# Patient Record
Sex: FEMALE | Race: BLACK OR AFRICAN AMERICAN | NOT HISPANIC OR LATINO | URBAN - METROPOLITAN AREA
[De-identification: names, ages, dates, MRNs, and addresses within clinical notes are randomized per-mention and may not be internally consistent; named-entity substitution may affect disease eponyms.]

---

## 2017-06-01 ENCOUNTER — OUTPATIENT (OUTPATIENT)
Dept: OUTPATIENT SERVICES | Facility: HOSPITAL | Age: 72
LOS: 1 days | End: 2017-06-01
Payer: MEDICAID

## 2017-06-06 DIAGNOSIS — R69 ILLNESS, UNSPECIFIED: ICD-10-CM

## 2017-07-19 ENCOUNTER — LABORATORY RESULT (OUTPATIENT)
Age: 72
End: 2017-07-19

## 2017-07-19 ENCOUNTER — RESULT REVIEW (OUTPATIENT)
Age: 72
End: 2017-07-19

## 2017-07-19 ENCOUNTER — APPOINTMENT (OUTPATIENT)
Dept: SURGERY | Facility: CLINIC | Age: 72
End: 2017-07-19

## 2017-07-19 VITALS
SYSTOLIC BLOOD PRESSURE: 166 MMHG | BODY MASS INDEX: 24.99 KG/M2 | HEIGHT: 65 IN | HEART RATE: 84 BPM | WEIGHT: 150 LBS | DIASTOLIC BLOOD PRESSURE: 77 MMHG

## 2017-07-19 DIAGNOSIS — Z87.448 PERSONAL HISTORY OF OTHER DISEASES OF URINARY SYSTEM: ICD-10-CM

## 2017-07-19 DIAGNOSIS — Z86.79 PERSONAL HISTORY OF OTHER DISEASES OF THE CIRCULATORY SYSTEM: ICD-10-CM

## 2017-07-19 DIAGNOSIS — Z80.0 FAMILY HISTORY OF MALIGNANT NEOPLASM OF DIGESTIVE ORGANS: ICD-10-CM

## 2017-07-19 DIAGNOSIS — Z99.2 DEPENDENCE ON RENAL DIALYSIS: ICD-10-CM

## 2017-07-19 DIAGNOSIS — Z87.718 PERSONAL HISTORY OF OTHER SPECIFIED (CORRECTED) CONGENITAL MALFORMATIONS OF GENITOURINARY SYSTEM: ICD-10-CM

## 2017-07-22 PROBLEM — Z80.0 FAMILY HISTORY OF THROAT CANCER: Status: ACTIVE | Noted: 2017-07-22

## 2017-07-22 PROBLEM — Z99.2 HEMODIALYSIS PATIENT: Status: RESOLVED | Noted: 2017-07-22 | Resolved: 2017-07-22

## 2017-07-22 PROBLEM — Z87.448 HISTORY OF END STAGE RENAL DISEASE: Status: RESOLVED | Noted: 2017-07-22 | Resolved: 2017-07-22

## 2017-07-22 PROBLEM — Z87.718 HISTORY OF POLYCYSTIC KIDNEY DISEASE: Status: RESOLVED | Noted: 2017-07-22 | Resolved: 2017-07-22

## 2017-07-22 PROBLEM — Z86.79 HISTORY OF HYPERTENSION: Status: RESOLVED | Noted: 2017-07-22 | Resolved: 2017-07-22

## 2017-08-01 PROCEDURE — G9001: CPT

## 2018-01-24 ENCOUNTER — APPOINTMENT (OUTPATIENT)
Dept: SURGERY | Facility: CLINIC | Age: 73
End: 2018-01-24
Payer: MEDICARE

## 2018-01-24 PROCEDURE — 99213 OFFICE O/P EST LOW 20 MIN: CPT

## 2018-02-12 ENCOUNTER — FORM ENCOUNTER (OUTPATIENT)
Age: 73
End: 2018-02-12

## 2018-02-13 ENCOUNTER — OUTPATIENT (OUTPATIENT)
Dept: OUTPATIENT SERVICES | Facility: HOSPITAL | Age: 73
LOS: 1 days | End: 2018-02-13
Payer: MEDICARE

## 2018-02-13 ENCOUNTER — APPOINTMENT (OUTPATIENT)
Dept: ULTRASOUND IMAGING | Facility: CLINIC | Age: 73
End: 2018-02-13
Payer: MEDICARE

## 2018-02-13 DIAGNOSIS — Z00.8 ENCOUNTER FOR OTHER GENERAL EXAMINATION: ICD-10-CM

## 2018-02-13 PROCEDURE — 76536 US EXAM OF HEAD AND NECK: CPT

## 2018-02-13 PROCEDURE — 76536 US EXAM OF HEAD AND NECK: CPT | Mod: 26

## 2018-07-03 ENCOUNTER — APPOINTMENT (OUTPATIENT)
Dept: INTERNAL MEDICINE | Facility: CLINIC | Age: 73
End: 2018-07-03

## 2018-07-23 ENCOUNTER — APPOINTMENT (OUTPATIENT)
Dept: SURGERY | Facility: CLINIC | Age: 73
End: 2018-07-23

## 2018-07-25 ENCOUNTER — APPOINTMENT (OUTPATIENT)
Dept: SURGERY | Facility: CLINIC | Age: 73
End: 2018-07-25
Payer: MEDICARE

## 2018-07-25 PROCEDURE — 99213 OFFICE O/P EST LOW 20 MIN: CPT

## 2018-11-05 ENCOUNTER — NON-APPOINTMENT (OUTPATIENT)
Age: 73
End: 2018-11-05

## 2018-11-05 ENCOUNTER — APPOINTMENT (OUTPATIENT)
Dept: INTERNAL MEDICINE | Facility: CLINIC | Age: 73
End: 2018-11-05
Payer: MEDICARE

## 2018-11-05 VITALS
RESPIRATION RATE: 18 BRPM | DIASTOLIC BLOOD PRESSURE: 70 MMHG | HEIGHT: 65 IN | TEMPERATURE: 98.6 F | OXYGEN SATURATION: 98 % | HEART RATE: 70 BPM | WEIGHT: 149 LBS | SYSTOLIC BLOOD PRESSURE: 151 MMHG | BODY MASS INDEX: 24.83 KG/M2

## 2018-11-05 DIAGNOSIS — I82.622 ACUTE EMBOLISM AND THROMBOSIS OF DEEP VEINS OF LEFT UPPER EXTREMITY: ICD-10-CM

## 2018-11-05 DIAGNOSIS — Z82.49 FAMILY HISTORY OF ISCHEMIC HEART DISEASE AND OTHER DISEASES OF THE CIRCULATORY SYSTEM: ICD-10-CM

## 2018-11-05 PROCEDURE — 99204 OFFICE O/P NEW MOD 45 MIN: CPT | Mod: 25

## 2018-11-05 PROCEDURE — 93000 ELECTROCARDIOGRAM COMPLETE: CPT

## 2018-11-05 RX ORDER — DICLOFENAC SODIUM 1 %
KIT TOPICAL
Refills: 0 | Status: DISCONTINUED | COMMUNITY
End: 2018-11-05

## 2018-11-05 RX ORDER — CYCLOBENZAPRINE HYDROCHLORIDE 5 MG/1
5 TABLET, FILM COATED ORAL
Refills: 0 | Status: DISCONTINUED | COMMUNITY
End: 2018-11-05

## 2018-11-05 RX ORDER — PANTOPRAZOLE SODIUM 40 MG/1
40 TABLET, DELAYED RELEASE ORAL
Refills: 0 | Status: DISCONTINUED | COMMUNITY
End: 2018-11-05

## 2018-11-11 LAB
25(OH)D3 SERPL-MCNC: 26.9 NG/ML
ALBUMIN SERPL ELPH-MCNC: 4.2 G/DL
ALP BLD-CCNC: 69 U/L
ALT SERPL-CCNC: 9 U/L
ANION GAP SERPL CALC-SCNC: 16 MMOL/L
AST SERPL-CCNC: 18 U/L
BASOPHILS # BLD AUTO: 0.02 K/UL
BASOPHILS NFR BLD AUTO: 0.5 %
BILIRUB SERPL-MCNC: 0.3 MG/DL
BUN SERPL-MCNC: 21 MG/DL
CALCIUM SERPL-MCNC: 9.3 MG/DL
CHLORIDE SERPL-SCNC: 96 MMOL/L
CHOLEST SERPL-MCNC: 161 MG/DL
CHOLEST/HDLC SERPL: 2.3 RATIO
CO2 SERPL-SCNC: 25 MMOL/L
CREAT SERPL-MCNC: 4.97 MG/DL
EOSINOPHIL # BLD AUTO: 0.24 K/UL
EOSINOPHIL NFR BLD AUTO: 5.6 %
GLUCOSE SERPL-MCNC: 111 MG/DL
HCT VFR BLD CALC: 35.6 %
HDLC SERPL-MCNC: 69 MG/DL
HGB BLD-MCNC: 11.5 G/DL
IMM GRANULOCYTES NFR BLD AUTO: 0 %
LDLC SERPL CALC-MCNC: 71 MG/DL
LYMPHOCYTES # BLD AUTO: 1.62 K/UL
LYMPHOCYTES NFR BLD AUTO: 38 %
MAN DIFF?: NORMAL
MCHC RBC-ENTMCNC: 29.9 PG
MCHC RBC-ENTMCNC: 32.3 GM/DL
MCV RBC AUTO: 92.5 FL
MONOCYTES # BLD AUTO: 0.37 K/UL
MONOCYTES NFR BLD AUTO: 8.7 %
NEUTROPHILS # BLD AUTO: 2.01 K/UL
NEUTROPHILS NFR BLD AUTO: 47.2 %
PLATELET # BLD AUTO: 220 K/UL
POTASSIUM SERPL-SCNC: 4.5 MMOL/L
PROT SERPL-MCNC: 7.4 G/DL
RBC # BLD: 3.85 M/UL
RBC # FLD: 14.2 %
SODIUM SERPL-SCNC: 137 MMOL/L
TRIGL SERPL-MCNC: 103 MG/DL
TSH SERPL-ACNC: 1.05 UIU/ML
WBC # FLD AUTO: 4.26 K/UL

## 2018-12-20 ENCOUNTER — NON-APPOINTMENT (OUTPATIENT)
Age: 73
End: 2018-12-20

## 2018-12-20 ENCOUNTER — APPOINTMENT (OUTPATIENT)
Dept: INTERNAL MEDICINE | Facility: CLINIC | Age: 73
End: 2018-12-20
Payer: MEDICARE

## 2018-12-20 VITALS
BODY MASS INDEX: 24.83 KG/M2 | OXYGEN SATURATION: 96 % | DIASTOLIC BLOOD PRESSURE: 83 MMHG | RESPIRATION RATE: 18 BRPM | WEIGHT: 149 LBS | HEIGHT: 65 IN | SYSTOLIC BLOOD PRESSURE: 193 MMHG | HEART RATE: 68 BPM | TEMPERATURE: 98.6 F

## 2018-12-20 DIAGNOSIS — Z79.01 LONG TERM (CURRENT) USE OF ANTICOAGULANTS: ICD-10-CM

## 2018-12-20 DIAGNOSIS — Z76.89 PERSONS ENCOUNTERING HEALTH SERVICES IN OTHER SPECIFIED CIRCUMSTANCES: ICD-10-CM

## 2018-12-20 PROCEDURE — 99215 OFFICE O/P EST HI 40 MIN: CPT | Mod: 25

## 2018-12-20 PROCEDURE — 93000 ELECTROCARDIOGRAM COMPLETE: CPT

## 2018-12-20 RX ORDER — OXYCODONE HYDROCHLORIDE AND ACETAMINOPHEN 5; 325 MG/1; MG/1
5-325 TABLET ORAL
Refills: 0 | Status: DISCONTINUED | COMMUNITY
End: 2018-12-20

## 2018-12-20 RX ORDER — WARFARIN 5 MG/1
5 TABLET ORAL
Qty: 60 | Refills: 0 | Status: DISCONTINUED | COMMUNITY
Start: 2016-11-21 | End: 2018-12-20

## 2019-01-28 ENCOUNTER — APPOINTMENT (OUTPATIENT)
Dept: SURGERY | Facility: CLINIC | Age: 74
End: 2019-01-28
Payer: MEDICARE

## 2019-01-28 PROCEDURE — 99213 OFFICE O/P EST LOW 20 MIN: CPT

## 2019-01-28 NOTE — PHYSICAL EXAM
[de-identified] : No cervical or supraclavicular adenopathy, trachea deviating to the right with left lobe extending behind clavicle. Enlarged. [Normal] : orientation to person, place, and time: normal

## 2019-01-28 NOTE — HISTORY OF PRESENT ILLNESS
[de-identified] : Patient referred by Dr. Dickinson  for evaluation of enlarging left substernal goiter. Patient reports a needle biopsy was performed several years ago report not available. Thyroid ultrasound July 2017:  Right lobe 4.8 x 1.7 x 1.6 CM with subcentimeter nodules largest lower pole  7 mm rim calcified  stable. Left lobe 6 x 2.3 x 2.3 CM  with lower pole 4.3 x 4.4 x 2.3 CM increased from 3.7 x 3.8 x 3 CM. Patient denies dysphagia or change in voice. Patient received radiation 2 years ago for left breast cancer.\par biopsy 7/2017 benign,. denies symptoms or recent illness\par last US 2/2018 slight increase in dominant left nodule,   US 7/31/18. stable  and repeat US 1/2019 no change,  denies recent illlness  denies dysphagia, SOB or pain.

## 2019-02-05 ENCOUNTER — APPOINTMENT (OUTPATIENT)
Dept: INTERNAL MEDICINE | Facility: CLINIC | Age: 74
End: 2019-02-05
Payer: MEDICARE

## 2019-02-05 VITALS
HEART RATE: 77 BPM | WEIGHT: 150 LBS | HEIGHT: 65 IN | TEMPERATURE: 98.6 F | OXYGEN SATURATION: 97 % | RESPIRATION RATE: 18 BRPM | BODY MASS INDEX: 24.99 KG/M2 | DIASTOLIC BLOOD PRESSURE: 69 MMHG | SYSTOLIC BLOOD PRESSURE: 123 MMHG

## 2019-02-05 DIAGNOSIS — Z87.898 PERSONAL HISTORY OF OTHER SPECIFIED CONDITIONS: ICD-10-CM

## 2019-02-05 DIAGNOSIS — R68.83 CHILLS (WITHOUT FEVER): ICD-10-CM

## 2019-02-05 DIAGNOSIS — I10 ESSENTIAL (PRIMARY) HYPERTENSION: ICD-10-CM

## 2019-02-05 PROCEDURE — 99215 OFFICE O/P EST HI 40 MIN: CPT

## 2019-02-05 PROCEDURE — 99497 ADVNCD CARE PLAN 30 MIN: CPT

## 2019-02-06 LAB
ALBUMIN SERPL ELPH-MCNC: 4.4 G/DL
ALP BLD-CCNC: 65 U/L
ALT SERPL-CCNC: <5 U/L
ANION GAP SERPL CALC-SCNC: 18 MMOL/L
AST SERPL-CCNC: 16 U/L
BASOPHILS # BLD AUTO: 0.02 K/UL
BASOPHILS NFR BLD AUTO: 0.4 %
BILIRUB SERPL-MCNC: 0.4 MG/DL
BUN SERPL-MCNC: 38 MG/DL
CALCIUM SERPL-MCNC: 9.8 MG/DL
CHLORIDE SERPL-SCNC: 100 MMOL/L
CO2 SERPL-SCNC: 24 MMOL/L
CREAT SERPL-MCNC: 8.6 MG/DL
EOSINOPHIL # BLD AUTO: 0.12 K/UL
EOSINOPHIL NFR BLD AUTO: 2.5 %
GLUCOSE SERPL-MCNC: 125 MG/DL
HCT VFR BLD CALC: 40.3 %
HGB BLD-MCNC: 12.2 G/DL
IMM GRANULOCYTES NFR BLD AUTO: 0 %
LYMPHOCYTES # BLD AUTO: 1.44 K/UL
LYMPHOCYTES NFR BLD AUTO: 30.1 %
MAN DIFF?: NORMAL
MCHC RBC-ENTMCNC: 29.2 PG
MCHC RBC-ENTMCNC: 30.3 GM/DL
MCV RBC AUTO: 96.4 FL
MONOCYTES # BLD AUTO: 0.34 K/UL
MONOCYTES NFR BLD AUTO: 7.1 %
NEUTROPHILS # BLD AUTO: 2.86 K/UL
NEUTROPHILS NFR BLD AUTO: 59.9 %
PLATELET # BLD AUTO: 158 K/UL
POTASSIUM SERPL-SCNC: 4.5 MMOL/L
PROT SERPL-MCNC: 6.9 G/DL
RBC # BLD: 4.18 M/UL
RBC # FLD: 14.8 %
SODIUM SERPL-SCNC: 141 MMOL/L
TSH SERPL-ACNC: 1.25 UIU/ML
WBC # FLD AUTO: 4.78 K/UL

## 2019-02-12 ENCOUNTER — RX RENEWAL (OUTPATIENT)
Age: 74
End: 2019-02-12

## 2019-03-05 ENCOUNTER — APPOINTMENT (OUTPATIENT)
Dept: TRANSPLANT | Facility: CLINIC | Age: 74
End: 2019-03-05

## 2019-03-05 ENCOUNTER — LABORATORY RESULT (OUTPATIENT)
Age: 74
End: 2019-03-05

## 2019-03-05 ENCOUNTER — APPOINTMENT (OUTPATIENT)
Dept: NEPHROLOGY | Facility: CLINIC | Age: 74
End: 2019-03-05
Payer: COMMERCIAL

## 2019-03-05 ENCOUNTER — APPOINTMENT (OUTPATIENT)
Dept: TRANSPLANT | Facility: CLINIC | Age: 74
End: 2019-03-05
Payer: COMMERCIAL

## 2019-03-05 VITALS
HEART RATE: 65 BPM | HEIGHT: 65 IN | DIASTOLIC BLOOD PRESSURE: 77 MMHG | WEIGHT: 150 LBS | SYSTOLIC BLOOD PRESSURE: 142 MMHG | TEMPERATURE: 98.4 F | BODY MASS INDEX: 24.99 KG/M2 | RESPIRATION RATE: 17 BRPM

## 2019-03-05 VITALS
SYSTOLIC BLOOD PRESSURE: 142 MMHG | BODY MASS INDEX: 24.96 KG/M2 | HEART RATE: 65 BPM | TEMPERATURE: 98.4 F | OXYGEN SATURATION: 97 % | WEIGHT: 150 LBS | RESPIRATION RATE: 17 BRPM | DIASTOLIC BLOOD PRESSURE: 77 MMHG

## 2019-03-05 PROCEDURE — 99204 OFFICE O/P NEW MOD 45 MIN: CPT

## 2019-03-05 PROCEDURE — 99205 OFFICE O/P NEW HI 60 MIN: CPT

## 2019-03-05 NOTE — HISTORY OF PRESENT ILLNESS
[FreeTextEntry1] : 73 years old female, born in [place]\par Patient has known CKD (), cysts on her kidneys, PKD,  HTN (age 29), ESRD () on follow up with Dr. Sukumar Rooney at [facility] (Previously Dr. Green) is here for pre kidney transplant evaluation. \par She is accompanied by her daughter Giselle today.\par She has no known DM ; HTN (age 29). H/o Hyperlipidemia/ Gout\par Was on Coumadin in  for keeping her vascular access open. Off since 2018.\par No known h/o kidney stone \par No hematuria. H/o one unit Transfusion\par Urine out put: None\par Has no h/o Pneumonia / UTI.\par h/o breast cancer () Left breast lumpectomy () at [facility], Dr. Parks. Had radiation. Followed up at [facility] at [facility]. Last follow up in 2019, has been ok.\par No known h/o active CAD/CVA/PVD/DVT/neoplasia/active infections/bleeding.\par Reports no major allergies. Does not take any blood thinners. No known h/o tuberculosis or hepatitis.\par Most recent hospitalization/for: Right ankle fracture () after falling on ice.\par Past surgeries:\par Breast lump removed, ankle fracture repair, Right adrenal mass removed, no cancer (), Left arm AVF.\par Hysterectomy for fibroid, \par No history of kidney/ bladder  surgery.\par Non smoker.\par Fam: Parents are . Father - throat cancer  Mother- at 36, aneurysm HTN Siblings- 1 sister, 3 brothers.\par Children: 4 children, daughter Giselle , 50 is the youngest, Oldest daughter passed away after having a Flue, CAD. Also she had PKD. Oldest daugter's daughter also has PKD, aneurysm.\par Patient had check up for aneurysm and was told to be ok. She had brain MRI and [facility] and [facility].\par \par Has family history of kidney disease- Daughter and grand daughter and grandson.\par Independent for ADL. She lives by herself. But grand daughter and grand son live near by.\par Able to walk ten blocks, can climb stairs without difficulty.\par ROS: Has h/o shortness of breath on exertion. No h/o Sleep apnea. Has h/o Thyroid disease- had thyroid lump biopsied in 2018 was told to be benign. Followed by Dr. Verma..\par Functional/employment status:Retired from [employer].\par Daughter Giselle is on medical leave from [facility]. She was LPN.\par Dialysis history: [facility] [place]\par Potential Live donors:Being explored\par \par Prior Studies:\par Cardiology:Dr. Will at [facility], had stress test.\par Cancer Screen: PAP, mammogram- had, has had a colonoscopy in the past, has one scheduled for 3/13/19.Dr. Huerta, at [facility] Primary MD: Dr. Paulino\par

## 2019-03-05 NOTE — REASON FOR VISIT
[Initial Evaluation] : an initial evaluation [Family Member] : family member [FreeTextEntry1] : Pre kidney transplant evaluation

## 2019-03-05 NOTE — PHYSICAL EXAM
[General Appearance - Alert] : alert [General Appearance - In No Acute Distress] : in no acute distress [Oriented To Time, Place, And Person] : oriented to person, place, and time [Impaired Insight] : insight and judgment were intact [Affect] : the affect was normal [Sclera] : the sclera and conjunctiva were normal [PERRL With Normal Accommodation] : pupils were equal in size, round, and reactive to light [Extraocular Movements] : extraocular movements were intact [Outer Ear] : the ears and nose were normal in appearance [Oropharynx] : the oropharynx was normal [Neck Appearance] : the appearance of the neck was normal [Neck Cervical Mass (___cm)] : no neck mass was observed [Jugular Venous Distention Increased] : there was no jugular-venous distention [Thyroid Nodule] : there were no palpable thyroid nodules [Auscultation Breath Sounds / Voice Sounds] : lungs were clear to auscultation bilaterally [Heart Rate And Rhythm] : heart rate was normal and rhythm regular [Heart Sounds] : normal S1 and S2 [Heart Sounds Gallop] : no gallops [Murmurs] : no murmurs [Heart Sounds Pericardial Friction Rub] : no pericardial rub [Full Pulse] : the pedal pulses are present [Edema] : there was no peripheral edema [Bowel Sounds] : normal bowel sounds [Abdomen Soft] : soft [Abdomen Tenderness] : non-tender [FreeTextEntry1] : Right kidney palpable [Cervical Lymph Nodes Enlarged Posterior Bilaterally] : posterior cervical [Cervical Lymph Nodes Enlarged Anterior Bilaterally] : anterior cervical [Supraclavicular Lymph Nodes Enlarged Bilaterally] : supraclavicular [Axillary Lymph Nodes Enlarged Bilaterally] : axillary [Inguinal Lymph Nodes Enlarged Bilaterally] : inguinal [Involuntary Movements] : no involuntary movements were seen [___ (cm) Fistula] : [unfilled] (cm) fistula [Bruit] : a bruit was present [Thrill] : a thrill was present [] : no rash [No Focal Deficits] : no focal deficits

## 2019-03-05 NOTE — ASSESSMENT
[FreeTextEntry1] : .Ms. DOBSON 73 year She is evaluated for kidney transplantation.\par Pre transplant/ESRD: Patient will benefit from renal allotransplantation he is an acceptable/ moderate risk candidate, diabetes, CAD, PVD..\par Medical risks: Cardiovascular, cancer screening.\par Hypertension: Discussed implications. Continue follow up with primary physicians.\par Cardiac risk:  will get further evaluation; echo, stress test; Reviewed cardiovascular risk reduction strategies\par Cancer screening: PAP/Mammo.  Colon corrina screening. Has known h/o neoplastic disease- breast Ca in 2014.\par ID: Serology for acute and chronic viral infections. Screening for latent TB.- update\par Imaging: Renal/abdominal /chest /Iliac/ carotids imaging/ Aneurysm screening\par Consults: Nutrition, social work, cardiology, Transplant surgery, Oncology.\par Reviewed factors affecting survival and morbidity while on wait list and reviewed napoleon-operative and long-term risk factors affecting outcome in kidney transplantation.\par Details of transplant surgery, immunosuppression and its complications and benefits of live donor transplantation as well as variability in wait times across regions and multiple listing were discussed. KDPI >85% and PHS high risk criteria donors were discussed. Discussed factors affecting morbidity and mortality while on hemodialysis.\par Patient has potential live donor (possible ) at present. \par Will proceed with completing/ updating work up and listing for transplant/ live donor transplant once work up is reviewed and found to be ok.\par

## 2019-03-08 LAB
ABO + RH PNL BLD: NORMAL
ALBUMIN SERPL ELPH-MCNC: 4.3 G/DL
ALP BLD-CCNC: 67 U/L
ALT SERPL-CCNC: 7 U/L
ANION GAP SERPL CALC-SCNC: 17 MMOL/L
AST SERPL-CCNC: 17 U/L
BASOPHILS # BLD AUTO: 0.03 K/UL
BASOPHILS NFR BLD AUTO: 0.8 %
BILIRUB SERPL-MCNC: 0.4 MG/DL
BUN SERPL-MCNC: 38 MG/DL
C PEPTIDE SERPL-MCNC: 5.3 NG/ML
CALCIUM SERPL-MCNC: 9.3 MG/DL
CHLORIDE SERPL-SCNC: 101 MMOL/L
CMV IGG SERPL QL: 3.1 U/ML
CMV IGG SERPL-IMP: POSITIVE
CO2 SERPL-SCNC: 23 MMOL/L
CREAT SERPL-MCNC: 7.92 MG/DL
EBV DNA SERPL NAA+PROBE-ACNC: NOT DETECTED IU/ML
EBV EA AB SER IA-ACNC: <5 U/ML
EBV EA AB TITR SER IF: POSITIVE
EBV EA IGG SER QL IA: 236 U/ML
EBV EA IGG SER-ACNC: NEGATIVE
EBV EA IGM SER IA-ACNC: NEGATIVE
EBV PATRN SPEC IB-IMP: NORMAL
EBV VCA IGG SER IA-ACNC: >750 U/ML
EBV VCA IGM SER QL IA: <10 U/ML
EBVPCR LOG: NOT DETECTED LOGIU/ML
EOSINOPHIL # BLD AUTO: 0.17 K/UL
EOSINOPHIL NFR BLD AUTO: 4.7 %
EPSTEIN-BARR VIRUS CAPSID ANTIGEN IGG: POSITIVE
GLUCOSE SERPL-MCNC: 90 MG/DL
HAV IGM SER QL: NONREACTIVE
HBA1C MFR BLD HPLC: 4.6 %
HBV CORE IGG+IGM SER QL: NONREACTIVE
HBV SURFACE AB SER QL: REACTIVE
HBV SURFACE AG SER QL: NONREACTIVE
HCG SERPL-MCNC: 2 MIU/ML
HCT VFR BLD CALC: 36.7 %
HCV AB SER QL: NONREACTIVE
HCV S/CO RATIO: 0.08 S/CO
HGB BLD-MCNC: 11.3 G/DL
HIV1+2 AB SPEC QL IA.RAPID: NONREACTIVE
HSV 1+2 IGG SER IA-IMP: NEGATIVE
HSV 1+2 IGG SER IA-IMP: POSITIVE
HSV1 IGG SER QL: 49.8 INDEX
HSV2 IGG SER QL: 0.18 INDEX
IMM GRANULOCYTES NFR BLD AUTO: 0.3 %
LYMPHOCYTES # BLD AUTO: 1.47 K/UL
LYMPHOCYTES NFR BLD AUTO: 40.5 %
M TB IFN-G BLD-IMP: NEGATIVE
MAGNESIUM SERPL-MCNC: 2.2 MG/DL
MAN DIFF?: NORMAL
MCHC RBC-ENTMCNC: 29.9 PG
MCHC RBC-ENTMCNC: 30.8 GM/DL
MCV RBC AUTO: 97.1 FL
MONOCYTES # BLD AUTO: 0.38 K/UL
MONOCYTES NFR BLD AUTO: 10.5 %
NEUTROPHILS # BLD AUTO: 1.57 K/UL
NEUTROPHILS NFR BLD AUTO: 43.2 %
PHOSPHATE SERPL-MCNC: 5.3 MG/DL
PLATELET # BLD AUTO: 82 K/UL
POTASSIUM SERPL-SCNC: 4.8 MMOL/L
PROT SERPL-MCNC: 6.7 G/DL
QUANTIFERON TB PLUS MITOGEN MINUS NIL: 8.09 IU/ML
QUANTIFERON TB PLUS NIL: 0.02 IU/ML
QUANTIFERON TB PLUS TB1 MINUS NIL: 0.01 IU/ML
QUANTIFERON TB PLUS TB2 MINUS NIL: 0 IU/ML
RBC # BLD: 3.78 M/UL
RBC # FLD: 13.4 %
RUBV IGG FLD-ACNC: 13.4 INDEX
RUBV IGG SER-IMP: POSITIVE
SODIUM SERPL-SCNC: 141 MMOL/L
T GONDII AB SER-IMP: NEGATIVE
T GONDII IGG SER QL: <3 IU/ML
T PALLIDUM AB SER QL IA: NEGATIVE
URATE SERPL-MCNC: 3.3 MG/DL
VZV AB TITR SER: POSITIVE
VZV IGG SER IF-ACNC: 354.8 INDEX
WBC # FLD AUTO: 3.63 K/UL

## 2019-03-19 ENCOUNTER — OUTPATIENT (OUTPATIENT)
Dept: OUTPATIENT SERVICES | Facility: HOSPITAL | Age: 74
LOS: 1 days | Discharge: HOME | End: 2019-03-19

## 2019-03-19 DIAGNOSIS — D69.6 THROMBOCYTOPENIA, UNSPECIFIED: ICD-10-CM

## 2019-03-25 ENCOUNTER — RX RENEWAL (OUTPATIENT)
Age: 74
End: 2019-03-25

## 2019-03-26 ENCOUNTER — RESULT REVIEW (OUTPATIENT)
Age: 74
End: 2019-03-26

## 2019-03-26 ENCOUNTER — APPOINTMENT (OUTPATIENT)
Dept: HEMATOLOGY ONCOLOGY | Facility: CLINIC | Age: 74
End: 2019-03-26
Payer: COMMERCIAL

## 2019-03-26 VITALS
SYSTOLIC BLOOD PRESSURE: 134 MMHG | RESPIRATION RATE: 16 BRPM | BODY MASS INDEX: 24.79 KG/M2 | DIASTOLIC BLOOD PRESSURE: 74 MMHG | HEART RATE: 67 BPM | TEMPERATURE: 98.7 F | OXYGEN SATURATION: 95 % | HEIGHT: 65.12 IN | WEIGHT: 148.81 LBS

## 2019-03-26 DIAGNOSIS — R05 COUGH: ICD-10-CM

## 2019-03-26 DIAGNOSIS — J06.9 ACUTE UPPER RESPIRATORY INFECTION, UNSPECIFIED: ICD-10-CM

## 2019-03-26 DIAGNOSIS — L29.9 PRURITUS, UNSPECIFIED: ICD-10-CM

## 2019-03-26 DIAGNOSIS — Z80.1 FAMILY HISTORY OF MALIGNANT NEOPLASM OF TRACHEA, BRONCHUS AND LUNG: ICD-10-CM

## 2019-03-26 DIAGNOSIS — Z82.71 FAMILY HISTORY OF POLYCYSTIC KIDNEY: ICD-10-CM

## 2019-03-26 LAB
BASOPHILS # BLD AUTO: 0 K/UL — SIGNIFICANT CHANGE UP (ref 0–0.2)
BASOPHILS NFR BLD AUTO: 0 % — SIGNIFICANT CHANGE UP (ref 0–2)
EOSINOPHIL # BLD AUTO: 0.3 K/UL — SIGNIFICANT CHANGE UP (ref 0–0.5)
EOSINOPHIL NFR BLD AUTO: 6 % — SIGNIFICANT CHANGE UP (ref 0–6)
HCT VFR BLD CALC: 28.9 % — LOW (ref 34.5–45)
HGB BLD-MCNC: 9.6 G/DL — LOW (ref 11.5–15.5)
LYMPHOCYTES # BLD AUTO: 1.3 K/UL — SIGNIFICANT CHANGE UP (ref 1–3.3)
LYMPHOCYTES # BLD AUTO: 29.3 % — SIGNIFICANT CHANGE UP (ref 13–44)
MCHC RBC-ENTMCNC: 30.5 PG — SIGNIFICANT CHANGE UP (ref 27–34)
MCHC RBC-ENTMCNC: 33.4 G/DL — SIGNIFICANT CHANGE UP (ref 32–36)
MCV RBC AUTO: 91.4 FL — SIGNIFICANT CHANGE UP (ref 80–100)
MONOCYTES # BLD AUTO: 0.3 K/UL — SIGNIFICANT CHANGE UP (ref 0–0.9)
MONOCYTES NFR BLD AUTO: 7.6 % — SIGNIFICANT CHANGE UP (ref 2–14)
NEUTROPHILS # BLD AUTO: 2.5 K/UL — SIGNIFICANT CHANGE UP (ref 1.8–7.4)
NEUTROPHILS NFR BLD AUTO: 57 % — SIGNIFICANT CHANGE UP (ref 43–77)
PLATELET # BLD AUTO: 169 K/UL — SIGNIFICANT CHANGE UP (ref 150–400)
RBC # BLD: 3.16 M/UL — LOW (ref 3.8–5.2)
RBC # FLD: 13.4 % — SIGNIFICANT CHANGE UP (ref 10.3–14.5)
WBC # BLD: 4.4 K/UL — SIGNIFICANT CHANGE UP (ref 3.8–10.5)
WBC # FLD AUTO: 4.4 K/UL — SIGNIFICANT CHANGE UP (ref 3.8–10.5)

## 2019-03-26 PROCEDURE — 99205 OFFICE O/P NEW HI 60 MIN: CPT

## 2019-03-26 RX ORDER — OLOPATADINE HYDROCHLORIDE 2 MG/ML
0.2 SOLUTION OPHTHALMIC DAILY
Qty: 1 | Refills: 1 | Status: DISCONTINUED | COMMUNITY
Start: 2019-02-05 | End: 2019-03-26

## 2019-03-26 RX ORDER — NIFEDIPINE 60 MG/1
60 TABLET, FILM COATED, EXTENDED RELEASE ORAL
Qty: 90 | Refills: 0 | Status: DISCONTINUED | COMMUNITY
Start: 2019-03-25 | End: 2019-03-26

## 2019-03-26 RX ORDER — METOPROLOL SUCCINATE 100 MG/1
100 TABLET, EXTENDED RELEASE ORAL
Qty: 90 | Refills: 0 | Status: DISCONTINUED | COMMUNITY
Start: 2019-02-12 | End: 2019-03-26

## 2019-03-26 NOTE — REASON FOR VISIT
[Initial Consultation] : an initial consultation for [FreeTextEntry2] : evaluation for kidney transplant

## 2019-03-26 NOTE — RESULTS/DATA
[FreeTextEntry1] : Today's CBC (On 3/26/19) wbc 4.4 with normal diff,  hb 9.6 plt 169\par \par The peripheral smear was reviewed. The neutrophils were normal, 1 hyperlobated neutrophil. Red cells -some microcytosis present, no increase in retics, no increase in schistocytes; no target cells or basophilic stippling noted; few elliptocytes present. Platelets normal in number and morphology\par \par The previous medical records were reviewed. \par On 3/6/19 wbc 3.1 hb 10.7 plt 74\par On 3/5/19 wbc 3.6 Hb 11.7 plt 82\par On 2/5/19 wbc 4.8 hb 12.2 plt 158\par On 11/5/18 wbc 4.2 Hb 11.5 plt 220\par

## 2019-03-26 NOTE — REVIEW OF SYSTEMS
[Joint Pain] : joint pain [Negative] : Allergic/Immunologic [Fever] : no fever [Chills] : no chills [Night Sweats] : no night sweats [Fatigue] : no fatigue [Recent Change In Weight] : ~T no recent weight change [Eye Pain] : no eye pain [Red Eyes] : eyes not red [Dry Eyes] : no dryness of the eyes [Dysphagia] : no dysphagia [Loss of Hearing] : no loss of hearing [Nosebleeds] : no nosebleeds [Hoarseness] : no hoarseness [Odynophagia] : no odynophagia [Mucosal Pain] : no mucosal pain [Chest Pain] : no chest pain [Palpitations] : no palpitations [Lower Ext Edema] : no lower extremity edema [Vomiting] : no vomiting [Constipation] : no constipation [Diarrhea] : no diarrhea [Dysuria] : no dysuria [Incontinence] : no incontinence [Vaginal Discharge] : no vaginal discharge [Dysmenorrhea/Abn Vaginal Bleeding] : no dysmenorrhea/abnormal vaginal bleeding [Joint Stiffness] : no joint stiffness [Muscle Pain] : no muscle pain [Skin Rash] : no skin rash [Skin Wound] : no skin wound [Confused] : no confusion [Dizziness] : no dizziness [Fainting] : no fainting [Difficulty Walking] : no difficulty walking [Insomnia] : no insomnia [Anxiety] : no anxiety [Depression] : no depression [Hot Flashes] : no hot flashes [Easy Bleeding] : no tendency for easy bleeding [Easy Bruising] : no tendency for easy bruising [Swollen Glands] : no swollen glands [FreeTextEntry7] : colonoscopy on 3/13/19 -polyps. EGD on 3/13/19 -erosion [FreeTextEntry8] : on HD on Mon/Wed/Fri; anuric. No UTI's. PAP smear Feb 2019. Mammogram to be done on 4/2/19 [FreeTextEntry9] : chronic back pain

## 2019-03-26 NOTE — HISTORY OF PRESENT ILLNESS
[de-identified] : Ms. Adam was referred to my office for abnormal blood counts, needing hematological clearance prior to renal transplant. She had blood work done as part of her monthly blood at hemodialysis -on 3/5/19 wbc 3.6 Hb 11.7 plt 82. She was referred for leukopenia and thrombocytopenia. According to the patient and her daughter, she has not had abnormal counts in the past; she denied bleeding/bruising. They both recalled that at the time the blood was drawn, the patient was 'getting over the flu' -she had fevers and cough. At this time, she feels well.

## 2019-03-26 NOTE — CONSULT LETTER
[Dear  ___] : Dear  [unfilled], [Consult Letter:] : I had the pleasure of evaluating your patient, [unfilled]. [Please see my note below.] : Please see my note below. [Consult Closing:] : Thank you very much for allowing me to participate in the care of this patient.  If you have any questions, please do not hesitate to contact me. [Sincerely,] : Sincerely, [DrTanya  ___] : Dr. FARMER [DrTanya ___] : Dr. FARMER [___] : [unfilled]

## 2019-03-26 NOTE — PHYSICAL EXAM
[Restricted in physically strenuous activity but ambulatory and able to carry out work of a light or sedentary nature] : Status 1- Restricted in physically strenuous activity but ambulatory and able to carry out work of a light or sedentary nature, e.g., light house work, office work [Normal] : affect appropriate [de-identified] : L arm fistula with bruit

## 2019-03-26 NOTE — ASSESSMENT
[FreeTextEntry1] : 74 yo F with multiple medical problems including CRI on hemodialysis from PCKD, hx breast cancer in remission, here for evaluation of abnormal blood counts prior to kidney transplant\par \par -pt had leukopenia and thrombocytopenia on blood tests from 3/5/19 and 3/6/19; these have completely resolved, and wbc count today is 4.4k/ul with a normal differential, platelets of 169k/ul. The most likely reason for these was marrow suppression from viral infection -as per HPI, patient had 'the flu' at the time. Peripheral blood smear was also reviewed which showed no signs of hematological malignancy (ie. MDS or leukemia)\par \par -at today's visit, pt has anemia which is slightly worsened from prior measurements. Will speak to nephrologist to find out the dose of Epo she gets with HD. Meantime, will work up anemia: check iron studies, Epo level, B12/folate level and SPEP/YAEL/Ig's/free light chains to eval for monoclonal gammopathy. If abnormal, further testing will be done, as indicated\par \par -discussed with patient and daughter Giselle at length results of her tests and plan; if work up for anemia is negative, then most likely pt has anemia of renal insufficiency (on HD) and Epo may need to be given at this time. If no monoclonal protein present, then patient is hematologically cleared for renal transplant\par \par -follow up in 3 months as needed (if monoclonal protein detected); will discuss results with pt when they become available

## 2019-04-09 ENCOUNTER — OUTPATIENT (OUTPATIENT)
Dept: OUTPATIENT SERVICES | Facility: HOSPITAL | Age: 74
LOS: 1 days | End: 2019-04-09
Payer: COMMERCIAL

## 2019-04-09 ENCOUNTER — APPOINTMENT (OUTPATIENT)
Dept: ULTRASOUND IMAGING | Facility: CLINIC | Age: 74
End: 2019-04-09
Payer: COMMERCIAL

## 2019-04-09 ENCOUNTER — APPOINTMENT (OUTPATIENT)
Dept: RADIOLOGY | Facility: CLINIC | Age: 74
End: 2019-04-09
Payer: COMMERCIAL

## 2019-04-09 ENCOUNTER — NON-APPOINTMENT (OUTPATIENT)
Age: 74
End: 2019-04-09

## 2019-04-09 ENCOUNTER — APPOINTMENT (OUTPATIENT)
Dept: CARDIOLOGY | Facility: CLINIC | Age: 74
End: 2019-04-09
Payer: COMMERCIAL

## 2019-04-09 VITALS
SYSTOLIC BLOOD PRESSURE: 140 MMHG | HEIGHT: 65.12 IN | OXYGEN SATURATION: 98 % | WEIGHT: 150 LBS | DIASTOLIC BLOOD PRESSURE: 62 MMHG | BODY MASS INDEX: 24.99 KG/M2 | HEART RATE: 73 BPM

## 2019-04-09 DIAGNOSIS — Z01.818 ENCOUNTER FOR OTHER PREPROCEDURAL EXAMINATION: ICD-10-CM

## 2019-04-09 PROCEDURE — 76700 US EXAM ABDOM COMPLETE: CPT | Mod: 26,59

## 2019-04-09 PROCEDURE — 71046 X-RAY EXAM CHEST 2 VIEWS: CPT

## 2019-04-09 PROCEDURE — 71046 X-RAY EXAM CHEST 2 VIEWS: CPT | Mod: 26

## 2019-04-09 PROCEDURE — 93975 VASCULAR STUDY: CPT

## 2019-04-09 PROCEDURE — 93976 VASCULAR STUDY: CPT | Mod: 26

## 2019-04-09 PROCEDURE — 93000 ELECTROCARDIOGRAM COMPLETE: CPT

## 2019-04-09 PROCEDURE — 99204 OFFICE O/P NEW MOD 45 MIN: CPT

## 2019-04-09 PROCEDURE — 76700 US EXAM ABDOM COMPLETE: CPT

## 2019-05-02 NOTE — PHYSICAL EXAM
[General Appearance - Well Developed] : well developed [Normal Appearance] : normal appearance [Well Groomed] : well groomed [General Appearance - Well Nourished] : well nourished [No Deformities] : no deformities [General Appearance - In No Acute Distress] : no acute distress [Conjunctiva] : the conjunctiva were normal in both eyes [EOM Intact] : extraocular movements were intact [PERRL] : pupils were equal in size, round, and reactive to light [Normal Oral Mucosa] : normal oral mucosa [No Oral Pallor] : no oral pallor [No Oral Cyanosis] : no oral cyanosis [Normal Oropharynx] : normal oropharynx [Normal Jugular Venous A Waves Present] : normal jugular venous A waves present [Normal Jugular Venous V Waves Present] : normal jugular venous V waves present [No Jugular Venous Silverio A Waves] : no jugular venous silverio A waves [5th Left ICS - MCL] : palpated at the 5th LICS in the midclavicular line [No Precordial Heave] : no precordial heave was noted [Normal Rate] : normal [Normal] : normal [Rhythm Regular] : regular [Normal S1] : normal S1 [Normal S2] : normal S2 [I] : a grade 1 [2+] : left 2+ [No Pitting Edema] : no pitting edema present [] : no respiratory distress [Respiration, Rhythm And Depth] : normal respiratory rhythm and effort [Auscultation Breath Sounds / Voice Sounds] : lungs were clear to auscultation bilaterally [Exaggerated Use Of Accessory Muscles For Inspiration] : no accessory muscle use [Bowel Sounds] : normal bowel sounds [Abdomen Tenderness] : non-tender [Abdomen Soft] : soft [Gait - Sufficient For Exercise Testing] : the gait was sufficient for exercise testing [Abnormal Walk] : normal gait [Nail Clubbing] : no clubbing of the fingernails [Cyanosis, Localized] : no localized cyanosis [Skin Color & Pigmentation] : normal skin color and pigmentation [No Venous Stasis] : no venous stasis [No Xanthoma] : no  xanthoma was observed [Oriented To Time, Place, And Person] : oriented to person, place, and time [Affect] : the affect was normal [Impaired Insight] : insight and judgment were intact [Mood] : the mood was normal [No Anxiety] : not feeling anxious [Yellow Sclera (Icteric)] : no scleral icterus was seen [FreeTextEntry1] : poor dentition (wears dentures) [Right Carotid Bruit] : no bruit heard over the right carotid [Left Carotid Bruit] : no bruit heard over the left carotid

## 2019-05-02 NOTE — DISCUSSION/SUMMARY
[FreeTextEntry1] : Patient is a very pleasant 73 year-old woman with no known coronary artery disease and a negative ischemic evaluation from one year ago.\par \par Patient has appointment to follow-up with Caesar Will MD at [facility] on 5/21/2019. Will request repeat echocardiogram and nuclear stress test be performed at that time as it has been on year since her last ischemic evaluation.

## 2019-05-02 NOTE — HISTORY OF PRESENT ILLNESS
[FreeTextEntry1] : Patient is a 73 year-old woman with known cardiovascular risk factors of hypertension, ESRD on HD [id number] via left brachial AV fistula, who presents today for cardiac evaluation prior to possible renal transplant.\par \par Patient walks for exercise. She lives on the second floor. She does not report any recent chest pain, shortness of breath, syncope, or presyncope. More than a year ago, while on a higher dose of clonidine than she currently takes, she had an episode of syncope that was felt to be due to overmedication.\par \par She had a normal pharmacologic nuclear stress test in May 2018 with her cardiologist [facility] with [place]. She has had no changes in cardiovascular health since.\par \par PMD: Olive Paulino DO [phone]\par Breast Surgeon: Usman David MD [phone]\par Cardiologist: Caesar Will MD [phone] - next appointment is May 21, 2019\par Nephrologist: Dylon Rooney MD [phone]\par Oncologist: Kelli Levy MD [phone]

## 2019-05-16 ENCOUNTER — APPOINTMENT (OUTPATIENT)
Dept: HEPATOLOGY | Facility: CLINIC | Age: 74
End: 2019-05-16

## 2019-05-17 ENCOUNTER — OUTPATIENT (OUTPATIENT)
Dept: OUTPATIENT SERVICES | Facility: HOSPITAL | Age: 74
LOS: 1 days | Discharge: HOME | End: 2019-05-17

## 2019-05-17 ENCOUNTER — RESULT REVIEW (OUTPATIENT)
Age: 74
End: 2019-05-17

## 2019-05-17 ENCOUNTER — APPOINTMENT (OUTPATIENT)
Dept: HEMATOLOGY ONCOLOGY | Facility: CLINIC | Age: 74
End: 2019-05-17

## 2019-05-17 DIAGNOSIS — D69.6 THROMBOCYTOPENIA, UNSPECIFIED: ICD-10-CM

## 2019-05-17 LAB
BASOPHILS # BLD AUTO: 0 K/UL — SIGNIFICANT CHANGE UP (ref 0–0.2)
BASOPHILS NFR BLD AUTO: 0.6 % — SIGNIFICANT CHANGE UP (ref 0–2)
EOSINOPHIL # BLD AUTO: 0.2 K/UL — SIGNIFICANT CHANGE UP (ref 0–0.5)
EOSINOPHIL NFR BLD AUTO: 6 % — SIGNIFICANT CHANGE UP (ref 0–6)
HCT VFR BLD CALC: 35.4 % — SIGNIFICANT CHANGE UP (ref 34.5–45)
HGB BLD-MCNC: 11.8 G/DL — SIGNIFICANT CHANGE UP (ref 11.5–15.5)
LYMPHOCYTES # BLD AUTO: 1.4 K/UL — SIGNIFICANT CHANGE UP (ref 1–3.3)
LYMPHOCYTES # BLD AUTO: 42.5 % — SIGNIFICANT CHANGE UP (ref 13–44)
MCHC RBC-ENTMCNC: 30.9 PG — SIGNIFICANT CHANGE UP (ref 27–34)
MCHC RBC-ENTMCNC: 33.4 G/DL — SIGNIFICANT CHANGE UP (ref 32–36)
MCV RBC AUTO: 92.6 FL — SIGNIFICANT CHANGE UP (ref 80–100)
MONOCYTES # BLD AUTO: 0.3 K/UL — SIGNIFICANT CHANGE UP (ref 0–0.9)
MONOCYTES NFR BLD AUTO: 8.1 % — SIGNIFICANT CHANGE UP (ref 2–14)
NEUTROPHILS # BLD AUTO: 1.5 K/UL — LOW (ref 1.8–7.4)
NEUTROPHILS NFR BLD AUTO: 42.8 % — LOW (ref 43–77)
PLATELET # BLD AUTO: 110 K/UL — LOW (ref 150–400)
RBC # BLD: 3.82 M/UL — SIGNIFICANT CHANGE UP (ref 3.8–5.2)
RBC # FLD: 13 % — SIGNIFICANT CHANGE UP (ref 10.3–14.5)
WBC # BLD: 3.4 K/UL — LOW (ref 3.8–10.5)
WBC # FLD AUTO: 3.4 K/UL — LOW (ref 3.8–10.5)

## 2019-05-20 LAB
ALBUMIN MFR SERPL ELPH: 62.2 %
ALBUMIN SERPL ELPH-MCNC: 4.4 G/DL
ALBUMIN SERPL-MCNC: 4.2 G/DL
ALBUMIN/GLOB SERPL: 1.7 RATIO
ALP BLD-CCNC: 80 U/L
ALPHA1 GLOB MFR SERPL ELPH: 4.1 %
ALPHA1 GLOB SERPL ELPH-MCNC: 0.3 G/DL
ALPHA2 GLOB MFR SERPL ELPH: 7.4 %
ALPHA2 GLOB SERPL ELPH-MCNC: 0.5 G/DL
ALT SERPL-CCNC: 6 U/L
ANION GAP SERPL CALC-SCNC: 13 MMOL/L
AST SERPL-CCNC: 16 U/L
B-GLOBULIN MFR SERPL ELPH: 8.2 %
B-GLOBULIN SERPL ELPH-MCNC: 0.5 G/DL
BILIRUB SERPL-MCNC: 0.4 MG/DL
BUN SERPL-MCNC: 21 MG/DL
CALCIUM SERPL-MCNC: 9.3 MG/DL
CHLORIDE SERPL-SCNC: 96 MMOL/L
CO2 SERPL-SCNC: 27 MMOL/L
CREAT SERPL-MCNC: 4.55 MG/DL
DEPRECATED KAPPA LC FREE/LAMBDA SER: 4.24 RATIO
DEPRECATED KAPPA LC FREE/LAMBDA SER: 4.24 RATIO
FERRITIN SERPL-MCNC: 969 NG/ML
FOLATE SERPL-MCNC: >20 NG/ML
GAMMA GLOB FLD ELPH-MCNC: 1.2 G/DL
GAMMA GLOB MFR SERPL ELPH: 18.1 %
GLUCOSE SERPL-MCNC: 110 MG/DL
IGA SER QL IEP: 166 MG/DL
IGG SER QL IEP: 1264 MG/DL
IGM SER QL IEP: 25 MG/DL
INTERPRETATION SERPL IEP-IMP: NORMAL
IRON SATN MFR SERPL: NORMAL %
IRON SERPL-MCNC: 160 UG/DL
KAPPA LC CSF-MCNC: 7.29 MG/DL
KAPPA LC CSF-MCNC: 7.29 MG/DL
KAPPA LC SERPL-MCNC: 30.89 MG/DL
KAPPA LC SERPL-MCNC: 30.89 MG/DL
M PROTEIN MFR SERPL ELPH: 11.1 %
M PROTEIN SPEC IFE-MCNC: NORMAL
MONOCLON BAND OBS SERPL: 0.7 G/DL
POTASSIUM SERPL-SCNC: 3.8 MMOL/L
PROT SERPL-MCNC: 6.7 G/DL
SODIUM SERPL-SCNC: 136 MMOL/L
TIBC SERPL-MCNC: NORMAL UG/DL
UIBC SERPL-MCNC: <20 UG/DL
VIT B12 SERPL-MCNC: 802 PG/ML

## 2019-05-21 LAB — EPO SERPL-MCNC: 3.4 MIU/ML

## 2019-05-23 ENCOUNTER — APPOINTMENT (OUTPATIENT)
Dept: HEPATOLOGY | Facility: CLINIC | Age: 74
End: 2019-05-23
Payer: MEDICARE

## 2019-05-23 VITALS
HEIGHT: 65 IN | BODY MASS INDEX: 24.32 KG/M2 | TEMPERATURE: 98.1 F | HEART RATE: 72 BPM | WEIGHT: 146 LBS | SYSTOLIC BLOOD PRESSURE: 164 MMHG | DIASTOLIC BLOOD PRESSURE: 69 MMHG

## 2019-05-23 PROCEDURE — 99204 OFFICE O/P NEW MOD 45 MIN: CPT

## 2019-05-23 NOTE — HISTORY OF PRESENT ILLNESS
[FreeTextEntry1] : Patient has known CKD (2002), PKD, HTN (age 29), ESRD (2002) currently being evaluated for  pre kidney transplant evaluation. She is accompanied by her daughter to the clinic today.\par \par She has no known DM, but gives long hx of HTN (age 29), and hx of Hyperlipidemia/ Gout. Her past medical and surgical hx includes h/o breast cancer (2014), left breast lumpectomy (2014) at [facility], Dr. Parks, and received radiation. She [place] [facility]. \par \par No known h/o active CAD/CVA/PVD/DVT/neoplasia/active infections/bleeding.\par \par Noted to have a dilated CBD, and multiple pancreatic cysts. She has long hx of hypertension, and has ESRD on HD  ( > 17 years).\par \par She is being referred for hepatology clearance.

## 2019-05-23 NOTE — REVIEW OF SYSTEMS
[Constipation] : constipation [Negative] : Heme/Lymph [Fever] : no fever [Chills] : no chills [FreeTextEntry8] : On HD

## 2019-05-23 NOTE — REASON FOR VISIT
[Initial Evaluation] : an initial evaluation [FreeTextEntry1] : Dilated bile duct, and pancreatic cysts

## 2019-05-23 NOTE — PHYSICAL EXAM
[Sclera] : the sclera and conjunctiva were normal [Outer Ear] : the ears and nose were normal in appearance [Neck Appearance] : the appearance of the neck was normal [Arterial Pulses Carotid] : carotid pulses were normal with no bruits [Bowel Sounds] : normal bowel sounds [Abdomen Soft] : soft [Abdomen Tenderness] : non-tender [] : no hepato-splenomegaly [Abdomen Mass (___ Cm)] : no abdominal mass palpated [Abdomen Hernia] : no hernia was discovered [Abnormal Walk] : normal gait [Cranial Nerves] : cranial nerves 2-12 were intact [Sensation] : the sensory exam was normal to light touch and pinprick [Motor Exam] : the motor exam was normal [Oriented To Time, Place, And Person] : oriented to person, place, and time [Affect] : the affect was normal [Scleral Icterus] : No Scleral Icterus [Spider Angioma] : No spider angioma(s) were observed [Abdominal  Ascites] : no ascites [FreeTextEntry1] : H

## 2019-05-29 ENCOUNTER — RX RENEWAL (OUTPATIENT)
Age: 74
End: 2019-05-29

## 2019-05-30 ENCOUNTER — APPOINTMENT (OUTPATIENT)
Dept: MRI IMAGING | Facility: CLINIC | Age: 74
End: 2019-05-30
Payer: COMMERCIAL

## 2019-05-30 ENCOUNTER — OUTPATIENT (OUTPATIENT)
Dept: OUTPATIENT SERVICES | Facility: HOSPITAL | Age: 74
LOS: 1 days | End: 2019-05-30
Payer: COMMERCIAL

## 2019-05-30 DIAGNOSIS — K83.8 OTHER SPECIFIED DISEASES OF BILIARY TRACT: ICD-10-CM

## 2019-05-30 PROCEDURE — 74181 MRI ABDOMEN W/O CONTRAST: CPT

## 2019-05-30 PROCEDURE — 74181 MRI ABDOMEN W/O CONTRAST: CPT | Mod: 26

## 2019-06-17 ENCOUNTER — APPOINTMENT (OUTPATIENT)
Dept: HEPATOLOGY | Facility: CLINIC | Age: 74
End: 2019-06-17
Payer: MEDICARE

## 2019-06-17 VITALS
DIASTOLIC BLOOD PRESSURE: 67 MMHG | TEMPERATURE: 98.3 F | HEIGHT: 65 IN | BODY MASS INDEX: 23.66 KG/M2 | RESPIRATION RATE: 16 BRPM | HEART RATE: 71 BPM | WEIGHT: 142 LBS | SYSTOLIC BLOOD PRESSURE: 152 MMHG

## 2019-06-17 PROCEDURE — 99213 OFFICE O/P EST LOW 20 MIN: CPT

## 2019-06-17 NOTE — HISTORY OF PRESENT ILLNESS
[FreeTextEntry1] : Patient has known CKD (2002), PKD, HTN (age 29), ESRD (2002) currently being evaluated for pre kidney transplant evaluation. She is accompanied by her daughter to the clinic today.\par \par She has no known DM, but gives long hx of HTN (age 29), and hx of Hyperlipidemia/ Gout. Her past medical and surgical hx includes h/o breast cancer (2014), left breast lumpectomy (2014) at [facility], Dr. Parks, and received radiation. She followed up at [facility]. \par \par No known h/o active CAD/CVA/PVD/DVT/neoplasia/active infections/bleeding.\par \par Noted to have a dilated CBD, and multiple pancreatic cysts. She has long hx of hypertension, and has ESRD on HD ( > 17 years).\par \par She is being referred for hepatology clearance. \par \par Interval hx (6/17/2019):\par \par Dilated common bile duct without evidence of intraluminal filling defect. Findings consistent with polycystic kidneys. Multiple cysts seen throughout the pancreatic body and tail. I have also personally reviewed the images with Eleno Go. Seems like there are two dominant cysts, one in the body, and the other in the uncinate. There are also several additional smaller cysts in the tail. We will plan for EUS/FNA. \par \par \par Review of Systems\par \par Constitutional: no fever and no chills. \par Gastrointestinal: constipation. \par Genitourinary:. On HD. \par Eyes, ENT, Respiratory, Musculoskeletal, Integumentary, Neurological, Psychiatric, Endocrine and Heme/Lymph are otherwise negative. \par  \par Physical Examination:\par Constitutional: Well-developed  in no acute distress.\par Eyes: Sclera anicteric, conjunctiva normal, pupils equal round and reactive to light, and extraocular movements intact.\par ENT: Ears and nose normal in appearance. Oropharynx normal with moist mucous membranes and no thrush.\par Cardiovascular: Regular rate and rhythm, normal S1 and S2, no murmurs, rubs, or gallops, no JVD.\par Respiratory: Normal respiratory rhythm and effort, lungs clear to auscultation bilaterally.\par Gastrointestinal: Normal bowel sounds, non-distended, soft, non-tender to palpation, no hepatomegaly, no splenomegaly, no palpable masses.\par Musculoskeletal: Normal gait, normal muscle tone, normal muscle strength, no clubbing or cyanosis of the fingernails, no peripheral edema.\par Skin: Normal skin turgor, no rash, no jaundice, no spider angiomas, no palmar erythema.\par Neurologic: Alert, oriented to person, place, and date, no asterixis.\par \par

## 2019-06-17 NOTE — ASSESSMENT
[FreeTextEntry1] : ESRD on HD, currently being evaluated for kidney transplant at [facility].  Multiple pancreatic cysts and dilated bile duct (14 mm), normal LFTs. \par \par Plan\par MRI shows Dilated common bile duct without evidence of intraluminal filling defect. Findings consistent with polycystic kidneys. Multiple cysts seen throughout the pancreatic body and tail. I have also personally reviewed the images with Eleno Go. Seems like there are two dominant cysts, one in the body, and the other in the uncinate. There are also several additional smaller cysts in the tail. We will plan for EUS/FNA.  d/d includes pancreatic pseudocyst, intraductal papillary mucinous neoplasm (IPMN), serous/mucinous cystadenoma, simple pancreatic cyst. I suspect her pancreatic cysts is part of their polycystic kidney disease process.\par \par Risks/benefits of the procedure was discussed in details with the patient (in presence of her daughter). Risks including not limited to infection, bleeding perforations, pancreatitis were all discussed.  All questions answered. Patient agreed to pursue the procedure.\par

## 2019-06-21 ENCOUNTER — OUTPATIENT (OUTPATIENT)
Dept: OUTPATIENT SERVICES | Facility: HOSPITAL | Age: 74
LOS: 1 days | Discharge: HOME | End: 2019-06-21

## 2019-06-21 DIAGNOSIS — D69.6 THROMBOCYTOPENIA, UNSPECIFIED: ICD-10-CM

## 2019-06-26 ENCOUNTER — APPOINTMENT (OUTPATIENT)
Dept: HEMATOLOGY ONCOLOGY | Facility: CLINIC | Age: 74
End: 2019-06-26
Payer: MEDICARE

## 2019-06-26 ENCOUNTER — RESULT REVIEW (OUTPATIENT)
Age: 74
End: 2019-06-26

## 2019-06-26 VITALS
TEMPERATURE: 97.6 F | SYSTOLIC BLOOD PRESSURE: 132 MMHG | RESPIRATION RATE: 16 BRPM | DIASTOLIC BLOOD PRESSURE: 63 MMHG | BODY MASS INDEX: 23.85 KG/M2 | HEART RATE: 67 BPM | OXYGEN SATURATION: 98 % | WEIGHT: 143.3 LBS

## 2019-06-26 LAB
BASOPHILS # BLD AUTO: 0 K/UL — SIGNIFICANT CHANGE UP (ref 0–0.2)
BASOPHILS NFR BLD AUTO: 0.6 % — SIGNIFICANT CHANGE UP (ref 0–2)
EOSINOPHIL # BLD AUTO: 0.2 K/UL — SIGNIFICANT CHANGE UP (ref 0–0.5)
EOSINOPHIL NFR BLD AUTO: 4.7 % — SIGNIFICANT CHANGE UP (ref 0–6)
HCT VFR BLD CALC: 26.4 % — LOW (ref 34.5–45)
HGB BLD-MCNC: 8.8 G/DL — LOW (ref 11.5–15.5)
LYMPHOCYTES # BLD AUTO: 1 K/UL — SIGNIFICANT CHANGE UP (ref 1–3.3)
LYMPHOCYTES # BLD AUTO: 31.3 % — SIGNIFICANT CHANGE UP (ref 13–44)
MCHC RBC-ENTMCNC: 31.4 PG — SIGNIFICANT CHANGE UP (ref 27–34)
MCHC RBC-ENTMCNC: 33.2 G/DL — SIGNIFICANT CHANGE UP (ref 32–36)
MCV RBC AUTO: 94.5 FL — SIGNIFICANT CHANGE UP (ref 80–100)
MONOCYTES # BLD AUTO: 0.5 K/UL — SIGNIFICANT CHANGE UP (ref 0–0.9)
MONOCYTES NFR BLD AUTO: 15.3 % — HIGH (ref 2–14)
NEUTROPHILS # BLD AUTO: 1.6 K/UL — LOW (ref 1.8–7.4)
NEUTROPHILS NFR BLD AUTO: 48.1 % — SIGNIFICANT CHANGE UP (ref 43–77)
PLATELET # BLD AUTO: 143 K/UL — LOW (ref 150–400)
RBC # BLD: 2.79 M/UL — LOW (ref 3.8–5.2)
RBC # FLD: 15.4 % — HIGH (ref 10.3–14.5)
WBC # BLD: 3.3 K/UL — LOW (ref 3.8–10.5)
WBC # FLD AUTO: 3.3 K/UL — LOW (ref 3.8–10.5)

## 2019-06-26 PROCEDURE — 99214 OFFICE O/P EST MOD 30 MIN: CPT

## 2019-06-26 RX ORDER — CALCIUM ACETATE 667 MG/1
667 CAPSULE ORAL 3 TIMES DAILY
Qty: 540 | Refills: 0 | Status: DISCONTINUED | COMMUNITY
Start: 2017-01-25 | End: 2019-06-26

## 2019-06-26 RX ORDER — LORATADINE 10 MG/1
10 TABLET ORAL
Refills: 0 | Status: DISCONTINUED | COMMUNITY
End: 2019-06-26

## 2019-06-26 NOTE — HISTORY OF PRESENT ILLNESS
[de-identified] : Ms. Adam was referred to my office for abnormal blood counts, needing hematological clearance prior to renal transplant. She had blood work done as part of her monthly blood at hemodialysis -on 3/5/19 wbc 3.6 Hb 11.7 plt 82. She was referred for leukopenia and thrombocytopenia. According to the patient and her daughter, she has not had abnormal counts in the past; she denied bleeding/bruising. They both recalled that at the time the blood was drawn, the patient was 'getting over the flu' -she had fevers and cough. At this time, she feels well.  [de-identified] : The patient is here for follow up of results done -initially had thrombocytopenia and low wbc count. Pt reports since last visit, she was seen by hepatology for pancreatic cysts -is awaiting EUS in July 2019. She has no c/o.

## 2019-06-26 NOTE — ASSESSMENT
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 MDS)\par \par -blood work done at last visit, on 5/20/19 showed normal B12/folate. She also had SPEP/YAEL/Ig's/free light chains to eval for monoclonal gammopathy -this showed M spike of 0.7g/dl, with a serum IgG kappa monoclonal protein. Most likely pt has MGUS, but will need BM bx to r/o smoldering myeloma/myeloma as a cause of anemia before giving clearance for renal transplant\par \par -discussed with patient and daughter Giselle at length results of her tests and plan\par \par -schedule BM bx  -send cytogenetics/FISH for MDS and myeloma, r/o MDS and r/o myeloma\par \par -follow up after BM bx to discuss results

## 2019-06-26 NOTE — CONSULT LETTER
[Please see my note below.] : Please see my note below. [Dear  ___] : Dear  [unfilled], [Sincerely,] : Sincerely, [Consult Closing:] : Thank you very much for allowing me to participate in the care of this patient.  If you have any questions, please do not hesitate to contact me. [DrTanya ___] : Dr. FARMER [DrTanya  ___] : Dr. FARMER [___] : [unfilled] [Courtesy Letter:] : I had the pleasure of seeing your patient, [unfilled], in my office today.

## 2019-06-26 NOTE — REVIEW OF SYSTEMS
[Joint Pain] : joint pain [Negative] : Endocrine [Fever] : no fever [Chills] : no chills [Night Sweats] : no night sweats [Eye Pain] : no eye pain [Fatigue] : no fatigue [Recent Change In Weight] : ~T no recent weight change [Dysphagia] : no dysphagia [Dry Eyes] : no dryness of the eyes [Red Eyes] : eyes not red [Loss of Hearing] : no loss of hearing [Nosebleeds] : no nosebleeds [Hoarseness] : no hoarseness [Chest Pain] : no chest pain [Odynophagia] : no odynophagia [Mucosal Pain] : no mucosal pain [Lower Ext Edema] : no lower extremity edema [Palpitations] : no palpitations [Vomiting] : no vomiting [Diarrhea] : no diarrhea [Constipation] : no constipation [Vaginal Discharge] : no vaginal discharge [Incontinence] : no incontinence [Dysuria] : no dysuria [Dysmenorrhea/Abn Vaginal Bleeding] : no dysmenorrhea/abnormal vaginal bleeding [Muscle Pain] : no muscle pain [Joint Stiffness] : no joint stiffness [Skin Wound] : no skin wound [Confused] : no confusion [Skin Rash] : no skin rash [Difficulty Walking] : no difficulty walking [Fainting] : no fainting [Dizziness] : no dizziness [Depression] : no depression [Anxiety] : no anxiety [Insomnia] : no insomnia [Hot Flashes] : no hot flashes [Easy Bruising] : no tendency for easy bruising [Easy Bleeding] : no tendency for easy bleeding [FreeTextEntry7] : colonoscopy on 3/13/19 -polyps. EGD on 3/13/19 -erosion [Swollen Glands] : no swollen glands [FreeTextEntry8] : on HD on Mon/Wed/Fri; anuric. No UTI's. PAP smear Feb 2019. Mammogram to be done on 4/2/19 [FreeTextEntry9] : chronic back pain

## 2019-07-09 ENCOUNTER — OUTPATIENT (OUTPATIENT)
Dept: OUTPATIENT SERVICES | Facility: HOSPITAL | Age: 74
LOS: 1 days | End: 2019-07-09
Payer: MEDICARE

## 2019-07-09 VITALS
DIASTOLIC BLOOD PRESSURE: 66 MMHG | HEIGHT: 65 IN | WEIGHT: 141.98 LBS | TEMPERATURE: 98 F | HEART RATE: 68 BPM | OXYGEN SATURATION: 98 % | RESPIRATION RATE: 16 BRPM | SYSTOLIC BLOOD PRESSURE: 151 MMHG

## 2019-07-09 DIAGNOSIS — K86.2 CYST OF PANCREAS: ICD-10-CM

## 2019-07-09 DIAGNOSIS — I10 ESSENTIAL (PRIMARY) HYPERTENSION: ICD-10-CM

## 2019-07-09 DIAGNOSIS — E27.9 DISORDER OF ADRENAL GLAND, UNSPECIFIED: Chronic | ICD-10-CM

## 2019-07-09 DIAGNOSIS — Z98.890 OTHER SPECIFIED POSTPROCEDURAL STATES: Chronic | ICD-10-CM

## 2019-07-09 DIAGNOSIS — Z90.710 ACQUIRED ABSENCE OF BOTH CERVIX AND UTERUS: Chronic | ICD-10-CM

## 2019-07-09 DIAGNOSIS — Z01.818 ENCOUNTER FOR OTHER PREPROCEDURAL EXAMINATION: ICD-10-CM

## 2019-07-09 DIAGNOSIS — Z87.81 PERSONAL HISTORY OF (HEALED) TRAUMATIC FRACTURE: Chronic | ICD-10-CM

## 2019-07-09 PROCEDURE — G0463: CPT

## 2019-07-09 NOTE — H&P PST ADULT - HISTORY OF PRESENT ILLNESS
74 year old female with PMH of HTN, ESRD on HD x 17 years ( M-W-F via left AV fistula), chronic lower back pain, thyroid nodule, left breast cancer s/p lumpectomy 2014 found to have multiple pancreatic cysts on imaging during the work ups for kidney transplant planned for Upper EUS FNA under anesthesia.     *** Leukopenia/ Thrombocytopenia followed by Heme, plan for Bone marrow biopsy on 7/22/19.

## 2019-07-09 NOTE — H&P PST ADULT - OTHER CARE PROVIDERS
Dr. Caesar Connors cardiologist last visit 5/2019, Dr. Jerzy elaine, Dr. Caesar Connors cardiologist last visit 5/2019 , Dr. Jerzy elaine, Dr. Nicci Vogt thyroid, [facility], Dr. Mane ( renal transplant), pain management

## 2019-07-09 NOTE — H&P PST ADULT - NSICDXPROBLEM_GEN_ALL_CORE_FT
PROBLEM DIAGNOSES  Problem: Pancreatic cyst  Assessment and Plan: planned for Upper EUS FNA under anesthesia.   cbc on sunrise   recent labs to [facility] from  center  preprocedure instructions discussed     Problem: Hypertension  Assessment and Plan: continue antihypertensive meds the day of procedure   will obtain recent Echo/stress report  continue prophylactic aspirin at night PROBLEM DIAGNOSES  Problem: Hypertension  Assessment and Plan: continue antihypertensive meds the day of procedure   will obtain recent Echo/stress report  continue prophylactic aspirin at night   K the day of procedure     Problem: Pancreatic cyst  Assessment and Plan: planned for Upper EUS FNA under anesthesia.   cbc on sunrise   recent labs to MMF from  center  preprocedure instructions discussed

## 2019-07-09 NOTE — H&P PST ADULT - NSICDXPASTMEDICALHX_GEN_ALL_CORE_FT
PAST MEDICAL HISTORY:  Anemia in ESRD (end-stage renal disease)     Anuria     Breast cancer, female left 2014    ESRD on hemodialysis     H/O gastroesophageal reflux (GERD)     Hypertension     Pancreatic cyst     Thrombocytopenia leukopenia: followed by heme, plan for BMBx 7/22/19    Thyroid nodule yearly Ultrasound, monitoring yearly PAST MEDICAL HISTORY:  Anemia in ESRD (end-stage renal disease)     Anuria     AV fistula thrombosis history: was treated with coumadin, no more A/C.    Breast cancer, female left 2014    ESRD on hemodialysis     H/O gastroesophageal reflux (GERD)     Hypertension     Pancreatic cyst     Thrombocytopenia leukopenia: followed by chele, plan for BMBx 7/22/19    Thyroid nodule yearly Ultrasound, monitoring yearly

## 2019-07-09 NOTE — H&P PST ADULT - NSICDXPASTSURGICALHX_GEN_ALL_CORE_FT
PAST SURGICAL HISTORY:  Adrenal mass excison 2015    History of fracture of right ankle 2014 repaired    S/P arteriovenous (AV) fistula creation 2002    S/P hysterectomy 1994    S/P lumpectomy, left breast 2014

## 2019-07-10 ENCOUNTER — FORM ENCOUNTER (OUTPATIENT)
Age: 74
End: 2019-07-10

## 2019-07-11 ENCOUNTER — OUTPATIENT (OUTPATIENT)
Dept: OUTPATIENT SERVICES | Facility: HOSPITAL | Age: 74
LOS: 1 days | End: 2019-07-11
Payer: MEDICARE

## 2019-07-11 ENCOUNTER — APPOINTMENT (OUTPATIENT)
Dept: ULTRASOUND IMAGING | Facility: CLINIC | Age: 74
End: 2019-07-11
Payer: MEDICARE

## 2019-07-11 DIAGNOSIS — Z00.8 ENCOUNTER FOR OTHER GENERAL EXAMINATION: ICD-10-CM

## 2019-07-11 DIAGNOSIS — E27.9 DISORDER OF ADRENAL GLAND, UNSPECIFIED: Chronic | ICD-10-CM

## 2019-07-11 DIAGNOSIS — Z87.81 PERSONAL HISTORY OF (HEALED) TRAUMATIC FRACTURE: Chronic | ICD-10-CM

## 2019-07-11 DIAGNOSIS — Z90.710 ACQUIRED ABSENCE OF BOTH CERVIX AND UTERUS: Chronic | ICD-10-CM

## 2019-07-11 DIAGNOSIS — Z98.890 OTHER SPECIFIED POSTPROCEDURAL STATES: Chronic | ICD-10-CM

## 2019-07-11 PROCEDURE — 76536 US EXAM OF HEAD AND NECK: CPT

## 2019-07-11 PROCEDURE — 76536 US EXAM OF HEAD AND NECK: CPT | Mod: 26

## 2019-07-18 ENCOUNTER — OUTPATIENT (OUTPATIENT)
Dept: OUTPATIENT SERVICES | Facility: HOSPITAL | Age: 74
LOS: 1 days | Discharge: HOME | End: 2019-07-18

## 2019-07-18 ENCOUNTER — APPOINTMENT (OUTPATIENT)
Dept: GASTROENTEROLOGY | Facility: HOSPITAL | Age: 74
End: 2019-07-18

## 2019-07-18 ENCOUNTER — OUTPATIENT (OUTPATIENT)
Dept: OUTPATIENT SERVICES | Facility: HOSPITAL | Age: 74
LOS: 1 days | End: 2019-07-18
Payer: MEDICARE

## 2019-07-18 DIAGNOSIS — Z90.710 ACQUIRED ABSENCE OF BOTH CERVIX AND UTERUS: Chronic | ICD-10-CM

## 2019-07-18 DIAGNOSIS — D69.6 THROMBOCYTOPENIA, UNSPECIFIED: ICD-10-CM

## 2019-07-18 DIAGNOSIS — Z98.890 OTHER SPECIFIED POSTPROCEDURAL STATES: Chronic | ICD-10-CM

## 2019-07-18 DIAGNOSIS — E27.9 DISORDER OF ADRENAL GLAND, UNSPECIFIED: Chronic | ICD-10-CM

## 2019-07-18 DIAGNOSIS — K86.2 CYST OF PANCREAS: ICD-10-CM

## 2019-07-18 DIAGNOSIS — Z87.81 PERSONAL HISTORY OF (HEALED) TRAUMATIC FRACTURE: Chronic | ICD-10-CM

## 2019-07-18 PROBLEM — Z87.19 PERSONAL HISTORY OF OTHER DISEASES OF THE DIGESTIVE SYSTEM: Chronic | Status: ACTIVE | Noted: 2019-07-09

## 2019-07-18 PROBLEM — N18.6 END STAGE RENAL DISEASE: Chronic | Status: ACTIVE | Noted: 2019-07-09

## 2019-07-18 PROBLEM — E04.1 NONTOXIC SINGLE THYROID NODULE: Chronic | Status: ACTIVE | Noted: 2019-07-09

## 2019-07-18 PROBLEM — I10 ESSENTIAL (PRIMARY) HYPERTENSION: Chronic | Status: ACTIVE | Noted: 2019-07-09

## 2019-07-18 PROBLEM — C50.919 MALIGNANT NEOPLASM OF UNSPECIFIED SITE OF UNSPECIFIED FEMALE BREAST: Chronic | Status: ACTIVE | Noted: 2019-07-09

## 2019-07-18 PROBLEM — R34 ANURIA AND OLIGURIA: Chronic | Status: ACTIVE | Noted: 2019-07-09

## 2019-07-18 PROBLEM — T82.868A THROMBOSIS DUE TO VASCULAR PROSTHETIC DEVICES, IMPLANTS AND GRAFTS, INITIAL ENCOUNTER: Chronic | Status: ACTIVE | Noted: 2019-07-09

## 2019-07-18 PROCEDURE — 43259 EGD US EXAM DUODENUM/JEJUNUM: CPT | Mod: GC

## 2019-07-18 PROCEDURE — 43235 EGD DIAGNOSTIC BRUSH WASH: CPT

## 2019-07-19 ENCOUNTER — OUTPATIENT (OUTPATIENT)
Dept: OUTPATIENT SERVICES | Facility: HOSPITAL | Age: 74
LOS: 1 days | End: 2019-07-19
Payer: MEDICARE

## 2019-07-19 DIAGNOSIS — Z98.890 OTHER SPECIFIED POSTPROCEDURAL STATES: Chronic | ICD-10-CM

## 2019-07-19 DIAGNOSIS — Z87.81 PERSONAL HISTORY OF (HEALED) TRAUMATIC FRACTURE: Chronic | ICD-10-CM

## 2019-07-19 DIAGNOSIS — D69.6 THROMBOCYTOPENIA, UNSPECIFIED: ICD-10-CM

## 2019-07-19 DIAGNOSIS — E27.9 DISORDER OF ADRENAL GLAND, UNSPECIFIED: Chronic | ICD-10-CM

## 2019-07-19 DIAGNOSIS — Z90.710 ACQUIRED ABSENCE OF BOTH CERVIX AND UTERUS: Chronic | ICD-10-CM

## 2019-07-22 ENCOUNTER — RESULT REVIEW (OUTPATIENT)
Age: 74
End: 2019-07-22

## 2019-07-22 ENCOUNTER — APPOINTMENT (OUTPATIENT)
Dept: HEMATOLOGY ONCOLOGY | Facility: CLINIC | Age: 74
End: 2019-07-22
Payer: MEDICARE

## 2019-07-22 VITALS
OXYGEN SATURATION: 95 % | WEIGHT: 141.53 LBS | TEMPERATURE: 97.7 F | DIASTOLIC BLOOD PRESSURE: 69 MMHG | BODY MASS INDEX: 23.55 KG/M2 | SYSTOLIC BLOOD PRESSURE: 133 MMHG | HEART RATE: 73 BPM | RESPIRATION RATE: 16 BRPM

## 2019-07-22 LAB
BASOPHILS # BLD AUTO: 0 K/UL — SIGNIFICANT CHANGE UP (ref 0–0.2)
BASOPHILS NFR BLD AUTO: 0.4 % — SIGNIFICANT CHANGE UP (ref 0–2)
EOSINOPHIL # BLD AUTO: 0.2 K/UL — SIGNIFICANT CHANGE UP (ref 0–0.5)
EOSINOPHIL NFR BLD AUTO: 6.5 % — HIGH (ref 0–6)
HCT VFR BLD CALC: 37.4 % — SIGNIFICANT CHANGE UP (ref 34.5–45)
HGB BLD-MCNC: 11.7 G/DL — SIGNIFICANT CHANGE UP (ref 11.5–15.5)
LYMPHOCYTES # BLD AUTO: 1.3 K/UL — SIGNIFICANT CHANGE UP (ref 1–3.3)
LYMPHOCYTES # BLD AUTO: 34.8 % — SIGNIFICANT CHANGE UP (ref 13–44)
MCHC RBC-ENTMCNC: 30.9 PG — SIGNIFICANT CHANGE UP (ref 27–34)
MCHC RBC-ENTMCNC: 31.1 G/DL — LOW (ref 32–36)
MCV RBC AUTO: 99.2 FL — SIGNIFICANT CHANGE UP (ref 80–100)
MONOCYTES # BLD AUTO: 0.4 K/UL — SIGNIFICANT CHANGE UP (ref 0–0.9)
MONOCYTES NFR BLD AUTO: 11.2 % — SIGNIFICANT CHANGE UP (ref 2–14)
NEUTROPHILS # BLD AUTO: 1.8 K/UL — SIGNIFICANT CHANGE UP (ref 1.8–7.4)
NEUTROPHILS NFR BLD AUTO: 47.2 % — SIGNIFICANT CHANGE UP (ref 43–77)
PLATELET # BLD AUTO: 128 K/UL — LOW (ref 150–400)
RBC # BLD: 3.77 M/UL — LOW (ref 3.8–5.2)
RBC # FLD: 15.2 % — HIGH (ref 10.3–14.5)
WBC # BLD: 3.8 K/UL — SIGNIFICANT CHANGE UP (ref 3.8–10.5)
WBC # FLD AUTO: 3.8 K/UL — SIGNIFICANT CHANGE UP (ref 3.8–10.5)

## 2019-07-22 PROCEDURE — 88342 IMHCHEM/IMCYTCHM 1ST ANTB: CPT | Mod: 26,59

## 2019-07-22 PROCEDURE — 87205 SMEAR GRAM STAIN: CPT

## 2019-07-22 PROCEDURE — 88189 FLOWCYTOMETRY/READ 16 & >: CPT

## 2019-07-22 PROCEDURE — 88360 TUMOR IMMUNOHISTOCHEM/MANUAL: CPT

## 2019-07-22 PROCEDURE — 88305 TISSUE EXAM BY PATHOLOGIST: CPT | Mod: 26

## 2019-07-22 PROCEDURE — 85097 BONE MARROW INTERPRETATION: CPT

## 2019-07-22 PROCEDURE — 88237 TISSUE CULTURE BONE MARROW: CPT

## 2019-07-22 PROCEDURE — 88264 CHROMOSOME ANALYSIS 20-25: CPT

## 2019-07-22 PROCEDURE — 88305 TISSUE EXAM BY PATHOLOGIST: CPT

## 2019-07-22 PROCEDURE — 88313 SPECIAL STAINS GROUP 2: CPT | Mod: 26

## 2019-07-22 PROCEDURE — 88185 FLOWCYTOMETRY/TC ADD-ON: CPT

## 2019-07-22 PROCEDURE — 88342 IMHCHEM/IMCYTCHM 1ST ANTB: CPT

## 2019-07-22 PROCEDURE — 88280 CHROMOSOME KARYOTYPE STUDY: CPT

## 2019-07-22 PROCEDURE — 88313 SPECIAL STAINS GROUP 2: CPT

## 2019-07-22 PROCEDURE — 88341 IMHCHEM/IMCYTCHM EA ADD ANTB: CPT | Mod: 26,59

## 2019-07-22 PROCEDURE — 88271 CYTOGENETICS DNA PROBE: CPT

## 2019-07-22 PROCEDURE — 88184 FLOWCYTOMETRY/ TC 1 MARKER: CPT

## 2019-07-22 PROCEDURE — 38222 DX BONE MARROW BX & ASPIR: CPT

## 2019-07-22 PROCEDURE — 88360 TUMOR IMMUNOHISTOCHEM/MANUAL: CPT | Mod: 26

## 2019-07-22 PROCEDURE — 88275 CYTOGENETICS 100-300: CPT

## 2019-07-22 PROCEDURE — 88341 IMHCHEM/IMCYTCHM EA ADD ANTB: CPT

## 2019-07-22 PROCEDURE — 88285 CHROMOSOME COUNT ADDITIONAL: CPT

## 2019-07-23 LAB — TM INTERPRETATION: SIGNIFICANT CHANGE UP

## 2019-07-23 NOTE — REASON FOR VISIT
[Bone Marrow Biopsy] : bone marrow biopsy [Bone Marrow Aspiration] : bone marrow aspiration [Family Member] : family member [FreeTextEntry2] : R/O MDS, Multiple Myeloma

## 2019-07-23 NOTE — PROCEDURE
[Bone Marrow Aspiration] : bone marrow aspiration  [Bone Marrow Biopsy] : bone marrow biopsy [Patient] : the patient [Correct positioning] : correct positioning [Patient identification verified] : patient identification verified [Prone] : prone [The right posterior iliac crest was prepped with betadine and draped, using sterile technique.] : The right posterior iliac crest was prepped with betadine and draped, using sterile technique. [Aspirate] : aspirate [Lidocaine was injected and into the periosteum overlying the site.] : Lidocaine was injected and into the periosteum overlying the site. [Cytogenetics] : cytogenetics [FISH] : FISH [Biopsy] : biopsy [Flow Cytometry] : flow cytometry [] : The patient was instructed to remove the bandage the following AM. The patient may bathe. Acetaminophen may be taken for discomfort, as per package directions.If there are any other problems, the patient was instructed to call the office. The patient verbalized understanding, and is aware of the office contact numbers. [FreeTextEntry1] : R/O MDS, Multiple Myeloma [FreeTextEntry2] : CBC prior to procedure.\par WBC 3.8\par Hgb 11.7\par Hct 37.4\par Plts 128\par

## 2019-07-25 LAB — HEMATOPATHOLOGY REPORT: SIGNIFICANT CHANGE UP

## 2019-08-01 NOTE — CONSULT LETTER
[Dear  ___] : Dear  [unfilled], [Courtesy Letter:] : I had the pleasure of seeing your patient, [unfilled], in my office today. [Please see my note below.] : Please see my note below. [Sincerely,] : Sincerely, [Consult Closing:] : Thank you very much for allowing me to participate in the care of this patient.  If you have any questions, please do not hesitate to contact me. [DrTanya  ___] : Dr. FARMER [DrTanya ___] : Dr. FARMER [___] : [unfilled]

## 2019-08-05 ENCOUNTER — APPOINTMENT (OUTPATIENT)
Dept: HEMATOLOGY ONCOLOGY | Facility: CLINIC | Age: 74
End: 2019-08-05
Payer: MEDICARE

## 2019-08-05 ENCOUNTER — RESULT REVIEW (OUTPATIENT)
Age: 74
End: 2019-08-05

## 2019-08-05 VITALS
OXYGEN SATURATION: 99 % | WEIGHT: 142.2 LBS | SYSTOLIC BLOOD PRESSURE: 168 MMHG | HEART RATE: 61 BPM | TEMPERATURE: 98 F | DIASTOLIC BLOOD PRESSURE: 71 MMHG | RESPIRATION RATE: 16 BRPM | BODY MASS INDEX: 23.66 KG/M2

## 2019-08-05 LAB
ALBUMIN SERPL ELPH-MCNC: 4.2 G/DL
ALP BLD-CCNC: 79 U/L
ALT SERPL-CCNC: 5 U/L
ANION GAP SERPL CALC-SCNC: 14 MMOL/L
AST SERPL-CCNC: 12 U/L
BASOPHILS # BLD AUTO: 0 K/UL — SIGNIFICANT CHANGE UP (ref 0–0.2)
BASOPHILS NFR BLD AUTO: 0.3 % — SIGNIFICANT CHANGE UP (ref 0–2)
BILIRUB SERPL-MCNC: 0.4 MG/DL
BUN SERPL-MCNC: 23 MG/DL
CALCIUM SERPL-MCNC: 8.5 MG/DL
CHLORIDE SERPL-SCNC: 98 MMOL/L
CO2 SERPL-SCNC: 23 MMOL/L
CREAT SERPL-MCNC: 4.69 MG/DL
EOSINOPHIL # BLD AUTO: 0.2 K/UL — SIGNIFICANT CHANGE UP (ref 0–0.5)
EOSINOPHIL NFR BLD AUTO: 5.2 % — SIGNIFICANT CHANGE UP (ref 0–6)
GLUCOSE SERPL-MCNC: 108 MG/DL
HCT VFR BLD CALC: 37.3 % — SIGNIFICANT CHANGE UP (ref 34.5–45)
HGB BLD-MCNC: 12.1 G/DL — SIGNIFICANT CHANGE UP (ref 11.5–15.5)
LYMPHOCYTES # BLD AUTO: 1.3 K/UL — SIGNIFICANT CHANGE UP (ref 1–3.3)
LYMPHOCYTES # BLD AUTO: 42 % — SIGNIFICANT CHANGE UP (ref 13–44)
MCHC RBC-ENTMCNC: 32.5 G/DL — SIGNIFICANT CHANGE UP (ref 32–36)
MCHC RBC-ENTMCNC: 32.7 PG — SIGNIFICANT CHANGE UP (ref 27–34)
MCV RBC AUTO: 101 FL — HIGH (ref 80–100)
MONOCYTES # BLD AUTO: 0.4 K/UL — SIGNIFICANT CHANGE UP (ref 0–0.9)
MONOCYTES NFR BLD AUTO: 12.1 % — SIGNIFICANT CHANGE UP (ref 2–14)
NEUTROPHILS # BLD AUTO: 1.3 K/UL — LOW (ref 1.8–7.4)
NEUTROPHILS NFR BLD AUTO: 40.4 % — LOW (ref 43–77)
PLATELET # BLD AUTO: 129 K/UL — LOW (ref 150–400)
POTASSIUM SERPL-SCNC: 4.6 MMOL/L
PROT SERPL-MCNC: 6.5 G/DL
RBC # BLD: 3.71 M/UL — LOW (ref 3.8–5.2)
RBC # FLD: 14.3 % — SIGNIFICANT CHANGE UP (ref 10.3–14.5)
SODIUM SERPL-SCNC: 135 MMOL/L
WBC # BLD: 3.2 K/UL — LOW (ref 3.8–10.5)
WBC # FLD AUTO: 3.2 K/UL — LOW (ref 3.8–10.5)

## 2019-08-05 PROCEDURE — 99213 OFFICE O/P EST LOW 20 MIN: CPT

## 2019-08-05 RX ORDER — METHOCARBAMOL 500 MG/1
500 TABLET, FILM COATED ORAL
Qty: 21 | Refills: 0 | Status: DISCONTINUED | COMMUNITY
Start: 2018-11-28 | End: 2019-08-05

## 2019-08-05 NOTE — REVIEW OF SYSTEMS
[Joint Pain] : joint pain [Negative] : Allergic/Immunologic [Fever] : no fever [Chills] : no chills [Night Sweats] : no night sweats [Fatigue] : no fatigue [Recent Change In Weight] : ~T no recent weight change [Eye Pain] : no eye pain [Red Eyes] : eyes not red [Dry Eyes] : no dryness of the eyes [Dysphagia] : no dysphagia [Loss of Hearing] : no loss of hearing [Nosebleeds] : no nosebleeds [Odynophagia] : no odynophagia [Hoarseness] : no hoarseness [Chest Pain] : no chest pain [Mucosal Pain] : no mucosal pain [Palpitations] : no palpitations [Lower Ext Edema] : no lower extremity edema [Vomiting] : no vomiting [Constipation] : no constipation [Diarrhea] : no diarrhea [Dysuria] : no dysuria [Incontinence] : no incontinence [Vaginal Discharge] : no vaginal discharge [Dysmenorrhea/Abn Vaginal Bleeding] : no dysmenorrhea/abnormal vaginal bleeding [Joint Stiffness] : no joint stiffness [Muscle Pain] : no muscle pain [Skin Rash] : no skin rash [Confused] : no confusion [Skin Wound] : no skin wound [Fainting] : no fainting [Dizziness] : no dizziness [Insomnia] : no insomnia [Difficulty Walking] : no difficulty walking [Depression] : no depression [Anxiety] : no anxiety [Easy Bruising] : no tendency for easy bruising [Hot Flashes] : no hot flashes [Easy Bleeding] : no tendency for easy bleeding [Swollen Glands] : no swollen glands [FreeTextEntry8] : on HD on Mon/Wed/Fri; anuric. No UTI's. PAP smear Feb 2019. Mammogram to be done on 4/2/19 [FreeTextEntry7] : colonoscopy on 3/13/19 -polyps. EGD on 3/13/19 -erosion [FreeTextEntry9] : chronic back pain

## 2019-08-05 NOTE — ASSESSMENT
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 Awaiting marrow cytogenetics and FISH studies, but marrow is normocellular, no evidence of dyserythropoiesis. Low counts may be due to medication  -would try to switch pt's Hydralazine if possible and reassess CBC. Plasma cells <10% c/w MGUS -thus, patient is hematologically cleared for renal transplant\par \par -blood work done at last visit, on 5/20/19 showed normal B12/folate. She also had SPEP/YAEL/Ig's/free light chains to eval for monoclonal gammopathy -this showed M spike of 0.7g/dl, with a serum IgG kappa monoclonal protein. Plasma cells <10% c/w MGUS\par \par -discussed with patient and daughter Giselle at length results of her tests and plan\par \par -follow up CBC in 3 months

## 2019-08-05 NOTE — HISTORY OF PRESENT ILLNESS
[de-identified] : Ms. Adam was referred to my office for abnormal blood counts, needing hematological clearance prior to renal transplant. She had blood work done as part of her monthly blood at hemodialysis -on 3/5/19 wbc 3.6 Hb 11.7 plt 82. She was referred for leukopenia and thrombocytopenia. According to the patient and her daughter, she has not had abnormal counts in the past; she denied bleeding/bruising. They both recalled that at the time the blood was drawn, the patient was 'getting over the flu' -she had fevers and cough. At this time, she feels well.  [de-identified] : The patient is here for follow up of results done -initially had thrombocytopenia and low wbc count. Since last visit, pt had EGD and 'it was good.' She has had no fevers, no bleeding.

## 2019-08-05 NOTE — RESULTS/DATA
[FreeTextEntry1] : Today's CBC (ON 8/5/19) wbc 3.2 hb 12.1 plt 129 ANC 1300\par \par On 6/26/19) wbc 3.3 Hb 8.8 plt 143 ANC 1600\par On 3/26/19) wbc 4.4 with normal diff,  hb 9.6 plt 169\par On 3/6/19 wbc 3.1 hb 10.7 plt 74\par On 3/5/19 wbc 3.6 Hb 11.7 plt 82\par On 2/5/19 wbc 4.8 hb 12.2 plt 158\par On 11/5/18 wbc 4.2 Hb 11.5 plt 220\par \par PATHOLOGY\par On 7/22/19 BM bx \par \par Final Diagnosis\par 1, 2. Bone marrow biopsy and bone marrow aspirate\par - Normocellular bone marrow with erythroid-predominant\par trilineage hematopoiesis and maturation, increased iron stores\par - Mild plasmacytosis (5-9% of cells) with subset kappa-light\par chain restricted\par \par \par

## 2019-08-06 ENCOUNTER — APPOINTMENT (OUTPATIENT)
Dept: INTERNAL MEDICINE | Facility: CLINIC | Age: 74
End: 2019-08-06
Payer: MEDICARE

## 2019-08-06 VITALS
OXYGEN SATURATION: 97 % | HEIGHT: 65 IN | TEMPERATURE: 97.9 F | WEIGHT: 142 LBS | SYSTOLIC BLOOD PRESSURE: 155 MMHG | BODY MASS INDEX: 23.66 KG/M2 | DIASTOLIC BLOOD PRESSURE: 72 MMHG | HEART RATE: 64 BPM

## 2019-08-06 VITALS — SYSTOLIC BLOOD PRESSURE: 130 MMHG | DIASTOLIC BLOOD PRESSURE: 60 MMHG

## 2019-08-06 DIAGNOSIS — Z92.89 PERSONAL HISTORY OF OTHER MEDICAL TREATMENT: ICD-10-CM

## 2019-08-06 DIAGNOSIS — Z86.69 PERSONAL HISTORY OF OTHER DISEASES OF THE NERVOUS SYSTEM AND SENSE ORGANS: ICD-10-CM

## 2019-08-06 LAB
ALBUMIN MFR SERPL ELPH: 61.9 %
ALBUMIN SERPL-MCNC: 4 G/DL
ALBUMIN/GLOB SERPL: 1.6 RATIO
ALPHA1 GLOB MFR SERPL ELPH: 3.8 %
ALPHA1 GLOB SERPL ELPH-MCNC: 0.2 G/DL
ALPHA2 GLOB MFR SERPL ELPH: 7.6 %
ALPHA2 GLOB SERPL ELPH-MCNC: 0.5 G/DL
B-GLOBULIN MFR SERPL ELPH: 8.5 %
B-GLOBULIN SERPL ELPH-MCNC: 0.6 G/DL
CHROM ANALY OVERALL INTERP SPEC-IMP: SIGNIFICANT CHANGE UP
DEPRECATED KAPPA LC FREE/LAMBDA SER: 4.67 RATIO
DEPRECATED KAPPA LC FREE/LAMBDA SER: 4.67 RATIO
GAMMA GLOB FLD ELPH-MCNC: 1.2 G/DL
GAMMA GLOB MFR SERPL ELPH: 18.2 %
IGA SER QL IEP: 162 MG/DL
IGG SER QL IEP: 1263 MG/DL
IGM SER QL IEP: 34 MG/DL
INTERPRETATION SERPL IEP-IMP: NORMAL
KAPPA LC CSF-MCNC: 8.01 MG/DL
KAPPA LC CSF-MCNC: 8.01 MG/DL
KAPPA LC SERPL-MCNC: 37.4 MG/DL
KAPPA LC SERPL-MCNC: 37.4 MG/DL
M PROTEIN MFR SERPL ELPH: 8.9 %
M PROTEIN SPEC IFE-MCNC: NORMAL
MONOCLON BAND OBS SERPL: 0.6 G/DL
PROT SERPL-MCNC: 6.5 G/DL
PROT SERPL-MCNC: 6.5 G/DL

## 2019-08-06 PROCEDURE — 99214 OFFICE O/P EST MOD 30 MIN: CPT

## 2019-08-09 LAB — CHROM ANALY INTERPHASE BLD FISH-IMP: SIGNIFICANT CHANGE UP

## 2019-09-23 ENCOUNTER — APPOINTMENT (OUTPATIENT)
Dept: HEPATOLOGY | Facility: CLINIC | Age: 74
End: 2019-09-23
Payer: MEDICARE

## 2019-09-23 VITALS
BODY MASS INDEX: 23.49 KG/M2 | HEART RATE: 71 BPM | WEIGHT: 141 LBS | HEIGHT: 65 IN | DIASTOLIC BLOOD PRESSURE: 68 MMHG | SYSTOLIC BLOOD PRESSURE: 129 MMHG | RESPIRATION RATE: 16 BRPM | TEMPERATURE: 98.2 F

## 2019-09-23 PROCEDURE — 99214 OFFICE O/P EST MOD 30 MIN: CPT

## 2019-09-27 NOTE — ASSESSMENT
[FreeTextEntry1] : ESRD on HD, currently being evaluated for kidney transplant at [facility].  Multiple pancreatic cysts and dilated bile duct (14 mm), normal LFTs. EUS reconfirmed the cystic lesion in the pancreas. No high risk feature or pancreatic mas was noted.\par I suspect her bile duct is dilated from long standing narcotics, and old age as the LFTs are normal. Mildly depressed PLT counts but no evidenced for any underlying liver disease with normal LFTs, normal appearing liver on US, and spleen size is normal.\par Patient is cleared from hepatology perspective for kidney transplant.\par \par Consider follow up imaging in 1 year for the pancreatic cyst with MRI.

## 2019-09-27 NOTE — HISTORY OF PRESENT ILLNESS
[FreeTextEntry1] : Patient has known CKD (2002), PKD, HTN (age 29), ESRD (2002) currently being evaluated for pre kidney transplant evaluation. She is accompanied by her daughter to the clinic today.\par \par She has no known DM, but gives long hx of HTN (age 29), and hx of Hyperlipidemia/ Gout. Her past medical and surgical hx includes h/o breast cancer (2014), left breast lumpectomy (2014) at [facility], Dr. Parks, and received radiation. She followed up at [facility]. \par \par No known h/o active CAD/CVA/PVD/DVT/neoplasia/active infections/bleeding.\par \par Noted to have a dilated CBD, and multiple pancreatic cysts. She has long hx of hypertension, and has ESRD on HD ( > 17 years).\par \par She is being referred for hepatology clearance. \par \par Interval hx: 9-\par \par Since last seen patient had an EGD/EUS by Dr. Caldera. This revealed multiple pancreatic cysts in the head. Body and tail. No high risk feature was reported. No FNA performed. A lipoma was also noted in the antrum.\par Blood test from August revealed CKD with elevated Cr. LFTs were unremarkable. CBC revealed depressed PLT count. US shows unremarkable appearing liver. Spleen was not enlarged (9 cm)

## 2019-09-27 NOTE — PHYSICAL EXAM
[Sclera] : the sclera and conjunctiva were normal [Outer Ear] : the ears and nose were normal in appearance [Neck Appearance] : the appearance of the neck was normal [Arterial Pulses Carotid] : carotid pulses were normal with no bruits [Bowel Sounds] : normal bowel sounds [Abdomen Soft] : soft [Abdomen Tenderness] : non-tender [] : no hepato-splenomegaly [Abdomen Mass (___ Cm)] : no abdominal mass palpated [Abnormal Walk] : normal gait [Abdomen Hernia] : no hernia was discovered [Cranial Nerves] : cranial nerves 2-12 were intact [Sensation] : the sensory exam was normal to light touch and pinprick [Motor Exam] : the motor exam was normal [Oriented To Time, Place, And Person] : oriented to person, place, and time [Affect] : the affect was normal [Scleral Icterus] : No Scleral Icterus [Spider Angioma] : No spider angioma(s) were observed [Abdominal  Ascites] : no ascites

## 2019-10-23 ENCOUNTER — OUTPATIENT (OUTPATIENT)
Dept: OUTPATIENT SERVICES | Facility: HOSPITAL | Age: 74
LOS: 1 days | Discharge: HOME | End: 2019-10-23

## 2019-10-23 DIAGNOSIS — Z98.890 OTHER SPECIFIED POSTPROCEDURAL STATES: Chronic | ICD-10-CM

## 2019-10-23 DIAGNOSIS — Z90.710 ACQUIRED ABSENCE OF BOTH CERVIX AND UTERUS: Chronic | ICD-10-CM

## 2019-10-23 DIAGNOSIS — Z87.81 PERSONAL HISTORY OF (HEALED) TRAUMATIC FRACTURE: Chronic | ICD-10-CM

## 2019-10-23 DIAGNOSIS — D69.6 THROMBOCYTOPENIA, UNSPECIFIED: ICD-10-CM

## 2019-10-23 DIAGNOSIS — E27.9 DISORDER OF ADRENAL GLAND, UNSPECIFIED: Chronic | ICD-10-CM

## 2019-11-05 ENCOUNTER — APPOINTMENT (OUTPATIENT)
Dept: HEMATOLOGY ONCOLOGY | Facility: CLINIC | Age: 74
End: 2019-11-05
Payer: MEDICARE

## 2019-11-05 ENCOUNTER — RESULT REVIEW (OUTPATIENT)
Age: 74
End: 2019-11-05

## 2019-11-05 VITALS
OXYGEN SATURATION: 94 % | SYSTOLIC BLOOD PRESSURE: 145 MMHG | BODY MASS INDEX: 23.77 KG/M2 | WEIGHT: 142.86 LBS | HEART RATE: 63 BPM | DIASTOLIC BLOOD PRESSURE: 82 MMHG | TEMPERATURE: 98.8 F | RESPIRATION RATE: 14 BRPM

## 2019-11-05 LAB
BASOPHILS # BLD AUTO: 0 K/UL — SIGNIFICANT CHANGE UP (ref 0–0.2)
BASOPHILS NFR BLD AUTO: 0.6 % — SIGNIFICANT CHANGE UP (ref 0–2)
EOSINOPHIL # BLD AUTO: 0.1 K/UL — SIGNIFICANT CHANGE UP (ref 0–0.5)
EOSINOPHIL NFR BLD AUTO: 1.5 % — SIGNIFICANT CHANGE UP (ref 0–6)
HCT VFR BLD CALC: 33 % — LOW (ref 34.5–45)
HGB BLD-MCNC: 10.8 G/DL — LOW (ref 11.5–15.5)
LYMPHOCYTES # BLD AUTO: 1.5 K/UL — SIGNIFICANT CHANGE UP (ref 1–3.3)
LYMPHOCYTES # BLD AUTO: 26.2 % — SIGNIFICANT CHANGE UP (ref 13–44)
MCHC RBC-ENTMCNC: 32 PG — SIGNIFICANT CHANGE UP (ref 27–34)
MCHC RBC-ENTMCNC: 32.6 G/DL — SIGNIFICANT CHANGE UP (ref 32–36)
MCV RBC AUTO: 98.2 FL — SIGNIFICANT CHANGE UP (ref 80–100)
MONOCYTES # BLD AUTO: 0.5 K/UL — SIGNIFICANT CHANGE UP (ref 0–0.9)
MONOCYTES NFR BLD AUTO: 8 % — SIGNIFICANT CHANGE UP (ref 2–14)
NEUTROPHILS # BLD AUTO: 3.7 K/UL — SIGNIFICANT CHANGE UP (ref 1.8–7.4)
NEUTROPHILS NFR BLD AUTO: 63.8 % — SIGNIFICANT CHANGE UP (ref 43–77)
PLATELET # BLD AUTO: 161 K/UL — SIGNIFICANT CHANGE UP (ref 150–400)
RBC # BLD: 3.37 M/UL — LOW (ref 3.8–5.2)
RBC # FLD: 14 % — SIGNIFICANT CHANGE UP (ref 10.3–14.5)
WBC # BLD: 5.8 K/UL — SIGNIFICANT CHANGE UP (ref 3.8–10.5)
WBC # FLD AUTO: 5.8 K/UL — SIGNIFICANT CHANGE UP (ref 3.8–10.5)

## 2019-11-05 PROCEDURE — 99213 OFFICE O/P EST LOW 20 MIN: CPT

## 2019-11-05 NOTE — CONSULT LETTER
[Dear  ___] : Dear  [unfilled], [Courtesy Letter:] : I had the pleasure of seeing your patient, [unfilled], in my office today. [Please see my note below.] : Please see my note below. [Consult Closing:] : Thank you very much for allowing me to participate in the care of this patient.  If you have any questions, please do not hesitate to contact me. [Sincerely,] : Sincerely, [DrTanya  ___] : Dr. FARMER [DrTanya ___] : Dr. FARMER [___] : [unfilled]

## 2019-11-06 LAB
ALBUMIN MFR SERPL ELPH: 62.6 %
ALBUMIN SERPL-MCNC: 4.3 G/DL
ALBUMIN/GLOB SERPL: 1.7 RATIO
ALPHA1 GLOB MFR SERPL ELPH: 4.2 %
ALPHA1 GLOB SERPL ELPH-MCNC: 0.3 G/DL
ALPHA2 GLOB MFR SERPL ELPH: 7.3 %
ALPHA2 GLOB SERPL ELPH-MCNC: 0.5 G/DL
B-GLOBULIN MFR SERPL ELPH: 8.5 %
B-GLOBULIN SERPL ELPH-MCNC: 0.6 G/DL
DEPRECATED KAPPA LC FREE/LAMBDA SER: 6.25 RATIO
DEPRECATED KAPPA LC FREE/LAMBDA SER: 6.25 RATIO
GAMMA GLOB FLD ELPH-MCNC: 1.2 G/DL
GAMMA GLOB MFR SERPL ELPH: 17.4 %
IGA SER QL IEP: 178 MG/DL
IGG SER QL IEP: 1235 MG/DL
IGM SER QL IEP: 36 MG/DL
INTERPRETATION SERPL IEP-IMP: NORMAL
KAPPA LC CSF-MCNC: 6.96 MG/DL
KAPPA LC CSF-MCNC: 6.96 MG/DL
KAPPA LC SERPL-MCNC: 43.51 MG/DL
KAPPA LC SERPL-MCNC: 43.51 MG/DL
M PROTEIN MFR SERPL ELPH: 5.9 %
M PROTEIN SPEC IFE-MCNC: NORMAL
MONOCLON BAND OBS SERPL: 0.4 G/DL
PROT SERPL-MCNC: 6.8 G/DL
PROT SERPL-MCNC: 6.8 G/DL

## 2019-11-07 ENCOUNTER — APPOINTMENT (OUTPATIENT)
Dept: CT IMAGING | Facility: CLINIC | Age: 74
End: 2019-11-07
Payer: COMMERCIAL

## 2019-11-07 ENCOUNTER — FORM ENCOUNTER (OUTPATIENT)
Age: 74
End: 2019-11-07

## 2019-11-07 ENCOUNTER — OUTPATIENT (OUTPATIENT)
Dept: OUTPATIENT SERVICES | Facility: HOSPITAL | Age: 74
LOS: 1 days | End: 2019-11-07
Payer: COMMERCIAL

## 2019-11-07 DIAGNOSIS — Z87.81 PERSONAL HISTORY OF (HEALED) TRAUMATIC FRACTURE: Chronic | ICD-10-CM

## 2019-11-07 DIAGNOSIS — Z90.710 ACQUIRED ABSENCE OF BOTH CERVIX AND UTERUS: Chronic | ICD-10-CM

## 2019-11-07 DIAGNOSIS — Z98.890 OTHER SPECIFIED POSTPROCEDURAL STATES: Chronic | ICD-10-CM

## 2019-11-07 DIAGNOSIS — E27.9 DISORDER OF ADRENAL GLAND, UNSPECIFIED: Chronic | ICD-10-CM

## 2019-11-07 DIAGNOSIS — Z01.818 ENCOUNTER FOR OTHER PREPROCEDURAL EXAMINATION: ICD-10-CM

## 2019-11-07 PROCEDURE — 75635 CT ANGIO ABDOMINAL ARTERIES: CPT | Mod: 26

## 2019-11-07 PROCEDURE — 75635 CT ANGIO ABDOMINAL ARTERIES: CPT

## 2019-11-08 ENCOUNTER — APPOINTMENT (OUTPATIENT)
Dept: RADIOLOGY | Facility: CLINIC | Age: 74
End: 2019-11-08
Payer: MEDICARE

## 2019-11-08 ENCOUNTER — OUTPATIENT (OUTPATIENT)
Dept: OUTPATIENT SERVICES | Facility: HOSPITAL | Age: 74
LOS: 1 days | End: 2019-11-08
Payer: MEDICARE

## 2019-11-08 DIAGNOSIS — E27.9 DISORDER OF ADRENAL GLAND, UNSPECIFIED: Chronic | ICD-10-CM

## 2019-11-08 DIAGNOSIS — Z98.890 OTHER SPECIFIED POSTPROCEDURAL STATES: Chronic | ICD-10-CM

## 2019-11-08 DIAGNOSIS — Z00.8 ENCOUNTER FOR OTHER GENERAL EXAMINATION: ICD-10-CM

## 2019-11-08 DIAGNOSIS — Z90.710 ACQUIRED ABSENCE OF BOTH CERVIX AND UTERUS: Chronic | ICD-10-CM

## 2019-11-08 DIAGNOSIS — Z87.81 PERSONAL HISTORY OF (HEALED) TRAUMATIC FRACTURE: Chronic | ICD-10-CM

## 2019-11-08 PROCEDURE — 77075 RADEX OSSEOUS SURVEY COMPL: CPT

## 2019-11-08 PROCEDURE — 77075 RADEX OSSEOUS SURVEY COMPL: CPT | Mod: 26

## 2019-11-12 ENCOUNTER — APPOINTMENT (OUTPATIENT)
Dept: TRANSPLANT | Facility: CLINIC | Age: 74
End: 2019-11-12
Payer: COMMERCIAL

## 2019-11-12 VITALS
HEIGHT: 65 IN | SYSTOLIC BLOOD PRESSURE: 176 MMHG | BODY MASS INDEX: 23.49 KG/M2 | TEMPERATURE: 98.6 F | OXYGEN SATURATION: 94 % | RESPIRATION RATE: 14 BRPM | WEIGHT: 141 LBS | HEART RATE: 83 BPM | DIASTOLIC BLOOD PRESSURE: 78 MMHG

## 2019-11-12 PROCEDURE — 99214 OFFICE O/P EST MOD 30 MIN: CPT

## 2019-11-18 NOTE — ADDENDUM
[FreeTextEntry1] : 11/18/19 ADDENDUM\par \par Patient had skeletal survey done on 11/7/19 -showed osteopenia, NO bone lytic lesions.\par BM bx <10% plasma cells\par \par Thus, patient hematologically cleared for renal transplant; she has MGUS.

## 2019-11-18 NOTE — RESULTS/DATA
[FreeTextEntry1] : Today's CBC (ON 11/5/19) wbc 5.8 Hb 10.8 plt 161 ANC 3700\par  \par ON 8/5/19) wbc 3.2 hb 12.1 plt 129 ANC 1300\par On 6/26/19) wbc 3.3 Hb 8.8 plt 143 ANC 1600\par On 3/26/19) wbc 4.4 with normal diff,  hb 9.6 plt 169\par On 3/6/19 wbc 3.1 hb 10.7 plt 74\par On 3/5/19 wbc 3.6 Hb 11.7 plt 82\par On 2/5/19 wbc 4.8 hb 12.2 plt 158\par On 11/5/18 wbc 4.2 Hb 11.5 plt 220\par \par PATHOLOGY\par On 7/22/19 BM bx \par \par Final Diagnosis\par 1, 2. Bone marrow biopsy and bone marrow aspirate\par - Normocellular bone marrow with erythroid-predominant\par trilineage hematopoiesis and maturation, increased iron stores\par - Mild plasmacytosis (5-9% of cells) with subset kappa-light\par chain restricted\par \par \par

## 2019-11-18 NOTE — HISTORY OF PRESENT ILLNESS
[de-identified] : Ms. Adam was referred to my office for abnormal blood counts, needing hematological clearance prior to renal transplant. She had blood work done as part of her monthly blood at hemodialysis -on 3/5/19 wbc 3.6 Hb 11.7 plt 82. She was referred for leukopenia and thrombocytopenia. According to the patient and her daughter, she has not had abnormal counts in the past; she denied bleeding/bruising. They both recalled that at the time the blood was drawn, the patient was 'getting over the flu' -she had fevers and cough. At this time, she feels well.  [de-identified] : The patient is here for follow up of results done -initially had thrombocytopenia and low wbc count. Since last visit, pt had EGD and 'it was good.' She has had no fevers, no bleeding.

## 2019-11-18 NOTE — REVIEW OF SYSTEMS
[Joint Pain] : joint pain [Negative] : Allergic/Immunologic [Fever] : no fever [Chills] : no chills [Night Sweats] : no night sweats [Fatigue] : no fatigue [Recent Change In Weight] : ~T no recent weight change [Eye Pain] : no eye pain [Red Eyes] : eyes not red [Dry Eyes] : no dryness of the eyes [Dysphagia] : no dysphagia [Loss of Hearing] : no loss of hearing [Nosebleeds] : no nosebleeds [Hoarseness] : no hoarseness [Odynophagia] : no odynophagia [Mucosal Pain] : no mucosal pain [Chest Pain] : no chest pain [Palpitations] : no palpitations [Lower Ext Edema] : no lower extremity edema [Vomiting] : no vomiting [Constipation] : no constipation [Diarrhea] : no diarrhea [Dysuria] : no dysuria [Incontinence] : no incontinence [Vaginal Discharge] : no vaginal discharge [Dysmenorrhea/Abn Vaginal Bleeding] : no dysmenorrhea/abnormal vaginal bleeding [Joint Stiffness] : no joint stiffness [Muscle Pain] : no muscle pain [Skin Rash] : no skin rash [Skin Wound] : no skin wound [Confused] : no confusion [Dizziness] : no dizziness [Fainting] : no fainting [Difficulty Walking] : no difficulty walking [Insomnia] : no insomnia [Anxiety] : no anxiety [Hot Flashes] : no hot flashes [Depression] : no depression [Easy Bleeding] : no tendency for easy bleeding [Easy Bruising] : no tendency for easy bruising [Swollen Glands] : no swollen glands [FreeTextEntry7] : colonoscopy on 3/13/19 -polyps. EGD on 3/13/19 -erosion [FreeTextEntry8] : on HD on Mon/Wed/Fri; anuric. No UTI's. PAP smear Feb 2019. Mammogram to be done on 4/2/19 [FreeTextEntry9] : chronic back pain

## 2019-11-18 NOTE — ASSESSMENT
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 BM bx done July 2019 -showed normal marrow, 9% plasma cells. Normal cytogenetics. FISH 3.5% +11q. Plasma cells <10% c/w MGUS -thus, patient is hematologically cleared for renal transplant\par \par -repeat blood work today for SPEP/YAEL/Ig's/free light chains. Plasma cells <10% c/w MGUS. Will get skeletal survey to r/o bone lytic lesions\par \par -discussed with patient and daughter Giselle at length results of her tests and plan\par \par -follow up in 3 months

## 2019-11-25 ENCOUNTER — APPOINTMENT (OUTPATIENT)
Dept: TRANSPLANT | Facility: CLINIC | Age: 74
End: 2019-11-25

## 2019-12-03 ENCOUNTER — RX RENEWAL (OUTPATIENT)
Age: 74
End: 2019-12-03

## 2019-12-06 ENCOUNTER — TRANSCRIPTION ENCOUNTER (OUTPATIENT)
Age: 74
End: 2019-12-06

## 2019-12-06 ENCOUNTER — INPATIENT (INPATIENT)
Facility: HOSPITAL | Age: 74
LOS: 7 days | Discharge: HOME | DRG: 652 | End: 2019-12-14
Attending: TRANSPLANT SURGERY | Admitting: TRANSPLANT SURGERY
Payer: MEDICARE

## 2019-12-06 VITALS
HEART RATE: 69 BPM | TEMPERATURE: 98 F | OXYGEN SATURATION: 97 % | HEIGHT: 65 IN | DIASTOLIC BLOOD PRESSURE: 71 MMHG | RESPIRATION RATE: 18 BRPM | WEIGHT: 142.42 LBS | SYSTOLIC BLOOD PRESSURE: 137 MMHG

## 2019-12-06 DIAGNOSIS — Z94.0 KIDNEY TRANSPLANT STATUS: ICD-10-CM

## 2019-12-06 DIAGNOSIS — Z87.81 PERSONAL HISTORY OF (HEALED) TRAUMATIC FRACTURE: Chronic | ICD-10-CM

## 2019-12-06 DIAGNOSIS — Z98.890 OTHER SPECIFIED POSTPROCEDURAL STATES: Chronic | ICD-10-CM

## 2019-12-06 DIAGNOSIS — E27.9 DISORDER OF ADRENAL GLAND, UNSPECIFIED: Chronic | ICD-10-CM

## 2019-12-06 DIAGNOSIS — Z90.710 ACQUIRED ABSENCE OF BOTH CERVIX AND UTERUS: Chronic | ICD-10-CM

## 2019-12-06 LAB
ALBUMIN SERPL ELPH-MCNC: 4.3 G/DL — SIGNIFICANT CHANGE UP (ref 3.3–5)
ALP SERPL-CCNC: 58 U/L — SIGNIFICANT CHANGE UP (ref 40–120)
ALT FLD-CCNC: <5 U/L — LOW (ref 10–45)
ANION GAP SERPL CALC-SCNC: 15 MMOL/L — SIGNIFICANT CHANGE UP (ref 5–17)
APTT BLD: 27.8 SEC — SIGNIFICANT CHANGE UP (ref 27.5–36.3)
AST SERPL-CCNC: 14 U/L — SIGNIFICANT CHANGE UP (ref 10–40)
BILIRUB SERPL-MCNC: 0.3 MG/DL — SIGNIFICANT CHANGE UP (ref 0.2–1.2)
BLD GP AB SCN SERPL QL: NEGATIVE — SIGNIFICANT CHANGE UP
BUN SERPL-MCNC: 34 MG/DL — HIGH (ref 7–23)
CALCIUM SERPL-MCNC: 8.8 MG/DL — SIGNIFICANT CHANGE UP (ref 8.4–10.5)
CHLORIDE SERPL-SCNC: 95 MMOL/L — LOW (ref 96–108)
CO2 SERPL-SCNC: 24 MMOL/L — SIGNIFICANT CHANGE UP (ref 22–31)
CREAT SERPL-MCNC: 5.95 MG/DL — HIGH (ref 0.5–1.3)
GLUCOSE BLDC GLUCOMTR-MCNC: 83 MG/DL — SIGNIFICANT CHANGE UP (ref 70–99)
GLUCOSE SERPL-MCNC: 91 MG/DL — SIGNIFICANT CHANGE UP (ref 70–99)
HCT VFR BLD CALC: 37.6 % — SIGNIFICANT CHANGE UP (ref 34.5–45)
HGB BLD-MCNC: 12 G/DL — SIGNIFICANT CHANGE UP (ref 11.5–15.5)
INR BLD: 0.87 RATIO — LOW (ref 0.88–1.16)
MAGNESIUM SERPL-MCNC: 2.2 MG/DL — SIGNIFICANT CHANGE UP (ref 1.6–2.6)
MCHC RBC-ENTMCNC: 30.8 PG — SIGNIFICANT CHANGE UP (ref 27–34)
MCHC RBC-ENTMCNC: 31.9 GM/DL — LOW (ref 32–36)
MCV RBC AUTO: 96.7 FL — SIGNIFICANT CHANGE UP (ref 80–100)
NRBC # BLD: 0 /100 WBCS — SIGNIFICANT CHANGE UP (ref 0–0)
PHOSPHATE SERPL-MCNC: 5.4 MG/DL — HIGH (ref 2.5–4.5)
PLATELET # BLD AUTO: 147 K/UL — LOW (ref 150–400)
POTASSIUM SERPL-MCNC: 4.8 MMOL/L — SIGNIFICANT CHANGE UP (ref 3.5–5.3)
POTASSIUM SERPL-SCNC: 4.8 MMOL/L — SIGNIFICANT CHANGE UP (ref 3.5–5.3)
PROT SERPL-MCNC: 7 G/DL — SIGNIFICANT CHANGE UP (ref 6–8.3)
PROTHROM AB SERPL-ACNC: 10 SEC — SIGNIFICANT CHANGE UP (ref 10–12.9)
RBC # BLD: 3.89 M/UL — SIGNIFICANT CHANGE UP (ref 3.8–5.2)
RBC # FLD: 14.1 % — SIGNIFICANT CHANGE UP (ref 10.3–14.5)
RH IG SCN BLD-IMP: POSITIVE — SIGNIFICANT CHANGE UP
RH IG SCN BLD-IMP: POSITIVE — SIGNIFICANT CHANGE UP
SODIUM SERPL-SCNC: 134 MMOL/L — LOW (ref 135–145)
WBC # BLD: 3.52 K/UL — LOW (ref 3.8–10.5)
WBC # FLD AUTO: 3.52 K/UL — LOW (ref 3.8–10.5)

## 2019-12-06 PROCEDURE — 71045 X-RAY EXAM CHEST 1 VIEW: CPT | Mod: 26

## 2019-12-06 PROCEDURE — 93010 ELECTROCARDIOGRAM REPORT: CPT

## 2019-12-06 RX ORDER — BASILIXIMAB 20 MG/5ML
20 INJECTION, POWDER, FOR SOLUTION INTRAVENOUS ONCE
Refills: 0 | Status: COMPLETED | OUTPATIENT
Start: 2019-12-06 | End: 2020-11-03

## 2019-12-06 RX ORDER — VANCOMYCIN HCL 1 G
1000 VIAL (EA) INTRAVENOUS ONCE
Refills: 0 | Status: DISCONTINUED | OUTPATIENT
Start: 2019-12-06 | End: 2019-12-07

## 2019-12-06 RX ORDER — INFLUENZA VIRUS VACCINE 15; 15; 15; 15 UG/.5ML; UG/.5ML; UG/.5ML; UG/.5ML
0.5 SUSPENSION INTRAMUSCULAR ONCE
Refills: 0 | Status: DISCONTINUED | OUTPATIENT
Start: 2019-12-06 | End: 2019-12-07

## 2019-12-06 NOTE — H&P ADULT - HISTORY OF PRESENT ILLNESS
74F with h/o ESRD on HD MWF via LUE AVF 2/2 PCKD (anuric at home, Nephrologist: Dr. Nevin Osborne, last HD 12/6AM), HTN, HLD, Gout, LUE DVT, lumbar radiculopathy,  Breast CA s/p lumpectomy on Tamoxifen (2014) admitted for potential DDRT.      No complaints at this time.  Recently seen and cleared by Hematology for transient leukopenia/thrombocytopenia with negative w/u and resolution.  Cleared by Cardiology Dr. Alvarez in 4/2019.  Cleared by Hepatology Dr. Gregg for chronic karime-dil and stable pancreatic cysts s/p EUS. no transaminitis.    Surgical Hx:   S/P LUE arteriovenous (AV) fistula creation: 2002--h/o of thrombosis, completed Coumadin  S/P hysterectomy: 1994  Adrenal mass: excison 2015, benign  S/P lumpectomy, left breast: 2014

## 2019-12-06 NOTE — H&P ADULT - ASSESSMENT
74F with h/o ESRD on HD MWF via LUE AVF 2/2 PCKD (anuric at home, Nephrologist: Dr. Nevni Osborne, last HD 12/6AM), HTN, HLD, Gout, LUE DVT, lumbar radiculopathy,  Breast CA s/p lumpectomy on Tamoxifen (2014) admitted for potential DDRT.      -labs with Cr 6, K 4, Phos 5.  rest of labs stable. last HD this AM. no urgent need for HD at this time  -NPO  -tentative OR 0500 with Dr. Campos  -EKG/CXR stable  -Vancomycin for ABX PPx (PCN allergy, generalized rash)  -Medrol IV  -Induction: Simulect    ---------------------  Recipient Info:  CPRA: 1%  ABO: B+   CMV: Positive   EBV Status: Positive   Last HD: 12/6/2019   NPO: 12/6/2019 @ 1500  ALLERGIES: PCN – Rash, Shellfish – Rash, Chloro prep A  Financially cleared- yes   Surgeon-Dr. Campos     Donor Info: Local  Donor ID: [id number]  Match: [id number]  OPO: NYRT   Age: 54  ABO: B  KDPI 75%  COD: Anoxia   X Clamp Time: 12/6/2019 1800…OR is delayed.   Medical Hx: HLD  Terminal Cr: 1.7/5.45/4.32  CMV- positive  EBV- positive  Kidney ETA to [facility]: TBD  Kidney Laterality: TBD   X-match – virtual Neg.

## 2019-12-06 NOTE — H&P ADULT - NSHPREVIEWOFSYSTEMS_GEN_ALL_CORE
Gen: No weight changes, fatigue, fevers/chills, weakness  Skin: No rashes  Head/Eyes/Ears/Mouth: No headache; Normal hearing; Normal vision w/o blurriness; No sinus pain/discomfort, sore throat  Respiratory: No dyspnea, cough, wheezing, hemoptysis  CV: No chest pain, PND, orthopnea  GI: no abdominal pain, no diarrhea, constipation, nausea, vomiting, melena, hematochezia  : No increased frequency, dysuria, hematuria, nocturia  MSK: No joint pain/swelling; no back pain; no edema  Neuro: No dizziness/lightheadedness, weakness, seizures, numbness, tingling  Heme: No easy bruising or bleeding  Endo: No heat/cold intolerance  Psych: No significant nervousness, anxiety, stress, depression  All other systems were reviewed and are negative, except as noted.

## 2019-12-06 NOTE — H&P ADULT - NSICDXPASTMEDICALHX_GEN_ALL_CORE_FT
PAST MEDICAL HISTORY:  Anemia in ESRD (end-stage renal disease)     Anuria     AV fistula thrombosis history: was treated with coumadin, no more A/C.    Breast cancer, female left 2014    ESRD on hemodialysis     H/O gastroesophageal reflux (GERD)     Hypertension     Pancreatic cyst     Thrombocytopenia leukopenia: followed by chele, plan for BMBx 7/22/19    Thyroid nodule yearly Ultrasound, monitoring yearly

## 2019-12-06 NOTE — H&P ADULT - NSHPPHYSICALEXAM_GEN_ALL_CORE
Constitutional: Well developed / well nourished  Eyes: Anicteric, PERRLA  ENMT: nc/at  Neck: supple, no lymphadenopathy. +thyroid nodule  Respiratory: CTA B/L  Cardiovascular: RRR  Gastrointestinal: Soft abdomen, ND/NT.    Genitourinary: anuric  Extremities: no calf TTP. no edema.   Vascular: Palpable dp pulses bilaterally. LUE AVF palpable thrill  Neurological: A&O x3  Skin: no rashes, ulcerations, lesions  Musculoskeletal: Moving all extremities  Psychiatric: Responsive No difficulties

## 2019-12-07 ENCOUNTER — RESULT REVIEW (OUTPATIENT)
Age: 74
End: 2019-12-07

## 2019-12-07 DIAGNOSIS — I10 ESSENTIAL (PRIMARY) HYPERTENSION: ICD-10-CM

## 2019-12-07 DIAGNOSIS — D89.9 DISORDER INVOLVING THE IMMUNE MECHANISM, UNSPECIFIED: ICD-10-CM

## 2019-12-07 DIAGNOSIS — Z94.0 KIDNEY TRANSPLANT STATUS: ICD-10-CM

## 2019-12-07 LAB
ALBUMIN SERPL ELPH-MCNC: 3.3 G/DL — SIGNIFICANT CHANGE UP (ref 3.3–5)
ALBUMIN SERPL ELPH-MCNC: 3.7 G/DL — SIGNIFICANT CHANGE UP (ref 3.3–5)
ALP SERPL-CCNC: 49 U/L — SIGNIFICANT CHANGE UP (ref 40–120)
ALP SERPL-CCNC: 49 U/L — SIGNIFICANT CHANGE UP (ref 40–120)
ALT FLD-CCNC: 5 U/L — LOW (ref 10–45)
ALT FLD-CCNC: 7 U/L — LOW (ref 10–45)
ANION GAP SERPL CALC-SCNC: 21 MMOL/L — HIGH (ref 5–17)
ANION GAP SERPL CALC-SCNC: 21 MMOL/L — HIGH (ref 5–17)
ANION GAP SERPL CALC-SCNC: 22 MMOL/L — HIGH (ref 5–17)
APTT BLD: 25.7 SEC — LOW (ref 27.5–36.3)
APTT BLD: 25.8 SEC — LOW (ref 27.5–36.3)
AST SERPL-CCNC: 16 U/L — SIGNIFICANT CHANGE UP (ref 10–40)
AST SERPL-CCNC: 21 U/L — SIGNIFICANT CHANGE UP (ref 10–40)
BASOPHILS # BLD AUTO: 0.01 K/UL — SIGNIFICANT CHANGE UP (ref 0–0.2)
BASOPHILS NFR BLD AUTO: 0.1 % — SIGNIFICANT CHANGE UP (ref 0–2)
BILIRUB SERPL-MCNC: 0.4 MG/DL — SIGNIFICANT CHANGE UP (ref 0.2–1.2)
BILIRUB SERPL-MCNC: 0.4 MG/DL — SIGNIFICANT CHANGE UP (ref 0.2–1.2)
BUN SERPL-MCNC: 42 MG/DL — HIGH (ref 7–23)
BUN SERPL-MCNC: 45 MG/DL — HIGH (ref 7–23)
BUN SERPL-MCNC: 49 MG/DL — HIGH (ref 7–23)
CALCIUM SERPL-MCNC: 8.4 MG/DL — SIGNIFICANT CHANGE UP (ref 8.4–10.5)
CALCIUM SERPL-MCNC: 8.6 MG/DL — SIGNIFICANT CHANGE UP (ref 8.4–10.5)
CALCIUM SERPL-MCNC: 8.7 MG/DL — SIGNIFICANT CHANGE UP (ref 8.4–10.5)
CHLORIDE SERPL-SCNC: 90 MMOL/L — LOW (ref 96–108)
CHLORIDE SERPL-SCNC: 92 MMOL/L — LOW (ref 96–108)
CHLORIDE SERPL-SCNC: 92 MMOL/L — LOW (ref 96–108)
CO2 SERPL-SCNC: 14 MMOL/L — LOW (ref 22–31)
CO2 SERPL-SCNC: 17 MMOL/L — LOW (ref 22–31)
CO2 SERPL-SCNC: 19 MMOL/L — LOW (ref 22–31)
CREAT SERPL-MCNC: 6.86 MG/DL — HIGH (ref 0.5–1.3)
CREAT SERPL-MCNC: 7.51 MG/DL — HIGH (ref 0.5–1.3)
CREAT SERPL-MCNC: 7.63 MG/DL — HIGH (ref 0.5–1.3)
EOSINOPHIL # BLD AUTO: 0.01 K/UL — SIGNIFICANT CHANGE UP (ref 0–0.5)
EOSINOPHIL NFR BLD AUTO: 0.1 % — SIGNIFICANT CHANGE UP (ref 0–6)
GAS PNL BLDA: SIGNIFICANT CHANGE UP
GLUCOSE SERPL-MCNC: 148 MG/DL — HIGH (ref 70–99)
GLUCOSE SERPL-MCNC: 185 MG/DL — HIGH (ref 70–99)
GLUCOSE SERPL-MCNC: 201 MG/DL — HIGH (ref 70–99)
HCT VFR BLD CALC: 31.9 % — LOW (ref 34.5–45)
HCT VFR BLD CALC: 32.8 % — LOW (ref 34.5–45)
HCT VFR BLD CALC: 34.1 % — LOW (ref 34.5–45)
HGB BLD-MCNC: 10.2 G/DL — LOW (ref 11.5–15.5)
HGB BLD-MCNC: 10.6 G/DL — LOW (ref 11.5–15.5)
HGB BLD-MCNC: 11.3 G/DL — LOW (ref 11.5–15.5)
IMM GRANULOCYTES NFR BLD AUTO: 0.3 % — SIGNIFICANT CHANGE UP (ref 0–1.5)
INR BLD: 0.98 RATIO — SIGNIFICANT CHANGE UP (ref 0.88–1.16)
INR BLD: 0.99 RATIO — SIGNIFICANT CHANGE UP (ref 0.88–1.16)
INR BLD: 1.01 RATIO — SIGNIFICANT CHANGE UP (ref 0.88–1.16)
LYMPHOCYTES # BLD AUTO: 0.34 K/UL — LOW (ref 1–3.3)
LYMPHOCYTES # BLD AUTO: 2.5 % — LOW (ref 13–44)
MAGNESIUM SERPL-MCNC: 2.2 MG/DL — SIGNIFICANT CHANGE UP (ref 1.6–2.6)
MAGNESIUM SERPL-MCNC: 2.2 MG/DL — SIGNIFICANT CHANGE UP (ref 1.6–2.6)
MAGNESIUM SERPL-MCNC: 2.3 MG/DL — SIGNIFICANT CHANGE UP (ref 1.6–2.6)
MCHC RBC-ENTMCNC: 31 PG — SIGNIFICANT CHANGE UP (ref 27–34)
MCHC RBC-ENTMCNC: 31.7 PG — SIGNIFICANT CHANGE UP (ref 27–34)
MCHC RBC-ENTMCNC: 31.8 PG — SIGNIFICANT CHANGE UP (ref 27–34)
MCHC RBC-ENTMCNC: 32 GM/DL — SIGNIFICANT CHANGE UP (ref 32–36)
MCHC RBC-ENTMCNC: 32.3 GM/DL — SIGNIFICANT CHANGE UP (ref 32–36)
MCHC RBC-ENTMCNC: 33.1 GM/DL — SIGNIFICANT CHANGE UP (ref 32–36)
MCV RBC AUTO: 95.9 FL — SIGNIFICANT CHANGE UP (ref 80–100)
MCV RBC AUTO: 96.1 FL — SIGNIFICANT CHANGE UP (ref 80–100)
MCV RBC AUTO: 99.1 FL — SIGNIFICANT CHANGE UP (ref 80–100)
MONOCYTES # BLD AUTO: 0.45 K/UL — SIGNIFICANT CHANGE UP (ref 0–0.9)
MONOCYTES NFR BLD AUTO: 3.3 % — SIGNIFICANT CHANGE UP (ref 2–14)
NEUTROPHILS # BLD AUTO: 12.86 K/UL — HIGH (ref 1.8–7.4)
NEUTROPHILS NFR BLD AUTO: 93.7 % — HIGH (ref 43–77)
NRBC # BLD: 0 /100 WBCS — SIGNIFICANT CHANGE UP (ref 0–0)
PHOSPHATE SERPL-MCNC: 6.8 MG/DL — HIGH (ref 2.5–4.5)
PHOSPHATE SERPL-MCNC: 7.8 MG/DL — HIGH (ref 2.5–4.5)
PHOSPHATE SERPL-MCNC: 8.1 MG/DL — HIGH (ref 2.5–4.5)
PLATELET # BLD AUTO: 128 K/UL — LOW (ref 150–400)
PLATELET # BLD AUTO: 141 K/UL — LOW (ref 150–400)
PLATELET # BLD AUTO: 153 K/UL — SIGNIFICANT CHANGE UP (ref 150–400)
POTASSIUM SERPL-MCNC: 4.8 MMOL/L — SIGNIFICANT CHANGE UP (ref 3.5–5.3)
POTASSIUM SERPL-MCNC: 5.3 MMOL/L — SIGNIFICANT CHANGE UP (ref 3.5–5.3)
POTASSIUM SERPL-MCNC: 6.1 MMOL/L — HIGH (ref 3.5–5.3)
POTASSIUM SERPL-SCNC: 4.8 MMOL/L — SIGNIFICANT CHANGE UP (ref 3.5–5.3)
POTASSIUM SERPL-SCNC: 5.3 MMOL/L — SIGNIFICANT CHANGE UP (ref 3.5–5.3)
POTASSIUM SERPL-SCNC: 6.1 MMOL/L — HIGH (ref 3.5–5.3)
PROT SERPL-MCNC: 6 G/DL — SIGNIFICANT CHANGE UP (ref 6–8.3)
PROT SERPL-MCNC: 6 G/DL — SIGNIFICANT CHANGE UP (ref 6–8.3)
PROTHROM AB SERPL-ACNC: 11.2 SEC — SIGNIFICANT CHANGE UP (ref 10–12.9)
PROTHROM AB SERPL-ACNC: 11.3 SEC — SIGNIFICANT CHANGE UP (ref 10–12.9)
PROTHROM AB SERPL-ACNC: 11.5 SEC — SIGNIFICANT CHANGE UP (ref 10–12.9)
RBC # BLD: 3.22 M/UL — LOW (ref 3.8–5.2)
RBC # BLD: 3.42 M/UL — LOW (ref 3.8–5.2)
RBC # BLD: 3.55 M/UL — LOW (ref 3.8–5.2)
RBC # FLD: 13.8 % — SIGNIFICANT CHANGE UP (ref 10.3–14.5)
RBC # FLD: 14 % — SIGNIFICANT CHANGE UP (ref 10.3–14.5)
RBC # FLD: 14.1 % — SIGNIFICANT CHANGE UP (ref 10.3–14.5)
SODIUM SERPL-SCNC: 128 MMOL/L — LOW (ref 135–145)
SODIUM SERPL-SCNC: 128 MMOL/L — LOW (ref 135–145)
SODIUM SERPL-SCNC: 132 MMOL/L — LOW (ref 135–145)
WBC # BLD: 11.84 K/UL — HIGH (ref 3.8–10.5)
WBC # BLD: 13.71 K/UL — HIGH (ref 3.8–10.5)
WBC # BLD: 14.02 K/UL — HIGH (ref 3.8–10.5)
WBC # FLD AUTO: 11.84 K/UL — HIGH (ref 3.8–10.5)
WBC # FLD AUTO: 13.71 K/UL — HIGH (ref 3.8–10.5)
WBC # FLD AUTO: 14.02 K/UL — HIGH (ref 3.8–10.5)

## 2019-12-07 PROCEDURE — 93010 ELECTROCARDIOGRAM REPORT: CPT

## 2019-12-07 PROCEDURE — 71045 X-RAY EXAM CHEST 1 VIEW: CPT | Mod: 26

## 2019-12-07 PROCEDURE — 99222 1ST HOSP IP/OBS MODERATE 55: CPT | Mod: GC

## 2019-12-07 PROCEDURE — 38531 OPEN BX/EXC INGUINOFEM NODES: CPT

## 2019-12-07 PROCEDURE — 99291 CRITICAL CARE FIRST HOUR: CPT

## 2019-12-07 PROCEDURE — 76776 US EXAM K TRANSPL W/DOPPLER: CPT | Mod: 26,RT

## 2019-12-07 PROCEDURE — 88311 DECALCIFY TISSUE: CPT | Mod: 26

## 2019-12-07 PROCEDURE — 50360 RNL ALTRNSPLJ W/O RCP NFRCT: CPT

## 2019-12-07 PROCEDURE — 50605 INSERT URETERAL SUPPORT: CPT

## 2019-12-07 PROCEDURE — 88305 TISSUE EXAM BY PATHOLOGIST: CPT | Mod: 26

## 2019-12-07 RX ORDER — FUROSEMIDE 40 MG
40 TABLET ORAL DAILY
Refills: 0 | Status: DISCONTINUED | OUTPATIENT
Start: 2019-12-07 | End: 2019-12-08

## 2019-12-07 RX ORDER — SENNA PLUS 8.6 MG/1
2 TABLET ORAL AT BEDTIME
Refills: 0 | Status: DISCONTINUED | OUTPATIENT
Start: 2019-12-07 | End: 2019-12-11

## 2019-12-07 RX ORDER — ONDANSETRON 8 MG/1
4 TABLET, FILM COATED ORAL ONCE
Refills: 0 | Status: DISCONTINUED | OUTPATIENT
Start: 2019-12-07 | End: 2019-12-07

## 2019-12-07 RX ORDER — SODIUM CHLORIDE 9 MG/ML
500 INJECTION, SOLUTION INTRAVENOUS
Refills: 0 | Status: DISCONTINUED | OUTPATIENT
Start: 2019-12-07 | End: 2019-12-08

## 2019-12-07 RX ORDER — OXYCODONE HYDROCHLORIDE 5 MG/1
5 TABLET ORAL EVERY 4 HOURS
Refills: 0 | Status: DISCONTINUED | OUTPATIENT
Start: 2019-12-07 | End: 2019-12-09

## 2019-12-07 RX ORDER — BASILIXIMAB 20 MG/5ML
20 INJECTION, POWDER, FOR SOLUTION INTRAVENOUS ONCE
Refills: 0 | Status: DISCONTINUED | OUTPATIENT
Start: 2019-12-07 | End: 2019-12-07

## 2019-12-07 RX ORDER — DEXTROSE 50 % IN WATER 50 %
50 SYRINGE (ML) INTRAVENOUS ONCE
Refills: 0 | Status: COMPLETED | OUTPATIENT
Start: 2019-12-07 | End: 2019-12-07

## 2019-12-07 RX ORDER — TAMOXIFEN CITRATE 20 MG/1
20 TABLET, FILM COATED ORAL AT BEDTIME
Refills: 0 | Status: DISCONTINUED | OUTPATIENT
Start: 2019-12-08 | End: 2019-12-14

## 2019-12-07 RX ORDER — ACETAMINOPHEN 500 MG
650 TABLET ORAL EVERY 6 HOURS
Refills: 0 | Status: DISCONTINUED | OUTPATIENT
Start: 2019-12-07 | End: 2019-12-07

## 2019-12-07 RX ORDER — CALCIUM GLUCONATE 100 MG/ML
1 VIAL (ML) INTRAVENOUS ONCE
Refills: 0 | Status: COMPLETED | OUTPATIENT
Start: 2019-12-07 | End: 2019-12-07

## 2019-12-07 RX ORDER — OXYCODONE HYDROCHLORIDE 5 MG/1
10 TABLET ORAL EVERY 4 HOURS
Refills: 0 | Status: DISCONTINUED | OUTPATIENT
Start: 2019-12-07 | End: 2019-12-13

## 2019-12-07 RX ORDER — ACETAMINOPHEN 500 MG
975 TABLET ORAL EVERY 6 HOURS
Refills: 0 | Status: DISCONTINUED | OUTPATIENT
Start: 2019-12-07 | End: 2019-12-14

## 2019-12-07 RX ORDER — MYCOPHENOLATE MOFETIL 250 MG/1
1 CAPSULE ORAL EVERY 12 HOURS
Refills: 0 | Status: DISCONTINUED | OUTPATIENT
Start: 2019-12-07 | End: 2019-12-07

## 2019-12-07 RX ORDER — ACETAMINOPHEN 500 MG
1000 TABLET ORAL ONCE
Refills: 0 | Status: DISCONTINUED | OUTPATIENT
Start: 2019-12-07 | End: 2019-12-07

## 2019-12-07 RX ORDER — ACETAMINOPHEN 500 MG
1000 TABLET ORAL ONCE
Refills: 0 | Status: COMPLETED | OUTPATIENT
Start: 2019-12-07 | End: 2019-12-07

## 2019-12-07 RX ORDER — ALBUTEROL 90 UG/1
10 AEROSOL, METERED ORAL ONCE
Refills: 0 | Status: COMPLETED | OUTPATIENT
Start: 2019-12-07 | End: 2019-12-07

## 2019-12-07 RX ORDER — MYCOPHENOLATE MOFETIL 250 MG/1
1 CAPSULE ORAL
Refills: 0 | Status: DISCONTINUED | OUTPATIENT
Start: 2019-12-08 | End: 2019-12-14

## 2019-12-07 RX ORDER — TACROLIMUS 5 MG/1
4 CAPSULE ORAL
Refills: 0 | Status: DISCONTINUED | OUTPATIENT
Start: 2019-12-08 | End: 2019-12-08

## 2019-12-07 RX ORDER — HYDROMORPHONE HYDROCHLORIDE 2 MG/ML
0.5 INJECTION INTRAMUSCULAR; INTRAVENOUS; SUBCUTANEOUS
Refills: 0 | Status: DISCONTINUED | OUTPATIENT
Start: 2019-12-07 | End: 2019-12-07

## 2019-12-07 RX ORDER — CHOLECALCIFEROL (VITAMIN D3) 125 MCG
1000 CAPSULE ORAL DAILY
Refills: 0 | Status: DISCONTINUED | OUTPATIENT
Start: 2019-12-08 | End: 2019-12-14

## 2019-12-07 RX ORDER — ONDANSETRON 8 MG/1
4 TABLET, FILM COATED ORAL EVERY 6 HOURS
Refills: 0 | Status: DISCONTINUED | OUTPATIENT
Start: 2019-12-07 | End: 2019-12-14

## 2019-12-07 RX ORDER — SODIUM CHLORIDE 9 MG/ML
1000 INJECTION INTRAMUSCULAR; INTRAVENOUS; SUBCUTANEOUS
Refills: 0 | Status: DISCONTINUED | OUTPATIENT
Start: 2019-12-07 | End: 2019-12-08

## 2019-12-07 RX ORDER — VALGANCICLOVIR 450 MG/1
450 TABLET, FILM COATED ORAL DAILY
Refills: 0 | Status: DISCONTINUED | OUTPATIENT
Start: 2019-12-08 | End: 2019-12-10

## 2019-12-07 RX ORDER — TACROLIMUS 5 MG/1
4 CAPSULE ORAL DAILY
Refills: 0 | Status: DISCONTINUED | OUTPATIENT
Start: 2019-12-07 | End: 2019-12-07

## 2019-12-07 RX ORDER — POLYETHYLENE GLYCOL 3350 17 G/17G
17 POWDER, FOR SOLUTION ORAL DAILY
Refills: 0 | Status: DISCONTINUED | OUTPATIENT
Start: 2019-12-07 | End: 2019-12-14

## 2019-12-07 RX ORDER — ALBUMIN HUMAN 25 %
250 VIAL (ML) INTRAVENOUS ONCE
Refills: 0 | Status: COMPLETED | OUTPATIENT
Start: 2019-12-07 | End: 2019-12-07

## 2019-12-07 RX ORDER — INSULIN HUMAN 100 [IU]/ML
10 INJECTION, SOLUTION SUBCUTANEOUS ONCE
Refills: 0 | Status: COMPLETED | OUTPATIENT
Start: 2019-12-07 | End: 2019-12-07

## 2019-12-07 RX ORDER — FAMOTIDINE 10 MG/ML
20 INJECTION INTRAVENOUS DAILY
Refills: 0 | Status: DISCONTINUED | OUTPATIENT
Start: 2019-12-08 | End: 2019-12-14

## 2019-12-07 RX ORDER — CHLORHEXIDINE GLUCONATE 213 G/1000ML
1 SOLUTION TOPICAL
Refills: 0 | Status: DISCONTINUED | OUTPATIENT
Start: 2019-12-07 | End: 2019-12-14

## 2019-12-07 RX ORDER — NYSTATIN 500MM UNIT
500000 POWDER (EA) MISCELLANEOUS
Refills: 0 | Status: DISCONTINUED | OUTPATIENT
Start: 2019-12-08 | End: 2019-12-14

## 2019-12-07 RX ADMIN — Medication 125 MILLILITER(S): at 23:45

## 2019-12-07 RX ADMIN — Medication 40 MILLIGRAM(S): at 19:34

## 2019-12-07 RX ADMIN — Medication 1000 MILLIGRAM(S): at 22:26

## 2019-12-07 RX ADMIN — SODIUM CHLORIDE 70 MILLILITER(S): 9 INJECTION INTRAMUSCULAR; INTRAVENOUS; SUBCUTANEOUS at 21:45

## 2019-12-07 RX ADMIN — INSULIN HUMAN 10 UNIT(S): 100 INJECTION, SOLUTION SUBCUTANEOUS at 19:33

## 2019-12-07 RX ADMIN — Medication 500000 UNIT(S): at 23:44

## 2019-12-07 RX ADMIN — TACROLIMUS 4 MILLIGRAM(S): 5 CAPSULE ORAL at 15:43

## 2019-12-07 RX ADMIN — SODIUM CHLORIDE 50 MILLILITER(S): 9 INJECTION, SOLUTION INTRAVENOUS at 21:46

## 2019-12-07 RX ADMIN — Medication 50 MILLILITER(S): at 19:34

## 2019-12-07 RX ADMIN — MYCOPHENOLATE MOFETIL 1 GRAM(S): 250 CAPSULE ORAL at 19:04

## 2019-12-07 RX ADMIN — Medication 400 MILLIGRAM(S): at 22:11

## 2019-12-07 RX ADMIN — ALBUTEROL 2.5 MILLIGRAM(S): 90 AEROSOL, METERED ORAL at 19:36

## 2019-12-07 RX ADMIN — SODIUM CHLORIDE 70 MILLILITER(S): 9 INJECTION INTRAMUSCULAR; INTRAVENOUS; SUBCUTANEOUS at 15:13

## 2019-12-07 RX ADMIN — Medication 100 GRAM(S): at 21:45

## 2019-12-07 RX ADMIN — HYDROMORPHONE HYDROCHLORIDE 0.5 MILLIGRAM(S): 2 INJECTION INTRAMUSCULAR; INTRAVENOUS; SUBCUTANEOUS at 21:31

## 2019-12-07 RX ADMIN — HYDROMORPHONE HYDROCHLORIDE 0.5 MILLIGRAM(S): 2 INJECTION INTRAMUSCULAR; INTRAVENOUS; SUBCUTANEOUS at 21:46

## 2019-12-07 RX ADMIN — ONDANSETRON 4 MILLIGRAM(S): 8 TABLET, FILM COATED ORAL at 21:30

## 2019-12-07 NOTE — CHART NOTE - NSCHARTNOTEFT_GEN_A_CORE
No significant UO  DGF likely, K is 6.1  Plan for urgent HD with 2k bath 3 hours, no UF  d.w with transpant surgery- plan to do in SICU    Jhony Gastelum MD  Cell   Pager   Office

## 2019-12-07 NOTE — CONSULT NOTE ADULT - PROBLEM SELECTOR RECOMMENDATION 3
BP stable post op. Monitor BP. Goal 140-150, avoid hypotension. If bp >160 suggest to start Nifedipine home dose.

## 2019-12-07 NOTE — BRIEF OPERATIVE NOTE - OPERATION/FINDINGS
DDRT to right iliac fossa. Simulect induction. Single artery, vein and ureter. Cold ischemia time 13.5 hours. Ureter anastomosed over a double J stent. Stent was sutured to the johnson. Enlarged, firm lymph node encountered during iliac dissection, removed and sent for pathology.     Donor Info: Age 54, ABO B, KDPI 75%, X-clamp 20:39 12/6, Terminal Cr 1.7, CMV(+), EBV(+)    Recipient Info: ABO B+, CMV(+), EBV(+), Last HD 12/6CPRA 1%

## 2019-12-07 NOTE — CONSULT NOTE ADULT - SUBJECTIVE AND OBJECTIVE BOX
[facility] DIVISION OF KIDNEY DISEASES AND HYPERTENSION -- INITIAL CONSULT NOTE  --------------------------------------------------------------------------------  HPI:    74F with h/o ESRD on HD MWF via LUE AVF 2/2 since 2003  (anuric at home, Nephrologist: Dr. Nevin Osborne, last HD 12/6AM), HTN, HLD, Gout, LUE DVT, lumbar radiculopathy,  Breast CA s/p lumpectomy on Tamoxifen (2014) admitted on 12/06/19 for DDRT.  Pt underwent renal transplant no immediate complications post OP, received simulect induction  and steroids as per protocol. Donor information Age 54, ABO B, KDPI 75%, X-clamp 20:39 12/6, Terminal Cr 1.7, CMV(+), EBV(+). Recipient Info: ABO B+, CMV(+), EBV(+), Last HD 12/6CPRA 1%  Recently seen and cleared by Hematology for transient leukopenia/thrombocytopenia with negative w/u and resolution.  Cleared by Cardiology Dr. Alvarez in 4/2019.  Cleared by Hepatology Dr. Gregg for chronic karime-dil and stable pancreatic cysts s/p EUS. no transaminitis.    Pt examined in PACU still under sedation, not able to provide ros. Daughter at bed side, consented for HD. VS stable has hematuria, minimal uop.       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  Pancreatic cyst  Thyroid nodule: yearly Ultrasound, monitoring yearly  Anuria  ESRD on hemodialysis  Breast cancer, female: left 2014  H/O gastroesophageal reflux (GERD)  Thrombocytopenia: leukopenia: followed by heme, plan for BMBx 7/22/19  Anemia in ESRD (end-stage renal disease)  Hypertension  History of fracture of right ankle: 2014 repaired  S/P arteriovenous (AV) fistula creation: 2002  S/P hysterectomy: 1994  Adrenal mass: excison 2015  S/P lumpectomy, left breast: 2014    FAMILY HISTORY:  not contributory    PAST SOCIAL HISTORY:  not contributory     Patient Currently Takes Medications as of 06-Dec-2019 20:13 documented in Structured Notes  · 	Aspir 81 oral delayed release tablet: orally once a day (at bedtime)  · 	cloNIDine 0.2 mg oral tablet: 1 tab(s) orally 3 times a day  · 	folic acid 1 mg oral tablet: 1 tab(s) orally once a day  · 	hydrALAZINE 50 mg oral tablet: orally 3 times a day  · 	loratadine 10 mg oral tablet: 1 tab(s) orally once a day, As Needed  · 	NIFEdipine 60 mg oral tablet, extended release: 1 tab(s) orally once a day  · 	omeprazole 40 mg oral delayed release capsule: orally once a day (at bedtime)  · 	tamoxifen 20 mg oral tablet: orally once a day (at bedtime)  · 	Toprol- mg oral tablet, extended release: orally once a day (at bedtime)  · 	Vitamin D3 1000 intl units oral capsule: 1 cap(s) orally once a day  · 	Percocet 7.5/325 oral tablet: 1 tab(s) orally every 6 hours, As Needed    ALLERGIES & MEDICATIONS  --------------------------------------------------------------------------------  Allergies    ChloraPrep One-Step (Rash)  penicillins (Rash)  shellfish (Rash)    Intolerances      Standing Inpatient Medications  acetaminophen  IVPB .. 1000 milliGRAM(s) IV Intermittent once  mycophenolate mofetil 1 Gram(s) Oral every 12 hours  sodium chloride 0.45%. 500 milliLiter(s) IV Continuous <Continuous>  sodium chloride 0.9%. 1000 milliLiter(s) IV Continuous <Continuous>  tacrolimus ER Tablet (ENVARSUS XR) 4 milliGRAM(s) Oral daily  vancomycin  IVPB 1000 milliGRAM(s) IV Intermittent once    PRN Inpatient Medications  HYDROmorphone  Injectable 0.5 milliGRAM(s) IV Push every 10 minutes PRN  ondansetron Injectable 4 milliGRAM(s) IV Push every 6 hours PRN  ondansetron Injectable 4 milliGRAM(s) IV Push once PRN      REVIEW OF SYSTEMS  --------------------------------------------------------------------------------  unable to obtain.     VITALS/PHYSICAL EXAM  --------------------------------------------------------------------------------  T(C): 36.4 (12-07-19 @ 12:05), Max: 36.8 (12-06-19 @ 23:30)  HR: 59 (12-07-19 @ 13:00) (59 - 96)  BP: 125/60 (12-07-19 @ 13:00) (125/60 - 163/73)  RR: 16 (12-07-19 @ 13:00) (14 - 18)  SpO2: 98% (12-07-19 @ 13:00) (97% - 99%)  Wt(kg): --  Height (cm): 165.1 (12-07-19 @ 08:27)  Weight (kg): 64.6 (12-07-19 @ 08:27)  BMI (kg/m2): 23.7 (12-07-19 @ 08:27)  BSA (m2): 1.71 (12-07-19 @ 08:27)      12-07-19 @ 07:01  -  12-07-19 @ 13:13  --------------------------------------------------------  IN: 70 mL / OUT: 90 mL / NET: -20 mL      Physical Exam:  	Gen: Sedated.   	HEENT: supple neck  	Pulm: CTA B/L  	CV: RRR, S1S2; no rub  	Abd: +BS, soft, transplant site has YON drain blood output. Decrease BS.   	: No suprapubic tenderness  	UE: no edema; no asterixis  	LE:  no edema  	Neuro: sedated  	Vascular access: AVF     LABS/STUDIES  --------------------------------------------------------------------------------              12.0   3.52  >-----------<  147      [12-06-19 @ 21:23]              37.6     134  |  95  |  34  ----------------------------<  91      [12-06-19 @ 21:23]  4.8   |  24  |  5.95        Ca     8.8     [12-06-19 @ 21:23]      Mg     2.2     [12-06-19 @ 21:23]      Phos  5.4     [12-06-19 @ 21:23]    TPro  7.0  /  Alb  4.3  /  TBili  0.3  /  DBili  x   /  AST  14  /  ALT  <5  /  AlkPhos  58  [12-06-19 @ 21:23]    PT/INR: PT 10.0 , INR 0.87       [12-06-19 @ 21:23]  PTT: 27.8       [12-06-19 @ 21:23]      Creatinine Trend:  SCr 5.95 [12-06 @ 21:23]

## 2019-12-07 NOTE — PROGRESS NOTE ADULT - SUBJECTIVE AND OBJECTIVE BOX
Post OP Check  DDRT  Date: 12/7/19         POD# 0    74F with h/o ESRD on HD MWF via LUE AVF 2/2 PCKD (anuric at home, Nephrologist: Dr. Nevin Osborne, last HD 12/6AM), hx of fistula thrombosis, completed coumadin, HTN, HLD, Gout, LUE DVT, lumbar radiculopathy,  Breast CA s/p lumpectomy on Tamoxifen (2014) s/p DDRT 12/7/19 w/ simulect induction. Enlarged, lymph node was encountered during iliac dissection was removed and sent for pathology. Patient seen in PACU, still drowsy, but arousable. Denies SOB, CP, dyspnea, HA, dizziness, N/V. Post op CBC w/ stable H/H. Post op CXR clear.  OR details: DDRT to right iliac fossa. Simulect induction. (1A, 1V, 1U). Cold ischemia time 13.5 hours. Ureter anastomosed over a double J stent. Stent was sutured to the johnson. Enlarged, firm lymph node encountered during iliac dissection, removed and sent for pathology.     Donor Info: Age 54, ABO B, KDPI 75%, X-clamp 20:39 12/6, Terminal Cr 1.7, CMV(+), EBV(+)    Recipient Info: ABO B+, CMV(+), EBV(+), Last HD 12/6CPRA      Education:  Medications    Plan of care:  See Below    MEDICATIONS  (STANDING):  acetaminophen  IVPB .. 1000 milliGRAM(s) IV Intermittent once  mycophenolate mofetil 1 Gram(s) Oral every 12 hours  sodium chloride 0.45%. 500 milliLiter(s) (50 mL/Hr) IV Continuous <Continuous>  sodium chloride 0.9%. 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  Pancreatic cyst  Thyroid nodule: yearly Ultrasound, monitoring yearly  Anuria  ESRD on hemodialysis  Breast cancer, female: left 2014  H/O gastroesophageal reflux (GERD)  Thrombocytopenia: leukopenia: followed by [facility], plan for BMBx 7/22/19  Anemia in ESRD (end-stage renal disease)  Hypertension  History of fracture of right ankle: 2014 repaired  S/P arteriovenous (AV) fistula creation: 2002  S/P hysterectomy: 1994  Adrenal mass: excison 2015  S/P lumpectomy, left breast: 2014      Vital Signs Last 24 Hrs  T(C): 36.4 (07 Dec 2019 12:05), Max: 36.8 (06 Dec 2019 23:30)  T(F): 97.5 (07 Dec 2019 12:05), Max: 98.3 (06 Dec 2019 23:30)  HR: 60 (07 Dec 2019 15:00) (58 - 96)  BP: 156/73 (07 Dec 2019 15:00) (125/60 - 163/73)  BP(mean): 108 (07 Dec 2019 15:00) (87 - 108)  RR: 14 (07 Dec 2019 15:00) (12 - 18)  SpO2: 100% (07 Dec 2019 15:00) (97% - 100%)    I&O's Summary    07 Dec 2019 07:01  -  07 Dec 2019 15:28  --------------------------------------------------------  IN: 210 mL / OUT: 224 mL / NET: -14 mL                              11.3   11.84 )-----------( 128      ( 07 Dec 2019 12:55 )             34.1     12-07    132<L>  |  92<L>  |  42<H>  ----------------------------<  185<H>  4.8   |  19<L>  |  6.86<H>    Ca    8.6      07 Dec 2019 12:55  Phos  6.8     12-07  Mg     2.2     12-07    TPro  6.0  /  Alb  3.7  /  TBili  0.4  /  DBili  x   /  AST  21  /  ALT  5<L>  /  AlkPhos  49  12-07      Review of systems  Gen: No fevers/chills, weakness  Skin: No rashes  Head/Eyes/Ears/Mouth: No headache; Normal hearing; Normal vision w/o blurriness; No sinus pain/discomfort, sore throat  Respiratory: No dyspnea, cough, wheezing, hemoptysis  CV: No chest pain, PND, orthopnea  GI: C/O mild abdominal pain at surgical site. No  nausea, vomiting, melena, hematochezia  : No increased frequency, dysuria, hematuria, nocturia  MSK: No joint pain/swelling; no back pain; no edema  Neuro: No dizziness/lightheadedness, weakness, seizures, numbness, tingling  Heme: No easy bruising or bleeding  Endo: No heat/cold intolerance  Psych: No significant nervousness, anxiety, stress, depression  All other systems were reviewed and are negative, except as noted.      PHYSICAL EXAM:  Constitutional: Well developed / well nourished  Eyes: Anicteric, PERRLA  ENMT: nc/at  Neck: central line in place in R IJ.  Respiratory: CTA B/L  Cardiovascular: RRR  Gastrointestinal: Soft abdomen, mild tender to touch at surgical site, ND L YON in place w/ sanguineous output.   Genitourinary: Urinary catheter in place w/ very minimal urine.   Extremities: SCD's in place and working bilaterally  Vascular: Palpable dp pulses bilaterally  Neurological: A&O x3, drowsy.  Skin: dressing c/d/i  Musculoskeletal: Moving all extremities  Psychiatric: Responsive Post OP Check  DDRT  Date: 12/7/19         POD# 0    74F with h/o ESRD on HD MWF via LUE AVF 2/2 PCKD (anuric at home, Nephrologist: Dr. Nevin Osborne, last HD 12/6AM), hx of fistula thrombosis, completed coumadin, HTN, HLD, Gout, LUE DVT, lumbar radiculopathy,  Breast CA s/p lumpectomy on Tamoxifen (2014) s/p DDRT 12/7/19 w/ simulect induction. Enlarged, lymph node was encountered during iliac dissection was removed and sent for pathology. Patient seen in PACU, still drowsy, but arousable. Denies SOB, CP, dyspnea, HA, dizziness, N/V. Post op CBC w/ stable H/H. Post op CXR clear.  OR details: DDRT to right iliac fossa. Simulect induction. (1A, 1V, 1U). Cold ischemia time 13.5 hours. Ureter anastomosed over a double J stent. Stent was sutured to the johnson. Enlarged, firm lymph node encountered during iliac dissection, removed and sent for pathology.     Donor Info: Age 54, ABO B, KDPI 75%, X-clamp 20:39 12/6, Terminal Cr 1.7, CMV(+), EBV(+)    Recipient Info: ABO B+, CMV(+), EBV(+), Last HD 12/6CPRA    Education:  Medications    Plan of care:  See Below    MEDICATIONS  (STANDING):  acetaminophen  IVPB .. 1000 milliGRAM(s) IV Intermittent once  mycophenolate mofetil 1 Gram(s) Oral every 12 hours  sodium chloride 0.45%. 500 milliLiter(s) (50 mL/Hr) IV Continuous <Continuous>  sodium chloride 0.9%. 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  Pancreatic cyst  Thyroid nodule: yearly Ultrasound, monitoring yearly  Anuria  ESRD on hemodialysis  Breast cancer, female: left 2014  H/O gastroesophageal reflux (GERD)  Thrombocytopenia: leukopenia: followed by [facility], plan for BMBx 7/22/19  Anemia in ESRD (end-stage renal disease)  Hypertension  History of fracture of right ankle: 2014 repaired  S/P arteriovenous (AV) fistula creation: 2002  S/P hysterectomy: 1994  Adrenal mass: excison 2015  S/P lumpectomy, left breast: 2014      Vital Signs Last 24 Hrs  T(C): 36.4 (07 Dec 2019 12:05), Max: 36.8 (06 Dec 2019 23:30)  T(F): 97.5 (07 Dec 2019 12:05), Max: 98.3 (06 Dec 2019 23:30)  HR: 60 (07 Dec 2019 15:00) (58 - 96)  BP: 156/73 (07 Dec 2019 15:00) (125/60 - 163/73)  BP(mean): 108 (07 Dec 2019 15:00) (87 - 108)  RR: 14 (07 Dec 2019 15:00) (12 - 18)  SpO2: 100% (07 Dec 2019 15:00) (97% - 100%)    I&O's Summary    07 Dec 2019 07:01  -  07 Dec 2019 15:28  --------------------------------------------------------  IN: 210 mL / OUT: 224 mL / NET: -14 mL                              11.3   11.84 )-----------( 128      ( 07 Dec 2019 12:55 )             34.1     12-07    132<L>  |  92<L>  |  42<H>  ----------------------------<  185<H>  4.8   |  19<L>  |  6.86<H>    Ca    8.6      07 Dec 2019 12:55  Phos  6.8     12-07  Mg     2.2     12-07    TPro  6.0  /  Alb  3.7  /  TBili  0.4  /  DBili  x   /  AST  21  /  ALT  5<L>  /  AlkPhos  49  12-07      Review of systems  Gen: No fevers/chills, weakness  Skin: No rashes  Head/Eyes/Ears/Mouth: No headache; Normal hearing; Normal vision w/o blurriness; No sinus pain/discomfort, sore throat  Respiratory: No dyspnea, cough, wheezing, hemoptysis  CV: No chest pain, PND, orthopnea  GI: C/O mild abdominal pain at surgical site. No  nausea, vomiting, melena, hematochezia  : No increased frequency, dysuria, hematuria, nocturia  MSK: No joint pain/swelling; no back pain; no edema  Neuro: No dizziness/lightheadedness, weakness, seizures, numbness, tingling  Heme: No easy bruising or bleeding  Endo: No heat/cold intolerance  Psych: No significant nervousness, anxiety, stress, depression  All other systems were reviewed and are negative, except as noted.      PHYSICAL EXAM:  Constitutional: Well developed / well nourished  Eyes: Anicteric, PERRLA  ENMT: nc/at  Neck: central line in place in R IJ.  Respiratory: CTA B/L  Cardiovascular: RRR  Gastrointestinal: Soft abdomen, mild tender to touch at surgical site, ND L YON in place w/ sanguineous output.   Genitourinary: Urinary catheter in place w/ very minimal urine.   Extremities: SCD's in place and working bilaterally  Vascular: Palpable dp pulses bilaterally  Neurological: A&O x3, drowsy.  Skin: dressing c/d/i  Musculoskeletal: Moving all extremities  Psychiatric: Responsive

## 2019-12-07 NOTE — CONSULT NOTE ADULT - ATTENDING COMMENTS
Patient seen and examined and agree with above.  74F with h/o ESRD on HD MWF via LUE AVF 2/2 PCKD (anuric at home, Nephrologist: Dr. Nevin sOborne, last HD 12/6AM), history of fistula thrombosis s/p completed coumadin therapy now functioning, HTN, HLD, Gout, LUE DVT, lumbar radiculopathy,  Breast CA s/p lumpectomy on Tamoxifen (2014) now s/p DDRT 12/7/19 w/ simulect induction.   She is hemodynamically appropriate   Abdomen is soft tender in the right lower quadrant; incision clean  YON drain with sanguinous drainage approximately 20 cc/hour  Labs reviewed  Admitted to the SICU for hyperkalemia.    1) Postoperatively she was hyperkalemic to 6.1. She was treated chemically and is undergoing hemodialysis.   Urine output with delayed graft function with 5-20 cc/ hour   Patient became hypotensive during HD and started on phenylephrine  -will continue to monitor urine output closely    2) Lactic acidosis- currently lactate 4.4  -given 250 cc 5% albumin and will continue to trend    3) Hyperglycemia - will monitor at this time with goal BG < 180     4) H/H stable at this time  -no signs of bleeding     Renal ultrasound - no evidence of significant renal artery stenosis     CC time: 55 min
I have seen this patient with the fellow and agree with their assessment and plan. Monitor post op for need for renal replacement therapy.  Bp at goal   rest as per above    Johny Gastelum MD  Cell   Pager   Office

## 2019-12-07 NOTE — CONSULT NOTE ADULT - SUBJECTIVE AND OBJECTIVE BOX
HISTORY OF PRESENT ILLNESS:  74F with h/o ESRD on HD MWF via LUE AVF 2/2 PCKD (anuric at home, Nephrologist: Dr. Nevin Osborne, last HD 12/6AM), history of fistula thrombosis s/p completed coumadin therapy now functioning, HTN, HLD, Gout, LUE DVT, lumbar radiculopathy,  Breast CA s/p lumpectomy on Tamoxifen (2014) now s/p DDRT 12/7/19 w/ simulect induction. Postoperatively the pt was hemodynamically stable, however labs showed a slight decrease in h/h and a K of 6.1. The pts hct went from 37.6 preop to 34.1 and then to 32.8 on repeat. The pt has not required pressors and has had stable vital signs in the postoperative period. For her K, the pt was shifted with insulin and dextrose. Nephrology was called, and the pt is now written for urgent HD, which she will receive on presentation to the SICU.     PAST MEDICAL HISTORY: AV fistula thrombosis  Pancreatic cyst  Thyroid nodule  Anuria  ESRD on hemodialysis  Breast cancer, female  H/O gastroesophageal reflux (GERD)  Thrombocytopenia  Anemia in ESRD (end-stage renal disease)  Hypertension      PAST SURGICAL HISTORY: History of fracture of right ankle  S/P arteriovenous (AV) fistula creation  S/P hysterectomy  Adrenal mass  S/P lumpectomy, left breast      FAMILY HISTORY:     SOCIAL HISTORY:    CODE STATUS:     HOME MEDICATIONS:    ALLERGIES: ChloraPrep One-Step (Rash)  penicillins (Rash)  shellfish (Rash)      VITAL SIGNS:  ICU Vital Signs Last 24 Hrs  T(C): 36.4 (07 Dec 2019 12:05), Max: 36.8 (06 Dec 2019 23:30)  T(F): 97.5 (07 Dec 2019 12:05), Max: 98.3 (06 Dec 2019 23:30)  HR: 66 (07 Dec 2019 20:00) (58 - 96)  BP: 150/69 (07 Dec 2019 20:00) (105/66 - 163/73)  BP(mean): 99 (07 Dec 2019 20:00) (80 - 108)  ABP: 184/68 (07 Dec 2019 20:00) (127/56 - 184/68)  ABP(mean): 92 (07 Dec 2019 20:00) (73 - 96)  RR: 15 (07 Dec 2019 20:00) (12 - 18)  SpO2: 100% (07 Dec 2019 20:00) (95% - 100%)      NEURO  Exam:  acetaminophen  IVPB .. 1000 milliGRAM(s) IV Intermittent once  HYDROmorphone  Injectable 0.5 milliGRAM(s) IV Push every 10 minutes PRN Moderate Pain (4 - 6)  ondansetron Injectable 4 milliGRAM(s) IV Push every 6 hours PRN Nausea and/or Vomiting  ondansetron Injectable 4 milliGRAM(s) IV Push once PRN Nausea and/or Vomiting      RESPIRATORY  Mechanical Ventilation:   ABG - ( 07 Dec 2019 20:29 )  pH: 7.27  /  pCO2: 39    /  pO2: 76    / HCO3: 17    / Base Excess: -8.9  /  SaO2: 92      Lactate: x                Exam:      CARDIOVASCULAR    Exam:  Cardiac Rhythm:  furosemide   Injectable 40 milliGRAM(s) IV Push daily      GI/NUTRITION  Exam:  Diet:      GENITOURINARY/RENAL  calcium gluconate IVPB 1 Gram(s) IV Intermittent once  sodium chloride 0.45%. 500 milliLiter(s) IV Continuous <Continuous>  sodium chloride 0.9%. 1000 milliLiter(s) IV Continuous <Continuous>      12-07 @ 07:01  -  12-07 @ 20:51  --------------------------------------------------------  IN:    sodium chloride 0.9%.: 630 mL  Total IN: 630 mL    OUT:    Bulb: 600 mL    Indwelling Catheter - Urethral: 54 mL  Total OUT: 654 mL    Total NET: -24 mL        Weight (kg): 64.6 (12-07 @ 08:27)  12-07    128<L>  |  90<L>  |  45<H>  ----------------------------<  201<H>  6.1<H>   |  17<L>  |  7.51<H>    Ca    8.7      07 Dec 2019 18:29  Phos  8.1     12-07  Mg     2.2     12-07    TPro  6.0  /  Alb  3.3  /  TBili  0.4  /  DBili  x   /  AST  16  /  ALT  7<L>  /  AlkPhos  49  12-07    [ ] Gardner catheter, indication: urine output monitoring in critically ill patient    HEMATOLOGIC  [ ] VTE Prophylaxis:                          10.6   13.71 )-----------( 153      ( 07 Dec 2019 18:29 )             32.8     PT/INR - ( 07 Dec 2019 18:29 )   PT: 11.2 sec;   INR: 0.98 ratio         PTT - ( 07 Dec 2019 18:29 )  PTT:25.8 sec  Transfusion: [ ] PRBC	[ ] Platelets	[ ] FFP	[ ] Cryoprecipitate      INFECTIOUS DISEASES  mycophenolate mofetil 1 Gram(s) Oral every 12 hours  tacrolimus ER Tablet (ENVARSUS XR) 4 milliGRAM(s) Oral daily  vancomycin  IVPB 1000 milliGRAM(s) IV Intermittent once    RECENT CULTURES:      ENDOCRINE    CAPILLARY BLOOD GLUCOSE      POCT Blood Glucose.: 83 mg/dL (06 Dec 2019 21:43)      PATIENT CARE ACCESS DEVICES:  [ ] Peripheral IV  [ ] Central Venous Line	[ ] R	[ ] L	[ ] IJ	[ ] Fem	[ ] SC	Placed:   [ ] Arterial Line		[ ] R	[ ] L	[ ] Fem	[ ] Rad	[ ] Ax	Placed:   [ ] PICC:					[ ] Mediport  [ ] Urinary Catheter, Date Placed:   [x] Necessity of urinary, arterial, and venous catheters discussed    OTHER MEDICATIONS:     IMAGING STUDIES: HISTORY OF PRESENT ILLNESS:  74F with h/o ESRD on HD MWF via LUE AVF 2/2 PCKD (anuric at home, Nephrologist: Dr. Nevin Osborne, last HD 12/6AM), history of fistula thrombosis s/p completed coumadin therapy now functioning, HTN, HLD, Gout, LUE DVT, lumbar radiculopathy,  Breast CA s/p lumpectomy on Tamoxifen (2014) now s/p DDRT 12/7/19 w/ simulect induction. Postoperatively the pt was hemodynamically stable, however labs showed a slight decrease in h/h and a K of 6.1. The pts hct went from 37.6 preop to 34.1 and then to 32.8 on repeat. The pt has not required pressors and has had stable vital signs in the postoperative period. For her K, the pt was shifted with insulin and dextrose. Nephrology was called, and the pt is now written for urgent HD, which she will receive on presentation to the SICU. The pt has had low uop, making 0-20cc/hr since surgery. Drain output has also been high and has made 280cc total since surgery.     PAST MEDICAL HISTORY: AV fistula thrombosis  Pancreatic cyst  Thyroid nodule  Anuria  ESRD on hemodialysis  Breast cancer, female  H/O gastroesophageal reflux (GERD)  Thrombocytopenia  Anemia in ESRD (end-stage renal disease)  Hypertension      PAST SURGICAL HISTORY: History of fracture of right ankle  S/P arteriovenous (AV) fistula creation  S/P hysterectomy  Adrenal mass  S/P lumpectomy, left breast      FAMILY HISTORY:     SOCIAL HISTORY:    CODE STATUS: full code    HOME MEDICATIONS:    ALLERGIES: ChloraPrep One-Step (Rash)  penicillins (Rash)  shellfish (Rash)      VITAL SIGNS:  ICU Vital Signs Last 24 Hrs  T(C): 36.4 (07 Dec 2019 12:05), Max: 36.8 (06 Dec 2019 23:30)  T(F): 97.5 (07 Dec 2019 12:05), Max: 98.3 (06 Dec 2019 23:30)  HR: 66 (07 Dec 2019 20:00) (58 - 96)  BP: 150/69 (07 Dec 2019 20:00) (105/66 - 163/73)  BP(mean): 99 (07 Dec 2019 20:00) (80 - 108)  ABP: 184/68 (07 Dec 2019 20:00) (127/56 - 184/68)  ABP(mean): 92 (07 Dec 2019 20:00) (73 - 96)  RR: 15 (07 Dec 2019 20:00) (12 - 18)  SpO2: 100% (07 Dec 2019 20:00) (95% - 100%)      NEURO  Exam: alert and oriented, no focal deficits  acetaminophen  IVPB .. 1000 milliGRAM(s) IV Intermittent once  HYDROmorphone  Injectable 0.5 milliGRAM(s) IV Push every 10 minutes PRN Moderate Pain (4 - 6)  ondansetron Injectable 4 milliGRAM(s) IV Push every 6 hours PRN Nausea and/or Vomiting  ondansetron Injectable 4 milliGRAM(s) IV Push once PRN Nausea and/or Vomiting      RESPIRATORY  ABG - ( 07 Dec 2019 20:29 )  pH: 7.27  /  pCO2: 39    /  pO2: 76    / HCO3: 17    / Base Excess: -8.9  /  SaO2: 92      Lactate: x                Exam: clear lungs bilaterally      CARDIOVASCULAR    Exam: RRR  Cardiac Rhythm: sinus  furosemide   Injectable 40 milliGRAM(s) IV Push daily      GI/NUTRITION  Exam: soft, mildly distended, appropriately tender, incision site c/d/i  Diet: clears      GENITOURINARY/RENAL  calcium gluconate IVPB 1 Gram(s) IV Intermittent once  sodium chloride 0.45%. 500 milliLiter(s) IV Continuous <Continuous>  sodium chloride 0.9%. 1000 milliLiter(s) IV Continuous <Continuous>      12-07 @ 07:01  -  12-07 @ 20:51  --------------------------------------------------------  IN:    sodium chloride 0.9%.: 630 mL  Total IN: 630 mL    OUT:    Bulb: 600 mL    Indwelling Catheter - Urethral: 54 mL  Total OUT: 654 mL    Total NET: -24 mL        Weight (kg): 64.6 (12-07 @ 08:27)  12-07    128<L>  |  90<L>  |  45<H>  ----------------------------<  201<H>  6.1<H>   |  17<L>  |  7.51<H>    Ca    8.7      07 Dec 2019 18:29  Phos  8.1     12-07  Mg     2.2     12-07    TPro  6.0  /  Alb  3.3  /  TBili  0.4  /  DBili  x   /  AST  16  /  ALT  7<L>  /  AlkPhos  49  12-07    [x] Gardner catheter, indication: urine output monitoring in critically ill patient    HEMATOLOGIC  [ ] VTE Prophylaxis:                          10.6   13.71 )-----------( 153      ( 07 Dec 2019 18:29 )             32.8     PT/INR - ( 07 Dec 2019 18:29 )   PT: 11.2 sec;   INR: 0.98 ratio         PTT - ( 07 Dec 2019 18:29 )  PTT:25.8 sec  Transfusion: [ ] PRBC	[ ] Platelets	[ ] FFP	[ ] Cryoprecipitate      INFECTIOUS DISEASES  mycophenolate mofetil 1 Gram(s) Oral every 12 hours  tacrolimus ER Tablet (ENVARSUS XR) 4 milliGRAM(s) Oral daily  vancomycin  IVPB 1000 milliGRAM(s) IV Intermittent once    RECENT CULTURES:      ENDOCRINE    CAPILLARY BLOOD GLUCOSE      POCT Blood Glucose.: 83 mg/dL (06 Dec 2019 21:43)      PATIENT CARE ACCESS DEVICES:  [ ] Peripheral IV  [ ] Central Venous Line	[ ] R	[ ] L	[ ] IJ	[ ] Fem	[ ] SC	Placed:   [ ] Arterial Line		[ ] R	[ ] L	[ ] Fem	[ ] Rad	[ ] Ax	Placed:   [ ] PICC:					[ ] Mediport  [ ] Urinary Catheter, Date Placed:   [x] Necessity of urinary, arterial, and venous catheters discussed    OTHER MEDICATIONS:     IMAGING STUDIES: HISTORY OF PRESENT ILLNESS:  74F with h/o ESRD on HD MWF via LUE AVF 2/2 PCKD (anuric at home, Nephrologist: Dr. Nevin Osborne, last HD 12/6AM), history of fistula thrombosis s/p completed coumadin therapy now functioning, HTN, HLD, Gout, LUE DVT, lumbar radiculopathy,  Breast CA s/p lumpectomy on Tamoxifen (2014) now s/p DDRT 12/7/19 w/ simulect induction. Postoperatively the pt was hemodynamically stable, however labs showed a slight decrease in h/h and a K of 6.1. The pts hct went from 37.6 preop to 34.1 and then to 32.8 on repeat. The pt has not required pressors and has had stable vital signs in the postoperative period. For her K, the pt was shifted with insulin and dextrose. Nephrology was called, and the pt is now written for urgent HD, which she will receive on presentation to the SICU. The pt has had low uop, making 0-20cc/hr since surgery. Drain output has also been high and has made 280cc total since surgery.     PAST MEDICAL HISTORY: AV fistula thrombosis  Pancreatic cyst  Thyroid nodule  Anuria  ESRD on hemodialysis  Breast cancer, female  H/O gastroesophageal reflux (GERD)  Thrombocytopenia  Anemia in ESRD (end-stage renal disease)  Hypertension      PAST SURGICAL HISTORY: History of fracture of right ankle  S/P arteriovenous (AV) fistula creation  S/P hysterectomy  Adrenal mass  S/P lumpectomy, left breast      FAMILY HISTORY:     SOCIAL HISTORY:    CODE STATUS: full code    HOME MEDICATIONS:    ALLERGIES: ChloraPrep One-Step (Rash)  penicillins (Rash)  shellfish (Rash)      VITAL SIGNS:  ICU Vital Signs Last 24 Hrs  T(C): 36.4 (07 Dec 2019 12:05), Max: 36.8 (06 Dec 2019 23:30)  T(F): 97.5 (07 Dec 2019 12:05), Max: 98.3 (06 Dec 2019 23:30)  HR: 66 (07 Dec 2019 20:00) (58 - 96)  BP: 150/69 (07 Dec 2019 20:00) (105/66 - 163/73)  BP(mean): 99 (07 Dec 2019 20:00) (80 - 108)  ABP: 184/68 (07 Dec 2019 20:00) (127/56 - 184/68)  ABP(mean): 92 (07 Dec 2019 20:00) (73 - 96)  RR: 15 (07 Dec 2019 20:00) (12 - 18)  SpO2: 100% (07 Dec 2019 20:00) (95% - 100%)      NEURO  Exam: alert and oriented, no focal deficits  acetaminophen  IVPB .. 1000 milliGRAM(s) IV Intermittent once  HYDROmorphone  Injectable 0.5 milliGRAM(s) IV Push every 10 minutes PRN Moderate Pain (4 - 6)  ondansetron Injectable 4 milliGRAM(s) IV Push every 6 hours PRN Nausea and/or Vomiting  ondansetron Injectable 4 milliGRAM(s) IV Push once PRN Nausea and/or Vomiting      RESPIRATORY  ABG - ( 07 Dec 2019 20:29 )  pH: 7.27  /  pCO2: 39    /  pO2: 76    / HCO3: 17    / Base Excess: -8.9  /  SaO2: 92      Lactate: x                Exam: clear lungs bilaterally      CARDIOVASCULAR    Exam: RRR  Cardiac Rhythm: sinus  furosemide   Injectable 40 milliGRAM(s) IV Push daily      GI/NUTRITION  Exam: soft, mildly distended, appropriately tender, incision site c/d/i  Diet: clears      GENITOURINARY/RENAL  calcium gluconate IVPB 1 Gram(s) IV Intermittent once  sodium chloride 0.45%. 500 milliLiter(s) IV Continuous <Continuous>  sodium chloride 0.9%. 1000 milliLiter(s) IV Continuous <Continuous>      12-07 @ 07:01  -  12-07 @ 20:51  --------------------------------------------------------  IN:    sodium chloride 0.9%.: 630 mL  Total IN: 630 mL    OUT:    Bulb: 600 mL    Indwelling Catheter - Urethral: 54 mL  Total OUT: 654 mL    Total NET: -24 mL        Weight (kg): 64.6 (12-07 @ 08:27)  12-07    128<L>  |  90<L>  |  45<H>  ----------------------------<  201<H>  6.1<H>   |  17<L>  |  7.51<H>    Ca    8.7      07 Dec 2019 18:29  Phos  8.1     12-07  Mg     2.2     12-07    TPro  6.0  /  Alb  3.3  /  TBili  0.4  /  DBili  x   /  AST  16  /  ALT  7<L>  /  AlkPhos  49  12-07    [x] Gardner catheter, indication: urine output monitoring in critically ill patient    HEMATOLOGIC  [ ] VTE Prophylaxis:                          10.6   13.71 )-----------( 153      ( 07 Dec 2019 18:29 )             32.8     PT/INR - ( 07 Dec 2019 18:29 )   PT: 11.2 sec;   INR: 0.98 ratio         PTT - ( 07 Dec 2019 18:29 )  PTT:25.8 sec  Transfusion: [ ] PRBC	[ ] Platelets	[ ] FFP	[ ] Cryoprecipitate      INFECTIOUS DISEASES  mycophenolate mofetil 1 Gram(s) Oral every 12 hours  tacrolimus ER Tablet (ENVARSUS XR) 4 milliGRAM(s) Oral daily  vancomycin  IVPB 1000 milliGRAM(s) IV Intermittent once    RECENT CULTURES:      ENDOCRINE    CAPILLARY BLOOD GLUCOSE      POCT Blood Glucose.: 83 mg/dL (06 Dec 2019 21:43)      PATIENT CARE ACCESS DEVICES:  [ ] Peripheral IV  [x] Central Venous Line	[ ] R	[ ] L	[ ] IJ	[ ] Fem	[ ] SC	Placed:   [ ] Arterial Line		[ ] R	[ ] L	[ ] Fem	[ ] Rad	[ ] Ax	Placed:   [ ] PICC:					[ ] Mediport  [ ] Urinary Catheter, Date Placed:   [x] Necessity of urinary, arterial, and venous catheters discussed    OTHER MEDICATIONS:     IMAGING STUDIES:

## 2019-12-07 NOTE — CONSULT NOTE ADULT - PROBLEM SELECTOR RECOMMENDATION 9
Pt ESRD on HD since 2003, due to PCKD. Presented for DDRT. K stable after surgery, uop not optimal yet. continue with IV fluids.   Monitor labs closely, no indication for HD.   Continue with johnson to monitor uop.   Adjust meds based on HD.

## 2019-12-07 NOTE — PRE-ANESTHESIA EVALUATION ADULT - NSANTHPMHFT_GEN_ALL_CORE
74F with h/o ESRD on HD MWF via LUE AVF 2/2 PCKD (anuric at home, Nephrologist: Dr. Nevin Osborne, last HD 12/6AM), HTN, HLD, Gout, LUE DVT, lumbar radiculopathy,  Breast CA s/p lumpectomy on Tamoxifen (2014) admitted for potential DDRT.

## 2019-12-07 NOTE — PROGRESS NOTE ADULT - ASSESSMENT
74F with h/o ESRD on HD MWF via LUE AVF 2/2 PCKD (anuric at home, Nephrologist: Dr. Nvein Osborne, last HD 12/6AM), hx of fistula thrombosis, completed coumadin, HTN, HLD, Gout, LUE DVT, lumbar radiculopathy,  Breast CA s/p lumpectomy on Tamoxifen (2014) s/p DDRT 12/7/19 w/ simulect induction, ureter anastomosed over a double J stent. Enlarged, lymph node was encountered during iliac dissection was removed and sent for pathology. Patient seen in PACU, still drowsy, but arousable. Denies SOB, CP, dyspnea, HA, dizziness, N/V. Post op CBC w/ stable H/H. Post op CXR clear, vitals remain stable. Johnson sutured to stent is in place, minimal UOP.    ESRD on HD 2/2 to PCKD  -s/p DDRT 12/7; Simulect induction  -continue replacements and maintenance IVF  -closely monitor I/Os q 15 minutes in PACU, hourly on floor  -start Envarsus 4; start MMF 1g BID, methyl pred taper.   -start ppx (valcyte/bactrim/nystatin on 12/7 AM)  -Check envarsus level daily (send prior to AM envarsus dose).  -YON currently w/ sanguineous output (~145 total); cont to monitor closely.   -SCDs at all times.  -Post op H/H stable; Repeat labs at 6:30 pm  -stent sutured to johnson in place w/ minimal UOP ~ 15 cc total over past 3 hrs.   -CXR w/ clear lungs.     HTN  -start home meds PRN  -Home meds: clonidine 0.2 TID, hydralazine 50 TID, nifedipine 60 daily, metoprolol  daily  -on ASA81 at home, currently held. 74F with h/o ESRD on HD MWF via LUE AVF 2/2 PCKD (anuric at home, Nephrologist: Dr. Nevin Osborne, last HD 12/6AM), hx of fistula thrombosis, completed coumadin, HTN, HLD, Gout, LUE DVT, lumbar radiculopathy,  Breast CA s/p lumpectomy on Tamoxifen (2014) s/p DDRT 12/7/19 w/ simulect induction, ureter anastomosed over a double J stent. Enlarged, lymph node was encountered during iliac dissection was removed and sent for pathology. Patient seen in PACU, still drowsy, but arousable. Denies SOB, CP, dyspnea, HA, dizziness, N/V. Post op CBC w/ stable H/H. Post op CXR clear, vitals remain stable. Johnson sutured to stent is in place, minimal UOP.    ESRD on HD 2/2 to PCKD  -s/p DDRT 12/7; Simulect induction  -enlarged lymph node found during iliac dissection, removed and sent to pathology, f/u path.   -continue replacements and maintenance IVF  -closely monitor I/Os q 15 minutes in PACU, hourly on floor  -start Envarsus 4; start MMF 1g BID, methyl pred taper.   -start ppx (valcyte/bactrim/nystatin on 12/7 AM)  -Check envarsus level daily (send prior to AM envarsus dose).  -YON currently w/ sanguineous output (~145 total); cont to monitor closely.   -SCDs at all times.  -Post op H/H stable; Repeat labs at 6:30 pm  -stent sutured to johnson in place w/ minimal UOP ~ 15 cc total over past 3 hrs.   -CXR w/ clear lungs.     HTN  -start home meds PRN  -Home meds: clonidine 0.2 TID, hydralazine 50 TID, nifedipine 60 daily, metoprolol  daily  -on ASA81 at home, currently held.

## 2019-12-07 NOTE — CONSULT NOTE ADULT - PROBLEM SELECTOR RECOMMENDATION 2
s/p simulect induction, continue with steroids and taper as per protocol. Start tacrolimus 4 mg daily and MMF 1000 BID.   c/w prophylactic agents, nystatin, valcyte and bactrim if scr does not improve consider adjusting based on HD.

## 2019-12-07 NOTE — PROVIDER CONTACT NOTE (CRITICAL VALUE NOTIFICATION) - ASSESSMENT
Pt is alert and oriented x4. VS stable. Breathing is unlabored. NSR on cardiac monitor at bedside. Offering no complaints at this time. Oliguric the past hour with urine output of 10cc. Surgical service provider Juan R has been aware of pt's status.

## 2019-12-08 LAB
ALBUMIN SERPL ELPH-MCNC: 3.2 G/DL — LOW (ref 3.3–5)
ALBUMIN SERPL ELPH-MCNC: 3.6 G/DL — SIGNIFICANT CHANGE UP (ref 3.3–5)
ALP SERPL-CCNC: 43 U/L — SIGNIFICANT CHANGE UP (ref 40–120)
ALP SERPL-CCNC: 45 U/L — SIGNIFICANT CHANGE UP (ref 40–120)
ALT FLD-CCNC: 7 U/L — LOW (ref 10–45)
ALT FLD-CCNC: 7 U/L — LOW (ref 10–45)
ANION GAP SERPL CALC-SCNC: 18 MMOL/L — HIGH (ref 5–17)
ANION GAP SERPL CALC-SCNC: 19 MMOL/L — HIGH (ref 5–17)
ANION GAP SERPL CALC-SCNC: 20 MMOL/L — HIGH (ref 5–17)
ANION GAP SERPL CALC-SCNC: 23 MMOL/L — HIGH (ref 5–17)
APTT BLD: 22.9 SEC — LOW (ref 27.5–36.3)
APTT BLD: 23 SEC — LOW (ref 27.5–36.3)
APTT BLD: 23.3 SEC — LOW (ref 27.5–36.3)
APTT BLD: 24.4 SEC — LOW (ref 27.5–36.3)
APTT BLD: 25.2 SEC — LOW (ref 27.5–36.3)
AST SERPL-CCNC: 14 U/L — SIGNIFICANT CHANGE UP (ref 10–40)
AST SERPL-CCNC: 19 U/L — SIGNIFICANT CHANGE UP (ref 10–40)
BILIRUB DIRECT SERPL-MCNC: 0.1 MG/DL — SIGNIFICANT CHANGE UP (ref 0–0.2)
BILIRUB DIRECT SERPL-MCNC: 0.2 MG/DL — SIGNIFICANT CHANGE UP (ref 0–0.2)
BILIRUB INDIRECT FLD-MCNC: 0.2 MG/DL — SIGNIFICANT CHANGE UP (ref 0.2–1)
BILIRUB INDIRECT FLD-MCNC: 0.3 MG/DL — SIGNIFICANT CHANGE UP (ref 0.2–1)
BILIRUB SERPL-MCNC: 0.3 MG/DL — SIGNIFICANT CHANGE UP (ref 0.2–1.2)
BILIRUB SERPL-MCNC: 0.5 MG/DL — SIGNIFICANT CHANGE UP (ref 0.2–1.2)
BUN SERPL-MCNC: 23 MG/DL — SIGNIFICANT CHANGE UP (ref 7–23)
BUN SERPL-MCNC: 29 MG/DL — HIGH (ref 7–23)
BUN SERPL-MCNC: 33 MG/DL — HIGH (ref 7–23)
BUN SERPL-MCNC: 35 MG/DL — HIGH (ref 7–23)
CALCIUM SERPL-MCNC: 8.3 MG/DL — LOW (ref 8.4–10.5)
CALCIUM SERPL-MCNC: 8.6 MG/DL — SIGNIFICANT CHANGE UP (ref 8.4–10.5)
CALCIUM SERPL-MCNC: 8.8 MG/DL — SIGNIFICANT CHANGE UP (ref 8.4–10.5)
CALCIUM SERPL-MCNC: 9.5 MG/DL — SIGNIFICANT CHANGE UP (ref 8.4–10.5)
CHLORIDE SERPL-SCNC: 92 MMOL/L — LOW (ref 96–108)
CHLORIDE SERPL-SCNC: 93 MMOL/L — LOW (ref 96–108)
CHLORIDE SERPL-SCNC: 93 MMOL/L — LOW (ref 96–108)
CHLORIDE SERPL-SCNC: 94 MMOL/L — LOW (ref 96–108)
CK MB BLD-MCNC: 1.7 % — SIGNIFICANT CHANGE UP (ref 0–3.5)
CK MB CFR SERPL CALC: 2.6 NG/ML — SIGNIFICANT CHANGE UP (ref 0–3.8)
CK SERPL-CCNC: 150 U/L — SIGNIFICANT CHANGE UP (ref 25–170)
CO2 SERPL-SCNC: 18 MMOL/L — LOW (ref 22–31)
CO2 SERPL-SCNC: 19 MMOL/L — LOW (ref 22–31)
CO2 SERPL-SCNC: 20 MMOL/L — LOW (ref 22–31)
CO2 SERPL-SCNC: 20 MMOL/L — LOW (ref 22–31)
CREAT SERPL-MCNC: 4.22 MG/DL — HIGH (ref 0.5–1.3)
CREAT SERPL-MCNC: 5.27 MG/DL — HIGH (ref 0.5–1.3)
CREAT SERPL-MCNC: 5.98 MG/DL — HIGH (ref 0.5–1.3)
CREAT SERPL-MCNC: 6.64 MG/DL — HIGH (ref 0.5–1.3)
GAS PNL BLDA: SIGNIFICANT CHANGE UP
GLUCOSE BLDC GLUCOMTR-MCNC: 173 MG/DL — HIGH (ref 70–99)
GLUCOSE BLDC GLUCOMTR-MCNC: 186 MG/DL — HIGH (ref 70–99)
GLUCOSE SERPL-MCNC: 162 MG/DL — HIGH (ref 70–99)
GLUCOSE SERPL-MCNC: 172 MG/DL — HIGH (ref 70–99)
GLUCOSE SERPL-MCNC: 181 MG/DL — HIGH (ref 70–99)
GLUCOSE SERPL-MCNC: 199 MG/DL — HIGH (ref 70–99)
HAV IGM SER-ACNC: SIGNIFICANT CHANGE UP
HBA1C BLD-MCNC: 4.4 % — SIGNIFICANT CHANGE UP (ref 4–5.6)
HBV CORE IGM SER-ACNC: SIGNIFICANT CHANGE UP
HBV SURFACE AG SER-ACNC: SIGNIFICANT CHANGE UP
HCT VFR BLD CALC: 25.3 % — LOW (ref 34.5–45)
HCT VFR BLD CALC: 26.4 % — LOW (ref 34.5–45)
HCT VFR BLD CALC: 27.2 % — LOW (ref 34.5–45)
HCT VFR BLD CALC: 28.2 % — LOW (ref 34.5–45)
HCT VFR BLD CALC: 28.4 % — LOW (ref 34.5–45)
HCV AB S/CO SERPL IA: 0.08 S/CO — SIGNIFICANT CHANGE UP (ref 0–0.99)
HCV AB SERPL-IMP: SIGNIFICANT CHANGE UP
HGB BLD-MCNC: 8.4 G/DL — LOW (ref 11.5–15.5)
HGB BLD-MCNC: 8.6 G/DL — LOW (ref 11.5–15.5)
HGB BLD-MCNC: 9.1 G/DL — LOW (ref 11.5–15.5)
HGB BLD-MCNC: 9.3 G/DL — LOW (ref 11.5–15.5)
HGB BLD-MCNC: 9.3 G/DL — LOW (ref 11.5–15.5)
INR BLD: 0.97 RATIO — SIGNIFICANT CHANGE UP (ref 0.88–1.16)
INR BLD: 0.99 RATIO — SIGNIFICANT CHANGE UP (ref 0.88–1.16)
INR BLD: 1 RATIO — SIGNIFICANT CHANGE UP (ref 0.88–1.16)
LDH SERPL L TO P-CCNC: 167 U/L — SIGNIFICANT CHANGE UP (ref 50–242)
LDH SERPL L TO P-CCNC: 174 U/L — SIGNIFICANT CHANGE UP (ref 50–242)
MAGNESIUM SERPL-MCNC: 1.9 MG/DL — SIGNIFICANT CHANGE UP (ref 1.6–2.6)
MAGNESIUM SERPL-MCNC: 2 MG/DL — SIGNIFICANT CHANGE UP (ref 1.6–2.6)
MAGNESIUM SERPL-MCNC: 2.1 MG/DL — SIGNIFICANT CHANGE UP (ref 1.6–2.6)
MAGNESIUM SERPL-MCNC: 2.1 MG/DL — SIGNIFICANT CHANGE UP (ref 1.6–2.6)
MAGNESIUM SERPL-MCNC: 2.2 MG/DL — SIGNIFICANT CHANGE UP (ref 1.6–2.6)
MCHC RBC-ENTMCNC: 30.9 PG — SIGNIFICANT CHANGE UP (ref 27–34)
MCHC RBC-ENTMCNC: 31.4 PG — SIGNIFICANT CHANGE UP (ref 27–34)
MCHC RBC-ENTMCNC: 31.6 PG — SIGNIFICANT CHANGE UP (ref 27–34)
MCHC RBC-ENTMCNC: 31.7 PG — SIGNIFICANT CHANGE UP (ref 27–34)
MCHC RBC-ENTMCNC: 32 PG — SIGNIFICANT CHANGE UP (ref 27–34)
MCHC RBC-ENTMCNC: 32.6 GM/DL — SIGNIFICANT CHANGE UP (ref 32–36)
MCHC RBC-ENTMCNC: 32.7 GM/DL — SIGNIFICANT CHANGE UP (ref 32–36)
MCHC RBC-ENTMCNC: 33 GM/DL — SIGNIFICANT CHANGE UP (ref 32–36)
MCHC RBC-ENTMCNC: 33.2 GM/DL — SIGNIFICANT CHANGE UP (ref 32–36)
MCHC RBC-ENTMCNC: 33.5 GM/DL — SIGNIFICANT CHANGE UP (ref 32–36)
MCV RBC AUTO: 94.8 FL — SIGNIFICANT CHANGE UP (ref 80–100)
MCV RBC AUTO: 95 FL — SIGNIFICANT CHANGE UP (ref 80–100)
MCV RBC AUTO: 95.1 FL — SIGNIFICANT CHANGE UP (ref 80–100)
MCV RBC AUTO: 95.9 FL — SIGNIFICANT CHANGE UP (ref 80–100)
MCV RBC AUTO: 96.9 FL — SIGNIFICANT CHANGE UP (ref 80–100)
NRBC # BLD: 0 /100 WBCS — SIGNIFICANT CHANGE UP (ref 0–0)
PHOSPHATE SERPL-MCNC: 4.6 MG/DL — HIGH (ref 2.5–4.5)
PHOSPHATE SERPL-MCNC: 4.9 MG/DL — HIGH (ref 2.5–4.5)
PHOSPHATE SERPL-MCNC: 6 MG/DL — HIGH (ref 2.5–4.5)
PHOSPHATE SERPL-MCNC: 6.7 MG/DL — HIGH (ref 2.5–4.5)
PHOSPHATE SERPL-MCNC: 7.1 MG/DL — HIGH (ref 2.5–4.5)
PLATELET # BLD AUTO: 108 K/UL — LOW (ref 150–400)
PLATELET # BLD AUTO: 110 K/UL — LOW (ref 150–400)
PLATELET # BLD AUTO: 113 K/UL — LOW (ref 150–400)
PLATELET # BLD AUTO: 126 K/UL — LOW (ref 150–400)
PLATELET # BLD AUTO: 131 K/UL — LOW (ref 150–400)
POTASSIUM SERPL-MCNC: 3.8 MMOL/L — SIGNIFICANT CHANGE UP (ref 3.5–5.3)
POTASSIUM SERPL-MCNC: 4.5 MMOL/L — SIGNIFICANT CHANGE UP (ref 3.5–5.3)
POTASSIUM SERPL-MCNC: 4.9 MMOL/L — SIGNIFICANT CHANGE UP (ref 3.5–5.3)
POTASSIUM SERPL-MCNC: 5.1 MMOL/L — SIGNIFICANT CHANGE UP (ref 3.5–5.3)
POTASSIUM SERPL-SCNC: 3.8 MMOL/L — SIGNIFICANT CHANGE UP (ref 3.5–5.3)
POTASSIUM SERPL-SCNC: 4.5 MMOL/L — SIGNIFICANT CHANGE UP (ref 3.5–5.3)
POTASSIUM SERPL-SCNC: 4.9 MMOL/L — SIGNIFICANT CHANGE UP (ref 3.5–5.3)
POTASSIUM SERPL-SCNC: 5.1 MMOL/L — SIGNIFICANT CHANGE UP (ref 3.5–5.3)
PROT SERPL-MCNC: 5.8 G/DL — LOW (ref 6–8.3)
PROT SERPL-MCNC: 6 G/DL — SIGNIFICANT CHANGE UP (ref 6–8.3)
PROTHROM AB SERPL-ACNC: 11.1 SEC — SIGNIFICANT CHANGE UP (ref 10–12.9)
PROTHROM AB SERPL-ACNC: 11.3 SEC — SIGNIFICANT CHANGE UP (ref 10–12.9)
PROTHROM AB SERPL-ACNC: 11.4 SEC — SIGNIFICANT CHANGE UP (ref 10–12.9)
RBC # BLD: 2.66 M/UL — LOW (ref 3.8–5.2)
RBC # BLD: 2.78 M/UL — LOW (ref 3.8–5.2)
RBC # BLD: 2.87 M/UL — LOW (ref 3.8–5.2)
RBC # BLD: 2.91 M/UL — LOW (ref 3.8–5.2)
RBC # BLD: 2.96 M/UL — LOW (ref 3.8–5.2)
RBC # FLD: 13.8 % — SIGNIFICANT CHANGE UP (ref 10.3–14.5)
RBC # FLD: 13.9 % — SIGNIFICANT CHANGE UP (ref 10.3–14.5)
RBC # FLD: 13.9 % — SIGNIFICANT CHANGE UP (ref 10.3–14.5)
RBC # FLD: 14 % — SIGNIFICANT CHANGE UP (ref 10.3–14.5)
RBC # FLD: 14.1 % — SIGNIFICANT CHANGE UP (ref 10.3–14.5)
SODIUM SERPL-SCNC: 130 MMOL/L — LOW (ref 135–145)
SODIUM SERPL-SCNC: 132 MMOL/L — LOW (ref 135–145)
SODIUM SERPL-SCNC: 132 MMOL/L — LOW (ref 135–145)
SODIUM SERPL-SCNC: 135 MMOL/L — SIGNIFICANT CHANGE UP (ref 135–145)
TACROLIMUS SERPL-MCNC: 3.6 NG/ML — SIGNIFICANT CHANGE UP
TROPONIN T, HIGH SENSITIVITY RESULT: 69 NG/L — HIGH (ref 0–51)
TROPONIN T, HIGH SENSITIVITY RESULT: 83 NG/L — HIGH (ref 0–51)
WBC # BLD: 10.13 K/UL — SIGNIFICANT CHANGE UP (ref 3.8–10.5)
WBC # BLD: 10.39 K/UL — SIGNIFICANT CHANGE UP (ref 3.8–10.5)
WBC # BLD: 12.33 K/UL — HIGH (ref 3.8–10.5)
WBC # BLD: 8.45 K/UL — SIGNIFICANT CHANGE UP (ref 3.8–10.5)
WBC # BLD: 9.2 K/UL — SIGNIFICANT CHANGE UP (ref 3.8–10.5)
WBC # FLD AUTO: 10.13 K/UL — SIGNIFICANT CHANGE UP (ref 3.8–10.5)
WBC # FLD AUTO: 10.39 K/UL — SIGNIFICANT CHANGE UP (ref 3.8–10.5)
WBC # FLD AUTO: 12.33 K/UL — HIGH (ref 3.8–10.5)
WBC # FLD AUTO: 8.45 K/UL — SIGNIFICANT CHANGE UP (ref 3.8–10.5)
WBC # FLD AUTO: 9.2 K/UL — SIGNIFICANT CHANGE UP (ref 3.8–10.5)

## 2019-12-08 PROCEDURE — 71045 X-RAY EXAM CHEST 1 VIEW: CPT | Mod: 26

## 2019-12-08 PROCEDURE — 99232 SBSQ HOSP IP/OBS MODERATE 35: CPT | Mod: GC

## 2019-12-08 PROCEDURE — 99291 CRITICAL CARE FIRST HOUR: CPT

## 2019-12-08 PROCEDURE — 93010 ELECTROCARDIOGRAM REPORT: CPT

## 2019-12-08 RX ORDER — CALCIUM ACETATE 667 MG
667 TABLET ORAL
Refills: 0 | Status: DISCONTINUED | OUTPATIENT
Start: 2019-12-08 | End: 2019-12-14

## 2019-12-08 RX ORDER — ALBUMIN HUMAN 25 %
250 VIAL (ML) INTRAVENOUS ONCE
Refills: 0 | Status: DISCONTINUED | OUTPATIENT
Start: 2019-12-08 | End: 2019-12-08

## 2019-12-08 RX ORDER — LABETALOL HCL 100 MG
10 TABLET ORAL ONCE
Refills: 0 | Status: COMPLETED | OUTPATIENT
Start: 2019-12-08 | End: 2019-12-08

## 2019-12-08 RX ORDER — HYDRALAZINE HCL 50 MG
10 TABLET ORAL ONCE
Refills: 0 | Status: COMPLETED | OUTPATIENT
Start: 2019-12-08 | End: 2019-12-08

## 2019-12-08 RX ORDER — FUROSEMIDE 40 MG
60 TABLET ORAL ONCE
Refills: 0 | Status: COMPLETED | OUTPATIENT
Start: 2019-12-08 | End: 2019-12-08

## 2019-12-08 RX ORDER — FUROSEMIDE 40 MG
40 TABLET ORAL ONCE
Refills: 0 | Status: COMPLETED | OUTPATIENT
Start: 2019-12-08 | End: 2019-12-08

## 2019-12-08 RX ORDER — METOPROLOL TARTRATE 50 MG
50 TABLET ORAL
Refills: 0 | Status: DISCONTINUED | OUTPATIENT
Start: 2019-12-08 | End: 2019-12-14

## 2019-12-08 RX ORDER — INSULIN LISPRO 100/ML
VIAL (ML) SUBCUTANEOUS EVERY 4 HOURS
Refills: 0 | Status: DISCONTINUED | OUTPATIENT
Start: 2019-12-08 | End: 2019-12-09

## 2019-12-08 RX ORDER — PHENYLEPHRINE HYDROCHLORIDE 10 MG/ML
0.3 INJECTION INTRAVENOUS
Qty: 40 | Refills: 0 | Status: DISCONTINUED | OUTPATIENT
Start: 2019-12-08 | End: 2019-12-08

## 2019-12-08 RX ORDER — TACROLIMUS 5 MG/1
4 CAPSULE ORAL
Refills: 0 | Status: DISCONTINUED | OUTPATIENT
Start: 2019-12-08 | End: 2019-12-09

## 2019-12-08 RX ORDER — METOCLOPRAMIDE HCL 10 MG
10 TABLET ORAL ONCE
Refills: 0 | Status: COMPLETED | OUTPATIENT
Start: 2019-12-08 | End: 2019-12-08

## 2019-12-08 RX ADMIN — Medication 500000 UNIT(S): at 11:38

## 2019-12-08 RX ADMIN — Medication 10 MILLIGRAM(S): at 01:32

## 2019-12-08 RX ADMIN — Medication 500000 UNIT(S): at 17:02

## 2019-12-08 RX ADMIN — OXYCODONE HYDROCHLORIDE 10 MILLIGRAM(S): 5 TABLET ORAL at 11:52

## 2019-12-08 RX ADMIN — Medication 50 MILLIGRAM(S): at 17:02

## 2019-12-08 RX ADMIN — Medication 500000 UNIT(S): at 05:07

## 2019-12-08 RX ADMIN — OXYCODONE HYDROCHLORIDE 10 MILLIGRAM(S): 5 TABLET ORAL at 01:32

## 2019-12-08 RX ADMIN — Medication 10 MILLIGRAM(S): at 04:39

## 2019-12-08 RX ADMIN — OXYCODONE HYDROCHLORIDE 10 MILLIGRAM(S): 5 TABLET ORAL at 07:12

## 2019-12-08 RX ADMIN — Medication 50 MILLIGRAM(S): at 05:07

## 2019-12-08 RX ADMIN — VALGANCICLOVIR 450 MILLIGRAM(S): 450 TABLET, FILM COATED ORAL at 11:49

## 2019-12-08 RX ADMIN — Medication 125 MILLIGRAM(S): at 17:02

## 2019-12-08 RX ADMIN — OXYCODONE HYDROCHLORIDE 5 MILLIGRAM(S): 5 TABLET ORAL at 17:02

## 2019-12-08 RX ADMIN — Medication 60 MILLIGRAM(S): at 10:40

## 2019-12-08 RX ADMIN — CHLORHEXIDINE GLUCONATE 1 APPLICATION(S): 213 SOLUTION TOPICAL at 05:08

## 2019-12-08 RX ADMIN — MYCOPHENOLATE MOFETIL 1 GRAM(S): 250 CAPSULE ORAL at 17:02

## 2019-12-08 RX ADMIN — Medication 40 MILLIGRAM(S): at 09:14

## 2019-12-08 RX ADMIN — OXYCODONE HYDROCHLORIDE 5 MILLIGRAM(S): 5 TABLET ORAL at 17:47

## 2019-12-08 RX ADMIN — Medication 10 MILLIGRAM(S): at 12:23

## 2019-12-08 RX ADMIN — POLYETHYLENE GLYCOL 3350 17 GRAM(S): 17 POWDER, FOR SOLUTION ORAL at 11:50

## 2019-12-08 RX ADMIN — Medication 1000 UNIT(S): at 11:38

## 2019-12-08 RX ADMIN — OXYCODONE HYDROCHLORIDE 5 MILLIGRAM(S): 5 TABLET ORAL at 08:16

## 2019-12-08 RX ADMIN — Medication 1: at 22:05

## 2019-12-08 RX ADMIN — Medication 125 MILLIGRAM(S): at 05:07

## 2019-12-08 RX ADMIN — TAMOXIFEN CITRATE 20 MILLIGRAM(S): 20 TABLET, FILM COATED ORAL at 21:57

## 2019-12-08 RX ADMIN — TACROLIMUS 4 MILLIGRAM(S): 5 CAPSULE ORAL at 05:07

## 2019-12-08 RX ADMIN — MYCOPHENOLATE MOFETIL 1 GRAM(S): 250 CAPSULE ORAL at 05:07

## 2019-12-08 RX ADMIN — SENNA PLUS 2 TABLET(S): 8.6 TABLET ORAL at 21:56

## 2019-12-08 RX ADMIN — PHENYLEPHRINE HYDROCHLORIDE 7.27 MICROGRAM(S)/KG/MIN: 10 INJECTION INTRAVENOUS at 01:36

## 2019-12-08 RX ADMIN — OXYCODONE HYDROCHLORIDE 5 MILLIGRAM(S): 5 TABLET ORAL at 09:00

## 2019-12-08 RX ADMIN — Medication 0.1 MILLIGRAM(S): at 11:39

## 2019-12-08 RX ADMIN — Medication 667 MILLIGRAM(S): at 21:56

## 2019-12-08 RX ADMIN — OXYCODONE HYDROCHLORIDE 10 MILLIGRAM(S): 5 TABLET ORAL at 02:02

## 2019-12-08 RX ADMIN — OXYCODONE HYDROCHLORIDE 10 MILLIGRAM(S): 5 TABLET ORAL at 06:42

## 2019-12-08 RX ADMIN — OXYCODONE HYDROCHLORIDE 10 MILLIGRAM(S): 5 TABLET ORAL at 12:45

## 2019-12-08 RX ADMIN — Medication 667 MILLIGRAM(S): at 17:02

## 2019-12-08 RX ADMIN — Medication 40 MILLIGRAM(S): at 05:07

## 2019-12-08 RX ADMIN — Medication 1 TABLET(S): at 11:49

## 2019-12-08 RX ADMIN — FAMOTIDINE 20 MILLIGRAM(S): 10 INJECTION INTRAVENOUS at 11:38

## 2019-12-08 RX ADMIN — Medication 10 MILLIGRAM(S): at 10:00

## 2019-12-08 RX ADMIN — Medication 667 MILLIGRAM(S): at 11:39

## 2019-12-08 RX ADMIN — Medication 0.2 MILLIGRAM(S): at 17:02

## 2019-12-08 RX ADMIN — Medication 0.2 MILLIGRAM(S): at 22:06

## 2019-12-08 NOTE — PROGRESS NOTE ADULT - PROBLEM SELECTOR PLAN 1
Pt ESRD on HD since 2003, due to PCKD. Presented for DDRT, now with DGF requiring HD, due to hyperkalemia, electrolytes improving remains oliguric 114 UOP.   Agree with trial of diuretics again today and fluids.   Monitor labs closely, no indication for HD.   Continue with johnson to monitor uop.

## 2019-12-08 NOTE — PROGRESS NOTE ADULT - SUBJECTIVE AND OBJECTIVE BOX
Transplant Surgery - Multidisciplinary Rounds  --------------------------------------------------------------  DDRT Date:  2/7/2019       POD# 1    Present:   Patient seen with multidisciplinary team including Transplant Surgeon: Dr. Campos, ROMÁN Bueno and unit RN during am rounds and examined with Dr. Campos.  Disciplines not in attendance will be notified of the plan.     HPI: 74F with h/o ESRD on HD MWF via LUE AVF 2/2 PCKD (anuric at home, Nephrologist: Dr. Nevin Osborne, last HD 12/6AM), hx of fistula thrombosis, completed coumadin, HTN, HLD, Gout, LUE DVT, lumbar radiculopathy,  Breast CA s/p lumpectomy on Tamoxifen (2014).  Underwent DDRT 12/7/19 w/ simulect induction.     Donor Info: Age 54, ABO B, KDPI 75%, X-clamp 20:39 12/6, Terminal Cr 1.7, CMV(+), EBV(+)    Recipient Info: ABO B+, CMV(+), EBV(+), Last HD 12/6CPRA  OR: DDRT to R iliac fossa, Simulect induction. 1A, 1V, 1U. Ureter stented. Stent sutured to johnson. CIT 13.5Hrs  Enlarged, lymph node was encountered during iliac dissection was removed and sent for pathology.     Interval Events: POD 1 s/p DDRT with DGF - required HD post op for Hyperkalemia. Hypotensive during HD with an episode of SVT  - this am hypertensive with SBP 170s, restarted home dose clonidine 0.2mg q8hrs  - Minimal u/o ~10cc/hr, received lasix 100mg ivp with minimal response  - H/H downtrending 8.6/26 from 9.1/27; YON with SS output, BP stable, plan to f/u repeat cbc this afternoon  - Heplock IVF, diet advanced    Potential Discharge date: pending clinical improvement     Education:  Medications    Plan of care:  See Below    MEDICATIONS  (STANDING):  calcium acetate 667 milliGRAM(s) Oral four times a day with meals  chlorhexidine 2% Cloths 1 Application(s) Topical <User Schedule>  cholecalciferol 1000 Unit(s) Oral daily  cloNIDine 0.2 milliGRAM(s) Oral every 8 hours  famotidine    Tablet 20 milliGRAM(s) Oral daily  methylPREDNISolone sodium succinate Injectable 125 milliGRAM(s) IV Push two times a day  metoprolol tartrate 50 milliGRAM(s) Oral two times a day  mycophenolate mofetil 1 Gram(s) Oral <User Schedule>  nystatin    Suspension 121184 Unit(s) Swish and Swallow four times a day  polyethylene glycol 3350 17 Gram(s) Oral daily  senna 2 Tablet(s) Oral at bedtime  tacrolimus ER Tablet (ENVARSUS XR) 4 milliGRAM(s) Oral <User Schedule>  tamoxifen 20 milliGRAM(s) Oral at bedtime  trimethoprim   80 mG/sulfamethoxazole 400 mG 1 Tablet(s) Oral daily  valGANciclovir 450 milliGRAM(s) Oral daily    MEDICATIONS  (PRN):  acetaminophen   Tablet .. 975 milliGRAM(s) Oral every 6 hours PRN Mild Pain (1 - 3)  ondansetron Injectable 4 milliGRAM(s) IV Push every 6 hours PRN Nausea and/or Vomiting  oxyCODONE    IR 5 milliGRAM(s) Oral every 4 hours PRN Moderate Pain (4 - 6)  oxyCODONE    IR 10 milliGRAM(s) Oral every 4 hours PRN Severe Pain (7 - 10)      PAST MEDICAL & SURGICAL HISTORY:  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  Pancreatic cyst  Thyroid nodule: yearly Ultrasound, monitoring yearly  Anuria  ESRD on hemodialysis  Breast cancer, female: left 2014  H/O gastroesophageal reflux (GERD)  Thrombocytopenia: leukopenia: followed by heme, plan for BMBx 7/22/19  Anemia in ESRD (end-stage renal disease)  Hypertension  History of fracture of right ankle: 2014 repaired  S/P arteriovenous (AV) fistula creation: 2002  S/P hysterectomy: 1994  Adrenal mass: excison 2015  S/P lumpectomy, left breast: 2014    Vital Signs Last 24 Hrs  T(C): 36.7 (08 Dec 2019 11:00), Max: 36.9 (07 Dec 2019 23:00)  T(F): 98.1 (08 Dec 2019 11:00), Max: 98.4 (07 Dec 2019 23:00)  HR: 92 (08 Dec 2019 12:00) (58 - 97)  BP: 152/68 (07 Dec 2019 21:00) (105/66 - 156/73)  BP(mean): 99 (07 Dec 2019 21:00) (80 - 108)  RR: 24 (08 Dec 2019 12:00) (12 - 37)  SpO2: 98% (08 Dec 2019 12:00) (95% - 100%)    I&O's Summary    07 Dec 2019 07:01  -  08 Dec 2019 07:00  --------------------------------------------------------  IN: 1780 mL / OUT: 569 mL / NET: 1211 mL    08 Dec 2019 07:01  -  08 Dec 2019 13:16  --------------------------------------------------------  IN: 0 mL / OUT: 55 mL / NET: -55 mL                        8.6    8.45  )-----------( 110      ( 08 Dec 2019 12:19 )             26.4     12-08    132<L>  |  93<L>  |  33<H>  ----------------------------<  199<H>  5.1   |  20<L>  |  5.98<H>    Ca    8.6      08 Dec 2019 12:19  Phos  6.7     12-08  Mg     2.1     12-08    TPro  6.0  /  Alb  3.6  /  TBili  0.5  /  DBili  0.2  /  AST  19  /  ALT  7<L>  /  AlkPhos  45  12-08      Review of systems  Gen: No weight changes, fatigue, fevers/chills, weakness  Skin: No rashes  Head/Eyes/Ears/Mouth: No headache; Normal hearing; Normal vision w/o blurriness; No sinus pain/discomfort, sore throat  Respiratory: No dyspnea, cough, wheezing, hemoptysis  CV: No chest pain, PND, orthopnea  GI: C/O mild abdominal pain at surgical site, diarrhea, constipation, nausea, vomiting, melena, hematochezia  : No increased frequency, dysuria, hematuria, nocturia  MSK: No joint pain/swelling; no back pain; no edema  Neuro: No dizziness/lightheadedness, weakness, seizures, numbness, tingling  Heme: No easy bruising or bleeding  Endo: No heat/cold intolerance  Psych: No significant nervousness, anxiety, stress, depression  All other systems were reviewed and are negative, except as noted.    PHYSICAL EXAM:  Constitutional: Well developed / well nourished  Eyes: Anicteric, PERRLA  ENMT: nc/at  Neck: L TCL .  Respiratory: CTA B/L  Cardiovascular: RRR  Gastrointestinal: Soft abdomen, mild tender to touch at surgical site, ND L YON in place with SS output   Genitourinary: Urinary catheter in place w/ very minimal urine.   Extremities: SCD's in place and working bilaterally  Vascular: Palpable dp pulses bilaterally  Neurological: A&O x3, drowsy.  Skin: dressing c/d/i  Musculoskeletal: Moving all extremities  Psychiatric: Responsive

## 2019-12-08 NOTE — PROGRESS NOTE ADULT - PROBLEM SELECTOR PLAN 2
s/p simulect induction, continue with steroids and taper as per protocol. C/W tacrolimus 4 mg daily and MMF 1000 BID, adjust Tacro based on levels. Goal 8-10  c/w prophylactic agents, nystatin, valcyte and bactrim if scr does not improve consider adjusting based on HD. Valcyte three times a week.

## 2019-12-08 NOTE — PROGRESS NOTE ADULT - SUBJECTIVE AND OBJECTIVE BOX
[facility] DIVISION OF KIDNEY DISEASES AND HYPERTENSION -- FOLLOW UP NOTE  --------------------------------------------------------------------------------  HPI  74F with h/o ESRD on HD MWF via LUE AVF 2/2 since 2003  (anuric at home, Nephrologist: Dr. Nevin Osborne, last HD 12/6AM), HTN, HLD, Gout, LUE DVT, lumbar radiculopathy,  Breast CA s/p lumpectomy on Tamoxifen (2014) admitted on 12/06/19 for DDRT.  Pt underwent renal transplant no immediate complications post OP, received simulect induction  and steroids as per protocol. Donor information Age 54, ABO B, KDPI 75%, X-clamp 20:39 12/6, Terminal Cr 1.7, CMV(+), EBV(+). Recipient Info: ABO B+, CMV(+), EBV(+), Last HD 12/6CPRA 1%.     s/p DDRT, POD#1.     24 hour events/subjective:  Pt examined at bed side, awake, feeling well, VS stable, had HD overnight due to hyperkalemia, remains oliguric today, has bloody output from YON drain, and hematuria. No fever. Pt denies pain, no flatus yet.     PAST HISTORY  --------------------------------------------------------------------------------  No significant changes to PMH, PSH, FHx, SHx, unless otherwise noted    ALLERGIES & MEDICATIONS  --------------------------------------------------------------------------------  Allergies    ChloraPrep One-Step (Rash)  penicillins (Rash)  shellfish (Rash)    Intolerances      Standing Inpatient Medications  chlorhexidine 2% Cloths 1 Application(s) Topical <User Schedule>  cholecalciferol 1000 Unit(s) Oral daily  famotidine    Tablet 20 milliGRAM(s) Oral daily  furosemide   Injectable 40 milliGRAM(s) IV Push daily  methylPREDNISolone sodium succinate Injectable 125 milliGRAM(s) IV Push two times a day  metoprolol tartrate 50 milliGRAM(s) Oral two times a day  mycophenolate mofetil 1 Gram(s) Oral <User Schedule>  nystatin    Suspension 683129 Unit(s) Swish and Swallow four times a day  polyethylene glycol 3350 17 Gram(s) Oral daily  senna 2 Tablet(s) Oral at bedtime  sodium chloride 0.45%. 500 milliLiter(s) IV Continuous <Continuous>  sodium chloride 0.9%. 1000 milliLiter(s) IV Continuous <Continuous>  tacrolimus ER Tablet (ENVARSUS XR) 4 milliGRAM(s) Oral <User Schedule>  tamoxifen 20 milliGRAM(s) Oral at bedtime  trimethoprim   80 mG/sulfamethoxazole 400 mG 1 Tablet(s) Oral daily  valGANciclovir 450 milliGRAM(s) Oral daily    PRN Inpatient Medications  acetaminophen   Tablet .. 975 milliGRAM(s) Oral every 6 hours PRN  ondansetron Injectable 4 milliGRAM(s) IV Push every 6 hours PRN  oxyCODONE    IR 5 milliGRAM(s) Oral every 4 hours PRN  oxyCODONE    IR 10 milliGRAM(s) Oral every 4 hours PRN      REVIEW OF SYSTEMS  --------------------------------------------------------------------------------  Gen: no lethargy, + fatigue  Respiratory: No dyspnea  CV: No chest pain  GI: No abdominal pain. Abdomen is tender.   MSK: no LE edema      All other systems were reviewed and are negative, except as noted.    VITALS/PHYSICAL EXAM  --------------------------------------------------------------------------------  T(C): 36.6 (12-08-19 @ 08:00), Max: 36.9 (12-07-19 @ 23:00)  HR: 83 (12-08-19 @ 08:00) (58 - 97)  BP: 152/68 (12-07-19 @ 21:00) (105/66 - 163/73)  RR: 29 (12-08-19 @ 08:00) (12 - 37)  SpO2: 99% (12-08-19 @ 08:00) (95% - 100%)  Wt(kg): --  Height (cm): 165.1 (12-07-19 @ 08:27)  Weight (kg): 64.6 (12-07-19 @ 08:27)  BMI (kg/m2): 23.7 (12-07-19 @ 08:27)  BSA (m2): 1.71 (12-07-19 @ 08:27)      12-07-19 @ 07:01  -  12-08-19 @ 07:00  --------------------------------------------------------  IN: 1780 mL / OUT: 569 mL / NET: 1211 mL      Physical Exam:  	Gen: NAD,   	HEENT: supple neck, clear oropharynx  	Pulm: CTA B/L  	CV: RRR, S1S2; no rub + murmur.   	Back: no sacral edema  	Abd: +BS, soft, nontender/nondistended. Transplant site mildly tender, YON drain fluid sanguineous fluid  	: No suprapubic tenderness. Gardner in place, gross hematuria.   	UE:  no edema; no asterixis  	LE:  no edema  	Neuro: No focal deficits,  	Vascular access: AVF.     LABS/STUDIES  --------------------------------------------------------------------------------              9.1    10.13 >-----------<  113      [12-08-19 @ 06:52]              27.2     132  |  93  |  29  ----------------------------<  172      [12-08-19 @ 06:52]  4.5   |  19  |  5.27        Ca     8.3     [12-08-19 @ 06:52]      Mg     2.1     [12-08-19 @ 06:52]      Phos  6.0     [12-08-19 @ 06:52]    TPro  6.0  /  Alb  3.6  /  TBili  0.5  /  DBili  0.2  /  AST  19  /  ALT  7   /  AlkPhos  45  [12-08-19 @ 03:00]    PT/INR: PT 11.3 , INR 0.99       [12-08-19 @ 06:52]  PTT: 25.2       [12-08-19 @ 06:52]          [12-08-19 @ 03:48]        [12-08-19 @ 03:00]    Creatinine Trend:  SCr 5.27 [12-08 @ 06:52]  SCr 4.22 [12-08 @ 03:00]  SCr 7.63 [12-07 @ 21:48]  SCr 7.51 [12-07 @ 18:29]  SCr 6.86 [12-07 @ 12:55]          HBsAg Nonreact      [12-08-19 @ 04:50]  HCV 0.08, Nonreact      [12-08-19 @ 04:50]

## 2019-12-08 NOTE — PROVIDER CONTACT NOTE (CHANGE IN STATUS NOTIFICATION) - BACKGROUND
pt has hx of ESRD from polycystic kidney disease breast CA, HTN presented to [facility] for DDRT.

## 2019-12-08 NOTE — PROVIDER CONTACT NOTE (CHANGE IN STATUS NOTIFICATION) - ASSESSMENT
A&Ox4, patient on david while on HD with no fluid being removed. patient was in NSR denies chest pain. Clear liquid diet. johnson making 0-10cc of urine per hour. RLQ YON drain. q4 labs being sent. When patient had 20 beats of VTach R radial jeevan correlated with HR with a drop in pressure to MAP of 47.

## 2019-12-08 NOTE — CONSULT NOTE ADULT - SUBJECTIVE AND OBJECTIVE BOX
CHIEF COMPLAINT: s/p renal transplant    HISTORY OF PRESENT ILLNESS: 75 yo female w h/o ESRD on HD 2/2 PCKD, HTN, HLD, breast ca (s/p lumpectomy on Tamoxifen), post-op day 1 DDRT. Post-operative patient was hypotensive requiring Giorgio for blood pressure support. She subsequently underwent HD. During this period, the pt had a 16 second run of NSVT. Patient denies any chest pain, dyspnea or palpitations during the episode. At this time she denies any cardiac complaint. She reports a normal echocardiogram and pharmacologic nuclear stress test this past year. She denies any recent chest pain, dyspnea, palpitations, dizziness, peripheral edema.      Allergies    ChloraPrep One-Step (Rash)  penicillins (Rash)  shellfish (Rash)    Intolerances    	    MEDICATIONS:  cloNIDine 0.2 milliGRAM(s) Oral every 8 hours  metoprolol tartrate 50 milliGRAM(s) Oral two times a day    nystatin    Suspension [id number] Unit(s) Swish and Swallow four times a day  trimethoprim   80 mG/sulfamethoxazole 400 mG 1 Tablet(s) Oral daily  valGANciclovir 450 milliGRAM(s) Oral daily      acetaminophen   Tablet .. 975 milliGRAM(s) Oral every 6 hours PRN  ondansetron Injectable 4 milliGRAM(s) IV Push every 6 hours PRN  oxyCODONE    IR 5 milliGRAM(s) Oral every 4 hours PRN  oxyCODONE    IR 10 milliGRAM(s) Oral every 4 hours PRN    famotidine    Tablet 20 milliGRAM(s) Oral daily  polyethylene glycol 3350 17 Gram(s) Oral daily  senna 2 Tablet(s) Oral at bedtime    methylPREDNISolone sodium succinate Injectable 125 milliGRAM(s) IV Push two times a day    calcium acetate 667 milliGRAM(s) Oral four times a day with meals  chlorhexidine 2% Cloths 1 Application(s) Topical <User Schedule>  cholecalciferol 1000 Unit(s) Oral daily  mycophenolate mofetil 1 Gram(s) Oral <User Schedule>  tacrolimus ER Tablet (ENVARSUS XR) 4 milliGRAM(s) Oral <User Schedule>  tamoxifen 20 milliGRAM(s) Oral at bedtime      PAST MEDICAL & SURGICAL HISTORY:  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  Pancreatic cyst  Thyroid nodule: yearly Ultrasound, monitoring yearly  Anuria  ESRD on hemodialysis  Breast cancer, female: left 2014  H/O gastroesophageal reflux (GERD)  Thrombocytopenia: leukopenia: followed by heme, plan for BMBx 7/22/19  Anemia in ESRD (end-stage renal disease)  Hypertension  History of fracture of right ankle: 2014 repaired  S/P arteriovenous (AV) fistula creation: 2002  S/P hysterectomy: 1994  Adrenal mass: excison 2015  S/P lumpectomy, left breast: 2014      FAMILY HISTORY: Denies family h/o CAD      SOCIAL HISTORY:    Denies tobacco, alcohol or drug use      REVIEW OF SYSTEMS:  General: no fatigue/malaise, weight loss/gain.  Skin: no rashes.  Ophthalmologic: no blurred vision, no loss of vision. 	  ENT: no sore throat, rhinorrhea, sinus congestion.  Respiratory: no SOB, cough or wheeze.  Gastrointestinal:  no N/V/D, no melena/hematemesis/hematochezia.  Genitourinary: no dysuria/hesitancy or hematuria.  Musculoskeletal: no myalgias or arthralgias.  Neurological: no changes in vision or hearing, no lightheadedness/dizziness, no syncope/near syncope	  Psychiatric: no unusual stress/anxiety.   Hematology/Lymphatics: no unusual bleeding, bruising and no lymphadenopathy.  Endocrine: no unusual thirst.   All others negative except as stated above and in HPI.    PHYSICAL EXAM:  T(C): 36.7 (12-08-19 @ 15:00), Max: 36.9 (12-07-19 @ 23:00)  HR: 84 (12-08-19 @ 16:00) (66 - 97)  BP: 152/68 (12-07-19 @ 21:00) (117/58 - 152/68)  RR: 28 (12-08-19 @ 16:00) (14 - 37)  SpO2: 98% (12-08-19 @ 16:00) (95% - 100%)  Wt(kg): --  I&O's Summary    07 Dec 2019 07:01  -  08 Dec 2019 07:00  --------------------------------------------------------  IN: 1780 mL / OUT: 569 mL / NET: 1211 mL    08 Dec 2019 07:01  -  08 Dec 2019 16:38  --------------------------------------------------------  IN: 0 mL / OUT: 132 mL / NET: -132 mL        Appearance: No distress	  HEENT:   Normal oral mucosa, PERRL, EOMI	  Lymphatic: No lymphadenopathy  Cardiovascular: Normal S1 S2, No JVD, No murmurs, No edema  Respiratory: Lungs clear to auscultation	  Psychiatry: A & O x 3, Mood & affect appropriate  Gastrointestinal:  Soft, Non-tender, + BS	  Skin: No rashes, No ecchymoses, No cyanosis	  Neurologic: Non-focal  Extremities: Normal range of motion, No clubbing, cyanosis or edema  Vascular: Peripheral pulses palpable 2+ bilaterally        LABS:	 	    CBC Full  -  ( 08 Dec 2019 12:19 )  WBC Count : 8.45 K/uL  Hemoglobin : 8.6 g/dL  Hematocrit : 26.4 %  Platelet Count - Automated : 110 K/uL  Mean Cell Volume : 95.0 fl  Mean Cell Hemoglobin : 30.9 pg  Mean Cell Hemoglobin Concentration : 32.6 gm/dL  Auto Neutrophil # : x  Auto Lymphocyte # : x  Auto Monocyte # : x  Auto Eosinophil # : x  Auto Basophil # : x  Auto Neutrophil % : x  Auto Lymphocyte % : x  Auto Monocyte % : x  Auto Eosinophil % : x  Auto Basophil % : x    12-08    132<L>  |  93<L>  |  33<H>  ----------------------------<  199<H>  5.1   |  20<L>  |  5.98<H>  12-08    132<L>  |  93<L>  |  29<H>  ----------------------------<  172<H>  4.5   |  19<L>  |  5.27<H>    Ca    8.6      08 Dec 2019 12:19  Ca    8.3<L>      08 Dec 2019 06:52  Phos  6.7     12-08  Phos  6.0     12-08  Mg     2.1     12-08  Mg     2.1     12-08    TPro  6.0  /  Alb  3.6  /  TBili  0.5  /  DBili  0.2  /  AST  19  /  ALT  7<L>  /  AlkPhos  45  12-08  TPro  5.8<L>  /  Alb  3.2<L>  /  TBili  0.3  /  DBili  0.1  /  AST  14  /  ALT  7<L>  /  AlkPhos  43  12-08      proBNP:   Lipid Profile:   HgA1c:   TSH:       CARDIAC MARKERS:            TELEMETRY: 	  Currently sinus in the 80s, overnight sinus 70-90 w PACs, PVCs, and a 16 second run (approx 30 beats) of NSVT  ECG:  	Sinus HR 80 PACs T wave inversions III, V5-V6  RADIOLOGY:  OTHER: 	    PREVIOUS DIAGNOSTIC TESTING:    [ ] Echocardiogram:   [ ]  Catheterization:   [ ] Stress Test:

## 2019-12-08 NOTE — PROGRESS NOTE ADULT - ASSESSMENT
74F with h/o ESRD on HD MWF via LUE AVF 2/2 since 2003  (anuric at home, Nephrologist: Dr. Nevin Osborne, last HD 12/6AM), HTN, HLD, Gout, LUE DVT, lumbar radiculopathy,  Breast CA s/p lumpectomy on Tamoxifen (2014) admitted on 12/06/19 for DDRT.

## 2019-12-08 NOTE — PROGRESS NOTE ADULT - SUBJECTIVE AND OBJECTIVE BOX
HISTORY  74F with h/o ESRD on HD MWF via LUE AVF 2/2 PCKD (anuric at home, Nephrologist: Dr. Nevin Osborne, last HD 12/6AM), history of fistula thrombosis s/p completed coumadin therapy now functioning, HTN, HLD, Gout, LUE DVT, lumbar radiculopathy,  Breast CA s/p lumpectomy on Tamoxifen (2014) now s/p DDRT 12/7/19 w/ simulect induction. Postoperatively the pt was hemodynamically stable, however labs showed a slight decrease in h/h and a K of 6.1. The pts hct went from 37.6 preop to 34.1 and then to 32.8 on repeat. The pt has not required pressors and has had stable vital signs in the postoperative period. For her K, the pt was shifted with insulin and dextrose. Nephrology was called, and the pt is now written for urgent HD, which she will receive on presentation to the SICU. The pt has had low uop, making 0-20cc/hr since surgery. Drain output has also been high and has made 280cc total since surgery.     24 HOUR EVENTS:  -dialyzed upon presentation to the unit, during dialysis experienced episodes of hypotension and was intermittently on david. Pt also reported episodes of chest pain. Troponin was sent after dialysis and was found to be 69, will trend  -during dialysis episode of SVT which self resolved, labs were within normal limits at that time (however was drawn during dialysis)  -Post dialysis, pt hypertensive to systolic 190s-200s, and some of bp meds started  -h/h initially downtrended however was stable x2 at h/h of 9/28, will use threshold of 9 to transfuse per transplant team  -Pt w/ episodes of nausea, given zofran/reglan w/ relief  -drain putting serosanguinous (more sanguinous) output  -uop overnight 0-5 cc/hr  -got 250cc of albumin before dialysis for elevated lactate       SUBJECTIVE/ROS:  [ ] A ten-point review of systems was otherwise negative except as noted.  [ ] Due to altered mental status/intubation, subjective information were not able to be obtained from the patient. History was obtained, to the extent possible, from review of the chart and collateral sources of information.      NEURO  Exam: awake, alert, oriented  Meds: acetaminophen   Tablet .. 975 milliGRAM(s) Oral every 6 hours PRN Mild Pain (1 - 3)  ondansetron Injectable 4 milliGRAM(s) IV Push every 6 hours PRN Nausea and/or Vomiting  oxyCODONE    IR 5 milliGRAM(s) Oral every 4 hours PRN Moderate Pain (4 - 6)  oxyCODONE    IR 10 milliGRAM(s) Oral every 4 hours PRN Severe Pain (7 - 10)    [x] Adequacy of sedation and pain control has been assessed and adjusted      RESPIRATORY  RR: 34 (12-08-19 @ 04:00) (12 - 37)  SpO2: 96% (12-08-19 @ 04:00) (95% - 100%)  Wt(kg): --  Exam: unlabored, clear to auscultation bilaterally  ABG - ( 08 Dec 2019 02:57 )  pH: 7.40  /  pCO2: 36    /  pO2: 99    / HCO3: 22    / Base Excess: -2.3  /  SaO2: 98      Lactate: x                [N/A] Extubation Readiness Assessed  Meds:       CARDIOVASCULAR  HR: 91 (12-08-19 @ 04:00) (58 - 95)  BP: 152/68 (12-07-19 @ 21:00) (105/66 - 163/73)  BP(mean): 99 (12-07-19 @ 21:00) (80 - 108)  ABP: 155/51 (12-08-19 @ 04:00) (102/52 - 191/52)  ABP(mean): 78 (12-08-19 @ 04:00) (56 - 96)  Wt(kg): --  CVP(cm H2O): --      Exam: regular rate and rhythm  Cardiac Rhythm: sinus  Perfusion     [x]Adequate   [ ]Inadequate  Mentation   [x]Normal       [ ]Reduced  Extremities  [x]Warm         [ ]Cool  Volume Status [ ]Hypervolemic [x]Euvolemic [ ]Hypovolemic  Meds: furosemide   Injectable 40 milliGRAM(s) IV Push daily        GI/NUTRITION  Exam: soft, appropriately tender, nondistended, incision C/D/I  Diet: Clears  Meds: famotidine    Tablet 20 milliGRAM(s) Oral daily  polyethylene glycol 3350 17 Gram(s) Oral daily  senna 2 Tablet(s) Oral at bedtime      GENITOURINARY  I&O's Detail    12-07 @ 07:01  -  12-08 @ 04:33  --------------------------------------------------------  IN:    Albumin 5%  - 250 mL: 250 mL    IV PiggyBack: 200 mL    sodium chloride 0.9%.: 1120 mL  Total IN: 1570 mL    OUT:    Bulb: 370 mL    Indwelling Catheter - Urethral: 99 mL  Total OUT: 469 mL    Total NET: 1101 mL        Weight (kg): 64.6 (12-07 @ 08:27)  12-07    128<L>  |  92<L>  |  49<H>  ----------------------------<  148<H>  5.3   |  14<L>  |  7.63<H>    Ca    8.4      07 Dec 2019 21:48  Phos  4.6     12-08  Mg     1.9     12-08    TPro  6.0  /  Alb  3.6  /  TBili  0.5  /  DBili  0.2  /  AST  19  /  ALT  7<L>  /  AlkPhos  45  12-08    [ ] Gardner catheter, indication: N/A  Meds: cholecalciferol 1000 Unit(s) Oral daily  sodium chloride 0.45%. 500 milliLiter(s) IV Continuous <Continuous>  sodium chloride 0.9%. 1000 milliLiter(s) IV Continuous <Continuous>        HEMATOLOGIC  Meds:   [ ] VTE Prophylaxis: will hold for transplant team                        9.3    12.33 )-----------( 126      ( 08 Dec 2019 03:00 )             28.4     PT/INR - ( 08 Dec 2019 03:00 )   PT: 11.1 sec;   INR: 0.97 ratio         PTT - ( 08 Dec 2019 03:00 )  PTT:23.0 sec  Transfusion     [ ] PRBC   [ ] Platelets   [ ] FFP   [ ] Cryoprecipitate      INFECTIOUS DISEASES  WBC Count: 12.33 K/uL (12-08 @ 03:00)  WBC Count: 10.39 K/uL (12-08 @ 00:52)  WBC Count: 14.02 K/uL (12-07 @ 21:48)  WBC Count: 13.71 K/uL (12-07 @ 18:29)  WBC Count: 11.84 K/uL (12-07 @ 12:55)    RECENT CULTURES:    Meds: mycophenolate mofetil 1 Gram(s) Oral <User Schedule>  nystatin    Suspension 899074 Unit(s) Swish and Swallow four times a day  tacrolimus ER Tablet (ENVARSUS XR) 4 milliGRAM(s) Oral <User Schedule>  trimethoprim   80 mG/sulfamethoxazole 400 mG 1 Tablet(s) Oral daily  valGANciclovir 450 milliGRAM(s) Oral daily        ENDOCRINE  CAPILLARY BLOOD GLUCOSE        Meds: methylPREDNISolone sodium succinate Injectable 125 milliGRAM(s) IV Push two times a day        ACCESS DEVICES:  [ ] Peripheral IV  [x] Central Venous Line	[ ] R	[ ] L	[ ] IJ	[ ] Fem	[ ] SC	Placed:   [ ] Arterial Line		[ ] R	[ ] L	[ ] Fem	[ ] Rad	[ ] Ax	Placed:   [ ] PICC:					[ ] Mediport  [ ] Urinary Catheter, Date Placed:   [x] Necessity of urinary, arterial, and venous catheters discussed    OTHER MEDICATIONS:  chlorhexidine 2% Cloths 1 Application(s) Topical <User Schedule>  tamoxifen 20 milliGRAM(s) Oral at bedtime      CODE STATUS: full code      IMAGING:  < from: US Trans Kidney w/ Doppler, Right (12.07.19 @ 23:12) >    No evidence of a significant renal artery stenosis.    Slightly elevated resistive index within the mid and upper segmental   artery, without evidence of elevated velocities. No evidence of   peritransplant collection, or hydronephrosis.      < end of copied text >

## 2019-12-08 NOTE — PROGRESS NOTE ADULT - ASSESSMENT
74F with h/o ESRD on HD MWF via LUE AVF 2/2 PCKD (anuric at home, Nephrologist: Dr. Nevin Osborne, last HD 12/6AM), hx of fistula thrombosis, completed coumadin, HTN, HLD, Gout, LUE DVT, lumbar radiculopathy,  Breast CA s/p lumpectomy on Tamoxifen (2014).  s/p DDRT 12/7/19 w/ simulect induction, ureter anastomosed over a double J stent. Enlarged, lymph node was encountered during iliac dissection was removed and sent for pathology.   [] POD 1 s/p DDRT with DGF  - Monitor renal function, Lasix 100mg IVP with minimal response, s/p HD post op for hyperkalemia  - DC IVF/Replacments  - Diet: advance  - Pain control  - Immuno: Envarsus 4mg daily, MMF 1g bid, Pred taper, Simulect induction  - Proph: valcyte/bactrim/nystatin  -SCDs at all times, IS, OOB  - H/H downtrending: Repeat CBC in the afternoon   [] HTN  - Restart Clonidine 0.2mg TID, on metoprolol 50mg bid.   -Home meds: clonidine 0.2 TID, hydralazine 50 TID, nifedipine 60 daily, metoprolol  daily  -on ASA81 at home, currently held.

## 2019-12-08 NOTE — PROGRESS NOTE ADULT - ASSESSMENT
ASSESSMENT:  74F with h/o ESRD on HD MWF via LUE AVF 2/2 PCKD (anuric at home, Nephrologist: Dr. Nevin Osborne, last HD 12/6AM), history of fistula thrombosis s/p completed coumadin therapy now functioning, HTN, HLD, Gout, LUE DVT, lumbar radiculopathy,  Breast CA s/p lumpectomy on Tamoxifen (2014) now s/p DDRT 12/7/19 w/ simulect induction. SICU called for hemodynamic monitoring and for dialysis.     PLAN:    NEURO:  -pain control - tylenol/oxycodone  -pt alert and oriented    RESPIRATORY:   -Breathing comfortably on RA  -am cxr    CARDIOVASCULAR:  -will obtain labs q4  -pt hypertensive on presentation to unit, however will hold home bp meds for now as pt with low diastolic pressure  -lactate initially elevated    GI/NUTRITION:  -clears    GENITOURINARY/RENAL:  -dialysis as pt w/ elevated K (s/p shift) and increasing Cr  -trend electrolytes  -low uop, will follow transplant team, will give dose of lasix in am  -continue transplant meds - tacro, cellcept  -will f/u nephrology regarding future dialysis    HEMATOLOGIC:  -hold dvt ppx per transplant  -transfuse for hb < 9 per transplant  -drain w/ sanguinous output  -hct stable but will trend cbc q4h  continue home tamoxifen    INFECTIOUS DISEASE:  -continue bactrim and valcyte    ENDOCRINE:  -steroid taper per transplant    SICU, [id number]

## 2019-12-08 NOTE — CONSULT NOTE ADULT - ASSESSMENT
73 yo female w h/o ESRD on HD 2/2 PCKD, HTN, HLD, breast ca (s/p lumpectomy on Tamoxifen), post-op day 1 DDRT. Post-operative patient was hypotensive requiring Giorgio for blood pressure support. She subsequently underwent HD. During this period, the pt had a 16 second run of NSVT.    Non-sustained VT   - Occurred in the setting of hypotension requiring vasopressors as well as hemodialysis. Her arrhythmia was likely driven by metabolic and hemodynamic changes.   - Has been in sinus rhythm all day with stable blood pressure   - Continue current cardiac medications   - Should remain on telemetry monitoring   - Check TTE    Patrick García MD  Cardiology Fellow  [phone]  All Cardiology service information can be found 24/7 on [email or web address], password: [email or web address]
74F with h/o ESRD on HD MWF via LUE AVF 2/2 since 2003  (anuric at home, Nephrologist: Dr. Nevin Osborne, last HD 12/6AM), HTN, HLD, Gout, LUE DVT, lumbar radiculopathy,  Breast CA s/p lumpectomy on Tamoxifen (2014) admitted on 12/06/19 for DDRT.
ASSESSMENT:  74F with h/o ESRD on HD MWF via LUE AVF 2/2 PCKD (anuric at home, Nephrologist: Dr. Nevin Osborne, last HD 12/6AM), history of fistula thrombosis s/p completed coumadin therapy now functioning, HTN, HLD, Gout, LUE DVT, lumbar radiculopathy,  Breast CA s/p lumpectomy on Tamoxifen (2014) now s/p DDRT 12/7/19 w/ simulect induction. SICU called for hemodynamic monitoring and for dialysis.     PLAN:    NEURO:  -pain control - tylenol/oxyconde  -pt alert and oriented    RESPIRATORY:   -Breathing comfortably on RA  -am cxr    CARDIOVASCULAR:  -will obtain labs q4  -pt hypertensive on presentation to unit, however will hold home bp meds for now as pt with low diastolic pressure  -lactate elevated at 4.4, will give albumin and trend lactate    GI/NUTRITION:  -clears    GENITOURINARY/RENAL:  -dialysis as pt w/ elevated K (s/p shift) and increasing Cr  -trend electrolytes  -low uop, will follow transplant team  -continue transplant meds - tacro, cellcept    HEMATOLOGIC:  -hold dvt ppx per transplant  -drain w/ sanguinous output  -hct stable but will trend cbc q4h  -contine home tamoxifen    INFECTIOUS DISEASE:  -continue bactrim and valcyte    ENDOCRINE:  -steroid taper per transplant    Seen and discussed w/ Dr. Bradshaw  SICU, [id number]

## 2019-12-09 LAB
ALBUMIN SERPL ELPH-MCNC: 3 G/DL — LOW (ref 3.3–5)
ALP SERPL-CCNC: 35 U/L — LOW (ref 40–120)
ALT FLD-CCNC: 5 U/L — LOW (ref 10–45)
ANION GAP SERPL CALC-SCNC: 17 MMOL/L — SIGNIFICANT CHANGE UP (ref 5–17)
ANION GAP SERPL CALC-SCNC: 18 MMOL/L — HIGH (ref 5–17)
ANION GAP SERPL CALC-SCNC: 19 MMOL/L — HIGH (ref 5–17)
APTT BLD: 23.1 SEC — LOW (ref 27.5–36.3)
AST SERPL-CCNC: 13 U/L — SIGNIFICANT CHANGE UP (ref 10–40)
BILIRUB DIRECT SERPL-MCNC: <0.1 MG/DL — SIGNIFICANT CHANGE UP (ref 0–0.2)
BILIRUB INDIRECT FLD-MCNC: >0.1 MG/DL — LOW (ref 0.2–1)
BILIRUB SERPL-MCNC: 0.2 MG/DL — SIGNIFICANT CHANGE UP (ref 0.2–1.2)
BLD GP AB SCN SERPL QL: NEGATIVE — SIGNIFICANT CHANGE UP
BUN SERPL-MCNC: 39 MG/DL — HIGH (ref 7–23)
BUN SERPL-MCNC: 50 MG/DL — HIGH (ref 7–23)
BUN SERPL-MCNC: 50 MG/DL — HIGH (ref 7–23)
CALCIUM SERPL-MCNC: 8.7 MG/DL — SIGNIFICANT CHANGE UP (ref 8.4–10.5)
CALCIUM SERPL-MCNC: 8.7 MG/DL — SIGNIFICANT CHANGE UP (ref 8.4–10.5)
CALCIUM SERPL-MCNC: 8.8 MG/DL — SIGNIFICANT CHANGE UP (ref 8.4–10.5)
CHLORIDE SERPL-SCNC: 91 MMOL/L — LOW (ref 96–108)
CHLORIDE SERPL-SCNC: 92 MMOL/L — LOW (ref 96–108)
CHLORIDE SERPL-SCNC: 94 MMOL/L — LOW (ref 96–108)
CO2 SERPL-SCNC: 19 MMOL/L — LOW (ref 22–31)
CO2 SERPL-SCNC: 19 MMOL/L — LOW (ref 22–31)
CO2 SERPL-SCNC: 20 MMOL/L — LOW (ref 22–31)
CREAT SERPL-MCNC: 7.28 MG/DL — HIGH (ref 0.5–1.3)
CREAT SERPL-MCNC: 8.1 MG/DL — HIGH (ref 0.5–1.3)
CREAT SERPL-MCNC: 8.29 MG/DL — HIGH (ref 0.5–1.3)
GAS PNL BLDA: SIGNIFICANT CHANGE UP
GLUCOSE BLDC GLUCOMTR-MCNC: 124 MG/DL — HIGH (ref 70–99)
GLUCOSE BLDC GLUCOMTR-MCNC: 128 MG/DL — HIGH (ref 70–99)
GLUCOSE BLDC GLUCOMTR-MCNC: 141 MG/DL — HIGH (ref 70–99)
GLUCOSE BLDC GLUCOMTR-MCNC: 176 MG/DL — HIGH (ref 70–99)
GLUCOSE BLDC GLUCOMTR-MCNC: 181 MG/DL — HIGH (ref 70–99)
GLUCOSE SERPL-MCNC: 165 MG/DL — HIGH (ref 70–99)
GLUCOSE SERPL-MCNC: 168 MG/DL — HIGH (ref 70–99)
GLUCOSE SERPL-MCNC: 174 MG/DL — HIGH (ref 70–99)
HCT VFR BLD CALC: 22.5 % — LOW (ref 34.5–45)
HCT VFR BLD CALC: 23.4 % — LOW (ref 34.5–45)
HCT VFR BLD CALC: 28.7 % — LOW (ref 34.5–45)
HCT VFR BLD CALC: 30 % — LOW (ref 34.5–45)
HGB BLD-MCNC: 7.3 G/DL — LOW (ref 11.5–15.5)
HGB BLD-MCNC: 7.8 G/DL — LOW (ref 11.5–15.5)
HGB BLD-MCNC: 9.6 G/DL — LOW (ref 11.5–15.5)
HGB BLD-MCNC: 9.9 G/DL — LOW (ref 11.5–15.5)
INR BLD: 0.97 RATIO — SIGNIFICANT CHANGE UP (ref 0.88–1.16)
LDH SERPL L TO P-CCNC: 142 U/L — SIGNIFICANT CHANGE UP (ref 50–242)
MAGNESIUM SERPL-MCNC: 2.1 MG/DL — SIGNIFICANT CHANGE UP (ref 1.6–2.6)
MAGNESIUM SERPL-MCNC: 2.2 MG/DL — SIGNIFICANT CHANGE UP (ref 1.6–2.6)
MAGNESIUM SERPL-MCNC: 2.2 MG/DL — SIGNIFICANT CHANGE UP (ref 1.6–2.6)
MCHC RBC-ENTMCNC: 29.4 PG — SIGNIFICANT CHANGE UP (ref 27–34)
MCHC RBC-ENTMCNC: 29.5 PG — SIGNIFICANT CHANGE UP (ref 27–34)
MCHC RBC-ENTMCNC: 30.2 PG — SIGNIFICANT CHANGE UP (ref 27–34)
MCHC RBC-ENTMCNC: 31.6 PG — SIGNIFICANT CHANGE UP (ref 27–34)
MCHC RBC-ENTMCNC: 32.4 GM/DL — SIGNIFICANT CHANGE UP (ref 32–36)
MCHC RBC-ENTMCNC: 33 GM/DL — SIGNIFICANT CHANGE UP (ref 32–36)
MCHC RBC-ENTMCNC: 33.3 GM/DL — SIGNIFICANT CHANGE UP (ref 32–36)
MCHC RBC-ENTMCNC: 33.4 GM/DL — SIGNIFICANT CHANGE UP (ref 32–36)
MCV RBC AUTO: 87.8 FL — SIGNIFICANT CHANGE UP (ref 80–100)
MCV RBC AUTO: 89.3 FL — SIGNIFICANT CHANGE UP (ref 80–100)
MCV RBC AUTO: 90.7 FL — SIGNIFICANT CHANGE UP (ref 80–100)
MCV RBC AUTO: 97.4 FL — SIGNIFICANT CHANGE UP (ref 80–100)
NRBC # BLD: 0 /100 WBCS — SIGNIFICANT CHANGE UP (ref 0–0)
PHOSPHATE SERPL-MCNC: 7.2 MG/DL — HIGH (ref 2.5–4.5)
PHOSPHATE SERPL-MCNC: 7.2 MG/DL — HIGH (ref 2.5–4.5)
PHOSPHATE SERPL-MCNC: 7.5 MG/DL — HIGH (ref 2.5–4.5)
PLATELET # BLD AUTO: 82 K/UL — LOW (ref 150–400)
PLATELET # BLD AUTO: 83 K/UL — LOW (ref 150–400)
PLATELET # BLD AUTO: 84 K/UL — LOW (ref 150–400)
PLATELET # BLD AUTO: 84 K/UL — LOW (ref 150–400)
POTASSIUM SERPL-MCNC: 4.6 MMOL/L — SIGNIFICANT CHANGE UP (ref 3.5–5.3)
POTASSIUM SERPL-MCNC: 4.8 MMOL/L — SIGNIFICANT CHANGE UP (ref 3.5–5.3)
POTASSIUM SERPL-MCNC: 5 MMOL/L — SIGNIFICANT CHANGE UP (ref 3.5–5.3)
POTASSIUM SERPL-SCNC: 4.6 MMOL/L — SIGNIFICANT CHANGE UP (ref 3.5–5.3)
POTASSIUM SERPL-SCNC: 4.8 MMOL/L — SIGNIFICANT CHANGE UP (ref 3.5–5.3)
POTASSIUM SERPL-SCNC: 5 MMOL/L — SIGNIFICANT CHANGE UP (ref 3.5–5.3)
PROT SERPL-MCNC: 5.3 G/DL — LOW (ref 6–8.3)
PROTHROM AB SERPL-ACNC: 11.2 SEC — SIGNIFICANT CHANGE UP (ref 10–12.9)
RBC # BLD: 2.31 M/UL — LOW (ref 3.8–5.2)
RBC # BLD: 2.58 M/UL — LOW (ref 3.8–5.2)
RBC # BLD: 3.27 M/UL — LOW (ref 3.8–5.2)
RBC # BLD: 3.36 M/UL — LOW (ref 3.8–5.2)
RBC # FLD: 14.2 % — SIGNIFICANT CHANGE UP (ref 10.3–14.5)
RBC # FLD: 18.6 % — HIGH (ref 10.3–14.5)
RBC # FLD: 19.7 % — HIGH (ref 10.3–14.5)
RBC # FLD: 20.2 % — HIGH (ref 10.3–14.5)
RH IG SCN BLD-IMP: POSITIVE — SIGNIFICANT CHANGE UP
SODIUM SERPL-SCNC: 128 MMOL/L — LOW (ref 135–145)
SODIUM SERPL-SCNC: 130 MMOL/L — LOW (ref 135–145)
SODIUM SERPL-SCNC: 131 MMOL/L — LOW (ref 135–145)
TACROLIMUS SERPL-MCNC: 4.1 NG/ML — SIGNIFICANT CHANGE UP
TROPONIN T, HIGH SENSITIVITY RESULT: 74 NG/L — HIGH (ref 0–51)
WBC # BLD: 11.87 K/UL — HIGH (ref 3.8–10.5)
WBC # BLD: 12.32 K/UL — HIGH (ref 3.8–10.5)
WBC # BLD: 8.01 K/UL — SIGNIFICANT CHANGE UP (ref 3.8–10.5)
WBC # BLD: 8.14 K/UL — SIGNIFICANT CHANGE UP (ref 3.8–10.5)
WBC # FLD AUTO: 11.87 K/UL — HIGH (ref 3.8–10.5)
WBC # FLD AUTO: 12.32 K/UL — HIGH (ref 3.8–10.5)
WBC # FLD AUTO: 8.01 K/UL — SIGNIFICANT CHANGE UP (ref 3.8–10.5)
WBC # FLD AUTO: 8.14 K/UL — SIGNIFICANT CHANGE UP (ref 3.8–10.5)

## 2019-12-09 PROCEDURE — 99233 SBSQ HOSP IP/OBS HIGH 50: CPT

## 2019-12-09 PROCEDURE — 93306 TTE W/DOPPLER COMPLETE: CPT | Mod: 26

## 2019-12-09 PROCEDURE — 99232 SBSQ HOSP IP/OBS MODERATE 35: CPT | Mod: GC

## 2019-12-09 PROCEDURE — 71045 X-RAY EXAM CHEST 1 VIEW: CPT | Mod: 26

## 2019-12-09 RX ORDER — TACROLIMUS 5 MG/1
2 CAPSULE ORAL ONCE
Refills: 0 | Status: COMPLETED | OUTPATIENT
Start: 2019-12-09 | End: 2019-12-09

## 2019-12-09 RX ORDER — LABETALOL HCL 100 MG
10 TABLET ORAL ONCE
Refills: 0 | Status: COMPLETED | OUTPATIENT
Start: 2019-12-09 | End: 2019-12-09

## 2019-12-09 RX ORDER — TACROLIMUS 5 MG/1
6 CAPSULE ORAL DAILY
Refills: 0 | Status: DISCONTINUED | OUTPATIENT
Start: 2019-12-10 | End: 2019-12-10

## 2019-12-09 RX ORDER — INSULIN LISPRO 100/ML
VIAL (ML) SUBCUTANEOUS
Refills: 0 | Status: DISCONTINUED | OUTPATIENT
Start: 2019-12-09 | End: 2019-12-14

## 2019-12-09 RX ORDER — INSULIN LISPRO 100/ML
VIAL (ML) SUBCUTANEOUS AT BEDTIME
Refills: 0 | Status: DISCONTINUED | OUTPATIENT
Start: 2019-12-09 | End: 2019-12-14

## 2019-12-09 RX ORDER — BUMETANIDE 0.25 MG/ML
2 INJECTION INTRAMUSCULAR; INTRAVENOUS ONCE
Refills: 0 | Status: COMPLETED | OUTPATIENT
Start: 2019-12-09 | End: 2019-12-09

## 2019-12-09 RX ADMIN — BUMETANIDE 2 MILLIGRAM(S): 0.25 INJECTION INTRAMUSCULAR; INTRAVENOUS at 10:56

## 2019-12-09 RX ADMIN — OXYCODONE HYDROCHLORIDE 5 MILLIGRAM(S): 5 TABLET ORAL at 20:13

## 2019-12-09 RX ADMIN — Medication 500000 UNIT(S): at 06:17

## 2019-12-09 RX ADMIN — Medication 0.2 MILLIGRAM(S): at 18:31

## 2019-12-09 RX ADMIN — SENNA PLUS 2 TABLET(S): 8.6 TABLET ORAL at 21:37

## 2019-12-09 RX ADMIN — Medication 50 MILLIGRAM(S): at 18:23

## 2019-12-09 RX ADMIN — VALGANCICLOVIR 450 MILLIGRAM(S): 450 TABLET, FILM COATED ORAL at 11:51

## 2019-12-09 RX ADMIN — FAMOTIDINE 20 MILLIGRAM(S): 10 INJECTION INTRAVENOUS at 11:50

## 2019-12-09 RX ADMIN — Medication 10 MILLIGRAM(S): at 20:20

## 2019-12-09 RX ADMIN — Medication 60 MILLIGRAM(S): at 18:36

## 2019-12-09 RX ADMIN — TAMOXIFEN CITRATE 20 MILLIGRAM(S): 20 TABLET, FILM COATED ORAL at 21:37

## 2019-12-09 RX ADMIN — Medication 0.1 MILLIGRAM(S): at 05:18

## 2019-12-09 RX ADMIN — POLYETHYLENE GLYCOL 3350 17 GRAM(S): 17 POWDER, FOR SOLUTION ORAL at 11:50

## 2019-12-09 RX ADMIN — Medication 500000 UNIT(S): at 18:28

## 2019-12-09 RX ADMIN — OXYCODONE HYDROCHLORIDE 5 MILLIGRAM(S): 5 TABLET ORAL at 20:43

## 2019-12-09 RX ADMIN — Medication 500000 UNIT(S): at 11:51

## 2019-12-09 RX ADMIN — Medication 1: at 11:09

## 2019-12-09 RX ADMIN — Medication 667 MILLIGRAM(S): at 07:40

## 2019-12-09 RX ADMIN — Medication 500000 UNIT(S): at 01:04

## 2019-12-09 RX ADMIN — Medication 667 MILLIGRAM(S): at 11:51

## 2019-12-09 RX ADMIN — Medication 667 MILLIGRAM(S): at 21:38

## 2019-12-09 RX ADMIN — MYCOPHENOLATE MOFETIL 1 GRAM(S): 250 CAPSULE ORAL at 18:27

## 2019-12-09 RX ADMIN — MYCOPHENOLATE MOFETIL 1 GRAM(S): 250 CAPSULE ORAL at 06:18

## 2019-12-09 RX ADMIN — CHLORHEXIDINE GLUCONATE 1 APPLICATION(S): 213 SOLUTION TOPICAL at 05:16

## 2019-12-09 RX ADMIN — Medication 667 MILLIGRAM(S): at 18:23

## 2019-12-09 RX ADMIN — TACROLIMUS 4 MILLIGRAM(S): 5 CAPSULE ORAL at 07:40

## 2019-12-09 RX ADMIN — Medication 1 TABLET(S): at 11:50

## 2019-12-09 RX ADMIN — TACROLIMUS 2 MILLIGRAM(S): 5 CAPSULE ORAL at 18:02

## 2019-12-09 RX ADMIN — Medication 1000 UNIT(S): at 11:51

## 2019-12-09 RX ADMIN — Medication 40 MILLIGRAM(S): at 05:18

## 2019-12-09 NOTE — CHART NOTE - NSCHARTNOTEFT_GEN_A_CORE
This patient is followed by Cardiology Non-Service Consult Attending. For all clinical matters regarding this patient, please call the attending directly.     Contact information can be found at [email or web address], login [email or web address]",  under Non-Service Consult Attending 7:30 AM - 5 PM.

## 2019-12-09 NOTE — DIETITIAN INITIAL EVALUATION ADULT. - ETIOLOGY
increased physiologic demand of stress factor and surgical healing limited prior education on post-transplant nutrition therapy and food safety guidelines

## 2019-12-09 NOTE — DIETITIAN INITIAL EVALUATION ADULT. - PHYSICAL APPEARANCE
ht: 5 feet 5 inches, admit wt: 142 pounds, BMI: 23.7 Kg/m2, IBW: 125 pounds (+/- 10%), 114% IBW/well nourished/other (specify) Edema: none noted  Skin: no pressure injuries noted per nursing flowsheet  Nutrition Focused Physical Exam: Pt appears well developed with no signs of muscle or fat depletion.

## 2019-12-09 NOTE — PROGRESS NOTE ADULT - ASSESSMENT
ASSESSMENT:  74F with h/o ESRD on HD MWF via LUE AVF 2/2 PCKD (anuric at home, Nephrologist: Dr. Nevin Osborne, last HD 12/6AM), history of fistula thrombosis s/p completed coumadin therapy now functioning, HTN, HLD, Gout, LUE DVT, lumbar radiculopathy,  Breast CA s/p lumpectomy on Tamoxifen (2014) now s/p DDRT 12/7/19 w/ simulect induction. SICU called for hemodynamic monitoring and for dialysis.     PLAN:    NEURO: S/p DDRT  - Cont postoperative pain control - tylenol/oxycodone    RESPIRATORY: No active issues  - Breathing comfortably on RA  - AM CXR    CARDIOVASCULAR: Hx of HTN, Episode of hypotension requiring david during HD on 12/7, associated with tachycardia with Q waves  - Cardiology was consulted and recommends TTE  - F/u TTE  - Trend lactate  - Cont antihypertensive medications - clonidine 0.1 q8 and metoprolol 50 bid  - Will recheck troponin    GI/NUTRITION: Tolerated regular diet  - Cont current diet  - Cont bowel regimen  - Monitor YON output and quality    GENITOURINARY/RENAL: S/p DDRT with minimal urine output and minimal response to lasix 100  - Appreciate nephrology recs regarding dialysis   -trend electrolytes  - Appreciate transplant sx recs regarding further diuresis  - continue transplant meds - tacro, cellcept, f/u tacro level this AM    HEMATOLOGIC: S/p DDRT with postoperative downward trend in H/H  - CBCs q8  - hold dvt ppx per transplant  - transfuse for hb < 9 per transplant  - drain w/ sanguinous output  - Cont cbc q4h    INFECTIOUS DISEASE:  - continue nystatin, bactrim and valcyte    ENDOCRINE:  - steroid taper per transplant  - continue home tamoxifen

## 2019-12-09 NOTE — DIETITIAN INITIAL EVALUATION ADULT. - OTHER INFO
INFORMATION PTA  Diet PTA: Pt typically follows a low sodium, low carb diet, and follows renal restrictions (ESRD on HD x 17 years). Pt typically skips breakfast, eats a sandwich for lunch, and a full dinner of chicken or fish with vegetables. Pt drinks an occasional Nepro.  Nutrition status PTA: Well nourished  Nutrition Supplements PTA: Per H&P, folic acid and vit D3. Pt reports she takes PhosLo.  Food Allergies: shellfish  Weight History PTA: stable per pt  Other information: aneuric PTA    INFORMATION THIS ADMISSION  Last BM: 12/7, no flatus  Urine Output x 24-hours: 147ml, DGF noted  Other  Information: Pt required post-op HD on 12/8 for hyperkalemia  Therapeutic Diet Education Provided: Reviewed post transplant nutrition therapy and food safety guidelines for transplant recipients. Reviewed recommendations to avoid grapefruit, pomegranate and star fruit while taking immunosuppressant medication. Reviewed recommendations for moderate intake of sodium and carbohydrates with transplant medications. Pt was receptive and expressed understanding.   Provided nutrition handout: USDA Food Safety for Transplant Recipients booklet.

## 2019-12-09 NOTE — PROGRESS NOTE ADULT - ASSESSMENT
ANTONELLA DOBSON is a 74y Female s/p DDRT on 12/9/2019. PMH is significant for HTN, HLD, gout, and breast cancer s/p lumpectomy 2014.    Allergy: penicillin- rash  CMV+/+    Transplant Medications  Induction  -Basiliximab 20 mg POD 0 (given in OR) and POD 4  -Methyprednisolone taper (switch to PO prednisone on POD 4)            POD 0: 500 mg IV in OR            POD 1: 125 mg IV Q12H            POD 2: 60 mg IV Q12H            POD 3: 30 mg IV Q12H        Maintenance Immunosuppression  -Tacrolimus XR 0.14 mg/kg/dose Q24H at 6AM (Adjust for goal trough: 8-10)  -Mycophenolate 1,000 mg PO/IV Q12H  -Prednisone             POD 4: 20 mg PO Q12H            POD 5: 10 mg PO Q12H            POD 6: 5 mg PO Q12H            POD 7-: 5 mg daily     Anti-infection   -Bactrim SS tablet (frequency based on renal function)  -Valganciclovir (dose based on CMV serostatus and frequency based on renal function)  -Nystatin swish and swallow 5 mL four times daily    Surgical prophylaxis pre- and intra-operative dosing  -Cefazolin    Prophylaxis  -GI ppx: famotidine 20 mg daily  -Bowel ppx: senna/colace  -DVT: sequential compression device  -Pain:            Mild: Acetaminophen 650 mg every 6 hours PRN           Moderate: Tramadol 25 mg every 4 hours PRN (adjust for renal function)           Severe: Tramadol 50 mg every 4 hours PRN (adjust for renal function)    Home medications  -aspirin 81 mg daily  -clonidine 0.2 mg TID  -hydralazine 50 mg TID  -nifedipine 60 mg daily  -metoprolol succ 100 mg daily  -omeprazole 40 mg daily  -tamoxifen 20 mg daily  -folic acid 1 mg daily  -vit d3 1000 units daily    Outpatient medication reconciliation reviewed and will be re-started appropriately.  Plan discussed with multidisciplinary team.     Jaz Singletary, EtienneD

## 2019-12-09 NOTE — PROGRESS NOTE ADULT - ASSESSMENT
74F with h/o ESRD on HD MWF via LUE AVF 2/2 since 2003  (anuric at home, Nephrologist: Dr. Nevin Osborne, last HD 12/6AM), HTN, HLD, Gout, LUE DVT, lumbar radiculopathy,  Breast CA s/p lumpectomy on Tamoxifen (2014) admitted on 12/06/19 for DDRT.    Donor Info: Age 54, ABO B, KDPI 75%, X-clamp 20:39 12/6, Terminal Cr 1.7, CMV(+), EBV(+)    Recipient Info: ABO B+, CMV(+), EBV(+), Last HD 12/6[facility]  OR: DDRT to R iliac fossa, Simulect induction. 1A, 1V, 1U. Ureter stented. Stent sutured to johnson. CIT 13.5Hrs  Enlarged, lymph node was encountered during iliac dissection was removed and sent for pathology.

## 2019-12-09 NOTE — PROGRESS NOTE ADULT - SUBJECTIVE AND OBJECTIVE BOX
Transplant Surgery - Multidisciplinary Rounds  --------------------------------------------------------------  DDRT Date:  2/7/2019       POD# 2    Present:  Patient seen and examined with multidisciplinary team including Transplant Surgeon: Dr. Heard, Dr. Campos, Dr. Chacon, Dr. Santana,  NP: Dario,  Nephrologist Dr. Caicedo  renal fellow Marily, Pharmacist RENETTA Singletary, Surgical resident Wade Orozco, PGY3, and examined by Dr. Campos. Disciplines not in attendance will be notified of the plan.    HPI: 74F with h/o ESRD on HD MWF via LUE AVF 2/2 PCKD (anuric at home, Nephrologist: Dr. Nevin Osborne, last HD 12/6AM), hx of fistula thrombosis, completed coumadin, HTN, HLD, Gout, LUE DVT, lumbar radiculopathy,  Breast CA s/p lumpectomy on Tamoxifen (2014).  Underwent DDRT 12/7/19 w/ simulect induction.     Donor Info: Age 54, ABO B, KDPI 75%, X-clamp 20:39 12/6, Terminal Cr 1.7, CMV(+), EBV(+)    Recipient Info: ABO B+, CMV(+), EBV(+), Last HD 12/6[facility]A  OR: DDRT to R iliac fossa, Simulect induction. 1A, 1V, 1U. Ureter stented. Stent sutured to johnson. CIT 13.5Hrs  Enlarged, lymph node was encountered during iliac dissection was removed and sent for pathology.     Interval Events: POD 2 s/p DDRT with DGF - required HD post op for Hyperkalemia. Hypotensive during HD with an episode of SVT  - Now BP stable on home dose clonidine 0.2mg q8hrs  - Minimal u/o ~10cc/hr, received lasix 100mg ivp with minimal response  - H/H downtrending 7.8/23.4, from 7.3/22.5 s/p one u PRBC this am, YON with SS output, BP stable   - Heplock IVF, diet advanced    Potential Discharge date: pending clinical improvement     Education:  Medications    Plan of care:  See Below       MEDICATIONS  (STANDING):  calcium acetate 667 milliGRAM(s) Oral four times a day with meals  chlorhexidine 2% Cloths 1 Application(s) Topical <User Schedule>  cholecalciferol 1000 Unit(s) Oral daily  cloNIDine 0.2 milliGRAM(s) Oral every 12 hours  famotidine    Tablet 20 milliGRAM(s) Oral daily  insulin lispro (HumaLOG) corrective regimen sliding scale   SubCutaneous three times a day before meals  insulin lispro (HumaLOG) corrective regimen sliding scale   SubCutaneous at bedtime  methylPREDNISolone sodium succinate Injectable 60 milliGRAM(s) IV Push two times a day  metoprolol tartrate 50 milliGRAM(s) Oral two times a day  mycophenolate mofetil 1 Gram(s) Oral <User Schedule>  nystatin    Suspension 901598 Unit(s) Swish and Swallow four times a day  polyethylene glycol 3350 17 Gram(s) Oral daily  senna 2 Tablet(s) Oral at bedtime  tacrolimus ER Tablet (ENVARSUS XR) 4 milliGRAM(s) Oral <User Schedule>  tamoxifen 20 milliGRAM(s) Oral at bedtime  trimethoprim   80 mG/sulfamethoxazole 400 mG 1 Tablet(s) Oral daily  valGANciclovir 450 milliGRAM(s) Oral daily    MEDICATIONS  (PRN):  acetaminophen   Tablet .. 975 milliGRAM(s) Oral every 6 hours PRN Mild Pain (1 - 3)  ondansetron Injectable 4 milliGRAM(s) IV Push every 6 hours PRN Nausea and/or Vomiting  oxyCODONE    IR 5 milliGRAM(s) Oral every 4 hours PRN Moderate Pain (4 - 6)  oxyCODONE    IR 10 milliGRAM(s) Oral every 4 hours PRN Severe Pain (7 - 10)      PAST MEDICAL & SURGICAL HISTORY:  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  Pancreatic cyst  Thyroid nodule: yearly Ultrasound, monitoring yearly  Anuria  ESRD on hemodialysis  Breast cancer, female: left 2014  H/O gastroesophageal reflux (GERD)  Thrombocytopenia: leukopenia: followed by [facility]le, plan for BMBx 7/22/19  Anemia in ESRD (end-stage renal disease)  Hypertension  History of fracture of right ankle: 2014 repaired  S/P arteriovenous (AV) fistula creation: 2002  S/P hysterectomy: 1994  Adrenal mass: excison 2015  S/P lumpectomy, left breast: 2014      Vital Signs Last 24 Hrs  T(C): 36.5 (09 Dec 2019 11:00), Max: 37.6 (08 Dec 2019 23:00)  T(F): 97.7 (09 Dec 2019 11:00), Max: 99.7 (08 Dec 2019 23:00)  HR: 89 (09 Dec 2019 14:00) (63 - 89)  BP: 147/47 (09 Dec 2019 10:00) (111/51 - 161/70)  BP(mean): 84 (09 Dec 2019 10:00) (78 - 100)  RR: 36 (09 Dec 2019 14:00) (16 - 36)  SpO2: 98% (09 Dec 2019 14:00) (94% - 100%)    I&O's Summary    08 Dec 2019 07:01  -  09 Dec 2019 07:00  --------------------------------------------------------  IN: 830 mL / OUT: 367 mL / NET: 463 mL    09 Dec 2019 07:01  -  09 Dec 2019 15:02  --------------------------------------------------------  IN: 600 mL / OUT: 185 mL / NET: 415 mL                              9.6    11.87 )-----------( 82       ( 09 Dec 2019 12:16 )             28.7     12-09    128<L>  |  91<L>  |  50<H>  ----------------------------<  168<H>  4.8   |  20<L>  |  8.10<H>    Ca    8.7      09 Dec 2019 12:16  Phos  7.5     12-09  Mg     2.2     12-09    TPro  5.3<L>  /  Alb  3.0<L>  /  TBili  0.2  /  DBili  <0.1  /  AST  13  /  ALT  5<L>  /  AlkPhos  35<L>  12-09    Tacrolimus (), Serum: 4.1 ng/mL (12-09 @ 08:22)                Review of systems  Gen: No weight changes, fatigue, fevers/chills, weakness  Skin: No rashes  Head/Eyes/Ears/Mouth: No headache; Normal hearing; Normal vision w/o blurriness; No sinus pain/discomfort, sore throat  Respiratory: No dyspnea, cough, wheezing, hemoptysis  CV: No chest pain, PND, orthopnea  GI: C/O mild abdominal pain at surgical site, diarrhea, constipation, nausea, vomiting, melena, hematochezia  : No increased frequency, dysuria, hematuria, nocturia  MSK: No joint pain/swelling; no back pain; no edema  Neuro: No dizziness/lightheadedness, weakness, seizures, numbness, tingling  Heme: No easy bruising or bleeding  Endo: No heat/cold intolerance  Psych: No significant nervousness, anxiety, stress, depression  All other systems were reviewed and are negative, except as noted.    PHYSICAL EXAM:  Constitutional: Well developed / well nourished  Eyes: Anicteric, PERRLA  ENMT: nc/at  Neck: L TCL .  Respiratory: CTA B/L  Cardiovascular: RRR  Gastrointestinal: Soft abdomen, mild tender to touch at surgical site, ND L YON in place with SS output   Genitourinary: Urinary catheter in place w/ very minimal urine.   Extremities: SCD's in place and working bilaterally  Vascular: Palpable dp pulses bilaterally  Neurological: A&O x3, drowsy.  Skin: dressing c/d/i  Musculoskeletal: Moving all extremities  Psychiatric: Responsive

## 2019-12-09 NOTE — DIETITIAN INITIAL EVALUATION ADULT. - REASON INDICATOR FOR ASSESSMENT
Nutrition Assessment warranted for length of stay on 8ICU and status post kidney transplant 12/7.  Information obtained from: patient, medical record  Per chart: 74F with h/o ESRD on HD x 17 years 2/2 PCKD, history of fistula thrombosis, HTN, HLD, Gout, LUE DVT, lumbar radiculopathy,  Breast CA s/p lumpectomy on Tamoxifen (2014) now s/p DDRT 12/7/19 w/ simulect induction. SICU called for hemodynamic monitoring and for dialysis.

## 2019-12-09 NOTE — PROGRESS NOTE ADULT - SUBJECTIVE AND OBJECTIVE BOX
[facility] DIVISION OF KIDNEY DISEASES AND HYPERTENSION -- FOLLOW UP NOTE  --------------------------------------------------------------------------------  Chief Complaint: s/p DDRT    24 hour events/subjective: Patient evaluated at bedside, in no acute distress. Patient received Lasix 100 mg IV with minimal response. Plan to give Bumex 4 mg IV and monitor urine output. If no improvement, will arrange for HD.    PAST HISTORY  --------------------------------------------------------------------------------  No significant changes to PMH, PSH, FHx, SHx, unless otherwise noted    ALLERGIES & MEDICATIONS  --------------------------------------------------------------------------------  Allergies    ChloraPrep One-Step (Rash)  penicillins (Rash)  shellfish (Rash)    Intolerances    Standing Inpatient Medications  calcium acetate 667 milliGRAM(s) Oral four times a day with meals  chlorhexidine 2% Cloths 1 Application(s) Topical <User Schedule>  cholecalciferol 1000 Unit(s) Oral daily  cloNIDine 0.2 milliGRAM(s) Oral every 12 hours  famotidine    Tablet 20 milliGRAM(s) Oral daily  insulin lispro (HumaLOG) corrective regimen sliding scale   SubCutaneous three times a day before meals  insulin lispro (HumaLOG) corrective regimen sliding scale   SubCutaneous at bedtime  methylPREDNISolone sodium succinate Injectable 60 milliGRAM(s) IV Push two times a day  metoprolol tartrate 50 milliGRAM(s) Oral two times a day  mycophenolate mofetil 1 Gram(s) Oral <User Schedule>  nystatin    Suspension 413619 Unit(s) Swish and Swallow four times a day  polyethylene glycol 3350 17 Gram(s) Oral daily  senna 2 Tablet(s) Oral at bedtime  tacrolimus ER Tablet (ENVARSUS XR) 4 milliGRAM(s) Oral <User Schedule>  tamoxifen 20 milliGRAM(s) Oral at bedtime  trimethoprim   80 mG/sulfamethoxazole 400 mG 1 Tablet(s) Oral daily  valGANciclovir 450 milliGRAM(s) Oral daily    REVIEW OF SYSTEMS  --------------------------------------------------------------------------------  Gen: No fatigue, fevers/chills, weakness  Skin: No rashes  Head/Eyes/Ears/Mouth: No headache;No sore throat  Respiratory: No dyspnea, cough,   CV: No chest pain, PND, orthopnea  GI: No abdominal pain, diarrhea, constipation, nausea, vomiting  Transplant: No pain  : No increased frequency, dysuria, hematuria, nocturia  MSK: No joint pain/swelling; no back pain; no edema  Neuro: No dizziness/lightheadedness, weakness, seizures, numbness, tingling  Psych: No significant nervousness, anxiety, stress, depression    All other systems were reviewed and are negative, except as noted.    VITALS/PHYSICAL EXAM  --------------------------------------------------------------------------------  T(C): 36.5 (12-09-19 @ 11:00), Max: 37.6 (12-08-19 @ 23:00)  HR: 76 (12-09-19 @ 13:00) (63 - 86)  BP: 147/47 (12-09-19 @ 10:00) (111/51 - 161/70)  RR: 23 (12-09-19 @ 13:00) (16 - 28)  SpO2: 97% (12-09-19 @ 13:00) (94% - 100%)  Wt(kg): --    12-08-19 @ 07:01  -  12-09-19 @ 07:00  --------------------------------------------------------  IN: 830 mL / OUT: 367 mL / NET: 463 mL    12-09-19 @ 07:01  -  12-09-19 @ 14:03  --------------------------------------------------------  IN: 600 mL / OUT: 150 mL / NET: 450 mL    Physical Exam:  	Gen: NAD, well-appearing  	HEENT: PERRL, supple neck, clear oropharynx  	Pulm: CTA B/L  	CV: RRR, S1S2; no rub  	Back: No spinal or CVA tenderness; no sacral edema  	Abd: +BS, soft, nontender/nondistended              Transplant: No tenderness, swelling  	: No suprapubic tenderness  	UE: Warm, FROM, intact strength; no edema; no asterixis  	LE: Warm, FROM, intact strength; no edema  	Neuro: No focal deficits, intact gait  	Psych: Normal affect and mood  	Skin: Warm, without rashes    LABS/STUDIES  --------------------------------------------------------------------------------              9.6    11.87 >-----------<  82       [12-09-19 @ 12:16]              28.7     128  |  91  |  50  ----------------------------<  168      [12-09-19 @ 12:16]  4.8   |  20  |  8.10        Ca     8.7     [12-09-19 @ 12:16]      Mg     2.2     [12-09-19 @ 12:16]      Phos  7.5     [12-09-19 @ 12:16]    TPro  5.3  /  Alb  3.0  /  TBili  0.2  /  DBili  <0.1  /  AST  13  /  ALT  5   /  AlkPhos  35  [12-09-19 @ 00:33]    PT/INR: PT 11.2 , INR 0.97       [12-09-19 @ 00:33]  PTT: 23.1       [12-09-19 @ 00:33]          [12-08-19 @ 03:48]        [12-09-19 @ 00:33]    Creatinine Trend:  SCr 8.10 [12-09 @ 12:16]  SCr 7.28 [12-09 @ 00:33]  SCr 6.64 [12-08 @ 16:24]  SCr 5.98 [12-08 @ 12:19]  SCr 5.27 [12-08 @ 06:52]    Tacrolimus (), Serum: 4.1 ng/mL (12-09 @ 08:22)  Tacrolimus (), Serum: 3.6 ng/mL (12-08 @ 12:38)

## 2019-12-09 NOTE — PROGRESS NOTE ADULT - PROBLEM SELECTOR PLAN 2
s/p simulect induction, continue with steroids and taper as per protocol. C/W Envarsus 4 mg daily and MMF 1000 BID, adjust Tacro based on levels. Goal 8-10  c/w prophylactic agents, Nystatin, Valcyte and Bactrim. May need to renally adjust Valcyte if Scr does not improve.

## 2019-12-09 NOTE — DIETITIAN INITIAL EVALUATION ADULT. - PERTINENT LABORATORY DATA
12-09 @ 04:33: Hemoglobin 7.8<L>, Hematocrit 23.4<L>  12-09 @ 00:33: Sodium 131<L>, Potassium 5.0, Chloride 94<L>, Calcium 8.7, Magnesium 2.1, Phosphorus 7.2<H>, BUN 39<H>, Creatinine 7.28<H>, <H>, Alk Phos 35<L>, ALT/SGPT 5<L>, AST/SGOT 13, Total Protein 5.3<L>, Albumin 3.0<L>, Total Bilirubin 0.2, Direct Bilirubin <0.1, Hemoglobin 7.3<L>, Hematocrit 22.5<L>

## 2019-12-09 NOTE — PROGRESS NOTE ADULT - SUBJECTIVE AND OBJECTIVE BOX
SICU DAILY PROGRESS NOTE    74F with h/o ESRD on HD MWF via LUE AVF 2/2 PCKD (anuric at home, Nephrologist: Dr. Nevin Osborne, last HD 12/6AM), history of fistula thrombosis s/p completed coumadin therapy now functioning, HTN, HLD, Gout, LUE DVT, lumbar radiculopathy,  Breast CA s/p lumpectomy on Tamoxifen (2014) now s/p DDRT 12/7/19 w/ simulect induction. Postoperatively the pt was hemodynamically stable, however labs showed a slight decrease in h/h and a K of 6.1. The pts hct went from 37.6 preop to 34.1 and then to 32.8 on repeat. The pt has not required pressors and has had stable vital signs in the postoperative period. For her K, the pt was shifted with insulin and dextrose. Nephrology was called, and the pt is now written for urgent HD, which she will receive on presentation to the SICU. The pt has had low uop, making 0-20cc/hr since surgery. Drain output has also been high and has made 280cc total since surgery.     24 HOUR EVENTS:  - Advanced diet and IVL'd  - Given labetalol 10 and then Restarted home clonidine 0.2, then decreased back to 0.1 overnight  - Cardiology consulted for Q waves - attributed to hypotension during HD requiring Giorgio, Cards recommended Echo  - Lasix 100 with minimal response    SUBJECTIVE/ROS:  [X] A ten-point review of systems was otherwise negative except as noted.  [ ] Due to altered mental status/intubation, subjective information were not able to be obtained from the patient. History was obtained, to the extent possible, from review of the chart and collateral sources of information.    NEURO  Exam: awake, alert, oriented x4 pain is well controlled, cooperative and in NAD  Meds: acetaminophen   Tablet .. 975 milliGRAM(s) Oral every 6 hours PRN Mild Pain (1 - 3)  ondansetron Injectable 4 milliGRAM(s) IV Push every 6 hours PRN Nausea and/or Vomiting  oxyCODONE    IR 5 milliGRAM(s) Oral every 4 hours PRN Moderate Pain (4 - 6)  oxyCODONE    IR 10 milliGRAM(s) Oral every 4 hours PRN Severe Pain (7 - 10)  [x] Adequacy of sedation and pain control has been assessed and adjusted    RESPIRATORY  RR: 22 (12-09-19 @ 01:00) (16 - 37)  SpO2: 96% (12-09-19 @ 01:00) (94% - 99%)  Exam: nonlabored on RA, clear to auscultation bilaterally  ABG - ( 09 Dec 2019 00:05 )  pH: 7.37  /  pCO2: 38    /  pO2: 110   / HCO3: 21    / Base Excess: -3.2  /  SaO2: 98        CARDIOVASCULAR  HR: 69 (12-09-19 @ 01:00) (67 - 97)  BP: 111/51 (12-08-19 @ 23:00) (111/51 - 124/58)  BP(mean): 78 (12-08-19 @ 23:00) (78 - 84)  ABP: 129/49 (12-09-19 @ 01:00) (103/41 - 199/60)  ABP(mean): 74 (12-09-19 @ 01:00) (56 - 108)    Exam: regular rate and rhythm  Cardiac Rhythm: sinus  Perfusion     [x]Adequate   [ ]Inadequate  Mentation   [x]Normal       [ ]Reduced  Extremities  [x]Warm         [ ]Cool  Volume Status [ ]Hypervolemic [x]Euvolemic [ ]Hypovolemic  Meds: cloNIDine 0.1 milliGRAM(s) Oral every 8 hours  metoprolol tartrate 50 milliGRAM(s) Oral two times a day    GI/NUTRITION  Exam: soft, nontender, nondistended, incision C/D/I, drain is serosanguinous  Diet: Regular roque restricted  Meds: famotidine    Tablet 20 milliGRAM(s) Oral daily  polyethylene glycol 3350 17 Gram(s) Oral daily  senna 2 Tablet(s) Oral at bedtime    GENITOURINARY  I&O's Detail    12-07 @ 07:01  -  12-08 @ 07:00  --------------------------------------------------------  IN:    Albumin 5%  - 250 mL: 250 mL    IV PiggyBack: 200 mL    sodium chloride 0.9%: 1330 mL  Total IN: 1780 mL    OUT:    Bulb: 455 mL    Indwelling Catheter - Urethral: 114 mL  Total OUT: 569 mL    Total NET: 1211 mL    12-08 @ 07:01  -  12-09 @ 01:26  --------------------------------------------------------  IN:    Oral Fluid: 480 mL  Total IN: 480 mL    OUT:    Bulb: 110 mL    Indwelling Catheter - Urethral: 102 mL  Total OUT: 212 mL    Total NET: 268 mL    12-09    131<L>  |  94<L>  |  39<H>  ----------------------------<  165<H>  5.0   |  19<L>  |  7.28<H>    Ca    8.7      09 Dec 2019 00:33  Phos  7.2     12-09  Mg     2.1     12-09    TPro  5.3<L>  /  Alb  3.0<L>  /  TBili  0.2  /  DBili  <0.1  /  AST  13  /  ALT  5<L>  /  AlkPhos  35<L>  12-09    [X] Gardner catheter, indication: N/A  Meds: calcium acetate 667 milliGRAM(s) Oral four times a day with meals  cholecalciferol 1000 Unit(s) Oral daily    HEMATOLOGIC  Meds:   [x] VTE Prophylaxis                        8.4    9.20  )-----------( 108      ( 08 Dec 2019 16:24 )             25.3     PT/INR - ( 09 Dec 2019 00:33 )   PT: 11.2 sec;   INR: 0.97 ratio      PTT - ( 09 Dec 2019 00:33 )  PTT:23.1 sec  Transfusion     [ ] PRBC   [ ] Platelets   [ ] FFP   [ ] Cryoprecipitate    INFECTIOUS DISEASES  WBC Count: 9.20 K/uL (12-08 @ 16:24)  WBC Count: 8.45 K/uL (12-08 @ 12:19)  WBC Count: 10.13 K/uL (12-08 @ 06:52)  WBC Count: 12.33 K/uL (12-08 @ 03:00)    RECENT CULTURES:    Meds: mycophenolate mofetil 1 Gram(s) Oral <User Schedule>  nystatin    Suspension 465294 Unit(s) Swish and Swallow four times a day  tacrolimus ER Tablet (ENVARSUS XR) 4 milliGRAM(s) Oral <User Schedule>  trimethoprim   80 mG/sulfamethoxazole 400 mG 1 Tablet(s) Oral daily  valGANciclovir 450 milliGRAM(s) Oral daily    ENDOCRINE  CAPILLARY BLOOD GLUCOSE    POCT Blood Glucose.: 128 mg/dL (09 Dec 2019 01:25)  POCT Blood Glucose.: 186 mg/dL (08 Dec 2019 21:59)  POCT Blood Glucose.: 173 mg/dL (08 Dec 2019 11:37)    Meds: insulin lispro (HumaLOG) corrective regimen sliding scale   SubCutaneous every 4 hours  methylPREDNISolone sodium succinate Injectable 60 milliGRAM(s) IV Push two times a day        ACCESS DEVICES:  [ ] Peripheral IV  [ ] Central Venous Line	[ ] R	[ ] L	[ ] IJ	[ ] Fem	[ ] SC	Placed:   [ ] Arterial Line		[ ] R	[ ] L	[ ] Fem	[ ] Rad	[ ] Ax	Placed:   [ ] PICC:					[ ] Mediport  [ ] Urinary Catheter, Date Placed:   [x] Necessity of urinary, arterial, and venous catheters discussed    OTHER MEDICATIONS:  chlorhexidine 2% Cloths 1 Application(s) Topical <User Schedule>  tamoxifen 20 milliGRAM(s) Oral at bedtime      CODE STATUS:      IMAGING: SICU DAILY PROGRESS NOTE    74F with h/o ESRD on HD MWF via LUE AVF 2/2 PCKD (anuric at home, Nephrologist: Dr. Nevin Osborne, last HD 12/6AM), history of fistula thrombosis s/p completed coumadin therapy now functioning, HTN, HLD, Gout, LUE DVT, lumbar radiculopathy,  Breast CA s/p lumpectomy on Tamoxifen (2014) now s/p DDRT 12/7/19 w/ simulect induction. Postoperatively the pt was hemodynamically stable, however labs showed a slight decrease in h/h and a K of 6.1. The pts hct went from 37.6 preop to 34.1 and then to 32.8 on repeat. The pt has not required pressors and has had stable vital signs in the postoperative period. For her K, the pt was shifted with insulin and dextrose. Nephrology was called, and the pt is now written for urgent HD, which she will receive on presentation to the SICU. The pt has had low uop, making 0-20cc/hr since surgery. Drain output has also been high and has made 280cc total since surgery.     24 HOUR EVENTS:  - Advanced diet and IVL'd  - Given labetalol 10 and then Restarted home clonidine 0.2, then decreased back to 0.1 overnight  - Cardiology consulted for Q waves - attributed to hypotension during HD requiring Giorgio, Cards recommended Echo  - Lasix 100 with minimal response  - H/H drop from 8.4/25.3 to 7.3/22.5    SUBJECTIVE/ROS:  [X] A ten-point review of systems was otherwise negative except as noted.  [ ] Due to altered mental status/intubation, subjective information were not able to be obtained from the patient. History was obtained, to the extent possible, from review of the chart and collateral sources of information.    NEURO  Exam: awake, alert, oriented x4 pain is well controlled, cooperative and in NAD  Meds: acetaminophen   Tablet .. 975 milliGRAM(s) Oral every 6 hours PRN Mild Pain (1 - 3)  ondansetron Injectable 4 milliGRAM(s) IV Push every 6 hours PRN Nausea and/or Vomiting  oxyCODONE    IR 5 milliGRAM(s) Oral every 4 hours PRN Moderate Pain (4 - 6)  oxyCODONE    IR 10 milliGRAM(s) Oral every 4 hours PRN Severe Pain (7 - 10)  [x] Adequacy of sedation and pain control has been assessed and adjusted    RESPIRATORY  RR: 22 (12-09-19 @ 01:00) (16 - 37)  SpO2: 96% (12-09-19 @ 01:00) (94% - 99%)  Exam: nonlabored on RA, clear to auscultation bilaterally  ABG - ( 09 Dec 2019 00:05 )  pH: 7.37  /  pCO2: 38    /  pO2: 110   / HCO3: 21    / Base Excess: -3.2  /  SaO2: 98        CARDIOVASCULAR  HR: 69 (12-09-19 @ 01:00) (67 - 97)  BP: 111/51 (12-08-19 @ 23:00) (111/51 - 124/58)  BP(mean): 78 (12-08-19 @ 23:00) (78 - 84)  ABP: 129/49 (12-09-19 @ 01:00) (103/41 - 199/60)  ABP(mean): 74 (12-09-19 @ 01:00) (56 - 108)    Exam: regular rate and rhythm  Cardiac Rhythm: sinus  Perfusion     [x]Adequate   [ ]Inadequate  Mentation   [x]Normal       [ ]Reduced  Extremities  [x]Warm         [ ]Cool  Volume Status [ ]Hypervolemic [x]Euvolemic [ ]Hypovolemic  Meds: cloNIDine 0.1 milliGRAM(s) Oral every 8 hours  metoprolol tartrate 50 milliGRAM(s) Oral two times a day    GI/NUTRITION  Exam: soft, nontender, nondistended, incision C/D/I, drain is serosanguinous, though more sanguinous than serous  Diet: Regular roque restricted  Meds: famotidine    Tablet 20 milliGRAM(s) Oral daily  polyethylene glycol 3350 17 Gram(s) Oral daily  senna 2 Tablet(s) Oral at bedtime    GENITOURINARY  I&O's Detail    12-07 @ 07:01  -  12-08 @ 07:00  --------------------------------------------------------  IN:    Albumin 5%  - 250 mL: 250 mL    IV PiggyBack: 200 mL    sodium chloride 0.9%: 1330 mL  Total IN: 1780 mL    OUT:    Bulb: 455 mL    Indwelling Catheter - Urethral: 114 mL  Total OUT: 569 mL    Total NET: 1211 mL    12-08 @ 07:01  -  12-09 @ 01:26  --------------------------------------------------------  IN:    Oral Fluid: 480 mL  Total IN: 480 mL    OUT:    Bulb: 110 mL    Indwelling Catheter - Urethral: 102 mL  Total OUT: 212 mL    Total NET: 268 mL    12-09    131<L>  |  94<L>  |  39<H>  ----------------------------<  165<H>  5.0   |  19<L>  |  7.28<H>    Ca    8.7      09 Dec 2019 00:33  Phos  7.2     12-09  Mg     2.1     12-09    TPro  5.3<L>  /  Alb  3.0<L>  /  TBili  0.2  /  DBili  <0.1  /  AST  13  /  ALT  5<L>  /  AlkPhos  35<L>  12-09    [X] Gardner catheter, indication: N/A  Meds: calcium acetate 667 milliGRAM(s) Oral four times a day with meals  cholecalciferol 1000 Unit(s) Oral daily    HEMATOLOGIC  Meds:   [x] VTE Prophylaxis                        8.4    9.20  )-----------( 108      ( 08 Dec 2019 16:24 )             25.3     PT/INR - ( 09 Dec 2019 00:33 )   PT: 11.2 sec;   INR: 0.97 ratio      PTT - ( 09 Dec 2019 00:33 )  PTT:23.1 sec  Transfusion     [ ] PRBC   [ ] Platelets   [ ] FFP   [ ] Cryoprecipitate    INFECTIOUS DISEASES  WBC Count: 9.20 K/uL (12-08 @ 16:24)  WBC Count: 8.45 K/uL (12-08 @ 12:19)  WBC Count: 10.13 K/uL (12-08 @ 06:52)  WBC Count: 12.33 K/uL (12-08 @ 03:00)    RECENT CULTURES:    Meds: mycophenolate mofetil 1 Gram(s) Oral <User Schedule>  nystatin    Suspension 815386 Unit(s) Swish and Swallow four times a day  tacrolimus ER Tablet (ENVARSUS XR) 4 milliGRAM(s) Oral <User Schedule>  trimethoprim   80 mG/sulfamethoxazole 400 mG 1 Tablet(s) Oral daily  valGANciclovir 450 milliGRAM(s) Oral daily    ENDOCRINE  CAPILLARY BLOOD GLUCOSE    POCT Blood Glucose.: 128 mg/dL (09 Dec 2019 01:25)  POCT Blood Glucose.: 186 mg/dL (08 Dec 2019 21:59)  POCT Blood Glucose.: 173 mg/dL (08 Dec 2019 11:37)    Meds: insulin lispro (HumaLOG) corrective regimen sliding scale   SubCutaneous every 4 hours  methylPREDNISolone sodium succinate Injectable 60 milliGRAM(s) IV Push two times a day    ACCESS DEVICES:  [X] Peripheral IV  [X] Central Venous Line	[X] R	[ ] L	[X] IJ	[ ] Fem	[ ] SC	Placed:   [ ] Arterial Line		[ ] R	[ ] L	[ ] Fem	[ ] Rad	[ ] Ax	Placed:   [ ] PICC:					[ ] Mediport  [X] Urinary Catheter, Date Placed:   [x] Necessity of urinary, arterial, and venous catheters discussed    OTHER MEDICATIONS:  chlorhexidine 2% Cloths 1 Application(s) Topical <User Schedule>  tamoxifen 20 milliGRAM(s) Oral at bedtime    CODE STATUS: Full Code SICU DAILY PROGRESS NOTE    74F with h/o ESRD on HD MWF via LUE AVF 2/2 PCKD (anuric at home, Nephrologist: Dr. Nevin Osborne, last HD 12/6AM), history of fistula thrombosis s/p completed coumadin therapy now functioning, HTN, HLD, Gout, LUE DVT, lumbar radiculopathy,  Breast CA s/p lumpectomy on Tamoxifen (2014) now s/p DDRT 12/7/19 w/ simulect induction. Postoperatively the pt was hemodynamically stable, however labs showed a slight decrease in h/h and a K of 6.1. The pts hct went from 37.6 preop to 34.1 and then to 32.8 on repeat. The pt has not required pressors and has had stable vital signs in the postoperative period. For her K, the pt was shifted with insulin and dextrose. Nephrology was called, and the pt is now written for urgent HD, which she will receive on presentation to the SICU. The pt has had low uop, making 0-20cc/hr since surgery. Drain output has also been high and has made 280cc total since surgery.     24 HOUR EVENTS:  - Advanced diet and IVL'd  - Given labetalol 10 and then Restarted home clonidine 0.2, then decreased back to 0.1 overnight  - Cardiology consulted for Q waves - attributed to hypotension during HD requiring Giorgio, Cards recommended Echo  - Lasix 100 with minimal response  - H/H drop from 8.4/25.3 to 7.3/22.5 --> 1 U pRBC    SUBJECTIVE/ROS:  [X] A ten-point review of systems was otherwise negative except as noted.  [ ] Due to altered mental status/intubation, subjective information were not able to be obtained from the patient. History was obtained, to the extent possible, from review of the chart and collateral sources of information.    NEURO  Exam: awake, alert, oriented x4 pain is well controlled, cooperative and in NAD  Meds: acetaminophen   Tablet .. 975 milliGRAM(s) Oral every 6 hours PRN Mild Pain (1 - 3)  ondansetron Injectable 4 milliGRAM(s) IV Push every 6 hours PRN Nausea and/or Vomiting  oxyCODONE    IR 5 milliGRAM(s) Oral every 4 hours PRN Moderate Pain (4 - 6)  oxyCODONE    IR 10 milliGRAM(s) Oral every 4 hours PRN Severe Pain (7 - 10)  [x] Adequacy of sedation and pain control has been assessed and adjusted    RESPIRATORY  RR: 22 (12-09-19 @ 01:00) (16 - 37)  SpO2: 96% (12-09-19 @ 01:00) (94% - 99%)  Exam: nonlabored on RA, clear to auscultation bilaterally  ABG - ( 09 Dec 2019 00:05 )  pH: 7.37  /  pCO2: 38    /  pO2: 110   / HCO3: 21    / Base Excess: -3.2  /  SaO2: 98        CARDIOVASCULAR  HR: 69 (12-09-19 @ 01:00) (67 - 97)  BP: 111/51 (12-08-19 @ 23:00) (111/51 - 124/58)  BP(mean): 78 (12-08-19 @ 23:00) (78 - 84)  ABP: 129/49 (12-09-19 @ 01:00) (103/41 - 199/60)  ABP(mean): 74 (12-09-19 @ 01:00) (56 - 108)    Exam: regular rate and rhythm  Cardiac Rhythm: sinus  Perfusion     [x]Adequate   [ ]Inadequate  Mentation   [x]Normal       [ ]Reduced  Extremities  [x]Warm         [ ]Cool  Volume Status [ ]Hypervolemic [x]Euvolemic [ ]Hypovolemic  Meds: cloNIDine 0.1 milliGRAM(s) Oral every 8 hours  metoprolol tartrate 50 milliGRAM(s) Oral two times a day    GI/NUTRITION  Exam: soft, nontender, nondistended, incision C/D/I, drain is serosanguinous, though more sanguinous than serous  Diet: Regular roque restricted  Meds: famotidine    Tablet 20 milliGRAM(s) Oral daily  polyethylene glycol 3350 17 Gram(s) Oral daily  senna 2 Tablet(s) Oral at bedtime    GENITOURINARY  I&O's Detail    12-07 @ 07:01  -  12-08 @ 07:00  --------------------------------------------------------  IN:    Albumin 5%  - 250 mL: 250 mL    IV PiggyBack: 200 mL    sodium chloride 0.9%: 1330 mL  Total IN: 1780 mL    OUT:    Bulb: 455 mL    Indwelling Catheter - Urethral: 114 mL  Total OUT: 569 mL    Total NET: 1211 mL    12-08 @ 07:01  -  12-09 @ 01:26  --------------------------------------------------------  IN:    Oral Fluid: 480 mL  Total IN: 480 mL    OUT:    Bulb: 110 mL    Indwelling Catheter - Urethral: 102 mL  Total OUT: 212 mL    Total NET: 268 mL    12-09    131<L>  |  94<L>  |  39<H>  ----------------------------<  165<H>  5.0   |  19<L>  |  7.28<H>    Ca    8.7      09 Dec 2019 00:33  Phos  7.2     12-09  Mg     2.1     12-09    TPro  5.3<L>  /  Alb  3.0<L>  /  TBili  0.2  /  DBili  <0.1  /  AST  13  /  ALT  5<L>  /  AlkPhos  35<L>  12-09    [X] Gardner catheter, indication: N/A  Meds: calcium acetate 667 milliGRAM(s) Oral four times a day with meals  cholecalciferol 1000 Unit(s) Oral daily    HEMATOLOGIC  Meds:   [x] VTE Prophylaxis                        8.4    9.20  )-----------( 108      ( 08 Dec 2019 16:24 )             25.3     PT/INR - ( 09 Dec 2019 00:33 )   PT: 11.2 sec;   INR: 0.97 ratio      PTT - ( 09 Dec 2019 00:33 )  PTT:23.1 sec  Transfusion     [ ] PRBC   [ ] Platelets   [ ] FFP   [ ] Cryoprecipitate    INFECTIOUS DISEASES  WBC Count: 9.20 K/uL (12-08 @ 16:24)  WBC Count: 8.45 K/uL (12-08 @ 12:19)  WBC Count: 10.13 K/uL (12-08 @ 06:52)  WBC Count: 12.33 K/uL (12-08 @ 03:00)    RECENT CULTURES:    Meds: mycophenolate mofetil 1 Gram(s) Oral <User Schedule>  nystatin    Suspension 235653 Unit(s) Swish and Swallow four times a day  tacrolimus ER Tablet (ENVARSUS XR) 4 milliGRAM(s) Oral <User Schedule>  trimethoprim   80 mG/sulfamethoxazole 400 mG 1 Tablet(s) Oral daily  valGANciclovir 450 milliGRAM(s) Oral daily    ENDOCRINE  CAPILLARY BLOOD GLUCOSE    POCT Blood Glucose.: 128 mg/dL (09 Dec 2019 01:25)  POCT Blood Glucose.: 186 mg/dL (08 Dec 2019 21:59)  POCT Blood Glucose.: 173 mg/dL (08 Dec 2019 11:37)    Meds: insulin lispro (HumaLOG) corrective regimen sliding scale   SubCutaneous every 4 hours  methylPREDNISolone sodium succinate Injectable 60 milliGRAM(s) IV Push two times a day    ACCESS DEVICES:  [X] Peripheral IV  [X] Central Venous Line	[X] R	[ ] L	[X] IJ	[ ] Fem	[ ] SC	Placed:   [ ] Arterial Line		[ ] R	[ ] L	[ ] Fem	[ ] Rad	[ ] Ax	Placed:   [ ] PICC:					[ ] Mediport  [X] Urinary Catheter, Date Placed:   [x] Necessity of urinary, arterial, and venous catheters discussed    OTHER MEDICATIONS:  chlorhexidine 2% Cloths 1 Application(s) Topical <User Schedule>  tamoxifen 20 milliGRAM(s) Oral at bedtime    CODE STATUS: Full Code

## 2019-12-09 NOTE — PROGRESS NOTE ADULT - ASSESSMENT
74F with h/o ESRD on HD MWF via LUE AVF 2/2 PCKD (anuric at home, Nephrologist: Dr. Nevin Osborne, last HD 12/6AM), hx of fistula thrombosis, completed coumadin, HTN, HLD, Gout, LUE DVT, lumbar radiculopathy,  Breast CA s/p lumpectomy on Tamoxifen (2014).  s/p DDRT 12/7/19 w/ simulect induction, ureter anastomosed over a double J stent. Enlarged, lymph node was encountered during iliac dissection was removed and sent for pathology.   [] POD 1 s/p DDRT with DGF  - Monitor renal function, s/p HD post op for hyperkalemia  - Give one dose bumex 2mg x1  - HD per renal today  - Remains with low UO  - Diet: as liv  - Pain control  - Immuno: Envarsus 4mg daily, MMF 1g bid, Pred taper, Simulect induction  - Proph: valcyte/bactrim/nystatin  -SCDs at all times, IS, OOB  - H/H low s/p one u PRBC this am, repeat H&H 7.8/23.4  -SCDs at all times, IS, Please keep pt OOB for all meals  - Ambulate with pt four times a day  - ID, on metoprolol 50mg bid.   - Plan to D/C TLC  [] HTN:   -Home meds: clonidine 0.2 TID, hydralazine 50 TID, nifedipine 60 daily, metoprolol  daily  -on ASA81 at home, currently held.

## 2019-12-09 NOTE — DIETITIAN INITIAL EVALUATION ADULT. - ADD RECOMMEND
1) Continue Renal diet in setting of DGF. 2) Monitor graft function and urine output. 3) Reinforce post-transplant nutrition therapy and food safety guidelines. 4) Discharge diet: Continue as above. Recommend follow up visit with Transplant MD and outpatient RD for dietary modifications as warranted.

## 2019-12-10 LAB
ALBUMIN SERPL ELPH-MCNC: 3.4 G/DL — SIGNIFICANT CHANGE UP (ref 3.3–5)
ALP SERPL-CCNC: 47 U/L — SIGNIFICANT CHANGE UP (ref 40–120)
ALT FLD-CCNC: 8 U/L — LOW (ref 10–45)
ANION GAP SERPL CALC-SCNC: 12 MMOL/L — SIGNIFICANT CHANGE UP (ref 5–17)
ANION GAP SERPL CALC-SCNC: 15 MMOL/L — SIGNIFICANT CHANGE UP (ref 5–17)
APTT BLD: 21.3 SEC — LOW (ref 27.5–36.3)
AST SERPL-CCNC: 16 U/L — SIGNIFICANT CHANGE UP (ref 10–40)
BILIRUB DIRECT SERPL-MCNC: 0.1 MG/DL — SIGNIFICANT CHANGE UP (ref 0–0.2)
BILIRUB INDIRECT FLD-MCNC: 0.2 MG/DL — SIGNIFICANT CHANGE UP (ref 0.2–1)
BILIRUB SERPL-MCNC: 0.3 MG/DL — SIGNIFICANT CHANGE UP (ref 0.2–1.2)
BUN SERPL-MCNC: 47 MG/DL — HIGH (ref 7–23)
BUN SERPL-MCNC: 47 MG/DL — HIGH (ref 7–23)
CALCIUM SERPL-MCNC: 7.8 MG/DL — LOW (ref 8.4–10.5)
CALCIUM SERPL-MCNC: 8.3 MG/DL — LOW (ref 8.4–10.5)
CHLORIDE SERPL-SCNC: 100 MMOL/L — SIGNIFICANT CHANGE UP (ref 96–108)
CHLORIDE SERPL-SCNC: 96 MMOL/L — SIGNIFICANT CHANGE UP (ref 96–108)
CO2 SERPL-SCNC: 19 MMOL/L — LOW (ref 22–31)
CO2 SERPL-SCNC: 21 MMOL/L — LOW (ref 22–31)
CREAT SERPL-MCNC: 6.5 MG/DL — HIGH (ref 0.5–1.3)
CREAT SERPL-MCNC: 6.86 MG/DL — HIGH (ref 0.5–1.3)
GAS PNL BLDA: SIGNIFICANT CHANGE UP
GLUCOSE BLDC GLUCOMTR-MCNC: 154 MG/DL — HIGH (ref 70–99)
GLUCOSE BLDC GLUCOMTR-MCNC: 197 MG/DL — HIGH (ref 70–99)
GLUCOSE BLDC GLUCOMTR-MCNC: 229 MG/DL — HIGH (ref 70–99)
GLUCOSE SERPL-MCNC: 131 MG/DL — HIGH (ref 70–99)
GLUCOSE SERPL-MCNC: 214 MG/DL — HIGH (ref 70–99)
HCT VFR BLD CALC: 27.1 % — LOW (ref 34.5–45)
HCT VFR BLD CALC: 27.4 % — LOW (ref 34.5–45)
HGB BLD-MCNC: 9 G/DL — LOW (ref 11.5–15.5)
HGB BLD-MCNC: 9 G/DL — LOW (ref 11.5–15.5)
INR BLD: 0.87 RATIO — LOW (ref 0.88–1.16)
LDH SERPL L TO P-CCNC: 154 U/L — SIGNIFICANT CHANGE UP (ref 50–242)
MAGNESIUM SERPL-MCNC: 2 MG/DL — SIGNIFICANT CHANGE UP (ref 1.6–2.6)
MAGNESIUM SERPL-MCNC: 2.1 MG/DL — SIGNIFICANT CHANGE UP (ref 1.6–2.6)
MCHC RBC-ENTMCNC: 29.2 PG — SIGNIFICANT CHANGE UP (ref 27–34)
MCHC RBC-ENTMCNC: 29.8 PG — SIGNIFICANT CHANGE UP (ref 27–34)
MCHC RBC-ENTMCNC: 32.8 GM/DL — SIGNIFICANT CHANGE UP (ref 32–36)
MCHC RBC-ENTMCNC: 33.2 GM/DL — SIGNIFICANT CHANGE UP (ref 32–36)
MCV RBC AUTO: 89 FL — SIGNIFICANT CHANGE UP (ref 80–100)
MCV RBC AUTO: 89.7 FL — SIGNIFICANT CHANGE UP (ref 80–100)
NRBC # BLD: 0 /100 WBCS — SIGNIFICANT CHANGE UP (ref 0–0)
NRBC # BLD: 0 /100 WBCS — SIGNIFICANT CHANGE UP (ref 0–0)
PHOSPHATE SERPL-MCNC: 4.8 MG/DL — HIGH (ref 2.5–4.5)
PHOSPHATE SERPL-MCNC: 5.5 MG/DL — HIGH (ref 2.5–4.5)
PLATELET # BLD AUTO: 83 K/UL — LOW (ref 150–400)
PLATELET # BLD AUTO: 87 K/UL — LOW (ref 150–400)
POTASSIUM SERPL-MCNC: 4.1 MMOL/L — SIGNIFICANT CHANGE UP (ref 3.5–5.3)
POTASSIUM SERPL-MCNC: 4.6 MMOL/L — SIGNIFICANT CHANGE UP (ref 3.5–5.3)
POTASSIUM SERPL-SCNC: 4.1 MMOL/L — SIGNIFICANT CHANGE UP (ref 3.5–5.3)
POTASSIUM SERPL-SCNC: 4.6 MMOL/L — SIGNIFICANT CHANGE UP (ref 3.5–5.3)
PROT SERPL-MCNC: 5.7 G/DL — LOW (ref 6–8.3)
PROTHROM AB SERPL-ACNC: 9.9 SEC — LOW (ref 10–12.9)
RBC # BLD: 3.02 M/UL — LOW (ref 3.8–5.2)
RBC # BLD: 3.08 M/UL — LOW (ref 3.8–5.2)
RBC # FLD: 19.6 % — HIGH (ref 10.3–14.5)
RBC # FLD: 19.9 % — HIGH (ref 10.3–14.5)
SODIUM SERPL-SCNC: 131 MMOL/L — LOW (ref 135–145)
SODIUM SERPL-SCNC: 132 MMOL/L — LOW (ref 135–145)
TACROLIMUS SERPL-MCNC: 4.7 NG/ML — SIGNIFICANT CHANGE UP
WBC # BLD: 10.53 K/UL — HIGH (ref 3.8–10.5)
WBC # BLD: 9.2 K/UL — SIGNIFICANT CHANGE UP (ref 3.8–10.5)
WBC # FLD AUTO: 10.53 K/UL — HIGH (ref 3.8–10.5)
WBC # FLD AUTO: 9.2 K/UL — SIGNIFICANT CHANGE UP (ref 3.8–10.5)

## 2019-12-10 PROCEDURE — 71045 X-RAY EXAM CHEST 1 VIEW: CPT | Mod: 26

## 2019-12-10 PROCEDURE — 99232 SBSQ HOSP IP/OBS MODERATE 35: CPT | Mod: GC

## 2019-12-10 PROCEDURE — 99233 SBSQ HOSP IP/OBS HIGH 50: CPT

## 2019-12-10 RX ORDER — ASPIRIN/CALCIUM CARB/MAGNESIUM 324 MG
81 TABLET ORAL DAILY
Refills: 0 | Status: DISCONTINUED | OUTPATIENT
Start: 2019-12-10 | End: 2019-12-14

## 2019-12-10 RX ORDER — VALGANCICLOVIR 450 MG/1
450 TABLET, FILM COATED ORAL
Refills: 0 | Status: DISCONTINUED | OUTPATIENT
Start: 2019-12-10 | End: 2019-12-14

## 2019-12-10 RX ORDER — TACROLIMUS 5 MG/1
8 CAPSULE ORAL DAILY
Refills: 0 | Status: DISCONTINUED | OUTPATIENT
Start: 2019-12-11 | End: 2019-12-11

## 2019-12-10 RX ORDER — NIFEDIPINE 30 MG
60 TABLET, EXTENDED RELEASE 24 HR ORAL DAILY
Refills: 0 | Status: DISCONTINUED | OUTPATIENT
Start: 2019-12-11 | End: 2019-12-14

## 2019-12-10 RX ORDER — FUROSEMIDE 40 MG
40 TABLET ORAL
Refills: 0 | Status: DISCONTINUED | OUTPATIENT
Start: 2019-12-10 | End: 2019-12-10

## 2019-12-10 RX ORDER — NIFEDIPINE 30 MG
90 TABLET, EXTENDED RELEASE 24 HR ORAL DAILY
Refills: 0 | Status: DISCONTINUED | OUTPATIENT
Start: 2019-12-10 | End: 2019-12-10

## 2019-12-10 RX ORDER — NIFEDIPINE 30 MG
60 TABLET, EXTENDED RELEASE 24 HR ORAL ONCE
Refills: 0 | Status: COMPLETED | OUTPATIENT
Start: 2019-12-10 | End: 2019-12-10

## 2019-12-10 RX ORDER — FUROSEMIDE 40 MG
80 TABLET ORAL ONCE
Refills: 0 | Status: COMPLETED | OUTPATIENT
Start: 2019-12-10 | End: 2019-12-10

## 2019-12-10 RX ADMIN — Medication 60 MILLIGRAM(S): at 10:13

## 2019-12-10 RX ADMIN — TAMOXIFEN CITRATE 20 MILLIGRAM(S): 20 TABLET, FILM COATED ORAL at 23:23

## 2019-12-10 RX ADMIN — Medication 500000 UNIT(S): at 00:24

## 2019-12-10 RX ADMIN — MYCOPHENOLATE MOFETIL 1 GRAM(S): 250 CAPSULE ORAL at 05:04

## 2019-12-10 RX ADMIN — POLYETHYLENE GLYCOL 3350 17 GRAM(S): 17 POWDER, FOR SOLUTION ORAL at 12:11

## 2019-12-10 RX ADMIN — CHLORHEXIDINE GLUCONATE 1 APPLICATION(S): 213 SOLUTION TOPICAL at 06:51

## 2019-12-10 RX ADMIN — Medication 500000 UNIT(S): at 05:04

## 2019-12-10 RX ADMIN — Medication 50 MILLIGRAM(S): at 05:04

## 2019-12-10 RX ADMIN — Medication 975 MILLIGRAM(S): at 02:03

## 2019-12-10 RX ADMIN — Medication 0.2 MILLIGRAM(S): at 06:52

## 2019-12-10 RX ADMIN — Medication 30 MILLIGRAM(S): at 17:38

## 2019-12-10 RX ADMIN — TACROLIMUS 6 MILLIGRAM(S): 5 CAPSULE ORAL at 08:13

## 2019-12-10 RX ADMIN — Medication 1: at 17:40

## 2019-12-10 RX ADMIN — Medication 667 MILLIGRAM(S): at 12:03

## 2019-12-10 RX ADMIN — Medication 1: at 12:03

## 2019-12-10 RX ADMIN — VALGANCICLOVIR 450 MILLIGRAM(S): 450 TABLET, FILM COATED ORAL at 12:03

## 2019-12-10 RX ADMIN — Medication 80 MILLIGRAM(S): at 15:32

## 2019-12-10 RX ADMIN — Medication 500000 UNIT(S): at 12:03

## 2019-12-10 RX ADMIN — SENNA PLUS 2 TABLET(S): 8.6 TABLET ORAL at 23:22

## 2019-12-10 RX ADMIN — MYCOPHENOLATE MOFETIL 1 GRAM(S): 250 CAPSULE ORAL at 17:38

## 2019-12-10 RX ADMIN — Medication 500000 UNIT(S): at 23:22

## 2019-12-10 RX ADMIN — Medication 667 MILLIGRAM(S): at 08:13

## 2019-12-10 RX ADMIN — Medication 30 MILLIGRAM(S): at 05:05

## 2019-12-10 RX ADMIN — Medication 500000 UNIT(S): at 18:21

## 2019-12-10 RX ADMIN — Medication 975 MILLIGRAM(S): at 02:33

## 2019-12-10 RX ADMIN — Medication 50 MILLIGRAM(S): at 17:38

## 2019-12-10 RX ADMIN — Medication 81 MILLIGRAM(S): at 15:32

## 2019-12-10 RX ADMIN — FAMOTIDINE 20 MILLIGRAM(S): 10 INJECTION INTRAVENOUS at 12:03

## 2019-12-10 RX ADMIN — Medication 0.2 MILLIGRAM(S): at 15:39

## 2019-12-10 RX ADMIN — Medication 1 TABLET(S): at 12:03

## 2019-12-10 RX ADMIN — Medication 0.2 MILLIGRAM(S): at 23:22

## 2019-12-10 RX ADMIN — Medication 1000 UNIT(S): at 12:03

## 2019-12-10 RX ADMIN — Medication 667 MILLIGRAM(S): at 17:38

## 2019-12-10 NOTE — PROGRESS NOTE ADULT - PROBLEM SELECTOR PLAN 2
s/p simulect induction, continue with steroids and taper as per protocol. Increased to Envarsus 6 mg daily and MMF 1000 BID, adjust Tacro based on levels. Goal 8-10  c/w prophylactic agents, Nystatin, Valcyte and Bactrim.

## 2019-12-10 NOTE — PROGRESS NOTE ADULT - SUBJECTIVE AND OBJECTIVE BOX
[facility] DIVISION OF KIDNEY DISEASES AND HYPERTENSION -- FOLLOW UP NOTE  --------------------------------------------------------------------------------  Chief Complaint: s/p DDRT    24 hour events/subjective: Patient evaluated at bedside, in no acute distress. Patient with improved urine output. Started on Lasix 40 mg IV.    PAST HISTORY  --------------------------------------------------------------------------------  No significant changes to PMH, PSH, FHx, SHx, unless otherwise noted    ALLERGIES & MEDICATIONS  --------------------------------------------------------------------------------  Allergies    ChloraPrep One-Step (Rash)  penicillins (Rash)  shellfish (Rash)    Intolerances      Standing Inpatient Medications  aspirin  chewable 81 milliGRAM(s) Oral daily  calcium acetate 667 milliGRAM(s) Oral four times a day with meals  chlorhexidine 2% Cloths 1 Application(s) Topical <User Schedule>  cholecalciferol 1000 Unit(s) Oral daily  cloNIDine 0.2 milliGRAM(s) Oral every 8 hours  famotidine    Tablet 20 milliGRAM(s) Oral daily  furosemide   Injectable 40 milliGRAM(s) IV Push two times a day  insulin lispro (HumaLOG) corrective regimen sliding scale   SubCutaneous three times a day before meals  insulin lispro (HumaLOG) corrective regimen sliding scale   SubCutaneous at bedtime  methylPREDNISolone sodium succinate Injectable 30 milliGRAM(s) IV Push two times a day  metoprolol tartrate 50 milliGRAM(s) Oral two times a day  mycophenolate mofetil 1 Gram(s) Oral <User Schedule>  nystatin    Suspension 091972 Unit(s) Swish and Swallow four times a day  polyethylene glycol 3350 17 Gram(s) Oral daily  senna 2 Tablet(s) Oral at bedtime  tacrolimus ER Tablet (ENVARSUS XR) 6 milliGRAM(s) Oral daily  tamoxifen 20 milliGRAM(s) Oral at bedtime  trimethoprim   80 mG/sulfamethoxazole 400 mG 1 Tablet(s) Oral daily  valGANciclovir 450 milliGRAM(s) Oral <User Schedule>    REVIEW OF SYSTEMS  --------------------------------------------------------------------------------  Gen: No fatigue, fevers/chills, weakness  Skin: No rashes  Head/Eyes/Ears/Mouth: No headache;No sore throat  Respiratory: No dyspnea, cough,   CV: No chest pain, PND, orthopnea  GI: No abdominal pain, diarrhea, constipation, nausea, vomiting  Transplant: No pain  : No increased frequency, dysuria, hematuria, nocturia  MSK: No joint pain/swelling; no back pain; no edema  Neuro: No dizziness/lightheadedness, weakness, seizures, numbness, tingling  Psych: No significant nervousness, anxiety, stress, depression    All other systems were reviewed and are negative, except as noted.    VITALS/PHYSICAL EXAM  --------------------------------------------------------------------------------  T(C): 36.3 (12-10-19 @ 11:00), Max: 37 (12-09-19 @ 19:00)  HR: 63 (12-10-19 @ 13:00) (60 - 87)  BP: 158/72 (12-10-19 @ 09:00) (158/72 - 202/84)  RR: 19 (12-10-19 @ 13:00) (14 - 33)  SpO2: 96% (12-10-19 @ 13:00) (93% - 100%)  Wt(kg): --    12-09-19 @ 07:01  -  12-10-19 @ 07:00  --------------------------------------------------------  IN: 1100 mL / OUT: 1057 mL / NET: 43 mL    12-10-19 @ 07:01  -  12-10-19 @ 14:02  --------------------------------------------------------  IN: 0 mL / OUT: 335 mL / NET: -335 mL    Physical Exam:  	Gen: NAD, well-appearing  	HEENT: PERRL, supple neck, clear oropharynx  	Pulm: CTA B/L  	CV: RRR, S1S2; no rub  	Back: No spinal or CVA tenderness; no sacral edema  	Abd: +BS, soft, nontender/nondistended              Transplant: No tenderness, swelling  	: No suprapubic tenderness  	UE: Warm, FROM, intact strength; no edema; no asterixis  	LE: Warm, FROM, intact strength; no edema  	Neuro: No focal deficits, intact gait  	Psych: Normal affect and mood  	Skin: Warm, without rashes    LABS/STUDIES  --------------------------------------------------------------------------------              9.0    9.20  >-----------<  87       [12-10-19 @ 13:21]              27.4     131  |  100  |  47  ----------------------------<  131      [12-10-19 @ 13:21]  4.1   |  19  |  6.50        Ca     7.8     [12-10-19 @ 13:21]      Mg     2.0     [12-10-19 @ 13:21]      Phos  4.8     [12-10-19 @ 13:21]    TPro  5.7  /  Alb  3.4  /  TBili  0.3  /  DBili  0.1  /  AST  16  /  ALT  8   /  AlkPhos  47  [12-10-19 @ 02:09]    PT/INR: PT 9.9  , INR 0.87       [12-10-19 @ 02:09]  PTT: 21.3       [12-10-19 @ 02:09]          [12-10-19 @ 02:09]    Creatinine Trend:  SCr 6.50 [12-10 @ 13:21]  SCr 6.86 [12-10 @ 02:09]  SCr 8.29 [12-09 @ 17:58]  SCr 8.10 [12-09 @ 12:16]  SCr 7.28 [12-09 @ 00:33]    Tacrolimus (), Serum: 4.7 ng/mL (12-10 @ 08:43)  Tacrolimus (), Serum: 4.1 ng/mL (12-09 @ 08:22)  Tacrolimus (), Serum: 3.6 ng/mL (12-08 @ 12:38)

## 2019-12-10 NOTE — PROGRESS NOTE ADULT - SUBJECTIVE AND OBJECTIVE BOX
SICU DAILY PROGRESS NOTE    74F with h/o ESRD on HD MWF via LUE AVF 2/2 PCKD (anuric at home, Nephrologist: Dr. Nevin Osborne, last HD 12/6AM), history of fistula thrombosis s/p completed coumadin therapy now functioning, HTN, HLD, Gout, LUE DVT, lumbar radiculopathy,  Breast CA s/p lumpectomy on Tamoxifen (2014) now s/p DDRT 12/7/19 w/ simulect induction. Postoperatively the pt was hemodynamically stable, however labs showed a slight decrease in h/h and a K of 6.1. The pts hct went from 37.6 preop to 34.1 and then to 32.8 on repeat. The pt has not required pressors and has had stable vital signs in the postoperative period. For her K, the pt was shifted with insulin and dextrose. Nephrology was called, and the pt is now written for urgent HD, which she will receive on presentation to the SICU. The pt has had low uop, making 0-20cc/hr since surgery. Drain output has also been high and has made 280cc total since surgery.     24 HOUR EVENTS:  - got 2u PRBC  - got 2g Bumex, w/o appropriate UOP  - increased clonidine from 0.1 to 0.2 BID  - echo performed showing severe LA enlargement, LV hypertrophy, EF 65~70%  - overnight, hypertensive to 220's, 10 labetolol given  - underwent HD      SUBJECTIVE/ROS:  [x] A ten-point review of systems was otherwise negative except as noted.  [ ] Due to altered mental status/intubation, subjective information were not able to be obtained from the patient. History was obtained, to the extent possible, from review of the chart and collateral sources of information.      NEURO  AOx3  Exam: awake, alert, oriented  Meds: acetaminophen   Tablet .. 975 milliGRAM(s) Oral every 6 hours PRN Mild Pain (1 - 3)  ondansetron Injectable 4 milliGRAM(s) IV Push every 6 hours PRN Nausea and/or Vomiting  oxyCODONE    IR 5 milliGRAM(s) Oral every 4 hours PRN Moderate Pain (4 - 6)  oxyCODONE    IR 10 milliGRAM(s) Oral every 4 hours PRN Severe Pain (7 - 10)    [x] Adequacy of sedation and pain control has been assessed and adjusted      RESPIRATORY  RR: 14 (12-10-19 @ 03:00) (14 - 36)  SpO2: 96% (12-10-19 @ 03:00) (93% - 100%)  Wt(kg): --  Exam: unlabored, clear to auscultation bilaterally  Mechanical Ventilation:   ABG - ( 10 Dec 2019 02:02 )  pH: 7.37  /  pCO2: 40    /  pO2: 89    / HCO3: 23    / Base Excess: -1.7  /  SaO2: 97      Lactate: x        [N/A] Extubation Readiness Assessed  Meds:     CARDIOVASCULAR  HR: 68 (12-10-19 @ 03:00) (63 - 89)  BP: 161/11 (12-09-19 @ 20:00) (147/47 - 178/81)  BP(mean): 110 (12-09-19 @ 20:00) (84 - 117)  ABP: 174/62 (12-10-19 @ 03:00) (126/47 - 194/67)  ABP(mean): 103 (12-10-19 @ 03:00) (1 - 113)  Wt(kg): --  CVP(cm H2O): --    Exam: regular rate and rhythm  Cardiac Rhythm: sinus  Perfusion     [x]Adequate   [ ]Inadequate  Mentation   [x]Normal       [ ]Reduced  Extremities  [x]Warm         [ ]Cool  Volume Status [ ]Hypervolemic [x]Euvolemic [ ]Hypovolemic  Meds: cloNIDine 0.2 milliGRAM(s) Oral every 12 hours  metoprolol tartrate 50 milliGRAM(s) Oral two times a day    GI/NUTRITION  Exam: soft, nontender, nondistended, incision C/D/I  Diet: renal restriction  Meds: famotidine    Tablet 20 milliGRAM(s) Oral daily  polyethylene glycol 3350 17 Gram(s) Oral daily  senna 2 Tablet(s) Oral at bedtime      GENITOURINARY  I&O's Detail    12-08 @ 07:01  -  12-09 @ 07:00  --------------------------------------------------------  IN:    Oral Fluid: 480 mL    Packed Red Blood Cells: 350 mL  Total IN: 830 mL    OUT:    Bulb: 195 mL    Indwelling Catheter - Urethral: 172 mL  Total OUT: 367 mL    Total NET: 463 mL      12-09 @ 07:01  -  12-10 @ 03:19  --------------------------------------------------------  IN:    Oral Fluid: 650 mL    Packed Red Blood Cells: 350 mL  Total IN: 1000 mL    OUT:    Bulb: 50 mL    Indwelling Catheter - Urethral: 614 mL  Total OUT: 664 mL    Total NET: 336 mL          12-10    132<L>  |  96  |  47<H>  ----------------------------<  214<H>  4.6   |  21<L>  |  6.86<H>    Ca    8.3<L>      10 Dec 2019 02:09  Phos  5.5     12-10  Mg     2.1     12-10    TPro  5.7<L>  /  Alb  3.4  /  TBili  0.3  /  DBili  0.1  /  AST  16  /  ALT  8<L>  /  AlkPhos  47  12-10    [ ] Gardner catheter, indication: N/A  Meds: calcium acetate 667 milliGRAM(s) Oral four times a day with meals  cholecalciferol 1000 Unit(s) Oral daily        HEMATOLOGIC  [x] VTE Prophylaxis                        9.0    10.53 )-----------( 83       ( 10 Dec 2019 02:09 )             27.1     PT/INR - ( 10 Dec 2019 02:09 )   PT: 9.9 sec;   INR: 0.87 ratio         PTT - ( 10 Dec 2019 02:09 )  PTT:21.3 sec  Transfusion     [ ] PRBC   [ ] Platelets   [ ] FFP   [ ] Cryoprecipitate      INFECTIOUS DISEASES  WBC Count: 10.53 K/uL (12-10 @ 02:09)  WBC Count: 12.32 K/uL (12-09 @ 17:58)  WBC Count: 11.87 K/uL (12-09 @ 12:16)  WBC Count: 8.14 K/uL (12-09 @ 04:33)    RECENT CULTURES:    Meds: mycophenolate mofetil 1 Gram(s) Oral <User Schedule>  nystatin    Suspension 773538 Unit(s) Swish and Swallow four times a day  tacrolimus ER Tablet (ENVARSUS XR) 6 milliGRAM(s) Oral daily  trimethoprim   80 mG/sulfamethoxazole 400 mG 1 Tablet(s) Oral daily  valGANciclovir 450 milliGRAM(s) Oral daily    ENDOCRINE  CAPILLARY BLOOD GLUCOSE    POCT Blood Glucose.: 176 mg/dL (09 Dec 2019 21:36)  POCT Blood Glucose.: 141 mg/dL (09 Dec 2019 17:54)  POCT Blood Glucose.: 181 mg/dL (09 Dec 2019 11:06)  POCT Blood Glucose.: 124 mg/dL (09 Dec 2019 05:21)    Meds: insulin lispro (HumaLOG) corrective regimen sliding scale   SubCutaneous three times a day before meals  insulin lispro (HumaLOG) corrective regimen sliding scale   SubCutaneous at bedtime  methylPREDNISolone sodium succinate Injectable 30 milliGRAM(s) IV Push two times a day        ACCESS DEVICES:  [x] Peripheral IV  [ ] Central Venous Line	[ ] R	[ ] L	[ ] IJ	[ ] Fem	[ ] SC	Placed:   [ ] Arterial Line		[ ] R	[ ] L	[ ] Fem	[ ] Rad	[ ] Ax	Placed:   [ ] PICC:					[ ] Mediport  [ ] Urinary Catheter, Date Placed:   [x] Necessity of urinary, arterial, and venous catheters discussed    OTHER MEDICATIONS:  chlorhexidine 2% Cloths 1 Application(s) Topical <User Schedule>  tamoxifen 20 milliGRAM(s) Oral at bedtime      CODE STATUS: full

## 2019-12-10 NOTE — PROGRESS NOTE ADULT - PROBLEM SELECTOR PLAN 1
Pt ESRD on HD since 2003, due to PCKD. Presented for DDRT on 12/7/2019 complicated by DGF requiring HD, due to hyperkalemia. Patient received Lasix 100 mg IV with minimal urine output.  Patient was planned for HD yesterday but only received about 10 minutes. UO improved. Recommend to start Lasix 40 mg IV BID.  Recommend to start sodium bicarbonate tablets 1300 mg BID.

## 2019-12-10 NOTE — PROGRESS NOTE ADULT - ASSESSMENT
74F with h/o ESRD on HD MWF via LUE AVF 2/2 PCKD (anuric at home, Nephrologist: Dr. Nevin Osborne, last HD 12/6AM), history of fistula thrombosis s/p completed coumadin therapy now functioning, HTN, HLD, Gout, LUE DVT, lumbar radiculopathy,  Breast CA s/p lumpectomy on Tamoxifen (2014) now s/p DDRT 12/7/19 w/ simulect induction. SICU called for hemodynamic monitoring and for dialysis.     PLAN:    NEURO: S/p DDRT  - Cont postoperative pain control - tylenol/oxycodone    RESPIRATORY: No active issues  - Breathing comfortably on RA  - AM CXR    CARDIOVASCULAR: Hx of HTN, Episode of hypotension requiring david during HD on 12/7, associated with tachycardia with Q waves  - Cardiology was consulted and recommends TTE  - F/u TTE  - Trend lactate  - Cont antihypertensive medications - clonidine 0.1 q8 and metoprolol 50 bid  - Will recheck troponin    GI/NUTRITION: Tolerated regular diet  - Cont current diet  - Cont bowel regimen  - Monitor YON output and quality    GENITOURINARY/RENAL: S/p DDRT with minimal urine output and minimal response to lasix 100  - Appreciate nephrology recs regarding dialysis   -trend electrolytes  - Appreciate transplant sx recs regarding further diuresis  - continue transplant meds - tacro, cellcept, f/u tacro level this AM    HEMATOLOGIC: S/p DDRT with postoperative downward trend in H/H  - CBCs q8  - hold dvt ppx per transplant  - transfuse for hb < 9 per transplant  - drain w/ sanguinous output  - Cont cbc q4h    INFECTIOUS DISEASE:  - continue nystatin, bactrim and valcyte    ENDOCRINE:  - steroid taper per transplant  - continue home tamoxifen 74F with h/o ESRD on HD MWF via LUE AVF 2/2 PCKD (anuric at home, Nephrologist: Dr. Nevin Osborne, last HD 12/6AM), history of fistula thrombosis s/p completed coumadin therapy now functioning, HTN, HLD, Gout, LUE DVT, lumbar radiculopathy,  Breast CA s/p lumpectomy on Tamoxifen (2014) now s/p DDRT 12/7/19 w/ simulect induction. SICU called for hemodynamic monitoring and for dialysis.     PLAN:    NEURO: s/p DDRT  - Cont postoperative pain control - tylenol/oxycodone    RESPIRATORY: No active issues  - Breathing comfortably on RA  - AM CXR    CARDIOVASCULAR: Hx of HTN, Episode of hypotension requiring david during HD on 12/7, associated with tachycardia with Q waves  - TTE showing enlarged LA, and hypertrophy of LV, EF 65~70%  - Lactate cleared 1.2  - Cont antihypertensive medications - clonidine 0.2 q8 and metoprolol 50 bid  - Monitor for HTN    GI/NUTRITION: Tolerated regular diet  - Cont current diet  - Cont bowel regimen  - Monitor YON output and quality    GENITOURINARY/RENAL: S/p DDRT with minimal urine output and minimal response to lasix 100  - HD obtained overnight  - trend electrolytes  - Appreciate transplant sx recs regarding further diuresis  - continue transplant meds - tacro, cellcept, f/u tacro level this AM  - s/p 2g Bumex yesterday, monitor UOP    HEMATOLOGIC: S/p DDRT with postoperative downward trend in H/H  - CBCs q8  - hold dvt ppx per transplant  - transfuse for hb < 9 per transplant  - drain w/ sanguinous output  - Cont cbc q6h    INFECTIOUS DISEASE:  - continue nystatin, bactrim and valcyte    ENDOCRINE:  - steroid taper per transplant  - continue home tamoxifen

## 2019-12-10 NOTE — PROGRESS NOTE ADULT - SUBJECTIVE AND OBJECTIVE BOX
Transplant Surgery - Multidisciplinary Rounds  --------------------------------------------------------------  DDRT Date:  2/7/2019       POD# 3    Present:  Patient seen and examined with multidisciplinary team including Transplant Surgeon: Dr. Heard, Dr. Campos, Dr. Joyce, Dr. Santana,  NP: Dario,  Nephrologist Dr. Caicedo  renal fellow Marily, Pharmacist RENETTA Singletary, Surgical resident Wade Orozco, PGY3, and examined by Dr. Campos. Disciplines not in attendance will be notified of the plan.    HPI: 74F with h/o ESRD on HD MWF via LUE AVF 2/2 PCKD (anuric at home, Nephrologist: Dr. Nevin Osborne, last HD 12/6AM), hx of fistula thrombosis, completed coumadin, HTN, HLD, Gout, LUE DVT, lumbar radiculopathy,  Breast CA s/p lumpectomy on Tamoxifen (2014).  Underwent DDRT 12/7/19 w/ simulect induction.     Donor Info: Age 54, ABO B, KDPI 75%, X-clamp 20:39 12/6, Terminal Cr 1.7, CMV(+), EBV(+)    Recipient Info: ABO B+, CMV(+), EBV(+), Last HD 12/6CPRA  OR: DDRT to R iliac fossa, Simulect induction. 1A, 1V, 1U. Ureter stented. Stent sutured to johnson. CIT 13.5Hrs  Enlarged, lymph node was encountered during iliac dissection was removed and sent for pathology.     Interval Events: POD 3 s/p DDRT with DGF - required HD post op for Hyperkalemia. Hypotensive during HD with an episode of SVT  - Now BP stable on home dose clonidine 0.2mg q8hrs  - Increased UOP, received bumex 2mg ivp with moderate response  - Tolerating regular diet    Potential Discharge date: pending clinical improvement     Education:  Medications    Plan of care:  See Below         MEDICATIONS  (STANDING):  aspirin  chewable 81 milliGRAM(s) Oral daily  calcium acetate 667 milliGRAM(s) Oral four times a day with meals  chlorhexidine 2% Cloths 1 Application(s) Topical <User Schedule>  cholecalciferol 1000 Unit(s) Oral daily  cloNIDine 0.2 milliGRAM(s) Oral every 8 hours  famotidine    Tablet 20 milliGRAM(s) Oral daily  furosemide   Injectable 40 milliGRAM(s) IV Push two times a day  insulin lispro (HumaLOG) corrective regimen sliding scale   SubCutaneous three times a day before meals  insulin lispro (HumaLOG) corrective regimen sliding scale   SubCutaneous at bedtime  methylPREDNISolone sodium succinate Injectable 30 milliGRAM(s) IV Push two times a day  metoprolol tartrate 50 milliGRAM(s) Oral two times a day  mycophenolate mofetil 1 Gram(s) Oral <User Schedule>  nystatin    Suspension 993602 Unit(s) Swish and Swallow four times a day  polyethylene glycol 3350 17 Gram(s) Oral daily  senna 2 Tablet(s) Oral at bedtime  tacrolimus ER Tablet (ENVARSUS XR) 6 milliGRAM(s) Oral daily  tamoxifen 20 milliGRAM(s) Oral at bedtime  trimethoprim   80 mG/sulfamethoxazole 400 mG 1 Tablet(s) Oral daily  valGANciclovir 450 milliGRAM(s) Oral <User Schedule>    MEDICATIONS  (PRN):  acetaminophen   Tablet .. 975 milliGRAM(s) Oral every 6 hours PRN Mild Pain (1 - 3)  ondansetron Injectable 4 milliGRAM(s) IV Push every 6 hours PRN Nausea and/or Vomiting  oxyCODONE    IR 5 milliGRAM(s) Oral every 4 hours PRN Moderate Pain (4 - 6)  oxyCODONE    IR 10 milliGRAM(s) Oral every 4 hours PRN Severe Pain (7 - 10)      PAST MEDICAL & SURGICAL HISTORY:  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  Pancreatic cyst  Thyroid nodule: yearly Ultrasound, monitoring yearly  Anuria  ESRD on hemodialysis  Breast cancer, female: left 2014  H/O gastroesophageal reflux (GERD)  Thrombocytopenia: leukopenia: followed by chele, plan for BMBx 7/22/19  Anemia in ESRD (end-stage renal disease)  Hypertension  History of fracture of right ankle: 2014 repaired  S/P arteriovenous (AV) fistula creation: 2002  S/P hysterectomy: 1994  Adrenal mass: excison 2015  S/P lumpectomy, left breast: 2014      Vital Signs Last 24 Hrs  T(C): 36.3 (10 Dec 2019 11:00), Max: 37 (09 Dec 2019 19:00)  T(F): 97.3 (10 Dec 2019 11:00), Max: 98.6 (09 Dec 2019 19:00)  HR: 63 (10 Dec 2019 13:00) (60 - 89)  BP: 158/72 (10 Dec 2019 09:00) (158/72 - 202/84)  BP(mean): 104 (10 Dec 2019 09:00) (104 - 121)  RR: 19 (10 Dec 2019 13:00) (14 - 36)  SpO2: 96% (10 Dec 2019 13:00) (93% - 100%)    I&O's Summary    09 Dec 2019 07:01  -  10 Dec 2019 07:00  --------------------------------------------------------  IN: 1100 mL / OUT: 1057 mL / NET: 43 mL    10 Dec 2019 07:01  -  10 Dec 2019 13:42  --------------------------------------------------------  IN: 0 mL / OUT: 335 mL / NET: -335 mL                              9.0    9.20  )-----------( 87       ( 10 Dec 2019 13:21 )             27.4     12-10    132<L>  |  96  |  47<H>  ----------------------------<  214<H>  4.6   |  21<L>  |  6.86<H>    Ca    8.3<L>      10 Dec 2019 02:09  Phos  5.5     12-10  Mg     2.1     12-10    TPro  5.7<L>  /  Alb  3.4  /  TBili  0.3  /  DBili  0.1  /  AST  16  /  ALT  8<L>  /  AlkPhos  47  12-10    Tacrolimus (), Serum: 4.7 ng/mL (12-10 @ 08:43)            Review of systems  Gen: No weight changes, fatigue, fevers/chills, weakness  Skin: No rashes  Head/Eyes/Ears/Mouth: No headache; Normal hearing; Normal vision w/o blurriness; No sinus pain/discomfort, sore throat  Respiratory: No dyspnea, cough, wheezing, hemoptysis  CV: No chest pain, PND, orthopnea  GI: C/O mild abdominal pain at surgical site, diarrhea, constipation, nausea, vomiting, melena, hematochezia  : No increased frequency, dysuria, hematuria, nocturia  MSK: No joint pain/swelling; no back pain; no edema  Neuro: No dizziness/lightheadedness, weakness, seizures, numbness, tingling  Heme: No easy bruising or bleeding  Endo: No heat/cold intolerance  Psych: No significant nervousness, anxiety, stress, depression  All other systems were reviewed and are negative, except as noted.    PHYSICAL EXAM:  Constitutional: Well developed / well nourished  Eyes: Anicteric, PERRLA  ENMT: nc/at  Neck: L TCL .  Respiratory: CTA B/L  Cardiovascular: RRR  Gastrointestinal: Soft abdomen, mild tender to touch at surgical site, ND L YON in place with SS output   Genitourinary: Urinary catheter in place w/ very minimal urine.   Extremities: SCD's in place and working bilaterally  Vascular: Palpable dp pulses bilaterally  Neurological: A&O x3, drowsy.  Skin: dressing c/d/i  Musculoskeletal: Moving all extremities  Psychiatric: Responsive

## 2019-12-10 NOTE — PROGRESS NOTE ADULT - ASSESSMENT
74F with h/o ESRD on HD MWF via LUE AVF 2/2 PCKD (anuric at home, Nephrologist: Dr. Nevin Osborne, last HD 12/6AM), hx of fistula thrombosis, completed coumadin, HTN, HLD, Gout, LUE DVT, lumbar radiculopathy,  Breast CA s/p lumpectomy on Tamoxifen (2014).  s/p DDRT 12/7/19 w/ simulect induction, ureter anastomosed over a double J stent. Enlarged, lymph node was encountered during iliac dissection was removed and sent for pathology.   [] POD 3 s/p DDRT with DGF  - Monitor renal function, s/p HD post op for hyperkalemia  - Give lasix 40mg IV BID today  - HD per renal today  - UOP improving  - Restart home ASA 81  - Diet: as liv  - Pain control  - Immuno: Envarsus 4mg daily, MMF 1g bid, Pred taper, Simulect induction  - Proph: valcyte/bactrim/nystatin  -SCDs at all times, IS, OOB  - H/H low s/p one u PRBC this am, repeat H&H 7.8/23.4  -SCDs at all times, IS, Please keep pt OOB for all meals  - Ambulate with pt four times a day  - ID, on metoprolol 50mg bid.   - Plan to D/C TLC  [] HTN:   -Home meds: clonidine 0.2 TID, hydralazine 50 TID, nifedipine 60 daily, metoprolol  daily

## 2019-12-11 DIAGNOSIS — E87.1 HYPO-OSMOLALITY AND HYPONATREMIA: ICD-10-CM

## 2019-12-11 LAB
ALBUMIN SERPL ELPH-MCNC: 3.3 G/DL — SIGNIFICANT CHANGE UP (ref 3.3–5)
ALP SERPL-CCNC: 39 U/L — LOW (ref 40–120)
ALT FLD-CCNC: 6 U/L — LOW (ref 10–45)
ANION GAP SERPL CALC-SCNC: 16 MMOL/L — SIGNIFICANT CHANGE UP (ref 5–17)
APTT BLD: 22.2 SEC — LOW (ref 27.5–36.3)
AST SERPL-CCNC: 12 U/L — SIGNIFICANT CHANGE UP (ref 10–40)
BILIRUB SERPL-MCNC: 0.3 MG/DL — SIGNIFICANT CHANGE UP (ref 0.2–1.2)
BUN SERPL-MCNC: 65 MG/DL — HIGH (ref 7–23)
CALCIUM SERPL-MCNC: 8.4 MG/DL — SIGNIFICANT CHANGE UP (ref 8.4–10.5)
CHLORIDE SERPL-SCNC: 89 MMOL/L — LOW (ref 96–108)
CO2 SERPL-SCNC: 22 MMOL/L — SIGNIFICANT CHANGE UP (ref 22–31)
CREAT SERPL-MCNC: 7.38 MG/DL — HIGH (ref 0.5–1.3)
GLUCOSE BLDC GLUCOMTR-MCNC: 155 MG/DL — HIGH (ref 70–99)
GLUCOSE BLDC GLUCOMTR-MCNC: 163 MG/DL — HIGH (ref 70–99)
GLUCOSE BLDC GLUCOMTR-MCNC: 194 MG/DL — HIGH (ref 70–99)
GLUCOSE BLDC GLUCOMTR-MCNC: 200 MG/DL — HIGH (ref 70–99)
GLUCOSE SERPL-MCNC: 153 MG/DL — HIGH (ref 70–99)
HCT VFR BLD CALC: 25.3 % — LOW (ref 34.5–45)
HGB BLD-MCNC: 8.4 G/DL — LOW (ref 11.5–15.5)
INR BLD: 0.86 RATIO — LOW (ref 0.88–1.16)
LDH SERPL L TO P-CCNC: 163 U/L — SIGNIFICANT CHANGE UP (ref 50–242)
MAGNESIUM SERPL-MCNC: 2.2 MG/DL — SIGNIFICANT CHANGE UP (ref 1.6–2.6)
MCHC RBC-ENTMCNC: 29.6 PG — SIGNIFICANT CHANGE UP (ref 27–34)
MCHC RBC-ENTMCNC: 33.2 GM/DL — SIGNIFICANT CHANGE UP (ref 32–36)
MCV RBC AUTO: 89.1 FL — SIGNIFICANT CHANGE UP (ref 80–100)
NRBC # BLD: 0 /100 WBCS — SIGNIFICANT CHANGE UP (ref 0–0)
OSMOLALITY SERPL: 298 MOSMOL/KG — SIGNIFICANT CHANGE UP (ref 280–301)
OSMOLALITY UR: 240 MOS/KG — LOW (ref 300–900)
PHOSPHATE SERPL-MCNC: 5.7 MG/DL — HIGH (ref 2.5–4.5)
PLATELET # BLD AUTO: 91 K/UL — LOW (ref 150–400)
POTASSIUM SERPL-MCNC: 4.6 MMOL/L — SIGNIFICANT CHANGE UP (ref 3.5–5.3)
POTASSIUM SERPL-SCNC: 4.6 MMOL/L — SIGNIFICANT CHANGE UP (ref 3.5–5.3)
PROT SERPL-MCNC: 5.5 G/DL — LOW (ref 6–8.3)
PROTHROM AB SERPL-ACNC: 9.9 SEC — LOW (ref 10–12.9)
RBC # BLD: 2.84 M/UL — LOW (ref 3.8–5.2)
RBC # FLD: 18.6 % — HIGH (ref 10.3–14.5)
SODIUM SERPL-SCNC: 127 MMOL/L — LOW (ref 135–145)
TACROLIMUS SERPL-MCNC: 4.1 NG/ML — SIGNIFICANT CHANGE UP
TSH SERPL-MCNC: 0.23 UIU/ML — LOW (ref 0.27–4.2)
WBC # BLD: 7.39 K/UL — SIGNIFICANT CHANGE UP (ref 3.8–10.5)
WBC # FLD AUTO: 7.39 K/UL — SIGNIFICANT CHANGE UP (ref 3.8–10.5)

## 2019-12-11 PROCEDURE — 99232 SBSQ HOSP IP/OBS MODERATE 35: CPT | Mod: GC

## 2019-12-11 RX ORDER — TACROLIMUS 5 MG/1
2 CAPSULE ORAL ONCE
Refills: 0 | Status: COMPLETED | OUTPATIENT
Start: 2019-12-11 | End: 2019-12-11

## 2019-12-11 RX ORDER — SENNA PLUS 8.6 MG/1
2 TABLET ORAL AT BEDTIME
Refills: 0 | Status: DISCONTINUED | OUTPATIENT
Start: 2019-12-11 | End: 2019-12-14

## 2019-12-11 RX ORDER — BASILIXIMAB 20 MG/5ML
20 INJECTION, POWDER, FOR SOLUTION INTRAVENOUS ONCE
Refills: 0 | Status: COMPLETED | OUTPATIENT
Start: 2019-12-11 | End: 2019-12-11

## 2019-12-11 RX ORDER — TACROLIMUS 5 MG/1
10 CAPSULE ORAL DAILY
Refills: 0 | Status: DISCONTINUED | OUTPATIENT
Start: 2019-12-12 | End: 2019-12-12

## 2019-12-11 RX ORDER — FUROSEMIDE 40 MG
40 TABLET ORAL EVERY 12 HOURS
Refills: 0 | Status: DISCONTINUED | OUTPATIENT
Start: 2019-12-11 | End: 2019-12-12

## 2019-12-11 RX ADMIN — Medication 0.2 MILLIGRAM(S): at 14:41

## 2019-12-11 RX ADMIN — TACROLIMUS 1 MILLIGRAM(S): 5 CAPSULE ORAL at 12:12

## 2019-12-11 RX ADMIN — Medication 50 MILLIGRAM(S): at 17:19

## 2019-12-11 RX ADMIN — Medication 50 MILLIGRAM(S): at 05:49

## 2019-12-11 RX ADMIN — BASILIXIMAB 100 MILLIGRAM(S): 20 INJECTION, POWDER, FOR SOLUTION INTRAVENOUS at 17:19

## 2019-12-11 RX ADMIN — MYCOPHENOLATE MOFETIL 1 GRAM(S): 250 CAPSULE ORAL at 05:49

## 2019-12-11 RX ADMIN — Medication 81 MILLIGRAM(S): at 12:14

## 2019-12-11 RX ADMIN — FAMOTIDINE 20 MILLIGRAM(S): 10 INJECTION INTRAVENOUS at 12:14

## 2019-12-11 RX ADMIN — Medication 500000 UNIT(S): at 05:49

## 2019-12-11 RX ADMIN — Medication 500000 UNIT(S): at 12:14

## 2019-12-11 RX ADMIN — Medication 40 MILLIGRAM(S): at 08:26

## 2019-12-11 RX ADMIN — Medication 667 MILLIGRAM(S): at 08:36

## 2019-12-11 RX ADMIN — Medication 1 TABLET(S): at 12:14

## 2019-12-11 RX ADMIN — Medication 1: at 18:19

## 2019-12-11 RX ADMIN — POLYETHYLENE GLYCOL 3350 17 GRAM(S): 17 POWDER, FOR SOLUTION ORAL at 12:14

## 2019-12-11 RX ADMIN — TACROLIMUS 8 MILLIGRAM(S): 5 CAPSULE ORAL at 08:36

## 2019-12-11 RX ADMIN — MYCOPHENOLATE MOFETIL 1 GRAM(S): 250 CAPSULE ORAL at 17:18

## 2019-12-11 RX ADMIN — Medication 0.2 MILLIGRAM(S): at 21:07

## 2019-12-11 RX ADMIN — Medication 20 MILLIGRAM(S): at 17:19

## 2019-12-11 RX ADMIN — Medication 20 MILLIGRAM(S): at 05:49

## 2019-12-11 RX ADMIN — Medication 1: at 12:00

## 2019-12-11 RX ADMIN — TAMOXIFEN CITRATE 20 MILLIGRAM(S): 20 TABLET, FILM COATED ORAL at 21:35

## 2019-12-11 RX ADMIN — Medication 1000 UNIT(S): at 12:15

## 2019-12-11 RX ADMIN — Medication 500000 UNIT(S): at 23:37

## 2019-12-11 RX ADMIN — Medication 40 MILLIGRAM(S): at 18:20

## 2019-12-11 RX ADMIN — Medication 667 MILLIGRAM(S): at 12:14

## 2019-12-11 RX ADMIN — SENNA PLUS 2 TABLET(S): 8.6 TABLET ORAL at 21:08

## 2019-12-11 RX ADMIN — Medication 500000 UNIT(S): at 17:18

## 2019-12-11 RX ADMIN — Medication 0.2 MILLIGRAM(S): at 05:49

## 2019-12-11 RX ADMIN — Medication 1: at 14:41

## 2019-12-11 RX ADMIN — Medication 667 MILLIGRAM(S): at 17:18

## 2019-12-11 NOTE — PROGRESS NOTE ADULT - SUBJECTIVE AND OBJECTIVE BOX
Transplant Surgery - Multidisciplinary Rounds  --------------------------------------------------------------  DDRT Date:  2/7/2019       POD# 4    Present:  Patient seen with multidisciplinary team icluding Transplant: Dr. Campos,  Nephrologist: Dr. Caicedo, renal fellow Marily,  Pharmacist: RANDY Singletary Zouloufis, Surgical resident Wade Orozco, PGY3, and examined by Dr. Campos. Disciplines not in attendance will be notified of the plan.      HPI: 74F with h/o ESRD on HD MWF via LUE AVF 2/2 PCKD (anuric at home, Nephrologist: Dr. Nevin Osborne, last HD 12/6AM), hx of fistula thrombosis, completed coumadin, HTN, HLD, Gout, LUE DVT, lumbar radiculopathy,  Breast CA s/p lumpectomy on Tamoxifen (2014).  Underwent DDRT 12/7/19 w/ simulect induction.     Donor Info: Age 54, ABO B, KDPI 75%, X-clamp 20:39 12/6, Terminal Cr 1.7, CMV(+), EBV(+)    Recipient Info: ABO B+, CMV(+), EBV(+), Last HD 12/6[facility]  OR: DDRT to R iliac fossa, Simulect induction. 1A, 1V, 1U. Ureter stented. Stent sutured to johnson. CIT 13.5Hrs  Enlarged, lymph node was encountered during iliac dissection was removed and sent for pathology.     Interval Events:   POD#4, s/p DDRT with DGF - required HD post op for Hyperkalemia. Hypotensive during HD with an episode of SVT  - Now BP stable on home dose clonidine 0.2mg q8hrs  - Increased UOP, received bumex 2mg POD2, and lasix 80mg IV yesterday with moderate response  - Tolerating regular diet  - UO 2.1L, Cr 7.8    Potential Discharge date: pending clinical improvement     Education:  Medications    Plan of care:  See Below        MEDICATIONS  (STANDING):  aspirin  chewable 81 milliGRAM(s) Oral daily  calcium acetate 667 milliGRAM(s) Oral four times a day with meals  chlorhexidine 2% Cloths 1 Application(s) Topical <User Schedule>  cholecalciferol 1000 Unit(s) Oral daily  cloNIDine 0.2 milliGRAM(s) Oral every 8 hours  famotidine    Tablet 20 milliGRAM(s) Oral daily  furosemide   Injectable 40 milliGRAM(s) IV Push every 12 hours  insulin lispro (HumaLOG) corrective regimen sliding scale   SubCutaneous three times a day before meals  insulin lispro (HumaLOG) corrective regimen sliding scale   SubCutaneous at bedtime  metoprolol tartrate 50 milliGRAM(s) Oral two times a day  mycophenolate mofetil 1 Gram(s) Oral <User Schedule>  NIFEdipine XL 60 milliGRAM(s) Oral daily  nystatin    Suspension 029448 Unit(s) Swish and Swallow four times a day  polyethylene glycol 3350 17 Gram(s) Oral daily  predniSONE   Tablet 20 milliGRAM(s) Oral two times a day  senna 2 Tablet(s) Oral at bedtime  tacrolimus ER Tablet (ENVARSUS XR) 8 milliGRAM(s) Oral daily  tamoxifen 20 milliGRAM(s) Oral at bedtime  trimethoprim   80 mG/sulfamethoxazole 400 mG 1 Tablet(s) Oral daily  valGANciclovir 450 milliGRAM(s) Oral <User Schedule>    MEDICATIONS  (PRN):  acetaminophen   Tablet .. 975 milliGRAM(s) Oral every 6 hours PRN Mild Pain (1 - 3)  ondansetron Injectable 4 milliGRAM(s) IV Push every 6 hours PRN Nausea and/or Vomiting  oxyCODONE    IR 5 milliGRAM(s) Oral every 4 hours PRN Moderate Pain (4 - 6)  oxyCODONE    IR 10 milliGRAM(s) Oral every 4 hours PRN Severe Pain (7 - 10)      PAST MEDICAL & SURGICAL HISTORY:  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  Pancreatic cyst  Thyroid nodule: yearly Ultrasound, monitoring yearly  Anuria  ESRD on hemodialysis  Breast cancer, female: left 2014  H/O gastroesophageal reflux (GERD)  Thrombocytopenia: leukopenia: followed by [facility], plan for BMBx 7/22/19  Anemia in ESRD (end-stage renal disease)  Hypertension  History of fracture of right ankle: 2014 repaired  S/P arteriovenous (AV) fistula creation: 2002  S/P hysterectomy: 1994  Adrenal mass: excison 2015  S/P lumpectomy, left breast: 2014      Vital Signs Last 24 Hrs  T(C): 36.8 (11 Dec 2019 05:00), Max: 37.1 (10 Dec 2019 19:00)  T(F): 98.3 (11 Dec 2019 05:00), Max: 98.8 (10 Dec 2019 19:00)  HR: 60 (11 Dec 2019 05:00) (60 - 77)  BP: 150/69 (11 Dec 2019 05:00) (120/60 - 163/67)  BP(mean): 102 (11 Dec 2019 00:00) (86 - 110)  RR: 18 (11 Dec 2019 05:00) (17 - 37)  SpO2: 98% (11 Dec 2019 05:00) (92% - 100%)    I&O's Summary    10 Dec 2019 07:01  -  11 Dec 2019 07:00  --------------------------------------------------------  IN: 110 mL / OUT: 2272 mL / NET: -2162 mL    11 Dec 2019 07:01  -  11 Dec 2019 09:27  --------------------------------------------------------  IN: 0 mL / OUT: 35 mL / NET: -35 mL                              8.4    7.39  )-----------( 91       ( 11 Dec 2019 06:27 )             25.3     12-11    127<L>  |  89<L>  |  65<H>  ----------------------------<  153<H>  4.6   |  22  |  7.38<H>    Ca    8.4      11 Dec 2019 06:24  Phos  5.7     12-11  Mg     2.2     12-11    TPro  5.5<L>  /  Alb  3.3  /  TBili  0.3  /  DBili  0.1  /  AST  12  /  ALT  6<L>  /  AlkPhos  39<L>  12-11    Tacrolimus (), Serum: 4.7 ng/mL (12-10 @ 08:43)      Review of systems  Gen: No weight changes, fatigue, fevers/chills, weakness  Skin: No rashes  Head/Eyes/Ears/Mouth: No headache; Normal hearing; Normal vision w/o blurriness; No sinus pain/discomfort, sore throat  Respiratory: No dyspnea, cough, wheezing, hemoptysis  CV: No chest pain, PND, orthopnea  GI: C/O mild abdominal pain at surgical site, diarrhea, constipation, nausea, vomiting, melena, hematochezia  : No increased frequency, dysuria, hematuria, nocturia  MSK: No joint pain/swelling; no back pain; no edema  Neuro: No dizziness/lightheadedness, weakness, seizures, numbness, tingling  Heme: No easy bruising or bleeding  Endo: No heat/cold intolerance  Psych: No significant nervousness, anxiety, stress, depression  All other systems were reviewed and are negative, except as noted.    PHYSICAL EXAM:  Constitutional: Well developed / well nourished  Eyes: Anicteric, PERRLA  ENMT: nc/at  Neck: L TCL .  Respiratory: CTA B/L  Cardiovascular: RRR  Gastrointestinal: Soft abdomen, mild tender to touch at surgical site, ND L YON in place with SS output   Genitourinary: Urinary catheter in place w/ very minimal urine.   Extremities: SCD's in place and working bilaterally  Vascular: Palpable dp pulses bilaterally  Neurological: A&O x3, drowsy.  Skin: dressing c/d/i  Musculoskeletal: Moving all extremities  Psychiatric: Responsive

## 2019-12-11 NOTE — PROGRESS NOTE ADULT - SUBJECTIVE AND OBJECTIVE BOX
[facility] DIVISION OF KIDNEY DISEASES AND HYPERTENSION -- FOLLOW UP NOTE  --------------------------------------------------------------------------------  Chief Complaint: s/p DDRT    24 hour events/subjective: Patient evaluated at bedside, in no acute distress. Patient given Lasix 80 mg IV once yesterday with improved urine output. Patient now on Lasix 40 mg IV BID.    PAST HISTORY  --------------------------------------------------------------------------------  No significant changes to PMH, PSH, FHx, SHx, unless otherwise noted    ALLERGIES & MEDICATIONS  --------------------------------------------------------------------------------  Allergies    ChloraPrep One-Step (Rash)  penicillins (Rash)  shellfish (Rash)    Intolerances      Standing Inpatient Medications  aspirin  chewable 81 milliGRAM(s) Oral daily  calcium acetate 667 milliGRAM(s) Oral four times a day with meals  chlorhexidine 2% Cloths 1 Application(s) Topical <User Schedule>  cholecalciferol 1000 Unit(s) Oral daily  cloNIDine 0.2 milliGRAM(s) Oral every 8 hours  famotidine    Tablet 20 milliGRAM(s) Oral daily  furosemide   Injectable 40 milliGRAM(s) IV Push every 12 hours  insulin lispro (HumaLOG) corrective regimen sliding scale   SubCutaneous three times a day before meals  insulin lispro (HumaLOG) corrective regimen sliding scale   SubCutaneous at bedtime  metoprolol tartrate 50 milliGRAM(s) Oral two times a day  mycophenolate mofetil 1 Gram(s) Oral <User Schedule>  NIFEdipine XL 60 milliGRAM(s) Oral daily  nystatin    Suspension 470179 Unit(s) Swish and Swallow four times a day  polyethylene glycol 3350 17 Gram(s) Oral daily  predniSONE   Tablet 20 milliGRAM(s) Oral two times a day  senna 2 Tablet(s) Oral at bedtime  tamoxifen 20 milliGRAM(s) Oral at bedtime  trimethoprim   80 mG/sulfamethoxazole 400 mG 1 Tablet(s) Oral daily  valGANciclovir 450 milliGRAM(s) Oral <User Schedule>    REVIEW OF SYSTEMS  --------------------------------------------------------------------------------  Gen: No fatigue, fevers/chills, weakness  Skin: No rashes  Head/Eyes/Ears/Mouth: No headache;No sore throat  Respiratory: No dyspnea, cough,   CV: No chest pain, PND, orthopnea  GI: No abdominal pain, diarrhea, constipation, nausea, vomiting  Transplant: No pain  : No increased frequency, dysuria, hematuria, nocturia  MSK: No joint pain/swelling; no back pain; no edema  Neuro: No dizziness/lightheadedness, weakness, seizures, numbness, tingling  Psych: No significant nervousness, anxiety, stress, depression    All other systems were reviewed and are negative, except as noted.    VITALS/PHYSICAL EXAM  --------------------------------------------------------------------------------  T(C): 36.5 (12-11-19 @ 09:00), Max: 37.1 (12-10-19 @ 19:00)  HR: 59 (12-11-19 @ 09:00) (59 - 77)  BP: 162/76 (12-11-19 @ 09:00) (120/60 - 163/67)  RR: 18 (12-11-19 @ 05:00) (17 - 37)  SpO2: 98% (12-11-19 @ 05:00) (92% - 98%)  Wt(kg): --    12-10-19 @ 07:01  -  12-11-19 @ 07:00  --------------------------------------------------------  IN: 110 mL / OUT: 2272 mL / NET: -2162 mL    12-11-19 @ 07:01  -  12-11-19 @ 12:33  --------------------------------------------------------  IN: 0 mL / OUT: 35 mL / NET: -35 mL    Physical Exam:  	Gen: NAD, well-appearing  	HEENT: PERRL, supple neck, clear oropharynx  	Pulm: CTA B/L  	CV: RRR, S1S2; no rub  	Back: No spinal or CVA tenderness; no sacral edema  	Abd: +BS, soft, nontender/nondistended              Transplant: No tenderness, swelling  	: No suprapubic tenderness  	UE: Warm, FROM, intact strength; no edema; no asterixis  	LE: Warm, FROM, intact strength; no edema  	Neuro: No focal deficits, intact gait  	Psych: Normal affect and mood  	Skin: Warm, without rashes    LABS/STUDIES  --------------------------------------------------------------------------------              8.4    7.39  >-----------<  91       [12-11-19 @ 06:27]              25.3     127  |  89  |  65  ----------------------------<  153      [12-11-19 @ 06:24]  4.6   |  22  |  7.38        Ca     8.4     [12-11-19 @ 06:24]      Mg     2.2     [12-11-19 @ 06:24]      Phos  5.7     [12-11-19 @ 06:24]    TPro  5.5  /  Alb  3.3  /  TBili  0.3  /  DBili  0.1  /  AST  12  /  ALT  6   /  AlkPhos  39  [12-11-19 @ 06:24]    PT/INR: PT 9.9  , INR 0.86       [12-11-19 @ 06:27]  PTT: 22.2       [12-11-19 @ 06:27]          [12-11-19 @ 06:24]    Creatinine Trend:  SCr 7.38 [12-11 @ 06:24]  SCr 6.50 [12-10 @ 13:21]  SCr 6.86 [12-10 @ 02:09]  SCr 8.29 [12-09 @ 17:58]  SCr 8.10 [12-09 @ 12:16]    Tacrolimus (), Serum: 4.1 ng/mL (12-11 @ 07:10)  Tacrolimus (), Serum: 4.7 ng/mL (12-10 @ 08:43)  Tacrolimus (), Serum: 4.1 ng/mL (12-09 @ 08:22)  Tacrolimus (), Serum: 3.6 ng/mL (12-08 @ 12:38)

## 2019-12-11 NOTE — PROGRESS NOTE ADULT - PROBLEM SELECTOR PLAN 2
s/p simulect induction, continue with steroids and taper as per protocol. Increased to Envarsus 10 mg daily and MMF 1000 BID, adjust Tacro based on levels. Goal 8-10  c/w prophylactic agents, Nystatin, Valcyte and Bactrim.

## 2019-12-11 NOTE — PROGRESS NOTE ADULT - PROBLEM SELECTOR PLAN 4
Patient noted to be hyponatremic to 127. Please send urine osmoles, serum osmoles, and urine electrolytes. Monitor serum Na.

## 2019-12-11 NOTE — PROGRESS NOTE ADULT - ASSESSMENT
74F with h/o ESRD on HD MWF via LUE AVF 2/2 PCKD (anuric at home, Nephrologist: Dr. Nevin Osborne, last HD 12/6AM), hx of fistula thrombosis, completed coumadin, HTN, HLD, Gout, LUE DVT, lumbar radiculopathy,  Breast CA s/p lumpectomy on Tamoxifen (2014).  s/p DDRT 12/7/19 w/ simulect induction, ureter anastomosed over a double J stent. Enlarged, lymph node was encountered during iliac dissection was removed and sent for pathology.   [] POD 4 s/p DDRT with DGF  - Monitor renal function, s/p HD post op for hyperkalemia POD1  - lasix 40mg IV BID continue  - UOP improving  - On home ASA 81  - Diet: as liv  - Pain control  - Immuno: Envarsus daily, MMF 1g bid, Pred taper, Simulect induction  - Proph: valcyte/bactrim/nystatin  -SCDs at all times, IS, Please keep pt OOB for all meals  - Ambulate with pt four times a day  - Plan to D/C TLC, once PIV is placed  - Hyponatremia send urine Na and osmo  - Send TSH    [] HTN:   -Continue clonidine 0.2 TID, nifedipine 60 daily, metoprolol XL 50 daily

## 2019-12-11 NOTE — PROGRESS NOTE ADULT - PROBLEM SELECTOR PLAN 1
Pt ESRD on HD since 2003, due to PCKD. Presented for DDRT on 12/7/2019 complicated by DGF requiring HD, due to hyperkalemia. Patient received HD on 12/ 9/2019 for about 10 minutes. UO has since improved. Recommend to start Lasix 40 mg IV BID.  Recommend to start sodium bicarbonate tablets 1300 mg BID.

## 2019-12-12 LAB
ALBUMIN SERPL ELPH-MCNC: 3.3 G/DL — SIGNIFICANT CHANGE UP (ref 3.3–5)
ALP SERPL-CCNC: 40 U/L — SIGNIFICANT CHANGE UP (ref 40–120)
ALT FLD-CCNC: 8 U/L — LOW (ref 10–45)
ANION GAP SERPL CALC-SCNC: 17 MMOL/L — SIGNIFICANT CHANGE UP (ref 5–17)
ANION GAP SERPL CALC-SCNC: 17 MMOL/L — SIGNIFICANT CHANGE UP (ref 5–17)
APTT BLD: 22.4 SEC — LOW (ref 27.5–36.3)
APTT BLD: 22.4 SEC — LOW (ref 27.5–36.3)
AST SERPL-CCNC: 11 U/L — SIGNIFICANT CHANGE UP (ref 10–40)
BILIRUB SERPL-MCNC: 0.4 MG/DL — SIGNIFICANT CHANGE UP (ref 0.2–1.2)
BLD GP AB SCN SERPL QL: NEGATIVE — SIGNIFICANT CHANGE UP
BUN SERPL-MCNC: 77 MG/DL — HIGH (ref 7–23)
BUN SERPL-MCNC: 88 MG/DL — HIGH (ref 7–23)
CALCIUM SERPL-MCNC: 9 MG/DL — SIGNIFICANT CHANGE UP (ref 8.4–10.5)
CALCIUM SERPL-MCNC: 9.3 MG/DL — SIGNIFICANT CHANGE UP (ref 8.4–10.5)
CHLORIDE SERPL-SCNC: 90 MMOL/L — LOW (ref 96–108)
CHLORIDE SERPL-SCNC: 95 MMOL/L — LOW (ref 96–108)
CO2 SERPL-SCNC: 19 MMOL/L — LOW (ref 22–31)
CO2 SERPL-SCNC: 21 MMOL/L — LOW (ref 22–31)
CREAT FLD-MCNC: 7.47 MG/DL — SIGNIFICANT CHANGE UP
CREAT SERPL-MCNC: 6.88 MG/DL — HIGH (ref 0.5–1.3)
CREAT SERPL-MCNC: 7.35 MG/DL — HIGH (ref 0.5–1.3)
GLUCOSE BLDC GLUCOMTR-MCNC: 150 MG/DL — HIGH (ref 70–99)
GLUCOSE BLDC GLUCOMTR-MCNC: 191 MG/DL — HIGH (ref 70–99)
GLUCOSE BLDC GLUCOMTR-MCNC: 203 MG/DL — HIGH (ref 70–99)
GLUCOSE BLDC GLUCOMTR-MCNC: 225 MG/DL — HIGH (ref 70–99)
GLUCOSE SERPL-MCNC: 208 MG/DL — HIGH (ref 70–99)
GLUCOSE SERPL-MCNC: 234 MG/DL — HIGH (ref 70–99)
HCT VFR BLD CALC: 27.6 % — LOW (ref 34.5–45)
HCT VFR BLD CALC: 29 % — LOW (ref 34.5–45)
HGB BLD-MCNC: 9 G/DL — LOW (ref 11.5–15.5)
HGB BLD-MCNC: 9.3 G/DL — LOW (ref 11.5–15.5)
INR BLD: 0.85 RATIO — LOW (ref 0.88–1.16)
INR BLD: 0.87 RATIO — LOW (ref 0.88–1.16)
LDH SERPL L TO P-CCNC: 183 U/L — SIGNIFICANT CHANGE UP (ref 50–242)
MAGNESIUM SERPL-MCNC: 2.2 MG/DL — SIGNIFICANT CHANGE UP (ref 1.6–2.6)
MAGNESIUM SERPL-MCNC: 2.2 MG/DL — SIGNIFICANT CHANGE UP (ref 1.6–2.6)
MCHC RBC-ENTMCNC: 28.9 PG — SIGNIFICANT CHANGE UP (ref 27–34)
MCHC RBC-ENTMCNC: 29.2 PG — SIGNIFICANT CHANGE UP (ref 27–34)
MCHC RBC-ENTMCNC: 32.1 GM/DL — SIGNIFICANT CHANGE UP (ref 32–36)
MCHC RBC-ENTMCNC: 32.6 GM/DL — SIGNIFICANT CHANGE UP (ref 32–36)
MCV RBC AUTO: 89.6 FL — SIGNIFICANT CHANGE UP (ref 80–100)
MCV RBC AUTO: 90.1 FL — SIGNIFICANT CHANGE UP (ref 80–100)
NRBC # BLD: 0 /100 WBCS — SIGNIFICANT CHANGE UP (ref 0–0)
NRBC # BLD: 0 /100 WBCS — SIGNIFICANT CHANGE UP (ref 0–0)
OSMOLALITY UR: 346 MOS/KG — SIGNIFICANT CHANGE UP (ref 300–900)
PHOSPHATE SERPL-MCNC: 5.2 MG/DL — HIGH (ref 2.5–4.5)
PHOSPHATE SERPL-MCNC: 5.7 MG/DL — HIGH (ref 2.5–4.5)
PLATELET # BLD AUTO: 101 K/UL — LOW (ref 150–400)
PLATELET # BLD AUTO: 122 K/UL — LOW (ref 150–400)
POTASSIUM SERPL-MCNC: 4.7 MMOL/L — SIGNIFICANT CHANGE UP (ref 3.5–5.3)
POTASSIUM SERPL-MCNC: 5.1 MMOL/L — SIGNIFICANT CHANGE UP (ref 3.5–5.3)
POTASSIUM SERPL-SCNC: 4.7 MMOL/L — SIGNIFICANT CHANGE UP (ref 3.5–5.3)
POTASSIUM SERPL-SCNC: 5.1 MMOL/L — SIGNIFICANT CHANGE UP (ref 3.5–5.3)
PROT SERPL-MCNC: 6 G/DL — SIGNIFICANT CHANGE UP (ref 6–8.3)
PROTHROM AB SERPL-ACNC: 9.7 SEC — LOW (ref 10–12.9)
PROTHROM AB SERPL-ACNC: 9.9 SEC — LOW (ref 10–12.9)
RBC # BLD: 3.08 M/UL — LOW (ref 3.8–5.2)
RBC # BLD: 3.22 M/UL — LOW (ref 3.8–5.2)
RBC # FLD: 17.8 % — HIGH (ref 10.3–14.5)
RBC # FLD: 18.1 % — HIGH (ref 10.3–14.5)
RH IG SCN BLD-IMP: POSITIVE — SIGNIFICANT CHANGE UP
SODIUM SERPL-SCNC: 126 MMOL/L — LOW (ref 135–145)
SODIUM SERPL-SCNC: 133 MMOL/L — LOW (ref 135–145)
SODIUM UR-SCNC: 61 MMOL/L — SIGNIFICANT CHANGE UP
SURGICAL PATHOLOGY STUDY: SIGNIFICANT CHANGE UP
TACROLIMUS SERPL-MCNC: 6 NG/ML — SIGNIFICANT CHANGE UP
UUN UR-MCNC: 479 MG/DL — SIGNIFICANT CHANGE UP
WBC # BLD: 5.86 K/UL — SIGNIFICANT CHANGE UP (ref 3.8–10.5)
WBC # BLD: 8.59 K/UL — SIGNIFICANT CHANGE UP (ref 3.8–10.5)
WBC # FLD AUTO: 5.86 K/UL — SIGNIFICANT CHANGE UP (ref 3.8–10.5)
WBC # FLD AUTO: 8.59 K/UL — SIGNIFICANT CHANGE UP (ref 3.8–10.5)

## 2019-12-12 PROCEDURE — 99232 SBSQ HOSP IP/OBS MODERATE 35: CPT | Mod: GC

## 2019-12-12 PROCEDURE — 76776 US EXAM K TRANSPL W/DOPPLER: CPT | Mod: 26,RT

## 2019-12-12 PROCEDURE — 93971 EXTREMITY STUDY: CPT | Mod: 26

## 2019-12-12 RX ORDER — SODIUM BICARBONATE 1 MEQ/ML
1300 SYRINGE (ML) INTRAVENOUS EVERY 12 HOURS
Refills: 0 | Status: DISCONTINUED | OUTPATIENT
Start: 2019-12-12 | End: 2019-12-14

## 2019-12-12 RX ORDER — HYDRALAZINE HCL 50 MG
50 TABLET ORAL
Refills: 0 | Status: DISCONTINUED | OUTPATIENT
Start: 2019-12-12 | End: 2019-12-14

## 2019-12-12 RX ORDER — FUROSEMIDE 40 MG
40 TABLET ORAL
Refills: 0 | Status: DISCONTINUED | OUTPATIENT
Start: 2019-12-12 | End: 2019-12-14

## 2019-12-12 RX ORDER — TACROLIMUS 5 MG/1
10 CAPSULE ORAL DAILY
Refills: 0 | Status: DISCONTINUED | OUTPATIENT
Start: 2019-12-12 | End: 2019-12-13

## 2019-12-12 RX ADMIN — Medication 81 MILLIGRAM(S): at 13:23

## 2019-12-12 RX ADMIN — Medication 10 MILLIGRAM(S): at 05:25

## 2019-12-12 RX ADMIN — MYCOPHENOLATE MOFETIL 1 GRAM(S): 250 CAPSULE ORAL at 17:14

## 2019-12-12 RX ADMIN — MYCOPHENOLATE MOFETIL 1 GRAM(S): 250 CAPSULE ORAL at 05:25

## 2019-12-12 RX ADMIN — OXYCODONE HYDROCHLORIDE 10 MILLIGRAM(S): 5 TABLET ORAL at 01:50

## 2019-12-12 RX ADMIN — Medication 975 MILLIGRAM(S): at 14:56

## 2019-12-12 RX ADMIN — Medication 50 MILLIGRAM(S): at 17:14

## 2019-12-12 RX ADMIN — Medication 60 MILLIGRAM(S): at 05:25

## 2019-12-12 RX ADMIN — POLYETHYLENE GLYCOL 3350 17 GRAM(S): 17 POWDER, FOR SOLUTION ORAL at 13:24

## 2019-12-12 RX ADMIN — Medication 667 MILLIGRAM(S): at 17:14

## 2019-12-12 RX ADMIN — Medication 667 MILLIGRAM(S): at 08:21

## 2019-12-12 RX ADMIN — Medication 500000 UNIT(S): at 23:28

## 2019-12-12 RX ADMIN — Medication 10 MILLIGRAM(S): at 17:14

## 2019-12-12 RX ADMIN — Medication 667 MILLIGRAM(S): at 13:23

## 2019-12-12 RX ADMIN — Medication 40 MILLIGRAM(S): at 17:14

## 2019-12-12 RX ADMIN — TACROLIMUS 10 MILLIGRAM(S): 5 CAPSULE ORAL at 08:20

## 2019-12-12 RX ADMIN — Medication 975 MILLIGRAM(S): at 15:56

## 2019-12-12 RX ADMIN — Medication 0.2 MILLIGRAM(S): at 13:24

## 2019-12-12 RX ADMIN — Medication 0.2 MILLIGRAM(S): at 05:25

## 2019-12-12 RX ADMIN — VALGANCICLOVIR 450 MILLIGRAM(S): 450 TABLET, FILM COATED ORAL at 05:25

## 2019-12-12 RX ADMIN — SENNA PLUS 2 TABLET(S): 8.6 TABLET ORAL at 22:12

## 2019-12-12 RX ADMIN — Medication 500000 UNIT(S): at 05:28

## 2019-12-12 RX ADMIN — FAMOTIDINE 20 MILLIGRAM(S): 10 INJECTION INTRAVENOUS at 13:23

## 2019-12-12 RX ADMIN — Medication 500000 UNIT(S): at 13:24

## 2019-12-12 RX ADMIN — Medication 500000 UNIT(S): at 17:14

## 2019-12-12 RX ADMIN — TAMOXIFEN CITRATE 20 MILLIGRAM(S): 20 TABLET, FILM COATED ORAL at 22:12

## 2019-12-12 RX ADMIN — Medication 1 TABLET(S): at 13:23

## 2019-12-12 RX ADMIN — Medication 50 MILLIGRAM(S): at 05:25

## 2019-12-12 RX ADMIN — Medication 2: at 13:25

## 2019-12-12 RX ADMIN — Medication 1000 UNIT(S): at 13:23

## 2019-12-12 RX ADMIN — OXYCODONE HYDROCHLORIDE 10 MILLIGRAM(S): 5 TABLET ORAL at 01:20

## 2019-12-12 RX ADMIN — CHLORHEXIDINE GLUCONATE 1 APPLICATION(S): 213 SOLUTION TOPICAL at 13:41

## 2019-12-12 RX ADMIN — Medication 0.2 MILLIGRAM(S): at 22:12

## 2019-12-12 RX ADMIN — Medication 40 MILLIGRAM(S): at 05:25

## 2019-12-12 NOTE — PROGRESS NOTE ADULT - SUBJECTIVE AND OBJECTIVE BOX
Transplant Surgery - Multidisciplinary Rounds  --------------------------------------------------------------  DDRT Date:  2/7/2019       POD# 5    Present:  Patient seen with multidisciplinary team icluding Transplant: Dr. Campos,  Nephrologist: Dr. Caicedo,  Pharmacist: Jaz Singletary, RANDY Paredes and Akanksha Sanches,  Surgical resident Wade Orozco, PGY3, and examined by Dr. Campos. Disciplines not in attendance will be notified of the plan.      HPI: 74F with h/o ESRD on HD MWF via LUE AVF 2/2 PCKD (anuric at home, Nephrologist: Dr. Nevin Osborne, last HD 12/6AM), hx of fistula thrombosis, completed coumadin, HTN, HLD, Gout, LUE DVT, lumbar radiculopathy,  Breast CA s/p lumpectomy on Tamoxifen (2014).  Underwent DDRT 12/7/19 w/ simulect induction.     Donor Info: Age 54, ABO B, KDPI 75%, X-clamp 20:39 12/6, Terminal Cr 1.7, CMV(+), EBV(+)    Recipient Info: ABO B+, CMV(+), EBV(+), Last HD 12/6[facility]  OR: DDRT to R iliac fossa, Simulect induction. 1A, 1V, 1U. Ureter stented. Stent sutured to johnson. CIT 13.5Hrs  Enlarged, lymph node was encountered during iliac dissection was removed and sent for pathology.     Interval Events:   POD#5, s/p DDRT with DGF - required HD post op for Hyperkalemia x2 12/7, 12/10.   - Increasing UOP 2.7 L/ 24 hours, received lasix 40mg IV BID  yesterday with improved response  -hyponatremic unimproved with diuresis Na level 126 today  -hypertensive overnight    Potential Discharge date: pending clinical improvement     Education:  Medications    Plan of care:  See Below       MEDICATIONS  (STANDING):  aspirin  chewable 81 milliGRAM(s) Oral daily  calcium acetate 667 milliGRAM(s) Oral four times a day with meals  chlorhexidine 2% Cloths 1 Application(s) Topical <User Schedule>  cholecalciferol 1000 Unit(s) Oral daily  cloNIDine 0.2 milliGRAM(s) Oral every 8 hours  famotidine    Tablet 20 milliGRAM(s) Oral daily  furosemide    Tablet 40 milliGRAM(s) Oral two times a day  hydrALAZINE 50 milliGRAM(s) Oral two times a day  insulin lispro (HumaLOG) corrective regimen sliding scale   SubCutaneous three times a day before meals  insulin lispro (HumaLOG) corrective regimen sliding scale   SubCutaneous at bedtime  metoprolol tartrate 50 milliGRAM(s) Oral two times a day  mycophenolate mofetil 1 Gram(s) Oral <User Schedule>  NIFEdipine XL 60 milliGRAM(s) Oral daily  nystatin    Suspension 020041 Unit(s) Swish and Swallow four times a day  polyethylene glycol 3350 17 Gram(s) Oral daily  predniSONE   Tablet   Oral   predniSONE   Tablet 10 milliGRAM(s) Oral two times a day  senna 2 Tablet(s) Oral at bedtime  tacrolimus ER Tablet (ENVARSUS XR) 10 milliGRAM(s) Oral daily  tamoxifen 20 milliGRAM(s) Oral at bedtime  trimethoprim   80 mG/sulfamethoxazole 400 mG 1 Tablet(s) Oral daily  valGANciclovir 450 milliGRAM(s) Oral <User Schedule>    MEDICATIONS  (PRN):  acetaminophen   Tablet .. 975 milliGRAM(s) Oral every 6 hours PRN Mild Pain (1 - 3)  ondansetron Injectable 4 milliGRAM(s) IV Push every 6 hours PRN Nausea and/or Vomiting  oxyCODONE    IR 5 milliGRAM(s) Oral every 4 hours PRN Moderate Pain (4 - 6)  oxyCODONE    IR 10 milliGRAM(s) Oral every 4 hours PRN Severe Pain (7 - 10)      PAST MEDICAL & SURGICAL HISTORY:  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  Pancreatic cyst  Thyroid nodule: yearly Ultrasound, monitoring yearly  Anuria  ESRD on hemodialysis  Breast cancer, female: left 2014  H/O gastroesophageal reflux (GERD)  Thrombocytopenia: leukopenia: followed by chele, plan for BMBx 7/22/19  Anemia in ESRD (end-stage renal disease)  Hypertension  History of fracture of right ankle: 2014 repaired  S/P arteriovenous (AV) fistula creation: 2002  S/P hysterectomy: 1994  Adrenal mass: excison 2015  S/P lumpectomy, left breast: 2014      Vital Signs Last 24 Hrs  T(C): 36.6 (12 Dec 2019 09:00), Max: 36.7 (12 Dec 2019 01:00)  T(F): 97.9 (12 Dec 2019 09:00), Max: 98 (12 Dec 2019 01:00)  HR: 60 (12 Dec 2019 09:00) (56 - 84)  BP: 172/71 (12 Dec 2019 09:00) (149/73 - 182/84)  BP(mean): --  RR: 18 (12 Dec 2019 09:00) (18 - 18)  SpO2: 98% (12 Dec 2019 09:00) (95% - 99%)    I&O's Summary    11 Dec 2019 07:01  -  12 Dec 2019 07:00  --------------------------------------------------------  IN: 1250 mL / OUT: 2967 mL / NET: -1717 mL    12 Dec 2019 07:01  -  12 Dec 2019 12:41  --------------------------------------------------------  IN: 0 mL / OUT: 910 mL / NET: -910 mL          LABS:                        9.3    5.86  )-----------( 101      ( 12 Dec 2019 07:00 )             29.0     12-12    126<L>  |  90<L>  |  77<H>  ----------------------------<  208<H>  4.7   |  19<L>  |  7.35<H>    Ca    9.3      12 Dec 2019 06:54  Phos  5.7     12-12  Mg     2.2     12-12    TPro  6.0  /  Alb  3.3  /  TBili  0.4  /  DBili  0.1  /  AST  11  /  ALT  8<L>  /  AlkPhos  40  12-12    PT/INR - ( 12 Dec 2019 07:00 )   PT: 9.9 sec;   INR: 0.87 ratio         PTT - ( 12 Dec 2019 07:00 )  PTT:22.4 sec    CAPILLARY BLOOD GLUCOSE      POCT Blood Glucose.: 150 mg/dL (12 Dec 2019 09:37)  POCT Blood Glucose.: 200 mg/dL (11 Dec 2019 21:55)  POCT Blood Glucose.: 163 mg/dL (11 Dec 2019 18:02)  POCT Blood Glucose.: 155 mg/dL (11 Dec 2019 14:39)    LIVER FUNCTIONS - ( 12 Dec 2019 06:54 )  Alb: 3.3 g/dL / Pro: 6.0 g/dL / ALK PHOS: 40 U/L / ALT: 8 U/L / AST: 11 U/L / GGT: x             Tacrolimus (), Serum: 6.0 ng/mL (12-12 @ 08:00)      Cultures:      Review of systems  Gen: No weight changes, fatigue, fevers/chills, weakness  Skin: No rashes  Head/Eyes/Ears/Mouth: No headache; Normal hearing; Normal vision w/o blurriness; No sinus pain/discomfort, sore throat  Respiratory: No dyspnea, cough, wheezing, hemoptysis  CV: No chest pain, PND, orthopnea  GI: C/O mild abdominal pain at surgical site, diarrhea, constipation, nausea, vomiting, melena, hematochezia  : No increased frequency, dysuria, hematuria, nocturia  MSK: No joint pain/swelling; no back pain; no edema  Neuro: No dizziness/lightheadedness, weakness, seizures, numbness, tingling  Heme: No easy bruising or bleeding  Endo: No heat/cold intolerance  Psych: No significant nervousness, anxiety, stress, depression  All other systems were reviewed and are negative, except as noted.    PHYSICAL EXAM:  Constitutional: Well developed / well nourished  Eyes: Anicteric, PERRLA  ENMT: nc/at  Neck: L TCL .  Respiratory: CTA B/L  Cardiovascular: RRR  Gastrointestinal: Soft abdomen, mild tender to touch at surgical site, ND L YON in place with SS output   Genitourinary: Urinary catheter in place w/ very minimal urine.   Extremities: SCD's in place and working bilaterally  Vascular: Palpable dp pulses bilaterally  Neurological: A&O x3, drowsy.  Skin: dressing c/d/i  Musculoskeletal: Moving all extremities  Psychiatric: Responsive

## 2019-12-12 NOTE — PROGRESS NOTE ADULT - SUBJECTIVE AND OBJECTIVE BOX
[facility] DIVISION OF KIDNEY DISEASES AND HYPERTENSION -- FOLLOW UP NOTE  --------------------------------------------------------------------------------  Chief Complaint: s/p DDRT    24 hour events/subjective: Patient evaluated at bedside, in no acute distress. Patient currently on Lasix 40 mg BID PO. Patient also noted to have excess fluid through drain, sent for fluid creatinine to check for urine leak.    PAST HISTORY  --------------------------------------------------------------------------------  No significant changes to PMH, PSH, FHx, SHx, unless otherwise noted    ALLERGIES & MEDICATIONS  --------------------------------------------------------------------------------  Allergies    ChloraPrep One-Step (Rash)  penicillins (Rash)  shellfish (Rash)    Intolerances      Standing Inpatient Medications  aspirin  chewable 81 milliGRAM(s) Oral daily  calcium acetate 667 milliGRAM(s) Oral four times a day with meals  chlorhexidine 2% Cloths 1 Application(s) Topical <User Schedule>  cholecalciferol 1000 Unit(s) Oral daily  cloNIDine 0.2 milliGRAM(s) Oral every 8 hours  famotidine    Tablet 20 milliGRAM(s) Oral daily  furosemide    Tablet 40 milliGRAM(s) Oral two times a day  hydrALAZINE 50 milliGRAM(s) Oral two times a day  insulin lispro (HumaLOG) corrective regimen sliding scale   SubCutaneous three times a day before meals  insulin lispro (HumaLOG) corrective regimen sliding scale   SubCutaneous at bedtime  metoprolol tartrate 50 milliGRAM(s) Oral two times a day  mycophenolate mofetil 1 Gram(s) Oral <User Schedule>  NIFEdipine XL 60 milliGRAM(s) Oral daily  nystatin    Suspension 603649 Unit(s) Swish and Swallow four times a day  polyethylene glycol 3350 17 Gram(s) Oral daily  predniSONE   Tablet   Oral   predniSONE   Tablet 10 milliGRAM(s) Oral two times a day  senna 2 Tablet(s) Oral at bedtime  tacrolimus ER Tablet (ENVARSUS XR) 10 milliGRAM(s) Oral daily  tamoxifen 20 milliGRAM(s) Oral at bedtime  trimethoprim   80 mG/sulfamethoxazole 400 mG 1 Tablet(s) Oral daily  valGANciclovir 450 milliGRAM(s) Oral <User Schedule>    REVIEW OF SYSTEMS  --------------------------------------------------------------------------------  Gen: No fatigue, fevers/chills, weakness  Skin: No rashes  Head/Eyes/Ears/Mouth: No headache;No sore throat  Respiratory: No dyspnea, cough,   CV: No chest pain, PND, orthopnea  GI: No abdominal pain, diarrhea, constipation, nausea, vomiting  Transplant: No pain  : No increased frequency, dysuria, hematuria, nocturia  MSK: No joint pain/swelling; no back pain; no edema  Neuro: No dizziness/lightheadedness, weakness, seizures, numbness, tingling  Psych: No significant nervousness, anxiety, stress, depression    All other systems were reviewed and are negative, except as noted.    VITALS/PHYSICAL EXAM  --------------------------------------------------------------------------------  T(C): 36.4 (12-12-19 @ 13:00), Max: 36.7 (12-12-19 @ 01:00)  HR: 71 (12-12-19 @ 13:00) (56 - 84)  BP: 182/71 (12-12-19 @ 13:00) (153/71 - 182/84)  RR: 18 (12-12-19 @ 13:00) (18 - 18)  SpO2: 98% (12-12-19 @ 13:00) (95% - 99%)  Wt(kg): --    12-11-19 @ 07:01  -  12-12-19 @ 07:00  --------------------------------------------------------  IN: 1250 mL / OUT: 2967 mL / NET: -1717 mL    12-12-19 @ 07:01  -  12-12-19 @ 15:25  --------------------------------------------------------  IN: 120 mL / OUT: 1300 mL / NET: -1180 mL    Physical Exam:  	Gen: NAD, well-appearing  	HEENT: PERRL, supple neck, clear oropharynx  	Pulm: CTA B/L  	CV: RRR, S1S2; no rub  	Back: No spinal or CVA tenderness; no sacral edema  	Abd: +BS, soft, nontender/nondistended              Transplant: No tenderness, swelling  	: No suprapubic tenderness  	UE: Warm, FROM, intact strength; no edema; no asterixis  	LE: Warm, FROM, intact strength; no edema  	Neuro: No focal deficits, intact gait  	Psych: Normal affect and mood  	Skin: Warm, without rashes      LABS/STUDIES  --------------------------------------------------------------------------------              9.3    5.86  >-----------<  101      [12-12-19 @ 07:00]              29.0     126  |  90  |  77  ----------------------------<  208      [12-12-19 @ 06:54]  4.7   |  19  |  7.35        Ca     9.3     [12-12-19 @ 06:54]      Mg     2.2     [12-12-19 @ 06:54]      Phos  5.7     [12-12-19 @ 06:54]    TPro  6.0  /  Alb  3.3  /  TBili  0.4  /  DBili  0.1  /  AST  11  /  ALT  8   /  AlkPhos  40  [12-12-19 @ 06:54]    PT/INR: PT 9.9  , INR 0.87       [12-12-19 @ 07:00]  PTT: 22.4       [12-12-19 @ 07:00]          [12-12-19 @ 06:54]  Serum Osmolality 298      [12-11-19 @ 12:20]    Creatinine Trend:  SCr 7.35 [12-12 @ 06:54]  SCr 7.38 [12-11 @ 06:24]  SCr 6.50 [12-10 @ 13:21]  SCr 6.86 [12-10 @ 02:09]  SCr 8.29 [12-09 @ 17:58]    Tacrolimus (), Serum: 6.0 ng/mL (12-12 @ 08:00)  Tacrolimus (), Serum: 4.1 ng/mL (12-11 @ 07:10)  Tacrolimus (), Serum: 4.7 ng/mL (12-10 @ 08:43)  Tacrolimus (), Serum: 4.1 ng/mL (12-09 @ 08:22)

## 2019-12-12 NOTE — PROGRESS NOTE ADULT - PROBLEM SELECTOR PLAN 3
BP above target range. Hydralazine added to regimen. Monitor BP on current BP medication. Low salt diet.

## 2019-12-12 NOTE — CHART NOTE - NSCHARTNOTEFT_GEN_A_CORE
Nutrition Follow Up Note    Patient seen for: nutrition follow up S/P kidney transplant on     Source: patient, family at bedside, medical record    Chart reviewed, events noted. "74F with h/o ESRD on HD MWF via LUE AVF  since   (anuric at home, Nephrologist: Dr. Nevin Osborne, last HD 12/6AM), HTN, HLD, Gout, LUE DVT, lumbar radiculopathy,  Breast CA s/p lumpectomy on Tamoxifen () admitted on 19 for DDRT."    Patient's diet was liberalized to Regular today after renal ultrasound. Pt noted with elevated phosphorus, being addressed with Rx for PhosLo, and patient education on high phosphorus foods to avoid. Pt noted with DGF, low urine output and hyponatremia, improved with Lasix Rx. Pt noted with hyperglycemia, being addressed with Humalog corrective regimen.    Pt unable to teach back key points of nutrition education. Reviewed post transplant nutrition therapy and food safety guidelines for transplant recipients. Reviewed recommendations to avoid grapefruit, pomegranate and star fruit while taking immunosuppressant medication. Pt was receptive and expressed understanding. Pt is advised to refer to her copy of USDA Food Safety for Transplant Recipients booklet.     Diet : Regular     PO intake : Pt endorses good po intake despite constipation     Last BM: no BM post-op, +flatus; bowel regimen noted    Urine output x 24-hours: 2837ml    Last HD:  (for hyperkalemia), 12/10 (150ml removed)    Daily Weight in k.6 (12-12), Weight in k.1 (12-11), Weight in k (12-10), Weight in k.1 (12-10), Weight in k.7 (12-10), Weight in k.6 (12-), Weight in k.5 (12-); weight changes likely reflect fluid shifts    Drug Dosing Weight  Weight (kg): 64.6 (07 Dec 2019 08:27)  BMI (kg/m2): 23.7 (07 Dec 2019 08:27)    Pertinent Medications: MEDICATIONS  (STANDING):  aspirin  chewable 81 milliGRAM(s) Oral daily  calcium acetate 667 milliGRAM(s) Oral four times a day with meals  chlorhexidine 2% Cloths 1 Application(s) Topical <User Schedule>  cholecalciferol 1000 Unit(s) Oral daily  cloNIDine 0.2 milliGRAM(s) Oral every 8 hours  famotidine    Tablet 20 milliGRAM(s) Oral daily  furosemide    Tablet 40 milliGRAM(s) Oral two times a day  hydrALAZINE 50 milliGRAM(s) Oral two times a day  insulin lispro (HumaLOG) corrective regimen sliding scale   SubCutaneous three times a day before meals  insulin lispro (HumaLOG) corrective regimen sliding scale   SubCutaneous at bedtime  metoprolol tartrate 50 milliGRAM(s) Oral two times a day  mycophenolate mofetil 1 Gram(s) Oral <User Schedule>  NIFEdipine XL 60 milliGRAM(s) Oral daily  nystatin    Suspension 669537 Unit(s) Swish and Swallow four times a day  polyethylene glycol 3350 17 Gram(s) Oral daily  predniSONE   Tablet   Oral   predniSONE   Tablet 10 milliGRAM(s) Oral two times a day  senna 2 Tablet(s) Oral at bedtime  tacrolimus ER Tablet (ENVARSUS XR) 10 milliGRAM(s) Oral daily  tamoxifen 20 milliGRAM(s) Oral at bedtime  trimethoprim   80 mG/sulfamethoxazole 400 mG 1 Tablet(s) Oral daily  valGANciclovir 450 milliGRAM(s) Oral <User Schedule>    MEDICATIONS  (PRN):  acetaminophen   Tablet .. 975 milliGRAM(s) Oral every 6 hours PRN Mild Pain (1 - 3)  ondansetron Injectable 4 milliGRAM(s) IV Push every 6 hours PRN Nausea and/or Vomiting  oxyCODONE    IR 5 milliGRAM(s) Oral every 4 hours PRN Moderate Pain (4 - 6)  oxyCODONE    IR 10 milliGRAM(s) Oral every 4 hours PRN Severe Pain (7 - 10)      LABS:   - @ 07:00:  Hemoglobin 9.3<L>, Hematocrit 29.0<L>   @ 06:54: Sodium 126<L>, Potassium 4.7, Chloride 90<L>, Calcium 9.3, Magnesium 2.2, Phosphorus 5.7<H>, BUN 77<H>, Creatinine 7.35<H>, <H>, Alk Phos 40, ALT/SGPT 8<L>, AST/SGOT 11, Total Protein 6.0, Albumin 3.3, Total Bilirubin 0.4, Direct Bilirubin 0.1,     POCT Blood Glucose.: 203 mg/dL (12 Dec 2019 13:07)  POCT Blood Glucose.: 150 mg/dL (12 Dec 2019 09:37)  POCT Blood Glucose.: 200 mg/dL (11 Dec 2019 21:55)  POCT Blood Glucose.: 163 mg/dL (11 Dec 2019 18:02)    Skin per nursing documentation: no pressure injuries noted  Edema: none noted    Estimated Needs:   [X ] no change since previous assessment  [ ] recalculated:     Previous Nutrition Diagnosis: 1) Increased Nutrient Needs 2) Food & Nutrition Related Knowledge Deficit  Nutrition Diagnosis is: ongoing, being addressed with liberalized diet and nutrition education    New Nutrition Diagnosis: none     Interventions:     Recommend  1) Continue Regular diet. Recommend reduce intake of high phosphorus foods. 2) Reinforce post-transplant nutrition therapy and food safety guidelines in-house and prior to discharge. 3) Discharge diet: Continue as above. Recommend follow up visit with Transplant MD and outpatient RD for dietary modifications as warranted.     Monitoring and Evaluation:     Continue to monitor nutritional intake, tolerance to diet prescription, weights, labs, skin integrity.    RD remains available upon request and will follow up per protocol.    Celeste Santillan, MS RD CDN [facility], Pager # [phone]

## 2019-12-12 NOTE — PROGRESS NOTE ADULT - ASSESSMENT
74F with h/o ESRD on HD MWF via LUE AVF 2/2 PCKD (anuric at home, Nephrologist: Dr. Nevin Osborne, last HD 12/6AM), hx of fistula thrombosis, completed coumadin, HTN, HLD, Gout, LUE DVT, lumbar radiculopathy,  Breast CA s/p lumpectomy on Tamoxifen (2014).  s/p DDRT 12/7/19 w/ simulect induction, ureter anastomosed over a double J stent. Enlarged, lymph node was encountered during iliac dissection was removed and sent for pathology.     #Kidney replaced by transplant.    Plan:  Kidney replaced by transplant.  POD#  5  DGF with improving urine output   PRN Tylenol and oxycodone for pain, Monitor and manage pain  Gardner and stent removed by surgeon today follow up post void   on bowel regimen  Pt encouraged to ambulate  send YON fluid for creatinine   send urine for osmo, urea, Na   repeat renal U/S today  daily cbc, bmp, mg phos    #Immunosuppressive management encounter following kidney transplant.    Plan: On envarsis , cellcept, steroid taper, Nystatin S&S, bactrim, and Valcyte   F/U Tacrolimus level daily, tacro goal 8-10  Simulect induction next dose on day 4  Monitor closely.        HTN (Hypertension).  Plan: hypertensive  start hydralazine 50 mg BID   c/w clonidine, metoprolol, nifedipine   Monitor BP.         [] POD 5 s/p DDRT with DGF  - Monitor renal function, s/p HD post op for hyperkalemia POD1  - Diet: as liv  - Pain control  - Immuno: Envarsus daily, MMF 1g bid, Pred taper, Simulect induction  - Proph: valcyte/bactrim/nystatin  -SCDs at all times, IS, Please keep pt OOB for all meals  - Ambulate with pt four times a day  - Plan to D/C TLC, once PIV is placed  - Hyponatremia send urine Na and osmo  - Send TSH    [] HTN:   -Continue clonidine 0.2 TID, nifedipine 60 daily, metoprolol XL 50 daily

## 2019-12-12 NOTE — PROGRESS NOTE ADULT - PROBLEM SELECTOR PLAN 1
Pt ESRD on HD since 2003, due to PCKD. Presented for DDRT on 12/7/2019 complicated by DGF requiring HD, due to hyperkalemia. Patient received HD on 12/ 9/2019 for about 10 minutes. UO has since improved. Now on Lasix 40 mg PO BID.  Recommend to start sodium bicarbonate tablets 1300 mg BID.

## 2019-12-12 NOTE — PROGRESS NOTE ADULT - PROBLEM SELECTOR PLAN 4
Patient noted to be hyponatremic to 126. Serum osmolarity WNL. Patient with iso-osmolar hyponatremia. Please send lipid profile and serum free light chains. Monitor serum Na.

## 2019-12-13 ENCOUNTER — TRANSCRIPTION ENCOUNTER (OUTPATIENT)
Age: 74
End: 2019-12-13

## 2019-12-13 LAB
ALBUMIN SERPL ELPH-MCNC: 3.3 G/DL — SIGNIFICANT CHANGE UP (ref 3.3–5)
ALBUMIN SERPL ELPH-MCNC: 3.4 G/DL — SIGNIFICANT CHANGE UP (ref 3.3–5)
ALP SERPL-CCNC: 44 U/L — SIGNIFICANT CHANGE UP (ref 40–120)
ALP SERPL-CCNC: 45 U/L — SIGNIFICANT CHANGE UP (ref 40–120)
ALT FLD-CCNC: 6 U/L — LOW (ref 10–45)
ALT FLD-CCNC: 9 U/L — LOW (ref 10–45)
ANION GAP SERPL CALC-SCNC: 15 MMOL/L — SIGNIFICANT CHANGE UP (ref 5–17)
APTT BLD: 22.6 SEC — LOW (ref 27.5–36.3)
AST SERPL-CCNC: 10 U/L — SIGNIFICANT CHANGE UP (ref 10–40)
AST SERPL-CCNC: 11 U/L — SIGNIFICANT CHANGE UP (ref 10–40)
BILIRUB DIRECT SERPL-MCNC: 0.1 MG/DL — SIGNIFICANT CHANGE UP (ref 0–0.2)
BILIRUB INDIRECT FLD-MCNC: 0.2 MG/DL — SIGNIFICANT CHANGE UP (ref 0.2–1)
BILIRUB SERPL-MCNC: 0.3 MG/DL — SIGNIFICANT CHANGE UP (ref 0.2–1.2)
BILIRUB SERPL-MCNC: 0.3 MG/DL — SIGNIFICANT CHANGE UP (ref 0.2–1.2)
BUN SERPL-MCNC: 91 MG/DL — HIGH (ref 7–23)
CALCIUM SERPL-MCNC: 8.9 MG/DL — SIGNIFICANT CHANGE UP (ref 8.4–10.5)
CHLORIDE SERPL-SCNC: 97 MMOL/L — SIGNIFICANT CHANGE UP (ref 96–108)
CHOLEST SERPL-MCNC: 116 MG/DL — SIGNIFICANT CHANGE UP (ref 10–199)
CO2 SERPL-SCNC: 22 MMOL/L — SIGNIFICANT CHANGE UP (ref 22–31)
CREAT SERPL-MCNC: 6.79 MG/DL — HIGH (ref 0.5–1.3)
GLUCOSE BLDC GLUCOMTR-MCNC: 126 MG/DL — HIGH (ref 70–99)
GLUCOSE BLDC GLUCOMTR-MCNC: 137 MG/DL — HIGH (ref 70–99)
GLUCOSE BLDC GLUCOMTR-MCNC: 160 MG/DL — HIGH (ref 70–99)
GLUCOSE BLDC GLUCOMTR-MCNC: 177 MG/DL — HIGH (ref 70–99)
GLUCOSE SERPL-MCNC: 154 MG/DL — HIGH (ref 70–99)
HCT VFR BLD CALC: 26.9 % — LOW (ref 34.5–45)
HDLC SERPL-MCNC: 64 MG/DL — SIGNIFICANT CHANGE UP
HGB BLD-MCNC: 8.7 G/DL — LOW (ref 11.5–15.5)
INR BLD: 0.86 RATIO — LOW (ref 0.88–1.16)
KAPPA LC SER QL IFE: 19.6 MG/DL — HIGH (ref 0.33–1.94)
KAPPA/LAMBDA FREE LIGHT CHAIN RATIO, SERUM: 8.95 RATIO — HIGH (ref 0.26–1.65)
LAMBDA LC SER QL IFE: 2.19 MG/DL — SIGNIFICANT CHANGE UP (ref 0.57–2.63)
LDH SERPL L TO P-CCNC: 187 U/L — SIGNIFICANT CHANGE UP (ref 50–242)
LIPID PNL WITH DIRECT LDL SERPL: 42 MG/DL — SIGNIFICANT CHANGE UP
MAGNESIUM SERPL-MCNC: 2.3 MG/DL — SIGNIFICANT CHANGE UP (ref 1.6–2.6)
MCHC RBC-ENTMCNC: 29.1 PG — SIGNIFICANT CHANGE UP (ref 27–34)
MCHC RBC-ENTMCNC: 32.3 GM/DL — SIGNIFICANT CHANGE UP (ref 32–36)
MCV RBC AUTO: 90 FL — SIGNIFICANT CHANGE UP (ref 80–100)
NRBC # BLD: 0 /100 WBCS — SIGNIFICANT CHANGE UP (ref 0–0)
PHOSPHATE SERPL-MCNC: 4.7 MG/DL — HIGH (ref 2.5–4.5)
PLATELET # BLD AUTO: 123 K/UL — LOW (ref 150–400)
POTASSIUM SERPL-MCNC: 4.9 MMOL/L — SIGNIFICANT CHANGE UP (ref 3.5–5.3)
POTASSIUM SERPL-SCNC: 4.9 MMOL/L — SIGNIFICANT CHANGE UP (ref 3.5–5.3)
PROT SERPL-MCNC: 5.7 G/DL — LOW (ref 6–8.3)
PROT SERPL-MCNC: 5.9 G/DL — LOW (ref 6–8.3)
PROTHROM AB SERPL-ACNC: 9.8 SEC — LOW (ref 10–12.9)
RBC # BLD: 2.99 M/UL — LOW (ref 3.8–5.2)
RBC # FLD: 17.9 % — HIGH (ref 10.3–14.5)
SODIUM SERPL-SCNC: 134 MMOL/L — LOW (ref 135–145)
TACROLIMUS SERPL-MCNC: 5.1 NG/ML — SIGNIFICANT CHANGE UP
TOTAL CHOLESTEROL/HDL RATIO MEASUREMENT: 1.8 RATIO — LOW (ref 3.3–7.1)
TRIGL SERPL-MCNC: 48 MG/DL — SIGNIFICANT CHANGE UP (ref 10–149)
WBC # BLD: 8.04 K/UL — SIGNIFICANT CHANGE UP (ref 3.8–10.5)
WBC # FLD AUTO: 8.04 K/UL — SIGNIFICANT CHANGE UP (ref 3.8–10.5)

## 2019-12-13 PROCEDURE — 99232 SBSQ HOSP IP/OBS MODERATE 35: CPT | Mod: GC

## 2019-12-13 RX ORDER — MYCOPHENOLATE MOFETIL 250 MG/1
1000 CAPSULE ORAL
Qty: 0 | Refills: 0 | DISCHARGE
Start: 2019-12-13

## 2019-12-13 RX ORDER — ERYTHROPOIETIN 10000 [IU]/ML
10000 INJECTION, SOLUTION INTRAVENOUS; SUBCUTANEOUS ONCE
Refills: 0 | Status: COMPLETED | OUTPATIENT
Start: 2019-12-13 | End: 2019-12-13

## 2019-12-13 RX ORDER — ASPIRIN/CALCIUM CARB/MAGNESIUM 324 MG
0 TABLET ORAL
Qty: 0 | Refills: 0 | DISCHARGE

## 2019-12-13 RX ORDER — NIFEDIPINE 30 MG
1 TABLET, EXTENDED RELEASE 24 HR ORAL
Qty: 0 | Refills: 0 | DISCHARGE

## 2019-12-13 RX ORDER — LORATADINE 10 MG/1
1 TABLET ORAL
Qty: 0 | Refills: 0 | DISCHARGE

## 2019-12-13 RX ORDER — SENNA PLUS 8.6 MG/1
2 TABLET ORAL
Qty: 0 | Refills: 0 | DISCHARGE
Start: 2019-12-13

## 2019-12-13 RX ORDER — FUROSEMIDE 40 MG
1 TABLET ORAL
Qty: 0 | Refills: 0 | DISCHARGE
Start: 2019-12-13

## 2019-12-13 RX ORDER — FOLIC ACID 0.8 MG
1 TABLET ORAL
Qty: 0 | Refills: 0 | DISCHARGE

## 2019-12-13 RX ORDER — TAMOXIFEN CITRATE 20 MG/1
1 TABLET, FILM COATED ORAL
Qty: 0 | Refills: 0 | DISCHARGE
Start: 2019-12-13

## 2019-12-13 RX ORDER — ASPIRIN/CALCIUM CARB/MAGNESIUM 324 MG
1 TABLET ORAL
Qty: 0 | Refills: 0 | DISCHARGE
Start: 2019-12-13

## 2019-12-13 RX ORDER — METOPROLOL TARTRATE 50 MG
1 TABLET ORAL
Qty: 0 | Refills: 0 | DISCHARGE
Start: 2019-12-13

## 2019-12-13 RX ORDER — METOPROLOL TARTRATE 50 MG
0 TABLET ORAL
Qty: 0 | Refills: 0 | DISCHARGE

## 2019-12-13 RX ORDER — SODIUM BICARBONATE 1 MEQ/ML
2 SYRINGE (ML) INTRAVENOUS
Qty: 120 | Refills: 0
Start: 2019-12-13 | End: 2020-01-11

## 2019-12-13 RX ORDER — TAMOXIFEN CITRATE 20 MG/1
0 TABLET, FILM COATED ORAL
Qty: 0 | Refills: 0 | DISCHARGE

## 2019-12-13 RX ORDER — CALCIUM ACETATE 667 MG
667 TABLET ORAL
Qty: 0 | Refills: 0 | DISCHARGE
Start: 2019-12-13

## 2019-12-13 RX ORDER — HYDRALAZINE HCL 50 MG
0 TABLET ORAL
Qty: 0 | Refills: 0 | DISCHARGE

## 2019-12-13 RX ORDER — VALGANCICLOVIR 450 MG/1
1 TABLET, FILM COATED ORAL
Qty: 0 | Refills: 0 | DISCHARGE
Start: 2019-12-13

## 2019-12-13 RX ORDER — NIFEDIPINE 30 MG
1 TABLET, EXTENDED RELEASE 24 HR ORAL
Qty: 0 | Refills: 0 | DISCHARGE
Start: 2019-12-13

## 2019-12-13 RX ORDER — TACROLIMUS 5 MG/1
2 CAPSULE ORAL ONCE
Refills: 0 | Status: COMPLETED | OUTPATIENT
Start: 2019-12-13 | End: 2019-12-13

## 2019-12-13 RX ORDER — NYSTATIN 500MM UNIT
5 POWDER (EA) MISCELLANEOUS
Qty: 0 | Refills: 0 | DISCHARGE
Start: 2019-12-13

## 2019-12-13 RX ORDER — HYDRALAZINE HCL 50 MG
1 TABLET ORAL
Qty: 0 | Refills: 0 | DISCHARGE
Start: 2019-12-13

## 2019-12-13 RX ORDER — TACROLIMUS 5 MG/1
12 CAPSULE ORAL
Refills: 0 | Status: DISCONTINUED | OUTPATIENT
Start: 2019-12-14 | End: 2019-12-14

## 2019-12-13 RX ADMIN — TACROLIMUS 2 MILLIGRAM(S): 5 CAPSULE ORAL at 13:43

## 2019-12-13 RX ADMIN — Medication 0.2 MILLIGRAM(S): at 06:02

## 2019-12-13 RX ADMIN — MYCOPHENOLATE MOFETIL 1 GRAM(S): 250 CAPSULE ORAL at 06:03

## 2019-12-13 RX ADMIN — TAMOXIFEN CITRATE 20 MILLIGRAM(S): 20 TABLET, FILM COATED ORAL at 21:39

## 2019-12-13 RX ADMIN — Medication 667 MILLIGRAM(S): at 17:43

## 2019-12-13 RX ADMIN — Medication 50 MILLIGRAM(S): at 17:44

## 2019-12-13 RX ADMIN — Medication 5 MILLIGRAM(S): at 06:02

## 2019-12-13 RX ADMIN — FAMOTIDINE 20 MILLIGRAM(S): 10 INJECTION INTRAVENOUS at 11:47

## 2019-12-13 RX ADMIN — Medication 40 MILLIGRAM(S): at 06:02

## 2019-12-13 RX ADMIN — Medication 667 MILLIGRAM(S): at 09:44

## 2019-12-13 RX ADMIN — Medication 500000 UNIT(S): at 11:47

## 2019-12-13 RX ADMIN — SENNA PLUS 2 TABLET(S): 8.6 TABLET ORAL at 21:39

## 2019-12-13 RX ADMIN — Medication 50 MILLIGRAM(S): at 06:02

## 2019-12-13 RX ADMIN — MYCOPHENOLATE MOFETIL 1 GRAM(S): 250 CAPSULE ORAL at 17:43

## 2019-12-13 RX ADMIN — TACROLIMUS 10 MILLIGRAM(S): 5 CAPSULE ORAL at 09:30

## 2019-12-13 RX ADMIN — Medication 1000 UNIT(S): at 11:47

## 2019-12-13 RX ADMIN — Medication 0.2 MILLIGRAM(S): at 13:43

## 2019-12-13 RX ADMIN — Medication 1300 MILLIGRAM(S): at 06:02

## 2019-12-13 RX ADMIN — OXYCODONE HYDROCHLORIDE 10 MILLIGRAM(S): 5 TABLET ORAL at 21:39

## 2019-12-13 RX ADMIN — ERYTHROPOIETIN 10000 UNIT(S): 10000 INJECTION, SOLUTION INTRAVENOUS; SUBCUTANEOUS at 09:30

## 2019-12-13 RX ADMIN — Medication 975 MILLIGRAM(S): at 07:47

## 2019-12-13 RX ADMIN — OXYCODONE HYDROCHLORIDE 10 MILLIGRAM(S): 5 TABLET ORAL at 22:15

## 2019-12-13 RX ADMIN — Medication 81 MILLIGRAM(S): at 11:48

## 2019-12-13 RX ADMIN — Medication 1300 MILLIGRAM(S): at 17:44

## 2019-12-13 RX ADMIN — Medication 975 MILLIGRAM(S): at 08:30

## 2019-12-13 RX ADMIN — Medication 500000 UNIT(S): at 06:02

## 2019-12-13 RX ADMIN — Medication 500000 UNIT(S): at 17:44

## 2019-12-13 RX ADMIN — Medication 5 MILLIGRAM(S): at 17:43

## 2019-12-13 RX ADMIN — Medication 1: at 11:46

## 2019-12-13 RX ADMIN — Medication 1 TABLET(S): at 11:48

## 2019-12-13 RX ADMIN — Medication 40 MILLIGRAM(S): at 17:44

## 2019-12-13 RX ADMIN — Medication 667 MILLIGRAM(S): at 11:45

## 2019-12-13 RX ADMIN — Medication 0.2 MILLIGRAM(S): at 21:39

## 2019-12-13 RX ADMIN — POLYETHYLENE GLYCOL 3350 17 GRAM(S): 17 POWDER, FOR SOLUTION ORAL at 11:49

## 2019-12-13 RX ADMIN — Medication 500000 UNIT(S): at 23:00

## 2019-12-13 RX ADMIN — Medication 50 MILLIGRAM(S): at 17:43

## 2019-12-13 NOTE — DISCHARGE NOTE PROVIDER - CARE PROVIDERS DIRECT ADDRESSES
,DirectAddress_Unknown,[email or web address]

## 2019-12-13 NOTE — PROGRESS NOTE ADULT - PROBLEM SELECTOR PLAN 4
Patient noted to be hyponatremic to 126. Serum osmolarity WNL. Patient with iso-osmolar hyponatremia. Repeat serum sodium today is 134. Lipid profile WNL, serum free light chains pending. Continue fluid restriction < 1L. Monitor serum Na.

## 2019-12-13 NOTE — PROGRESS NOTE ADULT - PROBLEM SELECTOR PLAN 1
Pt ESRD on HD since 2003, due to PCKD. Presented for DDRT on 12/7/2019 complicated by DGF requiring HD, due to hyperkalemia. Patient received HD on 12/ 9/2019 for about 10 minutes. UO has since improved. Now on Lasix 40 mg PO BID.  Continue sodium bicarbonate tablets 1300 mg BID, DC if CO2 > 24.

## 2019-12-13 NOTE — PROGRESS NOTE ADULT - SUBJECTIVE AND OBJECTIVE BOX
[facility] DIVISION OF KIDNEY DISEASES AND HYPERTENSION -- FOLLOW UP NOTE  --------------------------------------------------------------------------------  Chief Complaint: s/p DDRT    24 hour events/subjective: Patient evaluated at bedside, in no acute distress. Patient with good urine output, Scr beginning to down trend, today is 6.79.    PAST HISTORY  --------------------------------------------------------------------------------  No significant changes to PMH, PSH, FHx, SHx, unless otherwise noted    ALLERGIES & MEDICATIONS  --------------------------------------------------------------------------------  Allergies    ChloraPrep One-Step (Rash)  penicillins (Rash)  shellfish (Rash)    Intolerances    Standing Inpatient Medications  aspirin  chewable 81 milliGRAM(s) Oral daily  calcium acetate 667 milliGRAM(s) Oral four times a day with meals  chlorhexidine 2% Cloths 1 Application(s) Topical <User Schedule>  cholecalciferol 1000 Unit(s) Oral daily  cloNIDine 0.2 milliGRAM(s) Oral every 8 hours  famotidine    Tablet 20 milliGRAM(s) Oral daily  furosemide    Tablet 40 milliGRAM(s) Oral two times a day  hydrALAZINE 50 milliGRAM(s) Oral two times a day  insulin lispro (HumaLOG) corrective regimen sliding scale   SubCutaneous three times a day before meals  insulin lispro (HumaLOG) corrective regimen sliding scale   SubCutaneous at bedtime  metoprolol tartrate 50 milliGRAM(s) Oral two times a day  mycophenolate mofetil 1 Gram(s) Oral <User Schedule>  NIFEdipine XL 60 milliGRAM(s) Oral daily  nystatin    Suspension 516553 Unit(s) Swish and Swallow four times a day  polyethylene glycol 3350 17 Gram(s) Oral daily  predniSONE   Tablet   Oral   predniSONE   Tablet 5 milliGRAM(s) Oral two times a day  senna 2 Tablet(s) Oral at bedtime  sodium bicarbonate 1300 milliGRAM(s) Oral every 12 hours  tacrolimus ER Tablet (ENVARSUS XR) 10 milliGRAM(s) Oral daily  tamoxifen 20 milliGRAM(s) Oral at bedtime  trimethoprim   80 mG/sulfamethoxazole 400 mG 1 Tablet(s) Oral daily  valGANciclovir 450 milliGRAM(s) Oral <User Schedule>    REVIEW OF SYSTEMS  --------------------------------------------------------------------------------  Gen: No fatigue, fevers/chills, weakness  Skin: No rashes  Head/Eyes/Ears/Mouth: No headache;No sore throat  Respiratory: No dyspnea, cough,   CV: No chest pain, PND, orthopnea  GI: No abdominal pain, diarrhea, constipation, nausea, vomiting  Transplant: No pain  : No increased frequency, dysuria, hematuria, nocturia  MSK: No joint pain/swelling; no back pain; no edema  Neuro: No dizziness/lightheadedness, weakness, seizures, numbness, tingling  Psych: No significant nervousness, anxiety, stress, depression    All other systems were reviewed and are negative, except as noted.    VITALS/PHYSICAL EXAM  --------------------------------------------------------------------------------  T(C): 36.8 (12-13-19 @ 05:00), Max: 36.9 (12-12-19 @ 17:00)  HR: 70 (12-13-19 @ 05:00) (69 - 76)  BP: 134/57 (12-13-19 @ 05:00) (134/57 - 182/71)  RR: 18 (12-13-19 @ 05:00) (18 - 18)  SpO2: 99% (12-13-19 @ 05:00) (95% - 99%)  Wt(kg): --    12-12-19 @ 07:01  -  12-13-19 @ 07:00  --------------------------------------------------------  IN: 340 mL / OUT: 2850 mL / NET: -2510 mL    12-13-19 @ 07:01  -  12-13-19 @ 10:10  --------------------------------------------------------  IN: 0 mL / OUT: 250 mL / NET: -250 mL    Physical Exam:  	Gen: NAD, well-appearing  	HEENT: PERRL, supple neck, clear oropharynx  	Pulm: CTA B/L  	CV: RRR, S1S2; no rub  	Back: No spinal or CVA tenderness; no sacral edema  	Abd: +BS, soft, nontender/nondistended              Transplant: No tenderness, swelling  	: No suprapubic tenderness  	UE: Warm, FROM, intact strength; no edema; no asterixis  	LE: Warm, FROM, intact strength; no edema  	Neuro: No focal deficits, intact gait  	Psych: Normal affect and mood  	Skin: Warm, without rashes    LABS/STUDIES  --------------------------------------------------------------------------------              8.7    8.04  >-----------<  123      [12-13-19 @ 06:00]              26.9     134  |  97  |  91  ----------------------------<  154      [12-13-19 @ 06:00]  4.9   |  22  |  6.79        Ca     8.9     [12-13-19 @ 06:00]      Mg     2.3     [12-13-19 @ 06:00]      Phos  4.7     [12-13-19 @ 06:00]    TPro  5.7  /  Alb  3.4  /  TBili  0.3  /  DBili  0.1  /  AST  11  /  ALT  9   /  AlkPhos  45  [12-13-19 @ 06:00]    PT/INR: PT 9.8  , INR 0.86       [12-13-19 @ 06:00]  PTT: 22.6       [12-13-19 @ 06:00]          [12-13-19 @ 06:00]  Serum Osmolality 298      [12-11-19 @ 12:20]    Creatinine Trend:  SCr 6.79 [12-13 @ 06:00]  SCr 6.88 [12-12 @ 22:39]  SCr 7.35 [12-12 @ 06:54]  SCr 7.38 [12-11 @ 06:24]  SCr 6.50 [12-10 @ 13:21]    Tacrolimus (), Serum: 5.1 ng/mL (12-13 @ 07:15)  Tacrolimus (), Serum: 6.0 ng/mL (12-12 @ 08:00)  Tacrolimus (), Serum: 4.1 ng/mL (12-11 @ 07:10)  Tacrolimus (), Serum: 4.7 ng/mL (12-10 @ 08:43)

## 2019-12-13 NOTE — PROGRESS NOTE ADULT - ASSESSMENT
74F with h/o ESRD on HD MWF via LUE AVF 2/2 PCKD (anuric at home, Nephrologist: Dr. Nevin Osborne, last HD 12/6AM), hx of fistula thrombosis, completed coumadin, HTN, HLD, Gout, LUE DVT, lumbar radiculopathy,  Breast CA s/p lumpectomy on Tamoxifen (2014).  s/p DDRT 12/7/19 w/ simulect induction, ureter anastomosed over a double J stent.   [] POD 6 s/p DDRT   - post op with DGF requiring HD x 2 ( 12/7 and 12/10), renal function improving   - Gardner/stent removed 12/12   - YON x 1 with SS output, YON fluid creat 7 (serum cr 6.79)  - Immuno: Envarsus 10mg daily (will adjust daily by level), MMF 1g bid, Pred taper, Simulect induction  - Proph: valcyte/bactrim/nystatin  - Diet: Regular, keep 1L fluid restriction until tomorrow, NA better   - pain control    - Bowel regimen    [] HTN:   - cont HydralaZine 50mg bid, Nifedipine 60mg daily, Clonidine 0.2mg q8hrs, Metoprolol 50mg bid   [] Anemia  - Procrit 10K  - watch H/H

## 2019-12-13 NOTE — DISCHARGE NOTE PROVIDER - NSDCMRMEDTOKEN_GEN_ALL_CORE_FT
Aspir 81 oral delayed release tablet: orally once a day (at bedtime)  cloNIDine 0.2 mg oral tablet: 1 tab(s) orally 3 times a day  folic acid 1 mg oral tablet: 1 tab(s) orally once a day  hydrALAZINE 50 mg oral tablet: orally 3 times a day  loratadine 10 mg oral tablet: 1 tab(s) orally once a day, As Needed  NIFEdipine 60 mg oral tablet, extended release: 1 tab(s) orally once a day  omeprazole 40 mg oral delayed release capsule: orally once a day (at bedtime)  Percocet 7.5/325 oral tablet: 1 tab(s) orally every 6 hours, As Needed  tamoxifen 20 mg oral tablet: orally once a day (at bedtime)  Toprol- mg oral tablet, extended release: orally once a day (at bedtime)  Vitamin D3 1000 intl units oral capsule: 1 cap(s) orally once a day aspirin 81 mg oral tablet, chewable: 1 tab(s) orally once a day  calcium acetate 667 mg oral tablet: 667 milligram(s) orally 4 times a day  cloNIDine 0.2 mg oral tablet: 1 tab(s) orally every 8 hours  furosemide 40 mg oral tablet: 1 tab(s) orally 2 times a day  hydrALAZINE 50 mg oral tablet: 1 tab(s) orally 2 times a day  metoprolol tartrate 50 mg oral tablet: 1 tab(s) orally 2 times a day  mycophenolate mofetil 250 mg oral capsule: 1000 milligram(s) orally 2 times a day  NIFEdipine 60 mg oral tablet, extended release: 1 tab(s) orally once a day  nystatin 100,000 units/mL oral suspension: 5 milliliter(s) orally 4 times a day  omeprazole 40 mg oral delayed release capsule: orally once a day (at bedtime)  predniSONE: 5 milligram(s) orally once a day  senna oral tablet: 2 tab(s) orally once a day (at bedtime)  sodium bicarbonate 650 mg oral tablet: 2 tab(s) orally 2 times a day   sulfamethoxazole-trimethoprim 400 mg-80 mg oral tablet: 1 tab(s) orally once a day  tamoxifen 20 mg oral tablet: 1 tab(s) orally once a day (at bedtime)  valGANciclovir 450 mg oral tablet: 1 tab(s) orally Monday and Thursday  Vitamin D3 1000 intl units oral capsule: 1 cap(s) orally once a day aspirin 81 mg oral tablet, chewable: 1 tab(s) orally once a day  calcium acetate 667 mg oral tablet: 667 milligram(s) orally 4 times a day  cloNIDine 0.2 mg oral tablet: 1 tab(s) orally every 8 hours  famotidine 20 mg oral tablet: 1 tab(s) orally once a day  furosemide 40 mg oral tablet: 1 tab(s) orally 2 times a day  hydrALAZINE 50 mg oral tablet: 1 tab(s) orally 2 times a day  metoprolol tartrate 50 mg oral tablet: 1 tab(s) orally 2 times a day  mycophenolate mofetil 250 mg oral capsule: 1000 milligram(s) orally 2 times a day  NIFEdipine 60 mg oral tablet, extended release: 1 tab(s) orally once a day  nystatin 100,000 units/mL oral suspension: 5 milliliter(s) orally 4 times a day  predniSONE: 5 milligram(s) orally once a day  senna oral tablet: 2 tab(s) orally once a day (at bedtime)  sodium bicarbonate 650 mg oral tablet: 2 tab(s) orally 2 times a day   sulfamethoxazole-trimethoprim 400 mg-80 mg oral tablet: 1 tab(s) orally once a day  tacrolimus 4 mg oral tablet, extended release: 3 tab(s) orally   tamoxifen 20 mg oral tablet: 1 tab(s) orally once a day (at bedtime)  valGANciclovir 450 mg oral tablet: 1 tab(s) orally Monday and Thursday  Vitamin D3 1000 intl units oral capsule: 1 cap(s) orally once a day

## 2019-12-13 NOTE — PROGRESS NOTE ADULT - SUBJECTIVE AND OBJECTIVE BOX
Transplant Surgery - Multidisciplinary Rounds  --------------------------------------------------------------  DDRT Date:  2/7/2019       POD# 6    Present:  Patient seen with multidisciplinary team including Transplant Surgeon: Dr. Middleton, Dr. Joyce, Dr. Campos. Dr. Heard,  Nephrologist: Dr. Caicedo, Dr. Perea, Pharmacist: Luca Donohue, RANDY Paredes, ROMÁN Bueno, [facility],  Surgical resident Wade Orozco, PGY3, and unit RN during am rounds and and examined by Dr. Middleton. Disciplines not in attendance will be notified of the plan.      HPI: 74F with h/o ESRD on HD MWF via LUE AVF 2/2 PCKD (anuric at home, Nephrologist: Dr. Nevin Osborne, last HD 12/6AM), hx of fistula thrombosis, completed coumadin, HTN, HLD, Gout, LUE DVT, lumbar radiculopathy,  Breast CA s/p lumpectomy on Tamoxifen (2014).  Underwent DDRT 12/7/19 w/ Simulect induction.     Donor Info: Age 54, ABO B, KDPI 75%, X-clamp 20:39 12/6, Terminal Cr 1.7, CMV(+), EBV(+)    Recipient Info: ABO B+, CMV(+), EBV(+), Last HD 12/6[facility]  OR: DDRT to R iliac fossa, Simulect induction. 1A, 1V, 1U. Ureter stented. Stent sutured to johnson. CIT 13.5Hrs  Enlarged, lymph node was encountered during iliac dissection was removed and sent for pathology (benign)      Interval Events:   - POD#6 s/p DDRT c/b DGF , required HD post op for Hyperkalemia x2 12/7, 12/10.   - Urine output improving 2L, On lasix 40mg PO bid. Renal US on 12/12 with good flow, no hydro, no collection  - Johnson with stent removed yesterday 12/12, PVR zero  - YON output 340cc (fluid creat 7, serum cr 6.79)  - Pain well controlled, tolerating PO intake, + bowel function    Potential Discharge date: pending clinical improvement     Education:  Medications    Plan of care:  See Below    MEDICATIONS  (STANDING):  aspirin  chewable 81 milliGRAM(s) Oral daily  calcium acetate 667 milliGRAM(s) Oral four times a day with meals  chlorhexidine 2% Cloths 1 Application(s) Topical <User Schedule>  cholecalciferol 1000 Unit(s) Oral daily  cloNIDine 0.2 milliGRAM(s) Oral every 8 hours  famotidine    Tablet 20 milliGRAM(s) Oral daily  furosemide    Tablet 40 milliGRAM(s) Oral two times a day  hydrALAZINE 50 milliGRAM(s) Oral two times a day  insulin lispro (HumaLOG) corrective regimen sliding scale   SubCutaneous three times a day before meals  insulin lispro (HumaLOG) corrective regimen sliding scale   SubCutaneous at bedtime  metoprolol tartrate 50 milliGRAM(s) Oral two times a day  mycophenolate mofetil 1 Gram(s) Oral <User Schedule>  NIFEdipine XL 60 milliGRAM(s) Oral daily  nystatin    Suspension 666956 Unit(s) Swish and Swallow four times a day  polyethylene glycol 3350 17 Gram(s) Oral daily  predniSONE   Tablet   Oral   predniSONE   Tablet 5 milliGRAM(s) Oral two times a day  senna 2 Tablet(s) Oral at bedtime  sodium bicarbonate 1300 milliGRAM(s) Oral every 12 hours  tacrolimus ER Tablet (ENVARSUS XR) 10 milliGRAM(s) Oral daily  tamoxifen 20 milliGRAM(s) Oral at bedtime  trimethoprim   80 mG/sulfamethoxazole 400 mG 1 Tablet(s) Oral daily  valGANciclovir 450 milliGRAM(s) Oral <User Schedule>    MEDICATIONS  (PRN):  acetaminophen   Tablet .. 975 milliGRAM(s) Oral every 6 hours PRN Mild Pain (1 - 3)  ondansetron Injectable 4 milliGRAM(s) IV Push every 6 hours PRN Nausea and/or Vomiting  oxyCODONE    IR 5 milliGRAM(s) Oral every 4 hours PRN Moderate Pain (4 - 6)  oxyCODONE    IR 10 milliGRAM(s) Oral every 4 hours PRN Severe Pain (7 - 10)      PAST MEDICAL & SURGICAL HISTORY:  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  Pancreatic cyst  Thyroid nodule: yearly Ultrasound, monitoring yearly  Anuria  ESRD on hemodialysis  Breast cancer, female: left 2014  H/O gastroesophageal reflux (GERD)  Thrombocytopenia: leukopenia: followed by heme, plan for BMBx 7/22/19  Anemia in ESRD (end-stage renal disease)  Hypertension  History of fracture of right ankle: 2014 repaired  S/P arteriovenous (AV) fistula creation: 2002  S/P hysterectomy: 1994  Adrenal mass: excison 2015  S/P lumpectomy, left breast: 2014      Vital Signs Last 24 Hrs  T(C): 36.3 (13 Dec 2019 09:00), Max: 36.9 (12 Dec 2019 17:00)  T(F): 97.3 (13 Dec 2019 09:00), Max: 98.4 (12 Dec 2019 17:00)  HR: 61 (13 Dec 2019 09:00) (61 - 76)  BP: 127/61 (13 Dec 2019 09:00) (127/61 - 182/71)  BP(mean): --  RR: 18 (13 Dec 2019 09:00) (18 - 18)  SpO2: 100% (13 Dec 2019 09:00) (95% - 100%)    I&O's Summary    12 Dec 2019 07:01  -  13 Dec 2019 07:00  --------------------------------------------------------  IN: 340 mL / OUT: 2850 mL / NET: -2510 mL    13 Dec 2019 07:01  -  13 Dec 2019 10:24  --------------------------------------------------------  IN: 0 mL / OUT: 250 mL / NET: -250 mL                          8.7    8.04  )-----------( 123      ( 13 Dec 2019 06:00 )             26.9     12-13    134<L>  |  97  |  91<H>  ----------------------------<  154<H>  4.9   |  22  |  6.79<H>    Ca    8.9      13 Dec 2019 06:00  Phos  4.7     12-13  Mg     2.3     12-13    TPro  5.7<L>  /  Alb  3.4  /  TBili  0.3  /  DBili  0.1  /  AST  11  /  ALT  9<L>  /  AlkPhos  45  12-13    Tacrolimus (), Serum: 5.1 ng/mL (12-13 @ 07:15)      Review of systems  Gen: No weight changes, fatigue, fevers/chills, weakness  Skin: No rashes  Head/Eyes/Ears/Mouth: No headache; Normal hearing; Normal vision w/o blurriness; No sinus pain/discomfort, sore throat  Respiratory: No dyspnea, cough, wheezing, hemoptysis  CV: No chest pain, PND, orthopnea  GI: C/O mild abdominal pain at surgical site, diarrhea, constipation, nausea, vomiting, melena, hematochezia  : No increased frequency, dysuria, hematuria, nocturia  MSK: No joint pain/swelling; no back pain; no edema  Neuro: No dizziness/lightheadedness, weakness, seizures, numbness, tingling  Heme: No easy bruising or bleeding  Endo: No heat/cold intolerance  Psych: No significant nervousness, anxiety, stress, depression  All other systems were reviewed and are negative, except as noted.    PHYSICAL EXAM:  Constitutional: Well developed / well nourished  Eyes: Anicteric, PERRLA  ENMT: nc/at  Neck: L TCL .  Respiratory: CTA B/L  Cardiovascular: RRR  Gastrointestinal: Soft abdomen, mild tender to touch at surgical site, ND L YON in place with SS output   Genitourinary: voiding spontaneously    Extremities: SCD's in place and working bilaterally  Vascular: Palpable dp pulses bilaterally  Neurological: A&O x3, drowsy.  Skin: dressing c/d/i  Musculoskeletal: Moving all extremities  Psychiatric: Responsive

## 2019-12-13 NOTE — DISCHARGE NOTE PROVIDER - NSDCCPCAREPLAN_GEN_ALL_CORE_FT
PRINCIPAL DISCHARGE DIAGNOSIS  Diagnosis: Kidney transplant recipient  Assessment and Plan of Treatment: No heavy lifting anything more than 10-15lbs or straining. Otherwise, you may return to your usual level of physical activity. If you are taking narcotic pain medication (such as Percocet), do NOT drive a car, operate machinery or make important decisions.  Call transplant clinic If you developed any of the following, fever, pain, redness, swelling at incision site, cough, nausea, vomiting, painful urination, difficulty urination, or not making any urine.  NOTIFY YOUR SURGEON IF: You have any bleeding that does not stop, any pus draining from your wound, any fever (over 100.4 F) or chills, persistent nausea/vomiting with inability to tolerate food or liquids, persistent diarrhea, or if your pain is not controlled on your discharge pain medications.      SECONDARY DISCHARGE DIAGNOSES  Diagnosis: Immunosuppressed status  Assessment and Plan of Treatment: Keep away from people who have cough, cold, and symptom of flu.  Only take medications that are on your discharge list  If you missed your medications call the transplant office, do not double up medication because you missed a dose.  If you have any question regarding your medication please call transplant office.      Diagnosis: Hypertension  Assessment and Plan of Treatment: Be sure to follow a low salt diet. If you have been prescribed antihypertensive medications to control your blood pressure, be sure to take them every day as prescribed and do not miss any doses, the medications do not work if they are not taken consistently. Follow up with your Primary Care Doctor and have your Blood Pressure checked regularly.

## 2019-12-13 NOTE — PHARMACY EDUCATION NOTE - MEDICATION SAFETY
Adherence/Signs and symptoms to report/Storage and handling/Miss dose instruction/Herbals/Purpose/Side effects/Allergies/Interactions/Medication precautions
Miss dose instruction/Purpose/Storage and handling/Allergies/Herbals/Interactions/Side effects/Adherence/Medication precautions/Signs and symptoms to report

## 2019-12-13 NOTE — DISCHARGE NOTE PROVIDER - HOSPITAL COURSE
75 y/o woman with h/o ESRD on HD MWF via LUE AVF 2/2 PCKD (anuric at home, Nephrologist: Dr. Nevin Osborne, last HD 12/6AM), hx of fistula thrombosis, completed coumadin, HTN, HLD, Gout, LUE DVT, lumbar radiculopathy,  Breast CA s/p lumpectomy on Tamoxifen (2014).  Underwent DDRT with stent (sutured to johnson) on 12/7/19 w/ Simulect induction.  Her post-operative course was complicated buy delayed graft function requiring HD.  Her graft function started to improve and she was responsive to diuretics.  Hd sessions were held as her UOP improved. Electrolytes remained stable.  S Her immunosupp[ression was optimized.  je was startred         Donor Info: Age 54, ABO B, KDPI 75%, X-clamp 20:39 12/6, Terminal Cr 1.7, CMV(+), EBV(+)        Recipient Info: ABO B+, CMV(+), EBV(+), Last HD 12/6[facility]    OR: DDRT to R iliac fossa, Simulect induction. 1A, 1V, 1U. Ureter stented. Stent sutured to johnson. CIT 13.5Hrs    Enlarged, lymph node was encountered during iliac dissection was removed and sent for pathology (benign)          Interval Events:     - POD#6 s/p DDRT c/b DGF , required HD post op for Hyperkalemia x2 12/7, 12/10.     - Urine output improving 2L, On lasix 40mg PO bid. Renal US on 12/12 with good flow, no hydro, no collection    - Johnson with stent removed yesterday 12/12, PVR zero    - YON output 340cc (fluid creat 7, serum cr 6.79)    - Pain well controlled, tolerating PO intake, + bowel function 73 y/o woman with h/o ESRD on HD MWF via LUE AVF 2/2 PCKD (anuric at home, Nephrologist: Dr. Nevin Osborne, last HD 12/6AM), hx of fistula thrombosis, completed coumadin, HTN, HLD, Gout, LUE DVT, lumbar radiculopathy,  Breast CA s/p lumpectomy on Tamoxifen (2014).  Underwent DDRT with stent (sutured to johnson) on 12/7/19 w/ Simulect induction.  Her post-operative course was complicated by delayed graft function requiring HD.  Her graft function started to improve and she was responsive to diuretics.  HD sessions were held as her UOP improved.  Ultrasound showed good flow, no BASSAM, RIs 0.7-0.9, no collections.  Johnson with stent attached was removed on POD5.  She passed trial of void. She initially had some hematuria that subsided.  YON drain was removed.   She was ambulating, tolerating a regular diet and had bowel function.  She received final dose of Simulect. Her immunosuppression was optimized. She was discharged to home on Envarsus 10mg daily, MMF 1gm BID, oral Prednisone taper and prophylactic agents Bactrim, Nystatin and Valcyte 450mg BIW.  She was evaluated by the multi-disciplinary team including surgeon, nephrologist, NP, pharmacist, nutrition, social work, and nursing and deemed stable for discharge to home with close outpatient follow-up.            Donor Info: Age 54, ABO B, KDPI 75%, X-clamp 20:39 12/6, Terminal Cr 1.7, CMV(+), EBV(+)        Recipient Info: ABO B+, CMV(+), EBV(+), Last HD 12/6[facility]    OR: DDRT to R iliac fossa, Simulect induction. 1A, 1V, 1U. Ureter stented. Stent sutured to johnson. CIT 13.5Hrs    Enlarged, lymph node was encountered during iliac dissection was removed and sent for pathology (benign) 73 y/o woman with h/o ESRD on HD MWF via LUE AVF 2/2 PCKD (anuric at home, Nephrologist: Dr. Nevin Osborne, last HD 12/6AM), hx of fistula thrombosis, completed coumadin, HTN, HLD, Gout, LUE DVT, lumbar radiculopathy,  Breast CA s/p lumpectomy on Tamoxifen (2014).  Underwent DDRT with stent (sutured to johnson) on 12/7/19 w/ Simulect induction.  Her post-operative course was complicated by delayed graft function requiring HD.  Her graft function started to improve and she was responsive to diuretics.  HD sessions were held as her UOP improved.  Ultrasound showed good flow, no BASSAM, RIs 0.7-0.9, no collections.  Johnson with stent attached was removed on POD5.  She passed trial of void. She initially had some hematuria that subsided. YON remained on discharge, to be removed outpatient.   She was ambulating, tolerating a regular diet and had bowel function.  She received final dose of Simulect. Her immunosuppression was optimized. She was discharged home on Envarsus 12mg daily, MMF 1gm BID, oral Prednisone taper and prophylactic agents Bactrim, Nystatin and Valcyte 450mg BIW.  She was evaluated by the multi-disciplinary team including surgeon, nephrologist, NP, pharmacist, nutrition, social work, and nursing and deemed stable for discharge to home with close outpatient follow-up.            Donor Info: Age 54, ABO B, KDPI 75%, X-clamp 20:39 12/6, Terminal Cr 1.7, CMV(+), EBV(+)        Recipient Info: ABO B+, CMV(+), EBV(+), Last HD 12/6[facility]    OR: DDRT to R iliac fossa, Simulect induction. 1A, 1V, 1U. Ureter stented. Stent sutured to johnson. CIT 13.5Hrs    Enlarged, lymph node was encountered during iliac dissection was removed and sent for pathology (benign)

## 2019-12-13 NOTE — PHARMACY EDUCATION NOTE - EDUCATION SUMMARY
Discharge immunosuppressant medications and prophylatic anti-infective agents reviewed with the patient. Outpatient medication schedule was discussed in detail including: medication name, indication, dose, administration times, treatment duration, side effects, drug interactions, and special instructions. Patient questions and concerns were answered and addressed. Patient demonstrated understanding.
Discharge immunosuppressant medications and prophylatic anti-infective agents reviewed with the patient. Outpatient medication schedule was discussed in detail including: medication name, indication, dose, administration times, treatment duration, side effects, drug interactions, and special instructions.     Patient questions and concerns were answered and addressed. Patient demonstrated understanding.

## 2019-12-13 NOTE — DISCHARGE NOTE PROVIDER - NSDCFUSCHEDAPPT_GEN_ALL_CORE_FT
ANTONELLA DOBSON ; 01/07/2020 ; [facility] [address] ANTONELLA Villarreal ; 02/11/2020 ; [facility] ANTONELLA DOBSON ; 01/07/2020 ; [facility] [address] ANTONELLA [facility] ; 02/11/2020 ; [facility] ANTONELLA DOBSON ; 01/07/2020 ; [facility] [address] [place] ANTONELLA Villarreal ; 02/11/2020 ; [facility] ANTONELLA DOBSON ; 01/07/2020 ; [facility] [address] ANTONELLA [facility]arreal ; 02/11/2020 ; [facility] ANTONELLA DOBSON ; 12/18/2019 ; [facility] [address] ANTONELLA Mata ; 01/07/2020 ; [facility] [address] ANTONELLA [facility]arreal ; 02/11/2020 ; [facility]

## 2019-12-13 NOTE — PROGRESS NOTE ADULT - NSHPATTENDINGPLANDISCUSS_GEN_ALL_CORE
Patient seen on multidisciplinary rounds with Transplant surgeons, nephrologist, NP/PA, pharmacist, and nurse.
Transplant team

## 2019-12-13 NOTE — DISCHARGE NOTE PROVIDER - NSDCFUADDAPPT_GEN_ALL_CORE_FT
1. Please call to make a follow-up appointment at the Transplant Clinic with Dr. Campos on  ( date  ). Phone: [phone]  2. Please follow up with your primary care physician in one week regarding your hospitalization. 1. Please call to make a follow-up appointment at the Transplant Clinic with Dr. Campos on  Monday ( 12/16). Phone: [phone]  2. Please follow up with your primary care physician in one week regarding your hospitalization.

## 2019-12-13 NOTE — DISCHARGE NOTE PROVIDER - CARE PROVIDER_API CALL
Dale Campos)  Surgery  Organ Transplant  [address] [place] [address]  Phone: [phone]  Fax: [phone]  Follow Up Time:     Jay Caicedo)  Internal Medicine; Nephrology  [address] [place] [address]  Phone: [phone]  Fax: [phone]  Follow Up Time:

## 2019-12-13 NOTE — PROGRESS NOTE ADULT - PROBLEM SELECTOR PLAN 3
BP above target range. Hydralazine added to regimen with improved control. Monitor BP on current BP medication. Low salt diet.

## 2019-12-14 ENCOUNTER — TRANSCRIPTION ENCOUNTER (OUTPATIENT)
Age: 74
End: 2019-12-14

## 2019-12-14 VITALS
SYSTOLIC BLOOD PRESSURE: 144 MMHG | TEMPERATURE: 98 F | RESPIRATION RATE: 18 BRPM | HEART RATE: 67 BPM | DIASTOLIC BLOOD PRESSURE: 63 MMHG | OXYGEN SATURATION: 96 %

## 2019-12-14 LAB
ALBUMIN SERPL ELPH-MCNC: 3.4 G/DL — SIGNIFICANT CHANGE UP (ref 3.3–5)
ALP SERPL-CCNC: 36 U/L — LOW (ref 40–120)
ALT FLD-CCNC: 9 U/L — LOW (ref 10–45)
ANION GAP SERPL CALC-SCNC: 16 MMOL/L — SIGNIFICANT CHANGE UP (ref 5–17)
APTT BLD: 23.5 SEC — LOW (ref 27.5–36.3)
AST SERPL-CCNC: 12 U/L — SIGNIFICANT CHANGE UP (ref 10–40)
BILIRUB SERPL-MCNC: 0.4 MG/DL — SIGNIFICANT CHANGE UP (ref 0.2–1.2)
BUN SERPL-MCNC: 90 MG/DL — HIGH (ref 7–23)
CALCIUM SERPL-MCNC: 8.9 MG/DL — SIGNIFICANT CHANGE UP (ref 8.4–10.5)
CHLORIDE SERPL-SCNC: 99 MMOL/L — SIGNIFICANT CHANGE UP (ref 96–108)
CO2 SERPL-SCNC: 22 MMOL/L — SIGNIFICANT CHANGE UP (ref 22–31)
CREAT SERPL-MCNC: 6.3 MG/DL — HIGH (ref 0.5–1.3)
GLUCOSE BLDC GLUCOMTR-MCNC: 150 MG/DL — HIGH (ref 70–99)
GLUCOSE BLDC GLUCOMTR-MCNC: 202 MG/DL — HIGH (ref 70–99)
GLUCOSE SERPL-MCNC: 148 MG/DL — HIGH (ref 70–99)
HCT VFR BLD CALC: 26 % — LOW (ref 34.5–45)
HCT VFR BLD CALC: 29 % — LOW (ref 34.5–45)
HGB BLD-MCNC: 8.4 G/DL — LOW (ref 11.5–15.5)
HGB BLD-MCNC: 9.4 G/DL — LOW (ref 11.5–15.5)
INR BLD: 0.91 RATIO — SIGNIFICANT CHANGE UP (ref 0.88–1.16)
LDH SERPL L TO P-CCNC: 174 U/L — SIGNIFICANT CHANGE UP (ref 50–242)
MAGNESIUM SERPL-MCNC: 2.2 MG/DL — SIGNIFICANT CHANGE UP (ref 1.6–2.6)
MCHC RBC-ENTMCNC: 29.3 PG — SIGNIFICANT CHANGE UP (ref 27–34)
MCHC RBC-ENTMCNC: 29.4 PG — SIGNIFICANT CHANGE UP (ref 27–34)
MCHC RBC-ENTMCNC: 32.3 GM/DL — SIGNIFICANT CHANGE UP (ref 32–36)
MCHC RBC-ENTMCNC: 32.4 GM/DL — SIGNIFICANT CHANGE UP (ref 32–36)
MCV RBC AUTO: 90.6 FL — SIGNIFICANT CHANGE UP (ref 80–100)
MCV RBC AUTO: 90.6 FL — SIGNIFICANT CHANGE UP (ref 80–100)
NRBC # BLD: 0 /100 WBCS — SIGNIFICANT CHANGE UP (ref 0–0)
NRBC # BLD: 0 /100 WBCS — SIGNIFICANT CHANGE UP (ref 0–0)
PHOSPHATE SERPL-MCNC: 4.3 MG/DL — SIGNIFICANT CHANGE UP (ref 2.5–4.5)
PLATELET # BLD AUTO: 140 K/UL — LOW (ref 150–400)
PLATELET # BLD AUTO: 158 K/UL — SIGNIFICANT CHANGE UP (ref 150–400)
POTASSIUM SERPL-MCNC: 5 MMOL/L — SIGNIFICANT CHANGE UP (ref 3.5–5.3)
POTASSIUM SERPL-SCNC: 5 MMOL/L — SIGNIFICANT CHANGE UP (ref 3.5–5.3)
PROT SERPL-MCNC: 5.6 G/DL — LOW (ref 6–8.3)
PROTHROM AB SERPL-ACNC: 10.3 SEC — SIGNIFICANT CHANGE UP (ref 10–12.9)
RBC # BLD: 2.87 M/UL — LOW (ref 3.8–5.2)
RBC # BLD: 3.2 M/UL — LOW (ref 3.8–5.2)
RBC # FLD: 17.9 % — HIGH (ref 10.3–14.5)
RBC # FLD: 18 % — HIGH (ref 10.3–14.5)
SODIUM SERPL-SCNC: 137 MMOL/L — SIGNIFICANT CHANGE UP (ref 135–145)
TACROLIMUS SERPL-MCNC: 6.9 NG/ML — SIGNIFICANT CHANGE UP
WBC # BLD: 6.45 K/UL — SIGNIFICANT CHANGE UP (ref 3.8–10.5)
WBC # BLD: 8.29 K/UL — SIGNIFICANT CHANGE UP (ref 3.8–10.5)
WBC # FLD AUTO: 6.45 K/UL — SIGNIFICANT CHANGE UP (ref 3.8–10.5)
WBC # FLD AUTO: 8.29 K/UL — SIGNIFICANT CHANGE UP (ref 3.8–10.5)

## 2019-12-14 PROCEDURE — 93005 ELECTROCARDIOGRAM TRACING: CPT

## 2019-12-14 PROCEDURE — 76776 US EXAM K TRANSPL W/DOPPLER: CPT

## 2019-12-14 PROCEDURE — 84295 ASSAY OF SERUM SODIUM: CPT

## 2019-12-14 PROCEDURE — 94640 AIRWAY INHALATION TREATMENT: CPT

## 2019-12-14 PROCEDURE — 84443 ASSAY THYROID STIM HORMONE: CPT

## 2019-12-14 PROCEDURE — 86850 RBC ANTIBODY SCREEN: CPT

## 2019-12-14 PROCEDURE — 82803 BLOOD GASES ANY COMBINATION: CPT

## 2019-12-14 PROCEDURE — 85610 PROTHROMBIN TIME: CPT

## 2019-12-14 PROCEDURE — C1769: CPT

## 2019-12-14 PROCEDURE — 99261: CPT

## 2019-12-14 PROCEDURE — 85027 COMPLETE CBC AUTOMATED: CPT

## 2019-12-14 PROCEDURE — 84100 ASSAY OF PHOSPHORUS: CPT

## 2019-12-14 PROCEDURE — 83036 HEMOGLOBIN GLYCOSYLATED A1C: CPT

## 2019-12-14 PROCEDURE — 93971 EXTREMITY STUDY: CPT

## 2019-12-14 PROCEDURE — 88311 DECALCIFY TISSUE: CPT

## 2019-12-14 PROCEDURE — 82248 BILIRUBIN DIRECT: CPT

## 2019-12-14 PROCEDURE — P9045: CPT

## 2019-12-14 PROCEDURE — 83521 IG LIGHT CHAINS FREE EACH: CPT

## 2019-12-14 PROCEDURE — C1889: CPT

## 2019-12-14 PROCEDURE — 80197 ASSAY OF TACROLIMUS: CPT

## 2019-12-14 PROCEDURE — C2617: CPT

## 2019-12-14 PROCEDURE — P9016: CPT

## 2019-12-14 PROCEDURE — 82962 GLUCOSE BLOOD TEST: CPT

## 2019-12-14 PROCEDURE — 88305 TISSUE EXAM BY PATHOLOGIST: CPT

## 2019-12-14 PROCEDURE — 83605 ASSAY OF LACTIC ACID: CPT

## 2019-12-14 PROCEDURE — 84132 ASSAY OF SERUM POTASSIUM: CPT

## 2019-12-14 PROCEDURE — 80053 COMPREHEN METABOLIC PANEL: CPT

## 2019-12-14 PROCEDURE — 80061 LIPID PANEL: CPT

## 2019-12-14 PROCEDURE — 36430 TRANSFUSION BLD/BLD COMPNT: CPT

## 2019-12-14 PROCEDURE — 82330 ASSAY OF CALCIUM: CPT

## 2019-12-14 PROCEDURE — 84300 ASSAY OF URINE SODIUM: CPT

## 2019-12-14 PROCEDURE — 84484 ASSAY OF TROPONIN QUANT: CPT

## 2019-12-14 PROCEDURE — 80076 HEPATIC FUNCTION PANEL: CPT

## 2019-12-14 PROCEDURE — 83615 LACTATE (LD) (LDH) ENZYME: CPT

## 2019-12-14 PROCEDURE — 82550 ASSAY OF CK (CPK): CPT

## 2019-12-14 PROCEDURE — 83935 ASSAY OF URINE OSMOLALITY: CPT

## 2019-12-14 PROCEDURE — 82435 ASSAY OF BLOOD CHLORIDE: CPT

## 2019-12-14 PROCEDURE — 71045 X-RAY EXAM CHEST 1 VIEW: CPT

## 2019-12-14 PROCEDURE — 86901 BLOOD TYPING SEROLOGIC RH(D): CPT

## 2019-12-14 PROCEDURE — 83930 ASSAY OF BLOOD OSMOLALITY: CPT

## 2019-12-14 PROCEDURE — 93306 TTE W/DOPPLER COMPLETE: CPT

## 2019-12-14 PROCEDURE — 82570 ASSAY OF URINE CREATININE: CPT

## 2019-12-14 PROCEDURE — 80048 BASIC METABOLIC PNL TOTAL CA: CPT

## 2019-12-14 PROCEDURE — 83735 ASSAY OF MAGNESIUM: CPT

## 2019-12-14 PROCEDURE — 82947 ASSAY GLUCOSE BLOOD QUANT: CPT

## 2019-12-14 PROCEDURE — 82553 CREATINE MB FRACTION: CPT

## 2019-12-14 PROCEDURE — 85014 HEMATOCRIT: CPT

## 2019-12-14 PROCEDURE — 85730 THROMBOPLASTIN TIME PARTIAL: CPT

## 2019-12-14 PROCEDURE — 84540 ASSAY OF URINE/UREA-N: CPT

## 2019-12-14 PROCEDURE — 80074 ACUTE HEPATITIS PANEL: CPT

## 2019-12-14 PROCEDURE — 86900 BLOOD TYPING SEROLOGIC ABO: CPT

## 2019-12-14 PROCEDURE — 86923 COMPATIBILITY TEST ELECTRIC: CPT

## 2019-12-14 PROCEDURE — 82565 ASSAY OF CREATININE: CPT

## 2019-12-14 RX ORDER — FAMOTIDINE 10 MG/ML
1 INJECTION INTRAVENOUS
Qty: 0 | Refills: 0 | DISCHARGE
Start: 2019-12-14

## 2019-12-14 RX ORDER — OMEPRAZOLE 10 MG/1
0 CAPSULE, DELAYED RELEASE ORAL
Qty: 0 | Refills: 0 | DISCHARGE

## 2019-12-14 RX ORDER — TACROLIMUS 5 MG/1
3 CAPSULE ORAL
Qty: 0 | Refills: 0 | DISCHARGE
Start: 2019-12-14

## 2019-12-14 RX ADMIN — Medication 81 MILLIGRAM(S): at 11:01

## 2019-12-14 RX ADMIN — FAMOTIDINE 20 MILLIGRAM(S): 10 INJECTION INTRAVENOUS at 11:01

## 2019-12-14 RX ADMIN — Medication 667 MILLIGRAM(S): at 08:43

## 2019-12-14 RX ADMIN — Medication 1300 MILLIGRAM(S): at 05:48

## 2019-12-14 RX ADMIN — CHLORHEXIDINE GLUCONATE 1 APPLICATION(S): 213 SOLUTION TOPICAL at 05:48

## 2019-12-14 RX ADMIN — Medication 1 TABLET(S): at 11:01

## 2019-12-14 RX ADMIN — TACROLIMUS 12 MILLIGRAM(S): 5 CAPSULE ORAL at 08:43

## 2019-12-14 RX ADMIN — Medication 2: at 12:48

## 2019-12-14 RX ADMIN — Medication 0.2 MILLIGRAM(S): at 15:07

## 2019-12-14 RX ADMIN — Medication 40 MILLIGRAM(S): at 15:07

## 2019-12-14 RX ADMIN — Medication 60 MILLIGRAM(S): at 05:48

## 2019-12-14 RX ADMIN — Medication 5 MILLIGRAM(S): at 05:48

## 2019-12-14 RX ADMIN — Medication 500000 UNIT(S): at 05:48

## 2019-12-14 RX ADMIN — Medication 40 MILLIGRAM(S): at 05:48

## 2019-12-14 RX ADMIN — Medication 0.2 MILLIGRAM(S): at 05:48

## 2019-12-14 RX ADMIN — Medication 500000 UNIT(S): at 11:01

## 2019-12-14 RX ADMIN — Medication 50 MILLIGRAM(S): at 05:48

## 2019-12-14 RX ADMIN — Medication 1000 UNIT(S): at 11:01

## 2019-12-14 RX ADMIN — TAMOXIFEN CITRATE 20 MILLIGRAM(S): 20 TABLET, FILM COATED ORAL at 15:04

## 2019-12-14 RX ADMIN — POLYETHYLENE GLYCOL 3350 17 GRAM(S): 17 POWDER, FOR SOLUTION ORAL at 11:01

## 2019-12-14 RX ADMIN — MYCOPHENOLATE MOFETIL 1 GRAM(S): 250 CAPSULE ORAL at 05:48

## 2019-12-14 RX ADMIN — Medication 667 MILLIGRAM(S): at 11:01

## 2019-12-14 NOTE — PROGRESS NOTE ADULT - ATTENDING COMMENTS
Doing well  kidney making good amounts of urine
Pt seen and examined with residents and PA, agree with above.    1. S/p DDRT with delayed graft function, oliguria, hyperkalemia requiring postoperative HD:  - K 4.9, HCO3 20, BUN 35, no fluid overload or clinical uremia: no need for emergent dialysis today  - Lasix 100mg IVP per Renal after discussion with Transplant Surg given this morning, no response  - IVL  - Tacrolimus: check level 30 minutes before tomorrow's 8AM dose  - Mycophenolate  - Solumedrol to prednisone taper as ordered  - Monitor creatinine and electrolytes q8  - Transplant ultrasound postoperatively showed mildly elevated resistive indices    2. Postoperative hypotension and NSVT during HD with new lateral Q waves:  - Appreciate Cards input, arrhythmia likely secondary to hypotension  - Check TTE    3. Acute posthemorrhagic anemia with initially elevated YON output:  - YON output has significantly decreased and has lightened in color  - Hct drifting but no sign of active bleeding  - Will transfuse at Hb <8 or for hemodynamic changes/ signs of active bleeding  - D/w Dr. Campos    4. Essential HTN:  - Have restarted clonidine and metoprolol at home doses (on metoprolol XL at home, but using IR while in SICU)  - Holding hydralazine and nifedipine, will re-evaluate depending on BP  - Goal SBP < 160mm Hg per my discussion with Dr. Campos    5. Hyperphosphatemia:  - Phosphate binder started    6. Prophylaxis:  - Per Transplant team, no chemical VTE prophylaxis. Will continue Venodynes.    Critical Care time: 45 minutes
good urine output  johnson removed - stent attached    Plan   evaluate drain creatinine
s/p DDRT with DGF, now making more urine  Cont current immunosuppression with Envarsus 10mg daily, Prednisone taper, Cellcept 1gm bid.   Will check tacrolimus level and adjust dose accordingly.  Transplant Prophylaxis with nystatin, bactrim, valcyte  monitor glucose levels and adjust sliding scare as necessary   monitor YON output- drainage increased  anemia stable  medication teaching
Awake and alert  Saturating well on RA, CXR reviewed, not in gross fluid overload   Adjustment of antihypertensives, add procardia, pt borderline bradycardiac on Clonidine  S/p HD  Started making urine, received IV Bumex yesterday, on BID IV Lasix now  HCT have been stable, consider starting pharmacologic DVT prophylaxis  Transplant meds, ISS
Tacro level 6.9 - on Envarsus 12mg daily, no changes.  (increased yesterday from 10mg)
S/p DDRT  Awake and alert, pain well controlled  Rx of uncontrolled HTN with increased dosed of Clonidine  High YON output with drop in HCT s/p transfusion, continue to monitor HCT  Diuretic Bumex, s/p HD  Transplant medicine  ISS  Mechanical DVT prophylaxis
I have seen this patient with the fellow and agree with their assessment and plan. In addition, DDRT s/p hd yesterday post op for hyperkalemia now stable but HTN still oliguric.     Restart clonidine at half the dose  can give lasix 100mg IV now, 40mg is not going to work for his GFR  No urgent HD needed  Monitor for UO status  Would delay start of full dose tacro to prevent recovery of DGF  Change bactrim and valcyte to QOD dosing    Jhony Gastelum MD  Cell   Pager   Office 
Kidney Transplant recipient with Delayed allograft function  Improving urine output, elevated creatinine  Reviewed allograft function, prophylaxis regimen and immunosuppression  Reviewed antihypertensive and glycemic control regimen  Plan:  Tacrolimus trough level target 8-10 ng/ml  Continue diuretics  Monitor for allograft functional recovery  Will follow  I was present during and reviewed clinical and lab data as well as assessment and plan as documented by the house staff as noted. Please contact if any additional questions with any change in clinical condition or on availability of any additional information or reports.
Kidney transplant recipient with delayed functioning allograft  Oliguric, still dialysis dependent  Hypertension  Reviewed immunosuppression, prophylaxis, antihypertensive and glycemic control regimen,   Plan:  Continue current immunosuppression, Tacrolimus Target 8-10 ng/ml  Hemodialysis today in view of oliguria, hyponatremia and azotemia  I was present during and reviewed clinical and lab data as well as assessment and plan as documented by the house staff as noted. Please contact if any additional questions with any change in clinical condition or on availability of any additional information or reports.
Kidney recipient with improved urine out put, elevated creatinine  reviewed immunosuppression, allograft function, prophylaxis regimen and antihypertensive regimen  Plan:  Tacrolimus Trough target level 8-10 ng/ml  Will follow  I was present during and reviewed clinical and lab data as well as assessment and plan as documented by the housestaff as noted. Please contact if any additional questions with any change in clinical condition or on availability of any additional information or reports.
Kidney recipient, non oliguric, Improving creatinine  Reviewed immunosuppression, prophylaxis, allograft function, antihypertensive regimen  Plan  Tacrolimus 12 h trough level 8-10 ng/ml  Will follow  I was present during and reviewed clinical and lab data as well as assessment and plan as documented by the housestaff as noted. Please contact if any additional questions with any change in clinical condition or on availability of any additional information or reports.
Kidney transplant recipient with functioning renal allograft  Non oliguric, improved creatinine   HTN  Reviewed immunosuppression and allograft function  Reviewed prophylaxis regimen and antihypertensive regimen  reviewed events,. clinical and lab data  Plan:  Tacrolimus target level 8-10 ng/ml  Will follow  I was present during and reviewed clinical and lab data as well as assessment and plan as documented by the house staff as noted. Please contact if any additional questions with any change in clinical condition or on availability of any additional information or reports.

## 2019-12-14 NOTE — PROGRESS NOTE ADULT - ASSESSMENT
74F with h/o ESRD on HD MWF via LUE AVF 2/2 PCKD (anuric at home, Nephrologist: Dr. Nevin Osborne, last HD 12/6AM), hx of fistula thrombosis, completed coumadin, HTN, HLD, Gout, LUE DVT, lumbar radiculopathy,  Breast CA s/p lumpectomy on Tamoxifen (2014).  s/p DDRT 12/7/19 w/ simulect induction, ureter anastomosed over a double J stent. Stent/johnson removed 12/12  [] POD 7 s/p DDRT   - post op with DGF requiring HD x 2 ( 12/7 and 12/10), renal function improving   - Johnson/stent removed 12/12   - YON x 1 with SS output  - Immuno: Envarsus 12mg daily (will adjust daily by level), MMF 1g bid, Pred taper, Completed Simulect   - Proph: valcyte/bactrim/nystatin  - Diet: Regular,dc fluid restriction   - pain control    - Bowel regimen    [] HTN:   - cont HydralaZine 50mg bid, Nifedipine 60mg daily, Clonidine 0.2mg q8hrs, Metoprolol 50mg bid   [] Dispo: dc home today 74F with h/o ESRD on HD MWF via LUE AVF 2/2 PCKD (anuric at home, Nephrologist: Dr. Nevin Osobrne, last HD 12/6AM), hx of fistula thrombosis, completed coumadin, HTN, HLD, Gout, LUE DVT, lumbar radiculopathy,  Breast CA s/p lumpectomy on Tamoxifen (2014).  s/p DDRT 12/7/19 w/ simulect induction, ureter anastomosed over a double J stent. Stent/johnson removed 12/12  [] POD 7 s/p DDRT   - post op with DGF requiring HD x 2 ( 12/7 and 12/10), renal function improving   - Johnson/stent removed 12/12   - YON x 1 with SS output  - Immuno: Envarsus 12mg daily (will adjust daily by level), MMF 1g bid, Pred taper, Completed Simulect   - Proph: valcyte/bactrim/nystatin  - Diet: Regular,dc fluid restriction   - pain control    - Bowel regimen    - Anemia: repeat CBC at noon     [] HTN:   - cont HydralaZine 50mg bid, Nifedipine 60mg daily, Clonidine 0.2mg q8hrs, Metoprolol 50mg bid   [] Dispo:   - dc home today with YON drain  - f/u  in Transplant  clinic on Monday

## 2019-12-14 NOTE — DISCHARGE NOTE NURSING/CASE MANAGEMENT/SOCIAL WORK - NSDCFUADDAPPT_GEN_ALL_CORE_FT
1. Please call to make a follow-up appointment at the Transplant Clinic with Dr. Campos on  Monday ( 12/16). Phone: [phone]  2. Please follow up with your primary care physician in one week regarding your hospitalization.

## 2019-12-14 NOTE — PROGRESS NOTE ADULT - SUBJECTIVE AND OBJECTIVE BOX
Transplant Surgery - Multidisciplinary Rounds  --------------------------------------------------------------  DDRT Date:  2/7/2019       POD# 7    Present:  Patient seen and examined with Dr. Santana and ROMÁN Bueno during am rounds. Disciplines not in attendance will be notified of the plan.      HPI: 74F with h/o ESRD on HD MWF via LUE AVF 2/2 PCKD (anuric at home, Nephrologist: Dr. Nevin Osborne, last HD 12/6AM), hx of fistula thrombosis, completed coumadin, HTN, HLD, Gout, LUE DVT, lumbar radiculopathy,  Breast CA s/p lumpectomy on Tamoxifen (2014).      Underwent DDRT (ureter stented)  12/7/19 w/ Simulect induction.  Johnson with stent removed 12/12. Post op course c/b by DGF. Required HD on 12/7 and 12/10. Urine output since improved. Renal doppler on 12/12 with good flow.       Donor Info: Age 54, ABO B, KDPI 75%, X-clamp 20:39 12/6, Terminal Cr 1.7, CMV(+), EBV(+)    Recipient Info: ABO B+, CMV(+), EBV(+), Last HD 12/6[facility]  OR: DDRT to R iliac fossa, Simulect induction. 1A, 1V, 1U. Ureter stented. Stent sutured to johnson. CIT 13.5Hrs  Enlarged, lymph node was encountered during iliac dissection was removed and sent for pathology (benign)      Interval Events:   - POD#7 s/p DDRT c/b DGF; renal function improving; u/o 2L. On Lasix 40mg PO bid  - YON output 275cc (SS)  - Pain well controlled, tolerating PO intake, + bowel function  - Hyponatremia resolved, ; Fluid restriction dced     Potential Discharge date: pending clinical improvement     Education:  Medications    Plan of care:  See Below    MEDICATIONS  (STANDING):  aspirin  chewable 81 milliGRAM(s) Oral daily  calcium acetate 667 milliGRAM(s) Oral four times a day with meals  chlorhexidine 2% Cloths 1 Application(s) Topical <User Schedule>  cholecalciferol 1000 Unit(s) Oral daily  cloNIDine 0.2 milliGRAM(s) Oral every 8 hours  famotidine    Tablet 20 milliGRAM(s) Oral daily  furosemide    Tablet 40 milliGRAM(s) Oral two times a day  hydrALAZINE 50 milliGRAM(s) Oral two times a day  insulin lispro (HumaLOG) corrective regimen sliding scale   SubCutaneous three times a day before meals  insulin lispro (HumaLOG) corrective regimen sliding scale   SubCutaneous at bedtime  metoprolol tartrate 50 milliGRAM(s) Oral two times a day  mycophenolate mofetil 1 Gram(s) Oral <User Schedule>  NIFEdipine XL 60 milliGRAM(s) Oral daily  nystatin    Suspension 163127 Unit(s) Swish and Swallow four times a day  polyethylene glycol 3350 17 Gram(s) Oral daily  predniSONE   Tablet   Oral   predniSONE   Tablet 5 milliGRAM(s) Oral daily  senna 2 Tablet(s) Oral at bedtime  sodium bicarbonate 1300 milliGRAM(s) Oral every 12 hours  tacrolimus ER Tablet (ENVARSUS XR) 12 milliGRAM(s) Oral <User Schedule>  tamoxifen 20 milliGRAM(s) Oral at bedtime  trimethoprim   80 mG/sulfamethoxazole 400 mG 1 Tablet(s) Oral daily  valGANciclovir 450 milliGRAM(s) Oral <User Schedule>    MEDICATIONS  (PRN):  acetaminophen   Tablet .. 975 milliGRAM(s) Oral every 6 hours PRN Mild Pain (1 - 3)  ondansetron Injectable 4 milliGRAM(s) IV Push every 6 hours PRN Nausea and/or Vomiting  oxyCODONE    IR 5 milliGRAM(s) Oral every 4 hours PRN Moderate Pain (4 - 6)  oxyCODONE    IR 10 milliGRAM(s) Oral every 4 hours PRN Severe Pain (7 - 10)      PAST MEDICAL & SURGICAL HISTORY:  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  Pancreatic cyst  Thyroid nodule: yearly Ultrasound, monitoring yearly  Anuria  ESRD on hemodialysis  Breast cancer, female: left 2014  H/O gastroesophageal reflux (GERD)  Thrombocytopenia: leukopenia: followed by [facility], plan for BMBx 7/22/19  Anemia in ESRD (end-stage renal disease)  Hypertension  History of fracture of right ankle: 2014 repaired  S/P arteriovenous (AV) fistula creation: 2002  S/P hysterectomy: 1994  Adrenal mass: excison 2015  S/P lumpectomy, left breast: 2014      Vital Signs Last 24 Hrs  T(C): 36.6 (14 Dec 2019 09:00), Max: 36.9 (14 Dec 2019 01:00)  T(F): 97.9 (14 Dec 2019 09:00), Max: 98.5 (14 Dec 2019 01:00)  HR: 66 (14 Dec 2019 09:00) (64 - 76)  BP: 140/62 (14 Dec 2019 09:00) (129/65 - 171/74)  BP(mean): --  RR: 18 (14 Dec 2019 09:00) (18 - 18)  SpO2: 99% (14 Dec 2019 09:00) (96% - 99%)    I&O's Summary    13 Dec 2019 07:01  -  14 Dec 2019 07:00  --------------------------------------------------------  IN: 940 mL / OUT: 2355 mL / NET: -1415 mL    14 Dec 2019 07:01  -  14 Dec 2019 09:28  --------------------------------------------------------  IN: 480 mL / OUT: 445 mL / NET: 35 mL                         8.4    6.45  )-----------( 140      ( 14 Dec 2019 06:55 )             26.0     12-14    137  |  99  |  90<H>  ----------------------------<  148<H>  5.0   |  22  |  6.30<H>    Ca    8.9      14 Dec 2019 06:55  Phos  4.3     12-14  Mg     2.2     12-14    TPro  5.6<L>  /  Alb  3.4  /  TBili  0.4  /  DBili  0.2  /  AST  12  /  ALT  9<L>  /  AlkPhos  36<L>  12-14    Tacrolimus (), Serum: 5.1 ng/mL (12-13 @ 07:15)      Review of systems  Gen: No weight changes, fatigue, fevers/chills, weakness  Skin: No rashes  Head/Eyes/Ears/Mouth: No headache; Normal hearing; Normal vision w/o blurriness; No sinus pain/discomfort, sore throat  Respiratory: No dyspnea, cough, wheezing, hemoptysis  CV: No chest pain, PND, orthopnea  GI: C/O mild abdominal pain at surgical site, diarrhea, constipation, nausea, vomiting, melena, hematochezia  : No increased frequency, dysuria, hematuria, nocturia  MSK: No joint pain/swelling; no back pain; no edema  Neuro: No dizziness/lightheadedness, weakness, seizures, numbness, tingling  Heme: No easy bruising or bleeding  Endo: No heat/cold intolerance  Psych: No significant nervousness, anxiety, stress, depression  All other systems were reviewed and are negative, except as noted.    PHYSICAL EXAM:  Constitutional: Well developed / well nourished  Eyes: Anicteric, PERRLA  ENMT: nc/at  Neck: L TCL .  Respiratory: CTA B/L  Cardiovascular: RRR  Gastrointestinal: Soft abdomen, mild tender to touch at surgical site, ND L YON in place with SS output   Genitourinary: voiding spontaneously    Extremities: SCD's in place and working bilaterally  Vascular: Palpable dp pulses bilaterally  Neurological: A&O x3, drowsy.  Skin: dressing c/d/i  Musculoskeletal: Moving all extremities  Psychiatric: Responsive

## 2019-12-14 NOTE — DISCHARGE NOTE NURSING/CASE MANAGEMENT/SOCIAL WORK - PATIENT PORTAL LINK FT
You can access the FollowMyHealth Patient Portal offered by [facility] by registering at the following website: [email or web address]. By joining [facility] FollowMyHealth portal, you will also be able to view your health information using other applications (apps) compatible with our system.

## 2019-12-16 ENCOUNTER — OTHER (OUTPATIENT)
Age: 74
End: 2019-12-16

## 2019-12-16 ENCOUNTER — APPOINTMENT (OUTPATIENT)
Dept: TRANSPLANT | Facility: CLINIC | Age: 74
End: 2019-12-16

## 2019-12-16 ENCOUNTER — INBOUND DOCUMENT (OUTPATIENT)
Age: 74
End: 2019-12-16

## 2019-12-17 ENCOUNTER — APPOINTMENT (OUTPATIENT)
Dept: INTERNAL MEDICINE | Facility: CLINIC | Age: 74
End: 2019-12-17

## 2019-12-17 LAB
ALBUMIN SERPL ELPH-MCNC: 4 G/DL
ALP BLD-CCNC: 41 U/L
ALT SERPL-CCNC: 6 U/L
ANION GAP SERPL CALC-SCNC: 15 MMOL/L
APPEARANCE: ABNORMAL
AST SERPL-CCNC: 14 U/L
BACTERIA: ABNORMAL
BASOPHILS # BLD AUTO: 0.02 K/UL
BASOPHILS NFR BLD AUTO: 0.3 %
BILIRUB SERPL-MCNC: 0.6 MG/DL
BILIRUBIN URINE: NEGATIVE
BLOOD URINE: ABNORMAL
BUN SERPL-MCNC: 80 MG/DL
CALCIUM SERPL-MCNC: 9.7 MG/DL
CHLORIDE SERPL-SCNC: 101 MMOL/L
CO2 SERPL-SCNC: 24 MMOL/L
COLOR: YELLOW
CREAT SERPL-MCNC: 5.51 MG/DL
CREAT SPEC-SCNC: 116 MG/DL
CREAT/PROT UR: 1.9 RATIO
EOSINOPHIL # BLD AUTO: 0.11 K/UL
EOSINOPHIL NFR BLD AUTO: 1.4 %
GLUCOSE QUALITATIVE U: NEGATIVE
GLUCOSE SERPL-MCNC: 168 MG/DL
HCT VFR BLD CALC: 32.1 %
HGB BLD-MCNC: 10 G/DL
HYALINE CASTS: 0 /LPF
IMM GRANULOCYTES NFR BLD AUTO: 0.6 %
KETONES URINE: NEGATIVE
LDH SERPL-CCNC: 222 U/L
LEUKOCYTE ESTERASE URINE: NEGATIVE
LYMPHOCYTES # BLD AUTO: 1.19 K/UL
LYMPHOCYTES NFR BLD AUTO: 14.9 %
MAGNESIUM SERPL-MCNC: 2.1 MG/DL
MAN DIFF?: NORMAL
MCHC RBC-ENTMCNC: 29.2 PG
MCHC RBC-ENTMCNC: 31.2 GM/DL
MCV RBC AUTO: 93.9 FL
MICROSCOPIC-UA: NORMAL
MONOCYTES # BLD AUTO: 0.34 K/UL
MONOCYTES NFR BLD AUTO: 4.3 %
NEUTROPHILS # BLD AUTO: 6.26 K/UL
NEUTROPHILS NFR BLD AUTO: 78.5 %
NITRITE URINE: NEGATIVE
PH URINE: 6.5
PHOSPHATE SERPL-MCNC: 3.8 MG/DL
PLATELET # BLD AUTO: 201 K/UL
POTASSIUM SERPL-SCNC: 4.7 MMOL/L
PROT SERPL-MCNC: 6.4 G/DL
PROT UR-MCNC: 216 MG/DL
PROTEIN URINE: ABNORMAL
RBC # BLD: 3.42 M/UL
RBC # FLD: 18.2 %
RED BLOOD CELLS URINE: 298 /HPF
SODIUM SERPL-SCNC: 140 MMOL/L
SPECIFIC GRAVITY URINE: 1.02
SQUAMOUS EPITHELIAL CELLS: 6 /HPF
TACROLIMUS SERPL-MCNC: 11.8 NG/ML
URATE SERPL-MCNC: 8.3 MG/DL
UROBILINOGEN URINE: NORMAL
WBC # FLD AUTO: 7.97 K/UL
WHITE BLOOD CELLS URINE: 38 /HPF

## 2019-12-17 RX ORDER — METOPROLOL SUCCINATE 100 MG/1
100 TABLET, EXTENDED RELEASE ORAL
Qty: 90 | Refills: 0 | Status: DISCONTINUED | COMMUNITY
End: 2019-12-17

## 2019-12-17 RX ORDER — POLYETHYLENE GLYCOL 3350 17 G/17G
17 POWDER, FOR SOLUTION ORAL
Qty: 527 | Refills: 0 | Status: DISCONTINUED | COMMUNITY
Start: 2019-05-29 | End: 2019-12-17

## 2019-12-17 RX ORDER — PARICALCITOL 2 UG/ML
2 INJECTION, SOLUTION INTRAVENOUS
Refills: 0 | Status: DISCONTINUED | COMMUNITY
Start: 2019-03-26 | End: 2019-12-17

## 2019-12-17 RX ORDER — CLONIDINE HYDROCHLORIDE 0.2 MG/1
0.2 TABLET ORAL TWICE DAILY
Qty: 180 | Refills: 0 | Status: DISCONTINUED | COMMUNITY
Start: 2016-12-20 | End: 2019-12-17

## 2019-12-17 RX ORDER — FOLIC ACID 1 MG/1
1 TABLET ORAL DAILY
Qty: 90 | Refills: 3 | Status: DISCONTINUED | COMMUNITY
End: 2019-12-17

## 2019-12-17 RX ORDER — NIFEDIPINE 60 MG/1
60 TABLET, EXTENDED RELEASE ORAL DAILY
Qty: 90 | Refills: 0 | Status: DISCONTINUED | COMMUNITY
End: 2019-12-17

## 2019-12-17 RX ORDER — PREDNISONE 5 MG/1
5 TABLET ORAL
Qty: 15 | Refills: 0 | Status: DISCONTINUED | COMMUNITY
Start: 2019-12-09 | End: 2019-12-17

## 2019-12-17 RX ORDER — HYDRALAZINE HYDROCHLORIDE 50 MG/1
50 TABLET ORAL TWICE DAILY
Qty: 180 | Refills: 0 | Status: DISCONTINUED | COMMUNITY
Start: 2017-04-25 | End: 2019-12-17

## 2019-12-17 RX ORDER — TAMOXIFEN CITRATE 20 MG/1
20 TABLET, FILM COATED ORAL DAILY
Refills: 0 | Status: DISCONTINUED | COMMUNITY
End: 2019-12-17

## 2019-12-17 RX ORDER — NUT.TX.GLUCOSE INTOLERANCE,SOY
BAR ORAL
Qty: 2 | Refills: 3 | Status: DISCONTINUED | COMMUNITY
Start: 2018-12-20 | End: 2019-12-17

## 2019-12-17 RX ORDER — OMEPRAZOLE 40 MG/1
40 CAPSULE, DELAYED RELEASE ORAL
Qty: 90 | Refills: 0 | Status: DISCONTINUED | COMMUNITY
Start: 2019-03-25 | End: 2019-12-17

## 2019-12-18 ENCOUNTER — APPOINTMENT (OUTPATIENT)
Dept: TRANSPLANT | Facility: CLINIC | Age: 74
End: 2019-12-18
Payer: MEDICARE

## 2019-12-18 LAB — BKV DNA SPEC QL NAA+PROBE: NOT DETECTED COPIES/ML

## 2019-12-19 ENCOUNTER — APPOINTMENT (OUTPATIENT)
Dept: TRANSPLANT | Facility: CLINIC | Age: 74
End: 2019-12-19
Payer: MEDICARE

## 2019-12-19 VITALS
WEIGHT: 135 LBS | RESPIRATION RATE: 14 BRPM | DIASTOLIC BLOOD PRESSURE: 57 MMHG | BODY MASS INDEX: 22.49 KG/M2 | TEMPERATURE: 98 F | OXYGEN SATURATION: 99 % | SYSTOLIC BLOOD PRESSURE: 104 MMHG | HEIGHT: 65 IN | HEART RATE: 80 BPM

## 2019-12-19 PROCEDURE — 99215 OFFICE O/P EST HI 40 MIN: CPT

## 2019-12-20 LAB
ALBUMIN SERPL ELPH-MCNC: 3.9 G/DL
ALP BLD-CCNC: 44 U/L
ALT SERPL-CCNC: 7 U/L
ANION GAP SERPL CALC-SCNC: 15 MMOL/L
APPEARANCE: ABNORMAL
AST SERPL-CCNC: 9 U/L
BACTERIA: ABNORMAL
BASOPHILS # BLD AUTO: 0.03 K/UL
BASOPHILS NFR BLD AUTO: 0.4 %
BILIRUB SERPL-MCNC: 0.6 MG/DL
BILIRUBIN URINE: NEGATIVE
BLOOD URINE: ABNORMAL
BUN SERPL-MCNC: 71 MG/DL
CALCIUM SERPL-MCNC: 9.8 MG/DL
CHLORIDE SERPL-SCNC: 99 MMOL/L
CO2 SERPL-SCNC: 24 MMOL/L
COLOR: YELLOW
CREAT SERPL-MCNC: 5.1 MG/DL
CREAT SPEC-SCNC: 167 MG/DL
CREAT/PROT UR: 2.3 RATIO
EOSINOPHIL # BLD AUTO: 0.05 K/UL
EOSINOPHIL NFR BLD AUTO: 0.7 %
GLUCOSE QUALITATIVE U: NORMAL
GLUCOSE SERPL-MCNC: 227 MG/DL
HCT VFR BLD CALC: 30.2 %
HGB BLD-MCNC: 9.2 G/DL
HYALINE CASTS: 2 /LPF
IMM GRANULOCYTES NFR BLD AUTO: 0.3 %
KETONES URINE: NEGATIVE
LDH SERPL-CCNC: 231 U/L
LEUKOCYTE ESTERASE URINE: ABNORMAL
LYMPHOCYTES # BLD AUTO: 1.2 K/UL
LYMPHOCYTES NFR BLD AUTO: 16.6 %
MAGNESIUM SERPL-MCNC: 2 MG/DL
MAN DIFF?: NORMAL
MCHC RBC-ENTMCNC: 28.9 PG
MCHC RBC-ENTMCNC: 30.5 GM/DL
MCV RBC AUTO: 95 FL
MICROSCOPIC-UA: NORMAL
MONOCYTES # BLD AUTO: 0.29 K/UL
MONOCYTES NFR BLD AUTO: 4 %
NEUTROPHILS # BLD AUTO: 5.62 K/UL
NEUTROPHILS NFR BLD AUTO: 78 %
NITRITE URINE: NEGATIVE
PH URINE: 6
PHOSPHATE SERPL-MCNC: 3.4 MG/DL
PLATELET # BLD AUTO: 191 K/UL
POTASSIUM SERPL-SCNC: 4.2 MMOL/L
PROT SERPL-MCNC: 6.4 G/DL
PROT UR-MCNC: 390 MG/DL
PROTEIN URINE: ABNORMAL
RBC # BLD: 3.18 M/UL
RBC # FLD: 17.9 %
RED BLOOD CELLS URINE: 147 /HPF
SODIUM SERPL-SCNC: 138 MMOL/L
SPECIFIC GRAVITY URINE: 1.02
SQUAMOUS EPITHELIAL CELLS: 3 /HPF
TACROLIMUS SERPL-MCNC: 17.4 NG/ML
URATE SERPL-MCNC: 8.4 MG/DL
UROBILINOGEN URINE: NORMAL
WBC # FLD AUTO: 7.21 K/UL
WHITE BLOOD CELLS URINE: 129 /HPF

## 2019-12-23 LAB
CMV DNA SPEC QL NAA+PROBE: NOT DETECTED
CMVPCR LOG: NOT DETECTED LOG10IU/ML

## 2019-12-26 ENCOUNTER — APPOINTMENT (OUTPATIENT)
Dept: NEPHROLOGY | Facility: CLINIC | Age: 74
End: 2019-12-26
Payer: MEDICARE

## 2019-12-26 VITALS
HEIGHT: 65 IN | HEART RATE: 75 BPM | OXYGEN SATURATION: 100 % | SYSTOLIC BLOOD PRESSURE: 120 MMHG | DIASTOLIC BLOOD PRESSURE: 75 MMHG | BODY MASS INDEX: 21.66 KG/M2 | RESPIRATION RATE: 14 BRPM | WEIGHT: 130 LBS | TEMPERATURE: 98.3 F

## 2019-12-26 PROCEDURE — 99215 OFFICE O/P EST HI 40 MIN: CPT

## 2019-12-26 NOTE — ASSESSMENT
[FreeTextEntry1] : Renal Transplant recipient: Had delayed allograft function, elevated creatinine at discharge. Has urinary frequency and nocturia. No dysuria/hematuria. No fever/chills. Tolerating medications.\par Urine output upto 1300 ml/day. Not accurate collection.\par Immunosuppression: reviewed; simulect induction, on tac/MMF/prednisone, last Tac level noted; will recheck. Currently on Envarsus  9 mg daily.; full dose MMF and prednisone at 5 mg daily\par Has sheets to maintain home charts of glucose , blood pressure, temperature, weight and urine output.\par Hypertension: controlled; Reviewed medications. Changed to long acting nifedipine for blood pressure control.\par Hyperlipidemia: On statin, continue the same.\par Cardiovascular risk reduction discussed. On aspirin.\par Infection prophylaxis: On Bactrim, Valcyte and nystatin as well as GI prophylaxis.\par Discussed ambulation, using incentive spirometer, optimal glucose and blood pressure readings, adherence with medications and follow ups, follow up clinic visit schedule, avoiding dehydration, mosquito bites; prevention of DVT as well as food safety.\par He met with transplant surgeon on 12/19; post op  care was discussed.\par

## 2019-12-26 NOTE — HISTORY OF PRESENT ILLNESS
[FreeTextEntry1] : Admission: 06-Dec-2019  \par Discharge: 14-Dec-2019\par 75 y/o woman with h/o ESRD on HD MWF via LUE AVF 2/2 PCKD (anuric at home, Nephrologist: Dr. Nevin Osborne, last HD 12/6AM), hx of fistula thrombosis, completed coumadin, HTN, HLD, Gout, LUE DVT, lumbar radiculopathy,  Breast CA s/p lumpectomy on Tamoxifen (2014).  Underwent DDRT with stent (sutured to johnson) on 12/7/19 w/ Simulect induction.  Her post-operative course was complicated by delayed graft function requiring HD.  Her graft function started to improve and she was responsive to diuretics.  HD sessions were held as her UOP improved.  Ultrasound showed good flow, no BASSAM, RIs 0.7-0.9, no collections.  Johnson with stent attached was removed on POD5.  She passed trial of void. She initially had some hematuria that subsided. YON remained on discharge, to be removed outpatient.   She was ambulating, tolerating a regular diet and had bowel function.  She received final dose of Simulect. Her immunosuppression was optimized. She was discharged home on Envarsus 12mg daily, MMF 1gm BID, oral Prednisone taper and prophylactic agents Bactrim, Nystatin and Valcyte 450mg BIW.  \par \par \par Donor Info: Age 54, ABO B, KDPI 75%, X-clamp 20:39 12/6, Terminal Cr 1.7, CMV(+), EBV(+)\par \par Recipient Info: ABO B+, CMV(+), EBV(+), Last HD 12/6[facility]\par OR: DDRT to R iliac fossa, Simulect induction. 1A, 1V, 1U. Ureter stented. Stent sutured to johnson. CIT 13.5Hrs\par Enlarged, lymph node was encountered during iliac dissection was removed and sent for pathology (benign)  \par \par Discharge Medications:\par \par \par aspirin 81 mg oral tablet, chewable: 1 tab(s) orally once a day\par calcium acetate 667 mg oral tablet: 667 milligram(s) orally 4 times a day\par cloNIDine 0.2 mg oral tablet: 1 tab(s) orally every 8 hours\par famotidine 20 mg oral tablet: 1 tab(s) orally once a day\par furosemide 40 mg oral tablet: 1 tab(s) orally 2 times a day\par hydrALAZINE 50 mg oral tablet: 1 tab(s) orally 2 times a day\par metoprolol tartrate 50 mg oral tablet: 1 tab(s) orally 2 times a day\par mycophenolate mofetil 250 mg oral capsule: 1000 milligram(s) orally 2 times a day\par NIFEdipine 60 mg oral tablet, extended release: 1 tab(s) orally once a day\par nystatin 100,000 units/mL oral suspension: 5 milliliter(s) orally 4 times a day\par predniSONE: 5 milligram(s) orally once a day\par senna oral tablet: 2 tab(s) orally once a day (at bedtime)- off now\par sodium bicarbonate 650 mg oral tablet: 2 tab(s) orally 2 times a day - off now\par sulfamethoxazole-trimethoprim 400 mg-80 mg oral tablet: 1 tab(s) orally once a day\par tacrolimus 4 mg oral tablet, extended release: 3 tab(s) orally \par tamoxifen 20 mg oral tablet: 1 tab(s) orally once a day (at bedtime)\par valGANciclovir 450 mg oral tablet: 1 tab(s) orally Monday and Thursday\par Vitamin D3 1000 intl units oral capsule: 1 cap(s) orally once a day\par \par Currently on:\par \par Envarsus 9 Mg daily\par Full dose MMF\par Off senna and bicarbonate\par Creatinine Trend:\par SCr 6.79 [12-13 @ 06:00]\par SCr 6.88 [12-12 @ 22:39]\par SCr 7.35 [12-12 @ 06:54]\par SCr 7.38 [12-11 @ 06:24]\par SCr 6.50 [12-10 @ 13:21]\par \par \par Last creatinine (12/19/19) 5.1 K 4.2 \par Tac: 17.4 (dose decreased from 12 to 9) Hb 9.2 WBC: 7.21\par \par Had diarrhea, vomiting over 2 days resolved now. Has tremors\par Not taking senna anymore\par Has 20 ml/day from drain.\par Today accompanied by Giselle, daughter\par Grand daughter Beatriz stays with her at night\par She has Bertha programmed her to remind taking her medications.\par \par \par

## 2019-12-26 NOTE — REASON FOR VISIT
[Consultation] : a consultation visit [Family Member] : family member [FreeTextEntry1] : Post transplant follow up consultation

## 2019-12-26 NOTE — PHYSICAL EXAM
[General Appearance - Alert] : alert [General Appearance - In No Acute Distress] : in no acute distress [Sclera] : the sclera and conjunctiva were normal [PERRL With Normal Accommodation] : pupils were equal in size, round, and reactive to light [Outer Ear] : the ears and nose were normal in appearance [Extraocular Movements] : extraocular movements were intact [Oropharynx] : the oropharynx was normal [Neck Appearance] : the appearance of the neck was normal [Neck Cervical Mass (___cm)] : no neck mass was observed [Jugular Venous Distention Increased] : there was no jugular-venous distention [Thyroid Nodule] : there were no palpable thyroid nodules [Auscultation Breath Sounds / Voice Sounds] : lungs were clear to auscultation bilaterally [Heart Rate And Rhythm] : heart rate was normal and rhythm regular [Heart Sounds] : normal S1 and S2 [Heart Sounds Gallop] : no gallops [Heart Sounds Pericardial Friction Rub] : no pericardial rub [Murmurs] : no murmurs [Full Pulse] : the pedal pulses are present [Bowel Sounds] : normal bowel sounds [Edema] : there was no peripheral edema [Abdomen Soft] : soft [Abdomen Tenderness] : non-tender [Cervical Lymph Nodes Enlarged Anterior Bilaterally] : anterior cervical [Cervical Lymph Nodes Enlarged Posterior Bilaterally] : posterior cervical [Axillary Lymph Nodes Enlarged Bilaterally] : axillary [Supraclavicular Lymph Nodes Enlarged Bilaterally] : supraclavicular [Inguinal Lymph Nodes Enlarged Bilaterally] : inguinal [Bruit] : a bruit was present [___ (cm) Fistula] : [unfilled] (cm) fistula [Involuntary Movements] : no involuntary movements were seen [Thrill] : a thrill was present [] : no rash [No Focal Deficits] : no focal deficits [FreeTextEntry1] : tremors+ [Oriented To Time, Place, And Person] : oriented to person, place, and time [Impaired Insight] : insight and judgment were intact [Affect] : the affect was normal

## 2019-12-27 LAB
ALBUMIN SERPL ELPH-MCNC: 4.1 G/DL
ALP BLD-CCNC: 60 U/L
ALT SERPL-CCNC: 12 U/L
ANION GAP SERPL CALC-SCNC: 19 MMOL/L
APPEARANCE: CLEAR
AST SERPL-CCNC: 16 U/L
BACTERIA: ABNORMAL
BASOPHILS # BLD AUTO: 0.02 K/UL
BASOPHILS NFR BLD AUTO: 0.2 %
BILIRUB SERPL-MCNC: 0.3 MG/DL
BILIRUBIN URINE: NEGATIVE
BLOOD URINE: ABNORMAL
BUN SERPL-MCNC: 54 MG/DL
CALCIUM SERPL-MCNC: 10 MG/DL
CHLORIDE SERPL-SCNC: 101 MMOL/L
CO2 SERPL-SCNC: 18 MMOL/L
COLOR: YELLOW
CREAT SERPL-MCNC: 4.58 MG/DL
CREAT SPEC-SCNC: 225 MG/DL
CREAT/PROT UR: 1 RATIO
EOSINOPHIL # BLD AUTO: 0.04 K/UL
EOSINOPHIL NFR BLD AUTO: 0.5 %
GLUCOSE QUALITATIVE U: NEGATIVE
GLUCOSE SERPL-MCNC: 234 MG/DL
HCT VFR BLD CALC: 30 %
HGB BLD-MCNC: 9.5 G/DL
HYALINE CASTS: 0 /LPF
IMM GRANULOCYTES NFR BLD AUTO: 0.3 %
KETONES URINE: NEGATIVE
LDH SERPL-CCNC: 238 U/L
LEUKOCYTE ESTERASE URINE: NEGATIVE
LYMPHOCYTES # BLD AUTO: 1.69 K/UL
LYMPHOCYTES NFR BLD AUTO: 19.2 %
MAGNESIUM SERPL-MCNC: 2.1 MG/DL
MAN DIFF?: NORMAL
MCHC RBC-ENTMCNC: 29.1 PG
MCHC RBC-ENTMCNC: 31.7 GM/DL
MCV RBC AUTO: 92 FL
MICROSCOPIC-UA: NORMAL
MONOCYTES # BLD AUTO: 0.49 K/UL
MONOCYTES NFR BLD AUTO: 5.6 %
NEUTROPHILS # BLD AUTO: 6.51 K/UL
NEUTROPHILS NFR BLD AUTO: 74.2 %
NITRITE URINE: NEGATIVE
PH URINE: 5.5
PHOSPHATE SERPL-MCNC: 3.4 MG/DL
PLATELET # BLD AUTO: 161 K/UL
POTASSIUM SERPL-SCNC: 4.5 MMOL/L
PROT SERPL-MCNC: 6.8 G/DL
PROT UR-MCNC: 217 MG/DL
PROTEIN URINE: ABNORMAL
RBC # BLD: 3.26 M/UL
RBC # FLD: 17.2 %
RED BLOOD CELLS URINE: 2 /HPF
SODIUM SERPL-SCNC: 138 MMOL/L
SPECIFIC GRAVITY URINE: >=1.03
SQUAMOUS EPITHELIAL CELLS: 3 /HPF
TACROLIMUS SERPL-MCNC: 14.5 NG/ML
URATE SERPL-MCNC: 8.3 MG/DL
URINE COMMENTS: NORMAL
UROBILINOGEN URINE: NORMAL
WBC # FLD AUTO: 8.78 K/UL
WHITE BLOOD CELLS URINE: 0 /HPF

## 2019-12-29 ENCOUNTER — TRANSCRIPTION ENCOUNTER (OUTPATIENT)
Age: 74
End: 2019-12-29

## 2019-12-30 ENCOUNTER — APPOINTMENT (OUTPATIENT)
Dept: TRANSPLANT | Facility: CLINIC | Age: 74
End: 2019-12-30

## 2019-12-30 LAB
ALBUMIN SERPL ELPH-MCNC: 4 G/DL
ALP BLD-CCNC: 58 U/L
ALT SERPL-CCNC: 7 U/L
ANION GAP SERPL CALC-SCNC: 16 MMOL/L
APPEARANCE: ABNORMAL
AST SERPL-CCNC: 12 U/L
BACTERIA: ABNORMAL
BASOPHILS # BLD AUTO: 0.01 K/UL
BASOPHILS NFR BLD AUTO: 0.1 %
BILIRUB SERPL-MCNC: 0.4 MG/DL
BILIRUBIN URINE: NEGATIVE
BLOOD URINE: NEGATIVE
BUN SERPL-MCNC: 51 MG/DL
CALCIUM SERPL-MCNC: 10 MG/DL
CALCIUM SERPL-MCNC: 10 MG/DL
CHLORIDE SERPL-SCNC: 102 MMOL/L
CO2 SERPL-SCNC: 19 MMOL/L
COLOR: YELLOW
CREAT SERPL-MCNC: 3.91 MG/DL
CREAT SPEC-SCNC: 119 MG/DL
CREAT/PROT UR: 0.3 RATIO
EOSINOPHIL # BLD AUTO: 0.04 K/UL
EOSINOPHIL NFR BLD AUTO: 0.5 %
GLUCOSE QUALITATIVE U: NEGATIVE
GLUCOSE SERPL-MCNC: 222 MG/DL
HCT VFR BLD CALC: 30.1 %
HGB BLD-MCNC: 9.5 G/DL
HYALINE CASTS: 2 /LPF
IMM GRANULOCYTES NFR BLD AUTO: 0.3 %
KETONES URINE: NEGATIVE
LDH SERPL-CCNC: 216 U/L
LEUKOCYTE ESTERASE URINE: NEGATIVE
LYMPHOCYTES # BLD AUTO: 1.39 K/UL
LYMPHOCYTES NFR BLD AUTO: 18.8 %
MAGNESIUM SERPL-MCNC: 2 MG/DL
MAN DIFF?: NORMAL
MCHC RBC-ENTMCNC: 29.1 PG
MCHC RBC-ENTMCNC: 31.6 GM/DL
MCV RBC AUTO: 92 FL
MICROSCOPIC-UA: NORMAL
MONOCYTES # BLD AUTO: 0.5 K/UL
MONOCYTES NFR BLD AUTO: 6.8 %
NEUTROPHILS # BLD AUTO: 5.42 K/UL
NEUTROPHILS NFR BLD AUTO: 73.5 %
NITRITE URINE: NEGATIVE
PARATHYROID HORMONE INTACT: 157 PG/ML
PH URINE: 6
PHOSPHATE SERPL-MCNC: 3.8 MG/DL
PLATELET # BLD AUTO: 178 K/UL
POTASSIUM SERPL-SCNC: 4.5 MMOL/L
PROT SERPL-MCNC: 6.7 G/DL
PROT UR-MCNC: 32 MG/DL
PROTEIN URINE: ABNORMAL
RBC # BLD: 3.27 M/UL
RBC # FLD: 17 %
RED BLOOD CELLS URINE: 4 /HPF
SODIUM SERPL-SCNC: 137 MMOL/L
SPECIFIC GRAVITY URINE: 1.02
SQUAMOUS EPITHELIAL CELLS: 5 /HPF
TACROLIMUS SERPL-MCNC: 10.7 NG/ML
URATE SERPL-MCNC: 8.6 MG/DL
UROBILINOGEN URINE: NORMAL
WBC # FLD AUTO: 7.38 K/UL
WHITE BLOOD CELLS URINE: 4 /HPF

## 2020-01-01 ENCOUNTER — OUTPATIENT (OUTPATIENT)
Dept: OUTPATIENT SERVICES | Facility: HOSPITAL | Age: 75
LOS: 1 days | End: 2020-01-01
Payer: MEDICARE

## 2020-01-01 DIAGNOSIS — Z90.710 ACQUIRED ABSENCE OF BOTH CERVIX AND UTERUS: Chronic | ICD-10-CM

## 2020-01-01 DIAGNOSIS — Z98.890 OTHER SPECIFIED POSTPROCEDURAL STATES: Chronic | ICD-10-CM

## 2020-01-01 DIAGNOSIS — Z87.81 PERSONAL HISTORY OF (HEALED) TRAUMATIC FRACTURE: Chronic | ICD-10-CM

## 2020-01-01 DIAGNOSIS — E27.9 DISORDER OF ADRENAL GLAND, UNSPECIFIED: Chronic | ICD-10-CM

## 2020-01-01 PROCEDURE — G9001: CPT

## 2020-01-02 ENCOUNTER — APPOINTMENT (OUTPATIENT)
Dept: TRANSPLANT | Facility: CLINIC | Age: 75
End: 2020-01-02
Payer: MEDICARE

## 2020-01-02 VITALS
RESPIRATION RATE: 14 BRPM | SYSTOLIC BLOOD PRESSURE: 116 MMHG | BODY MASS INDEX: 21.16 KG/M2 | HEIGHT: 65 IN | DIASTOLIC BLOOD PRESSURE: 63 MMHG | WEIGHT: 127 LBS | OXYGEN SATURATION: 100 % | HEART RATE: 95 BPM | TEMPERATURE: 98.6 F

## 2020-01-02 LAB
ALBUMIN SERPL ELPH-MCNC: 4.2 G/DL
ALP BLD-CCNC: 56 U/L
ALT SERPL-CCNC: 10 U/L
ANION GAP SERPL CALC-SCNC: 18 MMOL/L
APPEARANCE: ABNORMAL
AST SERPL-CCNC: 15 U/L
BACTERIA: ABNORMAL
BASOPHILS # BLD AUTO: 0.02 K/UL
BASOPHILS NFR BLD AUTO: 0.3 %
BILIRUB SERPL-MCNC: 0.3 MG/DL
BILIRUBIN URINE: NEGATIVE
BKV DNA SPEC QL NAA+PROBE: NOT DETECTED COPIES/ML
BKV DNA SPEC QL NAA+PROBE: NOT DETECTED COPIES/ML
BLOOD URINE: NORMAL
BUN SERPL-MCNC: 58 MG/DL
CALCIUM SERPL-MCNC: 10.1 MG/DL
CALCIUM SERPL-MCNC: 10.1 MG/DL
CHLORIDE SERPL-SCNC: 100 MMOL/L
CO2 SERPL-SCNC: 18 MMOL/L
COLOR: YELLOW
CREAT SERPL-MCNC: 4.45 MG/DL
CREAT SPEC-SCNC: 207 MG/DL
CREAT/PROT UR: 0.5 RATIO
EOSINOPHIL # BLD AUTO: 0.03 K/UL
EOSINOPHIL NFR BLD AUTO: 0.4 %
GLUCOSE QUALITATIVE U: NEGATIVE
GLUCOSE SERPL-MCNC: 282 MG/DL
HCT VFR BLD CALC: 28.7 %
HGB BLD-MCNC: 9.2 G/DL
HYALINE CASTS: 0 /LPF
IMM GRANULOCYTES NFR BLD AUTO: 0.1 %
KETONES URINE: NEGATIVE
LDH SERPL-CCNC: 206 U/L
LEUKOCYTE ESTERASE URINE: NEGATIVE
LYMPHOCYTES # BLD AUTO: 1.67 K/UL
LYMPHOCYTES NFR BLD AUTO: 22.4 %
MAGNESIUM SERPL-MCNC: 1.9 MG/DL
MAN DIFF?: NORMAL
MCHC RBC-ENTMCNC: 28.9 PG
MCHC RBC-ENTMCNC: 32.1 GM/DL
MCV RBC AUTO: 90.3 FL
MICROSCOPIC-UA: NORMAL
MONOCYTES # BLD AUTO: 0.45 K/UL
MONOCYTES NFR BLD AUTO: 6 %
NEUTROPHILS # BLD AUTO: 5.26 K/UL
NEUTROPHILS NFR BLD AUTO: 70.8 %
NITRITE URINE: NEGATIVE
PARATHYROID HORMONE INTACT: 154 PG/ML
PH URINE: 6
PHOSPHATE SERPL-MCNC: 3.5 MG/DL
PLATELET # BLD AUTO: 231 K/UL
POTASSIUM SERPL-SCNC: 4.8 MMOL/L
PROT SERPL-MCNC: 6.9 G/DL
PROT UR-MCNC: 94 MG/DL
PROTEIN URINE: ABNORMAL
RBC # BLD: 3.18 M/UL
RBC # FLD: 16.8 %
RED BLOOD CELLS URINE: 2 /HPF
SODIUM SERPL-SCNC: 135 MMOL/L
SPECIFIC GRAVITY URINE: 1.02
SQUAMOUS EPITHELIAL CELLS: 5 /HPF
TACROLIMUS SERPL-MCNC: 8.9 NG/ML
URATE SERPL-MCNC: 8.8 MG/DL
UROBILINOGEN URINE: NORMAL
WBC # FLD AUTO: 7.44 K/UL
WHITE BLOOD CELLS URINE: 5 /HPF

## 2020-01-02 PROCEDURE — 99024 POSTOP FOLLOW-UP VISIT: CPT

## 2020-01-02 RX ORDER — CALCIUM ACETATE 667 MG/1
667 CAPSULE ORAL 3 TIMES DAILY
Qty: 90 | Refills: 3 | Status: DISCONTINUED | COMMUNITY
Start: 2019-12-11 | End: 2020-01-02

## 2020-01-02 NOTE — END OF VISIT
[>50% of Time Spent on Counseling for ____] : Greater than 50% of the encounter time was spent on counseling for [unfilled] [Time Spent: ___ minutes] : I have spent [unfilled] minutes of face to face time with the patient [] : I personally saw and examined this patient.  My history and physical is based on my own examination and interaction with the patient and those present with ~him/her~, on available medical records, as well as on the reports and observations of other members of the team.

## 2020-01-02 NOTE — REASON FOR VISIT
[Follow-Up] : a follow-up visit  [FreeTextEntry3] :  DONOR KIDNEY TRANSPLANT [Family Member] : family member [FreeTextEntry5] : 12/7/19

## 2020-01-02 NOTE — HISTORY OF PRESENT ILLNESS
[de-identified] : Since last visit, doing well\par Urine output ~1.5L/d\par no dysuria\par feels tired, but no specific symptoms, afebrile\par poor diet (seen by nutrition)\par \par On envarsus 9mg, full dose MMF, pred 5 daily\par BP - stable on medications\par Drain - serous 20ml/d > removed today\par \par 73 y/o woman with h/o ESRD on HD MWF via LUE AVF 2/2 PCKD (anuric at home, Nephrologist: Dr. Nevin Osborne, last HD 12/6AM), hx of fistula thrombosis, completed coumadin, HTN, HLD, Gout, LUE DVT, lumbar radiculopathy, Breast CA s/p lumpectomy on Tamoxifen (2014). Underwent DDRT with stent (sutured to johnson) on 12/7/19 w/ Simulect induction. Her post-operative course was complicated by delayed graft function requiring HD. \par \par Donor Info: Age 54, ABO B, KDPI 75%, X-clamp 20:39 12/6, Terminal Cr 1.7, CMV(+), EBV(+)\par \par Recipient Info: ABO B+, CMV(+), EBV(+), Last HD 12/6[facility]\par OR: DDRT to R iliac fossa, Simulect induction. 1A, 1V, 1U. Ureter stented. Stent sutured to johnson. CIT 13.5Hrs

## 2020-01-02 NOTE — ASSESSMENT
[FreeTextEntry1] : Drain - removed\par \par Immunosuppression - \par envarsus aim levels 7-10, check today\par full dose MMF\par 5mg pred\par \par prophylaxis - bactrim, valcyte, nystatin\par \par decrease lasix to 40mg daily (from 40mg bid)

## 2020-01-03 DIAGNOSIS — Z71.89 OTHER SPECIFIED COUNSELING: ICD-10-CM

## 2020-01-07 ENCOUNTER — NON-APPOINTMENT (OUTPATIENT)
Age: 75
End: 2020-01-07

## 2020-01-07 ENCOUNTER — APPOINTMENT (OUTPATIENT)
Dept: INTERNAL MEDICINE | Facility: CLINIC | Age: 75
End: 2020-01-07
Payer: MEDICARE

## 2020-01-07 ENCOUNTER — APPOINTMENT (OUTPATIENT)
Dept: NEPHROLOGY | Facility: CLINIC | Age: 75
End: 2020-01-07

## 2020-01-07 ENCOUNTER — LABORATORY RESULT (OUTPATIENT)
Age: 75
End: 2020-01-07

## 2020-01-07 VITALS
HEIGHT: 65 IN | WEIGHT: 124 LBS | BODY MASS INDEX: 20.66 KG/M2 | SYSTOLIC BLOOD PRESSURE: 134 MMHG | DIASTOLIC BLOOD PRESSURE: 63 MMHG | OXYGEN SATURATION: 100 % | HEART RATE: 85 BPM

## 2020-01-07 DIAGNOSIS — M25.511 PAIN IN RIGHT SHOULDER: ICD-10-CM

## 2020-01-07 DIAGNOSIS — N18.6 END STAGE RENAL DISEASE: ICD-10-CM

## 2020-01-07 DIAGNOSIS — D64.89 OTHER SPECIFIED ANEMIAS: ICD-10-CM

## 2020-01-07 DIAGNOSIS — M54.16 RADICULOPATHY, LUMBAR REGION: ICD-10-CM

## 2020-01-07 DIAGNOSIS — Z99.2 END STAGE RENAL DISEASE: ICD-10-CM

## 2020-01-07 DIAGNOSIS — Z01.818 ENCOUNTER FOR OTHER PREPROCEDURAL EXAMINATION: ICD-10-CM

## 2020-01-07 DIAGNOSIS — Z86.2 PERSONAL HISTORY OF DISEASES OF THE BLOOD AND BLOOD-FORMING ORGANS AND CERTAIN DISORDERS INVOLVING THE IMMUNE MECHANISM: ICD-10-CM

## 2020-01-07 DIAGNOSIS — Z82.49 FAMILY HISTORY OF ISCHEMIC HEART DISEASE AND OTHER DISEASES OF THE CIRCULATORY SYSTEM: ICD-10-CM

## 2020-01-07 DIAGNOSIS — D72.819 DECREASED WHITE BLOOD CELL COUNT, UNSPECIFIED: ICD-10-CM

## 2020-01-07 LAB — BKV DNA SPEC QL NAA+PROBE: NOT DETECTED COPIES/ML

## 2020-01-07 PROCEDURE — G0439: CPT | Mod: PD

## 2020-01-07 PROCEDURE — 93000 ELECTROCARDIOGRAM COMPLETE: CPT

## 2020-01-08 LAB
APPEARANCE: ABNORMAL
BILIRUBIN URINE: NEGATIVE
BLOOD URINE: NEGATIVE
COLOR: NORMAL
CREAT SPEC-SCNC: 127 MG/DL
GLUCOSE QUALITATIVE U: NORMAL
KETONES URINE: NEGATIVE
LEUKOCYTE ESTERASE URINE: NEGATIVE
MICROALBUMIN 24H UR DL<=1MG/L-MCNC: 15.6 MG/DL
MICROALBUMIN/CREAT 24H UR-RTO: 123 MG/G
NITRITE URINE: NEGATIVE
PH URINE: 5.5
PROTEIN URINE: ABNORMAL
SPECIFIC GRAVITY URINE: 1.01
UROBILINOGEN URINE: NORMAL

## 2020-01-09 ENCOUNTER — OTHER (OUTPATIENT)
Age: 75
End: 2020-01-09

## 2020-01-09 ENCOUNTER — APPOINTMENT (OUTPATIENT)
Dept: NEPHROLOGY | Facility: CLINIC | Age: 75
End: 2020-01-09
Payer: MEDICARE

## 2020-01-09 VITALS
HEART RATE: 80 BPM | SYSTOLIC BLOOD PRESSURE: 103 MMHG | DIASTOLIC BLOOD PRESSURE: 69 MMHG | HEIGHT: 65 IN | OXYGEN SATURATION: 100 % | BODY MASS INDEX: 20.66 KG/M2 | TEMPERATURE: 97.6 F | WEIGHT: 124 LBS | RESPIRATION RATE: 14 BRPM

## 2020-01-09 LAB
ALBUMIN SERPL ELPH-MCNC: 4.4 G/DL
ALP BLD-CCNC: 59 U/L
ALT SERPL-CCNC: 7 U/L
ANION GAP SERPL CALC-SCNC: 17 MMOL/L
APPEARANCE: CLEAR
AST SERPL-CCNC: 17 U/L
BACTERIA: ABNORMAL
BASOPHILS # BLD AUTO: 0.02 K/UL
BASOPHILS NFR BLD AUTO: 0.2 %
BILIRUB SERPL-MCNC: 0.2 MG/DL
BILIRUBIN URINE: NEGATIVE
BLOOD URINE: NEGATIVE
BUN SERPL-MCNC: 59 MG/DL
CALCIUM SERPL-MCNC: 10.6 MG/DL
CHLORIDE SERPL-SCNC: 98 MMOL/L
CO2 SERPL-SCNC: 17 MMOL/L
COLOR: NORMAL
CREAT SERPL-MCNC: 3.58 MG/DL
CREAT SPEC-SCNC: 119 MG/DL
CREAT/PROT UR: 0.4 RATIO
EOSINOPHIL # BLD AUTO: 0.02 K/UL
EOSINOPHIL NFR BLD AUTO: 0.2 %
GLUCOSE QUALITATIVE U: ABNORMAL
GLUCOSE SERPL-MCNC: 415 MG/DL
HCT VFR BLD CALC: 29.7 %
HGB BLD-MCNC: 9.2 G/DL
HYALINE CASTS: 0 /LPF
IMM GRANULOCYTES NFR BLD AUTO: 0.5 %
KETONES URINE: NEGATIVE
LDH SERPL-CCNC: 221 U/L
LEUKOCYTE ESTERASE URINE: NEGATIVE
LYMPHOCYTES # BLD AUTO: 1.43 K/UL
LYMPHOCYTES NFR BLD AUTO: 17 %
MAGNESIUM SERPL-MCNC: 2.1 MG/DL
MAN DIFF?: NORMAL
MCHC RBC-ENTMCNC: 28.1 PG
MCHC RBC-ENTMCNC: 31 GM/DL
MCV RBC AUTO: 90.8 FL
MICROSCOPIC-UA: NORMAL
MONOCYTES # BLD AUTO: 0.44 K/UL
MONOCYTES NFR BLD AUTO: 5.2 %
NEUTROPHILS # BLD AUTO: 6.46 K/UL
NEUTROPHILS NFR BLD AUTO: 76.9 %
NITRITE URINE: NEGATIVE
PH URINE: 6
PHOSPHATE SERPL-MCNC: 2.8 MG/DL
PLATELET # BLD AUTO: 222 K/UL
POTASSIUM SERPL-SCNC: 5.6 MMOL/L
PROT SERPL-MCNC: 6.9 G/DL
PROT UR-MCNC: 43 MG/DL
PROTEIN URINE: ABNORMAL
RBC # BLD: 3.27 M/UL
RBC # FLD: 16.4 %
RED BLOOD CELLS URINE: 2 /HPF
SODIUM SERPL-SCNC: 132 MMOL/L
SPECIFIC GRAVITY URINE: 1.02
SQUAMOUS EPITHELIAL CELLS: 9 /HPF
TACROLIMUS SERPL-MCNC: 7.4 NG/ML
URATE SERPL-MCNC: 7.5 MG/DL
UROBILINOGEN URINE: NORMAL
WBC # FLD AUTO: 8.41 K/UL
WHITE BLOOD CELLS URINE: 4 /HPF

## 2020-01-09 PROCEDURE — 99214 OFFICE O/P EST MOD 30 MIN: CPT | Mod: PD

## 2020-01-09 RX ORDER — HYDRALAZINE HYDROCHLORIDE 50 MG/1
50 TABLET ORAL DAILY
Qty: 90 | Refills: 0 | Status: DISCONTINUED | COMMUNITY
End: 2020-01-09

## 2020-01-09 NOTE — REASON FOR VISIT
[Family Member] : family member [Follow-Up] : a follow-up visit [FreeTextEntry1] : Post transplant follow up , elevated creatinine, non oliguric

## 2020-01-09 NOTE — HISTORY OF PRESENT ILLNESS
[FreeTextEntry1] : DGF, off dialysis. Urine out put 1900 ml/day\par No diarrhea.\par Poor appetite and gen weakness otherwise ok\par Has 20 ml/day from drain.\par Today accompanied by Giselle, daughter\par Grand daughter Beatriz stays with her at night\par She has Bertha programmed her to remind taking her medications.\par \par \par

## 2020-01-09 NOTE — PHYSICAL EXAM
[General Appearance - Alert] : alert [Sclera] : the sclera and conjunctiva were normal [General Appearance - In No Acute Distress] : in no acute distress [Outer Ear] : the ears and nose were normal in appearance [Extraocular Movements] : extraocular movements were intact [PERRL With Normal Accommodation] : pupils were equal in size, round, and reactive to light [Oropharynx] : the oropharynx was normal [Neck Appearance] : the appearance of the neck was normal [Neck Cervical Mass (___cm)] : no neck mass was observed [Jugular Venous Distention Increased] : there was no jugular-venous distention [Thyroid Nodule] : there were no palpable thyroid nodules [Auscultation Breath Sounds / Voice Sounds] : lungs were clear to auscultation bilaterally [Heart Rate And Rhythm] : heart rate was normal and rhythm regular [Heart Sounds] : normal S1 and S2 [Heart Sounds Pericardial Friction Rub] : no pericardial rub [Murmurs] : no murmurs [Heart Sounds Gallop] : no gallops [Full Pulse] : the pedal pulses are present [Edema] : there was no peripheral edema [Bowel Sounds] : normal bowel sounds [Abdomen Soft] : soft [Abdomen Tenderness] : non-tender [Cervical Lymph Nodes Enlarged Posterior Bilaterally] : posterior cervical [Supraclavicular Lymph Nodes Enlarged Bilaterally] : supraclavicular [Cervical Lymph Nodes Enlarged Anterior Bilaterally] : anterior cervical [Axillary Lymph Nodes Enlarged Bilaterally] : axillary [___ (cm) Fistula] : [unfilled] (cm) fistula [Inguinal Lymph Nodes Enlarged Bilaterally] : inguinal [Involuntary Movements] : no involuntary movements were seen [Thrill] : a thrill was present [Bruit] : a bruit was present [No Focal Deficits] : no focal deficits [] : no rash [FreeTextEntry1] : tremors+ [Oriented To Time, Place, And Person] : oriented to person, place, and time [Impaired Insight] : insight and judgment were intact [Affect] : the affect was normal

## 2020-01-09 NOTE — ASSESSMENT
[FreeTextEntry1] : Renal Transplant recipient: Had delayed allograft function, elevated creatinine at discharge. Has urinary frequency and nocturia. No dysuria/hematuria. No fever/chills. Tolerating medications.\par Urine output upto 1900 ml/day. Not accurate collection.\par Immunosuppression: reviewed; simulect induction, on tac/MMF/prednisone, last Tac level noted; will recheck. Currently on Envarsus  7 mg daily.; full dose MMF and prednisone at 5 mg daily\par Has sheets to maintain home charts of glucose , blood pressure, temperature, weight and urine output.\par Hypertension: Low blood pressure ; Reviewed medications. Discontinue hydralazine\par Cardiovascular risk reduction discussed. On aspirin.\par Infection prophylaxis: On Bactrim, Valcyte and nystatin as well as GI prophylaxis.\par Discussed ambulation, using incentive spirometer, optimal glucose and blood pressure readings, adherence with medications and follow ups, follow up clinic visit schedule, avoiding dehydration, mosquito bites; prevention of DVT as well as food safety.\par He met with transplant surgeon on 1/2/20; post op  care was discussed.\par

## 2020-01-10 ENCOUNTER — INPATIENT (INPATIENT)
Facility: HOSPITAL | Age: 75
LOS: 3 days | Discharge: HOME HEALTH | DRG: 637 | End: 2020-01-14
Attending: SURGERY | Admitting: SURGERY
Payer: MEDICARE

## 2020-01-10 VITALS
WEIGHT: 123.9 LBS | DIASTOLIC BLOOD PRESSURE: 67 MMHG | RESPIRATION RATE: 18 BRPM | SYSTOLIC BLOOD PRESSURE: 128 MMHG | OXYGEN SATURATION: 98 % | HEART RATE: 81 BPM | TEMPERATURE: 99 F | HEIGHT: 64 IN

## 2020-01-10 DIAGNOSIS — Z87.81 PERSONAL HISTORY OF (HEALED) TRAUMATIC FRACTURE: Chronic | ICD-10-CM

## 2020-01-10 DIAGNOSIS — D89.9 DISORDER INVOLVING THE IMMUNE MECHANISM, UNSPECIFIED: ICD-10-CM

## 2020-01-10 DIAGNOSIS — E11.9 TYPE 2 DIABETES MELLITUS WITHOUT COMPLICATIONS: ICD-10-CM

## 2020-01-10 DIAGNOSIS — I10 ESSENTIAL (PRIMARY) HYPERTENSION: ICD-10-CM

## 2020-01-10 DIAGNOSIS — E78.49 OTHER HYPERLIPIDEMIA: ICD-10-CM

## 2020-01-10 DIAGNOSIS — E27.9 DISORDER OF ADRENAL GLAND, UNSPECIFIED: Chronic | ICD-10-CM

## 2020-01-10 DIAGNOSIS — Z94.0 KIDNEY TRANSPLANT STATUS: ICD-10-CM

## 2020-01-10 DIAGNOSIS — Z90.710 ACQUIRED ABSENCE OF BOTH CERVIX AND UTERUS: Chronic | ICD-10-CM

## 2020-01-10 DIAGNOSIS — Z98.890 OTHER SPECIFIED POSTPROCEDURAL STATES: Chronic | ICD-10-CM

## 2020-01-10 DIAGNOSIS — R73.9 HYPERGLYCEMIA, UNSPECIFIED: ICD-10-CM

## 2020-01-10 LAB
25(OH)D3 SERPL-MCNC: 54.7 NG/ML
ALBUMIN SERPL ELPH-MCNC: 4 G/DL — SIGNIFICANT CHANGE UP (ref 3.3–5)
ALP SERPL-CCNC: 55 U/L — SIGNIFICANT CHANGE UP (ref 40–120)
ALT FLD-CCNC: 8 U/L — LOW (ref 10–45)
ANION GAP SERPL CALC-SCNC: 12 MMOL/L — SIGNIFICANT CHANGE UP (ref 5–17)
APPEARANCE UR: CLEAR — SIGNIFICANT CHANGE UP
AST SERPL-CCNC: 22 U/L — SIGNIFICANT CHANGE UP (ref 10–40)
B-OH-BUTYR SERPL-SCNC: 0.2 MMOL/L — SIGNIFICANT CHANGE UP
BACTERIA # UR AUTO: ABNORMAL
BASE EXCESS BLDV CALC-SCNC: -3.5 MMOL/L — LOW (ref -2–2)
BASOPHILS # BLD AUTO: 0.01 K/UL — SIGNIFICANT CHANGE UP (ref 0–0.2)
BASOPHILS NFR BLD AUTO: 0.2 % — SIGNIFICANT CHANGE UP (ref 0–2)
BILIRUB SERPL-MCNC: 0.3 MG/DL — SIGNIFICANT CHANGE UP (ref 0.2–1.2)
BILIRUB UR-MCNC: NEGATIVE — SIGNIFICANT CHANGE UP
BUN SERPL-MCNC: 56 MG/DL — HIGH (ref 7–23)
CA-I SERPL-SCNC: 1.32 MMOL/L — HIGH (ref 1.12–1.3)
CALCIUM SERPL-MCNC: 10.3 MG/DL — SIGNIFICANT CHANGE UP (ref 8.4–10.5)
CHLORIDE BLDV-SCNC: 102 MMOL/L — SIGNIFICANT CHANGE UP (ref 96–108)
CHLORIDE SERPL-SCNC: 98 MMOL/L — SIGNIFICANT CHANGE UP (ref 96–108)
CHOLEST SERPL-MCNC: 133 MG/DL
CHOLEST/HDLC SERPL: 3.2 RATIO
CO2 BLDV-SCNC: 22 MMOL/L — SIGNIFICANT CHANGE UP (ref 22–30)
CO2 SERPL-SCNC: 19 MMOL/L — LOW (ref 22–31)
COLOR SPEC: SIGNIFICANT CHANGE UP
CREAT SERPL-MCNC: 3.42 MG/DL — HIGH (ref 0.5–1.3)
DIFF PNL FLD: NEGATIVE — SIGNIFICANT CHANGE UP
EOSINOPHIL # BLD AUTO: 0.02 K/UL — SIGNIFICANT CHANGE UP (ref 0–0.5)
EOSINOPHIL NFR BLD AUTO: 0.3 % — SIGNIFICANT CHANGE UP (ref 0–6)
EPI CELLS # UR: 4 /HPF — SIGNIFICANT CHANGE UP
FOLATE SERPL-MCNC: 16.2 NG/ML
GAS PNL BLDV: 128 MMOL/L — LOW (ref 135–145)
GAS PNL BLDV: SIGNIFICANT CHANGE UP
GAS PNL BLDV: SIGNIFICANT CHANGE UP
GLUCOSE BLDC GLUCOMTR-MCNC: 214 MG/DL — HIGH (ref 70–99)
GLUCOSE BLDC GLUCOMTR-MCNC: 226 MG/DL — HIGH (ref 70–99)
GLUCOSE BLDC GLUCOMTR-MCNC: 311 MG/DL — HIGH (ref 70–99)
GLUCOSE BLDC GLUCOMTR-MCNC: 319 MG/DL — HIGH (ref 70–99)
GLUCOSE BLDC GLUCOMTR-MCNC: 343 MG/DL — HIGH (ref 70–99)
GLUCOSE BLDV-MCNC: 307 MG/DL — HIGH (ref 70–99)
GLUCOSE SERPL-MCNC: 320 MG/DL — HIGH (ref 70–99)
GLUCOSE UR QL: ABNORMAL
HBV SURFACE AB SER QL: REACTIVE
HCO3 BLDV-SCNC: 21 MMOL/L — SIGNIFICANT CHANGE UP (ref 21–29)
HCT VFR BLD CALC: 25.7 % — LOW (ref 34.5–45)
HCT VFR BLDA CALC: 26 % — LOW (ref 39–50)
HDLC SERPL-MCNC: 42 MG/DL
HGB BLD CALC-MCNC: 8.3 G/DL — LOW (ref 11.5–15.5)
HGB BLD-MCNC: 8.1 G/DL — LOW (ref 11.5–15.5)
HYALINE CASTS # UR AUTO: 2 /LPF — SIGNIFICANT CHANGE UP (ref 0–2)
IMM GRANULOCYTES NFR BLD AUTO: 0.7 % — SIGNIFICANT CHANGE UP (ref 0–1.5)
KETONES UR-MCNC: NEGATIVE — SIGNIFICANT CHANGE UP
LACTATE BLDV-MCNC: 1.4 MMOL/L — SIGNIFICANT CHANGE UP (ref 0.7–2)
LDLC SERPL CALC-MCNC: 32 MG/DL
LEUKOCYTE ESTERASE UR-ACNC: NEGATIVE — SIGNIFICANT CHANGE UP
LYMPHOCYTES # BLD AUTO: 1.16 K/UL — SIGNIFICANT CHANGE UP (ref 1–3.3)
LYMPHOCYTES # BLD AUTO: 19.3 % — SIGNIFICANT CHANGE UP (ref 13–44)
MCHC RBC-ENTMCNC: 28.2 PG — SIGNIFICANT CHANGE UP (ref 27–34)
MCHC RBC-ENTMCNC: 31.5 GM/DL — LOW (ref 32–36)
MCV RBC AUTO: 89.5 FL — SIGNIFICANT CHANGE UP (ref 80–100)
MONOCYTES # BLD AUTO: 0.49 K/UL — SIGNIFICANT CHANGE UP (ref 0–0.9)
MONOCYTES NFR BLD AUTO: 8.1 % — SIGNIFICANT CHANGE UP (ref 2–14)
NEUTROPHILS # BLD AUTO: 4.3 K/UL — SIGNIFICANT CHANGE UP (ref 1.8–7.4)
NEUTROPHILS NFR BLD AUTO: 71.4 % — SIGNIFICANT CHANGE UP (ref 43–77)
NITRITE UR-MCNC: NEGATIVE — SIGNIFICANT CHANGE UP
NRBC # BLD: 0 /100 WBCS — SIGNIFICANT CHANGE UP (ref 0–0)
PCO2 BLDV: 40 MMHG — SIGNIFICANT CHANGE UP (ref 35–50)
PH BLDV: 7.35 — SIGNIFICANT CHANGE UP (ref 7.35–7.45)
PH UR: 6 — SIGNIFICANT CHANGE UP (ref 5–8)
PLATELET # BLD AUTO: 177 K/UL — SIGNIFICANT CHANGE UP (ref 150–400)
PO2 BLDV: 42 MMHG — SIGNIFICANT CHANGE UP (ref 25–45)
POTASSIUM BLDV-SCNC: 4.9 MMOL/L — SIGNIFICANT CHANGE UP (ref 3.5–5.3)
POTASSIUM SERPL-MCNC: 5.2 MMOL/L — SIGNIFICANT CHANGE UP (ref 3.5–5.3)
POTASSIUM SERPL-SCNC: 5.2 MMOL/L — SIGNIFICANT CHANGE UP (ref 3.5–5.3)
PROT SERPL-MCNC: 7.2 G/DL — SIGNIFICANT CHANGE UP (ref 6–8.3)
PROT UR-MCNC: ABNORMAL
RBC # BLD: 2.87 M/UL — LOW (ref 3.8–5.2)
RBC # FLD: 16.2 % — HIGH (ref 10.3–14.5)
RBC CASTS # UR COMP ASSIST: 2 /HPF — SIGNIFICANT CHANGE UP (ref 0–4)
SAO2 % BLDV: 74 % — SIGNIFICANT CHANGE UP (ref 67–88)
SODIUM SERPL-SCNC: 129 MMOL/L — LOW (ref 135–145)
SP GR SPEC: 1.01 — SIGNIFICANT CHANGE UP (ref 1.01–1.02)
TRIGL SERPL-MCNC: 297 MG/DL
TSH SERPL-ACNC: 1.28 UIU/ML
UROBILINOGEN FLD QL: NEGATIVE — SIGNIFICANT CHANGE UP
VIT B12 SERPL-MCNC: 596 PG/ML
WBC # BLD: 6.02 K/UL — SIGNIFICANT CHANGE UP (ref 3.8–10.5)
WBC # FLD AUTO: 6.02 K/UL — SIGNIFICANT CHANGE UP (ref 3.8–10.5)
WBC UR QL: 7 /HPF — HIGH (ref 0–5)

## 2020-01-10 PROCEDURE — 99222 1ST HOSP IP/OBS MODERATE 55: CPT | Mod: GC

## 2020-01-10 PROCEDURE — 99221 1ST HOSP IP/OBS SF/LOW 40: CPT | Mod: AI

## 2020-01-10 PROCEDURE — 99223 1ST HOSP IP/OBS HIGH 75: CPT | Mod: GC

## 2020-01-10 PROCEDURE — 99285 EMERGENCY DEPT VISIT HI MDM: CPT

## 2020-01-10 PROCEDURE — 93010 ELECTROCARDIOGRAM REPORT: CPT

## 2020-01-10 RX ORDER — DEXTROSE 50 % IN WATER 50 %
15 SYRINGE (ML) INTRAVENOUS ONCE
Refills: 0 | Status: DISCONTINUED | OUTPATIENT
Start: 2020-01-10 | End: 2020-01-14

## 2020-01-10 RX ORDER — FAMOTIDINE 10 MG/ML
20 INJECTION INTRAVENOUS DAILY
Refills: 0 | Status: DISCONTINUED | OUTPATIENT
Start: 2020-01-10 | End: 2020-01-14

## 2020-01-10 RX ORDER — TAMOXIFEN CITRATE 20 MG/1
20 TABLET, FILM COATED ORAL DAILY
Refills: 0 | Status: DISCONTINUED | OUTPATIENT
Start: 2020-01-10 | End: 2020-01-14

## 2020-01-10 RX ORDER — NIFEDIPINE 30 MG
60 TABLET, EXTENDED RELEASE 24 HR ORAL DAILY
Refills: 0 | Status: DISCONTINUED | OUTPATIENT
Start: 2020-01-10 | End: 2020-01-14

## 2020-01-10 RX ORDER — ASPIRIN/CALCIUM CARB/MAGNESIUM 324 MG
81 TABLET ORAL DAILY
Refills: 0 | Status: DISCONTINUED | OUTPATIENT
Start: 2020-01-10 | End: 2020-01-14

## 2020-01-10 RX ORDER — SODIUM CHLORIDE 9 MG/ML
1000 INJECTION, SOLUTION INTRAVENOUS
Refills: 0 | Status: DISCONTINUED | OUTPATIENT
Start: 2020-01-10 | End: 2020-01-14

## 2020-01-10 RX ORDER — METOPROLOL TARTRATE 50 MG
50 TABLET ORAL
Refills: 0 | Status: DISCONTINUED | OUTPATIENT
Start: 2020-01-10 | End: 2020-01-14

## 2020-01-10 RX ORDER — DEXTROSE 50 % IN WATER 50 %
25 SYRINGE (ML) INTRAVENOUS ONCE
Refills: 0 | Status: DISCONTINUED | OUTPATIENT
Start: 2020-01-10 | End: 2020-01-14

## 2020-01-10 RX ORDER — MYCOPHENOLATE MOFETIL 250 MG/1
1000 CAPSULE ORAL
Refills: 0 | Status: DISCONTINUED | OUTPATIENT
Start: 2020-01-10 | End: 2020-01-12

## 2020-01-10 RX ORDER — INSULIN LISPRO 100/ML
2 VIAL (ML) SUBCUTANEOUS
Refills: 0 | Status: DISCONTINUED | OUTPATIENT
Start: 2020-01-10 | End: 2020-01-11

## 2020-01-10 RX ORDER — NYSTATIN 500MM UNIT
500000 POWDER (EA) MISCELLANEOUS
Refills: 0 | Status: DISCONTINUED | OUTPATIENT
Start: 2020-01-10 | End: 2020-01-14

## 2020-01-10 RX ORDER — DEXTROSE 50 % IN WATER 50 %
12.5 SYRINGE (ML) INTRAVENOUS ONCE
Refills: 0 | Status: DISCONTINUED | OUTPATIENT
Start: 2020-01-10 | End: 2020-01-14

## 2020-01-10 RX ORDER — INSULIN GLARGINE 100 [IU]/ML
8 INJECTION, SOLUTION SUBCUTANEOUS AT BEDTIME
Refills: 0 | Status: DISCONTINUED | OUTPATIENT
Start: 2020-01-10 | End: 2020-01-11

## 2020-01-10 RX ORDER — INSULIN LISPRO 100/ML
VIAL (ML) SUBCUTANEOUS
Refills: 0 | Status: DISCONTINUED | OUTPATIENT
Start: 2020-01-10 | End: 2020-01-14

## 2020-01-10 RX ORDER — TACROLIMUS 5 MG/1
7 CAPSULE ORAL
Refills: 0 | Status: DISCONTINUED | OUTPATIENT
Start: 2020-01-11 | End: 2020-01-12

## 2020-01-10 RX ORDER — PHENYLEPHRINE-SHARK LIVER OIL-MINERAL OIL-PETROLATUM RECTAL OINTMENT
1 OINTMENT (GRAM) RECTAL THREE TIMES A DAY
Refills: 0 | Status: DISCONTINUED | OUTPATIENT
Start: 2020-01-10 | End: 2020-01-14

## 2020-01-10 RX ORDER — INFLUENZA VIRUS VACCINE 15; 15; 15; 15 UG/.5ML; UG/.5ML; UG/.5ML; UG/.5ML
0.5 SUSPENSION INTRAMUSCULAR ONCE
Refills: 0 | Status: DISCONTINUED | OUTPATIENT
Start: 2020-01-10 | End: 2020-01-14

## 2020-01-10 RX ORDER — INSULIN LISPRO 100/ML
VIAL (ML) SUBCUTANEOUS
Refills: 0 | Status: DISCONTINUED | OUTPATIENT
Start: 2020-01-10 | End: 2020-01-10

## 2020-01-10 RX ORDER — VALGANCICLOVIR 450 MG/1
450 TABLET, FILM COATED ORAL
Refills: 0 | Status: DISCONTINUED | OUTPATIENT
Start: 2020-01-10 | End: 2020-01-13

## 2020-01-10 RX ORDER — GLUCAGON INJECTION, SOLUTION 0.5 MG/.1ML
1 INJECTION, SOLUTION SUBCUTANEOUS ONCE
Refills: 0 | Status: DISCONTINUED | OUTPATIENT
Start: 2020-01-10 | End: 2020-01-14

## 2020-01-10 RX ORDER — SODIUM BICARBONATE 1 MEQ/ML
650 SYRINGE (ML) INTRAVENOUS
Refills: 0 | Status: DISCONTINUED | OUTPATIENT
Start: 2020-01-10 | End: 2020-01-14

## 2020-01-10 RX ADMIN — MYCOPHENOLATE MOFETIL 1000 MILLIGRAM(S): 250 CAPSULE ORAL at 18:38

## 2020-01-10 RX ADMIN — Medication 4: at 17:06

## 2020-01-10 RX ADMIN — Medication 650 MILLIGRAM(S): at 18:38

## 2020-01-10 RX ADMIN — Medication 2 UNIT(S): at 17:06

## 2020-01-10 RX ADMIN — Medication 2: at 19:34

## 2020-01-10 RX ADMIN — Medication 500000 UNIT(S): at 18:38

## 2020-01-10 RX ADMIN — INSULIN GLARGINE 8 UNIT(S): 100 INJECTION, SOLUTION SUBCUTANEOUS at 21:38

## 2020-01-10 RX ADMIN — PHENYLEPHRINE-SHARK LIVER OIL-MINERAL OIL-PETROLATUM RECTAL OINTMENT 1 APPLICATION(S): at 23:22

## 2020-01-10 RX ADMIN — Medication 2 UNIT(S): at 19:34

## 2020-01-10 RX ADMIN — Medication 500000 UNIT(S): at 23:23

## 2020-01-10 NOTE — CONSULT NOTE ADULT - PROBLEM SELECTOR RECOMMENDATION 9
NODAT after renal transplant. Glucocorticoids and calcineurin inhibitors such as tacrolimus puts patients at higher risk for nodat. Now with random glucose >200 and symptomatic. Not in DKA.   -start lantus 8 units qhs  -start humalog 2 units tidac  --start humalog low dose sc sliding scale TIDAC.   -start humalog low dose sc sliding scale qhs   -nutrition consult    discharge  Prandin 0.5 mg TIDAC   [facility]  [address] [place] [address] [address], [place] [address] [phone] NODAT after renal transplant. Glucocorticoids and calcineurin inhibitors such as tacrolimus puts patients at higher risk for nodat. Now with random glucose >200 and symptomatic. Not in DKA.   -start lantus 8 units qhs  -start humalog 2 units tidac  --start humalog low dose sc sliding scale TIDAC.   -start humalog low dose sc sliding scale qhs   -nutrition consult    discharge  Prandin 0.5 mg TIDAC or basal +prandin is insulin requirement is high  [facility]  [address] [place] [address] [address], [place] [address]  [phone]

## 2020-01-10 NOTE — H&P ADULT - NSHPLABSRESULTS_GEN_ALL_CORE
8.1    6.02  )-----------( 177      ( 10 Andreas 2020 10:59 )             25.7     01-10    129<L>  |  98  |  56<H>  ----------------------------<  320<H>  5.2   |  19<L>  |  3.42<H>    Ca    10.3      10 Andreas 2020 10:59    TPro  7.2  /  Alb  4.0  /  TBili  0.3  /  DBili  x   /  AST  22  /  ALT  8<L>  /  AlkPhos  55  01-10

## 2020-01-10 NOTE — CONSULT NOTE ADULT - PROBLEM SELECTOR PROBLEM 1
Hyperglycemia
Type 2 diabetes mellitus without complication, without long-term current use of insulin

## 2020-01-10 NOTE — H&P ADULT - ATTENDING COMMENTS
s/p DDRt last month admitted with hyperglycemia (400) and hyponatremia  endocrinology seeing pt  started on insulin  renal function stable  Immunosuppression- Envarsus 7mg daily, prednisone 5mg daily, cellcept 500mg bid

## 2020-01-10 NOTE — ED ADULT NURSE NOTE - OBJECTIVE STATEMENT
73 y/o female sent in by her doctor for elevated blood glucose level yesterday evening. Pt. had a renal transplant on December 7th, and went for routine blood work last night when her blood glucose was found to be in the 400s. AOx4, ambulatory, with daughter at the bedside. No chest pain, fever, chills, nausea, vomiting. Pt. complains of shortness of breath walking to and from the bathroom that started after the transplant. Pt. also reports decreased appetite, unintentional weight loss, and increased urine output since the transplant. Urine normal in color. Pt. had been getting dialysis x17 years prior to the transplant. Pt. does not complain of any pain at this time. 20G R forearm. Pt. placed in hospital gown, bed locked and in the lowest position, call bell within reach. 73 y/o female sent in by her doctor for elevated blood glucose level yesterday evening. Pt. had a renal transplant on December 7th, and went for routine blood work last night when her blood glucose was found to be in the 400s. AOx4, ambulatory, with daughter at the bedside. No chest pain, fever, chills, nausea, vomiting. Pt. complains of shortness of breath walking to and from the bathroom that started after the transplant. Pt. also reports decreased appetite, unintentional weight loss, and increased urine output since the transplant. Urine normal in color. Pt. had been getting dialysis x17 years prior to the transplant, AV shunt NENA. Pt. does not complain of any pain at this time. 20G R forearm. Pt. placed in hospital gown, bed locked and in the lowest position, call bell within reach.

## 2020-01-10 NOTE — ED PROVIDER NOTE - ENMT, MLM
Airway patent,  Mouth with dry mucosa. Throat has no vesicles, no oropharyngeal exudates and uvula is midline.

## 2020-01-10 NOTE — CONSULT NOTE ADULT - PROBLEM SELECTOR RECOMMENDATION 2
Pt. with DDRT on 12/7/19 at [facility], c/b DGF however no longer on HD. Scr improving slowly. Home immunosuppression consists of Envarsus 7 mg daily, MMF 1 G BID, and prednisone 5 mg. Continue current immunosuppression. Check daily tacrolimus trough level 30 minutes before Envarsus dose. Goal FK level between 8-10.
controlled on nifedipine and metoprolol

## 2020-01-10 NOTE — ED ADULT NURSE NOTE - NSIMPLEMENTINTERV_GEN_ALL_ED
Implemented All Fall Risk Interventions:  [place] to call system. Call bell, personal items and telephone within reach. Instruct patient to call for assistance. Room bathroom lighting operational. Non-slip footwear when patient is off stretcher. Physically safe environment: no spills, clutter or unnecessary equipment. Stretcher in lowest position, wheels locked, appropriate side rails in place. Provide visual cue, wrist band, yellow gown, etc. Monitor gait and stability. Monitor for mental status changes and reorient to person, place, and time. Review medications for side effects contributing to fall risk. Reinforce activity limits and safety measures with patient and family.

## 2020-01-10 NOTE — ED PROVIDER NOTE - CLINICAL SUMMARY MEDICAL DECISION MAKING FREE TEXT BOX
75 yo F with pmhx polycystic kidney disease, htn, breast ca(in remission), and sp kidney transplant 12/7/19 presenting with hyperglycemia as per outpatient bloodwork. Admits to fatigue, polydipsia, polyuria and weight loss. Will obtain labs, ekg, urine and reassess pending results. 73 yo F with pmhx polycystic kidney disease, htn, breast ca(in remission), and sp kidney transplant 12/7/19 presenting with hyperglycemia as per outpatient bloodwork. Admits to fatigue, polydipsia, polyuria and weight loss. Will obtain labs, ekg, urine and reassess pending results.  Lori: 74 year old female with pmhx of PKD, htn, breat cx, s/p kidney transplant 12/7/19 here with hyperglycemia found on outpatient bw. admits to fatigue, weight loss, polydipsia, will get labs, ua, ekg, reassess

## 2020-01-10 NOTE — ED PROVIDER NOTE - PSH
Adrenal mass  excison 2015  History of fracture of right ankle  2014 repaired  S/P arteriovenous (AV) fistula creation  2002  S/P hysterectomy  1994  S/P lumpectomy, left breast  2014

## 2020-01-10 NOTE — H&P ADULT - NSHPATTENDINGPLANDISCUSS_GEN_ALL_CORE
Patient seen on multidisciplinary rounds with Transplant surgeons, nephrologist, NP/PA, pharmacist, and nurse.

## 2020-01-10 NOTE — ED ADULT TRIAGE NOTE - CHIEF COMPLAINT QUOTE
high glucose from blood work yesterday; unintentional weight loss over past 3 weeks; hx renal transplant December

## 2020-01-10 NOTE — CONSULT NOTE ADULT - ASSESSMENT
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     Consulted for new onset diabetes after kidney transplant. 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     Consulted for new onset diabetes after kidney transplant with glucose values > 300 (high risk patient with high level decision-making). Rhomboid Transposition Flap Text: The defect edges were debeveled with a #15 scalpel blade.  Given the location of the defect and the proximity to free margins a rhomboid transposition flap was deemed most appropriate.  Using a sterile surgical marker, an appropriate rhomboid flap was drawn incorporating the defect.    The area thus outlined was incised deep to adipose tissue with a #15 scalpel blade.  The skin margins were undermined to an appropriate distance in all directions utilizing iris scissors.

## 2020-01-10 NOTE — CONSULT NOTE ADULT - ATTENDING COMMENTS
Kidney transplant recipient with functioning renal allograft  Prolonged ESRD prior to transplant  DGF, improving creatinine, still elevated  DM , with severe hyperglycemia, being started on insulin, weight loss post transplant  Plan:  IV hydration, Insulin  Reviewed immunosuppression, if w/u suggestive of infection/weight loss persists, will decrease MMF dose to 500mg/dose  Endocrine consult, diabetic education  Chest X ray, blood culture, amylase, lipase, TSH  Abdominal sonogram  Will follow  I was present during and reviewed clinical and lab data as well as assessment and plan as documented by the housestaff as noted. Please contact if any additional questions with any change in clinical condition or on availability of any additional information or reports.
Agree with assessment and plan as above by Dr. Escobar. Reviewed all pertinent labs, glucose values, and imaging studies. Modifications made as indicated above.     Duke Plascencia D.O  [phone]

## 2020-01-10 NOTE — H&P ADULT - HISTORY OF PRESENT ILLNESS
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 Post op course c/b DGF requiring HD, graft function improved/responded to diuretics. HD held on dc.     Sent to ED from clinic with hyperglycemia (Gluc on chem 415). No prior h/o DM, not on insulin at home. In ED:  , K 5.2, Gluc 320, . UA: gluc > 500  Reports poor appetite, feeling very weak post transplant; wt loss and dyspnea on exertion.   Making good urine, no lower extrem edema    Donor Info: Age 54, ABO B, KDPI 75%, Terminal Cr 1.7, CMV(+), EBV(+)    OR: DDRT to R iliac fossa, Simulect induction. 1A, 1V, 1U. Ureter stented. Stent sutured to johnson. 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 Post op course c/b DGF requiring HD, graft function improved/responded to diuretics. HD held on dc.     Sent to ED from clinic with hyperglycemia (Gluc on chem 415). No prior h/o DM, not on insulin at home. In ED:  , K 5.2, Gluc 320, . UA: gluc > 500  Reports poor appetite, feeling very weak post transplant; wt loss and dyspnea on exertion.   Making good urine, no lower extrem edema    Immuno: Env 7mg, MMF 1g bid, Pred 5    Donor Info: Age 54, ABO B, KDPI 75%, Terminal Cr 1.7, CMV(+), EBV(+)    OR: DDRT to R iliac fossa, Simulect induction. 1A, 1V, 1U. Ureter stented. Stent sutured to johnson. CIT 13.5Hrs  Enlarged, lymph node was encountered during iliac dissection was removed and sent for pathology (benign)

## 2020-01-10 NOTE — CONSULT NOTE ADULT - SUBJECTIVE AND OBJECTIVE BOX
[facility] DIVISION OF KIDNEY DISEASES AND HYPERTENSION -- INITIAL CONSULT NOTE  --------------------------------------------------------------------------------  HPI:        PAST HISTORY  --------------------------------------------------------------------------------  PAST MEDICAL & SURGICAL HISTORY:  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  Pancreatic cyst  Thyroid nodule: yearly Ultrasound, monitoring yearly  Anuria  ESRD on hemodialysis  Breast cancer, female: left 2014  H/O gastroesophageal reflux (GERD)  Thrombocytopenia: leukopenia: followed by [facility], plan for BMBx 7/22/19  Anemia in ESRD (end-stage renal disease)  Hypertension  History of fracture of right ankle: 2014 repaired  S/P arteriovenous (AV) fistula creation: 2002  S/P hysterectomy: 1994  Adrenal mass: excison 2015  S/P lumpectomy, left breast: 2014    FAMILY HISTORY:    PAST SOCIAL HISTORY:    ALLERGIES & MEDICATIONS  --------------------------------------------------------------------------------  Allergies    ChloraPrep One-Step (Rash)  penicillins (Rash)  shellfish (Rash)    Intolerances      Standing Inpatient Medications    PRN Inpatient Medications      REVIEW OF SYSTEMS  --------------------------------------------------------------------------------  Gen: No weight changes, fatigue, fevers/chills, weakness  Skin: No rashes  Respiratory: No dyspnea, cough, wheezing, hemoptysis  CV: No chest pain, PND, orthopnea  GI: No abdominal pain, diarrhea, constipation, nausea, vomiting, melena, hematochezia  : No increased frequency, dysuria, hematuria, nocturia  MSK: No joint pain/swelling; no back pain; no edema  Neuro: No dizziness/lightheadedness, weakness  Heme: No easy bruising or bleeding      All other systems were reviewed and are negative, except as noted.    VITALS/PHYSICAL EXAM  --------------------------------------------------------------------------------  T(C): 37.1 (01-10-20 @ 09:45), Max: 37.1 (01-10-20 @ 09:45)  HR: 81 (01-10-20 @ 09:45) (81 - 81)  BP: 128/67 (01-10-20 @ 09:45) (128/67 - 128/67)  RR: 18 (01-10-20 @ 09:45) (18 - 18)  SpO2: 98% (01-10-20 @ 09:45) (98% - 98%)  Wt(kg): --  Height (cm): 162.56 (01-10-20 @ 09:45)  Weight (kg): 56.2 (01-10-20 @ 09:45)  BMI (kg/m2): 21.3 (01-10-20 @ 09:45)  BSA (m2): 1.6 (01-10-20 @ 09:45)      Physical Exam:  	Gen: NAD, well-appearing  	HEENT: PERRL, supple neck, clear oropharynx  	Pulm: CTA B/L  	CV: RRR, S1S2; no rub  	Back: No spinal or CVA tenderness; no sacral edema  	Abd: +BS, soft, nontender/nondistended  	: No suprapubic tenderness  	LE: Warm, FROM, no clubbing, intact strength; no edema  	Skin: Warm, without rashes  	Vascular access:    LABS/STUDIES  --------------------------------------------------------------------------------              8.1    6.02  >-----------<  177      [01-10-20 @ 10:59]              25.7     129  |  98  |  56  ----------------------------<  320      [01-10-20 @ 10:59]  5.2   |  19  |  3.42        Ca     10.3     [01-10-20 @ 10:59]    TPro  7.2  /  Alb  4.0  /  TBili  0.3  /  DBili  x   /  AST  22  /  ALT  8   /  AlkPhos  55  [01-10-20 @ 10:59]          Creatinine Trend:  SCr 3.42 [01-10 @ 10:59]  SCr 6.30 [12-14 @ 06:55]  SCr 6.79 [12-13 @ 06:00]  SCr 6.88 [12-12 @ 22:39]  SCr 7.35 [12-12 @ 06:54]    Urinalysis - [01-10-20 @ 11:27]      Color Light Yellow / Appearance Clear / SG 1.015 / pH 6.0      Gluc 500 mg/dL / Ketone Negative  / Bili Negative / Urobili Negative       Blood Negative / Protein 30 mg/dL / Leuk Est Negative / Nitrite Negative      RBC 2 / WBC 7 / Hyaline 2 / Gran  / Sq Epi  / Non Sq Epi 4 / Bacteria Few      HbA1c 4.4      [12-08-19 @ 12:44]  TSH 0.23      [12-11-19 @ 16:05]  Lipid: chol 116, TG 48, HDL 64, LDL 42      [12-13-19 @ 08:10]    HBsAg Nonreact      [12-08-19 @ 04:50]  HCV 0.08, Nonreact      [12-08-19 @ 04:50]    Free Light Chains: kappa 19.60, lambda 2.19, ratio = 8.95      [12-13 @ 08:13] [facility] DIVISION OF KIDNEY DISEASES AND HYPERTENSION -- INITIAL CONSULT NOTE  --------------------------------------------------------------------------------  HPI: 75 yo F with ESRD s/p DDRT on 12/7/19 c/b DGF however now off HD, HTN, and no previous history of DM was referred to the ER with findings of uncontrolled hyperglycemia on outpatient labs. Pt. found to have elevated serum glucose of 320 on BMP during admission (1/10/20), and UA with significant glucosuria. Scr noted to be elevated at 3.42. Transplant nephrology team consulted for immunosuppression management.    Pt. seen and examined at bedside in ER with daughter present. Pt. has not been eating and drinking well since her transplant, having lost about 25 pounds since. Pt. also with exertional dyspnea, which has progressively worsened. Otherwise pt. has been able to take her home immunosuppression without concern. Pt. was seen in transplant clinic yesterday where her serum glucose was 415, serum potassium was mildly elevated to 5.6, and Scr was increased but improved to 3.58. [facility] pt. with slowly improving Scr. Pt. denies CP, N/V/F/C.    Donor Info: Age 54, ABO B, KDPI 75%, X-clamp 20:39 12/6, Terminal Cr 1.7, CMV(+), EBV(+)  Recipient Info: ABO B+, CMV(+), EBV(+), CPRA 1%  CIT 13.5 hours    PAST HISTORY  --------------------------------------------------------------------------------  PAST MEDICAL & SURGICAL HISTORY:  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  Pancreatic cyst  Thyroid nodule: yearly Ultrasound, monitoring yearly  Anuria  ESRD on hemodialysis  Breast cancer, female: left 2014  H/O gastroesophageal reflux (GERD)  Thrombocytopenia: leukopenia: followed by heme, plan for BMBx 7/22/19  Anemia in ESRD (end-stage renal disease)  Hypertension  History of fracture of right ankle: 2014 repaired  S/P arteriovenous (AV) fistula creation: 2002  S/P hysterectomy: 1994  Adrenal mass: excison 2015  S/P lumpectomy, left breast: 2014    FAMILY HISTORY:  Denies family history of kidney disease    PAST SOCIAL HISTORY:  Denies alcohol/tobacco use    ALLERGIES & MEDICATIONS  --------------------------------------------------------------------------------  Allergies    ChloraPrep One-Step (Rash)  penicillins (Rash)  shellfish (Rash)    Intolerances    Standing Inpatient Medications    PRN Inpatient Medications    REVIEW OF SYSTEMS  --------------------------------------------------------------------------------  Gen: + lethargy, + fatigue, + weight loss/poor appetite  Respiratory: + BORJA  CV: No chest pain  GI: No abdominal pain  MSK: No LE edema  Neuro: No dizziness  Heme: No bleeding  Psych: No depression  Skin: No rashes    All other systems were reviewed and are negative, except as noted.    VITALS/PHYSICAL EXAM  --------------------------------------------------------------------------------  T(C): 37.1 (01-10-20 @ 09:45), Max: 37.1 (01-10-20 @ 09:45)  HR: 81 (01-10-20 @ 09:45) (81 - 81)  BP: 128/67 (01-10-20 @ 09:45) (128/67 - 128/67)  RR: 18 (01-10-20 @ 09:45) (18 - 18)  SpO2: 98% (01-10-20 @ 09:45) (98% - 98%)  Wt(kg): --  Height (cm): 162.56 (01-10-20 @ 09:45)  Weight (kg): 56.2 (01-10-20 @ 09:45)  BMI (kg/m2): 21.3 (01-10-20 @ 09:45)  BSA (m2): 1.6 (01-10-20 @ 09:45)    Physical Exam:  	Gen: NAD, thin female  	HEENT: Supple neck  	Pulm: CTA B/L  	CV: RRR, S1S2; no rub  	Abd: +BS, soft, nontender/nondistended  		Transplant: no tenderness  	: No suprapubic tenderness  	LE: No edema b/l  	Skin: Warm, without rashes    LABS/STUDIES  --------------------------------------------------------------------------------              8.1    6.02  >-----------<  177      [01-10-20 @ 10:59]              25.7     129  |  98  |  56  ----------------------------<  320      [01-10-20 @ 10:59]  5.2   |  19  |  3.42        Ca     10.3     [01-10-20 @ 10:59]    Creatinine Trend:  SCr 3.42 [01-10 @ 10:59]  SCr 6.30 [12-14 @ 06:55]  SCr 6.79 [12-13 @ 06:00]  SCr 6.88 [12-12 @ 22:39]  SCr 7.35 [12-12 @ 06:54]    Urinalysis - [01-10-20 @ 11:27]      Color Light Yellow / Appearance Clear / SG 1.015 / pH 6.0      Gluc 500 mg/dL / Ketone Negative  / Bili Negative / Urobili Negative       Blood Negative / Protein 30 mg/dL / Leuk Est Negative / Nitrite Negative      RBC 2 / WBC 7 / Hyaline 2 / Gran  / Sq Epi  / Non Sq Epi 4 / Bacteria Few [facility] DIVISION OF KIDNEY DISEASES AND HYPERTENSION -- INITIAL CONSULT NOTE  --------------------------------------------------------------------------------  HPI: 75 yo F with ESRD s/p DDRT on 12/7/19 c/b DGF however now off HD, HTN, and no previous history of DM was referred to the ER with findings of uncontrolled hyperglycemia on outpatient labs. Pt. found to have elevated serum glucose of 320 on BMP during admission (1/10/20), and UA with significant glucosuria. Scr noted to be elevated at 3.42. Transplant nephrology team consulted for immunosuppression management.    Pt. seen and examined at bedside in ER with daughter present. Pt. has not been eating and drinking well since her transplant, having lost about 25 pounds since. Pt. also with exertional dyspnea, which has progressively worsened. Otherwise pt. has been able to take her home immunosuppression without concern. Pt. was seen in transplant clinic yesterday where her serum glucose was 415, serum potassium was mildly elevated to 5.6, and Scr was increased but improved to 3.58. [facility] pt. with slowly improving Scr. Pt. denies CP, N/V/F/C.    Donor Info: Age 54, ABO B, KDPI 75%, X-clamp 20:39 12/6, Terminal Cr 1.7, CMV(+), EBV(+)  Recipient Info: ABO B+, CMV(+), EBV(+), CPRA 1%  CIT 13.5 hours    PAST HISTORY  --------------------------------------------------------------------------------  PAST MEDICAL & SURGICAL HISTORY:  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  Pancreatic cyst  Thyroid nodule: yearly Ultrasound, monitoring yearly  Anuria  ESRD on hemodialysis  Breast cancer, female: left 2014  H/O gastroesophageal reflux (GERD)  Thrombocytopenia: leukopenia: followed by heme, plan for BMBx 7/22/19  Anemia in ESRD (end-stage renal disease)  Hypertension  History of fracture of right ankle: 2014 repaired  S/P arteriovenous (AV) fistula creation: 2002  S/P hysterectomy: 1994  Adrenal mass: excison 2015  S/P lumpectomy, left breast: 2014    FAMILY HISTORY:  Denies family history of kidney disease    PAST SOCIAL HISTORY:  Denies alcohol/tobacco use    ALLERGIES & MEDICATIONS  --------------------------------------------------------------------------------  Allergies    ChloraPrep One-Step (Rash)  penicillins (Rash)  shellfish (Rash)    Intolerances    Standing Inpatient Medications    PRN Inpatient Medications    REVIEW OF SYSTEMS  --------------------------------------------------------------------------------  Gen: + lethargy, + fatigue, + weight loss/poor appetite  Respiratory: + BORJA  CV: No chest pain  GI: No abdominal pain  MSK: No LE edema  Neuro: No dizziness  Heme: No bleeding  Psych: No depression  Skin: No rashes    All other systems were reviewed and are negative, except as noted.    VITALS/PHYSICAL EXAM  --------------------------------------------------------------------------------  T(C): 37.1 (01-10-20 @ 09:45), Max: 37.1 (01-10-20 @ 09:45)  HR: 81 (01-10-20 @ 09:45) (81 - 81)  BP: 128/67 (01-10-20 @ 09:45) (128/67 - 128/67)  RR: 18 (01-10-20 @ 09:45) (18 - 18)  SpO2: 98% (01-10-20 @ 09:45) (98% - 98%)  Height (cm): 162.56 (01-10-20 @ 09:45)  Weight (kg): 56.2 (01-10-20 @ 09:45)  BMI (kg/m2): 21.3 (01-10-20 @ 09:45)  BSA (m2): 1.6 (01-10-20 @ 09:45)    Physical Exam:  	Gen: NAD, thin female  	HEENT: Supple neck  	Pulm: CTA B/L  	CV: RRR, S1S2; no rub  	Abd: +BS, soft, nontender/nondistended  		Transplant: no tenderness  	: No suprapubic tenderness  	LE: No edema b/l  	Skin: Warm, without rashes    LABS/STUDIES  --------------------------------------------------------------------------------              8.1    6.02  >-----------<  177      [01-10-20 @ 10:59]              25.7     129  |  98  |  56  ----------------------------<  320      [01-10-20 @ 10:59]  5.2   |  19  |  3.42        Ca     10.3     [01-10-20 @ 10:59]    Creatinine Trend:  SCr 3.42 [01-10 @ 10:59]  SCr 6.30 [12-14 @ 06:55]  SCr 6.79 [12-13 @ 06:00]  SCr 6.88 [12-12 @ 22:39]  SCr 7.35 [12-12 @ 06:54]    Urinalysis - [01-10-20 @ 11:27]      Color Light Yellow / Appearance Clear / SG 1.015 / pH 6.0      Gluc 500 mg/dL / Ketone Negative  / Bili Negative / Urobili Negative       Blood Negative / Protein 30 mg/dL / Leuk Est Negative / Nitrite Negative      RBC 2 / WBC 7 / Hyaline 2 / Gran  / Sq Epi  / Non Sq Epi 4 / Bacteria Few

## 2020-01-10 NOTE — ED PROVIDER NOTE - OBJECTIVE STATEMENT
73 yo F with pmhx polycystic kidney disease, htn, breast ca(in remission), and sp kidney transplant 12/7/19 presenting with hyperglycemia as per outpatient blood work. Patient states she has never had DM prior but was told after seeing her nephrologist yesterday that her BS was in the 400s. As per patient, Since surgery patient has lost 30 lbs and is having fatigue worse when she ambulates. Patient admits to polyuria and polydipsia. patient denies cp, sob, cough, fever, abd pain, nvd, dysuria, hematuria, and flank pain

## 2020-01-10 NOTE — CONSULT NOTE ADULT - SUBJECTIVE AND OBJECTIVE BOX
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 Post op course c/b delay graft function requiring HD, graft function improved/responded to diuretics. HD held on discharge. Sent to ED from clinic with hyperglycemia   Currently on mycophenolate Mofetil, prednisone 5mg qday and tacrolimus for renal transplant.     Endocrine History:       PAST MEDICAL & SURGICAL HISTORY:  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  Pancreatic cyst  Thyroid nodule: yearly Ultrasound, monitoring yearly  Anuria  ESRD on hemodialysis  Breast cancer, female: left 2014  H/O gastroesophageal reflux (GERD)  Thrombocytopenia: leukopenia: followed by [facility], plan for BMBx 7/22/19  Anemia in ESRD (end-stage renal disease)  Hypertension  History of fracture of right ankle: 2014 repaired  S/P arteriovenous (AV) fistula creation: 2002  S/P hysterectomy: 1994  Adrenal mass: excison 2015  S/P lumpectomy, left breast: 2014      FAMILY HISTORY:      Social History:    Outpatient Medications: Home Medications:  aspirin 81 mg oral tablet, chewable: 1 tab(s) orally once a day (13 Dec 2019 16:24)  cloNIDine 0.2 mg oral tablet: 1 tab(s) orally every 8 hours (13 Dec 2019 16:24)  famotidine 20 mg oral tablet: 1 tab(s) orally once a day (14 Dec 2019 13:53)  furosemide 40 mg oral tablet: 1 tab(s) orally 2 times a day (13 Dec 2019 16:24)  metoprolol tartrate 50 mg oral tablet: 1 tab(s) orally 2 times a day (13 Dec 2019 16:24)  mycophenolate mofetil 250 mg oral capsule: 1000 milligram(s) orally 2 times a day (13 Dec 2019 16:24)  NIFEdipine 60 mg oral tablet, extended release: 1 tab(s) orally once a day (13 Dec 2019 16:24)  nystatin 100,000 units/mL oral suspension: 5 milliliter(s) orally 4 times a day (13 Dec 2019 16:24)  predniSONE: 5 milligram(s) orally once a day (13 Dec 2019 16:24)  senna oral tablet: 2 tab(s) orally once a day (at bedtime) (13 Dec 2019 16:24)  sulfamethoxazole-trimethoprim 400 mg-80 mg oral tablet: 1 tab(s) orally once a day (13 Dec 2019 16:24)  tacrolimus 4 mg oral tablet, extended release: 3 tab(s) orally  (14 Dec 2019 13:53)  tamoxifen 20 mg oral tablet: 1 tab(s) orally once a day (at bedtime) (13 Dec 2019 16:24)  valGANciclovir 450 mg oral tablet: 1 tab(s) orally Monday and Thursday (13 Dec 2019 16:24)  Vitamin D3 1000 intl units oral capsule: 1 cap(s) orally once a day (09 Jul 2019 12:31)      MEDICATIONS  (STANDING):  aspirin  chewable 81 milliGRAM(s) Oral daily  cloNIDine 0.2 milliGRAM(s) Oral two times a day  dextrose 5%. 1000 milliLiter(s) (50 mL/Hr) IV Continuous <Continuous>  dextrose 50% Injectable 12.5 Gram(s) IV Push once  dextrose 50% Injectable 25 Gram(s) IV Push once  dextrose 50% Injectable 25 Gram(s) IV Push once  famotidine    Tablet 20 milliGRAM(s) Oral daily  influenza   Vaccine 0.5 milliLiter(s) IntraMuscular once  insulin lispro (HumaLOG) corrective regimen sliding scale   SubCutaneous three times a day before meals  metoprolol tartrate 50 milliGRAM(s) Oral two times a day  mycophenolate mofetil 1000 milliGRAM(s) Oral two times a day  NIFEdipine XL 60 milliGRAM(s) Oral daily  nystatin    Suspension 323793 Unit(s) Oral four times a day  sodium bicarbonate 650 milliGRAM(s) Oral two times a day  tamoxifen 20 milliGRAM(s) Oral daily  trimethoprim   80 mG/sulfamethoxazole 400 mG 1 Tablet(s) Oral daily  valGANciclovir 450 milliGRAM(s) Oral <User Schedule>    MEDICATIONS  (PRN):  dextrose 40% Gel 15 Gram(s) Oral once PRN Blood Glucose LESS THAN 70 milliGRAM(s)/deciliter  glucagon  Injectable 1 milliGRAM(s) IntraMuscular once PRN Glucose LESS THAN 70 milligrams/deciliter      Allergies    ChloraPrep One-Step (Rash)  penicillins (Rash)  shellfish (Rash)    Intolerances      Review of Systems:  Constitutional: No fever, chills   Neuro: No tremors, headache   Cardiovascular: No chest pain, palpitations  Respiratory: No SOB, no cough  GI: No nausea, vomiting, abdominal pain  : No dysuria, polyuria   Skin: no rash, ulcers   Psych: no depression, anxiety   Endocrine: no polyphagia, polydipsia     ALL OTHER SYSTEMS REVIEWED AND NEGATIVE        PHYSICAL EXAM:  VITALS: T(C): 36.5 (01-10-20 @ 15:21)  T(F): 97.7 (01-10-20 @ 15:21), Max: 98.8 (01-10-20 @ 09:45)  HR: 65 (01-10-20 @ 15:21) (65 - 86)  BP: 127/61 (01-10-20 @ 15:21) (127/61 - 144/66)  RR:  (16 - 18)  SpO2:  (98% - 99%)  Wt(kg): --  GENERAL: NAD, well-groomed, well-developed  EYES: No proptosis, anicteric  HEENT:  Atraumatic, Normocephalic, moist mucous membranes  THYROID: Normal size, no palpable nodules  RESPIRATORY: Clear to auscultation bilaterally; No rales, rhonchi, wheezing  CARDIOVASCULAR: Regular rate and rhythm; No murmurs  GI: Soft, nontender, non distended, normal bowel sounds  SKIN: Dry, intact, No rashes or lesions  NEURO: AOx3, moves all extremities spontaneously   PSYCH: Reactive affect, euthymic mood    POCT Blood Glucose.: 319 mg/dL (01-10-20 @ 15:26)  POCT Blood Glucose.: 311 mg/dL (01-10-20 @ 14:41)  POCT Blood Glucose.: 288 mg/dL (01-10-20 @ 09:47)                            8.1    6.02  )-----------( 177      ( 10 Andreas 2020 10:59 )             25.7       01-10    129<L>  |  98  |  56<H>  ----------------------------<  320<H>  5.2   |  19<L>  |  3.42<H>    EGFR if : 14<L>  EGFR if non : 12<L>    Ca    10.3      01-10    TPro  7.2  /  Alb  4.0  /  TBili  0.3  /  DBili  x   /  AST  22  /  ALT  8<L>  /  AlkPhos  55  01-10      Thyroid Function Tests:      Hemoglobin A1C, Whole Blood: 4.4 % [4.0 - 5.6] (12-08-19 @ 12:44)      12-13 Chol 116 LDL 42 HDL 64 Trig 48      Radiology: HPI:  75 y/o F with PMHx of HTN, HLD, Gout, LUE DVT, Lumbar radiculopathy, breast CA s/p lumpectomy on tamoxifen, 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 Post op course c/b delay graft function requiring HD, graft function improved/responded to diuretics. HD held on discharge. Sent to ED from clinic with hyperglycemia   Currently on mycophenolate Mofetil, prednisone 5mg qday and tacrolimus for renal transplant.     Endocrine History: No hx of diabetes or prediabetes. Recently had renal transplant in early December now presenting with new onset diabetes. Patient has been having polydipsia and polyuria since her discharge with increased weakness in the past 2 weeks. She lost her appetite and had not been eating much at home. Patient does drink regular ginger ale. Used to walk around a lot but had not been able to do that due to the weakness recently.   No FH of diabetes.       PAST MEDICAL & SURGICAL HISTORY:  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  Pancreatic cyst  Thyroid nodule: yearly Ultrasound, monitoring yearly  Anuria  ESRD on hemodialysis  Breast cancer, female: left 2014  H/O gastroesophageal reflux (GERD)  Thrombocytopenia: leukopenia: followed by heme, plan for BMBx 7/22/19  Anemia in ESRD (end-stage renal disease)  Hypertension  History of fracture of right ankle: 2014 repaired  S/P arteriovenous (AV) fistula creation: 2002  S/P hysterectomy: 1994  Adrenal mass: excison 2015  S/P lumpectomy, left breast: 2014      FAMILY HISTORY:No FH of diabetes.         Social History:No history of tobacco, etoh or illicit drug use.     Outpatient Medications: Home Medications:  aspirin 81 mg oral tablet, chewable: 1 tab(s) orally once a day (13 Dec 2019 16:24)  cloNIDine 0.2 mg oral tablet: 1 tab(s) orally every 8 hours (13 Dec 2019 16:24)  famotidine 20 mg oral tablet: 1 tab(s) orally once a day (14 Dec 2019 13:53)  furosemide 40 mg oral tablet: 1 tab(s) orally 2 times a day (13 Dec 2019 16:24)  metoprolol tartrate 50 mg oral tablet: 1 tab(s) orally 2 times a day (13 Dec 2019 16:24)  mycophenolate mofetil 250 mg oral capsule: 1000 milligram(s) orally 2 times a day (13 Dec 2019 16:24)  NIFEdipine 60 mg oral tablet, extended release: 1 tab(s) orally once a day (13 Dec 2019 16:24)  nystatin 100,000 units/mL oral suspension: 5 milliliter(s) orally 4 times a day (13 Dec 2019 16:24)  predniSONE: 5 milligram(s) orally once a day (13 Dec 2019 16:24)  senna oral tablet: 2 tab(s) orally once a day (at bedtime) (13 Dec 2019 16:24)  sulfamethoxazole-trimethoprim 400 mg-80 mg oral tablet: 1 tab(s) orally once a day (13 Dec 2019 16:24)  tacrolimus 4 mg oral tablet, extended release: 3 tab(s) orally  (14 Dec 2019 13:53)  tamoxifen 20 mg oral tablet: 1 tab(s) orally once a day (at bedtime) (13 Dec 2019 16:24)  valGANciclovir 450 mg oral tablet: 1 tab(s) orally Monday and Thursday (13 Dec 2019 16:24)  Vitamin D3 1000 intl units oral capsule: 1 cap(s) orally once a day (09 Jul 2019 12:31)      MEDICATIONS  (STANDING):  aspirin  chewable 81 milliGRAM(s) Oral daily  cloNIDine 0.2 milliGRAM(s) Oral two times a day  dextrose 5%. 1000 milliLiter(s) (50 mL/Hr) IV Continuous <Continuous>  dextrose 50% Injectable 12.5 Gram(s) IV Push once  dextrose 50% Injectable 25 Gram(s) IV Push once  dextrose 50% Injectable 25 Gram(s) IV Push once  famotidine    Tablet 20 milliGRAM(s) Oral daily  influenza   Vaccine 0.5 milliLiter(s) IntraMuscular once  insulin lispro (HumaLOG) corrective regimen sliding scale   SubCutaneous three times a day before meals  metoprolol tartrate 50 milliGRAM(s) Oral two times a day  mycophenolate mofetil 1000 milliGRAM(s) Oral two times a day  NIFEdipine XL 60 milliGRAM(s) Oral daily  nystatin    Suspension [id number] Unit(s) Oral four times a day  sodium bicarbonate 650 milliGRAM(s) Oral two times a day  tamoxifen 20 milliGRAM(s) Oral daily  trimethoprim   80 mG/sulfamethoxazole 400 mG 1 Tablet(s) Oral daily  valGANciclovir 450 milliGRAM(s) Oral <User Schedule>    MEDICATIONS  (PRN):  dextrose 40% Gel 15 Gram(s) Oral once PRN Blood Glucose LESS THAN 70 milliGRAM(s)/deciliter  glucagon  Injectable 1 milliGRAM(s) IntraMuscular once PRN Glucose LESS THAN 70 milligrams/deciliter      Allergies    ChloraPrep One-Step (Rash)  penicillins (Rash)  shellfish (Rash)    Intolerances      Review of Systems:  Constitutional: No fever, chills. + weakenss   Neuro: No tremors, headache   Cardiovascular: No chest pain, palpitations  Respiratory: No SOB, no cough  GI: No nausea, vomiting, abdominal pain  : No dysuria, + polyuria   Skin: no rash, ulcers   Psych: no depression, anxiety   Endocrine: no polyphagia, + polydipsia     ALL OTHER SYSTEMS REVIEWED AND NEGATIVE        PHYSICAL EXAM:  VITALS: T(C): 36.5 (01-10-20 @ 15:21)  T(F): 97.7 (01-10-20 @ 15:21), Max: 98.8 (01-10-20 @ 09:45)  HR: 65 (01-10-20 @ 15:21) (65 - 86)  BP: 127/61 (01-10-20 @ 15:21) (127/61 - 144/66)  RR:  (16 - 18)  SpO2:  (98% - 99%)  Wt(kg): --  GENERAL: NAD, well-groomed, well-developed  EYES: No proptosis, anicteric  HEENT:  Atraumatic, Normocephalic, moist mucous membranes  RESPIRATORY: Clear to auscultation bilaterally; No rales, rhonchi, wheezing  CARDIOVASCULAR: Regular rate and rhythm; No murmurs  GI: Soft, nontender, non distended, normal bowel sounds  SKIN: Dry, intact, No rashes or lesions  NEURO: AOx3, moves all extremities spontaneously   PSYCH: Reactive affect, euthymic mood    POCT Blood Glucose.: 319 mg/dL (01-10-20 @ 15:26)  POCT Blood Glucose.: 311 mg/dL (01-10-20 @ 14:41)  POCT Blood Glucose.: 288 mg/dL (01-10-20 @ 09:47)                            8.1    6.02  )-----------( 177      ( 10 Andreas 2020 10:59 )             25.7       01-10    129<L>  |  98  |  56<H>  ----------------------------<  320<H>  5.2   |  19<L>  |  3.42<H>    EGFR if : 14<L>  EGFR if non : 12<L>    Ca    10.3      01-10    TPro  7.2  /  Alb  4.0  /  TBili  0.3  /  DBili  x   /  AST  22  /  ALT  8<L>  /  AlkPhos  55  01-10      Thyroid Function Tests:      Hemoglobin A1C, Whole Blood: 4.4 % [4.0 - 5.6] (12-08-19 @ 12:44)      12-13 Chol 116 LDL 42 HDL 64 Trig 48      Radiology:

## 2020-01-10 NOTE — PATIENT PROFILE ADULT - PASTORAL.
pt reports needing a psych eval , reporting hopelessness , depression , panic attack , impulsive behavior , marital issues , denies plan
chaplaincy/clergy/

## 2020-01-10 NOTE — CONSULT NOTE ADULT - PROBLEM SELECTOR RECOMMENDATION 9
Pt. with hyperglycemia and likely development of DM in the setting of renal transplant medication side-effect. Pt. noted to have elevated serum glucose as outpatient, now 320 on admission. Recommend endocrinology team consult. Consider starting ISS and Lantus. Monitor hyponatremia (partially d/t hyperglycemia). ADA diet

## 2020-01-10 NOTE — ED PROVIDER NOTE - PMH
Anemia in ESRD (end-stage renal disease)    Anuria    AV fistula thrombosis  history: was treated with coumadin, no more A/C.  Breast cancer, female  left 2014  ESRD on hemodialysis    H/O gastroesophageal reflux (GERD)    Hypertension    Pancreatic cyst    Thrombocytopenia  leukopenia: followed by heme, plan for BMBx 7/22/19  Thyroid nodule  yearly Ultrasound, monitoring yearly

## 2020-01-10 NOTE — H&P ADULT - ASSESSMENT
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 Post op course c/b DGF requiring HD, graft function improved/responded to diuretics. HD held on dc.     Sent to ED from clinic with hyperglycemia (Gluc on chem 415). No prior h/o DM, not on insulin at home. In ED:  , K 5.2, Gluc 320, . UA: gluc > 500  Reports poor appetite, feeling very weak post transplant; wt loss and dyspnea on exertion.   Making good urine, no lower extrem edema 75 y/o F with PMHx of HTN, HLD, Gout, LUE DVT, Lumbar radiculopathy, breast CA s/p lumpectomy on tamoxifen, ESRD 2/2 PCKD (anuric prior to trasnplant), underwent DDRT on 12/7/2019 with simulect induction (ureteral stent removed on POD 5 with johnson). Post op course c/b DGF requiring HD, graft function improved/responded to diuretics. HD held on dc.  Admitted with Hyperglycemia    [] s/p DDRT c/b DGF, off HD  - Strick I &O  - Immuno: cont Env 7mg daily, MMF 1g bid, Pred 5  - Proph: valcyte/bactrim/nystatin   - Hold Lasix (hyponatremia)   - check EBV PCR  - Repeat BMP tonight     [] Hyperglycemia  - Endocrine consulted; f/u note  - Diabetic diet  - Monitor FS, Started ISS, check HgbA1C    [] HTN  - cont Metoprolol 50mg bid, clinidine 0.2mg bid, nifedipine xl 60mg daily

## 2020-01-10 NOTE — H&P ADULT - NSHPPHYSICALEXAM_GEN_ALL_CORE
Vital signs:   T(C): 36.5 (10 Andreas 2020 15:21), Max: 37.1 (10 Andreas 2020 09:45)  T(F): 97.7 (10 Andreas 2020 15:21), Max: 98.8 (10 Andreas 2020 09:45)  HR: 65 (10 Andreas 2020 15:21) (65 - 86)  BP: 127/61 (10 Andreas 2020 15:21) (127/61 - 144/66)  BP(mean): --  RR: 18 (10 Andreas 2020 15:21) (16 - 18)  SpO2: 98% (10 Andreas 2020 15:21) (98% - 99%)      Constitutional: Well developed / well nourished  Eyes: Anicteric, PERRLA  ENMT: nc/at  Neck: central line *****************  Respiratory: CTA B/L  Cardiovascular: RRR  Gastrointestinal: Soft abdomen, mild tender to touch at surgical site, ND  Genitourinary: Urinary catheter in place*****Voiding spontaneously  Extremities: SCD's in place and working bilaterally  Vascular: Palpable dp pulses bilaterally  Neurological: A&O x3  Skin: Mild serosanguinous eli on wound dressing*******Wound open to air no erythema and evidence of infection noted  Musculoskeletal: Moving all extremities  Psychiatric: Responsive Vital signs:   T(C): 36.5 (10 Andreas 2020 15:21), Max: 37.1 (10 Andreas 2020 09:45)  T(F): 97.7 (10 Andreas 2020 15:21), Max: 98.8 (10 Andreas 2020 09:45)  HR: 65 (10 Andreas 2020 15:21) (65 - 86)  BP: 127/61 (10 Andreas 2020 15:21) (127/61 - 144/66)  BP(mean): --  RR: 18 (10 Andreas 2020 15:21) (16 - 18)  SpO2: 98% (10 Andreas 2020 15:21) (98% - 99%)      Constitutional: Well developed / well nourished  Eyes: Anicteric, PERRLA  ENMT: nc/at  Neck: no JVD  Respiratory: CTA B/L  Cardiovascular: RRR  Gastrointestinal: Soft abdomen, non tender, incision well healed  Genitourinary: Voiding spontaneously  Extremities: SCD's in place and working bilaterally  Vascular: Palpable dp pulses bilaterally  Neurological: A&O x3  Skin: well healed incision   Musculoskeletal: Moving all extremities  Psychiatric: Responsive

## 2020-01-11 DIAGNOSIS — I10 ESSENTIAL (PRIMARY) HYPERTENSION: ICD-10-CM

## 2020-01-11 DIAGNOSIS — N17.0 ACUTE KIDNEY FAILURE WITH TUBULAR NECROSIS: ICD-10-CM

## 2020-01-11 DIAGNOSIS — E87.1 HYPO-OSMOLALITY AND HYPONATREMIA: ICD-10-CM

## 2020-01-11 LAB
ALBUMIN SERPL ELPH-MCNC: 3.8 G/DL — SIGNIFICANT CHANGE UP (ref 3.3–5)
ALP SERPL-CCNC: 54 U/L — SIGNIFICANT CHANGE UP (ref 40–120)
ALT FLD-CCNC: 10 U/L — SIGNIFICANT CHANGE UP (ref 10–45)
ANION GAP SERPL CALC-SCNC: 14 MMOL/L — SIGNIFICANT CHANGE UP (ref 5–17)
ANION GAP SERPL CALC-SCNC: 8 MMOL/L — SIGNIFICANT CHANGE UP (ref 5–17)
ANION GAP SERPL CALC-SCNC: 9 MMOL/L — SIGNIFICANT CHANGE UP (ref 5–17)
AST SERPL-CCNC: 20 U/L — SIGNIFICANT CHANGE UP (ref 10–40)
BASOPHILS # BLD AUTO: 0.01 K/UL — SIGNIFICANT CHANGE UP (ref 0–0.2)
BASOPHILS NFR BLD AUTO: 0.2 % — SIGNIFICANT CHANGE UP (ref 0–2)
BILIRUB SERPL-MCNC: 0.2 MG/DL — SIGNIFICANT CHANGE UP (ref 0.2–1.2)
BUN SERPL-MCNC: 56 MG/DL — HIGH (ref 7–23)
BUN SERPL-MCNC: 58 MG/DL — HIGH (ref 7–23)
BUN SERPL-MCNC: 58 MG/DL — HIGH (ref 7–23)
CALCIUM SERPL-MCNC: 10 MG/DL — SIGNIFICANT CHANGE UP (ref 8.4–10.5)
CALCIUM SERPL-MCNC: 10.1 MG/DL — SIGNIFICANT CHANGE UP (ref 8.4–10.5)
CALCIUM SERPL-MCNC: 9.9 MG/DL — SIGNIFICANT CHANGE UP (ref 8.4–10.5)
CHLORIDE SERPL-SCNC: 96 MMOL/L — SIGNIFICANT CHANGE UP (ref 96–108)
CHLORIDE SERPL-SCNC: 98 MMOL/L — SIGNIFICANT CHANGE UP (ref 96–108)
CHLORIDE SERPL-SCNC: 99 MMOL/L — SIGNIFICANT CHANGE UP (ref 96–108)
CHLORIDE UR-SCNC: <35 MMOL/L — SIGNIFICANT CHANGE UP
CO2 SERPL-SCNC: 18 MMOL/L — LOW (ref 22–31)
CREAT SERPL-MCNC: 3.22 MG/DL — HIGH (ref 0.5–1.3)
CREAT SERPL-MCNC: 3.5 MG/DL — HIGH (ref 0.5–1.3)
CREAT SERPL-MCNC: 3.56 MG/DL — HIGH (ref 0.5–1.3)
CULTURE RESULTS: SIGNIFICANT CHANGE UP
EOSINOPHIL # BLD AUTO: 0.02 K/UL — SIGNIFICANT CHANGE UP (ref 0–0.5)
EOSINOPHIL NFR BLD AUTO: 0.3 % — SIGNIFICANT CHANGE UP (ref 0–6)
GLUCOSE BLDC GLUCOMTR-MCNC: 161 MG/DL — HIGH (ref 70–99)
GLUCOSE BLDC GLUCOMTR-MCNC: 195 MG/DL — HIGH (ref 70–99)
GLUCOSE BLDC GLUCOMTR-MCNC: 222 MG/DL — HIGH (ref 70–99)
GLUCOSE BLDC GLUCOMTR-MCNC: 281 MG/DL — HIGH (ref 70–99)
GLUCOSE SERPL-MCNC: 145 MG/DL — HIGH (ref 70–99)
GLUCOSE SERPL-MCNC: 226 MG/DL — HIGH (ref 70–99)
GLUCOSE SERPL-MCNC: 278 MG/DL — HIGH (ref 70–99)
HBA1C BLD-MCNC: 7.2 % — HIGH (ref 4–5.6)
HCT VFR BLD CALC: 23.7 % — LOW (ref 34.5–45)
HCT VFR BLD CALC: 24 % — LOW (ref 34.5–45)
HGB BLD-MCNC: 7.8 G/DL — LOW (ref 11.5–15.5)
HGB BLD-MCNC: 7.8 G/DL — LOW (ref 11.5–15.5)
IMM GRANULOCYTES NFR BLD AUTO: 0.5 % — SIGNIFICANT CHANGE UP (ref 0–1.5)
LYMPHOCYTES # BLD AUTO: 1.37 K/UL — SIGNIFICANT CHANGE UP (ref 1–3.3)
LYMPHOCYTES # BLD AUTO: 24 % — SIGNIFICANT CHANGE UP (ref 13–44)
MAGNESIUM SERPL-MCNC: 2 MG/DL — SIGNIFICANT CHANGE UP (ref 1.6–2.6)
MAGNESIUM SERPL-MCNC: 2 MG/DL — SIGNIFICANT CHANGE UP (ref 1.6–2.6)
MCHC RBC-ENTMCNC: 28.6 PG — SIGNIFICANT CHANGE UP (ref 27–34)
MCHC RBC-ENTMCNC: 28.6 PG — SIGNIFICANT CHANGE UP (ref 27–34)
MCHC RBC-ENTMCNC: 32.5 GM/DL — SIGNIFICANT CHANGE UP (ref 32–36)
MCHC RBC-ENTMCNC: 32.9 GM/DL — SIGNIFICANT CHANGE UP (ref 32–36)
MCV RBC AUTO: 86.8 FL — SIGNIFICANT CHANGE UP (ref 80–100)
MCV RBC AUTO: 87.9 FL — SIGNIFICANT CHANGE UP (ref 80–100)
MONOCYTES # BLD AUTO: 0.41 K/UL — SIGNIFICANT CHANGE UP (ref 0–0.9)
MONOCYTES NFR BLD AUTO: 7.2 % — SIGNIFICANT CHANGE UP (ref 2–14)
NEUTROPHILS # BLD AUTO: 3.88 K/UL — SIGNIFICANT CHANGE UP (ref 1.8–7.4)
NEUTROPHILS NFR BLD AUTO: 67.8 % — SIGNIFICANT CHANGE UP (ref 43–77)
NRBC # BLD: 0 /100 WBCS — SIGNIFICANT CHANGE UP (ref 0–0)
NRBC # BLD: 0 /100 WBCS — SIGNIFICANT CHANGE UP (ref 0–0)
OSMOLALITY SERPL: 306 MOSMOL/KG — HIGH (ref 280–301)
OSMOLALITY UR: 398 MOS/KG — SIGNIFICANT CHANGE UP (ref 300–900)
PHOSPHATE SERPL-MCNC: 2.9 MG/DL — SIGNIFICANT CHANGE UP (ref 2.5–4.5)
PHOSPHATE SERPL-MCNC: 3.5 MG/DL — SIGNIFICANT CHANGE UP (ref 2.5–4.5)
PLATELET # BLD AUTO: 154 K/UL — SIGNIFICANT CHANGE UP (ref 150–400)
PLATELET # BLD AUTO: 156 K/UL — SIGNIFICANT CHANGE UP (ref 150–400)
POTASSIUM SERPL-MCNC: 4.7 MMOL/L — SIGNIFICANT CHANGE UP (ref 3.5–5.3)
POTASSIUM SERPL-MCNC: 5.2 MMOL/L — SIGNIFICANT CHANGE UP (ref 3.5–5.3)
POTASSIUM SERPL-MCNC: 5.5 MMOL/L — HIGH (ref 3.5–5.3)
POTASSIUM SERPL-SCNC: 4.7 MMOL/L — SIGNIFICANT CHANGE UP (ref 3.5–5.3)
POTASSIUM SERPL-SCNC: 5.2 MMOL/L — SIGNIFICANT CHANGE UP (ref 3.5–5.3)
POTASSIUM SERPL-SCNC: 5.5 MMOL/L — HIGH (ref 3.5–5.3)
POTASSIUM UR-SCNC: 40 MMOL/L — SIGNIFICANT CHANGE UP
PROT SERPL-MCNC: 6.8 G/DL — SIGNIFICANT CHANGE UP (ref 6–8.3)
RBC # BLD: 2.73 M/UL — LOW (ref 3.8–5.2)
RBC # BLD: 2.73 M/UL — LOW (ref 3.8–5.2)
RBC # FLD: 15.9 % — HIGH (ref 10.3–14.5)
RBC # FLD: 16.1 % — HIGH (ref 10.3–14.5)
SODIUM SERPL-SCNC: 123 MMOL/L — LOW (ref 135–145)
SODIUM SERPL-SCNC: 124 MMOL/L — LOW (ref 135–145)
SODIUM SERPL-SCNC: 131 MMOL/L — LOW (ref 135–145)
SODIUM UR-SCNC: <35 MMOL/L — SIGNIFICANT CHANGE UP
SPECIMEN SOURCE: SIGNIFICANT CHANGE UP
TACROLIMUS SERPL-MCNC: 7.1 NG/ML — SIGNIFICANT CHANGE UP
WBC # BLD: 5.72 K/UL — SIGNIFICANT CHANGE UP (ref 3.8–10.5)
WBC # BLD: 7.43 K/UL — SIGNIFICANT CHANGE UP (ref 3.8–10.5)
WBC # FLD AUTO: 5.72 K/UL — SIGNIFICANT CHANGE UP (ref 3.8–10.5)
WBC # FLD AUTO: 7.43 K/UL — SIGNIFICANT CHANGE UP (ref 3.8–10.5)

## 2020-01-11 PROCEDURE — 99232 SBSQ HOSP IP/OBS MODERATE 35: CPT

## 2020-01-11 RX ORDER — INSULIN GLARGINE 100 [IU]/ML
12 INJECTION, SOLUTION SUBCUTANEOUS AT BEDTIME
Refills: 0 | Status: DISCONTINUED | OUTPATIENT
Start: 2020-01-11 | End: 2020-01-12

## 2020-01-11 RX ORDER — INSULIN LISPRO 100/ML
4 VIAL (ML) SUBCUTANEOUS
Refills: 0 | Status: DISCONTINUED | OUTPATIENT
Start: 2020-01-11 | End: 2020-01-14

## 2020-01-11 RX ORDER — SODIUM CHLORIDE 9 MG/ML
1000 INJECTION INTRAMUSCULAR; INTRAVENOUS; SUBCUTANEOUS
Refills: 0 | Status: DISCONTINUED | OUTPATIENT
Start: 2020-01-11 | End: 2020-01-11

## 2020-01-11 RX ADMIN — Medication 2: at 18:40

## 2020-01-11 RX ADMIN — TACROLIMUS 7 MILLIGRAM(S): 5 CAPSULE ORAL at 09:15

## 2020-01-11 RX ADMIN — Medication 81 MILLIGRAM(S): at 12:32

## 2020-01-11 RX ADMIN — SODIUM CHLORIDE 75 MILLILITER(S): 9 INJECTION INTRAMUSCULAR; INTRAVENOUS; SUBCUTANEOUS at 10:56

## 2020-01-11 RX ADMIN — Medication 50 MILLIGRAM(S): at 05:33

## 2020-01-11 RX ADMIN — Medication 0.1 MILLIGRAM(S): at 21:21

## 2020-01-11 RX ADMIN — Medication 500000 UNIT(S): at 12:31

## 2020-01-11 RX ADMIN — Medication 1: at 13:43

## 2020-01-11 RX ADMIN — Medication 3: at 09:16

## 2020-01-11 RX ADMIN — FAMOTIDINE 20 MILLIGRAM(S): 10 INJECTION INTRAVENOUS at 12:32

## 2020-01-11 RX ADMIN — Medication 4 UNIT(S): at 13:43

## 2020-01-11 RX ADMIN — Medication 50 MILLIGRAM(S): at 21:21

## 2020-01-11 RX ADMIN — Medication 500000 UNIT(S): at 05:35

## 2020-01-11 RX ADMIN — Medication 650 MILLIGRAM(S): at 05:35

## 2020-01-11 RX ADMIN — MYCOPHENOLATE MOFETIL 1000 MILLIGRAM(S): 250 CAPSULE ORAL at 17:35

## 2020-01-11 RX ADMIN — Medication 2 UNIT(S): at 09:15

## 2020-01-11 RX ADMIN — INSULIN GLARGINE 12 UNIT(S): 100 INJECTION, SOLUTION SUBCUTANEOUS at 21:22

## 2020-01-11 RX ADMIN — Medication 0.2 MILLIGRAM(S): at 05:33

## 2020-01-11 RX ADMIN — MYCOPHENOLATE MOFETIL 1000 MILLIGRAM(S): 250 CAPSULE ORAL at 05:35

## 2020-01-11 RX ADMIN — Medication 60 MILLIGRAM(S): at 05:33

## 2020-01-11 RX ADMIN — Medication 4 UNIT(S): at 18:40

## 2020-01-11 RX ADMIN — Medication 500000 UNIT(S): at 17:35

## 2020-01-11 RX ADMIN — TAMOXIFEN CITRATE 20 MILLIGRAM(S): 20 TABLET, FILM COATED ORAL at 12:32

## 2020-01-11 RX ADMIN — Medication 1 TABLET(S): at 12:32

## 2020-01-11 RX ADMIN — Medication 5 MILLIGRAM(S): at 05:35

## 2020-01-11 RX ADMIN — Medication 650 MILLIGRAM(S): at 17:35

## 2020-01-11 NOTE — PROGRESS NOTE ADULT - ASSESSMENT
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 Started on basal/bolus w/glucose values remaining in 200s. Tolerating POs. BG goal (100-180mg/dl).

## 2020-01-11 NOTE — DIETITIAN INITIAL EVALUATION ADULT. - OTHER INFO
Intake PTA: Pt endorses poor PO intake after transplant, eating small bites of food throughout the day.    Confirms shellfish allergy, no nausea/vomiting, no difficulty swallowing, left dentures at home however is able to order soft meal items, no GI distress, Multivitamin supplementation PTA, last BM last night per pt.    Diet: Pt endorses increasing PO intake in-house a small amount. Observed pt had 1/4 of omelet this morning, 1/2 yogurt parfait. Pt would like to try Glucerna in-house.    Education: Discussed fluid restriction with pt. Pt preferred to speak to RD when her daughter was present, as pt's daughter prepares meals for pt.     Weight Hx: Pt endorses significant weight loss. States she was 154 pound prior to procedure.  Per sunrise: 145 pounds (12/14/19), 123 pounds (1/10), 121 pounds (1/11). ~33 pound wt loss x 1 month = 21% wt loss Intake PTA: Pt endorses poor PO intake after transplant, eating small bites of food throughout the day. Denies hx of DM. Prior A1c was 4.4% in December 2019.     Confirms shellfish allergy, no nausea/vomiting, no difficulty swallowing, left dentures at home however is able to order soft meal items, no GI distress, Multivitamin supplementation PTA, last BM last night per pt.    Diet: Pt endorses increasing PO intake in-house a small amount. Observed pt had 1/4 of omelet this morning, 1/2 yogurt parfait. Pt would like to try Glucerna in-house.    Education: Discussed fluid restriction with pt. Pt preferred to speak to RD when her daughter was present, as pt's daughter prepares meals for pt.     Weight Hx: Pt endorses significant weight loss. States she was 154 pound prior to procedure.  Per sunrise: 145 pounds (12/14/19), 123 pounds (1/10), 121 pounds (1/11). ~33 pound wt loss x 1 month = 21% wt loss Intake PTA: Pt endorses poor PO intake after transplant, eating small bites of food throughout the day. Denies hx of DM. Prior A1c was 4.4% in December 2019. New A1c is 7.2% (1/11).    Confirms shellfish allergy, no nausea/vomiting, no difficulty swallowing, left dentures at home however is able to order soft meal items, no GI distress, Multivitamin supplementation PTA, last BM last night per pt.    Diet: Pt endorses increasing PO intake in-house a small amount. Observed pt had 1/4 of omelet this morning, 1/2 yogurt parfait. Pt would like to try Glucerna in-house.    Education: Discussed fluid restriction with pt. Pt preferred to speak to RD when her daughter was present, as pt's daughter prepares meals for pt. RD revisited room three times to speak to daughter, however pt's daughter did not come. Left educational materials at bedside, pt would like if RD re-visited when daughter is present.    Weight Hx: Pt endorses significant weight loss. States she was 154 pound prior to procedure.  Per sunrise: 145 pounds (12/14/19), 123 pounds (1/10), 121 pounds (1/11). ~33 pound wt loss x 1 month = 21% wt loss

## 2020-01-11 NOTE — DIETITIAN INITIAL EVALUATION ADULT. - REASON INDICATOR FOR ASSESSMENT
Nutrition consult for new onset DM.   Obtained information from: Pt, EMR  Per chart, 75 y/o woman s/p DDRT 12/7/19. Pt admitted for hyperglycemia, with new onset diabetes after transplant per endocrinology.  PMH: HTN, HLD, Gout, LUE DVT, Lumbar radiculopathy, breast CA s/p lumpectomy on tamoxifen, ESRD 2/2 PCKD (anuric prior to transplant)

## 2020-01-11 NOTE — DIETITIAN INITIAL EVALUATION ADULT. - ENERGY NEEDS
Ht: 64 inches Wt: 121 pounds BMI: 20.7 kg/m2 IBW: 120 pounds(+/-10%) 100%IBW  no edema. no pressure ulcers documented.

## 2020-01-11 NOTE — CHART NOTE - NSCHARTNOTEFT_GEN_A_CORE
Upon Nutritional Assessment by the Registered Dietitian your patient was determined to meet criteria / has evidence of the following diagnosis/diagnoses:          [ ]  Mild Protein Calorie Malnutrition        [ ]  Moderate Protein Calorie Malnutrition        [x] Severe Protein Calorie Malnutrition        [ ] Unspecified Protein Calorie Malnutrition        [ ] Underweight / BMI <19        [ ] Morbid Obesity / BMI > 40      Findings as based on:  [x] Comprehensive nutrition assessment   [x] Nutrition Focused Physical Exam  [x] Other:   Etiology inadequate protein-energy intake secondary to persistent lack of appetite s/p transplant.     Signs/Symptoms <50% estimated nutrient needs x 1month, moderate/severe muscle/fat depletion, 21% wt loss x1 month.         Nutrition Plan/Recommendations:    1) Please continue current diet, add Glucerna x1 daily (285kcal and 14g protein)  2) Recommend add Multivitamin daily   3) Pt aware RD remains available for diet education reinforcement as needed        PROVIDER Section:     By signing this assessment you are acknowledging and agree with the diagnosis/diagnoses assigned by the Registered Dietitian    Comments:

## 2020-01-11 NOTE — DIETITIAN INITIAL EVALUATION ADULT. - ADD RECOMMEND
1) Please continue current diet, add Glucerna x1 daily (285kcal and 14g protein) 2) Recommend add Multivitamin daily 3) Pt aware RD remains available for diet education reinforcement as needed 1) Please continue current diet, add Glucerna x1 daily (285kcal and 14g protein) 2) Recommend add Multivitamin daily 3) Pt aware RD remains available for diet education reinforcement as needed 4) Noted new A1c was ordered 1) Please continue current diet, add Glucerna x1 daily (285kcal and 14g protein) 2) Recommend add Multivitamin daily 3) Pt aware RD remains available for diet education reinforcement as needed 4) malnutrition chart note placed

## 2020-01-11 NOTE — DIETITIAN INITIAL EVALUATION ADULT. - ETIOLOGY
inadequate protein-energy intake secondary to persistent lack of appetite s/p transplant lack of prior diet education secondary to new onset DM

## 2020-01-11 NOTE — PROGRESS NOTE ADULT - ASSESSMENT
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 Post op course c/b DGF requiring HD, graft function improved/responded to diuretics. HD held on dc.  Admitted with Hyperglycemia    [] s/p DDRT c/b DGF, off HD  - Strick I &O  - Immuno: cont Env 7mg daily, MMF 1g bid, Pred 5  - Proph: valcyte/bactrim/nystatin   - Hold Lasix (hyponatremia)   - check EBV PCR  -Na down to 124 this AM; fluid restricted to 1L; urine lytes/serum osm/urine osm sent.     [] Hyperglycemia  - Endocrine following; started Lantus 8; Lispro pre-meal 2/2/2, and insulin sliding scale.  - Diabetic diet w/ 1L fluid restriction.   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 Post op course c/b DGF requiring HD, graft function improved/responded to diuretics. HD held on dc.  Admitted with Hyperglycemia    [] s/p DDRT c/b DGF, off HD  - Strick I &O  - Immuno: cont Env 7mg daily, MMF 1g bid, Pred 5  - Proph: valcyte/bactrim/nystatin   - Hold Lasix (hyponatremia)   - check EBV PCR  -Na down to 124 this AM; fluid restricted to 1L; Start NS 75cc/hr urine lytes/serum osm/urine osm sent.   -Renal US pending    [] Hyperglycemia  - Endocrine following; started Lantus 8; Lispro pre-meal 2/2/2, and insulin sliding scale.  - Diabetic diet w/ 1L fluid restriction.   - Monitor FS, Started ISS, check HgbA1C; HgbA1C in 12/2019 4.4    [] HTN  - cont Metoprolol 50mg bid, nifedipine xl 60mg daily  -decrease clonidine to 0.1 BID.

## 2020-01-11 NOTE — PROGRESS NOTE ADULT - SUBJECTIVE AND OBJECTIVE BOX
Transplant Surgery - Multidisciplinary Rounds  --------------------------------------------------------------  DDRT  Date: 12/7/19    Present:   Patient seen with multidisciplinary team including ( Transplant Surgeon: Dr. Chacon, Dr. Middleton.  Transplant Nephrologist: Dr. Perea.  PAs:  Nga Bueno and Marcelino Rogers in am rounds and examined with Dr. Chacon. Disciplines not in attendance will be notified of the plan.     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 Post op course c/b DGF requiring HD, graft function improved/responded to diuretics. HD held on dc.     Sent to ED from clinic with hyperglycemia (Gluc on chem 415). No prior h/o DM, not on insulin at home. In ED:  , K 5.2, Gluc 320, . UA: gluc > 500  Reports poor appetite, feeling very weak post transplant; wt loss and dyspnea on exertion. Making good urine, no lower extrem edema.    Immuno: Env 7mg, MMF 1g bid, Pred 5    Donor Info: Age 54, ABO B, KDPI 75%, Terminal Cr 1.7, CMV(+), EBV(+)    OR: DDRT to R iliac fossa, Simulect induction. 1A, 1V, 1U. Ureter stented. Stent sutured to johnson. CIT 13.5Hrs  Enlarged, lymph node was encountered during iliac dissection was removed and sent for pathology (benign)      Interval Events:   -admitted w/ hyperglycemia, no previous history of DM  -HbA1C 12/2019: 4.4. Repeat sent.  -Seen by Endo; started on Lantus 8, lispro 2 pre-meal, sliding scale.     -Na 124 this AM, fluid restricted to 1L/daily, urine lytes sent.     Potential Discharge date: pending clinical improvement.     Education:  Medications    Plan of care:  See Below    MEDICATIONS  (STANDING):  aspirin  chewable 81 milliGRAM(s) Oral daily  cloNIDine 0.2 milliGRAM(s) Oral two times a day  dextrose 5%. 1000 milliLiter(s) (50 mL/Hr) IV Continuous <Continuous>  dextrose 50% Injectable 12.5 Gram(s) IV Push once  dextrose 50% Injectable 25 Gram(s) IV Push once  dextrose 50% Injectable 25 Gram(s) IV Push once  famotidine    Tablet 20 milliGRAM(s) Oral daily  influenza   Vaccine 0.5 milliLiter(s) IntraMuscular once  insulin glargine Injectable (LANTUS) 8 Unit(s) SubCutaneous at bedtime  insulin lispro (HumaLOG) corrective regimen sliding scale   SubCutaneous three times a day before meals  insulin lispro Injectable (HumaLOG) 2 Unit(s) SubCutaneous three times a day before meals  metoprolol tartrate 50 milliGRAM(s) Oral two times a day  mycophenolate mofetil 1000 milliGRAM(s) Oral two times a day  NIFEdipine XL 60 milliGRAM(s) Oral daily  nystatin    Suspension 879562 Unit(s) Oral four times a day  predniSONE   Tablet 5 milliGRAM(s) Oral daily  sodium bicarbonate 650 milliGRAM(s) Oral two times a day  tacrolimus ER Tablet (ENVARSUS XR) 7 milliGRAM(s) Oral <User Schedule>  tamoxifen 20 milliGRAM(s) Oral daily  trimethoprim   80 mG/sulfamethoxazole 400 mG 1 Tablet(s) Oral daily  valGANciclovir 450 milliGRAM(s) Oral <User Schedule>    MEDICATIONS  (PRN):  dextrose 40% Gel 15 Gram(s) Oral once PRN Blood Glucose LESS THAN 70 milliGRAM(s)/deciliter  glucagon  Injectable 1 milliGRAM(s) IntraMuscular once PRN Glucose LESS THAN 70 milligrams/deciliter  hemorrhoidal Ointment 1 Application(s) Rectal three times a day PRN pain      PAST MEDICAL & SURGICAL HISTORY:  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  Pancreatic cyst  Thyroid nodule: yearly Ultrasound, monitoring yearly  Anuria  ESRD on hemodialysis  Breast cancer, female: left 2014  H/O gastroesophageal reflux (GERD)  Thrombocytopenia: leukopenia: followed by heme, plan for BMBx 7/22/19  Anemia in ESRD (end-stage renal disease)  Hypertension  History of fracture of right ankle: 2014 repaired  S/P arteriovenous (AV) fistula creation: 2002  S/P hysterectomy: 1994  Adrenal mass: excison 2015  S/P lumpectomy, left breast: 2014      Vital Signs Last 24 Hrs  T(C): 36.6 (11 Jan 2020 05:00), Max: 37.1 (10 Andreas 2020 09:45)  T(F): 97.9 (11 Jan 2020 05:00), Max: 98.8 (10 Andreas 2020 09:45)  HR: 104 (11 Jan 2020 05:00) (65 - 104)  BP: 111/68 (11 Jan 2020 05:00) (105/53 - 144/66)  BP(mean): --  RR: 18 (11 Jan 2020 05:00) (16 - 18)  SpO2: 100% (11 Jan 2020 05:00) (96% - 100%)    I&O's Summary    10 Andreas 2020 07:01  -  11 Jan 2020 07:00  --------------------------------------------------------  IN: 660 mL / OUT: 900 mL / NET: -240 mL                              7.8    5.72  )-----------( 154      ( 11 Jan 2020 06:20 )             23.7     01-11    124<L>  |  98  |  58<H>  ----------------------------<  226<H>  5.2   |  18<L>  |  3.56<H>    Ca    10.1      11 Jan 2020 06:20  Phos  3.5     01-11  Mg     2.0     01-11    TPro  6.8  /  Alb  3.8  /  TBili  0.2  /  DBili  x   /  AST  20  /  ALT  10  /  AlkPhos  54  01-11      Review of Systems: Gen: + fatigue, wt Loss  	Skin: No rashes  	Head/Eyes/Ears/Mouth: No headache; Normal hearing; Normal vision w/o blurriness; No sinus pain/discomfort, sore throat  	Respiratory: No dyspnea, cough, wheezing, hemoptysis  	CV: No chest pain, PND, orthopnea  	GI: no abdominal pain  	: No increased frequency, dysuria, hematuria, nocturia  	MSK: No joint pain/swelling; no back pain; no edema  	Neuro: No dizziness/lightheadedness, weakness, seizures, numbness, tingling  	Heme: No easy bruising or bleeding  	Endo: No heat/cold intolerance  	Psych: No significant nervousness, anxiety, stress, depression  All other systems were reviewed and are negative, except as noted.    Physical Exam:  Constitutional: Well developed / well nourished  	Eyes: Anicteric, PERRLA  	ENMT: nc/at  	Neck: no JVD  	Respiratory: CTA B/L  Cardiovascular: RRR  	Gastrointestinal: Soft abdomen, non tender, incision well healed  	Genitourinary: Voiding spontaneously  	Extremities: SCD's in place and working bilaterally  	Vascular: Palpable dp pulses bilaterally  	Neurological: A&O x3  	Skin: well healed incision   	Musculoskeletal: Moving all extremities  Psychiatric: Responsive   Transplant Surgery - Multidisciplinary Rounds  --------------------------------------------------------------  DDRT  Date: 12/7/19    Present:   Patient seen with multidisciplinary team including ( Transplant Surgeon: Dr. Chacon, Dr. Middleton.  Transplant Nephrologist: Dr. Perea.  PAs:  Nga Bueno and Marcelino Rogers; resident Kalie Reyes in am rounds and examined with Dr. Chacon. Disciplines not in attendance will be notified of the plan.     73 y/o F with PMHx of HTN, HLD, Gout, LUE DVT, Lumbar radiculopathy, breast CA s/p lumpectomy on tamoxifen, ESRD 2/2 PCKD (anuric prior to transplant underwent DDRT on 12/7/2019 with simulect induction (ureteral stent removed on POD 5 with johnson). Post op course c/b DGF requiring HD, graft function improved/responded to diuretics. HD held on dc.     Sent to ED from clinic with hyperglycemia (Gluc on chem 415). No prior h/o DM, not on insulin at home. In ED:  , K 5.2, Gluc 320, . UA: gluc > 500  Reports poor appetite, feeling very weak post transplant; wt loss and dyspnea on exertion. Making good urine, no lower extrem edema.    Immuno: Env 7mg, MMF 1g bid, Pred 5    Donor Info: Age 54, ABO B, KDPI 75%, Terminal Cr 1.7, CMV(+), EBV(+)    OR: DDRT to R iliac fossa, Simulect induction. 1A, 1V, 1U. Ureter stented. Stent sutured to johnson. CIT 13.5Hrs  Enlarged, lymph node was encountered during iliac dissection was removed and sent for pathology (benign)      Interval Events:   -admitted w/ hyperglycemia, no previous history of DM  -HbA1C 12/2019: 4.4. Repeat sent.  -Seen by Endo; started on Lantus 8, lispro 2 pre-meal, sliding scale.     -Na 124 this AM, fluid restricted to 1L/daily, urine lytes sent.     Potential Discharge date: pending clinical improvement.     Education:  Medications    Plan of care:  See Below    MEDICATIONS  (STANDING):  aspirin  chewable 81 milliGRAM(s) Oral daily  cloNIDine 0.2 milliGRAM(s) Oral two times a day  dextrose 5%. 1000 milliLiter(s) (50 mL/Hr) IV Continuous <Continuous>  dextrose 50% Injectable 12.5 Gram(s) IV Push once  dextrose 50% Injectable 25 Gram(s) IV Push once  dextrose 50% Injectable 25 Gram(s) IV Push once  famotidine    Tablet 20 milliGRAM(s) Oral daily  influenza   Vaccine 0.5 milliLiter(s) IntraMuscular once  insulin glargine Injectable (LANTUS) 8 Unit(s) SubCutaneous at bedtime  insulin lispro (HumaLOG) corrective regimen sliding scale   SubCutaneous three times a day before meals  insulin lispro Injectable (HumaLOG) 2 Unit(s) SubCutaneous three times a day before meals  metoprolol tartrate 50 milliGRAM(s) Oral two times a day  mycophenolate mofetil 1000 milliGRAM(s) Oral two times a day  NIFEdipine XL 60 milliGRAM(s) Oral daily  nystatin    Suspension [id number] Unit(s) Oral four times a day  predniSONE   Tablet 5 milliGRAM(s) Oral daily  sodium bicarbonate 650 milliGRAM(s) Oral two times a day  tacrolimus ER Tablet (ENVARSUS XR) 7 milliGRAM(s) Oral <User Schedule>  tamoxifen 20 milliGRAM(s) Oral daily  trimethoprim   80 mG/sulfamethoxazole 400 mG 1 Tablet(s) Oral daily  valGANciclovir 450 milliGRAM(s) Oral <User Schedule>    MEDICATIONS  (PRN):  dextrose 40% Gel 15 Gram(s) Oral once PRN Blood Glucose LESS THAN 70 milliGRAM(s)/deciliter  glucagon  Injectable 1 milliGRAM(s) IntraMuscular once PRN Glucose LESS THAN 70 milligrams/deciliter  hemorrhoidal Ointment 1 Application(s) Rectal three times a day PRN pain      PAST MEDICAL & SURGICAL HISTORY:  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  Pancreatic cyst  Thyroid nodule: yearly Ultrasound, monitoring yearly  Anuria  ESRD on hemodialysis  Breast cancer, female: left 2014  H/O gastroesophageal reflux (GERD)  Thrombocytopenia: leukopenia: followed by heme, plan for BMBx 7/22/19  Anemia in ESRD (end-stage renal disease)  Hypertension  History of fracture of right ankle: 2014 repaired  S/P arteriovenous (AV) fistula creation: 2002  S/P hysterectomy: 1994  Adrenal mass: excison 2015  S/P lumpectomy, left breast: 2014      Vital Signs Last 24 Hrs  T(C): 36.6 (11 Jan 2020 05:00), Max: 37.1 (10 Andreas 2020 09:45)  T(F): 97.9 (11 Jan 2020 05:00), Max: 98.8 (10 Andreas 2020 09:45)  HR: 104 (11 Jan 2020 05:00) (65 - 104)  BP: 111/68 (11 Jan 2020 05:00) (105/53 - 144/66)  BP(mean): --  RR: 18 (11 Jan 2020 05:00) (16 - 18)  SpO2: 100% (11 Jan 2020 05:00) (96% - 100%)    I&O's Summary    10 Andreas 2020 07:01  -  11 Jan 2020 07:00  --------------------------------------------------------  IN: 660 mL / OUT: 900 mL / NET: -240 mL                              7.8    5.72  )-----------( 154      ( 11 Jan 2020 06:20 )             23.7     01-11    124<L>  |  98  |  58<H>  ----------------------------<  226<H>  5.2   |  18<L>  |  3.56<H>    Ca    10.1      11 Jan 2020 06:20  Phos  3.5     01-11  Mg     2.0     01-11    TPro  6.8  /  Alb  3.8  /  TBili  0.2  /  DBili  x   /  AST  20  /  ALT  10  /  AlkPhos  54  01-11      Review of Systems: Gen: + fatigue, wt Loss  	Skin: No rashes  	Head/Eyes/Ears/Mouth: No headache; Normal hearing; Normal vision w/o blurriness; No sinus pain/discomfort, sore throat  	Respiratory: No dyspnea, cough, wheezing, hemoptysis  	CV: No chest pain, PND, orthopnea  	GI: no abdominal pain  	: No increased frequency, dysuria, hematuria, nocturia  	MSK: No joint pain/swelling; no back pain; no edema  	Neuro: No dizziness/lightheadedness, weakness, seizures, numbness, tingling  	Heme: No easy bruising or bleeding  	Endo: No heat/cold intolerance  	Psych: No significant nervousness, anxiety, stress, depression  All other systems were reviewed and are negative, except as noted.    Physical Exam:  Constitutional: Well developed / well nourished  	Eyes: Anicteric, PERRLA  	ENMT: nc/at  	Neck: no JVD  	Respiratory: CTA B/L  Cardiovascular: RRR  	Gastrointestinal: Soft abdomen, non tender, incision well healed  	Genitourinary: Voiding spontaneously  	Extremities: SCD's in place and working bilaterally  	Vascular: Palpable dp pulses bilaterally  	Neurological: A&O x3  	Skin: well healed incision   	Musculoskeletal: Moving all extremities  Psychiatric: Responsive   Transplant Surgery - Multidisciplinary Rounds  --------------------------------------------------------------  DDRT  Date: 12/7/19    Present:   Patient seen with multidisciplinary team including ( Transplant Surgeon: Dr. Chacon, Dr. Middleton.  Transplant Nephrologist: Dr. Perea.  PAs:  Nga Bueno and Marcelino Rogers; resident Kalie Reyes in am rounds and examined with Dr. Chacon. Disciplines not in attendance will be notified of the plan.     73 y/o F with PMHx of HTN, HLD, Gout, LUE DVT, Lumbar radiculopathy, breast CA s/p lumpectomy on tamoxifen, ESRD 2/2 PCKD (anuric prior to transplant underwent DDRT on 12/7/2019 with simulect induction (ureteral stent removed on POD 5 with johnson). Post op course c/b DGF requiring HD, graft function improved/responded to diuretics. HD held on dc.     Sent to ED from clinic with hyperglycemia (Gluc on chem 415). No prior h/o DM, not on insulin at home. In ED:  , K 5.2, Gluc 320, . UA: gluc > 500  Reports poor appetite, feeling very weak post transplant; wt loss and dyspnea on exertion. Making good urine, no lower extrem edema.    Immuno: Env 7mg, MMF 1g bid, Pred 5    Donor Info: Age 54, ABO B, KDPI 75%, Terminal Cr 1.7, CMV(+), EBV(+)    OR: DDRT to R iliac fossa, Simulect induction. 1A, 1V, 1U. Ureter stented. Stent sutured to johnson. CIT 13.5Hrs  Enlarged, lymph node was encountered during iliac dissection was removed and sent for pathology (benign)      Interval Events:   -admitted w/ hyperglycemia, no previous history of DM  -HbA1C 12/2019: 4.4. Repeat sent.  -Seen by Endo; started on Lantus 8, lispro 2 pre-meal, sliding scale.     -Na 124 this AM, fluid restricted to 1L/daily, started NS 75cc/hr; urine lytes sent.     Potential Discharge date: pending clinical improvement.     Education:  Medications    Plan of care:  See Below    MEDICATIONS  (STANDING):  aspirin  chewable 81 milliGRAM(s) Oral daily  cloNIDine 0.2 milliGRAM(s) Oral two times a day  dextrose 5%. 1000 milliLiter(s) (50 mL/Hr) IV Continuous <Continuous>  dextrose 50% Injectable 12.5 Gram(s) IV Push once  dextrose 50% Injectable 25 Gram(s) IV Push once  dextrose 50% Injectable 25 Gram(s) IV Push once  famotidine    Tablet 20 milliGRAM(s) Oral daily  influenza   Vaccine 0.5 milliLiter(s) IntraMuscular once  insulin glargine Injectable (LANTUS) 8 Unit(s) SubCutaneous at bedtime  insulin lispro (HumaLOG) corrective regimen sliding scale   SubCutaneous three times a day before meals  insulin lispro Injectable (HumaLOG) 2 Unit(s) SubCutaneous three times a day before meals  metoprolol tartrate 50 milliGRAM(s) Oral two times a day  mycophenolate mofetil 1000 milliGRAM(s) Oral two times a day  NIFEdipine XL 60 milliGRAM(s) Oral daily  nystatin    Suspension [id number] Unit(s) Oral four times a day  predniSONE   Tablet 5 milliGRAM(s) Oral daily  sodium bicarbonate 650 milliGRAM(s) Oral two times a day  tacrolimus ER Tablet (ENVARSUS XR) 7 milliGRAM(s) Oral <User Schedule>  tamoxifen 20 milliGRAM(s) Oral daily  trimethoprim   80 mG/sulfamethoxazole 400 mG 1 Tablet(s) Oral daily  valGANciclovir 450 milliGRAM(s) Oral <User Schedule>    MEDICATIONS  (PRN):  dextrose 40% Gel 15 Gram(s) Oral once PRN Blood Glucose LESS THAN 70 milliGRAM(s)/deciliter  glucagon  Injectable 1 milliGRAM(s) IntraMuscular once PRN Glucose LESS THAN 70 milligrams/deciliter  hemorrhoidal Ointment 1 Application(s) Rectal three times a day PRN pain      PAST MEDICAL & SURGICAL HISTORY:  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  Pancreatic cyst  Thyroid nodule: yearly Ultrasound, monitoring yearly  Anuria  ESRD on hemodialysis  Breast cancer, female: left 2014  H/O gastroesophageal reflux (GERD)  Thrombocytopenia: leukopenia: followed by heme, plan for BMBx 7/22/19  Anemia in ESRD (end-stage renal disease)  Hypertension  History of fracture of right ankle: 2014 repaired  S/P arteriovenous (AV) fistula creation: 2002  S/P hysterectomy: 1994  Adrenal mass: excison 2015  S/P lumpectomy, left breast: 2014      Vital Signs Last 24 Hrs  T(C): 36.6 (11 Jan 2020 05:00), Max: 37.1 (10 Andreas 2020 09:45)  T(F): 97.9 (11 Jan 2020 05:00), Max: 98.8 (10 Andreas 2020 09:45)  HR: 104 (11 Jan 2020 05:00) (65 - 104)  BP: 111/68 (11 Jan 2020 05:00) (105/53 - 144/66)  BP(mean): --  RR: 18 (11 Jan 2020 05:00) (16 - 18)  SpO2: 100% (11 Jan 2020 05:00) (96% - 100%)    I&O's Summary    10 Andreas 2020 07:01  -  11 Jan 2020 07:00  --------------------------------------------------------  IN: 660 mL / OUT: 900 mL / NET: -240 mL                              7.8    5.72  )-----------( 154      ( 11 Jan 2020 06:20 )             23.7     01-11    124<L>  |  98  |  58<H>  ----------------------------<  226<H>  5.2   |  18<L>  |  3.56<H>    Ca    10.1      11 Jan 2020 06:20  Phos  3.5     01-11  Mg     2.0     01-11    TPro  6.8  /  Alb  3.8  /  TBili  0.2  /  DBili  x   /  AST  20  /  ALT  10  /  AlkPhos  54  01-11      Review of Systems: Gen: + fatigue, wt Loss  	Skin: No rashes  	Head/Eyes/Ears/Mouth: No headache; Normal hearing; Normal vision w/o blurriness; No sinus pain/discomfort, sore throat  	Respiratory: No dyspnea, cough, wheezing, hemoptysis  	CV: No chest pain, PND, orthopnea  	GI: no abdominal pain  	: No increased frequency, dysuria, hematuria, nocturia  	MSK: No joint pain/swelling; no back pain; no edema  	Neuro: No dizziness/lightheadedness, weakness, seizures, numbness, tingling  	Heme: No easy bruising or bleeding  	Endo: No heat/cold intolerance  	Psych: No significant nervousness, anxiety, stress, depression  All other systems were reviewed and are negative, except as noted.    Physical Exam:  Constitutional: Well developed / well nourished  	Eyes: Anicteric, PERRLA  	ENMT: nc/at  	Neck: no JVD  	Respiratory: CTA B/L  Cardiovascular: RRR  	Gastrointestinal: Soft abdomen, non tender, incision well healed  	Genitourinary: Voiding spontaneously  	Extremities: SCD's in place and working bilaterally  	Vascular: Palpable dp pulses bilaterally  	Neurological: A&O x3  	Skin: well healed incision   	Musculoskeletal: Moving all extremities  Psychiatric: Responsive

## 2020-01-11 NOTE — PROGRESS NOTE ADULT - SUBJECTIVE AND OBJECTIVE BOX
[facility] DIVISION OF KIDNEY DISEASES AND HYPERTENSION -- FOLLOW UP NOTE  --------------------------------------------------------------------------------  Chief Complaint:  renal transplant    24 hour events/subjective:  Pt admitted with hyperglycemia and hyponatremia.  Feels weak.        PAST HISTORY  --------------------------------------------------------------------------------  No significant changes to PMH, PSH, FHx, SHx, unless otherwise noted    ALLERGIES & MEDICATIONS  --------------------------------------------------------------------------------  Allergies    ChloraPrep One-Step (Rash)  penicillins (Rash)  shellfish (Rash)    Intolerances      Standing Inpatient Medications  aspirin  chewable 81 milliGRAM(s) Oral daily  cloNIDine 0.1 milliGRAM(s) Oral two times a day  dextrose 5%. 1000 milliLiter(s) IV Continuous <Continuous>  dextrose 50% Injectable 12.5 Gram(s) IV Push once  dextrose 50% Injectable 25 Gram(s) IV Push once  dextrose 50% Injectable 25 Gram(s) IV Push once  famotidine    Tablet 20 milliGRAM(s) Oral daily  influenza   Vaccine 0.5 milliLiter(s) IntraMuscular once  insulin glargine Injectable (LANTUS) 8 Unit(s) SubCutaneous at bedtime  insulin lispro (HumaLOG) corrective regimen sliding scale   SubCutaneous three times a day before meals  insulin lispro Injectable (HumaLOG) 2 Unit(s) SubCutaneous three times a day before meals  metoprolol tartrate 50 milliGRAM(s) Oral two times a day  mycophenolate mofetil 1000 milliGRAM(s) Oral two times a day  NIFEdipine XL 60 milliGRAM(s) Oral daily  nystatin    Suspension 131672 Unit(s) Oral four times a day  predniSONE   Tablet 5 milliGRAM(s) Oral daily  sodium bicarbonate 650 milliGRAM(s) Oral two times a day  sodium chloride 0.9%. 1000 milliLiter(s) IV Continuous <Continuous>  tacrolimus ER Tablet (ENVARSUS XR) 7 milliGRAM(s) Oral <User Schedule>  tamoxifen 20 milliGRAM(s) Oral daily  trimethoprim   80 mG/sulfamethoxazole 400 mG 1 Tablet(s) Oral daily  valGANciclovir 450 milliGRAM(s) Oral <User Schedule>    PRN Inpatient Medications  dextrose 40% Gel 15 Gram(s) Oral once PRN  glucagon  Injectable 1 milliGRAM(s) IntraMuscular once PRN  hemorrhoidal Ointment 1 Application(s) Rectal three times a day PRN      REVIEW OF SYSTEMS  --------------------------------------------------------------------------------  Gen: weakness  Skin: No rashes  Head/Eyes/Ears/Mouth: No headache;No sore throat  Respiratory: No dyspnea, cough,   CV: No chest pain, PND, orthopnea  GI: No abdominal pain, diarrhea, constipation, nausea, vomiting  Transplant: No pain  : No increased frequency, dysuria, hematuria, nocturia  MSK: No joint pain/swelling; no back pain; no edema  Neuro: No dizziness/lightheadedness, weakness, seizures, numbness, tingling  Psych: No significant nervousness, anxiety, stress, depression    All other systems were reviewed and are negative, except as noted.    VITALS/PHYSICAL EXAM  --------------------------------------------------------------------------------  T(C): 36.6 (01-11-20 @ 09:16), Max: 37 (01-11-20 @ 01:00)  HR: 78 (01-11-20 @ 09:16) (65 - 104)  BP: 108/62 (01-11-20 @ 09:16) (105/53 - 144/66)  RR: 18 (01-11-20 @ 09:16) (16 - 18)  SpO2: 100% (01-11-20 @ 09:16) (96% - 100%)  Wt(kg): --  Height (cm): 162.56 (01-10-20 @ 09:45)  Weight (kg): 56.2 (01-10-20 @ 09:45)  BMI (kg/m2): 21.3 (01-10-20 @ 09:45)  BSA (m2): 1.6 (01-10-20 @ 09:45)      01-10-20 @ 07:01  -  01-11-20 @ 07:00  --------------------------------------------------------  IN: 660 mL / OUT: 900 mL / NET: -240 mL    01-11-20 @ 07:01  -  01-11-20 @ 11:19  --------------------------------------------------------  IN: 180 mL / OUT: 0 mL / NET: 180 mL      Physical Exam:  	Gen: NAD  	HEENT: PERRL, supple neck, clear oropharynx  	Pulm: CTA B/L  	CV: RRR, S1S2; no rub  	Back: No spinal or CVA tenderness; no sacral edema  	Abd: +BS, soft, nontender/nondistended                      Transplant: No tenderness, swelling  	: No suprapubic tenderness  	UE: Warm, FROM; no edema; no asterixis  	LE: Warm, FROM; no edema  	Neuro: No focal deficits  	Psych: Normal affect and mood  	Skin: Warm, without rashes      LABS/STUDIES  --------------------------------------------------------------------------------              7.8    5.72  >-----------<  154      [01-11-20 @ 06:20]              23.7     123  |  96  |  58  ----------------------------<  278      [01-11-20 @ 09:32]  5.5   |  18  |  3.50        Ca     10.0     [01-11-20 @ 09:32]      Mg     2.0     [01-11-20 @ 06:20]      Phos  3.5     [01-11-20 @ 06:20]    TPro  6.8  /  Alb  3.8  /  TBili  0.2  /  DBili  x   /  AST  20  /  ALT  10  /  AlkPhos  54  [01-11-20 @ 06:20]        Serum Osmolality 306      [01-11-20 @ 09:32]    Creatinine Trend:  SCr 3.50 [01-11 @ 09:32]  SCr 3.56 [01-11 @ 06:20]  SCr 3.42 [01-10 @ 10:59]  SCr 6.30 [12-14 @ 06:55]  SCr 6.79 [12-13 @ 06:00]              Urinalysis - [01-10-20 @ 11:27]      Color Light Yellow / Appearance Clear / SG 1.015 / pH 6.0      Gluc 500 mg/dL / Ketone Negative  / Bili Negative / Urobili Negative       Blood Negative / Protein 30 mg/dL / Leuk Est Negative / Nitrite Negative      RBC 2 / WBC 7 / Hyaline 2 / Gran  / Sq Epi  / Non Sq Epi 4 / Bacteria Few    Urine Sodium <35      [01-11-20 @ 10:55]  Urine Potassium 40      [01-11-20 @ 10:55]  Urine Chloride <35      [01-11-20 @ 10:55]    HbA1c 4.4      [12-08-19 @ 12:44]  TSH 0.23      [12-11-19 @ 16:05]  Lipid: chol 116, TG 48, HDL 64, LDL 42      [12-13-19 @ 08:10]      Free Light Chains: kappa 19.60, lambda 2.19, ratio = 8.95      [12-13 @ 08:13]

## 2020-01-11 NOTE — PROGRESS NOTE ADULT - SUBJECTIVE AND OBJECTIVE BOX
Diabetes Follow up note:    Chief complaint: f/u new onset diabetes after transplant    Interval Hx: BG values remain in 200s started on basal/bolus regimen yesterday. Tolerating POs. Pt said daughter is an LPN and she will be assisting her with DM care at home. Met w/RD earlier this morning.    Review of Systems:  General: + weakness  GI: Tolerating POs. Denies N/V/D/Abd pain  CV: Denies CP/SOB  ENDO: No S&Sx of hypoglycemia. + polyuria prior to admission  MEDS:    insulin glargine Injectable (LANTUS) 8 Unit(s) SubCutaneous at bedtime  insulin lispro (HumaLOG) corrective regimen sliding scale   SubCutaneous three times a day before meals  insulin lispro Injectable (HumaLOG) 2 Unit(s) SubCutaneous three times a day before meals  predniSONE   Tablet 5 milliGRAM(s) Oral daily    nystatin    Suspension 885182 Unit(s) Oral four times a day  trimethoprim   80 mG/sulfamethoxazole 400 mG 1 Tablet(s) Oral daily  valGANciclovir 450 milliGRAM(s) Oral <User Schedule>    Allergies    ChloraPrep One-Step (Rash)  penicillins (Rash)  shellfish (Rash)      PE:  General: Female lying in bed. NAD.   Vital Signs Last 24 Hrs  T(C): 36.6 (11 Jan 2020 09:16), Max: 37 (11 Jan 2020 01:00)  T(F): 97.9 (11 Jan 2020 09:16), Max: 98.6 (11 Jan 2020 01:00)  HR: 78 (11 Jan 2020 09:16) (65 - 104)  BP: 108/62 (11 Jan 2020 09:16) (105/53 - 144/66)  BP(mean): 77 (11 Jan 2020 09:16) (77 - 77)  RR: 18 (11 Jan 2020 09:16) (16 - 18)  SpO2: 100% (11 Jan 2020 09:16) (96% - 100%)  Abd: Soft, NT,ND,   Extremities: Warm  Neuro: A&O X3    LABS:  POCT Blood Glucose.: 281 mg/dL (01-11-20 @ 09:07)  POCT Blood Glucose.: 226 mg/dL (01-10-20 @ 21:23)  POCT Blood Glucose.: 214 mg/dL (01-10-20 @ 19:17)  POCT Blood Glucose.: 343 mg/dL (01-10-20 @ 17:01)  POCT Blood Glucose.: 319 mg/dL (01-10-20 @ 15:26)  POCT Blood Glucose.: 311 mg/dL (01-10-20 @ 14:41)  POCT Blood Glucose.: 288 mg/dL (01-10-20 @ 09:47)                            7.8    5.72  )-----------( 154      ( 11 Jan 2020 06:20 )             23.7       01-11    123<L>  |  96  |  58<H>  ----------------------------<  278<H>  5.5<H>   |  18<L>  |  3.50<H>    Ca    10.0      11 Jan 2020 09:32  Phos  3.5     01-11  Mg     2.0     01-11    TPro  6.8  /  Alb  3.8  /  TBili  0.2  /  DBili  x   /  AST  20  /  ALT  10  /  AlkPhos  54  01-11          Hemoglobin A1C, Whole Blood: 4.4 % [4.0 - 5.6] (12-08-19 @ 12:44)          Contact number: lornea [phone] or [phone] no

## 2020-01-11 NOTE — DIETITIAN INITIAL EVALUATION ADULT. - SIGNS/SYMPTOMS
<50% estimated nutrient needs x 1month, moderate/severe muscle/fat depletion, 21% wt loss x1 month verbalized need for diet education verbalized need for diet education, A1c 7.2%

## 2020-01-11 NOTE — DIETITIAN INITIAL EVALUATION ADULT. - PHYSICAL APPEARANCE
unscheduled/primary
Nutrition-focused physical exam conducted with consent from pt. Findings: Moderate muscle wasting in temples; mild muscle wasting of clavicles; severe muscle wasting of interosseous muscle; moderate fat loss of triceps and buccal region.

## 2020-01-12 DIAGNOSIS — E09.65 DRUG OR CHEMICAL INDUCED DIABETES MELLITUS WITH HYPERGLYCEMIA: ICD-10-CM

## 2020-01-12 DIAGNOSIS — N18.9 CHRONIC KIDNEY DISEASE, UNSPECIFIED: ICD-10-CM

## 2020-01-12 LAB
ALBUMIN SERPL ELPH-MCNC: 3.7 G/DL — SIGNIFICANT CHANGE UP (ref 3.3–5)
ALP SERPL-CCNC: 49 U/L — SIGNIFICANT CHANGE UP (ref 40–120)
ALT FLD-CCNC: 10 U/L — SIGNIFICANT CHANGE UP (ref 10–45)
ANION GAP SERPL CALC-SCNC: 13 MMOL/L — SIGNIFICANT CHANGE UP (ref 5–17)
AST SERPL-CCNC: 23 U/L — SIGNIFICANT CHANGE UP (ref 10–40)
BASOPHILS # BLD AUTO: 0.01 K/UL — SIGNIFICANT CHANGE UP (ref 0–0.2)
BASOPHILS NFR BLD AUTO: 0.2 % — SIGNIFICANT CHANGE UP (ref 0–2)
BILIRUB SERPL-MCNC: 0.2 MG/DL — SIGNIFICANT CHANGE UP (ref 0.2–1.2)
BLD GP AB SCN SERPL QL: NEGATIVE — SIGNIFICANT CHANGE UP
BUN SERPL-MCNC: 52 MG/DL — HIGH (ref 7–23)
CALCIUM SERPL-MCNC: 10.1 MG/DL — SIGNIFICANT CHANGE UP (ref 8.4–10.5)
CHLORIDE SERPL-SCNC: 103 MMOL/L — SIGNIFICANT CHANGE UP (ref 96–108)
CO2 SERPL-SCNC: 19 MMOL/L — LOW (ref 22–31)
CREAT SERPL-MCNC: 3.24 MG/DL — HIGH (ref 0.5–1.3)
EOSINOPHIL # BLD AUTO: 0.05 K/UL — SIGNIFICANT CHANGE UP (ref 0–0.5)
EOSINOPHIL NFR BLD AUTO: 0.8 % — SIGNIFICANT CHANGE UP (ref 0–6)
GLUCOSE BLDC GLUCOMTR-MCNC: 166 MG/DL — HIGH (ref 70–99)
GLUCOSE BLDC GLUCOMTR-MCNC: 172 MG/DL — HIGH (ref 70–99)
GLUCOSE BLDC GLUCOMTR-MCNC: 179 MG/DL — HIGH (ref 70–99)
GLUCOSE BLDC GLUCOMTR-MCNC: 192 MG/DL — HIGH (ref 70–99)
GLUCOSE BLDC GLUCOMTR-MCNC: 253 MG/DL — HIGH (ref 70–99)
GLUCOSE SERPL-MCNC: 133 MG/DL — HIGH (ref 70–99)
HCT VFR BLD CALC: 22 % — LOW (ref 34.5–45)
HGB BLD-MCNC: 7.2 G/DL — LOW (ref 11.5–15.5)
IMM GRANULOCYTES NFR BLD AUTO: 0.7 % — SIGNIFICANT CHANGE UP (ref 0–1.5)
LYMPHOCYTES # BLD AUTO: 1.46 K/UL — SIGNIFICANT CHANGE UP (ref 1–3.3)
LYMPHOCYTES # BLD AUTO: 23.9 % — SIGNIFICANT CHANGE UP (ref 13–44)
MAGNESIUM SERPL-MCNC: 2.1 MG/DL — SIGNIFICANT CHANGE UP (ref 1.6–2.6)
MCHC RBC-ENTMCNC: 28.7 PG — SIGNIFICANT CHANGE UP (ref 27–34)
MCHC RBC-ENTMCNC: 32.7 GM/DL — SIGNIFICANT CHANGE UP (ref 32–36)
MCV RBC AUTO: 87.6 FL — SIGNIFICANT CHANGE UP (ref 80–100)
MONOCYTES # BLD AUTO: 0.48 K/UL — SIGNIFICANT CHANGE UP (ref 0–0.9)
MONOCYTES NFR BLD AUTO: 7.9 % — SIGNIFICANT CHANGE UP (ref 2–14)
NEUTROPHILS # BLD AUTO: 4.06 K/UL — SIGNIFICANT CHANGE UP (ref 1.8–7.4)
NEUTROPHILS NFR BLD AUTO: 66.5 % — SIGNIFICANT CHANGE UP (ref 43–77)
NRBC # BLD: 0 /100 WBCS — SIGNIFICANT CHANGE UP (ref 0–0)
PHOSPHATE SERPL-MCNC: 3.5 MG/DL — SIGNIFICANT CHANGE UP (ref 2.5–4.5)
PLATELET # BLD AUTO: 138 K/UL — LOW (ref 150–400)
POTASSIUM SERPL-MCNC: 4.9 MMOL/L — SIGNIFICANT CHANGE UP (ref 3.5–5.3)
POTASSIUM SERPL-SCNC: 4.9 MMOL/L — SIGNIFICANT CHANGE UP (ref 3.5–5.3)
PROT SERPL-MCNC: 6.6 G/DL — SIGNIFICANT CHANGE UP (ref 6–8.3)
RBC # BLD: 2.51 M/UL — LOW (ref 3.8–5.2)
RBC # FLD: 16.1 % — HIGH (ref 10.3–14.5)
RH IG SCN BLD-IMP: POSITIVE — SIGNIFICANT CHANGE UP
SODIUM SERPL-SCNC: 135 MMOL/L — SIGNIFICANT CHANGE UP (ref 135–145)
TACROLIMUS SERPL-MCNC: 6.3 NG/ML — SIGNIFICANT CHANGE UP
WBC # BLD: 6.1 K/UL — SIGNIFICANT CHANGE UP (ref 3.8–10.5)
WBC # FLD AUTO: 6.1 K/UL — SIGNIFICANT CHANGE UP (ref 3.8–10.5)

## 2020-01-12 PROCEDURE — 99232 SBSQ HOSP IP/OBS MODERATE 35: CPT

## 2020-01-12 RX ORDER — ERYTHROPOIETIN 10000 [IU]/ML
10000 INJECTION, SOLUTION INTRAVENOUS; SUBCUTANEOUS ONCE
Refills: 0 | Status: COMPLETED | OUTPATIENT
Start: 2020-01-12 | End: 2020-01-12

## 2020-01-12 RX ORDER — ERYTHROPOIETIN 10000 [IU]/ML
1000 INJECTION, SOLUTION INTRAVENOUS; SUBCUTANEOUS ONCE
Refills: 0 | Status: DISCONTINUED | OUTPATIENT
Start: 2020-01-12 | End: 2020-01-12

## 2020-01-12 RX ORDER — INSULIN GLARGINE 100 [IU]/ML
13 INJECTION, SOLUTION SUBCUTANEOUS AT BEDTIME
Refills: 0 | Status: DISCONTINUED | OUTPATIENT
Start: 2020-01-12 | End: 2020-01-14

## 2020-01-12 RX ORDER — TACROLIMUS 5 MG/1
8 CAPSULE ORAL
Refills: 0 | Status: DISCONTINUED | OUTPATIENT
Start: 2020-01-13 | End: 2020-01-14

## 2020-01-12 RX ORDER — INSULIN LISPRO 100/ML
2 VIAL (ML) SUBCUTANEOUS ONCE
Refills: 0 | Status: COMPLETED | OUTPATIENT
Start: 2020-01-12 | End: 2020-01-12

## 2020-01-12 RX ORDER — INSULIN LISPRO 100/ML
3 VIAL (ML) SUBCUTANEOUS ONCE
Refills: 0 | Status: DISCONTINUED | OUTPATIENT
Start: 2020-01-12 | End: 2020-01-12

## 2020-01-12 RX ORDER — TACROLIMUS 5 MG/1
1 CAPSULE ORAL ONCE
Refills: 0 | Status: COMPLETED | OUTPATIENT
Start: 2020-01-12 | End: 2020-01-12

## 2020-01-12 RX ORDER — MYCOPHENOLATE MOFETIL 250 MG/1
500 CAPSULE ORAL
Refills: 0 | Status: DISCONTINUED | OUTPATIENT
Start: 2020-01-12 | End: 2020-01-14

## 2020-01-12 RX ADMIN — Medication 2 UNIT(S): at 23:10

## 2020-01-12 RX ADMIN — Medication 0.1 MILLIGRAM(S): at 05:45

## 2020-01-12 RX ADMIN — Medication 60 MILLIGRAM(S): at 05:45

## 2020-01-12 RX ADMIN — ERYTHROPOIETIN 10000 UNIT(S): 10000 INJECTION, SOLUTION INTRAVENOUS; SUBCUTANEOUS at 11:40

## 2020-01-12 RX ADMIN — Medication 50 MILLIGRAM(S): at 05:45

## 2020-01-12 RX ADMIN — Medication 1: at 18:41

## 2020-01-12 RX ADMIN — Medication 4 UNIT(S): at 13:57

## 2020-01-12 RX ADMIN — Medication 650 MILLIGRAM(S): at 17:59

## 2020-01-12 RX ADMIN — Medication 5 MILLIGRAM(S): at 05:45

## 2020-01-12 RX ADMIN — Medication 650 MILLIGRAM(S): at 05:44

## 2020-01-12 RX ADMIN — Medication 1: at 13:56

## 2020-01-12 RX ADMIN — Medication 1 TABLET(S): at 11:42

## 2020-01-12 RX ADMIN — Medication 4 UNIT(S): at 18:41

## 2020-01-12 RX ADMIN — Medication 50 MILLIGRAM(S): at 17:59

## 2020-01-12 RX ADMIN — Medication 0.1 MILLIGRAM(S): at 17:59

## 2020-01-12 RX ADMIN — Medication 4 UNIT(S): at 09:39

## 2020-01-12 RX ADMIN — MYCOPHENOLATE MOFETIL 1000 MILLIGRAM(S): 250 CAPSULE ORAL at 05:45

## 2020-01-12 RX ADMIN — TAMOXIFEN CITRATE 20 MILLIGRAM(S): 20 TABLET, FILM COATED ORAL at 11:42

## 2020-01-12 RX ADMIN — FAMOTIDINE 20 MILLIGRAM(S): 10 INJECTION INTRAVENOUS at 11:42

## 2020-01-12 RX ADMIN — MYCOPHENOLATE MOFETIL 500 MILLIGRAM(S): 250 CAPSULE ORAL at 17:59

## 2020-01-12 RX ADMIN — Medication 500000 UNIT(S): at 05:44

## 2020-01-12 RX ADMIN — Medication 500000 UNIT(S): at 17:59

## 2020-01-12 RX ADMIN — TACROLIMUS 1 MILLIGRAM(S): 5 CAPSULE ORAL at 11:40

## 2020-01-12 RX ADMIN — TACROLIMUS 7 MILLIGRAM(S): 5 CAPSULE ORAL at 08:30

## 2020-01-12 RX ADMIN — Medication 30 MILLILITER(S): at 17:58

## 2020-01-12 RX ADMIN — Medication 500000 UNIT(S): at 11:42

## 2020-01-12 RX ADMIN — Medication 81 MILLIGRAM(S): at 11:43

## 2020-01-12 RX ADMIN — Medication 1: at 09:40

## 2020-01-12 RX ADMIN — Medication 500000 UNIT(S): at 00:46

## 2020-01-12 RX ADMIN — Medication 500000 UNIT(S): at 23:10

## 2020-01-12 RX ADMIN — INSULIN GLARGINE 13 UNIT(S): 100 INJECTION, SOLUTION SUBCUTANEOUS at 23:10

## 2020-01-12 NOTE — PROGRESS NOTE ADULT - SUBJECTIVE AND OBJECTIVE BOX
[facility] DIVISION OF KIDNEY DISEASES AND HYPERTENSION -- FOLLOW UP NOTE  --------------------------------------------------------------------------------  Chief Complaint:  hyperglycemia     24 hour events/subjective:  PT feeling better today.  Has been having occasional loose BM's       PAST HISTORY  --------------------------------------------------------------------------------  No significant changes to PMH, PSH, FHx, SHx, unless otherwise noted    ALLERGIES & MEDICATIONS  --------------------------------------------------------------------------------  Allergies    ChloraPrep One-Step (Rash)  penicillins (Rash)  shellfish (Rash)    Intolerances      Standing Inpatient Medications  aspirin  chewable 81 milliGRAM(s) Oral daily  cloNIDine 0.1 milliGRAM(s) Oral two times a day  dextrose 5%. 1000 milliLiter(s) IV Continuous <Continuous>  dextrose 50% Injectable 12.5 Gram(s) IV Push once  dextrose 50% Injectable 25 Gram(s) IV Push once  dextrose 50% Injectable 25 Gram(s) IV Push once  famotidine    Tablet 20 milliGRAM(s) Oral daily  influenza   Vaccine 0.5 milliLiter(s) IntraMuscular once  insulin glargine Injectable (LANTUS) 12 Unit(s) SubCutaneous at bedtime  insulin lispro (HumaLOG) corrective regimen sliding scale   SubCutaneous three times a day before meals  insulin lispro Injectable (HumaLOG) 4 Unit(s) SubCutaneous three times a day before meals  metoprolol tartrate 50 milliGRAM(s) Oral two times a day  mycophenolate mofetil 500 milliGRAM(s) Oral two times a day  NIFEdipine XL 60 milliGRAM(s) Oral daily  nystatin    Suspension [id number] Unit(s) Oral four times a day  predniSONE   Tablet 5 milliGRAM(s) Oral daily  sodium bicarbonate 650 milliGRAM(s) Oral two times a day  tacrolimus ER Tablet (ENVARSUS XR) 7 milliGRAM(s) Oral <User Schedule>  tamoxifen 20 milliGRAM(s) Oral daily  trimethoprim   80 mG/sulfamethoxazole 400 mG 1 Tablet(s) Oral daily  valGANciclovir 450 milliGRAM(s) Oral <User Schedule>    PRN Inpatient Medications  dextrose 40% Gel 15 Gram(s) Oral once PRN  glucagon  Injectable 1 milliGRAM(s) IntraMuscular once PRN  hemorrhoidal Ointment 1 Application(s) Rectal three times a day PRN      REVIEW OF SYSTEMS  --------------------------------------------------------------------------------  Gen: No fatigue, fevers/chills, weakness  Skin: No rashes  Head/Eyes/Ears/Mouth: No headache;No sore throat  Respiratory: No dyspnea, cough,   CV: No chest pain, PND, orthopnea  GI: No abdominal pain, diarrhea, constipation, nausea, vomiting  Transplant: No pain  : No increased frequency, dysuria, hematuria, nocturia  MSK: No joint pain/swelling; no back pain; no edema  Neuro: No dizziness/lightheadedness, weakness, seizures, numbness, tingling  Psych: No significant nervousness, anxiety, stress, depression    All other systems were reviewed and are negative, except as noted.    VITALS/PHYSICAL EXAM  --------------------------------------------------------------------------------  T(C): 37.1 (01-12-20 @ 05:00), Max: 37.6 (01-12-20 @ 01:00)  HR: 86 (01-12-20 @ 05:00) (82 - 97)  BP: 115/64 (01-12-20 @ 05:00) (114/41 - 124/71)  RR: 18 (01-12-20 @ 05:00) (18 - 18)  SpO2: 98% (01-12-20 @ 05:00) (97% - 99%)  Wt(kg): --        01-11-20 @ 07:01  -  01-12-20 @ 07:00  --------------------------------------------------------  IN: 1465 mL / OUT: 1020 mL / NET: 445 mL      Physical Exam:  	Gen: NAD  	HEENT: PERRL, supple neck, clear oropharynx  	Pulm: CTA B/L  	CV: RRR, S1S2; no rub  	Back: No spinal or CVA tenderness; no sacral edema  	Abd: +BS, soft, nontender/nondistended                      Transplant: No tenderness, swelling  	: No suprapubic tenderness  	UE: Warm, FROM; no edema; no asterixis  	LE: Warm, FROM; no edema  	Neuro: No focal deficits  	Psych: Normal affect and mood  	Skin: Warm, without rashes      LABS/STUDIES  --------------------------------------------------------------------------------              7.2    6.10  >-----------<  138      [01-12-20 @ 07:00]              22.0     135  |  103  |  52  ----------------------------<  133      [01-12-20 @ 07:00]  4.9   |  19  |  3.24        Ca     10.1     [01-12-20 @ 07:00]      Mg     2.1     [01-12-20 @ 07:00]      Phos  3.5     [01-12-20 @ 07:00]    TPro  6.6  /  Alb  3.7  /  TBili  0.2  /  DBili  x   /  AST  23  /  ALT  10  /  AlkPhos  49  [01-12-20 @ 07:00]        Serum Osmolality 306      [01-11-20 @ 09:32]    Creatinine Trend:  SCr 3.24 [01-12 @ 07:00]  SCr 3.22 [01-11 @ 16:27]  SCr 3.50 [01-11 @ 09:32]  SCr 3.56 [01-11 @ 06:20]  SCr 3.42 [01-10 @ 10:59]    Tacrolimus (), Serum: 6.3 ng/mL (01-12 @ 08:29)  Tacrolimus (), Serum: 7.1 ng/mL (01-11 @ 09:58)            Urinalysis - [01-10-20 @ 11:27]      Color Light Yellow / Appearance Clear / SG 1.015 / pH 6.0      Gluc 500 mg/dL / Ketone Negative  / Bili Negative / Urobili Negative       Blood Negative / Protein 30 mg/dL / Leuk Est Negative / Nitrite Negative      RBC 2 / WBC 7 / Hyaline 2 / Gran  / Sq Epi  / Non Sq Epi 4 / Bacteria Few    Urine Sodium <35      [01-11-20 @ 10:55]  Urine Potassium 40      [01-11-20 @ 10:55]  Urine Chloride <35      [01-11-20 @ 10:55]  Urine Osmolality 398      [01-11-20 @ 10:55]    HbA1c 7.2      [01-11-20 @ 09:23]  TSH 0.23      [12-11-19 @ 16:05]  Lipid: chol 116, TG 48, HDL 64, LDL 42      [12-13-19 @ 08:10]

## 2020-01-12 NOTE — PROGRESS NOTE ADULT - ASSESSMENT
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 BG values improving on adjusted basal/bolus regimen. Tolerating POs. No hypoglycemia. Pt w/daughters who are LPN and RN who will assist her with transition from inpatient to home. BG goal (100-180mg/dl).

## 2020-01-12 NOTE — PROGRESS NOTE ADULT - ASSESSMENT
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 Post op course c/b DGF requiring HD, graft function improved/responded to diuretics. HD held on dc.  Admitted with Hyperglycemia    [] s/p DDRT c/b DGF, off HD  - Strict I &O  - Immuno: Envarsus per level, MMF 1g bid, Pred 5  - Proph: valcyte/bactrim/nystatin   - Hold Lasix (hyponatremia)   - EBV PCR pending  - Hyponatremia has resolved  - Renal US pending    [] New Onset DM post transplant  - Appreciate Endocrine following;  Lantus 12; Lispro pre-meal 4/4/4, and insulin sliding scale.  - Monitor FS, HgbA1C 7.2, 12/2019 4.4    [] HTN  -cont Metoprolol 50mg bid, nifedipine xl 60mg daily, clonidine to 0.1 BID.     [] Dispo  -pending clinical stability

## 2020-01-12 NOTE — PROGRESS NOTE ADULT - SUBJECTIVE AND OBJECTIVE BOX
Diabetes Follow up note:    Chief complaint:     Interval Hx:    Review of Systems:  General:  GI: Tolerating POs. Denies N/V/D/Abd pain  CV: Denies CP/SOB  ENDO: No S&Sx of hypoglycemia  MEDS:  dextrose 40% Gel 15 Gram(s) Oral once PRN  dextrose 50% Injectable 12.5 Gram(s) IV Push once  dextrose 50% Injectable 25 Gram(s) IV Push once  dextrose 50% Injectable 25 Gram(s) IV Push once  glucagon  Injectable 1 milliGRAM(s) IntraMuscular once PRN  insulin glargine Injectable (LANTUS) 13 Unit(s) SubCutaneous at bedtime  insulin lispro (HumaLOG) corrective regimen sliding scale   SubCutaneous three times a day before meals  insulin lispro Injectable (HumaLOG) 4 Unit(s) SubCutaneous three times a day before meals  predniSONE   Tablet 5 milliGRAM(s) Oral daily    nystatin    Suspension 676245 Unit(s) Oral four times a day  trimethoprim   80 mG/sulfamethoxazole 400 mG 1 Tablet(s) Oral daily  valGANciclovir 450 milliGRAM(s) Oral <User Schedule>    Allergies    ChloraPrep One-Step (Rash)  penicillins (Rash)  shellfish (Rash)    Intolerances      PE:  General:  Vital Signs Last 24 Hrs  T(C): 37.1 (12 Jan 2020 13:00), Max: 37.6 (12 Jan 2020 01:00)  T(F): 98.7 (12 Jan 2020 13:00), Max: 99.6 (12 Jan 2020 01:00)  HR: 80 (12 Jan 2020 13:00) (80 - 97)  BP: 134/51 (12 Jan 2020 13:00) (114/41 - 134/51)  BP(mean): --  RR: 18 (12 Jan 2020 13:00) (18 - 18)  SpO2: 96% (12 Jan 2020 13:00) (96% - 99%)  Abd: Soft, NT,ND,   Extremities: Warm  Neuro: A&O X3    LABS:  POCT Blood Glucose.: 192 mg/dL (01-12-20 @ 12:23)  POCT Blood Glucose.: 179 mg/dL (01-12-20 @ 09:39)  POCT Blood Glucose.: 161 mg/dL (01-11-20 @ 21:04)  POCT Blood Glucose.: 222 mg/dL (01-11-20 @ 18:36)  POCT Blood Glucose.: 195 mg/dL (01-11-20 @ 13:41)  POCT Blood Glucose.: 281 mg/dL (01-11-20 @ 09:07)  POCT Blood Glucose.: 226 mg/dL (01-10-20 @ 21:23)  POCT Blood Glucose.: 214 mg/dL (01-10-20 @ 19:17)  POCT Blood Glucose.: 343 mg/dL (01-10-20 @ 17:01)  POCT Blood Glucose.: 319 mg/dL (01-10-20 @ 15:26)  POCT Blood Glucose.: 311 mg/dL (01-10-20 @ 14:41)  POCT Blood Glucose.: 288 mg/dL (01-10-20 @ 09:47)                            7.2    6.10  )-----------( 138      ( 12 Jan 2020 07:00 )             22.0       01-12    135  |  103  |  52<H>  ----------------------------<  133<H>  4.9   |  19<L>  |  3.24<H>    Ca    10.1      12 Jan 2020 07:00  Phos  3.5     01-12  Mg     2.1     01-12    TPro  6.6  /  Alb  3.7  /  TBili  0.2  /  DBili  x   /  AST  23  /  ALT  10  /  AlkPhos  49  01-12      Thyroid Function Tests:      Hemoglobin A1C, Whole Blood: 7.2 % <H> [4.0 - 5.6] (01-11-20 @ 09:23)  Hemoglobin A1C, Whole Blood: 4.4 % [4.0 - 5.6] (12-08-19 @ 12:44)          Contact number: lorena [phone] or [phone] Diabetes Follow up note:    Chief complaint: f/u New onset diabetes after xplant    Interval Hx: Improvement in glycemic control over past 24 hours. Pt seen at bedside. Reports more energy and feeling better overall than prior to admission. Said daughter can help her administer insulin in AM at home if need be. Reports good appetite.     Review of Systems:  General: as above.   GI: Tolerating POs. Denies N/V/D/Abd pain  CV: Denies CP/SOB  ENDO: No S&Sx of hypoglycemia  MEDS:    insulin glargine Injectable (LANTUS) 13 Unit(s) SubCutaneous at bedtime  insulin lispro (HumaLOG) corrective regimen sliding scale   SubCutaneous three times a day before meals  insulin lispro Injectable (HumaLOG) 4 Unit(s) SubCutaneous three times a day before meals  predniSONE   Tablet 5 milliGRAM(s) Oral daily    nystatin    Suspension 902904 Unit(s) Oral four times a day  trimethoprim   80 mG/sulfamethoxazole 400 mG 1 Tablet(s) Oral daily  valGANciclovir 450 milliGRAM(s) Oral <User Schedule>    Allergies    ChloraPrep One-Step (Rash)  penicillins (Rash)  shellfish (Rash)      PE:  General: Female lying in bed. NAD>   Vital Signs Last 24 Hrs  T(C): 37.1 (12 Jan 2020 13:00), Max: 37.6 (12 Jan 2020 01:00)  T(F): 98.7 (12 Jan 2020 13:00), Max: 99.6 (12 Jan 2020 01:00)  HR: 80 (12 Jan 2020 13:00) (80 - 97)  BP: 134/51 (12 Jan 2020 13:00) (114/41 - 134/51)  BP(mean): --  RR: 18 (12 Jan 2020 13:00) (18 - 18)  SpO2: 96% (12 Jan 2020 13:00) (96% - 99%)  Abd: Soft, NT,ND,   Extremities: Warm.   Neuro: A&O X3    LABS:  POCT Blood Glucose.: 192 mg/dL (01-12-20 @ 12:23)  POCT Blood Glucose.: 179 mg/dL (01-12-20 @ 09:39)  POCT Blood Glucose.: 161 mg/dL (01-11-20 @ 21:04)  POCT Blood Glucose.: 222 mg/dL (01-11-20 @ 18:36)  POCT Blood Glucose.: 195 mg/dL (01-11-20 @ 13:41)  POCT Blood Glucose.: 281 mg/dL (01-11-20 @ 09:07)  POCT Blood Glucose.: 226 mg/dL (01-10-20 @ 21:23)  POCT Blood Glucose.: 214 mg/dL (01-10-20 @ 19:17)  POCT Blood Glucose.: 343 mg/dL (01-10-20 @ 17:01)  POCT Blood Glucose.: 319 mg/dL (01-10-20 @ 15:26)  POCT Blood Glucose.: 311 mg/dL (01-10-20 @ 14:41)  POCT Blood Glucose.: 288 mg/dL (01-10-20 @ 09:47)                            7.2    6.10  )-----------( 138      ( 12 Jan 2020 07:00 )             22.0       01-12    135  |  103  |  52<H>  ----------------------------<  133<H>  4.9   |  19<L>  |  3.24<H>    Ca    10.1      12 Jan 2020 07:00  Phos  3.5     01-12  Mg     2.1     01-12    TPro  6.6  /  Alb  3.7  /  TBili  0.2  /  DBili  x   /  AST  23  /  ALT  10  /  AlkPhos  49  01-12        Hemoglobin A1C, Whole Blood: 7.2 % <H> [4.0 - 5.6] (01-11-20 @ 09:23)  Hemoglobin A1C, Whole Blood: 4.4 % [4.0 - 5.6] (12-08-19 @ 12:44)          Contact number: lorena [phone] or [phone]

## 2020-01-12 NOTE — PROGRESS NOTE ADULT - SUBJECTIVE AND OBJECTIVE BOX
Transplant Surgery - Multidisciplinary Rounds  --------------------------------------------------------------  DDRT  Date: 12/7/19    Present:   Patient seen and examined with multidisciplinary team including ( Transplant Surgeon: Dr. Chacon.  Transplant Nephrologist: Dr. Perea.  ROMÁN Sawyer, unit RN. Disciplines not in attendance will be notified of the plan.     75 y/o F with PMHx of HTN, HLD, Gout, LUE DVT, Lumbar radiculopathy, breast CA s/p lumpectomy on tamoxifen, ESRD 2/2 PCKD (anuric prior to transplant underwent DDRT on 12/7/2019 with simulect induction (ureteral stent removed on POD 5 with johnson). Post op course c/b DGF requiring HD, graft function improved/responded to diuretics. HD held on dc.     Sent to ED from clinic with hyperglycemia (Gluc on chem 415). No prior h/o DM, not on insulin at home. In ED:  , K 5.2, Gluc 320, . UA: gluc > 500  Reports poor appetite, feeling very weak post transplant; wt loss and dyspnea on exertion. Making good urine, no lower extremity edema.    Immuno: Env 7mg, MMF 1g bid, Pred 5    Donor Info: Age 54, ABO B, KDPI 75%, Terminal Cr 1.7, CMV(+), EBV(+)    OR: DDRT to R iliac fossa, Simulect induction. 1A, 1V, 1U. Ureter stented. Stent sutured to johnson. CIT 13.5Hrs  Enlarged, lymph node was encountered during iliac dissection was removed and sent for pathology (benign)      Interval Events:   -afebrile, VS stable. HTN better controlled on Clonidine/Lopressor/Nifedipine  -hyponatremia resolved  -unchanged tremors  -ambulatory without issues  -appetite has returned, tolerating diet  -stable diarrhea, unchanged since transplant  -good UO1L, Cr 3s  -glucose much improved on new insulin regimen    Potential Discharge date: pending clinical improvement.     Education:  Medications    Plan of care:  See Below      MEDICATIONS  (STANDING):  aspirin  chewable 81 milliGRAM(s) Oral daily  cloNIDine 0.1 milliGRAM(s) Oral two times a day  dextrose 5%. 1000 milliLiter(s) (50 mL/Hr) IV Continuous <Continuous>  dextrose 50% Injectable 12.5 Gram(s) IV Push once  dextrose 50% Injectable 25 Gram(s) IV Push once  dextrose 50% Injectable 25 Gram(s) IV Push once  famotidine    Tablet 20 milliGRAM(s) Oral daily  influenza   Vaccine 0.5 milliLiter(s) IntraMuscular once  insulin glargine Injectable (LANTUS) 12 Unit(s) SubCutaneous at bedtime  insulin lispro (HumaLOG) corrective regimen sliding scale   SubCutaneous three times a day before meals  insulin lispro Injectable (HumaLOG) 4 Unit(s) SubCutaneous three times a day before meals  metoprolol tartrate 50 milliGRAM(s) Oral two times a day  mycophenolate mofetil 1000 milliGRAM(s) Oral two times a day  NIFEdipine XL 60 milliGRAM(s) Oral daily  nystatin    Suspension 151831 Unit(s) Oral four times a day  predniSONE   Tablet 5 milliGRAM(s) Oral daily  sodium bicarbonate 650 milliGRAM(s) Oral two times a day  tacrolimus ER Tablet (ENVARSUS XR) 7 milliGRAM(s) Oral <User Schedule>  tamoxifen 20 milliGRAM(s) Oral daily  trimethoprim   80 mG/sulfamethoxazole 400 mG 1 Tablet(s) Oral daily  valGANciclovir 450 milliGRAM(s) Oral <User Schedule>    MEDICATIONS  (PRN):  dextrose 40% Gel 15 Gram(s) Oral once PRN Blood Glucose LESS THAN 70 milliGRAM(s)/deciliter  glucagon  Injectable 1 milliGRAM(s) IntraMuscular once PRN Glucose LESS THAN 70 milligrams/deciliter  hemorrhoidal Ointment 1 Application(s) Rectal three times a day PRN pain      PAST MEDICAL & SURGICAL HISTORY:  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  Pancreatic cyst  Thyroid nodule: yearly Ultrasound, monitoring yearly  Anuria  ESRD on hemodialysis  Breast cancer, female: left 2014  H/O gastroesophageal reflux (GERD)  Thrombocytopenia: leukopenia: followed by [facility], plan for BMBx 7/22/19  Anemia in ESRD (end-stage renal disease)  Hypertension  History of fracture of right ankle: 2014 repaired  S/P arteriovenous (AV) fistula creation: 2002  S/P hysterectomy: 1994  Adrenal mass: excison 2015  S/P lumpectomy, left breast: 2014      Vital Signs Last 24 Hrs  T(C): 37.1 (12 Jan 2020 05:00), Max: 37.6 (12 Jan 2020 01:00)  T(F): 98.7 (12 Jan 2020 05:00), Max: 99.6 (12 Jan 2020 01:00)  HR: 86 (12 Jan 2020 05:00) (82 - 97)  BP: 115/64 (12 Jan 2020 05:00) (114/41 - 124/71)  BP(mean): --  RR: 18 (12 Jan 2020 05:00) (18 - 18)  SpO2: 98% (12 Jan 2020 05:00) (97% - 99%)    I&O's Summary    11 Jan 2020 07:01  -  12 Jan 2020 07:00  --------------------------------------------------------  IN: 1465 mL / OUT: 1020 mL / NET: 445 mL                              7.2    6.10  )-----------( 138      ( 12 Jan 2020 07:00 )             22.0     01-12    135  |  103  |  52<H>  ----------------------------<  133<H>  4.9   |  19<L>  |  3.24<H>    Ca    10.1      12 Jan 2020 07:00  Phos  3.5     01-12  Mg     2.1     01-12    TPro  6.6  /  Alb  3.7  /  TBili  0.2  /  DBili  x   /  AST  23  /  ALT  10  /  AlkPhos  49  01-12    Tacrolimus (), Serum: 7.1 ng/mL (01-11 @ 09:58)        Culture - Blood (collected 01-10-20 @ 17:15)  Source: .Blood Blood-Venous  Preliminary Report (01-11-20 @ 18:02):    No growth to date.    Culture - Blood (collected 01-10-20 @ 17:15)  Source: .Blood Blood-Venous  Preliminary Report (01-11-20 @ 18:02):    No growth to date.    Culture - Urine (collected 01-10-20 @ 14:28)  Source: .Urine Clean Catch (Midstream)  Final Report (01-11-20 @ 10:38):    <10,000 CFU/mL Normal Urogenital Theresa              Review of Systems:   Gen: improved fatigue  	Skin: No rashes  	Head/Eyes/Ears/Mouth: No headache; Normal hearing; Normal vision w/o blurriness; No sinus pain/discomfort, sore throat  	Respiratory: No dyspnea, cough, wheezing, hemoptysis  	CV: No chest pain, PND, orthopnea  	GI: no abdominal pain  	: No increased frequency, dysuria, hematuria, nocturia  	MSK: No joint pain/swelling; no back pain; no edema  	Neuro: No dizziness/lightheadedness, weakness, seizures, numbness, tingling  	Heme: No easy bruising or bleeding  	Endo: No heat/cold intolerance  	Psych: No significant nervousness, anxiety, stress, depression  All other systems were reviewed and are negative, except as noted.    Physical Exam:  Constitutional: Well developed / well nourished  	Eyes: Anicteric, PERRLA  	ENMT: nc/at  	Neck: no JVD  	Respiratory: CTA B/L  Cardiovascular: RRR  	Gastrointestinal: Soft abdomen, non tender, non distended, incision well healed  	Genitourinary: Voiding spontaneously  	Extremities: SCD's in place and working bilaterally  	Vascular: Palpable dp pulses bilaterally  	Neurological: A&O x3  	Skin: well healed incision   	Musculoskeletal: Moving all extremities  Psychiatric: Responsive

## 2020-01-13 LAB
ALBUMIN SERPL ELPH-MCNC: 3.7 G/DL — SIGNIFICANT CHANGE UP (ref 3.3–5)
ALP SERPL-CCNC: 48 U/L — SIGNIFICANT CHANGE UP (ref 40–120)
ALT FLD-CCNC: 9 U/L — LOW (ref 10–45)
ANION GAP SERPL CALC-SCNC: 12 MMOL/L — SIGNIFICANT CHANGE UP (ref 5–17)
AST SERPL-CCNC: 21 U/L — SIGNIFICANT CHANGE UP (ref 10–40)
BASOPHILS # BLD AUTO: 0.01 K/UL — SIGNIFICANT CHANGE UP (ref 0–0.2)
BASOPHILS NFR BLD AUTO: 0.1 % — SIGNIFICANT CHANGE UP (ref 0–2)
BILIRUB SERPL-MCNC: 0.3 MG/DL — SIGNIFICANT CHANGE UP (ref 0.2–1.2)
BKV DNA SPEC QL NAA+PROBE: ABNORMAL COPIES/ML
BUN SERPL-MCNC: 40 MG/DL — HIGH (ref 7–23)
CALCIUM SERPL-MCNC: 10 MG/DL — SIGNIFICANT CHANGE UP (ref 8.4–10.5)
CHLORIDE SERPL-SCNC: 105 MMOL/L — SIGNIFICANT CHANGE UP (ref 96–108)
CMV DNA SPEC QL NAA+PROBE: NOT DETECTED
CMVPCR LOG: NOT DETECTED LOG10IU/ML
CO2 SERPL-SCNC: 20 MMOL/L — LOW (ref 22–31)
CREAT SERPL-MCNC: 2.64 MG/DL — HIGH (ref 0.5–1.3)
EBV DNA SERPL NAA+PROBE-ACNC: SIGNIFICANT CHANGE UP IU/ML
EBVPCR LOG: SIGNIFICANT CHANGE UP LOG10IU/ML
EOSINOPHIL # BLD AUTO: 0.04 K/UL — SIGNIFICANT CHANGE UP (ref 0–0.5)
EOSINOPHIL NFR BLD AUTO: 0.6 % — SIGNIFICANT CHANGE UP (ref 0–6)
GLUCOSE BLDC GLUCOMTR-MCNC: 137 MG/DL — HIGH (ref 70–99)
GLUCOSE BLDC GLUCOMTR-MCNC: 167 MG/DL — HIGH (ref 70–99)
GLUCOSE BLDC GLUCOMTR-MCNC: 171 MG/DL — HIGH (ref 70–99)
GLUCOSE BLDC GLUCOMTR-MCNC: 208 MG/DL — HIGH (ref 70–99)
GLUCOSE SERPL-MCNC: 150 MG/DL — HIGH (ref 70–99)
HCT VFR BLD CALC: 26.7 % — LOW (ref 34.5–45)
HGB BLD-MCNC: 8.5 G/DL — LOW (ref 11.5–15.5)
IMM GRANULOCYTES NFR BLD AUTO: 0.6 % — SIGNIFICANT CHANGE UP (ref 0–1.5)
LYMPHOCYTES # BLD AUTO: 1.76 K/UL — SIGNIFICANT CHANGE UP (ref 1–3.3)
LYMPHOCYTES # BLD AUTO: 25 % — SIGNIFICANT CHANGE UP (ref 13–44)
MAGNESIUM SERPL-MCNC: 2.1 MG/DL — SIGNIFICANT CHANGE UP (ref 1.6–2.6)
MCHC RBC-ENTMCNC: 28.3 PG — SIGNIFICANT CHANGE UP (ref 27–34)
MCHC RBC-ENTMCNC: 31.8 GM/DL — LOW (ref 32–36)
MCV RBC AUTO: 89 FL — SIGNIFICANT CHANGE UP (ref 80–100)
MONOCYTES # BLD AUTO: 0.55 K/UL — SIGNIFICANT CHANGE UP (ref 0–0.9)
MONOCYTES NFR BLD AUTO: 7.8 % — SIGNIFICANT CHANGE UP (ref 2–14)
NEUTROPHILS # BLD AUTO: 4.64 K/UL — SIGNIFICANT CHANGE UP (ref 1.8–7.4)
NEUTROPHILS NFR BLD AUTO: 65.9 % — SIGNIFICANT CHANGE UP (ref 43–77)
NRBC # BLD: 0 /100 WBCS — SIGNIFICANT CHANGE UP (ref 0–0)
PHOSPHATE SERPL-MCNC: 3.1 MG/DL — SIGNIFICANT CHANGE UP (ref 2.5–4.5)
PLATELET # BLD AUTO: 130 K/UL — LOW (ref 150–400)
POTASSIUM SERPL-MCNC: 5.1 MMOL/L — SIGNIFICANT CHANGE UP (ref 3.5–5.3)
POTASSIUM SERPL-SCNC: 5.1 MMOL/L — SIGNIFICANT CHANGE UP (ref 3.5–5.3)
PROT SERPL-MCNC: 6.5 G/DL — SIGNIFICANT CHANGE UP (ref 6–8.3)
RBC # BLD: 3 M/UL — LOW (ref 3.8–5.2)
RBC # FLD: 15.8 % — HIGH (ref 10.3–14.5)
SODIUM SERPL-SCNC: 137 MMOL/L — SIGNIFICANT CHANGE UP (ref 135–145)
TACROLIMUS SERPL-MCNC: 9.2 NG/ML — SIGNIFICANT CHANGE UP
WBC # BLD: 7.04 K/UL — SIGNIFICANT CHANGE UP (ref 3.8–10.5)
WBC # FLD AUTO: 7.04 K/UL — SIGNIFICANT CHANGE UP (ref 3.8–10.5)

## 2020-01-13 PROCEDURE — 76776 US EXAM K TRANSPL W/DOPPLER: CPT | Mod: 26,RT

## 2020-01-13 PROCEDURE — 99233 SBSQ HOSP IP/OBS HIGH 50: CPT

## 2020-01-13 PROCEDURE — 99232 SBSQ HOSP IP/OBS MODERATE 35: CPT

## 2020-01-13 RX ORDER — VALGANCICLOVIR 450 MG/1
450 TABLET, FILM COATED ORAL
Refills: 0 | Status: DISCONTINUED | OUTPATIENT
Start: 2020-01-13 | End: 2020-01-14

## 2020-01-13 RX ORDER — ONDANSETRON 8 MG/1
4 TABLET, FILM COATED ORAL ONCE
Refills: 0 | Status: DISCONTINUED | OUTPATIENT
Start: 2020-01-13 | End: 2020-01-13

## 2020-01-13 RX ADMIN — Medication 1: at 18:05

## 2020-01-13 RX ADMIN — INSULIN GLARGINE 13 UNIT(S): 100 INJECTION, SOLUTION SUBCUTANEOUS at 22:21

## 2020-01-13 RX ADMIN — VALGANCICLOVIR 450 MILLIGRAM(S): 450 TABLET, FILM COATED ORAL at 10:11

## 2020-01-13 RX ADMIN — Medication 4 UNIT(S): at 18:05

## 2020-01-13 RX ADMIN — Medication 4 UNIT(S): at 13:15

## 2020-01-13 RX ADMIN — Medication 81 MILLIGRAM(S): at 13:13

## 2020-01-13 RX ADMIN — Medication 1 TABLET(S): at 13:14

## 2020-01-13 RX ADMIN — MYCOPHENOLATE MOFETIL 500 MILLIGRAM(S): 250 CAPSULE ORAL at 05:25

## 2020-01-13 RX ADMIN — Medication 0.1 MILLIGRAM(S): at 18:05

## 2020-01-13 RX ADMIN — TAMOXIFEN CITRATE 20 MILLIGRAM(S): 20 TABLET, FILM COATED ORAL at 13:15

## 2020-01-13 RX ADMIN — Medication 50 MILLIGRAM(S): at 05:26

## 2020-01-13 RX ADMIN — Medication 500000 UNIT(S): at 13:14

## 2020-01-13 RX ADMIN — Medication 500000 UNIT(S): at 18:04

## 2020-01-13 RX ADMIN — TACROLIMUS 8 MILLIGRAM(S): 5 CAPSULE ORAL at 10:11

## 2020-01-13 RX ADMIN — Medication 50 MILLIGRAM(S): at 18:05

## 2020-01-13 RX ADMIN — Medication 650 MILLIGRAM(S): at 18:05

## 2020-01-13 RX ADMIN — FAMOTIDINE 20 MILLIGRAM(S): 10 INJECTION INTRAVENOUS at 13:14

## 2020-01-13 RX ADMIN — Medication 2: at 13:15

## 2020-01-13 RX ADMIN — Medication 60 MILLIGRAM(S): at 05:25

## 2020-01-13 RX ADMIN — MYCOPHENOLATE MOFETIL 500 MILLIGRAM(S): 250 CAPSULE ORAL at 18:06

## 2020-01-13 RX ADMIN — Medication 0.1 MILLIGRAM(S): at 05:26

## 2020-01-13 RX ADMIN — Medication 650 MILLIGRAM(S): at 05:25

## 2020-01-13 RX ADMIN — Medication 500000 UNIT(S): at 05:26

## 2020-01-13 RX ADMIN — Medication 5 MILLIGRAM(S): at 05:26

## 2020-01-13 NOTE — PROGRESS NOTE ADULT - SUBJECTIVE AND OBJECTIVE BOX
Pt went out to smoke as nurse went to triage pt.    DIABETES FOLLOW UP NOTE:   INTERVAL HX: 73 y/o F w/h/o HTN, HLD, Gout, LUE DVT, Lumbar radiculopathy, breast CA s/p lumpectomy on tamoxifen, ESRD 2/2 PCKD now s/p DDRT on 12/7/2019. Presented with NODAT. Pt states no family of DM or personal h/o pre diabetes with A1C 4.4% prior to transplant. Pt on Prednisone 5mg daily presenting with hyperglycemia and with variable BG levels between 100s to 200s while on present insulin regimen. Pt reports tolerating POs but dislikes hospital food so eating mostly fruits during stay. At home likes to eat poultry/green beans and fruits/juice and was drinking regular Ginger Ale. Here with BG initially >300s. No hypoglycemia. Noted that pt was NPO this am  for US and didn't receive pre meal correction Humalog for BG of 171mg/dL with rebound hyperglycemia after procedure in 200s.  BG goal (100-180mg/dl). Reports no pain and voiding well. Per primary team possible discharge home tomorrow. Pt has not learned BG monitoring or insulin injection yet.         Review of Systems:  General: As above  Cardiovascular: No chest pain, palpitations  Respiratory: No SOB, no cough  GI: No nausea, vomiting, abdominal pain  Endocrine: no polyuria, polydipsia or S&Sx of hypoglycemia    Allergies    ChloraPrep One-Step (Rash)  penicillins (Rash)  shellfish (Rash)    Intolerances      MEDICATIONS:  insulin glargine Injectable (LANTUS) 13 Unit(s) SubCutaneous at bedtime  insulin lispro (HumaLOG) corrective regimen sliding scale   SubCutaneous three times a day before meals  insulin lispro Injectable (HumaLOG) 4 Unit(s) SubCutaneous three times a day before meals  predniSONE   Tablet 5 milliGRAM(s) Oral daily  tacrolimus ER Tablet (ENVARSUS XR) 8 milliGRAM(s) Oral <User Schedule>  tamoxifen 20 milliGRAM(s) Oral daily  trimethoprim   80 mG/sulfamethoxazole 400 mG 1 Tablet(s) Oral daily  valGANciclovir 450 milliGRAM(s) Oral <User Schedule>      PHYSICAL EXAM:  VITALS: T(C): 36.6 (01-13-20 @ 17:00)  T(F): 97.8 (01-13-20 @ 17:00), Max: 98.9 (01-12-20 @ 21:00)  HR: 90 (01-13-20 @ 17:00) (77 - 90)  BP: 128/68 (01-13-20 @ 17:00) (110/64 - 157/71)  RR:  (18 - 20)  SpO2:  (98% - 99%)  Wt(kg): --  GENERAL: Thin female laying in bed in NAD. Son and daughter at bedside  Abdomen: Soft, nontender, non distended  Extremities: Warm, no edema in all 4 exts. Noted LUE AV shunt with + thrill  NEURO: A&O X3    LABS:  POCT Blood Glucose.: 167 mg/dL (01-13-20 @ 17:11)  POCT Blood Glucose.: 208 mg/dL (01-13-20 @ 13:12)  POCT Blood Glucose.: 171 mg/dL (01-13-20 @ 05:38)  POCT Blood Glucose.: 253 mg/dL (01-12-20 @ 22:34)  POCT Blood Glucose.: 166 mg/dL (01-12-20 @ 18:26)  POCT Blood Glucose.: 172 mg/dL (01-12-20 @ 13:42)  POCT Blood Glucose.: 192 mg/dL (01-12-20 @ 12:23)  POCT Blood Glucose.: 179 mg/dL (01-12-20 @ 09:39)  POCT Blood Glucose.: 161 mg/dL (01-11-20 @ 21:04)  POCT Blood Glucose.: 222 mg/dL (01-11-20 @ 18:36)  POCT Blood Glucose.: 195 mg/dL (01-11-20 @ 13:41)  POCT Blood Glucose.: 281 mg/dL (01-11-20 @ 09:07)  POCT Blood Glucose.: 226 mg/dL (01-10-20 @ 21:23)  POCT Blood Glucose.: 214 mg/dL (01-10-20 @ 19:17)                            8.5    7.04  )-----------( 130      ( 13 Jan 2020 06:41 )             26.7       01-13    137  |  105  |  40<H>  ----------------------------<  150<H>  5.1   |  20<L>  |  2.64<H>    EGFR if : 20<L>      Ca    10.0      01-13  Mg     2.1     01-13  Phos  3.1     01-13    TPro  6.5  /  Alb  3.7  /  TBili  0.3  /  DBili  x   /  AST  21  /  ALT  9<L>  /  AlkPhos  48  01-13    Hemoglobin A1C, Whole Blood: 7.2 % <H> [4.0 - 5.6] (01-11-20 @ 09:23)  Hemoglobin A1C, Whole Blood: 4.4 % [4.0 - 5.6] (12-08-19 @ 12:44)

## 2020-01-13 NOTE — PROGRESS NOTE ADULT - SUBJECTIVE AND OBJECTIVE BOX
Transplant Surgery - Multidisciplinary Rounds  --------------------------------------------------------------  DDRT  Date: 12/7/19    Present:   Patient seen and examined with multidisciplinary team including ( Transplant Surgeon: Dr. Middleton/Oswaldo/Beth/Max.  Transplant Nephrologist: Dr. Guzman.  ROMÁN Sawyer, unit RN. Disciplines not in attendance will be notified of the plan.     73 y/o F with PMHx of HTN, HLD, Gout, LUE DVT, Lumbar radiculopathy, breast CA s/p lumpectomy on tamoxifen, ESRD 2/2 PCKD (anuric prior to transplant underwent DDRT on 12/7/2019 with simulect induction (ureteral stent removed on POD 5 with johnson). Post op course c/b DGF requiring HD, graft function improved/responded to diuretics. HD held on dc.     Sent to ED from clinic with hyperglycemia (Gluc on chem 415). No prior h/o DM, not on insulin at home. In ED:  , K 5.2, Gluc 320, . UA: gluc > 500  Reports poor appetite, feeling very weak post transplant; wt loss and dyspnea on exertion. Making good urine, no lower extremity edema.    Immuno at home: Env 7mg, MMF 1g bid, Pred 5    Donor Info: Age 54, ABO B, KDPI 75%, Terminal Cr 1.7, CMV(+), EBV(+)    OR: DDRT to R iliac fossa, Simulect induction. 1A, 1V, 1U. Ureter stented. Stent sutured to johnson. CIT 13.5Hrs  Enlarged, lymph node was encountered during iliac dissection was removed and sent for pathology (benign)      Interval Events:   -afebrile, VS stable. HTN better controlled on Clonidine/Lopressor/Nifedipine  -hyponatremia resolved  -received 1U PRBC/10k Epogen yesterday with good response for chronic anemia  -unchanged tremors  -ambulatory without issues  -appetite has returned, tolerating diet  -stable diarrhea, unchanged since transplant  -good , Cr improving to 2.4 from 3.2  -glucose much improved on new insulin regimen    Potential Discharge date: tomorrow    Education:  Medications    Plan of care:  See Below      MEDICATIONS  (STANDING):  aspirin  chewable 81 milliGRAM(s) Oral daily  cloNIDine 0.1 milliGRAM(s) Oral two times a day  dextrose 5%. 1000 milliLiter(s) (50 mL/Hr) IV Continuous <Continuous>  dextrose 50% Injectable 12.5 Gram(s) IV Push once  dextrose 50% Injectable 25 Gram(s) IV Push once  dextrose 50% Injectable 25 Gram(s) IV Push once  famotidine    Tablet 20 milliGRAM(s) Oral daily  influenza   Vaccine 0.5 milliLiter(s) IntraMuscular once  insulin glargine Injectable (LANTUS) 13 Unit(s) SubCutaneous at bedtime  insulin lispro (HumaLOG) corrective regimen sliding scale   SubCutaneous three times a day before meals  insulin lispro Injectable (HumaLOG) 4 Unit(s) SubCutaneous three times a day before meals  metoprolol tartrate 50 milliGRAM(s) Oral two times a day  mycophenolate mofetil 500 milliGRAM(s) Oral two times a day  NIFEdipine XL 60 milliGRAM(s) Oral daily  nystatin    Suspension [id number] Unit(s) Oral four times a day  predniSONE   Tablet 5 milliGRAM(s) Oral daily  sodium bicarbonate 650 milliGRAM(s) Oral two times a day  tacrolimus ER Tablet (ENVARSUS XR) 8 milliGRAM(s) Oral <User Schedule>  tamoxifen 20 milliGRAM(s) Oral daily  trimethoprim   80 mG/sulfamethoxazole 400 mG 1 Tablet(s) Oral daily  valGANciclovir 450 milliGRAM(s) Oral <User Schedule>    MEDICATIONS  (PRN):  dextrose 40% Gel 15 Gram(s) Oral once PRN Blood Glucose LESS THAN 70 milliGRAM(s)/deciliter  glucagon  Injectable 1 milliGRAM(s) IntraMuscular once PRN Glucose LESS THAN 70 milligrams/deciliter  hemorrhoidal Ointment 1 Application(s) Rectal three times a day PRN pain      PAST MEDICAL & SURGICAL HISTORY:  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  Pancreatic cyst  Thyroid nodule: yearly Ultrasound, monitoring yearly  Anuria  ESRD on hemodialysis  Breast cancer, female: left 2014  H/O gastroesophageal reflux (GERD)  Thrombocytopenia: leukopenia: followed by heme, plan for BMBx 7/22/19  Anemia in ESRD (end-stage renal disease)  Hypertension  History of fracture of right ankle: 2014 repaired  S/P arteriovenous (AV) fistula creation: 2002  S/P hysterectomy: 1994  Adrenal mass: excison 2015  S/P lumpectomy, left breast: 2014      Vital Signs Last 24 Hrs  T(C): 37.1 (13 Jan 2020 09:00), Max: 37.2 (12 Jan 2020 21:00)  T(F): 98.8 (13 Jan 2020 09:00), Max: 98.9 (12 Jan 2020 21:00)  HR: 77 (13 Jan 2020 09:00) (77 - 85)  BP: 146/73 (13 Jan 2020 09:00) (110/64 - 157/71)  BP(mean): --  RR: 18 (13 Jan 2020 09:00) (18 - 18)  SpO2: 99% (13 Jan 2020 09:00) (96% - 99%)    I&O's Summary    12 Jan 2020 07:01  -  13 Jan 2020 07:00  --------------------------------------------------------  IN: 120 mL / OUT: 700 mL / NET: -580 mL                              8.5    7.04  )-----------( 130      ( 13 Jan 2020 06:41 )             26.7     01-13    137  |  105  |  40<H>  ----------------------------<  150<H>  5.1   |  20<L>  |  2.64<H>    Ca    10.0      13 Jan 2020 06:40  Phos  3.1     01-13  Mg     2.1     01-13    TPro  6.5  /  Alb  3.7  /  TBili  0.3  /  DBili  x   /  AST  21  /  ALT  9<L>  /  AlkPhos  48  01-13    Tacrolimus (), Serum: 9.2 ng/mL (01-13 @ 07:26)        Culture - Blood (collected 01-10-20 @ 17:15)  Source: .Blood Blood-Venous  Preliminary Report (01-11-20 @ 18:02):    No growth to date.    Culture - Blood (collected 01-10-20 @ 17:15)  Source: .Blood Blood-Venous  Preliminary Report (01-11-20 @ 18:02):    No growth to date.    Culture - Urine (collected 01-10-20 @ 14:28)  Source: .Urine Clean Catch (Midstream)  Final Report (01-11-20 @ 10:38):    <10,000 CFU/mL Normal Urogenital Theresa              Review of Systems:   Gen: improved fatigue  	Skin: No rashes  	Head/Eyes/Ears/Mouth: No headache; Normal hearing; Normal vision w/o blurriness; No sinus pain/discomfort, sore throat  	Respiratory: No dyspnea, cough, wheezing, hemoptysis  	CV: No chest pain, PND, orthopnea  	GI: no abdominal pain  	: No increased frequency, dysuria, hematuria, nocturia  	MSK: No joint pain/swelling; no back pain; no edema  	Neuro: No dizziness/lightheadedness, weakness, seizures, numbness, tingling  	Heme: No easy bruising or bleeding  	Endo: No heat/cold intolerance  	Psych: No significant nervousness, anxiety, stress, depression  All other systems were reviewed and are negative, except as noted.    Physical Exam:  Constitutional: Well developed / well nourished  	Eyes: Anicteric, PERRLA  	ENMT: nc/at  	Neck: no JVD  	Respiratory: CTA B/L  Cardiovascular: RRR  	Gastrointestinal: Soft abdomen, non tender, non distended, incision well healed  	Genitourinary: Voiding spontaneously  	Extremities: SCD's in place and working bilaterally  	Vascular: Palpable dp pulses bilaterally  	Neurological: A&O x3  	Skin: well healed incision   	Musculoskeletal: Moving all extremities  Psychiatric: Responsive

## 2020-01-13 NOTE — PROGRESS NOTE ADULT - ASSESSMENT
75 y/o F w/h/o HTN, HLD, Gout, LUE DVT, Lumbar radiculopathy, breast CA s/p lumpectomy on tamoxifen, ESRD 2/2 PCKD now s/p DDRT on 12/7/2019. Presented with NODAT. Pt on Prednisone 5mg daily and tolerating POs with variable BG levels between 100s to 200s while on present insulin regimen depending on PO intake since pt is moslty eating fruits and very little protein while in hospital. Pt states she dislikes hospital food but will eat better at home.  No hypoglycemia. BG goal (100-180mg/dl). Per primary team possible discharge home tomorrow. Pt has not learned BG monitoring or insulin injection yet.   Spoke to pt about glucose variability at different times of the day and the benefits that at least long lasting insulin can have on her glycemic control. Pt and family agreeable to once a day insulin in addition to Prandin ac meals for glycemic control.   Met with patient and children and reviewed the following:    -A1c LEVEL: Present and goal  -Blood glucose goals: 100s to 150s as out pt  -Glucose monitoring frequency: ac and hs  -Hypoglycemia prevention,detection and treatment  -Healthy eating and portion control. Review carbs and need to avoid regular juice and sode. Alternative drinks discussed. Noted RD visit.  -Insulin(s) action, time of administration and side effects. Basal insulin  -Importance of follow up care. Has f/u apt with endo  Pt and family verbalized understanding and able to teach back what NODAT is and the improtance of diet and insulin  Spent over 20 minutes providing face to face education and setting out pt care.  Set up TV channel for pt/family to watch video on insulin pen use and spoke to RN about need for teaching regarding use of insulin pen.  Pt daughters and niece are LPNs and are willing to assist pt with DM care. Pt lives with niece

## 2020-01-13 NOTE — PROGRESS NOTE ADULT - SUBJECTIVE AND OBJECTIVE BOX
[facility] DIVISION OF KIDNEY DISEASES AND HYPERTENSION -- FOLLOW UP NOTE  --------------------------------------------------------------------------------  HPI: 73 yo F with ESRD s/p DDRT on 12/7/19 c/b DGF however now off HD, HTN, and no previous history of DM was referred to the ER with findings of uncontrolled hyperglycemia on outpatient labs. Pt. found to have elevated serum glucose of 320 on BMP during admission (1/10/20), and UA with significant glucosuria. Scr noted to be elevated at 3.42. Transplant nephrology team consulted for immunosuppression management. Since admission, pt. with improved control of hyperglycemia. Scr improved this AM to 2.64. Awaiting renal US with Doppler today.    Pt. seen and examined at bedside this AM. Pt. states that she feels improved since being admitted, but feels lethargic this AM. Denies CP, SOB, N/V/F/C.    Donor Info: Age 54, ABO B, KDPI 75%, X-clamp 20:39 12/6, Terminal Cr 1.7, CMV(+), EBV(+)  Recipient Info: ABO B+, CMV(+), EBV(+), CPRA 1%  CIT 13.5 hours  PAST HISTORY  --------------------------------------------------------------------------------  No significant changes to PMH, PSH, FHx, SHx, unless otherwise noted    ALLERGIES & MEDICATIONS  --------------------------------------------------------------------------------  Allergies    ChloraPrep One-Step (Rash)  penicillins (Rash)  shellfish (Rash)    Intolerances    Standing Inpatient Medications  aspirin  chewable 81 milliGRAM(s) Oral daily  cloNIDine 0.1 milliGRAM(s) Oral two times a day  dextrose 5%. 1000 milliLiter(s) IV Continuous <Continuous>  dextrose 50% Injectable 12.5 Gram(s) IV Push once  dextrose 50% Injectable 25 Gram(s) IV Push once  dextrose 50% Injectable 25 Gram(s) IV Push once  famotidine    Tablet 20 milliGRAM(s) Oral daily  influenza   Vaccine 0.5 milliLiter(s) IntraMuscular once  insulin glargine Injectable (LANTUS) 13 Unit(s) SubCutaneous at bedtime  insulin lispro (HumaLOG) corrective regimen sliding scale   SubCutaneous three times a day before meals  insulin lispro Injectable (HumaLOG) 4 Unit(s) SubCutaneous three times a day before meals  metoprolol tartrate 50 milliGRAM(s) Oral two times a day  mycophenolate mofetil 500 milliGRAM(s) Oral two times a day  NIFEdipine XL 60 milliGRAM(s) Oral daily  nystatin    Suspension 539098 Unit(s) Oral four times a day  predniSONE   Tablet 5 milliGRAM(s) Oral daily  sodium bicarbonate 650 milliGRAM(s) Oral two times a day  tacrolimus ER Tablet (ENVARSUS XR) 8 milliGRAM(s) Oral <User Schedule>  tamoxifen 20 milliGRAM(s) Oral daily  trimethoprim   80 mG/sulfamethoxazole 400 mG 1 Tablet(s) Oral daily  valGANciclovir 450 milliGRAM(s) Oral <User Schedule>    REVIEW OF SYSTEMS  --------------------------------------------------------------------------------  Gen: + lethargy, + fatigue, + weight loss/poor appetite  Respiratory: No BORJA  CV: No chest pain  GI: No abdominal pain  MSK: No LE edema  Neuro: No dizziness    All other systems were reviewed and are negative, except as noted.  VITALS/PHYSICAL EXAM  --------------------------------------------------------------------------------  T(C): 36.6 (01-13-20 @ 05:00), Max: 37.2 (01-12-20 @ 21:00)  HR: 85 (01-13-20 @ 05:40) (80 - 85)  BP: 157/71 (01-13-20 @ 05:40) (110/64 - 157/71)  RR: 18 (01-13-20 @ 05:00) (18 - 18)  SpO2: 99% (01-13-20 @ 05:00) (96% - 99%)  Wt(kg): --    01-12-20 @ 07:01  -  01-13-20 @ 07:00  --------------------------------------------------------  IN: 120 mL / OUT: 700 mL / NET: -580 mL    Physical Exam:  	Gen: NAD, thin female  	HEENT: Supple neck  	Pulm: CTA B/L  	CV: RRR, S1S2; no rub  	Abd: +BS, soft, nontender/nondistended  		Transplant: no tenderness  	: No suprapubic tenderness  	LE: No edema b/l  	Skin: Warm, without rashes  LABS/STUDIES  --------------------------------------------------------------------------------              8.5    7.04  >-----------<  130      [01-13-20 @ 06:41]              26.7     137  |  105  |  40  ----------------------------<  150      [01-13-20 @ 06:40]  5.1   |  20  |  2.64        Ca     10.0     [01-13-20 @ 06:40]      Mg     2.1     [01-13-20 @ 06:40]      Phos  3.1     [01-13-20 @ 06:40]    Creatinine Trend:  SCr 2.64 [01-13 @ 06:40]  SCr 3.24 [01-12 @ 07:00]  SCr 3.22 [01-11 @ 16:27]  SCr 3.50 [01-11 @ 09:32]  SCr 3.56 [01-11 @ 06:20]

## 2020-01-13 NOTE — PROGRESS NOTE ADULT - ASSESSMENT
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 Post op course c/b DGF requiring HD, graft function improved/responded to diuretics. HD held on dc.  Admitted with Hyperglycemia    [] s/p DDRT c/b DGF, off HD  - Strict I &Os  - Immuno: Envarsus per level, /500, Pred 5  - Proph: valcyte/bactrim/nystatin   - EBV PCR pending  - Hyponatremia has resolved  - Renal US today    [] New Onset DM post transplant  - Appreciate Endocrine following;  Lantus 13; Lispro pre-meal 4/4/4, and insulin sliding scale.  - Final d/c recs TBD today  - Monitor FS, HgbA1C 7.2, 12/2019 4.4    [] HTN  -cont Metoprolol 50mg bid, nifedipine xl 60mg daily, clonidine to 0.1 BID.     [] Dispo  -tomorrow, if renal u/s stable

## 2020-01-14 ENCOUNTER — TRANSCRIPTION ENCOUNTER (OUTPATIENT)
Age: 75
End: 2020-01-14

## 2020-01-14 VITALS
OXYGEN SATURATION: 96 % | RESPIRATION RATE: 18 BRPM | DIASTOLIC BLOOD PRESSURE: 66 MMHG | HEART RATE: 83 BPM | SYSTOLIC BLOOD PRESSURE: 127 MMHG | TEMPERATURE: 98 F

## 2020-01-14 LAB
ALBUMIN SERPL ELPH-MCNC: 3.7 G/DL — SIGNIFICANT CHANGE UP (ref 3.3–5)
ALP SERPL-CCNC: 46 U/L — SIGNIFICANT CHANGE UP (ref 40–120)
ALT FLD-CCNC: 9 U/L — LOW (ref 10–45)
ANION GAP SERPL CALC-SCNC: 12 MMOL/L — SIGNIFICANT CHANGE UP (ref 5–17)
AST SERPL-CCNC: 24 U/L — SIGNIFICANT CHANGE UP (ref 10–40)
BASOPHILS # BLD AUTO: 0.01 K/UL — SIGNIFICANT CHANGE UP (ref 0–0.2)
BASOPHILS NFR BLD AUTO: 0.1 % — SIGNIFICANT CHANGE UP (ref 0–2)
BILIRUB SERPL-MCNC: 0.2 MG/DL — SIGNIFICANT CHANGE UP (ref 0.2–1.2)
BUN SERPL-MCNC: 37 MG/DL — HIGH (ref 7–23)
CALCIUM SERPL-MCNC: 9.9 MG/DL — SIGNIFICANT CHANGE UP (ref 8.4–10.5)
CHLORIDE SERPL-SCNC: 106 MMOL/L — SIGNIFICANT CHANGE UP (ref 96–108)
CO2 SERPL-SCNC: 19 MMOL/L — LOW (ref 22–31)
CREAT SERPL-MCNC: 2.42 MG/DL — HIGH (ref 0.5–1.3)
EOSINOPHIL # BLD AUTO: 0.03 K/UL — SIGNIFICANT CHANGE UP (ref 0–0.5)
EOSINOPHIL NFR BLD AUTO: 0.4 % — SIGNIFICANT CHANGE UP (ref 0–6)
GLUCOSE BLDC GLUCOMTR-MCNC: 124 MG/DL — HIGH (ref 70–99)
GLUCOSE BLDC GLUCOMTR-MCNC: 185 MG/DL — HIGH (ref 70–99)
GLUCOSE SERPL-MCNC: 131 MG/DL — HIGH (ref 70–99)
HCT VFR BLD CALC: 27.4 % — LOW (ref 34.5–45)
HGB BLD-MCNC: 8.7 G/DL — LOW (ref 11.5–15.5)
IMM GRANULOCYTES NFR BLD AUTO: 0.4 % — SIGNIFICANT CHANGE UP (ref 0–1.5)
LYMPHOCYTES # BLD AUTO: 1.57 K/UL — SIGNIFICANT CHANGE UP (ref 1–3.3)
LYMPHOCYTES # BLD AUTO: 20.8 % — SIGNIFICANT CHANGE UP (ref 13–44)
MAGNESIUM SERPL-MCNC: 2.2 MG/DL — SIGNIFICANT CHANGE UP (ref 1.6–2.6)
MCHC RBC-ENTMCNC: 28.2 PG — SIGNIFICANT CHANGE UP (ref 27–34)
MCHC RBC-ENTMCNC: 31.8 GM/DL — LOW (ref 32–36)
MCV RBC AUTO: 88.7 FL — SIGNIFICANT CHANGE UP (ref 80–100)
MONOCYTES # BLD AUTO: 0.53 K/UL — SIGNIFICANT CHANGE UP (ref 0–0.9)
MONOCYTES NFR BLD AUTO: 7 % — SIGNIFICANT CHANGE UP (ref 2–14)
NEUTROPHILS # BLD AUTO: 5.39 K/UL — SIGNIFICANT CHANGE UP (ref 1.8–7.4)
NEUTROPHILS NFR BLD AUTO: 71.3 % — SIGNIFICANT CHANGE UP (ref 43–77)
NRBC # BLD: 0 /100 WBCS — SIGNIFICANT CHANGE UP (ref 0–0)
PHOSPHATE SERPL-MCNC: 3.1 MG/DL — SIGNIFICANT CHANGE UP (ref 2.5–4.5)
PLATELET # BLD AUTO: 134 K/UL — LOW (ref 150–400)
POTASSIUM SERPL-MCNC: 5 MMOL/L — SIGNIFICANT CHANGE UP (ref 3.5–5.3)
POTASSIUM SERPL-SCNC: 5 MMOL/L — SIGNIFICANT CHANGE UP (ref 3.5–5.3)
PROT SERPL-MCNC: 6.5 G/DL — SIGNIFICANT CHANGE UP (ref 6–8.3)
RBC # BLD: 3.09 M/UL — LOW (ref 3.8–5.2)
RBC # FLD: 15.8 % — HIGH (ref 10.3–14.5)
SODIUM SERPL-SCNC: 137 MMOL/L — SIGNIFICANT CHANGE UP (ref 135–145)
TACROLIMUS SERPL-MCNC: 12 NG/ML — SIGNIFICANT CHANGE UP
WBC # BLD: 7.56 K/UL — SIGNIFICANT CHANGE UP (ref 3.8–10.5)
WBC # FLD AUTO: 7.56 K/UL — SIGNIFICANT CHANGE UP (ref 3.8–10.5)

## 2020-01-14 PROCEDURE — 82962 GLUCOSE BLOOD TEST: CPT

## 2020-01-14 PROCEDURE — 99232 SBSQ HOSP IP/OBS MODERATE 35: CPT

## 2020-01-14 PROCEDURE — 82436 ASSAY OF URINE CHLORIDE: CPT

## 2020-01-14 PROCEDURE — 84100 ASSAY OF PHOSPHORUS: CPT

## 2020-01-14 PROCEDURE — 82947 ASSAY GLUCOSE BLOOD QUANT: CPT

## 2020-01-14 PROCEDURE — 99285 EMERGENCY DEPT VISIT HI MDM: CPT

## 2020-01-14 PROCEDURE — 80197 ASSAY OF TACROLIMUS: CPT

## 2020-01-14 PROCEDURE — 80053 COMPREHEN METABOLIC PANEL: CPT

## 2020-01-14 PROCEDURE — 82330 ASSAY OF CALCIUM: CPT

## 2020-01-14 PROCEDURE — 83036 HEMOGLOBIN GLYCOSYLATED A1C: CPT

## 2020-01-14 PROCEDURE — 87799 DETECT AGENT NOS DNA QUANT: CPT

## 2020-01-14 PROCEDURE — 36430 TRANSFUSION BLD/BLD COMPNT: CPT

## 2020-01-14 PROCEDURE — 83930 ASSAY OF BLOOD OSMOLALITY: CPT

## 2020-01-14 PROCEDURE — 86900 BLOOD TYPING SEROLOGIC ABO: CPT

## 2020-01-14 PROCEDURE — 83935 ASSAY OF URINE OSMOLALITY: CPT

## 2020-01-14 PROCEDURE — 87086 URINE CULTURE/COLONY COUNT: CPT

## 2020-01-14 PROCEDURE — 93005 ELECTROCARDIOGRAM TRACING: CPT

## 2020-01-14 PROCEDURE — 84300 ASSAY OF URINE SODIUM: CPT

## 2020-01-14 PROCEDURE — 80048 BASIC METABOLIC PNL TOTAL CA: CPT

## 2020-01-14 PROCEDURE — 87040 BLOOD CULTURE FOR BACTERIA: CPT

## 2020-01-14 PROCEDURE — 86850 RBC ANTIBODY SCREEN: CPT

## 2020-01-14 PROCEDURE — 84133 ASSAY OF URINE POTASSIUM: CPT

## 2020-01-14 PROCEDURE — 82435 ASSAY OF BLOOD CHLORIDE: CPT

## 2020-01-14 PROCEDURE — 76776 US EXAM K TRANSPL W/DOPPLER: CPT

## 2020-01-14 PROCEDURE — 82803 BLOOD GASES ANY COMBINATION: CPT

## 2020-01-14 PROCEDURE — 83735 ASSAY OF MAGNESIUM: CPT

## 2020-01-14 PROCEDURE — 81001 URINALYSIS AUTO W/SCOPE: CPT

## 2020-01-14 PROCEDURE — 85014 HEMATOCRIT: CPT

## 2020-01-14 PROCEDURE — 85027 COMPLETE CBC AUTOMATED: CPT

## 2020-01-14 PROCEDURE — 83605 ASSAY OF LACTIC ACID: CPT

## 2020-01-14 PROCEDURE — 84295 ASSAY OF SERUM SODIUM: CPT

## 2020-01-14 PROCEDURE — 84132 ASSAY OF SERUM POTASSIUM: CPT

## 2020-01-14 PROCEDURE — 99231 SBSQ HOSP IP/OBS SF/LOW 25: CPT

## 2020-01-14 PROCEDURE — 86923 COMPATIBILITY TEST ELECTRIC: CPT

## 2020-01-14 PROCEDURE — 86901 BLOOD TYPING SEROLOGIC RH(D): CPT

## 2020-01-14 PROCEDURE — P9016: CPT

## 2020-01-14 PROCEDURE — 82010 KETONE BODYS QUAN: CPT

## 2020-01-14 RX ORDER — SODIUM BICARBONATE 1 MEQ/ML
1 SYRINGE (ML) INTRAVENOUS
Qty: 60 | Refills: 0
Start: 2020-01-14 | End: 2020-02-12

## 2020-01-14 RX ORDER — VALGANCICLOVIR 450 MG/1
1 TABLET, FILM COATED ORAL
Qty: 0 | Refills: 0 | DISCHARGE
Start: 2020-01-14

## 2020-01-14 RX ORDER — LINAGLIPTIN 5 MG/1
1 TABLET, FILM COATED ORAL
Qty: 30 | Refills: 0
Start: 2020-01-14 | End: 2020-02-12

## 2020-01-14 RX ORDER — MYCOPHENOLATE MOFETIL 250 MG/1
2 CAPSULE ORAL
Qty: 0 | Refills: 0 | DISCHARGE
Start: 2020-01-14

## 2020-01-14 RX ORDER — ISOPROPYL ALCOHOL, BENZOCAINE .7; .06 ML/ML; ML/ML
1 SWAB TOPICAL
Qty: 100 | Refills: 1
Start: 2020-01-14 | End: 2020-03-03

## 2020-01-14 RX ORDER — TACROLIMUS 5 MG/1
7 CAPSULE ORAL
Refills: 0 | Status: DISCONTINUED | OUTPATIENT
Start: 2020-01-15 | End: 2020-01-14

## 2020-01-14 RX ORDER — TACROLIMUS 5 MG/1
7 CAPSULE ORAL
Qty: 0 | Refills: 0 | DISCHARGE
Start: 2020-01-14

## 2020-01-14 RX ORDER — ENOXAPARIN SODIUM 100 MG/ML
13 INJECTION SUBCUTANEOUS
Qty: 1 | Refills: 0
Start: 2020-01-14 | End: 2020-02-12

## 2020-01-14 RX ORDER — REPAGLINIDE 1 MG/1
1 TABLET ORAL
Qty: 90 | Refills: 0
Start: 2020-01-14 | End: 2020-02-12

## 2020-01-14 RX ADMIN — Medication 30 MILLILITER(S): at 01:36

## 2020-01-14 RX ADMIN — MYCOPHENOLATE MOFETIL 500 MILLIGRAM(S): 250 CAPSULE ORAL at 05:47

## 2020-01-14 RX ADMIN — Medication 81 MILLIGRAM(S): at 12:04

## 2020-01-14 RX ADMIN — Medication 60 MILLIGRAM(S): at 05:46

## 2020-01-14 RX ADMIN — Medication 4 UNIT(S): at 12:38

## 2020-01-14 RX ADMIN — Medication 1: at 08:54

## 2020-01-14 RX ADMIN — Medication 500000 UNIT(S): at 12:04

## 2020-01-14 RX ADMIN — Medication 50 MILLIGRAM(S): at 08:54

## 2020-01-14 RX ADMIN — Medication 0.1 MILLIGRAM(S): at 05:46

## 2020-01-14 RX ADMIN — TAMOXIFEN CITRATE 20 MILLIGRAM(S): 20 TABLET, FILM COATED ORAL at 12:04

## 2020-01-14 RX ADMIN — Medication 4 UNIT(S): at 08:54

## 2020-01-14 RX ADMIN — Medication 500000 UNIT(S): at 01:36

## 2020-01-14 RX ADMIN — FAMOTIDINE 20 MILLIGRAM(S): 10 INJECTION INTRAVENOUS at 12:04

## 2020-01-14 RX ADMIN — TACROLIMUS 8 MILLIGRAM(S): 5 CAPSULE ORAL at 08:55

## 2020-01-14 RX ADMIN — Medication 1 TABLET(S): at 12:05

## 2020-01-14 RX ADMIN — Medication 5 MILLIGRAM(S): at 05:46

## 2020-01-14 RX ADMIN — Medication 650 MILLIGRAM(S): at 05:46

## 2020-01-14 RX ADMIN — Medication 500000 UNIT(S): at 05:47

## 2020-01-14 NOTE — PROGRESS NOTE ADULT - SUBJECTIVE AND OBJECTIVE BOX
[facility] DIVISION OF KIDNEY DISEASES AND HYPERTENSION -- FOLLOW UP NOTE  --------------------------------------------------------------------------------  HPI: 75 yo F with ESRD s/p DDRT on 12/7/19 c/b DGF however now off HD, HTN, and no previous history of DM was referred to the ER with findings of uncontrolled hyperglycemia on outpatient labs. Pt. found to have elevated serum glucose of 320 on BMP during admission (1/10/20), and UA with significant glucosuria. Scr noted to be elevated at 3.42. Transplant nephrology team consulted for immunosuppression management. Since admission, pt. with improved control of hyperglycemia. Scr improved this AM to 2.42. Renal US with findings suggestive of renal parenchymal disease.    Pt. seen and examined at bedside this AM. Pt. feels improved this AM and is eager for discharge home. Denies CP, SOB, N/V/F/C.    PAST HISTORY  --------------------------------------------------------------------------------  No significant changes to PMH, PSH, FHx, SHx, unless otherwise noted    ALLERGIES & MEDICATIONS  --------------------------------------------------------------------------------  Allergies    ChloraPrep One-Step (Rash)  penicillins (Rash)  shellfish (Rash)    Intolerances    Standing Inpatient Medications  aspirin  chewable 81 milliGRAM(s) Oral daily  cloNIDine 0.1 milliGRAM(s) Oral two times a day  dextrose 5%. 1000 milliLiter(s) IV Continuous <Continuous>  dextrose 50% Injectable 12.5 Gram(s) IV Push once  dextrose 50% Injectable 25 Gram(s) IV Push once  dextrose 50% Injectable 25 Gram(s) IV Push once  famotidine    Tablet 20 milliGRAM(s) Oral daily  influenza   Vaccine 0.5 milliLiter(s) IntraMuscular once  insulin glargine Injectable (LANTUS) 13 Unit(s) SubCutaneous at bedtime  insulin lispro (HumaLOG) corrective regimen sliding scale   SubCutaneous three times a day before meals  insulin lispro Injectable (HumaLOG) 4 Unit(s) SubCutaneous three times a day before meals  metoprolol tartrate 50 milliGRAM(s) Oral two times a day  mycophenolate mofetil 500 milliGRAM(s) Oral two times a day  NIFEdipine XL 60 milliGRAM(s) Oral daily  nystatin    Suspension 808633 Unit(s) Oral four times a day  predniSONE   Tablet 5 milliGRAM(s) Oral daily  sodium bicarbonate 650 milliGRAM(s) Oral two times a day  tacrolimus ER Tablet (ENVARSUS XR) 8 milliGRAM(s) Oral <User Schedule>  tamoxifen 20 milliGRAM(s) Oral daily  trimethoprim   80 mG/sulfamethoxazole 400 mG 1 Tablet(s) Oral daily  valGANciclovir 450 milliGRAM(s) Oral <User Schedule>    REVIEW OF SYSTEMS  --------------------------------------------------------------------------------  Gen: + poor appetite  Respiratory: No BORJA  CV: No chest pain  GI: No abdominal pain  MSK: No LE edema  Neuro: No dizziness    All other systems were reviewed and are negative, except as noted.    VITALS/PHYSICAL EXAM  --------------------------------------------------------------------------------  T(C): 36.4 (01-14-20 @ 05:00), Max: 37.2 (01-14-20 @ 01:00)  HR: 88 (01-14-20 @ 05:00) (77 - 93)  BP: 133/72 (01-14-20 @ 05:00) (108/60 - 146/73)  RR: 18 (01-14-20 @ 05:00) (18 - 20)  SpO2: 97% (01-14-20 @ 05:00) (97% - 99%)  Wt(kg): --    01-13-20 @ 07:01  -  01-14-20 @ 07:00  --------------------------------------------------------  IN: 900 mL / OUT: 950 mL / NET: -50 mL    Physical Exam:  	Gen: NAD, thin female  	HEENT: Supple neck  	Pulm: CTA B/L  	CV: RRR, S1S2; no rub  	Abd: +BS, soft, nontender/nondistended  		Transplant: no tenderness  	: No suprapubic tenderness  	LE: No edema b/l  	Skin: Warm, without rashes    LABS/STUDIES  --------------------------------------------------------------------------------              8.7    7.56  >-----------<  134      [01-14-20 @ 06:27]              27.4     137  |  106  |  37  ----------------------------<  131      [01-14-20 @ 06:26]  5.0   |  19  |  2.42        Ca     9.9     [01-14-20 @ 06:26]      Mg     2.2     [01-14-20 @ 06:26]      Phos  3.1     [01-14-20 @ 06:26]    Creatinine Trend:  SCr 2.42 [01-14 @ 06:26]  SCr 2.64 [01-13 @ 06:40]  SCr 3.24 [01-12 @ 07:00]  SCr 3.22 [01-11 @ 16:27]  SCr 3.50 [01-11 @ 09:32]

## 2020-01-14 NOTE — PROGRESS NOTE ADULT - PROBLEM SELECTOR PROBLEM 1
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
Renal transplant recipient
Type 2 diabetes mellitus without complication, without long-term current use of insulin
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

## 2020-01-14 NOTE — DISCHARGE NOTE PROVIDER - NSDCMRMEDTOKEN_GEN_ALL_CORE_FT
alcohol swabs : Apply topically to affected area 4 times a day   aspirin 81 mg oral tablet, chewable: 1 tab(s) orally once a day  cloNIDine 0.1 mg oral tablet: 1 tab(s) orally 2 times a day  famotidine 20 mg oral tablet: 1 tab(s) orally once a day  glucometer (per patient&#x27;s insurance): Test blood sugars four times a day. Dispense #1 glucometer.  Insulin Pen Needles, 4mm: 1 application subcutaneously 4 times a day. ** Use with insulin pen **   lancets: 1 application subcutaneously 4 times a day   Lantus Solostar Pen 100 units/mL subcutaneous solution: 13 unit(s) subcutaneous once a day (at bedtime)   metoprolol tartrate 50 mg oral tablet: 1 tab(s) orally 2 times a day  mycophenolate mofetil 250 mg oral capsule: 2 tab(s) orally 2 times a day  NIFEdipine 60 mg oral tablet, extended release: 1 tab(s) orally once a day  nystatin 100,000 units/mL oral suspension: 5 milliliter(s) orally 4 times a day  Prandin 1 mg oral tablet: 1 tab(s) orally 3 times a day (with meals)   predniSONE: 5 milligram(s) orally once a day  senna oral tablet: 2 tab(s) orally once a day (at bedtime)  sodium bicarbonate 650 mg oral tablet: 1 tab(s) orally 2 times a day   sulfamethoxazole-trimethoprim 400 mg-80 mg oral tablet: 1 tab(s) orally once a day  tacrolimus 1 mg oral tablet, extended release: 7 tab(s) orally   tamoxifen 20 mg oral tablet: 1 tab(s) orally once a day (at bedtime)  test strips (per patient&#x27;s insurance): 1 application subcutaneously 4 times a day. ** Compatible with patient&#x27;s glucometer **  Tradjenta 5 mg oral tablet: 1 tab(s) orally once a day   valGANciclovir 450 mg oral tablet: 1 tab(s) orally Monday, Wednesday, and Friday  Vitamin D3 1000 intl units oral capsule: 1 cap(s) orally once a day

## 2020-01-14 NOTE — PROGRESS NOTE ADULT - PROBLEM SELECTOR PLAN 4
Post transplant diabetes.  Endocrine following.  Pt now on insulin.
Post transplant diabetes.  Endocrine following.  Pt now on insulin and sugars improved.
Post transplant diabetes. Endocrine team following. Pt now on insulin and sugars improved.
Post transplant diabetes. Endocrine team following. Pt now on insulin and sugars improved. Pt. to be discharged with insulin.

## 2020-01-14 NOTE — PROGRESS NOTE ADULT - REASON FOR ADMISSION
hyperglycemia

## 2020-01-14 NOTE — PROGRESS NOTE ADULT - PROBLEM SELECTOR PLAN 5
Check urine osm and electrolytes.  Appears dry and with poor solute intake.  Fluid restrict but will start NS at 75cc/hr.  Repeat serum sodium today in 4-5 hours.
Resolved.
controlled.
controlled.

## 2020-01-14 NOTE — DISCHARGE NOTE NURSING/CASE MANAGEMENT/SOCIAL WORK - PATIENT PORTAL LINK FT
You can access the FollowMyHealth Patient Portal offered by [facility] by registering at the following website: [email or web address]. By joining [employer]’s FollowMyHealth portal, you will also be able to view your health information using other applications (apps) compatible with our system.

## 2020-01-14 NOTE — DISCHARGE NOTE PROVIDER - HOSPITAL COURSE
73 y/o woman with PMHx of HTN, HLD, Gout, LUE DVT, Lumbar radiculopathy, breast CA s/p lumpectomy on Tamoxifen, ESRD 2/2 PCKD (anuric prior to transplant underwent DDRT on 12/7/2019 with simulect induction (ureteral stent removed on POD 5 with johnson). Post op course c/b DGF requiring HD, graft function improved/responded to diuretics. HD held on discharge from hospital.  She presented for admission from clinic with notable hyperglycemia 415 on chemistry panel. She has no prior h/o DM and has never used Insulin.  She reported poor appetite, feeling very weak post transplant with weight loss and dyspnea on exertion.  Also reported making good urine.  In ED:  , K 5.2, Gluc 320, . UA: gluc > 500.  Serum sodium improved with NS IVF and fluid restriction. Renal ultrasound showed Increased cortical echogenicity, urothelial thickening, and elevated intrarenal arterial resistance, features suggestive of parenchymal renal disease.  New small perinephric hematoma.  No duplex evidence of hemodynamically significant transplant renal artery stenosis or renal vein thrombosis.   1 unit PRBC given for symptomatic anemia.  Endocrinology initiated insulin regimen and blood glucose normalized.  Patient and daughter received diabetic education.  Outpatient endocrinology appointments were made for follow-up. Immunosuppression was optimized. MMF was decreased to 500mg BID due to complaints of persistent loose stools since transplant. She was ambulating, tolerating a regular diet and had bowel function. She was discharged to home on Lantus Insulin 13 units at bedtime, Prandin 1mg TID with meals and Tradjenta 5mg daily. Immunosuppression Envarsus 7mg daily, MMF 500mg BID, Nystatin, bactrim and Valcyte TIW.  She was evaluated by the multi-disciplinary team including surgeon, nephrologist, NP, pharmacist, nutrition, social work, and nursing and deemed stable for discharge to home with close outpatient follow-up.        Donor Info: Age 54, ABO B, KDPI 75%, Terminal Cr 1.7, CMV(+), EBV(+)        OR: DDRT to R iliac fossa, Simulect induction. 1A, 1V, 1U. Ureter stented. Stent sutured to johnson. CIT 13.5Hrs    Enlarged, lymph node was encountered during iliac dissection was removed and sent for pathology (benign)

## 2020-01-14 NOTE — PROGRESS NOTE ADULT - SUBJECTIVE AND OBJECTIVE BOX
Diabetes Follow up note:    Chief complaint: f/u new onset Diabetes after xplant.     Interval Hx: Glucose values much improved. Readying for discharge home today. Pt seen at bedside w/daughter present. Reports learning how to use insulin pen and was able to practice self-injecting. Reviewed discharge plan w/pt and daughter. Appetite improved from admission.     Review of Systems:  General: "I feel better"  GI: Tolerating POs. Denies N/V/D/Abd pain  CV: Denies CP/SOB  ENDO: No S&Sx of hypoglycemia  MEDS:    insulin glargine Injectable (LANTUS) 13 Unit(s) SubCutaneous at bedtime  insulin lispro (HumaLOG) corrective regimen sliding scale   SubCutaneous three times a day before meals  insulin lispro Injectable (HumaLOG) 4 Unit(s) SubCutaneous three times a day before meals  predniSONE   Tablet 5 milliGRAM(s) Oral daily    nystatin    Suspension 447270 Unit(s) Oral four times a day  trimethoprim   80 mG/sulfamethoxazole 400 mG 1 Tablet(s) Oral daily  valGANciclovir 450 milliGRAM(s) Oral <User Schedule>    Allergies    ChloraPrep One-Step (Rash)  penicillins (Rash)  shellfish (Rash)        PE:  General: Female lying in bed. NAD.   Vital Signs Last 24 Hrs  T(C): 36.8 (14 Jan 2020 09:00), Max: 37.2 (14 Jan 2020 01:00)  T(F): 98.3 (14 Jan 2020 09:00), Max: 99 (14 Jan 2020 01:00)  HR: 83 (14 Jan 2020 09:00) (78 - 93)  BP: 127/66 (14 Jan 2020 09:00) (108/60 - 134/66)  BP(mean): --  RR: 18 (14 Jan 2020 09:00) (18 - 18)  SpO2: 96% (14 Jan 2020 09:00) (96% - 99%)  Resp: CTA b/l. no wheeze.   Abd: Soft, NT,ND,   Extremities: Warm  Neuro: A&O X3    LABS:  POCT Blood Glucose.: 124 mg/dL (01-14-20 @ 12:33)  POCT Blood Glucose.: 185 mg/dL (01-14-20 @ 08:50)  POCT Blood Glucose.: 137 mg/dL (01-13-20 @ 21:38)  POCT Blood Glucose.: 167 mg/dL (01-13-20 @ 17:11)  POCT Blood Glucose.: 208 mg/dL (01-13-20 @ 13:12)  POCT Blood Glucose.: 171 mg/dL (01-13-20 @ 05:38)  POCT Blood Glucose.: 253 mg/dL (01-12-20 @ 22:34)  POCT Blood Glucose.: 166 mg/dL (01-12-20 @ 18:26)  POCT Blood Glucose.: 172 mg/dL (01-12-20 @ 13:42)  POCT Blood Glucose.: 192 mg/dL (01-12-20 @ 12:23)  POCT Blood Glucose.: 179 mg/dL (01-12-20 @ 09:39)  POCT Blood Glucose.: 161 mg/dL (01-11-20 @ 21:04)  POCT Blood Glucose.: 222 mg/dL (01-11-20 @ 18:36)                            8.7    7.56  )-----------( 134      ( 14 Jan 2020 06:27 )             27.4       01-14    137  |  106  |  37<H>  ----------------------------<  131<H>  5.0   |  19<L>  |  2.42<H>    Ca    9.9      14 Jan 2020 06:26  Phos  3.1     01-14  Mg     2.2     01-14    TPro  6.5  /  Alb  3.7  /  TBili  0.2  /  DBili  x   /  AST  24  /  ALT  9<L>  /  AlkPhos  46  01-14      Thyroid Function Tests:      Hemoglobin A1C, Whole Blood: 7.2 % <H> [4.0 - 5.6] (01-11-20 @ 09:23)  Hemoglobin A1C, Whole Blood: 4.4 % [4.0 - 5.6] (12-08-19 @ 12:44)          Contact number: lorena [phone] or [phone] Diabetes Follow up note:    Chief complaint: f/u new onset Diabetes after xplant.     Interval Hx: Glucose values much improved. Readying for discharge home today. Pt seen at bedside w/daughter present. Reports learning how to use insulin pen and was able to practice self-injecting. Reviewed discharge plan w/pt and daughter. Appetite improved from admission.     Review of Systems:  General: "I feel better"  GI: Tolerating POs. Denies N/V/D/Abd pain  CV: Denies CP/SOB  ENDO: No S&Sx of hypoglycemia  MEDS:    insulin glargine Injectable (LANTUS) 13 Unit(s) SubCutaneous at bedtime  insulin lispro (HumaLOG) corrective regimen sliding scale   SubCutaneous three times a day before meals  insulin lispro Injectable (HumaLOG) 4 Unit(s) SubCutaneous three times a day before meals  predniSONE   Tablet 5 milliGRAM(s) Oral daily    nystatin    Suspension 606351 Unit(s) Oral four times a day  trimethoprim   80 mG/sulfamethoxazole 400 mG 1 Tablet(s) Oral daily  valGANciclovir 450 milliGRAM(s) Oral <User Schedule>    Allergies    ChloraPrep One-Step (Rash)  penicillins (Rash)  shellfish (Rash)        PE:  General: Female lying in bed. NAD.   Vital Signs Last 24 Hrs  T(C): 36.8 (14 Jan 2020 09:00), Max: 37.2 (14 Jan 2020 01:00)  T(F): 98.3 (14 Jan 2020 09:00), Max: 99 (14 Jan 2020 01:00)  HR: 83 (14 Jan 2020 09:00) (78 - 93)  BP: 127/66 (14 Jan 2020 09:00) (108/60 - 134/66)  BP(mean): --  RR: 18 (14 Jan 2020 09:00) (18 - 18)  SpO2: 96% (14 Jan 2020 09:00) (96% - 99%)  Resp: CTA b/l. no wheeze.   Abd: Soft, NT,ND,   Extremities: Warm  Neuro: A&O X3    LABS:  POCT Blood Glucose.: 124 mg/dL (01-14-20 @ 12:33)  POCT Blood Glucose.: 185 mg/dL (01-14-20 @ 08:50)  POCT Blood Glucose.: 137 mg/dL (01-13-20 @ 21:38)  POCT Blood Glucose.: 167 mg/dL (01-13-20 @ 17:11)  POCT Blood Glucose.: 208 mg/dL (01-13-20 @ 13:12)  POCT Blood Glucose.: 171 mg/dL (01-13-20 @ 05:38)  POCT Blood Glucose.: 253 mg/dL (01-12-20 @ 22:34)  POCT Blood Glucose.: 166 mg/dL (01-12-20 @ 18:26)  POCT Blood Glucose.: 172 mg/dL (01-12-20 @ 13:42)  POCT Blood Glucose.: 192 mg/dL (01-12-20 @ 12:23)  POCT Blood Glucose.: 179 mg/dL (01-12-20 @ 09:39)  POCT Blood Glucose.: 161 mg/dL (01-11-20 @ 21:04)  POCT Blood Glucose.: 222 mg/dL (01-11-20 @ 18:36)                            8.7    7.56  )-----------( 134      ( 14 Jan 2020 06:27 )             27.4       01-14    137  |  106  |  37<H>  ----------------------------<  131<H>  5.0   |  19<L>  |  2.42<H>    Ca    9.9      14 Jan 2020 06:26  Phos  3.1     01-14  Mg     2.2     01-14    TPro  6.5  /  Alb  3.7  /  TBili  0.2  /  DBili  x   /  AST  24  /  ALT  9<L>  /  AlkPhos  46  01-14      Hemoglobin A1C, Whole Blood: 7.2 % <H> [4.0 - 5.6] (01-11-20 @ 09:23)  Hemoglobin A1C, Whole Blood: 4.4 % [4.0 - 5.6] (12-08-19 @ 12:44)          Contact number: lorena [phone] or [phone]

## 2020-01-14 NOTE — DISCHARGE NOTE PROVIDER - NSDCFUADDAPPT_GEN_ALL_CORE_FT
1.  Please call to make a follow-up appointment at the Transplant Clinic with Dr. Campos on Thursday January 16, 2020.   Clinic Phone: [phone]  2.  You have a scheduled follow-up appointment with [facility] on 2/3/20 at 2:30pm with the Clinical Diabetic Educator   3.   You have a scheduled follow-up appointment with Dr Santos endocrinologist on 5/4/20 at 3pm at [address] [place] [address] [address], [place] [address]. [phone].   4.   Please follow up with your primary care physician in one week regarding your hospitalization

## 2020-01-14 NOTE — DISCHARGE NOTE PROVIDER - PROVIDER TOKENS
PROVIDER:[TOKEN:[[id number]]],PROVIDER:[TOKEN:[[id number]]],PROVIDER:[TOKEN:[[id number]]]

## 2020-01-14 NOTE — PROGRESS NOTE ADULT - SUBJECTIVE AND OBJECTIVE BOX
Transplant Surgery - Multidisciplinary Rounds  --------------------------------------------------------------  DDRT  Date: 12/7/19    Present:   Patient seen with multidisciplinary team including Transplant Surgeon: Dr. Heard, Dr. Santana, Dr. Chacon, Dr. Campos. Transplant Nephrologist: Dr. Caicedo, Dr. De La Cruz V, Dr. Zapien, Dr. Perea. G, renal fellow, Pharmacist: Luca Chadwick, PGY 3 Dr. Jade, NA: Reggie Bishop, neutritionist, social work, and pharmacy student during am rounds and examined with Dr. Chacon.  Disciplines not in attendance will be notified of the plan.      75 y/o F with PMHx of HTN, HLD, Gout, LUE DVT, Lumbar radiculopathy, breast CA s/p lumpectomy on tamoxifen, ESRD 2/2 PCKD (anuric prior to transplant underwent DDRT on 12/7/2019 with simulect induction (ureteral stent removed on POD 5 with johnson). Post op course c/b DGF requiring HD, graft function improved/responded to diuretics. HD held on dc.     Sent to ED from clinic with hyperglycemia (Gluc on chem 415). No prior h/o DM, not on insulin at home. In ED:  , K 5.2, Gluc 320, . UA: gluc > 500  Reports poor appetite, feeling very weak post transplant; wt loss and dyspnea on exertion. Making good urine, no lower extremity edema.    Immuno at home: Env 7mg, MMF 1g bid, Pred 5    Donor Info: Age 54, ABO B, KDPI 75%, Terminal Cr 1.7, CMV(+), EBV(+)    OR: DDRT to R iliac fossa, Simulect induction. 1A, 1V, 1U. Ureter stented. Stent sutured to johnson. CIT 13.5Hrs  Enlarged, lymph node was encountered during iliac dissection was removed and sent for pathology (benign)      Interval Events:   -afebrile, VS stable. HTN better controlled on Clonidine/Lopressor/Nifedipine  -hyponatremia resolved  -received 1U PRBC/10k Epogen 1/12/20 with good response for chronic anemia  -unchanged tremors  -ambulatory without issues  -appetite has returned, tolerating diet  -stable diarrhea, unchanged since transplant  -good , Cr improving to 2.4 from 2.6  -glucose much improved on new insulin regimen    Potential Discharge date: tomorrow    Education:  Medications    Plan of care:  See Below       MEDICATIONS  (STANDING):  aspirin  chewable 81 milliGRAM(s) Oral daily  cloNIDine 0.1 milliGRAM(s) Oral two times a day  dextrose 5%. 1000 milliLiter(s) (50 mL/Hr) IV Continuous <Continuous>  dextrose 50% Injectable 12.5 Gram(s) IV Push once  dextrose 50% Injectable 25 Gram(s) IV Push once  dextrose 50% Injectable 25 Gram(s) IV Push once  famotidine    Tablet 20 milliGRAM(s) Oral daily  influenza   Vaccine 0.5 milliLiter(s) IntraMuscular once  insulin glargine Injectable (LANTUS) 13 Unit(s) SubCutaneous at bedtime  insulin lispro (HumaLOG) corrective regimen sliding scale   SubCutaneous three times a day before meals  insulin lispro Injectable (HumaLOG) 4 Unit(s) SubCutaneous three times a day before meals  metoprolol tartrate 50 milliGRAM(s) Oral two times a day  mycophenolate mofetil 500 milliGRAM(s) Oral two times a day  NIFEdipine XL 60 milliGRAM(s) Oral daily  nystatin    Suspension 060011 Unit(s) Oral four times a day  predniSONE   Tablet 5 milliGRAM(s) Oral daily  sodium bicarbonate 650 milliGRAM(s) Oral two times a day  tamoxifen 20 milliGRAM(s) Oral daily  trimethoprim   80 mG/sulfamethoxazole 400 mG 1 Tablet(s) Oral daily  valGANciclovir 450 milliGRAM(s) Oral <User Schedule>    MEDICATIONS  (PRN):  dextrose 40% Gel 15 Gram(s) Oral once PRN Blood Glucose LESS THAN 70 milliGRAM(s)/deciliter  glucagon  Injectable 1 milliGRAM(s) IntraMuscular once PRN Glucose LESS THAN 70 milligrams/deciliter  hemorrhoidal Ointment 1 Application(s) Rectal three times a day PRN pain      PAST MEDICAL & SURGICAL HISTORY:  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  Pancreatic cyst  Thyroid nodule: yearly Ultrasound, monitoring yearly  Anuria  ESRD on hemodialysis  Breast cancer, female: left 2014  H/O gastroesophageal reflux (GERD)  Thrombocytopenia: leukopenia: followed by heme, plan for BMBx 7/22/19  Anemia in ESRD (end-stage renal disease)  Hypertension  History of fracture of right ankle: 2014 repaired  S/P arteriovenous (AV) fistula creation: 2002  S/P hysterectomy: 1994  Adrenal mass: excison 2015  S/P lumpectomy, left breast: 2014      Vital Signs Last 24 Hrs  T(C): 36.8 (14 Jan 2020 09:00), Max: 37.2 (14 Jan 2020 01:00)  T(F): 98.3 (14 Jan 2020 09:00), Max: 99 (14 Jan 2020 01:00)  HR: 83 (14 Jan 2020 09:00) (78 - 93)  BP: 127/66 (14 Jan 2020 09:00) (108/60 - 141/69)  BP(mean): --  RR: 18 (14 Jan 2020 09:00) (18 - 20)  SpO2: 96% (14 Jan 2020 09:00) (96% - 99%)    I&O's Summary    13 Jan 2020 07:01  -  14 Jan 2020 07:00  --------------------------------------------------------  IN: 1000 mL / OUT: 950 mL / NET: 50 mL    14 Jan 2020 07:01  -  14 Jan 2020 10:37  --------------------------------------------------------  IN: 240 mL / OUT: 200 mL / NET: 40 mL                              8.7    7.56  )-----------( 134      ( 14 Jan 2020 06:27 )             27.4     01-14    137  |  106  |  37<H>  ----------------------------<  131<H>  5.0   |  19<L>  |  2.42<H>    Ca    9.9      14 Jan 2020 06:26  Phos  3.1     01-14  Mg     2.2     01-14    TPro  6.5  /  Alb  3.7  /  TBili  0.2  /  DBili  x   /  AST  24  /  ALT  9<L>  /  AlkPhos  46  01-14    Tacrolimus (), Serum: 12.0 ng/mL (01-14 @ 07:38)        Culture - Blood (collected 01-10-20 @ 17:15)  Source: .Blood Blood-Venous  Preliminary Report (01-11-20 @ 18:02):    No growth to date.    Culture - Blood (collected 01-10-20 @ 17:15)  Source: .Blood Blood-Venous  Preliminary Report (01-11-20 @ 18:02):    No growth to date.    Culture - Urine (collected 01-10-20 @ 14:28)  Source: .Urine Clean Catch (Midstream)  Final Report (01-11-20 @ 10:38):    <10,000 CFU/mL Normal Urogenital Theresa      Review of Systems:   Gen: improved fatigue  	Skin: No rashes  	Head/Eyes/Ears/Mouth: No headache; Normal hearing; Normal vision w/o blurriness; No sinus pain/discomfort, sore throat  	Respiratory: No dyspnea, cough, wheezing, hemoptysis  	CV: No chest pain, PND, orthopnea  	GI: no abdominal pain  	: No increased frequency, dysuria, hematuria, nocturia  	MSK: No joint pain/swelling; no back pain; no edema  	Neuro: No dizziness/lightheadedness, weakness, seizures, numbness, tingling  	Heme: No easy bruising or bleeding  	Endo: No heat/cold intolerance  	Psych: No significant nervousness, anxiety, stress, depression  All other systems were reviewed and are negative, except as noted.    Physical Exam:  Constitutional: Well developed / well nourished  	Eyes: Anicteric, PERRLA  	ENMT: nc/at  	Neck: no JVD  	Respiratory: CTA B/L  Cardiovascular: RRR  	Gastrointestinal: Soft abdomen, non tender, non distended, incision well healed  	Genitourinary: Voiding spontaneously  	Extremities: SCD's in place and working bilaterally  	Vascular: Palpable dp pulses bilaterally  	Neurological: A&O x3  	Skin: well healed incision   	Musculoskeletal: Moving all extremities  Psychiatric: Responsive

## 2020-01-14 NOTE — DISCHARGE NOTE PROVIDER - NSDCFUSCHEDAPPT_GEN_ALL_CORE_FT
ANTONELLA DOBSON ; 01/15/2020 ; NPP Surg TrPl [facility] ANTONELLA Mata ; 01/22/2020 ; NPP Surg Tr[facility] ANTONELLA Mata ; 01/30/2020 ; NPP Nephro [facility] ANTONELLA Mata ; 02/03/2020 ; NPP [place] [facility]  ANTONELLA DOBSON ; 02/03/2020 ; NPP Endocrin [facility]  ANTONELLA DOBSON ; 02/05/2020 ; NPP Surg TrPl [facility] ANTONELLA Mata ; 02/11/2020 ; NPP [facility]  ANTONELLA DOBSON ; 02/13/2020 ; NPP Nephro [facility] ANTONELLA Mata ; 02/19/2020 ; NPP Surg TrPl [facility] ANTONELLA Mata ; 02/20/2020 ; NPP Nephro [facility] ANTONELLA Mata ; 04/06/2020 ; NPP [facility] Renetta Casanova

## 2020-01-14 NOTE — DISCHARGE NOTE PROVIDER - CARE PROVIDERS DIRECT ADDRESSES
,DirectAddress_Unknown,[email or web address]

## 2020-01-14 NOTE — PROGRESS NOTE ADULT - ASSESSMENT
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 Post op course c/b DGF requiring HD, graft function improved/responded to diuretics. HD held on dc.  Admitted with Hyperglycemia    [] s/p DDRT c/b DGF, off HD  - Strict I &Os  - Immuno: Envarsus per level (This am level is 12 up from 9.2 will decrease Envarsus to 7mg from 8mg), /500, Pred 5  - Proph: valcyte/bactrim/nystatin   - EBV PCR pending  - Hyponatremia has resolved  - Renal US 1/13/20 noted with 5cm perinephretic collection with patent arteries and veins, Resistive Indices Range: 0.78-0.91    [] New Onset DM post transplant  - Appreciate Endocrine following;  Lantus 13; Lispro pre-meal 4/4/4, and insulin sliding scale.  - Final d/c recs listed per endo   - Monitor FS, HgbA1C 7.2, 12/2019 4.4    [] HTN  -cont Metoprolol 50mg bid, nifedipine xl 60mg daily, clonidine to 0.1 BID.     [] Dispo  -Today

## 2020-01-14 NOTE — PROGRESS NOTE ADULT - PROBLEM SELECTOR PLAN 6
Reduce clonidine to 0.1mgs BID
Transfused 1 unit. Given Epogen 10,000 units on 1/12/20.
Transfused 1 unit. Given Epogen 10,000 units on 1/12/20.
controlled.

## 2020-01-14 NOTE — PROGRESS NOTE ADULT - PROBLEM SELECTOR PLAN 1
-test BG AC/HS  -C/w Lantus 13 units QHS  -c/w Humalog 4 units AC meals  -c/w Humalog low correction scale AC and Low HS scale. Spoke to RN not to hold this insulin even if pt NPO  -plan to discharge on lantus 13 units q hs plus Tradjenta 5mg daily plus Prandin 1mg ac meals (hold Prandin if not eating or BG <100s).  -Staff to teach finger pricks and insulin administration via insulin pen to pt, irma or kali since they do not know use of insulin pens.  Please write Rxs for: Solo Star Insulin pen/Patty insulin pen needles/glucose meter/strips/lancets  -f/u @ [facility] on 2/3/20 at 2;30pm with CDE and on 5/4/20 at 3pm with Dr Santos endocrinologist. [address] [place] [address] [address], [place] [address]. [phone].   -Plan discussed with pt/team.  Contact info: [phone] (24/7). pager [phone]
-test BG AC/HS  -Increase Lantus 12 units QHS  -Increase Humalog 4 units AC meals  -c/w Humalog low correction scale AC and Low HS scale  -Pt will need glucometer and teaching prior to discharge.   -Discharge plan: Prandin 0.5mg w/meals + Tradjenta 5mg. If insulin requirements are high, would consider adding basal insulin.   -f/u @ [facility]  [address] [place] [address], [address], [place] [address]  [phone]. Appointment to follow  pager: [phone]
Had DGF but resolved.  Likely with persistent ATN as creatinine still high.
Had DGF but resolved.  Likely with persistent ATN as creatinine still high.
Had DGF but resolved. Likely with persistent ATN as creatinine still high. Scr improved dramatically overnight to 2.64 this AM.
Had DGF but resolved. Likely with persistent ATN as creatinine still high. Scr improving, now 2.42 this AM. ATN likely resolving. Monitor Scr
-test BG AC/HS  -Increase Lantus 13 units QHS  -c/w Humalog 4 units AC meals  -c/w Humalog low correction scale AC and Low HS scale  Pt will need glucometer and teaching prior to discharge.   -Discharge plan: Prandin 0.5mg w/meals + Tradjenta 5mg. If insulin requirements are high, would consider adding basal insulin.   -f/u @ [facility]  [address] [place] [address], [address], [place] [address]  [phone]. Appointment to follow  pager: [phone].
Inpatient:  -test BG AC/HS  -c/w Lantus 13 units QHS  -c/w Humalog 4 units AC meals  -c/w Humalog low correction scale AC and Low HS scale  Discharge plan:  Lantus 13 units QHS  Prandin 1 mg w/meals  Tradjenta 5mg daily  -f/u @ [facility] on 2/3/20 at 2;30pm with CDE and on 5/4/20 at 3pm with Dr Santos endocrinologist. [address] [place] [address] [address], [place] [address]. [phone].   pager: [phone]

## 2020-01-14 NOTE — CHART NOTE - NSCHARTNOTEFT_GEN_A_CORE
Nutrition follow up     Pt seen for malnutrition follow up and consult received for education.   Care plan in progress.    Hospital course as per chart: Pt 75 y/o F with PMH: HTN, HLD, Gout, LUE DVT, Lumbar radiculopathy, breast CA S/P lumpectomy on tamoxifen, ESRD 2/2 PCKD, S/P DDRT (12/07/2019) c/b DGF however now off HD. Pt admitted for hyperglycemia, with new onset diabetes after transplant per endocrinology, acute tubular necrosis - improved with hydration.    Source: Patient [x]    Family [ ]     other [x]; Medical record    Pt reports improved appetite and PO intake, but still consuming approximately 50% of meals. Offered nutritional supplement - pt agreed to Glucerna, states previously drinking and enjoying it, spoke to Transplant Team. Denies difficulty chewing/swallowing. Pt denies nausea, vomiting, diarrhea, or constipation, last BM today (01/14). Reviewed education on DM and post transplant nutrition therapy and food safety guidelines for transplant recipients. Reviewed recommendations to optimize PO and protein intake. Pt denies having questions/concerns about diet and nutrition education provided - made aware RD remains available.     Diet: Consistent Carbohydrate     Enteral /Parenteral Nutrition: n/a    Current Weight: (01/11) 121 pounds -> (01/14) 121.6 pounds -weight likely stable, will continue to monitor.   % Weight Change: n/a    Pertinent Medications: MEDICATIONS  (STANDING):  aspirin  chewable 81 milliGRAM(s) Oral daily  cloNIDine 0.1 milliGRAM(s) Oral two times a day  dextrose 5%. 1000 milliLiter(s) (50 mL/Hr) IV Continuous <Continuous>  dextrose 50% Injectable 12.5 Gram(s) IV Push once  dextrose 50% Injectable 25 Gram(s) IV Push once  dextrose 50% Injectable 25 Gram(s) IV Push once  famotidine    Tablet 20 milliGRAM(s) Oral daily  influenza   Vaccine 0.5 milliLiter(s) IntraMuscular once  insulin glargine Injectable (LANTUS) 13 Unit(s) SubCutaneous at bedtime  insulin lispro (HumaLOG) corrective regimen sliding scale   SubCutaneous three times a day before meals  insulin lispro Injectable (HumaLOG) 4 Unit(s) SubCutaneous three times a day before meals  metoprolol tartrate 50 milliGRAM(s) Oral two times a day  mycophenolate mofetil 500 milliGRAM(s) Oral two times a day  NIFEdipine XL 60 milliGRAM(s) Oral daily  nystatin    Suspension 581399 Unit(s) Oral four times a day  predniSONE   Tablet 5 milliGRAM(s) Oral daily  sodium bicarbonate 650 milliGRAM(s) Oral two times a day  tamoxifen 20 milliGRAM(s) Oral daily  trimethoprim   80 mG/sulfamethoxazole 400 mG 1 Tablet(s) Oral daily  valGANciclovir 450 milliGRAM(s) Oral <User Schedule>    MEDICATIONS  (PRN):  dextrose 40% Gel 15 Gram(s) Oral once PRN Blood Glucose LESS THAN 70 milliGRAM(s)/deciliter  glucagon  Injectable 1 milliGRAM(s) IntraMuscular once PRN Glucose LESS THAN 70 milligrams/deciliter  hemorrhoidal Ointment 1 Application(s) Rectal three times a day PRN pain    Pertinent Labs: (01/14) Glu 131 mg/dL<H> L Cr  2.42 mg/dL<H> BUN 37 mg/dL<H>   Finger sticks: (01/14) 185 (01/13) 137 - 208   (01/11) [id number] 7.2 %    Skin: no noted pressure injuries as per documentation.   No noted edema as per flow sheets.     Estimated Needs:   [x] no change since previous assessment  [ ] recalculated:     Previous Nutrition Diagnoses:   [x] Malnutrition; moderate  [x] Food & Nutrition Related Knowledge Deficit.     Nutrition Diagnoses are [x] ongoing - being addressed with PO intake encouragement, recommendations for nutritional supplement, and education.   Goals: Pt to meet >75% of estimated nutritional needs during hospital stay and able to teach back 2 points of nutrition education.      New Nutrition Diagnosis: [x] not applicable    Interventions:     1. Recommend continue Consistent Carbohydrate diet upon discharge. Recommend follow up visit with Transplant MD and outpatient RD for dietary modifications as warranted.   2. Recommend Glucerna Shake 240mls 2x daily (440kcals, 20g protein) to optimize kcal and protein intake. Spoke to Transplant Team.   3. Encourage PO intake and provide food preferences within therapeutic restrictions.   4. Reviewed recommendations to optimize PO and protein intake, recommended small frequent meals by ordering nutrient-dense snacks and leaving non-perishable food away from tray for later consumption during the day or between meals, to start with protein, and supplement throughout the day; reviewed foods with protein and menu order procedures in hospital.   5. Reviewed education on DM and post transplant nutrition therapy and food safety guidelines for transplant recipients. Provided education on foods containing carbohydrates, foods containing proteins, and portion sizes. Stressed the importance of a balanced meal to maintain blood glucose. Encouraged vegetables consumption. Described HbA1c and stressed importance of its normal levels. Recommended water consumption with avoidance of soda and juice. Reviewed the importance of thoroughly washing all fresh fruits/vegetables, importance of avoiding uncooked/raw/unpasteurized foods, avoiding pre-made deli/buffet/salad bar meals. Foods recommended as healthy well balanced diet and importance of adequate protein intakes for proper post-surgical healing discussed. Reviewed recommendations to avoid grapefruit, pomegranate and star fruit while taking immunosuppressant medication. Reviewed recommendations for moderate intake of sodium and carbohydrates with transplant medications.   6. Continue to obtain weights to identify changes if any.     Monitoring and Evaluation:     [x] PO intake [x] Tolerance to diet prescription [x] weights [x] follow up per protocol    [x] other: needs for further education.     RD remains available.  Di Almaraz MS RDN CDN #[id number].

## 2020-01-14 NOTE — PROGRESS NOTE ADULT - ASSESSMENT
73 y/o F w/h/o HTN, HLD, Gout, LUE DVT, Lumbar radiculopathy, breast CA s/p lumpectomy on tamoxifen, ESRD 2/2 PCKD now s/p DDRT on 12/7/2019. Presented with NODAT. Pt on Prednisone 5mg daily and tolerating POs with variable BG levels improved on basal/bolus regimen. Pt/daughter receiving education on insulin injections/pen use while inpatient. Discharge planning for today. Reviewed timing of basal insulin and importance of monitoring of glucose. Pt w/follow up outpt to monitor DM regimen. BG goal (100-180mg/dl).

## 2020-01-14 NOTE — DISCHARGE NOTE PROVIDER - NSDCCPCAREPLAN_GEN_ALL_CORE_FT
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 Continue to take all antidiabetic medications/insulin as prescribed. Follow up with your Primary Care Doctor regularly for blood sugar/A1c checks. Be sure to see an eye doctor and foot doctor on an annual basis.  ** You have pre-scheduled appointments with the Endocrinology department as listed on this discharge paperwork.  *** You are being discharged to home on Lantus Insulin 13 units taken at bedtime.  Additionally, you will need to take Tradjenta 5mg daily in the morning and Prandin 1mg before each meal 3 times daily.  Hold the Prandin if you are not eating or your fingerstick glucose is  <100s).        SECONDARY DISCHARGE DIAGNOSES  Diagnosis: Renal transplant recipient  Assessment and Plan of Treatment: No heavy lifting anything more than 10-15lbs or straining. Otherwise, you may return to your usual level of physical activity.   Call transplant clinic If you developed any of the following, fever, pain, redness, swelling at incision site, cough, nausea, vomiting, painful urination, difficulty urination, or not making any urine.  NOTIFY YOUR SURGEON IF: You have any bleeding that does not stop, any pus draining from your wound, any fever (over 100.4 F) or chills, persistent nausea/vomiting with inability to tolerate food or liquids, persistent diarrhea, or if your pain is not controlled on your discharge pain medications.      Diagnosis: Immunosuppression  Assessment and Plan of Treatment: Keep away from people who have cough, cold, and symptom of flu.  Only take medications that are on your discharge list  If you missed your medications call the transplant office, do not double up medication because you missed a dose.  If you have any question regarding your medication please call transplant office.    Diagnosis: HTN (hypertension)  Assessment and Plan of Treatment: Be sure to follow a low salt diet. If you have been prescribed antihypertensive medications to control your blood pressure, be sure to take them every day as prescribed and do not miss any doses, the medications do not work if they are not taken consistently. Follow up with your Primary Care Doctor and have your Blood Pressure checked regularly.

## 2020-01-14 NOTE — DISCHARGE NOTE NURSING/CASE MANAGEMENT/SOCIAL WORK - NSDCFUADDAPPT_GEN_ALL_CORE_FT
1.  Please call to make a follow-up appointment at the Transplant Clinic with Dr. Campos on Thursday January 16, 2020.   Clinic Phone: [phone]  2.  You have a scheduled follow-up appointment with [facility] on 2/3/20 at 2:30pm with the Clinical Diabetic Educator   3.   You have a scheduled follow-up appointment with Dr Santos endocrinologist on 5/4/20 at 3pm at [address] [place] [address] [address], [place] [address]. [phone].   4.   Please follow up with your primary care physician in one week regarding your hospitalization

## 2020-01-14 NOTE — PROGRESS NOTE ADULT - PROBLEM SELECTOR PLAN 3
cont tacro, MMF and pred. check tacro trough tomorrow.
Continue tacro, MMF and pred. Check tacro trough today. Reduced MMF to 500 mg BID for frailty and diarrhea
Continue tacro, MMF and pred. Check tacro trough today. Reduced MMF to 500mgs BID for frailty and diarrhea
cont tacro, MMF and pred. check tacro trough tomorrow.  Reduce MMF to 500mgs BID for frailty and diarrhea.

## 2020-01-14 NOTE — DISCHARGE NOTE PROVIDER - CARE PROVIDER_API CALL
Dale Campos)  Surgery  Organ Transplant  [address] [place] [address]  Phone: [phone]  Fax: [phone]  Follow Up Time:     Roseline Santos (DO)  EndocrinologyMetabDiabetes; Internal Medicine  [address] [place] [address]  Phone: [phone]  Fax: [phone]  Follow Up Time:     Damián Perea (DO)  Nephrology  [address] [facility], [place] [address]  Phone: [phone]  Fax: [phone]  Follow Up Time:

## 2020-01-14 NOTE — PROGRESS NOTE ADULT - ATTENDING COMMENTS
agree with above  blood sugar and blood pressure better controlled  kidney function improving  immunosuppression tacro, mmf and steroids
agree with above  feels better  blood sugar and serum Na better   immunosuppression tacro, mmf and steroids  will  transfuse one unit of prbcs today  plan discharge tomorrow
s/p DDRT 5 weeks ago with improving renal function, admitted with hyperglycemia and hyponatremia  Glucose better controlled with lantus 14u and pre-meal insulin 4u  hyponatremia resolved  Immunosuppression - Cont envarsus 8mg daily, prednisone 5mg daily, cellcept 500mg bid  Still having some diarrhea. consider holding cellcept vs changing to myfortic  pending renal ultrasound to eval for collection  Cr down to 2.6
agree with above  hyperglycemic and hyponatremic  will start normal saline  endocrine consult for blood sugar  immunosuppression tacro, mmf and steoids
73 y/o woman s/p DDRT on 12/7/19 admitted with new onset diabetes. She was started on insulin. Blood glucose control improving.   She had DGF requiring temporary dialysis, creatinine continues to improve 2.4mg/dL today.  Transfused 1 unit on 1/12/20 and given procrit for anemia of CKD+ medication induced bone marrow suppression. Hgb remains stable.   Doing well. Exam unremarkable. Labs reviewed. Tac level 12 today     Plan  - Decrease  Envarsus 7mg daily. Aim for trough level 8-10  - Continue Cellcept 500mg bid. Dose reduced due to GI side effects. Tolerating well , abdominal discomfort has improved today   - Endo recommend d/c home on Lantus 13 qhs, tradenta 5 and prandin TID. No pre meal insulin.
75 y/o woman s/p DDRT on 12/7/19 admitted with new onset diabetes. She was started on insulin. Blood glucose control improving.   She had DGF requiring temporary dialysis, creatinine continues to improve 2.6mg/dL today.  Transfused 1 unit yesterday and given procrit for anemia of CKD+ medication induced bone marrow suppression.   Doing well. Exam unremarkable. Labs reviewed.     Plan  - Continue Envarsus 8mg daily. Dose increased from 6mg yesterday. Aim for trough level 8-10  - Continue Cellcept 500mg bid. Dose reduced due to GI side effects. Will consider changing to Myfortic   - Endo f/u for management of PTDM - possible d/c home on oral agent.

## 2020-01-14 NOTE — PROGRESS NOTE ADULT - PROBLEM SELECTOR PROBLEM 6
HTN (hypertension)
Anemia due to chronic kidney disease, unspecified CKD stage
Anemia due to chronic kidney disease, unspecified CKD stage
HTN (hypertension)

## 2020-01-15 ENCOUNTER — LABORATORY RESULT (OUTPATIENT)
Age: 75
End: 2020-01-15

## 2020-01-15 ENCOUNTER — APPOINTMENT (OUTPATIENT)
Dept: TRANSPLANT | Facility: CLINIC | Age: 75
End: 2020-01-15
Payer: MEDICARE

## 2020-01-15 VITALS
TEMPERATURE: 97.6 F | RESPIRATION RATE: 14 BRPM | OXYGEN SATURATION: 97 % | SYSTOLIC BLOOD PRESSURE: 145 MMHG | HEART RATE: 96 BPM | WEIGHT: 123 LBS | BODY MASS INDEX: 20.49 KG/M2 | DIASTOLIC BLOOD PRESSURE: 84 MMHG | HEIGHT: 65 IN

## 2020-01-15 LAB
ALBUMIN SERPL ELPH-MCNC: 4 G/DL
ALP BLD-CCNC: 48 U/L
ALT SERPL-CCNC: 9 U/L
ANION GAP SERPL CALC-SCNC: 13 MMOL/L
AST SERPL-CCNC: 25 U/L
BILIRUB SERPL-MCNC: 0.3 MG/DL
BUN SERPL-MCNC: 35 MG/DL
CALCIUM SERPL-MCNC: 10.3 MG/DL
CHLORIDE SERPL-SCNC: 104 MMOL/L
CO2 SERPL-SCNC: 20 MMOL/L
CREAT SERPL-MCNC: 2.77 MG/DL
CULTURE RESULTS: SIGNIFICANT CHANGE UP
CULTURE RESULTS: SIGNIFICANT CHANGE UP
GLUCOSE SERPL-MCNC: 177 MG/DL
LDH SERPL-CCNC: 306 U/L
MAGNESIUM SERPL-MCNC: 2.1 MG/DL
PHOSPHATE SERPL-MCNC: 3.6 MG/DL
POTASSIUM SERPL-SCNC: 5.2 MMOL/L
PROT SERPL-MCNC: 6.6 G/DL
SODIUM SERPL-SCNC: 136 MMOL/L
SPECIMEN SOURCE: SIGNIFICANT CHANGE UP
SPECIMEN SOURCE: SIGNIFICANT CHANGE UP
TACROLIMUS SERPL-MCNC: 12.1 NG/ML
URATE SERPL-MCNC: 6.6 MG/DL

## 2020-01-15 PROCEDURE — 99024 POSTOP FOLLOW-UP VISIT: CPT

## 2020-01-15 RX ORDER — MYCOPHENOLATE MOFETIL 500 MG/1
500 TABLET ORAL
Qty: 120 | Refills: 11 | Status: DISCONTINUED | COMMUNITY
Start: 2019-12-09 | End: 2020-01-15

## 2020-01-15 NOTE — ASSESSMENT
[FreeTextEntry1] : Renal Transplant Recipient - improving creatinine\par \par Immunosuppression - \par Envarsus (level check today)\par Cellcept - switching to myfortic 500mg today due to continued loose BM and colicky abdominal discomfort\par pred 5\par \par Blood sugar levels improved\par Lantus 13u QHS, Prandin 1mg with meals, Tradjenta 5mg daily\par \par Prophylaxis - bactrim, valcyte, nystatin\par \par f/u with nephrology next week\par

## 2020-01-15 NOTE — REASON FOR VISIT
[Follow-Up] : a follow-up visit  [Family Member] : family member [FreeTextEntry3] : Kidney Transplant [FreeTextEntry5] : 12/7/19

## 2020-01-15 NOTE — HISTORY OF PRESENT ILLNESS
[ Donor] :  donor [Basiliximab] : basiliximab [] : Yes [Positive/Positive] : Donor Positive/Recipient Positive [Hepatitis C] : no hepatitis c [PHS Increased Risk] : no Public Health Service increased risk [ABO Incompatible] : ABO incompatible [Terminal Creatinine: ____] : Terminal Creatinine: [unfilled] [KDPI: ____] : Kidney Donor Profile Index: [unfilled] [TextBox_7] : 12/7/19 [de-identified] : 73 y/o woman with h/o ESRD on HD MWF via LUE AVF 2/2 PCKD (anuric at home, Nephrologist: Dr. Nevin Osborne, last HD 12/6AM), hx of fistula thrombosis, completed coumadin, HTN, HLD, Gout, LUE DVT, lumbar radiculopathy, Breast CA s/p lumpectomy on Tamoxifen (2014). Underwent DDRT with stent (sutured to johnson) on 12/7/19 w/ Simulect induction. Her post-operative course was complicated by delayed graft function requiring HD.\par \par Since last clinic visit:\par Admission to [facility] for hyperglycemia, blood glucose >400\par Seen by endocrine team (for o/p f/u 2/3/20)\par Started on Lantus 13u QHS, Prandin 1mg with meals, Tradjenta 5mg daily\par Improved blood sugars 150-180\par \par Recent loose BM ~6x/d, light brown, no blood, associated with colicky abdominal discomfort before bowels opened\par cellcept was decreased from 1g > 500mg daily

## 2020-01-15 NOTE — PHYSICAL EXAM
[Soft] : soft [Non-tender] : non-tender [] : right dorsalis pedis palpable [Normal] : normal [Clean] : clean [Healing Well] : healing well [Dry] : dry

## 2020-01-16 ENCOUNTER — OTHER (OUTPATIENT)
Age: 75
End: 2020-01-16

## 2020-01-16 LAB
APPEARANCE: ABNORMAL
BACTERIA: ABNORMAL
BASOPHILS # BLD AUTO: 0.02 K/UL
BASOPHILS NFR BLD AUTO: 0.2 %
BILIRUBIN URINE: NEGATIVE
BLOOD URINE: ABNORMAL
COLOR: YELLOW
CREAT SPEC-SCNC: 184 MG/DL
CREAT/PROT UR: 0.7 RATIO
EOSINOPHIL # BLD AUTO: 0.03 K/UL
EOSINOPHIL NFR BLD AUTO: 0.3 %
GLUCOSE QUALITATIVE U: NEGATIVE
HCT VFR BLD CALC: 30.8 %
HGB BLD-MCNC: 9.5 G/DL
HYALINE CASTS: 0 /LPF
IMM GRANULOCYTES NFR BLD AUTO: 0.8 %
KETONES URINE: NEGATIVE
LEUKOCYTE ESTERASE URINE: NEGATIVE
LYMPHOCYTES # BLD AUTO: 1.55 K/UL
LYMPHOCYTES NFR BLD AUTO: 17.3 %
MAN DIFF?: NORMAL
MCHC RBC-ENTMCNC: 28.2 PG
MCHC RBC-ENTMCNC: 30.8 GM/DL
MCV RBC AUTO: 91.4 FL
MICROSCOPIC-UA: NORMAL
MONOCYTES # BLD AUTO: 0.58 K/UL
MONOCYTES NFR BLD AUTO: 6.5 %
NEUTROPHILS # BLD AUTO: 6.72 K/UL
NEUTROPHILS NFR BLD AUTO: 74.9 %
NITRITE URINE: NEGATIVE
PH URINE: 5.5
PLATELET # BLD AUTO: 112 K/UL
PROT UR-MCNC: 119 MG/DL
PROTEIN URINE: ABNORMAL
RBC # BLD: 3.37 M/UL
RBC # FLD: 16.1 %
RED BLOOD CELLS URINE: 3 /HPF
SPECIFIC GRAVITY URINE: 1.02
SQUAMOUS EPITHELIAL CELLS: 27 /HPF
UROBILINOGEN URINE: NORMAL
WBC # FLD AUTO: 8.97 K/UL
WHITE BLOOD CELLS URINE: 15 /HPF

## 2020-01-17 ENCOUNTER — OTHER (OUTPATIENT)
Age: 75
End: 2020-01-17

## 2020-01-17 LAB
BKV DNA SPEC QL NAA+PROBE: 210 COPIES/ML
CMV DNA SPEC QL NAA+PROBE: NOT DETECTED
CMVPCR LOG: NOT DETECTED LOG10IU/ML

## 2020-01-22 ENCOUNTER — APPOINTMENT (OUTPATIENT)
Dept: TRANSPLANT | Facility: CLINIC | Age: 75
End: 2020-01-22
Payer: MEDICARE

## 2020-01-22 VITALS
DIASTOLIC BLOOD PRESSURE: 81 MMHG | BODY MASS INDEX: 20.83 KG/M2 | TEMPERATURE: 98 F | HEIGHT: 65 IN | WEIGHT: 125 LBS | HEART RATE: 75 BPM | OXYGEN SATURATION: 99 % | RESPIRATION RATE: 14 BRPM | SYSTOLIC BLOOD PRESSURE: 157 MMHG

## 2020-01-22 LAB
ALBUMIN SERPL ELPH-MCNC: 4.2 G/DL
ALP BLD-CCNC: 38 U/L
ALT SERPL-CCNC: 7 U/L
ANION GAP SERPL CALC-SCNC: 14 MMOL/L
APPEARANCE: ABNORMAL
AST SERPL-CCNC: 19 U/L
BACTERIA: ABNORMAL
BASOPHILS # BLD AUTO: 0.02 K/UL
BASOPHILS NFR BLD AUTO: 0.3 %
BILIRUB SERPL-MCNC: 0.3 MG/DL
BILIRUBIN URINE: NEGATIVE
BLOOD URINE: NEGATIVE
BUN SERPL-MCNC: 33 MG/DL
CALCIUM SERPL-MCNC: 10 MG/DL
CHLORIDE SERPL-SCNC: 107 MMOL/L
CO2 SERPL-SCNC: 17 MMOL/L
COLOR: YELLOW
CREAT SERPL-MCNC: 2.13 MG/DL
CREAT SPEC-SCNC: 192 MG/DL
CREAT/PROT UR: 0.3 RATIO
EOSINOPHIL # BLD AUTO: 0.04 K/UL
EOSINOPHIL NFR BLD AUTO: 0.6 %
GLUCOSE QUALITATIVE U: NEGATIVE
GLUCOSE SERPL-MCNC: 85 MG/DL
HCT VFR BLD CALC: 26 %
HGB BLD-MCNC: 8 G/DL
HYALINE CASTS: 0 /LPF
IMM GRANULOCYTES NFR BLD AUTO: 0.7 %
KETONES URINE: NEGATIVE
LDH SERPL-CCNC: 295 U/L
LEUKOCYTE ESTERASE URINE: NEGATIVE
LYMPHOCYTES # BLD AUTO: 0.81 K/UL
LYMPHOCYTES NFR BLD AUTO: 12.1 %
MAGNESIUM SERPL-MCNC: 2.1 MG/DL
MAN DIFF?: NORMAL
MCHC RBC-ENTMCNC: 28.5 PG
MCHC RBC-ENTMCNC: 30.8 GM/DL
MCV RBC AUTO: 92.5 FL
MICROSCOPIC-UA: NORMAL
MONOCYTES # BLD AUTO: 0.61 K/UL
MONOCYTES NFR BLD AUTO: 9.1 %
NEUTROPHILS # BLD AUTO: 5.15 K/UL
NEUTROPHILS NFR BLD AUTO: 77.2 %
NITRITE URINE: NEGATIVE
PH URINE: 6
PHOSPHATE SERPL-MCNC: 3.4 MG/DL
PLATELET # BLD AUTO: 177 K/UL
POTASSIUM SERPL-SCNC: 5.2 MMOL/L
PROT SERPL-MCNC: 6.6 G/DL
PROT UR-MCNC: 64 MG/DL
PROTEIN URINE: ABNORMAL
RBC # BLD: 2.81 M/UL
RBC # FLD: 16.8 %
RED BLOOD CELLS URINE: 4 /HPF
SODIUM SERPL-SCNC: 139 MMOL/L
SPECIFIC GRAVITY URINE: 1.02
SQUAMOUS EPITHELIAL CELLS: 19 /HPF
TACROLIMUS SERPL-MCNC: 8.1 NG/ML
URATE SERPL-MCNC: 5.3 MG/DL
UROBILINOGEN URINE: NORMAL
WBC # FLD AUTO: 6.68 K/UL
WHITE BLOOD CELLS URINE: 13 /HPF

## 2020-01-22 PROCEDURE — 99024 POSTOP FOLLOW-UP VISIT: CPT

## 2020-01-22 RX ORDER — CLONIDINE HYDROCHLORIDE 0.2 MG/1
0.2 TABLET ORAL TWICE DAILY
Qty: 2 | Refills: 0 | Status: DISCONTINUED | COMMUNITY
Start: 2019-12-11 | End: 2020-01-22

## 2020-01-22 RX ORDER — FUROSEMIDE 40 MG/1
40 TABLET ORAL DAILY
Qty: 90 | Refills: 0 | Status: DISCONTINUED | COMMUNITY
Start: 2019-12-11 | End: 2020-01-22

## 2020-01-22 NOTE — HISTORY OF PRESENT ILLNESS
[de-identified] : Transplant Type:  donor \par Date of Surgery: 19 \par Induction Agent(s): basiliximab \par CMV Status: Donor Positive/Recipient Positive \par Ureteral Stent: Yes \par Donor Characteristics: ABO incompatible, but no Public Health Service increased risk, no hepatitis c \par Terminal Creatinine: 1.7 \par Kidney Donor Profile Index: 75 \par \par Interval Events: 75 y/o woman with h/o ESRD on HD MWF via LUE AVF 2/2 PCKD (anuric at home, Nephrologist: Dr. Nevin Osborne, last HD 12/6AM), hx of fistula thrombosis, completed coumadin, HTN, HLD, Gout, LUE DVT, lumbar radiculopathy, Breast CA s/p lumpectomy on Tamoxifen (). Underwent DDRT with stent (sutured to johnson) on 19 w/ Simulect induction. Her post-operative course was complicated by delayed graft function requiring HD.\par \par Since last week:\par Doing well, good blood glucose control \par Lantus 13u QHS, Prandin 1mg with meals, Tradjenta 5mg daily\par \par Improved appetite \par Good activity levels\par \par No issues with loose BM/colicky abdominal pain since switching to myfortic\par \par \par

## 2020-01-22 NOTE — ASSESSMENT
[FreeTextEntry1] : Renal Transplant Recipient - initial DGF, now improving Cr\par \par Immunosuppression - took envarsus before blood test today\par Envarsus currently on 6mg, myfortic, pred 5\par \par Blood Glucose: improved control\par Lantus 13u QHS, Prandin 1mg with meals, Tradjenta 5mg daily\par \par Prophylaxis - bactrim, valcyte, nystatin\par \par f/u with nephrology next week\par

## 2020-01-23 ENCOUNTER — APPOINTMENT (OUTPATIENT)
Dept: INTERNAL MEDICINE | Facility: CLINIC | Age: 75
End: 2020-01-23
Payer: MEDICARE

## 2020-01-23 VITALS
OXYGEN SATURATION: 98 % | DIASTOLIC BLOOD PRESSURE: 61 MMHG | SYSTOLIC BLOOD PRESSURE: 117 MMHG | TEMPERATURE: 98.6 F | BODY MASS INDEX: 21.16 KG/M2 | HEART RATE: 86 BPM | WEIGHT: 127 LBS | HEIGHT: 65 IN

## 2020-01-23 DIAGNOSIS — R63.0 ANOREXIA: ICD-10-CM

## 2020-01-23 DIAGNOSIS — Z01.84 ENCOUNTER FOR ANTIBODY RESPONSE EXAMINATION: ICD-10-CM

## 2020-01-23 DIAGNOSIS — R06.02 SHORTNESS OF BREATH: ICD-10-CM

## 2020-01-23 LAB — BKV DNA SPEC QL NAA+PROBE: 600 COPIES/ML

## 2020-01-23 PROCEDURE — 36415 COLL VENOUS BLD VENIPUNCTURE: CPT

## 2020-01-23 PROCEDURE — 99495 TRANSJ CARE MGMT MOD F2F 14D: CPT | Mod: 25

## 2020-01-24 LAB
CHOLEST SERPL-MCNC: 129 MG/DL
CHOLEST/HDLC SERPL: 2.9 RATIO
ESTIMATED AVERAGE GLUCOSE: 154 MG/DL
HBA1C MFR BLD HPLC: 7 %
HDLC SERPL-MCNC: 44 MG/DL
LDLC SERPL CALC-MCNC: 61 MG/DL
TRIGL SERPL-MCNC: 123 MG/DL

## 2020-01-30 ENCOUNTER — APPOINTMENT (OUTPATIENT)
Dept: NEPHROLOGY | Facility: CLINIC | Age: 75
End: 2020-01-30
Payer: MEDICARE

## 2020-01-30 VITALS
DIASTOLIC BLOOD PRESSURE: 79 MMHG | WEIGHT: 129 LBS | RESPIRATION RATE: 14 BRPM | HEART RATE: 92 BPM | TEMPERATURE: 97.6 F | BODY MASS INDEX: 21.49 KG/M2 | OXYGEN SATURATION: 100 % | SYSTOLIC BLOOD PRESSURE: 155 MMHG | HEIGHT: 65 IN

## 2020-01-30 PROCEDURE — 99214 OFFICE O/P EST MOD 30 MIN: CPT

## 2020-01-30 NOTE — REASON FOR VISIT
[Follow-Up] : a follow-up visit [Family Member] : family member [FreeTextEntry1] : Post transplant follow up , elevated creatinine, non oliguric, Anemic

## 2020-01-30 NOTE — HISTORY OF PRESENT ILLNESS
[FreeTextEntry1] : DDRT, doing well clinically.\par No diarrhea.\par Some gen weakness otherwise ok\par  Has been cleaning her house.\par Overall more active.\par \par \par

## 2020-01-31 LAB
ALBUMIN SERPL ELPH-MCNC: 4.2 G/DL
ALP BLD-CCNC: 34 U/L
ALT SERPL-CCNC: 12 U/L
ANION GAP SERPL CALC-SCNC: 11 MMOL/L
APPEARANCE: CLEAR
AST SERPL-CCNC: 19 U/L
BACTERIA: NEGATIVE
BASOPHILS # BLD AUTO: 0.02 K/UL
BASOPHILS NFR BLD AUTO: 0.5 %
BILIRUB SERPL-MCNC: 0.3 MG/DL
BILIRUBIN URINE: NEGATIVE
BLOOD URINE: NEGATIVE
BUN SERPL-MCNC: 31 MG/DL
CALCIUM SERPL-MCNC: 10 MG/DL
CHLORIDE SERPL-SCNC: 109 MMOL/L
CO2 SERPL-SCNC: 21 MMOL/L
COLOR: YELLOW
CREAT SERPL-MCNC: 1.98 MG/DL
CREAT SPEC-SCNC: 105 MG/DL
CREAT/PROT UR: 0.3 RATIO
EOSINOPHIL # BLD AUTO: 0.05 K/UL
EOSINOPHIL NFR BLD AUTO: 1.1 %
GLUCOSE QUALITATIVE U: NEGATIVE
GLUCOSE SERPL-MCNC: 90 MG/DL
HCT VFR BLD CALC: 24.5 %
HGB BLD-MCNC: 7.4 G/DL
HYALINE CASTS: 0 /LPF
IMM GRANULOCYTES NFR BLD AUTO: 1.6 %
KETONES URINE: NEGATIVE
LDH SERPL-CCNC: 258 U/L
LEUKOCYTE ESTERASE URINE: NEGATIVE
LYMPHOCYTES # BLD AUTO: 0.87 K/UL
LYMPHOCYTES NFR BLD AUTO: 19.8 %
MAGNESIUM SERPL-MCNC: 2 MG/DL
MAN DIFF?: NORMAL
MCHC RBC-ENTMCNC: 28.6 PG
MCHC RBC-ENTMCNC: 30.2 GM/DL
MCV RBC AUTO: 94.6 FL
MICROSCOPIC-UA: NORMAL
MONOCYTES # BLD AUTO: 0.37 K/UL
MONOCYTES NFR BLD AUTO: 8.4 %
NEUTROPHILS # BLD AUTO: 3.02 K/UL
NEUTROPHILS NFR BLD AUTO: 68.6 %
NITRITE URINE: NEGATIVE
PH URINE: 6.5
PHOSPHATE SERPL-MCNC: 3.2 MG/DL
PLATELET # BLD AUTO: 187 K/UL
POTASSIUM SERPL-SCNC: 5.3 MMOL/L
PROT SERPL-MCNC: 6.4 G/DL
PROT UR-MCNC: 36 MG/DL
PROTEIN URINE: NORMAL
RBC # BLD: 2.59 M/UL
RBC # FLD: 17.1 %
RED BLOOD CELLS URINE: 1 /HPF
SODIUM SERPL-SCNC: 141 MMOL/L
SPECIFIC GRAVITY URINE: 1.02
SQUAMOUS EPITHELIAL CELLS: 3 /HPF
TACROLIMUS SERPL-MCNC: 5.5 NG/ML
URATE SERPL-MCNC: 5.5 MG/DL
UROBILINOGEN URINE: NORMAL
WBC # FLD AUTO: 4.4 K/UL
WHITE BLOOD CELLS URINE: 2 /HPF

## 2020-02-03 ENCOUNTER — APPOINTMENT (OUTPATIENT)
Dept: ENDOCRINOLOGY | Facility: CLINIC | Age: 75
End: 2020-02-03

## 2020-02-04 LAB
BKV DNA SPEC QL NAA+PROBE: 900 COPIES/ML
CMV DNA SPEC QL NAA+PROBE: NOT DETECTED
CMVPCR LOG: NOT DETECTED LOG10IU/ML

## 2020-02-05 ENCOUNTER — APPOINTMENT (OUTPATIENT)
Dept: TRANSPLANT | Facility: CLINIC | Age: 75
End: 2020-02-05
Payer: MEDICARE

## 2020-02-05 VITALS
WEIGHT: 130 LBS | BODY MASS INDEX: 21.66 KG/M2 | OXYGEN SATURATION: 98 % | SYSTOLIC BLOOD PRESSURE: 146 MMHG | RESPIRATION RATE: 14 BRPM | DIASTOLIC BLOOD PRESSURE: 66 MMHG | HEART RATE: 83 BPM | HEIGHT: 65 IN | TEMPERATURE: 98 F

## 2020-02-05 PROCEDURE — 99024 POSTOP FOLLOW-UP VISIT: CPT

## 2020-02-10 ENCOUNTER — OUTPATIENT (OUTPATIENT)
Dept: OUTPATIENT SERVICES | Facility: HOSPITAL | Age: 75
LOS: 1 days | Discharge: HOME | End: 2020-02-10

## 2020-02-10 DIAGNOSIS — Z98.890 OTHER SPECIFIED POSTPROCEDURAL STATES: Chronic | ICD-10-CM

## 2020-02-10 DIAGNOSIS — Z87.81 PERSONAL HISTORY OF (HEALED) TRAUMATIC FRACTURE: Chronic | ICD-10-CM

## 2020-02-10 DIAGNOSIS — E27.9 DISORDER OF ADRENAL GLAND, UNSPECIFIED: Chronic | ICD-10-CM

## 2020-02-10 DIAGNOSIS — D69.6 THROMBOCYTOPENIA, UNSPECIFIED: ICD-10-CM

## 2020-02-10 DIAGNOSIS — Z90.710 ACQUIRED ABSENCE OF BOTH CERVIX AND UTERUS: Chronic | ICD-10-CM

## 2020-02-11 ENCOUNTER — RESULT REVIEW (OUTPATIENT)
Age: 75
End: 2020-02-11

## 2020-02-11 ENCOUNTER — RX RENEWAL (OUTPATIENT)
Age: 75
End: 2020-02-11

## 2020-02-11 ENCOUNTER — APPOINTMENT (OUTPATIENT)
Dept: HEMATOLOGY ONCOLOGY | Facility: CLINIC | Age: 75
End: 2020-02-11
Payer: MEDICARE

## 2020-02-11 VITALS
DIASTOLIC BLOOD PRESSURE: 68 MMHG | HEART RATE: 75 BPM | RESPIRATION RATE: 16 BRPM | SYSTOLIC BLOOD PRESSURE: 172 MMHG | TEMPERATURE: 97.9 F | BODY MASS INDEX: 21.79 KG/M2 | WEIGHT: 130.93 LBS | OXYGEN SATURATION: 100 %

## 2020-02-11 LAB
ALBUMIN SERPL ELPH-MCNC: 4 G/DL
ALP BLD-CCNC: 32 U/L
ALT SERPL-CCNC: 12 U/L
ANION GAP SERPL CALC-SCNC: 12 MMOL/L
APPEARANCE: CLEAR
AST SERPL-CCNC: 20 U/L
BACTERIA: ABNORMAL
BILIRUB SERPL-MCNC: 0.2 MG/DL
BILIRUBIN URINE: NEGATIVE
BKV DNA SPEC QL NAA+PROBE: 800 COPIES/ML
BLOOD URINE: NEGATIVE
BUN SERPL-MCNC: 31 MG/DL
CALCIUM SERPL-MCNC: 9.7 MG/DL
CHLORIDE SERPL-SCNC: 109 MMOL/L
CMV DNA SPEC QL NAA+PROBE: NOT DETECTED
CMVPCR LOG: NOT DETECTED LOG10IU/ML
CO2 SERPL-SCNC: 20 MMOL/L
COLOR: YELLOW
CREAT SERPL-MCNC: 1.73 MG/DL
CREAT SPEC-SCNC: 118 MG/DL
CREAT/PROT UR: 0.4 RATIO
GLUCOSE QUALITATIVE U: NEGATIVE
GLUCOSE SERPL-MCNC: 74 MG/DL
HCT VFR BLD CALC: 26 % — LOW (ref 34.5–45)
HGB BLD-MCNC: 8.4 G/DL — LOW (ref 11.5–15.5)
HYALINE CASTS: 2 /LPF
KETONES URINE: NEGATIVE
LDH SERPL-CCNC: 295 U/L
LEUKOCYTE ESTERASE URINE: NEGATIVE
LYMPHOCYTES # BLD AUTO: 12 % — LOW (ref 13–44)
MAGNESIUM SERPL-MCNC: 2.1 MG/DL
MCHC RBC-ENTMCNC: 30.2 PG — SIGNIFICANT CHANGE UP (ref 27–34)
MCHC RBC-ENTMCNC: 32.3 G/DL — SIGNIFICANT CHANGE UP (ref 32–36)
MCV RBC AUTO: 93.5 FL — SIGNIFICANT CHANGE UP (ref 80–100)
MICROSCOPIC-UA: NORMAL
MONOCYTES NFR BLD AUTO: 8 % — SIGNIFICANT CHANGE UP (ref 2–14)
NEUTROPHILS # BLD AUTO: LOW K/UL (ref 1.8–7.4)
NEUTROPHILS NFR BLD AUTO: 79 % — HIGH (ref 43–77)
NEUTS BAND # BLD: 1 % — SIGNIFICANT CHANGE UP (ref 0–8)
NITRITE URINE: NEGATIVE
PH URINE: 6
PHOSPHATE SERPL-MCNC: 3.5 MG/DL
PLAT MORPH BLD: NORMAL — SIGNIFICANT CHANGE UP
PLATELET # BLD AUTO: 136 K/UL — LOW (ref 150–400)
POTASSIUM SERPL-SCNC: 5 MMOL/L
PROT SERPL-MCNC: 6.1 G/DL
PROT UR-MCNC: 46 MG/DL
PROTEIN URINE: ABNORMAL
RBC # BLD: 2.78 M/UL — LOW (ref 3.8–5.2)
RBC # FLD: 15.7 % — HIGH (ref 10.3–14.5)
RBC BLD AUTO: SIGNIFICANT CHANGE UP
RED BLOOD CELLS URINE: 2 /HPF
SODIUM SERPL-SCNC: 141 MMOL/L
SPECIFIC GRAVITY URINE: 1.02
SQUAMOUS EPITHELIAL CELLS: 3 /HPF
TACROLIMUS SERPL-MCNC: 6.5 NG/ML
UROBILINOGEN URINE: NORMAL
WBC # BLD: 3.6 K/UL — LOW (ref 3.8–10.5)
WBC # FLD AUTO: 3.6 K/UL — LOW (ref 3.8–10.5)
WHITE BLOOD CELLS URINE: 3 /HPF

## 2020-02-11 PROCEDURE — 99214 OFFICE O/P EST MOD 30 MIN: CPT

## 2020-02-11 RX ORDER — FAMOTIDINE 20 MG/1
20 TABLET, FILM COATED ORAL DAILY
Refills: 0 | Status: DISCONTINUED | COMMUNITY
Start: 2020-02-11 | End: 2020-02-11

## 2020-02-11 RX ORDER — TACROLIMUS 1 MG/1
1 CAPSULE ORAL DAILY
Refills: 0 | Status: DISCONTINUED | COMMUNITY
Start: 2020-02-11 | End: 2020-02-11

## 2020-02-11 RX ORDER — SULFAMETHOXAZOLE AND TRIMETHOPRIM 400; 80 MG/1; MG/1
400-80 TABLET ORAL DAILY
Refills: 0 | Status: DISCONTINUED | COMMUNITY
Start: 2020-02-11 | End: 2020-02-11

## 2020-02-11 NOTE — HISTORY OF PRESENT ILLNESS
[de-identified] : Ms. Adam was referred to my office for abnormal blood counts, needing hematological clearance prior to renal transplant. She had blood work done as part of her monthly blood at hemodialysis -on 3/5/19 wbc 3.6 Hb 11.7 plt 82. She was referred for leukopenia and thrombocytopenia. According to the patient and her daughter, she has not had abnormal counts in the past; she denied bleeding/bruising. They both recalled that at the time the blood was drawn, the patient was 'getting over the flu' -she had fevers and cough. At this time, she feels well.  [de-identified] : The patient is here for count follow up. 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 She received the renal transplant on 12/7/19 -from a cadaveric donor. She is on immunosuppression with Tacrolimus/Cellcept as well as low dose Prednisone. \par \par She c/o fatigue -she has not gotten her Aranesp shots b/c of insurance, but reports they were now approved and she is supposed to get them.

## 2020-02-11 NOTE — REVIEW OF SYSTEMS
[Joint Pain] : joint pain [Negative] : Allergic/Immunologic [Fever] : no fever [Night Sweats] : no night sweats [Chills] : no chills [Fatigue] : no fatigue [Recent Change In Weight] : ~T no recent weight change [Eye Pain] : no eye pain [Red Eyes] : eyes not red [Dysphagia] : no dysphagia [Dry Eyes] : no dryness of the eyes [Loss of Hearing] : no loss of hearing [Nosebleeds] : no nosebleeds [Hoarseness] : no hoarseness [Chest Pain] : no chest pain [Mucosal Pain] : no mucosal pain [Odynophagia] : no odynophagia [Palpitations] : no palpitations [Lower Ext Edema] : no lower extremity edema [Vomiting] : no vomiting [Constipation] : no constipation [Diarrhea] : no diarrhea [Dysuria] : no dysuria [Incontinence] : no incontinence [Vaginal Discharge] : no vaginal discharge [Joint Stiffness] : no joint stiffness [Dysmenorrhea/Abn Vaginal Bleeding] : no dysmenorrhea/abnormal vaginal bleeding [Skin Rash] : no skin rash [Muscle Pain] : no muscle pain [Dizziness] : no dizziness [Skin Wound] : no skin wound [Confused] : no confusion [Difficulty Walking] : no difficulty walking [Fainting] : no fainting [Anxiety] : no anxiety [Insomnia] : no insomnia [Depression] : no depression [Hot Flashes] : no hot flashes [Easy Bruising] : no tendency for easy bruising [Easy Bleeding] : no tendency for easy bleeding [Swollen Glands] : no swollen glands [FreeTextEntry7] : colonoscopy on 3/13/19 -polyps. EGD on 3/13/19 -erosion [FreeTextEntry8] : on HD on Mon/Wed/Fri; anuric. No UTI's. PAP smear Feb 2019. Mammogram to be done on 4/2/19 [FreeTextEntry9] : chronic back pain

## 2020-02-11 NOTE — ASSESSMENT
[FreeTextEntry1] : 73 yo F with multiple medical problems including CRI on hemodialysis from PCKD, hx breast cancer in remission\par BM bx done on 7/22/19 mild plasmacytosis (5-9% cells) c/w MGUS\par Pt received cadaveric renal transplant on 12/7/19, now on immunosuppressive drugs\par \par -mild leukopenia and thrombocytopenia -would consider adjusting Bactrim to every other day or switching to Atovaquone to avoid myelosuppression. Repeat CBC with diff should be done in 4-6 wks\par \par -cont Aranesp or Procrit as needed to goal Hb of 10g/dl\par \par -repeat blood work today for SPEP/YAEL/Ig's/free light chains. Plasma cells <10% c/w MGUS. Skeletal survey -no bone lytic lesions\par \par -follow up in 3-4 months

## 2020-02-11 NOTE — RESULTS/DATA
[FreeTextEntry1] : Today's CBC (On 2/11/20) wbc 3.6 hb 8.4 plt 136 \par \par ON 11/5/19) wbc 5.8 Hb 10.8 plt 161 ANC 3700\par  ON 8/5/19) wbc 3.2 hb 12.1 plt 129 ANC 1300\par On 6/26/19) wbc 3.3 Hb 8.8 plt 143 ANC 1600\par On 3/26/19) wbc 4.4 with normal diff,  hb 9.6 plt 169\par On 3/6/19 wbc 3.1 hb 10.7 plt 74\par On 3/5/19 wbc 3.6 Hb 11.7 plt 82\par On 2/5/19 wbc 4.8 hb 12.2 plt 158\par On 11/5/18 wbc 4.2 Hb 11.5 plt 220\par \par PATHOLOGY\par On 7/22/19 BM bx \par \par Final Diagnosis\par 1, 2. Bone marrow biopsy and bone marrow aspirate\par - Normocellular bone marrow with erythroid-predominant\par trilineage hematopoiesis and maturation, increased iron stores\par - Mild plasmacytosis (5-9% of cells) with subset kappa-light\par chain restricted\par \par \par

## 2020-02-12 LAB
ALBUMIN MFR SERPL ELPH: 61.9 %
ALBUMIN SERPL ELPH-MCNC: 4.3 G/DL
ALBUMIN SERPL-MCNC: 4 G/DL
ALBUMIN/GLOB SERPL: 1.6 RATIO
ALP BLD-CCNC: 37 U/L
ALPHA1 GLOB MFR SERPL ELPH: 5.3 %
ALPHA1 GLOB SERPL ELPH-MCNC: 0.3 G/DL
ALPHA2 GLOB MFR SERPL ELPH: 8.9 %
ALPHA2 GLOB SERPL ELPH-MCNC: 0.6 G/DL
ALT SERPL-CCNC: 10 U/L
ANION GAP SERPL CALC-SCNC: 13 MMOL/L
AST SERPL-CCNC: 20 U/L
B-GLOBULIN MFR SERPL ELPH: 9.4 %
B-GLOBULIN SERPL ELPH-MCNC: 0.6 G/DL
BILIRUB SERPL-MCNC: 0.2 MG/DL
BUN SERPL-MCNC: 28 MG/DL
CALCIUM SERPL-MCNC: 9.9 MG/DL
CHLORIDE SERPL-SCNC: 106 MMOL/L
CO2 SERPL-SCNC: 20 MMOL/L
CREAT SERPL-MCNC: 2.07 MG/DL
DEPRECATED KAPPA LC FREE/LAMBDA SER: 9.18 RATIO
GAMMA GLOB FLD ELPH-MCNC: 0.9 G/DL
GAMMA GLOB MFR SERPL ELPH: 14.5 %
GLUCOSE SERPL-MCNC: 100 MG/DL
IGA SER QL IEP: 179 MG/DL
IGG SER QL IEP: 1065 MG/DL
IGM SER QL IEP: 30 MG/DL
INTERPRETATION SERPL IEP-IMP: NORMAL
KAPPA LC CSF-MCNC: 1.71 MG/DL
KAPPA LC SERPL-MCNC: 15.69 MG/DL
M PROTEIN MFR SERPL ELPH: 8 %
M PROTEIN SPEC IFE-MCNC: NORMAL
MONOCLON BAND OBS SERPL: 0.5 G/DL
POTASSIUM SERPL-SCNC: 5.3 MMOL/L
PROT SERPL-MCNC: 6.5 G/DL
SODIUM SERPL-SCNC: 139 MMOL/L

## 2020-02-13 ENCOUNTER — APPOINTMENT (OUTPATIENT)
Dept: NEPHROLOGY | Facility: CLINIC | Age: 75
End: 2020-02-13
Payer: MEDICARE

## 2020-02-13 VITALS
RESPIRATION RATE: 16 BRPM | OXYGEN SATURATION: 98 % | HEIGHT: 65 IN | HEART RATE: 74 BPM | BODY MASS INDEX: 21.66 KG/M2 | SYSTOLIC BLOOD PRESSURE: 150 MMHG | TEMPERATURE: 98.4 F | DIASTOLIC BLOOD PRESSURE: 78 MMHG | WEIGHT: 130 LBS

## 2020-02-13 PROCEDURE — 99214 OFFICE O/P EST MOD 30 MIN: CPT

## 2020-02-13 NOTE — REASON FOR VISIT
[Follow-Up] : a follow-up visit [Family Member] : family member [FreeTextEntry1] : Post transplant follow up ,  Anemic

## 2020-02-13 NOTE — HISTORY OF PRESENT ILLNESS
[FreeTextEntry1] : Received DDRT on 12/7, has no acute symptoms. Anemia, has been advised procrit, has not gotten yet.\par Overall more active. Still has nocturia.\par Has seen Dr. Franklin\par \par \par

## 2020-02-13 NOTE — PHYSICAL EXAM
[General Appearance - In No Acute Distress] : in no acute distress [General Appearance - Alert] : alert [PERRL With Normal Accommodation] : pupils were equal in size, round, and reactive to light [Sclera] : the sclera and conjunctiva were normal [Outer Ear] : the ears and nose were normal in appearance [Extraocular Movements] : extraocular movements were intact [Oropharynx] : the oropharynx was normal [Neck Appearance] : the appearance of the neck was normal [Neck Cervical Mass (___cm)] : no neck mass was observed [Thyroid Nodule] : there were no palpable thyroid nodules [Jugular Venous Distention Increased] : there was no jugular-venous distention [Auscultation Breath Sounds / Voice Sounds] : lungs were clear to auscultation bilaterally [Heart Sounds] : normal S1 and S2 [Heart Rate And Rhythm] : heart rate was normal and rhythm regular [Heart Sounds Gallop] : no gallops [Murmurs] : no murmurs [Heart Sounds Pericardial Friction Rub] : no pericardial rub [Full Pulse] : the pedal pulses are present [Edema] : there was no peripheral edema [Abdomen Soft] : soft [Bowel Sounds] : normal bowel sounds [Abdomen Tenderness] : non-tender [Cervical Lymph Nodes Enlarged Posterior Bilaterally] : posterior cervical [Cervical Lymph Nodes Enlarged Anterior Bilaterally] : anterior cervical [Inguinal Lymph Nodes Enlarged Bilaterally] : inguinal [Axillary Lymph Nodes Enlarged Bilaterally] : axillary [Supraclavicular Lymph Nodes Enlarged Bilaterally] : supraclavicular [Involuntary Movements] : no involuntary movements were seen [Bruit] : a bruit was present [___ (cm) Fistula] : [unfilled] (cm) fistula [No Focal Deficits] : no focal deficits [Thrill] : a thrill was present [] : no rash [Oriented To Time, Place, And Person] : oriented to person, place, and time [FreeTextEntry1] : tremors+ [Impaired Insight] : insight and judgment were intact [Affect] : the affect was normal

## 2020-02-13 NOTE — ASSESSMENT
[FreeTextEntry1] : Renal Transplant recipient: Had delayed allograft function, elevated creatinine at discharge. Has urinary frequency and nocturia. No dysuria/hematuria. No fever/chills. Tolerating medications.\par Immunosuppression: reviewed; simulect induction, on tac/MMF/prednisone, last Tac level noted; will recheck. Currently on Envarsus  9 mg daily.; Myfortic 360/dose and prednisone at 5 mg daily\par Hypertension: Controlled home blood pressure ; Reviewed medications.  \par Infection prophylaxis: On Bactrim, Valcyte and nystatin as well as GI prophylaxis.\par Discussed ambulation,   optimal glucose and blood pressure readings, adherence with medications and follow ups, follow up clinic visit schedule, avoiding dehydration, mosquito bites; prevention of DVT as well as food safety.\par Anemia: Will start on Procrit for anemia, normocytic. 10K units q weekly for 4 weeks and check Hb. She has no symptoms except tiredness. No reported bleeding from any site. She has not gotten the procrit injections yet.\par

## 2020-02-14 LAB
ALBUMIN SERPL ELPH-MCNC: 4.5 G/DL
ALP BLD-CCNC: 37 U/L
ALT SERPL-CCNC: 7 U/L
ANION GAP SERPL CALC-SCNC: 17 MMOL/L
APPEARANCE: CLEAR
AST SERPL-CCNC: 22 U/L
BACTERIA: ABNORMAL
BILIRUB SERPL-MCNC: 0.2 MG/DL
BILIRUBIN URINE: NEGATIVE
BKV DNA SPEC QL NAA+PROBE: ABNORMAL COPIES/ML
BLOOD URINE: NEGATIVE
BUN SERPL-MCNC: 31 MG/DL
CALCIUM SERPL-MCNC: 9.9 MG/DL
CHLORIDE SERPL-SCNC: 106 MMOL/L
CO2 SERPL-SCNC: 17 MMOL/L
COLOR: YELLOW
CREAT SERPL-MCNC: 2.09 MG/DL
CREAT SPEC-SCNC: 135 MG/DL
CREAT/PROT UR: 0.4 RATIO
GLUCOSE QUALITATIVE U: NEGATIVE
GLUCOSE SERPL-MCNC: 60 MG/DL
HYALINE CASTS: 2 /LPF
KETONES URINE: NEGATIVE
LDH SERPL-CCNC: 331 U/L
LEUKOCYTE ESTERASE URINE: NEGATIVE
MAGNESIUM SERPL-MCNC: 2.2 MG/DL
MICROSCOPIC-UA: NORMAL
NITRITE URINE: NEGATIVE
PH URINE: 6.5
PHOSPHATE SERPL-MCNC: 3.8 MG/DL
POTASSIUM SERPL-SCNC: 5.5 MMOL/L
PROT SERPL-MCNC: 6.8 G/DL
PROT UR-MCNC: 49 MG/DL
PROTEIN URINE: ABNORMAL
RED BLOOD CELLS URINE: 3 /HPF
SODIUM SERPL-SCNC: 139 MMOL/L
SPECIFIC GRAVITY URINE: 1.02
SQUAMOUS EPITHELIAL CELLS: 5 /HPF
TACROLIMUS SERPL-MCNC: 4.8 NG/ML
UROBILINOGEN URINE: NORMAL
WHITE BLOOD CELLS URINE: 5 /HPF

## 2020-02-19 ENCOUNTER — APPOINTMENT (OUTPATIENT)
Dept: SURGERY | Facility: CLINIC | Age: 75
End: 2020-02-19
Payer: MEDICARE

## 2020-02-19 PROCEDURE — 99213 OFFICE O/P EST LOW 20 MIN: CPT

## 2020-02-19 NOTE — PHYSICAL EXAM
[de-identified] : No cervical or supraclavicular adenopathy, trachea deviating to the right with left lobe extending behind clavicle. Enlarged. [Normal] : no neck adenopathy [de-identified] : Skin:  normal appearance.  no rash, nodules, vesicles, or erythema,\par Musculoskeletal:  full range of motion and no deformities appreciated\par Neurological:  grossly intact\par Psychiatric:  oriented to person, place and time with appropriate affect

## 2020-02-19 NOTE — HISTORY OF PRESENT ILLNESS
[de-identified] : Patient referred by Dr. Dickinson  for evaluation of enlarging left substernal goiter. Patient reports a needle biopsy was performed several years ago report not available. Thyroid ultrasound July 2017:  Right lobe 4.8 x 1.7 x 1.6 CM with subcentimeter nodules largest lower pole  7 mm rim calcified  stable. Left lobe 6 x 2.3 x 2.3 CM  with lower pole 4.3 x 4.4 x 2.3 CM increased from 3.7 x 3.8 x 3 CM. Patient denies dysphagia or change in voice. Patient received radiation 2 years ago for left breast cancer.\par biopsy 7/2017 benign,. \par last US 2/2018 slight increase in dominant left nodule,   US 7/31/18. stable  and repeat US 1/2019 no change, \par Patient with over 5 year hx of MNG .  thyroid US  7/2019 slight decrease in left nodule, no appreciable change in thyroid size.  Patient had kidney transplant 12/2019 now with type 2 DM, doing well.  denies recent illness  denies dysphagia, SOB or palpitations. .

## 2020-02-20 ENCOUNTER — APPOINTMENT (OUTPATIENT)
Dept: NEPHROLOGY | Facility: CLINIC | Age: 75
End: 2020-02-20
Payer: MEDICARE

## 2020-02-20 VITALS
HEIGHT: 65 IN | SYSTOLIC BLOOD PRESSURE: 134 MMHG | TEMPERATURE: 98.1 F | DIASTOLIC BLOOD PRESSURE: 70 MMHG | HEART RATE: 85 BPM | RESPIRATION RATE: 17 BRPM | BODY MASS INDEX: 21.66 KG/M2 | WEIGHT: 130 LBS | OXYGEN SATURATION: 95 %

## 2020-02-20 LAB
ALBUMIN SERPL ELPH-MCNC: 4.3 G/DL
ALP BLD-CCNC: 33 U/L
ALT SERPL-CCNC: 7 U/L
ANION GAP SERPL CALC-SCNC: 11 MMOL/L
APPEARANCE: ABNORMAL
AST SERPL-CCNC: 15 U/L
BACTERIA: ABNORMAL
BILIRUB SERPL-MCNC: 0.2 MG/DL
BILIRUBIN URINE: NEGATIVE
BLOOD URINE: NEGATIVE
BUN SERPL-MCNC: 35 MG/DL
CALCIUM SERPL-MCNC: 9.9 MG/DL
CHLORIDE SERPL-SCNC: 108 MMOL/L
CO2 SERPL-SCNC: 21 MMOL/L
COLOR: YELLOW
CREAT SERPL-MCNC: 2.14 MG/DL
CREAT SPEC-SCNC: 174 MG/DL
CREAT/PROT UR: 0.3 RATIO
GLUCOSE QUALITATIVE U: NEGATIVE
GLUCOSE SERPL-MCNC: 101 MG/DL
HYALINE CASTS: 5 /LPF
KETONES URINE: NEGATIVE
LDH SERPL-CCNC: 201 U/L
LEUKOCYTE ESTERASE URINE: NEGATIVE
MAGNESIUM SERPL-MCNC: 2.3 MG/DL
MICROSCOPIC-UA: NORMAL
NITRITE URINE: NEGATIVE
PH URINE: 6
PHOSPHATE SERPL-MCNC: 3.7 MG/DL
POTASSIUM SERPL-SCNC: 5.7 MMOL/L
PROT SERPL-MCNC: 6.6 G/DL
PROT UR-MCNC: 50 MG/DL
PROTEIN URINE: ABNORMAL
RED BLOOD CELLS URINE: 2 /HPF
SODIUM SERPL-SCNC: 140 MMOL/L
SPECIFIC GRAVITY URINE: 1.02
SQUAMOUS EPITHELIAL CELLS: 10 /HPF
TACROLIMUS SERPL-MCNC: 19 NG/ML
UROBILINOGEN URINE: NORMAL
WHITE BLOOD CELLS URINE: 5 /HPF

## 2020-02-20 PROCEDURE — 99214 OFFICE O/P EST MOD 30 MIN: CPT

## 2020-02-20 RX ORDER — INSULIN GLARGINE 100 [IU]/ML
100 INJECTION, SOLUTION SUBCUTANEOUS
Qty: 3 | Refills: 0 | Status: DISCONTINUED | COMMUNITY
Start: 2020-01-22 | End: 2020-02-20

## 2020-02-20 NOTE — REASON FOR VISIT
[Follow-Up] : a follow-up visit [Family Member] : family member [FreeTextEntry1] : Post transplant follow up ,  Anemic on procrit

## 2020-02-20 NOTE — ASSESSMENT
[FreeTextEntry1] : Renal Transplant recipient: Had delayed allograft function, elevated creatinine at discharge. Has urinary frequency and nocturia. No dysuria/hematuria. No fever/chills. Tolerating medications.\par Immunosuppression: reviewed; simulect induction, on tac/MMF/prednisone, last Tac level noted; will recheck. Currently on Envarsus  9 mg daily.; Myfortic 360/dose and prednisone at 5 mg daily\par Hypertension: Controlled home blood pressure ; Reviewed medications.  \par DM: on Lantus 13 units/day, Tradjenta and prandin. Reports an episode of hypoglycemia. Will d/c Lantus and monitor glucose on Tradjenta and Prandin. If hyperglycemia will restart on insulin.\par Infection prophylaxis: On Bactrim, Valcyte and nystatin as well as GI prophylaxis.\par Discussed ambulation,   optimal glucose and blood pressure readings, adherence with medications and follow ups, follow up clinic visit schedule, avoiding dehydration, mosquito bites; prevention of DVT as well as food safety.\par Anemia: On Procrit for anemia, normocytic. 10 K units q weekly for 4 weeks and check Hb. She has no symptoms except tiredness. No reported bleeding from any site. She has started procrit injections.\par

## 2020-02-20 NOTE — PHYSICAL EXAM
[General Appearance - Alert] : alert [General Appearance - In No Acute Distress] : in no acute distress [Sclera] : the sclera and conjunctiva were normal [PERRL With Normal Accommodation] : pupils were equal in size, round, and reactive to light [Outer Ear] : the ears and nose were normal in appearance [Extraocular Movements] : extraocular movements were intact [Oropharynx] : the oropharynx was normal [Neck Appearance] : the appearance of the neck was normal [Neck Cervical Mass (___cm)] : no neck mass was observed [Jugular Venous Distention Increased] : there was no jugular-venous distention [Thyroid Nodule] : there were no palpable thyroid nodules [Auscultation Breath Sounds / Voice Sounds] : lungs were clear to auscultation bilaterally [Heart Sounds] : normal S1 and S2 [Heart Rate And Rhythm] : heart rate was normal and rhythm regular [Heart Sounds Gallop] : no gallops [Murmurs] : no murmurs [Heart Sounds Pericardial Friction Rub] : no pericardial rub [Full Pulse] : the pedal pulses are present [Edema] : there was no peripheral edema [Bowel Sounds] : normal bowel sounds [Abdomen Soft] : soft [Abdomen Tenderness] : non-tender [Cervical Lymph Nodes Enlarged Posterior Bilaterally] : posterior cervical [Cervical Lymph Nodes Enlarged Anterior Bilaterally] : anterior cervical [Supraclavicular Lymph Nodes Enlarged Bilaterally] : supraclavicular [Axillary Lymph Nodes Enlarged Bilaterally] : axillary [Involuntary Movements] : no involuntary movements were seen [Inguinal Lymph Nodes Enlarged Bilaterally] : inguinal [___ (cm) Fistula] : [unfilled] (cm) fistula [Bruit] : a bruit was present [] : no rash [Thrill] : a thrill was present [FreeTextEntry1] : tremors+ [No Focal Deficits] : no focal deficits [Oriented To Time, Place, And Person] : oriented to person, place, and time [Affect] : the affect was normal [Impaired Insight] : insight and judgment were intact

## 2020-02-20 NOTE — HISTORY OF PRESENT ILLNESS
[FreeTextEntry1] : Received DDRT on 12/7, has no acute symptoms. Anemia, has started procrit, taken first dose today\par No acute symptoms at present. Reports one episode of hypoglycemia.\par Lives with Two grand children, Bobby and Beatriz.\par \par \par \par

## 2020-02-24 LAB — BKV DNA SPEC QL NAA+PROBE: ABNORMAL COPIES/ML

## 2020-02-27 ENCOUNTER — APPOINTMENT (OUTPATIENT)
Dept: TRANSPLANT | Facility: CLINIC | Age: 75
End: 2020-02-27
Payer: MEDICARE

## 2020-02-27 VITALS
HEART RATE: 83 BPM | TEMPERATURE: 97 F | SYSTOLIC BLOOD PRESSURE: 188 MMHG | WEIGHT: 130 LBS | OXYGEN SATURATION: 97 % | HEIGHT: 65 IN | RESPIRATION RATE: 17 BRPM | BODY MASS INDEX: 21.66 KG/M2 | DIASTOLIC BLOOD PRESSURE: 87 MMHG

## 2020-02-27 LAB
ALBUMIN SERPL ELPH-MCNC: 4.5 G/DL
ALP BLD-CCNC: 36 U/L
ALT SERPL-CCNC: 7 U/L
ANION GAP SERPL CALC-SCNC: 15 MMOL/L
APPEARANCE: CLEAR
AST SERPL-CCNC: 11 U/L
BACTERIA: ABNORMAL
BILIRUB SERPL-MCNC: 0.2 MG/DL
BILIRUBIN URINE: NEGATIVE
BLOOD URINE: NEGATIVE
BUN SERPL-MCNC: 43 MG/DL
CALCIUM SERPL-MCNC: 10.1 MG/DL
CALCIUM SERPL-MCNC: 10.1 MG/DL
CHLORIDE SERPL-SCNC: 105 MMOL/L
CO2 SERPL-SCNC: 18 MMOL/L
COLOR: YELLOW
CREAT SERPL-MCNC: 2.58 MG/DL
CREAT SPEC-SCNC: 179 MG/DL
CREAT/PROT UR: 0.2 RATIO
GLUCOSE QUALITATIVE U: NEGATIVE
GLUCOSE SERPL-MCNC: 119 MG/DL
HYALINE CASTS: 5 /LPF
KETONES URINE: NEGATIVE
LDH SERPL-CCNC: 183 U/L
LEUKOCYTE ESTERASE URINE: NEGATIVE
MAGNESIUM SERPL-MCNC: 2.1 MG/DL
MICROSCOPIC-UA: NORMAL
NITRITE URINE: NEGATIVE
PARATHYROID HORMONE INTACT: 96 PG/ML
PH URINE: 6
PHOSPHATE SERPL-MCNC: 3.5 MG/DL
POTASSIUM SERPL-SCNC: 5.5 MMOL/L
PROT SERPL-MCNC: 6.8 G/DL
PROT UR-MCNC: 35 MG/DL
PROTEIN URINE: ABNORMAL
RED BLOOD CELLS URINE: 2 /HPF
SODIUM SERPL-SCNC: 138 MMOL/L
SPECIFIC GRAVITY URINE: 1.02
SQUAMOUS EPITHELIAL CELLS: 9 /HPF
TACROLIMUS SERPL-MCNC: 6.6 NG/ML
URINE COMMENTS: NORMAL
UROBILINOGEN URINE: NORMAL
WHITE BLOOD CELLS URINE: 10 /HPF

## 2020-02-27 PROCEDURE — 99215 OFFICE O/P EST HI 40 MIN: CPT

## 2020-02-27 NOTE — ASSESSMENT
[FreeTextEntry1] : Renal Transplant Recipient - Cr creeping up slowly, ?related to tac levels - will await today's labs, if tac level ok, for US evaluation\par \par Immunosuppression \par Envarsus currently on 9mg, 360mg myfortic, pred 5\par \par Blood Glucose: improved control\par Prandin 1mg with meals, Tradjenta 5mg daily\par \par Prophylaxis - bactrim, valcyte, nystatin\par \par Anemia - got procrit dose today\par \par F/u next week\par f/u with nephrology next week

## 2020-02-27 NOTE — HISTORY OF PRESENT ILLNESS
[de-identified] : Interval Events: Transplant Type:  donor \par Date of Surgery: 19 \par Induction Agent(s): basiliximab \par CMV Status: Donor Positive/Recipient Positive \par Ureteral Stent: Yes \par Donor Characteristics: ABO incompatible, but no Public Health Service increased risk, no hepatitis c \par Terminal Creatinine: 1.7 \par Kidney Donor Profile Index: 75 \par \par Interval Events: 75 y/o woman with h/o ESRD on HD MWF via LUE AVF 2/2 PCKD (anuric at home, Nephrologist: Dr. Nevin Osborne, last HD 12/6AM), hx of fistula thrombosis, completed coumadin, HTN, HLD, Gout, LUE DVT, lumbar radiculopathy, Breast CA s/p lumpectomy on Tamoxifen (). Underwent DDRT with stent (sutured to johnson) on 19 w/ Simulect induction. Her post-operative course was complicated by delayed graft function requiring HD.\par \par Since last week:\par Doing well, good blood glucose control \par Prandin 1mg with meals, Tradjenta 5mg daily\par \par some decreased appetite as close to anniversary of daughter's death\par Good activity levels\par \par good BP control (high today as had not had any meds)\par \par No other issues

## 2020-02-28 RX ORDER — ISOPROPYL ALCOHOL 70 ML/100ML
SWAB TOPICAL
Qty: 1 | Refills: 3 | Status: COMPLETED | COMMUNITY
Start: 2020-02-27 | End: 2020-06-26

## 2020-03-02 LAB — BKV DNA SPEC QL NAA+PROBE: ABNORMAL COPIES/ML

## 2020-03-03 ENCOUNTER — FORM ENCOUNTER (OUTPATIENT)
Age: 75
End: 2020-03-03

## 2020-03-04 ENCOUNTER — OUTPATIENT (OUTPATIENT)
Dept: OUTPATIENT SERVICES | Facility: HOSPITAL | Age: 75
LOS: 1 days | End: 2020-03-04
Payer: MEDICARE

## 2020-03-04 ENCOUNTER — APPOINTMENT (OUTPATIENT)
Dept: ULTRASOUND IMAGING | Facility: CLINIC | Age: 75
End: 2020-03-04
Payer: MEDICARE

## 2020-03-04 DIAGNOSIS — E27.9 DISORDER OF ADRENAL GLAND, UNSPECIFIED: Chronic | ICD-10-CM

## 2020-03-04 DIAGNOSIS — Z87.81 PERSONAL HISTORY OF (HEALED) TRAUMATIC FRACTURE: Chronic | ICD-10-CM

## 2020-03-04 DIAGNOSIS — Z90.710 ACQUIRED ABSENCE OF BOTH CERVIX AND UTERUS: Chronic | ICD-10-CM

## 2020-03-04 DIAGNOSIS — Z98.890 OTHER SPECIFIED POSTPROCEDURAL STATES: Chronic | ICD-10-CM

## 2020-03-04 DIAGNOSIS — Z94.0 KIDNEY TRANSPLANT STATUS: ICD-10-CM

## 2020-03-04 LAB
BASOPHILS # BLD AUTO: 0.02 K/UL
BASOPHILS # BLD AUTO: 0.07 K/UL
BASOPHILS NFR BLD AUTO: 0.3 %
BASOPHILS NFR BLD AUTO: 1.5 %
EOSINOPHIL # BLD AUTO: 0.02 K/UL
EOSINOPHIL # BLD AUTO: 0.03 K/UL
EOSINOPHIL NFR BLD AUTO: 0.3 %
EOSINOPHIL NFR BLD AUTO: 0.6 %
HCT VFR BLD CALC: 24.6 %
HCT VFR BLD CALC: 26.7 %
HGB BLD-MCNC: 7.4 G/DL
HGB BLD-MCNC: 8 G/DL
IMM GRANULOCYTES NFR BLD AUTO: 0.5 %
IMM GRANULOCYTES NFR BLD AUTO: 3 %
LYMPHOCYTES # BLD AUTO: 0.83 K/UL
LYMPHOCYTES # BLD AUTO: 0.96 K/UL
LYMPHOCYTES NFR BLD AUTO: 14.9 %
LYMPHOCYTES NFR BLD AUTO: 17.8 %
MAN DIFF?: NORMAL
MAN DIFF?: NORMAL
MCHC RBC-ENTMCNC: 28.8 PG
MCHC RBC-ENTMCNC: 28.9 PG
MCHC RBC-ENTMCNC: 30 GM/DL
MCHC RBC-ENTMCNC: 30.1 GM/DL
MCV RBC AUTO: 96 FL
MCV RBC AUTO: 96.1 FL
MONOCYTES # BLD AUTO: 0.37 K/UL
MONOCYTES # BLD AUTO: 0.4 K/UL
MONOCYTES NFR BLD AUTO: 6.2 %
MONOCYTES NFR BLD AUTO: 7.9 %
NEUTROPHILS # BLD AUTO: 3.23 K/UL
NEUTROPHILS # BLD AUTO: 5 K/UL
NEUTROPHILS NFR BLD AUTO: 69.2 %
NEUTROPHILS NFR BLD AUTO: 77.8 %
PLATELET # BLD AUTO: 154 K/UL
PLATELET # BLD AUTO: 162 K/UL
RBC # BLD: 2.56 M/UL
RBC # BLD: 2.78 M/UL
RBC # FLD: 15.7 %
RBC # FLD: 16.8 %
URATE SERPL-MCNC: 5.1 MG/DL
URATE SERPL-MCNC: 5.2 MG/DL
WBC # FLD AUTO: 4.67 K/UL
WBC # FLD AUTO: 6.43 K/UL

## 2020-03-04 PROCEDURE — 76776 US EXAM K TRANSPL W/DOPPLER: CPT

## 2020-03-04 PROCEDURE — 76776 US EXAM K TRANSPL W/DOPPLER: CPT | Mod: 26

## 2020-03-05 ENCOUNTER — APPOINTMENT (OUTPATIENT)
Dept: TRANSPLANT | Facility: CLINIC | Age: 75
End: 2020-03-05
Payer: MEDICARE

## 2020-03-05 VITALS
HEIGHT: 65 IN | SYSTOLIC BLOOD PRESSURE: 141 MMHG | WEIGHT: 126 LBS | HEART RATE: 98 BPM | DIASTOLIC BLOOD PRESSURE: 73 MMHG | OXYGEN SATURATION: 98 % | TEMPERATURE: 98 F | BODY MASS INDEX: 20.99 KG/M2 | RESPIRATION RATE: 17 BRPM

## 2020-03-05 LAB
BASOPHILS # BLD AUTO: 0.02 K/UL
BASOPHILS # BLD AUTO: 0.03 K/UL
BASOPHILS NFR BLD AUTO: 0.3 %
BASOPHILS NFR BLD AUTO: 0.8 %
EOSINOPHIL # BLD AUTO: 0.02 K/UL
EOSINOPHIL # BLD AUTO: 0.08 K/UL
EOSINOPHIL NFR BLD AUTO: 0.5 %
EOSINOPHIL NFR BLD AUTO: 1.1 %
HCT VFR BLD CALC: 27.6 %
HCT VFR BLD CALC: 28.3 %
HGB BLD-MCNC: 8.1 G/DL
HGB BLD-MCNC: 8.8 G/DL
IMM GRANULOCYTES NFR BLD AUTO: 0.3 %
IMM GRANULOCYTES NFR BLD AUTO: 2.8 %
LYMPHOCYTES # BLD AUTO: 0.64 K/UL
LYMPHOCYTES # BLD AUTO: 1.35 K/UL
LYMPHOCYTES NFR BLD AUTO: 16.3 %
LYMPHOCYTES NFR BLD AUTO: 19 %
MAN DIFF?: NORMAL
MAN DIFF?: NORMAL
MCHC RBC-ENTMCNC: 28.8 PG
MCHC RBC-ENTMCNC: 29.3 GM/DL
MCHC RBC-ENTMCNC: 29.7 PG
MCHC RBC-ENTMCNC: 31.1 GM/DL
MCV RBC AUTO: 95.6 FL
MCV RBC AUTO: 98.2 FL
MONOCYTES # BLD AUTO: 0.44 K/UL
MONOCYTES # BLD AUTO: 0.62 K/UL
MONOCYTES NFR BLD AUTO: 11.2 %
MONOCYTES NFR BLD AUTO: 8.7 %
NEUTROPHILS # BLD AUTO: 2.68 K/UL
NEUTROPHILS # BLD AUTO: 5.01 K/UL
NEUTROPHILS NFR BLD AUTO: 68.4 %
NEUTROPHILS NFR BLD AUTO: 70.6 %
PLATELET # BLD AUTO: 147 K/UL
PLATELET # BLD AUTO: 166 K/UL
RBC # BLD: 2.81 M/UL
RBC # BLD: 2.96 M/UL
RBC # FLD: 15.6 %
RBC # FLD: 16.5 %
URATE SERPL-MCNC: 5.6 MG/DL
URATE SERPL-MCNC: 5.8 MG/DL
WBC # FLD AUTO: 3.92 K/UL
WBC # FLD AUTO: 7.1 K/UL

## 2020-03-05 PROCEDURE — 99215 OFFICE O/P EST HI 40 MIN: CPT | Mod: 24

## 2020-03-05 NOTE — ASSESSMENT
[FreeTextEntry1] : Renal Transplant Recipient - Cr creeping up slowly, US normal  - await today's labs, for possible biopsy, will hold aspirin\par \par Immunosuppression \par Envarsus currently on 10mg, 360mg myfortic, pred 5\par \par Blood Glucose: improved control\par Prandin 1mg with meals, Tradjenta 5mg daily\par \par Prophylaxis - bactrim, valcyte, nystatin\par \par Anemia - procrit dose today\par \par F/u next week

## 2020-03-05 NOTE — HISTORY OF PRESENT ILLNESS
[de-identified] : Interval Events: Interval Events: Transplant Type:  donor \par Date of Surgery: 19 \par Induction Agent(s): basiliximab \par CMV Status: Donor Positive/Recipient Positive \par Ureteral Stent: Yes \par Donor Characteristics: ABO incompatible, but no Public Health Service increased risk, no hepatitis c \par Terminal Creatinine: 1.7 \par Kidney Donor Profile Index: 75 \par \par Interval Events: 75 y/o woman with h/o ESRD on HD MWF via LUE AVF 2/2 PCKD (anuric at home, Nephrologist: Dr. Nevin Osborne, last HD 12/6AM), hx of fistula thrombosis, completed coumadin, HTN, HLD, Gout, LUE DVT, lumbar radiculopathy, Breast CA s/p lumpectomy on Tamoxifen (). Underwent DDRT with stent (sutured to johnson) on 19 w/ Simulect induction. Her post-operative course was complicated by delayed graft function requiring HD.\par \par Since last week:\par Doing well, good blood glucose control 100-200\par Prandin 1mg with meals, Tradjenta 5mg daily\par \par good urine output\par appetite improving\par no complaints or issues\par \par good BP control \par \par increasing Cr 2.5 last week, (yo 1.7)\par Had an US yesterday of kidney graft - no evidence of renal artery stenosis, small perinephric collection, patent renal vein\par \par last tac level 6.6 on envarsus 10mg

## 2020-03-06 LAB
ALBUMIN SERPL ELPH-MCNC: 4.6 G/DL
ALP BLD-CCNC: 37 U/L
ALT SERPL-CCNC: 6 U/L
ANION GAP SERPL CALC-SCNC: 13 MMOL/L
APPEARANCE: CLEAR
AST SERPL-CCNC: 16 U/L
BACTERIA: ABNORMAL
BASOPHILS # BLD AUTO: 0.03 K/UL
BASOPHILS NFR BLD AUTO: 0.4 %
BILIRUB SERPL-MCNC: 0.3 MG/DL
BILIRUBIN URINE: NEGATIVE
BKV DNA SPEC QL NAA+PROBE: ABNORMAL COPIES/ML
BLOOD URINE: NEGATIVE
BUN SERPL-MCNC: 41 MG/DL
CALCIUM SERPL-MCNC: 10.1 MG/DL
CHLORIDE SERPL-SCNC: 108 MMOL/L
CO2 SERPL-SCNC: 21 MMOL/L
COLOR: NORMAL
CREAT SERPL-MCNC: 2.37 MG/DL
CREAT SPEC-SCNC: 144 MG/DL
CREAT/PROT UR: 0.2 RATIO
EOSINOPHIL # BLD AUTO: 0.1 K/UL
EOSINOPHIL NFR BLD AUTO: 1.4 %
GLUCOSE QUALITATIVE U: NEGATIVE
GLUCOSE SERPL-MCNC: 124 MG/DL
HCT VFR BLD CALC: 31.1 %
HGB BLD-MCNC: 9.3 G/DL
HYALINE CASTS: 2 /LPF
IMM GRANULOCYTES NFR BLD AUTO: 0.5 %
KETONES URINE: NEGATIVE
LDH SERPL-CCNC: 195 U/L
LEUKOCYTE ESTERASE URINE: NEGATIVE
LYMPHOCYTES # BLD AUTO: 1.25 K/UL
LYMPHOCYTES NFR BLD AUTO: 16.9 %
MAGNESIUM SERPL-MCNC: 2.1 MG/DL
MAN DIFF?: NORMAL
MCHC RBC-ENTMCNC: 29.2 PG
MCHC RBC-ENTMCNC: 29.9 GM/DL
MCV RBC AUTO: 97.5 FL
MICROSCOPIC-UA: NORMAL
MONOCYTES # BLD AUTO: 0.65 K/UL
MONOCYTES NFR BLD AUTO: 8.8 %
NEUTROPHILS # BLD AUTO: 5.33 K/UL
NEUTROPHILS NFR BLD AUTO: 72 %
NITRITE URINE: NEGATIVE
PH URINE: 6.5
PHOSPHATE SERPL-MCNC: 3.5 MG/DL
PLATELET # BLD AUTO: 160 K/UL
POTASSIUM SERPL-SCNC: 5.5 MMOL/L
PROT SERPL-MCNC: 6.8 G/DL
PROT UR-MCNC: 32 MG/DL
PROTEIN URINE: ABNORMAL
RBC # BLD: 3.19 M/UL
RBC # FLD: 15.9 %
RED BLOOD CELLS URINE: 1 /HPF
SODIUM SERPL-SCNC: 141 MMOL/L
SPECIFIC GRAVITY URINE: 1.02
SQUAMOUS EPITHELIAL CELLS: 7 /HPF
TACROLIMUS SERPL-MCNC: 7.6 NG/ML
URATE SERPL-MCNC: 5.8 MG/DL
UROBILINOGEN URINE: NORMAL
WBC # FLD AUTO: 7.4 K/UL
WHITE BLOOD CELLS URINE: 9 /HPF

## 2020-03-09 LAB — CMV DNA SPEC QL NAA+PROBE: NOT DETECTED

## 2020-03-11 ENCOUNTER — APPOINTMENT (OUTPATIENT)
Dept: TRANSPLANT | Facility: CLINIC | Age: 75
End: 2020-03-11
Payer: MEDICARE

## 2020-03-11 ENCOUNTER — APPOINTMENT (OUTPATIENT)
Dept: TRANSPLANT | Facility: CLINIC | Age: 75
End: 2020-03-11

## 2020-03-11 VITALS
TEMPERATURE: 98 F | DIASTOLIC BLOOD PRESSURE: 64 MMHG | HEART RATE: 65 BPM | BODY MASS INDEX: 20.83 KG/M2 | SYSTOLIC BLOOD PRESSURE: 149 MMHG | WEIGHT: 125 LBS | RESPIRATION RATE: 17 BRPM | HEIGHT: 65 IN | OXYGEN SATURATION: 98 %

## 2020-03-11 PROCEDURE — 99215 OFFICE O/P EST HI 40 MIN: CPT | Mod: 24

## 2020-03-11 RX ORDER — NYSTATIN 100000 [USP'U]/ML
100000 SUSPENSION ORAL 4 TIMES DAILY
Qty: 2 | Refills: 2 | Status: DISCONTINUED | COMMUNITY
Start: 2019-12-09 | End: 2020-03-11

## 2020-03-11 NOTE — ASSESSMENT
[FreeTextEntry1] : Status Post kidney transplant \par \par 1) graft function - concern for rejection as sCr has come up in the last few weeks.\par biopsy planned pending today's labs\par \par 2)immunosuppression, envarsus, 360mg myfortic, pred 5\par \par 3)Diabetes - good control with prandin 1mg with meals, tradjenta 5mg daily\par \par 4)Anemia - procrit dose today\par

## 2020-03-11 NOTE — HISTORY OF PRESENT ILLNESS
[de-identified] : Interval Events: Interval Events: Interval Events: Transplant Type:  donor \par Date of Surgery: 19 \par Induction Agent(s): basiliximab \par CMV Status: Donor Positive/Recipient Positive \par Ureteral Stent: Yes \par Donor Characteristics: ABO incompatible, but no Public Health Service increased risk, no hepatitis c \par Terminal Creatinine: 1.7 \par Kidney Donor Profile Index: 75 \par \par Interval Events: 75 y/o woman with h/o ESRD on HD MWF via LUE AVF 2/2 PCKD (anuric at home, Nephrologist: Dr. Nevin Osborne, last HD 12/6AM), hx of fistula thrombosis, completed coumadin, HTN, HLD, Gout, LUE DVT, lumbar radiculopathy, Breast CA s/p lumpectomy on Tamoxifen (). Underwent DDRT with stent (sutured to johnson) on 19 w/ Simulect induction. Her post-operative course was complicated by delayed graft function requiring HD.\par \par Since last week:\par Symptomatically well\par no issues\par blood glucose control is adequate 100-200\par good urine output\par \par Noted increasing sCr (yo 1.7), last week noted sCr slightly improved from 2.5 to 2.3\par US - no evidence of BASSAM\par \par If further increase in sCr, renal allograft biopsy booked for tomorrow (aspirin held for 1 week)\par \par \par

## 2020-03-12 ENCOUNTER — APPOINTMENT (OUTPATIENT)
Dept: ULTRASOUND IMAGING | Facility: HOSPITAL | Age: 75
End: 2020-03-12

## 2020-03-12 LAB
ALBUMIN SERPL ELPH-MCNC: 4.5 G/DL
ALP BLD-CCNC: 37 U/L
ALT SERPL-CCNC: 8 U/L
ANION GAP SERPL CALC-SCNC: 14 MMOL/L
APPEARANCE: ABNORMAL
AST SERPL-CCNC: 19 U/L
BACTERIA: ABNORMAL
BASOPHILS # BLD AUTO: 0.02 K/UL
BASOPHILS NFR BLD AUTO: 0.4 %
BILIRUB SERPL-MCNC: 0.4 MG/DL
BILIRUBIN URINE: NEGATIVE
BLOOD URINE: NEGATIVE
BUN SERPL-MCNC: 23 MG/DL
CALCIUM SERPL-MCNC: 10.1 MG/DL
CHLORIDE SERPL-SCNC: 105 MMOL/L
CO2 SERPL-SCNC: 20 MMOL/L
COLOR: YELLOW
CREAT SERPL-MCNC: 2.23 MG/DL
CREAT SPEC-SCNC: 186 MG/DL
CREAT/PROT UR: 0.3 RATIO
EOSINOPHIL # BLD AUTO: 0.08 K/UL
EOSINOPHIL NFR BLD AUTO: 1.6 %
GLUCOSE QUALITATIVE U: NEGATIVE
GLUCOSE SERPL-MCNC: 131 MG/DL
HCT VFR BLD CALC: 33.7 %
HGB BLD-MCNC: 9.9 G/DL
HYALINE CASTS: 2 /LPF
IMM GRANULOCYTES NFR BLD AUTO: 0.4 %
INR PPP: 0.95 RATIO
KETONES URINE: NEGATIVE
LDH SERPL-CCNC: 233 U/L
LEUKOCYTE ESTERASE URINE: NEGATIVE
LYMPHOCYTES # BLD AUTO: 1.04 K/UL
LYMPHOCYTES NFR BLD AUTO: 20.8 %
MAGNESIUM SERPL-MCNC: 2.1 MG/DL
MAN DIFF?: NORMAL
MCHC RBC-ENTMCNC: 29.3 PG
MCHC RBC-ENTMCNC: 29.4 GM/DL
MCV RBC AUTO: 99.7 FL
MICROSCOPIC-UA: NORMAL
MONOCYTES # BLD AUTO: 0.44 K/UL
MONOCYTES NFR BLD AUTO: 8.8 %
NEUTROPHILS # BLD AUTO: 3.4 K/UL
NEUTROPHILS NFR BLD AUTO: 68 %
NITRITE URINE: NEGATIVE
PH URINE: 6
PHOSPHATE SERPL-MCNC: 3.9 MG/DL
PLATELET # BLD AUTO: 126 K/UL
POTASSIUM SERPL-SCNC: 5.5 MMOL/L
PROT SERPL-MCNC: 7 G/DL
PROT UR-MCNC: 51 MG/DL
PROTEIN URINE: ABNORMAL
PT BLD: 10.9 SEC
RBC # BLD: 3.38 M/UL
RBC # FLD: 15.6 %
RED BLOOD CELLS URINE: 2 /HPF
SODIUM SERPL-SCNC: 139 MMOL/L
SPECIFIC GRAVITY URINE: 1.02
SQUAMOUS EPITHELIAL CELLS: 15 /HPF
TACROLIMUS SERPL-MCNC: 12.6 NG/ML
URATE SERPL-MCNC: 5.7 MG/DL
UROBILINOGEN URINE: NORMAL
WBC # FLD AUTO: 5 K/UL
WHITE BLOOD CELLS URINE: 11 /HPF

## 2020-03-13 LAB
BKV DNA SPEC QL NAA+PROBE: ABNORMAL COPIES/ML
CMV DNA SPEC QL NAA+PROBE: NOT DETECTED
CMVPCR LOG: NOT DETECTED LOG10IU/ML

## 2020-03-19 ENCOUNTER — APPOINTMENT (OUTPATIENT)
Dept: NEPHROLOGY | Facility: CLINIC | Age: 75
End: 2020-03-19
Payer: MEDICARE

## 2020-03-19 VITALS
HEART RATE: 82 BPM | WEIGHT: 124 LBS | BODY MASS INDEX: 20.66 KG/M2 | DIASTOLIC BLOOD PRESSURE: 82 MMHG | HEIGHT: 65 IN | RESPIRATION RATE: 17 BRPM | OXYGEN SATURATION: 99 % | TEMPERATURE: 97.7 F | SYSTOLIC BLOOD PRESSURE: 175 MMHG

## 2020-03-19 VITALS — DIASTOLIC BLOOD PRESSURE: 70 MMHG | SYSTOLIC BLOOD PRESSURE: 158 MMHG

## 2020-03-19 LAB
ALBUMIN SERPL ELPH-MCNC: 4.3 G/DL
ALP BLD-CCNC: 34 U/L
ALT SERPL-CCNC: 6 U/L
ANION GAP SERPL CALC-SCNC: 13 MMOL/L
APPEARANCE: ABNORMAL
AST SERPL-CCNC: 18 U/L
BACTERIA: ABNORMAL
BASOPHILS # BLD AUTO: 0.02 K/UL
BASOPHILS NFR BLD AUTO: 0.6 %
BILIRUB SERPL-MCNC: 0.2 MG/DL
BILIRUBIN URINE: NEGATIVE
BLOOD URINE: NEGATIVE
BUN SERPL-MCNC: 37 MG/DL
CALCIUM SERPL-MCNC: 9.8 MG/DL
CALCIUM SERPL-MCNC: 9.8 MG/DL
CHLORIDE SERPL-SCNC: 108 MMOL/L
CO2 SERPL-SCNC: 18 MMOL/L
COLOR: NORMAL
CREAT SERPL-MCNC: 2.1 MG/DL
CREAT SPEC-SCNC: 155 MG/DL
CREAT/PROT UR: 0.3 RATIO
EOSINOPHIL # BLD AUTO: 0.08 K/UL
EOSINOPHIL NFR BLD AUTO: 2.3 %
GLUCOSE QUALITATIVE U: NEGATIVE
GLUCOSE SERPL-MCNC: 124 MG/DL
HCT VFR BLD CALC: 35 %
HGB BLD-MCNC: 10.3 G/DL
HYALINE CASTS: 1 /LPF
IMM GRANULOCYTES NFR BLD AUTO: 1.1 %
KETONES URINE: NEGATIVE
LDH SERPL-CCNC: 269 U/L
LEUKOCYTE ESTERASE URINE: NEGATIVE
LYMPHOCYTES # BLD AUTO: 1.05 K/UL
LYMPHOCYTES NFR BLD AUTO: 29.7 %
MAGNESIUM SERPL-MCNC: 2 MG/DL
MAN DIFF?: NORMAL
MCHC RBC-ENTMCNC: 28.9 PG
MCHC RBC-ENTMCNC: 29.4 GM/DL
MCV RBC AUTO: 98 FL
MICROSCOPIC-UA: NORMAL
MONOCYTES # BLD AUTO: 0.4 K/UL
MONOCYTES NFR BLD AUTO: 11.3 %
NEUTROPHILS # BLD AUTO: 1.95 K/UL
NEUTROPHILS NFR BLD AUTO: 55 %
NITRITE URINE: NEGATIVE
PARATHYROID HORMONE INTACT: 113 PG/ML
PH URINE: 6
PHOSPHATE SERPL-MCNC: 3.7 MG/DL
PLATELET # BLD AUTO: 152 K/UL
POTASSIUM SERPL-SCNC: 5.1 MMOL/L
PROT SERPL-MCNC: 6.7 G/DL
PROT UR-MCNC: 41 MG/DL
PROTEIN URINE: ABNORMAL
RBC # BLD: 3.57 M/UL
RBC # FLD: 14.5 %
RED BLOOD CELLS URINE: 3 /HPF
SODIUM SERPL-SCNC: 139 MMOL/L
SPECIFIC GRAVITY URINE: 1.02
SQUAMOUS EPITHELIAL CELLS: 9 /HPF
TACROLIMUS SERPL-MCNC: 9.9 NG/ML
URATE SERPL-MCNC: 5.2 MG/DL
URINE COMMENTS: NORMAL
UROBILINOGEN URINE: NORMAL
WBC # FLD AUTO: 3.54 K/UL
WHITE BLOOD CELLS URINE: 16 /HPF

## 2020-03-19 PROCEDURE — 99214 OFFICE O/P EST MOD 30 MIN: CPT

## 2020-03-19 RX ORDER — MYCOPHENILIC ACID 360 MG/1
360 TABLET, DELAYED RELEASE ORAL
Qty: 60 | Refills: 5 | Status: DISCONTINUED | COMMUNITY
Start: 2020-01-15 | End: 2020-03-19

## 2020-03-19 NOTE — ASSESSMENT
[FreeTextEntry1] : Renal Transplant recipient: Had delayed allograft function, elevated creatinine at discharge. Has urinary frequency and nocturia. No dysuria/hematuria. No fever/chills. Tolerating medications.\par Immunosuppression: reviewed; simulect induction, on tac/MMF/prednisone, last Tac level noted; will recheck. 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  On clonidine and nifedipine\par DM: on Tradjenta and prandin. Reports no hypoglycemia. Will continue to monitor glucose on Tradjenta and Prandin. \par Infection prophylaxis: On Bactrim, Valcyte and nystatin as well as GI prophylaxis. Once GFR >25 will increase valcyte to daily.\par Discussed ambulation,   optimal glucose and blood pressure readings, adherence with medications and follow ups, follow up clinic visit schedule, avoiding dehydration, mosquito bites; prevention of DVT as well as food safety.\par Anemia: On Procrit for anemia, normocytic. 10 K units q weekly for 4 weeks and check Hb. She has no symptoms except tiredness. No reported bleeding from any site. She has started procrit injections.\par

## 2020-03-19 NOTE — HISTORY OF PRESENT ILLNESS
[FreeTextEntry1] : Received DDRT on 12/7, has no acute symptoms. Anemia, has started procrit, received 4 doses, last dose last week. 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 \par Lives with Two grand daughter Beatriz.\par Accompanied by Althea, grand daughter today.\par \par \par

## 2020-03-19 NOTE — REASON FOR VISIT
[Follow-Up] : a follow-up visit [Family Member] : family member [FreeTextEntry1] : Post transplant follow up ,  BK viremia

## 2020-03-19 NOTE — PHYSICAL EXAM
[General Appearance - Alert] : alert [General Appearance - In No Acute Distress] : in no acute distress [Sclera] : the sclera and conjunctiva were normal [PERRL With Normal Accommodation] : pupils were equal in size, round, and reactive to light [Extraocular Movements] : extraocular movements were intact [Outer Ear] : the ears and nose were normal in appearance [Oropharynx] : the oropharynx was normal [Neck Appearance] : the appearance of the neck was normal [Neck Cervical Mass (___cm)] : no neck mass was observed [Jugular Venous Distention Increased] : there was no jugular-venous distention [Thyroid Nodule] : there were no palpable thyroid nodules [Auscultation Breath Sounds / Voice Sounds] : lungs were clear to auscultation bilaterally [Heart Rate And Rhythm] : heart rate was normal and rhythm regular [Heart Sounds] : normal S1 and S2 [Heart Sounds Gallop] : no gallops [Murmurs] : no murmurs [Heart Sounds Pericardial Friction Rub] : no pericardial rub [Full Pulse] : the pedal pulses are present [Edema] : there was no peripheral edema [Bowel Sounds] : normal bowel sounds [Abdomen Soft] : soft [Abdomen Tenderness] : non-tender [Cervical Lymph Nodes Enlarged Posterior Bilaterally] : posterior cervical [Cervical Lymph Nodes Enlarged Anterior Bilaterally] : anterior cervical [Supraclavicular Lymph Nodes Enlarged Bilaterally] : supraclavicular [Axillary Lymph Nodes Enlarged Bilaterally] : axillary [Inguinal Lymph Nodes Enlarged Bilaterally] : inguinal [Involuntary Movements] : no involuntary movements were seen [___ (cm) Fistula] : [unfilled] (cm) fistula [Bruit] : a bruit was present [Thrill] : a thrill was present [] : no rash [No Focal Deficits] : no focal deficits [FreeTextEntry1] : tremors+ [Oriented To Time, Place, And Person] : oriented to person, place, and time [Impaired Insight] : insight and judgment were intact [Affect] : the affect was normal

## 2020-03-22 LAB — BKV DNA SPEC QL NAA+PROBE: ABNORMAL COPIES/ML

## 2020-04-01 ENCOUNTER — APPOINTMENT (OUTPATIENT)
Dept: NEPHROLOGY | Facility: CLINIC | Age: 75
End: 2020-04-01
Payer: MEDICARE

## 2020-04-01 ENCOUNTER — APPOINTMENT (OUTPATIENT)
Dept: TRANSPLANT | Facility: CLINIC | Age: 75
End: 2020-04-01

## 2020-04-01 PROCEDURE — 99214 OFFICE O/P EST MOD 30 MIN: CPT | Mod: 95

## 2020-04-01 NOTE — HISTORY OF PRESENT ILLNESS
[Home] : at home, [unfilled] , at the time of the visit. [Medical Office: ([facility])___] : at ~his/her~ medical office located in V [Patient] : the patient [Self] : self [FreeTextEntry1] : This visit is provided by telehealth using real time 2 way audio visual technology. This patient is located at home and the provider is located at the [facility] medical office at [address] [place] [address]. Patient and family member, grand daughter  participated in this visit.\par Verbal consent for this visit was given by patient/accompanying family member\par Total duration of this visit which included conversation, lab review, medication review and clinical assessment and advice lasted approximately 25-30 minutes.\par \par Currently:\par \par Patient feels well\par Reviewed for acute symptoms-currently has none.\par Taking precautions to prevent COVID exposure, grand daughter has been tested positive.\par I reviewed current home  blood pressure and home glucose control and medications.\par On Telehealth visit:\par Patient appears comfortable\par No distress, not dyspneic\par Conscious, alert, oriented X 3\par Speech: Normal\par Ambulatory.\par Other observations as noted\par Reviewed last lab data \par

## 2020-04-01 NOTE — ASSESSMENT
[FreeTextEntry1] : Clinical Impression:\par Kidney Transplant recipient, functioning allograft\par Immunosuppression- Reviewed and adjusted.\par DM Controlled.\par HTN controlled\par BK Viremia on modified regimen\par Plan:\par Immunosuppression, BKV- continue current management\par Continue current medications for DM, HTN\par additional changes as noted\par Labs and follow up visit to be scheduled as discussed.\par Discussed COVID infection prevention strategies including handwashing, social distancing and monitoring for any signs or symptoms.\par \par

## 2020-04-23 ENCOUNTER — LABORATORY RESULT (OUTPATIENT)
Age: 75
End: 2020-04-23

## 2020-04-23 ENCOUNTER — APPOINTMENT (OUTPATIENT)
Dept: TRANSPLANT | Facility: CLINIC | Age: 75
End: 2020-04-23

## 2020-04-23 ENCOUNTER — APPOINTMENT (OUTPATIENT)
Dept: INTERNAL MEDICINE | Facility: CLINIC | Age: 75
End: 2020-04-23

## 2020-04-24 LAB
25(OH)D3 SERPL-MCNC: 41.4 NG/ML
ALBUMIN SERPL ELPH-MCNC: 4.3 G/DL
ALP BLD-CCNC: 38 U/L
ALT SERPL-CCNC: 7 U/L
ANION GAP SERPL CALC-SCNC: 13 MMOL/L
APPEARANCE: ABNORMAL
AST SERPL-CCNC: 19 U/L
BACTERIA: ABNORMAL
BASOPHILS # BLD AUTO: 0.09 K/UL
BASOPHILS NFR BLD AUTO: 1.8 %
BILIRUB SERPL-MCNC: 0.2 MG/DL
BILIRUBIN URINE: NEGATIVE
BLOOD URINE: NEGATIVE
BUN SERPL-MCNC: 41 MG/DL
CALCIUM SERPL-MCNC: 10.2 MG/DL
CALCIUM SERPL-MCNC: 10.2 MG/DL
CHLORIDE SERPL-SCNC: 108 MMOL/L
CHOLEST SERPL-MCNC: 171 MG/DL
CHOLEST/HDLC SERPL: 4.2 RATIO
CO2 SERPL-SCNC: 20 MMOL/L
COLOR: YELLOW
CREAT SERPL-MCNC: 2.07 MG/DL
CREAT SPEC-SCNC: 97 MG/DL
CREAT/PROT UR: 0.4 RATIO
EOSINOPHIL # BLD AUTO: 0 K/UL
EOSINOPHIL NFR BLD AUTO: 0 %
ESTIMATED AVERAGE GLUCOSE: 120 MG/DL
GLUCOSE QUALITATIVE U: NEGATIVE
GLUCOSE SERPL-MCNC: 187 MG/DL
HBA1C MFR BLD HPLC: 5.8 %
HCT VFR BLD CALC: 30.9 %
HDLC SERPL-MCNC: 41 MG/DL
HGB BLD-MCNC: 9.3 G/DL
HYALINE CASTS: 0 /LPF
KETONES URINE: NEGATIVE
LDH SERPL-CCNC: 272 U/L
LDLC SERPL CALC-MCNC: 78 MG/DL
LEUKOCYTE ESTERASE URINE: NEGATIVE
LYMPHOCYTES # BLD AUTO: 1.41 K/UL
LYMPHOCYTES NFR BLD AUTO: 27.5 %
MAGNESIUM SERPL-MCNC: 2 MG/DL
MAN DIFF?: NORMAL
MCHC RBC-ENTMCNC: 28.7 PG
MCHC RBC-ENTMCNC: 30.1 GM/DL
MCV RBC AUTO: 95.4 FL
MICROSCOPIC-UA: NORMAL
MONOCYTES # BLD AUTO: 0.14 K/UL
MONOCYTES NFR BLD AUTO: 2.8 %
NEUTROPHILS # BLD AUTO: 2.21 K/UL
NEUTROPHILS NFR BLD AUTO: 42.2 %
NITRITE URINE: NEGATIVE
PARATHYROID HORMONE INTACT: 138 PG/ML
PH URINE: 6
PHOSPHATE SERPL-MCNC: 3.4 MG/DL
PLATELET # BLD AUTO: 125 K/UL
POTASSIUM SERPL-SCNC: 5.1 MMOL/L
PROT SERPL-MCNC: 6.5 G/DL
PROT UR-MCNC: 40 MG/DL
PROTEIN URINE: ABNORMAL
RBC # BLD: 3.24 M/UL
RBC # FLD: 13.5 %
RED BLOOD CELLS URINE: 0 /HPF
SODIUM SERPL-SCNC: 140 MMOL/L
SPECIFIC GRAVITY URINE: 1.02
SQUAMOUS EPITHELIAL CELLS: 10 /HPF
TACROLIMUS SERPL-MCNC: 7 NG/ML
TRIGL SERPL-MCNC: 261 MG/DL
URATE SERPL-MCNC: 5.6 MG/DL
UROBILINOGEN URINE: NORMAL
WBC # FLD AUTO: 5.12 K/UL
WHITE BLOOD CELLS URINE: 9 /HPF

## 2020-04-27 LAB
CMV DNA SPEC QL NAA+PROBE: NOT DETECTED
CMVPCR LOG: NOT DETECTED LOG10IU/ML

## 2020-04-29 ENCOUNTER — APPOINTMENT (OUTPATIENT)
Dept: NEPHROLOGY | Facility: CLINIC | Age: 75
End: 2020-04-29
Payer: MEDICARE

## 2020-04-29 PROCEDURE — 99214 OFFICE O/P EST MOD 30 MIN: CPT | Mod: 95

## 2020-04-29 NOTE — HISTORY OF PRESENT ILLNESS
[Home] : at home, [unfilled] , at the time of the visit. [Medical Office: ([facility])___] : at ~his/her~ medical office located in V [Self] : self [Patient] : the patient [FreeTextEntry1] : This visit is provided by telehealth using real time 2 way audio visual technology. This patient is located at home and the provider is located at the [facility] medical office at [address] [place] [address]. Patient and family member, grand daughter  participated in this visit.\par Verbal consent for this visit was given by patient/accompanying family member\par Total duration of this visit which included conversation, lab review, medication review and clinical assessment and advice lasted approximately 25-30 minutes.\par \par Currently:\par \par Patient feels well\par Reviewed for acute symptoms-currently has none.Reports elevated glucose at times upto 240 mg/dl the night before.\par This morning was 169 mg/dl. MOnday seeing Dr. Oglesby.\par Appetite good. Sleep ok. No diarrhea/vomiting.\par Weight: 116 Lbs. No fever. Blood pressure: 143/82 mm Hg\par Taking precautions to prevent COVID exposure, grand daughter has been tested positive.\par I reviewed current home  blood pressure and home glucose control and medications.\par On Telehealth visit:\par Patient appears comfortable\par No distress, not dyspneic\par Conscious, alert, oriented X 3\par Speech: Normal\par Ambulatory.\par Other observations -no acute signs\par Reviewed last lab data \par

## 2020-04-29 NOTE — ASSESSMENT
[FreeTextEntry1] : Clinical Impression:\par Kidney Transplant recipient, functioning allograft\par Immunosuppression- Reviewed and adjusted.\par DM Controlled.\par HTN controlled\par BK Viremia on modified regimen\par Plan:\par Immunosuppression, BKV- continue current management, still has significant viremia\par Continue current medications for DM, HTN\par additional changes as noted\par Labs and follow up visit to be scheduled as discussed.\par Discussed COVID infection prevention strategies including handwashing, social distancing and monitoring for any signs or symptoms.\par \par Labs and follow up in 4 weeks\par

## 2020-04-30 LAB — BKV DNA SPEC QL NAA+PROBE: ABNORMAL COPIES/ML

## 2020-05-11 ENCOUNTER — APPOINTMENT (OUTPATIENT)
Dept: TRANSPLANT | Facility: CLINIC | Age: 75
End: 2020-05-11

## 2020-05-11 ENCOUNTER — LABORATORY RESULT (OUTPATIENT)
Age: 75
End: 2020-05-11

## 2020-05-12 ENCOUNTER — APPOINTMENT (OUTPATIENT)
Dept: TRANSPLANT | Facility: CLINIC | Age: 75
End: 2020-05-12
Payer: MEDICARE

## 2020-05-12 LAB
ALBUMIN SERPL ELPH-MCNC: 4.1 G/DL
ALP BLD-CCNC: 39 U/L
ALT SERPL-CCNC: 8 U/L
ANION GAP SERPL CALC-SCNC: 9 MMOL/L
APPEARANCE: CLEAR
AST SERPL-CCNC: 17 U/L
BACTERIA: ABNORMAL
BASOPHILS # BLD AUTO: 0.05 K/UL
BASOPHILS NFR BLD AUTO: 1 %
BILIRUB SERPL-MCNC: 0.3 MG/DL
BILIRUBIN URINE: NEGATIVE
BLOOD URINE: NEGATIVE
BUN SERPL-MCNC: 30 MG/DL
CALCIUM SERPL-MCNC: 9.4 MG/DL
CALCIUM SERPL-MCNC: 9.4 MG/DL
CHLORIDE SERPL-SCNC: 109 MMOL/L
CHOLEST SERPL-MCNC: 152 MG/DL
CHOLEST/HDLC SERPL: 3.2 RATIO
CO2 SERPL-SCNC: 23 MMOL/L
COLOR: YELLOW
CREAT SERPL-MCNC: 1.94 MG/DL
CREAT SPEC-SCNC: 108 MG/DL
CREAT/PROT UR: 0.4 RATIO
EOSINOPHIL # BLD AUTO: 0.05 K/UL
EOSINOPHIL NFR BLD AUTO: 1 %
GLUCOSE QUALITATIVE U: NEGATIVE
GLUCOSE SERPL-MCNC: 200 MG/DL
HCT VFR BLD CALC: 29.1 %
HDLC SERPL-MCNC: 47 MG/DL
HGB BLD-MCNC: 8.8 G/DL
HYALINE CASTS: 1 /LPF
KETONES URINE: NEGATIVE
LDH SERPL-CCNC: 236 U/L
LDLC SERPL CALC-MCNC: 79 MG/DL
LEUKOCYTE ESTERASE URINE: NEGATIVE
LYMPHOCYTES # BLD AUTO: 0.55 K/UL
LYMPHOCYTES NFR BLD AUTO: 11 %
MAGNESIUM SERPL-MCNC: 2 MG/DL
MAN DIFF?: NORMAL
MCHC RBC-ENTMCNC: 28.9 PG
MCHC RBC-ENTMCNC: 30.2 GM/DL
MCV RBC AUTO: 95.7 FL
MICROSCOPIC-UA: NORMAL
MONOCYTES # BLD AUTO: 0.3 K/UL
MONOCYTES NFR BLD AUTO: 6 %
NEUTROPHILS # BLD AUTO: 3.54 K/UL
NEUTROPHILS NFR BLD AUTO: 61 %
NITRITE URINE: NEGATIVE
PARATHYROID HORMONE INTACT: 176 PG/ML
PH URINE: 6.5
PHOSPHATE SERPL-MCNC: 2.9 MG/DL
PLATELET # BLD AUTO: 126 K/UL
POTASSIUM SERPL-SCNC: 5.4 MMOL/L
PROT SERPL-MCNC: 6.4 G/DL
PROT UR-MCNC: 39 MG/DL
PROTEIN URINE: ABNORMAL
RBC # BLD: 3.04 M/UL
RBC # FLD: 14.4 %
RED BLOOD CELLS URINE: 1 /HPF
SODIUM SERPL-SCNC: 141 MMOL/L
SPECIFIC GRAVITY URINE: 1.02
SQUAMOUS EPITHELIAL CELLS: 8 /HPF
TACROLIMUS SERPL-MCNC: 5.5 NG/ML
TRIGL SERPL-MCNC: 131 MG/DL
URATE SERPL-MCNC: 5.6 MG/DL
URINE COMMENTS: NORMAL
UROBILINOGEN URINE: NORMAL
WBC # FLD AUTO: 4.98 K/UL
WHITE BLOOD CELLS URINE: 6 /HPF

## 2020-05-12 PROCEDURE — 99214 OFFICE O/P EST MOD 30 MIN: CPT | Mod: 95

## 2020-05-12 NOTE — HISTORY OF PRESENT ILLNESS
[Home] : at home, [unfilled] , at the time of the visit. [Other Location: e.g. Home (Enter Location, City,State)___] : at [unfilled] [Patient] : the patient [ Donor] :  donor [Basiliximab] : basiliximab [PHS Increased Risk] : no Public Health Service increased risk [Hepatitis C] : no hepatitis c [Brain Death] : brain death [de-identified] : doing well\par says today has abdominal pain; she thinks its related to her medications she just took\par otherwise been doing well\par no issues\par blood sugar has been mostly in 140 to 170 range; rarely up to 200\par recently with BK viremia; envarsus dose decreased and mmf stopped and she was started on leflunomide\par last creatinine 1.9 trending slowly down\par last tacro level 5.9 [TextBox_7] : 12/7/2019

## 2020-05-12 NOTE — REASON FOR VISIT
[Follow-Up] : a follow-up visit  [FreeTextEntry3] :  donor kidney transplant [FreeTextEntry5] : 12/7/2019

## 2020-05-12 NOTE — ASSESSMENT
[FreeTextEntry1] : doing well 6 months post DDKT\par BK level pending from last labs\par last tacro level 5.9; will keep dose same for now\par repeat labs in one week

## 2020-05-13 LAB — BKV DNA SPEC QL NAA+PROBE: ABNORMAL COPIES/ML

## 2020-05-18 ENCOUNTER — APPOINTMENT (OUTPATIENT)
Dept: TRANSPLANT | Facility: CLINIC | Age: 75
End: 2020-05-18

## 2020-05-18 ENCOUNTER — LABORATORY RESULT (OUTPATIENT)
Age: 75
End: 2020-05-18

## 2020-05-19 LAB
ALBUMIN SERPL ELPH-MCNC: 4.2 G/DL
ALP BLD-CCNC: 42 U/L
ALT SERPL-CCNC: 8 U/L
ANION GAP SERPL CALC-SCNC: 10 MMOL/L
APPEARANCE: CLEAR
AST SERPL-CCNC: 16 U/L
BACTERIA: ABNORMAL
BASOPHILS # BLD AUTO: 0 K/UL
BASOPHILS NFR BLD AUTO: 0 %
BILIRUB SERPL-MCNC: 0.4 MG/DL
BILIRUBIN URINE: NEGATIVE
BLOOD URINE: NEGATIVE
BUN SERPL-MCNC: 39 MG/DL
CALCIUM SERPL-MCNC: 9.4 MG/DL
CHLORIDE SERPL-SCNC: 108 MMOL/L
CO2 SERPL-SCNC: 22 MMOL/L
COLOR: YELLOW
CREAT SERPL-MCNC: 1.8 MG/DL
CREAT SPEC-SCNC: 118 MG/DL
CREAT/PROT UR: 0.3 RATIO
EOSINOPHIL # BLD AUTO: 0 K/UL
EOSINOPHIL NFR BLD AUTO: 0 %
GLUCOSE QUALITATIVE U: NEGATIVE
GLUCOSE SERPL-MCNC: 201 MG/DL
HCT VFR BLD CALC: 27.5 %
HGB BLD-MCNC: 8.3 G/DL
HYALINE CASTS: 0 /LPF
KETONES URINE: NEGATIVE
LDH SERPL-CCNC: 218 U/L
LEUKOCYTE ESTERASE URINE: NEGATIVE
LYMPHOCYTES # BLD AUTO: 0.96 K/UL
LYMPHOCYTES NFR BLD AUTO: 19 %
MAGNESIUM SERPL-MCNC: 1.7 MG/DL
MAN DIFF?: NORMAL
MCHC RBC-ENTMCNC: 29.5 PG
MCHC RBC-ENTMCNC: 30.2 GM/DL
MCV RBC AUTO: 97.9 FL
MICROSCOPIC-UA: NORMAL
MONOCYTES # BLD AUTO: 0.2 K/UL
MONOCYTES NFR BLD AUTO: 4 %
NEUTROPHILS # BLD AUTO: 3.52 K/UL
NEUTROPHILS NFR BLD AUTO: 57 %
NITRITE URINE: NEGATIVE
PH URINE: 6.5
PHOSPHATE SERPL-MCNC: 3.4 MG/DL
PLATELET # BLD AUTO: 124 K/UL
POTASSIUM SERPL-SCNC: 5 MMOL/L
PROT SERPL-MCNC: 6.4 G/DL
PROT UR-MCNC: 40 MG/DL
PROTEIN URINE: ABNORMAL
RBC # BLD: 2.81 M/UL
RBC # FLD: 14.3 %
RED BLOOD CELLS URINE: 1 /HPF
SODIUM SERPL-SCNC: 140 MMOL/L
SPECIFIC GRAVITY URINE: 1.02
SQUAMOUS EPITHELIAL CELLS: 8 /HPF
TACROLIMUS SERPL-MCNC: 5 NG/ML
URINE COMMENTS: NORMAL
UROBILINOGEN URINE: NORMAL
WBC # FLD AUTO: 5.03 K/UL
WHITE BLOOD CELLS URINE: 6 /HPF

## 2020-05-26 ENCOUNTER — APPOINTMENT (OUTPATIENT)
Dept: NEPHROLOGY | Facility: CLINIC | Age: 75
End: 2020-05-26
Payer: MEDICARE

## 2020-05-26 PROCEDURE — 99214 OFFICE O/P EST MOD 30 MIN: CPT | Mod: 95

## 2020-05-26 RX ORDER — LANCETS 28 GAUGE
EACH MISCELLANEOUS
Qty: 4 | Refills: 3 | Status: ACTIVE | COMMUNITY
Start: 2020-02-06 | End: 1900-01-01

## 2020-05-26 RX ORDER — TACROLIMUS 1 MG/1
1 TABLET, EXTENDED RELEASE ORAL
Qty: 30 | Refills: 11 | Status: DISCONTINUED | COMMUNITY
Start: 2019-12-09 | End: 2020-05-26

## 2020-05-26 RX ORDER — ASCORBIC ACID 500 MG
500 TABLET ORAL DAILY
Qty: 14 | Refills: 0 | Status: DISCONTINUED | COMMUNITY
Start: 2020-04-01 | End: 2020-05-26

## 2020-05-26 RX ORDER — LANCETS 33 GAUGE
EACH MISCELLANEOUS
Qty: 100 | Refills: 11 | Status: DISCONTINUED | COMMUNITY
Start: 2020-02-27 | End: 2020-05-26

## 2020-05-26 RX ORDER — BLOOD SUGAR DIAGNOSTIC
STRIP MISCELLANEOUS
Qty: 100 | Refills: 3 | Status: DISCONTINUED | COMMUNITY
Start: 2020-02-26 | End: 2020-05-26

## 2020-05-26 NOTE — ASSESSMENT
[FreeTextEntry1] : Clinical Impression:\par Kidney Transplant recipient, functioning allograft\par Immunosuppression- Reviewed and adjusted.\par DM Controlled. Discussed hypoglycemia precautions and consistent carbohydrate diet\par HTN controlled\par BK Viremia on modified regimen\par Anemia - On procrit.\par Plan:\par Immunosuppression, BKV- continue current management, still has significant viremia\par Continue current medications for DM, HTN\par additional changes as noted\par Labs and follow up visit to be scheduled as discussed.\par Discussed COVID infection prevention strategies including handwashing, social distancing and monitoring for any signs or symptoms.\par \par Labs and follow up in 4 weeks\par

## 2020-05-26 NOTE — HISTORY OF PRESENT ILLNESS
[FreeTextEntry1] : This visit is provided by telehealth using real time 2 way audio visual technology. This patient is located at home and the provider is located at the [facility] medical office at [address] [place] [address]. Patient and family member, grand daughter  participated in this visit.\par Verbal consent for this visit was given by patient/accompanying family member\par Total duration of this visit which included conversation, lab review, medication review and clinical assessment and advice lasted approximately 25-30 minutes.\par \par Currently:\par \par Patient feels well\par Reviewed for acute symptoms-currently has none.Reports she had epigastric discomfort over the weekend, which resolved after taking Gas-X. Has been taking daily vitamin C tablets.\par \par This morning was 67 mg/dl. Has appointment to see primary MD next week.\par Appetite good. Sleep ok. No diarrhea/vomiting.\par No fever. Blood pressure:138 -164/81- 87 mm Hg\par Taking precautions to prevent COVID exposure, grand daughter who had been tested positive, has fully recovered.\par I reviewed current home  blood pressure and home glucose control and medications.\par \par On Telehealth visit:\par Patient appears comfortable\par No distress, not dyspneic\par Conscious, alert, oriented X 3\par Speech: Normal\par Ambulatory.\par Other observations -no acute signs\par Reviewed last lab data , noted Hb 8.2, creatinine 1.8 Tac level noted.\par Medications reviewed. Has been restarted on Procrit injections weekly from last Friday, taking Procrit injections at home , given by daughter Giselle.\par  [Home] : at home, [unfilled] , at the time of the visit. [Medical Office: ([facility])___] : at ~his/her~ medical office located in [place] [Self] : self [Patient] : the patient

## 2020-06-01 ENCOUNTER — APPOINTMENT (OUTPATIENT)
Dept: INTERNAL MEDICINE | Facility: CLINIC | Age: 75
End: 2020-06-01

## 2020-06-01 ENCOUNTER — APPOINTMENT (OUTPATIENT)
Dept: INTERNAL MEDICINE | Facility: CLINIC | Age: 75
End: 2020-06-01
Payer: MEDICARE

## 2020-06-01 VITALS
SYSTOLIC BLOOD PRESSURE: 142 MMHG | HEART RATE: 84 BPM | BODY MASS INDEX: 19.99 KG/M2 | WEIGHT: 120 LBS | HEIGHT: 65 IN | OXYGEN SATURATION: 98 % | DIASTOLIC BLOOD PRESSURE: 74 MMHG

## 2020-06-01 DIAGNOSIS — E11.9 TYPE 2 DIABETES MELLITUS W/OUT COMPLICATIONS: ICD-10-CM

## 2020-06-01 DIAGNOSIS — D64.9 ANEMIA, UNSPECIFIED: ICD-10-CM

## 2020-06-01 PROCEDURE — 99215 OFFICE O/P EST HI 40 MIN: CPT

## 2020-06-01 RX ORDER — PEN NEEDLE, DIABETIC 31 G X1/4"
32G X 4 MM NEEDLE, DISPOSABLE MISCELLANEOUS
Qty: 100 | Refills: 0 | Status: ACTIVE | COMMUNITY
Start: 2020-01-14

## 2020-06-01 RX ORDER — PEN NEEDLE, DIABETIC 31 G X1/4"
31G X 8 MM NEEDLE, DISPOSABLE MISCELLANEOUS
Qty: 200 | Refills: 0 | Status: ACTIVE | COMMUNITY
Start: 2020-04-07

## 2020-06-03 RX ORDER — ASPIRIN ENTERIC COATED TABLETS 81 MG 81 MG/1
81 TABLET, DELAYED RELEASE ORAL DAILY
Qty: 90 | Refills: 3 | Status: ACTIVE | COMMUNITY
Start: 1900-01-01 | End: 1900-01-01

## 2020-06-30 ENCOUNTER — APPOINTMENT (OUTPATIENT)
Dept: TRANSPLANT | Facility: CLINIC | Age: 75
End: 2020-06-30

## 2020-06-30 ENCOUNTER — LABORATORY RESULT (OUTPATIENT)
Age: 75
End: 2020-06-30

## 2020-07-01 LAB
ALBUMIN SERPL ELPH-MCNC: 4.5 G/DL
ALP BLD-CCNC: 44 U/L
ALT SERPL-CCNC: 9 U/L
ANION GAP SERPL CALC-SCNC: 11 MMOL/L
APPEARANCE: CLEAR
AST SERPL-CCNC: 19 U/L
BACTERIA: NEGATIVE
BASOPHILS # BLD AUTO: 0.05 K/UL
BASOPHILS NFR BLD AUTO: 0.9 %
BILIRUB SERPL-MCNC: 0.3 MG/DL
BILIRUBIN URINE: NEGATIVE
BLOOD URINE: NEGATIVE
BUN SERPL-MCNC: 45 MG/DL
CALCIUM SERPL-MCNC: 9.8 MG/DL
CHLORIDE SERPL-SCNC: 108 MMOL/L
CO2 SERPL-SCNC: 20 MMOL/L
COLOR: NORMAL
CREAT SERPL-MCNC: 1.96 MG/DL
CREAT SPEC-SCNC: 69 MG/DL
CREAT/PROT UR: 0.2 RATIO
EOSINOPHIL # BLD AUTO: 0.28 K/UL
EOSINOPHIL NFR BLD AUTO: 4.6 %
GLUCOSE QUALITATIVE U: NEGATIVE
GLUCOSE SERPL-MCNC: 166 MG/DL
HCT VFR BLD CALC: 37.5 %
HGB BLD-MCNC: 11.2 G/DL
HYALINE CASTS: 0 /LPF
KETONES URINE: NEGATIVE
LDH SERPL-CCNC: 252 U/L
LEUKOCYTE ESTERASE URINE: NEGATIVE
LYMPHOCYTES # BLD AUTO: 1.29 K/UL
LYMPHOCYTES NFR BLD AUTO: 21.3 %
MAGNESIUM SERPL-MCNC: 1.7 MG/DL
MAN DIFF?: NORMAL
MCHC RBC-ENTMCNC: 29.9 GM/DL
MCHC RBC-ENTMCNC: 30 PG
MCV RBC AUTO: 100.5 FL
MICROSCOPIC-UA: NORMAL
MONOCYTES # BLD AUTO: 0.22 K/UL
MONOCYTES NFR BLD AUTO: 3.7 %
NEUTROPHILS # BLD AUTO: 3.76 K/UL
NEUTROPHILS NFR BLD AUTO: 62 %
NITRITE URINE: NEGATIVE
PH URINE: 6.5
PHOSPHATE SERPL-MCNC: 3 MG/DL
PLATELET # BLD AUTO: 106 K/UL
POTASSIUM SERPL-SCNC: 5.2 MMOL/L
PROT SERPL-MCNC: 6.6 G/DL
PROT UR-MCNC: 16 MG/DL
PROTEIN URINE: NORMAL
RBC # BLD: 3.73 M/UL
RBC # FLD: 12.7 %
RED BLOOD CELLS URINE: 0 /HPF
SODIUM SERPL-SCNC: 140 MMOL/L
SPECIFIC GRAVITY URINE: 1.01
SQUAMOUS EPITHELIAL CELLS: 4 /HPF
TACROLIMUS SERPL-MCNC: 8.6 NG/ML
URATE SERPL-MCNC: 4.9 MG/DL
UROBILINOGEN URINE: NORMAL
WBC # FLD AUTO: 6.07 K/UL
WHITE BLOOD CELLS URINE: 1 /HPF

## 2020-07-02 LAB
CMV DNA SPEC QL NAA+PROBE: NOT DETECTED
CMVPCR LOG: NOT DETECTED LOG10IU/ML

## 2020-07-03 LAB — BKV DNA SPEC QL NAA+PROBE: ABNORMAL COPIES/ML

## 2020-07-06 RX ORDER — VALGANCICLOVIR HYDROCHLORIDE 450 MG/1
450 TABLET ORAL
Refills: 5 | Status: DISCONTINUED | COMMUNITY
Start: 2019-12-09 | End: 2020-07-06

## 2020-07-08 ENCOUNTER — OUTPATIENT (OUTPATIENT)
Dept: OUTPATIENT SERVICES | Facility: HOSPITAL | Age: 75
LOS: 1 days | Discharge: HOME | End: 2020-07-08

## 2020-07-08 DIAGNOSIS — E27.9 DISORDER OF ADRENAL GLAND, UNSPECIFIED: Chronic | ICD-10-CM

## 2020-07-08 DIAGNOSIS — Z87.81 PERSONAL HISTORY OF (HEALED) TRAUMATIC FRACTURE: Chronic | ICD-10-CM

## 2020-07-08 DIAGNOSIS — D69.6 THROMBOCYTOPENIA, UNSPECIFIED: ICD-10-CM

## 2020-07-08 DIAGNOSIS — Z98.890 OTHER SPECIFIED POSTPROCEDURAL STATES: Chronic | ICD-10-CM

## 2020-07-08 DIAGNOSIS — Z90.710 ACQUIRED ABSENCE OF BOTH CERVIX AND UTERUS: Chronic | ICD-10-CM

## 2020-07-09 ENCOUNTER — APPOINTMENT (OUTPATIENT)
Dept: HEMATOLOGY ONCOLOGY | Facility: CLINIC | Age: 75
End: 2020-07-09
Payer: MEDICARE

## 2020-07-09 PROCEDURE — 99213 OFFICE O/P EST LOW 20 MIN: CPT | Mod: 95

## 2020-07-09 NOTE — CONSULT LETTER
[Dear  ___] : Dear  [unfilled], [Please see my note below.] : Please see my note below. [Courtesy Letter:] : I had the pleasure of seeing your patient, [unfilled], in my office today. [Consult Closing:] : Thank you very much for allowing me to participate in the care of this patient.  If you have any questions, please do not hesitate to contact me. [Sincerely,] : Sincerely, [DrTanya  ___] : Dr. FARMER [___] : [unfilled] [DrTanya ___] : Dr. FARMER

## 2020-07-09 NOTE — REVIEW OF SYSTEMS
[Joint Pain] : joint pain [Negative] : Allergic/Immunologic [Chills] : no chills [Fever] : no fever [Night Sweats] : no night sweats [Fatigue] : no fatigue [Eye Pain] : no eye pain [Recent Change In Weight] : ~T no recent weight change [Red Eyes] : eyes not red [Dry Eyes] : no dryness of the eyes [Dysphagia] : no dysphagia [Nosebleeds] : no nosebleeds [Loss of Hearing] : no loss of hearing [Hoarseness] : no hoarseness [Odynophagia] : no odynophagia [Chest Pain] : no chest pain [Palpitations] : no palpitations [Mucosal Pain] : no mucosal pain [Lower Ext Edema] : no lower extremity edema [Vomiting] : no vomiting [Constipation] : no constipation [Diarrhea] : no diarrhea [Dysuria] : no dysuria [Incontinence] : no incontinence [Vaginal Discharge] : no vaginal discharge [Dysmenorrhea/Abn Vaginal Bleeding] : no dysmenorrhea/abnormal vaginal bleeding [Joint Stiffness] : no joint stiffness [Skin Rash] : no skin rash [Muscle Pain] : no muscle pain [Skin Wound] : no skin wound [Confused] : no confusion [Dizziness] : no dizziness [Fainting] : no fainting [Difficulty Walking] : no difficulty walking [Insomnia] : no insomnia [Hot Flashes] : no hot flashes [Depression] : no depression [Anxiety] : no anxiety [Easy Bleeding] : no tendency for easy bleeding [Easy Bruising] : no tendency for easy bruising [FreeTextEntry8] : on HD on Mon/Wed/Fri; anuric. No UTI's. PAP smear Feb 2019. Mammogram to be done on 4/2/19 [FreeTextEntry7] : colonoscopy on 3/13/19 -polyps. EGD on 3/13/19 -erosion [Swollen Glands] : no swollen glands [FreeTextEntry9] : chronic back pain

## 2020-07-09 NOTE — HISTORY OF PRESENT ILLNESS
[Home] : at home, [unfilled] , at the time of the visit. [Medical Office: ([facility])___] : at the medical office located in  [de-identified] : Ms. Adam was referred to my office for abnormal blood counts, needing hematological clearance prior to renal transplant. She had blood work done as part of her monthly blood at hemodialysis -on 3/5/19 wbc 3.6 Hb 11.7 plt 82. She was referred for leukopenia and thrombocytopenia. According to the patient and her daughter, she has not had abnormal counts in the past; she denied bleeding/bruising. They both recalled that at the time the blood was drawn, the patient was 'getting over the flu' -she had fevers and cough. At this time, she feels well.  [de-identified] : The patient is being followed up for MGUS, cleared for renal transplant at last visit in Nov 2019. She received the renal transplant on 12/7/19 -from a cadaveric donor. She is on immunosuppression with Tacrolimus/Cellcept as well as low dose Prednisone. Patient seen via Telehealth today in order to minimize risk of jailyn COVID-19 infection. Verbal consent given on 7/9/20 at 11:54 by patient. She stays home with her granddaughter -granddaughter had loss of smell/taste in March 2020, COVID+. Patient herself did not go out. She had blood work on 6/30/20 -Hb 11.2\par \par \par \par \par

## 2020-07-09 NOTE — ASSESSMENT
[FreeTextEntry1] : 74 yo F with multiple medical problems including CRI on hemodialysis from PCKD, hx breast cancer in remission\par BM bx done on 7/22/19 mild plasmacytosis (5-9% cells) c/w MGUS\par Pt received cadaveric renal transplant on 12/7/19, now on immunosuppressive drugs\par \par -visit done today via telemedicine ([facility]) to minimize risk of COVID19 transmission. Patient did not feel sick, but her granddaughter had COVID with mild symptoms. Last kappa free light chains checked on 2/11/20 -reviewed results, on lower than prior testing. Kidney function being monitored by nephrology and it is stable.  Cont Aranesp or Procrit as needed to goal Hb of 10g/dl -Hb has been stable\par \par -discussed with patient signs/symptoms of COVID-19 infection and preventative measures\par \par -repeat blood work for SPEP/YAEL/Ig's/free light chains -to be done in November 2020\par \par -follow up in November

## 2020-07-09 NOTE — RESULTS/DATA
[FreeTextEntry1] : \par \par On 2/11/20) wbc 3.6 hb 8.4 plt 136 \par ON 11/5/19) wbc 5.8 Hb 10.8 plt 161 ANC 3700\par  ON 8/5/19) wbc 3.2 hb 12.1 plt 129 ANC 1300\par On 6/26/19) wbc 3.3 Hb 8.8 plt 143 ANC 1600\par On 3/26/19) wbc 4.4 with normal diff,  hb 9.6 plt 169\par On 3/6/19 wbc 3.1 hb 10.7 plt 74\par On 3/5/19 wbc 3.6 Hb 11.7 plt 82\par On 2/5/19 wbc 4.8 hb 12.2 plt 158\par On 11/5/18 wbc 4.2 Hb 11.5 plt 220\par \par PATHOLOGY\par On 7/22/19 BM bx \par \par Final Diagnosis\par 1, 2. Bone marrow biopsy and bone marrow aspirate\par - Normocellular bone marrow with erythroid-predominant\par trilineage hematopoiesis and maturation, increased iron stores\par - Mild plasmacytosis (5-9% of cells) with subset kappa-light\par chain restricted\par \par \par

## 2020-07-28 ENCOUNTER — APPOINTMENT (OUTPATIENT)
Dept: INTERNAL MEDICINE | Facility: CLINIC | Age: 75
End: 2020-07-28
Payer: MEDICARE

## 2020-07-28 VITALS — SYSTOLIC BLOOD PRESSURE: 146 MMHG | DIASTOLIC BLOOD PRESSURE: 82 MMHG | HEART RATE: 81 BPM

## 2020-07-28 PROCEDURE — 99214 OFFICE O/P EST MOD 30 MIN: CPT | Mod: 95

## 2020-08-06 ENCOUNTER — APPOINTMENT (OUTPATIENT)
Dept: ULTRASOUND IMAGING | Facility: CLINIC | Age: 75
End: 2020-08-06
Payer: MEDICARE

## 2020-08-06 ENCOUNTER — RESULT REVIEW (OUTPATIENT)
Age: 75
End: 2020-08-06

## 2020-08-06 ENCOUNTER — OUTPATIENT (OUTPATIENT)
Dept: OUTPATIENT SERVICES | Facility: HOSPITAL | Age: 75
LOS: 1 days | End: 2020-08-06
Payer: MEDICARE

## 2020-08-06 DIAGNOSIS — Z98.890 OTHER SPECIFIED POSTPROCEDURAL STATES: Chronic | ICD-10-CM

## 2020-08-06 DIAGNOSIS — Z90.710 ACQUIRED ABSENCE OF BOTH CERVIX AND UTERUS: Chronic | ICD-10-CM

## 2020-08-06 DIAGNOSIS — Z87.81 PERSONAL HISTORY OF (HEALED) TRAUMATIC FRACTURE: Chronic | ICD-10-CM

## 2020-08-06 DIAGNOSIS — E27.9 DISORDER OF ADRENAL GLAND, UNSPECIFIED: Chronic | ICD-10-CM

## 2020-08-06 DIAGNOSIS — E04.9 NONTOXIC GOITER, UNSPECIFIED: ICD-10-CM

## 2020-08-06 PROCEDURE — 76536 US EXAM OF HEAD AND NECK: CPT | Mod: 26

## 2020-08-06 PROCEDURE — 76536 US EXAM OF HEAD AND NECK: CPT

## 2020-08-07 NOTE — PROGRESS NOTE ADULT - PROVIDER SPECIALTY LIST ADULT
SICU Full Thickness Lip Wedge Repair (Flap) Text: Given the location of the defect and the proximity to free margins a full thickness wedge repair was deemed most appropriate.  Using a sterile surgical marker, the appropriate repair was drawn incorporating the defect and placing the expected incisions perpendicular to the vermilion border.  The vermilion border was also meticulously outlined to ensure appropriate reapproximation during the repair.  The area thus outlined was incised through and through with a #15 scalpel blade.  The muscularis and dermis were reaproximated with deep sutures following hemostasis. Care was taken to realign the vermilion border before proceeding with the superficial closure.  Once the vermilion was realigned the superfical and mucosal closure was finished.

## 2020-08-08 ENCOUNTER — APPOINTMENT (OUTPATIENT)
Dept: MRI IMAGING | Facility: CLINIC | Age: 75
End: 2020-08-08
Payer: MEDICARE

## 2020-08-08 ENCOUNTER — OUTPATIENT (OUTPATIENT)
Dept: OUTPATIENT SERVICES | Facility: HOSPITAL | Age: 75
LOS: 1 days | End: 2020-08-08
Payer: MEDICARE

## 2020-08-08 DIAGNOSIS — Z87.81 PERSONAL HISTORY OF (HEALED) TRAUMATIC FRACTURE: Chronic | ICD-10-CM

## 2020-08-08 DIAGNOSIS — K86.2 CYST OF PANCREAS: ICD-10-CM

## 2020-08-08 DIAGNOSIS — E27.9 DISORDER OF ADRENAL GLAND, UNSPECIFIED: Chronic | ICD-10-CM

## 2020-08-08 DIAGNOSIS — Z00.8 ENCOUNTER FOR OTHER GENERAL EXAMINATION: ICD-10-CM

## 2020-08-08 DIAGNOSIS — Z90.710 ACQUIRED ABSENCE OF BOTH CERVIX AND UTERUS: Chronic | ICD-10-CM

## 2020-08-08 DIAGNOSIS — Z98.890 OTHER SPECIFIED POSTPROCEDURAL STATES: Chronic | ICD-10-CM

## 2020-08-08 DIAGNOSIS — K83.8 OTHER SPECIFIED DISEASES OF BILIARY TRACT: ICD-10-CM

## 2020-08-08 PROCEDURE — 74181 MRI ABDOMEN W/O CONTRAST: CPT

## 2020-08-08 PROCEDURE — 74181 MRI ABDOMEN W/O CONTRAST: CPT | Mod: 26

## 2020-08-10 ENCOUNTER — APPOINTMENT (OUTPATIENT)
Dept: ENDOCRINOLOGY | Facility: CLINIC | Age: 75
End: 2020-08-10

## 2020-09-03 ENCOUNTER — OUTPATIENT (OUTPATIENT)
Dept: OUTPATIENT SERVICES | Facility: HOSPITAL | Age: 75
LOS: 1 days | End: 2020-09-03
Payer: MEDICARE

## 2020-09-03 ENCOUNTER — RESULT REVIEW (OUTPATIENT)
Age: 75
End: 2020-09-03

## 2020-09-03 ENCOUNTER — TRANSCRIPTION ENCOUNTER (OUTPATIENT)
Age: 75
End: 2020-09-03

## 2020-09-03 ENCOUNTER — APPOINTMENT (OUTPATIENT)
Dept: ULTRASOUND IMAGING | Facility: CLINIC | Age: 75
End: 2020-09-03
Payer: MEDICARE

## 2020-09-03 ENCOUNTER — APPOINTMENT (OUTPATIENT)
Dept: MAMMOGRAPHY | Facility: CLINIC | Age: 75
End: 2020-09-03
Payer: MEDICARE

## 2020-09-03 DIAGNOSIS — Z12.31 ENCOUNTER FOR SCREENING MAMMOGRAM FOR MALIGNANT NEOPLASM OF BREAST: ICD-10-CM

## 2020-09-03 DIAGNOSIS — Z98.890 OTHER SPECIFIED POSTPROCEDURAL STATES: Chronic | ICD-10-CM

## 2020-09-03 DIAGNOSIS — Z90.710 ACQUIRED ABSENCE OF BOTH CERVIX AND UTERUS: Chronic | ICD-10-CM

## 2020-09-03 DIAGNOSIS — E27.9 DISORDER OF ADRENAL GLAND, UNSPECIFIED: Chronic | ICD-10-CM

## 2020-09-03 DIAGNOSIS — Z87.81 PERSONAL HISTORY OF (HEALED) TRAUMATIC FRACTURE: Chronic | ICD-10-CM

## 2020-09-03 PROCEDURE — G0279: CPT

## 2020-09-03 PROCEDURE — 77066 DX MAMMO INCL CAD BI: CPT

## 2020-09-03 PROCEDURE — 77066 DX MAMMO INCL CAD BI: CPT | Mod: 26

## 2020-09-03 PROCEDURE — G0279: CPT | Mod: 26

## 2020-09-30 ENCOUNTER — LABORATORY RESULT (OUTPATIENT)
Age: 75
End: 2020-09-30

## 2020-10-05 ENCOUNTER — TRANSCRIPTION ENCOUNTER (OUTPATIENT)
Age: 75
End: 2020-10-05

## 2020-10-05 LAB
ALBUMIN SERPL ELPH-MCNC: 4.8 G/DL
ALP BLD-CCNC: 39 U/L
ALT SERPL-CCNC: 13 U/L
ANION GAP SERPL CALC-SCNC: 17 MMOL/L
AST SERPL-CCNC: 22 U/L
BASOPHILS # BLD AUTO: 0 K/UL
BASOPHILS NFR BLD AUTO: 0 %
BILIRUB SERPL-MCNC: 0.5 MG/DL
BUN SERPL-MCNC: 38 MG/DL
CALCIUM SERPL-MCNC: 10.1 MG/DL
CHLORIDE SERPL-SCNC: 104 MMOL/L
CO2 SERPL-SCNC: 20 MMOL/L
CREAT SERPL-MCNC: 1.87 MG/DL
EOSINOPHIL # BLD AUTO: 0 K/UL
EOSINOPHIL NFR BLD AUTO: 0 %
ESTIMATED AVERAGE GLUCOSE: 108 MG/DL
GLUCOSE SERPL-MCNC: 138 MG/DL
HBA1C MFR BLD HPLC: 5.4 %
HCT VFR BLD CALC: 31.1 %
HGB BLD-MCNC: 9.5 G/DL
LYMPHOCYTES # BLD AUTO: 2.09 K/UL
LYMPHOCYTES NFR BLD AUTO: 28.1 %
MAN DIFF?: NORMAL
MCHC RBC-ENTMCNC: 30.5 GM/DL
MCHC RBC-ENTMCNC: 30.7 PG
MCV RBC AUTO: 100.6 FL
MONOCYTES # BLD AUTO: 0.65 K/UL
MONOCYTES NFR BLD AUTO: 8.8 %
NEUTROPHILS # BLD AUTO: 3.9 K/UL
NEUTROPHILS NFR BLD AUTO: 51.7 %
PLATELET # BLD AUTO: 138 K/UL
POTASSIUM SERPL-SCNC: 5.3 MMOL/L
PROT SERPL-MCNC: 7.2 G/DL
RBC # BLD: 3.09 M/UL
RBC # FLD: 13.4 %
SODIUM SERPL-SCNC: 141 MMOL/L
WBC # FLD AUTO: 7.42 K/UL

## 2020-10-14 ENCOUNTER — APPOINTMENT (OUTPATIENT)
Dept: NEPHROLOGY | Facility: CLINIC | Age: 75
End: 2020-10-14
Payer: MEDICARE

## 2020-10-14 ENCOUNTER — LABORATORY RESULT (OUTPATIENT)
Age: 75
End: 2020-10-14

## 2020-10-14 VITALS
WEIGHT: 118 LBS | BODY MASS INDEX: 19.66 KG/M2 | OXYGEN SATURATION: 95 % | RESPIRATION RATE: 15 BRPM | SYSTOLIC BLOOD PRESSURE: 158 MMHG | DIASTOLIC BLOOD PRESSURE: 94 MMHG | TEMPERATURE: 97.6 F | HEIGHT: 65 IN | HEART RATE: 81 BPM

## 2020-10-14 VITALS — SYSTOLIC BLOOD PRESSURE: 136 MMHG | DIASTOLIC BLOOD PRESSURE: 60 MMHG

## 2020-10-14 PROCEDURE — 99214 OFFICE O/P EST MOD 30 MIN: CPT

## 2020-10-14 NOTE — REASON FOR VISIT
[Family Member] : family member [Follow-Up] : a follow-up visit [FreeTextEntry1] : Post transplant follow up no acute symptoms

## 2020-10-14 NOTE — PHYSICAL EXAM
[General Appearance - Alert] : alert [General Appearance - In No Acute Distress] : in no acute distress [Sclera] : the sclera and conjunctiva were normal [PERRL With Normal Accommodation] : pupils were equal in size, round, and reactive to light [Extraocular Movements] : extraocular movements were intact [Oropharynx] : the oropharynx was normal [Outer Ear] : the ears and nose were normal in appearance [Neck Appearance] : the appearance of the neck was normal [Jugular Venous Distention Increased] : there was no jugular-venous distention [Neck Cervical Mass (___cm)] : no neck mass was observed [Thyroid Nodule] : there were no palpable thyroid nodules [Heart Rate And Rhythm] : heart rate was normal and rhythm regular [Auscultation Breath Sounds / Voice Sounds] : lungs were clear to auscultation bilaterally [Murmurs] : no murmurs [Heart Sounds Gallop] : no gallops [Heart Sounds] : normal S1 and S2 [Full Pulse] : the pedal pulses are present [Edema] : there was no peripheral edema [Heart Sounds Pericardial Friction Rub] : no pericardial rub [Abdomen Soft] : soft [Bowel Sounds] : normal bowel sounds [Abdomen Tenderness] : non-tender [Cervical Lymph Nodes Enlarged Anterior Bilaterally] : anterior cervical [Cervical Lymph Nodes Enlarged Posterior Bilaterally] : posterior cervical [Supraclavicular Lymph Nodes Enlarged Bilaterally] : supraclavicular [Axillary Lymph Nodes Enlarged Bilaterally] : axillary [Inguinal Lymph Nodes Enlarged Bilaterally] : inguinal [Involuntary Movements] : no involuntary movements were seen [___ (cm) Fistula] : [unfilled] (cm) fistula [Bruit] : a bruit was present [Thrill] : a thrill was present [] : no rash [No Focal Deficits] : no focal deficits [Impaired Insight] : insight and judgment were intact [Affect] : the affect was normal [Oriented To Time, Place, And Person] : oriented to person, place, and time [FreeTextEntry1] : tremors+

## 2020-10-14 NOTE — HISTORY OF PRESENT ILLNESS
[FreeTextEntry1] : Received DDRT on 12/7/2019, has no acute symptoms.  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 \par Lives with Two grand daughters Beatriz. Beatriz is moving to [place].\par Accompanied by Althea, grand daughter today.\par Feels tired otherwise ok\par \par \par

## 2020-10-14 NOTE — ASSESSMENT
[FreeTextEntry1] : Renal Transplant recipient: Had delayed allograft function, elevated creatinine at discharge. Has urinary frequency and nocturia. No dysuria/hematuria. No fever/chills. Tolerating medications.\par Labs from September stable.\par Immunosuppression: reviewed; simulect induction, on tac/MMF/prednisone, last Tac level noted; will recheck. Currently on Envarsus  8 mg daily.; Leflunomide 40 mg/day and prednisone at 5 mg daily\par Target Tac level 4-6 ng/ml in view of BK viremia\par Hypertension:  Reviewed medications.  On clonidine and nifedipine\par DM: on Tradjenta and prandin. Reports no hypoglycemia. Will continue to monitor glucose on Tradjenta and Prandin. \par Infection prophylaxis: On Bactrim, Valcyte and nystatin as well as GI prophylaxis. \par Discussed ambulation,   optimal glucose and blood pressure readings, adherence with medications and follow ups, follow up clinic visit schedule, avoiding dehydration, mosquito bites; prevention of DVT as well as food safety.\par Anemia: Will follow labs.\par Renal Preservation Strategies and infection prevention strategies reviewed with patient.\par She will take Flu shot from primary MD\par \par

## 2020-10-15 LAB
25(OH)D3 SERPL-MCNC: 52.9 NG/ML
ALBUMIN SERPL ELPH-MCNC: 4.3 G/DL
ALP BLD-CCNC: 38 U/L
ALT SERPL-CCNC: 12 U/L
ANION GAP SERPL CALC-SCNC: 11 MMOL/L
APPEARANCE: CLEAR
AST SERPL-CCNC: 17 U/L
BACTERIA: ABNORMAL
BASOPHILS # BLD AUTO: 0 K/UL
BASOPHILS NFR BLD AUTO: 0 %
BILIRUB SERPL-MCNC: 0.4 MG/DL
BILIRUBIN URINE: NEGATIVE
BLOOD URINE: NEGATIVE
BUN SERPL-MCNC: 41 MG/DL
CALCIUM SERPL-MCNC: 9.9 MG/DL
CALCIUM SERPL-MCNC: 9.9 MG/DL
CHLORIDE SERPL-SCNC: 103 MMOL/L
CHOLEST SERPL-MCNC: 181 MG/DL
CHOLEST/HDLC SERPL: 3.3 RATIO
CO2 SERPL-SCNC: 22 MMOL/L
COLOR: YELLOW
CREAT SERPL-MCNC: 1.98 MG/DL
CREAT SPEC-SCNC: 90 MG/DL
CREAT/PROT UR: 0.4 RATIO
EOSINOPHIL # BLD AUTO: 0.16 K/UL
EOSINOPHIL NFR BLD AUTO: 2.7 %
ESTIMATED AVERAGE GLUCOSE: 120 MG/DL
GLUCOSE QUALITATIVE U: NORMAL
GLUCOSE SERPL-MCNC: 277 MG/DL
HBA1C MFR BLD HPLC: 5.8 %
HCT VFR BLD CALC: 27.2 %
HDLC SERPL-MCNC: 55 MG/DL
HGB BLD-MCNC: 8.5 G/DL
HYALINE CASTS: 1 /LPF
KETONES URINE: NEGATIVE
LDH SERPL-CCNC: 213 U/L
LDLC SERPL CALC-MCNC: 89 MG/DL
LEUKOCYTE ESTERASE URINE: ABNORMAL
LYMPHOCYTES # BLD AUTO: 0.99 K/UL
LYMPHOCYTES NFR BLD AUTO: 16.8 %
MAGNESIUM SERPL-MCNC: 1.9 MG/DL
MAN DIFF?: NORMAL
MCHC RBC-ENTMCNC: 31.3 GM/DL
MCHC RBC-ENTMCNC: 31.4 PG
MCV RBC AUTO: 100.4 FL
MICROSCOPIC-UA: NORMAL
MONOCYTES # BLD AUTO: 0.31 K/UL
MONOCYTES NFR BLD AUTO: 5.3 %
NEUTROPHILS # BLD AUTO: 3.5 K/UL
NEUTROPHILS NFR BLD AUTO: 59.3 %
NITRITE URINE: NEGATIVE
PARATHYROID HORMONE INTACT: 100 PG/ML
PH URINE: 6
PHOSPHATE SERPL-MCNC: 3.1 MG/DL
PLATELET # BLD AUTO: 120 K/UL
POTASSIUM SERPL-SCNC: 4.9 MMOL/L
PROT SERPL-MCNC: 6.5 G/DL
PROT UR-MCNC: 32 MG/DL
PROTEIN URINE: ABNORMAL
RBC # BLD: 2.71 M/UL
RBC # FLD: 13.7 %
RED BLOOD CELLS URINE: 0 /HPF
SODIUM SERPL-SCNC: 135 MMOL/L
SPECIFIC GRAVITY URINE: 1.02
SQUAMOUS EPITHELIAL CELLS: 6 /HPF
TACROLIMUS SERPL-MCNC: 18 NG/ML
TRIGL SERPL-MCNC: 184 MG/DL
URATE SERPL-MCNC: 5.6 MG/DL
UROBILINOGEN URINE: NORMAL
WBC # FLD AUTO: 5.91 K/UL
WHITE BLOOD CELLS URINE: 12 /HPF

## 2020-10-16 LAB
BKV DNA SPEC QL NAA+PROBE: 900 COPIES/ML
CMV DNA SPEC QL NAA+PROBE: NOT DETECTED IU/ML

## 2020-10-20 ENCOUNTER — APPOINTMENT (OUTPATIENT)
Dept: ENDOCRINOLOGY | Facility: CLINIC | Age: 75
End: 2020-10-20
Payer: MEDICARE

## 2020-10-20 PROCEDURE — 95251 CONT GLUC MNTR ANALYSIS I&R: CPT

## 2020-10-20 PROCEDURE — 95250 CONT GLUC MNTR PHYS/QHP EQP: CPT

## 2020-10-20 PROCEDURE — G0108 DIAB MANAGE TRN  PER INDIV: CPT

## 2020-10-22 ENCOUNTER — OUTPATIENT (OUTPATIENT)
Dept: OUTPATIENT SERVICES | Facility: HOSPITAL | Age: 75
LOS: 1 days | Discharge: HOME | End: 2020-10-22

## 2020-10-22 DIAGNOSIS — E27.9 DISORDER OF ADRENAL GLAND, UNSPECIFIED: Chronic | ICD-10-CM

## 2020-10-22 DIAGNOSIS — Z98.890 OTHER SPECIFIED POSTPROCEDURAL STATES: Chronic | ICD-10-CM

## 2020-10-22 DIAGNOSIS — Z87.81 PERSONAL HISTORY OF (HEALED) TRAUMATIC FRACTURE: Chronic | ICD-10-CM

## 2020-10-22 DIAGNOSIS — Z90.710 ACQUIRED ABSENCE OF BOTH CERVIX AND UTERUS: Chronic | ICD-10-CM

## 2020-10-22 DIAGNOSIS — D69.6 THROMBOCYTOPENIA, UNSPECIFIED: ICD-10-CM

## 2020-10-27 ENCOUNTER — RESULT REVIEW (OUTPATIENT)
Age: 75
End: 2020-10-27

## 2020-10-27 ENCOUNTER — APPOINTMENT (OUTPATIENT)
Dept: HEMATOLOGY ONCOLOGY | Facility: CLINIC | Age: 75
End: 2020-10-27
Payer: MEDICARE

## 2020-10-27 VITALS
HEART RATE: 79 BPM | RESPIRATION RATE: 16 BRPM | OXYGEN SATURATION: 98 % | DIASTOLIC BLOOD PRESSURE: 75 MMHG | BODY MASS INDEX: 19.98 KG/M2 | HEIGHT: 64.96 IN | SYSTOLIC BLOOD PRESSURE: 163 MMHG | WEIGHT: 119.93 LBS | TEMPERATURE: 96.6 F

## 2020-10-27 LAB
BASOPHILS # BLD AUTO: 0 K/UL — SIGNIFICANT CHANGE UP (ref 0–0.2)
BASOPHILS NFR BLD AUTO: 0 % — SIGNIFICANT CHANGE UP (ref 0–2)
EOSINOPHIL # BLD AUTO: 0.06 K/UL — SIGNIFICANT CHANGE UP (ref 0–0.5)
EOSINOPHIL NFR BLD AUTO: 1 % — SIGNIFICANT CHANGE UP (ref 0–6)
HCT VFR BLD CALC: 29.1 % — LOW (ref 34.5–45)
HGB BLD-MCNC: 9.1 G/DL — LOW (ref 11.5–15.5)
LYMPHOCYTES # BLD AUTO: 1.47 K/UL — SIGNIFICANT CHANGE UP (ref 1–3.3)
LYMPHOCYTES # BLD AUTO: 23 % — SIGNIFICANT CHANGE UP (ref 13–44)
MCHC RBC-ENTMCNC: 31.3 G/DL — LOW (ref 32–36)
MCHC RBC-ENTMCNC: 31.4 PG — SIGNIFICANT CHANGE UP (ref 27–34)
MCV RBC AUTO: 100.3 FL — HIGH (ref 80–100)
METAMYELOCYTES # FLD: 5 % — HIGH (ref 0–0)
MONOCYTES # BLD AUTO: 0.32 K/UL — SIGNIFICANT CHANGE UP (ref 0–0.9)
MONOCYTES NFR BLD AUTO: 5 % — SIGNIFICANT CHANGE UP (ref 2–14)
MYELOCYTES NFR BLD: 6 % — HIGH (ref 0–0)
NECROBIOTIC CELLS: SLIGHT — SIGNIFICANT CHANGE UP
NEUTROPHILS # BLD AUTO: 3.83 K/UL — SIGNIFICANT CHANGE UP (ref 1.8–7.4)
NEUTROPHILS NFR BLD AUTO: 49 % — SIGNIFICANT CHANGE UP (ref 43–77)
NEUTS BAND # BLD: 11 % — HIGH (ref 0–8)
NRBC # BLD: 0 /100 — SIGNIFICANT CHANGE UP (ref 0–0)
NRBC # BLD: SIGNIFICANT CHANGE UP /100 WBCS (ref 0–0)
PLAT MORPH BLD: NORMAL — SIGNIFICANT CHANGE UP
PLATELET # BLD AUTO: 139 K/UL — LOW (ref 150–400)
RBC # BLD: 2.9 M/UL — LOW (ref 3.8–5.2)
RBC # FLD: 13.9 % — SIGNIFICANT CHANGE UP (ref 10.3–14.5)
RBC BLD AUTO: SIGNIFICANT CHANGE UP
WBC # BLD: 6.38 K/UL — SIGNIFICANT CHANGE UP (ref 3.8–10.5)
WBC # FLD AUTO: 6.38 K/UL — SIGNIFICANT CHANGE UP (ref 3.8–10.5)

## 2020-10-27 PROCEDURE — 99214 OFFICE O/P EST MOD 30 MIN: CPT

## 2020-10-28 LAB
ALBUMIN MFR SERPL ELPH: 66.3 %
ALBUMIN SERPL-MCNC: 4.4 G/DL
ALBUMIN/GLOB SERPL: 1.9 RATIO
ALPHA1 GLOB MFR SERPL ELPH: 4.3 %
ALPHA1 GLOB SERPL ELPH-MCNC: 0.3 G/DL
ALPHA2 GLOB MFR SERPL ELPH: 7.8 %
ALPHA2 GLOB SERPL ELPH-MCNC: 0.5 G/DL
B-GLOBULIN MFR SERPL ELPH: 8.1 %
B-GLOBULIN SERPL ELPH-MCNC: 0.5 G/DL
DEPRECATED KAPPA LC FREE/LAMBDA SER: 8.51 RATIO
FERRITIN SERPL-MCNC: 1980 NG/ML
GAMMA GLOB FLD ELPH-MCNC: 0.9 G/DL
GAMMA GLOB MFR SERPL ELPH: 13.5 %
IGA SER QL IEP: 186 MG/DL
IGG SER QL IEP: 1027 MG/DL
IGM SER QL IEP: 30 MG/DL
INTERPRETATION SERPL IEP-IMP: NORMAL
IRON SATN MFR SERPL: 36 %
IRON SERPL-MCNC: 82 UG/DL
KAPPA LC CSF-MCNC: 1.46 MG/DL
KAPPA LC SERPL-MCNC: 12.43 MG/DL
M PROTEIN MFR SERPL ELPH: 7.2 %
M PROTEIN SPEC IFE-MCNC: NORMAL
MONOCLON BAND OBS SERPL: 0.5 G/DL
PROT SERPL-MCNC: 6.7 G/DL
PROT SERPL-MCNC: 6.7 G/DL
TIBC SERPL-MCNC: 228 UG/DL
UIBC SERPL-MCNC: 146 UG/DL

## 2020-10-28 NOTE — CONSULT LETTER
[Dear  ___] : Dear  [unfilled], [Courtesy Letter:] : I had the pleasure of seeing your patient, [unfilled], in my office today. [Please see my note below.] : Please see my note below. [Consult Closing:] : Thank you very much for allowing me to participate in the care of this patient.  If you have any questions, please do not hesitate to contact me. [Sincerely,] : Sincerely, [FreeTextEntry3] : Bettina Carter MD. MS. \par Hematologist/Oncologist\par [facility]\par ph. [phone]\par fx. [phone]\par  [DrTanya  ___] : Dr. FARMER

## 2020-10-28 NOTE — PHYSICAL EXAM
[Restricted in physically strenuous activity but ambulatory and able to carry out work of a light or sedentary nature] : Status 1- Restricted in physically strenuous activity but ambulatory and able to carry out work of a light or sedentary nature, e.g., light house work, office work [Normal] : affect appropriate [Thin] : thin [de-identified] : chronically ill appearing, but in nad [de-identified] : LUE AV fistula

## 2020-10-28 NOTE — REASON FOR VISIT
[Follow-Up Visit] : a follow-up visit for [Monoclonal Gammopathy] : monoclonal gammopathy [Family Member] : family member [FreeTextEntry2] : Anemia / MGUS

## 2020-10-28 NOTE — HISTORY OF PRESENT ILLNESS
[Treatment Protocol] : Treatment Protocol [de-identified] : Note as per Dr Ramsey on July 9, 2020\par "Ms. Adam was referred to my office for abnormal blood counts, needing hematological clearance prior to renal transplant. She had blood work done as part of her monthly blood at hemodialysis -on 3/5/19 wbc 3.6 Hb 11.7 plt 82. She was referred for leukopenia and thrombocytopenia. According to the patient and her daughter, she has not had abnormal counts in the past; she denied bleeding/bruising. They both recalled that at the time the blood was drawn, the patient was 'getting over the flu' -she had fevers and cough. At this time, she feels well. \par \par \par \par \par Interval History: The patient is being followed up for MGUS, cleared for renal transplant at last visit in Nov 2019. She received the renal transplant on 12/7/19 -from a cadaveric donor. She is on immunosuppression with Tacrolimus/Cellcept as well as low dose Prednisone. Patient seen via Telehealth today in order to minimize risk of jailyn COVID-19 infection. Verbal consent given on 7/9/20 at 11:54 by patient. She stays home with her granddaughter -granddaughter had loss of smell/taste in March 2020, COVID+. Patient herself did not go out. She had blood work on 6/30/20 -Hb 11.2"\par \par  [FreeTextEntry1] : surveillance [de-identified] : Pt is here for follow-up regarding known hx of MGUS. SHe is accompanied with her dtr to the visit. She reports recently being started on procrit by her nephrologist. She has been on procrit in the past but reports medication being prescribed every 3 months. She denies any bleeding or easy bruising. SHe also reports her FSG levels have been elevated. SHe was seen by endocrine and medication changes were recommended, which she has only just started this week. She is also planned for nutrition evaluation, which is pending. She voiced concerns about some meds which are not being mailed to her home and requires her to go directly to the pharmacy for retrieval. She is unsure why this is occurring.

## 2020-10-28 NOTE — ASSESSMENT
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

## 2020-11-02 ENCOUNTER — NON-APPOINTMENT (OUTPATIENT)
Age: 75
End: 2020-11-02

## 2020-11-09 ENCOUNTER — APPOINTMENT (OUTPATIENT)
Dept: HEMATOLOGY ONCOLOGY | Facility: CLINIC | Age: 75
End: 2020-11-09

## 2020-11-13 ENCOUNTER — NON-APPOINTMENT (OUTPATIENT)
Age: 75
End: 2020-11-13

## 2020-11-13 ENCOUNTER — APPOINTMENT (OUTPATIENT)
Dept: INTERNAL MEDICINE | Facility: CLINIC | Age: 75
End: 2020-11-13
Payer: MEDICARE

## 2020-11-13 VITALS
HEIGHT: 65 IN | BODY MASS INDEX: 19.99 KG/M2 | OXYGEN SATURATION: 98 % | TEMPERATURE: 98.4 F | WEIGHT: 120 LBS | HEART RATE: 76 BPM | RESPIRATION RATE: 16 BRPM | DIASTOLIC BLOOD PRESSURE: 72 MMHG | SYSTOLIC BLOOD PRESSURE: 147 MMHG

## 2020-11-13 DIAGNOSIS — R05 COUGH: ICD-10-CM

## 2020-11-13 DIAGNOSIS — Z92.89 PERSONAL HISTORY OF OTHER MEDICAL TREATMENT: ICD-10-CM

## 2020-11-13 PROCEDURE — 90670 PCV13 VACCINE IM: CPT

## 2020-11-13 PROCEDURE — 93000 ELECTROCARDIOGRAM COMPLETE: CPT

## 2020-11-13 PROCEDURE — 99215 OFFICE O/P EST HI 40 MIN: CPT | Mod: 25

## 2020-11-13 PROCEDURE — 90662 IIV NO PRSV INCREASED AG IM: CPT

## 2020-11-13 PROCEDURE — G0008: CPT

## 2020-11-13 PROCEDURE — G0009: CPT

## 2020-11-13 RX ORDER — FAMOTIDINE 20 MG/1
20 TABLET, FILM COATED ORAL
Qty: 90 | Refills: 3 | Status: DISCONTINUED | COMMUNITY
Start: 2019-12-09 | End: 2020-11-13

## 2020-12-07 NOTE — PATIENT PROFILE ADULT - NSPROIMPLANTSMEDDEV_GEN_A_NUR
[address] [facility]  Progress Note    Patient: Yoselin Hardin MRN: [id number]   SSN: [id number]  YOB: 1945   Age: 67 y.o. Sex: male      Admit Date: 12/4/2020    LOS: 3 days   Chief Complaint   Patient presents with    Nausea    Vomiting       Subjective:     Pt stated having very little abdominal pain. He is tolerating clears. He is not requiring pain meds     ROS  No cp, no sob  No fever No chills    Objective:     Visit Vitals  BP (!) 141/81   Pulse (!) 55   Temp 98.9 °F (37.2 °C)   Resp 18   Ht 5' 11\" (1.803 m)   Wt 108.9 kg (240 lb)   SpO2 95%   BMI 33.47 kg/m²       Physical Exam:   GENERAL APPEARANCE: No acute distress. MENTAL: Well developed, well groomed. Appears stated age. Affect appropriate. Responds to questions appropriately. HEAD: Normocephalic. Atraumatic. NECK: Symmetric, trachea midline. CHEST: . Breath sounds clear bilaterally w/o wheezes, rubs, rales, or rhonchi. CV: Normal S1 and S2 w/o murmur, rub, gallop, or click  ABDOMEN: Abdomen soft. BS+. No guarding or rebound. No tenderness to palpation  EXTREMITIES:No edema, clubbing, or cyanosis    SKIN: No grossly apparent lesions, masses, rashes, or ulcerations.       Intake and Output:  Current Shift: No intake/output data recorded. Last three shifts: 12/05 1901 - 12/07 0700  In: 6295 [P.O.:1920; I.V.:1910]  Out: 1325 [Urine:1325]    Lab/Data Review:  Recent Results (from the past 12 hour(s))   CBC WITH AUTOMATED DIFF    Collection Time: 12/07/20  5:33 AM   Result Value Ref Range    WBC 9.4 4.6 - 13.2 K/uL    RBC 3.89 (L) 4.70 - 5.50 M/uL    HGB 10.9 (L) 13.0 - 16.0 g/dL    HCT 32.1 (L) 36.0 - 48.0 %    MCV 82.5 74.0 - 97.0 FL    MCH 28.0 24.0 - 34.0 PG    MCHC 34.0 31.0 - 37.0 g/dL    RDW 15.0 (H) 11.6 - 14.5 %    PLATELET 462 028 - 863 K/uL    MPV 11.4 9.2 - 11.8 FL    NEUTROPHILS 83 (H) 40 - 73 %    LYMPHOCYTES 9 (L) 21 - 52 %    MONOCYTES 6 3 - 10 %    EOSINOPHILS 2 0 - 5 %    BASOPHILS 0 0 - 2 %    ABS. NEUTROPHILS 7.8 1.8 - 8.0 K/UL    ABS. LYMPHOCYTES 0.9 0.9 - 3.6 K/UL    ABS. MONOCYTES 0.6 0.05 - 1.2 K/UL    ABS. EOSINOPHILS 0.1 0.0 - 0.4 K/UL    ABS. BASOPHILS 0.0 0.0 - 0.1 K/UL    DF AUTOMATED     LIPASE    Collection Time: 12/07/20  5:33 AM   Result Value Ref Range    Lipase 822 (H) 73 - 491 U/L   METABOLIC PANEL, COMPREHENSIVE    Collection Time: 12/07/20  5:33 AM   Result Value Ref Range    Sodium 139 136 - 145 mmol/L    Potassium 4.1 3.5 - 5.5 mmol/L    Chloride 109 100 - 111 mmol/L    CO2 26 21 - 32 mmol/L    Anion gap 4 3.0 - 18 mmol/L    Glucose 95 74 - 99 mg/dL    BUN 15 7.0 - 18 MG/DL    Creatinine 1.44 (H) 0.6 - 1.3 MG/DL    BUN/Creatinine ratio 10 (L) 12 - 20      GFR est AA 58 (L) >60 ml/min/1.73m2    GFR est non-AA 48 (L) >60 ml/min/1.73m2    Calcium 9.0 8.5 - 10.1 MG/DL    Bilirubin, total 2.3 (H) 0.2 - 1.0 MG/DL    ALT (SGPT) 587 (H) 16 - 61 U/L    AST (SGOT) 151 (H) 10 - 38 U/L    Alk. phosphatase 134 (H) 45 - 117 U/L    Protein, total 5.7 (L) 6.4 - 8.2 g/dL    Albumin 2.6 (L) 3.4 - 5.0 g/dL    Globulin 3.1 2.0 - 4.0 g/dL    A-G Ratio 0.8 0.8 - 1.7         RECENT RESULTS  MODALITY IMPRESSION   XR Results from [place] encounter on 02/24/20   XR CHEST PA LAT    Narrative EXAM: XR CHEST PA LAT    HISTORY: Smoker, generalized weakness. COMPARISON: May 8, 2019    FINDINGS:  No evidence of pneumonia or acute pulmonary infiltrate. Lung volumes are  satisfactory and there is no evidence of a pleural effusion or pneumothorax. Cardiac size and pulmonary vascular markings are within normal limits. There is  moderate atheromatous calcification and tortuosity of the thoracic aorta. The  bony structures appear intact. Impression IMPRESSION[de-identified]    1.  No acute cardiopulmonary disease.             CT Results from [place] encounter on 12/04/20   CT ABD PELV W WO CONT    Narrative CT ABDOMEN AND PELVIS WITHOUT AND WITH INTRAVENOUS CONTRAST    COMPARISON: August 3, 2018 CT.    INDICATIONS: Acute liver failure, leukocytosis. TECHNIQUE: Noncontrast CT was performed of the abdomen and pelvis . Contrast enhanced CT was then performed following the uneventful administration  of 100 cc of Isovue-300. Volumetric data acquisition was performed of the  abdomen and pelvis during arterial and venous phases on a multislice scanner and  reconstructed in axial coronal and sagittal planes,    Noncontrast images through the abdomen show no biliary or renal calculi. Vascular calcifications are present consistent with age. Following Contrast Administration:    CT ABDOMEN FINDINGS:    Lung Bases: Some mild groundglass and reticular opacities suggests  hypoventilation. .  Liver: There is intra and extrahepatic biliary dilation. The common duct  measures 1.6 cm. The common duct is dilated to the ampulla. No focal hepatic  lesions are evident. The gallbladder is contracted. Franca Perez Spleen: .  Kidneys: Small cysts are present bilaterally. Otherwise unremarkable. Pancreas: There is severe edematous pancreatitis. Inflammatory exudate extends  into the mesenteric root. The duct is not dilated. Adrenal Glands: Negative. Stomach, Small Bowel And Colon: The posterior wall the stomach is mildly  edematous contiguous with the pancreatic inflammation. There is marked edema of  the duodenal C-loop through to the fourth portion of the duodenum. The remainder  of the small bowel is unremarkable. There is a knuckle of nonobstructed small  bowel extending into an epigastric ventral hernia the appendix, if present, is  not confidently identified. There is diverticulosis throughout the colon without  findings of diverticulitis. The abdominal aorta and cava are unremarkable. There is no retroperitoneal lymphadenopathy. .  Peritoneal Spaces: There is no free fluid or free air. Bladder: Unremarkable . Osseous Structures Of Abdomen And Pelvis: Unremarkable for age.       Impression IMPRESSION:     Severe edematous pancreatitis. Biliary dilation likely secondary to the above  Extensive duodenal edema secondary to the above      All CT scans at this facility are performed using dose optimization technique as  appropriate to the performed exam, to include automated exposure control,  adjustment of the mA and/or kV according to patient's size (Including  appropriate matching for site-specific examinations), or use of iterative  reconstruction technique. MRI Results from [place] encounter on 09/01/15   MRA BRAIN WO CONT    Narrative MRA Head Without Contrast    CPT CODE: 62249    HISTORY: Left leg weakness, hypertension. COMPARISON: Concurrent brain MRI. FINDINGS:     Moderately motion degraded exam which gives the vessels a diffusely  pseudo-beaded appearance. True stenoses would be difficult to differentiate from  artifactual narrowing. Tortuosity of the visualized cervical ICAs. The ICAs are  patent but with suggested mild to moderate bilateral supraclinoid stenoses. A1  and M1 segments of the VIVEK and MCA are patent however also with suggested  multifocal stenoses, appearing moderate to severe. Specifically there is  apparent moderate narrowing in the mid M1 RMCA. Suggested severe stenoses at the  MCA bifurcations, right greater than left. Also suggested severe stenoses in the  A1 LACA Codominant vertebral arteries are patent with mild luminal irregularity. Basilar trunk is patent. Proximal PCAs are patent. Impression IMPRESSION:    Motion degraded exam with diffusely pseudo-beaded appearance to the vessels. True stenoses cannot clearly be differentiated from artifactual stenoses. No  evidence of occlusion of the ICAs or very proximal ACAs or MCAs. The  vertebrobasilar and posterior cerebral arteries also appear to be patent. ULTRASOUND Results from [place] encounter on 12/04/20   US ABD LTD    Impression IMPRESSION:  1. Liver appears mildly echogenic.  No masses.  -Central intrahepatic biliary ductal dilation with enlarged extrahepatic common  bile duct as on earlier CT. 2. Gallbladder contracted. Not well assessed. Small amount of fluid in the  gallbladder fossa. 3. Right renal cyst. Mildly echogenic parenchyma as above. No hydronephrosis. -Tiny amount of adjacent free fluid. 4. Pancreas not adequately visualized as above. Results from [place] encounter on 09/20/19   US PELV NON OBS  LTD    Impression IMPRESSION:  1. No bladder wall thickening. 2. Minimal post void residual bladder volume as described. 3. Prostate hypertrophy with nodular impression and bladder base. Cardiology Procedures/Testing:  MODALITY RESULTS   EKG Results for orders placed or performed during the hospital encounter of 12/04/20   EKG, 12 LEAD, INITIAL   Result Value Ref Range    Ventricular Rate 77 BPM    Atrial Rate 77 BPM    P-R Interval 186 ms    QRS Duration 108 ms    Q-T Interval 380 ms    QTC Calculation (Bezet) 430 ms    Calculated P Axis 35 degrees    Calculated R Axis -62 degrees    Calculated T Axis 40 degrees    Diagnosis       Normal sinus rhythm  Left anterior fascicular block  Abnormal ECG  When compared with ECG of 02-AUG-2018 17:47,  Questionable change in initial forces of Inferior leads  T wave amplitude has decreased in Anterior leads  Confirmed by Jose Pham ([id number]) on 12/4/2020 8:10:50 PM         ECHO 06/20/19   ECHO ADULT COMPLETE 06/20/2019 6/20/2019    Narrative · Left Ventricle: Estimated left ventricular ejection fraction is 56 -   60%. No regional wall motion abnormality noted. Age-appropriate left   ventricular diastolic function. · Aorta: Moderate aortic root dilatation. · Aortic Valve: Probably trileaflet aortic valve. Aortic valve sclerosis. Mild aortic valve regurgitation is present.         Signed by: Jaelyn Cornelius MD        Special Testing/Procedures:  MODALITY RESULTS   MICRO All Micro Results     Procedure Component Value Units Date/Time    CULTURE, THROAT [[id number]] Collected:  12/05/20 1900    Order Status:  Completed Specimen:  Throat swab Updated:  12/06/20 0932    STREP AG Hansel PRUETT [[id number]] Collected:  12/05/20 1900    Order Status:  Completed Specimen:  Throat Updated:  12/05/20 2021     Group A Strep Ag ID Negative            UA Results for orders placed or performed in visit on 03/29/18   AMB POC URINALYSIS DIP STICK AUTO W/O MICRO     Status: None   Result Value Ref Range Status    Color (UA POC) Yellow  Final    Clarity (UA POC) Clear  Final    Glucose (UA POC) Negative Negative Final    Bilirubin (UA POC) Negative Negative Final    Ketones (UA POC) Negative Negative Final    Specific gravity (UA POC) 1.010 1.001 - 1.035 Final    Blood (UA POC) Trace Negative Final    pH (UA POC) 5.0 4.6 - 8.0 Final    Protein (UA POC) Negative Negative Final    Urobilinogen (UA POC) 0.2 mg/dL 0.2 - 1 Final    Nitrites (UA POC) Negative Negative Final    Leukocyte esterase (UA POC) Negative Negative Final      PATH none       Assessment and Plan:   67 y. o. male with PMH CKD, HTN, HLD, CVA, now admitted with acute pancreatitis.     Acute pancreatitis likely related bilary duct issue pt with significantly elevated transaminitis and hyperbilirubinemia on admission   Currently afebrile, liver enzymes down-trending, BS+ and abdominal pain improved  - Discharged LR @ 175 cc/hr   - GI - started on clear liquids, KEV, IgG4 and CA 19-9 (negative) , Strep panel ( negative);  plan for MRCP outpatient follow up w GI  - zofran prn   -PT/OT rec     HTN  -Continue home Amlodipine  -Consider holding home losartan     CKD stage 3  -Renal dose medications     HLD  -Hold home atorvastatin     Hx of CVA  -Continue home ASA     COPD  - O2 as needed to maintain sats >88%  - Home Albuterol PRN, spiriva     Overactive bladder  -Continue home oxybutynin     Global care  - Renally dose all meds           Diet DIET clears    DVT Prophylaxis SCD. SQH   GI Prophylaxis Famotidine   Code status Full   Disposition >2MN      Point of Contact Oscar Client  [phone]            Maryann Rocha PGY-1   [address] [facility] Medicine   Pager: [phone]   December 7, 2020, 8:05 AM None

## 2021-01-08 ENCOUNTER — APPOINTMENT (OUTPATIENT)
Dept: INTERNAL MEDICINE | Facility: CLINIC | Age: 76
End: 2021-01-08
Payer: MEDICARE

## 2021-01-08 VITALS
DIASTOLIC BLOOD PRESSURE: 72 MMHG | RESPIRATION RATE: 16 BRPM | WEIGHT: 122 LBS | OXYGEN SATURATION: 96 % | TEMPERATURE: 97.5 F | HEIGHT: 65 IN | BODY MASS INDEX: 20.33 KG/M2 | SYSTOLIC BLOOD PRESSURE: 163 MMHG | HEART RATE: 66 BPM

## 2021-01-08 DIAGNOSIS — K63.5 POLYP OF COLON: ICD-10-CM

## 2021-01-08 DIAGNOSIS — H26.9 UNSPECIFIED CATARACT: ICD-10-CM

## 2021-01-08 DIAGNOSIS — Z01.818 ENCOUNTER FOR OTHER PREPROCEDURAL EXAMINATION: ICD-10-CM

## 2021-01-08 DIAGNOSIS — Z23 ENCOUNTER FOR IMMUNIZATION: ICD-10-CM

## 2021-01-08 DIAGNOSIS — Z92.29 PERSONAL HISTORY OF OTHER DRUG THERAPY: ICD-10-CM

## 2021-01-08 PROCEDURE — 99213 OFFICE O/P EST LOW 20 MIN: CPT | Mod: 25

## 2021-01-08 PROCEDURE — G0439: CPT

## 2021-01-09 ENCOUNTER — APPOINTMENT (OUTPATIENT)
Dept: ULTRASOUND IMAGING | Facility: CLINIC | Age: 76
End: 2021-01-09
Payer: MEDICARE

## 2021-01-09 ENCOUNTER — OUTPATIENT (OUTPATIENT)
Dept: OUTPATIENT SERVICES | Facility: HOSPITAL | Age: 76
LOS: 1 days | End: 2021-01-09
Payer: MEDICARE

## 2021-01-09 ENCOUNTER — RESULT REVIEW (OUTPATIENT)
Age: 76
End: 2021-01-09

## 2021-01-09 DIAGNOSIS — E04.9 NONTOXIC GOITER, UNSPECIFIED: ICD-10-CM

## 2021-01-09 DIAGNOSIS — Z98.890 OTHER SPECIFIED POSTPROCEDURAL STATES: Chronic | ICD-10-CM

## 2021-01-09 DIAGNOSIS — Z90.710 ACQUIRED ABSENCE OF BOTH CERVIX AND UTERUS: Chronic | ICD-10-CM

## 2021-01-09 DIAGNOSIS — E27.9 DISORDER OF ADRENAL GLAND, UNSPECIFIED: Chronic | ICD-10-CM

## 2021-01-09 DIAGNOSIS — Z87.81 PERSONAL HISTORY OF (HEALED) TRAUMATIC FRACTURE: Chronic | ICD-10-CM

## 2021-01-09 PROBLEM — K63.5 COLON POLYPS: Status: ACTIVE | Noted: 2021-01-08

## 2021-01-09 PROCEDURE — 76536 US EXAM OF HEAD AND NECK: CPT

## 2021-01-09 PROCEDURE — 76536 US EXAM OF HEAD AND NECK: CPT | Mod: 26

## 2021-02-03 ENCOUNTER — APPOINTMENT (OUTPATIENT)
Dept: SURGERY | Facility: CLINIC | Age: 76
End: 2021-02-03

## 2021-02-04 ENCOUNTER — APPOINTMENT (OUTPATIENT)
Dept: NEPHROLOGY | Facility: CLINIC | Age: 76
End: 2021-02-04
Payer: MEDICARE

## 2021-02-04 ENCOUNTER — LABORATORY RESULT (OUTPATIENT)
Age: 76
End: 2021-02-04

## 2021-02-04 VITALS
HEART RATE: 78 BPM | WEIGHT: 120 LBS | SYSTOLIC BLOOD PRESSURE: 126 MMHG | HEIGHT: 64 IN | DIASTOLIC BLOOD PRESSURE: 74 MMHG | BODY MASS INDEX: 20.49 KG/M2 | RESPIRATION RATE: 12 BRPM | OXYGEN SATURATION: 100 % | TEMPERATURE: 98 F

## 2021-02-04 PROCEDURE — 99214 OFFICE O/P EST MOD 30 MIN: CPT

## 2021-02-04 RX ORDER — ERYTHROPOIETIN 10000 [IU]/ML
10000 INJECTION, SOLUTION INTRAVENOUS; SUBCUTANEOUS
Qty: 4 | Refills: 0 | Status: DISCONTINUED | COMMUNITY
Start: 2020-01-30 | End: 2021-02-04

## 2021-02-04 NOTE — ASSESSMENT
[FreeTextEntry1] : Renal Transplant recipient: Had delayed allograft function, elevated creatinine at discharge. Has urinary frequency and nocturia. No dysuria/hematuria. No fever/chills. Tolerating medications.\par Labs from September stable.\par Immunosuppression: reviewed; simulect induction, on tac/leflunomide/prednisone, last Tac level noted; will recheck. Currently on Envarsus  8 mg daily.; Leflunomide 40 mg/day and prednisone at 5 mg daily\par Target Tac level 4-6 ng/ml in view of BK viremia\par If BK negative will switch bacik to MMF. Insurance issues with Leflunomide ([employer])\par Hypertension:  Reviewed medications.  On clonidine and nifedipine\par DM: on Tradjenta and prandin. Reports no hypoglycemia. Will continue to monitor glucose on Tradjenta and Prandin. \par Infection prophylaxis: On Bactrim,  as well as GI prophylaxis. \par Discussed ambulation,   optimal glucose and blood pressure readings, adherence with medications and follow ups, follow up clinic visit schedule, avoiding dehydration, mosquito bites; prevention of DVT as well as food safety.\par Renal Preservation Strategies and infection prevention strategies reviewed with patient.\par Discussed COVID prevention and vaccination.\par \par

## 2021-02-04 NOTE — REASON FOR VISIT
[Follow-Up] : a follow-up visit [Family Member] : family member [FreeTextEntry1] : Post transplant follow up no acute symptoms

## 2021-02-04 NOTE — HISTORY OF PRESENT ILLNESS
[FreeTextEntry1] : Received DDRT on 12/7/2019, has no acute symptoms.  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 \par Lives alone now Beatriz moved to [place]. Althea, grand daughter lives near by.\par Feels tired otherwise ok\par Sees Maame Calle for endocrinology at [place]\par Phone: [phone] Fax \par \par \par

## 2021-02-05 ENCOUNTER — NON-APPOINTMENT (OUTPATIENT)
Age: 76
End: 2021-02-05

## 2021-02-06 LAB
25(OH)D3 SERPL-MCNC: 51.8 NG/ML
ALBUMIN SERPL ELPH-MCNC: 4.5 G/DL
ALP BLD-CCNC: 39 U/L
ALT SERPL-CCNC: 6 U/L
ANION GAP SERPL CALC-SCNC: 11 MMOL/L
APPEARANCE: CLEAR
AST SERPL-CCNC: 17 U/L
BACTERIA: ABNORMAL
BASOPHILS # BLD AUTO: 0 K/UL
BASOPHILS NFR BLD AUTO: 0 %
BILIRUB SERPL-MCNC: 0.3 MG/DL
BILIRUBIN URINE: NEGATIVE
BKV DNA SPEC QL NAA+PROBE: ABNORMAL COPIES/ML
BLOOD URINE: NEGATIVE
BUN SERPL-MCNC: 45 MG/DL
CALCIUM SERPL-MCNC: 9.8 MG/DL
CALCIUM SERPL-MCNC: 9.8 MG/DL
CHLORIDE SERPL-SCNC: 109 MMOL/L
CHOLEST SERPL-MCNC: 202 MG/DL
CO2 SERPL-SCNC: 19 MMOL/L
COLOR: YELLOW
CREAT SERPL-MCNC: 2.33 MG/DL
CREAT SPEC-SCNC: 190 MG/DL
CREAT/PROT UR: 0.3 RATIO
EOSINOPHIL # BLD AUTO: 0 K/UL
EOSINOPHIL NFR BLD AUTO: 0 %
ESTIMATED AVERAGE GLUCOSE: 94 MG/DL
GLUCOSE QUALITATIVE U: NEGATIVE
GLUCOSE SERPL-MCNC: 171 MG/DL
HBA1C MFR BLD HPLC: 4.9 %
HCT VFR BLD CALC: 29.3 %
HDLC SERPL-MCNC: 58 MG/DL
HGB BLD-MCNC: 9 G/DL
HYALINE CASTS: 0 /LPF
KETONES URINE: NEGATIVE
LDH SERPL-CCNC: 223 U/L
LDLC SERPL CALC-MCNC: 110 MG/DL
LEUKOCYTE ESTERASE URINE: NEGATIVE
LYMPHOCYTES # BLD AUTO: 0.91 K/UL
LYMPHOCYTES NFR BLD AUTO: 15.7 %
MAGNESIUM SERPL-MCNC: 2 MG/DL
MAN DIFF?: NORMAL
MCHC RBC-ENTMCNC: 30.3 PG
MCHC RBC-ENTMCNC: 30.7 GM/DL
MCV RBC AUTO: 98.7 FL
MICROSCOPIC-UA: NORMAL
MONOCYTES # BLD AUTO: 0.51 K/UL
MONOCYTES NFR BLD AUTO: 8.7 %
NEUTROPHILS # BLD AUTO: 4.14 K/UL
NEUTROPHILS NFR BLD AUTO: 71.3 %
NITRITE URINE: NEGATIVE
NONHDLC SERPL-MCNC: 144 MG/DL
PARATHYROID HORMONE INTACT: 163 PG/ML
PH URINE: 6
PHOSPHATE SERPL-MCNC: 3.4 MG/DL
PLATELET # BLD AUTO: 123 K/UL
POTASSIUM SERPL-SCNC: 5.3 MMOL/L
PROT SERPL-MCNC: 6.8 G/DL
PROT UR-MCNC: 52 MG/DL
PROTEIN URINE: ABNORMAL
RBC # BLD: 2.97 M/UL
RBC # FLD: 13.5 %
RED BLOOD CELLS URINE: 1 /HPF
SODIUM SERPL-SCNC: 139 MMOL/L
SPECIFIC GRAVITY URINE: 1.03
SQUAMOUS EPITHELIAL CELLS: 23 /HPF
TACROLIMUS SERPL-MCNC: 22.4 NG/ML
TRIGL SERPL-MCNC: 170 MG/DL
URATE SERPL-MCNC: 5.6 MG/DL
URINE COMMENTS: NORMAL
UROBILINOGEN URINE: NORMAL
WBC # FLD AUTO: 5.81 K/UL
WHITE BLOOD CELLS URINE: 7 /HPF

## 2021-02-08 ENCOUNTER — APPOINTMENT (OUTPATIENT)
Dept: DERMATOLOGY | Facility: CLINIC | Age: 76
End: 2021-02-08
Payer: MEDICARE

## 2021-02-08 VITALS — HEIGHT: 65 IN | WEIGHT: 122 LBS | BODY MASS INDEX: 20.33 KG/M2

## 2021-02-08 LAB — CMV DNA SPEC QL NAA+PROBE: NOT DETECTED IU/ML

## 2021-02-08 PROCEDURE — 99203 OFFICE O/P NEW LOW 30 MIN: CPT

## 2021-02-09 ENCOUNTER — APPOINTMENT (OUTPATIENT)
Dept: TRANSPLANT | Facility: CLINIC | Age: 76
End: 2021-02-09

## 2021-02-09 LAB
ALBUMIN SERPL ELPH-MCNC: 4.2 G/DL
ALP BLD-CCNC: 37 U/L
ALT SERPL-CCNC: 10 U/L
ANION GAP SERPL CALC-SCNC: 13 MMOL/L
AST SERPL-CCNC: 15 U/L
BILIRUB SERPL-MCNC: 0.3 MG/DL
BUN SERPL-MCNC: 43 MG/DL
CALCIUM SERPL-MCNC: 9.6 MG/DL
CHLORIDE SERPL-SCNC: 108 MMOL/L
CO2 SERPL-SCNC: 18 MMOL/L
CREAT SERPL-MCNC: 2.51 MG/DL
GLUCOSE SERPL-MCNC: 152 MG/DL
POTASSIUM SERPL-SCNC: 5.3 MMOL/L
PROT SERPL-MCNC: 6.6 G/DL
SODIUM SERPL-SCNC: 140 MMOL/L
TACROLIMUS SERPL-MCNC: 12.7 NG/ML

## 2021-02-22 ENCOUNTER — OUTPATIENT (OUTPATIENT)
Dept: OUTPATIENT SERVICES | Facility: HOSPITAL | Age: 76
LOS: 1 days | Discharge: HOME | End: 2021-02-22

## 2021-02-22 DIAGNOSIS — Z87.81 PERSONAL HISTORY OF (HEALED) TRAUMATIC FRACTURE: Chronic | ICD-10-CM

## 2021-02-22 DIAGNOSIS — D69.9 HEMORRHAGIC CONDITION, UNSPECIFIED: ICD-10-CM

## 2021-02-22 DIAGNOSIS — Z98.890 OTHER SPECIFIED POSTPROCEDURAL STATES: Chronic | ICD-10-CM

## 2021-02-22 DIAGNOSIS — Z90.710 ACQUIRED ABSENCE OF BOTH CERVIX AND UTERUS: Chronic | ICD-10-CM

## 2021-02-22 DIAGNOSIS — E27.9 DISORDER OF ADRENAL GLAND, UNSPECIFIED: Chronic | ICD-10-CM

## 2021-02-23 ENCOUNTER — APPOINTMENT (OUTPATIENT)
Dept: HEMATOLOGY ONCOLOGY | Facility: CLINIC | Age: 76
End: 2021-02-23

## 2021-03-18 ENCOUNTER — APPOINTMENT (OUTPATIENT)
Dept: SURGERY | Facility: CLINIC | Age: 76
End: 2021-03-18
Payer: MEDICARE

## 2021-03-18 PROCEDURE — 99213 OFFICE O/P EST LOW 20 MIN: CPT

## 2021-03-18 NOTE — PHYSICAL EXAM
[de-identified] : No cervical or supraclavicular adenopathy, trachea deviating to the right with left lobe extending behind clavicle. Enlarged. [Normal] : no neck adenopathy [de-identified] : Skin:  normal appearance.  no rash, nodules, vesicles, or erythema,\par Musculoskeletal:  full range of motion and no deformities appreciated\par Neurological:  grossly intact\par Psychiatric:  oriented to person, place and time with appropriate affect

## 2021-03-18 NOTE — HISTORY OF PRESENT ILLNESS
[de-identified] : Patient referred by Dr. Dickinson  for evaluation of enlarging left substernal goiter. Patient reports a needle biopsy was performed several years ago report not available. Thyroid ultrasound July 2017:  Right lobe 4.8 x 1.7 x 1.6 CM with subcentimeter nodules largest lower pole  7 mm rim calcified  stable. Left lobe 6 x 2.3 x 2.3 CM  with lower pole 4.3 x 4.4 x 2.3 CM increased from 3.7 x 3.8 x 3 CM. Patient denies dysphagia or change in voice. Patient received radiation 2 years ago for left breast cancer.\par biopsy 7/2017 benign,. \par last US 2/2018 slight increase in dominant left nodule,   US 7/31/18. stable  and repeat US 1/2019 no change, \par Patient with over 6 year hx of MNG .  thyroid US  7/2019 slight decrease in left nodule, no appreciable change in thyroid size.  Patient had kidney transplant 12/2019 now with type 2 DM, doing well.  f/u US 1/2021 stable mnodules.  denies recent illness  denies dysphagia, SOB or palpitations. I have reviewed all old and new data and available images.

## 2021-03-18 NOTE — ASSESSMENT
[FreeTextEntry1] : stable nodules no suspicious finding s on PE or imaging.  , repeat US 2/2022   RTO 1 year

## 2021-03-22 ENCOUNTER — OUTPATIENT (OUTPATIENT)
Dept: OUTPATIENT SERVICES | Facility: HOSPITAL | Age: 76
LOS: 1 days | Discharge: HOME | End: 2021-03-22

## 2021-03-22 DIAGNOSIS — Z98.890 OTHER SPECIFIED POSTPROCEDURAL STATES: Chronic | ICD-10-CM

## 2021-03-22 DIAGNOSIS — D69.9 HEMORRHAGIC CONDITION, UNSPECIFIED: ICD-10-CM

## 2021-03-22 DIAGNOSIS — Z87.81 PERSONAL HISTORY OF (HEALED) TRAUMATIC FRACTURE: Chronic | ICD-10-CM

## 2021-03-22 DIAGNOSIS — Z90.710 ACQUIRED ABSENCE OF BOTH CERVIX AND UTERUS: Chronic | ICD-10-CM

## 2021-03-22 DIAGNOSIS — E27.9 DISORDER OF ADRENAL GLAND, UNSPECIFIED: Chronic | ICD-10-CM

## 2021-03-23 ENCOUNTER — APPOINTMENT (OUTPATIENT)
Dept: ENDOCRINOLOGY | Facility: CLINIC | Age: 76
End: 2021-03-23

## 2021-04-23 ENCOUNTER — OUTPATIENT (OUTPATIENT)
Dept: OUTPATIENT SERVICES | Facility: HOSPITAL | Age: 76
LOS: 1 days | Discharge: HOME | End: 2021-04-23

## 2021-04-23 DIAGNOSIS — Z87.81 PERSONAL HISTORY OF (HEALED) TRAUMATIC FRACTURE: Chronic | ICD-10-CM

## 2021-04-23 DIAGNOSIS — Z90.710 ACQUIRED ABSENCE OF BOTH CERVIX AND UTERUS: Chronic | ICD-10-CM

## 2021-04-23 DIAGNOSIS — Z98.890 OTHER SPECIFIED POSTPROCEDURAL STATES: Chronic | ICD-10-CM

## 2021-04-23 DIAGNOSIS — D69.6 THROMBOCYTOPENIA, UNSPECIFIED: ICD-10-CM

## 2021-04-23 DIAGNOSIS — E27.9 DISORDER OF ADRENAL GLAND, UNSPECIFIED: Chronic | ICD-10-CM

## 2021-04-26 ENCOUNTER — APPOINTMENT (OUTPATIENT)
Dept: HEMATOLOGY ONCOLOGY | Facility: CLINIC | Age: 76
End: 2021-04-26
Payer: MEDICARE

## 2021-04-26 ENCOUNTER — RESULT REVIEW (OUTPATIENT)
Age: 76
End: 2021-04-26

## 2021-04-26 VITALS
HEART RATE: 81 BPM | SYSTOLIC BLOOD PRESSURE: 168 MMHG | OXYGEN SATURATION: 98 % | WEIGHT: 126.32 LBS | HEIGHT: 65 IN | RESPIRATION RATE: 16 BRPM | TEMPERATURE: 96.8 F | DIASTOLIC BLOOD PRESSURE: 72 MMHG | BODY MASS INDEX: 21.05 KG/M2

## 2021-04-26 LAB
BASOPHILS # BLD AUTO: 0.02 K/UL — SIGNIFICANT CHANGE UP (ref 0–0.2)
BASOPHILS NFR BLD AUTO: 0.5 % — SIGNIFICANT CHANGE UP (ref 0–2)
EOSINOPHIL # BLD AUTO: 0.03 K/UL — SIGNIFICANT CHANGE UP (ref 0–0.5)
EOSINOPHIL NFR BLD AUTO: 0.7 % — SIGNIFICANT CHANGE UP (ref 0–6)
HCT VFR BLD CALC: 31.2 % — LOW (ref 34.5–45)
HGB BLD-MCNC: 9.6 G/DL — LOW (ref 11.5–15.5)
IMM GRANULOCYTES NFR BLD AUTO: 0.9 % — SIGNIFICANT CHANGE UP (ref 0–1.5)
LYMPHOCYTES # BLD AUTO: 0.85 K/UL — LOW (ref 1–3.3)
LYMPHOCYTES # BLD AUTO: 19.6 % — SIGNIFICANT CHANGE UP (ref 13–44)
MCHC RBC-ENTMCNC: 29.4 PG — SIGNIFICANT CHANGE UP (ref 27–34)
MCHC RBC-ENTMCNC: 30.8 G/DL — LOW (ref 32–36)
MCV RBC AUTO: 95.4 FL — SIGNIFICANT CHANGE UP (ref 80–100)
MONOCYTES # BLD AUTO: 0.44 K/UL — SIGNIFICANT CHANGE UP (ref 0–0.9)
MONOCYTES NFR BLD AUTO: 10.1 % — SIGNIFICANT CHANGE UP (ref 2–14)
NEUTROPHILS # BLD AUTO: 2.96 K/UL — SIGNIFICANT CHANGE UP (ref 1.8–7.4)
NEUTROPHILS NFR BLD AUTO: 68.2 % — SIGNIFICANT CHANGE UP (ref 43–77)
NRBC # BLD: 0 /100 WBCS — SIGNIFICANT CHANGE UP (ref 0–0)
PLATELET # BLD AUTO: 121 K/UL — LOW (ref 150–400)
RBC # BLD: 3.27 M/UL — LOW (ref 3.8–5.2)
RBC # FLD: 13.3 % — SIGNIFICANT CHANGE UP (ref 10.3–14.5)
WBC # BLD: 4.34 K/UL — SIGNIFICANT CHANGE UP (ref 3.8–10.5)
WBC # FLD AUTO: 4.34 K/UL — SIGNIFICANT CHANGE UP (ref 3.8–10.5)

## 2021-04-26 PROCEDURE — 99213 OFFICE O/P EST LOW 20 MIN: CPT

## 2021-04-26 RX ORDER — OXYCODONE AND ACETAMINOPHEN 7.5; 325 MG/1; MG/1
7.5-325 TABLET ORAL
Qty: 90 | Refills: 0 | Status: DISCONTINUED | COMMUNITY
Start: 2018-12-10 | End: 2021-04-26

## 2021-04-26 NOTE — OB HISTORY
[Currently In Menopause] : currently in menopause [Sinus Problems] : sinus problems [Dyspnea] : dyspnea [Palpitations] : palpitations [Headache] : headache [Anxiety] : anxiety [Panic Attacks] : panic attacks [Negative] : Psychiatric [Fever] : no fever [Chills] : no chills [Dry Eyes] : no dryness of the eyes [Eye Irritation] : no ~T irritation of the eyes [Cough] : no cough [Sputum] : not coughing up ~M sputum [Hemoptysis] : no hemoptysis [Chest Tightness] : no chest tightness [Pleuritic Pain] : no pleuritic pain [Wheezing] : no wheezing [Hypertension] : no ~T hypertension [Hypotension] : no hypotension [Chest Discomfort] : no chest discomfort [Orthopnea] : no orthopnea [Dysrhythmia] : no dysrhythmia [Edema] : ~T edema was not present [Hay Fever] : no hay fever [Itchy Eyes] : no itching of ~T the eyes [Dysphagia] : no dysphagia [Nausea] : no nausea [Nocturia] : no nocturia [Frequency] : no change in urinary frequency [Trauma] : no ~T physical trauma [Fracture] : no fracture [Dizziness] : no dizziness [Syncope] : no fainting [Diet Meds] : not taking dietary supplements [DVT] : no DVT [Hepatic Disease] : no hepatic disease

## 2021-04-27 NOTE — ASSESSMENT
[FreeTextEntry1] : 74 y/o F s/p renal transplant in 2019 followed for anemia of CKD and MGUS. \par \par MGUS\par - repeat SPEP q6months to monitor M spike\par - will f/u results and call pt with findings\par - reassurrance given\par \par Anemia\par - secondary to hx CKD, on COREY support with procrit via Dr Caicedo\par - iron studies show elevated ferritin in the past, adequate for COREY support. Will repeat. \par - Hold COREY if Hb>10\par \par -Patient understands and agrees with plan. All information explained to the best of my ability.\par \par

## 2021-04-27 NOTE — HISTORY OF PRESENT ILLNESS
[Treatment Protocol] : Treatment Protocol [de-identified] : Patient previously seen by Dr. Franklin and Dr. Carter, transitioned to me on 4/26/21. She was referred initially for abnormal blood counts, needing hematological clearance prior to renal transplant. She had blood work done as part of her monthly blood at hemodialysis -on 3/5/19 wbc 3.6 Hb 11.7 plt 82. She was referred for leukopenia and thrombocytopenia. According to the patient and her daughter, she has not had abnormal counts in the past; she denied bleeding/bruising. They both recalled that at the time the blood was drawn, the patient was 'getting over the flu' -she had fevers and cough. On follow up , she felt well. \par \par \par \par \par The patient has since been followed up for MGUS, cleared for renal transplant at last visit in Nov 2019. She received the renal transplant on 12/7/19 -from a cadaveric donor. She is on immunosuppression with Tacrolimus/Cellcept as well as low dose Prednisone. \par \par  [FreeTextEntry1] : surveillance [de-identified] : Patient reports that she feels like her heart is irregular, because sometimes she gets like a flutter in her chest and then a little pain. All of her EKGs have looked OK, had Holter monitor done a very long time ago which was normal. She plans to follow up with heart doctor. No dyspnea. Patient reports she has had lower back pain which goes down her left leg which is "terrible". She reports that she has herniated discs, but she does not want back surgery. Sometimes she feels no pain, sometimes she has a lot. She reports she feels like she "needs" the Procrit because she feels very tired. She also feels overall shaky.

## 2021-04-27 NOTE — PHYSICAL EXAM
[Restricted in physically strenuous activity but ambulatory and able to carry out work of a light or sedentary nature] : Status 1- Restricted in physically strenuous activity but ambulatory and able to carry out work of a light or sedentary nature, e.g., light house work, office work [Thin] : thin [Normal] : affect appropriate [de-identified] : chronically ill appearing, but in nad [de-identified] : LUE AV fistula

## 2021-04-29 ENCOUNTER — APPOINTMENT (OUTPATIENT)
Dept: NEPHROLOGY | Facility: CLINIC | Age: 76
End: 2021-04-29
Payer: MEDICARE

## 2021-04-29 VITALS
RESPIRATION RATE: 16 BRPM | SYSTOLIC BLOOD PRESSURE: 140 MMHG | HEART RATE: 70 BPM | TEMPERATURE: 97.8 F | HEIGHT: 65 IN | BODY MASS INDEX: 20.66 KG/M2 | WEIGHT: 124 LBS | DIASTOLIC BLOOD PRESSURE: 75 MMHG | OXYGEN SATURATION: 96 %

## 2021-04-29 PROCEDURE — 99214 OFFICE O/P EST MOD 30 MIN: CPT

## 2021-04-29 RX ORDER — COLD-HOT PACK
125 MCG EACH MISCELLANEOUS
Qty: 90 | Refills: 3 | Status: ACTIVE | COMMUNITY
Start: 2019-12-11 | End: 1900-01-01

## 2021-04-29 NOTE — ASSESSMENT
[FreeTextEntry1] : Renal Transplant recipient: Had delayed allograft function, elevated creatinine at discharge. Has urinary frequency and nocturia. No dysuria/hematuria. No fever/chills. Tolerating medications.\par Labs from September stable.\par Immunosuppression: reviewed; simulect induction, on tac/leflunomide/prednisone, last Tac level noted; will recheck. Currently on Envarsus  8 mg daily.; Leflunomide 40 mg/day and prednisone at 5 mg daily\par Target Tac level 4-6 ng/ml in view of BK viremia\par Hypertension:  Reviewed medications.  On clonidine and nifedipine, metoprolol, controlled.\par DM: on Tradjenta and prandin. Reports no hypoglycemia. Will continue to monitor glucose on Tradjenta and Prandin. \par Infection prophylaxis: On Bactrim,  as well as GI prophylaxis. \par Discussed ambulation,   optimal glucose and blood pressure readings, adherence with medications and follow ups, follow up clinic visit schedule, avoiding dehydration, mosquito bites; prevention of DVT as well as food safety.\par Renal Preservation Strategies and infection prevention strategies reviewed with patient.\par Discussed COVID prevention and vaccination. She has not had vaccines yet.\par \par

## 2021-04-29 NOTE — HISTORY OF PRESENT ILLNESS
[FreeTextEntry1] : Received DDRT on 12/7/2019.  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 Lives  now with Beatriz moved back from [place]. Feels tired otherwise ok\par \par Overall doing well. Noted recent labs, stable improved creatinine.\par \par \par

## 2021-04-30 ENCOUNTER — NON-APPOINTMENT (OUTPATIENT)
Age: 76
End: 2021-04-30

## 2021-04-30 LAB
25(OH)D3 SERPL-MCNC: 46.9 NG/ML
ALBUMIN SERPL ELPH-MCNC: 4.4 G/DL
ALP BLD-CCNC: 45 U/L
ALT SERPL-CCNC: 6 U/L
ANION GAP SERPL CALC-SCNC: 10 MMOL/L
APPEARANCE: CLEAR
AST SERPL-CCNC: 16 U/L
BACTERIA: NEGATIVE
BASOPHILS # BLD AUTO: 0.02 K/UL
BASOPHILS NFR BLD AUTO: 0.4 %
BILIRUB SERPL-MCNC: 0.3 MG/DL
BILIRUBIN URINE: NEGATIVE
BLOOD URINE: NEGATIVE
BUN SERPL-MCNC: 39 MG/DL
CALCIUM SERPL-MCNC: 9.5 MG/DL
CALCIUM SERPL-MCNC: 9.5 MG/DL
CHLORIDE SERPL-SCNC: 112 MMOL/L
CHOLEST SERPL-MCNC: 198 MG/DL
CO2 SERPL-SCNC: 20 MMOL/L
COLOR: NORMAL
CREAT SERPL-MCNC: 1.87 MG/DL
CREAT SPEC-SCNC: 73 MG/DL
CREAT/PROT UR: 0.3 RATIO
EOSINOPHIL # BLD AUTO: 0.02 K/UL
EOSINOPHIL NFR BLD AUTO: 0.4 %
ESTIMATED AVERAGE GLUCOSE: 103 MG/DL
GLUCOSE QUALITATIVE U: NEGATIVE
GLUCOSE SERPL-MCNC: 161 MG/DL
HBA1C MFR BLD HPLC: 5.2 %
HCT VFR BLD CALC: 31.7 %
HDLC SERPL-MCNC: 55 MG/DL
HGB BLD-MCNC: 9.7 G/DL
HYALINE CASTS: 0 /LPF
IMM GRANULOCYTES NFR BLD AUTO: 0.4 %
KETONES URINE: NEGATIVE
LDH SERPL-CCNC: 177 U/L
LDLC SERPL CALC-MCNC: 107 MG/DL
LEUKOCYTE ESTERASE URINE: NEGATIVE
LYMPHOCYTES # BLD AUTO: 0.79 K/UL
LYMPHOCYTES NFR BLD AUTO: 17.8 %
MAGNESIUM SERPL-MCNC: 2.1 MG/DL
MAN DIFF?: NORMAL
MCHC RBC-ENTMCNC: 29.1 PG
MCHC RBC-ENTMCNC: 30.6 GM/DL
MCV RBC AUTO: 95.2 FL
MICROSCOPIC-UA: NORMAL
MONOCYTES # BLD AUTO: 0.37 K/UL
MONOCYTES NFR BLD AUTO: 8.3 %
NEUTROPHILS # BLD AUTO: 3.23 K/UL
NEUTROPHILS NFR BLD AUTO: 72.7 %
NITRITE URINE: NEGATIVE
NONHDLC SERPL-MCNC: 143 MG/DL
PARATHYROID HORMONE INTACT: 159 PG/ML
PH URINE: 6.5
PHOSPHATE SERPL-MCNC: 3.7 MG/DL
PLATELET # BLD AUTO: 125 K/UL
POTASSIUM SERPL-SCNC: 5.1 MMOL/L
PROT SERPL-MCNC: 6.7 G/DL
PROT UR-MCNC: 21 MG/DL
PROTEIN URINE: NORMAL
RBC # BLD: 3.33 M/UL
RBC # FLD: 13.3 %
RED BLOOD CELLS URINE: 0 /HPF
SODIUM SERPL-SCNC: 142 MMOL/L
SPECIFIC GRAVITY URINE: 1.02
SQUAMOUS EPITHELIAL CELLS: 5 /HPF
TACROLIMUS SERPL-MCNC: 4.1 NG/ML
TRIGL SERPL-MCNC: 180 MG/DL
URATE SERPL-MCNC: 5.3 MG/DL
UROBILINOGEN URINE: NORMAL
WBC # FLD AUTO: 4.45 K/UL
WHITE BLOOD CELLS URINE: 3 /HPF

## 2021-05-04 ENCOUNTER — NON-APPOINTMENT (OUTPATIENT)
Age: 76
End: 2021-05-04

## 2021-05-04 LAB
BKV DNA SPEC QL NAA+PROBE: 3590 COPIES/ML
CMV DNA SPEC QL NAA+PROBE: NOT DETECTED IU/ML
LOG 10 BK QUANTITATION PCR: 3.56

## 2021-05-06 LAB
ALBUMIN MFR SERPL ELPH: 62.2 %
ALBUMIN SERPL ELPH-MCNC: 4.3 G/DL
ALBUMIN SERPL-MCNC: 4.1 G/DL
ALBUMIN/GLOB SERPL: 1.6 RATIO
ALP BLD-CCNC: 45 U/L
ALPHA1 GLOB MFR SERPL ELPH: 4.7 %
ALPHA1 GLOB SERPL ELPH-MCNC: 0.3 G/DL
ALPHA2 GLOB MFR SERPL ELPH: 9.4 %
ALPHA2 GLOB SERPL ELPH-MCNC: 0.6 G/DL
ALT SERPL-CCNC: 5 U/L
ANION GAP SERPL CALC-SCNC: 11 MMOL/L
AST SERPL-CCNC: 15 U/L
B-GLOBULIN MFR SERPL ELPH: 9.4 %
B-GLOBULIN SERPL ELPH-MCNC: 0.6 G/DL
BILIRUB SERPL-MCNC: 0.2 MG/DL
BUN SERPL-MCNC: 40 MG/DL
CALCIUM SERPL-MCNC: 9.3 MG/DL
CHLORIDE SERPL-SCNC: 109 MMOL/L
CO2 SERPL-SCNC: 21 MMOL/L
CREAT SERPL-MCNC: 1.86 MG/DL
DEPRECATED KAPPA LC FREE/LAMBDA SER: 6.34 RATIO
FERRITIN SERPL-MCNC: 1916 NG/ML
GAMMA GLOB FLD ELPH-MCNC: 0.9 G/DL
GAMMA GLOB MFR SERPL ELPH: 14.3 %
GLUCOSE SERPL-MCNC: 187 MG/DL
IGA SER QL IEP: 171 MG/DL
IGG SER QL IEP: 1101 MG/DL
IGM SER QL IEP: 32 MG/DL
INTERPRETATION SERPL IEP-IMP: NORMAL
IRON SATN MFR SERPL: 20 %
IRON SERPL-MCNC: 47 UG/DL
KAPPA LC CSF-MCNC: 1.66 MG/DL
KAPPA LC SERPL-MCNC: 10.52 MG/DL
M PROTEIN MFR SERPL ELPH: 6.7 %
M PROTEIN SPEC IFE-MCNC: NORMAL
MONOCLON BAND OBS SERPL: 0.4 G/DL
POTASSIUM SERPL-SCNC: 5.5 MMOL/L
PROT SERPL-MCNC: 6.6 G/DL
SODIUM SERPL-SCNC: 141 MMOL/L
TIBC SERPL-MCNC: 233 UG/DL
UIBC SERPL-MCNC: 186 UG/DL

## 2021-05-09 ENCOUNTER — RX RENEWAL (OUTPATIENT)
Age: 76
End: 2021-05-09

## 2021-05-28 ENCOUNTER — RX RENEWAL (OUTPATIENT)
Age: 76
End: 2021-05-28

## 2021-06-24 ENCOUNTER — NON-APPOINTMENT (OUTPATIENT)
Age: 76
End: 2021-06-24

## 2021-06-24 ENCOUNTER — APPOINTMENT (OUTPATIENT)
Dept: TRANSPLANT | Facility: CLINIC | Age: 76
End: 2021-06-24

## 2021-06-24 LAB
ALBUMIN SERPL ELPH-MCNC: 4.3 G/DL
ALP BLD-CCNC: 36 U/L
ALT SERPL-CCNC: 7 U/L
ANION GAP SERPL CALC-SCNC: 9 MMOL/L
APPEARANCE: CLEAR
AST SERPL-CCNC: 18 U/L
BACTERIA: ABNORMAL
BASOPHILS # BLD AUTO: 0.03 K/UL
BASOPHILS NFR BLD AUTO: 0.7 %
BILIRUB SERPL-MCNC: 0.3 MG/DL
BILIRUBIN URINE: NEGATIVE
BLOOD URINE: NEGATIVE
BUN SERPL-MCNC: 32 MG/DL
CALCIUM SERPL-MCNC: 9.5 MG/DL
CHLORIDE SERPL-SCNC: 109 MMOL/L
CO2 SERPL-SCNC: 22 MMOL/L
COLOR: YELLOW
CREAT SERPL-MCNC: 1.92 MG/DL
CREAT SPEC-SCNC: 190 MG/DL
CREAT/PROT UR: 0.2 RATIO
EOSINOPHIL # BLD AUTO: 0.07 K/UL
EOSINOPHIL NFR BLD AUTO: 1.7 %
GLUCOSE QUALITATIVE U: NEGATIVE
GLUCOSE SERPL-MCNC: 103 MG/DL
HCT VFR BLD CALC: 32 %
HGB BLD-MCNC: 10 G/DL
HYALINE CASTS: 2 /LPF
IMM GRANULOCYTES NFR BLD AUTO: 0.7 %
KETONES URINE: NEGATIVE
LDH SERPL-CCNC: 161 U/L
LEUKOCYTE ESTERASE URINE: NEGATIVE
LYMPHOCYTES # BLD AUTO: 0.86 K/UL
LYMPHOCYTES NFR BLD AUTO: 20.4 %
MAGNESIUM SERPL-MCNC: 2 MG/DL
MAN DIFF?: NORMAL
MCHC RBC-ENTMCNC: 29.2 PG
MCHC RBC-ENTMCNC: 31.3 GM/DL
MCV RBC AUTO: 93.6 FL
MICROSCOPIC-UA: NORMAL
MONOCYTES # BLD AUTO: 0.48 K/UL
MONOCYTES NFR BLD AUTO: 11.4 %
NEUTROPHILS # BLD AUTO: 2.75 K/UL
NEUTROPHILS NFR BLD AUTO: 65.1 %
NITRITE URINE: NEGATIVE
PH URINE: 6
PHOSPHATE SERPL-MCNC: 3.4 MG/DL
PLATELET # BLD AUTO: 122 K/UL
POTASSIUM SERPL-SCNC: 4.6 MMOL/L
PROT SERPL-MCNC: 6.5 G/DL
PROT UR-MCNC: 45 MG/DL
PROTEIN URINE: ABNORMAL
RBC # BLD: 3.42 M/UL
RBC # FLD: 13.6 %
RED BLOOD CELLS URINE: 2 /HPF
SODIUM SERPL-SCNC: 141 MMOL/L
SPECIFIC GRAVITY URINE: 1.03
SQUAMOUS EPITHELIAL CELLS: 14 /HPF
TACROLIMUS SERPL-MCNC: 4.7 NG/ML
URATE SERPL-MCNC: 5.5 MG/DL
UROBILINOGEN URINE: NORMAL
WBC # FLD AUTO: 4.22 K/UL
WHITE BLOOD CELLS URINE: 8 /HPF

## 2021-06-28 ENCOUNTER — APPOINTMENT (OUTPATIENT)
Dept: NEPHROLOGY | Facility: CLINIC | Age: 76
End: 2021-06-28
Payer: MEDICARE

## 2021-06-28 VITALS
HEART RATE: 72 BPM | WEIGHT: 129 LBS | DIASTOLIC BLOOD PRESSURE: 78 MMHG | TEMPERATURE: 97 F | SYSTOLIC BLOOD PRESSURE: 110 MMHG | RESPIRATION RATE: 14 BRPM | BODY MASS INDEX: 21.47 KG/M2

## 2021-06-28 LAB
BKV DNA SPEC QL NAA+PROBE: 1640 COPIES/ML
CMV DNA SPEC QL NAA+PROBE: NOT DETECTED IU/ML
LOG 10 BK QUANTITATION PCR: 3.21

## 2021-06-28 PROCEDURE — 99214 OFFICE O/P EST MOD 30 MIN: CPT

## 2021-06-28 NOTE — ASSESSMENT
[FreeTextEntry1] : Renal Transplant recipient: Had delayed allograft function, elevated creatinine at discharge. Has urinary frequency and nocturia. No dysuria/hematuria. No fever/chills. Tolerating medications.\par Noted creatinine, most recent 1.9 mg/dl. Clinically doing well.\par Immunosuppression: reviewed; simulect induction, on tac/leflunomide/prednisone, last Tac level noted; will recheck. Currently on Envarsus  5 mg daily.; Leflunomide 40 mg/day and prednisone at 5 mg daily\par Target Tac level 4-6 ng/ml in view of BK viremia\par Hypertension:  Reviewed medications.  Blood pressure is controlled.\par DM: Reviewed. Will continue to monitor glucose  . \par BK Viremia: Pending PCR results. continue current management.\par Infection prophylaxis: On Bactrim,  as well as GI prophylaxis. \par Discussed ambulation,   optimal glucose and blood pressure readings, adherence with medications and follow ups, follow up clinic visit schedule, avoiding dehydration, mosquito bites; prevention of DVT as well as food safety.\par Renal Preservation Strategies and infection prevention strategies reviewed with patient.\par Discussed COVID prevention and vaccination. She has not had vaccines yet. She is planning to take it.\par \par

## 2021-06-28 NOTE — HISTORY OF PRESENT ILLNESS
[FreeTextEntry1] : Received DDRT on 12/7/2019.  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 Lives  now with Beatriz who moved back from [place]. Feels tired otherwise ok\par Has multiple family members living with her.\par Overall doing well. Noted recent labs, stable improved creatinine.\par \par \par

## 2021-07-22 ENCOUNTER — APPOINTMENT (OUTPATIENT)
Dept: INTERNAL MEDICINE | Facility: CLINIC | Age: 76
End: 2021-07-22
Payer: MEDICARE

## 2021-07-22 DIAGNOSIS — J02.9 ACUTE PHARYNGITIS, UNSPECIFIED: ICD-10-CM

## 2021-07-22 DIAGNOSIS — R09.82 POSTNASAL DRIP: ICD-10-CM

## 2021-07-22 PROCEDURE — 99214 OFFICE O/P EST MOD 30 MIN: CPT | Mod: 95

## 2021-07-22 RX ORDER — BROMPHENIRAMINE MALEATE, PSEUDOEPHEDRINE HYDROCHLORIDE, 2; 30; 10 MG/5ML; MG/5ML; MG/5ML
30-2-10 SYRUP ORAL
Qty: 1 | Refills: 0 | Status: DISCONTINUED | COMMUNITY
Start: 2021-07-22 | End: 2021-07-22

## 2021-08-01 ENCOUNTER — OUTPATIENT (OUTPATIENT)
Dept: OUTPATIENT SERVICES | Facility: HOSPITAL | Age: 76
LOS: 1 days | End: 2021-08-01
Payer: MEDICARE

## 2021-08-01 DIAGNOSIS — Z87.81 PERSONAL HISTORY OF (HEALED) TRAUMATIC FRACTURE: Chronic | ICD-10-CM

## 2021-08-01 DIAGNOSIS — E27.9 DISORDER OF ADRENAL GLAND, UNSPECIFIED: Chronic | ICD-10-CM

## 2021-08-01 DIAGNOSIS — Z90.710 ACQUIRED ABSENCE OF BOTH CERVIX AND UTERUS: Chronic | ICD-10-CM

## 2021-08-01 DIAGNOSIS — Z98.890 OTHER SPECIFIED POSTPROCEDURAL STATES: Chronic | ICD-10-CM

## 2021-08-09 ENCOUNTER — NON-APPOINTMENT (OUTPATIENT)
Age: 76
End: 2021-08-09

## 2021-08-12 ENCOUNTER — OUTPATIENT (OUTPATIENT)
Dept: OUTPATIENT SERVICES | Facility: HOSPITAL | Age: 76
LOS: 1 days | End: 2021-08-12
Payer: MEDICARE

## 2021-08-12 ENCOUNTER — APPOINTMENT (OUTPATIENT)
Dept: RADIOLOGY | Facility: CLINIC | Age: 76
End: 2021-08-12
Payer: MEDICARE

## 2021-08-12 DIAGNOSIS — R05 COUGH: ICD-10-CM

## 2021-08-12 DIAGNOSIS — E27.9 DISORDER OF ADRENAL GLAND, UNSPECIFIED: Chronic | ICD-10-CM

## 2021-08-12 DIAGNOSIS — Z87.81 PERSONAL HISTORY OF (HEALED) TRAUMATIC FRACTURE: Chronic | ICD-10-CM

## 2021-08-12 DIAGNOSIS — Z98.890 OTHER SPECIFIED POSTPROCEDURAL STATES: Chronic | ICD-10-CM

## 2021-08-12 DIAGNOSIS — Z90.710 ACQUIRED ABSENCE OF BOTH CERVIX AND UTERUS: Chronic | ICD-10-CM

## 2021-08-12 PROCEDURE — 71046 X-RAY EXAM CHEST 2 VIEWS: CPT | Mod: 26

## 2021-08-12 PROCEDURE — 71046 X-RAY EXAM CHEST 2 VIEWS: CPT

## 2021-08-14 ENCOUNTER — TRANSCRIPTION ENCOUNTER (OUTPATIENT)
Age: 76
End: 2021-08-14

## 2021-08-22 ENCOUNTER — TRANSCRIPTION ENCOUNTER (OUTPATIENT)
Age: 76
End: 2021-08-22

## 2021-08-22 ENCOUNTER — INPATIENT (INPATIENT)
Facility: HOSPITAL | Age: 76
LOS: 4 days | Discharge: HOME HEALTH | DRG: 177 | End: 2021-08-27
Attending: HOSPITALIST | Admitting: STUDENT IN AN ORGANIZED HEALTH CARE EDUCATION/TRAINING PROGRAM
Payer: MEDICARE

## 2021-08-22 VITALS
SYSTOLIC BLOOD PRESSURE: 157 MMHG | HEART RATE: 103 BPM | TEMPERATURE: 98 F | OXYGEN SATURATION: 96 % | HEIGHT: 64 IN | RESPIRATION RATE: 20 BRPM | WEIGHT: 119.93 LBS | DIASTOLIC BLOOD PRESSURE: 84 MMHG

## 2021-08-22 DIAGNOSIS — Z98.890 OTHER SPECIFIED POSTPROCEDURAL STATES: Chronic | ICD-10-CM

## 2021-08-22 DIAGNOSIS — Z87.81 PERSONAL HISTORY OF (HEALED) TRAUMATIC FRACTURE: Chronic | ICD-10-CM

## 2021-08-22 DIAGNOSIS — Z90.710 ACQUIRED ABSENCE OF BOTH CERVIX AND UTERUS: Chronic | ICD-10-CM

## 2021-08-22 DIAGNOSIS — E27.9 DISORDER OF ADRENAL GLAND, UNSPECIFIED: Chronic | ICD-10-CM

## 2021-08-22 PROCEDURE — 93010 ELECTROCARDIOGRAM REPORT: CPT

## 2021-08-22 PROCEDURE — 99285 EMERGENCY DEPT VISIT HI MDM: CPT | Mod: CS,GC

## 2021-08-22 RX ORDER — DEXAMETHASONE 0.5 MG/5ML
6 ELIXIR ORAL ONCE
Refills: 0 | Status: COMPLETED | OUTPATIENT
Start: 2021-08-22 | End: 2021-08-22

## 2021-08-22 NOTE — ED PROVIDER NOTE - PHYSICAL EXAMINATION
Gen: Alert and oriented. Increased work of breathing. Answering questions appropriately  HEENT: extra occular movements intact, no nasal discharge, mucous membranes moist  CV: Regular rate and rhythm, +S1/S2, no murmurs/rubs/gallops,   Resp: Tachypnic. Satting 91% on RA. Crackles at bases  GI: Abdomen soft non-distended, non tender to palpation, no masses  MSK: No open wounds, no bruising, no LE edema, Homans sign negative bl  Neuro: A&Ox4, following commands, moving all four extremities spontaneously  Psych: appropriate mood

## 2021-08-22 NOTE — ED PROVIDER NOTE - ATTENDING CONTRIBUTION TO CARE
pt is a 77 y/o 75 y/o woman with PMHx of HTN, HLD, Gout, LUE DVT, Lumbar radiculopathy, breast CA s/p lumpectomy on Tamoxifen, ESRD here with covid sts since last weekend covid pos on thursday sent in by the family for hypoxia sating 91-92% on ra here tachypneic likely will need admission for covid, immunosuppressed, decadron, admission, covid order set used.

## 2021-08-22 NOTE — ED PROVIDER NOTE - NS ED ROS FT
Gen: Endorses fever, chills, generalized weakness  CV: Denies chest pain, palpitations  HEENT: Denies neck pain, headache, vision changes  Skin: Denies rash, erythema, color changes  Resp: Endorses SOB, cough  Endo: Denies sensitivity to heat, cold, increased urination  GI: Denies constipation, nausea, vomiting, diarrhea  Msk: Denies back pain, LE swelling, extremity pain  : Denies dysuria, increased frequency  Neuro: Denies LOC, focal weakness, numbness, tingling  Psych: Denies hx of psych, SI, HI

## 2021-08-22 NOTE — ED PROVIDER NOTE - OBJECTIVE STATEMENT
77 yo F with PMHx of HTN, HLD, Gout, LUE DVT, Lumbar radiculopathy, breast CA s/p lumpectomy, ESRD now sp transplant in 2019 (on immunosuppression) presenting with SOB after being COVID positive last Thursday. Had symptoms for two weeks but initial COVID was negative. Came to ED today because she her O2 sa2 was 91% on RA. Endorses cough and sob. No cp or LE swelling.

## 2021-08-22 NOTE — ED PROVIDER NOTE - PROGRESS NOTE DETAILS
Joseph Frankel PGY3: Patient satting at 91% onRA and 80s when walking. Patient now on O2 and stable. Will admit for further management. Attending MD Ross: Admitted for COVID hypoxia

## 2021-08-23 DIAGNOSIS — M54.5 LOW BACK PAIN: ICD-10-CM

## 2021-08-23 DIAGNOSIS — U07.1 COVID-19: ICD-10-CM

## 2021-08-23 DIAGNOSIS — Z94.0 KIDNEY TRANSPLANT STATUS: ICD-10-CM

## 2021-08-23 DIAGNOSIS — D75.89 OTHER SPECIFIED DISEASES OF BLOOD AND BLOOD-FORMING ORGANS: ICD-10-CM

## 2021-08-23 DIAGNOSIS — E11.9 TYPE 2 DIABETES MELLITUS WITHOUT COMPLICATIONS: ICD-10-CM

## 2021-08-23 DIAGNOSIS — O07.1 DELAYED OR EXCESSIVE HEMORRHAGE FOLLOWING FAILED ATTEMPTED TERMINATION OF PREGNANCY: ICD-10-CM

## 2021-08-23 DIAGNOSIS — C50.919 MALIGNANT NEOPLASM OF UNSPECIFIED SITE OF UNSPECIFIED FEMALE BREAST: ICD-10-CM

## 2021-08-23 DIAGNOSIS — I10 ESSENTIAL (PRIMARY) HYPERTENSION: ICD-10-CM

## 2021-08-23 DIAGNOSIS — Z29.9 ENCOUNTER FOR PROPHYLACTIC MEASURES, UNSPECIFIED: ICD-10-CM

## 2021-08-23 LAB
ALBUMIN SERPL ELPH-MCNC: 3.3 G/DL — SIGNIFICANT CHANGE UP (ref 3.3–5)
ALBUMIN SERPL ELPH-MCNC: 3.8 G/DL — SIGNIFICANT CHANGE UP (ref 3.3–5)
ALP SERPL-CCNC: 36 U/L — LOW (ref 40–120)
ALP SERPL-CCNC: 41 U/L — SIGNIFICANT CHANGE UP (ref 40–120)
ALT FLD-CCNC: 10 U/L — SIGNIFICANT CHANGE UP (ref 10–45)
ALT FLD-CCNC: 13 U/L — SIGNIFICANT CHANGE UP (ref 10–45)
ANION GAP SERPL CALC-SCNC: 13 MMOL/L — SIGNIFICANT CHANGE UP (ref 5–17)
ANION GAP SERPL CALC-SCNC: 15 MMOL/L — SIGNIFICANT CHANGE UP (ref 5–17)
ANISOCYTOSIS BLD QL: SLIGHT — SIGNIFICANT CHANGE UP
APTT BLD: 27.8 SEC — SIGNIFICANT CHANGE UP (ref 27.5–35.5)
APTT BLD: 29.1 SEC — SIGNIFICANT CHANGE UP (ref 27.5–35.5)
AST SERPL-CCNC: 27 U/L — SIGNIFICANT CHANGE UP (ref 10–40)
AST SERPL-CCNC: 27 U/L — SIGNIFICANT CHANGE UP (ref 10–40)
BASE EXCESS BLDV CALC-SCNC: -2.7 MMOL/L — LOW (ref -2–2)
BASOPHILS # BLD AUTO: 0 K/UL — SIGNIFICANT CHANGE UP (ref 0–0.2)
BASOPHILS # BLD AUTO: 0 K/UL — SIGNIFICANT CHANGE UP (ref 0–0.2)
BASOPHILS NFR BLD AUTO: 0 % — SIGNIFICANT CHANGE UP (ref 0–2)
BASOPHILS NFR BLD AUTO: 0 % — SIGNIFICANT CHANGE UP (ref 0–2)
BILIRUB SERPL-MCNC: 0.4 MG/DL — SIGNIFICANT CHANGE UP (ref 0.2–1.2)
BILIRUB SERPL-MCNC: 0.4 MG/DL — SIGNIFICANT CHANGE UP (ref 0.2–1.2)
BUN SERPL-MCNC: 25 MG/DL — HIGH (ref 7–23)
BUN SERPL-MCNC: 25 MG/DL — HIGH (ref 7–23)
CA-I SERPL-SCNC: 1.25 MMOL/L — SIGNIFICANT CHANGE UP (ref 1.15–1.33)
CALCIUM SERPL-MCNC: 9.2 MG/DL — SIGNIFICANT CHANGE UP (ref 8.4–10.5)
CALCIUM SERPL-MCNC: 9.3 MG/DL — SIGNIFICANT CHANGE UP (ref 8.4–10.5)
CHLORIDE BLDV-SCNC: 103 MMOL/L — SIGNIFICANT CHANGE UP (ref 96–108)
CHLORIDE SERPL-SCNC: 99 MMOL/L — SIGNIFICANT CHANGE UP (ref 96–108)
CHLORIDE SERPL-SCNC: 99 MMOL/L — SIGNIFICANT CHANGE UP (ref 96–108)
CO2 BLDV-SCNC: 24 MMOL/L — SIGNIFICANT CHANGE UP (ref 22–26)
CO2 SERPL-SCNC: 17 MMOL/L — LOW (ref 22–31)
CO2 SERPL-SCNC: 19 MMOL/L — LOW (ref 22–31)
CREAT SERPL-MCNC: 1.85 MG/DL — HIGH (ref 0.5–1.3)
CREAT SERPL-MCNC: 1.95 MG/DL — HIGH (ref 0.5–1.3)
CRP SERPL-MCNC: 140 MG/L — HIGH (ref 0–4)
D DIMER BLD IA.RAPID-MCNC: 2508 NG/ML DDU — HIGH
D DIMER BLD IA.RAPID-MCNC: 2588 NG/ML DDU — HIGH
DACRYOCYTES BLD QL SMEAR: SLIGHT — SIGNIFICANT CHANGE UP
DACRYOCYTES BLD QL SMEAR: SLIGHT — SIGNIFICANT CHANGE UP
ELLIPTOCYTES BLD QL SMEAR: SIGNIFICANT CHANGE UP
ELLIPTOCYTES BLD QL SMEAR: SLIGHT — SIGNIFICANT CHANGE UP
EOSINOPHIL # BLD AUTO: 0 K/UL — SIGNIFICANT CHANGE UP (ref 0–0.5)
EOSINOPHIL # BLD AUTO: 0 K/UL — SIGNIFICANT CHANGE UP (ref 0–0.5)
EOSINOPHIL NFR BLD AUTO: 0 % — SIGNIFICANT CHANGE UP (ref 0–6)
EOSINOPHIL NFR BLD AUTO: 0 % — SIGNIFICANT CHANGE UP (ref 0–6)
FERRITIN SERPL-MCNC: 4566 NG/ML — HIGH (ref 15–150)
GAS PNL BLDV: 133 MMOL/L — LOW (ref 136–145)
GAS PNL BLDV: SIGNIFICANT CHANGE UP
GAS PNL BLDV: SIGNIFICANT CHANGE UP
GLUCOSE BLDC GLUCOMTR-MCNC: 142 MG/DL — HIGH (ref 70–99)
GLUCOSE BLDC GLUCOMTR-MCNC: 183 MG/DL — HIGH (ref 70–99)
GLUCOSE BLDC GLUCOMTR-MCNC: 226 MG/DL — HIGH (ref 70–99)
GLUCOSE BLDC GLUCOMTR-MCNC: 236 MG/DL — HIGH (ref 70–99)
GLUCOSE BLDC GLUCOMTR-MCNC: 242 MG/DL — HIGH (ref 70–99)
GLUCOSE BLDV-MCNC: 213 MG/DL — HIGH (ref 70–99)
GLUCOSE SERPL-MCNC: 218 MG/DL — HIGH (ref 70–99)
GLUCOSE SERPL-MCNC: 219 MG/DL — HIGH (ref 70–99)
HCO3 BLDV-SCNC: 23 MMOL/L — SIGNIFICANT CHANGE UP (ref 22–29)
HCT VFR BLD CALC: 29 % — LOW (ref 34.5–45)
HCT VFR BLD CALC: 31.4 % — LOW (ref 34.5–45)
HCT VFR BLDA CALC: 10 % — CRITICAL LOW (ref 34.5–46.5)
HGB BLD CALC-MCNC: <4 G/DL — CRITICAL LOW (ref 11.7–16.1)
HGB BLD-MCNC: 9.2 G/DL — LOW (ref 11.5–15.5)
HGB BLD-MCNC: 9.9 G/DL — LOW (ref 11.5–15.5)
INR BLD: 0.97 RATIO — SIGNIFICANT CHANGE UP (ref 0.88–1.16)
INR BLD: 0.99 RATIO — SIGNIFICANT CHANGE UP (ref 0.88–1.16)
LACTATE BLDV-MCNC: 1.2 MMOL/L — SIGNIFICANT CHANGE UP (ref 0.7–2)
LYMPHOCYTES # BLD AUTO: 0.32 K/UL — LOW (ref 1–3.3)
LYMPHOCYTES # BLD AUTO: 0.41 K/UL — LOW (ref 1–3.3)
LYMPHOCYTES # BLD AUTO: 4 % — LOW (ref 13–44)
LYMPHOCYTES # BLD AUTO: 5.4 % — LOW (ref 13–44)
MACROCYTES BLD QL: SLIGHT — SIGNIFICANT CHANGE UP
MAGNESIUM SERPL-MCNC: 2.2 MG/DL — SIGNIFICANT CHANGE UP (ref 1.6–2.6)
MANUAL SMEAR VERIFICATION: SIGNIFICANT CHANGE UP
MANUAL SMEAR VERIFICATION: SIGNIFICANT CHANGE UP
MCHC RBC-ENTMCNC: 28.4 PG — SIGNIFICANT CHANGE UP (ref 27–34)
MCHC RBC-ENTMCNC: 28.4 PG — SIGNIFICANT CHANGE UP (ref 27–34)
MCHC RBC-ENTMCNC: 31.5 GM/DL — LOW (ref 32–36)
MCHC RBC-ENTMCNC: 31.7 GM/DL — LOW (ref 32–36)
MCV RBC AUTO: 89.5 FL — SIGNIFICANT CHANGE UP (ref 80–100)
MCV RBC AUTO: 90 FL — SIGNIFICANT CHANGE UP (ref 80–100)
MONOCYTES # BLD AUTO: 0.07 K/UL — SIGNIFICANT CHANGE UP (ref 0–0.9)
MONOCYTES # BLD AUTO: 0.48 K/UL — SIGNIFICANT CHANGE UP (ref 0–0.9)
MONOCYTES NFR BLD AUTO: 0.9 % — LOW (ref 2–14)
MONOCYTES NFR BLD AUTO: 6 % — SIGNIFICANT CHANGE UP (ref 2–14)
NEUTROPHILS # BLD AUTO: 7.15 K/UL — SIGNIFICANT CHANGE UP (ref 1.8–7.4)
NEUTROPHILS # BLD AUTO: 7.24 K/UL — SIGNIFICANT CHANGE UP (ref 1.8–7.4)
NEUTROPHILS NFR BLD AUTO: 81 % — HIGH (ref 43–77)
NEUTROPHILS NFR BLD AUTO: 85.7 % — HIGH (ref 43–77)
NEUTS BAND # BLD: 8 % — SIGNIFICANT CHANGE UP (ref 0–8)
NEUTS BAND # BLD: 9 % — HIGH (ref 0–8)
NRBC # BLD: 0 /100 — SIGNIFICANT CHANGE UP (ref 0–0)
NT-PROBNP SERPL-SCNC: 2173 PG/ML — HIGH (ref 0–300)
PCO2 BLDV: 41 MMHG — SIGNIFICANT CHANGE UP (ref 39–42)
PH BLDV: 7.35 — SIGNIFICANT CHANGE UP (ref 7.32–7.43)
PHOSPHATE SERPL-MCNC: 2.2 MG/DL — LOW (ref 2.5–4.5)
PLAT MORPH BLD: NORMAL — SIGNIFICANT CHANGE UP
PLAT MORPH BLD: NORMAL — SIGNIFICANT CHANGE UP
PLATELET # BLD AUTO: 106 K/UL — LOW (ref 150–400)
PLATELET # BLD AUTO: 124 K/UL — LOW (ref 150–400)
PO2 BLDV: 27 MMHG — SIGNIFICANT CHANGE UP (ref 25–45)
POIKILOCYTOSIS BLD QL AUTO: SIGNIFICANT CHANGE UP
POIKILOCYTOSIS BLD QL AUTO: SLIGHT — SIGNIFICANT CHANGE UP
POTASSIUM BLDV-SCNC: 5 MMOL/L — SIGNIFICANT CHANGE UP (ref 3.5–5.1)
POTASSIUM SERPL-MCNC: 4.9 MMOL/L — SIGNIFICANT CHANGE UP (ref 3.5–5.3)
POTASSIUM SERPL-MCNC: 5.5 MMOL/L — HIGH (ref 3.5–5.3)
POTASSIUM SERPL-SCNC: 4.9 MMOL/L — SIGNIFICANT CHANGE UP (ref 3.5–5.3)
POTASSIUM SERPL-SCNC: 5.5 MMOL/L — HIGH (ref 3.5–5.3)
PROCALCITONIN SERPL-MCNC: 0.63 NG/ML — HIGH (ref 0.02–0.1)
PROT SERPL-MCNC: 6.6 G/DL — SIGNIFICANT CHANGE UP (ref 6–8.3)
PROT SERPL-MCNC: 7.1 G/DL — SIGNIFICANT CHANGE UP (ref 6–8.3)
PROTHROM AB SERPL-ACNC: 11.6 SEC — SIGNIFICANT CHANGE UP (ref 10.6–13.6)
PROTHROM AB SERPL-ACNC: 11.9 SEC — SIGNIFICANT CHANGE UP (ref 10.6–13.6)
RBC # BLD: 3.24 M/UL — LOW (ref 3.8–5.2)
RBC # BLD: 3.49 M/UL — LOW (ref 3.8–5.2)
RBC # FLD: 12.9 % — SIGNIFICANT CHANGE UP (ref 10.3–14.5)
RBC # FLD: 13 % — SIGNIFICANT CHANGE UP (ref 10.3–14.5)
RBC BLD AUTO: ABNORMAL
RBC BLD AUTO: ABNORMAL
SAO2 % BLDV: 50.4 % — LOW (ref 67–88)
SARS-COV-2 RNA SPEC QL NAA+PROBE: DETECTED
SODIUM SERPL-SCNC: 129 MMOL/L — LOW (ref 135–145)
SODIUM SERPL-SCNC: 133 MMOL/L — LOW (ref 135–145)
TROPONIN T, HIGH SENSITIVITY RESULT: 41 NG/L — SIGNIFICANT CHANGE UP (ref 0–51)
WBC # BLD: 7.63 K/UL — SIGNIFICANT CHANGE UP (ref 3.8–10.5)
WBC # BLD: 8.04 K/UL — SIGNIFICANT CHANGE UP (ref 3.8–10.5)
WBC # FLD AUTO: 7.63 K/UL — SIGNIFICANT CHANGE UP (ref 3.8–10.5)
WBC # FLD AUTO: 8.04 K/UL — SIGNIFICANT CHANGE UP (ref 3.8–10.5)

## 2021-08-23 PROCEDURE — 93970 EXTREMITY STUDY: CPT | Mod: 26

## 2021-08-23 PROCEDURE — 99223 1ST HOSP IP/OBS HIGH 75: CPT

## 2021-08-23 PROCEDURE — 71045 X-RAY EXAM CHEST 1 VIEW: CPT | Mod: 26

## 2021-08-23 PROCEDURE — 12345: CPT | Mod: NC

## 2021-08-23 PROCEDURE — 99222 1ST HOSP IP/OBS MODERATE 55: CPT | Mod: GC

## 2021-08-23 RX ORDER — SODIUM CHLORIDE 9 MG/ML
1000 INJECTION, SOLUTION INTRAVENOUS
Refills: 0 | Status: DISCONTINUED | OUTPATIENT
Start: 2021-08-23 | End: 2021-08-25

## 2021-08-23 RX ORDER — SODIUM ZIRCONIUM CYCLOSILICATE 10 G/10G
10 POWDER, FOR SUSPENSION ORAL ONCE
Refills: 0 | Status: COMPLETED | OUTPATIENT
Start: 2021-08-23 | End: 2021-08-23

## 2021-08-23 RX ORDER — ACETAMINOPHEN 500 MG
650 TABLET ORAL EVERY 4 HOURS
Refills: 0 | Status: DISCONTINUED | OUTPATIENT
Start: 2021-08-23 | End: 2021-08-27

## 2021-08-23 RX ORDER — DEXTROSE 50 % IN WATER 50 %
15 SYRINGE (ML) INTRAVENOUS ONCE
Refills: 0 | Status: DISCONTINUED | OUTPATIENT
Start: 2021-08-23 | End: 2021-08-25

## 2021-08-23 RX ORDER — ACETAMINOPHEN 500 MG
650 TABLET ORAL EVERY 4 HOURS
Refills: 0 | Status: DISCONTINUED | OUTPATIENT
Start: 2021-08-23 | End: 2021-08-24

## 2021-08-23 RX ORDER — INSULIN LISPRO 100/ML
VIAL (ML) SUBCUTANEOUS AT BEDTIME
Refills: 0 | Status: DISCONTINUED | OUTPATIENT
Start: 2021-08-23 | End: 2021-08-25

## 2021-08-23 RX ORDER — CHOLECALCIFEROL (VITAMIN D3) 125 MCG
1 CAPSULE ORAL
Qty: 0 | Refills: 0 | DISCHARGE

## 2021-08-23 RX ORDER — REMDESIVIR 5 MG/ML
INJECTION INTRAVENOUS
Refills: 0 | Status: COMPLETED | OUTPATIENT
Start: 2021-08-23 | End: 2021-08-27

## 2021-08-23 RX ORDER — DEXAMETHASONE 0.5 MG/5ML
6 ELIXIR ORAL DAILY
Refills: 0 | Status: DISCONTINUED | OUTPATIENT
Start: 2021-08-24 | End: 2021-08-27

## 2021-08-23 RX ORDER — DEXTROSE 50 % IN WATER 50 %
25 SYRINGE (ML) INTRAVENOUS ONCE
Refills: 0 | Status: DISCONTINUED | OUTPATIENT
Start: 2021-08-23 | End: 2021-08-25

## 2021-08-23 RX ORDER — METOPROLOL TARTRATE 50 MG
50 TABLET ORAL
Refills: 0 | Status: DISCONTINUED | OUTPATIENT
Start: 2021-08-23 | End: 2021-08-27

## 2021-08-23 RX ORDER — REMDESIVIR 5 MG/ML
200 INJECTION INTRAVENOUS EVERY 24 HOURS
Refills: 0 | Status: COMPLETED | OUTPATIENT
Start: 2021-08-23 | End: 2021-08-23

## 2021-08-23 RX ORDER — SODIUM BICARBONATE 1 MEQ/ML
650 SYRINGE (ML) INTRAVENOUS
Refills: 0 | Status: DISCONTINUED | OUTPATIENT
Start: 2021-08-23 | End: 2021-08-27

## 2021-08-23 RX ORDER — GLUCAGON INJECTION, SOLUTION 0.5 MG/.1ML
1 INJECTION, SOLUTION SUBCUTANEOUS ONCE
Refills: 0 | Status: DISCONTINUED | OUTPATIENT
Start: 2021-08-23 | End: 2021-08-25

## 2021-08-23 RX ORDER — INSULIN GLARGINE 100 [IU]/ML
8 INJECTION, SOLUTION SUBCUTANEOUS AT BEDTIME
Refills: 0 | Status: DISCONTINUED | OUTPATIENT
Start: 2021-08-23 | End: 2021-08-25

## 2021-08-23 RX ORDER — TACROLIMUS 5 MG/1
5 CAPSULE ORAL DAILY
Refills: 0 | Status: DISCONTINUED | OUTPATIENT
Start: 2021-08-24 | End: 2021-08-25

## 2021-08-23 RX ORDER — HEPARIN SODIUM 5000 [USP'U]/ML
5000 INJECTION INTRAVENOUS; SUBCUTANEOUS EVERY 12 HOURS
Refills: 0 | Status: DISCONTINUED | OUTPATIENT
Start: 2021-08-23 | End: 2021-08-27

## 2021-08-23 RX ORDER — INSULIN LISPRO 100/ML
VIAL (ML) SUBCUTANEOUS
Refills: 0 | Status: DISCONTINUED | OUTPATIENT
Start: 2021-08-23 | End: 2021-08-25

## 2021-08-23 RX ORDER — POTASSIUM PHOSPHATE, MONOBASIC POTASSIUM PHOSPHATE, DIBASIC 236; 224 MG/ML; MG/ML
15 INJECTION, SOLUTION INTRAVENOUS ONCE
Refills: 0 | Status: DISCONTINUED | OUTPATIENT
Start: 2021-08-23 | End: 2021-08-23

## 2021-08-23 RX ORDER — REMDESIVIR 5 MG/ML
100 INJECTION INTRAVENOUS EVERY 24 HOURS
Refills: 0 | Status: COMPLETED | OUTPATIENT
Start: 2021-08-24 | End: 2021-08-27

## 2021-08-23 RX ORDER — ENOXAPARIN SODIUM 100 MG/ML
40 INJECTION SUBCUTANEOUS DAILY
Refills: 0 | Status: DISCONTINUED | OUTPATIENT
Start: 2021-08-23 | End: 2021-08-23

## 2021-08-23 RX ORDER — DEXTROSE 50 % IN WATER 50 %
12.5 SYRINGE (ML) INTRAVENOUS ONCE
Refills: 0 | Status: DISCONTINUED | OUTPATIENT
Start: 2021-08-23 | End: 2021-08-25

## 2021-08-23 RX ORDER — FAMOTIDINE 10 MG/ML
20 INJECTION INTRAVENOUS DAILY
Refills: 0 | Status: DISCONTINUED | OUTPATIENT
Start: 2021-08-23 | End: 2021-08-27

## 2021-08-23 RX ADMIN — Medication 650 MILLIGRAM(S): at 18:46

## 2021-08-23 RX ADMIN — REMDESIVIR 500 MILLIGRAM(S): 5 INJECTION INTRAVENOUS at 12:01

## 2021-08-23 RX ADMIN — Medication 4: at 08:51

## 2021-08-23 RX ADMIN — Medication 0.1 MILLIGRAM(S): at 18:19

## 2021-08-23 RX ADMIN — HEPARIN SODIUM 5000 UNIT(S): 5000 INJECTION INTRAVENOUS; SUBCUTANEOUS at 18:16

## 2021-08-23 RX ADMIN — HEPARIN SODIUM 5000 UNIT(S): 5000 INJECTION INTRAVENOUS; SUBCUTANEOUS at 06:12

## 2021-08-23 RX ADMIN — SODIUM ZIRCONIUM CYCLOSILICATE 10 GRAM(S): 10 POWDER, FOR SUSPENSION ORAL at 20:36

## 2021-08-23 RX ADMIN — Medication 2: at 17:44

## 2021-08-23 RX ADMIN — Medication 50 MILLIGRAM(S): at 18:16

## 2021-08-23 RX ADMIN — Medication 62.5 MILLIMOLE(S): at 21:49

## 2021-08-23 RX ADMIN — INSULIN GLARGINE 8 UNIT(S): 100 INJECTION, SOLUTION SUBCUTANEOUS at 22:24

## 2021-08-23 RX ADMIN — FAMOTIDINE 20 MILLIGRAM(S): 10 INJECTION INTRAVENOUS at 12:01

## 2021-08-23 RX ADMIN — Medication 50 MILLIGRAM(S): at 06:11

## 2021-08-23 RX ADMIN — Medication 0.1 MILLIGRAM(S): at 06:11

## 2021-08-23 RX ADMIN — Medication 4: at 12:23

## 2021-08-23 RX ADMIN — Medication 6 MILLIGRAM(S): at 01:56

## 2021-08-23 NOTE — PATIENT PROFILE ADULT - NSTRANSFERBELONGINGSDISPO_GEN_A_NUR
with patient Peng Advancement Flap Text: The defect edges were debeveled with a #15 scalpel blade.  Given the location of the defect, shape of the defect and the proximity to free margins a Peng advancement flap was deemed most appropriate.  Using a sterile surgical marker, an appropriate advancement flap was drawn incorporating the defect and placing the expected incisions within the relaxed skin tension lines where possible. The area thus outlined was incised deep to adipose tissue with a #15 scalpel blade.  The skin margins were undermined to an appropriate distance in all directions utilizing iris scissors.

## 2021-08-23 NOTE — CONSULT NOTE ADULT - ASSESSMENT
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 The patient reports that she developed fever, chills, sob, cough, muscle ache 2 weeks prior and then found out she had COVID19 the thursday prior to presentation. 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 The patient reports that she developed fever, chills, sob, cough, muscle ache 2 weeks prior and then found out she had COVID19 the thursday prior to presentation.   Hypoxia on 2l [place]  elevated inflammatory markers      A) COVID  Monitor clinically.  Monitor Oxygenation  O2 supplementation.  Remdesivir - UPTO 5 days course depending on course.  Monitor CBC ,LFTs and Creatinine daily.  While there may be effects on renal function benefits>riska nd RDV has been used in patients with CRI  Monitor creatinine cloosley  Ferritin,CRP and D dimer q 48 hrs.  Dexamethasone as  hypoxia.  Anticoagulation per protocol.  Treatment options are limited-this as well as limitations of data discussed with pt.  Monitor for any bacterial superinfection/complications.  Would obtain CT chest to r/o bacterial process- elevated PCT may be from renal function      B) Hypoxia  from above  doubt bacterial proces but check Non contrast CT chest-if any s/s bacterial superinfection would start ceftriaxone + zithromax  follwo cx    C) DDRT  on immunesuppression  will defer to renal  adjust medication doses per renal function    Will tailor plan for ID issues  per course,results.Will defer to primary team on management of other issues.  Assessment, plan and recommendations as detailed above were discussed with the medical/primary  team.  Will Follow.  Beeper [phone] [facility] [id number].   Wknd/afterhours/No response-[phone] or Fellow on call

## 2021-08-23 NOTE — H&P ADULT - NSHPPHYSICALEXAM_GEN_ALL_CORE
Vital Signs Last 24 Hrs  T(C): 37.4 (23 Aug 2021 04:44), Max: 37.4 (23 Aug 2021 03:05)  T(F): 99.4 (23 Aug 2021 04:44), Max: 99.4 (23 Aug 2021 04:44)  HR: 108 (23 Aug 2021 04:44) (96 - 108)  BP: 182/83 (23 Aug 2021 04:44) (153/83 - 182/83)  RR: 18 (23 Aug 2021 04:44) (17 - 20)  SpO2: 98% (23 Aug 2021 04:44) (96% - 99%) on RA    GENERAL: NAD, well-developed  HEAD:  Atraumatic, Normocephalic  EYES: EOMI, PERRL, conjunctiva and sclera clear  Mouth: MMM, no lesions  NECK: Supple, no appreciable masses  Lung: normal work of breathing, cta b/l  Chest: S1&S2+, rrr, no m/r/g appreciated  ABDOMEN: bs+, soft, nt, nd, no appreciable masses  : No johnson catheter, no CVA tenderness  EXTREMITIES:  radial pulse present b/l, PT present b/l, no pitting edema present  Neuro: A&Ox3, no flaccid paralysis in extremities appreciated  SKIN: warm and dry, no visible purulence in exposed areas

## 2021-08-23 NOTE — H&P ADULT - NSHPREVIEWOFSYSTEMS_GEN_ALL_CORE
CONSTITUTIONAL: fever+, chills+  EYES: No eye pain, no acute blindness  Mouth: no pain in mouth, no cuts  RESPIRATORY: cough+, sob+  CARDIOVASCULAR: No CP, no palpitations  GASTROINTESTINAL: no abdominal pain, no n/v/d  GENITOURINARY: No dysuria, no hematuria  Heme: No easy bruising, no swelling of neck  NEUROLOGICAL: No seizure, No acute paralysis  SKIN: No itching, no rashes  MUSCULOSKELETAL: No acute joint pain, no joint swelling, muscle ache+

## 2021-08-23 NOTE — ED ADULT NURSE NOTE - NSIMPLEMENTINTERV_GEN_ALL_ED
Implemented All Fall Risk Interventions:  [place] to call system. Call bell, personal items and telephone within reach. Instruct patient to call for assistance. Room bathroom lighting operational. Non-slip footwear when patient is off stretcher. Physically safe environment: no spills, clutter or unnecessary equipment. Stretcher in lowest position, wheels locked, appropriate side rails in place. Provide visual cue, wrist band, yellow gown, etc. Monitor gait and stability. Monitor for mental status changes and reorient to person, place, and time. Review medications for side effects contributing to fall risk. Reinforce activity limits and safety measures with patient and family.

## 2021-08-23 NOTE — CONSULT NOTE ADULT - ATTENDING COMMENTS
Pt admitted with Covid19 pneumonia.  Never vaccinated.   Hypoxic on remdesivir and dexamethasone.   Renal function stable.

## 2021-08-23 NOTE — H&P ADULT - NSHPLABSRESULTS_GEN_ALL_CORE
Personally reviewed available labs, imaging and ekg  CBC Full  -  ( 23 Aug 2021 01:38 )  WBC Count : 8.04 K/uL  RBC Count : 3.49 M/uL  Hemoglobin : 9.9 g/dL  Hematocrit : 31.4 %  Platelet Count - Automated : 106 K/uL  Mean Cell Volume : 90.0 fl  Mean Cell Hemoglobin : 28.4 pg  Mean Cell Hemoglobin Concentration : 31.5 gm/dL  Auto Neutrophil # : 7.24 K/uL  Auto Lymphocyte # : 0.32 K/uL  Auto Monocyte # : 0.48 K/uL  Auto Eosinophil # : 0.00 K/uL  Auto Basophil # : 0.00 K/uL  Auto Neutrophil % : 81.0 %  Auto Lymphocyte % : 4.0 %  Auto Monocyte % : 6.0 %  Auto Eosinophil % : 0.0 %  Auto Basophil % : 0.0 %    08-23    133<L>  |  99  |  25<H>  ----------------------------<  218<H>  4.9   |  19<L>  |  1.95<H>    Ca    9.3      23 Aug 2021 01:38    TPro  7.1  /  Alb  3.8  /  TBili  0.4  /  DBili  x   /  AST  27  /  ALT  13  /  AlkPhos  41  08-23    PT/INR - ( 23 Aug 2021 01:38 )   PT: 11.6 sec;   INR: 0.97 ratio         PTT - ( 23 Aug 2021 01:38 )  PTT:27.8 sec  Imaging: lung opacification on left lung on cxr  EKG: sinus tachycardia

## 2021-08-23 NOTE — CONSULT NOTE ADULT - ASSESSMENT
Patient is a 75 y/o F w PMH of ESRD, s/p DDRT on 12/7/19 c/b DGF now off HD, BK viremia on Leflunomide off MMF, HTN, breast cancer s/p lumpectomy on Tamoxifen, DVT, HLD, gout, presented to [facility] for fever, chills and muscle ache. Admitted for COVID-19. Patient never got covid-19 vaccine. Transplant nephrology consulted for kidney transplant management.     # S/p DDRT on 12/7/19   - Baseline Scr ranges from 1.8 to 2.2, last Scr prior to admission was 1.92 on 6/24/21  - Scr is 1.85 mg/dl today  - Continue to monitor labs and urine output  - Dose meds as per GFR    #Immunosuppression  - Got Simulect induction  - On Envarsus 5 mg PO daily, prednisone 5 mg PO daily and Leflunomide 40 mg PO daily  - Resume Bactrim prophylaxis  - Would resume Envarsus 5 mg PO daily and Leflunomide 40 mg PO daily   - Currently on Dexamethazone, so hold pred for now   - Obtain tacrolimus trough daily (30 minute prior to AM dose)     # Hyperkalemia  - Last K 5.5  - Would give 1 dose of Lokelma 10 g PO   - Monitor K   - Low K diet   #COVID 19  - Agree with Remdesvir and Dexamethasone     If any questions, please feel free to contact me     Alla Sykes  Nephrology Fellow  [facility] Pager: [phone]  [facility] Pager: [phone]         Patient is a 75 y/o F w PMH of ESRD, s/p DDRT on 12/7/19 c/b DGF now off HD, 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 Admitted for COVID-19. Patient never got covid-19 vaccine. Transplant nephrology consulted for kidney transplant management.     # S/p DDRT on 12/7/19   - Baseline Scr ranges from 1.8 to 2.2, last Scr prior to admission was 1.92 on 6/24/21  - Scr is 1.85 mg/dl today  - Continue to monitor labs and urine output  - Dose meds as per GFR    #Immunosuppression  - Got Simulect induction  - On Envarsus 5 mg PO daily, prednisone 5 mg PO daily and Leflunomide 40 mg PO daily  - Resume Bactrim prophylaxis  - Would resume Envarsus 5 mg PO daily and Leflunomide 40 mg PO daily   - Currently on Dexamethazone, so hold pred for now   - Obtain tacrolimus trough daily (30 minute prior to AM dose)     # Hyperkalemia  - Last K 5.5  - Would give 1 dose of Lokelma 10 g PO   - Monitor K   - Low K diet     #Metabolic acidosis  - Last bicarb 17, would resume home dose PO sodium bicarb    #Hypophosphatemia  - Last phos 2.2, needs repletion     #COVID 19  - Agree with Remdesvir and Dexamethasone     If any questions, please feel free to contact me     Alla Sykes  Nephrology Fellow  [facility] Pager: [phone]  [facility] Pager: [phone]         Patient is a 75 y/o F w PMH of ESRD, s/p DDRT on 12/7/19 c/b DGF now off HD, 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 Admitted for COVID-19. Patient never got covid-19 vaccine. Transplant nephrology consulted for kidney transplant management.     # S/p DDRT on 12/7/19   - Baseline Scr ranges from 1.8 to 2.2, last Scr prior to admission was 1.92 on 6/24/21  - Scr is 1.85 mg/dl today  - Continue to monitor labs and urine output  - Dose meds as per GFR    #Immunosuppression  - Got Simulect induction  - On Envarsus 5 mg PO daily, prednisone 5 mg PO daily and Leflunomide 40 mg PO daily  - Resume Bactrim prophylaxis  - Would resume Envarsus 5 mg PO daily   - Hold Leflunomide   - Currently on Dexamethazone, so hold pred for now   - Obtain tacrolimus trough daily (30 minute prior to AM dose)     # Hyperkalemia  - Last K 5.5  - Would give 1 dose of Lokelma 10 g PO   - Monitor K   - Low K diet     #Metabolic acidosis  - Last bicarb 17, would resume home dose PO sodium bicarb    #Hypophosphatemia  - Last phos 2.2, needs repletion     #COVID 19  - Agree with Remdesvir and Dexamethasone     If any questions, please feel free to contact me     Alla Sykes  Nephrology Fellow  [facility] Pager: [phone]  [facility] Pager: [phone]         Patient is a 75 y/o F w PMH of ESRD, s/p DDRT on 12/7/19 c/b DGF now off HD, 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 Admitted for COVID-19. Patient never got covid-19 vaccine. Transplant nephrology consulted for kidney transplant management.     # S/p DDRT on 12/7/19   - Baseline Scr ranges from 1.8 to 2.2, last Scr prior to admission was 1.92 on 6/24/21  - Scr is 1.85 mg/dl today  - Obtain BK PCR   - Continue to monitor labs and urine output  - Dose meds as per GFR    #Immunosuppression  - Got Simulect induction  - On Envarsus 5 mg PO daily, prednisone 5 mg PO daily and Leflunomide 40 mg PO daily  - Resume Bactrim prophylaxis  - Would resume Envarsus 5 mg PO daily   - Hold Leflunomide   - Currently on Dexamethazone, so hold pred for now   - Obtain tacrolimus trough daily (30 minute prior to AM dose)     # Hyperkalemia  - Last K 5.5  - Would give 1 dose of Lokelma 10 g PO   - Monitor K   - Low K diet     #Metabolic acidosis  - Last bicarb 17, would resume home dose PO sodium bicarb    #Hypophosphatemia  - Last phos 2.2, needs repletion     #COVID 19  - Agree with Remdesvir and Dexamethasone     If any questions, please feel free to contact me     Alla Sykes  Nephrology Fellow  [facility] Pager: [phone]  [facility] Pager: [phone]

## 2021-08-23 NOTE — H&P ADULT - HISTORY OF PRESENT ILLNESS
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 The patient reports that she developed fever, chills, sob, cough, muscle ache 2 weeks prior and then found out she had COVID19 the thursday prior to presentation. She has sick contact in granddaughter however unclear if she may have gotten sick from another source. No hemoptysis, lower extremity swelling, cp, palpitations, n/v/d, or painful urination.

## 2021-08-23 NOTE — H&P ADULT - ASSESSMENT
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

## 2021-08-23 NOTE — H&P ADULT - PROBLEM SELECTOR PLAN 6
istop #: [id number]  07/02/2021	07/09/2021	oxycodone-acetaminophen  mg tab	90	30	Anne Rodrigues	[id number]	Insurance	[facility]    oxycodone for pain as needed

## 2021-08-23 NOTE — CONSULT NOTE ADULT - SUBJECTIVE AND OBJECTIVE BOX
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 The patient reports that she developed fever, chills, sob, cough, muscle ache 2 weeks prior and then found out she had COVID19 the thursday prior to presentation. She has sick contact in granddaughter however unclear if she may have gotten sick from another source. No hemoptysis, lower extremity swelling, cp, palpitations, n/v/d, or painful urination. (23 Aug 2021 05:28)  "    Above verified-agree with above unless noted below.    ID consulted     PAST MEDICAL & SURGICAL HISTORY:  Hypertension    Anemia in ESRD (end-stage renal disease)    Thrombocytopenia  leukopenia: followed by chele, plan for BMBx 7/22/19    H/O gastroesophageal reflux (GERD)    Breast cancer, female  left 2014    ESRD on hemodialysis    Anuria    Thyroid nodule  yearly Ultrasound, monitoring yearly    Pancreatic cyst    AV fistula thrombosis  history: was treated with coumadin, no more A/C.    S/P lumpectomy, left breast  2014    Adrenal mass  excison 2015    S/P hysterectomy  1994    S/P arteriovenous (AV) fistula creation  2002    History of fracture of right ankle  2014 repaired        Social history: denies    Smoking,    ETOH,      IVDU       FAMILY HISTORY:  No pertinent family history in first degree relatives    - Family history of medical problems in all first degree relatives reviewed.None of these were found to be related to patients current illness.    REVIEW OF SYSTEMS  General:	Denies any malaise fatigue or chills. Fevers +    Skin:No rash  	  Ophthalmologic:Denies any visual complaints,discharge redness or photophobia  	  ENMT:No nasal discharge,headache,sinus congestion or throat pain.No dental complaints    Respiratory and Thorax:+ cough,minimal sputum no  chest pain.+ shortness of breath  	  Cardiovascular:	No chest pain,palpitaions or dizziness    Gastrointestinal:	NO nausea,abdominal pain or diarrhea.    Genitourinary:	No dysuria,frequency. No flank pain    Musculoskeletal:	No joint swelling or pain.No weakness    Neurological:No confusion,diziness.No extremity weakness.No bladder or bowel incontinence	          Endocrine:	No recent weight gain or loss.No abnormal heat/cold intolerance    Allergic/Immunologic:	No hives or rash   Allergies    ChloraPrep One-Step (Rash)  penicillins (Rash)  shellfish (Rash)    Intolerances        Antimicrobials:    remdesivir  IVPB   IV Intermittent       Prior Antimicrobials:  MEDICATIONS  (STANDING):    remdesivir  IVPB   500 mL/Hr IV Intermittent (08-23-21 @ 12:01)      Other medications reviewed  MEDICATIONS  (STANDING):  cloNIDine 0.1 milliGRAM(s) Oral two times a day  dextrose 40% Gel 15 Gram(s) Oral once  dextrose 5%. 1000 milliLiter(s) (50 mL/Hr) IV Continuous <Continuous>  dextrose 5%. 1000 milliLiter(s) (100 mL/Hr) IV Continuous <Continuous>  dextrose 50% Injectable 25 Gram(s) IV Push once  dextrose 50% Injectable 12.5 Gram(s) IV Push once  dextrose 50% Injectable 25 Gram(s) IV Push once  famotidine    Tablet 20 milliGRAM(s) Oral daily  glucagon  Injectable 1 milliGRAM(s) IntraMuscular once  heparin   Injectable 5000 Unit(s) SubCutaneous every 12 hours  insulin glargine Injectable (LANTUS) 8 Unit(s) SubCutaneous at bedtime  insulin lispro (ADMELOG) corrective regimen sliding scale   SubCutaneous three times a day before meals  insulin lispro (ADMELOG) corrective regimen sliding scale   SubCutaneous at bedtime  metoprolol tartrate 50 milliGRAM(s) Oral two times a day  remdesivir  IVPB   IV Intermittent         Vital Signs Last 24 Hrs  T(C): 36.6 (23 Aug 2021 12:10), Max: 37.4 (23 Aug 2021 03:05)  T(F): 97.9 (23 Aug 2021 12:10), Max: 99.4 (23 Aug 2021 04:44)  HR: 74 (23 Aug 2021 12:10) (74 - 108)  BP: 13/70 (23 Aug 2021 12:10) (13/70 - 182/83)  BP(mean): --  RR: 18 (23 Aug 2021 12:10) (17 - 20)  SpO2: 97% (23 Aug 2021 12:10) (96% - 99%)    PHYSICAL EXAM:Pleasant patient in no acute distress.      Constitutional:Comfortable.Awake and alert  No cachexia     Eyes:PERRL EOMI.NO discharge or conjunctival injection    ENMT:No sinus tenderness.No thrush.No pharyngeal exudate or erythema.Fair dental hygiene    Neck:Supple,No LN,no JVD      Respiratory:Good air entry bilaterally,CTA    Cardiovascular:S1 S2 wnl, No murmurs,rub or gallops    Gastrointestinal:Soft BS(+) no tenderness no masses ,No rebound or guarding  R LQ trasplant kidney-no tenderness    Genitourinary:No CVA tendereness         Extremities:LUE AVF no erythema or tenderness    Vascular:peripheral pulses felt    Neurological:AAO X 3,No grossly focal deficits            Musculoskeletal:No joint swelling or LOM            Labs:                            9.2    7.63  )-----------( 124      ( 23 Aug 2021 07:27 )             29.0         08-23    129<L>  |  99  |  25<H>  ----------------------------<  219<H>  5.5<H>   |  17<L>  |  1.85<H>    Creatinine, Serum: 1.85 mg/dL (08-23-21 @ 07:04)  Creatinine, Serum: 1.95 mg/dL (08-23-21 @ 01:38)    Ca    9.2      23 Aug 2021 07:04  Phos  2.2     08-23  Mg     2.2     08-23    TPro  6.6  /  Alb  3.3  /  TBili  0.4  /  DBili  x   /  AST  27  /  ALT  10  /  AlkPhos  36<L>  08-23        COVID-19 PCR . (08.23.21 @ 00:18)   COVID-19 PCR: Detected:    < from: Xray Chest 1 View- PORTABLE-Urgent (08.23.21 @ 00:42) >  IMPRESSION:  Patchy opacities in the left peripheral lung. Changes compatible with pneumonia. Prominent perihilar markings may mild represent pulmonary vascular congestion.    --- End of Report ---      < end of copied text >        Ferritin, Serum: 4566: Test Repeated ng/mL (08.23.21 @ 04:08) D-Dimer Assay, Quantitative (08.23.21 @ 07:33)   D-Dimer Assay, Quantitative: 2508 ng/mL DDU C-Reactive Protein, Serum (08.23.21 @ 01:38)   C-Reactive Protein, Serum: 140 mg/L Imaging studies(films) were independently reviewed.Findings as detailed in report above   
[facility] DIVISION OF KIDNEY DISEASES AND HYPERTENSION -- INITIAL CONSULT NOTE  --------------------------------------------------------------------------------  HPI: Patient is a 75 y/o F w PMH of ESRD, s/p DDRT on 12/7/19 c/b DGF now off HD, HTN, breast cancer s/p lumpectomy on Tamoxifen, DVT, HLD, gout, presented to [facility] for fever, chills and muscle ache. Admitted for COVID-19. Transplant nephrology consulted for kidney transplant management.     Patient was seen and examined at bedside. Reported feeling ok. Endorse having some SOB and fever, chills. Denies nausea, vomiting, diarrhea, LE edema or dysuria.    PAST HISTORY  --------------------------------------------------------------------------------  PAST MEDICAL & SURGICAL HISTORY:  Hypertension    Anemia in ESRD (end-stage renal disease)    Thrombocytopenia  leukopenia: followed by chele, plan for BMBx 7/22/19    H/O gastroesophageal reflux (GERD)    Breast cancer, female  left 2014    ESRD on hemodialysis    Anuria    Thyroid nodule  yearly Ultrasound, monitoring yearly    Pancreatic cyst    AV fistula thrombosis  history: was treated with coumadin, no more A/C.    S/P lumpectomy, left breast  2014    Adrenal mass  excison 2015    S/P hysterectomy  1994    S/P arteriovenous (AV) fistula creation  2002    History of fracture of right ankle  2014 repaired      FAMILY HISTORY:  No pertinent family history in first degree relatives      PAST SOCIAL HISTORY:    ALLERGIES & MEDICATIONS  --------------------------------------------------------------------------------  Allergies    ChloraPrep One-Step (Rash)  penicillins (Rash)  shellfish (Rash)    Intolerances    Standing Inpatient Medications  cloNIDine 0.1 milliGRAM(s) Oral two times a day  dextrose 40% Gel 15 Gram(s) Oral once  dextrose 5%. 1000 milliLiter(s) IV Continuous <Continuous>  dextrose 5%. 1000 milliLiter(s) IV Continuous <Continuous>  dextrose 50% Injectable 25 Gram(s) IV Push once  dextrose 50% Injectable 12.5 Gram(s) IV Push once  dextrose 50% Injectable 25 Gram(s) IV Push once  famotidine    Tablet 20 milliGRAM(s) Oral daily  glucagon  Injectable 1 milliGRAM(s) IntraMuscular once  heparin   Injectable 5000 Unit(s) SubCutaneous every 12 hours  insulin glargine Injectable (LANTUS) 8 Unit(s) SubCutaneous at bedtime  insulin lispro (ADMELOG) corrective regimen sliding scale   SubCutaneous three times a day before meals  insulin lispro (ADMELOG) corrective regimen sliding scale   SubCutaneous at bedtime  metoprolol tartrate 50 milliGRAM(s) Oral two times a day  remdesivir  IVPB   IV Intermittent     PRN Inpatient Medications  acetaminophen   Tablet .. 650 milliGRAM(s) Oral every 4 hours PRN  acetaminophen  Suppository .. 650 milliGRAM(s) Rectal every 4 hours PRN    REVIEW OF SYSTEMS  --------------------------------------------------------------------------------  Gen: ++ fever/chills   Skin: No rashes  Head/Eyes/Ears/Mouth: No headache, sore throat  Respiratory: + dyspnea  CV: No chest pain, PND, orthopnea  GI: No abdominal pain, diarrhea  : No increased frequency, dysuria, hematuria, nocturia  MSK: No edema  All other systems were reviewed and are negative, except as noted.    VITALS/PHYSICAL EXAM  --------------------------------------------------------------------------------  T(C): 36.6 (08-23-21 @ 12:10), Max: 37.4 (08-23-21 @ 03:05)  HR: 74 (08-23-21 @ 12:10) (74 - 108)  BP: 13/70 (08-23-21 @ 12:10) (13/70 - 182/83)  RR: 18 (08-23-21 @ 12:10) (17 - 20)  SpO2: 97% (08-23-21 @ 12:10) (96% - 99%)  Wt(kg): --  Height (cm): 162.6 (08-22-21 @ 22:11)  Weight (kg): 54.4 (08-22-21 @ 22:11)  BMI (kg/m2): 20.6 (08-22-21 @ 22:11)  BSA (m2): 1.57 (08-22-21 @ 22:11)      Physical Exam:  	Gen: NAD, well-appearing  	HEENT: MMM  	Pulm: CTA B/L, no crackles, nasal cannular   	CV: RRR, S1S2+  	Abd: +BS, soft, nontender/nondistended          Transplant:  	: No suprapubic tenderness  	MSK: no edema   	Psych: Normal affect and mood  	Skin: Warm    LABS/STUDIES  --------------------------------------------------------------------------------              9.2    7.63  >-----------<  124      [08-23-21 @ 07:27]              29.0     129  |  99  |  25  ----------------------------<  219      [08-23-21 @ 07:04]  5.5   |  17  |  1.85        Ca     9.2     [08-23-21 @ 07:04]      Mg     2.2     [08-23-21 @ 07:04]      Phos  2.2     [08-23-21 @ 07:04]    TPro  6.6  /  Alb  3.3  /  TBili  0.4  /  DBili  x   /  AST  27  /  ALT  10  /  AlkPhos  36  [08-23-21 @ 07:04]    PT/INR: PT 11.9 , INR 0.99       [08-23-21 @ 07:33]  PTT: 29.1       [08-23-21 @ 07:33]      Creatinine Trend:  SCr 1.85 [08-23 @ 07:04]  SCr 1.95 [08-23 @ 01:38]    Urinalysis - [01-10-20 @ 11:27]      Color Light Yellow / Appearance Clear / SG 1.015 / pH 6.0      Gluc 500 mg/dL / Ketone Negative  / Bili Negative / Urobili Negative       Blood Negative / Protein 30 mg/dL / Leuk Est Negative / Nitrite Negative      RBC 2 / WBC 7 / Hyaline 2 / Gran  / Sq Epi  / Non Sq Epi 4 / Bacteria Few      Ferritin 4566      [08-23-21 @ 04:08]  HbA1c 7.2      [01-11-20 @ 09:23]    HBsAg Nonreact      [12-08-19 @ 04:50]  HCV 0.08, Nonreact      [12-08-19 @ 04:50]    Free Light Chains: kappa 19.60, lambda 2.19, ratio = 8.95      [12-13 @ 08:13]    Tacrolimus  Cyclosporine  Sirolimus  Mycophenolate  BK PCR  CMV PCR  Parvo PCR  EBV PCR

## 2021-08-23 NOTE — ED ADULT NURSE NOTE - OBJECTIVE STATEMENT
76y female presents to the ED via EMS complaining of low O2 sat at home. pmHx ESRD s/p Kidney transplant (2019; on immunosuppressants) COVID + 8/20/21. AOx4 endorses O2 sat at home 91% RA and cough/SOB, loss of taste and smell since 8/20.  Upon assessment in ED no signs of respiratory distress noted (no use of accessory muscles, patient is able to maintain a conversation w/o endorsing SOB), Sating 98% on 2LNC. Patient placed on the cardiac monitor at bedside. Denies chest pain, fever, chills, N/V/D, back pain. Call bell within reach; educated on use of call bell and verbalizes understanding.

## 2021-08-23 NOTE — H&P ADULT - PROBLEM SELECTOR PLAN 3
Subjective:       Patient ID: Sravanthi Ruiz is a 49 y.o. female.    Chief Complaint:  Well Woman (last pap and HPV negative 17, last mammogram wnl 17)      History of Present Illness  49 year old here for annual.  Having irregular periods.  Patient perimenopausal with heavier periods when she has them.  Occasional hot flashes.  No vaginal dryness with intercourse.  No pelvic pain.  No breast concerns.  Discussed trial of lysteda for heavy periods.  Discussed performing ultrasound if periods worsen or become irregular.  Pap utd.  Mammogram ordered.      GYN & OB History  Patient's last menstrual period was 2018 (exact date).   Date of Last Pap: 2017    OB History    Para Term  AB Living   1 1 1     1   SAB TAB Ectopic Multiple Live Births           1      # Outcome Date GA Lbr Efrain/2nd Weight Sex Delivery Anes PTL Lv   1 Term  40w0d  3.459 kg (7 lb 10 oz) M Vag-Spont   FEROZ          Past Medical History:   Diagnosis Date    Diverticulitis     Diverticulosis of large intestine without hemorrhage 2017    Incontinence     Nerve damage     right foot    Vitamin D deficiency 2017       Past Surgical History:   Procedure Laterality Date    COLONOSCOPY N/A 2017    Procedure: COLONOSCOPY;  Surgeon: Rito Booker MD;  Location: [facility] ([address] [place] [address]);  Service: Endoscopy;  Laterality: N/A;    FOOT SURGERY  10/2010    cyst removal    LASIK         Review of Systems  Review of Systems   Constitutional: Negative for fatigue.   Respiratory: Negative for shortness of breath.    Cardiovascular: Negative for chest pain.   Gastrointestinal: Negative for abdominal pain, constipation, diarrhea and nausea.   Endocrine: Positive for heat intolerance.   Genitourinary: Positive for menstrual problem. Negative for decreased urine volume, dyspareunia, dysuria, pelvic pain, vaginal bleeding and vaginal discharge.   Musculoskeletal: Negative for back pain.   Skin:  Negative for rash.   Neurological: Negative for headaches.   Hematological: Negative for adenopathy.   Psychiatric/Behavioral: Negative for dysphoric mood. The patient is not nervous/anxious.         Objective:   Physical Exam:   Constitutional: She is oriented to person, place, and time. She appears well-developed and well-nourished.      Neck: No thyromegaly present.    Cardiovascular: Normal rate.     Pulmonary/Chest: Effort normal and breath sounds normal. Right breast exhibits no mass, no nipple discharge, no skin change, no tenderness and no bleeding. Left breast exhibits no mass, no nipple discharge, no skin change, no tenderness and no bleeding.        Abdominal: Soft. Normal appearance and bowel sounds are normal. She exhibits no distension and no mass. There is no tenderness. There is no rebound and no guarding.     Genitourinary: Vagina normal and uterus normal. Pelvic exam was performed with patient supine. There is no rash, tenderness, lesion or injury on the right labia. There is no rash, tenderness, lesion or injury on the left labia. Uterus is not enlarged, not fixed and not tender. Cervix is normal. Right adnexum displays no mass, no tenderness and no fullness. Left adnexum displays no mass, no tenderness and no fullness. No erythema, tenderness, rectocele, cystocele or unspecified prolapse of vaginal walls in the vagina. No signs of injury around the vagina. No vaginal discharge found. Cervix exhibits no motion tenderness, no discharge and no friability.           Musculoskeletal: Normal range of motion and moves all extremeties.      Lymphadenopathy:     She has no axillary adenopathy.        Right: No supraclavicular adenopathy present.        Left: No supraclavicular adenopathy present.    Neurological: She is alert and oriented to person, place, and time.    Skin: Skin is warm and dry.    Psychiatric: She has a normal mood and affect. Her behavior is normal. Judgment normal.        Assessment/  Plan:     Encounter for gynecological examination (general) (routine) without abnormal findings    Screening mammogram, encounter for  -     Mammo Digital Screening Bilat with Tomosynthesis CAD; Future; Expected date: 07/20/2018    Other orders  -     tranexamic acid (LYSTEDA) 650 mg tablet; Take 2 tablets (1,300 mg total) by mouth 3 (three) times daily.  Dispense: 30 tablet; Refill: 0         Follow-up in about 1 year (around 7/20/2019).    Patient was counseled today on A.C.S. Pap guidelines and recommendations for yearly pelvic exams, mammograms and monthly self breast exams; to see her PCP for other health maintenance.    hold tamoxifen for now

## 2021-08-23 NOTE — H&P ADULT - NSHPADDITIONALINFOADULT_GEN_ALL_CORE
Otto Flores MD  Medicine Attending  Department of Hospital Medicine  pager: [phone] (available from 20:00 to 08:00)

## 2021-08-24 LAB
A1C WITH ESTIMATED AVERAGE GLUCOSE RESULT: 5.7 % — HIGH (ref 4–5.6)
ANION GAP SERPL CALC-SCNC: 14 MMOL/L — SIGNIFICANT CHANGE UP (ref 5–17)
BUN SERPL-MCNC: 28 MG/DL — HIGH (ref 7–23)
CALCIUM SERPL-MCNC: 9.1 MG/DL — SIGNIFICANT CHANGE UP (ref 8.4–10.5)
CHLORIDE SERPL-SCNC: 100 MMOL/L — SIGNIFICANT CHANGE UP (ref 96–108)
CO2 SERPL-SCNC: 19 MMOL/L — LOW (ref 22–31)
COVID-19 SPIKE DOMAIN AB INTERP: NEGATIVE — SIGNIFICANT CHANGE UP
COVID-19 SPIKE DOMAIN ANTIBODY RESULT: 0.4 U/ML — SIGNIFICANT CHANGE UP
CREAT SERPL-MCNC: 1.83 MG/DL — HIGH (ref 0.5–1.3)
ESTIMATED AVERAGE GLUCOSE: 117 MG/DL — HIGH (ref 68–114)
GLUCOSE BLDC GLUCOMTR-MCNC: 258 MG/DL — HIGH (ref 70–99)
GLUCOSE BLDC GLUCOMTR-MCNC: 264 MG/DL — HIGH (ref 70–99)
GLUCOSE BLDC GLUCOMTR-MCNC: 285 MG/DL — HIGH (ref 70–99)
GLUCOSE BLDC GLUCOMTR-MCNC: 308 MG/DL — HIGH (ref 70–99)
GLUCOSE BLDC GLUCOMTR-MCNC: 86 MG/DL — SIGNIFICANT CHANGE UP (ref 70–99)
GLUCOSE SERPL-MCNC: 147 MG/DL — HIGH (ref 70–99)
HCT VFR BLD CALC: 30.7 % — LOW (ref 34.5–45)
HGB BLD-MCNC: 9.6 G/DL — LOW (ref 11.5–15.5)
MCHC RBC-ENTMCNC: 28.6 PG — SIGNIFICANT CHANGE UP (ref 27–34)
MCHC RBC-ENTMCNC: 31.3 GM/DL — LOW (ref 32–36)
MCV RBC AUTO: 91.4 FL — SIGNIFICANT CHANGE UP (ref 80–100)
NRBC # BLD: 0 /100 WBCS — SIGNIFICANT CHANGE UP (ref 0–0)
PHOSPHATE SERPL-MCNC: 2.7 MG/DL — SIGNIFICANT CHANGE UP (ref 2.5–4.5)
PLATELET # BLD AUTO: 144 K/UL — LOW (ref 150–400)
POTASSIUM SERPL-MCNC: 5.1 MMOL/L — SIGNIFICANT CHANGE UP (ref 3.5–5.3)
POTASSIUM SERPL-SCNC: 5.1 MMOL/L — SIGNIFICANT CHANGE UP (ref 3.5–5.3)
RBC # BLD: 3.36 M/UL — LOW (ref 3.8–5.2)
RBC # FLD: 13 % — SIGNIFICANT CHANGE UP (ref 10.3–14.5)
SARS-COV-2 IGG+IGM SERPL QL IA: 0.4 U/ML — SIGNIFICANT CHANGE UP
SARS-COV-2 IGG+IGM SERPL QL IA: NEGATIVE — SIGNIFICANT CHANGE UP
SODIUM SERPL-SCNC: 133 MMOL/L — LOW (ref 135–145)
TACROLIMUS SERPL-MCNC: 3.2 NG/ML — SIGNIFICANT CHANGE UP
WBC # BLD: 6.78 K/UL — SIGNIFICANT CHANGE UP (ref 3.8–10.5)
WBC # FLD AUTO: 6.78 K/UL — SIGNIFICANT CHANGE UP (ref 3.8–10.5)

## 2021-08-24 PROCEDURE — 99233 SBSQ HOSP IP/OBS HIGH 50: CPT

## 2021-08-24 PROCEDURE — 99232 SBSQ HOSP IP/OBS MODERATE 35: CPT

## 2021-08-24 PROCEDURE — 71250 CT THORAX DX C-: CPT | Mod: 26

## 2021-08-24 RX ORDER — TAMOXIFEN CITRATE 20 MG/1
20 TABLET, FILM COATED ORAL DAILY
Refills: 0 | Status: DISCONTINUED | OUTPATIENT
Start: 2021-08-24 | End: 2021-08-27

## 2021-08-24 RX ORDER — ACETAMINOPHEN 500 MG
650 TABLET ORAL EVERY 6 HOURS
Refills: 0 | Status: DISCONTINUED | OUTPATIENT
Start: 2021-08-24 | End: 2021-08-27

## 2021-08-24 RX ADMIN — Medication 8: at 18:54

## 2021-08-24 RX ADMIN — INSULIN GLARGINE 8 UNIT(S): 100 INJECTION, SOLUTION SUBCUTANEOUS at 21:39

## 2021-08-24 RX ADMIN — HEPARIN SODIUM 5000 UNIT(S): 5000 INJECTION INTRAVENOUS; SUBCUTANEOUS at 17:41

## 2021-08-24 RX ADMIN — TAMOXIFEN CITRATE 20 MILLIGRAM(S): 20 TABLET, FILM COATED ORAL at 12:35

## 2021-08-24 RX ADMIN — Medication 0.1 MILLIGRAM(S): at 05:28

## 2021-08-24 RX ADMIN — HEPARIN SODIUM 5000 UNIT(S): 5000 INJECTION INTRAVENOUS; SUBCUTANEOUS at 05:28

## 2021-08-24 RX ADMIN — Medication 0.1 MILLIGRAM(S): at 17:41

## 2021-08-24 RX ADMIN — Medication 650 MILLIGRAM(S): at 17:41

## 2021-08-24 RX ADMIN — Medication 650 MILLIGRAM(S): at 05:28

## 2021-08-24 RX ADMIN — Medication 650 MILLIGRAM(S): at 05:29

## 2021-08-24 RX ADMIN — Medication 6: at 12:35

## 2021-08-24 RX ADMIN — Medication 1 TABLET(S): at 05:29

## 2021-08-24 RX ADMIN — Medication 6 MILLIGRAM(S): at 05:28

## 2021-08-24 RX ADMIN — Medication 50 MILLIGRAM(S): at 05:28

## 2021-08-24 RX ADMIN — Medication 50 MILLIGRAM(S): at 17:41

## 2021-08-24 RX ADMIN — REMDESIVIR 500 MILLIGRAM(S): 5 INJECTION INTRAVENOUS at 11:27

## 2021-08-24 RX ADMIN — Medication 650 MILLIGRAM(S): at 06:49

## 2021-08-24 RX ADMIN — Medication 2: at 21:39

## 2021-08-24 RX ADMIN — FAMOTIDINE 20 MILLIGRAM(S): 10 INJECTION INTRAVENOUS at 11:28

## 2021-08-24 RX ADMIN — TACROLIMUS 5 MILLIGRAM(S): 5 CAPSULE ORAL at 06:47

## 2021-08-24 NOTE — PROGRESS NOTE ADULT - ASSESSMENT
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 The patient reports that she developed fever, chills, sob, cough, muscle ache 2 weeks prior and then found out she had COVID19 the thursday prior to presentation.   Hypoxia on 2l NC  elevated inflammatory markers  CT chest s/o covid-no s/s bacteriual superinfection       A) COVID  Monitor clinically.  Monitor Oxygenation  O2 supplementation.  Remdesivir - UPTO 5 days course depending on course.  Monitor CBC ,LFTs and Creatinine daily.  While there may be effects on renal function benefits>riska nd RDV has been used in patients with CRI  Monitor creatinine closley  Ferritin,CRP and D dimer q 48 hrs.  Dexamethasone as  hypoxia.  Anticoagulation per protocol.  Treatment options are limited-this as well as limitations of data discussed with pt.  Monitor for any bacterial superinfection/complications.        B) Hypoxia  from above  follow cx    C) DDRT  on immunesuppression  will defer to renal  adjust medication doses per renal function    Will tailor plan for ID issues  per course,results.Will defer to primary team on management of other issues.  Assessment, plan and recommendations as detailed above were discussed with the medical/primary  team.  Will Follow.  Beeper [phone] [facility] [id number].   Wknd/afterhours/No response-[phone] or Fellow on call

## 2021-08-24 NOTE — PROGRESS NOTE ADULT - SUBJECTIVE AND OBJECTIVE BOX
[facility] Division of Hospital Medicine  Neymar Aguillon MD, MIMI  Pager (M-F, 8A-5P): [phone]  Other Times:  [phone]    Patient is a 76y old  Female who presents with a chief complaint of 76F p/w COVID19 and sob (23 Aug 2021 12:41)      SUBJECTIVE / OVERNIGHT EVENTS:  No events overnight. Feels stronger today. No new complaints.     MEDICATIONS  (STANDING):  cloNIDine 0.1 milliGRAM(s) Oral two times a day  dexAMETHasone     Tablet 6 milliGRAM(s) Oral daily  famotidine    Tablet 20 milliGRAM(s) Oral daily  glucagon  Injectable 1 milliGRAM(s) IntraMuscular once  heparin   Injectable 5000 Unit(s) SubCutaneous every 12 hours  insulin glargine Injectable (LANTUS) 8 Unit(s) SubCutaneous at bedtime  insulin lispro (ADMELOG) corrective regimen sliding scale   SubCutaneous three times a day before meals  insulin lispro (ADMELOG) corrective regimen sliding scale   SubCutaneous at bedtime  metoprolol tartrate 50 milliGRAM(s) Oral two times a day  remdesivir  IVPB   IV Intermittent   remdesivir  IVPB 100 milliGRAM(s) IV Intermittent every 24 hours  sodium bicarbonate 650 milliGRAM(s) Oral two times a day  tacrolimus ER Tablet (ENVARSUS XR) 5 milliGRAM(s) Oral daily  trimethoprim  160 mG/sulfamethoxazole 800 mG 1 Tablet(s) Oral <User Schedule>    MEDICATIONS  (PRN):  acetaminophen   Tablet .. 650 milliGRAM(s) Oral every 6 hours PRN Temp greater or equal to 38C (100.4F)  acetaminophen  Suppository .. 650 milliGRAM(s) Rectal every 4 hours PRN Temp greater or equal to 38.5C (101.3F)    CAPILLARY BLOOD GLUCOSE      POCT Blood Glucose.: 86 mg/dL (24 Aug 2021 07:51)  POCT Blood Glucose.: 142 mg/dL (23 Aug 2021 21:39)  POCT Blood Glucose.: 183 mg/dL (23 Aug 2021 16:52)  POCT Blood Glucose.: 242 mg/dL (23 Aug 2021 12:21)    I&O's Summary    23 Aug 2021 07:01  -  24 Aug 2021 07:00  --------------------------------------------------------  IN: 820 mL / OUT: 0 mL / NET: 820 mL        PHYSICAL EXAM:  Vital Signs Last 24 Hrs  T(C): 36.7 (24 Aug 2021 09:08), Max: 38.3 (24 Aug 2021 04:59)  T(F): 98 (24 Aug 2021 09:08), Max: 100.9 (24 Aug 2021 04:59)  HR: 108 (24 Aug 2021 04:59) (74 - 108)  BP: 117/65 (24 Aug 2021 09:08) (117/65 - 177/84)  BP(mean): --  RR: 18 (24 Aug 2021 04:59) (18 - 18)  SpO2: 98% (24 Aug 2021 09:08) (94% - 98%)    GENERAL: NAD, well-developed  HEAD:  Atraumatic, Normocephalic  EYES: EOMI, conjunctiva and sclera clear  Mouth: MMM  NECK: Supple  Lung: normal work of breathing, CTA b/l  Chest: S1&S2+, rrr, no m/r/g appreciated  ABDOMEN: bs+, soft, nt, nd  : No johnson catheter, no CVA tenderness  EXTREMITIES:  no pitting edema present  Neuro: A&Ox3, no flaccid paralysis in extremities appreciated      LABS:                        9.6    6.78  )-----------( 144      ( 24 Aug 2021 10:12 )             30.7     08-24    133<L>  |  100  |  28<H>  ----------------------------<  147<H>  5.1   |  19<L>  |  1.83<H>    Ca    9.1      24 Aug 2021 10:12  Phos  2.7     08-24  Mg     2.2     08-23    TPro  6.6  /  Alb  3.3  /  TBili  0.4  /  DBili  x   /  AST  27  /  ALT  10  /  AlkPhos  36<L>  08-23    PT/INR - ( 23 Aug 2021 07:33 )   PT: 11.9 sec;   INR: 0.99 ratio         PTT - ( 23 Aug 2021 07:33 )  PTT:29.1 sec          Culture - Blood (collected 23 Aug 2021 08:27)  Source: .Blood Blood-Peripheral  Preliminary Report (24 Aug 2021 09:01):    No growth to date.    Culture - Blood (collected 23 Aug 2021 08:27)  Source: .Blood Blood-Peripheral  Preliminary Report (24 Aug 2021 09:01):    No growth to date.        RADIOLOGY & ADDITIONAL TESTS:    Lab Results Reviewed: Cr stable    CT Chest reviewed: typical of COVID 19 pneumonia

## 2021-08-24 NOTE — PROGRESS NOTE ADULT - SUBJECTIVE AND OBJECTIVE BOX
Patient is a 76y old  Female who presents with a chief complaint of 76F p/w COVID19 and sob (24 Aug 2021 11:14)    Being followed by ID for COVID    Interval history:on 2 L NC  some cough   No acute events      ROS:  No sputum SOB,CP  No N/V/D./abd pain  No other complaints      Antimicrobials:    remdesivir  IVPB   IV Intermittent   remdesivir  IVPB 100 milliGRAM(s) IV Intermittent every 24 hours  trimethoprim  160 mG/sulfamethoxazole 800 mG 1 Tablet(s) Oral <User Schedule>    Other medications reviewed  MEDICATIONS  (STANDING):  cloNIDine 0.1 milliGRAM(s) Oral two times a day  dexAMETHasone     Tablet 6 milliGRAM(s) Oral daily  dextrose 40% Gel 15 Gram(s) Oral once  dextrose 5%. 1000 milliLiter(s) (50 mL/Hr) IV Continuous <Continuous>  dextrose 5%. 1000 milliLiter(s) (100 mL/Hr) IV Continuous <Continuous>  dextrose 50% Injectable 25 Gram(s) IV Push once  dextrose 50% Injectable 12.5 Gram(s) IV Push once  dextrose 50% Injectable 25 Gram(s) IV Push once  famotidine    Tablet 20 milliGRAM(s) Oral daily  glucagon  Injectable 1 milliGRAM(s) IntraMuscular once  heparin   Injectable 5000 Unit(s) SubCutaneous every 12 hours  insulin glargine Injectable (LANTUS) 8 Unit(s) SubCutaneous at bedtime  insulin lispro (ADMELOG) corrective regimen sliding scale   SubCutaneous three times a day before meals  insulin lispro (ADMELOG) corrective regimen sliding scale   SubCutaneous at bedtime  metoprolol tartrate 50 milliGRAM(s) Oral two times a day  remdesivir  IVPB   IV Intermittent   remdesivir  IVPB 100 milliGRAM(s) IV Intermittent every 24 hours  sodium bicarbonate 650 milliGRAM(s) Oral two times a day  tacrolimus ER Tablet (ENVARSUS XR) 5 milliGRAM(s) Oral daily  tamoxifen 20 milliGRAM(s) Oral daily  trimethoprim  160 mG/sulfamethoxazole 800 mG 1 Tablet(s) Oral <User Schedule>      Vital Signs Last 24 Hrs  T(C): 36.7 (08-24-21 @ 09:08), Max: 38.3 (08-24-21 @ 04:59)  T(F): 98 (08-24-21 @ 09:08), Max: 100.9 (08-24-21 @ 04:59)  HR: 108 (08-24-21 @ 04:59) (74 - 108)  BP: 117/65 (08-24-21 @ 09:08) (117/65 - 177/84)  BP(mean): --  RR: 18 (08-24-21 @ 04:59) (18 - 18)  SpO2: 98% (08-24-21 @ 09:08) (94% - 98%)    Physical Exam:        HEENT PERRLA EOMI    No oral exudate or erythema    Chest Good AE,minimal basal crackles    CVS RRR S1 S2 WNl No murmur or rub or gallop    Abd soft BS normal No tenderness RLQ transplant kidney no tenderness    IV site no erythema tenderness or discharge    CNS AAO X 3 no focal    Lab Data:                          9.6    6.78  )-----------( 144      ( 24 Aug 2021 10:12 )             30.7       08-24    133<L>  |  100  |  28<H>  ----------------------------<  147<H>  5.1   |  19<L>  |  1.83<H>    Creatinine, Serum: 1.83 mg/dL (08-24-21 @ 10:12)  Creatinine, Serum: 1.85 mg/dL (08-23-21 @ 07:04)  Creatinine, Serum: 1.95 mg/dL (08-23-21 @ 01:38)    Ca    9.1      24 Aug 2021 10:12  Phos  2.7     08-24  Mg     2.2     08-23    TPro  6.6  /  Alb  3.3  /  TBili  0.4  /  DBili  x   /  AST  27  /  ALT  10  /  AlkPhos  36<L>  08-23        Culture - Blood (collected 23 Aug 2021 08:27)  Source: .Blood Blood-Peripheral  Preliminary Report (24 Aug 2021 09:01):    No growth to date.    Culture - Blood (collected 23 Aug 2021 08:27)  Source: .Blood Blood-Peripheral  Preliminary Report (24 Aug 2021 09:01):    No growth to date.      < from: CT Chest No Cont (08.24.21 @ 09:30) >  IMPRESSION:    1.  Bilateral opacities can occur in the clinical setting of lung injury given the reported history of atypical infection.  2.  Bilateral septated nonseptated renal lesions some which are calcified.  3.  Subtle areas of sclerosis of the T8 vertebral body is incompletely characterized. After the acute symptoms resolve, MRI may be helpful for characterization.    --- End of Report ---        < end of copied text >  D-Dimer Assay, Quantitative (08.23.21 @ 07:33)   D-Dimer Assay, Quantitative: 2508 ng/mL DDU Ferritin, Serum (08.23.21 @ 04:08)   Ferritin, Serum: 4566: Test Repeated ng/mL C-Reactive Protein, Serum (08.23.21 @ 01:38)   C-Reactive Protein, Serum: 140 mg/L

## 2021-08-25 LAB
ANION GAP SERPL CALC-SCNC: 10 MMOL/L — SIGNIFICANT CHANGE UP (ref 5–17)
BUN SERPL-MCNC: 36 MG/DL — HIGH (ref 7–23)
CALCIUM SERPL-MCNC: 9.3 MG/DL — SIGNIFICANT CHANGE UP (ref 8.4–10.5)
CHLORIDE SERPL-SCNC: 103 MMOL/L — SIGNIFICANT CHANGE UP (ref 96–108)
CO2 SERPL-SCNC: 19 MMOL/L — LOW (ref 22–31)
CREAT SERPL-MCNC: 1.84 MG/DL — HIGH (ref 0.5–1.3)
CRP SERPL-MCNC: 109 MG/L — HIGH (ref 0–4)
D DIMER BLD IA.RAPID-MCNC: 2298 NG/ML DDU — HIGH
FERRITIN SERPL-MCNC: 5016 NG/ML — HIGH (ref 15–150)
GLUCOSE BLDC GLUCOMTR-MCNC: 158 MG/DL — HIGH (ref 70–99)
GLUCOSE BLDC GLUCOMTR-MCNC: 221 MG/DL — HIGH (ref 70–99)
GLUCOSE BLDC GLUCOMTR-MCNC: 299 MG/DL — HIGH (ref 70–99)
GLUCOSE BLDC GLUCOMTR-MCNC: 340 MG/DL — HIGH (ref 70–99)
GLUCOSE SERPL-MCNC: 124 MG/DL — HIGH (ref 70–99)
HCT VFR BLD CALC: 27.1 % — LOW (ref 34.5–45)
HGB BLD-MCNC: 8.6 G/DL — LOW (ref 11.5–15.5)
MCHC RBC-ENTMCNC: 28.2 PG — SIGNIFICANT CHANGE UP (ref 27–34)
MCHC RBC-ENTMCNC: 31.7 GM/DL — LOW (ref 32–36)
MCV RBC AUTO: 88.9 FL — SIGNIFICANT CHANGE UP (ref 80–100)
NRBC # BLD: 0 /100 WBCS — SIGNIFICANT CHANGE UP (ref 0–0)
PHOSPHATE SERPL-MCNC: 2.9 MG/DL — SIGNIFICANT CHANGE UP (ref 2.5–4.5)
PLATELET # BLD AUTO: 163 K/UL — SIGNIFICANT CHANGE UP (ref 150–400)
POTASSIUM SERPL-MCNC: 5 MMOL/L — SIGNIFICANT CHANGE UP (ref 3.5–5.3)
POTASSIUM SERPL-SCNC: 5 MMOL/L — SIGNIFICANT CHANGE UP (ref 3.5–5.3)
RBC # BLD: 3.05 M/UL — LOW (ref 3.8–5.2)
RBC # FLD: 13.2 % — SIGNIFICANT CHANGE UP (ref 10.3–14.5)
SODIUM SERPL-SCNC: 132 MMOL/L — LOW (ref 135–145)
TACROLIMUS SERPL-MCNC: <2 NG/ML — SIGNIFICANT CHANGE UP
WBC # BLD: 5.43 K/UL — SIGNIFICANT CHANGE UP (ref 3.8–10.5)
WBC # FLD AUTO: 5.43 K/UL — SIGNIFICANT CHANGE UP (ref 3.8–10.5)

## 2021-08-25 PROCEDURE — 99233 SBSQ HOSP IP/OBS HIGH 50: CPT

## 2021-08-25 PROCEDURE — 99232 SBSQ HOSP IP/OBS MODERATE 35: CPT

## 2021-08-25 RX ORDER — SODIUM CHLORIDE 9 MG/ML
1000 INJECTION, SOLUTION INTRAVENOUS
Refills: 0 | Status: DISCONTINUED | OUTPATIENT
Start: 2021-08-25 | End: 2021-08-27

## 2021-08-25 RX ORDER — DEXTROSE 50 % IN WATER 50 %
15 SYRINGE (ML) INTRAVENOUS ONCE
Refills: 0 | Status: DISCONTINUED | OUTPATIENT
Start: 2021-08-25 | End: 2021-08-27

## 2021-08-25 RX ORDER — INSULIN LISPRO 100/ML
VIAL (ML) SUBCUTANEOUS
Refills: 0 | Status: DISCONTINUED | OUTPATIENT
Start: 2021-08-25 | End: 2021-08-27

## 2021-08-25 RX ORDER — TACROLIMUS 5 MG/1
7 CAPSULE ORAL DAILY
Refills: 0 | Status: DISCONTINUED | OUTPATIENT
Start: 2021-08-26 | End: 2021-08-26

## 2021-08-25 RX ORDER — ACETAMINOPHEN 500 MG
650 TABLET ORAL ONCE
Refills: 0 | Status: COMPLETED | OUTPATIENT
Start: 2021-08-25 | End: 2021-08-25

## 2021-08-25 RX ORDER — TACROLIMUS 5 MG/1
2 CAPSULE ORAL ONCE
Refills: 0 | Status: COMPLETED | OUTPATIENT
Start: 2021-08-25 | End: 2021-08-25

## 2021-08-25 RX ORDER — DEXTROSE 50 % IN WATER 50 %
25 SYRINGE (ML) INTRAVENOUS ONCE
Refills: 0 | Status: DISCONTINUED | OUTPATIENT
Start: 2021-08-25 | End: 2021-08-27

## 2021-08-25 RX ORDER — INSULIN LISPRO 100/ML
4 VIAL (ML) SUBCUTANEOUS
Refills: 0 | Status: DISCONTINUED | OUTPATIENT
Start: 2021-08-25 | End: 2021-08-27

## 2021-08-25 RX ORDER — INSULIN GLARGINE 100 [IU]/ML
8 INJECTION, SOLUTION SUBCUTANEOUS AT BEDTIME
Refills: 0 | Status: DISCONTINUED | OUTPATIENT
Start: 2021-08-25 | End: 2021-08-27

## 2021-08-25 RX ORDER — GLUCAGON INJECTION, SOLUTION 0.5 MG/.1ML
1 INJECTION, SOLUTION SUBCUTANEOUS ONCE
Refills: 0 | Status: DISCONTINUED | OUTPATIENT
Start: 2021-08-25 | End: 2021-08-27

## 2021-08-25 RX ORDER — INSULIN LISPRO 100/ML
VIAL (ML) SUBCUTANEOUS AT BEDTIME
Refills: 0 | Status: DISCONTINUED | OUTPATIENT
Start: 2021-08-25 | End: 2021-08-27

## 2021-08-25 RX ADMIN — Medication 650 MILLIGRAM(S): at 22:28

## 2021-08-25 RX ADMIN — Medication 0.1 MILLIGRAM(S): at 17:12

## 2021-08-25 RX ADMIN — Medication 50 MILLIGRAM(S): at 17:12

## 2021-08-25 RX ADMIN — Medication 6: at 11:37

## 2021-08-25 RX ADMIN — INSULIN GLARGINE 8 UNIT(S): 100 INJECTION, SOLUTION SUBCUTANEOUS at 21:19

## 2021-08-25 RX ADMIN — HEPARIN SODIUM 5000 UNIT(S): 5000 INJECTION INTRAVENOUS; SUBCUTANEOUS at 17:12

## 2021-08-25 RX ADMIN — Medication 650 MILLIGRAM(S): at 05:03

## 2021-08-25 RX ADMIN — REMDESIVIR 500 MILLIGRAM(S): 5 INJECTION INTRAVENOUS at 10:21

## 2021-08-25 RX ADMIN — TAMOXIFEN CITRATE 20 MILLIGRAM(S): 20 TABLET, FILM COATED ORAL at 11:39

## 2021-08-25 RX ADMIN — Medication 2: at 08:03

## 2021-08-25 RX ADMIN — Medication 650 MILLIGRAM(S): at 05:49

## 2021-08-25 RX ADMIN — Medication 650 MILLIGRAM(S): at 05:02

## 2021-08-25 RX ADMIN — Medication 0.1 MILLIGRAM(S): at 05:03

## 2021-08-25 RX ADMIN — Medication 650 MILLIGRAM(S): at 21:58

## 2021-08-25 RX ADMIN — TACROLIMUS 2 MILLIGRAM(S): 5 CAPSULE ORAL at 14:05

## 2021-08-25 RX ADMIN — TACROLIMUS 5 MILLIGRAM(S): 5 CAPSULE ORAL at 05:39

## 2021-08-25 RX ADMIN — FAMOTIDINE 20 MILLIGRAM(S): 10 INJECTION INTRAVENOUS at 11:39

## 2021-08-25 RX ADMIN — Medication 650 MILLIGRAM(S): at 17:12

## 2021-08-25 RX ADMIN — Medication 50 MILLIGRAM(S): at 05:03

## 2021-08-25 RX ADMIN — Medication 8: at 17:13

## 2021-08-25 RX ADMIN — Medication 6 MILLIGRAM(S): at 05:04

## 2021-08-25 RX ADMIN — HEPARIN SODIUM 5000 UNIT(S): 5000 INJECTION INTRAVENOUS; SUBCUTANEOUS at 05:04

## 2021-08-25 RX ADMIN — Medication 4 UNIT(S): at 17:13

## 2021-08-25 NOTE — PHYSICAL THERAPY INITIAL EVALUATION ADULT - PLANNED THERAPY INTERVENTIONS, PT EVAL
Pt will negotiate up/down 12  steps independently with appropriate assistive device and railing in 4 weeks/balance training/gait training/strengthening/transfer training

## 2021-08-25 NOTE — PROGRESS NOTE ADULT - SUBJECTIVE AND OBJECTIVE BOX
[facility] DIVISION OF KIDNEY DISEASES AND HYPERTENSION -- FOLLOW UP NOTE  --------------------------------------------------------------------------------    24 hour events/subjective: Patient was seen and examined at bedside. Reported feeling ok. Denies CP, SOB, fever, chills, nausea, vomiting, diarrhea, LE edema or dysuria    PAST HISTORY  --------------------------------------------------------------------------------  No significant changes to PMH, PSH, FHx, SHx, unless otherwise noted    ALLERGIES & MEDICATIONS  --------------------------------------------------------------------------------  Allergies    ChloraPrep One-Step (Rash)  penicillins (Rash)  shellfish (Rash)    Intolerances      Standing Inpatient Medications  cloNIDine 0.1 milliGRAM(s) Oral two times a day  dexAMETHasone     Tablet 6 milliGRAM(s) Oral daily  dextrose 40% Gel 15 Gram(s) Oral once  dextrose 5%. 1000 milliLiter(s) IV Continuous <Continuous>  dextrose 5%. 1000 milliLiter(s) IV Continuous <Continuous>  dextrose 50% Injectable 25 Gram(s) IV Push once  dextrose 50% Injectable 12.5 Gram(s) IV Push once  dextrose 50% Injectable 25 Gram(s) IV Push once  famotidine    Tablet 20 milliGRAM(s) Oral daily  glucagon  Injectable 1 milliGRAM(s) IntraMuscular once  heparin   Injectable 5000 Unit(s) SubCutaneous every 12 hours  insulin glargine Injectable (LANTUS) 8 Unit(s) SubCutaneous at bedtime  insulin lispro (ADMELOG) corrective regimen sliding scale   SubCutaneous three times a day before meals  insulin lispro (ADMELOG) corrective regimen sliding scale   SubCutaneous at bedtime  metoprolol tartrate 50 milliGRAM(s) Oral two times a day  remdesivir  IVPB   IV Intermittent   remdesivir  IVPB 100 milliGRAM(s) IV Intermittent every 24 hours  sodium bicarbonate 650 milliGRAM(s) Oral two times a day  tacrolimus ER Tablet (ENVARSUS XR) 5 milliGRAM(s) Oral daily  tamoxifen 20 milliGRAM(s) Oral daily  trimethoprim  160 mG/sulfamethoxazole 800 mG 1 Tablet(s) Oral <User Schedule>    PRN Inpatient Medications  acetaminophen   Tablet .. 650 milliGRAM(s) Oral every 6 hours PRN  acetaminophen  Suppository .. 650 milliGRAM(s) Rectal every 4 hours PRN      REVIEW OF SYSTEMS  --------------------------------------------------------------------------------  Gen: No fevers/chills,   Respiratory: No dyspnea, cough  CV: No chest pain  GI: No abdominal pain, diarrhea  : No dysuria, hematuria  MSK: No  edema  Heme: No easy bruising or bleeding    All other systems were reviewed and are negative, except as noted.    VITALS/PHYSICAL EXAM  --------------------------------------------------------------------------------  T(C): 36.6 (08-25-21 @ 10:49), Max: 36.8 (08-24-21 @ 22:05)  HR: 76 (08-25-21 @ 10:49) (75 - 84)  BP: 131/66 (08-25-21 @ 10:49) (117/63 - 165/82)  RR: 18 (08-25-21 @ 10:49) (18 - 18)  SpO2: 95% (08-25-21 @ 10:49) (94% - 97%)  Wt(kg): --        08-25-21 @ 07:01  -  08-25-21 @ 12:43  --------------------------------------------------------  IN: 120 mL / OUT: 0 mL / NET: 120 mL    Physical Exam:  	Gen: NAD  	HEENT: MMM  	Pulm: on 2L nasal cannular   	CV: S1S2  	Abd: Soft, +BS   	Ext: No LE edema B/L  	Neuro: Awake  	Skin: Warm and dry    LABS/STUDIES  --------------------------------------------------------------------------------              8.6    5.43  >-----------<  163      [08-25-21 @ 06:04]              27.1     132  |  103  |  36  ----------------------------<  124      [08-25-21 @ 06:04]  5.0   |  19  |  1.84        Ca     9.3     [08-25-21 @ 06:04]      Phos  2.9     [08-25-21 @ 06:04]            Creatinine Trend:  SCr 1.84 [08-25 @ 06:04]  SCr 1.83 [08-24 @ 10:12]  SCr 1.85 [08-23 @ 07:04]  SCr 1.95 [08-23 @ 01:38]        Ferritin 4566      [08-23-21 @ 04:08]  HbA1c 7.2      [01-11-20 @ 09:23]

## 2021-08-25 NOTE — PROGRESS NOTE ADULT - SUBJECTIVE AND OBJECTIVE BOX
[facility] Division of Hospital Medicine  Neymar Aguillon MD, MIMI  Pager (M-F, 8A-5P): [phone]  Other Times:  [phone]    Patient is a 76y old  Female who presents with a chief complaint of 76F p/w COVID19 and sob (25 Aug 2021 12:43)      SUBJECTIVE / OVERNIGHT EVENTS:  No events overnight. No new complaints. States that she continues to feel stronger every day.     MEDICATIONS  (STANDING):  cloNIDine 0.1 milliGRAM(s) Oral two times a day  dexAMETHasone     Tablet 6 milliGRAM(s) Oral daily  dextrose 40% Gel 15 Gram(s) Oral once  dextrose 5%. 1000 milliLiter(s) (50 mL/Hr) IV Continuous <Continuous>  dextrose 5%. 1000 milliLiter(s) (100 mL/Hr) IV Continuous <Continuous>  dextrose 50% Injectable 25 Gram(s) IV Push once  dextrose 50% Injectable 12.5 Gram(s) IV Push once  dextrose 50% Injectable 25 Gram(s) IV Push once  famotidine    Tablet 20 milliGRAM(s) Oral daily  glucagon  Injectable 1 milliGRAM(s) IntraMuscular once  heparin   Injectable 5000 Unit(s) SubCutaneous every 12 hours  insulin glargine Injectable (LANTUS) 8 Unit(s) SubCutaneous at bedtime  insulin lispro (ADMELOG) corrective regimen sliding scale   SubCutaneous three times a day before meals  insulin lispro (ADMELOG) corrective regimen sliding scale   SubCutaneous at bedtime  metoprolol tartrate 50 milliGRAM(s) Oral two times a day  remdesivir  IVPB   IV Intermittent   remdesivir  IVPB 100 milliGRAM(s) IV Intermittent every 24 hours  sodium bicarbonate 650 milliGRAM(s) Oral two times a day  tacrolimus ER Tablet (ENVARSUS XR) 7 milliGRAM(s) Oral daily  tamoxifen 20 milliGRAM(s) Oral daily  trimethoprim  160 mG/sulfamethoxazole 800 mG 1 Tablet(s) Oral <User Schedule>    MEDICATIONS  (PRN):  acetaminophen   Tablet .. 650 milliGRAM(s) Oral every 6 hours PRN Temp greater or equal to 38C (100.4F)  acetaminophen  Suppository .. 650 milliGRAM(s) Rectal every 4 hours PRN Temp greater or equal to 38.5C (101.3F)    CAPILLARY BLOOD GLUCOSE      POCT Blood Glucose.: 299 mg/dL (25 Aug 2021 11:34)  POCT Blood Glucose.: 158 mg/dL (25 Aug 2021 07:58)  POCT Blood Glucose.: 258 mg/dL (24 Aug 2021 21:12)  POCT Blood Glucose.: 308 mg/dL (24 Aug 2021 18:53)  POCT Blood Glucose.: 285 mg/dL (24 Aug 2021 16:49)    I&O's Summary    25 Aug 2021 07:01  -  25 Aug 2021 15:31  --------------------------------------------------------  IN: 240 mL / OUT: 0 mL / NET: 240 mL        PHYSICAL EXAM:  Vital Signs Last 24 Hrs  T(C): 36.6 (25 Aug 2021 10:49), Max: 36.8 (24 Aug 2021 22:05)  T(F): 97.8 (25 Aug 2021 10:49), Max: 98.3 (24 Aug 2021 22:05)  HR: 76 (25 Aug 2021 12:32) (75 - 84)  BP: 163/73 (25 Aug 2021 12:32) (117/63 - 165/82)  BP(mean): --  RR: 18 (25 Aug 2021 10:49) (18 - 18)  SpO2: 97% (25 Aug 2021 12:32) (94% - 97%)    GENERAL: NAD, well-developed  HEAD:  Atraumatic, Normocephalic  EYES: EOMI, conjunctiva and sclera clear  Mouth: MMM  NECK: Supple  Lung: normal work of breathing, CTA b/l  Chest: S1&S2+, rrr, no m/r/g appreciated  ABDOMEN: bs+, soft, nt, nd  : No johnson catheter, no CVA tenderness  EXTREMITIES:  no pitting edema present  Neuro: A&Ox3, no flaccid paralysis in extremities appreciated    LABS:                        8.6    5.43  )-----------( 163      ( 25 Aug 2021 06:04 )             27.1     08-25    132<L>  |  103  |  36<H>  ----------------------------<  124<H>  5.0   |  19<L>  |  1.84<H>    Ca    9.3      25 Aug 2021 06:04  Phos  2.9     08-25                Culture - Blood (collected 23 Aug 2021 08:27)  Source: .Blood Blood-Peripheral  Preliminary Report (24 Aug 2021 09:01):    No growth to date.    Culture - Blood (collected 23 Aug 2021 08:27)  Source: .Blood Blood-Peripheral  Preliminary Report (24 Aug 2021 09:01):    No growth to date.        RADIOLOGY & ADDITIONAL TESTS:    Lab Results Reviewed: ferritin level still high. tacro level low.

## 2021-08-25 NOTE — PHYSICAL THERAPY INITIAL EVALUATION ADULT - GAIT DEVIATIONS NOTED, PT EVAL
Unsteady gait without device, balance improved with walker/decreased monique/decreased step length/decreased stride length

## 2021-08-25 NOTE — PHYSICAL THERAPY INITIAL EVALUATION ADULT - PRECAUTIONS/LIMITATIONS, REHAB EVAL
CXR: Patchy opacities in the left peripheral lung. Changes compatible with pneumonia. Prominent perihilar markings may mild represent pulmonary vascular congestion.  On Remdesivir and Dexamethasone. CT chest s/o covid-no s/s bacterial superinfection CXR: Patchy opacities in the left peripheral lung. Changes compatible with pneumonia. Prominent perihilar markings may mild represent pulmonary vascular congestion.  On Remdesivir and Dexamethasone. CT chest s/o covid-no s/s bacterial superinfection/fall precautions

## 2021-08-25 NOTE — PROGRESS NOTE ADULT - SUBJECTIVE AND OBJECTIVE BOX
Patient is a 76y old  Female who presents with a chief complaint of 76F p/w COVID19 and sob (24 Aug 2021 11:47)    Being followed by ID for COVID, renal transplant    Interval history:on 2 L NC  feels a little better  No acute events      ROS:  minimal cough, no SOB,CP  No N/V/D./abd pain  No other complaints      Antimicrobials:    remdesivir  IVPB   IV Intermittent   remdesivir  IVPB 100 milliGRAM(s) IV Intermittent every 24 hours  trimethoprim  160 mG/sulfamethoxazole 800 mG 1 Tablet(s) Oral <User Schedule>    Other medications reviewed  MEDICATIONS  (STANDING):  cloNIDine 0.1 milliGRAM(s) Oral two times a day  dexAMETHasone     Tablet 6 milliGRAM(s) Oral daily  dextrose 40% Gel 15 Gram(s) Oral once  dextrose 5%. 1000 milliLiter(s) (50 mL/Hr) IV Continuous <Continuous>  dextrose 5%. 1000 milliLiter(s) (100 mL/Hr) IV Continuous <Continuous>  dextrose 50% Injectable 25 Gram(s) IV Push once  dextrose 50% Injectable 12.5 Gram(s) IV Push once  dextrose 50% Injectable 25 Gram(s) IV Push once  famotidine    Tablet 20 milliGRAM(s) Oral daily  glucagon  Injectable 1 milliGRAM(s) IntraMuscular once  heparin   Injectable 5000 Unit(s) SubCutaneous every 12 hours  insulin glargine Injectable (LANTUS) 8 Unit(s) SubCutaneous at bedtime  insulin lispro (ADMELOG) corrective regimen sliding scale   SubCutaneous three times a day before meals  insulin lispro (ADMELOG) corrective regimen sliding scale   SubCutaneous at bedtime  metoprolol tartrate 50 milliGRAM(s) Oral two times a day  remdesivir  IVPB   IV Intermittent   remdesivir  IVPB 100 milliGRAM(s) IV Intermittent every 24 hours  sodium bicarbonate 650 milliGRAM(s) Oral two times a day  tacrolimus ER Tablet (ENVARSUS XR) 5 milliGRAM(s) Oral daily  tamoxifen 20 milliGRAM(s) Oral daily  trimethoprim  160 mG/sulfamethoxazole 800 mG 1 Tablet(s) Oral <User Schedule>      Vital Signs Last 24 Hrs  T(C): 36.8 (08-25-21 @ 04:15), Max: 36.8 (08-24-21 @ 22:05)  T(F): 98.3 (08-25-21 @ 04:15), Max: 98.3 (08-24-21 @ 22:05)  HR: 75 (08-25-21 @ 04:15) (75 - 84)  BP: 165/82 (08-25-21 @ 04:15) (117/63 - 165/82)  BP(mean): --  RR: 18 (08-25-21 @ 04:15) (18 - 18)  SpO2: 94% (08-25-21 @ 04:15) (94% - 97%)    Physical Exam:    HEENT PERRLA EOMI    No oral exudate or erythema    Chest Good AE,minimal basal crackles    CVS RRR S1 S2 WNl No murmur or rub or gallop    Abd soft BS normal No tenderness RLQ transplant kidney no tenderness    IV site no erythema tenderness or discharge    CNS AAO X 3 no focal    Lab Data:                          8.6    5.43  )-----------( 163      ( 25 Aug 2021 06:04 )             27.1       08-25    132<L>  |  103  |  36<H>  ----------------------------<  124<H>  5.0   |  19<L>  |  1.84<H>    Creatinine, Serum: 1.84 mg/dL (08-25-21 @ 06:04)  Creatinine, Serum: 1.83 mg/dL (08-24-21 @ 10:12)  Creatinine, Serum: 1.85 mg/dL (08-23-21 @ 07:04)  Creatinine, Serum: 1.95 mg/dL (08-23-21 @ 01:38)      Ca    9.3      25 Aug 2021 06:04  Phos  2.9     08-25          Culture - Blood (collected 23 Aug 2021 08:27)  Source: .Blood Blood-Peripheral  Preliminary Report (24 Aug 2021 09:01):    No growth to date.    Culture - Blood (collected 23 Aug 2021 08:27)  Source: .Blood Blood-Peripheral  Preliminary Report (24 Aug 2021 09:01):    No growth to date.        C-Reactive Protein, Serum (08.25.21 @ 09:24)   C-Reactive Protein, Serum: 109 mg/L   C-Reactive Protein, Serum (08.23.21 @ 01:38)   C-Reactive Protein, Serum: 140 mg/L D-Dimer Assay, Quantitative (08.25.21 @ 09:24)   D-Dimer Assay, Quantitative: 2298 ng/mL DDU   D-Dimer Assay, Quantitative (08.23.21 @ 07:33)   D-Dimer Assay, Quantitative: 2508 ng/mL DDU   D-Dimer Assay, Quantitative (08.23.21 @ 01:38)   D-Dimer Assay, Quantitative: 2588 ng/mL DDU     < from: CT Chest No Cont (08.24.21 @ 09:30) >  IMPRESSION:    1.  Bilateral opacities can occur in the clinical setting of lung injury given the reported history of atypical infection.  2.  Bilateral septated nonseptated renal lesions some which are calcified.  3.  Subtle areas of sclerosis of the T8 vertebral body is incompletely characterized. After the acute symptoms resolve, MRI may be helpful for characterization.    --- End of Report ---        < end of copied text >

## 2021-08-25 NOTE — PHYSICAL THERAPY INITIAL EVALUATION ADULT - PERTINENT HX OF CURRENT PROBLEM, REHAB EVAL
p/w COVID19 and sob. The patient reports that she developed fever, chills, sob, cough, muscle ache 2 weeks prior and then found out she had COVID19 the thursday prior to presentation. She has sick contact in granddaughter however unclear if she may have gotten sick from another source.

## 2021-08-25 NOTE — PROGRESS NOTE ADULT - ASSESSMENT
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 The patient reports that she developed fever, chills, sob, cough, muscle ache 2 weeks prior and then found out she had COVID19 the thursday prior to presentation.   Hypoxia on 2l NC  elevated inflammatory markers but improving   CT chest s/o covid-no s/s bacterial superinfection       A) COVID  Monitor clinically.  Monitor Oxygenation  O2 supplementation.  Remdesivir - UPTO 5 days course depending on course.  Monitor CBC ,LFTs and Creatinine daily.  While there may be effects on renal function benefits>riska nd RDV has been used in patients with CRI  Monitor creatinine closley  Ferritin,CRP and D dimer q 48 hrs.  Dexamethasone as  hypoxia.  Anticoagulation per protocol.  Treatment options are limited-this as well as limitations of data discussed with pt.  Monitor for any bacterial superinfection/complications.        B) Hypoxia  from above  follow cx    C) DDRT  on immunesuppression  will defer to renal  adjust medication doses per renal function    Will tailor plan for ID issues  per course,results.Will defer to primary team on management of other issues.  Assessment, plan and recommendations as detailed above were discussed with the medical/primary  team.  I am away till 8/31 .  My colleagues in ID service will cover and follow for ID issues -Please call [phone] if issues or questions.

## 2021-08-25 NOTE — PHYSICAL THERAPY INITIAL EVALUATION ADULT - ADDITIONAL COMMENTS
Pt lives with family in an apt with 12 steps to enter. + railing. Pt was ambulatory without a device PTA but owns a cane. States she has an aide 7 days 9-5. States her aide helps with showers and household duties.

## 2021-08-26 ENCOUNTER — TRANSCRIPTION ENCOUNTER (OUTPATIENT)
Age: 76
End: 2021-08-26

## 2021-08-26 LAB
ALBUMIN SERPL ELPH-MCNC: 2.8 G/DL — LOW (ref 3.3–5)
ALP SERPL-CCNC: 40 U/L — SIGNIFICANT CHANGE UP (ref 40–120)
ALT FLD-CCNC: 13 U/L — SIGNIFICANT CHANGE UP (ref 10–45)
ANION GAP SERPL CALC-SCNC: 12 MMOL/L — SIGNIFICANT CHANGE UP (ref 5–17)
ANION GAP SERPL CALC-SCNC: 16 MMOL/L — SIGNIFICANT CHANGE UP (ref 5–17)
AST SERPL-CCNC: 24 U/L — SIGNIFICANT CHANGE UP (ref 10–40)
BILIRUB SERPL-MCNC: 0.4 MG/DL — SIGNIFICANT CHANGE UP (ref 0.2–1.2)
BUN SERPL-MCNC: 50 MG/DL — HIGH (ref 7–23)
BUN SERPL-MCNC: 52 MG/DL — HIGH (ref 7–23)
CALCIUM SERPL-MCNC: 9.1 MG/DL — SIGNIFICANT CHANGE UP (ref 8.4–10.5)
CALCIUM SERPL-MCNC: 9.7 MG/DL — SIGNIFICANT CHANGE UP (ref 8.4–10.5)
CHLORIDE SERPL-SCNC: 102 MMOL/L — SIGNIFICANT CHANGE UP (ref 96–108)
CHLORIDE SERPL-SCNC: 103 MMOL/L — SIGNIFICANT CHANGE UP (ref 96–108)
CHLORIDE UR-SCNC: 37 MMOL/L — SIGNIFICANT CHANGE UP
CO2 SERPL-SCNC: 17 MMOL/L — LOW (ref 22–31)
CO2 SERPL-SCNC: 18 MMOL/L — LOW (ref 22–31)
CREAT SERPL-MCNC: 2 MG/DL — HIGH (ref 0.5–1.3)
CREAT SERPL-MCNC: 2.25 MG/DL — HIGH (ref 0.5–1.3)
D DIMER BLD IA.RAPID-MCNC: 1220 NG/ML DDU — HIGH
GLUCOSE BLDC GLUCOMTR-MCNC: 157 MG/DL — HIGH (ref 70–99)
GLUCOSE BLDC GLUCOMTR-MCNC: 177 MG/DL — HIGH (ref 70–99)
GLUCOSE BLDC GLUCOMTR-MCNC: 209 MG/DL — HIGH (ref 70–99)
GLUCOSE BLDC GLUCOMTR-MCNC: 235 MG/DL — HIGH (ref 70–99)
GLUCOSE SERPL-MCNC: 129 MG/DL — HIGH (ref 70–99)
GLUCOSE SERPL-MCNC: 181 MG/DL — HIGH (ref 70–99)
HCT VFR BLD CALC: 25.1 % — LOW (ref 34.5–45)
HGB BLD-MCNC: 8 G/DL — LOW (ref 11.5–15.5)
MCHC RBC-ENTMCNC: 28.3 PG — SIGNIFICANT CHANGE UP (ref 27–34)
MCHC RBC-ENTMCNC: 31.9 GM/DL — LOW (ref 32–36)
MCV RBC AUTO: 88.7 FL — SIGNIFICANT CHANGE UP (ref 80–100)
NRBC # BLD: 0 /100 WBCS — SIGNIFICANT CHANGE UP (ref 0–0)
PHOSPHATE SERPL-MCNC: 3.2 MG/DL — SIGNIFICANT CHANGE UP (ref 2.5–4.5)
PLATELET # BLD AUTO: 177 K/UL — SIGNIFICANT CHANGE UP (ref 150–400)
POTASSIUM SERPL-MCNC: 4.7 MMOL/L — SIGNIFICANT CHANGE UP (ref 3.5–5.3)
POTASSIUM SERPL-MCNC: 5.5 MMOL/L — HIGH (ref 3.5–5.3)
POTASSIUM SERPL-SCNC: 4.7 MMOL/L — SIGNIFICANT CHANGE UP (ref 3.5–5.3)
POTASSIUM SERPL-SCNC: 5.5 MMOL/L — HIGH (ref 3.5–5.3)
PROT SERPL-MCNC: 6 G/DL — SIGNIFICANT CHANGE UP (ref 6–8.3)
RBC # BLD: 2.83 M/UL — LOW (ref 3.8–5.2)
RBC # FLD: 13.2 % — SIGNIFICANT CHANGE UP (ref 10.3–14.5)
SODIUM SERPL-SCNC: 132 MMOL/L — LOW (ref 135–145)
SODIUM SERPL-SCNC: 136 MMOL/L — SIGNIFICANT CHANGE UP (ref 135–145)
SODIUM UR-SCNC: 67 MMOL/L — SIGNIFICANT CHANGE UP
TACROLIMUS SERPL-MCNC: 11.1 NG/ML — SIGNIFICANT CHANGE UP
UUN UR-MCNC: 573 MG/DL — SIGNIFICANT CHANGE UP
WBC # BLD: 5.66 K/UL — SIGNIFICANT CHANGE UP (ref 3.8–10.5)
WBC # FLD AUTO: 5.66 K/UL — SIGNIFICANT CHANGE UP (ref 3.8–10.5)

## 2021-08-26 PROCEDURE — 99233 SBSQ HOSP IP/OBS HIGH 50: CPT

## 2021-08-26 RX ORDER — INSULIN HUMAN 100 [IU]/ML
10 INJECTION, SOLUTION SUBCUTANEOUS ONCE
Refills: 0 | Status: COMPLETED | OUTPATIENT
Start: 2021-08-26 | End: 2021-08-26

## 2021-08-26 RX ORDER — TACROLIMUS 5 MG/1
5 CAPSULE ORAL DAILY
Refills: 0 | Status: DISCONTINUED | OUTPATIENT
Start: 2021-08-27 | End: 2021-08-27

## 2021-08-26 RX ORDER — SODIUM CHLORIDE 9 MG/ML
1000 INJECTION INTRAMUSCULAR; INTRAVENOUS; SUBCUTANEOUS
Refills: 0 | Status: DISCONTINUED | OUTPATIENT
Start: 2021-08-26 | End: 2021-08-27

## 2021-08-26 RX ORDER — DEXTROSE 50 % IN WATER 50 %
50 SYRINGE (ML) INTRAVENOUS ONCE
Refills: 0 | Status: COMPLETED | OUTPATIENT
Start: 2021-08-26 | End: 2021-08-26

## 2021-08-26 RX ADMIN — FAMOTIDINE 20 MILLIGRAM(S): 10 INJECTION INTRAVENOUS at 12:24

## 2021-08-26 RX ADMIN — Medication 650 MILLIGRAM(S): at 05:48

## 2021-08-26 RX ADMIN — Medication 6 MILLIGRAM(S): at 05:49

## 2021-08-26 RX ADMIN — Medication 4 UNIT(S): at 10:41

## 2021-08-26 RX ADMIN — HEPARIN SODIUM 5000 UNIT(S): 5000 INJECTION INTRAVENOUS; SUBCUTANEOUS at 05:48

## 2021-08-26 RX ADMIN — TAMOXIFEN CITRATE 20 MILLIGRAM(S): 20 TABLET, FILM COATED ORAL at 12:24

## 2021-08-26 RX ADMIN — Medication 50 MILLILITER(S): at 08:25

## 2021-08-26 RX ADMIN — INSULIN HUMAN 10 UNIT(S): 100 INJECTION, SOLUTION SUBCUTANEOUS at 08:28

## 2021-08-26 RX ADMIN — Medication 0.1 MILLIGRAM(S): at 17:09

## 2021-08-26 RX ADMIN — Medication 4 UNIT(S): at 17:07

## 2021-08-26 RX ADMIN — HEPARIN SODIUM 5000 UNIT(S): 5000 INJECTION INTRAVENOUS; SUBCUTANEOUS at 17:09

## 2021-08-26 RX ADMIN — Medication 4 UNIT(S): at 08:29

## 2021-08-26 RX ADMIN — TACROLIMUS 7 MILLIGRAM(S): 5 CAPSULE ORAL at 05:47

## 2021-08-26 RX ADMIN — Medication 4: at 08:30

## 2021-08-26 RX ADMIN — Medication 50 MILLIGRAM(S): at 05:49

## 2021-08-26 RX ADMIN — Medication 2: at 10:40

## 2021-08-26 RX ADMIN — Medication 1 TABLET(S): at 06:54

## 2021-08-26 RX ADMIN — Medication 4: at 17:08

## 2021-08-26 RX ADMIN — Medication 50 MILLIGRAM(S): at 17:10

## 2021-08-26 RX ADMIN — Medication 650 MILLIGRAM(S): at 17:09

## 2021-08-26 RX ADMIN — REMDESIVIR 500 MILLIGRAM(S): 5 INJECTION INTRAVENOUS at 10:39

## 2021-08-26 RX ADMIN — Medication 0.1 MILLIGRAM(S): at 05:49

## 2021-08-26 RX ADMIN — INSULIN GLARGINE 8 UNIT(S): 100 INJECTION, SOLUTION SUBCUTANEOUS at 21:49

## 2021-08-26 RX ADMIN — SODIUM CHLORIDE 100 MILLILITER(S): 9 INJECTION INTRAMUSCULAR; INTRAVENOUS; SUBCUTANEOUS at 14:26

## 2021-08-26 NOTE — PROGRESS NOTE ADULT - PROBLEM SELECTOR PLAN 4
c/w clonidine  monitor vital signs

## 2021-08-26 NOTE — PROGRESS NOTE ADULT - PROBLEM SELECTOR PLAN 1
Acute respiratory failure with hypoxia due to COVID-19 pneumonia. CT chest consistent with the diagnosis.   - Continue Supplemental oxygen with NC 2L, titrate down as tolerated  - C/w dexamethasone and remdesivir (day 3/5)  - DVT prophylaxis with heparin sc  - Plan of care discussed with the patient's daughter Olga [phone]
Acute respiratory failure with hypoxia due to COVID-19 pneumonia. CT chest consistent with the diagnosis.   - Sating 95% on room air   - C/w dexamethasone and remdesivir (day 4/5)  - DVT prophylaxis with heparin sc
Acute respiratory failure with hypoxia due to COVID-19 pneumonia. CT chest consistent with the diagnosis.   - Continue Supplemental oxygen with NC 2L, titrate down as tolerated  - C/w dexamethasone and remdesivir (day 2/5)  - DVT prophylaxis with heparin sc  - Plan of care discussed with the patient's daughter.

## 2021-08-26 NOTE — DISCHARGE NOTE PROVIDER - NSRESEARCHGRANT_PROPHYLAXISRECOMFT_GEN_A_CORE
IMPROVE-DD Application Not Available Rivaroxaban 10 mg oral tablet: 1 tab orally once a day for 30 days

## 2021-08-26 NOTE — PROGRESS NOTE ADULT - SUBJECTIVE AND OBJECTIVE BOX
[facility] DIVISION OF KIDNEY DISEASES AND HYPERTENSION -- FOLLOW UP NOTE  --------------------------------------------------------------------------------    24 hour events/subjective: Patient was seen and examined at bedside. Reported feeling well. Endorse some decrease in appetite. Denies CP, SOB, fever, chills, nausea, vomiting, diarrhea, LE edema or dysuria.          PAST HISTORY  --------------------------------------------------------------------------------  No significant changes to PMH, PSH, FHx, SHx, unless otherwise noted    ALLERGIES & MEDICATIONS  --------------------------------------------------------------------------------  Allergies    ChloraPrep One-Step (Rash)  penicillins (Rash)  shellfish (Rash)    Intolerances      Standing Inpatient Medications  cloNIDine 0.1 milliGRAM(s) Oral two times a day  dexAMETHasone     Tablet 6 milliGRAM(s) Oral daily  dextrose 40% Gel 15 Gram(s) Oral once  dextrose 5%. 1000 milliLiter(s) IV Continuous <Continuous>  dextrose 5%. 1000 milliLiter(s) IV Continuous <Continuous>  dextrose 50% Injectable 25 Gram(s) IV Push once  famotidine    Tablet 20 milliGRAM(s) Oral daily  glucagon  Injectable 1 milliGRAM(s) IntraMuscular once  heparin   Injectable 5000 Unit(s) SubCutaneous every 12 hours  insulin glargine Injectable (LANTUS) 8 Unit(s) SubCutaneous at bedtime  insulin lispro (ADMELOG) corrective regimen sliding scale   SubCutaneous three times a day before meals  insulin lispro (ADMELOG) corrective regimen sliding scale   SubCutaneous at bedtime  insulin lispro Injectable (ADMELOG) 4 Unit(s) SubCutaneous three times a day before meals  metoprolol tartrate 50 milliGRAM(s) Oral two times a day  remdesivir  IVPB   IV Intermittent   remdesivir  IVPB 100 milliGRAM(s) IV Intermittent every 24 hours  sodium bicarbonate 650 milliGRAM(s) Oral two times a day  sodium chloride 0.9%. 1000 milliLiter(s) IV Continuous <Continuous>  tacrolimus ER Tablet (ENVARSUS XR) 7 milliGRAM(s) Oral daily  tamoxifen 20 milliGRAM(s) Oral daily  trimethoprim  160 mG/sulfamethoxazole 800 mG 1 Tablet(s) Oral <User Schedule>    PRN Inpatient Medications  acetaminophen   Tablet .. 650 milliGRAM(s) Oral every 6 hours PRN  acetaminophen  Suppository .. 650 milliGRAM(s) Rectal every 4 hours PRN      REVIEW OF SYSTEMS  --------------------------------------------------------------------------------  Gen: No fevers/chills  Respiratory: No dyspnea, cough,   CV: No chest pain, PND, orthopnea  GI: No abdominal pain, diarrhea, constipation, nausea, vomiting  Transplant: No pain  : No increased frequency, dysuria, hematuria   MSK: No edema  Neuro: No dizziness/lightheadedness    All other systems were reviewed and are negative, except as noted.    VITALS/PHYSICAL EXAM  --------------------------------------------------------------------------------  T(C): 36.4 (08-26-21 @ 05:00), Max: 36.8 (08-25-21 @ 21:29)  HR: 75 (08-26-21 @ 05:00) (73 - 82)  BP: 133/63 (08-26-21 @ 05:00) (123/62 - 134/73)  RR: 18 (08-26-21 @ 05:00) (18 - 18)  SpO2: 95% (08-26-21 @ 12:05) (95% - 97%)  Wt(kg): --        08-25-21 @ 07:01  -  08-26-21 @ 07:00  --------------------------------------------------------  IN: 480 mL / OUT: 0 mL / NET: 480 mL      Physical Exam:  	Gen: NAD, able to speak in full sentences   	HEENT: PERRL, MMM   	Pulm: CTA B/L, no crackles   	CV: RRR, S1S2+  	Abd: +BS, soft          Transplant: No tenderness, swelling  	: No suprapubic tenderness  	MSK: no edema   	Psych: Normal affect and mood  	Skin: Warm          Access:    LABS/STUDIES  --------------------------------------------------------------------------------              8.0    5.66  >-----------<  177      [08-26-21 @ 05:45]              25.1     136  |  103  |  50  ----------------------------<  129      [08-26-21 @ 12:42]  4.7   |  17  |  2.00        Ca     9.7     [08-26-21 @ 12:42]      Phos  3.2     [08-26-21 @ 05:45]    TPro  6.0  /  Alb  2.8  /  TBili  0.4  /  DBili  x   /  AST  24  /  ALT  13  /  AlkPhos  40  [08-26-21 @ 05:45]          Creatinine Trend:  SCr 2.00 [08-26 @ 12:42]  SCr 2.25 [08-26 @ 05:45]  SCr 1.84 [08-25 @ 06:04]  SCr 1.83 [08-24 @ 10:12]  SCr 1.85 [08-23 @ 07:04]    Tacrolimus (), Serum: 11.1 ng/mL (08-26 @ 07:15)  Tacrolimus (), Serum: <2.0 ng/mL (08-25 @ 07:17)  Tacrolimus (), Serum: 3.2 ng/mL (08-24 @ 05:27)              Urine Sodium 67      [08-26-21 @ 11:31]  Urine Chloride 37      [08-26-21 @ 11:31]    Ferritin 5016      [08-25-21 @ 10:45]  HbA1c 7.2      [01-11-20 @ 09:23]

## 2021-08-26 NOTE — DISCHARGE NOTE PROVIDER - NSDCCPCAREPLAN_GEN_ALL_CORE_FT
PRINCIPAL DISCHARGE DIAGNOSIS  Diagnosis: 2019 novel coronavirus disease (COVID-19)  Assessment and Plan of Treatment:       SECONDARY DISCHARGE DIAGNOSES  Diagnosis: HTN (hypertension)  Assessment and Plan of Treatment:     Diagnosis: Renal transplant recipient  Assessment and Plan of Treatment:     Diagnosis: Type 2 diabetes mellitus  Assessment and Plan of Treatment:     Diagnosis: Acute respiratory failure due to COVID-19  Assessment and Plan of Treatment:      PRINCIPAL DISCHARGE DIAGNOSIS  Diagnosis: 2019 novel coronavirus disease (COVID-19)  Assessment and Plan of Treatment: You were treated for Covid-19 and should complete steroid (Decadron) at home for another 6 days. Also, you are being discharged on a blood thinner for the next 30 days as you are at high risk for developing blood clot with Covid.  Follow up with your primary doctor in 1-2 weeks. Call for appointment.      SECONDARY DISCHARGE DIAGNOSES  Diagnosis: Acute respiratory failure due to COVID-19  Assessment and Plan of Treatment:   Condition now resolved and respiratory status is stable.    Diagnosis: Renal transplant recipient  Assessment and Plan of Treatment: You were followed by the transplant team.  Your Tacrolimus was decreased to once a day.  Follow up with your transplant doctor next week. Call on Monday and inform that you were discharged today and your Tacrolimus dose was adjusted during hospitalization.    Diagnosis: HTN (hypertension)  Assessment and Plan of Treatment: Low salt diet  Activity as tolerated.  Take all medication as prescribed.  Follow up with your medical doctor for routine blood pressure monitoring at your next visit.  Notify your doctor if you have any of the following symptoms:   Dizziness, Lightheadedness, Blurry vision, Headache, Chest pain, Shortness of breath

## 2021-08-26 NOTE — DISCHARGE NOTE PROVIDER - CARE PROVIDER_API CALL
Damián Perea  NEPHROLOGY  [address] [place], [place] [address]  Phone: [phone]  Fax: [phone]  Follow Up Time:

## 2021-08-26 NOTE — PROGRESS NOTE ADULT - PROBLEM SELECTOR PLAN 6
istop #: [id number]  07/02/2021	07/09/2021	oxycodone-acetaminophen  mg tab	90	30	Anne Rodrigues	[id number]	Insurance	[facility]    oxycodone for pain as needed
istop #: [id number]  07/02/2021	07/09/2021	oxycodone-acetaminophen  mg tab	90	30	Anne Rodrigues	[id number]	Insurance	[facility]    oxycodone for pain as needed
istop #: [id number]  07/02/2021	07/09/2021	oxycodone-acetaminophen  mg tab	90	30	Anne Rodrigues	[id number]	Insurance	[facility]    oxycodone for pain as needed

## 2021-08-26 NOTE — PROGRESS NOTE ADULT - NSPROGADDITIONALINFOA_GEN_ALL_CORE
D/w daughter 8/26 updated on full plan of care   If cr continues to downtrend anticipate discharge 8/27 after last course of remdesevir D/w both daughters 8/26 updated on full plan of care   If cr continues to downtrend anticipate discharge 8/27 after last course of remdesevir

## 2021-08-26 NOTE — PROGRESS NOTE ADULT - PROBLEM SELECTOR PROBLEM 1
Acute respiratory failure due to COVID-19

## 2021-08-26 NOTE — PROGRESS NOTE ADULT - PROBLEM SELECTOR PLAN 2
Transplant Nephrology follow up appreciated.   Continue tacrolimus Monitor levels daily for toxicity.   IVF per renal today   Continue to monitor cr levels
Transplant Nephrology follow up appreciated.   Continue tacrolimus, missed yesterday morning's dose. Resumed again today. Monitor levels daily for toxicity.   Cr remains stable. GFR 30 - may need to stop Remdesivir if drops.   Avoid nephrotoxins.
Transplant Nephrology follow up appreciated.   Continue tacrolimus, missed yesterday morning's dose. Resumed again today. Monitor levels daily for toxicity.   Cr remains stable. GFR 30 - may need to stop Remdesivir is drops.   Avoid nephrotoxins.

## 2021-08-26 NOTE — DISCHARGE NOTE PROVIDER - NSDCFUSCHEDAPPT_GEN_ALL_CORE_FT
ANTONELLA DOBSON ; 09/02/2021 ; [facility] [address] [place] [address] ANTONELLA Mata ; 10/25/2021 ; [facility] ANTONELLA DOBSON ; 08/30/2021 ; [facility] [address] ANTONELLA Villarreal ; 09/02/2021 ; [facility] [address] [place] [address] ANTONELLA Mata ; 10/25/2021 ; [facility]

## 2021-08-26 NOTE — PROGRESS NOTE ADULT - PROBLEM SELECTOR PLAN 3
Resume tamoxifen 20mg daily (home med)

## 2021-08-26 NOTE — PROGRESS NOTE ADULT - PROBLEM SELECTOR PLAN 5
continue to monitor FS glucose on current regimen for now:  Continue Lantus 8 units qHS  premeal Admelog 4 units TID with meals  Admelog sliding scale.
continue to monitor FS glucose on current regimen for now:  Continue Lantus 8 units qHS  Start premeal Admelog 4 units TID with meals  Admelog sliding scale.
continue to monitor FS glucose on current regimen for now:  Lantus 8 units qHS  Admelog sliding scale.

## 2021-08-26 NOTE — DISCHARGE NOTE PROVIDER - NSDCMRMEDTOKEN_GEN_ALL_CORE_FT
albuterol 90 mcg/inh inhalation aerosol: 2 puff(s) inhaled every 6 hours, As Needed  cloNIDine 0.1 mg oral tablet: 1 tab(s) orally 2 times a day  famotidine 20 mg oral tablet: 1 tab(s) orally once a day  HumaLOG KwikPen 100 units/mL injectable solution: Sliding scale  Lantus Solostar Pen 100 units/mL subcutaneous solution: 8 unit(s) subcutaneous once a day (at bedtime)  leflunomide 20 mg oral tablet: 1 tab(s) orally once a day  Metoprolol Tartrate 50 mg oral tablet: 1 tab(s) orally 2 times a day  Percocet 10/325 oral tablet: 1 tab(s) orally every 8 hours, As Needed  repaglinide 2 mg oral tablet: 1 tab(s) orally 3 times a day (before meals)  tacrolimus 1 mg oral tablet, extended release: 1 tab(s) orally once a day (in the morning)  tacrolimus 4 mg oral tablet, extended release: 1 tab(s) orally once a day (in the morning)  tamoxifen 20 mg oral tablet: 1 tab(s) orally once a day  Tradjenta 5 mg oral tablet: 1 tab(s) orally once a day   3 in 1 Commode:   albuterol 90 mcg/inh inhalation aerosol: 2 puff(s) inhaled every 6 hours, As Needed  cloNIDine 0.1 mg oral tablet: 1 tab(s) orally 2 times a day  famotidine 20 mg oral tablet: 1 tab(s) orally once a day  HumaLOG KwikPen 100 units/mL injectable solution: Sliding scale  Lantus Solostar Pen 100 units/mL subcutaneous solution: 8 unit(s) subcutaneous once a day (at bedtime)  leflunomide 20 mg oral tablet: 1 tab(s) orally once a day  Metoprolol Tartrate 50 mg oral tablet: 1 tab(s) orally 2 times a day  Percocet 10/325 oral tablet: 1 tab(s) orally every 8 hours, As Needed  repaglinide 2 mg oral tablet: 1 tab(s) orally 3 times a day (before meals)  Rolling Walker:   tacrolimus 1 mg oral tablet, extended release: 1 tab(s) orally once a day (in the morning)  tacrolimus 4 mg oral tablet, extended release: 1 tab(s) orally once a day (in the morning)  tamoxifen 20 mg oral tablet: 1 tab(s) orally once a day  Tradjenta 5 mg oral tablet: 1 tab(s) orally once a day   3 in 1 Commode:   albuterol 90 mcg/inh inhalation aerosol: 2 puff(s) inhaled every 6 hours, As Needed  apixaban 2.5 mg oral tablet: 1 tab(s) orally 2 times a day  cloNIDine 0.1 mg oral tablet: 1 tab(s) orally 2 times a day  dexamethasone 6 mg oral tablet: 1 tab(s) orally once a day x6 days  famotidine 20 mg oral tablet: 1 tab(s) orally once a day  HumaLOG KwikPen 100 units/mL injectable solution: 4 unit(s) subcutaneously 3 times a day (with meals)   Lantus Solostar Pen 100 units/mL subcutaneous solution: 8 unit(s) subcutaneous once a day (at bedtime)  Metoprolol Tartrate 50 mg oral tablet: 1 tab(s) orally 2 times a day  Percocet 10/325 oral tablet: 1 tab(s) orally every 8 hours, As Needed  repaglinide 2 mg oral tablet: 1 tab(s) orally 3 times a day (before meals)  Rolling Walker:   sodium bicarbonate 650 mg oral tablet: 2 tab(s) orally 2 times a day  sulfamethoxazole-trimethoprim 800 mg-160 mg oral tablet: 1 tab(s) orally 3 times a week on Tuesday/Thursday/Saturday at 9am  tacrolimus 1 mg oral tablet, extended release: 5 tab(s) orally once a day  tamoxifen 20 mg oral tablet: 1 tab(s) orally once a day  Tradjenta 5 mg oral tablet: 1 tab(s) orally once a day

## 2021-08-26 NOTE — DISCHARGE NOTE PROVIDER - HOSPITAL COURSE
76F w/ ESRD 2/2 PCKD s/p renal transplant, breast CA s/p lumpectomy on tamoxifen, HTN, hx of dvt, HLD, gout, chronic lower back pain p/w COVID19 and sob admit to medicine for further management     Problem/Plan - 1:  ·  Problem: Acute respiratory failure due to COVID-19.   ·  Plan: Acute respiratory failure with hypoxia due to COVID-19 pneumonia. CT chest consistent with the diagnosis.   - Continue Supplemental oxygen with NC 2L, titrate down as tolerated  - C/w dexamethasone and remdesivir (day 3/5)  - DVT prophylaxis with heparin sc  - Plan of care discussed with the patient's daughter Olga [phone].     Problem/Plan - 2:  ·  Problem: Renal transplant recipient.   ·  Plan: Transplant Nephrology follow up appreciated.   Continue tacrolimus, missed yesterday morning's dose. Resumed again today. Monitor levels daily for toxicity.   Cr remains stable. GFR 30 - may need to stop Remdesivir if drops.   Avoid nephrotoxins.     Problem/Plan - 3:  ·  Problem: Breast cancer.   ·  Plan: Resume tamoxifen 20mg daily (home med).     Problem/Plan - 4:  ·  Problem: HTN (hypertension).   ·  Plan: c/w clonidine  monitor vital signs.     Problem/Plan - 5:  ·  Problem: Type 2 diabetes mellitus.   ·  Plan: continue to monitor FS glucose on current regimen for now:  Continue Lantus 8 units qHS  Start premeal Admelog 4 units TID with meals  Admelog sliding scale.     Problem/Plan - 6:  ·  Problem: Low back pain.   ·  Plan: istop #: [id number]  07/02/2021	07/09/2021	oxycodone-acetaminophen  mg tab	90	30	Anne Rodrigues	[id number]	Insurance	[facility]    oxycodone for pain as needed.     Problem/Plan - 7:  ·  Problem: Bicytopenia.   ·  Plan: monitor w/ cbc.         76F w/ ESRD 2/2 PCKD s/p renal transplant, breast CA s/p lumpectomy on tamoxifen, HTN, hx of dvt, HLD, gout, chronic lower back pain p/w COVID19 and sob admit to medicine for further management     Problem/Plan - 1:  ·  Problem: Acute respiratory failure due to COVID-19.   ·  Plan: Acute respiratory failure with hypoxia due to COVID-19 pneumonia. CT chest consistent with the diagnosis.   - Continue Supplemental oxygen with NC 2L, titrate down as tolerated  - C/w dexamethasone and remdesivir (day 3/5)  - DVT prophylaxis with heparin sc  - Plan of care discussed with the patient's daughter Olga [phone].     Problem/Plan - 2:  ·  Problem: Renal transplant recipient.   ·  Plan: Transplant Nephrology follow up appreciated.   Continue tacrolimus, missed yesterday morning's dose. Resumed again today. Monitor levels daily for toxicity.   Cr remains stable. GFR 30 - may need to stop Remdesivir if drops.   Avoid nephrotoxins.     Problem/Plan - 3:  ·  Problem: Breast cancer.   ·  Plan: Resume tamoxifen 20mg daily (home med).     Problem/Plan - 4:  ·  Problem: HTN (hypertension).   ·  Plan: c/w clonidine  monitor vital signs.     Problem/Plan - 5:  ·  Problem: Type 2 diabetes mellitus.   ·  Plan: continue to monitor FS glucose on current regimen for now:  Continue Lantus 8 units qHS  Start premeal Admelog 4 units TID with meals  Admelog sliding scale.     Problem/Plan - 6:  ·  Problem: Low back pain.   ·  Plan: istop #: [id number]  07/02/2021	07/09/2021	oxycodone-acetaminophen  mg tab	90	30	Anne Rodrigues	[id number]	Insurance	[facility]    oxycodone for pain as needed.     Problem/Plan - 7:  ·  Problem: Bicytopenia.   ·  Plan: monitor w/ cbc.    Discharge Diagnoses:  # Acute respiratory failure due to COVID-19  # Renal transplant recipient  # Breast cancer  # Type 2 diabetes mellitus  # HTN

## 2021-08-26 NOTE — PROGRESS NOTE ADULT - SUBJECTIVE AND OBJECTIVE BOX
Patient is a 76y old  Female who presents with a chief complaint of 76F p/w COVID19 and sob (26 Aug 2021 11:23)      SUBJECTIVE / OVERNIGHT EVENTS: Feels much better, breathing is better, no cp, abdominal pain     MEDICATIONS  (STANDING):  cloNIDine 0.1 milliGRAM(s) Oral two times a day  dexAMETHasone     Tablet 6 milliGRAM(s) Oral daily  dextrose 40% Gel 15 Gram(s) Oral once  dextrose 5%. 1000 milliLiter(s) (50 mL/Hr) IV Continuous <Continuous>  dextrose 5%. 1000 milliLiter(s) (100 mL/Hr) IV Continuous <Continuous>  dextrose 50% Injectable 25 Gram(s) IV Push once  famotidine    Tablet 20 milliGRAM(s) Oral daily  glucagon  Injectable 1 milliGRAM(s) IntraMuscular once  heparin   Injectable 5000 Unit(s) SubCutaneous every 12 hours  insulin glargine Injectable (LANTUS) 8 Unit(s) SubCutaneous at bedtime  insulin lispro (ADMELOG) corrective regimen sliding scale   SubCutaneous three times a day before meals  insulin lispro (ADMELOG) corrective regimen sliding scale   SubCutaneous at bedtime  insulin lispro Injectable (ADMELOG) 4 Unit(s) SubCutaneous three times a day before meals  metoprolol tartrate 50 milliGRAM(s) Oral two times a day  remdesivir  IVPB   IV Intermittent   remdesivir  IVPB 100 milliGRAM(s) IV Intermittent every 24 hours  sodium bicarbonate 650 milliGRAM(s) Oral two times a day  sodium chloride 0.9%. 1000 milliLiter(s) (100 mL/Hr) IV Continuous <Continuous>  tacrolimus ER Tablet (ENVARSUS XR) 7 milliGRAM(s) Oral daily  tamoxifen 20 milliGRAM(s) Oral daily  trimethoprim  160 mG/sulfamethoxazole 800 mG 1 Tablet(s) Oral <User Schedule>    MEDICATIONS  (PRN):  acetaminophen   Tablet .. 650 milliGRAM(s) Oral every 6 hours PRN Temp greater or equal to 38C (100.4F)  acetaminophen  Suppository .. 650 milliGRAM(s) Rectal every 4 hours PRN Temp greater or equal to 38.5C (101.3F)        CAPILLARY BLOOD GLUCOSE      POCT Blood Glucose.: 157 mg/dL (26 Aug 2021 10:28)  POCT Blood Glucose.: 209 mg/dL (26 Aug 2021 08:01)  POCT Blood Glucose.: 221 mg/dL (25 Aug 2021 20:57)  POCT Blood Glucose.: 340 mg/dL (25 Aug 2021 16:57)    I&O's Summary    25 Aug 2021 07:01  -  26 Aug 2021 07:00  --------------------------------------------------------  IN: 480 mL / OUT: 0 mL / NET: 480 mL        PHYSICAL EXAM:  GENERAL: NAD, well-developed  HEAD:  Atraumatic, Normocephalic  EYES: conjunctiva and sclera clear  NECK: No JVD  CHEST/LUNG: Clear to auscultation bilaterally; No wheeze  HEART: Regular rate and rhythm;S1S2  ABDOMEN: Soft, Nontender, Nondistended; Bowel sounds present  EXTREMITIES:  2+ Peripheral Pulses, No clubbing, cyanosis, or edema  PSYCH: AAOx3      LABS:                        8.0    5.66  )-----------( 177      ( 26 Aug 2021 05:45 )             25.1     08-26    136  |  103  |  50<H>  ----------------------------<  129<H>  4.7   |  17<L>  |  2.00<H>    Ca    9.7      26 Aug 2021 12:42  Phos  3.2     08-26    TPro  6.0  /  Alb  2.8<L>  /  TBili  0.4  /  DBili  x   /  AST  24  /  ALT  13  /  AlkPhos  40  08-26              RADIOLOGY & ADDITIONAL TESTS:    Imaging Personally Reviewed:    Consultant(s) Notes Reviewed:      Care Discussed with Consultants/Other Providers:

## 2021-08-26 NOTE — PROGRESS NOTE ADULT - ASSESSMENT
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 Admitted for COVID-19. Patient never got covid-19 vaccine. Transplant nephrology consulted for kidney transplant management.     # S/p DDRT on 12/7/19   - Baseline Scr ranges from 1.8 to 2.2, last Scr prior to admission was 1.92 on 6/24/21  - Scr increased to 2.25 this morning   - Would start on gentle IV hydration   - Continue to monitor labs and urine output  - Dose meds as per GFR    #Immunosuppression  - Got Simulect induction  - Last tacrolimus level today is 11.1? but uncertain if got the dose before or after medication given   - Continue Envarsus to 7 mg PO daily   - Continue Bactrim prophylaxis  - Hold Leflunomide   - Currently on Dexamethazone, so hold pred for now   - Obtain tacrolimus trough daily (30 minute prior to AM dose)     # Hyperkalemia  - Last K 5  - Monitor K   - Low K diet     #Metabolic acidosis  - Last bicarb 17, continue PO bicarb    #Hypophosphatemia  - Resolved, last phos wnl     #COVID 19  - Agree with Remdesvir and Dexamethasone     If any questions, please feel free to contact me     Alla Sykes  Nephrology Fellow  [facility] Pager: [phone]  TUCKER Pager: [phone] Patient is a 75 y/o F w PMH of ESRD, s/p DDRT on 12/7/19 c/b DGF now off HD, 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 Admitted for COVID-19. Patient never got covid-19 vaccine. Transplant nephrology consulted for kidney transplant management.     # S/p DDRT on 12/7/19   - Baseline Scr ranges from 1.8 to 2.2, last Scr prior to admission was 1.92 on 6/24/21  - Scr increased to 2.25 this morning   - Would start on gentle IV hydration   - Continue to monitor labs and urine output  - Dose meds as per GFR    #Immunosuppression  - Got Simulect induction  - Last tacrolimus level today is 11.1? but uncertain if got the dose before or after medication given   - Decrease Envarsus 5 mg PO daily   - Continue Bactrim prophylaxis  - Hold Leflunomide   - Currently on Dexamethazone, so hold pred for now   - Obtain tacrolimus trough daily (30 minute prior to AM dose)     # Hyperkalemia  - Last K 5  - Monitor K   - Low K diet     #Metabolic acidosis  - Last bicarb 17, continue PO bicarb    #Hypophosphatemia  - Resolved, last phos wnl     #COVID 19  - Agree with Remdesvir and Dexamethasone     If any questions, please feel free to contact me     Alla Sykes  Nephrology Fellow  [facility] Pager: [phone]  TUCKER Pager: [phone]

## 2021-08-27 ENCOUNTER — TRANSCRIPTION ENCOUNTER (OUTPATIENT)
Age: 76
End: 2021-08-27

## 2021-08-27 VITALS
HEART RATE: 93 BPM | RESPIRATION RATE: 18 BRPM | SYSTOLIC BLOOD PRESSURE: 161 MMHG | TEMPERATURE: 98 F | DIASTOLIC BLOOD PRESSURE: 81 MMHG | OXYGEN SATURATION: 93 %

## 2021-08-27 DIAGNOSIS — E11.9 TYPE 2 DIABETES MELLITUS WITHOUT COMPLICATIONS: ICD-10-CM

## 2021-08-27 LAB
ALBUMIN SERPL ELPH-MCNC: 2.8 G/DL — LOW (ref 3.3–5)
ALP SERPL-CCNC: 36 U/L — LOW (ref 40–120)
ALT FLD-CCNC: 12 U/L — SIGNIFICANT CHANGE UP (ref 10–45)
ANION GAP SERPL CALC-SCNC: 14 MMOL/L — SIGNIFICANT CHANGE UP (ref 5–17)
AST SERPL-CCNC: 20 U/L — SIGNIFICANT CHANGE UP (ref 10–40)
BILIRUB SERPL-MCNC: 0.3 MG/DL — SIGNIFICANT CHANGE UP (ref 0.2–1.2)
BUN SERPL-MCNC: 55 MG/DL — HIGH (ref 7–23)
CALCIUM SERPL-MCNC: 9.3 MG/DL — SIGNIFICANT CHANGE UP (ref 8.4–10.5)
CHLORIDE SERPL-SCNC: 105 MMOL/L — SIGNIFICANT CHANGE UP (ref 96–108)
CO2 SERPL-SCNC: 16 MMOL/L — LOW (ref 22–31)
CREAT SERPL-MCNC: 2.06 MG/DL — HIGH (ref 0.5–1.3)
GLUCOSE BLDC GLUCOMTR-MCNC: 149 MG/DL — HIGH (ref 70–99)
GLUCOSE BLDC GLUCOMTR-MCNC: 260 MG/DL — HIGH (ref 70–99)
GLUCOSE SERPL-MCNC: 150 MG/DL — HIGH (ref 70–99)
HCT VFR BLD CALC: 24.3 % — LOW (ref 34.5–45)
HGB BLD-MCNC: 7.8 G/DL — LOW (ref 11.5–15.5)
MCHC RBC-ENTMCNC: 28.5 PG — SIGNIFICANT CHANGE UP (ref 27–34)
MCHC RBC-ENTMCNC: 32.1 GM/DL — SIGNIFICANT CHANGE UP (ref 32–36)
MCV RBC AUTO: 88.7 FL — SIGNIFICANT CHANGE UP (ref 80–100)
NRBC # BLD: 0 /100 WBCS — SIGNIFICANT CHANGE UP (ref 0–0)
PLATELET # BLD AUTO: 192 K/UL — SIGNIFICANT CHANGE UP (ref 150–400)
POTASSIUM SERPL-MCNC: 5.1 MMOL/L — SIGNIFICANT CHANGE UP (ref 3.5–5.3)
POTASSIUM SERPL-SCNC: 5.1 MMOL/L — SIGNIFICANT CHANGE UP (ref 3.5–5.3)
PROT SERPL-MCNC: 5.9 G/DL — LOW (ref 6–8.3)
RBC # BLD: 2.74 M/UL — LOW (ref 3.8–5.2)
RBC # FLD: 13.2 % — SIGNIFICANT CHANGE UP (ref 10.3–14.5)
SODIUM SERPL-SCNC: 135 MMOL/L — SIGNIFICANT CHANGE UP (ref 135–145)
TACROLIMUS SERPL-MCNC: 7.2 NG/ML — SIGNIFICANT CHANGE UP
WBC # BLD: 7.2 K/UL — SIGNIFICANT CHANGE UP (ref 3.8–10.5)
WBC # FLD AUTO: 7.2 K/UL — SIGNIFICANT CHANGE UP (ref 3.8–10.5)

## 2021-08-27 PROCEDURE — 99232 SBSQ HOSP IP/OBS MODERATE 35: CPT | Mod: GC

## 2021-08-27 PROCEDURE — 85018 HEMOGLOBIN: CPT

## 2021-08-27 PROCEDURE — 83880 ASSAY OF NATRIURETIC PEPTIDE: CPT

## 2021-08-27 PROCEDURE — 84484 ASSAY OF TROPONIN QUANT: CPT

## 2021-08-27 PROCEDURE — 85025 COMPLETE CBC W/AUTO DIFF WBC: CPT

## 2021-08-27 PROCEDURE — 82330 ASSAY OF CALCIUM: CPT

## 2021-08-27 PROCEDURE — 82435 ASSAY OF BLOOD CHLORIDE: CPT

## 2021-08-27 PROCEDURE — 93005 ELECTROCARDIOGRAM TRACING: CPT

## 2021-08-27 PROCEDURE — 85014 HEMATOCRIT: CPT

## 2021-08-27 PROCEDURE — 83735 ASSAY OF MAGNESIUM: CPT

## 2021-08-27 PROCEDURE — 80197 ASSAY OF TACROLIMUS: CPT

## 2021-08-27 PROCEDURE — 93970 EXTREMITY STUDY: CPT

## 2021-08-27 PROCEDURE — 82803 BLOOD GASES ANY COMBINATION: CPT

## 2021-08-27 PROCEDURE — 85610 PROTHROMBIN TIME: CPT

## 2021-08-27 PROCEDURE — 83036 HEMOGLOBIN GLYCOSYLATED A1C: CPT

## 2021-08-27 PROCEDURE — 99232 SBSQ HOSP IP/OBS MODERATE 35: CPT

## 2021-08-27 PROCEDURE — 80048 BASIC METABOLIC PNL TOTAL CA: CPT

## 2021-08-27 PROCEDURE — 85379 FIBRIN DEGRADATION QUANT: CPT

## 2021-08-27 PROCEDURE — 82436 ASSAY OF URINE CHLORIDE: CPT

## 2021-08-27 PROCEDURE — 82947 ASSAY GLUCOSE BLOOD QUANT: CPT

## 2021-08-27 PROCEDURE — 80053 COMPREHEN METABOLIC PANEL: CPT

## 2021-08-27 PROCEDURE — 99285 EMERGENCY DEPT VISIT HI MDM: CPT | Mod: 25

## 2021-08-27 PROCEDURE — 97110 THERAPEUTIC EXERCISES: CPT

## 2021-08-27 PROCEDURE — 84540 ASSAY OF URINE/UREA-N: CPT

## 2021-08-27 PROCEDURE — 83605 ASSAY OF LACTIC ACID: CPT

## 2021-08-27 PROCEDURE — 97530 THERAPEUTIC ACTIVITIES: CPT

## 2021-08-27 PROCEDURE — 87040 BLOOD CULTURE FOR BACTERIA: CPT

## 2021-08-27 PROCEDURE — 71250 CT THORAX DX C-: CPT

## 2021-08-27 PROCEDURE — 84100 ASSAY OF PHOSPHORUS: CPT

## 2021-08-27 PROCEDURE — 84145 PROCALCITONIN (PCT): CPT

## 2021-08-27 PROCEDURE — 86140 C-REACTIVE PROTEIN: CPT

## 2021-08-27 PROCEDURE — 84295 ASSAY OF SERUM SODIUM: CPT

## 2021-08-27 PROCEDURE — 85730 THROMBOPLASTIN TIME PARTIAL: CPT

## 2021-08-27 PROCEDURE — 97161 PT EVAL LOW COMPLEX 20 MIN: CPT

## 2021-08-27 PROCEDURE — 97116 GAIT TRAINING THERAPY: CPT

## 2021-08-27 PROCEDURE — 86769 SARS-COV-2 COVID-19 ANTIBODY: CPT

## 2021-08-27 PROCEDURE — 84300 ASSAY OF URINE SODIUM: CPT

## 2021-08-27 PROCEDURE — 84132 ASSAY OF SERUM POTASSIUM: CPT

## 2021-08-27 PROCEDURE — 82962 GLUCOSE BLOOD TEST: CPT

## 2021-08-27 PROCEDURE — 99239 HOSP IP/OBS DSCHRG MGMT >30: CPT

## 2021-08-27 PROCEDURE — 82728 ASSAY OF FERRITIN: CPT

## 2021-08-27 PROCEDURE — 71045 X-RAY EXAM CHEST 1 VIEW: CPT

## 2021-08-27 PROCEDURE — 87635 SARS-COV-2 COVID-19 AMP PRB: CPT

## 2021-08-27 PROCEDURE — 85027 COMPLETE CBC AUTOMATED: CPT

## 2021-08-27 RX ORDER — APIXABAN 2.5 MG/1
2.5 TABLET, FILM COATED ORAL
Refills: 0 | Status: DISCONTINUED | OUTPATIENT
Start: 2021-08-28 | End: 2021-08-27

## 2021-08-27 RX ORDER — TACROLIMUS 5 MG/1
1 CAPSULE ORAL
Qty: 0 | Refills: 0 | DISCHARGE

## 2021-08-27 RX ORDER — INSULIN LISPRO 100/ML
0 VIAL (ML) SUBCUTANEOUS
Qty: 0 | Refills: 0 | DISCHARGE

## 2021-08-27 RX ORDER — APIXABAN 2.5 MG/1
1 TABLET, FILM COATED ORAL
Qty: 0 | Refills: 0 | DISCHARGE
Start: 2021-08-27

## 2021-08-27 RX ORDER — SODIUM BICARBONATE 1 MEQ/ML
2 SYRINGE (ML) INTRAVENOUS
Qty: 0 | Refills: 0 | DISCHARGE
Start: 2021-08-27

## 2021-08-27 RX ORDER — TACROLIMUS 5 MG/1
5 CAPSULE ORAL
Qty: 150 | Refills: 0
Start: 2021-08-27 | End: 2021-09-25

## 2021-08-27 RX ORDER — APIXABAN 2.5 MG/1
1 TABLET, FILM COATED ORAL
Qty: 60 | Refills: 0
Start: 2021-08-27 | End: 2021-09-25

## 2021-08-27 RX ORDER — SODIUM BICARBONATE 1 MEQ/ML
1300 SYRINGE (ML) INTRAVENOUS
Refills: 0 | Status: DISCONTINUED | OUTPATIENT
Start: 2021-08-27 | End: 2021-08-27

## 2021-08-27 RX ORDER — LEFLUNOMIDE 10 MG/1
1 TABLET ORAL
Qty: 0 | Refills: 0 | DISCHARGE

## 2021-08-27 RX ORDER — DEXAMETHASONE 0.5 MG/5ML
1 ELIXIR ORAL
Qty: 6 | Refills: 0
Start: 2021-08-27 | End: 2021-09-01

## 2021-08-27 RX ORDER — DEXAMETHASONE 0.5 MG/5ML
1 ELIXIR ORAL
Qty: 0 | Refills: 0 | DISCHARGE
Start: 2021-08-27

## 2021-08-27 RX ORDER — SODIUM BICARBONATE 1 MEQ/ML
2 SYRINGE (ML) INTRAVENOUS
Qty: 28 | Refills: 0
Start: 2021-08-27 | End: 2021-09-02

## 2021-08-27 RX ORDER — INSULIN LISPRO 100/ML
4 VIAL (ML) SUBCUTANEOUS
Qty: 0 | Refills: 0 | DISCHARGE

## 2021-08-27 RX ORDER — INSULIN LISPRO 100/ML
4 VIAL (ML) SUBCUTANEOUS
Qty: 360 | Refills: 0
Start: 2021-08-27 | End: 2021-09-25

## 2021-08-27 RX ORDER — TACROLIMUS 5 MG/1
5 CAPSULE ORAL
Qty: 0 | Refills: 0 | DISCHARGE
Start: 2021-08-27

## 2021-08-27 RX ADMIN — Medication 4 UNIT(S): at 08:20

## 2021-08-27 RX ADMIN — Medication 4 UNIT(S): at 13:08

## 2021-08-27 RX ADMIN — Medication 650 MILLIGRAM(S): at 05:35

## 2021-08-27 RX ADMIN — Medication 0.1 MILLIGRAM(S): at 05:35

## 2021-08-27 RX ADMIN — Medication 50 MILLIGRAM(S): at 05:35

## 2021-08-27 RX ADMIN — TACROLIMUS 5 MILLIGRAM(S): 5 CAPSULE ORAL at 05:35

## 2021-08-27 RX ADMIN — Medication 30 MILLILITER(S): at 02:39

## 2021-08-27 RX ADMIN — HEPARIN SODIUM 5000 UNIT(S): 5000 INJECTION INTRAVENOUS; SUBCUTANEOUS at 05:35

## 2021-08-27 RX ADMIN — REMDESIVIR 500 MILLIGRAM(S): 5 INJECTION INTRAVENOUS at 10:35

## 2021-08-27 RX ADMIN — Medication 6 MILLIGRAM(S): at 05:35

## 2021-08-27 RX ADMIN — TAMOXIFEN CITRATE 20 MILLIGRAM(S): 20 TABLET, FILM COATED ORAL at 14:35

## 2021-08-27 RX ADMIN — FAMOTIDINE 20 MILLIGRAM(S): 10 INJECTION INTRAVENOUS at 14:32

## 2021-08-27 RX ADMIN — Medication 6: at 13:09

## 2021-08-27 NOTE — PROGRESS NOTE ADULT - SUBJECTIVE AND OBJECTIVE BOX
[facility] DIVISION OF KIDNEY DISEASES AND HYPERTENSION -- FOLLOW UP NOTE  --------------------------------------------------------------------------------     24 hour events/subjective: Patient was seen and examined at bedside. Reported feeling well. Denies CP, SOB, fever, chills, nausea, vomiting, diarrhea, LE edema or dysuria.    PAST HISTORY  --------------------------------------------------------------------------------  No significant changes to PMH, PSH, FHx, SHx, unless otherwise noted    ALLERGIES & MEDICATIONS  --------------------------------------------------------------------------------  Allergies    ChloraPrep One-Step (Rash)  penicillins (Rash)  shellfish (Rash)    Intolerances    Standing Inpatient Medications  cloNIDine 0.1 milliGRAM(s) Oral two times a day  dexAMETHasone     Tablet 6 milliGRAM(s) Oral daily  dextrose 40% Gel 15 Gram(s) Oral once  dextrose 5%. 1000 milliLiter(s) IV Continuous <Continuous>  dextrose 5%. 1000 milliLiter(s) IV Continuous <Continuous>  dextrose 50% Injectable 25 Gram(s) IV Push once  famotidine    Tablet 20 milliGRAM(s) Oral daily  glucagon  Injectable 1 milliGRAM(s) IntraMuscular once  heparin   Injectable 5000 Unit(s) SubCutaneous every 12 hours  insulin glargine Injectable (LANTUS) 8 Unit(s) SubCutaneous at bedtime  insulin lispro (ADMELOG) corrective regimen sliding scale   SubCutaneous three times a day before meals  insulin lispro (ADMELOG) corrective regimen sliding scale   SubCutaneous at bedtime  insulin lispro Injectable (ADMELOG) 4 Unit(s) SubCutaneous three times a day before meals  metoprolol tartrate 50 milliGRAM(s) Oral two times a day  remdesivir  IVPB   IV Intermittent   remdesivir  IVPB 100 milliGRAM(s) IV Intermittent every 24 hours  sodium bicarbonate 650 milliGRAM(s) Oral two times a day  sodium chloride 0.9%. 1000 milliLiter(s) IV Continuous <Continuous>  tacrolimus ER Tablet (ENVARSUS XR) 5 milliGRAM(s) Oral daily  tamoxifen 20 milliGRAM(s) Oral daily  trimethoprim  160 mG/sulfamethoxazole 800 mG 1 Tablet(s) Oral <User Schedule>    PRN Inpatient Medications  acetaminophen   Tablet .. 650 milliGRAM(s) Oral every 6 hours PRN  acetaminophen  Suppository .. 650 milliGRAM(s) Rectal every 4 hours PRN      REVIEW OF SYSTEMS  --------------------------------------------------------------------------------  Gen: No fevers/chills  Respiratory: No dyspnea, cough,   CV: No chest pain, PND, orthopnea  GI: No abdominal pain, diarrhea, constipation, nausea, vomiting  Transplant: No pain  : No increased frequency, dysuria, hematuria   MSK: No edema  Neuro: No dizziness/lightheadedness    All other systems were reviewed and are negative, except as noted.    VITALS/PHYSICAL EXAM  --------------------------------------------------------------------------------  T(C): 36.8 (08-27-21 @ 04:20), Max: 36.9 (08-26-21 @ 21:45)  HR: 86 (08-27-21 @ 10:00) (78 - 86)  BP: 162/84 (08-27-21 @ 04:20) (131/75 - 162/84)  RR: 18 (08-27-21 @ 10:00) (18 - 18)  SpO2: 95% (08-27-21 @ 10:00) (94% - 98%)  Wt(kg): --        08-26-21 @ 07:01  -  08-27-21 @ 07:00  --------------------------------------------------------  IN: 240 mL / OUT: 0 mL / NET: 240 mL      Physical Exam:  	Gen: NAD, able to speak in full sentences   	HEENT: PERRL, MMM   	Pulm: CTA B/L, no crackles, talking comfortably on room air   	CV: RRR, S1S2+  	Abd: +BS, soft          Transplant: No tenderness, swelling  	: No suprapubic tenderness  	MSK: no edema   	Psych: Normal affect and mood  	Skin: Warm    LABS/STUDIES  --------------------------------------------------------------------------------              7.8    7.20  >-----------<  192      [08-27-21 @ 06:44]              24.3     135  |  105  |  55  ----------------------------<  150      [08-27-21 @ 06:43]  5.1   |  16  |  2.06        Ca     9.3     [08-27-21 @ 06:43]      Phos  3.2     [08-26-21 @ 05:45]    TPro  5.9  /  Alb  2.8  /  TBili  0.3  /  DBili  x   /  AST  20  /  ALT  12  /  AlkPhos  36  [08-27-21 @ 06:43]          Creatinine Trend:  SCr 2.06 [08-27 @ 06:43]  SCr 2.00 [08-26 @ 12:42]  SCr 2.25 [08-26 @ 05:45]  SCr 1.84 [08-25 @ 06:04]  SCr 1.83 [08-24 @ 10:12]    Tacrolimus (), Serum: 7.2 ng/mL (08-27 @ 08:17)  Tacrolimus (), Serum: 11.1 ng/mL (08-26 @ 07:15)  Tacrolimus (), Serum: <2.0 ng/mL (08-25 @ 07:17)  Tacrolimus (), Serum: 3.2 ng/mL (08-24 @ 05:27)              Urine Sodium 67      [08-26-21 @ 11:31]  Urine Urea Nitrogen 573      [08-26-21 @ 16:18]  Urine Chloride 37      [08-26-21 @ 11:31]    Ferritin 5016      [08-25-21 @ 10:45]  HbA1c 7.2      [01-11-20 @ 09:23]

## 2021-08-27 NOTE — CHART NOTE - NSCHARTNOTEFT_GEN_A_CORE
Medicine Attending - Discharge Day Note:  ================================================    # Acute respiratory failure due to COVID-19  # Renal transplant recipient  # Breast cancer  # Type 2 diabetes mellitus  # HTN    Hypoxia resolved. Stable for discharge to home. Discussed plan of care with the patient and her daughter by phone.     Discharge time spent 35 minutes.     Neymar Aguillon MD, MIMI  [phone]
Addendum Note - Medicine Attending:  ========================================================    The patient was seen and examined by me today.   Consulted renal transplant and ID.   Continue Remdesivir and decadron.   Immunosuppressive therapy (tacrolimus) per transplant team. Check tacro level in AM   CT chest ordered, low threshold to start antibiotics. elevated pro-Calcitonin level.  Plan discussed in detail with the patient's daughter by phone.      Neymar Aguillon MD, MIMI  [phone]

## 2021-08-27 NOTE — PROGRESS NOTE ADULT - ATTENDING COMMENTS
Pt no longer hypoxic, doing well.  slight increase in creatinine but stable.   Clear from transplant standpoint for d/c  Resume leflunomide in 1 week and repeat labs in 1 week.

## 2021-08-27 NOTE — PROGRESS NOTE ADULT - PROVIDER SPECIALTY LIST ADULT
Infectious Disease
Transplant Nephrology
Infectious Disease
Transplant Nephrology
Infectious Disease
Transplant Nephrology
Hospitalist

## 2021-08-27 NOTE — PROGRESS NOTE ADULT - ASSESSMENT
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 The patient reports that she developed fever, chills, sob, cough, muscle ache 2 weeks prior and then found out she had COVID19 the thursday prior to presentation.   Hypoxia on 2l [place]  elevated inflammatory markers but improving   CT chest s/o covid-no s/s bacterial superinfection     A) COVID  Monitor clinically.  Monitor Oxygenation  O2 supplementation.  s/p 5 days of RDV  Monitor creatinine closely  Ferritin, CRP and D dimer q 48 hrs.  Would complete total 10 day of Dexamethasone 6 mg daily (from initiation date here) and then transition to baseline prednisone dose.   Anticoagulation per protocol.  Treatment options are limited-this as well as limitations of data discussed with pt.  Monitor for any bacterial superinfection/complications.    B) Hypoxia  from above  follow cx    C) DDRT  on immunosuppression  will defer to renal  adjust medication doses per renal function    I will sign off at this time. Please feel free to contact me with any further questions or concerns.    Josh Rudd M.D.  [facility] Division of Infectious Disease  8AM-5PM: Pager Number [phone]  After Hours (or if no response): Please contact the Infectious Diseases Office at [phone]     The above assessment and plan were discussed with medicine NP

## 2021-08-27 NOTE — PROGRESS NOTE ADULT - REASON FOR ADMISSION
76F p/w COVID19 and sob

## 2021-08-27 NOTE — PROGRESS NOTE ADULT - SUBJECTIVE AND OBJECTIVE BOX
Follow Up:  COVID19    Interval History: afebrile overnight. on room air today.     REVIEW OF SYSTEMS  [  ] ROS unobtainable because:    [  ] All other systems negative except as noted below:     Constitutional:  [ ] fever [ ] chills  [ ] weight loss  [ ] weakness  Skin:  [ ] rash [ ] phlebitis	  Eyes: [ ] icterus [ ] pain  [ ] discharge	  ENMT: [ ] sore throat  [ ] thrush [ ] ulcers [ ] exudates  Respiratory: [  ] dyspnea [ ] hemoptysis [ ] cough [ ] sputum	  Cardiovascular:  [ ] chest pain [ ] palpitations [ ] edema	  Gastrointestinal:  [ ] nausea [ ] vomiting [ ] diarrhea [ ] constipation [ ] pain	  Genitourinary:  [ ] dysuria [ ] frequency [ ] hematuria [ ] discharge [ ] flank pain  [ ] incontinence  Musculoskeletal:  [ ] myalgias [ ] arthralgias [ ] arthritis  [ ] back pain  Neurological:  [ ] headache [ ] seizures  [ ] confusion/altered mental status    Allergies  ChloraPrep One-Step (Rash)  penicillins (Rash)  shellfish (Rash)        ANTIMICROBIALS:  remdesivir  IVPB    remdesivir  IVPB 100 every 24 hours  trimethoprim  160 mG/sulfamethoxazole 800 mG 1 <User Schedule>      OTHER MEDS:  MEDICATIONS  (STANDING):  acetaminophen   Tablet .. 650 every 6 hours PRN  acetaminophen  Suppository .. 650 every 4 hours PRN  cloNIDine 0.1 two times a day  dexAMETHasone     Tablet 6 daily  dextrose 40% Gel 15 once  dextrose 50% Injectable 25 once  famotidine    Tablet 20 daily  glucagon  Injectable 1 once  heparin   Injectable 5000 every 12 hours  insulin glargine Injectable (LANTUS) 8 at bedtime  insulin lispro (ADMELOG) corrective regimen sliding scale  three times a day before meals  insulin lispro (ADMELOG) corrective regimen sliding scale  at bedtime  insulin lispro Injectable (ADMELOG) 4 three times a day before meals  metoprolol tartrate 50 two times a day  tacrolimus ER Tablet (ENVARSUS XR) 5 daily  tamoxifen 20 daily      Vital Signs Last 24 Hrs  T(C): 36.8 (27 Aug 2021 04:20), Max: 36.9 (26 Aug 2021 21:45)  T(F): 98.2 (27 Aug 2021 04:20), Max: 98.4 (26 Aug 2021 21:45)  HR: 86 (27 Aug 2021 10:00) (78 - 86)  BP: 162/84 (27 Aug 2021 04:20) (131/75 - 162/84)  BP(mean): --  RR: 18 (27 Aug 2021 10:00) (18 - 18)  SpO2: 95% (27 Aug 2021 10:00) (94% - 98%)    PHYSICAL EXAMINATION:    General: Alert and Awake, NAD  HEENT: PERRL, EOMI  Neck: Supple  Cardiac: RRR, No M/R/G  Resp: CTAB, No Wh/Rh/Ra  Abdomen: NBS, NT/ND, No HSM, No rigidity or guarding  MSK: No LE edema. No Calf tenderness  : No johnson  Skin: No rashes or lesions. Skin is warm and dry to the touch.   Neuro: Alert and Awake. CN 2-12 Grossly intact. Moves all four extremities spontaneously.  Psych: Calm, Pleasant, Cooperative    LABORATORY:                          7.8    7.20  )-----------( 192      ( 27 Aug 2021 06:44 )             24.3       08-27    135  |  105  |  55<H>  ----------------------------<  150<H>  5.1   |  16<L>  |  2.06<H>    Ca    9.3      27 Aug 2021 06:43  Phos  3.2     08-26    TPro  5.9<L>  /  Alb  2.8<L>  /  TBili  0.3  /  DBili  x   /  AST  20  /  ALT  12  /  AlkPhos  36<L>  08-27    C-Reactive Protein, Serum: 109 mg/L (08-25-21 @ 09:24)  C-Reactive Protein, Serum: 140 mg/L (08-23-21 @ 01:38)    Ferritin, Serum: 5016 ng/mL (08-25-21 @ 10:45)  Ferritin, Serum: 4566 ng/mL (08-23-21 @ 04:08)    D-Dimer Assay, Quantitative: 1220 ng/mL DDU (08-26-21 @ 05:45)  D-Dimer Assay, Quantitative: 2298 ng/mL DDU (08-25-21 @ 09:24)  D-Dimer Assay, Quantitative: 2508 ng/mL DDU (08-23-21 @ 07:33)    Procalcitonin, Serum: 0.63 ng/mL (08-23-21 @ 01:38)    MICROBIOLOGY:    .Blood Blood-Peripheral  08-23-21   No growth to date.  --  --    RADIOLOGY:    <The imaging below has been reviewed and visualized by me independently. Findings as detailed in report below>    < from: CT Chest No Cont (08.24.21 @ 09:30) >  IMPRESSION:    1.  Bilateral opacities can occur in the clinical setting of lung injury given the reported history of atypical infection.  2.  Bilateral septated nonseptated renal lesions some which are calcified.  3.  Subtle areas of sclerosis of the T8 vertebral body is incompletely characterized. After the acute symptoms resolve, MRI may be helpful for characterization.    < end of copied text >   Follow Up:  COVID19    Interval History: afebrile overnight. on room air today.     REVIEW OF SYSTEMS  [  ] ROS unobtainable because:    [ x ] All other systems negative except as noted below:     Constitutional:  [ ] fever [ ] chills  [ ] weight loss  [ ] weakness  Skin:  [ ] rash [ ] phlebitis	  Eyes: [ ] icterus [ ] pain  [ ] discharge	  ENMT: [ ] sore throat  [ ] thrush [ ] ulcers [ ] exudates  Respiratory: [  ] dyspnea [ ] hemoptysis [x ] cough [ ] sputum	  Cardiovascular:  [ ] chest pain [ ] palpitations [ ] edema	  Gastrointestinal:  [ ] nausea [ ] vomiting [ ] diarrhea [ ] constipation [ ] pain	  Genitourinary:  [ ] dysuria [ ] frequency [ ] hematuria [ ] discharge [ ] flank pain  [ ] incontinence  Musculoskeletal:  [ ] myalgias [ ] arthralgias [ ] arthritis  [ ] back pain  Neurological:  [ ] headache [ ] seizures  [ ] confusion/altered mental status    Allergies  ChloraPrep One-Step (Rash)  penicillins (Rash)  shellfish (Rash)        ANTIMICROBIALS:  remdesivir  IVPB    remdesivir  IVPB 100 every 24 hours  trimethoprim  160 mG/sulfamethoxazole 800 mG 1 <User Schedule>      OTHER MEDS:  MEDICATIONS  (STANDING):  acetaminophen   Tablet .. 650 every 6 hours PRN  acetaminophen  Suppository .. 650 every 4 hours PRN  cloNIDine 0.1 two times a day  dexAMETHasone     Tablet 6 daily  dextrose 40% Gel 15 once  dextrose 50% Injectable 25 once  famotidine    Tablet 20 daily  glucagon  Injectable 1 once  heparin   Injectable 5000 every 12 hours  insulin glargine Injectable (LANTUS) 8 at bedtime  insulin lispro (ADMELOG) corrective regimen sliding scale  three times a day before meals  insulin lispro (ADMELOG) corrective regimen sliding scale  at bedtime  insulin lispro Injectable (ADMELOG) 4 three times a day before meals  metoprolol tartrate 50 two times a day  tacrolimus ER Tablet (ENVARSUS XR) 5 daily  tamoxifen 20 daily      Vital Signs Last 24 Hrs  T(C): 36.8 (27 Aug 2021 04:20), Max: 36.9 (26 Aug 2021 21:45)  T(F): 98.2 (27 Aug 2021 04:20), Max: 98.4 (26 Aug 2021 21:45)  HR: 86 (27 Aug 2021 10:00) (78 - 86)  BP: 162/84 (27 Aug 2021 04:20) (131/75 - 162/84)  BP(mean): --  RR: 18 (27 Aug 2021 10:00) (18 - 18)  SpO2: 95% (27 Aug 2021 10:00) (94% - 98%)    PHYSICAL EXAMINATION:    General: Alert and Awake, NAD  Cardiac: RRR, No M/R/G  Resp: CTAB, No Wh/Rh/Ra  Abdomen: NBS, NT/ND, No HSM, No rigidity or guarding  MSK: No LE edema. No Calf tenderness  : No johnson  Skin: No rashes or lesions. Skin is warm and dry to the touch.   Neuro: Alert and Awake. CN 2-12 Grossly intact. Moves all four extremities spontaneously.  Psych: Calm, Pleasant, Cooperative    LABORATORY:                          7.8    7.20  )-----------( 192      ( 27 Aug 2021 06:44 )             24.3       08-27    135  |  105  |  55<H>  ----------------------------<  150<H>  5.1   |  16<L>  |  2.06<H>    Ca    9.3      27 Aug 2021 06:43  Phos  3.2     08-26    TPro  5.9<L>  /  Alb  2.8<L>  /  TBili  0.3  /  DBili  x   /  AST  20  /  ALT  12  /  AlkPhos  36<L>  08-27    C-Reactive Protein, Serum: 109 mg/L (08-25-21 @ 09:24)  C-Reactive Protein, Serum: 140 mg/L (08-23-21 @ 01:38)    Ferritin, Serum: 5016 ng/mL (08-25-21 @ 10:45)  Ferritin, Serum: 4566 ng/mL (08-23-21 @ 04:08)    D-Dimer Assay, Quantitative: 1220 ng/mL DDU (08-26-21 @ 05:45)  D-Dimer Assay, Quantitative: 2298 ng/mL DDU (08-25-21 @ 09:24)  D-Dimer Assay, Quantitative: 2508 ng/mL DDU (08-23-21 @ 07:33)    Procalcitonin, Serum: 0.63 ng/mL (08-23-21 @ 01:38)    MICROBIOLOGY:    .Blood Blood-Peripheral  08-23-21   No growth to date.  --  --    RADIOLOGY:    <The imaging below has been reviewed and visualized by me independently. Findings as detailed in report below>    < from: CT Chest No Cont (08.24.21 @ 09:30) >  IMPRESSION:    1.  Bilateral opacities can occur in the clinical setting of lung injury given the reported history of atypical infection.  2.  Bilateral septated nonseptated renal lesions some which are calcified.  3.  Subtle areas of sclerosis of the T8 vertebral body is incompletely characterized. After the acute symptoms resolve, MRI may be helpful for characterization.    < end of copied text >

## 2021-08-27 NOTE — PROGRESS NOTE ADULT - ASSESSMENT
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 Admitted for COVID-19. Patient never got covid-19 vaccine. Transplant nephrology consulted for kidney transplant management.     # S/p DDRT on 12/7/19   - Baseline Scr ranges from 1.8 to 2.2, last Scr prior to admission was 1.92 on 6/24/21, Scr increased to 2.25 8/26/21, last Scr improved to 2.06 mg/dl today   - S/p gentle IV hydration   - Continue to monitor labs and urine output  - Dose meds as per GFR    #Immunosuppression  - Got Simulect induction  - Last tacrolimus level improved to 7.2 today, dose also likely given before level taken   - Continue Envarsus 5 mg PO daily   - Continue Bactrim prophylaxis  - Hold Leflunomide   - Currently on Dexamethazone, so hold pred for now, resume pred once done with Dexa   - Obtain tacrolimus trough daily (30 minute prior to AM dose)     # Hyperkalemia  - Last K 5.1   - Monitor K   - Low K diet     #Metabolic acidosis  - Last bicarb 16, increase sodium bicarb to 1300 mg PO BID     #Hypophosphatemia  - Resolved, last phos wnl     #COVID 19  - Agree with Remdesvir and Dexamethasone     If any questions, please feel free to contact me     Alla Sykes  Nephrology Fellow  [facility] Pager: [phone]  [facility] Pager: [phone]

## 2021-08-27 NOTE — DISCHARGE NOTE NURSING/CASE MANAGEMENT/SOCIAL WORK - PATIENT PORTAL LINK FT
You can access the FollowMyHealth Patient Portal offered by [facility] by registering at the following website: [email or web address]. By joining [facility]’s FollowMyHealth portal, you will also be able to view your health information using other applications (apps) compatible with our system.

## 2021-08-28 ENCOUNTER — TRANSCRIPTION ENCOUNTER (OUTPATIENT)
Age: 76
End: 2021-08-28

## 2021-08-28 LAB
CULTURE RESULTS: SIGNIFICANT CHANGE UP
CULTURE RESULTS: SIGNIFICANT CHANGE UP
SPECIMEN SOURCE: SIGNIFICANT CHANGE UP
SPECIMEN SOURCE: SIGNIFICANT CHANGE UP

## 2021-08-30 ENCOUNTER — APPOINTMENT (OUTPATIENT)
Dept: INTERNAL MEDICINE | Facility: CLINIC | Age: 76
End: 2021-08-30
Payer: MEDICARE

## 2021-08-30 DIAGNOSIS — Z86.19 PERSONAL HISTORY OF OTHER INFECTIOUS AND PARASITIC DISEASES: ICD-10-CM

## 2021-08-30 PROCEDURE — 99496 TRANSJ CARE MGMT HIGH F2F 7D: CPT | Mod: CS,25,95

## 2021-08-30 RX ORDER — AZITHROMYCIN 250 MG/1
250 TABLET, FILM COATED ORAL
Qty: 1 | Refills: 0 | Status: DISCONTINUED | COMMUNITY
Start: 2018-12-20 | End: 2021-08-30

## 2021-08-30 RX ORDER — REPAGLINIDE 1 MG/1
1 TABLET ORAL 3 TIMES DAILY
Qty: 270 | Refills: 3 | Status: DISCONTINUED | COMMUNITY
Start: 2020-01-22 | End: 2021-08-30

## 2021-08-30 RX ORDER — BENZONATATE 200 MG/1
200 CAPSULE ORAL 3 TIMES DAILY
Qty: 30 | Refills: 1 | Status: DISCONTINUED | COMMUNITY
Start: 2021-07-22 | End: 2021-08-30

## 2021-08-31 DIAGNOSIS — Z71.89 OTHER SPECIFIED COUNSELING: ICD-10-CM

## 2021-09-02 ENCOUNTER — APPOINTMENT (OUTPATIENT)
Dept: TRANSPLANT | Facility: CLINIC | Age: 76
End: 2021-09-02

## 2021-09-02 ENCOUNTER — APPOINTMENT (OUTPATIENT)
Dept: NEPHROLOGY | Facility: CLINIC | Age: 76
End: 2021-09-02

## 2021-09-06 ENCOUNTER — NON-APPOINTMENT (OUTPATIENT)
Age: 76
End: 2021-09-06

## 2021-09-06 LAB
25(OH)D3 SERPL-MCNC: 69.2 NG/ML
ALBUMIN SERPL ELPH-MCNC: 3.9 G/DL
ALP BLD-CCNC: 41 U/L
ALT SERPL-CCNC: 13 U/L
ANION GAP SERPL CALC-SCNC: 13 MMOL/L
APPEARANCE: CLEAR
AST SERPL-CCNC: 17 U/L
BACTERIA: ABNORMAL
BASOPHILS # BLD AUTO: 0.04 K/UL
BASOPHILS NFR BLD AUTO: 0.5 %
BILIRUB SERPL-MCNC: 0.3 MG/DL
BILIRUBIN URINE: NEGATIVE
BLOOD URINE: NEGATIVE
BUN SERPL-MCNC: 46 MG/DL
CALCIUM SERPL-MCNC: 9.6 MG/DL
CALCIUM SERPL-MCNC: 9.6 MG/DL
CHLORIDE SERPL-SCNC: 108 MMOL/L
CHOLEST SERPL-MCNC: 159 MG/DL
CMV DNA SPEC QL NAA+PROBE: NOT DETECTED IU/ML
CO2 SERPL-SCNC: 20 MMOL/L
COLOR: NORMAL
COVID-19 SPIKE DOMAIN ANTIBODY INTERPRETATION: NEGATIVE
CREAT SERPL-MCNC: 1.86 MG/DL
CREAT SPEC-SCNC: 159 MG/DL
CREAT/PROT UR: 0.4 RATIO
EOSINOPHIL # BLD AUTO: 0.05 K/UL
EOSINOPHIL NFR BLD AUTO: 0.6 %
ESTIMATED AVERAGE GLUCOSE: 131 MG/DL
GLUCOSE QUALITATIVE U: NEGATIVE
GLUCOSE SERPL-MCNC: 165 MG/DL
HBA1C MFR BLD HPLC: 6.2 %
HCT VFR BLD CALC: 27.7 %
HDLC SERPL-MCNC: 71 MG/DL
HGB BLD-MCNC: 8.4 G/DL
HYALINE CASTS: 1 /LPF
IMM GRANULOCYTES NFR BLD AUTO: 1.6 %
KETONES URINE: NEGATIVE
LDH SERPL-CCNC: 210 U/L
LDLC SERPL CALC-MCNC: 56 MG/DL
LEUKOCYTE ESTERASE URINE: ABNORMAL
LYMPHOCYTES # BLD AUTO: 1.12 K/UL
LYMPHOCYTES NFR BLD AUTO: 14.1 %
MAGNESIUM SERPL-MCNC: 2 MG/DL
MAN DIFF?: NORMAL
MCHC RBC-ENTMCNC: 28.5 PG
MCHC RBC-ENTMCNC: 30.3 GM/DL
MCV RBC AUTO: 93.9 FL
MICROSCOPIC-UA: NORMAL
MONOCYTES # BLD AUTO: 1.02 K/UL
MONOCYTES NFR BLD AUTO: 12.9 %
NEUTROPHILS # BLD AUTO: 5.57 K/UL
NEUTROPHILS NFR BLD AUTO: 70.3 %
NITRITE URINE: NEGATIVE
NONHDLC SERPL-MCNC: 88 MG/DL
PARATHYROID HORMONE INTACT: 35 PG/ML
PH URINE: 6.5
PHOSPHATE SERPL-MCNC: 2.7 MG/DL
PLATELET # BLD AUTO: 235 K/UL
POTASSIUM SERPL-SCNC: 4.7 MMOL/L
PROT SERPL-MCNC: 6.5 G/DL
PROT UR-MCNC: 64 MG/DL
PROTEIN URINE: ABNORMAL
RBC # BLD: 2.95 M/UL
RBC # FLD: 14.4 %
RED BLOOD CELLS URINE: 1 /HPF
SARS-COV-2 AB SERPL IA-ACNC: 0.4 U/ML
SODIUM SERPL-SCNC: 142 MMOL/L
SPECIFIC GRAVITY URINE: 1.02
SQUAMOUS EPITHELIAL CELLS: 14 /HPF
TACROLIMUS SERPL-MCNC: 2.3 NG/ML
TRIGL SERPL-MCNC: 158 MG/DL
URATE SERPL-MCNC: 5.4 MG/DL
UROBILINOGEN URINE: NORMAL
WBC # FLD AUTO: 7.93 K/UL
WHITE BLOOD CELLS URINE: 15 /HPF

## 2021-09-07 LAB
BKV DNA SPEC QL NAA+PROBE: 2700 COPIES/ML
LOG 10 BK QUANTITATION PCR: 3.43

## 2021-09-09 ENCOUNTER — FORM ENCOUNTER (OUTPATIENT)
Age: 76
End: 2021-09-09

## 2021-10-12 ENCOUNTER — APPOINTMENT (OUTPATIENT)
Dept: NEPHROLOGY | Facility: CLINIC | Age: 76
End: 2021-10-12
Payer: MEDICARE

## 2021-10-12 VITALS
BODY MASS INDEX: 21.3 KG/M2 | SYSTOLIC BLOOD PRESSURE: 140 MMHG | TEMPERATURE: 98.1 F | RESPIRATION RATE: 14 BRPM | OXYGEN SATURATION: 100 % | DIASTOLIC BLOOD PRESSURE: 80 MMHG | HEART RATE: 80 BPM | WEIGHT: 128 LBS

## 2021-10-12 PROCEDURE — 99215 OFFICE O/P EST HI 40 MIN: CPT

## 2021-10-12 NOTE — ASSESSMENT
[FreeTextEntry1] : Renal Transplant recipient: Had delayed allograft function, elevated creatinine at discharge. Has urinary frequency and nocturia. No dysuria/hematuria. No fever/chills. Tolerating medications.\par Noted creatinine, most recent 1.8 mg/dl. Clinically doing well.\par Immunosuppression: reviewed; simulect induction, on tac/leflunomide/prednisone, last Tac level noted; will recheck. Currently on Envarsus  5 mg daily.; Leflunomide 40 mg/day and prednisone at 5 mg daily\par Target Tac level 4-6 ng/ml in view of BK viremia\par Hypertension:  Reviewed medications.  Blood pressure is controlled.\par DM: Reviewed. Will continue to monitor glucose  . \par BK Viremia: Pending PCR results. continue current management.\par Infection prophylaxis: On Bactrim,  as well as GI prophylaxis. \par Discussed ambulation,   optimal glucose and blood pressure readings, adherence with medications and follow ups, follow up clinic visit schedule, avoiding dehydration, mosquito bites; prevention of DVT as well as food safety.\par Renal Preservation Strategies and infection prevention strategies reviewed with patient.\par Discussed COVID prevention and vaccination. She has not had vaccines yet. She is planning to take it.\par Recovered from COVID 19 pneumonia but no antibodies on last labs.\par \par

## 2021-10-12 NOTE — HISTORY OF PRESENT ILLNESS
[FreeTextEntry1] : Received DDRT on 12/7/2019.  BK viremia on leflunomide, off MMF.\par She had COVID and was hospitalized in lat August. Now back to normal. No respiratory symptoms\par No acute symptoms at present. Lives  now with Beatriz who moved back from [place] but will be returning soon. Also has Shaun another grandson living with her. Feels tired otherwise ok\par Overall doing well. \par This is her first follow up post discharge\par \par \par Took Tacrolimus prior to lab draw this morning

## 2021-10-13 ENCOUNTER — NON-APPOINTMENT (OUTPATIENT)
Age: 76
End: 2021-10-13

## 2021-10-13 LAB
ALBUMIN SERPL ELPH-MCNC: 4.2 G/DL
ALP BLD-CCNC: 39 U/L
ALT SERPL-CCNC: 5 U/L
ANION GAP SERPL CALC-SCNC: 12 MMOL/L
APPEARANCE: ABNORMAL
AST SERPL-CCNC: 13 U/L
BACTERIA: ABNORMAL
BASOPHILS # BLD AUTO: 0.04 K/UL
BASOPHILS NFR BLD AUTO: 0.7 %
BILIRUB SERPL-MCNC: 0.2 MG/DL
BILIRUBIN URINE: NEGATIVE
BLOOD URINE: NEGATIVE
BUN SERPL-MCNC: 44 MG/DL
CALCIUM SERPL-MCNC: 9.6 MG/DL
CHLORIDE SERPL-SCNC: 112 MMOL/L
CO2 SERPL-SCNC: 22 MMOL/L
COLOR: NORMAL
CREAT SERPL-MCNC: 1.76 MG/DL
CREAT SPEC-SCNC: 94 MG/DL
CREAT/PROT UR: 0.2 RATIO
EOSINOPHIL # BLD AUTO: 0.09 K/UL
EOSINOPHIL NFR BLD AUTO: 1.5 %
GLUCOSE QUALITATIVE U: NEGATIVE
GLUCOSE SERPL-MCNC: 124 MG/DL
HCT VFR BLD CALC: 33.1 %
HGB BLD-MCNC: 10.3 G/DL
HYALINE CASTS: 1 /LPF
IMM GRANULOCYTES NFR BLD AUTO: 0.2 %
KETONES URINE: NEGATIVE
LDH SERPL-CCNC: 173 U/L
LEUKOCYTE ESTERASE URINE: NEGATIVE
LYMPHOCYTES # BLD AUTO: 1.05 K/UL
LYMPHOCYTES NFR BLD AUTO: 17.3 %
MAGNESIUM SERPL-MCNC: 1.9 MG/DL
MAN DIFF?: NORMAL
MCHC RBC-ENTMCNC: 30.6 PG
MCHC RBC-ENTMCNC: 31.1 GM/DL
MCV RBC AUTO: 98.2 FL
MICROSCOPIC-UA: NORMAL
MONOCYTES # BLD AUTO: 0.6 K/UL
MONOCYTES NFR BLD AUTO: 9.9 %
NEUTROPHILS # BLD AUTO: 4.28 K/UL
NEUTROPHILS NFR BLD AUTO: 70.4 %
NITRITE URINE: NEGATIVE
PH URINE: 6
PHOSPHATE SERPL-MCNC: 3.6 MG/DL
PLATELET # BLD AUTO: 134 K/UL
POTASSIUM SERPL-SCNC: 5.3 MMOL/L
PROT SERPL-MCNC: 6.6 G/DL
PROT UR-MCNC: 18 MG/DL
PROTEIN URINE: NORMAL
RBC # BLD: 3.37 M/UL
RBC # FLD: 14.3 %
RED BLOOD CELLS URINE: 13 /HPF
SODIUM SERPL-SCNC: 145 MMOL/L
SPECIFIC GRAVITY URINE: 1.02
SQUAMOUS EPITHELIAL CELLS: 9 /HPF
TACROLIMUS SERPL-MCNC: 5.9 NG/ML
URATE SERPL-MCNC: 6.2 MG/DL
UROBILINOGEN URINE: NORMAL
WBC # FLD AUTO: 6.07 K/UL
WHITE BLOOD CELLS URINE: 8 /HPF

## 2021-10-15 ENCOUNTER — NON-APPOINTMENT (OUTPATIENT)
Age: 76
End: 2021-10-15

## 2021-10-15 LAB — CMV DNA SPEC QL NAA+PROBE: ABNORMAL IU/ML

## 2021-10-20 ENCOUNTER — NON-APPOINTMENT (OUTPATIENT)
Age: 76
End: 2021-10-20

## 2021-10-20 LAB
BKV DNA SPEC QL NAA+PROBE: 9950 COPIES/ML
LOG 10 BK QUANTITATION PCR: 4

## 2021-10-22 ENCOUNTER — OUTPATIENT (OUTPATIENT)
Dept: OUTPATIENT SERVICES | Facility: HOSPITAL | Age: 76
LOS: 1 days | Discharge: HOME | End: 2021-10-22

## 2021-10-22 DIAGNOSIS — Z98.890 OTHER SPECIFIED POSTPROCEDURAL STATES: Chronic | ICD-10-CM

## 2021-10-22 DIAGNOSIS — Z90.710 ACQUIRED ABSENCE OF BOTH CERVIX AND UTERUS: Chronic | ICD-10-CM

## 2021-10-22 DIAGNOSIS — D69.6 THROMBOCYTOPENIA, UNSPECIFIED: ICD-10-CM

## 2021-10-22 DIAGNOSIS — E27.9 DISORDER OF ADRENAL GLAND, UNSPECIFIED: Chronic | ICD-10-CM

## 2021-10-22 DIAGNOSIS — Z87.81 PERSONAL HISTORY OF (HEALED) TRAUMATIC FRACTURE: Chronic | ICD-10-CM

## 2021-10-25 ENCOUNTER — APPOINTMENT (OUTPATIENT)
Dept: HEMATOLOGY ONCOLOGY | Facility: CLINIC | Age: 76
End: 2021-10-25

## 2021-11-19 ENCOUNTER — OUTPATIENT (OUTPATIENT)
Dept: OUTPATIENT SERVICES | Facility: HOSPITAL | Age: 76
LOS: 1 days | Discharge: HOME | End: 2021-11-19

## 2021-11-19 ENCOUNTER — APPOINTMENT (OUTPATIENT)
Dept: ULTRASOUND IMAGING | Facility: CLINIC | Age: 76
End: 2021-11-19
Payer: MEDICARE

## 2021-11-19 ENCOUNTER — RESULT REVIEW (OUTPATIENT)
Age: 76
End: 2021-11-19

## 2021-11-19 ENCOUNTER — TRANSCRIPTION ENCOUNTER (OUTPATIENT)
Age: 76
End: 2021-11-19

## 2021-11-19 ENCOUNTER — APPOINTMENT (OUTPATIENT)
Dept: MAMMOGRAPHY | Facility: CLINIC | Age: 76
End: 2021-11-19
Payer: MEDICARE

## 2021-11-19 ENCOUNTER — OUTPATIENT (OUTPATIENT)
Dept: OUTPATIENT SERVICES | Facility: HOSPITAL | Age: 76
LOS: 1 days | End: 2021-11-19
Payer: MEDICARE

## 2021-11-19 DIAGNOSIS — E27.9 DISORDER OF ADRENAL GLAND, UNSPECIFIED: Chronic | ICD-10-CM

## 2021-11-19 DIAGNOSIS — Z98.890 OTHER SPECIFIED POSTPROCEDURAL STATES: Chronic | ICD-10-CM

## 2021-11-19 DIAGNOSIS — D69.6 THROMBOCYTOPENIA, UNSPECIFIED: ICD-10-CM

## 2021-11-19 DIAGNOSIS — Z87.81 PERSONAL HISTORY OF (HEALED) TRAUMATIC FRACTURE: Chronic | ICD-10-CM

## 2021-11-19 DIAGNOSIS — Z12.31 ENCOUNTER FOR SCREENING MAMMOGRAM FOR MALIGNANT NEOPLASM OF BREAST: ICD-10-CM

## 2021-11-19 DIAGNOSIS — Z90.710 ACQUIRED ABSENCE OF BOTH CERVIX AND UTERUS: Chronic | ICD-10-CM

## 2021-11-19 PROCEDURE — 76641 ULTRASOUND BREAST COMPLETE: CPT

## 2021-11-19 PROCEDURE — 76641 ULTRASOUND BREAST COMPLETE: CPT | Mod: 26,50

## 2021-11-19 PROCEDURE — 77067 SCR MAMMO BI INCL CAD: CPT

## 2021-11-19 PROCEDURE — 77067 SCR MAMMO BI INCL CAD: CPT | Mod: 26

## 2021-11-19 PROCEDURE — 77063 BREAST TOMOSYNTHESIS BI: CPT

## 2021-11-19 PROCEDURE — 77063 BREAST TOMOSYNTHESIS BI: CPT | Mod: 26

## 2021-11-23 ENCOUNTER — APPOINTMENT (OUTPATIENT)
Dept: HEMATOLOGY ONCOLOGY | Facility: CLINIC | Age: 76
End: 2021-11-23

## 2021-12-01 PROCEDURE — G9005: CPT

## 2021-12-06 ENCOUNTER — APPOINTMENT (OUTPATIENT)
Dept: RADIOLOGY | Facility: CLINIC | Age: 76
End: 2021-12-06
Payer: MEDICARE

## 2021-12-06 ENCOUNTER — OUTPATIENT (OUTPATIENT)
Dept: OUTPATIENT SERVICES | Facility: HOSPITAL | Age: 76
LOS: 1 days | End: 2021-12-06
Payer: MEDICARE

## 2021-12-06 DIAGNOSIS — Z90.710 ACQUIRED ABSENCE OF BOTH CERVIX AND UTERUS: Chronic | ICD-10-CM

## 2021-12-06 DIAGNOSIS — E27.9 DISORDER OF ADRENAL GLAND, UNSPECIFIED: Chronic | ICD-10-CM

## 2021-12-06 DIAGNOSIS — Z87.81 PERSONAL HISTORY OF (HEALED) TRAUMATIC FRACTURE: Chronic | ICD-10-CM

## 2021-12-06 DIAGNOSIS — Z98.890 OTHER SPECIFIED POSTPROCEDURAL STATES: Chronic | ICD-10-CM

## 2021-12-06 DIAGNOSIS — Z00.00 ENCOUNTER FOR GENERAL ADULT MEDICAL EXAMINATION WITHOUT ABNORMAL FINDINGS: ICD-10-CM

## 2021-12-06 PROCEDURE — 77080 DXA BONE DENSITY AXIAL: CPT | Mod: 26

## 2021-12-06 PROCEDURE — 77080 DXA BONE DENSITY AXIAL: CPT

## 2021-12-09 RX ORDER — SULFAMETHOXAZOLE AND TRIMETHOPRIM 400; 80 MG/1; MG/1
400-80 TABLET ORAL DAILY
Qty: 30 | Refills: 11 | Status: ACTIVE | COMMUNITY
Start: 2019-12-09 | End: 1900-01-01

## 2022-01-12 ENCOUNTER — RX RENEWAL (OUTPATIENT)
Age: 77
End: 2022-01-12

## 2022-01-13 ENCOUNTER — APPOINTMENT (OUTPATIENT)
Dept: NEPHROLOGY | Facility: CLINIC | Age: 77
End: 2022-01-13
Payer: MEDICARE

## 2022-01-13 VITALS
HEIGHT: 65 IN | SYSTOLIC BLOOD PRESSURE: 123 MMHG | OXYGEN SATURATION: 97 % | DIASTOLIC BLOOD PRESSURE: 66 MMHG | WEIGHT: 128 LBS | BODY MASS INDEX: 21.33 KG/M2 | TEMPERATURE: 98.9 F | RESPIRATION RATE: 14 BRPM | HEART RATE: 74 BPM

## 2022-01-13 DIAGNOSIS — B34.8 OTHER VIRAL INFECTIONS OF UNSPECIFIED SITE: ICD-10-CM

## 2022-01-13 LAB
25(OH)D3 SERPL-MCNC: 52.8 NG/ML
ALBUMIN SERPL ELPH-MCNC: 4.1 G/DL
ALP BLD-CCNC: 37 U/L
ALT SERPL-CCNC: 9 U/L
ANION GAP SERPL CALC-SCNC: 10 MMOL/L
APPEARANCE: ABNORMAL
AST SERPL-CCNC: 16 U/L
BACTERIA: NEGATIVE
BASOPHILS # BLD AUTO: 0.03 K/UL
BASOPHILS NFR BLD AUTO: 0.7 %
BILIRUB SERPL-MCNC: 0.4 MG/DL
BILIRUBIN URINE: NEGATIVE
BLOOD URINE: NEGATIVE
BUN SERPL-MCNC: 29 MG/DL
CALCIUM SERPL-MCNC: 9.3 MG/DL
CHLORIDE SERPL-SCNC: 106 MMOL/L
CHOLEST SERPL-MCNC: 156 MG/DL
CO2 SERPL-SCNC: 24 MMOL/L
COLOR: YELLOW
COVID-19 SPIKE DOMAIN ANTIBODY INTERPRETATION: POSITIVE
CREAT SERPL-MCNC: 1.91 MG/DL
CREAT SPEC-SCNC: 184 MG/DL
CREAT/PROT UR: 0.1 RATIO
EOSINOPHIL # BLD AUTO: 0.07 K/UL
EOSINOPHIL NFR BLD AUTO: 1.5 %
ESTIMATED AVERAGE GLUCOSE: 114 MG/DL
GLUCOSE QUALITATIVE U: NEGATIVE
GLUCOSE SERPL-MCNC: 177 MG/DL
HBA1C MFR BLD HPLC: 5.6 %
HCT VFR BLD CALC: 35.4 %
HDLC SERPL-MCNC: 60 MG/DL
HGB BLD-MCNC: 10.9 G/DL
HYALINE CASTS: 2 /LPF
IMM GRANULOCYTES NFR BLD AUTO: 0.4 %
KETONES URINE: NEGATIVE
LDLC SERPL CALC-MCNC: 62 MG/DL
LEUKOCYTE ESTERASE URINE: NEGATIVE
LYMPHOCYTES # BLD AUTO: 1.07 K/UL
LYMPHOCYTES NFR BLD AUTO: 23.3 %
MAGNESIUM SERPL-MCNC: 2 MG/DL
MAN DIFF?: NORMAL
MCHC RBC-ENTMCNC: 29.9 PG
MCHC RBC-ENTMCNC: 30.8 GM/DL
MCV RBC AUTO: 97 FL
MICROSCOPIC-UA: NORMAL
MONOCYTES # BLD AUTO: 0.46 K/UL
MONOCYTES NFR BLD AUTO: 10 %
NEUTROPHILS # BLD AUTO: 2.94 K/UL
NEUTROPHILS NFR BLD AUTO: 64.1 %
NITRITE URINE: NEGATIVE
NONHDLC SERPL-MCNC: 96 MG/DL
PH URINE: 6
PHOSPHATE SERPL-MCNC: 3.3 MG/DL
PLATELET # BLD AUTO: 142 K/UL
POTASSIUM SERPL-SCNC: 4.5 MMOL/L
PROT SERPL-MCNC: 6.1 G/DL
PROT UR-MCNC: 20 MG/DL
PROTEIN URINE: NORMAL
RBC # BLD: 3.65 M/UL
RBC # FLD: 13.6 %
RED BLOOD CELLS URINE: 1 /HPF
SARS-COV-2 AB SERPL IA-ACNC: 25.3 U/ML
SODIUM SERPL-SCNC: 140 MMOL/L
SPECIFIC GRAVITY URINE: 1.02
SQUAMOUS EPITHELIAL CELLS: 9 /HPF
TACROLIMUS SERPL-MCNC: 4.6 NG/ML
TRIGL SERPL-MCNC: 170 MG/DL
URATE SERPL-MCNC: 5.3 MG/DL
UROBILINOGEN URINE: NORMAL
WBC # FLD AUTO: 4.59 K/UL
WHITE BLOOD CELLS URINE: 5 /HPF

## 2022-01-13 PROCEDURE — 99214 OFFICE O/P EST MOD 30 MIN: CPT

## 2022-01-13 RX ORDER — APIXABAN 2.5 MG/1
2.5 TABLET, FILM COATED ORAL
Qty: 60 | Refills: 0 | Status: DISCONTINUED | COMMUNITY
Start: 2021-08-27 | End: 2022-01-13

## 2022-01-13 RX ORDER — INSULIN LISPRO 100 [IU]/ML
100 INJECTION, SOLUTION INTRAVENOUS; SUBCUTANEOUS
Qty: 15 | Refills: 0 | Status: DISCONTINUED | COMMUNITY
Start: 2020-12-30 | End: 2022-01-13

## 2022-01-13 NOTE — ASSESSMENT
[FreeTextEntry1] : Renal Transplant recipient: Had delayed allograft function, elevated creatinine at discharge. Has urinary frequency and nocturia. No dysuria/hematuria. No fever/chills. Tolerating medications.\par Noted creatinine,  . Clinically doing well.\par Low back pain: On f/u with primary MD. Likely musculoskeletal.\par Immunosuppression: reviewed; simulect induction, on tac/leflunomide/prednisone, last Tac level noted; will recheck. Currently on Envarsus  5 mg daily.; Leflunomide 40 mg/day and prednisone at 5 mg daily. 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  Blood pressure is controlled.\par DM: Reviewed. Will continue to monitor glucose  . 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 Modified immunosuppression. continue current management.\par Infection prophylaxis: On Bactrim,  as well as GI prophylaxis. \par Discussed ambulation,   optimal glucose and blood pressure readings, adherence with medications and follow ups, follow up clinic visit schedule, avoiding dehydration, mosquito bites; prevention of DVT as well as food safety.\par Renal Preservation Strategies and infection prevention strategies reviewed with patient.\par Discussed COVID prevention and vaccination. She has not had vaccines yet. She is planning to take it.\par Recovered from COVID 19 pneumonia in August 2021, but no antibodies on last labs.\par \par

## 2022-01-13 NOTE — HISTORY OF PRESENT ILLNESS
[FreeTextEntry1] : Received DDRT on 12/7/2019.  BK viremia on leflunomide, off MMF.\par She had COVID and was hospitalized in late August 2021. Now back to normal. No respiratory symptoms\par No acute symptoms at present. Lives  now with Beatriz who moved back from [place] but will be returning soon. Also has Shaun another grandson living with her. Feels tired otherwise ok\par Overall doing well. \par She is accompanied by Giselle today.\par Currently she feels well. Has intermittent pain low back. No urinary symptoms.\par Follows with primary MD Dr. Olive Paulino.\par Her primary nephrologist is Dr. Tad Rooney who she is planning to follow up with. She will continue to follow up with me at transplant center every year.\par She plans to get her vaccinations at primary MD. Has not had COVID vaccination yet.\par  \par

## 2022-01-18 LAB
BKV DNA SPEC QL NAA+PROBE: 208 COPIES/ML
CMV DNA SPEC QL NAA+PROBE: NOT DETECTED IU/ML

## 2022-02-07 RX ORDER — SODIUM BICARBONATE 650 MG/1
650 TABLET ORAL
Qty: 360 | Refills: 3 | Status: ACTIVE | COMMUNITY
Start: 2020-01-09 | End: 1900-01-01

## 2022-02-08 ENCOUNTER — APPOINTMENT (OUTPATIENT)
Dept: ENDOCRINOLOGY | Facility: CLINIC | Age: 77
End: 2022-02-08
Payer: MEDICARE

## 2022-02-08 VITALS
DIASTOLIC BLOOD PRESSURE: 60 MMHG | OXYGEN SATURATION: 95 % | BODY MASS INDEX: 21.66 KG/M2 | WEIGHT: 130 LBS | HEIGHT: 65 IN | SYSTOLIC BLOOD PRESSURE: 130 MMHG | HEART RATE: 76 BPM

## 2022-02-08 LAB — GLUCOSE BLDC GLUCOMTR-MCNC: 116

## 2022-02-08 PROCEDURE — 99204 OFFICE O/P NEW MOD 45 MIN: CPT | Mod: 25

## 2022-02-08 PROCEDURE — 82962 GLUCOSE BLOOD TEST: CPT

## 2022-02-09 RX ORDER — ELECTROLYTES/DEXTROSE
32G X 4 MM SOLUTION, ORAL ORAL
Qty: 1 | Refills: 0 | Status: ACTIVE | COMMUNITY
Start: 2020-11-10 | End: 1900-01-01

## 2022-02-10 NOTE — HISTORY OF PRESENT ILLNESS
[FreeTextEntry1] : This is a 75 yo female with past medical history of PKCKD with ESRD on HD, now s/p  donor renal transplant in Dec 2019, h/o breast cancer s/p mastectomy and RT, HTN, goiter and h/o pancreatic cysts who presents for diabetes management. \par \par Patient is accompanied by her daughter Lidia who is patient's health care proxy. She recalls she has renal transplant in Dec 2019,  currently on prednisone 5mg daily. She was then diagnosed with diabetes, uses freestyle lite glucometer, checks fingerstick glucose 4x/day. She is taking currently Lantus 10U at bedtime, Prandin 2mg TID ac, and Tradjenta 5mg daily.\par

## 2022-02-10 NOTE — PHYSICAL EXAM
[Alert] : alert [No Acute Distress] : no acute distress [Well Developed] : well developed [Normal Sclera/Conjunctiva] : normal sclera/conjunctiva [EOMI] : extra ocular movement intact [No Proptosis] : no proptosis [No Lid Lag] : no lid lag [Normal Hearing] : hearing was normal [No LAD] : no lymphadenopathy [Supple] : the neck was supple [Thyroid Not Enlarged] : the thyroid was not enlarged [No Thyroid Nodules] : no palpable thyroid nodules [No Respiratory Distress] : no respiratory distress [No Accessory Muscle Use] : no accessory muscle use [Normal Rate and Effort] : normal respiratory rate and effort [Clear to Auscultation] : lungs were clear to auscultation bilaterally [Normal S1, S2] : normal S1 and S2 [No Murmurs] : no murmurs [Normal Rate] : heart rate was normal [Regular Rhythm] : with a regular rhythm [No Edema] : no peripheral edema [Normal Bowel Sounds] : normal bowel sounds [Not Tender] : non-tender [Not Distended] : not distended [Soft] : abdomen soft [No Stigmata of Cushings Syndrome] : no stigmata of Cushings Syndrome [Normal Gait] : normal gait [No Clubbing, Cyanosis] : no clubbing  or cyanosis of the fingernails [No Rash] : no rash [Abdominal Striae] : no abdominal striae [Acanthosis Nigricans] : no acanthosis nigricans [Hirsutism] : no hirsutism [No Tremors] : no tremors [Normal Sensation on Monofilament Testing] : normal sensation on monofilament testing of lower extremities [Oriented x3] : oriented to person, place, and time [Normal Insight/Judgement] : insight and judgment were intact

## 2022-02-10 NOTE — ASSESSMENT
[Diabetes Foot Care] : diabetes foot care [Long Term Vascular Complications] : long term vascular complications of diabetes [Carbohydrate Consistent Diet] : carbohydrate consistent diet [Importance of Diet and Exercise] : importance of diet and exercise to improve glycemic control, achieve weight loss and improve cardiovascular health [Hypoglycemia Management] : hypoglycemia management [Action and use of Insulin] : action and use of short and long-acting insulin [Self Monitoring of Blood Glucose] : self monitoring of blood glucose [Injection Technique, Storage, Sharps Disposal] : injection technique, storage, and sharps disposal [Retinopathy Screening] : Patient was referred to ophthalmology for retinopathy screening [Diabetic Medications] : Risks and benefits of diabetic medications were discussed [FreeTextEntry1] : 1. Steroid induced hyperglycemia, post transplant DM\par Last HgbA1c noted 5.6% in Jan 2022, however A1c level is unreliable due to anemia\par s/p DDRT in Dec 2019, on prednisone 5mg daily\par reviewed home glucose monitoring log with patient, noted AM fasting at goal, however noted 2 episodes of symptomatic hypoglycemia in past 14 days\par Decrease Lantus to 8U qhs in view of this\par Continue Tradjenta 5mg daily\par Continue Prandin 1mg pre brekafast, 2mg with lunch and 2mg with dinner\par Discussed signs and symptoms of hypoglycemia and how to manage such episodes\par \par Answered all questions today; patient verbalized understanding of the above.\par RTC in 3 months

## 2022-02-10 NOTE — CONSULT LETTER
[Dear  ___] : Dear  [unfilled], [Consult Letter:] : I had the pleasure of evaluating your patient, [unfilled]. [Please see my note below.] : Please see my note below. [Consult Closing:] : Thank you very much for allowing me to participate in the care of this patient.  If you have any questions, please do not hesitate to contact me. [Sincerely,] : Sincerely, [FreeTextEntry3] : Kaitlynn Padilla, DO\par Endocrinologist \par [facility]\par

## 2022-02-11 ENCOUNTER — APPOINTMENT (OUTPATIENT)
Dept: INTERNAL MEDICINE | Facility: CLINIC | Age: 77
End: 2022-02-11

## 2022-02-11 ENCOUNTER — APPOINTMENT (OUTPATIENT)
Dept: INTERNAL MEDICINE | Facility: CLINIC | Age: 77
End: 2022-02-11
Payer: MEDICARE

## 2022-02-11 DIAGNOSIS — K83.8 OTHER SPECIFIED DISEASES OF BILIARY TRACT: ICD-10-CM

## 2022-02-11 DIAGNOSIS — Z92.3 PERSONAL HISTORY OF IRRADIATION: ICD-10-CM

## 2022-02-11 DIAGNOSIS — Z78.0 ASYMPTOMATIC MENOPAUSAL STATE: ICD-10-CM

## 2022-02-11 DIAGNOSIS — J12.82 COVID-19: ICD-10-CM

## 2022-02-11 DIAGNOSIS — D22.9 MELANOCYTIC NEVI, UNSPECIFIED: ICD-10-CM

## 2022-02-11 DIAGNOSIS — Z87.828 PERSONAL HISTORY OF OTHER (HEALED) PHYSICAL INJURY AND TRAUMA: ICD-10-CM

## 2022-02-11 DIAGNOSIS — Z92.89 PERSONAL HISTORY OF OTHER MEDICAL TREATMENT: ICD-10-CM

## 2022-02-11 DIAGNOSIS — U07.1 COVID-19: ICD-10-CM

## 2022-02-11 DIAGNOSIS — R43.9 UNSPECIFIED DISTURBANCES OF SMELL AND TASTE: ICD-10-CM

## 2022-02-11 DIAGNOSIS — Z09 ENCOUNTER FOR FOLLOW-UP EXAMINATION AFTER COMPLETED TREATMENT FOR CONDITIONS OTHER THAN MALIGNANT NEOPLASM: ICD-10-CM

## 2022-02-11 PROCEDURE — 99497 ADVNCD CARE PLAN 30 MIN: CPT | Mod: 95

## 2022-02-11 PROCEDURE — G0444 DEPRESSION SCREEN ANNUAL: CPT | Mod: 59,95

## 2022-02-11 PROCEDURE — G0439: CPT | Mod: 95

## 2022-02-11 RX ORDER — SILVER SULFADIAZINE 10 MG/G
1 CREAM TOPICAL TWICE DAILY
Qty: 1 | Refills: 0 | Status: DISCONTINUED | COMMUNITY
Start: 2021-11-17 | End: 2022-02-11

## 2022-03-22 RX ORDER — CLONIDINE HYDROCHLORIDE 0.1 MG/1
0.1 TABLET ORAL
Qty: 60 | Refills: 3 | Status: ACTIVE | COMMUNITY
Start: 2020-01-22 | End: 1900-01-01

## 2022-03-30 ENCOUNTER — OUTPATIENT (OUTPATIENT)
Dept: OUTPATIENT SERVICES | Facility: HOSPITAL | Age: 77
LOS: 1 days | End: 2022-03-30
Payer: MEDICARE

## 2022-03-30 ENCOUNTER — APPOINTMENT (OUTPATIENT)
Dept: ULTRASOUND IMAGING | Facility: CLINIC | Age: 77
End: 2022-03-30
Payer: MEDICARE

## 2022-03-30 DIAGNOSIS — Z90.710 ACQUIRED ABSENCE OF BOTH CERVIX AND UTERUS: Chronic | ICD-10-CM

## 2022-03-30 DIAGNOSIS — E04.9 NONTOXIC GOITER, UNSPECIFIED: ICD-10-CM

## 2022-03-30 DIAGNOSIS — Z87.81 PERSONAL HISTORY OF (HEALED) TRAUMATIC FRACTURE: Chronic | ICD-10-CM

## 2022-03-30 DIAGNOSIS — Z98.890 OTHER SPECIFIED POSTPROCEDURAL STATES: Chronic | ICD-10-CM

## 2022-03-30 DIAGNOSIS — E27.9 DISORDER OF ADRENAL GLAND, UNSPECIFIED: Chronic | ICD-10-CM

## 2022-03-30 PROCEDURE — 76536 US EXAM OF HEAD AND NECK: CPT

## 2022-03-30 PROCEDURE — 76536 US EXAM OF HEAD AND NECK: CPT | Mod: 26

## 2022-03-31 ENCOUNTER — APPOINTMENT (OUTPATIENT)
Dept: SURGERY | Facility: CLINIC | Age: 77
End: 2022-03-31
Payer: MEDICARE

## 2022-03-31 PROCEDURE — 99213 OFFICE O/P EST LOW 20 MIN: CPT

## 2022-03-31 NOTE — ASSESSMENT
[FreeTextEntry1] : stable nodules no suspicious finding s on PE or prior  imaging. will review report when available.  if stable , repeat US 2/2023   RTO 1 year  I have answered their questions to the best of my ability.\par

## 2022-03-31 NOTE — HISTORY OF PRESENT ILLNESS
[de-identified] : Patient referred by Dr. Dickinson  for evaluation of enlarging left substernal goiter. Patient reports a needle biopsy was performed several years ago report not available. Thyroid ultrasound July 2017:  Right lobe 4.8 x 1.7 x 1.6 CM with subcentimeter nodules largest lower pole  7 mm rim calcified  stable. Left lobe 6 x 2.3 x 2.3 CM  with lower pole 4.3 x 4.4 x 2.3 CM increased from 3.7 x 3.8 x 3 CM. Patient denies dysphagia or change in voice. Patient received radiation 2 years ago for left breast cancer.\par biopsy 7/2017 benign,. \par last US 2/2018 slight increase in dominant left nodule,   US 7/31/18. stable  and repeat US 1/2019 no change, \par Patient with over 7 year hx of MNG .  thyroid US  7/2019 slight decrease in left nodule, no appreciable change in thyroid size.  Patient had kidney transplant 12/2019 now with type 2 DM, doing well.  f/u US 1/2021 stable mnodules. had f/u US yesterday report pending.    denies recent illness  denies dysphagia, SOB or palpitations. I have reviewed all old and new data and available images.

## 2022-03-31 NOTE — PHYSICAL EXAM
[de-identified] : No cervical or supraclavicular adenopathy, trachea deviating to the right with left lobe extending behind clavicle. Enlarged. [Normal] : no neck adenopathy [de-identified] : Skin:  normal appearance.  no rash, nodules, vesicles, or erythema,\par Musculoskeletal:  full range of motion and no deformities appreciated\par Neurological:  grossly intact\par Psychiatric:  oriented to person, place and time with appropriate affect

## 2022-05-09 ENCOUNTER — APPOINTMENT (OUTPATIENT)
Dept: ENDOCRINOLOGY | Facility: CLINIC | Age: 77
End: 2022-05-09
Payer: MEDICARE

## 2022-05-09 VITALS
SYSTOLIC BLOOD PRESSURE: 110 MMHG | WEIGHT: 131 LBS | HEART RATE: 80 BPM | DIASTOLIC BLOOD PRESSURE: 62 MMHG | OXYGEN SATURATION: 96 % | HEIGHT: 65 IN | BODY MASS INDEX: 21.83 KG/M2

## 2022-05-09 LAB
GLUCOSE BLDC GLUCOMTR-MCNC: 134
HBA1C MFR BLD HPLC: 5.1

## 2022-05-09 PROCEDURE — 83036 HEMOGLOBIN GLYCOSYLATED A1C: CPT | Mod: QW

## 2022-05-09 PROCEDURE — 99213 OFFICE O/P EST LOW 20 MIN: CPT | Mod: 25

## 2022-05-09 PROCEDURE — 82962 GLUCOSE BLOOD TEST: CPT

## 2022-05-15 LAB
T4 FREE SERPL-MCNC: 1.2 NG/DL
TSH SERPL-ACNC: 0.75 UIU/ML

## 2022-05-15 NOTE — ASSESSMENT
[FreeTextEntry1] : 1. Posttransplant DM, h/o underlying CKD\par h/o DDRT in 2019, following regularly with nephrology and transplant center\par POC HgbA1c today is 5.1%, at goat\par POC glucose today is 131mg/dl\par Reviewed home glucose monitoring log, glucose levles are at goal. No reported hypoglycemia thus far.\par Continue Lantus 8U qhs \par Continue Tradjenta 5mg daily\par Continue Prandin 1mg pre brekafast, 2mg with lunch and 2mg with dinner\par \par 2. h/o thyroid nodule \par Clinically euthyroid today, not on thyroid medication\par Reviewed thyroid sonogram from April 2022: noted stable 4.7cm left lower pole nodule\par Reported FNA in 2017, patient reportedly noted benign pathology, no records to review today\par Bloodwork was collected in office today, will f/u results accordingly\par Advised to repeat thyroid sonogram in 1 year.\par \par Addendum: reviewed TFTs from 5/9/22 are within normal reference range, discussed results with patient via telephone\par \par Answered all questions today; patient and her daughter verbalized understanding of the above\par RTC in 3 months.\par  [Diabetes Foot Care] : diabetes foot care [Long Term Vascular Complications] : long term vascular complications of diabetes [Carbohydrate Consistent Diet] : carbohydrate consistent diet [Hypoglycemia Management] : hypoglycemia management [Self Monitoring of Blood Glucose] : self monitoring of blood glucose [Retinopathy Screening] : Patient was referred to ophthalmology for retinopathy screening

## 2022-05-15 NOTE — HISTORY OF PRESENT ILLNESS
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 She recalls AM fasting ranges , and bedtime ranges 139.  \par \par thyroifd , good, sono normal, f/u ENT, biopsy normal in past.

## 2022-05-15 NOTE — PHYSICAL EXAM
[Alert] : alert [No Acute Distress] : no acute distress [Well Developed] : well developed [Normal Sclera/Conjunctiva] : normal sclera/conjunctiva [EOMI] : extra ocular movement intact [No Proptosis] : no proptosis [No Lid Lag] : no lid lag [Normal Hearing] : hearing was normal [Supple] : the neck was supple [No LAD] : no lymphadenopathy [Thyroid Not Enlarged] : the thyroid was not enlarged [No Respiratory Distress] : no respiratory distress [No Accessory Muscle Use] : no accessory muscle use [Normal Rate and Effort] : normal respiratory rate and effort [Clear to Auscultation] : lungs were clear to auscultation bilaterally [No Murmurs] : no murmurs [Normal S1, S2] : normal S1 and S2 [Normal Rate] : heart rate was normal [Regular Rhythm] : with a regular rhythm [No Edema] : no peripheral edema [Normal Bowel Sounds] : normal bowel sounds [Not Tender] : non-tender [Not Distended] : not distended [Soft] : abdomen soft [No Stigmata of Cushings Syndrome] : no stigmata of Cushings Syndrome [Normal Gait] : normal gait [No Clubbing, Cyanosis] : no clubbing  or cyanosis of the fingernails [No Rash] : no rash [No Tremors] : no tremors [Normal Sensation on Monofilament Testing] : normal sensation on monofilament testing of lower extremities [Oriented x3] : oriented to person, place, and time [Normal Insight/Judgement] : insight and judgment were intact [Abdominal Striae] : no abdominal striae [Acanthosis Nigricans] : no acanthosis nigricans [Hirsutism] : no hirsutism [de-identified] : palpable left thyroid nodule

## 2022-06-21 ENCOUNTER — RX RENEWAL (OUTPATIENT)
Age: 77
End: 2022-06-21

## 2022-08-16 ENCOUNTER — APPOINTMENT (OUTPATIENT)
Dept: ENDOCRINOLOGY | Facility: CLINIC | Age: 77
End: 2022-08-16

## 2022-08-16 VITALS
DIASTOLIC BLOOD PRESSURE: 70 MMHG | HEART RATE: 75 BPM | WEIGHT: 128 LBS | SYSTOLIC BLOOD PRESSURE: 130 MMHG | OXYGEN SATURATION: 98 % | BODY MASS INDEX: 21.33 KG/M2 | HEIGHT: 65 IN

## 2022-08-16 LAB
GLUCOSE BLDC GLUCOMTR-MCNC: 115
HBA1C MFR BLD HPLC: 5.5

## 2022-08-16 PROCEDURE — 83036 HEMOGLOBIN GLYCOSYLATED A1C: CPT | Mod: QW

## 2022-08-16 PROCEDURE — 82962 GLUCOSE BLOOD TEST: CPT

## 2022-08-16 PROCEDURE — 99213 OFFICE O/P EST LOW 20 MIN: CPT | Mod: 25

## 2022-08-16 NOTE — ASSESSMENT
[Diabetes Foot Care] : diabetes foot care [Long Term Vascular Complications] : long term vascular complications of diabetes [Carbohydrate Consistent Diet] : carbohydrate consistent diet [Hypoglycemia Management] : hypoglycemia management [Self Monitoring of Blood Glucose] : self monitoring of blood glucose [Retinopathy Screening] : Patient was referred to ophthalmology for retinopathy screening [FreeTextEntry1] : 1. Posttransplant DM, h/o underlying CKD\par h/o DDRT in 2019, following regularly with nephrology and transplant center\par on immunosuppressive therapy including prednisone 5mg daily. \par POC HgbA1c today is 5.5%, at goal, maybe too tightly controlled due to age and comorbidities as well as morning hypoglycemic episodes.\par POC glucose today is 115mg/dl\par Decrease Lantus 6U qhs in view of morning lows.Advised if further lows, can decrease Lantus further by 2U increments. Discussed signs and symptoms of hypoglycemia and how to manage such episodes\par Encouraged to f/u PCP for decreased appetite, recommended to try Ensure diabetic formula and to see nutritionist. \par Continue Tradjenta 5mg daily\par Continue Prandin 1mg pre breakfast, 2mg with lunch and 2mg with dinner\par Following with nephro for CKD. Not on statin, on Vascepa. Advised to send labs including lipid panel from PCP office to this office for review.\par Patient and daughter reminded to call us or send us a portal message if any medication refills needed. \par No foot ulcers noted today, reminded to f/u ophtho for annual diabetic screenign\par \par 2. h/o thyroid nodule \par Biochemically and clinically euthyroid, not on any thyroid medication\par Reviewed thyroid sonogram from April 2022: noted stable 4.7cm left lower pole nodule\par Reported FNA in 2017, patient reportedly noted benign pathology, no records to review today\par Reminded pt to repeat thyroid sonogram in 1 year.\par \par Answered all questions today; patient and her daughter verbalized understanding of the above\par RTC in 3 months

## 2022-08-16 NOTE — HISTORY OF PRESENT ILLNESS
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 She is here with her daughter and notes she ran out of Tradjenta since past 2 weeks, unclear why it was not called into office. She notes since past 2 weeks, she has noted less apetite, denies n/v/d or abdominal pain but notes because hse is not eating much at dinner, her morning sugars are sometimes low 60-80s about 3x/week. Review of glucose monitoring log notes 3 episodes of AM fasting in 53,65,81. She notes some hypoglycemic symptoms at that time. Other AM fasting normally ranges 103-140, pos tlunch ranges 154-214, and bedtime ranges 183-250. \par \par Regarding h/o thyroid nodules, h/o FNA by ENT in past, reportedly benign. Not on thyroid medication, plans to repeat sonogram in 1 year to monitor nodules. \par \par preferred pharmacy refills.

## 2022-08-16 NOTE — PHYSICAL EXAM
[Alert] : alert [No Acute Distress] : no acute distress [Well Developed] : well developed [Normal Sclera/Conjunctiva] : normal sclera/conjunctiva [EOMI] : extra ocular movement intact [No Proptosis] : no proptosis [No Lid Lag] : no lid lag [Normal Hearing] : hearing was normal [No LAD] : no lymphadenopathy [Supple] : the neck was supple [Thyroid Not Enlarged] : the thyroid was not enlarged [No Respiratory Distress] : no respiratory distress [No Accessory Muscle Use] : no accessory muscle use [Normal Rate and Effort] : normal respiratory rate and effort [Clear to Auscultation] : lungs were clear to auscultation bilaterally [Normal S1, S2] : normal S1 and S2 [No Murmurs] : no murmurs [Normal Rate] : heart rate was normal [Regular Rhythm] : with a regular rhythm [No Edema] : no peripheral edema [Normal Bowel Sounds] : normal bowel sounds [Not Tender] : non-tender [Not Distended] : not distended [Soft] : abdomen soft [No Stigmata of Cushings Syndrome] : no stigmata of Cushings Syndrome [Normal Gait] : normal gait [No Clubbing, Cyanosis] : no clubbing  or cyanosis of the fingernails [No Rash] : no rash [No Tremors] : no tremors [Normal Sensation on Monofilament Testing] : normal sensation on monofilament testing of lower extremities [Oriented x3] : oriented to person, place, and time [Normal Insight/Judgement] : insight and judgment were intact [Abdominal Striae] : no abdominal striae [Acanthosis Nigricans] : no acanthosis nigricans [Hirsutism] : no hirsutism [de-identified] : palpable left thyroid nodule

## 2022-09-09 ENCOUNTER — APPOINTMENT (OUTPATIENT)
Dept: INTERNAL MEDICINE | Facility: CLINIC | Age: 77
End: 2022-09-09

## 2022-09-12 ENCOUNTER — RX RENEWAL (OUTPATIENT)
Age: 77
End: 2022-09-12

## 2022-09-13 ENCOUNTER — APPOINTMENT (OUTPATIENT)
Dept: INTERNAL MEDICINE | Facility: CLINIC | Age: 77
End: 2022-09-13

## 2022-09-13 VITALS
TEMPERATURE: 98 F | SYSTOLIC BLOOD PRESSURE: 142 MMHG | WEIGHT: 126 LBS | DIASTOLIC BLOOD PRESSURE: 60 MMHG | OXYGEN SATURATION: 95 % | HEIGHT: 65 IN | BODY MASS INDEX: 20.99 KG/M2 | HEART RATE: 79 BPM

## 2022-09-13 DIAGNOSIS — I51.3 INTRACARDIAC THROMBOSIS, NOT ELSEWHERE CLASSIFIED: ICD-10-CM

## 2022-09-13 PROCEDURE — G0008: CPT

## 2022-09-13 PROCEDURE — 99495 TRANSJ CARE MGMT MOD F2F 14D: CPT | Mod: 25

## 2022-09-13 PROCEDURE — 90662 IIV NO PRSV INCREASED AG IM: CPT

## 2022-09-13 PROCEDURE — 36415 COLL VENOUS BLD VENIPUNCTURE: CPT

## 2022-09-13 RX ORDER — NIFEDIPINE 60 MG/1
60 TABLET, EXTENDED RELEASE ORAL DAILY
Qty: 90 | Refills: 3 | Status: DISCONTINUED | COMMUNITY
Start: 2019-12-11 | End: 2022-09-13

## 2022-09-13 RX ORDER — ICOSAPENT ETHYL 1000 MG/1
1 CAPSULE ORAL
Qty: 180 | Refills: 0 | Status: ACTIVE | COMMUNITY
Start: 2022-06-20

## 2022-10-09 LAB
25(OH)D3 SERPL-MCNC: 52.3 NG/ML
ALBUMIN SERPL ELPH-MCNC: 4 G/DL
ALP BLD-CCNC: 45 U/L
ALT SERPL-CCNC: <5 U/L
ANION GAP SERPL CALC-SCNC: 14 MMOL/L
APPEARANCE: CLEAR
AST SERPL-CCNC: 15 U/L
BASOPHILS # BLD AUTO: 0.02 K/UL
BASOPHILS NFR BLD AUTO: 0.3 %
BILIRUB SERPL-MCNC: 0.3 MG/DL
BILIRUBIN URINE: NEGATIVE
BLOOD URINE: NEGATIVE
BUN SERPL-MCNC: 49 MG/DL
CALCIUM SERPL-MCNC: 9 MG/DL
CHLORIDE SERPL-SCNC: 111 MMOL/L
CHOLEST SERPL-MCNC: 191 MG/DL
CO2 SERPL-SCNC: 17 MMOL/L
COLOR: YELLOW
CREAT SERPL-MCNC: 2.5 MG/DL
EGFR: 19 ML/MIN/1.73M2
EOSINOPHIL # BLD AUTO: 0.03 K/UL
EOSINOPHIL NFR BLD AUTO: 0.5 %
ESTIMATED AVERAGE GLUCOSE: 128 MG/DL
FERRITIN SERPL-MCNC: 893 NG/ML
FOLATE SERPL-MCNC: 8.9 NG/ML
GLUCOSE QUALITATIVE U: NORMAL
GLUCOSE SERPL-MCNC: 179 MG/DL
HBA1C MFR BLD HPLC: 6.1 %
HCT VFR BLD CALC: 29.7 %
HDLC SERPL-MCNC: 59 MG/DL
HGB BLD-MCNC: 9.1 G/DL
IMM GRANULOCYTES NFR BLD AUTO: 0.2 %
IRON SERPL-MCNC: 35 UG/DL
KETONES URINE: NEGATIVE
LDLC SERPL CALC-MCNC: 107 MG/DL
LEUKOCYTE ESTERASE URINE: NEGATIVE
LYMPHOCYTES # BLD AUTO: 0.8 K/UL
LYMPHOCYTES NFR BLD AUTO: 13.8 %
MAN DIFF?: NORMAL
MCHC RBC-ENTMCNC: 28.8 PG
MCHC RBC-ENTMCNC: 30.6 GM/DL
MCV RBC AUTO: 94 FL
MONOCYTES # BLD AUTO: 0.38 K/UL
MONOCYTES NFR BLD AUTO: 6.6 %
NEUTROPHILS # BLD AUTO: 4.54 K/UL
NEUTROPHILS NFR BLD AUTO: 78.6 %
NITRITE URINE: NEGATIVE
NONHDLC SERPL-MCNC: 132 MG/DL
PH URINE: 6
PLATELET # BLD AUTO: 153 K/UL
POTASSIUM SERPL-SCNC: 5.2 MMOL/L
PROT SERPL-MCNC: 6.2 G/DL
PROTEIN URINE: NORMAL
RBC # BLD: 3.16 M/UL
RBC # FLD: 14.1 %
SODIUM SERPL-SCNC: 141 MMOL/L
SPECIFIC GRAVITY URINE: 1.02
TRIGL SERPL-MCNC: 127 MG/DL
TSH SERPL-ACNC: 1.01 UIU/ML
UROBILINOGEN URINE: NORMAL
VIT B12 SERPL-MCNC: 902 PG/ML
WBC # FLD AUTO: 5.78 K/UL

## 2022-10-11 ENCOUNTER — LABORATORY RESULT (OUTPATIENT)
Age: 77
End: 2022-10-11

## 2022-11-15 ENCOUNTER — APPOINTMENT (OUTPATIENT)
Dept: ENDOCRINOLOGY | Facility: CLINIC | Age: 77
End: 2022-11-15

## 2022-11-15 VITALS
BODY MASS INDEX: 21.16 KG/M2 | DIASTOLIC BLOOD PRESSURE: 80 MMHG | WEIGHT: 127 LBS | SYSTOLIC BLOOD PRESSURE: 150 MMHG | HEIGHT: 65 IN

## 2022-11-15 LAB
GLUCOSE BLDC GLUCOMTR-MCNC: 239
HBA1C MFR BLD HPLC: 6.2

## 2022-11-15 PROCEDURE — 82962 GLUCOSE BLOOD TEST: CPT

## 2022-11-15 PROCEDURE — 83036 HEMOGLOBIN GLYCOSYLATED A1C: CPT | Mod: QW

## 2022-11-15 PROCEDURE — 99212 OFFICE O/P EST SF 10 MIN: CPT | Mod: 25

## 2022-12-06 ENCOUNTER — RX RENEWAL (OUTPATIENT)
Age: 77
End: 2022-12-06

## 2022-12-06 NOTE — HISTORY OF PRESENT ILLNESS
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 She is here with her daughter and notes hypoglycemia has resolved.AM fasting normally ranges 100-138, post lunch ranges 150-214, and bedtime ranges 183-250. \par She admits to drinking snapple which may be elevating her sugars throughout day and didn't realize. \par \par Regarding h/o thyroid nodules, h/o FNA by ENT in past, reportedly benign. Not on thyroid medication, plans to repeat sonogram in 1 year to monitor nodules.

## 2022-12-06 NOTE — ASSESSMENT
[Diabetes Foot Care] : diabetes foot care [Long Term Vascular Complications] : long term vascular complications of diabetes [Carbohydrate Consistent Diet] : carbohydrate consistent diet [Hypoglycemia Management] : hypoglycemia management [Self Monitoring of Blood Glucose] : self monitoring of blood glucose [Retinopathy Screening] : Patient was referred to ophthalmology for retinopathy screening [FreeTextEntry1] : 1. Posttransplant DM, h/o underlying CKD\par h/o DDRT in 2019, following regularly with nephrology and transplant center\par on immunosuppressive therapy including prednisone 5mg daily. \par POC HgbA1c today is 6.2%, at goal, maybe too tightly controlled due to age and comorbidities as well as morning hypoglycemic episodes.\par Continue Lantus 6U qhs in view of morning lows.Advised if further lows, can decrease Lantus further by 2U increments. Discussed signs and symptoms of hypoglycemia and how to manage such episodes\par Encouraged to f/u PCP for decreased appetite, recommended to try Ensure diabetic formula and to see nutritionist. \par Continue Tradjenta 5mg daily\par Continue Prandin 1mg pre breakfast, 2mg with lunch and 2mg with dinner\par Following with nephro for CKD. Not on statin, on Vascepa. Advised to send labs including lipid panel from PCP office to this office for review.\par Patient and daughter reminded to call us or send us a portal message if any medication refills needed. \par No foot ulcers noted today, saw ophthalmology, no h/o retinopathy, h/o cataract surgery in 2019\par \par 2. h/o thyroid nodule \par Biochemically and clinically euthyroid, not on any thyroid medication\par Reviewed thyroid sonogram from April 2022: noted stable 4.7cm left lower pole nodule\par Reported FNA in 2017, patient reportedly noted benign pathology, no records to review today\par Reminded pt to repeat thyroid sonogram in 1 year.\par \par Answered all questions today; patient and her daughter verbalized understanding of the above\par RTC in 3 months

## 2022-12-06 NOTE — PHYSICAL EXAM
[Alert] : alert [No Acute Distress] : no acute distress [Well Developed] : well developed [Normal Sclera/Conjunctiva] : normal sclera/conjunctiva [EOMI] : extra ocular movement intact [No Proptosis] : no proptosis [No Lid Lag] : no lid lag [Normal Hearing] : hearing was normal [No LAD] : no lymphadenopathy [Supple] : the neck was supple [Thyroid Not Enlarged] : the thyroid was not enlarged [No Respiratory Distress] : no respiratory distress [No Accessory Muscle Use] : no accessory muscle use [Normal Rate and Effort] : normal respiratory rate and effort [Clear to Auscultation] : lungs were clear to auscultation bilaterally [Normal S1, S2] : normal S1 and S2 [No Murmurs] : no murmurs [Normal Rate] : heart rate was normal [Regular Rhythm] : with a regular rhythm [No Edema] : no peripheral edema [Normal Bowel Sounds] : normal bowel sounds [Not Tender] : non-tender [Not Distended] : not distended [Soft] : abdomen soft [No Stigmata of Cushings Syndrome] : no stigmata of Cushings Syndrome [Normal Gait] : normal gait [No Clubbing, Cyanosis] : no clubbing  or cyanosis of the fingernails [No Rash] : no rash [No Tremors] : no tremors [Normal Sensation on Monofilament Testing] : normal sensation on monofilament testing of lower extremities [Oriented x3] : oriented to person, place, and time [Normal Insight/Judgement] : insight and judgment were intact [Abdominal Striae] : no abdominal striae [Acanthosis Nigricans] : no acanthosis nigricans [Hirsutism] : no hirsutism [de-identified] : palpable left thyroid nodule

## 2023-01-19 ENCOUNTER — NON-APPOINTMENT (OUTPATIENT)
Age: 78
End: 2023-01-19

## 2023-01-21 ENCOUNTER — TRANSCRIPTION ENCOUNTER (OUTPATIENT)
Age: 78
End: 2023-01-21

## 2023-01-23 ENCOUNTER — RESULT REVIEW (OUTPATIENT)
Age: 78
End: 2023-01-23

## 2023-01-23 ENCOUNTER — APPOINTMENT (OUTPATIENT)
Dept: INTERNAL MEDICINE | Facility: CLINIC | Age: 78
End: 2023-01-23
Payer: MEDICARE

## 2023-01-23 VITALS
BODY MASS INDEX: 19.83 KG/M2 | DIASTOLIC BLOOD PRESSURE: 72 MMHG | OXYGEN SATURATION: 79 % | WEIGHT: 119 LBS | HEIGHT: 65 IN | SYSTOLIC BLOOD PRESSURE: 145 MMHG | HEART RATE: 85 BPM | TEMPERATURE: 98.5 F

## 2023-01-23 DIAGNOSIS — Z92.29 PERSONAL HISTORY OF OTHER DRUG THERAPY: ICD-10-CM

## 2023-01-23 DIAGNOSIS — Z86.018 PERSONAL HISTORY OF OTHER BENIGN NEOPLASM: ICD-10-CM

## 2023-01-23 PROCEDURE — 99496 TRANSJ CARE MGMT HIGH F2F 7D: CPT

## 2023-01-23 RX ORDER — RIVAROXABAN 2.5 MG/1
TABLET, FILM COATED ORAL
Refills: 0 | Status: DISCONTINUED | COMMUNITY
End: 2023-01-23

## 2023-01-23 RX ORDER — BLOOD-GLUCOSE METER
W/DEVICE KIT MISCELLANEOUS
Qty: 1 | Refills: 0 | Status: DISCONTINUED | COMMUNITY
Start: 2020-02-27 | End: 2023-01-23

## 2023-01-23 RX ORDER — LANCETS 28 GAUGE
EACH MISCELLANEOUS
Qty: 2 | Refills: 3 | Status: ACTIVE | COMMUNITY
Start: 2020-02-26 | End: 1900-01-01

## 2023-01-26 PROBLEM — Z92.29 HISTORY OF INFLUENZA VACCINATION: Status: RESOLVED | Noted: 2022-09-13 | Resolved: 2023-01-26

## 2023-01-26 PROBLEM — Z86.018 HISTORY OF ATRIAL MYXOMA: Status: RESOLVED | Noted: 2022-09-09 | Resolved: 2023-01-26

## 2023-01-26 PROBLEM — Z92.29 HISTORY OF ANTICOAGULANT THERAPY: Status: RESOLVED | Noted: 2022-08-30 | Resolved: 2023-01-26

## 2023-01-26 RX ORDER — OXYCODONE AND ACETAMINOPHEN 10; 325 MG/1; MG/1
10-325 TABLET ORAL
Qty: 90 | Refills: 0 | Status: DISCONTINUED | COMMUNITY
Start: 2021-04-16 | End: 2023-01-26

## 2023-01-26 RX ORDER — CALCITRIOL 0.25 UG/1
0.25 CAPSULE, LIQUID FILLED ORAL
Qty: 30 | Refills: 0 | Status: ACTIVE | COMMUNITY
Start: 2023-01-19

## 2023-01-30 ENCOUNTER — APPOINTMENT (OUTPATIENT)
Dept: NEPHROLOGY | Facility: CLINIC | Age: 78
End: 2023-01-30
Payer: MEDICARE

## 2023-01-30 VITALS
BODY MASS INDEX: 20.8 KG/M2 | RESPIRATION RATE: 14 BRPM | SYSTOLIC BLOOD PRESSURE: 130 MMHG | WEIGHT: 125 LBS | DIASTOLIC BLOOD PRESSURE: 60 MMHG | HEART RATE: 84 BPM

## 2023-01-30 PROCEDURE — 99214 OFFICE O/P EST MOD 30 MIN: CPT

## 2023-01-30 RX ORDER — TACROLIMUS 1 MG/1
1 TABLET, EXTENDED RELEASE ORAL
Qty: 30 | Refills: 11 | Status: DISCONTINUED | COMMUNITY
Start: 2021-02-09 | End: 2023-01-30

## 2023-01-30 NOTE — ASSESSMENT
Tejal Renee is a 63year old female. HPI:     HPI     Diabetic Eye Exam     Diabetes characteristics include controlled with diet, taking oral medications and Type 2. Duration of 9 years. Number of years diabetic 9. Number of years on pills 9.   Num [FreeTextEntry1] : Renal Transplant recipient: Had delayed allograft function, elevated stable creatinine no proteinuria.  No dysuria/hematuria. No fever/chills. Tolerating medications.\par Noted creatinine, no proteinuria . Clinically doing well.\par Immunosuppression: reviewed; simulect induction, on tac/leflunomide/prednisone, last Tac level noted; will recheck. Currently on Envarsus  5 mg daily.; Leflunomide 40 mg/day and prednisone at 5 mg daily. 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  Blood pressure is controlled.\par DM: Reviewed. Will continue to monitor glucose  . 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 Modified immunosuppression. continue current management.\par Infection prophylaxis: On Bactrim,  as well as GI prophylaxis. \par Discussed ambulation,   optimal glucose and blood pressure readings, adherence with medications and follow ups, follow up clinic visit schedule, avoiding dehydration, mosquito bites; prevention of DVT as well as food safety.\par Renal Preservation Strategies and infection prevention strategies reviewed with patient.\par Discussed COVID prevention and vaccination. She has not had vaccines yet. She had pneumonia and flu vaccine.\par Recovered from COVID 19 pneumonia in August 2021, but no antibodies on last labs in the past.\par \par  Quit Monday    Alcohol use: No      Alcohol/week: 0.0 standard drinks    Drug use: No      Medications:  ERGOCALCIFEROL 18977 units Oral Cap, TAKE ONE CAPSULE BY MOUTH EVERY WEEK, Disp: 12 capsule, Rfl: 1  AZELASTINE HCL 0.05 % Ophthalmic Solution, PLACE 1 Pads, , Disp: , Rfl:   Blood Glucose Monitoring Suppl (TRUE METRIX METER) W/DEVICE Does not apply Kit, As needed , Disp: , Rfl: 0  TRUEPLUS LANCETS 33G Does not apply Misc, , Disp: , Rfl:   OxyCODONE HCl IR 30 MG Oral Tab, Take 30 mg by mouth as needed.   , Normal Normal            Slit Lamp and Fundus Exam     External Exam       Right Left    External Normal Normal          Slit Lamp Exam       Right Left    Lids/Lashes Normal Normal    Conjunctiva/Sclera Normal Normal    Cornea Clear Clear    Anterior

## 2023-01-30 NOTE — HISTORY OF PRESENT ILLNESS
[FreeTextEntry1] : Received DDRT on 12/7/2019.  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 She had COVID and was hospitalized in late August 2021. Her creatinine is approx 2 mg/dl.  She most recently was hospitalized at [facility] from Jan 16 to Jan 19 th and had surgery for Atrial myxoma.  Overall doing well. Lives alone. Climbs 13 steps to her apartment .\par She is accompanied by Giselle today.\par She had creatinine upto 2.7 but on Jan 19th was 2.08 mg/dl. Transplant ultrasound on Jan 19th was unremarkable including doppler. UA showed no protein; Protein/creatinine 237 mg/Gram creatinine (0.2)\par No acute symptoms at present. \par Follows with primary MD Dr. Olive Paulino.\par Her primary nephrologist is Dr. Tad Rooney .  \par \par She has 5 children, 20 grand children 28 great grand and 1 great great grand child just born\par  \par \par Current medications:\par \par Bactrim daily\par Metoprolol 50 Mg X2/day\par Envarsus 5 mg daily\par Tradjenta 5 mg daily\par Prednisone 5 mg daily\par Leflunomide 40 mg daily\par Clonidine 0.1 mg X2/d\par Aspirin 81 mg/d\par Repaglinide 2 mg pre meal X2/d (eats 2 meals/d)\par Lantus 6 U daily\par Vitamin D3 1000 u/d\par Famotidine 20 mg X2/d\par Tamoxifen 20 mg/d\par Sodium bicarb 650 mg 2 tab X2/d\par Albuterol inhaler prn\par Vascepa 1g X2/d\par Oxycodone -acetaminophen 10/325 1 tid prn\par \par \par  \par

## 2023-02-02 ENCOUNTER — OUTPATIENT (OUTPATIENT)
Dept: OUTPATIENT SERVICES | Facility: HOSPITAL | Age: 78
LOS: 1 days | End: 2023-02-02
Payer: MEDICARE

## 2023-02-02 ENCOUNTER — APPOINTMENT (OUTPATIENT)
Dept: ULTRASOUND IMAGING | Facility: CLINIC | Age: 78
End: 2023-02-02
Payer: MEDICARE

## 2023-02-02 DIAGNOSIS — Z00.8 ENCOUNTER FOR OTHER GENERAL EXAMINATION: ICD-10-CM

## 2023-02-02 DIAGNOSIS — Z98.890 OTHER SPECIFIED POSTPROCEDURAL STATES: Chronic | ICD-10-CM

## 2023-02-02 DIAGNOSIS — E27.9 DISORDER OF ADRENAL GLAND, UNSPECIFIED: Chronic | ICD-10-CM

## 2023-02-02 DIAGNOSIS — Z90.710 ACQUIRED ABSENCE OF BOTH CERVIX AND UTERUS: Chronic | ICD-10-CM

## 2023-02-02 DIAGNOSIS — Z87.81 PERSONAL HISTORY OF (HEALED) TRAUMATIC FRACTURE: Chronic | ICD-10-CM

## 2023-02-02 PROCEDURE — 93970 EXTREMITY STUDY: CPT | Mod: 26

## 2023-02-02 PROCEDURE — 93970 EXTREMITY STUDY: CPT

## 2023-02-10 ENCOUNTER — OUTPATIENT (OUTPATIENT)
Dept: OUTPATIENT SERVICES | Facility: HOSPITAL | Age: 78
LOS: 1 days | Discharge: HOME | End: 2023-02-10

## 2023-02-10 DIAGNOSIS — Z98.890 OTHER SPECIFIED POSTPROCEDURAL STATES: Chronic | ICD-10-CM

## 2023-02-10 DIAGNOSIS — D69.6 THROMBOCYTOPENIA, UNSPECIFIED: ICD-10-CM

## 2023-02-10 DIAGNOSIS — Z87.81 PERSONAL HISTORY OF (HEALED) TRAUMATIC FRACTURE: Chronic | ICD-10-CM

## 2023-02-10 DIAGNOSIS — E27.9 DISORDER OF ADRENAL GLAND, UNSPECIFIED: Chronic | ICD-10-CM

## 2023-02-10 DIAGNOSIS — Z90.710 ACQUIRED ABSENCE OF BOTH CERVIX AND UTERUS: Chronic | ICD-10-CM

## 2023-02-14 ENCOUNTER — APPOINTMENT (OUTPATIENT)
Dept: ENDOCRINOLOGY | Facility: CLINIC | Age: 78
End: 2023-02-14
Payer: MEDICARE

## 2023-02-14 VITALS
BODY MASS INDEX: 20.49 KG/M2 | WEIGHT: 123 LBS | OXYGEN SATURATION: 89 % | SYSTOLIC BLOOD PRESSURE: 160 MMHG | HEIGHT: 65 IN | HEART RATE: 112 BPM | DIASTOLIC BLOOD PRESSURE: 80 MMHG

## 2023-02-14 PROCEDURE — 99212 OFFICE O/P EST SF 10 MIN: CPT | Mod: 25

## 2023-02-14 PROCEDURE — 83036 HEMOGLOBIN GLYCOSYLATED A1C: CPT | Mod: QW

## 2023-02-15 LAB — HBA1C MFR BLD HPLC: 5.2

## 2023-02-16 ENCOUNTER — APPOINTMENT (OUTPATIENT)
Dept: HEMATOLOGY ONCOLOGY | Facility: CLINIC | Age: 78
End: 2023-02-16

## 2023-02-27 NOTE — DATA REVIEWED
[FreeTextEntry1] : [facility] 1/30/23: bun./crt 14.2.67, Na 138, K 4.9, cl 103, co2 of 25, alb 3.9, tbili 0.5,

## 2023-02-27 NOTE — HISTORY OF PRESENT ILLNESS
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 \par She was diagnosed with posttransplant diabetes and is on Lantus 6U qhs, Tradjenta 5mg daily and Prandin 1-2-2mg with meals. She is here with her daughter and notes hypoglycemia has resolved. She forgot to bring meter, AM fasting normally ranges , prelunch ranges 120-130 and bedtime ranges 180-200. \par \par Regarding h/o thyroid nodules, h/o FNA by ENT in past, reportedly benign. Not on thyroid medication, plans to repeat sonogram in 1 year to monitor nodules.

## 2023-02-27 NOTE — ASSESSMENT
[Diabetes Foot Care] : diabetes foot care [Long Term Vascular Complications] : long term vascular complications of diabetes [Carbohydrate Consistent Diet] : carbohydrate consistent diet [Hypoglycemia Management] : hypoglycemia management [Self Monitoring of Blood Glucose] : self monitoring of blood glucose [Retinopathy Screening] : Patient was referred to ophthalmology for retinopathy screening [FreeTextEntry1] : 1. Posttransplant DM, h/o underlying CKD\par h/o DDRT in 2019, following regularly with nephrology and transplant center\par on immunosuppressive therapy including prednisone 5mg daily. \par POC HgbA1c today is 5.2%, at goal.  Patient advised no need for tight control due to advanced age and risk of hypoglycemia\par Continue Lantus 6U qhs for now.  Advised if any low glucose readings, can decrease Lantus further by 2U increments. Discussed signs and symptoms of hypoglycemia and how to manage such episodes\par Encouraged to f/u PCP for decreased appetite, recommended to try Ensure diabetic formula and to see nutritionist. \par Continue Tradjenta 5mg daily\par Continue Prandin 1mg pre breakfast, 2mg with lunch and 2mg with dinner\par Following with nephro for CKD. Not on statin, on Vascepa. Advised to send labs including lipid panel from PCP office to this office for review.\par Patient and daughter reminded to call us or send us a portal message if any medication refills needed. \par No foot ulcers noted today, saw ophthalmology, no h/o retinopathy, h/o cataract surgery in 2019\par \par 2. h/o thyroid nodule \par Biochemically and clinically euthyroid, not on any thyroid medication\par Reviewed thyroid sonogram from April 2022: noted stable 4.7cm left lower pole nodule\par Reported FNA in 2017, patient reportedly noted benign pathology, no records to review today\par Reminded pt to repeat thyroid sonogram in 1 year.\par \par Answered all questions today; patient and her daughter verbalized understanding of the above\par RTC in 3 months

## 2023-02-27 NOTE — PHYSICAL EXAM
[Alert] : alert [No Acute Distress] : no acute distress [Well Developed] : well developed [Normal Sclera/Conjunctiva] : normal sclera/conjunctiva [EOMI] : extra ocular movement intact [No Proptosis] : no proptosis [No Lid Lag] : no lid lag [Normal Hearing] : hearing was normal [No LAD] : no lymphadenopathy [Supple] : the neck was supple [Thyroid Not Enlarged] : the thyroid was not enlarged [No Respiratory Distress] : no respiratory distress [No Accessory Muscle Use] : no accessory muscle use [Normal Rate and Effort] : normal respiratory rate and effort [Clear to Auscultation] : lungs were clear to auscultation bilaterally [Normal S1, S2] : normal S1 and S2 [No Murmurs] : no murmurs [Normal Rate] : heart rate was normal [Regular Rhythm] : with a regular rhythm [No Edema] : no peripheral edema [Normal Bowel Sounds] : normal bowel sounds [Not Tender] : non-tender [Not Distended] : not distended [Soft] : abdomen soft [No Stigmata of Cushings Syndrome] : no stigmata of Cushings Syndrome [Normal Gait] : normal gait [No Clubbing, Cyanosis] : no clubbing  or cyanosis of the fingernails [No Rash] : no rash [No Tremors] : no tremors [Normal Sensation on Monofilament Testing] : normal sensation on monofilament testing of lower extremities [Oriented x3] : oriented to person, place, and time [Normal Insight/Judgement] : insight and judgment were intact [Abdominal Striae] : no abdominal striae [Acanthosis Nigricans] : no acanthosis nigricans [Hirsutism] : no hirsutism [de-identified] : palpable left thyroid nodule

## 2023-03-11 ENCOUNTER — NON-APPOINTMENT (OUTPATIENT)
Age: 78
End: 2023-03-11

## 2023-03-13 ENCOUNTER — APPOINTMENT (OUTPATIENT)
Dept: ULTRASOUND IMAGING | Facility: CLINIC | Age: 78
End: 2023-03-13

## 2023-03-17 ENCOUNTER — APPOINTMENT (OUTPATIENT)
Dept: SURGERY | Facility: CLINIC | Age: 78
End: 2023-03-17
Payer: MEDICARE

## 2023-03-17 DIAGNOSIS — E04.9 NONTOXIC GOITER, UNSPECIFIED: ICD-10-CM

## 2023-03-17 PROCEDURE — 99213 OFFICE O/P EST LOW 20 MIN: CPT

## 2023-03-17 NOTE — ASSESSMENT
[FreeTextEntry1] : h/o stable nodules,  no suspicious finding s on PE or prior  imaging. thyroid US now.  if stable , repeat US 2/2024   RTO 1 year  I have answered their questions to the best of my ability.\par

## 2023-03-17 NOTE — HISTORY OF PRESENT ILLNESS
[de-identified] : Patient referred by Dr. Dickinson  for evaluation of enlarging left substernal goiter. Patient reports a needle biopsy was performed several years ago report not available. Thyroid ultrasound July 2017:  Right lobe 4.8 x 1.7 x 1.6 CM with subcentimeter nodules largest lower pole  7 mm rim calcified  stable. Left lobe 6 x 2.3 x 2.3 CM  with lower pole 4.3 x 4.4 x 2.3 CM increased from 3.7 x 3.8 x 3 CM,  biopsy 7/2017 benign,. 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  thyroid US  7/2019 slight decrease in left nodule, no appreciable change in thyroid size.  Patient had kidney transplant 12/2019 now with type 2 DM, doing well.  f/u US 1/2021 stable mnodules. had f/u US 4/2022.   Patient had coronary artery bypass January 2023 improving.  Denies dysphagia or shortness of breath denies recent illness  denies dysphagia, SOB or palpitations. I have reviewed all old and new data and available images.

## 2023-03-17 NOTE — PHYSICAL EXAM
[de-identified] : No cervical or supraclavicular adenopathy, trachea deviating to the right with left lobe extending behind clavicle. Enlarged. [Normal] : no neck adenopathy [de-identified] : Skin:  normal appearance.  no rash, nodules, vesicles, or erythema,\par Musculoskeletal:  full range of motion and no deformities appreciated\par Neurological:  grossly intact\par Psychiatric:  oriented to person, place and time with appropriate affect

## 2023-03-28 ENCOUNTER — OUTPATIENT (OUTPATIENT)
Dept: OUTPATIENT SERVICES | Facility: HOSPITAL | Age: 78
LOS: 1 days | End: 2023-03-28
Payer: MEDICARE

## 2023-03-28 ENCOUNTER — APPOINTMENT (OUTPATIENT)
Dept: ULTRASOUND IMAGING | Facility: CLINIC | Age: 78
End: 2023-03-28
Payer: MEDICARE

## 2023-03-28 DIAGNOSIS — Z87.81 PERSONAL HISTORY OF (HEALED) TRAUMATIC FRACTURE: Chronic | ICD-10-CM

## 2023-03-28 DIAGNOSIS — Z98.890 OTHER SPECIFIED POSTPROCEDURAL STATES: Chronic | ICD-10-CM

## 2023-03-28 DIAGNOSIS — Z90.710 ACQUIRED ABSENCE OF BOTH CERVIX AND UTERUS: Chronic | ICD-10-CM

## 2023-03-28 DIAGNOSIS — E27.9 DISORDER OF ADRENAL GLAND, UNSPECIFIED: Chronic | ICD-10-CM

## 2023-03-28 DIAGNOSIS — E04.9 NONTOXIC GOITER, UNSPECIFIED: ICD-10-CM

## 2023-03-28 PROCEDURE — 76536 US EXAM OF HEAD AND NECK: CPT | Mod: 26

## 2023-03-28 PROCEDURE — 76536 US EXAM OF HEAD AND NECK: CPT

## 2023-04-06 ENCOUNTER — APPOINTMENT (OUTPATIENT)
Dept: RADIOLOGY | Facility: CLINIC | Age: 78
End: 2023-04-06

## 2023-05-04 ENCOUNTER — APPOINTMENT (OUTPATIENT)
Dept: INTERNAL MEDICINE | Facility: CLINIC | Age: 78
End: 2023-05-04
Payer: MEDICARE

## 2023-05-04 VITALS
WEIGHT: 123 LBS | DIASTOLIC BLOOD PRESSURE: 70 MMHG | SYSTOLIC BLOOD PRESSURE: 119 MMHG | RESPIRATION RATE: 14 BRPM | TEMPERATURE: 98 F | HEART RATE: 91 BPM | OXYGEN SATURATION: 97 % | BODY MASS INDEX: 20.49 KG/M2 | HEIGHT: 65 IN

## 2023-05-04 DIAGNOSIS — J30.89 OTHER ALLERGIC RHINITIS: ICD-10-CM

## 2023-05-04 DIAGNOSIS — Z12.31 ENCOUNTER FOR SCREENING MAMMOGRAM FOR MALIGNANT NEOPLASM OF BREAST: ICD-10-CM

## 2023-05-04 DIAGNOSIS — Z09 ENCOUNTER FOR FOLLOW-UP EXAMINATION AFTER COMPLETED TREATMENT FOR CONDITIONS OTHER THAN MALIGNANT NEOPLASM: ICD-10-CM

## 2023-05-04 DIAGNOSIS — Z00.00 ENCOUNTER FOR GENERAL ADULT MEDICAL EXAMINATION W/OUT ABNORMAL FINDINGS: ICD-10-CM

## 2023-05-04 DIAGNOSIS — N18.9 CHRONIC KIDNEY DISEASE, UNSPECIFIED: ICD-10-CM

## 2023-05-04 DIAGNOSIS — D63.1 CHRONIC KIDNEY DISEASE, UNSPECIFIED: ICD-10-CM

## 2023-05-04 PROCEDURE — G0439: CPT

## 2023-05-04 PROCEDURE — 99214 OFFICE O/P EST MOD 30 MIN: CPT | Mod: 25

## 2023-05-04 PROCEDURE — 36415 COLL VENOUS BLD VENIPUNCTURE: CPT

## 2023-05-04 RX ORDER — OXYCODONE HYDROCHLORIDE 15 MG/1
15 TABLET ORAL
Qty: 75 | Refills: 0 | Status: DISCONTINUED | COMMUNITY
Start: 2023-01-20 | End: 2023-05-04

## 2023-05-04 RX ORDER — OXYCODONE HYDROCHLORIDE 15 MG/1
15 TABLET, FILM COATED, EXTENDED RELEASE ORAL 3 TIMES DAILY
Refills: 0 | Status: DISCONTINUED | COMMUNITY
End: 2023-05-04

## 2023-05-04 RX ORDER — TAMOXIFEN CITRATE 20 MG/1
20 TABLET, FILM COATED ORAL DAILY
Qty: 30 | Refills: 0 | Status: DISCONTINUED | COMMUNITY
Start: 2019-12-11 | End: 2023-05-04

## 2023-05-08 ENCOUNTER — TRANSCRIPTION ENCOUNTER (OUTPATIENT)
Age: 78
End: 2023-05-08

## 2023-05-08 LAB
CHOLEST SERPL-MCNC: 193 MG/DL
ESTIMATED AVERAGE GLUCOSE: 114 MG/DL
FERRITIN SERPL-MCNC: 795 NG/ML
HBA1C MFR BLD HPLC: 5.6 %
HDLC SERPL-MCNC: 76 MG/DL
IRON SERPL-MCNC: 57 UG/DL
LDLC SERPL CALC-MCNC: 85 MG/DL
NONHDLC SERPL-MCNC: 118 MG/DL
TRIGL SERPL-MCNC: 163 MG/DL
TSH SERPL-ACNC: 0.61 UIU/ML

## 2023-05-10 ENCOUNTER — RESULT REVIEW (OUTPATIENT)
Age: 78
End: 2023-05-10

## 2023-05-10 ENCOUNTER — APPOINTMENT (OUTPATIENT)
Dept: ULTRASOUND IMAGING | Facility: CLINIC | Age: 78
End: 2023-05-10
Payer: MEDICARE

## 2023-05-10 ENCOUNTER — TRANSCRIPTION ENCOUNTER (OUTPATIENT)
Age: 78
End: 2023-05-10

## 2023-05-10 ENCOUNTER — APPOINTMENT (OUTPATIENT)
Dept: MAMMOGRAPHY | Facility: CLINIC | Age: 78
End: 2023-05-10
Payer: MEDICARE

## 2023-05-10 ENCOUNTER — OUTPATIENT (OUTPATIENT)
Dept: OUTPATIENT SERVICES | Facility: HOSPITAL | Age: 78
LOS: 1 days | End: 2023-05-10
Payer: MEDICARE

## 2023-05-10 DIAGNOSIS — E27.9 DISORDER OF ADRENAL GLAND, UNSPECIFIED: Chronic | ICD-10-CM

## 2023-05-10 DIAGNOSIS — Z98.890 OTHER SPECIFIED POSTPROCEDURAL STATES: Chronic | ICD-10-CM

## 2023-05-10 DIAGNOSIS — Z87.81 PERSONAL HISTORY OF (HEALED) TRAUMATIC FRACTURE: Chronic | ICD-10-CM

## 2023-05-10 DIAGNOSIS — Z90.710 ACQUIRED ABSENCE OF BOTH CERVIX AND UTERUS: Chronic | ICD-10-CM

## 2023-05-10 DIAGNOSIS — Z12.31 ENCOUNTER FOR SCREENING MAMMOGRAM FOR MALIGNANT NEOPLASM OF BREAST: ICD-10-CM

## 2023-05-10 PROCEDURE — G0279: CPT

## 2023-05-10 PROCEDURE — G0279: CPT | Mod: 26

## 2023-05-10 PROCEDURE — 77066 DX MAMMO INCL CAD BI: CPT | Mod: 26

## 2023-05-10 PROCEDURE — 76641 ULTRASOUND BREAST COMPLETE: CPT | Mod: 26,50,GA

## 2023-05-10 PROCEDURE — 77066 DX MAMMO INCL CAD BI: CPT

## 2023-05-10 PROCEDURE — 76641 ULTRASOUND BREAST COMPLETE: CPT

## 2023-05-15 ENCOUNTER — APPOINTMENT (OUTPATIENT)
Dept: ENDOCRINOLOGY | Facility: CLINIC | Age: 78
End: 2023-05-15
Payer: MEDICARE

## 2023-05-15 VITALS
HEIGHT: 65 IN | DIASTOLIC BLOOD PRESSURE: 94 MMHG | WEIGHT: 126.13 LBS | HEART RATE: 86 BPM | OXYGEN SATURATION: 94 % | BODY MASS INDEX: 21.01 KG/M2 | SYSTOLIC BLOOD PRESSURE: 158 MMHG

## 2023-05-15 LAB — GLUCOSE BLDC GLUCOMTR-MCNC: 241

## 2023-05-15 PROCEDURE — 99213 OFFICE O/P EST LOW 20 MIN: CPT | Mod: 25

## 2023-05-15 PROCEDURE — 82962 GLUCOSE BLOOD TEST: CPT

## 2023-05-17 NOTE — HISTORY OF PRESENT ILLNESS
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 She was diagnosed with posttransplant diabetes and is on Lantus 6U qhs, Tradjenta 5mg daily and Prandin 1-2-2mg with meals.  She forgot to bring meter, AM fasting normally ranges , prelunch ranges 120-130 and bedtime ranges 180-200. Denies hypoglycemia. \par \par Regarding h/o thyroid nodules, h/o FNA by ENT in past, reportedly benign. Not on thyroid medication, denies compressive symptoms.

## 2023-05-17 NOTE — PHYSICAL EXAM
[Alert] : alert [No Acute Distress] : no acute distress [Well Developed] : well developed [Normal Sclera/Conjunctiva] : normal sclera/conjunctiva [EOMI] : extra ocular movement intact [No Proptosis] : no proptosis [No Lid Lag] : no lid lag [Normal Hearing] : hearing was normal [No LAD] : no lymphadenopathy [Supple] : the neck was supple [Thyroid Not Enlarged] : the thyroid was not enlarged [No Respiratory Distress] : no respiratory distress [No Accessory Muscle Use] : no accessory muscle use [Normal Rate and Effort] : normal respiratory rate and effort [Clear to Auscultation] : lungs were clear to auscultation bilaterally [Normal S1, S2] : normal S1 and S2 [No Murmurs] : no murmurs [Normal Rate] : heart rate was normal [Regular Rhythm] : with a regular rhythm [No Edema] : no peripheral edema [Normal Bowel Sounds] : normal bowel sounds [Not Tender] : non-tender [Not Distended] : not distended [Soft] : abdomen soft [No Stigmata of Cushings Syndrome] : no stigmata of Cushings Syndrome [Normal Gait] : normal gait [No Clubbing, Cyanosis] : no clubbing  or cyanosis of the fingernails [No Rash] : no rash [No Tremors] : no tremors [Normal Sensation on Monofilament Testing] : normal sensation on monofilament testing of lower extremities [Oriented x3] : oriented to person, place, and time [Normal Insight/Judgement] : insight and judgment were intact [Abdominal Striae] : no abdominal striae [Acanthosis Nigricans] : no acanthosis nigricans [Hirsutism] : no hirsutism [de-identified] : palpable left thyroid nodule

## 2023-05-22 ENCOUNTER — APPOINTMENT (OUTPATIENT)
Dept: INTERNAL MEDICINE | Facility: CLINIC | Age: 78
End: 2023-05-22

## 2023-08-25 ENCOUNTER — APPOINTMENT (OUTPATIENT)
Dept: INTERNAL MEDICINE | Facility: CLINIC | Age: 78
End: 2023-08-25

## 2023-09-05 ENCOUNTER — APPOINTMENT (OUTPATIENT)
Dept: INTERNAL MEDICINE | Facility: CLINIC | Age: 78
End: 2023-09-05
Payer: MEDICARE

## 2023-09-05 VITALS
OXYGEN SATURATION: 97 % | BODY MASS INDEX: 18.49 KG/M2 | SYSTOLIC BLOOD PRESSURE: 110 MMHG | HEART RATE: 85 BPM | DIASTOLIC BLOOD PRESSURE: 71 MMHG | TEMPERATURE: 98.1 F | HEIGHT: 65 IN | WEIGHT: 111 LBS

## 2023-09-05 DIAGNOSIS — R53.81 OTHER MALAISE: ICD-10-CM

## 2023-09-05 DIAGNOSIS — R53.83 OTHER MALAISE: ICD-10-CM

## 2023-09-05 DIAGNOSIS — K08.9 DISORDER OF TEETH AND SUPPORTING STRUCTURES, UNSPECIFIED: ICD-10-CM

## 2023-09-05 DIAGNOSIS — U07.1 COVID-19: ICD-10-CM

## 2023-09-05 PROCEDURE — 99214 OFFICE O/P EST MOD 30 MIN: CPT | Mod: 25

## 2023-09-05 PROCEDURE — 36415 COLL VENOUS BLD VENIPUNCTURE: CPT

## 2023-09-05 NOTE — HISTORY OF PRESENT ILLNESS
[FreeTextEntry8] : 79 y/o female present for a follow-up on weight loss. Patient has weight loss. She sometimes forgets to eat. Patient is getting dental work done so she is not eating anything besides soft foods. Her blood pressure is okay. Patient daughter explains that when she went to her mother's house her mother answered the door and was confused. She had low sugar that day and was not doing well. Nephrologist giving her shots once a week of iron. Patient needs Trajenta prescription filled. All other medications are UTD. Cholesterol and sugar levels are being checked during today's encounter as part of follow-up BW. Patient inquired if memory problems are early signs of dementia. Patient states that she knows what she wants to say but she can't say the right word. Patient referred to Neurologist. Patient cleared by Cardiology after heart procedure. Patient lesion on right tricep is benign. Patient had an ingrown toenail. She had it cut out and then was given shoes that causes a lesion on the side of her foot. Podiatrist did not provide wound care. Patient provided wound care referral. [de-identified] : 78 F w/ PMHx of PCKD with ESRD now s/p kidney transplant, CKD4,  anemia, HTN, goiter, hx of breast cancer tx with surgery and radiation on tamoxifen, DM, and pancreatic cysts here for weight loss.  Pt feels tired. She is anemic and getting procrit injections. Her Hgb was recently 10.7 with renal.  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 Remains on eliquis. She is not having any bleeding on this medication. Going for LE venous dopplers tomorrow as she is having some left ankle swelling (not painful) and no hx of trauma.  DM controlled on meds and sees endo.  Hx of kidney transplant and taking meds as directed.  Recently saw her GI at [facility] and is UTD with imaging and testing. Meds are unchanged.  Losing weight due to dental work. She cant eat normally. She is afraid to drink ensures as it makes her sugars elevated. She has good apetite. Not having n/v/d.  Has sore on left great toe and over bunion. Been seeing podiatry and has been taking doxycycline. Wants to see wound care. C/o memory issues. She cant always find right word that she wants to say. Wants to see neuro. Had covid this year.  The patient denies any current fevers, chills, cold symptoms, headaches, dizziness, blurry vision, shortness of breath, dyspnea on exertion, cough, abdominal pain, urinary symptoms, or any nausea/vomiting/diarrhea/constipation.

## 2023-09-05 NOTE — HEALTH RISK ASSESSMENT
[Never] : Never [No falls in past year] : Patient reported no falls in the past year [0] : 2) Feeling down, depressed, or hopeless: Not at all (0) [PHQ-2 Negative - No further assessment needed] : PHQ-2 Negative - No further assessment needed [RCF2Inhml] : 0

## 2023-09-05 NOTE — ASSESSMENT
[FreeTextEntry1] : 78 F w/ PMHx of PCKD with ESRD now s/p kidney transplant, CKD4,  anemia, HTN, goiter, hx of breast cancer tx with surgery and radiation on tamoxifen, DM, and pancreatic cysts here for weight loss.  #1 Atrial myxoma: Sees Dr Herron for cardio. Entire tumor was not able to be removed so this is being monitored with serial echos with each visit. Still on AC. #2 Anticoagulation: Pt understands that while he/she is on this medication that they are at an increased risk for bleeding.  However, in the patient's case the benefits outweigh the risks of anticoagulation. They are to be vigilent for any signs of bleeding including but not limited to epistaxis, hematuria, rectal bleeding, hemorrhage and skin ecchymoses etc... Pt told to be very careful when ambulating as falls can lead to significant injury including but not limited to hematomas, intracranial hemorrhage (ie. SAH, epidural and subdural hematomas) and other uncontrollable bleeding.Pt told to avoid other blood thinners while on this medication including motrin and nsaids as they will increase the risk of bleeding. Pt told to be cautious with OTC medicines including garlic, gingko, green tea and fish oils as they also have some blood thinning properties. If patient to having bleeding they are to call the office or if severe go to ER immediately. #3 HTN: Controlled on meds. Risks of uncontrolled hypertension explained and understood. Sees cardio. Check BW.  #4 Hx PCKD s/p renal transplant: S/p transplant and is on standard of care meds. #5 Hx of breast cancer: s/p surgery and radiation. Sees Dr Cleaning at [facility] annually. Gets imaging at [facility] in  and is UTD. #6 Left lumbar radiculopathy/herniated discs:  Has pain meds to use PRN.  #7 Goiter: Sees Dr Vogt and UTD repeat thyroid US. #8 Poor dentition/weight loss: seeing dentist and has no top teeth. Getting the work done now.  Losing weight because of teeth and will improve. Suggested glucerna shakes given her DM instead of boost.  #9 numerous moles/changing mole (likely SK): F/u derm.  #10 Pancreatic cyst/enlarged CBD: UTD with GI at [facility].  #11 Fatigue/ Anemia of chronic dz and secondary to renal disease: F/u Renal. Getting procrit shots and recent Hgb was 10.6.  #12 DM: Sees Dr Padilla and taking meds as directed. Complications of DM explained including but not limited to MI/TIA/CVA/PAD/PVD/nephropathy/retinopathy neuropathy/amputation and ultimately death. Recommendations include: 1800kcal ADA diet, Ophthalmologic evaluation annually for dilated Fundoscopic exam, podiatry follow-up for foot care, Medication compliance, Quarterly lab work for HgA1C, lipid panel.Check A1C and lipis. Renewed tradjenta.  #13  HCM: UTD with CPE. BW done today. All the patient's questions and concerns were answered at the time of the visit. The patient is agreeable to above treatment plan. Lidia Mikala her daughter is HCP and she is a full code.

## 2023-09-05 NOTE — PHYSICAL EXAM
[No Acute Distress] : no acute distress [Well-Appearing] : well-appearing [Normal Voice/Communication] : normal voice/communication [Ill-Appearing] : ill-appearing [Normal] : no acute distress, well nourished, well developed and well-appearing [Normal Sclera/Conjunctiva] : normal sclera/conjunctiva [EOMI] : extraocular movements intact [Normal Outer Ear/Nose] : the outer ears and nose were normal in appearance [Normal Oropharynx] : the oropharynx was normal [Normal TMs] : both tympanic membranes were normal [No JVD] : no jugular venous distention [No Lymphadenopathy] : no lymphadenopathy [Supple] : supple [No Respiratory Distress] : no respiratory distress  [No Accessory Muscle Use] : no accessory muscle use [Clear to Auscultation] : lungs were clear to auscultation bilaterally [Normal Rate] : normal rate  [Regular Rhythm] : with a regular rhythm [Normal S1, S2] : normal S1 and S2 [No Edema] : there was no peripheral edema [Soft] : abdomen soft [Non Tender] : non-tender [Non-distended] : non-distended [Normal Bowel Sounds] : normal bowel sounds [Normal Posterior Cervical Nodes] : no posterior cervical lymphadenopathy [Normal Anterior Cervical Nodes] : no anterior cervical lymphadenopathy [No CVA Tenderness] : no CVA  tenderness [No Spinal Tenderness] : no spinal tenderness [No Joint Swelling] : no joint swelling [Grossly Normal Strength/Tone] : grossly normal strength/tone [Coordination Grossly Intact] : coordination grossly intact [No Focal Deficits] : no focal deficits [Normal Gait] : normal gait [Speech Grossly Normal] : speech grossly normal [Memory Grossly Normal] : memory grossly normal [Normal Affect] : the affect was normal [Alert and Oriented x3] : oriented to person, place, and time [Normal Mood] : the mood was normal [Normal Insight/Judgement] : insight and judgment were intact [Comprehensive Foot Exam Normal] : Right and left foot were examined and both feet are normal. No ulcers in either foot. Toes are normal and with full ROM.  Normal tactile sensation with monofilament testing throughout both feet [de-identified] : Looks thin [de-identified] : poor dentiton--no top teeth [de-identified] : skin breakdown over left foot bunion, sore on top of left great toe (not infected)

## 2023-09-06 ENCOUNTER — TRANSCRIPTION ENCOUNTER (OUTPATIENT)
Age: 78
End: 2023-09-06

## 2023-09-06 LAB
25(OH)D3 SERPL-MCNC: 65.5 NG/ML
CHOLEST SERPL-MCNC: 199 MG/DL
ESTIMATED AVERAGE GLUCOSE: 131 MG/DL
FOLATE SERPL-MCNC: 11.4 NG/ML
HBA1C MFR BLD HPLC: 6.2 %
HDLC SERPL-MCNC: 67 MG/DL
LDLC SERPL CALC-MCNC: 109 MG/DL
NONHDLC SERPL-MCNC: 131 MG/DL
T3FREE SERPL-MCNC: 2.16 PG/ML
T4 FREE SERPL-MCNC: 1.2 NG/DL
TRIGL SERPL-MCNC: 130 MG/DL
TSH SERPL-ACNC: 1.05 UIU/ML
VIT B12 SERPL-MCNC: 1257 PG/ML

## 2023-09-12 ENCOUNTER — APPOINTMENT (OUTPATIENT)
Dept: NEUROLOGY | Facility: CLINIC | Age: 78
End: 2023-09-12
Payer: MEDICARE

## 2023-09-12 VITALS
HEIGHT: 65 IN | WEIGHT: 109 LBS | BODY MASS INDEX: 18.16 KG/M2 | SYSTOLIC BLOOD PRESSURE: 134 MMHG | HEART RATE: 105 BPM | OXYGEN SATURATION: 99 % | DIASTOLIC BLOOD PRESSURE: 81 MMHG

## 2023-09-12 DIAGNOSIS — R41.3 OTHER AMNESIA: ICD-10-CM

## 2023-09-12 PROCEDURE — 99205 OFFICE O/P NEW HI 60 MIN: CPT

## 2023-09-19 ENCOUNTER — OUTPATIENT (OUTPATIENT)
Dept: OUTPATIENT SERVICES | Facility: HOSPITAL | Age: 78
LOS: 1 days | End: 2023-09-19
Payer: MEDICARE

## 2023-09-19 ENCOUNTER — APPOINTMENT (OUTPATIENT)
Dept: MRI IMAGING | Facility: CLINIC | Age: 78
End: 2023-09-19
Payer: MEDICARE

## 2023-09-19 ENCOUNTER — RX RENEWAL (OUTPATIENT)
Age: 78
End: 2023-09-19

## 2023-09-19 DIAGNOSIS — R41.3 OTHER AMNESIA: ICD-10-CM

## 2023-09-19 DIAGNOSIS — Z98.890 OTHER SPECIFIED POSTPROCEDURAL STATES: Chronic | ICD-10-CM

## 2023-09-19 DIAGNOSIS — R51.9 HEADACHE, UNSPECIFIED: ICD-10-CM

## 2023-09-19 DIAGNOSIS — Z87.81 PERSONAL HISTORY OF (HEALED) TRAUMATIC FRACTURE: Chronic | ICD-10-CM

## 2023-09-19 DIAGNOSIS — E27.9 DISORDER OF ADRENAL GLAND, UNSPECIFIED: Chronic | ICD-10-CM

## 2023-09-19 DIAGNOSIS — Z90.710 ACQUIRED ABSENCE OF BOTH CERVIX AND UTERUS: Chronic | ICD-10-CM

## 2023-09-19 PROCEDURE — 70551 MRI BRAIN STEM W/O DYE: CPT

## 2023-09-19 PROCEDURE — 70551 MRI BRAIN STEM W/O DYE: CPT | Mod: 26,MH

## 2023-09-20 ENCOUNTER — OUTPATIENT (OUTPATIENT)
Dept: OUTPATIENT SERVICES | Facility: HOSPITAL | Age: 78
LOS: 1 days | Discharge: HOME | End: 2023-09-20
Payer: MEDICARE

## 2023-09-20 ENCOUNTER — APPOINTMENT (OUTPATIENT)
Dept: WOUND CARE | Facility: HOSPITAL | Age: 78
End: 2023-09-20
Payer: MEDICARE

## 2023-09-20 VITALS
TEMPERATURE: 98.1 F | SYSTOLIC BLOOD PRESSURE: 116 MMHG | HEART RATE: 94 BPM | DIASTOLIC BLOOD PRESSURE: 73 MMHG | RESPIRATION RATE: 16 BRPM | OXYGEN SATURATION: 96 %

## 2023-09-20 VITALS — HEIGHT: 65 IN | WEIGHT: 109 LBS | BODY MASS INDEX: 18.16 KG/M2

## 2023-09-20 DIAGNOSIS — E27.9 DISORDER OF ADRENAL GLAND, UNSPECIFIED: Chronic | ICD-10-CM

## 2023-09-20 DIAGNOSIS — L97.501 NON-PRESSURE CHRONIC ULCER OF OTHER PART OF UNSPECIFIED FOOT LIMITED TO BREAKDOWN OF SKIN: ICD-10-CM

## 2023-09-20 DIAGNOSIS — Z98.890 OTHER SPECIFIED POSTPROCEDURAL STATES: Chronic | ICD-10-CM

## 2023-09-20 DIAGNOSIS — Z87.81 PERSONAL HISTORY OF (HEALED) TRAUMATIC FRACTURE: Chronic | ICD-10-CM

## 2023-09-20 DIAGNOSIS — Z90.710 ACQUIRED ABSENCE OF BOTH CERVIX AND UTERUS: Chronic | ICD-10-CM

## 2023-09-20 PROCEDURE — G0463: CPT

## 2023-09-20 PROCEDURE — 99203 OFFICE O/P NEW LOW 30 MIN: CPT

## 2023-09-20 RX ORDER — REPAGLINIDE 1 MG/1
1 TABLET ORAL
Qty: 150 | Refills: 2 | Status: DISCONTINUED | COMMUNITY
Start: 2021-03-22 | End: 2023-09-20

## 2023-09-20 RX ORDER — SENNOSIDES 8.6 MG
8.6 TABLET ORAL DAILY
Qty: 90 | Refills: 3 | Status: DISCONTINUED | COMMUNITY
Start: 2019-12-09 | End: 2023-09-20

## 2023-09-20 RX ORDER — TACROLIMUS 4 MG/1
4 TABLET, EXTENDED RELEASE ORAL
Qty: 90 | Refills: 0 | Status: DISCONTINUED | COMMUNITY
Start: 2019-12-09 | End: 2023-09-20

## 2023-09-20 RX ORDER — FLUTICASONE PROPIONATE 50 UG/1
50 SPRAY, METERED NASAL DAILY
Qty: 1 | Refills: 2 | Status: DISCONTINUED | COMMUNITY
Start: 2020-09-25 | End: 2023-09-20

## 2023-09-20 RX ORDER — LEFLUNOMIDE 20 MG/1
20 TABLET, FILM COATED ORAL
Qty: 30 | Refills: 5 | Status: DISCONTINUED | COMMUNITY
Start: 2020-03-17 | End: 2023-09-20

## 2023-09-20 RX ORDER — PREDNISONE 5 MG/1
5 TABLET ORAL
Qty: 30 | Refills: 11 | Status: DISCONTINUED | COMMUNITY
Start: 2019-12-09 | End: 2023-09-20

## 2023-09-20 RX ORDER — PREDNISONE 1 MG/1
1 TABLET ORAL
Refills: 0 | Status: ACTIVE | COMMUNITY

## 2023-09-21 DIAGNOSIS — Z86.018 PERSONAL HISTORY OF OTHER BENIGN NEOPLASM: ICD-10-CM

## 2023-09-21 DIAGNOSIS — Z98.49 CATARACT EXTRACTION STATUS, UNSPECIFIED EYE: ICD-10-CM

## 2023-09-21 DIAGNOSIS — E11.51 TYPE 2 DIABETES MELLITUS WITH DIABETIC PERIPHERAL ANGIOPATHY WITHOUT GANGRENE: ICD-10-CM

## 2023-09-21 DIAGNOSIS — Z94.0 KIDNEY TRANSPLANT STATUS: ICD-10-CM

## 2023-09-21 DIAGNOSIS — L97.513 NON-PRESSURE CHRONIC ULCER OF OTHER PART OF RIGHT FOOT WITH NECROSIS OF MUSCLE: ICD-10-CM

## 2023-09-21 DIAGNOSIS — Z87.448 PERSONAL HISTORY OF OTHER DISEASES OF URINARY SYSTEM: ICD-10-CM

## 2023-09-21 DIAGNOSIS — Z86.718 PERSONAL HISTORY OF OTHER VENOUS THROMBOSIS AND EMBOLISM: ICD-10-CM

## 2023-09-21 DIAGNOSIS — I10 ESSENTIAL (PRIMARY) HYPERTENSION: ICD-10-CM

## 2023-09-21 DIAGNOSIS — Z87.718 PERSONAL HISTORY OF OTHER SPECIFIED (CORRECTED) CONGENITAL MALFORMATIONS OF GENITOURINARY SYSTEM: ICD-10-CM

## 2023-09-21 DIAGNOSIS — Z82.71 FAMILY HISTORY OF POLYCYSTIC KIDNEY: ICD-10-CM

## 2023-09-21 DIAGNOSIS — Z80.0 FAMILY HISTORY OF MALIGNANT NEOPLASM OF DIGESTIVE ORGANS: ICD-10-CM

## 2023-09-21 DIAGNOSIS — Z86.16 PERSONAL HISTORY OF COVID-19: ICD-10-CM

## 2023-09-21 DIAGNOSIS — Z80.1 FAMILY HISTORY OF MALIGNANT NEOPLASM OF TRACHEA, BRONCHUS AND LUNG: ICD-10-CM

## 2023-09-21 DIAGNOSIS — E04.9 NONTOXIC GOITER, UNSPECIFIED: ICD-10-CM

## 2023-09-21 DIAGNOSIS — Z98.890 OTHER SPECIFIED POSTPROCEDURAL STATES: ICD-10-CM

## 2023-09-21 DIAGNOSIS — E11.621 TYPE 2 DIABETES MELLITUS WITH FOOT ULCER: ICD-10-CM

## 2023-09-21 DIAGNOSIS — Z85.3 PERSONAL HISTORY OF MALIGNANT NEOPLASM OF BREAST: ICD-10-CM

## 2023-09-21 DIAGNOSIS — Z86.2 PERSONAL HISTORY OF DISEASES OF THE BLOOD AND BLOOD-FORMING ORGANS AND CERTAIN DISORDERS INVOLVING THE IMMUNE MECHANISM: ICD-10-CM

## 2023-09-25 ENCOUNTER — NON-APPOINTMENT (OUTPATIENT)
Age: 78
End: 2023-09-25

## 2023-09-27 ENCOUNTER — NON-APPOINTMENT (OUTPATIENT)
Age: 78
End: 2023-09-27

## 2023-09-28 ENCOUNTER — NON-APPOINTMENT (OUTPATIENT)
Age: 78
End: 2023-09-28

## 2023-10-01 PROBLEM — Z92.3 HISTORY OF RADIATION THERAPY: Status: RESOLVED | Noted: 2018-11-05 | Resolved: 2023-10-01

## 2023-10-02 ENCOUNTER — APPOINTMENT (OUTPATIENT)
Dept: HYPERBARIC MEDICINE | Facility: HOSPITAL | Age: 78
End: 2023-10-02

## 2023-10-03 ENCOUNTER — APPOINTMENT (OUTPATIENT)
Dept: HYPERBARIC MEDICINE | Facility: HOSPITAL | Age: 78
End: 2023-10-03

## 2023-10-04 ENCOUNTER — APPOINTMENT (OUTPATIENT)
Dept: HYPERBARIC MEDICINE | Facility: HOSPITAL | Age: 78
End: 2023-10-04

## 2023-10-05 ENCOUNTER — APPOINTMENT (OUTPATIENT)
Dept: MRI IMAGING | Facility: CLINIC | Age: 78
End: 2023-10-05

## 2023-10-05 ENCOUNTER — APPOINTMENT (OUTPATIENT)
Dept: RADIOLOGY | Facility: CLINIC | Age: 78
End: 2023-10-05

## 2023-10-05 ENCOUNTER — APPOINTMENT (OUTPATIENT)
Dept: HYPERBARIC MEDICINE | Facility: HOSPITAL | Age: 78
End: 2023-10-05

## 2023-10-06 ENCOUNTER — APPOINTMENT (OUTPATIENT)
Dept: HYPERBARIC MEDICINE | Facility: HOSPITAL | Age: 78
End: 2023-10-06

## 2023-10-11 ENCOUNTER — NON-APPOINTMENT (OUTPATIENT)
Age: 78
End: 2023-10-11

## 2023-10-12 ENCOUNTER — APPOINTMENT (OUTPATIENT)
Dept: NEUROLOGY | Facility: CLINIC | Age: 78
End: 2023-10-12

## 2023-10-13 ENCOUNTER — TRANSCRIPTION ENCOUNTER (OUTPATIENT)
Age: 78
End: 2023-10-13

## 2023-10-31 ENCOUNTER — APPOINTMENT (OUTPATIENT)
Dept: INTERNAL MEDICINE | Facility: CLINIC | Age: 78
End: 2023-10-31
Payer: MEDICARE

## 2023-10-31 VITALS
SYSTOLIC BLOOD PRESSURE: 130 MMHG | HEART RATE: 92 BPM | OXYGEN SATURATION: 97 % | DIASTOLIC BLOOD PRESSURE: 76 MMHG | RESPIRATION RATE: 16 BRPM | HEIGHT: 65 IN | TEMPERATURE: 98.4 F

## 2023-10-31 PROCEDURE — 36415 COLL VENOUS BLD VENIPUNCTURE: CPT

## 2023-10-31 PROCEDURE — 99495 TRANSJ CARE MGMT MOD F2F 14D: CPT | Mod: 25

## 2023-11-01 LAB
ANION GAP SERPL CALC-SCNC: 12 MMOL/L
BUN SERPL-MCNC: 30 MG/DL
CALCIUM SERPL-MCNC: 10 MG/DL
CHLORIDE SERPL-SCNC: 99 MMOL/L
CO2 SERPL-SCNC: 25 MMOL/L
CREAT SERPL-MCNC: 1.86 MG/DL
EGFR: 27 ML/MIN/1.73M2
GLUCOSE SERPL-MCNC: 80 MG/DL
POTASSIUM SERPL-SCNC: 5.4 MMOL/L
SODIUM SERPL-SCNC: 136 MMOL/L

## 2023-11-03 ENCOUNTER — NON-APPOINTMENT (OUTPATIENT)
Age: 78
End: 2023-11-03

## 2023-11-03 LAB
HCT VFR BLD CALC: 25.9 %
HGB BLD-MCNC: 7.4 G/DL
MCHC RBC-ENTMCNC: 28.6 GM/DL
MCHC RBC-ENTMCNC: 28.6 PG
MCV RBC AUTO: 100 FL
PLATELET # BLD AUTO: 255 K/UL
RBC # BLD: 2.59 M/UL
RBC # FLD: 16.3 %
WBC # FLD AUTO: 10.8 K/UL

## 2023-11-09 NOTE — PROGRESS NOTE ADULT - PROBLEM SELECTOR PLAN 7
monitor w/ cbc
Helical Rim Text: The closure involved the helical rim.

## 2023-11-13 ENCOUNTER — TRANSCRIPTION ENCOUNTER (OUTPATIENT)
Age: 78
End: 2023-11-13

## 2023-11-14 ENCOUNTER — APPOINTMENT (OUTPATIENT)
Dept: ENDOCRINOLOGY | Facility: CLINIC | Age: 78
End: 2023-11-14
Payer: MEDICARE

## 2023-11-14 VITALS — HEART RATE: 92 BPM | SYSTOLIC BLOOD PRESSURE: 116 MMHG | OXYGEN SATURATION: 97 % | DIASTOLIC BLOOD PRESSURE: 70 MMHG

## 2023-11-14 PROCEDURE — 99213 OFFICE O/P EST LOW 20 MIN: CPT

## 2023-11-21 ENCOUNTER — APPOINTMENT (OUTPATIENT)
Dept: NEUROLOGY | Facility: CLINIC | Age: 78
End: 2023-11-21

## 2023-12-04 ENCOUNTER — NON-APPOINTMENT (OUTPATIENT)
Age: 78
End: 2023-12-04

## 2023-12-08 ENCOUNTER — APPOINTMENT (OUTPATIENT)
Dept: INTERNAL MEDICINE | Facility: CLINIC | Age: 78
End: 2023-12-08
Payer: MEDICARE

## 2023-12-08 ENCOUNTER — NON-APPOINTMENT (OUTPATIENT)
Age: 78
End: 2023-12-08

## 2023-12-08 VITALS
DIASTOLIC BLOOD PRESSURE: 84 MMHG | BODY MASS INDEX: 17.33 KG/M2 | SYSTOLIC BLOOD PRESSURE: 132 MMHG | OXYGEN SATURATION: 96 % | WEIGHT: 104 LBS | TEMPERATURE: 98 F | HEIGHT: 65 IN | HEART RATE: 83 BPM

## 2023-12-08 DIAGNOSIS — R41.89 OTHER SYMPTOMS AND SIGNS INVOLVING COGNITIVE FUNCTIONS AND AWARENESS: ICD-10-CM

## 2023-12-08 DIAGNOSIS — R51.9 HEADACHE, UNSPECIFIED: ICD-10-CM

## 2023-12-08 DIAGNOSIS — M51.26 OTHER INTERVERTEBRAL DISC DISPLACEMENT, LUMBAR REGION: ICD-10-CM

## 2023-12-08 DIAGNOSIS — G89.29 HEADACHE, UNSPECIFIED: ICD-10-CM

## 2023-12-08 DIAGNOSIS — Z92.29 PERSONAL HISTORY OF OTHER DRUG THERAPY: ICD-10-CM

## 2023-12-08 DIAGNOSIS — L97.509 PERSONAL HISTORY OF DIABETIC FOOT ULCER: ICD-10-CM

## 2023-12-08 DIAGNOSIS — L97.513 NON-PRESSURE CHRONIC ULCER OF OTHER PART OF RIGHT FOOT WITH NECROSIS OF MUSCLE: ICD-10-CM

## 2023-12-08 DIAGNOSIS — L97.529 NON-PRESSURE CHRONIC ULCER OF OTHER PART OF LEFT FOOT WITH UNSPECIFIED SEVERITY: ICD-10-CM

## 2023-12-08 DIAGNOSIS — Z86.31 PERSONAL HISTORY OF DIABETIC FOOT ULCER: ICD-10-CM

## 2023-12-08 PROCEDURE — 99215 OFFICE O/P EST HI 40 MIN: CPT | Mod: 25

## 2023-12-08 PROCEDURE — 93000 ELECTROCARDIOGRAM COMPLETE: CPT

## 2023-12-08 RX ORDER — APIXABAN 5 MG/1
5 TABLET, FILM COATED ORAL TWICE DAILY
Refills: 0 | Status: DISCONTINUED | COMMUNITY
End: 2023-12-08

## 2023-12-08 RX ORDER — ALBUTEROL SULFATE 90 UG/1
108 (90 BASE) INHALANT RESPIRATORY (INHALATION)
Qty: 1 | Refills: 2 | Status: ACTIVE | COMMUNITY
Start: 2021-05-09 | End: 1900-01-01

## 2023-12-08 RX ORDER — TORSEMIDE 20 MG/1
20 TABLET ORAL
Refills: 0 | Status: DISCONTINUED | COMMUNITY
End: 2023-12-08

## 2023-12-08 RX ORDER — OXYCODONE HYDROCHLORIDE AND ACETAMINOPHEN 10; 325 MG/1; MG/1
10-325 TABLET ORAL
Refills: 0 | Status: ACTIVE | COMMUNITY

## 2023-12-08 RX ORDER — AMLODIPINE BESYLATE 2.5 MG/1
2.5 TABLET ORAL
Qty: 90 | Refills: 1 | Status: ACTIVE | COMMUNITY

## 2023-12-08 RX ORDER — DOXYCYCLINE HYCLATE 100 MG/1
100 CAPSULE ORAL
Refills: 0 | Status: DISCONTINUED | COMMUNITY
End: 2023-12-08

## 2023-12-12 RX ORDER — BLOOD-GLUCOSE METER
KIT MISCELLANEOUS
Qty: 1 | Refills: 1 | Status: ACTIVE | COMMUNITY
Start: 2021-06-29 | End: 1900-01-01

## 2023-12-12 RX ORDER — BLOOD SUGAR DIAGNOSTIC
STRIP MISCELLANEOUS
Qty: 400 | Refills: 3 | Status: ACTIVE | COMMUNITY
Start: 2020-05-26 | End: 1900-01-01

## 2023-12-31 PROBLEM — Z23 NEED FOR VACCINATION WITH 13-POLYVALENT PNEUMOCOCCAL CONJUGATE VACCINE: Status: RESOLVED | Noted: 2020-11-13 | Resolved: 2021-01-08

## 2024-01-03 ENCOUNTER — NON-APPOINTMENT (OUTPATIENT)
Age: 79
End: 2024-01-03

## 2024-01-05 ENCOUNTER — APPOINTMENT (OUTPATIENT)
Dept: INTERNAL MEDICINE | Facility: CLINIC | Age: 79
End: 2024-01-05
Payer: MEDICARE

## 2024-01-05 VITALS
WEIGHT: 103 LBS | HEIGHT: 65 IN | BODY MASS INDEX: 17.16 KG/M2 | HEART RATE: 89 BPM | TEMPERATURE: 98.1 F | OXYGEN SATURATION: 98 % | SYSTOLIC BLOOD PRESSURE: 133 MMHG | DIASTOLIC BLOOD PRESSURE: 75 MMHG

## 2024-01-05 DIAGNOSIS — D49.0 NEOPLASM OF UNSPECIFIED BEHAVIOR OF DIGESTIVE SYSTEM: ICD-10-CM

## 2024-01-05 DIAGNOSIS — Z01.818 ENCOUNTER FOR OTHER PREPROCEDURAL EXAMINATION: ICD-10-CM

## 2024-01-05 DIAGNOSIS — S88.119A COMPLETE TRAUMATIC AMPUTATION AT LVL BETWEEN KNEE AND ANKLE, UNSPECIFIED LOWER LEG, INITIAL ENCOUNTER: ICD-10-CM

## 2024-01-05 DIAGNOSIS — E87.1 HYPO-OSMOLALITY AND HYPONATREMIA: ICD-10-CM

## 2024-01-05 DIAGNOSIS — Z86.2 PERSONAL HISTORY OF DISEASES OF THE BLOOD AND BLOOD-FORMING ORGANS AND CERTAIN DISORDERS INVOLVING THE IMMUNE MECHANISM: ICD-10-CM

## 2024-01-05 DIAGNOSIS — Q61.3 POLYCYSTIC KIDNEY, UNSPECIFIED: ICD-10-CM

## 2024-01-05 PROCEDURE — 99496 TRANSJ CARE MGMT HIGH F2F 7D: CPT

## 2024-01-05 RX ORDER — ELECTROLYTES/DEXTROSE
31G X 5 MM SOLUTION, ORAL ORAL
Qty: 4 | Refills: 1 | Status: ACTIVE | COMMUNITY
Start: 2020-04-07 | End: 1900-01-01

## 2024-01-05 RX ORDER — TACROLIMUS 1 MG/1
1 TABLET, EXTENDED RELEASE ORAL
Qty: 9 | Refills: 0 | Status: ACTIVE | COMMUNITY

## 2024-01-05 RX ORDER — ATORVASTATIN CALCIUM 10 MG/1
10 TABLET, FILM COATED ORAL
Refills: 0 | Status: ACTIVE | COMMUNITY

## 2024-01-05 RX ORDER — OXYCODONE 5 MG/1
5 TABLET ORAL 3 TIMES DAILY
Refills: 0 | Status: DISCONTINUED | COMMUNITY
Start: 2023-01-19 | End: 2024-01-05

## 2024-01-05 RX ORDER — METOPROLOL TARTRATE 100 MG/1
100 TABLET, FILM COATED ORAL
Qty: 180 | Refills: 0 | Status: ACTIVE | COMMUNITY
Start: 2019-12-11

## 2024-01-06 PROBLEM — Z01.818 PREOP EXAMINATION: Status: RESOLVED | Noted: 2023-12-08 | Resolved: 2024-01-06

## 2024-01-06 PROBLEM — S88.119A BELOW-KNEE AMPUTATION: Noted: 2023-10-31

## 2024-01-06 PROBLEM — Q61.3 CONGENITAL POLYCYSTIC KIDNEY: Status: ACTIVE | Noted: 2018-11-05

## 2024-01-06 PROBLEM — Z86.2 HISTORY OF LEUKOCYTOSIS: Status: RESOLVED | Noted: 2023-10-31 | Resolved: 2024-01-06

## 2024-01-06 PROBLEM — D49.0 IPMN (INTRADUCTAL PAPILLARY MUCINOUS NEOPLASM): Status: ACTIVE | Noted: 2020-08-10

## 2024-01-06 PROBLEM — E87.1 HYPONATREMIA: Status: RESOLVED | Noted: 2023-10-31 | Resolved: 2024-01-06

## 2024-01-08 ENCOUNTER — APPOINTMENT (OUTPATIENT)
Dept: CARE COORDINATION | Facility: HOME HEALTH | Age: 79
End: 2024-01-08
Payer: MEDICARE

## 2024-01-08 PROCEDURE — ZZZZZ: CPT

## 2024-01-08 PROCEDURE — 99442: CPT | Mod: 93

## 2024-01-09 ENCOUNTER — APPOINTMENT (OUTPATIENT)
Dept: ENDOCRINOLOGY | Facility: CLINIC | Age: 79
End: 2024-01-09

## 2024-01-12 RX ORDER — BLOOD-GLUCOSE SENSOR
EACH MISCELLANEOUS
Qty: 6 | Refills: 1 | Status: ACTIVE | COMMUNITY
Start: 2024-01-04 | End: 1900-01-01

## 2024-01-23 ENCOUNTER — OUTPATIENT (OUTPATIENT)
Dept: OUTPATIENT SERVICES | Facility: HOSPITAL | Age: 79
LOS: 1 days | Discharge: HOME | End: 2024-01-23

## 2024-01-23 VITALS — HEART RATE: 88 BPM | DIASTOLIC BLOOD PRESSURE: 69 MMHG | SYSTOLIC BLOOD PRESSURE: 155 MMHG

## 2024-01-23 DIAGNOSIS — E27.9 DISORDER OF ADRENAL GLAND, UNSPECIFIED: Chronic | ICD-10-CM

## 2024-01-23 DIAGNOSIS — D69.6 THROMBOCYTOPENIA, UNSPECIFIED: ICD-10-CM

## 2024-01-23 DIAGNOSIS — Z98.890 OTHER SPECIFIED POSTPROCEDURAL STATES: Chronic | ICD-10-CM

## 2024-01-23 DIAGNOSIS — Z90.710 ACQUIRED ABSENCE OF BOTH CERVIX AND UTERUS: Chronic | ICD-10-CM

## 2024-01-23 DIAGNOSIS — Z87.81 PERSONAL HISTORY OF (HEALED) TRAUMATIC FRACTURE: Chronic | ICD-10-CM

## 2024-02-01 ENCOUNTER — APPOINTMENT (OUTPATIENT)
Dept: CARE COORDINATION | Facility: CLINIC | Age: 79
End: 2024-02-01
Payer: MEDICARE

## 2024-02-01 ENCOUNTER — APPOINTMENT (OUTPATIENT)
Dept: HEMATOLOGY ONCOLOGY | Facility: CLINIC | Age: 79
End: 2024-02-01

## 2024-02-01 PROCEDURE — 99457 RPM TX MGMT 1ST 20 MIN: CPT

## 2024-02-08 ENCOUNTER — APPOINTMENT (OUTPATIENT)
Dept: HEMATOLOGY ONCOLOGY | Facility: CLINIC | Age: 79
End: 2024-02-08
Payer: MEDICARE

## 2024-02-08 VITALS
OXYGEN SATURATION: 98 % | DIASTOLIC BLOOD PRESSURE: 75 MMHG | RESPIRATION RATE: 16 BRPM | BODY MASS INDEX: 17.66 KG/M2 | WEIGHT: 106 LBS | HEIGHT: 65.04 IN | SYSTOLIC BLOOD PRESSURE: 166 MMHG | TEMPERATURE: 98 F | HEART RATE: 93 BPM

## 2024-02-08 DIAGNOSIS — Z94.0 KIDNEY TRANSPLANT STATUS: ICD-10-CM

## 2024-02-08 DIAGNOSIS — D47.2 MONOCLONAL GAMMOPATHY: ICD-10-CM

## 2024-02-08 PROCEDURE — 99214 OFFICE O/P EST MOD 30 MIN: CPT

## 2024-02-08 NOTE — PHYSICAL EXAM
[Restricted in physically strenuous activity but ambulatory and able to carry out work of a light or sedentary nature] : Status 1- Restricted in physically strenuous activity but ambulatory and able to carry out work of a light or sedentary nature, e.g., light house work, office work [Thin] : thin [Normal] : affect appropriate [de-identified] : chronically ill appearing, but in nad [de-identified] : LUE AV fistula, LLE AKA

## 2024-02-08 NOTE — HISTORY OF PRESENT ILLNESS
[de-identified] : Patient previously seen by Dr. Franklin and Dr. Carter, transitioned to me on 4/26/21. She was referred initially for abnormal blood counts, needing hematological clearance prior to renal transplant. She had blood work done as part of her monthly blood at hemodialysis -on 3/5/19 wbc 3.6 Hb 11.7 plt 82. She was referred for leukopenia and thrombocytopenia. According to the patient and her daughter, she has not had abnormal counts in the past; she denied bleeding/bruising. They both recalled that at the time the blood was drawn, the patient was 'getting over the flu' -she had fevers and cough. On follow up , she felt well.   The patient has since been followed up for MGUS, cleared for renal transplant at last visit in Nov 2019. She received the renal transplant on 12/7/19 -from a cadaveric donor. She is on immunosuppression with Tacrolimus/Cellcept as well as low dose Prednisone. She was subsequently lost to follow up until today on 2/8/24. In the interim time period she developed diabetes mellitus type 2. In January 2023 required open heart surgery for removal of benign atrial myxoma. Recovered well from that surgery. In late 2023 she had L BKA and then debridement and ultimately in December 2023 needed AKA.  Here for follow up, unclear specific indication.

## 2024-02-08 NOTE — ASSESSMENT
[FreeTextEntry1] : 79 y/o F previously followed for anemia 2/2 CKD and MGUS s/p BMBx at the time, since then s/p renal transplant in 2019, and also s/p removal of atrial myxoma and L sided BKA - debridement - and AKA in December 2023, here for re-establishment of care.   She still has anemia 2/2 CKD at this point, but is treated with IV iron and ESAs with nephrologist at [facility] Dr. Rooney. Hemoglobin most recently was 9.6 last week, they refused labs today as they receive home draw labs once per month from nephrologist office. No recent immunoelectrophoresis in our record or on quick review of their portal from [facility].   Plan: -Will reach out to Dr. Rooney's office and plan for her next monthly blood draw to evaluate immunoelectrophoresis to recheck M-spike - was 0.4 in the past but this was prior to her renal transplant.  -Continue ESAs and iron support with Dr. Rooney.  -Patient understands and agrees with plan. All information explained to the best of my ability.

## 2024-02-09 ENCOUNTER — APPOINTMENT (OUTPATIENT)
Dept: ULTRASOUND IMAGING | Facility: CLINIC | Age: 79
End: 2024-02-09

## 2024-02-12 NOTE — ASSESSMENT
[FreeTextEntry1] : See summary report dated 2/2/2024 for synchronous communication and billable hours report dated 2/2/2024 for total minutes.   Biofourmis data reviewed; patient not at goal with interactive encounters and monitoring. Correct blood pressure cuff size is confirmed. Will continue to review medications and times, nutrition, sleep hygiene, physical activity, stress management, social connections, and avoidance of risky substances. Plan is to continue to monitor blood pressure and adjust medications as indicated.

## 2024-02-15 ENCOUNTER — APPOINTMENT (OUTPATIENT)
Dept: INTERNAL MEDICINE | Facility: CLINIC | Age: 79
End: 2024-02-15
Payer: MEDICARE

## 2024-02-15 ENCOUNTER — APPOINTMENT (OUTPATIENT)
Dept: ENDOCRINOLOGY | Facility: CLINIC | Age: 79
End: 2024-02-15
Payer: MEDICARE

## 2024-02-15 VITALS — HEART RATE: 85 BPM | OXYGEN SATURATION: 96 % | SYSTOLIC BLOOD PRESSURE: 142 MMHG | DIASTOLIC BLOOD PRESSURE: 76 MMHG

## 2024-02-15 VITALS
OXYGEN SATURATION: 98 % | BODY MASS INDEX: 17.66 KG/M2 | DIASTOLIC BLOOD PRESSURE: 77 MMHG | HEIGHT: 65 IN | HEART RATE: 75 BPM | WEIGHT: 106 LBS | SYSTOLIC BLOOD PRESSURE: 159 MMHG | TEMPERATURE: 98 F

## 2024-02-15 VITALS — WEIGHT: 109 LBS | BODY MASS INDEX: 18.14 KG/M2 | SYSTOLIC BLOOD PRESSURE: 130 MMHG | DIASTOLIC BLOOD PRESSURE: 70 MMHG

## 2024-02-15 DIAGNOSIS — Z79.4 TYPE 2 DIABETES MELLITUS W/OUT COMPLICATIONS: ICD-10-CM

## 2024-02-15 DIAGNOSIS — I10 ESSENTIAL (PRIMARY) HYPERTENSION: ICD-10-CM

## 2024-02-15 DIAGNOSIS — E11.51 TYPE 2 DIABETES MELLITUS WITH DIABETIC PERIPHERAL ANGIOPATHY W/OUT GANGRENE: ICD-10-CM

## 2024-02-15 DIAGNOSIS — K86.2 CYST OF PANCREAS: ICD-10-CM

## 2024-02-15 DIAGNOSIS — R63.6 UNDERWEIGHT: ICD-10-CM

## 2024-02-15 DIAGNOSIS — S78.112A COMPLETE TRAUMATIC AMPUTATION AT LVL BETWEEN LEFT HIP AND KNEE, INITIAL ENCOUNTER: ICD-10-CM

## 2024-02-15 DIAGNOSIS — E11.22 TYPE 2 DIABETES MELLITUS WITH DIABETIC CHRONIC KIDNEY DISEASE: ICD-10-CM

## 2024-02-15 DIAGNOSIS — Z87.898 PERSONAL HISTORY OF OTHER SPECIFIED CONDITIONS: ICD-10-CM

## 2024-02-15 DIAGNOSIS — E11.9 TYPE 2 DIABETES MELLITUS W/OUT COMPLICATIONS: ICD-10-CM

## 2024-02-15 DIAGNOSIS — N18.9 CHRONIC KIDNEY DISEASE, UNSPECIFIED: ICD-10-CM

## 2024-02-15 DIAGNOSIS — R94.31 ABNORMAL ELECTROCARDIOGRAM [ECG] [EKG]: ICD-10-CM

## 2024-02-15 DIAGNOSIS — Z79.899 OTHER LONG TERM (CURRENT) DRUG THERAPY: ICD-10-CM

## 2024-02-15 DIAGNOSIS — R12 HEARTBURN: ICD-10-CM

## 2024-02-15 DIAGNOSIS — E11.21 TYPE 2 DIABETES MELLITUS WITH DIABETIC NEPHROPATHY: ICD-10-CM

## 2024-02-15 DIAGNOSIS — C50.912 MALIGNANT NEOPLASM OF UNSPECIFIED SITE OF LEFT FEMALE BREAST: ICD-10-CM

## 2024-02-15 DIAGNOSIS — E78.5 HYPERLIPIDEMIA, UNSPECIFIED: ICD-10-CM

## 2024-02-15 DIAGNOSIS — N18.4 CHRONIC KIDNEY DISEASE, STAGE 4 (SEVERE): ICD-10-CM

## 2024-02-15 DIAGNOSIS — D63.1 CHRONIC KIDNEY DISEASE, STAGE 4 (SEVERE): ICD-10-CM

## 2024-02-15 DIAGNOSIS — Z09 ENCOUNTER FOR FOLLOW-UP EXAMINATION AFTER COMPLETED TREATMENT FOR CONDITIONS OTHER THAN MALIGNANT NEOPLASM: ICD-10-CM

## 2024-02-15 DIAGNOSIS — Z79.52 LONG TERM (CURRENT) USE OF SYSTEMIC STEROIDS: ICD-10-CM

## 2024-02-15 DIAGNOSIS — E04.9 NONTOXIC GOITER, UNSPECIFIED: ICD-10-CM

## 2024-02-15 LAB — HBA1C MFR BLD HPLC: 6.5

## 2024-02-15 PROCEDURE — 83036 HEMOGLOBIN GLYCOSYLATED A1C: CPT | Mod: QW

## 2024-02-15 PROCEDURE — 99214 OFFICE O/P EST MOD 30 MIN: CPT | Mod: 25

## 2024-02-15 PROCEDURE — 99214 OFFICE O/P EST MOD 30 MIN: CPT

## 2024-02-15 PROCEDURE — G2211 COMPLEX E/M VISIT ADD ON: CPT

## 2024-02-15 RX ORDER — OXYCODONE 13.5 MG/1
13.5 CAPSULE, EXTENDED RELEASE ORAL
Refills: 0 | Status: DISCONTINUED | COMMUNITY
End: 2024-02-15

## 2024-02-15 RX ORDER — LINAGLIPTIN 5 MG/1
5 TABLET, FILM COATED ORAL DAILY
Qty: 90 | Refills: 0 | Status: DISCONTINUED | COMMUNITY
Start: 2020-01-22 | End: 2024-02-15

## 2024-02-15 RX ORDER — GABAPENTIN 100 MG/1
100 CAPSULE ORAL 3 TIMES DAILY
Qty: 90 | Refills: 0 | Status: ACTIVE | COMMUNITY

## 2024-02-15 RX ORDER — INSULIN GLARGINE 100 [IU]/ML
100 INJECTION, SOLUTION SUBCUTANEOUS
Qty: 3 | Refills: 1 | Status: ACTIVE | COMMUNITY
Start: 2020-11-10 | End: 1900-01-01

## 2024-02-15 RX ORDER — INSULIN LISPRO 100 [IU]/ML
100 INJECTION, SOLUTION INTRAVENOUS; SUBCUTANEOUS
Qty: 3 | Refills: 1 | Status: ACTIVE | COMMUNITY
Start: 2024-01-05 | End: 1900-01-01

## 2024-02-15 RX ORDER — REPAGLINIDE 1 MG/1
1 TABLET ORAL
Refills: 0 | Status: DISCONTINUED | COMMUNITY
End: 2024-02-15

## 2024-02-16 ENCOUNTER — APPOINTMENT (OUTPATIENT)
Dept: ENDOCRINOLOGY | Facility: CLINIC | Age: 79
End: 2024-02-16

## 2024-02-22 RX ORDER — FAMOTIDINE 20 MG/1
20 TABLET, FILM COATED ORAL TWICE DAILY
Qty: 180 | Refills: 3 | Status: ACTIVE | COMMUNITY
Start: 2020-07-28 | End: 1900-01-01

## 2024-02-26 ENCOUNTER — APPOINTMENT (OUTPATIENT)
Dept: ENDOCRINOLOGY | Facility: CLINIC | Age: 79
End: 2024-02-26
Payer: MEDICARE

## 2024-02-26 PROCEDURE — 95251 CONT GLUC MNTR ANALYSIS I&R: CPT

## 2024-02-26 PROCEDURE — ZZZZZ: CPT

## 2024-02-26 PROCEDURE — 95249 CONT GLUC MNTR PT PROV EQP: CPT

## 2024-03-01 ENCOUNTER — RESULT REVIEW (OUTPATIENT)
Age: 79
End: 2024-03-01

## 2024-03-01 ENCOUNTER — EMERGENCY (EMERGENCY)
Facility: HOSPITAL | Age: 79
LOS: 0 days | Discharge: ANOTHER HOSPITAL | End: 2024-03-01
Attending: EMERGENCY MEDICINE
Payer: MEDICARE

## 2024-03-01 ENCOUNTER — INPATIENT (INPATIENT)
Facility: HOSPITAL | Age: 79
LOS: 44 days | Discharge: NURSING, REHAB OR LONG-TERM CARE | DRG: 66 | End: 2024-04-15
Attending: STUDENT IN AN ORGANIZED HEALTH CARE EDUCATION/TRAINING PROGRAM | Admitting: NEUROLOGICAL SURGERY
Payer: MEDICARE

## 2024-03-01 ENCOUNTER — TRANSCRIPTION ENCOUNTER (OUTPATIENT)
Age: 79
End: 2024-03-01

## 2024-03-01 VITALS
HEART RATE: 90 BPM | OXYGEN SATURATION: 98 % | RESPIRATION RATE: 14 BRPM | DIASTOLIC BLOOD PRESSURE: 93 MMHG | SYSTOLIC BLOOD PRESSURE: 154 MMHG

## 2024-03-01 VITALS
SYSTOLIC BLOOD PRESSURE: 269 MMHG | WEIGHT: 132.28 LBS | DIASTOLIC BLOOD PRESSURE: 118 MMHG | TEMPERATURE: 98 F | HEIGHT: 62 IN | RESPIRATION RATE: 20 BRPM | OXYGEN SATURATION: 100 % | HEART RATE: 111 BPM

## 2024-03-01 VITALS — HEIGHT: 62 IN

## 2024-03-01 DIAGNOSIS — Z98.890 OTHER SPECIFIED POSTPROCEDURAL STATES: Chronic | ICD-10-CM

## 2024-03-01 DIAGNOSIS — Z90.710 ACQUIRED ABSENCE OF BOTH CERVIX AND UTERUS: Chronic | ICD-10-CM

## 2024-03-01 DIAGNOSIS — N18.6 END STAGE RENAL DISEASE: ICD-10-CM

## 2024-03-01 DIAGNOSIS — Z88.8 ALLERGY STATUS TO OTHER DRUGS, MEDICAMENTS AND BIOLOGICAL SUBSTANCES: ICD-10-CM

## 2024-03-01 DIAGNOSIS — R41.82 ALTERED MENTAL STATUS, UNSPECIFIED: ICD-10-CM

## 2024-03-01 DIAGNOSIS — E78.5 HYPERLIPIDEMIA, UNSPECIFIED: ICD-10-CM

## 2024-03-01 DIAGNOSIS — M10.9 GOUT, UNSPECIFIED: ICD-10-CM

## 2024-03-01 DIAGNOSIS — Z86.718 PERSONAL HISTORY OF OTHER VENOUS THROMBOSIS AND EMBOLISM: ICD-10-CM

## 2024-03-01 DIAGNOSIS — E27.9 DISORDER OF ADRENAL GLAND, UNSPECIFIED: Chronic | ICD-10-CM

## 2024-03-01 DIAGNOSIS — Z91.013 ALLERGY TO SEAFOOD: ICD-10-CM

## 2024-03-01 DIAGNOSIS — Z99.2 DEPENDENCE ON RENAL DIALYSIS: ICD-10-CM

## 2024-03-01 DIAGNOSIS — Z87.81 PERSONAL HISTORY OF (HEALED) TRAUMATIC FRACTURE: Chronic | ICD-10-CM

## 2024-03-01 DIAGNOSIS — I12.0 HYPERTENSIVE CHRONIC KIDNEY DISEASE WITH STAGE 5 CHRONIC KIDNEY DISEASE OR END STAGE RENAL DISEASE: ICD-10-CM

## 2024-03-01 DIAGNOSIS — Z85.3 PERSONAL HISTORY OF MALIGNANT NEOPLASM OF BREAST: ICD-10-CM

## 2024-03-01 DIAGNOSIS — Z88.0 ALLERGY STATUS TO PENICILLIN: ICD-10-CM

## 2024-03-01 DIAGNOSIS — E11.22 TYPE 2 DIABETES MELLITUS WITH DIABETIC CHRONIC KIDNEY DISEASE: ICD-10-CM

## 2024-03-01 DIAGNOSIS — I61.9 NONTRAUMATIC INTRACEREBRAL HEMORRHAGE, UNSPECIFIED: ICD-10-CM

## 2024-03-01 DIAGNOSIS — R40.4 TRANSIENT ALTERATION OF AWARENESS: ICD-10-CM

## 2024-03-01 LAB
ALBUMIN SERPL ELPH-MCNC: 3.2 G/DL — LOW (ref 3.3–5)
ALP SERPL-CCNC: 48 U/L — SIGNIFICANT CHANGE UP (ref 40–120)
ALT FLD-CCNC: 19 U/L — SIGNIFICANT CHANGE UP (ref 12–78)
ANION GAP SERPL CALC-SCNC: 9 MMOL/L — SIGNIFICANT CHANGE UP (ref 5–17)
APPEARANCE UR: CLEAR — SIGNIFICANT CHANGE UP
APTT BLD: 23 SEC — LOW (ref 24.5–35.6)
AST SERPL-CCNC: 41 U/L — HIGH (ref 15–37)
BACTERIA # UR AUTO: ABNORMAL /HPF
BASOPHILS # BLD AUTO: 0.03 K/UL — SIGNIFICANT CHANGE UP (ref 0–0.2)
BASOPHILS NFR BLD AUTO: 0.5 % — SIGNIFICANT CHANGE UP (ref 0–2)
BILIRUB SERPL-MCNC: 0.6 MG/DL — SIGNIFICANT CHANGE UP (ref 0.2–1.2)
BILIRUB UR-MCNC: NEGATIVE — SIGNIFICANT CHANGE UP
BLD GP AB SCN SERPL QL: NEGATIVE — SIGNIFICANT CHANGE UP
BLD GP AB SCN SERPL QL: SIGNIFICANT CHANGE UP
BUN SERPL-MCNC: 28 MG/DL — HIGH (ref 7–23)
CALCIUM SERPL-MCNC: 9.4 MG/DL — SIGNIFICANT CHANGE UP (ref 8.5–10.1)
CHLORIDE SERPL-SCNC: 105 MMOL/L — SIGNIFICANT CHANGE UP (ref 96–108)
CO2 SERPL-SCNC: 24 MMOL/L — SIGNIFICANT CHANGE UP (ref 22–31)
COLOR SPEC: YELLOW — SIGNIFICANT CHANGE UP
CREAT SERPL-MCNC: 1.69 MG/DL — HIGH (ref 0.5–1.3)
DIFF PNL FLD: ABNORMAL
EGFR: 31 ML/MIN/1.73M2 — LOW
EOSINOPHIL # BLD AUTO: 0.06 K/UL — SIGNIFICANT CHANGE UP (ref 0–0.5)
EOSINOPHIL NFR BLD AUTO: 1 % — SIGNIFICANT CHANGE UP (ref 0–6)
EPI CELLS # UR: PRESENT
FLUAV AG NPH QL: SIGNIFICANT CHANGE UP
FLUBV AG NPH QL: SIGNIFICANT CHANGE UP
GAS PNL BLDA: SIGNIFICANT CHANGE UP
GLUCOSE SERPL-MCNC: 289 MG/DL — HIGH (ref 70–99)
GLUCOSE UR QL: 500 MG/DL
HCT VFR BLD CALC: 34.5 % — SIGNIFICANT CHANGE UP (ref 34.5–45)
HGB BLD-MCNC: 10.9 G/DL — LOW (ref 11.5–15.5)
IMM GRANULOCYTES NFR BLD AUTO: 0.3 % — SIGNIFICANT CHANGE UP (ref 0–0.9)
INR BLD: 0.8 RATIO — LOW (ref 0.85–1.18)
KETONES UR-MCNC: NEGATIVE MG/DL — SIGNIFICANT CHANGE UP
LACTATE SERPL-SCNC: 2.6 MMOL/L — HIGH (ref 0.7–2)
LEUKOCYTE ESTERASE UR-ACNC: NEGATIVE — SIGNIFICANT CHANGE UP
LYMPHOCYTES # BLD AUTO: 1.59 K/UL — SIGNIFICANT CHANGE UP (ref 1–3.3)
LYMPHOCYTES # BLD AUTO: 26.7 % — SIGNIFICANT CHANGE UP (ref 13–44)
MCHC RBC-ENTMCNC: 29.3 PG — SIGNIFICANT CHANGE UP (ref 27–34)
MCHC RBC-ENTMCNC: 31.6 G/DL — LOW (ref 32–36)
MCV RBC AUTO: 92.7 FL — SIGNIFICANT CHANGE UP (ref 80–100)
MONOCYTES # BLD AUTO: 0.4 K/UL — SIGNIFICANT CHANGE UP (ref 0–0.9)
MONOCYTES NFR BLD AUTO: 6.7 % — SIGNIFICANT CHANGE UP (ref 2–14)
NEUTROPHILS # BLD AUTO: 3.86 K/UL — SIGNIFICANT CHANGE UP (ref 1.8–7.4)
NEUTROPHILS NFR BLD AUTO: 64.8 % — SIGNIFICANT CHANGE UP (ref 43–77)
NITRITE UR-MCNC: NEGATIVE — SIGNIFICANT CHANGE UP
NRBC # BLD: 0 /100 WBCS — SIGNIFICANT CHANGE UP (ref 0–0)
PH UR: 7.5 — SIGNIFICANT CHANGE UP (ref 5–8)
PLATELET # BLD AUTO: 148 K/UL — LOW (ref 150–400)
POTASSIUM SERPL-MCNC: 4.3 MMOL/L — SIGNIFICANT CHANGE UP (ref 3.5–5.3)
POTASSIUM SERPL-SCNC: 4.3 MMOL/L — SIGNIFICANT CHANGE UP (ref 3.5–5.3)
PROT SERPL-MCNC: 7.4 GM/DL — SIGNIFICANT CHANGE UP (ref 6–8.3)
PROT UR-MCNC: 300 MG/DL
PROTHROM AB SERPL-ACNC: 9.7 SEC — SIGNIFICANT CHANGE UP (ref 9.5–13)
RBC # BLD: 3.72 M/UL — LOW (ref 3.8–5.2)
RBC # FLD: 15.8 % — HIGH (ref 10.3–14.5)
RBC CASTS # UR COMP ASSIST: 1 /HPF — SIGNIFICANT CHANGE UP (ref 0–4)
RH IG SCN BLD-IMP: POSITIVE — SIGNIFICANT CHANGE UP
SARS-COV-2 RNA SPEC QL NAA+PROBE: SIGNIFICANT CHANGE UP
SODIUM SERPL-SCNC: 138 MMOL/L — SIGNIFICANT CHANGE UP (ref 135–145)
SP GR SPEC: 1.02 — SIGNIFICANT CHANGE UP (ref 1–1.03)
TROPONIN I, HIGH SENSITIVITY RESULT: 32.2 NG/L — SIGNIFICANT CHANGE UP
UROBILINOGEN FLD QL: 0.2 MG/DL — SIGNIFICANT CHANGE UP (ref 0.2–1)
WBC # BLD: 6.01 K/UL — SIGNIFICANT CHANGE UP (ref 3.8–10.5)
WBC # FLD AUTO: 6.01 K/UL — SIGNIFICANT CHANGE UP (ref 3.8–10.5)
WBC UR QL: 4 /HPF — SIGNIFICANT CHANGE UP (ref 0–5)

## 2024-03-01 PROCEDURE — 88342 IMHCHEM/IMCYTCHM 1ST ANTB: CPT | Mod: 26

## 2024-03-01 PROCEDURE — 0042T: CPT | Mod: MC

## 2024-03-01 PROCEDURE — 99291 CRITICAL CARE FIRST HOUR: CPT | Mod: FT,25

## 2024-03-01 PROCEDURE — 70450 CT HEAD/BRAIN W/O DYE: CPT | Mod: 26,77,XU

## 2024-03-01 PROCEDURE — 70450 CT HEAD/BRAIN W/O DYE: CPT | Mod: 26,XU,MC

## 2024-03-01 PROCEDURE — 70498 CT ANGIOGRAPHY NECK: CPT | Mod: 26,MC

## 2024-03-01 PROCEDURE — 93010 ELECTROCARDIOGRAM REPORT: CPT

## 2024-03-01 PROCEDURE — 99291 CRITICAL CARE FIRST HOUR: CPT

## 2024-03-01 PROCEDURE — 88313 SPECIAL STAINS GROUP 2: CPT | Mod: 26

## 2024-03-01 PROCEDURE — 61313 CRNEC/CRNOT STTL ICERE: CPT

## 2024-03-01 PROCEDURE — 70496 CT ANGIOGRAPHY HEAD: CPT | Mod: 26,MC

## 2024-03-01 PROCEDURE — 31500 INSERT EMERGENCY AIRWAY: CPT

## 2024-03-01 PROCEDURE — 88307 TISSUE EXAM BY PATHOLOGIST: CPT | Mod: 26

## 2024-03-01 PROCEDURE — 88304 TISSUE EXAM BY PATHOLOGIST: CPT | Mod: 26

## 2024-03-01 PROCEDURE — 71045 X-RAY EXAM CHEST 1 VIEW: CPT | Mod: 26

## 2024-03-01 DEVICE — SURGICEL 2 X 14": Type: IMPLANTABLE DEVICE | Site: LEFT | Status: FUNCTIONAL

## 2024-03-01 DEVICE — DURAL REPAIR PATCH DURA-GUARD 4CM X 4CM: Type: IMPLANTABLE DEVICE | Site: LEFT | Status: FUNCTIONAL

## 2024-03-01 DEVICE — SURGICEL FIBRILLAR 2 X 4": Type: IMPLANTABLE DEVICE | Site: LEFT | Status: FUNCTIONAL

## 2024-03-01 DEVICE — MATRIX DURAGEN PLUS DURAL REGENERATION 3X3: Type: IMPLANTABLE DEVICE | Site: LEFT | Status: FUNCTIONAL

## 2024-03-01 DEVICE — KIT A-LINE 1LUM 20G X 12CM SAFE KIT: Type: IMPLANTABLE DEVICE | Site: LEFT | Status: FUNCTIONAL

## 2024-03-01 DEVICE — SURGIFLO MATRIX WITH THROMBIN KIT: Type: IMPLANTABLE DEVICE | Site: LEFT | Status: FUNCTIONAL

## 2024-03-01 RX ORDER — NICARDIPINE HYDROCHLORIDE 30 MG/1
10 CAPSULE, EXTENDED RELEASE ORAL
Qty: 40 | Refills: 0 | Status: DISCONTINUED | OUTPATIENT
Start: 2024-03-01 | End: 2024-03-01

## 2024-03-01 RX ORDER — NICARDIPINE HYDROCHLORIDE 30 MG/1
5 CAPSULE, EXTENDED RELEASE ORAL
Qty: 40 | Refills: 0 | Status: DISCONTINUED | OUTPATIENT
Start: 2024-03-01 | End: 2024-03-01

## 2024-03-01 RX ORDER — ETOMIDATE 2 MG/ML
20 INJECTION INTRAVENOUS ONCE
Refills: 0 | Status: COMPLETED | OUTPATIENT
Start: 2024-03-01 | End: 2024-03-01

## 2024-03-01 RX ORDER — ROCURONIUM BROMIDE 10 MG/ML
60 VIAL (ML) INTRAVENOUS ONCE
Refills: 0 | Status: COMPLETED | OUTPATIENT
Start: 2024-03-01 | End: 2024-03-01

## 2024-03-01 RX ORDER — PROPOFOL 10 MG/ML
5 INJECTION, EMULSION INTRAVENOUS
Qty: 500 | Refills: 0 | Status: DISCONTINUED | OUTPATIENT
Start: 2024-03-01 | End: 2024-03-01

## 2024-03-01 RX ORDER — LEVETIRACETAM 250 MG/1
1000 TABLET, FILM COATED ORAL ONCE
Refills: 0 | Status: COMPLETED | OUTPATIENT
Start: 2024-03-01 | End: 2024-03-01

## 2024-03-01 RX ORDER — HYDRALAZINE HCL 50 MG
10 TABLET ORAL ONCE
Refills: 0 | Status: DISCONTINUED | OUTPATIENT
Start: 2024-03-01 | End: 2024-03-01

## 2024-03-01 RX ORDER — LEVETIRACETAM 250 MG/1
1000 TABLET, FILM COATED ORAL ONCE
Refills: 0 | Status: DISCONTINUED | OUTPATIENT
Start: 2024-03-01 | End: 2024-03-01

## 2024-03-01 RX ORDER — PROPOFOL 10 MG/ML
10 INJECTION, EMULSION INTRAVENOUS
Qty: 1000 | Refills: 0 | Status: DISCONTINUED | OUTPATIENT
Start: 2024-03-01 | End: 2024-03-01

## 2024-03-01 RX ADMIN — ETOMIDATE 20 MILLIGRAM(S): 2 INJECTION INTRAVENOUS at 16:44

## 2024-03-01 RX ADMIN — NICARDIPINE HYDROCHLORIDE 25 MG/HR: 30 CAPSULE, EXTENDED RELEASE ORAL at 17:58

## 2024-03-01 RX ADMIN — LEVETIRACETAM 400 MILLIGRAM(S): 250 TABLET, FILM COATED ORAL at 17:48

## 2024-03-01 RX ADMIN — Medication 60 MILLIGRAM(S): at 16:45

## 2024-03-01 RX ADMIN — PROPOFOL 3.6 MICROGRAM(S)/KG/MIN: 10 INJECTION, EMULSION INTRAVENOUS at 17:04

## 2024-03-01 NOTE — PRE-ANESTHESIA EVALUATION ADULT - NSANTHPMHFT_GEN_ALL_CORE
79 y/o female with ICH. intubated ETT 6.5 cuffed at 20 cm at the lips; unresponsive  DM; ESRD ; left AKAl left AV fistula 2002; s/p renal transplant 2019; breast ca s/p lumpectomy on tamoxifen; HTN; dvt; gout; low back pain; covid with SOB; anemia; GERD; thrombocytopenia followed by hemeadrenal mass excision 2015; right ankle ORIF 2014; hysterectomy 1994; pancreatic cyst; thyroid nodule

## 2024-03-01 NOTE — ED PROVIDER NOTE - OBJECTIVE STATEMENT
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 Per family and EMS AAOx3 at baseline, was at home with family. Last spoke to her at baseline 2:30. They thought she was falling asleep, got her into bed. Hx per EMS as pt unable to participate. Found responsive only to pain, aphasic by family who called EMS. BP elevated >220/110, FS >200, vomited  while they were assessing her and began to desaturate. 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 Per family and EMS AAOx3 at baseline, was at home with family. Last spoke to her at baseline 2:30pm. They thought she was falling asleep, got her into bed then ~3-3:30 found her unresponsive eyes rolled back in bed so called EMS. Found responsive only to pain, aphasic by family who called EMS. BP elevated >220/110, FS >200, vomited  while they were assessing her and began to desaturate. Has prior L arm fistual that is no longer active. 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 Per family and EMS AAOx3 at baseline, was at home with family. Last spoke to her at baseline 2:30pm. They thought she was falling asleep, got her into bed then ~3-3:30 found her unresponsive eyes rolled back in bed so called EMS. Found responsive only to pain, aphasic by family who called EMS. BP elevated >220/110, FS >200, vomited  while they were assessing her and began to desaturate. Has prior L arm fistual that is no longer active.

## 2024-03-01 NOTE — ED ADULT NURSE NOTE - NSFALLHARMRISKINTERV_ED_ALL_ED

## 2024-03-01 NOTE — ED PROVIDER NOTE - COVID-19 RESULT DATE/TIME
01-Mar-2024 17:32
EOS calculated successfully. EOS Risk Factor: 0.5/1000 live births ([facility] national incidence); GA=39w6d; Temp=98.6; ROM=1.333; GBS='Unknown'; Antibiotics='No antibiotics or any antibiotics < 2 hrs prior to birth'

## 2024-03-01 NOTE — ED PROVIDER NOTE - PHYSICAL EXAMINATION
GENERAL: +intubated and sedated   HEAD: normocephalic, atraumatic  CARDIAC: +tachycardic, +irregularly irregular rhythm, normal S1S2  PULM: +intubated, +b/l lung sounds  GI: Abd soft, nondistended  NEURO: +sedated on propofol   MSK: +LLE BKA, +RLE with IO  SKIN: well-perfused, extremities warm, no visible rashes  PSYCH: appropriate mood and affect

## 2024-03-01 NOTE — ED PROVIDER NOTE - PHYSICAL EXAMINATION
CONSTITUTIONAL: thin chronically ill appearing patient, unresponsive   SKIN: cool, diaphoretic   EYES: pupils 2mm, minimally responsive, eyes fixed to L gaze, unable to track, not significant blink to threat   ENT: poor dentition   NECK: Supple  CARD: RRR  RESP: b/l BS intact, scattered rhonchi  ABD: soft, NTND  EXT: L BKA, R no edema; L arm fistula w/o pulse   NEURO: moving L arm and leg - not localizing clearly, not moving R arm to painful stimuli ; pupils 3mm b/l deviated to L ; limited exam based on pt's mental status ; GCS (eyes open to pain, flex L arm to pain, no verbal response)

## 2024-03-01 NOTE — PRE-ANESTHESIA EVALUATION ADULT - NSRADCARDRESULTSFT_GEN_ALL_CORE
TTE 2019: EF 65-70%; 1. Severe left atrial enlargement.  LA volume index = 62  cc/m2. Hypermobile interatrial septum. mild MR, min TR  2. Eccentric left ventricular hypertrophy (dilated left  ventricle with normal relative wall thickness).  3. Hyperdynamic left ventricular systolic function.  4. Normal right ventricular size and function.  5. Estimated right ventricular systolic pressure equals 39  mm Hg, assuming right atrial pressure equals 8 mm Hg,  consistent with borderline pulmonary hypertension.  *** No previous Echo exam.

## 2024-03-01 NOTE — ED PROCEDURE NOTE - CPROC ED TRACHE INTUB DETAIL1
vomiting actively/Patient was pre-oxygenated. An endotracheal tube (ETT) was placed through the vocal cords into the trachea.  ETT position was confirmed by auscultation of bilateral breath sounds to all lung fields. ETCO2 level was appropriate./Difficult/crash intubation (see additional details section).

## 2024-03-01 NOTE — ED ADULT TRIAGE NOTE - CHIEF COMPLAINT QUOTE
Patient BIBA: responsive to pain only: move left arm. Patient with eye deviation to left. Patient vomited and possibly aspirated, . Diaphoretic. Patient not protecting airway.  PMH: DM, Left AKA, Left humeral area shunt: no thrill no bruit,

## 2024-03-01 NOTE — PHARMACOTHERAPY INTERVENTION NOTE - COMMENTS
Code Stroke called for patient for unresponsiveness, usual baseline A&O x3. BP elevated in /118 mmHg, patient had episode of emesis en route with EMS with possible aspiration, and patient also has history of DDRT 12/7/19 and L AKA 2/2 DM.    Pharmacy at bedside with ED team to assess patient and for rapid sequence intubation for airway protection.    Patient received etomidate 20mg, rocuronium 60mg, and currently sedated with propofol.

## 2024-03-01 NOTE — ED ADULT NURSE NOTE - OBJECTIVE STATEMENT
pt is a 79yo female PMH ESRD w/ R kidney transplant, HTN, HLD, DM BIBEMS as transfer from [facility] for head bleed. per EMS, pt found unresponsive sitting in chair at home around 1530 today, pt evaluated at [facility], CT showed L frontal parenchymal bleed with R sided shift, pt intubated at 1645, 6.5ett propofol infusion started for sedation, propofol rate at 35mcg/kg/min upon arrival to [facility] ED, propofol infusion paused per neuro MD Scott at bedside. pt also arrived to [facility] ED with Nicardipine infusion going at 10mg/hr for BP control pt is a 79yo female PMH ESRD w/ R kidney transplant, HTN, HLD, DM BIBEMS as transfer from [facility] for head bleed. per EMS, pt found unresponsive sitting in chair at home around 1530 today, pt evaluated at [facility], CT showed L frontal parenchymal bleed with R sided shift, pt intubated at 1645, 6.5 ET tube, 19mm at the lip, propofol infusion started at [facility] for sedation, propofol rate at 35mcg/kg/min upon arrival to [facility] ED, propofol infusion paused per neuro MD Scott at bedside. pt also arrived to [facility] ED with Nicardipine infusion started at [facility], going at 10mg/hr for BP control. upon exam, pt noted to have constricted pupils b/l (1mm b/l), minimall reactive/responsive to light. MD Lake, MD Rock, and neuro MD Scott at bedside assessing pt. pt tele packed, transported to CT with primary RN, MD Scott, RT, and EDT, pt transported to OR following CT scan with RN, MD, RT, EDT. pt family at bedside.

## 2024-03-01 NOTE — ED PROVIDER NOTE - TOBACCO USE
Pt is requesting a refill on her Gabapentin . Last seen in office on May 24th. [facility] [place] if approved. Unknown if ever smoked

## 2024-03-01 NOTE — ED PROCEDURE NOTE - ATTENDING CONTRIBUTION TO CARE
Patient evaluated and seen with PGY3 Dr Haskins  as a shared visit - agree with above procedure note

## 2024-03-01 NOTE — ED ADULT NURSE NOTE - NSFALLRISKINTERV_ED_ALL_ED
Assistance OOB with selected safe patient handling equipment if applicable/Communicate fall risk and risk factors to all staff, patient, and family/Encourage patient to sit up slowly, dangle for a short time, stand at bedside before walking/Provide patient with walking aids/Provide visual cue: yellow wristband, yellow gown, etc/Reinforce activity limits and safety measures with patient and family/Review medications for side effects contributing to fall risk/Toileting schedule using arm’s reach rule for commode and bathroom/Call bell, personal items and telephone in reach/Instruct patient to call for assistance before getting out of bed/chair/stretcher/Non-slip footwear applied when patient is off stretcher/[place] to call system/Physically safe environment - no spills, clutter or unnecessary equipment/Purposeful Proactive Rounding/Room/bathroom lighting operational, light cord in reach

## 2024-03-01 NOTE — ED PROCEDURE NOTE - NS ED ATTENDING STATEMENT MOD
I have seen and examined this patient and fully participated in the care of this patient as the teaching attending.  The service was shared with the WILIAN.  I reviewed and verified the documentation.

## 2024-03-01 NOTE — ED ADULT NURSE NOTE - NSSEPSISSUSPECTED_ED_A_ED
C:  Hu Alexander is here today for Physical    Medications: currently is not taking any medications  Refills needed today?  NO  Preferred pharmacy added   Denies Latex allergy or sensitivity  Tobacco history: verified    Patient would like communication of their results via:    Cell Phone:   Telephone Information:   Mobile [phone]     Okay to leave a message containing results? Yes    Immunization History   Administered Date(s) Administered   • Influenza, injectable, quadrivalent 11/15/2017   • Tdap 11/18/2013   Pended Date(s) Pended   • Influenza, injectable, quadrivalent 01/02/2019        Last CPX: 11/15/17  Last labs: 11/15/17  Last PSA: None  Last colonoscopy: None                    No

## 2024-03-01 NOTE — ED PROVIDER NOTE - CLINICAL SUMMARY MEDICAL DECISION MAKING FREE TEXT BOX
78-year-old female history of HTN, ESRD on HD via LUE AVF, breast CA, GERD, status post hysterectomy, status post excision of adrenal mass, presents as a transfer from [facility] intubated and sedated after large ICH.  Per report patient found laying in bed around 3 PM unresponsive by family went to [facility] by EMS.  Prior to 3 PM patient reportedly went to a nap at around 130 to 2 PM was unable to be awoken when family found her.  Mental status reported ANO 3.  Patient was intubated

## 2024-03-01 NOTE — ED PROVIDER NOTE - ATTENDING CONTRIBUTION TO CARE
Patient evaluated and seen with PGY 3 Dr Mcdowell  as a shared visit - agree with above history and physical - pt examined and seen by me personally - findings as seen: Pt with last known normal around 2:30pm, normally A&Ox 3. Otherwise pt noted to have significant bleed intraparenchymal region - left frontal with 1cm shift from left to right and effacement of ventricle noted - will initiate cardene drip after intubation - pt to transfer to [facility] for further care.

## 2024-03-01 NOTE — ED ADULT NURSE NOTE - OBJECTIVE STATEMENT
Pt BIBEMS unresponsive and with vomit on face. Per EMS, pt AOx3 at baseline with last known well time at 14:30 when pt went to take a nap and when family checked on pt around 15:30 pt was found unresponsive. Pt noted with constricted pupils and R eye deviating towards left. Pt responsive to painful stimuli. Dr. Simmons and Dr. Mcdowell at bedside deciding to intubate. Pt difficult IV stick, IO placed to R tibia with blood return. 6.5 ET tube placed at 19 lip line with capnography color change and b/l equal lung sounds. Pt noted to be on Eliquis as per home med list. Pt with L AV fistula, per EMS fistula no longer in use. AV fistula with no thrill or bruit. IV placed via US by Dr. Mcdowell and pt brought to CT. See ED Adult Flow Sheet and ED Stroke Flow Sheet for further documentation.

## 2024-03-02 ENCOUNTER — TRANSCRIPTION ENCOUNTER (OUTPATIENT)
Age: 79
End: 2024-03-02

## 2024-03-02 ENCOUNTER — RESULT REVIEW (OUTPATIENT)
Age: 79
End: 2024-03-02

## 2024-03-02 DIAGNOSIS — I61.9 NONTRAUMATIC INTRACEREBRAL HEMORRHAGE, UNSPECIFIED: ICD-10-CM

## 2024-03-02 DIAGNOSIS — Z94.0 KIDNEY TRANSPLANT STATUS: ICD-10-CM

## 2024-03-02 LAB
A1C WITH ESTIMATED AVERAGE GLUCOSE RESULT: 5.7 % — HIGH (ref 4–5.6)
ADD ON TEST-SPECIMEN IN LAB: SIGNIFICANT CHANGE UP
ADD ON TEST-SPECIMEN IN LAB: SIGNIFICANT CHANGE UP
ALBUMIN SERPL ELPH-MCNC: 3.6 G/DL — SIGNIFICANT CHANGE UP (ref 3.3–5)
ALBUMIN SERPL ELPH-MCNC: 3.7 G/DL — SIGNIFICANT CHANGE UP (ref 3.3–5)
ALP SERPL-CCNC: 41 U/L — SIGNIFICANT CHANGE UP (ref 40–120)
ALP SERPL-CCNC: 44 U/L — SIGNIFICANT CHANGE UP (ref 40–120)
ALT FLD-CCNC: 10 U/L — SIGNIFICANT CHANGE UP (ref 10–45)
ALT FLD-CCNC: 14 U/L — SIGNIFICANT CHANGE UP (ref 10–45)
ANION GAP SERPL CALC-SCNC: 11 MMOL/L — SIGNIFICANT CHANGE UP (ref 5–17)
ANION GAP SERPL CALC-SCNC: 11 MMOL/L — SIGNIFICANT CHANGE UP (ref 5–17)
APTT BLD: 27.5 SEC — SIGNIFICANT CHANGE UP (ref 24.5–35.6)
AST SERPL-CCNC: 29 U/L — SIGNIFICANT CHANGE UP (ref 10–40)
AST SERPL-CCNC: 31 U/L — SIGNIFICANT CHANGE UP (ref 10–40)
BASOPHILS # BLD AUTO: 0.02 K/UL — SIGNIFICANT CHANGE UP (ref 0–0.2)
BASOPHILS NFR BLD AUTO: 0.2 % — SIGNIFICANT CHANGE UP (ref 0–2)
BILIRUB SERPL-MCNC: 0.4 MG/DL — SIGNIFICANT CHANGE UP (ref 0.2–1.2)
BILIRUB SERPL-MCNC: 0.9 MG/DL — SIGNIFICANT CHANGE UP (ref 0.2–1.2)
BUN SERPL-MCNC: 26 MG/DL — HIGH (ref 7–23)
BUN SERPL-MCNC: 31 MG/DL — HIGH (ref 7–23)
CALCIUM SERPL-MCNC: 9.7 MG/DL — SIGNIFICANT CHANGE UP (ref 8.4–10.5)
CALCIUM SERPL-MCNC: 9.7 MG/DL — SIGNIFICANT CHANGE UP (ref 8.4–10.5)
CHLORIDE SERPL-SCNC: 101 MMOL/L — SIGNIFICANT CHANGE UP (ref 96–108)
CHLORIDE SERPL-SCNC: 106 MMOL/L — SIGNIFICANT CHANGE UP (ref 96–108)
CHOLEST SERPL-MCNC: 136 MG/DL — SIGNIFICANT CHANGE UP
CO2 SERPL-SCNC: 21 MMOL/L — LOW (ref 22–31)
CO2 SERPL-SCNC: 23 MMOL/L — SIGNIFICANT CHANGE UP (ref 22–31)
CREAT SERPL-MCNC: 1.18 MG/DL — SIGNIFICANT CHANGE UP (ref 0.5–1.3)
CREAT SERPL-MCNC: 1.33 MG/DL — HIGH (ref 0.5–1.3)
EGFR: 41 ML/MIN/1.73M2 — LOW
EGFR: 47 ML/MIN/1.73M2 — LOW
EOSINOPHIL # BLD AUTO: 0 K/UL — SIGNIFICANT CHANGE UP (ref 0–0.5)
EOSINOPHIL NFR BLD AUTO: 0 % — SIGNIFICANT CHANGE UP (ref 0–6)
ESTIMATED AVERAGE GLUCOSE: 117 MG/DL — HIGH (ref 68–114)
GAS PNL BLDA: SIGNIFICANT CHANGE UP
GAS PNL BLDA: SIGNIFICANT CHANGE UP
GLUCOSE BLDC GLUCOMTR-MCNC: 111 MG/DL — HIGH (ref 70–99)
GLUCOSE BLDC GLUCOMTR-MCNC: 112 MG/DL — HIGH (ref 70–99)
GLUCOSE BLDC GLUCOMTR-MCNC: 127 MG/DL — HIGH (ref 70–99)
GLUCOSE BLDC GLUCOMTR-MCNC: 145 MG/DL — HIGH (ref 70–99)
GLUCOSE BLDC GLUCOMTR-MCNC: 162 MG/DL — HIGH (ref 70–99)
GLUCOSE BLDC GLUCOMTR-MCNC: 162 MG/DL — HIGH (ref 70–99)
GLUCOSE BLDC GLUCOMTR-MCNC: 173 MG/DL — HIGH (ref 70–99)
GLUCOSE BLDC GLUCOMTR-MCNC: 191 MG/DL — HIGH (ref 70–99)
GLUCOSE BLDC GLUCOMTR-MCNC: 191 MG/DL — HIGH (ref 70–99)
GLUCOSE BLDC GLUCOMTR-MCNC: 210 MG/DL — HIGH (ref 70–99)
GLUCOSE BLDC GLUCOMTR-MCNC: 211 MG/DL — HIGH (ref 70–99)
GLUCOSE BLDC GLUCOMTR-MCNC: 248 MG/DL — HIGH (ref 70–99)
GLUCOSE BLDC GLUCOMTR-MCNC: 279 MG/DL — HIGH (ref 70–99)
GLUCOSE BLDC GLUCOMTR-MCNC: 289 MG/DL — HIGH (ref 70–99)
GLUCOSE BLDC GLUCOMTR-MCNC: 333 MG/DL — HIGH (ref 70–99)
GLUCOSE BLDC GLUCOMTR-MCNC: 341 MG/DL — HIGH (ref 70–99)
GLUCOSE BLDC GLUCOMTR-MCNC: 91 MG/DL — SIGNIFICANT CHANGE UP (ref 70–99)
GLUCOSE BLDC GLUCOMTR-MCNC: 94 MG/DL — SIGNIFICANT CHANGE UP (ref 70–99)
GLUCOSE BLDC GLUCOMTR-MCNC: 98 MG/DL — SIGNIFICANT CHANGE UP (ref 70–99)
GLUCOSE BLDC GLUCOMTR-MCNC: 99 MG/DL — SIGNIFICANT CHANGE UP (ref 70–99)
GLUCOSE SERPL-MCNC: 143 MG/DL — HIGH (ref 70–99)
GLUCOSE SERPL-MCNC: 367 MG/DL — HIGH (ref 70–99)
HCT VFR BLD CALC: 31.7 % — LOW (ref 34.5–45)
HCT VFR BLD CALC: 33.5 % — LOW (ref 34.5–45)
HDLC SERPL-MCNC: 72 MG/DL — SIGNIFICANT CHANGE UP
HGB BLD-MCNC: 10.3 G/DL — LOW (ref 11.5–15.5)
HGB BLD-MCNC: 10.9 G/DL — LOW (ref 11.5–15.5)
IMM GRANULOCYTES NFR BLD AUTO: 0.3 % — SIGNIFICANT CHANGE UP (ref 0–0.9)
INR BLD: 0.96 RATIO — SIGNIFICANT CHANGE UP (ref 0.85–1.18)
LIPID PNL WITH DIRECT LDL SERPL: 46 MG/DL — SIGNIFICANT CHANGE UP
LYMPHOCYTES # BLD AUTO: 0.56 K/UL — LOW (ref 1–3.3)
LYMPHOCYTES # BLD AUTO: 4.7 % — LOW (ref 13–44)
MAGNESIUM SERPL-MCNC: 1.9 MG/DL — SIGNIFICANT CHANGE UP (ref 1.6–2.6)
MAGNESIUM SERPL-MCNC: 2 MG/DL — SIGNIFICANT CHANGE UP (ref 1.6–2.6)
MCHC RBC-ENTMCNC: 28.3 PG — SIGNIFICANT CHANGE UP (ref 27–34)
MCHC RBC-ENTMCNC: 28.5 PG — SIGNIFICANT CHANGE UP (ref 27–34)
MCHC RBC-ENTMCNC: 32.5 GM/DL — SIGNIFICANT CHANGE UP (ref 32–36)
MCHC RBC-ENTMCNC: 32.5 GM/DL — SIGNIFICANT CHANGE UP (ref 32–36)
MCV RBC AUTO: 87.1 FL — SIGNIFICANT CHANGE UP (ref 80–100)
MCV RBC AUTO: 87.5 FL — SIGNIFICANT CHANGE UP (ref 80–100)
MONOCYTES # BLD AUTO: 0.87 K/UL — SIGNIFICANT CHANGE UP (ref 0–0.9)
MONOCYTES NFR BLD AUTO: 7.3 % — SIGNIFICANT CHANGE UP (ref 2–14)
NEUTROPHILS # BLD AUTO: 10.42 K/UL — HIGH (ref 1.8–7.4)
NEUTROPHILS NFR BLD AUTO: 87.5 % — HIGH (ref 43–77)
NON HDL CHOLESTEROL: 64 MG/DL — SIGNIFICANT CHANGE UP
NRBC # BLD: 0 /100 WBCS — SIGNIFICANT CHANGE UP (ref 0–0)
NRBC # BLD: 0 /100 WBCS — SIGNIFICANT CHANGE UP (ref 0–0)
OSMOLALITY SERPL: 289 MOSMOL/KG — SIGNIFICANT CHANGE UP (ref 280–301)
PHOSPHATE SERPL-MCNC: 3.5 MG/DL — SIGNIFICANT CHANGE UP (ref 2.5–4.5)
PHOSPHATE SERPL-MCNC: 3.7 MG/DL — SIGNIFICANT CHANGE UP (ref 2.5–4.5)
PLATELET # BLD AUTO: 158 K/UL — SIGNIFICANT CHANGE UP (ref 150–400)
PLATELET # BLD AUTO: 183 K/UL — SIGNIFICANT CHANGE UP (ref 150–400)
POTASSIUM SERPL-MCNC: 4.2 MMOL/L — SIGNIFICANT CHANGE UP (ref 3.5–5.3)
POTASSIUM SERPL-MCNC: 4.7 MMOL/L — SIGNIFICANT CHANGE UP (ref 3.5–5.3)
POTASSIUM SERPL-SCNC: 4.2 MMOL/L — SIGNIFICANT CHANGE UP (ref 3.5–5.3)
POTASSIUM SERPL-SCNC: 4.7 MMOL/L — SIGNIFICANT CHANGE UP (ref 3.5–5.3)
PROT SERPL-MCNC: 6.2 G/DL — SIGNIFICANT CHANGE UP (ref 6–8.3)
PROT SERPL-MCNC: 6.3 G/DL — SIGNIFICANT CHANGE UP (ref 6–8.3)
PROTHROM AB SERPL-ACNC: 10.6 SEC — SIGNIFICANT CHANGE UP (ref 9.5–13)
RBC # BLD: 3.64 M/UL — LOW (ref 3.8–5.2)
RBC # BLD: 3.83 M/UL — SIGNIFICANT CHANGE UP (ref 3.8–5.2)
RBC # FLD: 16.2 % — HIGH (ref 10.3–14.5)
RBC # FLD: 16.5 % — HIGH (ref 10.3–14.5)
SODIUM SERPL-SCNC: 135 MMOL/L — SIGNIFICANT CHANGE UP (ref 135–145)
SODIUM SERPL-SCNC: 138 MMOL/L — SIGNIFICANT CHANGE UP (ref 135–145)
T3 SERPL-MCNC: 73 NG/DL — LOW (ref 80–200)
T4 AB SER-ACNC: 7.1 UG/DL — SIGNIFICANT CHANGE UP (ref 4.6–12)
T4 FREE SERPL-MCNC: 1.3 NG/DL — SIGNIFICANT CHANGE UP (ref 0.9–1.8)
TACROLIMUS SERPL-MCNC: 5.5 NG/ML — SIGNIFICANT CHANGE UP
TRIGL SERPL-MCNC: 95 MG/DL — SIGNIFICANT CHANGE UP
TSH SERPL-MCNC: 1.24 UIU/ML — SIGNIFICANT CHANGE UP (ref 0.27–4.2)
TSH SERPL-MCNC: 1.68 UIU/ML — SIGNIFICANT CHANGE UP (ref 0.27–4.2)
WBC # BLD: 11.91 K/UL — HIGH (ref 3.8–10.5)
WBC # BLD: 8.27 K/UL — SIGNIFICANT CHANGE UP (ref 3.8–10.5)
WBC # FLD AUTO: 11.91 K/UL — HIGH (ref 3.8–10.5)
WBC # FLD AUTO: 8.27 K/UL — SIGNIFICANT CHANGE UP (ref 3.8–10.5)

## 2024-03-02 PROCEDURE — 93970 EXTREMITY STUDY: CPT | Mod: 26

## 2024-03-02 PROCEDURE — 99291 CRITICAL CARE FIRST HOUR: CPT

## 2024-03-02 PROCEDURE — 93306 TTE W/DOPPLER COMPLETE: CPT | Mod: 26

## 2024-03-02 PROCEDURE — 70450 CT HEAD/BRAIN W/O DYE: CPT | Mod: 26

## 2024-03-02 PROCEDURE — 71045 X-RAY EXAM CHEST 1 VIEW: CPT | Mod: 26

## 2024-03-02 RX ORDER — FAMOTIDINE 10 MG/ML
20 INJECTION INTRAVENOUS DAILY
Refills: 0 | Status: DISCONTINUED | OUTPATIENT
Start: 2024-03-02 | End: 2024-03-03

## 2024-03-02 RX ORDER — TACROLIMUS 5 MG/1
5 CAPSULE ORAL DAILY
Refills: 0 | Status: DISCONTINUED | OUTPATIENT
Start: 2024-03-02 | End: 2024-03-02

## 2024-03-02 RX ORDER — DEXTROSE 50 % IN WATER 50 %
15 SYRINGE (ML) INTRAVENOUS ONCE
Refills: 0 | Status: DISCONTINUED | OUTPATIENT
Start: 2024-03-02 | End: 2024-03-04

## 2024-03-02 RX ORDER — CEFAZOLIN SODIUM 1 G
2000 VIAL (EA) INJECTION ONCE
Refills: 0 | Status: COMPLETED | OUTPATIENT
Start: 2024-03-02 | End: 2024-03-02

## 2024-03-02 RX ORDER — SENNA PLUS 8.6 MG/1
2 TABLET ORAL AT BEDTIME
Refills: 0 | Status: DISCONTINUED | OUTPATIENT
Start: 2024-03-02 | End: 2024-03-02

## 2024-03-02 RX ORDER — CEFAZOLIN SODIUM 1 G
2000 VIAL (EA) INJECTION EVERY 8 HOURS
Refills: 0 | Status: COMPLETED | OUTPATIENT
Start: 2024-03-02 | End: 2024-03-02

## 2024-03-02 RX ORDER — SODIUM CHLORIDE 9 MG/ML
1000 INJECTION INTRAMUSCULAR; INTRAVENOUS; SUBCUTANEOUS
Refills: 0 | Status: DISCONTINUED | OUTPATIENT
Start: 2024-03-02 | End: 2024-03-03

## 2024-03-02 RX ORDER — LEVETIRACETAM 250 MG/1
500 TABLET, FILM COATED ORAL EVERY 12 HOURS
Refills: 0 | Status: DISCONTINUED | OUTPATIENT
Start: 2024-03-02 | End: 2024-03-02

## 2024-03-02 RX ORDER — ACETAMINOPHEN 500 MG
650 TABLET ORAL EVERY 6 HOURS
Refills: 0 | Status: DISCONTINUED | OUTPATIENT
Start: 2024-03-02 | End: 2024-03-08

## 2024-03-02 RX ORDER — INSULIN LISPRO 100/ML
VIAL (ML) SUBCUTANEOUS
Refills: 0 | Status: DISCONTINUED | OUTPATIENT
Start: 2024-03-02 | End: 2024-03-02

## 2024-03-02 RX ORDER — DEXTROSE 50 % IN WATER 50 %
12.5 SYRINGE (ML) INTRAVENOUS ONCE
Refills: 0 | Status: DISCONTINUED | OUTPATIENT
Start: 2024-03-02 | End: 2024-03-04

## 2024-03-02 RX ORDER — DEXTROSE 50 % IN WATER 50 %
25 SYRINGE (ML) INTRAVENOUS ONCE
Refills: 0 | Status: DISCONTINUED | OUTPATIENT
Start: 2024-03-02 | End: 2024-03-04

## 2024-03-02 RX ORDER — CARVEDILOL PHOSPHATE 80 MG/1
25 CAPSULE, EXTENDED RELEASE ORAL EVERY 12 HOURS
Refills: 0 | Status: DISCONTINUED | OUTPATIENT
Start: 2024-03-02 | End: 2024-03-08

## 2024-03-02 RX ORDER — SODIUM CHLORIDE 9 MG/ML
500 INJECTION INTRAMUSCULAR; INTRAVENOUS; SUBCUTANEOUS ONCE
Refills: 0 | Status: COMPLETED | OUTPATIENT
Start: 2024-03-02 | End: 2024-03-02

## 2024-03-02 RX ORDER — LEVETIRACETAM 250 MG/1
500 TABLET, FILM COATED ORAL EVERY 12 HOURS
Refills: 0 | Status: DISCONTINUED | OUTPATIENT
Start: 2024-03-02 | End: 2024-03-03

## 2024-03-02 RX ORDER — GLUCAGON INJECTION, SOLUTION 0.5 MG/.1ML
1 INJECTION, SOLUTION SUBCUTANEOUS ONCE
Refills: 0 | Status: DISCONTINUED | OUTPATIENT
Start: 2024-03-02 | End: 2024-03-04

## 2024-03-02 RX ORDER — SODIUM CHLORIDE 9 MG/ML
1000 INJECTION, SOLUTION INTRAVENOUS
Refills: 0 | Status: DISCONTINUED | OUTPATIENT
Start: 2024-03-02 | End: 2024-03-03

## 2024-03-02 RX ORDER — ONDANSETRON 8 MG/1
4 TABLET, FILM COATED ORAL EVERY 6 HOURS
Refills: 0 | Status: ACTIVE | OUTPATIENT
Start: 2024-03-02 | End: 2025-01-29

## 2024-03-02 RX ORDER — ALBUTEROL 90 UG/1
2 AEROSOL, METERED ORAL EVERY 6 HOURS
Refills: 0 | Status: DISCONTINUED | OUTPATIENT
Start: 2024-03-02 | End: 2024-03-02

## 2024-03-02 RX ORDER — INSULIN HUMAN 100 [IU]/ML
4 INJECTION, SOLUTION SUBCUTANEOUS
Qty: 100 | Refills: 0 | Status: DISCONTINUED | OUTPATIENT
Start: 2024-03-02 | End: 2024-03-02

## 2024-03-02 RX ORDER — TACROLIMUS 5 MG/1
2.5 CAPSULE ORAL
Refills: 0 | Status: DISCONTINUED | OUTPATIENT
Start: 2024-03-02 | End: 2024-03-02

## 2024-03-02 RX ORDER — TAMOXIFEN CITRATE 20 MG/1
20 TABLET, FILM COATED ORAL DAILY
Refills: 0 | Status: DISCONTINUED | OUTPATIENT
Start: 2024-03-02 | End: 2024-03-03

## 2024-03-02 RX ORDER — DEXMEDETOMIDINE HYDROCHLORIDE IN 0.9% SODIUM CHLORIDE 4 UG/ML
0.2 INJECTION INTRAVENOUS
Qty: 200 | Refills: 0 | Status: DISCONTINUED | OUTPATIENT
Start: 2024-03-02 | End: 2024-03-02

## 2024-03-02 RX ORDER — INSULIN LISPRO 100/ML
VIAL (ML) SUBCUTANEOUS EVERY 6 HOURS
Refills: 0 | Status: DISCONTINUED | OUTPATIENT
Start: 2024-03-02 | End: 2024-03-03

## 2024-03-02 RX ORDER — NICARDIPINE HYDROCHLORIDE 30 MG/1
5 CAPSULE, EXTENDED RELEASE ORAL
Qty: 40 | Refills: 0 | Status: DISCONTINUED | OUTPATIENT
Start: 2024-03-02 | End: 2024-03-03

## 2024-03-02 RX ORDER — LEVETIRACETAM 250 MG/1
500 TABLET, FILM COATED ORAL EVERY 24 HOURS
Refills: 0 | Status: DISCONTINUED | OUTPATIENT
Start: 2024-03-02 | End: 2024-03-02

## 2024-03-02 RX ORDER — POLYETHYLENE GLYCOL 3350 17 G/17G
17 POWDER, FOR SOLUTION ORAL DAILY
Refills: 0 | Status: DISCONTINUED | OUTPATIENT
Start: 2024-03-02 | End: 2024-03-03

## 2024-03-02 RX ORDER — METOPROLOL TARTRATE 50 MG
50 TABLET ORAL
Refills: 0 | Status: DISCONTINUED | OUTPATIENT
Start: 2024-03-02 | End: 2024-03-02

## 2024-03-02 RX ORDER — SENNA PLUS 8.6 MG/1
2 TABLET ORAL AT BEDTIME
Refills: 0 | Status: DISCONTINUED | OUTPATIENT
Start: 2024-03-02 | End: 2024-03-03

## 2024-03-02 RX ORDER — TACROLIMUS 5 MG/1
5 CAPSULE ORAL
Refills: 0 | Status: DISCONTINUED | OUTPATIENT
Start: 2024-03-03 | End: 2024-03-03

## 2024-03-02 RX ORDER — CARVEDILOL PHOSPHATE 80 MG/1
25 CAPSULE, EXTENDED RELEASE ORAL EVERY 12 HOURS
Refills: 0 | Status: DISCONTINUED | OUTPATIENT
Start: 2024-03-02 | End: 2024-03-02

## 2024-03-02 RX ORDER — FAMOTIDINE 10 MG/ML
20 INJECTION INTRAVENOUS DAILY
Refills: 0 | Status: DISCONTINUED | OUTPATIENT
Start: 2024-03-02 | End: 2024-03-02

## 2024-03-02 RX ORDER — SODIUM CHLORIDE 9 MG/ML
500 INJECTION INTRAMUSCULAR; INTRAVENOUS; SUBCUTANEOUS ONCE
Refills: 0 | Status: DISCONTINUED | OUTPATIENT
Start: 2024-03-02 | End: 2024-03-02

## 2024-03-02 RX ADMIN — INSULIN HUMAN 4 UNIT(S)/HR: 100 INJECTION, SOLUTION SUBCUTANEOUS at 03:00

## 2024-03-02 RX ADMIN — INSULIN HUMAN 4 UNIT(S)/HR: 100 INJECTION, SOLUTION SUBCUTANEOUS at 07:31

## 2024-03-02 RX ADMIN — CARVEDILOL PHOSPHATE 25 MILLIGRAM(S): 80 CAPSULE, EXTENDED RELEASE ORAL at 17:11

## 2024-03-02 RX ADMIN — Medication 100 MILLIGRAM(S): at 03:00

## 2024-03-02 RX ADMIN — INSULIN HUMAN 4 UNIT(S)/HR: 100 INJECTION, SOLUTION SUBCUTANEOUS at 03:02

## 2024-03-02 RX ADMIN — TAMOXIFEN CITRATE 20 MILLIGRAM(S): 20 TABLET, FILM COATED ORAL at 11:21

## 2024-03-02 RX ADMIN — TACROLIMUS 2.5 MILLIGRAM(S): 5 CAPSULE ORAL at 17:12

## 2024-03-02 RX ADMIN — NICARDIPINE HYDROCHLORIDE 25 MG/HR: 30 CAPSULE, EXTENDED RELEASE ORAL at 19:18

## 2024-03-02 RX ADMIN — SODIUM CHLORIDE 50 MILLILITER(S): 9 INJECTION INTRAMUSCULAR; INTRAVENOUS; SUBCUTANEOUS at 05:51

## 2024-03-02 RX ADMIN — FAMOTIDINE 20 MILLIGRAM(S): 10 INJECTION INTRAVENOUS at 11:21

## 2024-03-02 RX ADMIN — SODIUM CHLORIDE 50 MILLILITER(S): 9 INJECTION INTRAMUSCULAR; INTRAVENOUS; SUBCUTANEOUS at 19:19

## 2024-03-02 RX ADMIN — NICARDIPINE HYDROCHLORIDE 25 MG/HR: 30 CAPSULE, EXTENDED RELEASE ORAL at 14:06

## 2024-03-02 RX ADMIN — SODIUM CHLORIDE 50 MILLILITER(S): 9 INJECTION INTRAMUSCULAR; INTRAVENOUS; SUBCUTANEOUS at 05:40

## 2024-03-02 RX ADMIN — SENNA PLUS 2 TABLET(S): 8.6 TABLET ORAL at 21:51

## 2024-03-02 RX ADMIN — Medication 100 MILLIGRAM(S): at 06:09

## 2024-03-02 RX ADMIN — Medication 50 MILLIGRAM(S): at 06:09

## 2024-03-02 RX ADMIN — INSULIN HUMAN 4 UNIT(S)/HR: 100 INJECTION, SOLUTION SUBCUTANEOUS at 19:18

## 2024-03-02 RX ADMIN — SODIUM CHLORIDE 500 MILLILITER(S): 9 INJECTION INTRAMUSCULAR; INTRAVENOUS; SUBCUTANEOUS at 23:20

## 2024-03-02 RX ADMIN — NICARDIPINE HYDROCHLORIDE 25 MG/HR: 30 CAPSULE, EXTENDED RELEASE ORAL at 07:31

## 2024-03-02 RX ADMIN — INSULIN HUMAN 4 UNIT(S)/HR: 100 INJECTION, SOLUTION SUBCUTANEOUS at 12:35

## 2024-03-02 RX ADMIN — Medication 650 MILLIGRAM(S): at 16:57

## 2024-03-02 RX ADMIN — Medication 100 MILLIGRAM(S): at 15:32

## 2024-03-02 RX ADMIN — Medication 0.1 MILLIGRAM(S): at 21:50

## 2024-03-02 RX ADMIN — POLYETHYLENE GLYCOL 3350 17 GRAM(S): 17 POWDER, FOR SOLUTION ORAL at 11:21

## 2024-03-02 RX ADMIN — Medication 650 MILLIGRAM(S): at 16:16

## 2024-03-02 RX ADMIN — SODIUM CHLORIDE 50 MILLILITER(S): 9 INJECTION INTRAMUSCULAR; INTRAVENOUS; SUBCUTANEOUS at 07:32

## 2024-03-02 RX ADMIN — LEVETIRACETAM 400 MILLIGRAM(S): 250 TABLET, FILM COATED ORAL at 17:11

## 2024-03-02 RX ADMIN — TACROLIMUS 2.5 MILLIGRAM(S): 5 CAPSULE ORAL at 06:35

## 2024-03-02 NOTE — DISCHARGE NOTE NURSING/CASE MANAGEMENT/SOCIAL WORK - NSDCPECAREGIVERED_GEN_ALL_CORE
family not at bedside, pt. unable to be educated/verbalize/teach back, stroke packet at bedside/No family not at bedside, pt. unable to be educated/verbalize/teach back, stroke packet at bedside/Yes

## 2024-03-02 NOTE — CONSULT NOTE ADULT - ASSESSMENT
79 y/o Female with polycystic kidney disease ESRD s/p renal transplant in 2019 presented with ICH - had Craniotomy and EVD

## 2024-03-02 NOTE — PROGRESS NOTE ADULT - ASSESSMENT
ASSESSMENT/PLAN: s/p left craniectomy(discarded) and evacuation    NEURO:  Neurochecks Q1, Vitals Q1  r CTH post-op: improvement of midline shift, pneumocephalus and hydro  EVD to be placed as per neurosgx  rCTH in the AM  Tylenol/Oxy prn for pain  Activity: [] OOB as tolerated [] Bedrest [] PT [] OT [] PMNR    PULM:  Intubated   PRVC 14/450/5/50  CPAP as tolerated    CV:  SBP goal:     RENAL:  Fluids: NS at 75 ccs/hour    GI:  Diet: NPO, pending dysphagia screen  GI prophylaxis [] not indicated [] PPI [] other:  Bowel regimen [] colace [x] senna [] other: miralax    ENDO:   Goal euglycemia (-180)    HEME/ONC:  VTE prophylaxis: [x] SCDs [] chemoprophylaxis [] high risk of DVT/PE on admission due to:    ID: afebrile    MISC:    SOCIAL/FAMILY:  [] updated at bedside [] family meeting    CODE STATUS:  [] Full Code [] DNR [] DNI [] Palliative/Comfort Care    DISPOSITION:  [] ICU [] Stroke Unit [] Floor [] EMU [] RCU [] PCU    [] Patient is at high risk of neurologic deterioration/death due to:     Time seen:  Time spent: ___ [] critical care minutes ASSESSMENT/PLAN: s/p left craniectomy(discarded) and evacuation    NEURO:  Neurochecks Q1, Vitals Q1  r CTH post-op: improvement of midline shift, pneumocephalus and hydro  EVD to be placed as per neurosgx  rCTH in the AM  Tylenol/Oxy prn for pain  Activity: [] OOB as tolerated [] Bedrest [] PT [] OT [] PMNR    PULM:  Intubated   PRVC 14/450/5/50  CPAP as tolerated    CV:  SBP goal:     RENAL:  Fluids: NS at 75 ccs/hour    GI:  Diet: NPO, pending dysphagia screen  GI prophylaxis [] not indicated [] PPI [] other:  Bowel regimen [] colace [x] senna [] other: miralax    ENDO:   Goal euglycemia (-180)  Insulin ggt  calculate 24 hours needs   plan to transition to NPH    HEME/ONC:  VTE prophylaxis: [x] SCDs [] chemoprophylaxis [] high risk of DVT/PE on admission due to:    ID: afebrile    MISC:    SOCIAL/FAMILY:  [] updated at bedside [] family meeting    CODE STATUS:  [] Full Code [] DNR [] DNI [] Palliative/Comfort Care    DISPOSITION:  [] ICU [] Stroke Unit [] Floor [] EMU [] RCU [] PCU    [] Patient is at high risk of neurologic deterioration/death due to:     Time seen:  Time spent: ___ [] critical care minutes ASSESSMENT/PLAN: s/p left craniectomy(discarded) and evacuation    NEURO:  Neurochecks Q1, Vitals Q1  r CTH post-op: improvement of midline shift, pneumocephalus and hydro  EVD to be placed as per neurosgx  rCTH in the AM  Tylenol/Oxy prn for pain  f/u stroke code measures  Activity: [] OOB as tolerated [] Bedrest [] PT [] OT [] PMNR    PULM:  Intubated   PRVC 14/450/5/50  CPAP as tolerated    CV:  SBP goal:     RENAL:  Fluids: NS at 75 ccs/hour    GI:  Diet: NPO, pending dysphagia screen  GI prophylaxis [] not indicated [] PPI [] other:  Bowel regimen [] colace [x] senna [] other: miralax    ENDO:   Goal euglycemia (-180)  Insulin ggt  calculate 24 hours needs   plan to transition to NPH    HEME/ONC:  VTE prophylaxis: [x] SCDs [] chemoprophylaxis [] high risk of DVT/PE on admission due to:    ID: afebrile    MISC:    SOCIAL/FAMILY:  [] updated at bedside [] family meeting    CODE STATUS:  [] Full Code [] DNR [] DNI [] Palliative/Comfort Care    DISPOSITION:  [] ICU [] Stroke Unit [] Floor [] EMU [] RCU [] PCU    [] Patient is at high risk of neurologic deterioration/death due to:     Time seen:  Time spent: ___ [] critical care minutes ASSESSMENT/PLAN: s/p left craniectomy(discarded) and evacuation    NEURO:  Neurochecks Q1, Vitals Q1  r CTH post-op: s/p clot evacuation, heme in the 4th ventricle, improvement of midline shift, pneumocephalus and hydro  EVD to be placed as per neurosgx  rCTH in the AM  Tylenol/Oxy prn for pain  f/u stroke code measures  Activity: [] OOB as tolerated [] Bedrest [] PT [] OT [] PMNR    PULM:  Intubated   PRVC 14/450/5/50  CPAP as tolerated    CV:  SBP goal:     RENAL:  Fluids: NS at 75 ccs/hour  f/u nephro transplant team  patient has been off HD  s/p transplant in 2019  AVF in the left upper extremity     GI:  Diet: NPO, pending dysphagia screen  GI prophylaxis [] not indicated [] PPI [] other:  Bowel regimen [] colace [x] senna [] other: miralax    ENDO:   Goal euglycemia (-180)  Insulin ggt  calculate 24 hours needs   plan to transition to NPH    HEME/ONC:  VTE prophylaxis: [x] SCDs [] chemoprophylaxis [] high risk of DVT/PE on admission due to:  f/u heme and onc for hx of breast cancer on tamoxiphen    ID: afebrile    MISC:    SOCIAL/FAMILY:  [] updated at bedside [] family meeting    CODE STATUS:  [] Full Code [] DNR [] DNI [] Palliative/Comfort Care    DISPOSITION:  [] ICU [] Stroke Unit [] Floor [] EMU [] RCU [] PCU    [] Patient is at high risk of neurologic deterioration/death due to:     Time seen:  Time spent: ___ [] critical care minutes

## 2024-03-02 NOTE — PROGRESS NOTE ADULT - SUBJECTIVE AND OBJECTIVE BOX
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      Admission Scores  GCS:   HH:   MF:   NIHSS:   RASS:    CAM-ICU:   ICH:4    24 hour Events:       Allergies    shellfish (Rash)  ChloraPrep One-Step (Rash)  penicillins (Rash)    Intolerances        REVIEW OF SYSTEMS: [ ] Unable to Assess due to neurologic exam   [ ] All ROS addressed below are non-contributory, except:  Neuro: [ ] Headache [ ] Back pain [ ] Numbness [ ] Weakness [ ] Ataxia [ ] Dizziness [ ] Aphasia [ ] Dysarthria [ ] Visual disturbance  Resp: [ ] Shortness of breath/dyspnea, [ ] Orthopnea [ ] Cough  CV: [ ] Chest pain [ ] Palpitation [ ] Lightheadedness [ ] Syncope  Renal: [ ] Thirst [ ] Edema  GI: [ ] Nausea [ ] Emesis [ ] Abdominal pain [ ] Constipation [ ] Diarrhea  Hem: [ ] Hematemesis [ ] bright red blood per rectum  ID: [ ] Fever [ ] Chills [ ] Dysuria  ENT: [ ] Rhinorrhea      DEVICES:   [ ] Restraints [ ] ET tube [ ] central line [ ] arterial line [ ] johnson [ ] NGT/OGT [ ] EVD [ ] LD [ ] YON/HMV [ ] Trach [ ] PEG [ ] Chest Tube     VITALS:   Vital Signs Last 24 Hrs  T(C): 35.8 (01 Mar 2024 20:02), Max: 36.8 (01 Mar 2024 16:42)  T(F): 96.5 (01 Mar 2024 20:02), Max: 98.2 (01 Mar 2024 16:42)  HR: 111 (01 Mar 2024 20:30) (90 - 117)  BP: 131/70 (01 Mar 2024 20:30) (131/70 - 269/118)  BP(mean): 131 (01 Mar 2024 18:01) (131 - 180)  RR: 16 (01 Mar 2024 20:30) (14 - 20)  SpO2: 99% (01 Mar 2024 20:30) (97% - 100%)    Parameters below as of 01 Mar 2024 20:30  Patient On (Oxygen Delivery Method): ventilator      CAPILLARY BLOOD GLUCOSE      POCT Blood Glucose.: 286 mg/dL (01 Mar 2024 16:41)    I&O's Summary      Respiratory:  Mode: AC/ CMV (Assist Control/ Continuous Mandatory Ventilation)  RR (machine): 14  TV (machine): 450  FiO2: 50  PEEP: 5  ITime: 1  MAP: 8  PIP: 20    ABG - ( 02 Mar 2024 00:26 )  pH, Arterial: 7.36  pH, Blood: x     /  pCO2: 36    /  pO2: 233   / HCO3: 20    / Base Excess: -4.6  /  SaO2: 99.4                LABS:                        10.9   6.01  )-----------( 148      ( 01 Mar 2024 17:02 )             34.5     03-01    138  |  105  |  28<H>  ----------------------------<  289<H>  4.3   |  24  |  1.69<H>             MEDICATION LEVELS:     IVF FLUIDS/MEDICATIONS:   MEDICATIONS  (STANDING):    MEDICATIONS  (PRN):        IMAGING:      EXAMINATION:  PHYSICAL EXAM:    Constitutional: No Acute Distress     Neurological: PARISH, PERRL, +cough/gag/overbreathes, LUE LOC, LLE wds, R side flaccid                                                 Sensation: [ ] intact to light touch  [ ] decreased:     Reflexes: Deep Tendon Reflexes Intact     Pulmonary: Clear to Auscultation, No rales, No rhonchi, No wheezes     Cardiovascular: S1, S2, Regular rate and rhythm     Gastrointestinal: Soft, Non-tender, Non-distended     Extremities: No calf tenderness     Incision:    HOSPITAL COURSE: This is a 77 YO F with a PMHx ESRD s/p renal xplant off HD, L AKA, 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      Admission Scores  GCS:   HH:   MF:   NIHSS:   RASS:    CAM-ICU:   ICH:4    24 hour Events:       Allergies    shellfish (Rash)  ChloraPrep One-Step (Rash)  penicillins (Rash)    Intolerances        REVIEW OF SYSTEMS: [ ] Unable to Assess due to neurologic exam   [ ] All ROS addressed below are non-contributory, except:  Neuro: [ ] Headache [ ] Back pain [ ] Numbness [ ] Weakness [ ] Ataxia [ ] Dizziness [ ] Aphasia [ ] Dysarthria [ ] Visual disturbance  Resp: [ ] Shortness of breath/dyspnea, [ ] Orthopnea [ ] Cough  CV: [ ] Chest pain [ ] Palpitation [ ] Lightheadedness [ ] Syncope  Renal: [ ] Thirst [ ] Edema  GI: [ ] Nausea [ ] Emesis [ ] Abdominal pain [ ] Constipation [ ] Diarrhea  Hem: [ ] Hematemesis [ ] bright red blood per rectum  ID: [ ] Fever [ ] Chills [ ] Dysuria  ENT: [ ] Rhinorrhea      DEVICES:   [ ] Restraints [ ] ET tube [ ] central line [ ] arterial line [ ] johnson [ ] NGT/OGT [ ] EVD [ ] LD [ ] YON/HMV [ ] Trach [ ] PEG [ ] Chest Tube     VITALS:   Vital Signs Last 24 Hrs  T(C): 35.8 (01 Mar 2024 20:02), Max: 36.8 (01 Mar 2024 16:42)  T(F): 96.5 (01 Mar 2024 20:02), Max: 98.2 (01 Mar 2024 16:42)  HR: 111 (01 Mar 2024 20:30) (90 - 117)  BP: 131/70 (01 Mar 2024 20:30) (131/70 - 269/118)  BP(mean): 131 (01 Mar 2024 18:01) (131 - 180)  RR: 16 (01 Mar 2024 20:30) (14 - 20)  SpO2: 99% (01 Mar 2024 20:30) (97% - 100%)    Parameters below as of 01 Mar 2024 20:30  Patient On (Oxygen Delivery Method): ventilator      CAPILLARY BLOOD GLUCOSE      POCT Blood Glucose.: 286 mg/dL (01 Mar 2024 16:41)    I&O's Summary      Respiratory:  Mode: AC/ CMV (Assist Control/ Continuous Mandatory Ventilation)  RR (machine): 14  TV (machine): 450  FiO2: 50  PEEP: 5  ITime: 1  MAP: 8  PIP: 20    ABG - ( 02 Mar 2024 00:26 )  pH, Arterial: 7.36  pH, Blood: x     /  pCO2: 36    /  pO2: 233   / HCO3: 20    / Base Excess: -4.6  /  SaO2: 99.4                LABS:                        10.9   6.01  )-----------( 148      ( 01 Mar 2024 17:02 )             34.5     03-01    138  |  105  |  28<H>  ----------------------------<  289<H>  4.3   |  24  |  1.69<H>             MEDICATION LEVELS:     IVF FLUIDS/MEDICATIONS:   MEDICATIONS  (STANDING):    MEDICATIONS  (PRN):        IMAGING:      EXAMINATION:  PHYSICAL EXAM:    Constitutional: No Acute Distress     Neurological: PERRL, +cough/gag/overbreathes, B/L UEs 0/5, B/L LEs, WDs                                                 Pulmonary: Clear to Auscultation, No rales, No rhonchi, No wheezes     Cardiovascular: S1, S2, Regular rate and rhythm     Gastrointestinal: Soft, Non-tender, Non-distended     Extremities: No calf tenderness     Incision:    HOSPITAL COURSE: This is a 79 YO F with a PMHx ESRD s/p renal xplant off HD, L AKA, 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      Admission Scores  GCS:   HH:   MF:   NIHSS:   RASS:    CAM-ICU:   ICH:4    24 hour Events:       Allergies    shellfish (Rash)  ChloraPrep One-Step (Rash)  penicillins (Rash)    Intolerances        REVIEW OF SYSTEMS: [ ] Unable to Assess due to neurologic exam   [ ] All ROS addressed below are non-contributory, except:  Neuro: [ ] Headache [ ] Back pain [ ] Numbness [ ] Weakness [ ] Ataxia [ ] Dizziness [ ] Aphasia [ ] Dysarthria [ ] Visual disturbance  Resp: [ ] Shortness of breath/dyspnea, [ ] Orthopnea [ ] Cough  CV: [ ] Chest pain [ ] Palpitation [ ] Lightheadedness [ ] Syncope  Renal: [ ] Thirst [ ] Edema  GI: [ ] Nausea [ ] Emesis [ ] Abdominal pain [ ] Constipation [ ] Diarrhea  Hem: [ ] Hematemesis [ ] bright red blood per rectum  ID: [ ] Fever [ ] Chills [ ] Dysuria  ENT: [ ] Rhinorrhea      DEVICES:   [ ] Restraints [ ] ET tube [ ] central line [ ] arterial line [ ] johnson [ ] NGT/OGT [ ] EVD [ ] LD [ ] YON/HMV [ ] Trach [ ] PEG [ ] Chest Tube     VITALS:   Vital Signs Last 24 Hrs  T(C): 35.8 (01 Mar 2024 20:02), Max: 36.8 (01 Mar 2024 16:42)  T(F): 96.5 (01 Mar 2024 20:02), Max: 98.2 (01 Mar 2024 16:42)  HR: 111 (01 Mar 2024 20:30) (90 - 117)  BP: 131/70 (01 Mar 2024 20:30) (131/70 - 269/118)  BP(mean): 131 (01 Mar 2024 18:01) (131 - 180)  RR: 16 (01 Mar 2024 20:30) (14 - 20)  SpO2: 99% (01 Mar 2024 20:30) (97% - 100%)    Parameters below as of 01 Mar 2024 20:30  Patient On (Oxygen Delivery Method): ventilator      CAPILLARY BLOOD GLUCOSE      POCT Blood Glucose.: 286 mg/dL (01 Mar 2024 16:41)    I&O's Summary      Respiratory:  Mode: AC/ CMV (Assist Control/ Continuous Mandatory Ventilation)  RR (machine): 14  TV (machine): 450  FiO2: 50  PEEP: 5  ITime: 1  MAP: 8  PIP: 20    ABG - ( 02 Mar 2024 00:26 )  pH, Arterial: 7.36  pH, Blood: x     /  pCO2: 36    /  pO2: 233   / HCO3: 20    / Base Excess: -4.6  /  SaO2: 99.4                LABS:                        10.9   6.01  )-----------( 148      ( 01 Mar 2024 17:02 )             34.5     03-01    138  |  105  |  28<H>  ----------------------------<  289<H>  4.3   |  24  |  1.69<H>             MEDICATION LEVELS:     IVF FLUIDS/MEDICATIONS:   MEDICATIONS  (STANDING):    MEDICATIONS  (PRN):        IMAGING:      EXAMINATION:  PHYSICAL EXAM:    Constitutional: No Acute Distress     Neurological: PERRL, +cough/gag/overbreathes, B/L UEs 0/5, RLE TF                                                 Pulmonary: Clear to Auscultation, No rales, No rhonchi, No wheezes     Cardiovascular: S1, S2, Regular rate and rhythm     Gastrointestinal: Soft, Non-tender, Non-distended     Extremities: No calf tenderness     Incision:

## 2024-03-02 NOTE — H&P ADULT - HISTORY OF PRESENT ILLNESS
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 ICH score 4.

## 2024-03-02 NOTE — PROGRESS NOTE ADULT - SUBJECTIVE AND OBJECTIVE BOX
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    Admission Scores  GCS: 4 ICH: 4    24 hour Events:  3/2- Patient s/p left craniectomy(discarded) and evacuation    Allergies    shellfish (Rash)  ChloraPrep One-Step (Rash)  penicillins (Rash)    Intolerances        REVIEW OF SYSTEMS: [x] Unable to Assess due to neurologic exam   [ ] All ROS addressed below are non-contributory, except:  Neuro: [ ] Headache [ ] Back pain [ ] Numbness [ ] Weakness [ ] Ataxia [ ] Dizziness [ ] Aphasia [ ] Dysarthria [ ] Visual disturbance  Resp: [ ] Shortness of breath/dyspnea, [ ] Orthopnea [ ] Cough  CV: [ ] Chest pain [ ] Palpitation [ ] Lightheadedness [ ] Syncope  Renal: [ ] Thirst [ ] Edema  GI: [ ] Nausea [ ] Emesis [ ] Abdominal pain [ ] Constipation [ ] Diarrhea  Hem: [ ] Hematemesis [ ] bright red blood per rectum  ID: [ ] Fever [ ] Chills [ ] Dysuria  ENT: [ ] Rhinorrhea      DEVICES:   [ ] Restraints [ ] ET tube [ ] central line [ ] arterial line [ ] johnson [ ] NGT/OGT [ ] EVD [ ] LD [ ] YON/HMV [ ] Trach [ ] PEG [ ] Chest Tube     VITALS:   Vital Signs Last 24 Hrs  T(C): 37.5 (02 Mar 2024 03:00), Max: 37.5 (02 Mar 2024 03:00)  T(F): 99.5 (02 Mar 2024 03:00), Max: 99.5 (02 Mar 2024 03:00)  HR: 92 (02 Mar 2024 07:00) (69 - 117)  BP: 131/70 (01 Mar 2024 20:30) (131/70 - 269/118)  BP(mean): 131 (01 Mar 2024 18:01) (131 - 180)  RR: 26 (02 Mar 2024 07:00) (14 - 26)  SpO2: 99% (02 Mar 2024 07:00) (94% - 100%)    Parameters below as of 02 Mar 2024 01:30  Patient On (Oxygen Delivery Method): ventilator      CAPILLARY BLOOD GLUCOSE      POCT Blood Glucose.: 210 mg/dL (02 Mar 2024 08:04)  POCT Blood Glucose.: 248 mg/dL (02 Mar 2024 07:07)  POCT Blood Glucose.: 279 mg/dL (02 Mar 2024 06:06)  POCT Blood Glucose.: 289 mg/dL (02 Mar 2024 05:09)  POCT Blood Glucose.: 341 mg/dL (02 Mar 2024 04:17)  POCT Blood Glucose.: 333 mg/dL (02 Mar 2024 02:47)  POCT Blood Glucose.: 286 mg/dL (01 Mar 2024 16:41)    I&O's Summary    01 Mar 2024 07:01  -  02 Mar 2024 07:00  --------------------------------------------------------  IN: 179 mL / OUT: 750 mL / NET: -571 mL    02 Mar 2024 07:01  -  02 Mar 2024 08:13  --------------------------------------------------------  IN: 93.5 mL / OUT: 63 mL / NET: 30.5 mL        Respiratory:  Mode: AC/ CMV (Assist Control/ Continuous Mandatory Ventilation)  RR (machine): 14  TV (machine): 450  FiO2: 50  PEEP: 5  ITime: 1  MAP: 8  PIP: 17    ABG - ( 02 Mar 2024 03:30 )  pH, Arterial: 7.37  pH, Blood: x     /  pCO2: 38    /  pO2: 179   / HCO3: 22    / Base Excess: -3.0  /  SaO2: 98.5                LABS:                        10.9   8.27  )-----------( 158      ( 02 Mar 2024 02:08 )             33.5     03-02    135  |  101  |  26<H>  ----------------------------<  367<H>  4.7   |  23  |  1.18             MEDICATION LEVELS:     IVF FLUIDS/MEDICATIONS:   MEDICATIONS  (STANDING):  ceFAZolin   IVPB 2000 milliGRAM(s) IV Intermittent every 8 hours  dexMEDEtomidine Infusion 0.2 MICROgram(s)/kG/Hr (2.5 mL/Hr) IV Continuous <Continuous>  dextrose 5%. 1000 milliLiter(s) (50 mL/Hr) IV Continuous <Continuous>  dextrose 5%. 1000 milliLiter(s) (100 mL/Hr) IV Continuous <Continuous>  dextrose 50% Injectable 12.5 Gram(s) IV Push once  dextrose 50% Injectable 25 Gram(s) IV Push once  dextrose 50% Injectable 25 Gram(s) IV Push once  famotidine    Tablet 20 milliGRAM(s) Oral daily  glucagon  Injectable 1 milliGRAM(s) IntraMuscular once  insulin regular Infusion 4 Unit(s)/Hr (4 mL/Hr) IV Continuous <Continuous>  levETIRAcetam  IVPB 500 milliGRAM(s) IV Intermittent every 12 hours  metoprolol tartrate 50 milliGRAM(s) Oral two times a day  niCARdipine Infusion 5 mG/Hr (25 mL/Hr) IV Continuous <Continuous>  polyethylene glycol 3350 17 Gram(s) Oral daily  senna 2 Tablet(s) Oral at bedtime  sodium chloride 0.9%. 1000 milliLiter(s) (50 mL/Hr) IV Continuous <Continuous>  tacrolimus    0.5 mG/mL Suspension 2.5 milliGRAM(s) Oral two times a day  tamoxifen 20 milliGRAM(s) Oral daily    MEDICATIONS  (PRN):  dextrose Oral Gel 15 Gram(s) Oral once PRN Blood Glucose LESS THAN 70 milliGRAM(s)/deciliter  ondansetron Injectable 4 milliGRAM(s) IV Push every 6 hours PRN Nausea and/or Vomiting        EXAMINATION:  PHYSICAL EXAM:    Constitutional: No Acute Distress     Neurological: No eye opening, PERRL, +cough/gag/overbreathes, right corneal absent, Flaccid in all extremities.                                              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    Admission Scores  GCS: 4 ICH: 4    24 hour Events:  3/2- Patient s/p left craniectomy(discarded) and evacuation. Patient started on insulin drip for elevated BG     Allergies    shellfish (Rash)  ChloraPrep One-Step (Rash)  penicillins (Rash)    Intolerances        REVIEW OF SYSTEMS: [x] Unable to Assess due to neurologic exam   [ ] All ROS addressed below are non-contributory, except:  Neuro: [ ] Headache [ ] Back pain [ ] Numbness [ ] Weakness [ ] Ataxia [ ] Dizziness [ ] Aphasia [ ] Dysarthria [ ] Visual disturbance  Resp: [ ] Shortness of breath/dyspnea, [ ] Orthopnea [ ] Cough  CV: [ ] Chest pain [ ] Palpitation [ ] Lightheadedness [ ] Syncope  Renal: [ ] Thirst [ ] Edema  GI: [ ] Nausea [ ] Emesis [ ] Abdominal pain [ ] Constipation [ ] Diarrhea  Hem: [ ] Hematemesis [ ] bright red blood per rectum  ID: [ ] Fever [ ] Chills [ ] Dysuria  ENT: [ ] Rhinorrhea      DEVICES:   [ ] Restraints [ ] ET tube [ ] central line [ ] arterial line [ ] johnson [ ] NGT/OGT [ ] EVD [ ] LD [ ] YON/HMV [ ] Trach [ ] PEG [ ] Chest Tube     VITALS:   Vital Signs Last 24 Hrs  T(C): 37.5 (02 Mar 2024 03:00), Max: 37.5 (02 Mar 2024 03:00)  T(F): 99.5 (02 Mar 2024 03:00), Max: 99.5 (02 Mar 2024 03:00)  HR: 92 (02 Mar 2024 07:00) (69 - 117)  BP: 131/70 (01 Mar 2024 20:30) (131/70 - 269/118)  BP(mean): 131 (01 Mar 2024 18:01) (131 - 180)  RR: 26 (02 Mar 2024 07:00) (14 - 26)  SpO2: 99% (02 Mar 2024 07:00) (94% - 100%)    Parameters below as of 02 Mar 2024 01:30  Patient On (Oxygen Delivery Method): ventilator      CAPILLARY BLOOD GLUCOSE      POCT Blood Glucose.: 210 mg/dL (02 Mar 2024 08:04)  POCT Blood Glucose.: 248 mg/dL (02 Mar 2024 07:07)  POCT Blood Glucose.: 279 mg/dL (02 Mar 2024 06:06)  POCT Blood Glucose.: 289 mg/dL (02 Mar 2024 05:09)  POCT Blood Glucose.: 341 mg/dL (02 Mar 2024 04:17)  POCT Blood Glucose.: 333 mg/dL (02 Mar 2024 02:47)  POCT Blood Glucose.: 286 mg/dL (01 Mar 2024 16:41)    I&O's Summary    01 Mar 2024 07:01  -  02 Mar 2024 07:00  --------------------------------------------------------  IN: 179 mL / OUT: 750 mL / NET: -571 mL    02 Mar 2024 07:01  -  02 Mar 2024 08:13  --------------------------------------------------------  IN: 93.5 mL / OUT: 63 mL / NET: 30.5 mL        Respiratory:  Mode: AC/ CMV (Assist Control/ Continuous Mandatory Ventilation)  RR (machine): 14  TV (machine): 450  FiO2: 50  PEEP: 5  ITime: 1  MAP: 8  PIP: 17    ABG - ( 02 Mar 2024 03:30 )  pH, Arterial: 7.37  pH, Blood: x     /  pCO2: 38    /  pO2: 179   / HCO3: 22    / Base Excess: -3.0  /  SaO2: 98.5                LABS:                        10.9   8.27  )-----------( 158      ( 02 Mar 2024 02:08 )             33.5     03-02    135  |  101  |  26<H>  ----------------------------<  367<H>  4.7   |  23  |  1.18             MEDICATION LEVELS:     IVF FLUIDS/MEDICATIONS:   MEDICATIONS  (STANDING):  ceFAZolin   IVPB 2000 milliGRAM(s) IV Intermittent every 8 hours  dexMEDEtomidine Infusion 0.2 MICROgram(s)/kG/Hr (2.5 mL/Hr) IV Continuous <Continuous>  dextrose 5%. 1000 milliLiter(s) (50 mL/Hr) IV Continuous <Continuous>  dextrose 5%. 1000 milliLiter(s) (100 mL/Hr) IV Continuous <Continuous>  dextrose 50% Injectable 12.5 Gram(s) IV Push once  dextrose 50% Injectable 25 Gram(s) IV Push once  dextrose 50% Injectable 25 Gram(s) IV Push once  famotidine    Tablet 20 milliGRAM(s) Oral daily  glucagon  Injectable 1 milliGRAM(s) IntraMuscular once  insulin regular Infusion 4 Unit(s)/Hr (4 mL/Hr) IV Continuous <Continuous>  levETIRAcetam  IVPB 500 milliGRAM(s) IV Intermittent every 12 hours  metoprolol tartrate 50 milliGRAM(s) Oral two times a day  niCARdipine Infusion 5 mG/Hr (25 mL/Hr) IV Continuous <Continuous>  polyethylene glycol 3350 17 Gram(s) Oral daily  senna 2 Tablet(s) Oral at bedtime  sodium chloride 0.9%. 1000 milliLiter(s) (50 mL/Hr) IV Continuous <Continuous>  tacrolimus    0.5 mG/mL Suspension 2.5 milliGRAM(s) Oral two times a day  tamoxifen 20 milliGRAM(s) Oral daily    MEDICATIONS  (PRN):  dextrose Oral Gel 15 Gram(s) Oral once PRN Blood Glucose LESS THAN 70 milliGRAM(s)/deciliter  ondansetron Injectable 4 milliGRAM(s) IV Push every 6 hours PRN Nausea and/or Vomiting        EXAMINATION:  PHYSICAL EXAM:    Constitutional: No Acute Distress     Neurological: No eye opening, PERRL, +cough/gag/overbreathes, right corneal absent, Flaccid in all extremities.                                              Pulmonary: Clear to Auscultation, No rales, No rhonchi, No wheezes     Cardiovascular: S1, S2, Regular rate and rhythm     Gastrointestinal: Soft, Non-tender, Non-distended     Extremities: No calf tenderness, L leg amputated

## 2024-03-02 NOTE — CONSULT NOTE ADULT - SUBJECTIVE AND OBJECTIVE BOX
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 Pt got craniotomy with EVD placed on 3/2/24.   Transplant nephrology was consulted for management of the post transplant status and immunosuppression. Pt was following dr. Caicedo initially and the Dr. Neal in [place] for the follow up. On Envarsus 10, prednisone 5. She also had hx of BK viremia and so her cellcept was discontinued. She is on Leflunomide for ? RA vs BK viremia. History was obtained from her daughter. She mentioned that pt is compliant with medications and follow ups.         PAST HISTORY  --------------------------------------------------------------------------------  PAST MEDICAL & SURGICAL HISTORY:  Hypertension  Anemia in ESRD (end-stage renal disease)  Thrombocytopenia  leukopenia: followed by chele, plan for BMBx 7/22/19  H/O gastroesophageal reflux (GERD)  Breast cancer, female  left 2014  ESRD on hemodialysis  Anuria  Thyroid nodule  yearly Ultrasound, monitoring yearly  Pancreatic cyst  AV fistula thrombosis  history: was treated with coumadin, no more A/C.  S/P lumpectomy, left breast  2014  dAdrenal mass  excison 2015  S/P hysterectomy  1994  S/P arteriovenous (AV) fistula creation  2002  History of fracture of right ankle  2014 repaired    FAMILY HISTORY:    Social History:        ALLERGIES & MEDICATIONS  --------------------------------------------------------------------------------  Allergies    shellfish (Rash)  ChloraPrep One-Step (Rash)  penicillins (Rash)    Intolerances      Standing Inpatient Medications  carvedilol 25 milliGRAM(s) Oral every 12 hours  dexMEDEtomidine Infusion 0.2 MICROgram(s)/kG/Hr IV Continuous <Continuous>  dextrose 5%. 1000 milliLiter(s) IV Continuous <Continuous>  dextrose 5%. 1000 milliLiter(s) IV Continuous <Continuous>  dextrose 50% Injectable 12.5 Gram(s) IV Push once  dextrose 50% Injectable 25 Gram(s) IV Push once  dextrose 50% Injectable 25 Gram(s) IV Push once  famotidine    Tablet 20 milliGRAM(s) Oral daily  glucagon  Injectable 1 milliGRAM(s) IntraMuscular once  insulin regular Infusion 4 Unit(s)/Hr IV Continuous <Continuous>  levETIRAcetam  IVPB 500 milliGRAM(s) IV Intermittent every 12 hours  niCARdipine Infusion 5 mG/Hr IV Continuous <Continuous>  polyethylene glycol 3350 17 Gram(s) Oral daily  senna 2 Tablet(s) Oral at bedtime  sodium chloride 0.9%. 1000 milliLiter(s) IV Continuous <Continuous>  tacrolimus    0.5 mG/mL Suspension 2.5 milliGRAM(s) Oral two times a day  tamoxifen 20 milliGRAM(s) Oral daily    PRN Inpatient Medications  acetaminophen   Oral Liquid .. 650 milliGRAM(s) Oral every 6 hours PRN  dextrose Oral Gel 15 Gram(s) Oral once PRN  ondansetron Injectable 4 milliGRAM(s) IV Push every 6 hours PRN      REVIEW OF SYSTEMS  --------------------------------------------------------------------------------  Not obtainable as the patient is intubated.      VITALS/PHYSICAL EXAM  --------------------------------------------------------------------------------  T(C): 37.2 (03-02-24 @ 15:00), Max: 38.1 (03-02-24 @ 07:00)  HR: 92 (03-02-24 @ 20:59) (69 - 102)  BP: --  RR: 19 (03-02-24 @ 19:45) (14 - 21)  SpO2: 99% (03-02-24 @ 20:59) (94% - 100%)  Wt(kg): --  Height (cm): 157.5 (03-01-24 @ 20:05)  Weight (kg): 50 (03-01-24 @ 20:05)  BMI (kg/m2): 20.2 (03-01-24 @ 20:05)  BSA (m2): 1.48 (03-01-24 @ 20:05)      03-01-24 @ 07:01  -  03-02-24 @ 07:00  --------------------------------------------------------  IN: 185 mL / OUT: 752 mL / NET: -567 mL    03-02-24 @ 07:01  -  03-02-24 @ 21:23  --------------------------------------------------------  IN: 1634.5 mL / OUT: 584 mL / NET: 1050.5 mL        Physical Exam:  Gen: intubated and is on ventilator.   HEENT:  No JVD, ET tube and NG tube is in place, EVD in place.   Pulm:CTA B/L   CV:S1S2+   Abd: Non- tender, no distention, Bowel sounds +  Transplant: non tender, no bruit  : No suprapubic tenderness, Gardner cath in situ  Extremities: No edema. AKA +Lt side.   Skin: warm        LABS/STUDIES  --------------------------------------------------------------------------------              10.9   8.27  >-----------<  158      [03-02-24 @ 02:08]              33.5     135  |  101  |  26  ----------------------------<  367      [03-02-24 @ 02:08]  4.7   |  23  |  1.18        Ca     9.7     [03-02-24 @ 02:08]      Mg     1.9     [03-02-24 @ 02:08]      Phos  3.5     [03-02-24 @ 02:08]    TPro  6.2  /  Alb  3.7  /  TBili  0.9  /  DBili  x   /  AST  31  /  ALT  14  /  AlkPhos  44  [03-02-24 @ 02:08]    PT/INR: PT 10.6 , INR 0.96       [03-02-24 @ 02:09]  PTT: 27.5       [03-02-24 @ 02:09]      Creatinine Trend:  SCr 1.18 [03-02 @ 02:08]  SCr 1.69 [03-01 @ 17:02]    Urinalysis - [03-02-24 @ 02:08]      Color  / Appearance  / SG  / pH       Gluc 367 / Ketone   / Bili  / Urobili        Blood  / Protein  / Leuk Est  / Nitrite       RBC  / WBC  / Hyaline  / Gran  / Sq Epi  / Non Sq Epi  / Bacteria       HbA1c 7.2      [01-11-20 @ 09:23]  TSH 1.24      [03-02-24 @ 10:51]  Lipid: chol 136, TG 95, HDL 72, LDL --      [03-02-24 @ 02:10]    HBsAg Nonreact      [12-08-19 @ 04:50]  HCV 0.08, Nonreact      [12-08-19 @ 04:50]    Free Light Chains: kappa 19.60, lambda 2.19, ratio = 8.95      [12-13 @ 08:13]    TacrolimusTacrolimus (), Serum: 5.5 ng/mL (03-02 @ 03:24)    Cyclosporine  Sirolimus  Mycophenolate  BK PCR  CMV PCR  Parvo PCR  EBV PCR

## 2024-03-02 NOTE — PROGRESS NOTE ADULT - ASSESSMENT
ASSESSMENT/PLAN: s/p left craniectomy(discarded) and evacuation    NEURO:  Neurochecks Q1, Vitals Q1  r CTH post-op: s/p clot evacuation, heme in the 4th ventricle, improvement of midline shift, pneumocephalus and hydro  EVD to be placed as per neurosgx  rCTH in the AM  Tylenol/Oxy prn for pain  f/u stroke code measures  Activity: [] OOB as tolerated [] Bedrest [] PT [] OT [] PMNR    PULM:  Intubated   PRVC 14/450/5/50  CPAP as tolerated    CV:  SBP goal:     RENAL:  Fluids: NS at 75 ccs/hour  f/u nephro transplant team  patient has been off HD  s/p transplant in 2019  AVF in the left upper extremity     GI:  Diet: NPO, pending dysphagia screen  GI prophylaxis [] not indicated [] PPI [] other:  Bowel regimen [] colace [x] senna [] other: miralax    ENDO:   Goal euglycemia (-180)  Insulin ggt  calculate 24 hours needs   plan to transition to NPH    HEME/ONC:  VTE prophylaxis: [x] SCDs [] chemoprophylaxis [] high risk of DVT/PE on admission due to:  f/u heme and onc for hx of breast cancer on tamoxiphen    ID: afebrile    MISC:    SOCIAL/FAMILY:  [] updated at bedside [] family meeting    CODE STATUS:  [] Full Code [] DNR [] DNI [] Palliative/Comfort Care    DISPOSITION:  [] ICU [] Stroke Unit [] Floor [] EMU [] RCU [] PCU    [] Patient is at high risk of neurologic deterioration/death due to:     Time seen:  Time spent: ___ [] critical care minutes ASSESSMENT/PLAN: s/p left craniectomy(discarded) and evacuation. POD0     NEURO:  Neurochecks Q1, Vitals Q1  r CTH post-op: s/p clot evacuation, heme in the 4th ventricle, improvement of midline shift, pneumocephalus and hydro  EVD to be placed as per neurosgx  EVD in at 10, drained 0-5 cc, ICP 5-9  rCTH in the PM - follow up  Stroke work up   Tylenol/Oxy prn for pain  f/u stroke code measures  Keppra 500 mg BID for seizure ppx  Activity: [] OOB as tolerated [x] Bedrest [] PT [] OT [] PMNR    PULM:  Intubated   PRVC 14/450/5/50  CPAP as tolerated   Oral secretions , not inline  At risk of aspiration pneumonia     CV:  SBP goal:   History of HTN  Metoprolol 25 mg BID     RENAL:  Fluids: IVL   f/u nephro transplant team  patient has been off HD  s/p transplant in 2019  AVF in the left upper extremity   Patient oligoric  D/C brachial A line      GI:  Diet: OGT-- restart tube feeds with Glucerna  GI prophylaxis [] not indicated [x] PPI [] other:  Bowel regimen [x] miralax [x] senna    ENDO:   Goal euglycemia (-180)  Insulin ggt  calculate 24 hours needs   plan to transition to NPH  Obtain A1c, TSH     HEME/ONC:   H/H stable for now  VTE prophylaxis: [x] SCDs [] chemoprophylaxis [] high risk of DVT/PE on admission due to:  f/u heme and onc for hx of breast cancer on tamoxifen   f/u LEd     ID: afebrile  On napoleon op ancef     MISC:    SOCIAL/FAMILY:  [] updated at bedside [] family meeting    CODE STATUS:  [] Full Code [] DNR [] DNI [] Palliative/Comfort Care    DISPOSITION:  [] ICU [] Stroke Unit [] Floor [] EMU [] RCU [] PCU    [] Patient is at high risk of neurologic deterioration/death due to:

## 2024-03-02 NOTE — CHART NOTE - NSCHARTNOTEFT_GEN_A_CORE
CAPRINI SCORE [CLOT]    AGE RELATED RISK FACTORS                                                       MOBILITY RELATED FACTORS  [ ] Age 41-60 years                                            (1 Point)                  [x] Bed rest                                                        (1 Point)  [ ] Age: 61-74 years                                           (2 Points)                 [ ] Plaster cast                                                   (2 Points)  [x] Age= 75 years                                              (3 Points)                 [ ] Bed bound for more than 72 hours                 (2 Points)    DISEASE RELATED RISK FACTORS                                               GENDER SPECIFIC FACTORS  [ ] Edema in the lower extremities                       (1 Point)                  [ ] Pregnancy                                                     (1 Point)  [ ] Varicose veins                                               (1 Point)                  [ ] Post-partum < 6 weeks                                   (1 Point)             [x] BMI > 25 Kg/m2                                            (1 Point)                  [ ] Hormonal therapy  or oral contraception          (1 Point)                 [ ] Sepsis (in the previous month)                        (1 Point)                  [ ] History of pregnancy complications                 (1 point)  [ ] Pneumonia or serious lung disease                                               [ ] Unexplained or recurrent                     (1 Point)           (in the previous month)                               (1 Point)  [ ] Abnormal pulmonary function test                     (1 Point)                 SURGERY RELATED RISK FACTORS  [ ] Acute myocardial infarction                              (1 Point)                 [ ]  Section                                             (1 Point)  [ ] Congestive heart failure (in the previous month)  (1 Point)               [ ] Minor surgery                                                  (1 Point)   [ ] Inflammatory bowel disease                             (1 Point)                 [ ] Arthroscopic surgery                                        (2 Points)  [ ] Central venous access                                      (2 Points)                [ ] General surgery lasting more than 45 minutes   (2 Points)       [ ] Stroke (in the previous month)                          (5 Points)               [ ] Elective arthroplasty                                         (5 Points)                                                                                                                                               HEMATOLOGY RELATED FACTORS                                                 TRAUMA RELATED RISK FACTORS  [ ] Prior episodes of VTE                                     (3 Points)                [ ] Fracture of the hip, pelvis, or leg                       (5 Points)  [ ] Positive family history for VTE                         (3 Points)                 [ ] Acute spinal cord injury (in the previous month)  (5 Points)  [ ] Prothrombin 99624 A                                     (3 Points)                 [ ] Paralysis  (less than 1 month)                             (5 Points)  [ ] Factor V Leiden                                             (3 Points)                  [ ] Multiple Trauma within 1 month                        (5 Points)  [ ] Lupus anticoagulants                                     (3 Points)                                                           [ ] Anticardiolipin antibodies                               (3 Points)                                                       [ ] High homocysteine in the blood                      (3 Points)                                             [ ] Other congenital or acquired thrombophilia      (3 Points)                                                [ ] Heparin induced thrombocytopenia                  (3 Points)                                          Total Score [   4    ]    Caprini Score 0 - 2:  Low Risk, No VTE Prophylaxis required for most patients, encourage ambulation  Caprini Score 3 - 6:  At Risk, pharmacologic VTE prophylaxis is indicated for most patients (in the absence of a contraindication)  Caprini Score Greater than or = 7:  High Risk, pharmacologic VTE prophylaxis is indicated for most patients (in the absence of a contraindication)

## 2024-03-02 NOTE — H&P ADULT - NSHPLABSRESULTS_GEN_ALL_CORE
Large L IPH on CTH, a/w IVH and MLS . ICH score 4 Repeat CTH with expansion of bleed and worsened MLS

## 2024-03-02 NOTE — CONSULT NOTE ADULT - ATTENDING COMMENTS
Pt seen and examined.   Still intubated and sedated  Agree with tacro solution 5mgs BID and pred 5.  Ok to hold leflunomide  Check daily tacrolimus troughs  Creatinine is stable.

## 2024-03-02 NOTE — CONSULT NOTE ADULT - PROBLEM SELECTOR RECOMMENDATION 9
Pt has hx of polycystic kidney disease leading to ESRD then pt had renal transplant from brain dead donor in 2019 followed by DGF and then she was off the dialysis. Baseline renal function is good with Scr of ~1- 1.2. 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 She is on envarsus 10 and prednisone 5 qd. She is on leflunomide for  ? BK viremia vs RA. ( daughter was not sure) Renal function is at baseline with good UOP. Tacro level is 5.5.     PLAN:  Resume Envarsus 10 from tomorrow.   Start prednisone 5mg qd from tomorrow. She was given dexamethasone today.  Monitor tacro trough.   Avoid nephrotoxins as much as possible.   Rest of the management as per the primary team.

## 2024-03-02 NOTE — DISCHARGE NOTE NURSING/CASE MANAGEMENT/SOCIAL WORK - PATIENT PORTAL LINK FT
You can access the FollowMyHealth Patient Portal offered by [facility] by registering at the following website: [email or web address]. By joining [facility]’s FollowMyHealth portal, you will also be able to view your health information using other applications (apps) compatible with our system.

## 2024-03-02 NOTE — H&P ADULT - ASSESSMENT
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 ICH score 4.   Exam: PARISH, PERRL, +cough/gag/overbreathes, LUE LOC, LLE wds, R side flaccid  -repeat CTH was done showing expansion of bleed with increased mid line shift  -emergent OR for L hemicrani poss clot evac poss EVD poss bone placement in abdomen

## 2024-03-02 NOTE — PROGRESS NOTE ADULT - ATTENDING COMMENTS
79yo woman ESRD s/p renal transplant; breast CA s/p lumpectomy, hx DVT unknown when provoked vs not but off Eliquis now, HLD  large expanding L IPH s/p craniectomy and hematoma evacuation, EVD placed-->no ICP issues so far. at [id number] 0-5cc/hr output  repeat CTH pending per neurosurgery in pm   prn tylenol/oxy for pain control  no sedation  no R corneal, LUE LC, LLE AKA  remains intubated  started on insulin gtt overnight for hyperglycemia--check A1C now  SBP ; on nicardipine gtt now, change metoprolol to carvedilol bid, wean off cardene  d/c brachial jeevan   tolerating CPAP but poor mental status, not candidate for extubation yet  tube feeding to start  bowel regimen  IVL   s/p transplant   cont home meds for transplant and breast CA  BLE dopplers pending  hold chemoppx fresh post op  afebrile  45min cc

## 2024-03-02 NOTE — H&P ADULT - ATTENDING COMMENTS
Pt seen on 3/1/24.  Agree with above resident evaluation and assessment.  Pt transferred from outside hospital s/p collapse at home and found to have large left frontal IPH and malignant hypertension.  Pt with h/o ESRD s/p renal transplantation and removal of cardiac myxoma.  Pt also had recent left leg AKA.  Pt localizing on admission to our hospital, opening eyes to noxious stim.  Repeat CT showed expansion of left frontal lobar IPH and increase in midline shift.  Discussed R/B/A surgery with risks including, but not limited to, bleeding, infection, further stroke, deficits, medical complications and death.  Pt family desired to proceed with decompressive craniectomy and removal of hematoma.  PT on ASA and had not been on apixaban for >30 days. Pt taken to OR emergently.

## 2024-03-03 LAB
ANION GAP SERPL CALC-SCNC: 11 MMOL/L — SIGNIFICANT CHANGE UP (ref 5–17)
BUN SERPL-MCNC: 36 MG/DL — HIGH (ref 7–23)
CALCIUM SERPL-MCNC: 9.4 MG/DL — SIGNIFICANT CHANGE UP (ref 8.4–10.5)
CHLORIDE SERPL-SCNC: 106 MMOL/L — SIGNIFICANT CHANGE UP (ref 96–108)
CO2 SERPL-SCNC: 21 MMOL/L — LOW (ref 22–31)
CREAT ?TM UR-MCNC: 55 MG/DL — SIGNIFICANT CHANGE UP
CREAT SERPL-MCNC: 1.32 MG/DL — HIGH (ref 0.5–1.3)
EGFR: 41 ML/MIN/1.73M2 — LOW
GLUCOSE BLDC GLUCOMTR-MCNC: 128 MG/DL — HIGH (ref 70–99)
GLUCOSE BLDC GLUCOMTR-MCNC: 153 MG/DL — HIGH (ref 70–99)
GLUCOSE BLDC GLUCOMTR-MCNC: 190 MG/DL — HIGH (ref 70–99)
GLUCOSE BLDC GLUCOMTR-MCNC: 224 MG/DL — HIGH (ref 70–99)
GLUCOSE BLDC GLUCOMTR-MCNC: 242 MG/DL — HIGH (ref 70–99)
GLUCOSE BLDC GLUCOMTR-MCNC: 245 MG/DL — HIGH (ref 70–99)
GLUCOSE BLDC GLUCOMTR-MCNC: 247 MG/DL — HIGH (ref 70–99)
GLUCOSE BLDC GLUCOMTR-MCNC: 253 MG/DL — HIGH (ref 70–99)
GLUCOSE BLDC GLUCOMTR-MCNC: 268 MG/DL — HIGH (ref 70–99)
GLUCOSE BLDC GLUCOMTR-MCNC: 282 MG/DL — HIGH (ref 70–99)
GLUCOSE BLDC GLUCOMTR-MCNC: 291 MG/DL — HIGH (ref 70–99)
GLUCOSE BLDC GLUCOMTR-MCNC: 93 MG/DL — SIGNIFICANT CHANGE UP (ref 70–99)
GLUCOSE BLDC GLUCOMTR-MCNC: 95 MG/DL — SIGNIFICANT CHANGE UP (ref 70–99)
GLUCOSE SERPL-MCNC: 205 MG/DL — HIGH (ref 70–99)
HCT VFR BLD CALC: 29.4 % — LOW (ref 34.5–45)
HGB BLD-MCNC: 9.7 G/DL — LOW (ref 11.5–15.5)
MAGNESIUM SERPL-MCNC: 2 MG/DL — SIGNIFICANT CHANGE UP (ref 1.6–2.6)
MCHC RBC-ENTMCNC: 28.7 PG — SIGNIFICANT CHANGE UP (ref 27–34)
MCHC RBC-ENTMCNC: 33 GM/DL — SIGNIFICANT CHANGE UP (ref 32–36)
MCV RBC AUTO: 87 FL — SIGNIFICANT CHANGE UP (ref 80–100)
NRBC # BLD: 0 /100 WBCS — SIGNIFICANT CHANGE UP (ref 0–0)
PHOSPHATE SERPL-MCNC: 3.4 MG/DL — SIGNIFICANT CHANGE UP (ref 2.5–4.5)
PLATELET # BLD AUTO: 157 K/UL — SIGNIFICANT CHANGE UP (ref 150–400)
POTASSIUM SERPL-MCNC: 3.8 MMOL/L — SIGNIFICANT CHANGE UP (ref 3.5–5.3)
POTASSIUM SERPL-SCNC: 3.8 MMOL/L — SIGNIFICANT CHANGE UP (ref 3.5–5.3)
RBC # BLD: 3.38 M/UL — LOW (ref 3.8–5.2)
RBC # FLD: 16.6 % — HIGH (ref 10.3–14.5)
SODIUM SERPL-SCNC: 138 MMOL/L — SIGNIFICANT CHANGE UP (ref 135–145)
SODIUM UR-SCNC: 90 MMOL/L — SIGNIFICANT CHANGE UP
WBC # BLD: 10.33 K/UL — SIGNIFICANT CHANGE UP (ref 3.8–10.5)
WBC # FLD AUTO: 10.33 K/UL — SIGNIFICANT CHANGE UP (ref 3.8–10.5)

## 2024-03-03 PROCEDURE — 93010 ELECTROCARDIOGRAM REPORT: CPT

## 2024-03-03 PROCEDURE — 37191 INS ENDOVAS VENA CAVA FILTR: CPT

## 2024-03-03 PROCEDURE — 71045 X-RAY EXAM CHEST 1 VIEW: CPT | Mod: 26

## 2024-03-03 PROCEDURE — 99291 CRITICAL CARE FIRST HOUR: CPT | Mod: FS

## 2024-03-03 PROCEDURE — 99222 1ST HOSP IP/OBS MODERATE 55: CPT | Mod: GC

## 2024-03-03 PROCEDURE — 99292 CRITICAL CARE ADDL 30 MIN: CPT | Mod: FS

## 2024-03-03 RX ORDER — HYDRALAZINE HCL 50 MG
25 TABLET ORAL EVERY 8 HOURS
Refills: 0 | Status: DISCONTINUED | OUTPATIENT
Start: 2024-03-03 | End: 2024-03-05

## 2024-03-03 RX ORDER — INSULIN LISPRO 100/ML
6 VIAL (ML) SUBCUTANEOUS ONCE
Refills: 0 | Status: DISCONTINUED | OUTPATIENT
Start: 2024-03-03 | End: 2024-03-03

## 2024-03-03 RX ORDER — HYDRALAZINE HCL 50 MG
5 TABLET ORAL ONCE
Refills: 0 | Status: COMPLETED | OUTPATIENT
Start: 2024-03-03 | End: 2024-03-03

## 2024-03-03 RX ORDER — HUMAN INSULIN 100 [IU]/ML
15 INJECTION, SUSPENSION SUBCUTANEOUS EVERY 6 HOURS
Refills: 0 | Status: DISCONTINUED | OUTPATIENT
Start: 2024-03-03 | End: 2024-03-03

## 2024-03-03 RX ORDER — FAMOTIDINE 10 MG/ML
20 INJECTION INTRAVENOUS
Refills: 0 | Status: DISCONTINUED | OUTPATIENT
Start: 2024-03-03 | End: 2024-03-04

## 2024-03-03 RX ORDER — SODIUM CHLORIDE 9 MG/ML
1000 INJECTION, SOLUTION INTRAVENOUS
Refills: 0 | Status: DISCONTINUED | OUTPATIENT
Start: 2024-03-03 | End: 2024-03-04

## 2024-03-03 RX ORDER — DEXMEDETOMIDINE HYDROCHLORIDE IN 0.9% SODIUM CHLORIDE 4 UG/ML
0.2 INJECTION INTRAVENOUS
Qty: 200 | Refills: 0 | Status: DISCONTINUED | OUTPATIENT
Start: 2024-03-03 | End: 2024-03-04

## 2024-03-03 RX ORDER — FAMOTIDINE 10 MG/ML
20 INJECTION INTRAVENOUS
Refills: 0 | Status: DISCONTINUED | OUTPATIENT
Start: 2024-03-03 | End: 2024-03-03

## 2024-03-03 RX ORDER — POLYETHYLENE GLYCOL 3350 17 G/17G
17 POWDER, FOR SOLUTION ORAL EVERY 12 HOURS
Refills: 0 | Status: DISCONTINUED | OUTPATIENT
Start: 2024-03-03 | End: 2024-03-08

## 2024-03-03 RX ORDER — FAMOTIDINE 10 MG/ML
20 INJECTION INTRAVENOUS DAILY
Refills: 0 | Status: DISCONTINUED | OUTPATIENT
Start: 2024-03-03 | End: 2024-03-03

## 2024-03-03 RX ORDER — OXYCODONE HYDROCHLORIDE 5 MG/1
5 TABLET ORAL EVERY 4 HOURS
Refills: 0 | Status: DISCONTINUED | OUTPATIENT
Start: 2024-03-03 | End: 2024-03-08

## 2024-03-03 RX ORDER — FENTANYL CITRATE 50 UG/ML
25 INJECTION INTRAVENOUS
Refills: 0 | Status: DISCONTINUED | OUTPATIENT
Start: 2024-03-03 | End: 2024-03-10

## 2024-03-03 RX ORDER — SODIUM CHLORIDE 9 MG/ML
1000 INJECTION, SOLUTION INTRAVENOUS ONCE
Refills: 0 | Status: COMPLETED | OUTPATIENT
Start: 2024-03-03 | End: 2024-03-03

## 2024-03-03 RX ORDER — INSULIN LISPRO 100/ML
VIAL (ML) SUBCUTANEOUS EVERY 6 HOURS
Refills: 0 | Status: DISCONTINUED | OUTPATIENT
Start: 2024-03-03 | End: 2024-03-27

## 2024-03-03 RX ORDER — POLYETHYLENE GLYCOL 3350 17 G/17G
17 POWDER, FOR SOLUTION ORAL DAILY
Refills: 0 | Status: DISCONTINUED | OUTPATIENT
Start: 2024-03-03 | End: 2024-03-03

## 2024-03-03 RX ORDER — FENTANYL CITRATE 50 UG/ML
25 INJECTION INTRAVENOUS ONCE
Refills: 0 | Status: DISCONTINUED | OUTPATIENT
Start: 2024-03-03 | End: 2024-03-03

## 2024-03-03 RX ORDER — BRIVARACETAM 25 MG/1
50 TABLET, FILM COATED ORAL
Refills: 0 | Status: DISCONTINUED | OUTPATIENT
Start: 2024-03-03 | End: 2024-03-04

## 2024-03-03 RX ORDER — INSULIN GLARGINE 100 [IU]/ML
10 INJECTION, SOLUTION SUBCUTANEOUS ONCE
Refills: 0 | Status: COMPLETED | OUTPATIENT
Start: 2024-03-03 | End: 2024-03-03

## 2024-03-03 RX ORDER — HUMAN INSULIN 100 [IU]/ML
15 INJECTION, SUSPENSION SUBCUTANEOUS EVERY 6 HOURS
Refills: 0 | Status: DISCONTINUED | OUTPATIENT
Start: 2024-03-03 | End: 2024-03-04

## 2024-03-03 RX ORDER — SENNA PLUS 8.6 MG/1
2 TABLET ORAL AT BEDTIME
Refills: 0 | Status: DISCONTINUED | OUTPATIENT
Start: 2024-03-03 | End: 2024-03-08

## 2024-03-03 RX ORDER — FENTANYL CITRATE 50 UG/ML
50 INJECTION INTRAVENOUS ONCE
Refills: 0 | Status: DISCONTINUED | OUTPATIENT
Start: 2024-03-03 | End: 2024-03-03

## 2024-03-03 RX ORDER — LEVETIRACETAM 250 MG/1
500 TABLET, FILM COATED ORAL
Refills: 0 | Status: DISCONTINUED | OUTPATIENT
Start: 2024-03-03 | End: 2024-03-03

## 2024-03-03 RX ORDER — TACROLIMUS 5 MG/1
5 CAPSULE ORAL
Refills: 0 | Status: DISCONTINUED | OUTPATIENT
Start: 2024-03-03 | End: 2024-03-04

## 2024-03-03 RX ORDER — BRIVARACETAM 25 MG/1
50 TABLET, FILM COATED ORAL EVERY 12 HOURS
Refills: 0 | Status: DISCONTINUED | OUTPATIENT
Start: 2024-03-03 | End: 2024-03-03

## 2024-03-03 RX ORDER — CHLORHEXIDINE GLUCONATE 213 G/1000ML
15 SOLUTION TOPICAL EVERY 12 HOURS
Refills: 0 | Status: DISCONTINUED | OUTPATIENT
Start: 2024-03-03 | End: 2024-03-11

## 2024-03-03 RX ORDER — DEXMEDETOMIDINE HYDROCHLORIDE IN 0.9% SODIUM CHLORIDE 4 UG/ML
0.2 INJECTION INTRAVENOUS
Qty: 200 | Refills: 0 | Status: DISCONTINUED | OUTPATIENT
Start: 2024-03-03 | End: 2024-03-03

## 2024-03-03 RX ORDER — INSULIN HUMAN 100 [IU]/ML
3 INJECTION, SOLUTION SUBCUTANEOUS
Qty: 100 | Refills: 0 | Status: DISCONTINUED | OUTPATIENT
Start: 2024-03-03 | End: 2024-03-03

## 2024-03-03 RX ORDER — INSULIN LISPRO 100/ML
VIAL (ML) SUBCUTANEOUS EVERY 6 HOURS
Refills: 0 | Status: DISCONTINUED | OUTPATIENT
Start: 2024-03-03 | End: 2024-03-03

## 2024-03-03 RX ADMIN — Medication 0.1 MILLIGRAM(S): at 17:13

## 2024-03-03 RX ADMIN — TACROLIMUS 5 MILLIGRAM(S): 5 CAPSULE ORAL at 18:21

## 2024-03-03 RX ADMIN — FENTANYL CITRATE 25 MICROGRAM(S): 50 INJECTION INTRAVENOUS at 05:40

## 2024-03-03 RX ADMIN — OXYCODONE HYDROCHLORIDE 5 MILLIGRAM(S): 5 TABLET ORAL at 12:20

## 2024-03-03 RX ADMIN — OXYCODONE HYDROCHLORIDE 5 MILLIGRAM(S): 5 TABLET ORAL at 16:30

## 2024-03-03 RX ADMIN — OXYCODONE HYDROCHLORIDE 5 MILLIGRAM(S): 5 TABLET ORAL at 11:43

## 2024-03-03 RX ADMIN — Medication 5 MILLIGRAM(S): at 02:56

## 2024-03-03 RX ADMIN — SODIUM CHLORIDE 1000 MILLILITER(S): 9 INJECTION, SOLUTION INTRAVENOUS at 11:00

## 2024-03-03 RX ADMIN — Medication 650 MILLIGRAM(S): at 03:11

## 2024-03-03 RX ADMIN — FENTANYL CITRATE 25 MICROGRAM(S): 50 INJECTION INTRAVENOUS at 05:25

## 2024-03-03 RX ADMIN — Medication 6: at 23:21

## 2024-03-03 RX ADMIN — FENTANYL CITRATE 25 MICROGRAM(S): 50 INJECTION INTRAVENOUS at 01:40

## 2024-03-03 RX ADMIN — SODIUM CHLORIDE 500 MILLILITER(S): 9 INJECTION, SOLUTION INTRAVENOUS at 23:25

## 2024-03-03 RX ADMIN — Medication 650 MILLIGRAM(S): at 02:41

## 2024-03-03 RX ADMIN — FENTANYL CITRATE 25 MICROGRAM(S): 50 INJECTION INTRAVENOUS at 11:15

## 2024-03-03 RX ADMIN — BRIVARACETAM 50 MILLIGRAM(S): 25 TABLET, FILM COATED ORAL at 17:53

## 2024-03-03 RX ADMIN — POLYETHYLENE GLYCOL 3350 17 GRAM(S): 17 POWDER, FOR SOLUTION ORAL at 17:12

## 2024-03-03 RX ADMIN — Medication 0.1 MILLIGRAM(S): at 05:06

## 2024-03-03 RX ADMIN — Medication 5 MILLIGRAM(S): at 12:11

## 2024-03-03 RX ADMIN — Medication 5 MILLIGRAM(S): at 21:23

## 2024-03-03 RX ADMIN — Medication 5 MILLIGRAM(S): at 05:06

## 2024-03-03 RX ADMIN — OXYCODONE HYDROCHLORIDE 5 MILLIGRAM(S): 5 TABLET ORAL at 16:00

## 2024-03-03 RX ADMIN — Medication 4: at 00:13

## 2024-03-03 RX ADMIN — Medication 25 MILLIGRAM(S): at 12:05

## 2024-03-03 RX ADMIN — FENTANYL CITRATE 50 MICROGRAM(S): 50 INJECTION INTRAVENOUS at 23:50

## 2024-03-03 RX ADMIN — Medication 25 MILLIGRAM(S): at 21:23

## 2024-03-03 RX ADMIN — FENTANYL CITRATE 25 MICROGRAM(S): 50 INJECTION INTRAVENOUS at 11:00

## 2024-03-03 RX ADMIN — HUMAN INSULIN 15 UNIT(S): 100 INJECTION, SUSPENSION SUBCUTANEOUS at 23:21

## 2024-03-03 RX ADMIN — CHLORHEXIDINE GLUCONATE 15 MILLILITER(S): 213 SOLUTION TOPICAL at 23:54

## 2024-03-03 RX ADMIN — Medication 6: at 02:44

## 2024-03-03 RX ADMIN — SENNA PLUS 2 TABLET(S): 8.6 TABLET ORAL at 21:23

## 2024-03-03 RX ADMIN — INSULIN GLARGINE 10 UNIT(S): 100 INJECTION, SOLUTION SUBCUTANEOUS at 02:38

## 2024-03-03 RX ADMIN — TAMOXIFEN CITRATE 20 MILLIGRAM(S): 20 TABLET, FILM COATED ORAL at 12:10

## 2024-03-03 RX ADMIN — CARVEDILOL PHOSPHATE 25 MILLIGRAM(S): 80 CAPSULE, EXTENDED RELEASE ORAL at 05:06

## 2024-03-03 RX ADMIN — LEVETIRACETAM 400 MILLIGRAM(S): 250 TABLET, FILM COATED ORAL at 05:03

## 2024-03-03 RX ADMIN — DEXMEDETOMIDINE HYDROCHLORIDE IN 0.9% SODIUM CHLORIDE 2.5 MICROGRAM(S)/KG/HR: 4 INJECTION INTRAVENOUS at 23:54

## 2024-03-03 RX ADMIN — TACROLIMUS 5 MILLIGRAM(S): 5 CAPSULE ORAL at 06:22

## 2024-03-03 RX ADMIN — FENTANYL CITRATE 25 MICROGRAM(S): 50 INJECTION INTRAVENOUS at 01:55

## 2024-03-03 RX ADMIN — INSULIN HUMAN 3 UNIT(S)/HR: 100 INJECTION, SOLUTION SUBCUTANEOUS at 05:12

## 2024-03-03 RX ADMIN — FENTANYL CITRATE 25 MICROGRAM(S): 50 INJECTION INTRAVENOUS at 15:25

## 2024-03-03 RX ADMIN — CARVEDILOL PHOSPHATE 25 MILLIGRAM(S): 80 CAPSULE, EXTENDED RELEASE ORAL at 17:12

## 2024-03-03 RX ADMIN — FENTANYL CITRATE 25 MICROGRAM(S): 50 INJECTION INTRAVENOUS at 15:40

## 2024-03-03 RX ADMIN — Medication 4: at 17:12

## 2024-03-03 NOTE — PROGRESS NOTE ADULT - ASSESSMENT
ASSESSMENT/PLAN: s/p left craniectomy(discarded) and evacuation. POD0     NEURO:  Neurochecks Q1, Vitals Q1  r CTH post-op: s/p clot evacuation, heme in the 4th ventricle, improvement of midline shift, pneumocephalus and hydro  EVD to be placed as per neurosgx  EVD in at 10, drained 0-5 cc, ICP 5-9  Tylenol/Oxy prn for pain  Keppra 500 mg BID for seizure ppx  Activity: [] OOB as tolerated [x] Bedrest [] PT [] OT [] PMNR    PULM:  Intubated   PRVC 14/450/5/50  CPAP as tolerated   Oral secretions , not inline  At risk of aspiration pneumonia     CV:  SBP goal:   History of HTN  Metoprolol 25 mg BID     RENAL:  Fluids: IVL   f/u nephro transplant team  patient has been off HD  s/p transplant in 2019  AVF in the left upper extremity   Patient oligoric  D/C brachial A line      GI:  Diet: OGT-- restart tube feeds with Glucerna  GI prophylaxis [] not indicated [x] PPI [] other:  Bowel regimen [x] miralax [x] senna    ENDO:   Goal euglycemia (-180)  ISS  A1C:5.4%  calculate 24 hours needs   plan to transition to NPH ( however patient NPO after midnight )      HEME/ONC:   H/H stable for now  VTE prophylaxis: [x] SCDs [] chemoprophylaxis [] high risk of DVT/PE on admission due to:  f/u heme and onc for hx of breast cancer on tamoxifen   f/u LEd     ID: afebrile  On napoleon op ancef     MISC:    SOCIAL/FAMILY:  [] updated at bedside [] family meeting    CODE STATUS:  [] Full Code [] DNR [] DNI [] Palliative/Comfort Care    DISPOSITION:  [] ICU [] Stroke Unit [] Floor [] EMU [] RCU [] PCU    [] Patient is at high risk of neurologic deterioration/death due to:

## 2024-03-03 NOTE — CONSULT NOTE ADULT - ASSESSMENT
Interventional Radiology    Evaluate for Procedure: IVC filter placement    HPI: 77 yo female s/p left hemicraniectomy for IPH, now with bilateral above the knee DVT. IR consulted for IVC filter placement.     Allergies: ChloraPrep One-Step (Rash)  penicillins (Rash)    Medications (Abx/Cardiac/Anticoagulation/Blood Products)    carvedilol: 25 milliGRAM(s) Oral (03-03 @ 05:06)  ceFAZolin   IVPB: 100 mL/Hr IV Intermittent (03-02 @ 15:32)  ceFAZolin   IVPB: 100 mL/Hr IV Intermittent (03-02 @ 03:00)  cloNIDine: 0.1 milliGRAM(s) Oral (03-03 @ 05:06)  hydrALAZINE Injectable: 5 milliGRAM(s) IV Push (03-03 @ 02:56)  metoprolol tartrate: 50 milliGRAM(s) Oral (03-02 @ 06:09)  niCARdipine Infusion: 25 mL/Hr IV Continuous (03-01 @ 17:38)  niCARdipine Infusion: 25 mL/Hr IV Continuous (03-02 @ 14:07)    Data:    T(C): 37.5  HR: 84  BP: --  RR: 21  SpO2: 100%    -WBC 10.33 / HgB 9.7 / Hct 29.4 / Plt 157  -Na 138 / Cl 106 / BUN 31 / Glucose 143  -K 4.2 / CO2 21 / Cr 1.33  -ALT 10 / Alk Phos 41 / T.Bili 0.4  -INR 0.96 / PTT 27.5    Assessment/Plan:   77 yo female s/p left hemicraniectomy for IPH, now with bilateral above the knee DVT. IR consulted for IVC filter placement.   -- IR will plan to perform IVC filter today  -- please place IR procedure request order under Dr. Joe Dickinson    --  Vitaliy Hendrix DO/MIMI/EMERALD, PGY-5  Vascular and Interventional Radiology   Available on [employer] Teams    - Non-emergent consults: Place IR consult order in [place]  - Emergent issues (pager): [facility] [phone]; [facility] [phone]; [phone]  - Scheduling questions: [facility] [phone]; [facility] [phone]  - Clinic/outpatient booking: [facility] [phone]; [facility] [phone] Interventional Radiology    Evaluate for Procedure: IVC filter placement    HPI: 77 yo female s/p left hemicraniectomy for IPH, now with bilateral above the knee DVT. IR consulted for IVC filter placement.     Allergies: ChloraPrep One-Step (Rash)  penicillins (Rash)    Medications (Abx/Cardiac/Anticoagulation/Blood Products)    carvedilol: 25 milliGRAM(s) Oral (03-03 @ 05:06)  ceFAZolin   IVPB: 100 mL/Hr IV Intermittent (03-02 @ 15:32)  ceFAZolin   IVPB: 100 mL/Hr IV Intermittent (03-02 @ 03:00)  cloNIDine: 0.1 milliGRAM(s) Oral (03-03 @ 05:06)  hydrALAZINE Injectable: 5 milliGRAM(s) IV Push (03-03 @ 02:56)  metoprolol tartrate: 50 milliGRAM(s) Oral (03-02 @ 06:09)  niCARdipine Infusion: 25 mL/Hr IV Continuous (03-01 @ 17:38)  niCARdipine Infusion: 25 mL/Hr IV Continuous (03-02 @ 14:07)    Data:    T(C): 37.5  HR: 84  BP: --  RR: 21  SpO2: 100%    -WBC 10.33 / HgB 9.7 / Hct 29.4 / Plt 157  -Na 138 / Cl 106 / BUN 31 / Glucose 143  -K 4.2 / CO2 21 / Cr 1.33  -ALT 10 / Alk Phos 41 / T.Bili 0.4  -INR 0.96 / PTT 27.5    Assessment/Plan:   77 yo female s/p left hemicraniectomy for IPH, now with bilateral above the knee DVT. IR consulted for IVC filter placement.   -- IR will plan to perform placement of a retrievable IVC filter today  -- please place IR procedure request order under Dr. Joe Dickinson    --  Vitaliy Hendrix DO/MIMI/EMERALD, PGY-5  Vascular and Interventional Radiology   Available on [employer] Teams    - Non-emergent consults: Place IR consult order in [place]  - Emergent issues (pager): [facility] [phone]; [facility] [phone]; [phone]  - Scheduling questions: [facility] [phone]; [facility] [phone]  - Clinic/outpatient booking: [place] [address] [phone]; [facility] [phone]

## 2024-03-03 NOTE — PROCEDURE NOTE - PROCEDURE FINDINGS AND DETAILS
Successful IVC filter deployment below the bilateral renal veins via a right internal jugular vein approach.  Hemostasis achieved with manual compression.  Patient tolerated the procedure well with no immediate complication.

## 2024-03-03 NOTE — PRE PROCEDURE NOTE - PRE PROCEDURE EVALUATION
Interventional Radiology    HPI: 77 yo female s/p left hemicraniectomy for IPH, now with bilateral above the knee DVT. IR consulted for IVC filter placement.     Allergies: ChloraPrep One-Step (Rash)  penicillins (Rash)    Medications (Abx/Cardiac/Anticoagulation/Blood Products)    carvedilol: 25 milliGRAM(s) Oral (03-03 @ 05:06)  ceFAZolin   IVPB: 100 mL/Hr IV Intermittent (03-02 @ 15:32)  ceFAZolin   IVPB: 100 mL/Hr IV Intermittent (03-02 @ 03:00)  cloNIDine: 0.1 milliGRAM(s) Oral (03-03 @ 05:06)  hydrALAZINE: 25 milliGRAM(s) Oral (03-03 @ 12:05)  hydrALAZINE Injectable: 5 milliGRAM(s) IV Push (03-03 @ 02:56)  hydrALAZINE Injectable: 5 milliGRAM(s) IV Push (03-03 @ 12:11)  metoprolol tartrate: 50 milliGRAM(s) Oral (03-02 @ 06:09)  niCARdipine Infusion: 25 mL/Hr IV Continuous (03-02 @ 14:07)    Data:    T(C): 36.6  HR: 82  BP: 140/67  RR: 17  SpO2: 98%    Exam  General: intubated  Chest: Non labored breathing  Abdomen: Non-distended  Extremities: No swelling, warm    -WBC 10.33 / HgB 9.7 / Hct 29.4 / Plt 157  -Na 138 / Cl 106 / BUN 36 / Glucose 205  -K 3.8 / CO2 21 / Cr 1.32  -INR0.96    Plan:   77 yo female s/p left hemicraniectomy for IPH, now with bilateral above the knee DVT. IR to place retrievable IVC filter.   -- Relevant imaging and labs were reviewed.   -- Risks, benefits, and alternatives were explained to the patient's healthcare proxy and informed consent was obtained.

## 2024-03-03 NOTE — PROGRESS NOTE ADULT - SUBJECTIVE AND OBJECTIVE BOX
O:   T(C): 36.6 (03-03-24 @ 19:00), Max: 37.7 (03-03-24 @ 05:00)  HR: 91 (03-03-24 @ 19:00) (78 - 106)  BP: 139/75 (03-03-24 @ 19:00) (132/72 - 181/87)  RR: 24 (03-03-24 @ 19:00) (14 - 28)  SpO2: 98% (03-03-24 @ 19:00) (97% - 100%)  03-02-24 @ 07:01  -  03-03-24 @ 07:00  --------------------------------------------------------  IN: 3262 mL / OUT: 1209 mL / NET: 2053 mL    03-03-24 @ 07:01  -  03-03-24 @ 19:59  --------------------------------------------------------  IN: 1704 mL / OUT: 987 mL / NET: 717 mL    acetaminophen   Oral Liquid .. 650 milliGRAM(s) Oral every 6 hours PRN  brivaracetam Oral Solution 50 milliGRAM(s) Oral two times a day  carvedilol 25 milliGRAM(s) Oral every 12 hours  cloNIDine 0.1 milliGRAM(s) Oral two times a day  dexMEDEtomidine Infusion 0.2 MICROgram(s)/kG/Hr IV Continuous <Continuous>  dextrose 50% Injectable 25 Gram(s) IV Push once  dextrose 50% Injectable 12.5 Gram(s) IV Push once  dextrose 50% Injectable 25 Gram(s) IV Push once  dextrose Oral Gel 15 Gram(s) Oral once PRN  famotidine    Tablet 20 milliGRAM(s) Oral every 48 hours  fentaNYL    Injectable 25 MICROGram(s) IV Push every 2 hours PRN  glucagon  Injectable 1 milliGRAM(s) IntraMuscular once  hydrALAZINE 25 milliGRAM(s) Oral every 8 hours  insulin lispro (ADMELOG) corrective regimen sliding scale   SubCutaneous every 6 hours  ondansetron Injectable 4 milliGRAM(s) IV Push every 6 hours PRN  oxyCODONE    IR 5 milliGRAM(s) Oral every 4 hours PRN  polyethylene glycol 3350 17 Gram(s) Oral every 12 hours  predniSONE   Tablet 5 milliGRAM(s) Oral daily  senna 2 Tablet(s) Oral at bedtime  tacrolimus    0.5 mG/mL Suspension 5 milliGRAM(s) Oral two times a day  Mode: AC/ CMV (Assist Control/ Continuous Mandatory Ventilation), RR (machine): 14, TV (machine): 450, FiO2: 40, PEEP: 5, ITime: 1, MAP: 10, PIP: 16      ASSESSMENT/PLAN: 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 POD 1   NEURO CHECKS Q 1 HR   hydrocephalus EVD in at 10, drained 2 ml in 24 hrs , no ICP   precedex   Keppra 500 mg BID for seizure ppx  Activity: [] OOB as tolerated [x] Bedrest [] PT [] OT [] PMNR    PULM:  Intubated   PRVC 14/450/5/50  CPAP as tolerated   Oral secretions , not inline  At risk of aspiration pneumonia     CV:  SBP goal:   History of HTN  Metoprolol 25 mg BID     RENAL:  Fluids: IVL   f/u nephro transplant team  patient has been off HD  s/p transplant in 2019  AVF in the left upper extremity   Patient oligoric  D/C brachial A line      GI:  Diet: OGT-- restart tube feeds with Glucerna  GI prophylaxis [] not indicated [x] PPI [] other:  Bowel regimen [x] miralax [x] senna    ENDO:   Goal euglycemia (-180)  ISS  A1C:5.4%  calculate 24 hours needs   plan to transition to NPH ( however patient NPO after midnight )      HEME/ONC:   H/H stable for now  VTE prophylaxis: [x] SCDs [] chemoprophylaxis [] high risk of DVT/PE on admission due to:  f/u heme and onc for hx of breast cancer on tamoxifen   f/u LEd     ID: afebrile  On napoleon op ancef     MISC:    SOCIAL/FAMILY:  [] updated at bedside [] family meeting    CODE STATUS:  [] Full Code [] DNR [] DNI [] Palliative/Comfort Care    DISPOSITION:  [] ICU [] Stroke Unit [] Floor [] EMU [] RCU [] PCU    [] Patient is at high risk of neurologic deterioration/death due to:        O:   T(C): 36.6 (03-03-24 @ 19:00), Max: 37.7 (03-03-24 @ 05:00)  HR: 91 (03-03-24 @ 19:00) (78 - 106)  BP: 139/75 (03-03-24 @ 19:00) (132/72 - 181/87)  RR: 24 (03-03-24 @ 19:00) (14 - 28)  SpO2: 98% (03-03-24 @ 19:00) (97% - 100%)  03-02-24 @ 07:01  -  03-03-24 @ 07:00  --------------------------------------------------------  IN: 3262 mL / OUT: 1209 mL / NET: 2053 mL    03-03-24 @ 07:01  -  03-03-24 @ 19:59  --------------------------------------------------------  IN: 1704 mL / OUT: 987 mL / NET: 717 mL    acetaminophen   Oral Liquid .. 650 milliGRAM(s) Oral every 6 hours PRN  brivaracetam Oral Solution 50 milliGRAM(s) Oral two times a day  carvedilol 25 milliGRAM(s) Oral every 12 hours  cloNIDine 0.1 milliGRAM(s) Oral two times a day  dexMEDEtomidine Infusion 0.2 MICROgram(s)/kG/Hr IV Continuous <Continuous>  dextrose 50% Injectable 25 Gram(s) IV Push once  dextrose 50% Injectable 12.5 Gram(s) IV Push once  dextrose 50% Injectable 25 Gram(s) IV Push once  dextrose Oral Gel 15 Gram(s) Oral once PRN  famotidine    Tablet 20 milliGRAM(s) Oral every 48 hours  fentaNYL    Injectable 25 MICROGram(s) IV Push every 2 hours PRN  glucagon  Injectable 1 milliGRAM(s) IntraMuscular once  hydrALAZINE 25 milliGRAM(s) Oral every 8 hours  insulin lispro (ADMELOG) corrective regimen sliding scale   SubCutaneous every 6 hours  ondansetron Injectable 4 milliGRAM(s) IV Push every 6 hours PRN  oxyCODONE    IR 5 milliGRAM(s) Oral every 4 hours PRN  polyethylene glycol 3350 17 Gram(s) Oral every 12 hours  predniSONE   Tablet 5 milliGRAM(s) Oral daily  senna 2 Tablet(s) Oral at bedtime  tacrolimus    0.5 mG/mL Suspension 5 milliGRAM(s) Oral two times a day  Mode: AC/ CMV (Assist Control/ Continuous Mandatory Ventilation), RR (machine): 14, TV (machine): 450, FiO2: 40, PEEP: 5, ITime: 1, MAP: 10, PIP: 16      eyes midline, CAS, moving left UE spontaneously, right UE 0/5, right LE withdraws, not following commands     ASSESSMENT/PLAN: 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 POD 1   77yo woman ESRD s/p renal transplant; breast CA s/p lumpectomy, hx DVT unknown when provoked vs not but off Eliquis now, HLD  large expanding L IPH s/p craniectomy and hematoma evacuation,  NEURO CHECKS Q 1 HR   hydrocephalus EVD in at 10, drained 2 ml in 24 hrs , no ICP   precedex   Keppra 500 mg BID for seizure ppx  Activity: [] OOB as tolerated [x] Bedrest [] PT [] OT [] PMNR    PULM:  Intubated   PRVC 14/450/5/50  CPAP as tolerated   Oral secretions , not inline  At risk of aspiration pneumonia     CV:  SBP goal:   History of HTN  Metoprolol 25 mg BID     RENAL:  Fluids: IVL   f/u nephro transplant team  patient has been off HD  s/p transplant in 2019  AVF in the left upper extremity   Patient oligoric  D/C brachial A line      GI:  Diet: OGT-- restart tube feeds with Glucerna  GI prophylaxis [] not indicated [x] PPI [] other:  Bowel regimen [x] miralax [x] senna    ENDO:   Goal euglycemia (-180)  ISS  A1C:5.4%  calculate 24 hours needs   plan to transition to NPH ( however patient NPO after midnight )      HEME/ONC:   H/H stable for now  VTE prophylaxis: [x] SCDs [] chemoprophylaxis [] high risk of DVT/PE on admission due to:  f/u heme and onc for hx of breast cancer on tamoxifen   f/u LEd     ID: afebrile  On napoleon op ancef     MISC:    SOCIAL/FAMILY:  [] updated at bedside [] family meeting    CODE STATUS:  [] Full Code [] DNR [] DNI [] Palliative/Comfort Care    DISPOSITION:  [] ICU [] Stroke Unit [] Floor [] EMU [] RCU [] PCU    [] Patient is at high risk of neurologic deterioration/death due to:        O:  S/P IVC filter today   T(C): 36.6 (03-03-24 @ 19:00), Max: 37.7 (03-03-24 @ 05:00)  HR: 91 (03-03-24 @ 19:00) (78 - 106)  BP: 139/75 (03-03-24 @ 19:00) (132/72 - 181/87)  RR: 24 (03-03-24 @ 19:00) (14 - 28)  SpO2: 98% (03-03-24 @ 19:00) (97% - 100%)  03-02-24 @ 07:01  -  03-03-24 @ 07:00  --------------------------------------------------------  IN: 3262 mL / OUT: 1209 mL / NET: 2053 mL    03-03-24 @ 07:01  -  03-03-24 @ 19:59  --------------------------------------------------------  IN: 1704 mL / OUT: 987 mL / NET: 717 mL    acetaminophen   Oral Liquid .. 650 milliGRAM(s) Oral every 6 hours PRN  brivaracetam Oral Solution 50 milliGRAM(s) Oral two times a day  carvedilol 25 milliGRAM(s) Oral every 12 hours  cloNIDine 0.1 milliGRAM(s) Oral two times a day  dexMEDEtomidine Infusion 0.2 MICROgram(s)/kG/Hr IV Continuous <Continuous>  dextrose 50% Injectable 25 Gram(s) IV Push once  dextrose 50% Injectable 12.5 Gram(s) IV Push once  dextrose 50% Injectable 25 Gram(s) IV Push once  dextrose Oral Gel 15 Gram(s) Oral once PRN  famotidine    Tablet 20 milliGRAM(s) Oral every 48 hours  fentaNYL    Injectable 25 MICROGram(s) IV Push every 2 hours PRN  glucagon  Injectable 1 milliGRAM(s) IntraMuscular once  hydrALAZINE 25 milliGRAM(s) Oral every 8 hours  insulin lispro (ADMELOG) corrective regimen sliding scale   SubCutaneous every 6 hours  ondansetron Injectable 4 milliGRAM(s) IV Push every 6 hours PRN  oxyCODONE    IR 5 milliGRAM(s) Oral every 4 hours PRN  polyethylene glycol 3350 17 Gram(s) Oral every 12 hours  predniSONE   Tablet 5 milliGRAM(s) Oral daily  senna 2 Tablet(s) Oral at bedtime  tacrolimus    0.5 mG/mL Suspension 5 milliGRAM(s) Oral two times a day  Mode: AC/ CMV (Assist Control/ Continuous Mandatory Ventilation), RR (machine): 14, TV (machine): 450, FiO2: 40, PEEP: 5, ITime: 1, MAP: 10, PIP: 16      eyes midline, CAS, moving left UE spontaneously, right UE 0/5, right LE withdraws, not following commands     ASSESSMENT/PLAN: 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 POD 1   77yo woman ESRD s/p renal transplant; breast CA s/p lumpectomy, hx DVT unknown when provoked vs not but off Eliquis now, HLD  large expanding L IPH s/p craniectomy and hematoma evacuation,  NEURO CHECKS Q 1 HR   hydrocephalus EVD in at 10, drained 2 ml in 24 hrs , no ICP   precedex   Keppra 500 mg BID for seizure ppx  Activity: [] OOB as tolerated [x] Bedrest [] PT [] OT [] PMNR    PULM:  Intubated   PRVC 14/450/5/50  CPAP as tolerated   Oral secretions , not inline  At risk of aspiration pneumonia     CV:  SBP goal:   History of HTN  Metoprolol 25 mg BID     RENAL:  Fluids: IVL   f/u nephro transplant team  patient has been off HD  s/p transplant in 2019  AVF in the left upper extremity   Patient oligoric  D/C brachial A line      GI:  Diet: OGT-- restart tube feeds with Glucerna  GI prophylaxis [] not indicated [x] PPI [] other:  Bowel regimen [x] miralax [x] senna    ENDO:   Goal euglycemia (-180)  ISS  A1C:5.4%  calculate 24 hours needs   plan to transition to NPH ( however patient NPO after midnight )      HEME/ONC:   H/H stable for now  VTE prophylaxis: [x] SCDs [] chemoprophylaxis [] high risk of DVT/PE on admission due to:  f/u heme and onc for hx of breast cancer on tamoxifen   f/u LEd     ID: afebrile  On napoleon op ancef     MISC:    SOCIAL/FAMILY:  [] updated at bedside [] family meeting    CODE STATUS:  [] Full Code [] DNR [] DNI [] Palliative/Comfort Care    DISPOSITION:  [] ICU [] Stroke Unit [] Floor [] EMU [] RCU [] PCU    [] Patient is at high risk of neurologic deterioration/death due to:        O:  S/P IVC filter today   T(C): 36.6 (03-03-24 @ 19:00), Max: 37.7 (03-03-24 @ 05:00)  HR: 91 (03-03-24 @ 19:00) (78 - 106)  BP: 139/75 (03-03-24 @ 19:00) (132/72 - 181/87)  RR: 24 (03-03-24 @ 19:00) (14 - 28)  SpO2: 98% (03-03-24 @ 19:00) (97% - 100%)  03-02-24 @ 07:01  -  03-03-24 @ 07:00  --------------------------------------------------------  IN: 3262 mL / OUT: 1209 mL / NET: 2053 mL    03-03-24 @ 07:01  -  03-03-24 @ 19:59  --------------------------------------------------------  IN: 1704 mL / OUT: 987 mL / NET: 717 mL    acetaminophen   Oral Liquid .. 650 milliGRAM(s) Oral every 6 hours PRN  brivaracetam Oral Solution 50 milliGRAM(s) Oral two times a day  carvedilol 25 milliGRAM(s) Oral every 12 hours  cloNIDine 0.1 milliGRAM(s) Oral two times a day  dexMEDEtomidine Infusion 0.2 MICROgram(s)/kG/Hr IV Continuous <Continuous>  dextrose 50% Injectable 25 Gram(s) IV Push once  dextrose 50% Injectable 12.5 Gram(s) IV Push once  dextrose 50% Injectable 25 Gram(s) IV Push once  dextrose Oral Gel 15 Gram(s) Oral once PRN  famotidine    Tablet 20 milliGRAM(s) Oral every 48 hours  fentaNYL    Injectable 25 MICROGram(s) IV Push every 2 hours PRN  glucagon  Injectable 1 milliGRAM(s) IntraMuscular once  hydrALAZINE 25 milliGRAM(s) Oral every 8 hours  insulin lispro (ADMELOG) corrective regimen sliding scale   SubCutaneous every 6 hours  ondansetron Injectable 4 milliGRAM(s) IV Push every 6 hours PRN  oxyCODONE    IR 5 milliGRAM(s) Oral every 4 hours PRN  polyethylene glycol 3350 17 Gram(s) Oral every 12 hours  predniSONE   Tablet 5 milliGRAM(s) Oral daily  senna 2 Tablet(s) Oral at bedtime  tacrolimus    0.5 mG/mL Suspension 5 milliGRAM(s) Oral two times a day  Mode: AC/ CMV (Assist Control/ Continuous Mandatory Ventilation), RR (machine): 14, TV (machine): 450, FiO2: 40, PEEP: 5, ITime: 1, MAP: 10, PIP: 16      eyes midline, CAS, moving left UE spontaneously, right UE 0/5, right LE withdraws, not following commands       LABS:  Na: 138 (03-03 @ 07:49), 138 (03-02 @ 22:05), 135 (03-02 @ 02:08), 138 (03-01 @ 17:02)  K: 3.8 (03-03 @ 07:49), 4.2 (03-02 @ 22:05), 4.7 (03-02 @ 02:08), 4.3 (03-01 @ 17:02)  Cl: 106 (03-03 @ 07:49), 106 (03-02 @ 22:05), 101 (03-02 @ 02:08), 105 (03-01 @ 17:02)  CO2: 21 (03-03 @ 07:49), 21 (03-02 @ 22:05), 23 (03-02 @ 02:08), 24 (03-01 @ 17:02)  BUN: 36 (03-03 @ 07:49), 31 (03-02 @ 22:05), 26 (03-02 @ 02:08), 28 (03-01 @ 17:02)  Cr: 1.32 (03-03 @ 07:49), 1.33 (03-02 @ 22:05), 1.18 (03-02 @ 02:08), 1.69 (03-01 @ 17:02)  Glu: 205(03-03 @ 07:49), 143(03-02 @ 22:05), 367(03-02 @ 02:08), 289(03-01 @ 17:02)    Hgb: 9.7 (03-03 @ 07:49), 10.3 (03-02 @ 22:06), 10.9 (03-02 @ 02:08), 10.9 (03-01 @ 17:02)  Hct: 29.4 (03-03 @ 07:49), 31.7 (03-02 @ 22:06), 33.5 (03-02 @ 02:08), 34.5 (03-01 @ 17:02)  WBC: 10.33 (03-03 @ 07:49), 11.91 (03-02 @ 22:06), 8.27 (03-02 @ 02:08), 6.01 (03-01 @ 17:02)  Plt: 157 (03-03 @ 07:49), 183 (03-02 @ 22:06), 158 (03-02 @ 02:08), 148 (03-01 @ 17:02)    INR: 0.96 03-02-24 @ 02:09, 0.80 03-01-24 @ 17:02  PTT: 27.5 03-02-24 @ 02:09, 23.0 03-01-24 @ 17:02            ASSESSMENT/PLAN: 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 POD 1   NEURO CHECKS Q 1 HR   hydrocephalus EVD in at 10, drained 2 ml in 24 hrs , no ICP   precedex   Keppra 500 mg BID for seizure ppx  Activity: [] OOB as tolerated [x] Bedrest [] PT [] OT [] PMNR  IVC filter as it is contraindicated to start anticoag given ICH and has femoral DVT    acute respiratory failure intubated Mode: AC/ CMV (Assist Control/ Continuous Mandatory Ventilation), RR (machine): 14, TV (machine): 450, FiO2: 40, PEEP: 5, ITime: 1, MAP: 10, PIP: 16  CPAP trial  glucern at goal, last BM Prior to admission , continue senna and miralax  MEHREEN, LR  1 L bolus , oliguric   SBP goal:   History of HTN on clonidine carvedilol and hydralazine   Metoprolol 25 mg BID   glucerna   hyperrglycemia NPH 15 units q 6 hr  Goal euglycemia (-180) ISS  DVT chemoprophylaxis held per NS, start  heparin sc tomorrow if cleared with Dr Neil       CODE STATUS:  [x] Full Code [] DNR [] DNI [] Palliative/Comfort Care    DISPOSITION:  [x] ICU [] Stroke Unit [] Floor [] EMU [] RCU [] PCU    [x] Patient is at high risk of neurologic deterioration/death due to:  brain henriation    60 critical care time

## 2024-03-03 NOTE — PROGRESS NOTE ADULT - ATTENDING COMMENTS
Pt seen on 3/3/24.  PT on ventilator, localizing left UE.  No movement on right, pupils equal.  Dressing dry with defect flat.   EVD in placed, ICPs wnl.  CT with good evacuation of hematoma  and reduced mass effect.  Residual hemorrhage present.  IVH improved with now open 4th ventricle.  EVD with small amount of drainage.  Continue to monitor closely.

## 2024-03-03 NOTE — PROGRESS NOTE ADULT - ATTENDING COMMENTS
79yo woman ESRD s/p renal transplant; breast CA s/p lumpectomy, hx DVT unknown when provoked vs not but off Eliquis now, HLD  large expanding L IPH s/p craniectomy and hematoma evacuation,  POD 2  EVD placed-->no ICP issues so far. at 96lsB3C 0-5cc/hr output    ICU Vital Signs Last 24 Hrs  reviewed  labs reviewed        MEDICATIONS  (STANDING):  carvedilol 25 milliGRAM(s) Oral every 12 hours  cloNIDine 0.1 milliGRAM(s) Oral two times a day  dexMEDEtomidine Infusion 0.2 MICROgram(s)/kG/Hr (2.5 mL/Hr) IV Continuous <Continuous>  dextrose 5%. 1000 milliLiter(s) (50 mL/Hr) IV Continuous <Continuous>  dextrose 5%. 1000 milliLiter(s) (100 mL/Hr) IV Continuous <Continuous>  dextrose 50% Injectable 25 Gram(s) IV Push once  dextrose 50% Injectable 12.5 Gram(s) IV Push once  dextrose 50% Injectable 25 Gram(s) IV Push once  famotidine    Tablet 20 milliGRAM(s) Oral daily  glucagon  Injectable 1 milliGRAM(s) IntraMuscular once  insulin regular Infusion 3 Unit(s)/Hr (3 mL/Hr) IV Continuous <Continuous>  levETIRAcetam  IVPB 500 milliGRAM(s) IV Intermittent every 12 hours  niCARdipine Infusion 5 mG/Hr (25 mL/Hr) IV Continuous <Continuous>  polyethylene glycol 3350 17 Gram(s) Oral daily  predniSONE   Tablet 5 milliGRAM(s) Oral daily  senna 2 Tablet(s) Oral at bedtime  sodium chloride 0.9%. 1000 milliLiter(s) (50 mL/Hr) IV Continuous <Continuous>  tacrolimus    0.5 mG/mL Suspension 5 milliGRAM(s) Oral two times a day  tamoxifen 20 milliGRAM(s) Oral daily    MEDICATIONS  (PRN):  acetaminophen   Oral Liquid .. 650 milliGRAM(s) Oral every 6 hours PRN Temp greater or equal to 38C (100.4F), Mild Pain (1 - 3)  dextrose Oral Gel 15 Gram(s) Oral once PRN Blood Glucose LESS THAN 70 milliGRAM(s)/deciliter  fentaNYL    Injectable 25 MICROGram(s) IV Push every 2 hours PRN vent synchroncy  ondansetron Injectable 4 milliGRAM(s) IV Push every 6 hours PRN Nausea and/or Vomiting  oxyCODONE    IR 5 milliGRAM(s) Oral every 4 hours PRN Moderate Pain (4 - 6)    NS 50cc/hr     IVCF placement planned for this pm     prn tylenol/oxy for pain control  no sedation  d/c keppra, transition to briviact to 50mg Q12  vEEG x24hrs   Q2 hr checks   LUE LC but no FC  remains intubated  started on insulin gtt overnight for hyperglycemia--check A1C 5.7  SBP ; cont carvedilol 25mg Q12, cont home dose clonidine   EKG PAC, RBBB, prolonged QTc  repeat EKG pending  d/c brachial jeevan   tolerating CPAP but poor mental status, 10/5  tube feeding to start glucerna 1.2   bowel regimen; LBM PTA miralax bid, senna  1L bolus and IVL   s/p transplant   per renal, check tacro trough ?5pm today check with renal consult   cont home meds for transplant and breast CA  BLE dopplers DVT noted in the RIGHT common femoral, femoral, popliteal and gastrocnemius veins.  DVT noted in the LEFT common femoral and femoral veins.  chemoppx to start, d/w neurosurgery   IVCF pending this afternoon   afebrile

## 2024-03-03 NOTE — PROGRESS NOTE ADULT - SUBJECTIVE AND OBJECTIVE BOX
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    Admission Scores  GCS: 4 ICH: 4    24 hour Events:  3/2- Patient s/p left craniectomy(discarded) and evacuation. Patient started on insulin drip for elevated BG   3/3: Patient switched off insulin drip given low blood glucose. ISS placed. Patient's A1C 5.4%.    Allergies    shellfish (Rash)  ChloraPrep One-Step (Rash)  penicillins (Rash)    Intolerances        REVIEW OF SYSTEMS: [x] Unable to Assess due to neurologic exam   [ ] All ROS addressed below are non-contributory, except:  Neuro: [ ] Headache [ ] Back pain [ ] Numbness [ ] Weakness [ ] Ataxia [ ] Dizziness [ ] Aphasia [ ] Dysarthria [ ] Visual disturbance  Resp: [ ] Shortness of breath/dyspnea, [ ] Orthopnea [ ] Cough  CV: [ ] Chest pain [ ] Palpitation [ ] Lightheadedness [ ] Syncope  Renal: [ ] Thirst [ ] Edema  GI: [ ] Nausea [ ] Emesis [ ] Abdominal pain [ ] Constipation [ ] Diarrhea  Hem: [ ] Hematemesis [ ] bright red blood per rectum  ID: [ ] Fever [ ] Chills [ ] Dysuria  ENT: [ ] Rhinorrhea      DEVICES:   [ ] Restraints [ ] ET tube [ ] central line [ ] arterial line [ ] johnson [ ] NGT/OGT [ ] EVD [ ] LD [ ] YON/HMV [ ] Trach [ ] PEG [ ] Chest Tube     ICU Vital Signs Last 24 Hrs  T(C): 37.2 (02 Mar 2024 23:00), Max: 38.1 (02 Mar 2024 07:00)  T(F): 99 (02 Mar 2024 23:00), Max: 100.6 (02 Mar 2024 07:00)  HR: 98 (03 Mar 2024 00:00) (69 - 102)  BP: --  BP(mean): --  ABP: 152/63 (03 Mar 2024 00:00) (120/46 - 172/70)  ABP(mean): 99 (03 Mar 2024 00:00) (75 - 109)  RR: 22 (03 Mar 2024 00:00) (14 - 24)  SpO2: 100% (03 Mar 2024 00:00) (94% - 100%)    O2 Parameters below as of 02 Mar 2024 07:00  Patient On (Oxygen Delivery Method): ventilator    I&O's Summary    01 Mar 2024 07:01  -  02 Mar 2024 07:00  --------------------------------------------------------  IN: 185 mL / OUT: 752 mL / NET: -567 mL    02 Mar 2024 07:01  -  03 Mar 2024 01:00  --------------------------------------------------------  IN: 2567 mL / OUT: 669 mL / NET: 1898 mL                          10.3   11.91 )-----------( 183      ( 02 Mar 2024 22:06 )             31.7   03-02    138  |  106  |  31<H>  ----------------------------<  143<H>  4.2   |  21<L>  |  1.33<H>    Ca    9.7      02 Mar 2024 22:05  Phos  3.7     03-02  Mg     2.0     03-02    TPro  6.3  /  Alb  3.6  /  TBili  0.4  /  DBili  x   /  AST  29  /  ALT  10  /  AlkPhos  41  03-02          MEDICATION LEVELS:     IVF FLUIDS/MEDICATIONS:   MEDICATIONS  (STANDING):  ceFAZolin   IVPB 2000 milliGRAM(s) IV Intermittent every 8 hours  dexMEDEtomidine Infusion 0.2 MICROgram(s)/kG/Hr (2.5 mL/Hr) IV Continuous <Continuous>  dextrose 5%. 1000 milliLiter(s) (50 mL/Hr) IV Continuous <Continuous>  dextrose 5%. 1000 milliLiter(s) (100 mL/Hr) IV Continuous <Continuous>  dextrose 50% Injectable 12.5 Gram(s) IV Push once  dextrose 50% Injectable 25 Gram(s) IV Push once  dextrose 50% Injectable 25 Gram(s) IV Push once  famotidine    Tablet 20 milliGRAM(s) Oral daily  glucagon  Injectable 1 milliGRAM(s) IntraMuscular once  insulin regular Infusion 4 Unit(s)/Hr (4 mL/Hr) IV Continuous <Continuous>  levETIRAcetam  IVPB 500 milliGRAM(s) IV Intermittent every 12 hours  metoprolol tartrate 50 milliGRAM(s) Oral two times a day  niCARdipine Infusion 5 mG/Hr (25 mL/Hr) IV Continuous <Continuous>  polyethylene glycol 3350 17 Gram(s) Oral daily  senna 2 Tablet(s) Oral at bedtime  sodium chloride 0.9%. 1000 milliLiter(s) (50 mL/Hr) IV Continuous <Continuous>  tacrolimus    0.5 mG/mL Suspension 2.5 milliGRAM(s) Oral two times a day  tamoxifen 20 milliGRAM(s) Oral daily    MEDICATIONS  (PRN):  dextrose Oral Gel 15 Gram(s) Oral once PRN Blood Glucose LESS THAN 70 milliGRAM(s)/deciliter  ondansetron Injectable 4 milliGRAM(s) IV Push every 6 hours PRN Nausea and/or Vomiting        EXAMINATION:  PHYSICAL EXAM:    Constitutional: No Acute Distress     Neurological: No eye opening, PERRL, +cough/gag/overbreathes, right corneal absent, Flaccid in all extremities.                                              Pulmonary: Clear to Auscultation, No rales, No rhonchi, No wheezes     Cardiovascular: S1, S2, Regular rate and rhythm     Gastrointestinal: Soft, Non-tender, Non-distended     Extremities: No calf tenderness, L leg amputated

## 2024-03-03 NOTE — PROGRESS NOTE ADULT - SUBJECTIVE AND OBJECTIVE BOX
Patient seen and examined at bedside.    --Anticoagulation--    T(C): 37.2 (03-02-24 @ 23:00), Max: 38.1 (03-02-24 @ 07:00)  HR: 88 (03-03-24 @ 02:00) (72 - 102)  BP: --  RR: 17 (03-03-24 @ 02:00) (14 - 24)  SpO2: 99% (03-03-24 @ 02:00) (96% - 100%)  Wt(kg): --    Exam:  intubated, no EO, B/L pupil 3S, L gaze, + L corneals/cough/gag/OB, no FC, LUE spont, RUE 0/5, RLE wds

## 2024-03-04 DIAGNOSIS — Z94.0 KIDNEY TRANSPLANT STATUS: ICD-10-CM

## 2024-03-04 DIAGNOSIS — Z79.899 OTHER LONG TERM (CURRENT) DRUG THERAPY: ICD-10-CM

## 2024-03-04 DIAGNOSIS — N17.9 ACUTE KIDNEY FAILURE, UNSPECIFIED: ICD-10-CM

## 2024-03-04 LAB
ANION GAP SERPL CALC-SCNC: 10 MMOL/L — SIGNIFICANT CHANGE UP (ref 5–17)
ANION GAP SERPL CALC-SCNC: 11 MMOL/L — SIGNIFICANT CHANGE UP (ref 5–17)
ANION GAP SERPL CALC-SCNC: 9 MMOL/L — SIGNIFICANT CHANGE UP (ref 5–17)
BUN SERPL-MCNC: 36 MG/DL — HIGH (ref 7–23)
BUN SERPL-MCNC: 40 MG/DL — HIGH (ref 7–23)
BUN SERPL-MCNC: 45 MG/DL — HIGH (ref 7–23)
CALCIUM SERPL-MCNC: 9.2 MG/DL — SIGNIFICANT CHANGE UP (ref 8.4–10.5)
CALCIUM SERPL-MCNC: 9.4 MG/DL — SIGNIFICANT CHANGE UP (ref 8.4–10.5)
CALCIUM SERPL-MCNC: 9.6 MG/DL — SIGNIFICANT CHANGE UP (ref 8.4–10.5)
CHLORIDE SERPL-SCNC: 101 MMOL/L — SIGNIFICANT CHANGE UP (ref 96–108)
CHLORIDE SERPL-SCNC: 102 MMOL/L — SIGNIFICANT CHANGE UP (ref 96–108)
CHLORIDE SERPL-SCNC: 104 MMOL/L — SIGNIFICANT CHANGE UP (ref 96–108)
CO2 SERPL-SCNC: 23 MMOL/L — SIGNIFICANT CHANGE UP (ref 22–31)
CO2 SERPL-SCNC: 23 MMOL/L — SIGNIFICANT CHANGE UP (ref 22–31)
CO2 SERPL-SCNC: 25 MMOL/L — SIGNIFICANT CHANGE UP (ref 22–31)
CREAT SERPL-MCNC: 1.41 MG/DL — HIGH (ref 0.5–1.3)
CREAT SERPL-MCNC: 1.44 MG/DL — HIGH (ref 0.5–1.3)
CREAT SERPL-MCNC: 1.54 MG/DL — HIGH (ref 0.5–1.3)
EGFR: 34 ML/MIN/1.73M2 — LOW
EGFR: 37 ML/MIN/1.73M2 — LOW
EGFR: 38 ML/MIN/1.73M2 — LOW
GLUCOSE BLDC GLUCOMTR-MCNC: 114 MG/DL — HIGH (ref 70–99)
GLUCOSE BLDC GLUCOMTR-MCNC: 150 MG/DL — HIGH (ref 70–99)
GLUCOSE BLDC GLUCOMTR-MCNC: 170 MG/DL — HIGH (ref 70–99)
GLUCOSE BLDC GLUCOMTR-MCNC: 215 MG/DL — HIGH (ref 70–99)
GLUCOSE SERPL-MCNC: 100 MG/DL — HIGH (ref 70–99)
GLUCOSE SERPL-MCNC: 178 MG/DL — HIGH (ref 70–99)
GLUCOSE SERPL-MCNC: 210 MG/DL — HIGH (ref 70–99)
HCT VFR BLD CALC: 27.5 % — LOW (ref 34.5–45)
HCT VFR BLD CALC: 30.4 % — LOW (ref 34.5–45)
HGB BLD-MCNC: 8.8 G/DL — LOW (ref 11.5–15.5)
HGB BLD-MCNC: 9.9 G/DL — LOW (ref 11.5–15.5)
MAGNESIUM SERPL-MCNC: 2 MG/DL — SIGNIFICANT CHANGE UP (ref 1.6–2.6)
MAGNESIUM SERPL-MCNC: 2.1 MG/DL — SIGNIFICANT CHANGE UP (ref 1.6–2.6)
MCHC RBC-ENTMCNC: 28.7 PG — SIGNIFICANT CHANGE UP (ref 27–34)
MCHC RBC-ENTMCNC: 29 PG — SIGNIFICANT CHANGE UP (ref 27–34)
MCHC RBC-ENTMCNC: 32 GM/DL — SIGNIFICANT CHANGE UP (ref 32–36)
MCHC RBC-ENTMCNC: 32.6 GM/DL — SIGNIFICANT CHANGE UP (ref 32–36)
MCV RBC AUTO: 89.1 FL — SIGNIFICANT CHANGE UP (ref 80–100)
MCV RBC AUTO: 89.6 FL — SIGNIFICANT CHANGE UP (ref 80–100)
NRBC # BLD: 0 /100 WBCS — SIGNIFICANT CHANGE UP (ref 0–0)
NRBC # BLD: 0 /100 WBCS — SIGNIFICANT CHANGE UP (ref 0–0)
PHOSPHATE SERPL-MCNC: 3.3 MG/DL — SIGNIFICANT CHANGE UP (ref 2.5–4.5)
PHOSPHATE SERPL-MCNC: 3.7 MG/DL — SIGNIFICANT CHANGE UP (ref 2.5–4.5)
PLATELET # BLD AUTO: 106 K/UL — LOW (ref 150–400)
PLATELET # BLD AUTO: 142 K/UL — LOW (ref 150–400)
POTASSIUM SERPL-MCNC: 4.2 MMOL/L — SIGNIFICANT CHANGE UP (ref 3.5–5.3)
POTASSIUM SERPL-MCNC: 4.4 MMOL/L — SIGNIFICANT CHANGE UP (ref 3.5–5.3)
POTASSIUM SERPL-MCNC: 4.8 MMOL/L — SIGNIFICANT CHANGE UP (ref 3.5–5.3)
POTASSIUM SERPL-SCNC: 4.2 MMOL/L — SIGNIFICANT CHANGE UP (ref 3.5–5.3)
POTASSIUM SERPL-SCNC: 4.4 MMOL/L — SIGNIFICANT CHANGE UP (ref 3.5–5.3)
POTASSIUM SERPL-SCNC: 4.8 MMOL/L — SIGNIFICANT CHANGE UP (ref 3.5–5.3)
RBC # BLD: 3.07 M/UL — LOW (ref 3.8–5.2)
RBC # BLD: 3.41 M/UL — LOW (ref 3.8–5.2)
RBC # FLD: 15.9 % — HIGH (ref 10.3–14.5)
RBC # FLD: 16.1 % — HIGH (ref 10.3–14.5)
SODIUM SERPL-SCNC: 135 MMOL/L — SIGNIFICANT CHANGE UP (ref 135–145)
SODIUM SERPL-SCNC: 136 MMOL/L — SIGNIFICANT CHANGE UP (ref 135–145)
SODIUM SERPL-SCNC: 137 MMOL/L — SIGNIFICANT CHANGE UP (ref 135–145)
TACROLIMUS SERPL-MCNC: 10.2 NG/ML — SIGNIFICANT CHANGE UP
WBC # BLD: 9.29 K/UL — SIGNIFICANT CHANGE UP (ref 3.8–10.5)
WBC # BLD: 9.52 K/UL — SIGNIFICANT CHANGE UP (ref 3.8–10.5)
WBC # FLD AUTO: 9.29 K/UL — SIGNIFICANT CHANGE UP (ref 3.8–10.5)
WBC # FLD AUTO: 9.52 K/UL — SIGNIFICANT CHANGE UP (ref 3.8–10.5)

## 2024-03-04 PROCEDURE — 71045 X-RAY EXAM CHEST 1 VIEW: CPT | Mod: 26

## 2024-03-04 PROCEDURE — 99292 CRITICAL CARE ADDL 30 MIN: CPT | Mod: FS

## 2024-03-04 PROCEDURE — 95720 EEG PHY/QHP EA INCR W/VEEG: CPT

## 2024-03-04 PROCEDURE — 99291 CRITICAL CARE FIRST HOUR: CPT

## 2024-03-04 PROCEDURE — 76937 US GUIDE VASCULAR ACCESS: CPT | Mod: 26

## 2024-03-04 PROCEDURE — 99232 SBSQ HOSP IP/OBS MODERATE 35: CPT | Mod: GC

## 2024-03-04 PROCEDURE — 36410 VNPNXR 3YR/> PHY/QHP DX/THER: CPT

## 2024-03-04 RX ORDER — BRIVARACETAM 25 MG/1
50 TABLET, FILM COATED ORAL ONCE
Refills: 0 | Status: DISCONTINUED | OUTPATIENT
Start: 2024-03-04 | End: 2024-03-04

## 2024-03-04 RX ORDER — HUMAN INSULIN 100 [IU]/ML
20 INJECTION, SUSPENSION SUBCUTANEOUS EVERY 6 HOURS
Refills: 0 | Status: DISCONTINUED | OUTPATIENT
Start: 2024-03-04 | End: 2024-03-05

## 2024-03-04 RX ORDER — FAMOTIDINE 10 MG/ML
1 INJECTION INTRAVENOUS
Qty: 0 | Refills: 0 | DISCHARGE

## 2024-03-04 RX ORDER — OXYCODONE AND ACETAMINOPHEN 5; 325 MG/1; MG/1
1 TABLET ORAL
Qty: 0 | Refills: 0 | DISCHARGE

## 2024-03-04 RX ORDER — TAMOXIFEN CITRATE 20 MG/1
1 TABLET, FILM COATED ORAL
Qty: 0 | Refills: 0 | DISCHARGE

## 2024-03-04 RX ORDER — HYDRALAZINE HCL 50 MG
5 TABLET ORAL ONCE
Refills: 0 | Status: COMPLETED | OUTPATIENT
Start: 2024-03-04 | End: 2024-03-04

## 2024-03-04 RX ORDER — PANTOPRAZOLE SODIUM 20 MG/1
40 TABLET, DELAYED RELEASE ORAL DAILY
Refills: 0 | Status: DISCONTINUED | OUTPATIENT
Start: 2024-03-04 | End: 2024-03-08

## 2024-03-04 RX ORDER — ACETAMINOPHEN 500 MG
750 TABLET ORAL ONCE
Refills: 0 | Status: COMPLETED | OUTPATIENT
Start: 2024-03-04 | End: 2024-03-04

## 2024-03-04 RX ORDER — CHLORHEXIDINE GLUCONATE 213 G/1000ML
1 SOLUTION TOPICAL DAILY
Refills: 0 | Status: DISCONTINUED | OUTPATIENT
Start: 2024-03-04 | End: 2024-03-11

## 2024-03-04 RX ORDER — BRIVARACETAM 25 MG/1
100 TABLET, FILM COATED ORAL
Refills: 0 | Status: DISCONTINUED | OUTPATIENT
Start: 2024-03-04 | End: 2024-03-07

## 2024-03-04 RX ORDER — TACROLIMUS 5 MG/1
3 CAPSULE ORAL
Refills: 0 | Status: DISCONTINUED | OUTPATIENT
Start: 2024-03-04 | End: 2024-03-05

## 2024-03-04 RX ORDER — GABAPENTIN 400 MG/1
100 CAPSULE ORAL THREE TIMES A DAY
Refills: 0 | Status: DISCONTINUED | OUTPATIENT
Start: 2024-03-04 | End: 2024-03-04

## 2024-03-04 RX ORDER — REPAGLINIDE 1 MG/1
1 TABLET ORAL
Qty: 0 | Refills: 0 | DISCHARGE

## 2024-03-04 RX ORDER — GABAPENTIN 400 MG/1
100 CAPSULE ORAL THREE TIMES A DAY
Refills: 0 | Status: DISCONTINUED | OUTPATIENT
Start: 2024-03-04 | End: 2024-03-08

## 2024-03-04 RX ORDER — LINAGLIPTIN 5 MG/1
1 TABLET, FILM COATED ORAL
Qty: 0 | Refills: 0 | DISCHARGE

## 2024-03-04 RX ORDER — FUROSEMIDE 40 MG
10 TABLET ORAL ONCE
Refills: 0 | Status: COMPLETED | OUTPATIENT
Start: 2024-03-04 | End: 2024-03-04

## 2024-03-04 RX ORDER — HEPARIN SODIUM 5000 [USP'U]/ML
5000 INJECTION INTRAVENOUS; SUBCUTANEOUS EVERY 12 HOURS
Refills: 0 | Status: DISCONTINUED | OUTPATIENT
Start: 2024-03-05 | End: 2024-03-07

## 2024-03-04 RX ORDER — METOPROLOL TARTRATE 50 MG
1 TABLET ORAL
Qty: 0 | Refills: 0 | DISCHARGE

## 2024-03-04 RX ORDER — INSULIN GLARGINE 100 [IU]/ML
8 INJECTION, SOLUTION SUBCUTANEOUS
Qty: 0 | Refills: 0 | DISCHARGE

## 2024-03-04 RX ORDER — LACOSAMIDE 50 MG/1
100 TABLET ORAL
Refills: 0 | Status: DISCONTINUED | OUTPATIENT
Start: 2024-03-04 | End: 2024-03-05

## 2024-03-04 RX ORDER — SODIUM CHLORIDE 9 MG/ML
1000 INJECTION, SOLUTION INTRAVENOUS ONCE
Refills: 0 | Status: COMPLETED | OUTPATIENT
Start: 2024-03-04 | End: 2024-03-04

## 2024-03-04 RX ADMIN — HUMAN INSULIN 20 UNIT(S): 100 INJECTION, SUSPENSION SUBCUTANEOUS at 11:33

## 2024-03-04 RX ADMIN — FENTANYL CITRATE 25 MICROGRAM(S): 50 INJECTION INTRAVENOUS at 12:05

## 2024-03-04 RX ADMIN — Medication 750 MILLIGRAM(S): at 15:05

## 2024-03-04 RX ADMIN — FENTANYL CITRATE 25 MICROGRAM(S): 50 INJECTION INTRAVENOUS at 02:30

## 2024-03-04 RX ADMIN — Medication 0.1 MILLIGRAM(S): at 05:19

## 2024-03-04 RX ADMIN — HUMAN INSULIN 20 UNIT(S): 100 INJECTION, SUSPENSION SUBCUTANEOUS at 17:13

## 2024-03-04 RX ADMIN — PANTOPRAZOLE SODIUM 40 MILLIGRAM(S): 20 TABLET, DELAYED RELEASE ORAL at 11:01

## 2024-03-04 RX ADMIN — FENTANYL CITRATE 25 MICROGRAM(S): 50 INJECTION INTRAVENOUS at 02:45

## 2024-03-04 RX ADMIN — OXYCODONE HYDROCHLORIDE 5 MILLIGRAM(S): 5 TABLET ORAL at 12:20

## 2024-03-04 RX ADMIN — Medication 1 TABLET(S): at 17:12

## 2024-03-04 RX ADMIN — BRIVARACETAM 100 MILLIGRAM(S): 25 TABLET, FILM COATED ORAL at 17:13

## 2024-03-04 RX ADMIN — Medication 5 MILLIGRAM(S): at 03:13

## 2024-03-04 RX ADMIN — GABAPENTIN 100 MILLIGRAM(S): 400 CAPSULE ORAL at 14:18

## 2024-03-04 RX ADMIN — TACROLIMUS 5 MILLIGRAM(S): 5 CAPSULE ORAL at 05:19

## 2024-03-04 RX ADMIN — BRIVARACETAM 50 MILLIGRAM(S): 25 TABLET, FILM COATED ORAL at 05:19

## 2024-03-04 RX ADMIN — Medication 25 MILLIGRAM(S): at 21:16

## 2024-03-04 RX ADMIN — FENTANYL CITRATE 25 MICROGRAM(S): 50 INJECTION INTRAVENOUS at 23:00

## 2024-03-04 RX ADMIN — HUMAN INSULIN 20 UNIT(S): 100 INJECTION, SUSPENSION SUBCUTANEOUS at 23:07

## 2024-03-04 RX ADMIN — Medication 10 MILLIGRAM(S): at 11:01

## 2024-03-04 RX ADMIN — FENTANYL CITRATE 25 MICROGRAM(S): 50 INJECTION INTRAVENOUS at 11:47

## 2024-03-04 RX ADMIN — CHLORHEXIDINE GLUCONATE 1 APPLICATION(S): 213 SOLUTION TOPICAL at 11:20

## 2024-03-04 RX ADMIN — CHLORHEXIDINE GLUCONATE 15 MILLILITER(S): 213 SOLUTION TOPICAL at 17:23

## 2024-03-04 RX ADMIN — OXYCODONE HYDROCHLORIDE 5 MILLIGRAM(S): 5 TABLET ORAL at 02:45

## 2024-03-04 RX ADMIN — Medication 2: at 23:07

## 2024-03-04 RX ADMIN — FENTANYL CITRATE 25 MICROGRAM(S): 50 INJECTION INTRAVENOUS at 13:39

## 2024-03-04 RX ADMIN — OXYCODONE HYDROCHLORIDE 5 MILLIGRAM(S): 5 TABLET ORAL at 03:15

## 2024-03-04 RX ADMIN — HUMAN INSULIN 15 UNIT(S): 100 INJECTION, SUSPENSION SUBCUTANEOUS at 05:20

## 2024-03-04 RX ADMIN — TACROLIMUS 3 MILLIGRAM(S): 5 CAPSULE ORAL at 17:22

## 2024-03-04 RX ADMIN — Medication 300 MILLIGRAM(S): at 14:52

## 2024-03-04 RX ADMIN — BRIVARACETAM 50 MILLIGRAM(S): 25 TABLET, FILM COATED ORAL at 13:24

## 2024-03-04 RX ADMIN — CARVEDILOL PHOSPHATE 25 MILLIGRAM(S): 80 CAPSULE, EXTENDED RELEASE ORAL at 17:12

## 2024-03-04 RX ADMIN — Medication 0.1 MILLIGRAM(S): at 21:17

## 2024-03-04 RX ADMIN — CHLORHEXIDINE GLUCONATE 15 MILLILITER(S): 213 SOLUTION TOPICAL at 05:20

## 2024-03-04 RX ADMIN — POLYETHYLENE GLYCOL 3350 17 GRAM(S): 17 POWDER, FOR SOLUTION ORAL at 05:19

## 2024-03-04 RX ADMIN — CARVEDILOL PHOSPHATE 25 MILLIGRAM(S): 80 CAPSULE, EXTENDED RELEASE ORAL at 05:19

## 2024-03-04 RX ADMIN — Medication 25 MILLIGRAM(S): at 13:01

## 2024-03-04 RX ADMIN — POLYETHYLENE GLYCOL 3350 17 GRAM(S): 17 POWDER, FOR SOLUTION ORAL at 17:13

## 2024-03-04 RX ADMIN — OXYCODONE HYDROCHLORIDE 5 MILLIGRAM(S): 5 TABLET ORAL at 23:30

## 2024-03-04 RX ADMIN — OXYCODONE HYDROCHLORIDE 5 MILLIGRAM(S): 5 TABLET ORAL at 11:47

## 2024-03-04 RX ADMIN — SENNA PLUS 2 TABLET(S): 8.6 TABLET ORAL at 21:16

## 2024-03-04 RX ADMIN — SODIUM CHLORIDE 500 MILLILITER(S): 9 INJECTION, SOLUTION INTRAVENOUS at 02:41

## 2024-03-04 RX ADMIN — Medication 5 MILLIGRAM(S): at 05:25

## 2024-03-04 RX ADMIN — Medication 4: at 05:20

## 2024-03-04 RX ADMIN — FENTANYL CITRATE 25 MICROGRAM(S): 50 INJECTION INTRAVENOUS at 13:54

## 2024-03-04 RX ADMIN — OXYCODONE HYDROCHLORIDE 5 MILLIGRAM(S): 5 TABLET ORAL at 23:00

## 2024-03-04 RX ADMIN — FENTANYL CITRATE 25 MICROGRAM(S): 50 INJECTION INTRAVENOUS at 22:45

## 2024-03-04 RX ADMIN — Medication 0.1 MILLIGRAM(S): at 12:46

## 2024-03-04 RX ADMIN — Medication 25 MILLIGRAM(S): at 05:22

## 2024-03-04 RX ADMIN — GABAPENTIN 100 MILLIGRAM(S): 400 CAPSULE ORAL at 21:20

## 2024-03-04 RX ADMIN — LACOSAMIDE 100 MILLIGRAM(S): 50 TABLET ORAL at 23:14

## 2024-03-04 NOTE — PHYSICAL THERAPY INITIAL EVALUATION ADULT - PERTINENT HX OF CURRENT PROBLEM, REHAB EVAL
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 Pt got craniotomy with EVD placed on 3/2/24.  CTH: Large L IPH. a/w IVH and MLS . ICH score 4 Repeat CTH with expansion of bleed and worsened MLS  -L AKA approx 2 months ago     -

## 2024-03-04 NOTE — PROVIDER CONTACT NOTE (CHANGE IN STATUS NOTIFICATION) - BACKGROUND
77 y/o female admitted on 3/1/2024 for L frontal ICH. patient s/p hemicraniectomy and clot evacuation.

## 2024-03-04 NOTE — PROGRESS NOTE ADULT - ATTENDING COMMENTS
Pt seen on 3/4/23 in evening.  PT with some movement on left.  Defect flat, dressing dry.  EVD in place with normal ICP, small amount of drainage.  PT had evidence of left sided sz.  On EEG monitoring.  Continue close monitoring and treatment.  BP control.

## 2024-03-04 NOTE — PHARMACOTHERAPY INTERVENTION NOTE - COMMENTS
Spoke with patient's daughter at bedside and confirmed home medications and rash to PCN  Home Medications:  albuterol 90 mcg/inh inhalation aerosol: 2 puff(s) inhaled every 6 hours, As Needed (04 Mar 2024 12:22)  amLODIPine 5 mg oral tablet: 1 tab(s) orally once a day (04 Mar 2024 12:35)  aspirin 81 mg oral delayed release tablet: 1 tab(s) orally once a day x history of open heart surgery (04 Mar 2024 12:36)  Bactrim 400 mg-80 mg oral tablet: 1 tab(s) orally once a day x ppx (04 Mar 2024 12:23)  calcitriol 0.25 mcg oral capsule: 1 cap(s) orally once a day (04 Mar 2024 12:38)  cholecalciferol 25 mcg (1000 intl units) oral tablet: 1 tab(s) orally once a day (04 Mar 2024 12:41)  cloNIDine 0.1 mg oral tablet: 1 tab(s) orally 2 times a day (04 Mar 2024 12:22)  Envarsus XR 4 mg oral tablet, extended release: 10 milligram(s) orally once a day x renal transplant (04 Mar 2024 12:24)  famotidine 20 mg oral tablet: 1 tab(s) orally 2 times a day x reflux (04 Mar 2024 12:39)  gabapentin 100 mg oral tablet: 1 tab(s) orally 3 times a day x leg pain post amputation (04 Mar 2024 12:25)  HumaLOG KwikPen 100 units/mL injectable solution: 8 unit(s) injectable 3 times a day (04 Mar 2024 12:37)  Lantus Solostar Pen 100 units/mL subcutaneous solution: 15 unit(s) subcutaneous once a day (at bedtime) (04 Mar 2024 12:35)  leflunomide 20 mg oral tablet: 1 tab(s) orally 2 times a day x RA (04 Mar 2024 12:26)  Lipitor 10 mg oral tablet: 1 tab(s) orally once a day (04 Mar 2024 12:39)  metoprolol tartrate 100 mg oral tablet: 1 tab(s) orally 2 times a day (04 Mar 2024 12:24)  Multiple Vitamins oral tablet: 1 tab(s) orally once a day (04 Mar 2024 12:41)  Percocet 10/325 oral tablet: 1 tab(s) orally 4 times a day as needed for leg pain (04 Mar 2024 12:40)  predniSONE 5 mg oral tablet: 1 tab(s) orally once a day x kidney transplant (04 Mar 2024 12:34)  sodium bicarbonate 650 mg oral tablet: 2 tab(s) orally 2 times a day (04 Mar 2024 12:38)  Vascepa 1 g oral capsule: 1 cap(s) orally once a day (04 Mar 2024 12:40)

## 2024-03-04 NOTE — PROGRESS NOTE ADULT - SUBJECTIVE AND OBJECTIVE BOX
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    Admission Scores  GCS: 4 ICH: 4    24 hour Events:  3/2- Patient s/p left craniectomy(discarded) and evacuation. Patient started on insulin drip for elevated BG   3/3: Patient switched off insulin drip given low blood glucose. ISS placed. Patient's A1C 5.4%.  3/4: Patient s/p IVC filter for DVTs. Glucose elevated.         Allergies    shellfish (Rash)  ChloraPrep One-Step (Rash)  penicillins (Rash)    Intolerances        REVIEW OF SYSTEMS: [ ] Unable to Assess due to neurologic exam   [ ] All ROS addressed below are non-contributory, except:  Neuro: [ ] Headache [ ] Back pain [ ] Numbness [ ] Weakness [ ] Ataxia [ ] Dizziness [ ] Aphasia [ ] Dysarthria [ ] Visual disturbance  Resp: [ ] Shortness of breath/dyspnea, [ ] Orthopnea [ ] Cough  CV: [ ] Chest pain [ ] Palpitation [ ] Lightheadedness [ ] Syncope  Renal: [ ] Thirst [ ] Edema  GI: [ ] Nausea [ ] Emesis [ ] Abdominal pain [ ] Constipation [ ] Diarrhea  Hem: [ ] Hematemesis [ ] bright red blood per rectum  ID: [ ] Fever [ ] Chills [ ] Dysuria  ENT: [ ] Rhinorrhea      DEVICES:   [ ] Restraints [ ] ET tube [ ] central line [ ] arterial line [ ] johnson [ ] NGT/OGT [ ] EVD [ ] LD [ ] YON/HMV [ ] Trach [ ] PEG [ ] Chest Tube     VITALS:   Vital Signs Last 24 Hrs  T(C): 35.8 (04 Mar 2024 07:00), Max: 36.7 (03 Mar 2024 23:00)  T(F): 96.4 (04 Mar 2024 07:00), Max: 98.1 (03 Mar 2024 23:00)  HR: 63 (04 Mar 2024 07:00) (63 - 106)  BP: 158/70 (04 Mar 2024 07:00) (132/72 - 186/87)  BP(mean): 101 (04 Mar 2024 07:00) (85 - 128)  RR: 14 (04 Mar 2024 07:00) (14 - 30)  SpO2: 100% (04 Mar 2024 07:00) (95% - 100%)    Parameters below as of 03 Mar 2024 19:00  Patient On (Oxygen Delivery Method): ventilator    O2 Concentration (%): 40  CAPILLARY BLOOD GLUCOSE      POCT Blood Glucose.: 215 mg/dL (04 Mar 2024 05:17)  POCT Blood Glucose.: 291 mg/dL (03 Mar 2024 23:17)  POCT Blood Glucose.: 242 mg/dL (03 Mar 2024 17:04)  POCT Blood Glucose.: 282 mg/dL (03 Mar 2024 17:03)  POCT Blood Glucose.: 128 mg/dL (03 Mar 2024 11:59)  POCT Blood Glucose.: 95 mg/dL (03 Mar 2024 10:12)  POCT Blood Glucose.: 93 mg/dL (03 Mar 2024 09:49)  POCT Blood Glucose.: 153 mg/dL (03 Mar 2024 09:02)    I&O's Summary    03 Mar 2024 07:01  -  04 Mar 2024 07:00  --------------------------------------------------------  IN: 4666.5 mL / OUT: 1479 mL / NET: 3187.5 mL        Respiratory:  Mode: AC/ CMV (Assist Control/ Continuous Mandatory Ventilation)  RR (machine): 14  TV (machine): 450  FiO2: 40  PEEP: 5  ITime: 1  MAP: 10  PIP: 21        LABS:                        8.8    9.29  )-----------( 142      ( 04 Mar 2024 05:12 )             27.5     03-04    137  |  104  |  36<H>  ----------------------------<  210<H>  4.2   |  23  |  1.41<H>             MEDICATION LEVELS:     IVF FLUIDS/MEDICATIONS:   MEDICATIONS  (STANDING):  brivaracetam Oral Solution 50 milliGRAM(s) Oral two times a day  carvedilol 25 milliGRAM(s) Oral every 12 hours  chlorhexidine 0.12% Liquid 15 milliLiter(s) Oral Mucosa every 12 hours  chlorhexidine 4% Liquid 1 Application(s) Topical daily  cloNIDine 0.1 milliGRAM(s) Oral two times a day  dexMEDEtomidine Infusion 0.2 MICROgram(s)/kG/Hr (2.5 mL/Hr) IV Continuous <Continuous>  dextrose 5%. 1000 milliLiter(s) (50 mL/Hr) IV Continuous <Continuous>  dextrose 5%. 1000 milliLiter(s) (100 mL/Hr) IV Continuous <Continuous>  dextrose 50% Injectable 25 Gram(s) IV Push once  dextrose 50% Injectable 12.5 Gram(s) IV Push once  dextrose 50% Injectable 25 Gram(s) IV Push once  glucagon  Injectable 1 milliGRAM(s) IntraMuscular once  hydrALAZINE 25 milliGRAM(s) Oral every 8 hours  insulin lispro (ADMELOG) corrective regimen sliding scale   SubCutaneous every 6 hours  insulin NPH human recombinant 15 Unit(s) SubCutaneous every 6 hours  pantoprazole  Injectable 40 milliGRAM(s) IV Push daily  polyethylene glycol 3350 17 Gram(s) Oral every 12 hours  predniSONE   Tablet 5 milliGRAM(s) Oral daily  senna 2 Tablet(s) Oral at bedtime  tacrolimus    0.5 mG/mL Suspension 5 milliGRAM(s) Oral two times a day    MEDICATIONS  (PRN):  acetaminophen   Oral Liquid .. 650 milliGRAM(s) Oral every 6 hours PRN Temp greater or equal to 38C (100.4F), Mild Pain (1 - 3)  dextrose Oral Gel 15 Gram(s) Oral once PRN Blood Glucose LESS THAN 70 milliGRAM(s)/deciliter  fentaNYL    Injectable 25 MICROGram(s) IV Push every 2 hours PRN vent synchroncy  ondansetron Injectable 4 milliGRAM(s) IV Push every 6 hours PRN Nausea and/or Vomiting  oxyCODONE    IR 5 milliGRAM(s) Oral every 4 hours PRN Moderate Pain (4 - 6)        IMAGING:      EXAMINATION:  PHYSICAL EXAM:    Constitutional: No Acute Distress     Neurological: Awake, alert oriented to person, place and time, Following Commands, PERRL, EOMI, No Gaze Preference, Face Symmetrical, Speech Fluent, No dysmetria, No ataxia, No nystagmus     Motor exam:          Upper extremity                         Delt     Bicep     Tricep    HG                                                 R         5/5        5/5        5/5       5/5                                               L          5/5 5/5 5/5 5/5          Lower extremity                        HF         KF        KE       DF         PF                                                  R        5/5 5/5 5/5 5/5 5/5                                               L         5/5 5/5 5/5 5/5 5/5                                                 Sensation: [ ] intact to light touch  [ ] decreased:     Reflexes: Deep Tendon Reflexes Intact     Pulmonary: Clear to Auscultation, No rales, No rhonchi, No wheezes     Cardiovascular: S1, S2, Regular rate and rhythm     Gastrointestinal: Soft, Non-tender, Non-distended     Extremities: No calf tenderness     Incision:    HOSPITAL COURSE: This is a 77 YO F with a PMHx ESRD s/p renal xplant off HD, L AKA, 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    Admission Scores  GCS: 4 ICH: 4    24 hour Events:  3/2- Patient s/p left craniectomy(discarded) and evacuation. Patient started on insulin drip for elevated BG   3/3: Patient switched off insulin drip given low blood glucose. ISS placed. Patient's A1C 5.4%.  3/4: Patient s/p IVC filter for DVTs. Glucose elevated.       Allergies    shellfish (Rash)  ChloraPrep One-Step (Rash)  penicillins (Rash)    Intolerances        REVIEW OF SYSTEMS: [X ] Unable to Assess due to neurologic exam   [ ] All ROS addressed below are non-contributory, except:  Neuro: [ ] Headache [ ] Back pain [ ] Numbness [ ] Weakness [ ] Ataxia [ ] Dizziness [ ] Aphasia [ ] Dysarthria [ ] Visual disturbance  Resp: [ ] Shortness of breath/dyspnea, [ ] Orthopnea [ ] Cough  CV: [ ] Chest pain [ ] Palpitation [ ] Lightheadedness [ ] Syncope  Renal: [ ] Thirst [ ] Edema  GI: [ ] Nausea [ ] Emesis [ ] Abdominal pain [ ] Constipation [ ] Diarrhea  Hem: [ ] Hematemesis [ ] bright red blood per rectum  ID: [ ] Fever [ ] Chills [ ] Dysuria  ENT: [ ] Rhinorrhea      DEVICES:   [ ] Restraints [ ] ET tube [ ] central line [ ] arterial line [ ] johnson [ ] NGT/OGT [ ] EVD [ ] LD [ ] YON/HMV [ ] Trach [ ] PEG [ ] Chest Tube     VITALS:   Vital Signs Last 24 Hrs  T(C): 35.8 (04 Mar 2024 07:00), Max: 36.7 (03 Mar 2024 23:00)  T(F): 96.4 (04 Mar 2024 07:00), Max: 98.1 (03 Mar 2024 23:00)  HR: 63 (04 Mar 2024 07:00) (63 - 106)  BP: 158/70 (04 Mar 2024 07:00) (132/72 - 186/87)  BP(mean): 101 (04 Mar 2024 07:00) (85 - 128)  RR: 14 (04 Mar 2024 07:00) (14 - 30)  SpO2: 100% (04 Mar 2024 07:00) (95% - 100%)    Parameters below as of 03 Mar 2024 19:00  Patient On (Oxygen Delivery Method): ventilator    O2 Concentration (%): 40  CAPILLARY BLOOD GLUCOSE      POCT Blood Glucose.: 215 mg/dL (04 Mar 2024 05:17)  POCT Blood Glucose.: 291 mg/dL (03 Mar 2024 23:17)  POCT Blood Glucose.: 242 mg/dL (03 Mar 2024 17:04)  POCT Blood Glucose.: 282 mg/dL (03 Mar 2024 17:03)  POCT Blood Glucose.: 128 mg/dL (03 Mar 2024 11:59)  POCT Blood Glucose.: 95 mg/dL (03 Mar 2024 10:12)  POCT Blood Glucose.: 93 mg/dL (03 Mar 2024 09:49)  POCT Blood Glucose.: 153 mg/dL (03 Mar 2024 09:02)    I&O's Summary    03 Mar 2024 07:01  -  04 Mar 2024 07:00  --------------------------------------------------------  IN: 4666.5 mL / OUT: 1479 mL / NET: 3187.5 mL        Respiratory:  Mode: AC/ CMV (Assist Control/ Continuous Mandatory Ventilation)  RR (machine): 14  TV (machine): 450  FiO2: 40  PEEP: 5  ITime: 1  MAP: 10  PIP: 21        LABS:                        8.8    9.29  )-----------( 142      ( 04 Mar 2024 05:12 )             27.5     03-04    137  |  104  |  36<H>  ----------------------------<  210<H>  4.2   |  23  |  1.41<H>             MEDICATION LEVELS:     IVF FLUIDS/MEDICATIONS:   MEDICATIONS  (STANDING):  brivaracetam Oral Solution 50 milliGRAM(s) Oral two times a day  carvedilol 25 milliGRAM(s) Oral every 12 hours  chlorhexidine 0.12% Liquid 15 milliLiter(s) Oral Mucosa every 12 hours  chlorhexidine 4% Liquid 1 Application(s) Topical daily  cloNIDine 0.1 milliGRAM(s) Oral two times a day  dexMEDEtomidine Infusion 0.2 MICROgram(s)/kG/Hr (2.5 mL/Hr) IV Continuous <Continuous>  dextrose 5%. 1000 milliLiter(s) (50 mL/Hr) IV Continuous <Continuous>  dextrose 5%. 1000 milliLiter(s) (100 mL/Hr) IV Continuous <Continuous>  dextrose 50% Injectable 25 Gram(s) IV Push once  dextrose 50% Injectable 12.5 Gram(s) IV Push once  dextrose 50% Injectable 25 Gram(s) IV Push once  glucagon  Injectable 1 milliGRAM(s) IntraMuscular once  hydrALAZINE 25 milliGRAM(s) Oral every 8 hours  insulin lispro (ADMELOG) corrective regimen sliding scale   SubCutaneous every 6 hours  insulin NPH human recombinant 15 Unit(s) SubCutaneous every 6 hours  pantoprazole  Injectable 40 milliGRAM(s) IV Push daily  polyethylene glycol 3350 17 Gram(s) Oral every 12 hours  predniSONE   Tablet 5 milliGRAM(s) Oral daily  senna 2 Tablet(s) Oral at bedtime  tacrolimus    0.5 mG/mL Suspension 5 milliGRAM(s) Oral two times a day    MEDICATIONS  (PRN):  acetaminophen   Oral Liquid .. 650 milliGRAM(s) Oral every 6 hours PRN Temp greater or equal to 38C (100.4F), Mild Pain (1 - 3)  dextrose Oral Gel 15 Gram(s) Oral once PRN Blood Glucose LESS THAN 70 milliGRAM(s)/deciliter  fentaNYL    Injectable 25 MICROGram(s) IV Push every 2 hours PRN vent synchroncy  ondansetron Injectable 4 milliGRAM(s) IV Push every 6 hours PRN Nausea and/or Vomiting  oxyCODONE    IR 5 milliGRAM(s) Oral every 4 hours PRN Moderate Pain (4 - 6)        IMAGING:      EXAMINATION:  PHYSICAL EXAM:    Constitutional: No Acute Distress     Neurological: Eyes open to pain, eyes midline, CAS, moving left UE spontaneously, right UE 0/5, right LE withdraws, not following commands     Reflexes: Deep Tendon Reflexes Intact     Pulmonary: Clear to Auscultation, No rales, No rhonchi, No wheezes     Cardiovascular: S1, S2, Regular rate and rhythm     Gastrointestinal: Soft, Non-tender, Non-distended     Extremities: No calf tenderness

## 2024-03-04 NOTE — CHART NOTE - NSCHARTNOTEFT_GEN_A_CORE
Record reviewed untill 21:00.  There are 12 seizures noted since Briviact 50 mg around 13:24  Full report to follow after review of completed study tomorrow.     DIVINE Shaw.  Attending Physician, [facility]      [facility] EEG Reading Room Ph#: [phone]  Epilepsy Answering Service after 5PM and before 8:30AM: Ph#: [phone]

## 2024-03-04 NOTE — PROGRESS NOTE ADULT - ASSESSMENT
ASSESSMENT/PLAN: s/p left craniectomy(discarded) and evacuation. POD3    NEURO:  Neurochecks Q1, Vitals Q1  r CTH post-op: s/p clot evacuation, heme in the 4th ventricle, improvement of midline shift, pneumocephalus and hydro  EVD at 10 ICP 0-4 drained about 49 cc in 24 hours.  1 YON drain drained 130 cc in 24 hours  EVD in at 10, drained 0-5 cc, ICP 5-9  Tylenol/Oxy prn for pain  Briviact 50 mg BID for seizure ppx?   Activity: [] OOB as tolerated [x] Bedrest [] PT [] OT [] PMNR    PULM:  Intubated   PRVC 14/450/5/50  CPAP as tolerated   Oral secretions , not inline  At risk of aspiration pneumonia     CV:  SBP goal:   History of HTN  Metoprolol 25 mg BID     RENAL:  Fluids: IVL   f/u nephro transplant team  patient has been off HD  s/p transplant in 2019  AVF in the left upper extremity   Patient oligoric  D/C brachial A line      GI:  Diet: OGT-- restart tube feeds with Glucerna  GI prophylaxis [] not indicated [x] PPI [] other:  Bowel regimen [x] miralax [x] senna    ENDO:   Goal euglycemia (-180)  ISS  A1C: 5.4%  Currently on ISS, NPH 15 units q6H      HEME/ONC:   H/H stable for now  VTE prophylaxis: [x] SCDs [] chemoprophylaxis [] high risk of DVT/PE on admission due to:  f/u heme and onc for hx of breast cancer on tamoxifen   LED- bilateral above the knee DVT. IR consulted for IVC filter placement.   - Patient now s/p retrievable IVC filter     ID: afebrile    MISC:    SOCIAL/FAMILY:  [] updated at bedside [] family meeting    CODE STATUS:  [] Full Code [] DNR [] DNI [] Palliative/Comfort Care    DISPOSITION:  [] ICU [] Stroke Unit [] Floor [] EMU [] RCU [] PCU    [] Patient is at high risk of neurologic deterioration/death due to:      ASSESSMENT/PLAN: s/p left craniectomy(discarded) and evacuation. POD3    NEURO:  Neurochecks Q2, Vitals Q2  r CTH post-op: s/p clot evacuation, heme in the 4th ventricle, improvement of midline shift, pneumocephalus and hydro  EVD at 10 ICP 0-4 drained about 49 cc in 24 hours.  1 YON drain drained 130 cc in 24 hours  EVD in at 10, drained 0-5 cc, ICP 5-9  Tylenol/Oxy/Fentanyl prn for pain  Briviact 50 mg BID for seizure ppx  Activity: [x] ROM in bed    PULM:  Intubated   PRVC 14/450/5/50  CPAP as tolerated   CXR cephalization of the pulm veins otherwise unremarkable  Will give 10 mg IVP of lasix   Oral secretions, not inline  At risk of aspiration pneumonia     CV:  SBP goal:   History of HTN  Coreg 25 mg BID     RENAL:  Fluids: IVL   f/u nephro transplant team  patient has been off HD  s/p transplant in 2019  Renal function slightly worsening,   AVF in the left upper extremity   Patient oligoric about 25 cc/hour    GI:  Diet: OGT-- restart tube feeds with Glucerna  GI prophylaxis [] not indicated [x] PPI [] other:  Bowel regimen [x] miralax [x] senna  LBM: PTA     ENDO:   Goal euglycemia (-180)  ISS  A1C: 5.4%  Currently on ISS, NPH 20 units q6H    HEME/ONC:   H/H stable for now  VTE prophylaxis: [x] SCDs [x] chemoprophylaxis- restart -- will discuss with nsgy [] high risk of DVT/PE on admission due to:  hx of breast cancer - finished Tamoxifen in March 2023, will discontinue   LED- bilateral above the knee DVT. IR consulted for IVC filter placement.   - Patient now s/p retrievable IVC filter     ID: afebrile    MISC:    SOCIAL/FAMILY:  [] updated at bedside [] family meeting    CODE STATUS:  [] Full Code [] DNR [] DNI [] Palliative/Comfort Care    DISPOSITION:  [] ICU [] Stroke Unit [] Floor [] EMU [] RCU [] PCU    [] Patient is at high risk of neurologic deterioration/death due to:

## 2024-03-04 NOTE — PROGRESS NOTE ADULT - ASSESSMENT
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      3/3 S/P IVCF (retrievable) by IR  03/01/2024 S/P L Hemicrani for Evacuation of large ICH; bone discarded     Vascular cards consult in AM-   EEG-p, called f/u in AM   House cards called, ? h/o atrial myxoma

## 2024-03-04 NOTE — PROGRESS NOTE ADULT - PROBLEM SELECTOR PLAN 3
Pt's current regimen is Tacrolimus 5 mg BID, and prednisone 5 mg qd. Leflunomide currently on hold. Tacrolimus trough is above goal at 10.2 today. Recommend to decrease Tacrolimus dose to 4 mg BID. Monitor tacrolimus trough, goal: 4-7.    If you have any questions, please feel free to contact me  Irais Suarez  Nephrology Fellow  [phone] / [employer] Teams(Preferred)  (After 5pm or on weekends please page the on-call fellow) Pt's current regimen is Tacrolimus 5 mg BID, and prednisone 5 mg qd. Leflunomide currently on hold. Tacrolimus trough is above goal at 10.2 today. Recommend to decrease Tacrolimus dose to 3 mg BID. Monitor tacrolimus trough, goal: 4-7.    If you have any questions, please feel free to contact me  Irais Suarez  Nephrology Fellow  [phone] / [employer] Teams(Preferred)  (After 5pm or on weekends please page the on-call fellow)

## 2024-03-04 NOTE — DIETITIAN INITIAL EVALUATION ADULT - NSFNSGIIOFT_GEN_A_CORE
-No documented BM's recorded thus far. On bowel regimen (senna, Miralax).   -Pt prescribed antihyperglycemic regimen: NPH 15u q 6 hrs and insulin lispro sliding scale. To note, pt prescribed Prednisone.   -Remains sedated on Precedex; infusing at 12.5ml/hr.   -Prescribed Tacrolimus in setting of hx of renal transplant in 2019.   -EVD in place. Patent. See nursing flow sheet for output documentation.

## 2024-03-04 NOTE — OCCUPATIONAL THERAPY INITIAL EVALUATION ADULT - GENERAL OBSERVATIONS, REHAB EVAL
Pt received semisupine in bed in NAD, RN aware. +IVL +ETT +EVD +johnson +vEEG +NSCU monitoring +LUE restraint +tub feeds+JPD Pt received semisupine in bed in NAD, RN aware. +IVL +ETT +EVD +johnson +vEEG +NSCU monitoring +LUE restraint +tube feeds+JPD

## 2024-03-04 NOTE — CHART NOTE - NSCHARTNOTEFT_GEN_A_CORE
Initial 60 minutes of record reviewed.      Four electrographic seizures likely arising from the left parietal region (P7/P3).   Severe encephalopathic state.     Full report to follow after review of completed study tomorrow.     DIVINE Shaw  Attending Physician, [facility]          [facility] EEG Reading Room Ph#: [phone]  Epilepsy Answering Service after 5PM and before 8:30AM: Ph#: [phone]

## 2024-03-04 NOTE — DIETITIAN INITIAL EVALUATION ADULT - FACTORS AFF FOOD INTAKE
Pt NPO with EN feeds (Glucerna 1.2 at 55ml/hr x 24 hrs). Feeds initiated on 3/2, now infusing at goal rate.

## 2024-03-04 NOTE — PROGRESS NOTE ADULT - ASSESSMENT
77 y/o Female with polycystic kidney disease ESRD s/p renal transplant in 2019 presented with ICH - had Craniotomy and EVD

## 2024-03-04 NOTE — DIETITIAN INITIAL EVALUATION ADULT - NSFNSADHERENCEPTAFT_GEN_A_CORE
Endorsed hx of DM; takes Humalog 4u 3x daily, and Lantus 8u 1x daily. Recently received a Free Style Surekha for glucose monitoring. A1c 5.7% (3/2).

## 2024-03-04 NOTE — PROGRESS NOTE ADULT - PROBLEM SELECTOR PLAN 1
Pt. with ESRD, underwent DDRT in 2019. Pt. with MEHREEN in the setting of elevated tacrolimus level. On review of [place], Scr was WNL at 1.18 on admission (3/2), and trended up to 1.41 today (3/4). Pt. received 1L IV LR this morning. No UA or renal imaging available for review. MEHREEN may be due to pre-renal etiology as BUN:Cr is >25. However, should rule out alternative causes. Recommend to obtain UA, urine lytes, and kidney transplant sonogram (when stable). Continue with immunosuppression regimen, as outlined below. Monitor labs and urine output. Avoid nephrotoxins. Dose medications as per eGFR.

## 2024-03-04 NOTE — OCCUPATIONAL THERAPY INITIAL EVALUATION ADULT - PERTINENT HX OF CURRENT PROBLEM, REHAB EVAL
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 Pt got craniotomy with EVD placed on 3/2/24.  CTH: Large L IPH. a/w IVH and MLS . 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   3/2- Patient s/p left craniectomy(discarded) and evacuation. w/ EVD   3/3: Patient switched off insulin drip given low blood glucose. ISS placed. Patient's A1C 5.4%.  3/4: Patient s/p IVC filter for DVTs. Glucose elevated.       CTH: Large L IPH. a/w IVH and MLS . ICH score 4 Repeat CTH with expansion of bleed and worsened MLS  -L AKA approx 2 months ago

## 2024-03-04 NOTE — PHYSICAL THERAPY INITIAL EVALUATION ADULT - ADDITIONAL COMMENTS
Per daughter bedside, patient lives with her daughter in a private house with 3 steps to enter. She had a AKA approx. 2 months ago. She was able to transfer independently into a wheelchair and owned a shower chair. Daughter states patient just got her prosthetic and had 1 session of PT prior to hospital admission.

## 2024-03-04 NOTE — DIETITIAN INITIAL EVALUATION ADULT - REASON INDICATOR FOR ASSESSMENT
Nutrition Assessment warranted for length of stay.  Information obtained from: RN, comprehensive chart review, interdisciplinary medical rounds, daughters

## 2024-03-04 NOTE — PROCEDURE NOTE - NSPROCNAME_GEN_A_CORE
External Ventricular Drain Insertion
Interventional Radiology
Point of Care Ultrasound Vascular Access

## 2024-03-04 NOTE — CHART NOTE - NSCHARTNOTEFT_GEN_A_CORE
RN notified team about EVD output of 40cc in ~20 minutes, blood tinged/pink in color. Neurological exam remains stable. MD Jj hobson/neurosurgery notified. plan to clamp drain x1 hour and reopen @ 2am. ICPs wnl

## 2024-03-04 NOTE — PROGRESS NOTE ADULT - SUBJECTIVE AND OBJECTIVE BOX
[facility] Division of Kidney Diseases & Hypertension  FOLLOW UP NOTE  [phone]--------------------------------------------------------------------------------    Chief Complaint: DDRT (2019), Nontraumatic intracerebral hemorrhage    24 hour events/subjective: Pt. was seen and evaluated with family present in the neurosurgical ICU earlier today. Pt. remains intubated/sedated, unable to provide history or ROS.       PAST HISTORY  --------------------------------------------------------------------------------  No significant changes to PMH, PSH, FHx, SHx, unless otherwise noted    ALLERGIES & MEDICATIONS  --------------------------------------------------------------------------------  Allergies    shellfish (Rash)  ChloraPrep One-Step (Rash)  penicillins (Rash)    Intolerances    Standing Inpatient Medications  brivaracetam Oral Solution 50 milliGRAM(s) Oral two times a day  carvedilol 25 milliGRAM(s) Oral every 12 hours  chlorhexidine 0.12% Liquid 15 milliLiter(s) Oral Mucosa every 12 hours  chlorhexidine 4% Liquid 1 Application(s) Topical daily  cloNIDine 0.1 milliGRAM(s) Oral two times a day  hydrALAZINE 25 milliGRAM(s) Oral every 8 hours  insulin lispro (ADMELOG) corrective regimen sliding scale   SubCutaneous every 6 hours  insulin NPH human recombinant 20 Unit(s) SubCutaneous every 6 hours  pantoprazole  Injectable 40 milliGRAM(s) IV Push daily  polyethylene glycol 3350 17 Gram(s) Oral every 12 hours  predniSONE   Tablet 5 milliGRAM(s) Oral daily  senna 2 Tablet(s) Oral at bedtime  tacrolimus    0.5 mG/mL Suspension 5 milliGRAM(s) Oral two times a day    PRN Inpatient Medications  acetaminophen   Oral Liquid .. 650 milliGRAM(s) Oral every 6 hours PRN  fentaNYL    Injectable 25 MICROGram(s) IV Push every 2 hours PRN  ondansetron Injectable 4 milliGRAM(s) IV Push every 6 hours PRN  oxyCODONE    IR 5 milliGRAM(s) Oral every 4 hours PRN    REVIEW OF SYSTEMS  --------------------------------------------------------------------------------  Unable to obtain ROS, see HPI    VITALS/PHYSICAL EXAM  --------------------------------------------------------------------------------  T(C): 35.8 (03-04-24 @ 07:00), Max: 36.7 (03-03-24 @ 23:00)  HR: 70 (03-04-24 @ 09:05) (63 - 106)  BP: 147/74 (03-04-24 @ 09:00) (132/72 - 186/87)  RR: 14 (03-04-24 @ 09:00) (14 - 30)  SpO2: 100% (03-04-24 @ 09:05) (95% - 100%)  Wt(kg): --    03-03-24 @ 07:01  -  03-04-24 @ 07:00  --------------------------------------------------------  IN: 4666.5 mL / OUT: 1504 mL / NET: 3162.5 mL    03-04-24 @ 07:01  -  03-04-24 @ 11:06  --------------------------------------------------------  IN: 165 mL / OUT: 75 mL / NET: 90 mL    Physical Exam:  Gen: Intubated, sedated  HEENT: +ET tube  Pulm: Mechanical breath sounds BL  CV: RRR, S1S2;  Abd: +BS, soft, nontender/nondistended  : +Urinary catheter with large volume urine noted.  Extremities: no bilateral LE edema noted. +LLE AKA  Neuro: Sedated  Skin: Warm, without rashes    LABS/STUDIES  --------------------------------------------------------------------------------              8.8    9.29  >-----------<  142      [03-04-24 @ 05:12]              27.5     137  |  104  |  36  ----------------------------<  210      [03-04-24 @ 05:12]  4.2   |  23  |  1.41        Ca     9.2     [03-04-24 @ 05:12]      Mg     2.0     [03-04-24 @ 05:12]      Phos  3.3     [03-04-24 @ 05:12]    TPro  6.3  /  Alb  3.6  /  TBili  0.4  /  DBili  x   /  AST  29  /  ALT  10  /  AlkPhos  41  [03-02-24 @ 22:05]    Serum Osmolality 289      [03-02-24 @ 22:05]    Creatinine Trend:  SCr 1.41 [03-04 @ 05:12]  SCr 1.32 [03-03 @ 07:49]  SCr 1.33 [03-02 @ 22:05]  SCr 1.18 [03-02 @ 02:08]  SCr 1.69 [03-01 @ 17:02]    Urine Creatinine 55      [03-03-24 @ 05:03]  Urine Sodium 90      [03-03-24 @ 05:03]    TSH 1.24      [03-02-24 @ 10:51]  Lipid: chol 136, TG 95, HDL 72, LDL --      [03-02-24 @ 02:10]

## 2024-03-04 NOTE — DIETITIAN INITIAL EVALUATION ADULT - ORAL INTAKE PTA/DIET HISTORY
-Per discussion with daughters at bedside and via telephone, pt with very good appetite and PO intake PTA. Consumed three plus meals daily with snacks. No specific dietary preferences. Allergy to SHELLFISH confirmed. Denies intolerance to chewing/swallowing (wears upper/lower dentures). Reports taking daily multivitamin. Denies nausea/vomiting/constipation/diarrhea.

## 2024-03-04 NOTE — DIETITIAN INITIAL EVALUATION ADULT - ADD RECOMMEND
1. Monitor GI tolerance. RD to remain available to adjust EN formulary, volume/rate PRN.   2. Antihyperglycemic regimen deferred to team. Trend potassium levels as pt on Tacrolimus.   3. Monitor wt trends/labs/skin integrity/hydration status/bowel regularity.   4. Consider multivitamin if feeds held for extended period of time to aid in prevention of micronutrient deficiencies.

## 2024-03-04 NOTE — PROGRESS NOTE ADULT - SUBJECTIVE AND OBJECTIVE BOX
O:   Four electrographic seizures likely arising from the left parietal region (P7/P3). STARTED ON BREVIACT 100 MG bid      T(C): 36.8 (03-04-24 @ 19:00), Max: 36.8 (03-04-24 @ 19:00)  HR: 93 (03-04-24 @ 19:00) (63 - 100)  BP: 162/95 (03-04-24 @ 19:00) (118/85 - 196/98)  RR: 14 (03-04-24 @ 19:00) (7 - 30)  SpO2: 100% (03-04-24 @ 19:00) (95% - 100%)  03-03-24 @ 07:01  -  03-04-24 @ 07:00  --------------------------------------------------------  IN: 4666.5 mL / OUT: 1504 mL / NET: 3162.5 mL    03-04-24 @ 07:01  -  03-04-24 @ 20:02  --------------------------------------------------------  IN: 660 mL / OUT: 845 mL / NET: -185 mL    acetaminophen   Oral Liquid .. 650 milliGRAM(s) Oral every 6 hours PRN  brivaracetam Oral Solution 100 milliGRAM(s) Oral two times a day  carvedilol 25 milliGRAM(s) Oral every 12 hours  chlorhexidine 0.12% Liquid 15 milliLiter(s) Oral Mucosa every 12 hours  chlorhexidine 4% Liquid 1 Application(s) Topical daily  cloNIDine 0.1 milliGRAM(s) Oral three times a day  fentaNYL    Injectable 25 MICROGram(s) IV Push every 2 hours PRN  gabapentin Solution 100 milliGRAM(s) Oral three times a day  hydrALAZINE 25 milliGRAM(s) Oral every 8 hours  insulin lispro (ADMELOG) corrective regimen sliding scale   SubCutaneous every 6 hours  insulin NPH human recombinant 20 Unit(s) SubCutaneous every 6 hours  ondansetron Injectable 4 milliGRAM(s) IV Push every 6 hours PRN  oxyCODONE    IR 5 milliGRAM(s) Oral every 4 hours PRN  pantoprazole  Injectable 40 milliGRAM(s) IV Push daily  polyethylene glycol 3350 17 Gram(s) Oral every 12 hours  predniSONE   Tablet 5 milliGRAM(s) Oral daily  senna 2 Tablet(s) Oral at bedtime  tacrolimus    0.5 mG/mL Suspension 3 milliGRAM(s) Oral two times a day  trimethoprim   80 mG/sulfamethoxazole 400 mG 1 Tablet(s) Oral daily  Mode: AC/ CMV (Assist Control/ Continuous Mandatory Ventilation), RR (machine): 14, TV (machine): 450, FiO2: 40, PEEP: 5, ITime: 0.9, MAP: 8, PIP: 19    eyes midline, CAS, moving left UE spontaneously, right UE 0/5, right LE withdraws, not following commands         LABS:  Na: 136 (03-04 @ 16:13), 137 (03-04 @ 05:12), 138 (03-03 @ 07:49), 138 (03-02 @ 22:05), 135 (03-02 @ 02:08)  K: 4.4 (03-04 @ 16:13), 4.2 (03-04 @ 05:12), 3.8 (03-03 @ 07:49), 4.2 (03-02 @ 22:05), 4.7 (03-02 @ 02:08)  Cl: 102 (03-04 @ 16:13), 104 (03-04 @ 05:12), 106 (03-03 @ 07:49), 106 (03-02 @ 22:05), 101 (03-02 @ 02:08)  CO2: 25 (03-04 @ 16:13), 23 (03-04 @ 05:12), 21 (03-03 @ 07:49), 21 (03-02 @ 22:05), 23 (03-02 @ 02:08)  BUN: 40 (03-04 @ 16:13), 36 (03-04 @ 05:12), 36 (03-03 @ 07:49), 31 (03-02 @ 22:05), 26 (03-02 @ 02:08)  Cr: 1.44 (03-04 @ 16:13), 1.41 (03-04 @ 05:12), 1.32 (03-03 @ 07:49), 1.33 (03-02 @ 22:05), 1.18 (03-02 @ 02:08)  Glu: 100(03-04 @ 16:13), 210(03-04 @ 05:12), 205(03-03 @ 07:49), 143(03-02 @ 22:05), 367(03-02 @ 02:08)    Hgb: 8.8 (03-04 @ 05:12), 9.7 (03-03 @ 07:49), 10.3 (03-02 @ 22:06), 10.9 (03-02 @ 02:08)  Hct: 27.5 (03-04 @ 05:12), 29.4 (03-03 @ 07:49), 31.7 (03-02 @ 22:06), 33.5 (03-02 @ 02:08)  WBC: 9.29 (03-04 @ 05:12), 10.33 (03-03 @ 07:49), 11.91 (03-02 @ 22:06), 8.27 (03-02 @ 02:08)  Plt: 142 (03-04 @ 05:12), 157 (03-03 @ 07:49), 183 (03-02 @ 22:06), 158 (03-02 @ 02:08)    INR: 0.96 03-02-24 @ 02:09  PTT: 27.5 03-02-24 @ 02:09                ASSESSMENT/PLAN: 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 POD 1   NEURO CHECKS Q 2 HR   hydrocephalus EVD in at 10, drained 55 ml in 24 hrs , no ICP ISSUES, Keep ICP< 22 mmhg, CPP 60-70   precedex   seizures on EEG, breviact increase to 100 mg BID, monitor EEG overnight   Activity: [] OOB as tolerated [] Bedrest [x] PT [x] OT [] PMNR  IVC filter as it is contraindicated to start anticoag given ICH and has femoral DVT    Mode: AC/ CMV (Assist Control/ Continuous Mandatory Ventilation), RR (machine): 14, TV (machine): 450, FiO2: 40, PEEP: 5, ITime: 0.9, MAP: 8, PIP: 19  CPAP trial  glucern at goal, last BM Prior to admission , continue senna and miralax  MEHREEN, oliguric , was given lasix by day team, monitor MEHREEN and UO   SBP goal:   History of HTN on clonidine carvedilol and hydralazine   Metoprolol 25 mg BID   glucerna   hyperrglycemia NPH 15 units q 6 hr  Goal euglycemia (-180) ISS  DVT chemoprophylaxis  heparin sc   drop in hgb, repeat cbc tonight       CODE STATUS:  [x] Full Code [] DNR [] DNI [] Palliative/Comfort Care    DISPOSITION:  [x] ICU [] Stroke Unit [] Floor [] EMU [] RCU [] PCU    [x] Patient is at high risk of neurologic deterioration/death due to:  brain henriation    50 critical care time    O:   Four electrographic seizures likely arising from the left parietal region (P7/P3). STARTED ON BREVIACT 100 MG bid      T(C): 36.8 (03-04-24 @ 19:00), Max: 36.8 (03-04-24 @ 19:00)  HR: 93 (03-04-24 @ 19:00) (63 - 100)  BP: 162/95 (03-04-24 @ 19:00) (118/85 - 196/98)  RR: 14 (03-04-24 @ 19:00) (7 - 30)  SpO2: 100% (03-04-24 @ 19:00) (95% - 100%)  03-03-24 @ 07:01  -  03-04-24 @ 07:00  --------------------------------------------------------  IN: 4666.5 mL / OUT: 1504 mL / NET: 3162.5 mL    03-04-24 @ 07:01  -  03-04-24 @ 20:02  --------------------------------------------------------  IN: 660 mL / OUT: 845 mL / NET: -185 mL    acetaminophen   Oral Liquid .. 650 milliGRAM(s) Oral every 6 hours PRN  brivaracetam Oral Solution 100 milliGRAM(s) Oral two times a day  carvedilol 25 milliGRAM(s) Oral every 12 hours  chlorhexidine 0.12% Liquid 15 milliLiter(s) Oral Mucosa every 12 hours  chlorhexidine 4% Liquid 1 Application(s) Topical daily  cloNIDine 0.1 milliGRAM(s) Oral three times a day  fentaNYL    Injectable 25 MICROGram(s) IV Push every 2 hours PRN  gabapentin Solution 100 milliGRAM(s) Oral three times a day  hydrALAZINE 25 milliGRAM(s) Oral every 8 hours  insulin lispro (ADMELOG) corrective regimen sliding scale   SubCutaneous every 6 hours  insulin NPH human recombinant 20 Unit(s) SubCutaneous every 6 hours  ondansetron Injectable 4 milliGRAM(s) IV Push every 6 hours PRN  oxyCODONE    IR 5 milliGRAM(s) Oral every 4 hours PRN  pantoprazole  Injectable 40 milliGRAM(s) IV Push daily  polyethylene glycol 3350 17 Gram(s) Oral every 12 hours  predniSONE   Tablet 5 milliGRAM(s) Oral daily  senna 2 Tablet(s) Oral at bedtime  tacrolimus    0.5 mG/mL Suspension 3 milliGRAM(s) Oral two times a day  trimethoprim   80 mG/sulfamethoxazole 400 mG 1 Tablet(s) Oral daily  Mode: AC/ CMV (Assist Control/ Continuous Mandatory Ventilation), RR (machine): 14, TV (machine): 450, FiO2: 40, PEEP: 5, ITime: 0.9, MAP: 8, PIP: 19    eyes midline, CAS, moving left UE spontaneously, right UE 0/5, right LE withdraws, not following commands         LABS:  Na: 136 (03-04 @ 16:13), 137 (03-04 @ 05:12), 138 (03-03 @ 07:49), 138 (03-02 @ 22:05), 135 (03-02 @ 02:08)  K: 4.4 (03-04 @ 16:13), 4.2 (03-04 @ 05:12), 3.8 (03-03 @ 07:49), 4.2 (03-02 @ 22:05), 4.7 (03-02 @ 02:08)  Cl: 102 (03-04 @ 16:13), 104 (03-04 @ 05:12), 106 (03-03 @ 07:49), 106 (03-02 @ 22:05), 101 (03-02 @ 02:08)  CO2: 25 (03-04 @ 16:13), 23 (03-04 @ 05:12), 21 (03-03 @ 07:49), 21 (03-02 @ 22:05), 23 (03-02 @ 02:08)  BUN: 40 (03-04 @ 16:13), 36 (03-04 @ 05:12), 36 (03-03 @ 07:49), 31 (03-02 @ 22:05), 26 (03-02 @ 02:08)  Cr: 1.44 (03-04 @ 16:13), 1.41 (03-04 @ 05:12), 1.32 (03-03 @ 07:49), 1.33 (03-02 @ 22:05), 1.18 (03-02 @ 02:08)  Glu: 100(03-04 @ 16:13), 210(03-04 @ 05:12), 205(03-03 @ 07:49), 143(03-02 @ 22:05), 367(03-02 @ 02:08)    Hgb: 8.8 (03-04 @ 05:12), 9.7 (03-03 @ 07:49), 10.3 (03-02 @ 22:06), 10.9 (03-02 @ 02:08)  Hct: 27.5 (03-04 @ 05:12), 29.4 (03-03 @ 07:49), 31.7 (03-02 @ 22:06), 33.5 (03-02 @ 02:08)  WBC: 9.29 (03-04 @ 05:12), 10.33 (03-03 @ 07:49), 11.91 (03-02 @ 22:06), 8.27 (03-02 @ 02:08)  Plt: 142 (03-04 @ 05:12), 157 (03-03 @ 07:49), 183 (03-02 @ 22:06), 158 (03-02 @ 02:08)    INR: 0.96 03-02-24 @ 02:09  PTT: 27.5 03-02-24 @ 02:09                ASSESSMENT/PLAN: 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 POD 1   NEURO CHECKS Q 2 HR   hydrocephalus EVD in at 10, drained 55 ml in 24 hrs , no ICP ISSUES, Keep ICP< 22 mmhg, CPP 60-70 , get CT head tomorrow and clamp EVD   precedex   seizures on EEG, breviact increase to 100 mg BID, monitor EEG overnight   Activity: [] OOB as tolerated [] Bedrest [x] PT [x] OT [] PMNR  IVC filter as it is contraindicated to start anticoag given ICH and has femoral DVT    Mode: AC/ CMV (Assist Control/ Continuous Mandatory Ventilation), RR (machine): 14, TV (machine): 450, FiO2: 40, PEEP: 5, ITime: 0.9, MAP: 8, PIP: 19  CPAP trial  glucern at goal, last BM Prior to admission , continue senna and miralax  MEHREEN, oliguric , was given lasix by day team, monitor MEHREEN and UO   SBP goal:   History of HTN on clonidine carvedilol and hydralazine   Metoprolol 25 mg BID   glucerna   hyperrglycemia NPH 15 units q 6 hr  Goal euglycemia (-180) ISS  DVT chemoprophylaxis  heparin sc   drop in hgb, repeat cbc tonight       CODE STATUS:  [x] Full Code [] DNR [] DNI [] Palliative/Comfort Care    DISPOSITION:  [x] ICU [] Stroke Unit [] Floor [] EMU [] RCU [] PCU    [x] Patient is at high risk of neurologic deterioration/death due to:  brain henriation    50 critical care time    O:   Four electrographic seizures likely arising from the left parietal region (P7/P3). STARTED ON BREVIACT 100 MG bid      T(C): 36.8 (03-04-24 @ 19:00), Max: 36.8 (03-04-24 @ 19:00)  HR: 93 (03-04-24 @ 19:00) (63 - 100)  BP: 162/95 (03-04-24 @ 19:00) (118/85 - 196/98)  RR: 14 (03-04-24 @ 19:00) (7 - 30)  SpO2: 100% (03-04-24 @ 19:00) (95% - 100%)  03-03-24 @ 07:01  -  03-04-24 @ 07:00  --------------------------------------------------------  IN: 4666.5 mL / OUT: 1504 mL / NET: 3162.5 mL    03-04-24 @ 07:01  -  03-04-24 @ 20:02  --------------------------------------------------------  IN: 660 mL / OUT: 845 mL / NET: -185 mL    acetaminophen   Oral Liquid .. 650 milliGRAM(s) Oral every 6 hours PRN  brivaracetam Oral Solution 100 milliGRAM(s) Oral two times a day  carvedilol 25 milliGRAM(s) Oral every 12 hours  chlorhexidine 0.12% Liquid 15 milliLiter(s) Oral Mucosa every 12 hours  chlorhexidine 4% Liquid 1 Application(s) Topical daily  cloNIDine 0.1 milliGRAM(s) Oral three times a day  fentaNYL    Injectable 25 MICROGram(s) IV Push every 2 hours PRN  gabapentin Solution 100 milliGRAM(s) Oral three times a day  hydrALAZINE 25 milliGRAM(s) Oral every 8 hours  insulin lispro (ADMELOG) corrective regimen sliding scale   SubCutaneous every 6 hours  insulin NPH human recombinant 20 Unit(s) SubCutaneous every 6 hours  ondansetron Injectable 4 milliGRAM(s) IV Push every 6 hours PRN  oxyCODONE    IR 5 milliGRAM(s) Oral every 4 hours PRN  pantoprazole  Injectable 40 milliGRAM(s) IV Push daily  polyethylene glycol 3350 17 Gram(s) Oral every 12 hours  predniSONE   Tablet 5 milliGRAM(s) Oral daily  senna 2 Tablet(s) Oral at bedtime  tacrolimus    0.5 mG/mL Suspension 3 milliGRAM(s) Oral two times a day  trimethoprim   80 mG/sulfamethoxazole 400 mG 1 Tablet(s) Oral daily  Mode: AC/ CMV (Assist Control/ Continuous Mandatory Ventilation), RR (machine): 14, TV (machine): 450, FiO2: 40, PEEP: 5, ITime: 0.9, MAP: 8, PIP: 19    left gaze , CAS, moving left UE spontaneously, right UE 0/5, right LE withdraws, not following commands         LABS:  Na: 136 (03-04 @ 16:13), 137 (03-04 @ 05:12), 138 (03-03 @ 07:49), 138 (03-02 @ 22:05), 135 (03-02 @ 02:08)  K: 4.4 (03-04 @ 16:13), 4.2 (03-04 @ 05:12), 3.8 (03-03 @ 07:49), 4.2 (03-02 @ 22:05), 4.7 (03-02 @ 02:08)  Cl: 102 (03-04 @ 16:13), 104 (03-04 @ 05:12), 106 (03-03 @ 07:49), 106 (03-02 @ 22:05), 101 (03-02 @ 02:08)  CO2: 25 (03-04 @ 16:13), 23 (03-04 @ 05:12), 21 (03-03 @ 07:49), 21 (03-02 @ 22:05), 23 (03-02 @ 02:08)  BUN: 40 (03-04 @ 16:13), 36 (03-04 @ 05:12), 36 (03-03 @ 07:49), 31 (03-02 @ 22:05), 26 (03-02 @ 02:08)  Cr: 1.44 (03-04 @ 16:13), 1.41 (03-04 @ 05:12), 1.32 (03-03 @ 07:49), 1.33 (03-02 @ 22:05), 1.18 (03-02 @ 02:08)  Glu: 100(03-04 @ 16:13), 210(03-04 @ 05:12), 205(03-03 @ 07:49), 143(03-02 @ 22:05), 367(03-02 @ 02:08)    Hgb: 8.8 (03-04 @ 05:12), 9.7 (03-03 @ 07:49), 10.3 (03-02 @ 22:06), 10.9 (03-02 @ 02:08)  Hct: 27.5 (03-04 @ 05:12), 29.4 (03-03 @ 07:49), 31.7 (03-02 @ 22:06), 33.5 (03-02 @ 02:08)  WBC: 9.29 (03-04 @ 05:12), 10.33 (03-03 @ 07:49), 11.91 (03-02 @ 22:06), 8.27 (03-02 @ 02:08)  Plt: 142 (03-04 @ 05:12), 157 (03-03 @ 07:49), 183 (03-02 @ 22:06), 158 (03-02 @ 02:08)    INR: 0.96 03-02-24 @ 02:09  PTT: 27.5 03-02-24 @ 02:09                ASSESSMENT/PLAN: 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 POD 1   NEURO CHECKS Q 2 HR   hydrocephalus EVD in at 10, drained 55 ml in 24 hrs , no ICP ISSUES, Keep ICP< 22 mmhg, CPP 60-70 , get CT head tomorrow and clamp EVD   precedex   seizures on EEG, breviact increase to 100 mg BID, monitor EEG overnight   Activity: [] OOB as tolerated [] Bedrest [x] PT [x] OT [] PMNR  IVC filter as it is contraindicated to start anticoag given ICH and has femoral DVT    Mode: AC/ CMV (Assist Control/ Continuous Mandatory Ventilation), RR (machine): 14, TV (machine): 450, FiO2: 40, PEEP: 5, ITime: 0.9, MAP: 8, PIP: 19  CPAP trial  glucern at goal, last BM Prior to admission , continue senna and miralax  MEHREEN, oliguric , was given lasix by day team, monitor MEHREEN and UO   SBP goal:   History of HTN on clonidine carvedilol and hydralazine   Metoprolol 25 mg BID   glucerna   hyperrglycemia NPH 15 units q 6 hr  Goal euglycemia (-180) ISS  DVT chemoprophylaxis  heparin sc   drop in hgb, repeat cbc tonight       CODE STATUS:  [x] Full Code [] DNR [] DNI [] Palliative/Comfort Care    DISPOSITION:  [x] ICU [] Stroke Unit [] Floor [] EMU [] RCU [] PCU    [x] Patient is at high risk of neurologic deterioration/death due to:  brain henriation    50 critical care time    O:   Four electrographic seizures likely arising from the left parietal region (P7/P3). STARTED ON BREVIACT 100 MG bid      T(C): 36.8 (03-04-24 @ 19:00), Max: 36.8 (03-04-24 @ 19:00)  HR: 93 (03-04-24 @ 19:00) (63 - 100)  BP: 162/95 (03-04-24 @ 19:00) (118/85 - 196/98)  RR: 14 (03-04-24 @ 19:00) (7 - 30)  SpO2: 100% (03-04-24 @ 19:00) (95% - 100%)  03-03-24 @ 07:01  -  03-04-24 @ 07:00  --------------------------------------------------------  IN: 4666.5 mL / OUT: 1504 mL / NET: 3162.5 mL    03-04-24 @ 07:01  -  03-04-24 @ 20:02  --------------------------------------------------------  IN: 660 mL / OUT: 845 mL / NET: -185 mL    acetaminophen   Oral Liquid .. 650 milliGRAM(s) Oral every 6 hours PRN  brivaracetam Oral Solution 100 milliGRAM(s) Oral two times a day  carvedilol 25 milliGRAM(s) Oral every 12 hours  chlorhexidine 0.12% Liquid 15 milliLiter(s) Oral Mucosa every 12 hours  chlorhexidine 4% Liquid 1 Application(s) Topical daily  cloNIDine 0.1 milliGRAM(s) Oral three times a day  fentaNYL    Injectable 25 MICROGram(s) IV Push every 2 hours PRN  gabapentin Solution 100 milliGRAM(s) Oral three times a day  hydrALAZINE 25 milliGRAM(s) Oral every 8 hours  insulin lispro (ADMELOG) corrective regimen sliding scale   SubCutaneous every 6 hours  insulin NPH human recombinant 20 Unit(s) SubCutaneous every 6 hours  ondansetron Injectable 4 milliGRAM(s) IV Push every 6 hours PRN  oxyCODONE    IR 5 milliGRAM(s) Oral every 4 hours PRN  pantoprazole  Injectable 40 milliGRAM(s) IV Push daily  polyethylene glycol 3350 17 Gram(s) Oral every 12 hours  predniSONE   Tablet 5 milliGRAM(s) Oral daily  senna 2 Tablet(s) Oral at bedtime  tacrolimus    0.5 mG/mL Suspension 3 milliGRAM(s) Oral two times a day  trimethoprim   80 mG/sulfamethoxazole 400 mG 1 Tablet(s) Oral daily  Mode: AC/ CMV (Assist Control/ Continuous Mandatory Ventilation), RR (machine): 14, TV (machine): 450, FiO2: 40, PEEP: 5, ITime: 0.9, MAP: 8, PIP: 19    left gaze , CAS, moving left UE spontaneously, right UE 0/5, right LE withdraws, not following commands         LABS:  Na: 136 (03-04 @ 16:13), 137 (03-04 @ 05:12), 138 (03-03 @ 07:49), 138 (03-02 @ 22:05), 135 (03-02 @ 02:08)  K: 4.4 (03-04 @ 16:13), 4.2 (03-04 @ 05:12), 3.8 (03-03 @ 07:49), 4.2 (03-02 @ 22:05), 4.7 (03-02 @ 02:08)  Cl: 102 (03-04 @ 16:13), 104 (03-04 @ 05:12), 106 (03-03 @ 07:49), 106 (03-02 @ 22:05), 101 (03-02 @ 02:08)  CO2: 25 (03-04 @ 16:13), 23 (03-04 @ 05:12), 21 (03-03 @ 07:49), 21 (03-02 @ 22:05), 23 (03-02 @ 02:08)  BUN: 40 (03-04 @ 16:13), 36 (03-04 @ 05:12), 36 (03-03 @ 07:49), 31 (03-02 @ 22:05), 26 (03-02 @ 02:08)  Cr: 1.44 (03-04 @ 16:13), 1.41 (03-04 @ 05:12), 1.32 (03-03 @ 07:49), 1.33 (03-02 @ 22:05), 1.18 (03-02 @ 02:08)  Glu: 100(03-04 @ 16:13), 210(03-04 @ 05:12), 205(03-03 @ 07:49), 143(03-02 @ 22:05), 367(03-02 @ 02:08)    Hgb: 8.8 (03-04 @ 05:12), 9.7 (03-03 @ 07:49), 10.3 (03-02 @ 22:06), 10.9 (03-02 @ 02:08)  Hct: 27.5 (03-04 @ 05:12), 29.4 (03-03 @ 07:49), 31.7 (03-02 @ 22:06), 33.5 (03-02 @ 02:08)  WBC: 9.29 (03-04 @ 05:12), 10.33 (03-03 @ 07:49), 11.91 (03-02 @ 22:06), 8.27 (03-02 @ 02:08)  Plt: 142 (03-04 @ 05:12), 157 (03-03 @ 07:49), 183 (03-02 @ 22:06), 158 (03-02 @ 02:08)    INR: 0.96 03-02-24 @ 02:09  PTT: 27.5 03-02-24 @ 02:09                ASSESSMENT/PLAN: 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   NEURO CHECKS Q 2 HR   hydrocephalus EVD in at 10, drained 55 ml in 24 hrs , no ICP ISSUES, Keep ICP< 22 mmhg, CPP 60-70 , get CT head tomorrow and clamp EVD discussed with Dr Neil   seizures on EEG, breviact increase to 100 mg BID, seizures on  EEG , add vimpat 100 mg BID    Activity: [] OOB as tolerated [] Bedrest [x] PT [x] OT [] PMNR  IVC filter as it is contraindicated to start anticoag given ICH and has femoral DVT    Mode: AC/ CMV (Assist Control/ Continuous Mandatory Ventilation), RR (machine): 14, TV (machine): 450, FiO2: 40, PEEP: 5, ITime: 0.9, MAP: 8, PIP: 19  CPAP trial  glucerna at goal, last BM Prior to admission , continue senna and miralax , will give enema   MEHREEN, oliguric , was given lasix by day team, monitor MEHREEN and UO    SBP goal:   History of HTN on clonidine carvedilol and hydralazine   Metoprolol 25 mg BID   glucerna   hyperglycemia NPH 15 units q 6 hr  Goal euglycemia (-180) ISS  DVT chemoprophylaxis  heparin sc   drop in hgb, repeat cbc tonight       CODE STATUS:  [x] Full Code [] DNR [] DNI [] Palliative/Comfort Care    DISPOSITION:  [x] ICU [] Stroke Unit [] Floor [] EMU [] RCU [] PCU    [x] Patient is at high risk of neurologic deterioration/death due to:  brain henriation    50 critical care time

## 2024-03-04 NOTE — PROGRESS NOTE ADULT - SUBJECTIVE AND OBJECTIVE BOX
Patient seen and examined at bedside.    --Anticoagulation--    T(C): 36.7 (03-03-24 @ 23:00), Max: 37.7 (03-03-24 @ 05:00)  HR: 90 (03-04-24 @ 00:00) (78 - 106)  BP: 154/72 (03-04-24 @ 00:00) (132/72 - 181/87)  RR: 15 (03-04-24 @ 00:00) (14 - 28)  SpO2: 100% (03-04-24 @ 00:00) (97% - 100%)  Wt(kg): --    Exam: Intubated, no EO, B/L pupil 3S, L gaze, cough/gag/OB, no FC, LUE spont, RUE 0/5, RLE wds

## 2024-03-04 NOTE — DIETITIAN INITIAL EVALUATION ADULT - OTHER INFO
Dosing wt (3/1): 110 lbs  Per family, UBW ~109 lbs. Wt trend (per [facility]): (1/18): 103 lbs; (10/18/23): 101.7 lbs Will continue to monitor/trend.   History of L AKA noted. IBW: 97.9 lbs.

## 2024-03-04 NOTE — DIETITIAN INITIAL EVALUATION ADULT - PERTINENT LABORATORY DATA
03-04    137  |  104  |  36<H>  ----------------------------<  210<H>  4.2   |  23  |  1.41<H>    Ca    9.2      04 Mar 2024 05:12  Phos  3.3     03-04  Mg     2.0     03-04    TPro  6.3  /  Alb  3.6  /  TBili  0.4  /  DBili  x   /  AST  29  /  ALT  10  /  AlkPhos  41  03-02  POCT Blood Glucose.: 215 mg/dL (03-04-24 @ 05:17)  A1C with Estimated Average Glucose Result: 5.7 % (03-02-24 @ 11:57)

## 2024-03-04 NOTE — DIETITIAN INITIAL EVALUATION ADULT - PERTINENT MEDS FT
MEDICATIONS  (STANDING):  brivaracetam Oral Solution 50 milliGRAM(s) Oral two times a day  carvedilol 25 milliGRAM(s) Oral every 12 hours  chlorhexidine 0.12% Liquid 15 milliLiter(s) Oral Mucosa every 12 hours  chlorhexidine 4% Liquid 1 Application(s) Topical daily  cloNIDine 0.1 milliGRAM(s) Oral two times a day  dexMEDEtomidine Infusion 0.2 MICROgram(s)/kG/Hr (2.5 mL/Hr) IV Continuous <Continuous>  dextrose 5%. 1000 milliLiter(s) (100 mL/Hr) IV Continuous <Continuous>  dextrose 5%. 1000 milliLiter(s) (50 mL/Hr) IV Continuous <Continuous>  dextrose 50% Injectable 12.5 Gram(s) IV Push once  dextrose 50% Injectable 25 Gram(s) IV Push once  dextrose 50% Injectable 25 Gram(s) IV Push once  glucagon  Injectable 1 milliGRAM(s) IntraMuscular once  hydrALAZINE 25 milliGRAM(s) Oral every 8 hours  insulin lispro (ADMELOG) corrective regimen sliding scale   SubCutaneous every 6 hours  insulin NPH human recombinant 15 Unit(s) SubCutaneous every 6 hours  pantoprazole  Injectable 40 milliGRAM(s) IV Push daily  polyethylene glycol 3350 17 Gram(s) Oral every 12 hours  predniSONE   Tablet 5 milliGRAM(s) Oral daily  senna 2 Tablet(s) Oral at bedtime  tacrolimus    0.5 mG/mL Suspension 5 milliGRAM(s) Oral two times a day    MEDICATIONS  (PRN):  acetaminophen   Oral Liquid .. 650 milliGRAM(s) Oral every 6 hours PRN Temp greater or equal to 38C (100.4F), Mild Pain (1 - 3)  dextrose Oral Gel 15 Gram(s) Oral once PRN Blood Glucose LESS THAN 70 milliGRAM(s)/deciliter  fentaNYL    Injectable 25 MICROGram(s) IV Push every 2 hours PRN vent synchroncy  ondansetron Injectable 4 milliGRAM(s) IV Push every 6 hours PRN Nausea and/or Vomiting  oxyCODONE    IR 5 milliGRAM(s) Oral every 4 hours PRN Moderate Pain (4 - 6)

## 2024-03-04 NOTE — DIETITIAN INITIAL EVALUATION ADULT - ENTERAL
Increase EN feeds to Glucerna 1.2 at 60ml/hr x 24 hrs. To provide (based on dosing wt 50kg): 1440ml, 1728kcal (34.6kcal/kg) and 86g protein (1.72g protein/kg).

## 2024-03-04 NOTE — PHYSICAL THERAPY INITIAL EVALUATION ADULT - ACTIVE RANGE OF MOTION EXAMINATION, REHAB EVAL
Patient demonstrates spontaneous movement of LUE and L hip, no movement observed on R side/Right UE Active ROM was WFL (within functional limits)

## 2024-03-04 NOTE — DIETITIAN INITIAL EVALUATION ADULT - PHYSCIAL ASSESSMENT
No visual signs of body fat or muscle mass depletion. Pt unable to provide consent for Nutrition Focused Physical Assessment.

## 2024-03-04 NOTE — DIETITIAN INITIAL EVALUATION ADULT - REASON FOR ADMISSION
Pt is a 79 yo F with PMH: ESRD s/p renal transplant off HD, L AKA, BK viremia, HTN, breast cancer s/p lumpectomy on Tamoxifen, DVT off Eliquis, HLD, gout. Admitted with 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 S/P left craniectomy and evacuation on 3/2. S/P IVC filter for DVTs on 3/4.

## 2024-03-05 ENCOUNTER — NON-APPOINTMENT (OUTPATIENT)
Age: 79
End: 2024-03-05

## 2024-03-05 LAB
GLUCOSE BLDC GLUCOMTR-MCNC: 101 MG/DL — HIGH (ref 70–99)
GLUCOSE BLDC GLUCOMTR-MCNC: 104 MG/DL — HIGH (ref 70–99)
GLUCOSE BLDC GLUCOMTR-MCNC: 50 MG/DL — CRITICAL LOW (ref 70–99)
GLUCOSE BLDC GLUCOMTR-MCNC: 53 MG/DL — CRITICAL LOW (ref 70–99)
GLUCOSE BLDC GLUCOMTR-MCNC: 94 MG/DL — SIGNIFICANT CHANGE UP (ref 70–99)
TACROLIMUS SERPL-MCNC: 10.3 NG/ML — SIGNIFICANT CHANGE UP

## 2024-03-05 PROCEDURE — 70450 CT HEAD/BRAIN W/O DYE: CPT | Mod: 26

## 2024-03-05 PROCEDURE — 99232 SBSQ HOSP IP/OBS MODERATE 35: CPT | Mod: FS

## 2024-03-05 PROCEDURE — 99232 SBSQ HOSP IP/OBS MODERATE 35: CPT

## 2024-03-05 PROCEDURE — 71045 X-RAY EXAM CHEST 1 VIEW: CPT | Mod: 26

## 2024-03-05 PROCEDURE — 95720 EEG PHY/QHP EA INCR W/VEEG: CPT

## 2024-03-05 PROCEDURE — 99291 CRITICAL CARE FIRST HOUR: CPT | Mod: FS

## 2024-03-05 RX ORDER — LACOSAMIDE 50 MG/1
200 TABLET ORAL
Refills: 0 | Status: DISCONTINUED | OUTPATIENT
Start: 2024-03-05 | End: 2024-03-07

## 2024-03-05 RX ORDER — LABETALOL HCL 100 MG
10 TABLET ORAL ONCE
Refills: 0 | Status: COMPLETED | OUTPATIENT
Start: 2024-03-05 | End: 2024-03-05

## 2024-03-05 RX ORDER — HYDRALAZINE HCL 50 MG
10 TABLET ORAL ONCE
Refills: 0 | Status: COMPLETED | OUTPATIENT
Start: 2024-03-05 | End: 2024-03-05

## 2024-03-05 RX ORDER — HYDRALAZINE HCL 50 MG
5 TABLET ORAL ONCE
Refills: 0 | Status: COMPLETED | OUTPATIENT
Start: 2024-03-05 | End: 2024-03-05

## 2024-03-05 RX ORDER — HUMAN INSULIN 100 [IU]/ML
7 INJECTION, SUSPENSION SUBCUTANEOUS EVERY 6 HOURS
Refills: 0 | Status: DISCONTINUED | OUTPATIENT
Start: 2024-03-05 | End: 2024-03-06

## 2024-03-05 RX ORDER — HUMAN INSULIN 100 [IU]/ML
15 INJECTION, SUSPENSION SUBCUTANEOUS EVERY 6 HOURS
Refills: 0 | Status: DISCONTINUED | OUTPATIENT
Start: 2024-03-05 | End: 2024-03-05

## 2024-03-05 RX ORDER — HYDRALAZINE HCL 50 MG
50 TABLET ORAL EVERY 8 HOURS
Refills: 0 | Status: DISCONTINUED | OUTPATIENT
Start: 2024-03-05 | End: 2024-03-06

## 2024-03-05 RX ORDER — DEXTROSE 50 % IN WATER 50 %
25 SYRINGE (ML) INTRAVENOUS ONCE
Refills: 0 | Status: COMPLETED | OUTPATIENT
Start: 2024-03-05 | End: 2024-03-05

## 2024-03-05 RX ORDER — NICARDIPINE HYDROCHLORIDE 30 MG/1
5 CAPSULE, EXTENDED RELEASE ORAL
Qty: 40 | Refills: 0 | Status: DISCONTINUED | OUTPATIENT
Start: 2024-03-05 | End: 2024-03-05

## 2024-03-05 RX ADMIN — Medication 50 MILLIGRAM(S): at 21:13

## 2024-03-05 RX ADMIN — CHLORHEXIDINE GLUCONATE 1 APPLICATION(S): 213 SOLUTION TOPICAL at 13:22

## 2024-03-05 RX ADMIN — Medication 25 MILLILITER(S): at 12:23

## 2024-03-05 RX ADMIN — CHLORHEXIDINE GLUCONATE 15 MILLILITER(S): 213 SOLUTION TOPICAL at 05:08

## 2024-03-05 RX ADMIN — BRIVARACETAM 100 MILLIGRAM(S): 25 TABLET, FILM COATED ORAL at 05:08

## 2024-03-05 RX ADMIN — Medication 50 MILLIGRAM(S): at 05:39

## 2024-03-05 RX ADMIN — TACROLIMUS 3 MILLIGRAM(S): 5 CAPSULE ORAL at 05:08

## 2024-03-05 RX ADMIN — LACOSAMIDE 100 MILLIGRAM(S): 50 TABLET ORAL at 17:12

## 2024-03-05 RX ADMIN — Medication 50 MILLIGRAM(S): at 13:23

## 2024-03-05 RX ADMIN — OXYCODONE HYDROCHLORIDE 5 MILLIGRAM(S): 5 TABLET ORAL at 03:00

## 2024-03-05 RX ADMIN — Medication 0.1 MILLIGRAM(S): at 13:23

## 2024-03-05 RX ADMIN — CARVEDILOL PHOSPHATE 25 MILLIGRAM(S): 80 CAPSULE, EXTENDED RELEASE ORAL at 05:08

## 2024-03-05 RX ADMIN — CARVEDILOL PHOSPHATE 25 MILLIGRAM(S): 80 CAPSULE, EXTENDED RELEASE ORAL at 17:12

## 2024-03-05 RX ADMIN — OXYCODONE HYDROCHLORIDE 5 MILLIGRAM(S): 5 TABLET ORAL at 03:30

## 2024-03-05 RX ADMIN — GABAPENTIN 100 MILLIGRAM(S): 400 CAPSULE ORAL at 21:13

## 2024-03-05 RX ADMIN — Medication 25 MILLIGRAM(S): at 05:08

## 2024-03-05 RX ADMIN — Medication 0.1 MILLIGRAM(S): at 21:13

## 2024-03-05 RX ADMIN — Medication 10 MILLIGRAM(S): at 02:13

## 2024-03-05 RX ADMIN — Medication 5 MILLIGRAM(S): at 14:00

## 2024-03-05 RX ADMIN — HUMAN INSULIN 7 UNIT(S): 100 INJECTION, SUSPENSION SUBCUTANEOUS at 17:11

## 2024-03-05 RX ADMIN — OXYCODONE HYDROCHLORIDE 5 MILLIGRAM(S): 5 TABLET ORAL at 18:02

## 2024-03-05 RX ADMIN — POLYETHYLENE GLYCOL 3350 17 GRAM(S): 17 POWDER, FOR SOLUTION ORAL at 17:13

## 2024-03-05 RX ADMIN — Medication 0.1 MILLIGRAM(S): at 05:13

## 2024-03-05 RX ADMIN — BRIVARACETAM 100 MILLIGRAM(S): 25 TABLET, FILM COATED ORAL at 17:23

## 2024-03-05 RX ADMIN — Medication 10 MILLIGRAM(S): at 04:08

## 2024-03-05 RX ADMIN — HEPARIN SODIUM 5000 UNIT(S): 5000 INJECTION INTRAVENOUS; SUBCUTANEOUS at 17:13

## 2024-03-05 RX ADMIN — CHLORHEXIDINE GLUCONATE 15 MILLILITER(S): 213 SOLUTION TOPICAL at 17:12

## 2024-03-05 RX ADMIN — GABAPENTIN 100 MILLIGRAM(S): 400 CAPSULE ORAL at 05:14

## 2024-03-05 RX ADMIN — PANTOPRAZOLE SODIUM 40 MILLIGRAM(S): 20 TABLET, DELAYED RELEASE ORAL at 12:22

## 2024-03-05 RX ADMIN — Medication 1 TABLET(S): at 12:24

## 2024-03-05 RX ADMIN — HEPARIN SODIUM 5000 UNIT(S): 5000 INJECTION INTRAVENOUS; SUBCUTANEOUS at 05:13

## 2024-03-05 RX ADMIN — GABAPENTIN 100 MILLIGRAM(S): 400 CAPSULE ORAL at 13:27

## 2024-03-05 RX ADMIN — OXYCODONE HYDROCHLORIDE 5 MILLIGRAM(S): 5 TABLET ORAL at 13:10

## 2024-03-05 RX ADMIN — Medication 5 MILLIGRAM(S): at 05:07

## 2024-03-05 RX ADMIN — LACOSAMIDE 100 MILLIGRAM(S): 50 TABLET ORAL at 05:13

## 2024-03-05 RX ADMIN — HUMAN INSULIN 15 UNIT(S): 100 INJECTION, SUSPENSION SUBCUTANEOUS at 06:05

## 2024-03-05 RX ADMIN — OXYCODONE HYDROCHLORIDE 5 MILLIGRAM(S): 5 TABLET ORAL at 17:17

## 2024-03-05 RX ADMIN — Medication 10 MILLIGRAM(S): at 00:48

## 2024-03-05 RX ADMIN — NICARDIPINE HYDROCHLORIDE 25 MG/HR: 30 CAPSULE, EXTENDED RELEASE ORAL at 05:07

## 2024-03-05 RX ADMIN — OXYCODONE HYDROCHLORIDE 5 MILLIGRAM(S): 5 TABLET ORAL at 12:25

## 2024-03-05 NOTE — PROGRESS NOTE ADULT - SUBJECTIVE AND OBJECTIVE BOX
O: evd CLAMPED TODAY    T(C): 36.3 (03-05-24 @ 19:00), Max: 37 (03-04-24 @ 23:00)  HR: 81 (03-05-24 @ 19:00) (75 - 99)  BP: 148/75 (03-05-24 @ 19:00) (121/68 - 208/98)  RR: 14 (03-05-24 @ 19:00) (12 - 19)  SpO2: 98% (03-05-24 @ 19:00) (98% - 100%)  03-04-24 @ 07:01  -  03-05-24 @ 07:00  --------------------------------------------------------  IN: 1877.5 mL / OUT: 1530 mL / NET: 347.5 mL    03-05-24 @ 07:01  -  03-05-24 @ 19:33  --------------------------------------------------------  IN: 780 mL / OUT: 395 mL / NET: 385 mL    acetaminophen   Oral Liquid .. 650 milliGRAM(s) Oral every 6 hours PRN  brivaracetam Oral Solution 100 milliGRAM(s) Oral two times a day  carvedilol 25 milliGRAM(s) Oral every 12 hours  chlorhexidine 0.12% Liquid 15 milliLiter(s) Oral Mucosa every 12 hours  chlorhexidine 4% Liquid 1 Application(s) Topical daily  cloNIDine 0.1 milliGRAM(s) Oral three times a day  fentaNYL    Injectable 25 MICROGram(s) IV Push every 2 hours PRN  gabapentin Solution 100 milliGRAM(s) Oral three times a day  heparin   Injectable 5000 Unit(s) SubCutaneous every 12 hours  hydrALAZINE 50 milliGRAM(s) Oral every 8 hours  insulin lispro (ADMELOG) corrective regimen sliding scale   SubCutaneous every 6 hours  insulin NPH human recombinant 7 Unit(s) SubCutaneous every 6 hours  lacosamide 100 milliGRAM(s) Oral two times a day  ondansetron Injectable 4 milliGRAM(s) IV Push every 6 hours PRN  oxyCODONE    IR 5 milliGRAM(s) Oral every 4 hours PRN  pantoprazole  Injectable 40 milliGRAM(s) IV Push daily  polyethylene glycol 3350 17 Gram(s) Oral every 12 hours  predniSONE   Tablet 5 milliGRAM(s) Oral daily  senna 2 Tablet(s) Oral at bedtime  trimethoprim   80 mG/sulfamethoxazole 400 mG 1 Tablet(s) Oral daily  Mode: AC/ CMV (Assist Control/ Continuous Mandatory Ventilation), RR (machine): 1, TV (machine): 450, FiO2: 40, PEEP: 5, ITime: 0.9, MAP: 9, PIP: 23  left gaze , CAS, moving left UE spontaneously, right UE 0/5, right LE withdraws, not following commands         LABS:  Na: 136 (03-04 @ 16:13), 137 (03-04 @ 05:12), 138 (03-03 @ 07:49), 138 (03-02 @ 22:05), 135 (03-02 @ 02:08)  K: 4.4 (03-04 @ 16:13), 4.2 (03-04 @ 05:12), 3.8 (03-03 @ 07:49), 4.2 (03-02 @ 22:05), 4.7 (03-02 @ 02:08)  Cl: 102 (03-04 @ 16:13), 104 (03-04 @ 05:12), 106 (03-03 @ 07:49), 106 (03-02 @ 22:05), 101 (03-02 @ 02:08)  CO2: 25 (03-04 @ 16:13), 23 (03-04 @ 05:12), 21 (03-03 @ 07:49), 21 (03-02 @ 22:05), 23 (03-02 @ 02:08)  BUN: 40 (03-04 @ 16:13), 36 (03-04 @ 05:12), 36 (03-03 @ 07:49), 31 (03-02 @ 22:05), 26 (03-02 @ 02:08)  Cr: 1.44 (03-04 @ 16:13), 1.41 (03-04 @ 05:12), 1.32 (03-03 @ 07:49), 1.33 (03-02 @ 22:05), 1.18 (03-02 @ 02:08)  Glu: 100(03-04 @ 16:13), 210(03-04 @ 05:12), 205(03-03 @ 07:49), 143(03-02 @ 22:05), 367(03-02 @ 02:08)    Hgb: 8.8 (03-04 @ 05:12), 9.7 (03-03 @ 07:49), 10.3 (03-02 @ 22:06), 10.9 (03-02 @ 02:08)  Hct: 27.5 (03-04 @ 05:12), 29.4 (03-03 @ 07:49), 31.7 (03-02 @ 22:06), 33.5 (03-02 @ 02:08)  WBC: 9.29 (03-04 @ 05:12), 10.33 (03-03 @ 07:49), 11.91 (03-02 @ 22:06), 8.27 (03-02 @ 02:08)  Plt: 142 (03-04 @ 05:12), 157 (03-03 @ 07:49), 183 (03-02 @ 22:06), 158 (03-02 @ 02:08)    INR: 0.96 03-02-24 @ 02:09  PTT: 27.5 03-02-24 @ 02:09      EEG Summary / Classification:  Abnormal EEG.  •	Background slowing  -          Multifocal epileptiform discharges in left parietal, temporal and frontal regions  -          Multifocal electrographic seizures    EEG Impression / Clinical Correlate:  Abnormal prolonged EEG study due to  1- Frequent multifocal seizures involving mainly left parietal region, and less frequently left frontal or frontocentral regions  2- Frequent multifocal epileptiform discharges involving left parietal, temporal and frontal regions  3- Moderate to severe diffuse cerebral dysfunction that is not specific in etiology      •	Of note, there is gradual improvement in epileptiform abnormality and seizures rate after 2 am, with the last seizure recorded at 04:30 am          ASSESSMENT/PLAN: 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   NEURO CHECKS Q 2 HR   hydrocephalus EVD CLAMPED today, Keep ICP< 22 mmhg, CPP 60-70  CT head today stable  seizures on EEG, breviact  100 mg BID, vimpat 100 mg BID    Activity: [] OOB as tolerated [] Bedrest [x] PT [x] OT [] PMNR  IVC filter as it is contraindicated to start anticoag given ICH and has femoral DVT    Mode: AC/ CMV (Assist Control/ Continuous Mandatory Ventilation), RR (machine): 1, TV (machine): 450, FiO2: 40, PEEP: 5, ITime: 0.9, MAP: 9, PIP: 23  CPAP trial, might need a trach   glucerna at goal, last BM Prior to admission , continue senna and miralax , will give enema   MEHREEN, on CKD monitor creat and UO    SBP goal:   History of HTN on clonidine carvedilol and hydralazine Metoprolol 25 mg BID   glucerna   DM, today hypoglycemia insulin NPH human recombinant decreased to  7 Unit(s) SubCutaneous every 6 hours  Goal euglycemia (-180) ISS  DVT chemoprophylaxis  heparin sc   hgb stable       CODE STATUS:  [x] Full Code [] DNR [] DNI [] Palliative/Comfort Care    DISPOSITION:  [x] ICU [] Stroke Unit [] Floor [] EMU [] RCU [] PCU    [x] Patient is at high risk of neurologic deterioration/death due to:  brain henriation    60 critical care time          O: evd CLAMPED TODAY    T(C): 36.3 (03-05-24 @ 19:00), Max: 37 (03-04-24 @ 23:00)  HR: 81 (03-05-24 @ 19:00) (75 - 99)  BP: 148/75 (03-05-24 @ 19:00) (121/68 - 208/98)  RR: 14 (03-05-24 @ 19:00) (12 - 19)  SpO2: 98% (03-05-24 @ 19:00) (98% - 100%)  03-04-24 @ 07:01  -  03-05-24 @ 07:00  --------------------------------------------------------  IN: 1877.5 mL / OUT: 1530 mL / NET: 347.5 mL    03-05-24 @ 07:01  -  03-05-24 @ 19:33  --------------------------------------------------------  IN: 780 mL / OUT: 395 mL / NET: 385 mL    acetaminophen   Oral Liquid .. 650 milliGRAM(s) Oral every 6 hours PRN  brivaracetam Oral Solution 100 milliGRAM(s) Oral two times a day  carvedilol 25 milliGRAM(s) Oral every 12 hours  chlorhexidine 0.12% Liquid 15 milliLiter(s) Oral Mucosa every 12 hours  chlorhexidine 4% Liquid 1 Application(s) Topical daily  cloNIDine 0.1 milliGRAM(s) Oral three times a day  fentaNYL    Injectable 25 MICROGram(s) IV Push every 2 hours PRN  gabapentin Solution 100 milliGRAM(s) Oral three times a day  heparin   Injectable 5000 Unit(s) SubCutaneous every 12 hours  hydrALAZINE 50 milliGRAM(s) Oral every 8 hours  insulin lispro (ADMELOG) corrective regimen sliding scale   SubCutaneous every 6 hours  insulin NPH human recombinant 7 Unit(s) SubCutaneous every 6 hours  lacosamide 100 milliGRAM(s) Oral two times a day  ondansetron Injectable 4 milliGRAM(s) IV Push every 6 hours PRN  oxyCODONE    IR 5 milliGRAM(s) Oral every 4 hours PRN  pantoprazole  Injectable 40 milliGRAM(s) IV Push daily  polyethylene glycol 3350 17 Gram(s) Oral every 12 hours  predniSONE   Tablet 5 milliGRAM(s) Oral daily  senna 2 Tablet(s) Oral at bedtime  trimethoprim   80 mG/sulfamethoxazole 400 mG 1 Tablet(s) Oral daily  Mode: AC/ CMV (Assist Control/ Continuous Mandatory Ventilation), RR (machine): 1, TV (machine): 450, FiO2: 40, PEEP: 5, ITime: 0.9, MAP: 9, PIP: 23  left gaze , CAS, moving left UE spontaneously, right UE 0/5, right LE withdraws, not following commands         LABS:  Na: 136 (03-04 @ 16:13), 137 (03-04 @ 05:12), 138 (03-03 @ 07:49), 138 (03-02 @ 22:05), 135 (03-02 @ 02:08)  K: 4.4 (03-04 @ 16:13), 4.2 (03-04 @ 05:12), 3.8 (03-03 @ 07:49), 4.2 (03-02 @ 22:05), 4.7 (03-02 @ 02:08)  Cl: 102 (03-04 @ 16:13), 104 (03-04 @ 05:12), 106 (03-03 @ 07:49), 106 (03-02 @ 22:05), 101 (03-02 @ 02:08)  CO2: 25 (03-04 @ 16:13), 23 (03-04 @ 05:12), 21 (03-03 @ 07:49), 21 (03-02 @ 22:05), 23 (03-02 @ 02:08)  BUN: 40 (03-04 @ 16:13), 36 (03-04 @ 05:12), 36 (03-03 @ 07:49), 31 (03-02 @ 22:05), 26 (03-02 @ 02:08)  Cr: 1.44 (03-04 @ 16:13), 1.41 (03-04 @ 05:12), 1.32 (03-03 @ 07:49), 1.33 (03-02 @ 22:05), 1.18 (03-02 @ 02:08)  Glu: 100(03-04 @ 16:13), 210(03-04 @ 05:12), 205(03-03 @ 07:49), 143(03-02 @ 22:05), 367(03-02 @ 02:08)    Hgb: 8.8 (03-04 @ 05:12), 9.7 (03-03 @ 07:49), 10.3 (03-02 @ 22:06), 10.9 (03-02 @ 02:08)  Hct: 27.5 (03-04 @ 05:12), 29.4 (03-03 @ 07:49), 31.7 (03-02 @ 22:06), 33.5 (03-02 @ 02:08)  WBC: 9.29 (03-04 @ 05:12), 10.33 (03-03 @ 07:49), 11.91 (03-02 @ 22:06), 8.27 (03-02 @ 02:08)  Plt: 142 (03-04 @ 05:12), 157 (03-03 @ 07:49), 183 (03-02 @ 22:06), 158 (03-02 @ 02:08)    INR: 0.96 03-02-24 @ 02:09  PTT: 27.5 03-02-24 @ 02:09      EEG Summary / Classification:  Abnormal EEG.  •	Background slowing  -          Multifocal epileptiform discharges in left parietal, temporal and frontal regions  -          Multifocal electrographic seizures    EEG Impression / Clinical Correlate:  Abnormal prolonged EEG study due to  1- Frequent multifocal seizures involving mainly left parietal region, and less frequently left frontal or frontocentral regions  2- Frequent multifocal epileptiform discharges involving left parietal, temporal and frontal regions  3- Moderate to severe diffuse cerebral dysfunction that is not specific in etiology      •	Of note, there is gradual improvement in epileptiform abnormality and seizures rate after 2 am, with the last seizure recorded at 04:30 am          ASSESSMENT/PLAN: 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   NEURO CHECKS Q 1 HR   hydrocephalus EVD CLAMPED today, Keep ICP< 22 mmhg, CPP 60-70, no ICP issues so far   CT head today stable  seizures on EEG, breviact  100 mg BID, vimpat 100 mg BID  increase vimpat to 200 mg q 12hr   Activity: [] OOB as tolerated [] Bedrest [x] PT [x] OT [] PMNR  IVC filter as it is contraindicated to start anticoag given ICH and has femoral DVT    Mode: AC/ CMV (Assist Control/ Continuous Mandatory Ventilation), RR (machine): 1, TV (machine): 450, FiO2: 40, PEEP: 5, ITime: 0.9, MAP: 9, PIP: 23  CPAP trial, might need a trach   glucerna at goal, last BM today   MEHREEN, on CKD monitor creat and UO  , improved UO   SBP goal:   History of HTN on clonidine carvedilol and hydralazine Metoprolol 25 mg BID   glucerna   DM, today hypoglycemia insulin NPH human recombinant decreased to  7 Unit(s) SubCutaneous every 6 hours  Goal euglycemia (-180) ISS  DVT chemoprophylaxis  heparin sc   hgb stable       CODE STATUS:  [x] Full Code [] DNR [] DNI [] Palliative/Comfort Care    DISPOSITION:  [x] ICU [] Stroke Unit [] Floor [] EMU [] RCU [] PCU    [x] Patient is at high risk of neurologic deterioration/death due to:  brain henriation    60 critical care time

## 2024-03-05 NOTE — PROGRESS NOTE ADULT - SUBJECTIVE AND OBJECTIVE BOX
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    Admission Scores  GCS: 4 ICH: 4    24 hour Events:  3/2- Patient s/p left craniectomy(discarded) and evacuation. Patient started on insulin drip for elevated BG   3/3: Patient switched off insulin drip given low blood glucose. ISS placed. Patient's A1C 5.4%.  3/4: Patient s/p IVC filter for DVTs. Glucose elevated.   3/5- No acute events overnight. Plan for scan today and clamping of EVD.    Allergies    shellfish (Rash)  ChloraPrep One-Step (Rash)  penicillins (Rash)    Intolerances        REVIEW OF SYSTEMS: [x] Unable to Assess due to neurologic exam   [] All ROS addressed below are non-contributory, except:  Neuro: [ ] Headache [ ] Back pain [ ] Numbness [ ] Weakness [ ] Ataxia [ ] Dizziness [ ] Aphasia [ ] Dysarthria [ ] Visual disturbance  Resp: [ ] Shortness of breath/dyspnea, [ ] Orthopnea [ ] Cough  CV: [ ] Chest pain [ ] Palpitation [ ] Lightheadedness [ ] Syncope  Renal: [ ] Thirst [ ] Edema  GI: [ ] Nausea [ ] Emesis [ ] Abdominal pain [ ] Constipation [ ] Diarrhea  Hem: [ ] Hematemesis [ ] bright red blood per rectum  ID: [ ] Fever [ ] Chills [ ] Dysuria  ENT: [ ] Rhinorrhea      DEVICES:   [ ] Restraints [ ] ET tube [ ] central line [ ] arterial line [ ] johnson [ ] NGT/OGT [ ] EVD [ ] LD [ ] YON/HMV [ ] Trach [ ] PEG [ ] Chest Tube     VITALS:   Vital Signs Last 24 Hrs  T(C): 36.2 (05 Mar 2024 07:00), Max: 37 (04 Mar 2024 23:00)  T(F): 97.2 (05 Mar 2024 07:00), Max: 98.6 (04 Mar 2024 23:00)  HR: 81 (05 Mar 2024 08:13) (69 - 99)  BP: 139/75 (05 Mar 2024 08:00) (118/85 - 208/98)  BP(mean): 101 (05 Mar 2024 08:00) (85 - 139)  RR: 14 (05 Mar 2024 08:00) (7 - 19)  SpO2: 100% (05 Mar 2024 08:13) (98% - 100%)    Parameters below as of 04 Mar 2024 19:00  Patient On (Oxygen Delivery Method): ventilator    O2 Concentration (%): 40  CAPILLARY BLOOD GLUCOSE      POCT Blood Glucose.: 94 mg/dL (05 Mar 2024 05:26)  POCT Blood Glucose.: 170 mg/dL (04 Mar 2024 23:06)  POCT Blood Glucose.: 114 mg/dL (04 Mar 2024 17:06)  POCT Blood Glucose.: 150 mg/dL (04 Mar 2024 11:26)    I&O's Summary    04 Mar 2024 07:01  -  05 Mar 2024 07:00  --------------------------------------------------------  IN: 1877.5 mL / OUT: 1530 mL / NET: 347.5 mL        Respiratory:  Mode: CPAP with PS  FiO2: 40  PEEP: 5  PS: 10  MAP: 10  PIP: 15        LABS:                        9.9    9.52  )-----------( 106      ( 04 Mar 2024 23:18 )             30.4     03-04    135  |  101  |  45<H>  ----------------------------<  178<H>  4.8   |  23  |  1.54<H>             MEDICATION LEVELS:     IVF FLUIDS/MEDICATIONS:   MEDICATIONS  (STANDING):  brivaracetam Oral Solution 100 milliGRAM(s) Oral two times a day  carvedilol 25 milliGRAM(s) Oral every 12 hours  chlorhexidine 0.12% Liquid 15 milliLiter(s) Oral Mucosa every 12 hours  chlorhexidine 4% Liquid 1 Application(s) Topical daily  cloNIDine 0.1 milliGRAM(s) Oral three times a day  gabapentin Solution 100 milliGRAM(s) Oral three times a day  heparin   Injectable 5000 Unit(s) SubCutaneous every 12 hours  hydrALAZINE 50 milliGRAM(s) Oral every 8 hours  insulin lispro (ADMELOG) corrective regimen sliding scale   SubCutaneous every 6 hours  insulin NPH human recombinant 15 Unit(s) SubCutaneous every 6 hours  lacosamide 100 milliGRAM(s) Oral two times a day  niCARdipine Infusion 5 mG/Hr (25 mL/Hr) IV Continuous <Continuous>  pantoprazole  Injectable 40 milliGRAM(s) IV Push daily  polyethylene glycol 3350 17 Gram(s) Oral every 12 hours  predniSONE   Tablet 5 milliGRAM(s) Oral daily  senna 2 Tablet(s) Oral at bedtime  tacrolimus    0.5 mG/mL Suspension 3 milliGRAM(s) Oral two times a day  trimethoprim   80 mG/sulfamethoxazole 400 mG 1 Tablet(s) Oral daily    MEDICATIONS  (PRN):  acetaminophen   Oral Liquid .. 650 milliGRAM(s) Oral every 6 hours PRN Temp greater or equal to 38C (100.4F), Mild Pain (1 - 3)  fentaNYL    Injectable 25 MICROGram(s) IV Push every 2 hours PRN vent synchroncy  ondansetron Injectable 4 milliGRAM(s) IV Push every 6 hours PRN Nausea and/or Vomiting  oxyCODONE    IR 5 milliGRAM(s) Oral every 4 hours PRN Moderate Pain (4 - 6)        IMAGING:        EXAMINATION:  PHYSICAL EXAM:    Constitutional: No Acute Distress     Neurological: Eyes open to pain, eyes midline, CAS, moving left UE spontaneously, right UE 0/5, right LE withdraws, not following commands     Reflexes: Deep Tendon Reflexes Intact     Pulmonary: Clear to Auscultation, No rales, No rhonchi, No wheezes     Cardiovascular: S1, S2, Regular rate and rhythm     Gastrointestinal: Soft, Non-tender, Non-distended     Extremities: No calf tenderness

## 2024-03-05 NOTE — PROGRESS NOTE ADULT - ASSESSMENT
ASSESSMENT/PLAN: s/p left craniectomy(discarded) and evacuation. POD4    NEURO:  Neurochecks Q2, Vitals Q2  r CTH post-op: s/p clot evacuation, heme in the 4th ventricle, improvement of midline shift, pneumocephalus and hydro  EVD at 10 ICP 1-8 drained about 82 cc in 24 hours.  1 YON drain drained 35 cc in 24 hours  Plan for CTH this AM followed by clamping of EVD  Tylenol/Oxy/Fentanyl prn for pain  Briviact 50 mg BID for seizure ppx  Activity: [x] ROM in bed    PULM:  Intubated   PRVC 14/450/5/50  CPAP as tolerated   CXR cephalization of the pulm veins otherwise unremarkable  Oral secretions, not inline  At risk of aspiration pneumonia     CV:  SBP goal:   History of HTN  Coreg 25 mg BID     RENAL:  Fluids: IVL   f/u nephro transplant team  patient has been off HD  s/p transplant in 2019  Renal function worsening, urine output maintained   AVF in the left upper extremity     GI:  Diet: OGT-- continue tube feeds with Glucerna  GI prophylaxis [] not indicated [x] PPI [] other:  Bowel regimen [x] miralax [x] senna  LBM: PTA     ENDO:   Goal euglycemia (-180)  ISS  A1C: 5.4%  Currently on ISS, NPH 20 units q6H    HEME/ONC:   H/H stable for now  VTE prophylaxis: [x] SCDs [x] chemoprophylaxis- restart -- will discuss with nsgy [] high risk of DVT/PE on admission due to:  hx of breast cancer - finished Tamoxifen in March 2023, will discontinue   LED- bilateral above the knee DVT. IR consulted for IVC filter placement.   - Patient now s/p retrievable IVC filter     ID: afebrile    MISC:    SOCIAL/FAMILY:  [] updated at bedside [] family meeting    CODE STATUS:  [] Full Code [] DNR [] DNI [] Palliative/Comfort Care    DISPOSITION:  [] ICU [] Stroke Unit [] Floor [] EMU [] RCU [] PCU    [] Patient is at high risk of neurologic deterioration/death due to:      ASSESSMENT/PLAN: s/p left craniectomy(discarded) and evacuation. POD4    NEURO:  Neurochecks Q1, Vitals Q2  r CTH post-op: s/p clot evacuation, heme in the 4th ventricle, improvement of midline shift, pneumocephalus and hydro  EVD at 10 ICP 1-8 drained about 82 cc in 24 hours- clamped now (3/5)  1 YON drain drained 35 cc in 24 hours  VEEG- 1- Frequent multifocal seizures involving mainly left parietal region, and less frequently left frontal or frontocentral regions 2- Frequent multifocal epileptiform discharges involving left parietal, temporal and frontal regions 3- Moderate to severe diffuse cerebral dysfunction that is not specific in etiology  Tylenol/Oxy/Fentanyl prn for pain  Briviact 100 mg BID for seizures, Vimpat 100 mg BID (), will follow up with epileptologist and increase vimpat as needed  Activity: [x] ROM in bed    PULM:  Intubated   PRVC 14/450/5/50  CPAP as tolerated   CXR improved  Oral secretions, not inline  At risk of aspiration pneumonia     CV:  SBP goal:   History of HTN  Currently on Hydralazine 50 mg Q8H, clonidine 0.1 mg TID, Coreg 25 mg BID     RENAL:  Fluids: IVL   f/u nephro transplant team  patient has been off HD  s/p transplant in 2019  Renal function worsening, urine output maintained   AVF in the left upper extremity     GI:  Diet: OGT-- continue tube feeds with Glucerna  GI prophylaxis [] not indicated [x] PPI [] other:  Bowel regimen [x] miralax [x] senna  LBM: 3/5    ENDO:   Goal euglycemia (-180)  ISS  A1C: 5.4%  Currently on ISS, NPH 15 units q6H    HEME/ONC:   H/H stable for now  VTE prophylaxis: [x] SCDs [x] chemoprophylaxis- SQH [] high risk of DVT/PE on admission due to:  hx of breast cancer - finished Tamoxifen in March 2023, will discontinue   LED- bilateral above the knee DVT. IR consulted for IVC filter placement.   - Patient now s/p retrievable IVC filter   - continue to monitor thrombocytopenia does not correlate with heparin as it was started today 3/5  - Patient receives procrit shots every month, will continue     ID: afebrile    MISC:    SOCIAL/FAMILY:  [] updated at bedside [] family meeting    CODE STATUS:  [] Full Code [] DNR [] DNI [] Palliative/Comfort Care    DISPOSITION:  [] ICU [] Stroke Unit [] Floor [] EMU [] RCU [] PCU    [] Patient is at high risk of neurologic deterioration/death due to:

## 2024-03-05 NOTE — PROGRESS NOTE ADULT - PROBLEM SELECTOR PLAN 2
Pt's current regimen is Tacrolimus 5 mg BID, and prednisone 5 mg qd. Leflunomide currently on hold. Tacrolimus trough remains above goal at 10.3 today. Recommend to hold Tacrolimus, will dose based on trough result tomorrow AM. Monitor tacrolimus trough, goal: 4-7.    If you have any questions, please feel free to contact me  Irais Suarez  Nephrology Fellow  [phone] / Microsoft Teams(Preferred)  (After 5pm or on weekends please page the on-call fellow)

## 2024-03-05 NOTE — EEG REPORT - NS EEG TEXT BOX
REPORT OF CONTINUOUS VIDEO EEG      [facility]: [address], [place] [address], Phone: [phone]  [facility]: [address], [place] [address], Phone: [phone]  [facility]: [address] [place] [address], Phone: [phone]    Patient Name: Nury Adam    Age: 78 year, : 1945   [id number]  [facility]G #: [id number]    Study Date: 3/4/2024   Start Time: 10:43:45 AM      End Date: 3/5/2024         End Time: 08:00:03 AM     Study Duration: 21 h 13 m    Study Information:    EEG Recording Technique:  The patient underwent continuous Video-EEG monitoring, using Telemetry System hardware on the XLTek Digital System. EEG and video data were stored on a computer hard drive with important events saved in digital archive files. The material was reviewed by a physician (electroencephalographer / epileptologist) on a daily basis. Cosme and seizure detection algorithms were utilized and reviewed. An EEG Technician attended to the patient, and was available throughout daytime work hours.  The epilepsy center neurologist was available in person or on call 24-hours per day.    EEG Placement and Labeling of Electrodes:  The EEG was performed utilizing 20 channel referential EEG connections (coronal over temporal over parasagittal montage) using all standard 10-20 electrode placements with EKG, with additional electrodes placed in the inferior temporal region using the modified 10-10 montage electrode placements for elective admissions, or if deemed necessary. Recording was at a sampling rate of 256 samples per second per channel. Time synchronized digital video recording was done simultaneously with EEG recording. A low light infrared camera was used for low light recording.     History:  -78 year old male is here for seizure evaluation      Medication  Alphagan    Prograf    Deltasone    Oxycodone    Fentanyl    Precedex    Catapres    Briviact      Interpretation:    [[[Abbreviation Key:  PDR=alpha rhythm/posterior dominant rhythm. A-P=anterior posterior.  Amplitude: ‘very low’:<20; ‘low’:20-49; ‘medium’:; ‘high’:>150uV.  Persistence for periodic/rhythmic patterns (% of epoch) ‘rare’:<1%; ‘occasional’:1-10%; ‘frequent’:10-50%; ‘abundant’:50-90%; ‘continuous’:>90%.  Persistence for sporadic discharges: ‘rare’:<1/hr; ‘occasional’:1/min-1/hr; ‘frequent’:>1/min; ‘abundant’:>1/10 sec.  RPP=rhythmic and periodic patterns; GRDA=generalized rhythmic delta activity; FIRDA=frontal intermittent GRDA; LRDA=lateralized rhythmic delta activity; TIRDA=temporal intermittent rhythmic delta activity;  LPD=PLED=lateralized periodic discharges; GPD=generalized periodic discharges; BIPDs =bilateral independent periodic discharges; Mf=multifocal; SIRPDs=stimulus induced rhythmic, periodic, or ictal appearing discharges; BIRDs=brief potentially ictal rhythmic discharges >4 Hz, lasting .5-10s; PFA (paroxysmal bursts >13 Hz or =8 Hz <10s).  Modifiers: +F=with fast component; +S=with spike component; +R=with rhythmic component.  S-B=burst suppression pattern.  Max=maximal. N1-drowsy; N2-stage II sleep; N3-slow wave sleep. SSS/BETS=small sharp spikes/benign epileptiform transients of sleep. HV=hyperventilation; PS=photic stimulation]]]      Daily EEG Visual Analysis    FINDINGS:      Background:  Symmetry: symmetric  Continuous: continuous  PDR: absent  Reactivity: present  Voltage: normal, [defined typically between 20-150uV]  Anterior Posterior Gradient: absent  Breach: absent    Background Slowing:  Generalized slowing: present. Background is diffusely slow consisting of delta theta activity up to 5 Hz    Focal slowing: unclear.    State Changes:   absent    Sporadic Epileptiform Discharges:   Frequent multifocal discharges involving left parietal (P3/P7) and less frequently temporal (F7/T7) and frontal (F3/C3), occurs as spikes/polyspikes and waves either sporadic, periodic, in runs or event in bursts lasting 4 seconds    Rhythmic and Periodic Patterns (RPPs):  None     Electrographic and Electroclinical seizures:  Frequent multifocal subclinical seizures arising mainly from left parietal region (P3/P7) and less frequently from left frontal (F3) or left frontocentral (C3) regions and mostly stay focal but at times spread to the right hemisphere.    Other Clinical Events:  None    Activation Procedures:   -Hyperventilation was not performed.    -Photic stimulation was not performed.      Artifacts:  Intermittent myogenic and movement artifacts were noted.    ECG:  The heart rate on single channel ECG was predominantly between 70-80 BPM.    EEG Summary / Classification:  Abnormal EEG.  •	Background slowing  -          Multifocal epileptiform discharges in left parietal, temporal and frontal regions  -          Multifocal electrographic seizures    EEG Impression / Clinical Correlate:  Abnormal prolonged EEG study due to  1- Frequent multifocal seizures involving mainly left parietal region, and less frequently left frontal or frontocentral regions  2- Frequent multifocal epileptiform discharges involving left parietal, temporal and frontal regions  3- Moderate to severe diffuse cerebral dysfunction that is not specific in etiology      •	Of note, there is gradual improvement in epileptiform abnormality and seizures rate after 2 am, with the last seizure recorded at 04:30 am    ________________________________________    DIVINE Shaw  Attending Physician, [facility] [place]    ------------------------------------  EEG Reading Room: [phone]  On Call Service After Hours: [phone]

## 2024-03-05 NOTE — PROGRESS NOTE ADULT - ATTENDING COMMENTS
Pt still intubated, non responsive.    Tacro trough elevated, hold pending level tomorrow.   Creatinine baseline likely closer to 1.6.

## 2024-03-05 NOTE — PROGRESS NOTE ADULT - SUBJECTIVE AND OBJECTIVE BOX
[facility] DIVISION OF KIDNEY DISEASES AND HYPERTENSION -- FOLLOW UP NOTE  --------------------------------------------------------------------------------    Chief Complaint: DDRT (2019), Nontraumatic intracerebral hemorrhage    24 hour events/subjective: Pt. was seen and evaluated with family present in the neurosurgical ICU earlier today. Pt. remains intubated/sedated, unable to provide history or ROS.      PAST HISTORY  --------------------------------------------------------------------------------  No significant changes to PMH, PSH, FHx, SHx, unless otherwise noted    ALLERGIES & MEDICATIONS  --------------------------------------------------------------------------------  Allergies    shellfish (Rash)  ChloraPrep One-Step (Rash)  penicillins (Rash)    Intolerances      Standing Inpatient Medications  brivaracetam Oral Solution 100 milliGRAM(s) Oral two times a day  carvedilol 25 milliGRAM(s) Oral every 12 hours  chlorhexidine 0.12% Liquid 15 milliLiter(s) Oral Mucosa every 12 hours  chlorhexidine 4% Liquid 1 Application(s) Topical daily  cloNIDine 0.1 milliGRAM(s) Oral three times a day  gabapentin Solution 100 milliGRAM(s) Oral three times a day  heparin   Injectable 5000 Unit(s) SubCutaneous every 12 hours  hydrALAZINE 50 milliGRAM(s) Oral every 8 hours  insulin lispro (ADMELOG) corrective regimen sliding scale   SubCutaneous every 6 hours  insulin NPH human recombinant 15 Unit(s) SubCutaneous every 6 hours  lacosamide 100 milliGRAM(s) Oral two times a day  pantoprazole  Injectable 40 milliGRAM(s) IV Push daily  polyethylene glycol 3350 17 Gram(s) Oral every 12 hours  predniSONE   Tablet 5 milliGRAM(s) Oral daily  senna 2 Tablet(s) Oral at bedtime  tacrolimus    0.5 mG/mL Suspension 3 milliGRAM(s) Oral two times a day  trimethoprim   80 mG/sulfamethoxazole 400 mG 1 Tablet(s) Oral daily    PRN Inpatient Medications  acetaminophen   Oral Liquid .. 650 milliGRAM(s) Oral every 6 hours PRN  fentaNYL    Injectable 25 MICROGram(s) IV Push every 2 hours PRN  ondansetron Injectable 4 milliGRAM(s) IV Push every 6 hours PRN  oxyCODONE    IR 5 milliGRAM(s) Oral every 4 hours PRN      REVIEW OF SYSTEMS  --------------------------------------------------------------------------------  Unable to obtain ROS, see HPI    VITALS/PHYSICAL EXAM  --------------------------------------------------------------------------------  T(C): 36.2 (03-05-24 @ 07:00), Max: 37 (03-04-24 @ 23:00)  HR: 83 (03-05-24 @ 09:00) (75 - 99)  BP: 137/72 (03-05-24 @ 09:00) (118/85 - 208/98)  RR: 16 (03-05-24 @ 09:00) (7 - 19)  SpO2: 100% (03-05-24 @ 09:00) (98% - 100%)  Wt(kg): --    03-04-24 @ 07:01  -  03-05-24 @ 07:00  --------------------------------------------------------  IN: 1877.5 mL / OUT: 1530 mL / NET: 347.5 mL    03-05-24 @ 07:01  -  03-05-24 @ 10:57  --------------------------------------------------------  IN: 165 mL / OUT: 90 mL / NET: 75 mL    Physical Exam:  Gen: Intubated, sedated  HEENT: +ET tube  Pulm: Mechanical breath sounds BL  CV: RRR, S1S2;  Abd: +BS, soft, nontender/nondistended  : +Urinary catheter with large volume urine noted.  Extremities: no bilateral LE edema noted. +LLE AKA  Neuro: Sedated  Skin: Warm, without rashes    LABS/STUDIES  --------------------------------------------------------------------------------              9.9    9.52  >-----------<  106      [03-04-24 @ 23:18]              30.4     135  |  101  |  45  ----------------------------<  178      [03-04-24 @ 23:18]  4.8   |  23  |  1.54        Ca     9.4     [03-04-24 @ 23:18]      Mg     2.1     [03-04-24 @ 23:18]      Phos  3.7     [03-04-24 @ 23:18]    Creatinine Trend:  SCr 1.54 [03-04 @ 23:18]  SCr 1.44 [03-04 @ 16:13]  SCr 1.41 [03-04 @ 05:12]  SCr 1.32 [03-03 @ 07:49]  SCr 1.33 [03-02 @ 22:05]    Tacrolimus (), Serum: 10.3 ng/mL (03-05 @ 05:35)  Tacrolimus (), Serum: 10.2 ng/mL (03-04 @ 05:12)  Tacrolimus (), Serum: 5.5 ng/mL (03-02 @ 03:24)    Urine Creatinine 55      [03-03-24 @ 05:03]  Urine Sodium 90      [03-03-24 @ 05:03]    HbA1c 7.2      [01-11-20 @ 09:23]  TSH 1.24      [03-02-24 @ 10:51]  Lipid: chol 136, TG 95, HDL 72, LDL --      [03-02-24 @ 02:10]

## 2024-03-05 NOTE — PROGRESS NOTE ADULT - PROBLEM SELECTOR PLAN 1
Pt. with ESRD, underwent DDRT in 2019. On review of [place], Scr was 1.86 on 10/31/23. SCr was WNL at 1.18 on admission (3/2), and trended up to 1.54 yesterday (3/4). Pt. with likley underlying CKD, Scr likely approaching baseline. Continue with immunosuppression regimen, as outlined below. Monitor labs and urine output. Avoid nephrotoxins. Dose medications as per eGFR.

## 2024-03-05 NOTE — PROGRESS NOTE ADULT - SUBJECTIVE AND OBJECTIVE BOX
Patient seen and examined at bedside.    --Anticoagulation--  heparin   Injectable 5000 Unit(s) SubCutaneous every 12 hours    T(C): 37 (03-04-24 @ 23:00), Max: 37 (03-04-24 @ 23:00)  HR: 89 (03-05-24 @ 00:00) (63 - 99)  BP: 182/89 (03-05-24 @ 00:00) (118/85 - 196/98)  RR: 14 (03-05-24 @ 00:00) (7 - 19)  SpO2: 99% (03-05-24 @ 00:00) (98% - 100%)  Wt(kg): --    Exam:  Intubated, PARISH, PERRL, L gaze, +corneals/cough/gag/OB, not FC, LUE spont, RUE 0/5, RLE WDs

## 2024-03-05 NOTE — PATIENT PROFILE ADULT - STATED REASON FOR ADMISSION
Call to home, mom Guadalupe aware of the recommendations per Dr Freeman.   Transfer from [facility]

## 2024-03-05 NOTE — PATIENT PROFILE ADULT - FALL HARM RISK - DEVICES
Patient attended Phase 2 Cardiac Rehab Exercise Session. Further documentation will be completed in Cardiac Science/[employer] System and will be scanned into the medical record upon discharge.    
Wheelchair

## 2024-03-05 NOTE — PROGRESS NOTE ADULT - SUBJECTIVE AND OBJECTIVE BOX
Interventional Radiology Follow-Up Note    This is a 78y Female s/p IVC filter on 3/3/24 in Interventional Radiology with Dr. Joe Dickinson.     S: Patient seen and examined @ bedside.     Medication:     carvedilol: (03-05)  cloNIDine: (03-05)  cloNIDine: (03-04)  furosemide   Injectable: (03-04)  heparin   Injectable: (03-05)  hydrALAZINE: (03-05)  hydrALAZINE: (03-05)  hydrALAZINE Injectable: (03-05)  hydrALAZINE Injectable: (03-05)  hydrALAZINE Injectable: (03-04)  labetalol Injectable: (03-05)  labetalol Injectable: (03-05)  niCARdipine Infusion: (03-05)  trimethoprim   80 mG/sulfamethoxazole 400 mG: (03-05)    Vitals:   T(F): 98.1, Max: 98.6 (23:00)  HR: 79  BP: 202/101  RR: 14  SpO2: 100%    Physical Exam:  General: Nontoxic, in NAD, A&O 0.  Abdomen: soft, NTND, no peritoneal signs.  Neck: Access site is well healing without evidence of erythema, redness, irritation or hematoma.     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    Pt most recently s/p IVC filter.     -   -continue global management per primary team  -monitor h/h; transfuse as needed  -trend vs/labs  -flush drain with 5cc NS daily forward only; DO NOT aspirate  -change dressing q3 days or when dressing is saturated  -regarding outpatient follow up with IR, if the patient is d/c home with drainage catheter they can make an appointment with IR by calling the IR booking office at [phone]; recommend IR follow in _____wks/months for tube evaluation.  - Greater than 50% of the encounter was spent counseling and/or coordination of care on drain management and follow up.   -they will benefit from VNS service to help with drainage catheter care; they should continue same drainage catheter care as an outpatient.     Please call IR at extension [phone] with any questions, concerns, or issues regarding above.       Interventional Radiology Follow-Up Note    This is a 78y Female s/p IVC filter on 3/3/24 in Interventional Radiology with Dr. Joe Dickinson.     S: Patient seen and examined @ bedside.     Medication:     carvedilol: (03-05)  cloNIDine: (03-05)  cloNIDine: (03-04)  furosemide   Injectable: (03-04)  heparin   Injectable: (03-05)  hydrALAZINE: (03-05)  hydrALAZINE: (03-05)  hydrALAZINE Injectable: (03-05)  hydrALAZINE Injectable: (03-05)  hydrALAZINE Injectable: (03-04)  labetalol Injectable: (03-05)  labetalol Injectable: (03-05)  niCARdipine Infusion: (03-05)  trimethoprim   80 mG/sulfamethoxazole 400 mG: (03-05)    Vitals:   T(F): 98.1, Max: 98.6 (23:00)  HR: 79  BP: 202/101  RR: 14  SpO2: 100%    Physical Exam:  General: Nontoxic, in NAD, A&O 0.  Abdomen: soft, NTND, no peritoneal signs.  Neck: Access site is well healing without evidence of erythema, redness, irritation or hematoma.     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    Pt most recently s/p IVC filter.     -continue global management per primary team  -monitor h/h (stable at 9.9); transfuse as needed  -trend vs/labs  - if patient is able to tolerate AC, please contact IR in regards to IVC retrieval  - no further IR intervention indicated at this time. IR to sign off at this time.  - Greater than 50% of the encounter was spent counseling and/or coordination of care on drain management and follow up.      Please call IR at extension [phone] with any questions, concerns, or issues regarding above.

## 2024-03-05 NOTE — PATIENT PROFILE ADULT - PATIENT REPRESENTATIVE: ( YOU CAN CHOOSE ANY PERSON THAT CAN ASSIST YOU WITH YOUR HEALTH CARE PREFERENCES, DOES NOT HAVE TO BE A SPOUSE, IMMEDIATE FAMILY OR SIGNIFICANT OTHER/PARTNER)
Discharge criteria met per Maryjane and MD.  Will transfer the patient to phase 2.  
same name as above

## 2024-03-05 NOTE — PROGRESS NOTE ADULT - ASSESSMENT
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      3/3 S/P IVCF (retrievable) by IR  03/01/2024 S/P L Hemicrani for Evacuation of large ICH; bone discarded     CT then possible clamp EVD  vEEG in progress  House cards called, ? h/o atrial myxoma  Nephro- hold home bicarb and calcitriol per renal, tacrolimus dec to 3 BID, f/u trough in am  VascularSurg- f   Nephro- f

## 2024-03-06 LAB
ANION GAP SERPL CALC-SCNC: 11 MMOL/L — SIGNIFICANT CHANGE UP (ref 5–17)
BASE EXCESS BLDA CALC-SCNC: 0 MMOL/L — SIGNIFICANT CHANGE UP (ref -2–3)
BUN SERPL-MCNC: 48 MG/DL — HIGH (ref 7–23)
CALCIUM SERPL-MCNC: 9.4 MG/DL — SIGNIFICANT CHANGE UP (ref 8.4–10.5)
CHLORIDE SERPL-SCNC: 101 MMOL/L — SIGNIFICANT CHANGE UP (ref 96–108)
CO2 BLDA-SCNC: 27 MMOL/L — HIGH (ref 19–24)
CO2 SERPL-SCNC: 21 MMOL/L — LOW (ref 22–31)
CREAT SERPL-MCNC: 1.43 MG/DL — HIGH (ref 0.5–1.3)
EGFR: 37 ML/MIN/1.73M2 — LOW
GAS PNL BLDA: SIGNIFICANT CHANGE UP
GLUCOSE BLDC GLUCOMTR-MCNC: 149 MG/DL — HIGH (ref 70–99)
GLUCOSE BLDC GLUCOMTR-MCNC: 166 MG/DL — HIGH (ref 70–99)
GLUCOSE BLDC GLUCOMTR-MCNC: 184 MG/DL — HIGH (ref 70–99)
GLUCOSE BLDC GLUCOMTR-MCNC: 185 MG/DL — HIGH (ref 70–99)
GLUCOSE BLDC GLUCOMTR-MCNC: 239 MG/DL — HIGH (ref 70–99)
GLUCOSE SERPL-MCNC: 189 MG/DL — HIGH (ref 70–99)
HCO3 BLDA-SCNC: 25 MMOL/L — SIGNIFICANT CHANGE UP (ref 21–28)
HCT VFR BLD CALC: 28.7 % — LOW (ref 34.5–45)
HGB BLD-MCNC: 9.3 G/DL — LOW (ref 11.5–15.5)
HOROWITZ INDEX BLDA+IHG-RTO: 50 — SIGNIFICANT CHANGE UP
MAGNESIUM SERPL-MCNC: 2.3 MG/DL — SIGNIFICANT CHANGE UP (ref 1.6–2.6)
MCHC RBC-ENTMCNC: 29.2 PG — SIGNIFICANT CHANGE UP (ref 27–34)
MCHC RBC-ENTMCNC: 32.4 GM/DL — SIGNIFICANT CHANGE UP (ref 32–36)
MCV RBC AUTO: 90 FL — SIGNIFICANT CHANGE UP (ref 80–100)
MRSA PCR RESULT.: SIGNIFICANT CHANGE UP
NRBC # BLD: 0 /100 WBCS — SIGNIFICANT CHANGE UP (ref 0–0)
PCO2 BLDA: 43 MMHG — SIGNIFICANT CHANGE UP (ref 32–45)
PH BLDA: 7.38 — SIGNIFICANT CHANGE UP (ref 7.35–7.45)
PHOSPHATE SERPL-MCNC: 3.7 MG/DL — SIGNIFICANT CHANGE UP (ref 2.5–4.5)
PLATELET # BLD AUTO: 114 K/UL — LOW (ref 150–400)
PO2 BLDA: 80 MMHG — LOW (ref 83–108)
POTASSIUM SERPL-MCNC: 5 MMOL/L — SIGNIFICANT CHANGE UP (ref 3.5–5.3)
POTASSIUM SERPL-SCNC: 5 MMOL/L — SIGNIFICANT CHANGE UP (ref 3.5–5.3)
RBC # BLD: 3.19 M/UL — LOW (ref 3.8–5.2)
RBC # FLD: 15.6 % — HIGH (ref 10.3–14.5)
S AUREUS DNA NOSE QL NAA+PROBE: SIGNIFICANT CHANGE UP
SAO2 % BLDA: 97.2 % — SIGNIFICANT CHANGE UP (ref 94–98)
SODIUM SERPL-SCNC: 133 MMOL/L — LOW (ref 135–145)
TACROLIMUS SERPL-MCNC: 6.1 NG/ML — SIGNIFICANT CHANGE UP
WBC # BLD: 7.07 K/UL — SIGNIFICANT CHANGE UP (ref 3.8–10.5)
WBC # FLD AUTO: 7.07 K/UL — SIGNIFICANT CHANGE UP (ref 3.8–10.5)

## 2024-03-06 PROCEDURE — 95720 EEG PHY/QHP EA INCR W/VEEG: CPT

## 2024-03-06 PROCEDURE — 71045 X-RAY EXAM CHEST 1 VIEW: CPT | Mod: 26

## 2024-03-06 PROCEDURE — 99291 CRITICAL CARE FIRST HOUR: CPT

## 2024-03-06 RX ORDER — INFLUENZA VIRUS VACCINE 15; 15; 15; 15 UG/.5ML; UG/.5ML; UG/.5ML; UG/.5ML
0.7 SUSPENSION INTRAMUSCULAR ONCE
Refills: 0 | Status: ACTIVE | OUTPATIENT
Start: 2024-03-06 | End: 2025-02-02

## 2024-03-06 RX ORDER — HYDROMORPHONE HYDROCHLORIDE 2 MG/ML
1 INJECTION INTRAMUSCULAR; INTRAVENOUS; SUBCUTANEOUS ONCE
Refills: 0 | Status: DISCONTINUED | OUTPATIENT
Start: 2024-03-06 | End: 2024-03-06

## 2024-03-06 RX ORDER — ACETAMINOPHEN 500 MG
750 TABLET ORAL ONCE
Refills: 0 | Status: COMPLETED | OUTPATIENT
Start: 2024-03-06 | End: 2024-03-06

## 2024-03-06 RX ORDER — LABETALOL HCL 100 MG
5 TABLET ORAL ONCE
Refills: 0 | Status: COMPLETED | OUTPATIENT
Start: 2024-03-06 | End: 2024-03-06

## 2024-03-06 RX ORDER — HYDRALAZINE HCL 50 MG
50 TABLET ORAL ONCE
Refills: 0 | Status: COMPLETED | OUTPATIENT
Start: 2024-03-06 | End: 2024-03-06

## 2024-03-06 RX ORDER — HYDRALAZINE HCL 50 MG
100 TABLET ORAL EVERY 8 HOURS
Refills: 0 | Status: DISCONTINUED | OUTPATIENT
Start: 2024-03-06 | End: 2024-03-08

## 2024-03-06 RX ORDER — SODIUM CHLORIDE 9 MG/ML
2 INJECTION INTRAMUSCULAR; INTRAVENOUS; SUBCUTANEOUS EVERY 6 HOURS
Refills: 0 | Status: DISCONTINUED | OUTPATIENT
Start: 2024-03-06 | End: 2024-03-07

## 2024-03-06 RX ORDER — HYDRALAZINE HCL 50 MG
10 TABLET ORAL ONCE
Refills: 0 | Status: COMPLETED | OUTPATIENT
Start: 2024-03-06 | End: 2024-03-06

## 2024-03-06 RX ORDER — HUMAN INSULIN 100 [IU]/ML
10 INJECTION, SUSPENSION SUBCUTANEOUS EVERY 6 HOURS
Refills: 0 | Status: DISCONTINUED | OUTPATIENT
Start: 2024-03-06 | End: 2024-03-08

## 2024-03-06 RX ORDER — TACROLIMUS 5 MG/1
3 CAPSULE ORAL
Refills: 0 | Status: DISCONTINUED | OUTPATIENT
Start: 2024-03-06 | End: 2024-03-08

## 2024-03-06 RX ADMIN — SODIUM CHLORIDE 2 GRAM(S): 9 INJECTION INTRAMUSCULAR; INTRAVENOUS; SUBCUTANEOUS at 05:49

## 2024-03-06 RX ADMIN — GABAPENTIN 100 MILLIGRAM(S): 400 CAPSULE ORAL at 05:48

## 2024-03-06 RX ADMIN — HYDROMORPHONE HYDROCHLORIDE 1 MILLIGRAM(S): 2 INJECTION INTRAMUSCULAR; INTRAVENOUS; SUBCUTANEOUS at 23:00

## 2024-03-06 RX ADMIN — SODIUM CHLORIDE 2 GRAM(S): 9 INJECTION INTRAMUSCULAR; INTRAVENOUS; SUBCUTANEOUS at 23:05

## 2024-03-06 RX ADMIN — OXYCODONE HYDROCHLORIDE 5 MILLIGRAM(S): 5 TABLET ORAL at 21:30

## 2024-03-06 RX ADMIN — Medication 50 MILLIGRAM(S): at 05:46

## 2024-03-06 RX ADMIN — PANTOPRAZOLE SODIUM 40 MILLIGRAM(S): 20 TABLET, DELAYED RELEASE ORAL at 12:22

## 2024-03-06 RX ADMIN — Medication 50 MILLIGRAM(S): at 22:12

## 2024-03-06 RX ADMIN — CHLORHEXIDINE GLUCONATE 15 MILLILITER(S): 213 SOLUTION TOPICAL at 18:00

## 2024-03-06 RX ADMIN — BRIVARACETAM 100 MILLIGRAM(S): 25 TABLET, FILM COATED ORAL at 18:01

## 2024-03-06 RX ADMIN — Medication 50 MILLIGRAM(S): at 12:44

## 2024-03-06 RX ADMIN — Medication 5 MILLIGRAM(S): at 03:00

## 2024-03-06 RX ADMIN — SENNA PLUS 2 TABLET(S): 8.6 TABLET ORAL at 21:26

## 2024-03-06 RX ADMIN — Medication 5 MILLIGRAM(S): at 19:00

## 2024-03-06 RX ADMIN — HUMAN INSULIN 10 UNIT(S): 100 INJECTION, SUSPENSION SUBCUTANEOUS at 23:04

## 2024-03-06 RX ADMIN — HUMAN INSULIN 10 UNIT(S): 100 INJECTION, SUSPENSION SUBCUTANEOUS at 18:09

## 2024-03-06 RX ADMIN — Medication 2: at 12:34

## 2024-03-06 RX ADMIN — OXYCODONE HYDROCHLORIDE 5 MILLIGRAM(S): 5 TABLET ORAL at 03:00

## 2024-03-06 RX ADMIN — Medication 0.1 MILLIGRAM(S): at 12:45

## 2024-03-06 RX ADMIN — LACOSAMIDE 200 MILLIGRAM(S): 50 TABLET ORAL at 18:00

## 2024-03-06 RX ADMIN — HYDROMORPHONE HYDROCHLORIDE 1 MILLIGRAM(S): 2 INJECTION INTRAMUSCULAR; INTRAVENOUS; SUBCUTANEOUS at 23:15

## 2024-03-06 RX ADMIN — HUMAN INSULIN 7 UNIT(S): 100 INJECTION, SUSPENSION SUBCUTANEOUS at 00:40

## 2024-03-06 RX ADMIN — POLYETHYLENE GLYCOL 3350 17 GRAM(S): 17 POWDER, FOR SOLUTION ORAL at 05:52

## 2024-03-06 RX ADMIN — Medication 10 MILLIGRAM(S): at 04:00

## 2024-03-06 RX ADMIN — Medication 2: at 23:04

## 2024-03-06 RX ADMIN — Medication 5 MILLIGRAM(S): at 05:52

## 2024-03-06 RX ADMIN — GABAPENTIN 100 MILLIGRAM(S): 400 CAPSULE ORAL at 13:27

## 2024-03-06 RX ADMIN — SODIUM CHLORIDE 2 GRAM(S): 9 INJECTION INTRAMUSCULAR; INTRAVENOUS; SUBCUTANEOUS at 18:03

## 2024-03-06 RX ADMIN — CHLORHEXIDINE GLUCONATE 1 APPLICATION(S): 213 SOLUTION TOPICAL at 12:22

## 2024-03-06 RX ADMIN — GABAPENTIN 100 MILLIGRAM(S): 400 CAPSULE ORAL at 21:26

## 2024-03-06 RX ADMIN — OXYCODONE HYDROCHLORIDE 5 MILLIGRAM(S): 5 TABLET ORAL at 21:00

## 2024-03-06 RX ADMIN — SODIUM CHLORIDE 2 GRAM(S): 9 INJECTION INTRAMUSCULAR; INTRAVENOUS; SUBCUTANEOUS at 12:23

## 2024-03-06 RX ADMIN — Medication 1 TABLET(S): at 12:22

## 2024-03-06 RX ADMIN — Medication 300 MILLIGRAM(S): at 14:45

## 2024-03-06 RX ADMIN — Medication 50 MILLIGRAM(S): at 21:25

## 2024-03-06 RX ADMIN — POLYETHYLENE GLYCOL 3350 17 GRAM(S): 17 POWDER, FOR SOLUTION ORAL at 18:02

## 2024-03-06 RX ADMIN — OXYCODONE HYDROCHLORIDE 5 MILLIGRAM(S): 5 TABLET ORAL at 13:19

## 2024-03-06 RX ADMIN — HUMAN INSULIN 10 UNIT(S): 100 INJECTION, SUSPENSION SUBCUTANEOUS at 05:55

## 2024-03-06 RX ADMIN — Medication 4: at 05:45

## 2024-03-06 RX ADMIN — Medication 0.1 MILLIGRAM(S): at 21:29

## 2024-03-06 RX ADMIN — Medication 0.1 MILLIGRAM(S): at 05:51

## 2024-03-06 RX ADMIN — HUMAN INSULIN 10 UNIT(S): 100 INJECTION, SUSPENSION SUBCUTANEOUS at 12:34

## 2024-03-06 RX ADMIN — CARVEDILOL PHOSPHATE 25 MILLIGRAM(S): 80 CAPSULE, EXTENDED RELEASE ORAL at 18:01

## 2024-03-06 RX ADMIN — LACOSAMIDE 200 MILLIGRAM(S): 50 TABLET ORAL at 05:55

## 2024-03-06 RX ADMIN — BRIVARACETAM 100 MILLIGRAM(S): 25 TABLET, FILM COATED ORAL at 05:51

## 2024-03-06 RX ADMIN — HEPARIN SODIUM 5000 UNIT(S): 5000 INJECTION INTRAVENOUS; SUBCUTANEOUS at 05:47

## 2024-03-06 RX ADMIN — OXYCODONE HYDROCHLORIDE 5 MILLIGRAM(S): 5 TABLET ORAL at 03:30

## 2024-03-06 RX ADMIN — Medication 5 MILLIGRAM(S): at 00:40

## 2024-03-06 RX ADMIN — OXYCODONE HYDROCHLORIDE 5 MILLIGRAM(S): 5 TABLET ORAL at 12:34

## 2024-03-06 RX ADMIN — Medication 2: at 00:40

## 2024-03-06 RX ADMIN — TACROLIMUS 3 MILLIGRAM(S): 5 CAPSULE ORAL at 18:01

## 2024-03-06 RX ADMIN — HEPARIN SODIUM 5000 UNIT(S): 5000 INJECTION INTRAVENOUS; SUBCUTANEOUS at 18:01

## 2024-03-06 RX ADMIN — CHLORHEXIDINE GLUCONATE 15 MILLILITER(S): 213 SOLUTION TOPICAL at 05:51

## 2024-03-06 RX ADMIN — CARVEDILOL PHOSPHATE 25 MILLIGRAM(S): 80 CAPSULE, EXTENDED RELEASE ORAL at 05:46

## 2024-03-06 NOTE — PROGRESS NOTE ADULT - ASSESSMENT
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      3/3 S/P IVCF (retrievable) by IR  03/01/2024 S/P L Hemicrani for Evacuation of large ICH; bone discarded     48hr clamp trial  CTH 3/7 AM  Hold tacrolimus dose, trough AM  vEEG in progress  House cards called, ? h/o atrial myxoma  Nephro- hold home bicarb and calcitriol per renal

## 2024-03-06 NOTE — CHART NOTE - NSCHARTNOTEFT_GEN_A_CORE
ANTONELLA [id number]      Drain type: []SD [x]SG [] YON [] HMV [] Lumbar drain [] EVD [] ICP Powell [] Abd drain     Patient's position while drain removed: supine     [x] Patient tolerated well [x] No complications [] complications:     Exit Site secured with: [x] 3 staples [] __ suture (please specify how many of each)     Additional Info: Pt tolerated procedure well

## 2024-03-06 NOTE — PROGRESS NOTE ADULT - SUBJECTIVE AND OBJECTIVE BOX
Patient 78 Y female evaluated measured fitted  for protective cranial helmet ordered by Neurosurgery  for OBB use delivered by [facility] [phone]

## 2024-03-06 NOTE — PROGRESS NOTE ADULT - ASSESSMENT
ASSESSMENT/PLAN: s/p left craniectomy(discarded) and evacuation. POD5    NEURO:  Neurochecks Q1, Vitals Q2  r CTH post-op: s/p clot evacuation, heme in the 4th ventricle, improvement of midline shift, pneumocephalus and hydro  EVD at 10 ICP 1-8 drained about 82 cc in 24 hours- clamped now (3/5)  1 YON drain drained 35 cc in 24 hours  VEEG- will follow up today's read  Tylenol/Oxy/Fentanyl prn for pain  Briviact 100 mg BID for seizures, Vimpat 200 mg BID ()  Activity: [x] ROM in bed    PULM:  Intubated   PRVC 14/450/5/50  CPAP as tolerated   CXR improved  copious oral secretions, some in line   At risk of aspiration pneumonia     CV:  SBP goal:   History of HTN  Currently on Hydralazine 50 mg Q8H, clonidine 0.1 mg TID, Coreg 25 mg BID     RENAL:  Fluids: IVL   f/u nephro transplant team  patient has been off HD  s/p transplant in 2019  Renal function worsening, urine output maintained   AVF in the left upper extremity     GI:  Diet: OGT-- continue tube feeds with Glucerna  GI prophylaxis [] not indicated [x] PPI [] other:  Bowel regimen [x] miralax [x] senna  LBM: 3/5    ENDO:   Goal euglycemia (-180)  ISS  A1C: 5.4%  Currently on ISS, NPH 15 units q6H    HEME/ONC:   H/H stable for now  VTE prophylaxis: [x] SCDs [x] chemoprophylaxis- SQH [] high risk of DVT/PE on admission due to:  hx of breast cancer - finished Tamoxifen in March 2023, will discontinue   LED- bilateral above the knee DVT. IR consulted for IVC filter placement.   - Patient now s/p retrievable IVC filter   - continue to monitor thrombocytopenia does not correlate with heparin as it was started today 3/5  - Patient receives procrit shots every month, will continue     ID: afebrile    MISC:    SOCIAL/FAMILY:  [] updated at bedside [] family meeting    CODE STATUS:  [] Full Code [] DNR [] DNI [] Palliative/Comfort Care    DISPOSITION:  [] ICU [] Stroke Unit [] Floor [] EMU [] RCU [] PCU    [] Patient is at high risk of neurologic deterioration/death due to:      ASSESSMENT/PLAN: s/p left craniectomy(discarded) and evacuation. POD5    NEURO:  Neurochecks Q1, Vitals Q2  r CTH post-op: s/p clot evacuation, heme in the 4th ventricle, improvement of midline shift, pneumocephalus and hydro  CTH tomorrow (3/7)  EVD clamped since (3/5)  1 YON drain drained 35 cc in 24 hours  VEEG- 4 electrographic seizures, last one 3/5 on 5:15 PM, frequent epileptiform discharges in the left parietal region   Tylenol/Oxy/Fentanyl prn for pain  Briviact 100 mg BID for seizures, Vimpat 200 mg BID ()  Activity: [x] ROM in bed    PULM:  Intubated   PRVC 14/450/5/40  CPAP as tolerated   CXR improved  ABG- f/u   Copious oral secretions, some in line   At risk of aspiration pneumonia     CV:  SBP goal:   History of HTN  Currently on Hydralazine 50 mg Q8H, clonidine 0.1 mg TID, Coreg 25 mg BID     RENAL:  Fluids: IVL   f/u nephro transplant team  patient has been off HD  s/p transplant in 2019  Will restart her tacrolimus   Renal function stable, urine output maintained   AVF in the left upper extremity     GI:  Diet: OGT-- continue tube feeds with Glucerna  GI prophylaxis [] not indicated [x] PPI [] other:  Bowel regimen [x] miralax [x] senna  LBM: 3/5    ENDO:   Goal euglycemia (-180)  ISS  A1C: 5.4%  Currently on ISS, NPH 10 units q6H    HEME/ONC:   H/H stable for now  VTE prophylaxis: [x] SCDs [x] chemoprophylaxis- SQH [] high risk of DVT/PE on admission due to:  hx of breast cancer - finished Tamoxifen in March 2023, will discontinue   LED- bilateral above the knee DVT. IR consulted for IVC filter placement.   - Patient now s/p retrievable IVC filter  - Patient receives procrit shots every month, will continue     ID: afebrile    MISC:    SOCIAL/FAMILY:  [x] updated at bedside [] family meeting    CODE STATUS:  [x] Full Code [] DNR [] DNI [] Palliative/Comfort Care    DISPOSITION:  [x] ICU [] Stroke Unit [] Floor [] EMU [] RCU [] PCU    [x] Patient is at high risk of neurologic deterioration/death due to: infection, expansion of hematoma

## 2024-03-06 NOTE — PROGRESS NOTE ADULT - PROBLEM SELECTOR PLAN 2
Pt's current regimen is Tacrolimus by trough, and prednisone 5 mg qd. Leflunomide currently on hold. Tacrolimus trough improved to 6.1 today. Recommend to resume Tacrolimus at 3 mg BID. Monitor tacrolimus trough, goal: 4-7.    If you have any questions, please feel free to contact me  Irais Suarez  Nephrology Fellow  [phone] / [employer] Teams(Preferred)  (After 5pm or on weekends please page the on-call fellow)

## 2024-03-06 NOTE — PROGRESS NOTE ADULT - ASSESSMENT
ASSESSMENT/PLAN: s/p left craniectomy(discarded) and evacuation. POD5    NEURO:  Neurochecks Q1, Vitals Q2  r CTH post-op: s/p clot evacuation, heme in the 4th ventricle, improvement of midline shift, pneumocephalus and hydro  CTH tomorrow (3/7)  EVD clamped since (3/5)  1 YON drain drained 35 cc in 24 hours  VEEG- 4 electrographic seizures, last one 3/5 on 5:15 PM, frequent epileptiform discharges in the left parietal region   Tylenol/Oxy/Fentanyl prn for pain  Briviact 100 mg BID for seizures, Vimpat 200 mg BID ()  Activity: [x] ROM in bed    PULM:  Intubated   PRVC 14/450/5/40  CPAP as tolerated   CXR improved  ABG- f/u   Copious oral secretions, some in line   At risk of aspiration pneumonia     CV:  SBP goal:   History of HTN  Currently on Hydralazine 50 mg Q8H, clonidine 0.1 mg TID, Coreg 25 mg BID     RENAL:  Fluids: IVL   f/u nephro transplant team  patient has been off HD  s/p transplant in 2019  Will restart her tacrolimus   Renal function stable, urine output maintained   AVF in the left upper extremity     GI:  Diet: OGT-- continue tube feeds with Glucerna  GI prophylaxis [] not indicated [x] PPI [] other:  Bowel regimen [x] miralax [x] senna  LBM: 3/5    ENDO:   Goal euglycemia (-180)  ISS  A1C: 5.4%  Currently on ISS, NPH 10 units q6H    HEME/ONC:   H/H stable for now  VTE prophylaxis: [x] SCDs [x] chemoprophylaxis- SQH [] high risk of DVT/PE on admission due to:  hx of breast cancer - finished Tamoxifen in March 2023, will discontinue   LED- bilateral above the knee DVT. IR consulted for IVC filter placement.   - Patient now s/p retrievable IVC filter  - Patient receives procrit shots every month, will continue     ID: afebrile    MISC:    SOCIAL/FAMILY:  [x] updated at bedside [] family meeting    CODE STATUS:  [x] Full Code [] DNR [] DNI [] Palliative/Comfort Care    DISPOSITION:  [x] ICU [] Stroke Unit [] Floor [] EMU [] RCU [] PCU    [x] Patient is at high risk of neurologic deterioration/death due to: infection, expansion of hematoma

## 2024-03-06 NOTE — PROGRESS NOTE ADULT - SUBJECTIVE AND OBJECTIVE BOX
Patient seen and examined at bedside.    --Anticoagulation--  heparin   Injectable 5000 Unit(s) SubCutaneous every 12 hours    T(C): 36.9 (03-05-24 @ 23:00), Max: 36.9 (03-05-24 @ 23:00)  HR: 89 (03-06-24 @ 01:00) (75 - 93)  BP: 158/82 (03-06-24 @ 01:00) (121/68 - 208/98)  RR: 15 (03-06-24 @ 01:00) (12 - 19)  SpO2: 99% (03-06-24 @ 01:00) (96% - 100%)  Wt(kg): --    Exam: Intubated, no EO, PERRL, L gaze, +corneals/cough/gag/OB, not FC, LUE spont, RUE 0/5, RLE WDs

## 2024-03-06 NOTE — PROGRESS NOTE ADULT - SUBJECTIVE AND OBJECTIVE BOX
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    Admission Scores  GCS: 4 ICH: 4    24 hour Events:  3/2- Patient s/p left craniectomy(discarded) and evacuation. Patient started on insulin drip for elevated BG   3/3: Patient switched off insulin drip given low blood glucose. ISS placed. Patient's A1C 5.4%.  3/4: Patient s/p IVC filter for DVTs. Glucose elevated.   3/5- No acute events overnight. Plan for scan today and clamping of EVD.  3/6- No acute events overnight except adjustment of BP medications due to elevated BP beyond goal.    Allergies    shellfish (Rash)  ChloraPrep One-Step (Rash)  penicillins (Rash)    Intolerances        REVIEW OF SYSTEMS: [X ] Unable to Assess due to neurologic exam   [ ] All ROS addressed below are non-contributory, except:  Neuro: [ ] Headache [ ] Back pain [ ] Numbness [ ] Weakness [ ] Ataxia [ ] Dizziness [ ] Aphasia [ ] Dysarthria [ ] Visual disturbance  Resp: [ ] Shortness of breath/dyspnea, [ ] Orthopnea [ ] Cough  CV: [ ] Chest pain [ ] Palpitation [ ] Lightheadedness [ ] Syncope  Renal: [ ] Thirst [ ] Edema  GI: [ ] Nausea [ ] Emesis [ ] Abdominal pain [ ] Constipation [ ] Diarrhea  Hem: [ ] Hematemesis [ ] bright red blood per rectum  ID: [ ] Fever [ ] Chills [ ] Dysuria  ENT: [ ] Rhinorrhea      DEVICES:   [ ] Restraints [ ] ET tube [ ] central line [ ] arterial line [ ] johnson [ ] NGT/OGT [ ] EVD [ ] LD [ ] YON/HMV [ ] Trach [ ] PEG [ ] Chest Tube     VITALS:   Vital Signs Last 24 Hrs  T(C): 37.3 (06 Mar 2024 03:00), Max: 37.3 (06 Mar 2024 03:00)  T(F): 99.1 (06 Mar 2024 03:00), Max: 99.1 (06 Mar 2024 03:00)  HR: 91 (06 Mar 2024 07:41) (75 - 105)  BP: 134/75 (06 Mar 2024 06:00) (124/69 - 202/101)  BP(mean): 97 (06 Mar 2024 06:00) (92 - 143)  RR: 17 (06 Mar 2024 06:00) (12 - 24)  SpO2: 98% (06 Mar 2024 07:41) (96% - 100%)    Parameters below as of 05 Mar 2024 19:00  Patient On (Oxygen Delivery Method): ventilator    O2 Concentration (%): 40  CAPILLARY BLOOD GLUCOSE      POCT Blood Glucose.: 239 mg/dL (06 Mar 2024 05:43)  POCT Blood Glucose.: 185 mg/dL (06 Mar 2024 00:39)  POCT Blood Glucose.: 104 mg/dL (05 Mar 2024 17:09)  POCT Blood Glucose.: 101 mg/dL (05 Mar 2024 12:42)  POCT Blood Glucose.: 53 mg/dL (05 Mar 2024 12:13)  POCT Blood Glucose.: 50 mg/dL (05 Mar 2024 12:10)    I&O's Summary    05 Mar 2024 07:01  -  06 Mar 2024 07:00  --------------------------------------------------------  IN: 1620 mL / OUT: 895 mL / NET: 725 mL        Respiratory:  Mode: CPAP with PS  FiO2: 40  PEEP: 5  PS: 10  MAP: 10  PIP: 16        LABS:                        9.3    7.07  )-----------( 114      ( 06 Mar 2024 02:12 )             28.7     03-06    133<L>  |  101  |  48<H>  ----------------------------<  189<H>  5.0   |  21<L>  |  1.43<H>             MEDICATION LEVELS:     IVF FLUIDS/MEDICATIONS:   MEDICATIONS  (STANDING):  brivaracetam Oral Solution 100 milliGRAM(s) Oral two times a day  carvedilol 25 milliGRAM(s) Oral every 12 hours  chlorhexidine 0.12% Liquid 15 milliLiter(s) Oral Mucosa every 12 hours  chlorhexidine 4% Liquid 1 Application(s) Topical daily  cloNIDine 0.1 milliGRAM(s) Oral three times a day  gabapentin Solution 100 milliGRAM(s) Oral three times a day  heparin   Injectable 5000 Unit(s) SubCutaneous every 12 hours  hydrALAZINE 50 milliGRAM(s) Oral every 8 hours  insulin lispro (ADMELOG) corrective regimen sliding scale   SubCutaneous every 6 hours  insulin NPH human recombinant 10 Unit(s) SubCutaneous every 6 hours  lacosamide 200 milliGRAM(s) Oral two times a day  pantoprazole  Injectable 40 milliGRAM(s) IV Push daily  polyethylene glycol 3350 17 Gram(s) Oral every 12 hours  predniSONE   Tablet 5 milliGRAM(s) Oral daily  senna 2 Tablet(s) Oral at bedtime  sodium chloride 2 Gram(s) Oral every 6 hours  trimethoprim   80 mG/sulfamethoxazole 400 mG 1 Tablet(s) Oral daily    MEDICATIONS  (PRN):  acetaminophen   Oral Liquid .. 650 milliGRAM(s) Oral every 6 hours PRN Temp greater or equal to 38C (100.4F), Mild Pain (1 - 3)  fentaNYL    Injectable 25 MICROGram(s) IV Push every 2 hours PRN vent synchroncy  ondansetron Injectable 4 milliGRAM(s) IV Push every 6 hours PRN Nausea and/or Vomiting  oxyCODONE    IR 5 milliGRAM(s) Oral every 4 hours PRN Moderate Pain (4 - 6)        IMAGING:      EXAMINATION:  PHYSICAL EXAM:    Constitutional: No Acute Distress     Neurological: Eyes open to pain, eyes midline, CAS, moving left UE spontaneously, right UE 0/5, right LE withdraws, not following commands     Reflexes: Deep Tendon Reflexes Intact     Pulmonary: Clear to Auscultation, No rales, No rhonchi, No wheezes     Cardiovascular: S1, S2, Regular rate and rhythm     Gastrointestinal: Soft, Non-tender, Non-distended     Extremities: No calf tenderness

## 2024-03-06 NOTE — PROGRESS NOTE ADULT - PROBLEM SELECTOR PLAN 1
Pt. with ESRD, underwent DDRT in 2019. On review of [place], Scr was 1.86 on 10/31/23. SCr was WNL at 1.18 on admission (3/2), and remains elevated/stable at 1.43 today (3/6). Pt. with likley underlying CKD, Scr likely at baseline. Continue with immunosuppression regimen, as outlined below. Monitor labs and urine output. Avoid nephrotoxins. Dose medications as per eGFR.

## 2024-03-06 NOTE — PROGRESS NOTE ADULT - ATTENDING COMMENTS
Pt seen on 3/6/24 in afternoon.  Pt off all sedation, on AED.  Minimal movement left side.  EEG with further episodes of sz and pt with left epileptiform discharges.  EVD clamped x 24 hours with normal ICP.  Defect flat, dressing dry.  Continue close monitoring.  Discussed with family at bedside.

## 2024-03-06 NOTE — PROGRESS NOTE ADULT - SUBJECTIVE AND OBJECTIVE BOX
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    Admission Scores  GCS: 4 ICH: 4    24 hour Events:  3/2- Patient s/p left craniectomy(discarded) and evacuation. Patient started on insulin drip for elevated BG   3/3: Patient switched off insulin drip given low blood glucose. ISS placed. Patient's A1C 5.4%.  3/4: Patient s/p IVC filter for DVTs. Glucose elevated.   3/5- No acute events overnight. Plan for scan today and clamping of EVD.  3/6-    Allergies    shellfish (Rash)  ChloraPrep One-Step (Rash)  penicillins (Rash)    Intolerances        REVIEW OF SYSTEMS: [X ] Unable to Assess due to neurologic exam   [ ] All ROS addressed below are non-contributory, except:  Neuro: [ ] Headache [ ] Back pain [ ] Numbness [ ] Weakness [ ] Ataxia [ ] Dizziness [ ] Aphasia [ ] Dysarthria [ ] Visual disturbance  Resp: [ ] Shortness of breath/dyspnea, [ ] Orthopnea [ ] Cough  CV: [ ] Chest pain [ ] Palpitation [ ] Lightheadedness [ ] Syncope  Renal: [ ] Thirst [ ] Edema  GI: [ ] Nausea [ ] Emesis [ ] Abdominal pain [ ] Constipation [ ] Diarrhea  Hem: [ ] Hematemesis [ ] bright red blood per rectum  ID: [ ] Fever [ ] Chills [ ] Dysuria  ENT: [ ] Rhinorrhea      DEVICES:   [ ] Restraints [ ] ET tube [ ] central line [ ] arterial line [ ] johnson [ ] NGT/OGT [ ] EVD [ ] LD [ ] YON/HMV [ ] Trach [ ] PEG [ ] Chest Tube     VITALS:   Vital Signs Last 24 Hrs  T(C): 37.3 (06 Mar 2024 03:00), Max: 37.3 (06 Mar 2024 03:00)  T(F): 99.1 (06 Mar 2024 03:00), Max: 99.1 (06 Mar 2024 03:00)  HR: 91 (06 Mar 2024 07:41) (75 - 105)  BP: 134/75 (06 Mar 2024 06:00) (124/69 - 202/101)  BP(mean): 97 (06 Mar 2024 06:00) (92 - 143)  RR: 17 (06 Mar 2024 06:00) (12 - 24)  SpO2: 98% (06 Mar 2024 07:41) (96% - 100%)    Parameters below as of 05 Mar 2024 19:00  Patient On (Oxygen Delivery Method): ventilator    O2 Concentration (%): 40  CAPILLARY BLOOD GLUCOSE      POCT Blood Glucose.: 239 mg/dL (06 Mar 2024 05:43)  POCT Blood Glucose.: 185 mg/dL (06 Mar 2024 00:39)  POCT Blood Glucose.: 104 mg/dL (05 Mar 2024 17:09)  POCT Blood Glucose.: 101 mg/dL (05 Mar 2024 12:42)  POCT Blood Glucose.: 53 mg/dL (05 Mar 2024 12:13)  POCT Blood Glucose.: 50 mg/dL (05 Mar 2024 12:10)    I&O's Summary    05 Mar 2024 07:01  -  06 Mar 2024 07:00  --------------------------------------------------------  IN: 1620 mL / OUT: 895 mL / NET: 725 mL        Respiratory:  Mode: CPAP with PS  FiO2: 40  PEEP: 5  PS: 10  MAP: 10  PIP: 16        LABS:                        9.3    7.07  )-----------( 114      ( 06 Mar 2024 02:12 )             28.7     03-06    133<L>  |  101  |  48<H>  ----------------------------<  189<H>  5.0   |  21<L>  |  1.43<H>             MEDICATION LEVELS:     IVF FLUIDS/MEDICATIONS:   MEDICATIONS  (STANDING):  brivaracetam Oral Solution 100 milliGRAM(s) Oral two times a day  carvedilol 25 milliGRAM(s) Oral every 12 hours  chlorhexidine 0.12% Liquid 15 milliLiter(s) Oral Mucosa every 12 hours  chlorhexidine 4% Liquid 1 Application(s) Topical daily  cloNIDine 0.1 milliGRAM(s) Oral three times a day  gabapentin Solution 100 milliGRAM(s) Oral three times a day  heparin   Injectable 5000 Unit(s) SubCutaneous every 12 hours  hydrALAZINE 50 milliGRAM(s) Oral every 8 hours  insulin lispro (ADMELOG) corrective regimen sliding scale   SubCutaneous every 6 hours  insulin NPH human recombinant 10 Unit(s) SubCutaneous every 6 hours  lacosamide 200 milliGRAM(s) Oral two times a day  pantoprazole  Injectable 40 milliGRAM(s) IV Push daily  polyethylene glycol 3350 17 Gram(s) Oral every 12 hours  predniSONE   Tablet 5 milliGRAM(s) Oral daily  senna 2 Tablet(s) Oral at bedtime  sodium chloride 2 Gram(s) Oral every 6 hours  trimethoprim   80 mG/sulfamethoxazole 400 mG 1 Tablet(s) Oral daily    MEDICATIONS  (PRN):  acetaminophen   Oral Liquid .. 650 milliGRAM(s) Oral every 6 hours PRN Temp greater or equal to 38C (100.4F), Mild Pain (1 - 3)  fentaNYL    Injectable 25 MICROGram(s) IV Push every 2 hours PRN vent synchroncy  ondansetron Injectable 4 milliGRAM(s) IV Push every 6 hours PRN Nausea and/or Vomiting  oxyCODONE    IR 5 milliGRAM(s) Oral every 4 hours PRN Moderate Pain (4 - 6)        IMAGING:      EXAMINATION:  PHYSICAL EXAM:    Constitutional: No Acute Distress     Neurological: Eyes open to pain, eyes midline, CAS, moving left UE spontaneously, right UE 0/5, right LE withdraws, not following commands     Reflexes: Deep Tendon Reflexes Intact     Pulmonary: Clear to Auscultation, No rales, No rhonchi, No wheezes     Cardiovascular: S1, S2, Regular rate and rhythm     Gastrointestinal: Soft, Non-tender, Non-distended     Extremities: No calf tenderness    HOSPITAL COURSE: This is a 77 YO F with a PMHx ESRD s/p renal xplant off HD, L AKA, 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    Admission Scores  GCS: 4 ICH: 4    24 hour Events:  3/2- Patient s/p left craniectomy(discarded) and evacuation. Patient started on insulin drip for elevated BG   3/3: Patient switched off insulin drip given low blood glucose. ISS placed. Patient's A1C 5.4%.  3/4: Patient s/p IVC filter for DVTs. Glucose elevated.   3/5- No acute events overnight. Plan for scan today and clamping of EVD.  3/6- No acute events overnight except adjustment of BP medications due to elevated BP beyond goal.    Allergies    shellfish (Rash)  ChloraPrep One-Step (Rash)  penicillins (Rash)    Intolerances        REVIEW OF SYSTEMS: [X ] Unable to Assess due to neurologic exam   [ ] All ROS addressed below are non-contributory, except:  Neuro: [ ] Headache [ ] Back pain [ ] Numbness [ ] Weakness [ ] Ataxia [ ] Dizziness [ ] Aphasia [ ] Dysarthria [ ] Visual disturbance  Resp: [ ] Shortness of breath/dyspnea, [ ] Orthopnea [ ] Cough  CV: [ ] Chest pain [ ] Palpitation [ ] Lightheadedness [ ] Syncope  Renal: [ ] Thirst [ ] Edema  GI: [ ] Nausea [ ] Emesis [ ] Abdominal pain [ ] Constipation [ ] Diarrhea  Hem: [ ] Hematemesis [ ] bright red blood per rectum  ID: [ ] Fever [ ] Chills [ ] Dysuria  ENT: [ ] Rhinorrhea      DEVICES:   [ ] Restraints [ ] ET tube [ ] central line [ ] arterial line [ ] johnson [ ] NGT/OGT [ ] EVD [ ] LD [ ] YON/HMV [ ] Trach [ ] PEG [ ] Chest Tube     VITALS:   Vital Signs Last 24 Hrs  T(C): 37.3 (06 Mar 2024 03:00), Max: 37.3 (06 Mar 2024 03:00)  T(F): 99.1 (06 Mar 2024 03:00), Max: 99.1 (06 Mar 2024 03:00)  HR: 91 (06 Mar 2024 07:41) (75 - 105)  BP: 134/75 (06 Mar 2024 06:00) (124/69 - 202/101)  BP(mean): 97 (06 Mar 2024 06:00) (92 - 143)  RR: 17 (06 Mar 2024 06:00) (12 - 24)  SpO2: 98% (06 Mar 2024 07:41) (96% - 100%)    Parameters below as of 05 Mar 2024 19:00  Patient On (Oxygen Delivery Method): ventilator    O2 Concentration (%): 40  CAPILLARY BLOOD GLUCOSE      POCT Blood Glucose.: 239 mg/dL (06 Mar 2024 05:43)  POCT Blood Glucose.: 185 mg/dL (06 Mar 2024 00:39)  POCT Blood Glucose.: 104 mg/dL (05 Mar 2024 17:09)  POCT Blood Glucose.: 101 mg/dL (05 Mar 2024 12:42)  POCT Blood Glucose.: 53 mg/dL (05 Mar 2024 12:13)  POCT Blood Glucose.: 50 mg/dL (05 Mar 2024 12:10)    I&O's Summary    05 Mar 2024 07:01  -  06 Mar 2024 07:00  --------------------------------------------------------  IN: 1620 mL / OUT: 895 mL / NET: 725 mL        Respiratory:  Mode: CPAP with PS  FiO2: 40  PEEP: 5  PS: 10  MAP: 10  PIP: 16        LABS:                        9.3    7.07  )-----------( 114      ( 06 Mar 2024 02:12 )             28.7     03-06    133<L>  |  101  |  48<H>  ----------------------------<  189<H>  5.0   |  21<L>  |  1.43<H>             MEDICATION LEVELS:     IVF FLUIDS/MEDICATIONS:   MEDICATIONS  (STANDING):  brivaracetam Oral Solution 100 milliGRAM(s) Oral two times a day  carvedilol 25 milliGRAM(s) Oral every 12 hours  chlorhexidine 0.12% Liquid 15 milliLiter(s) Oral Mucosa every 12 hours  chlorhexidine 4% Liquid 1 Application(s) Topical daily  cloNIDine 0.1 milliGRAM(s) Oral three times a day  gabapentin Solution 100 milliGRAM(s) Oral three times a day  heparin   Injectable 5000 Unit(s) SubCutaneous every 12 hours  hydrALAZINE 50 milliGRAM(s) Oral every 8 hours  insulin lispro (ADMELOG) corrective regimen sliding scale   SubCutaneous every 6 hours  insulin NPH human recombinant 10 Unit(s) SubCutaneous every 6 hours  lacosamide 200 milliGRAM(s) Oral two times a day  pantoprazole  Injectable 40 milliGRAM(s) IV Push daily  polyethylene glycol 3350 17 Gram(s) Oral every 12 hours  predniSONE   Tablet 5 milliGRAM(s) Oral daily  senna 2 Tablet(s) Oral at bedtime  sodium chloride 2 Gram(s) Oral every 6 hours  trimethoprim   80 mG/sulfamethoxazole 400 mG 1 Tablet(s) Oral daily    MEDICATIONS  (PRN):  acetaminophen   Oral Liquid .. 650 milliGRAM(s) Oral every 6 hours PRN Temp greater or equal to 38C (100.4F), Mild Pain (1 - 3)  fentaNYL    Injectable 25 MICROGram(s) IV Push every 2 hours PRN vent synchroncy  ondansetron Injectable 4 milliGRAM(s) IV Push every 6 hours PRN Nausea and/or Vomiting  oxyCODONE    IR 5 milliGRAM(s) Oral every 4 hours PRN Moderate Pain (4 - 6)        IMAGING:      EXAMINATION:  PHYSICAL EXAM:    Constitutional: No Acute Distress     Neurological: Eyes open to pain, eyes midline, CAS, moving left UE spontaneously, right UE 0/5, right LE withdraws, not following commands     Reflexes: Deep Tendon Reflexes Intact     Pulmonary: Clear to Auscultation, No rales, No rhonchi, No wheezes     Cardiovascular: S1, S2, Regular rate and rhythm     Gastrointestinal: Soft, Non-tender, Non-distended     Extremities: No calf tenderness

## 2024-03-06 NOTE — PROGRESS NOTE ADULT - SUBJECTIVE AND OBJECTIVE BOX
[facility] DIVISION OF KIDNEY DISEASES AND HYPERTENSION -- FOLLOW UP NOTE  --------------------------------------------------------------------------------    Chief Complaint: DDRT (2019), Nontraumatic intracerebral hemorrhage    24 hour events/subjective: Pt. was seen and evaluated in the neurosurgical ICU earlier today. Pt. remains intubated/sedated, unable to provide history or ROS.      PAST HISTORY  --------------------------------------------------------------------------------  No significant changes to PMH, PSH, FHx, SHx, unless otherwise noted    ALLERGIES & MEDICATIONS  --------------------------------------------------------------------------------  Allergies    shellfish (Rash)  ChloraPrep One-Step (Rash)  penicillins (Rash)    Intolerances    Standing Inpatient Medications  acetaminophen   IVPB .. 750 milliGRAM(s) IV Intermittent once  brivaracetam Oral Solution 100 milliGRAM(s) Oral two times a day  carvedilol 25 milliGRAM(s) Oral every 12 hours  chlorhexidine 0.12% Liquid 15 milliLiter(s) Oral Mucosa every 12 hours  chlorhexidine 4% Liquid 1 Application(s) Topical daily  cloNIDine 0.1 milliGRAM(s) Oral three times a day  gabapentin Solution 100 milliGRAM(s) Oral three times a day  heparin   Injectable 5000 Unit(s) SubCutaneous every 12 hours  hydrALAZINE 50 milliGRAM(s) Oral every 8 hours  insulin lispro (ADMELOG) corrective regimen sliding scale   SubCutaneous every 6 hours  insulin NPH human recombinant 10 Unit(s) SubCutaneous every 6 hours  lacosamide 200 milliGRAM(s) Oral two times a day  pantoprazole  Injectable 40 milliGRAM(s) IV Push daily  polyethylene glycol 3350 17 Gram(s) Oral every 12 hours  predniSONE   Tablet 5 milliGRAM(s) Oral daily  senna 2 Tablet(s) Oral at bedtime  sodium chloride 2 Gram(s) Oral every 6 hours  tacrolimus    0.5 mG/mL Suspension 3 milliGRAM(s) Oral two times a day  trimethoprim   80 mG/sulfamethoxazole 400 mG 1 Tablet(s) Oral daily    PRN Inpatient Medications  acetaminophen   Oral Liquid .. 650 milliGRAM(s) Oral every 6 hours PRN  fentaNYL    Injectable 25 MICROGram(s) IV Push every 2 hours PRN  ondansetron Injectable 4 milliGRAM(s) IV Push every 6 hours PRN  oxyCODONE    IR 5 milliGRAM(s) Oral every 4 hours PRN      REVIEW OF SYSTEMS  --------------------------------------------------------------------------------  Unable to obtain ROS, see HPI    VITALS/PHYSICAL EXAM  --------------------------------------------------------------------------------  T(C): 36.8 (03-06-24 @ 11:00), Max: 37.3 (03-06-24 @ 03:00)  HR: 92 (03-06-24 @ 12:00) (77 - 105)  BP: 178/94 (03-06-24 @ 12:00) (125/67 - 202/101)  RR: 21 (03-06-24 @ 12:00) (12 - 24)  SpO2: 99% (03-06-24 @ 12:00) (96% - 100%)  Wt(kg): --    03-05-24 @ 07:01  -  03-06-24 @ 07:00  --------------------------------------------------------  IN: 1620 mL / OUT: 925 mL / NET: 695 mL    03-06-24 @ 07:01  -  03-06-24 @ 13:18  --------------------------------------------------------  IN: 165 mL / OUT: 65 mL / NET: 100 mL    Physical Exam:  Gen: Intubated, sedated  HEENT: +ET tube  Pulm: Mechanical breath sounds BL  CV: RRR, S1S2;  Abd: +BS, soft, nontender/nondistended  : +Urinary catheter with large volume urine noted.  Extremities: no bilateral LE edema noted. +LLE AKA  Neuro: Sedated  Skin: Warm, without rashes    LABS/STUDIES  --------------------------------------------------------------------------------              9.3    7.07  >-----------<  114      [03-06-24 @ 02:12]              28.7     133  |  101  |  48  ----------------------------<  189      [03-06-24 @ 02:12]  5.0   |  21  |  1.43        Ca     9.4     [03-06-24 @ 02:12]      Mg     2.3     [03-06-24 @ 02:12]      Phos  3.7     [03-06-24 @ 02:12]    Creatinine Trend:  SCr 1.43 [03-06 @ 02:12]  SCr 1.54 [03-04 @ 23:18]  SCr 1.44 [03-04 @ 16:13]  SCr 1.41 [03-04 @ 05:12]  SCr 1.32 [03-03 @ 07:49]    Tacrolimus (), Serum: 6.1 ng/mL (03-06 @ 03:38)  Tacrolimus (), Serum: 10.3 ng/mL (03-05 @ 05:35)  Tacrolimus (), Serum: 10.2 ng/mL (03-04 @ 05:12)  Tacrolimus (), Serum: 5.5 ng/mL (03-02 @ 03:24)    Urine Creatinine 55      [03-03-24 @ 05:03]  Urine Sodium 90      [03-03-24 @ 05:03]    HbA1c 7.2      [01-11-20 @ 09:23]  TSH 1.24      [03-02-24 @ 10:51]  Lipid: chol 136, TG 95, HDL 72, LDL --      [03-02-24 @ 02:10]

## 2024-03-07 ENCOUNTER — TRANSCRIPTION ENCOUNTER (OUTPATIENT)
Age: 79
End: 2024-03-07

## 2024-03-07 ENCOUNTER — APPOINTMENT (OUTPATIENT)
Dept: SURGERY | Facility: CLINIC | Age: 79
End: 2024-03-07

## 2024-03-07 LAB
ANION GAP SERPL CALC-SCNC: 11 MMOL/L — SIGNIFICANT CHANGE UP (ref 5–17)
ANION GAP SERPL CALC-SCNC: 9 MMOL/L — SIGNIFICANT CHANGE UP (ref 5–17)
APTT BLD: 28.2 SEC — SIGNIFICANT CHANGE UP (ref 24.5–35.6)
BUN SERPL-MCNC: 46 MG/DL — HIGH (ref 7–23)
BUN SERPL-MCNC: 48 MG/DL — HIGH (ref 7–23)
CALCIUM SERPL-MCNC: 9.4 MG/DL — SIGNIFICANT CHANGE UP (ref 8.4–10.5)
CALCIUM SERPL-MCNC: 9.6 MG/DL — SIGNIFICANT CHANGE UP (ref 8.4–10.5)
CHLORIDE SERPL-SCNC: 100 MMOL/L — SIGNIFICANT CHANGE UP (ref 96–108)
CHLORIDE SERPL-SCNC: 104 MMOL/L — SIGNIFICANT CHANGE UP (ref 96–108)
CO2 SERPL-SCNC: 22 MMOL/L — SIGNIFICANT CHANGE UP (ref 22–31)
CO2 SERPL-SCNC: 23 MMOL/L — SIGNIFICANT CHANGE UP (ref 22–31)
CREAT SERPL-MCNC: 1.33 MG/DL — HIGH (ref 0.5–1.3)
CREAT SERPL-MCNC: 1.35 MG/DL — HIGH (ref 0.5–1.3)
EGFR: 40 ML/MIN/1.73M2 — LOW
EGFR: 41 ML/MIN/1.73M2 — LOW
GLUCOSE BLDC GLUCOMTR-MCNC: 133 MG/DL — HIGH (ref 70–99)
GLUCOSE BLDC GLUCOMTR-MCNC: 166 MG/DL — HIGH (ref 70–99)
GLUCOSE BLDC GLUCOMTR-MCNC: 170 MG/DL — HIGH (ref 70–99)
GLUCOSE BLDC GLUCOMTR-MCNC: 195 MG/DL — HIGH (ref 70–99)
GLUCOSE SERPL-MCNC: 138 MG/DL — HIGH (ref 70–99)
GLUCOSE SERPL-MCNC: 199 MG/DL — HIGH (ref 70–99)
HCT VFR BLD CALC: 29.7 % — LOW (ref 34.5–45)
HGB BLD-MCNC: 9.4 G/DL — LOW (ref 11.5–15.5)
INR BLD: 0.9 RATIO — SIGNIFICANT CHANGE UP (ref 0.85–1.18)
MAGNESIUM SERPL-MCNC: 2.3 MG/DL — SIGNIFICANT CHANGE UP (ref 1.6–2.6)
MCHC RBC-ENTMCNC: 28.7 PG — SIGNIFICANT CHANGE UP (ref 27–34)
MCHC RBC-ENTMCNC: 31.6 GM/DL — LOW (ref 32–36)
MCV RBC AUTO: 90.8 FL — SIGNIFICANT CHANGE UP (ref 80–100)
MRSA PCR RESULT.: SIGNIFICANT CHANGE UP
NRBC # BLD: 0 /100 WBCS — SIGNIFICANT CHANGE UP (ref 0–0)
OSMOLALITY SERPL: 294 MOSMOL/KG — SIGNIFICANT CHANGE UP (ref 280–301)
OSMOLALITY UR: 534 MOS/KG — SIGNIFICANT CHANGE UP (ref 300–900)
PHOSPHATE SERPL-MCNC: 3.3 MG/DL — SIGNIFICANT CHANGE UP (ref 2.5–4.5)
PLATELET # BLD AUTO: 178 K/UL — SIGNIFICANT CHANGE UP (ref 150–400)
POTASSIUM SERPL-MCNC: 5 MMOL/L — SIGNIFICANT CHANGE UP (ref 3.5–5.3)
POTASSIUM SERPL-MCNC: 5.4 MMOL/L — HIGH (ref 3.5–5.3)
POTASSIUM SERPL-SCNC: 5 MMOL/L — SIGNIFICANT CHANGE UP (ref 3.5–5.3)
POTASSIUM SERPL-SCNC: 5.4 MMOL/L — HIGH (ref 3.5–5.3)
PROTHROM AB SERPL-ACNC: 9.5 SEC — SIGNIFICANT CHANGE UP (ref 9.5–13)
RBC # BLD: 3.27 M/UL — LOW (ref 3.8–5.2)
RBC # FLD: 15.2 % — HIGH (ref 10.3–14.5)
S AUREUS DNA NOSE QL NAA+PROBE: SIGNIFICANT CHANGE UP
SODIUM SERPL-SCNC: 133 MMOL/L — LOW (ref 135–145)
SODIUM SERPL-SCNC: 136 MMOL/L — SIGNIFICANT CHANGE UP (ref 135–145)
SODIUM UR-SCNC: 120 MMOL/L — SIGNIFICANT CHANGE UP
SURGICAL PATHOLOGY STUDY: SIGNIFICANT CHANGE UP
TACROLIMUS SERPL-MCNC: 5.1 NG/ML — SIGNIFICANT CHANGE UP
WBC # BLD: 8.44 K/UL — SIGNIFICANT CHANGE UP (ref 3.8–10.5)
WBC # FLD AUTO: 8.44 K/UL — SIGNIFICANT CHANGE UP (ref 3.8–10.5)

## 2024-03-07 PROCEDURE — 70450 CT HEAD/BRAIN W/O DYE: CPT | Mod: 26

## 2024-03-07 PROCEDURE — 99222 1ST HOSP IP/OBS MODERATE 55: CPT | Mod: 25

## 2024-03-07 PROCEDURE — 95720 EEG PHY/QHP EA INCR W/VEEG: CPT

## 2024-03-07 PROCEDURE — 99232 SBSQ HOSP IP/OBS MODERATE 35: CPT | Mod: GC

## 2024-03-07 PROCEDURE — 71045 X-RAY EXAM CHEST 1 VIEW: CPT | Mod: 26

## 2024-03-07 PROCEDURE — 99291 CRITICAL CARE FIRST HOUR: CPT

## 2024-03-07 RX ORDER — HYDROMORPHONE HYDROCHLORIDE 2 MG/ML
1 INJECTION INTRAMUSCULAR; INTRAVENOUS; SUBCUTANEOUS ONCE
Refills: 0 | Status: DISCONTINUED | OUTPATIENT
Start: 2024-03-07 | End: 2024-03-07

## 2024-03-07 RX ORDER — HYDRALAZINE HCL 50 MG
10 TABLET ORAL ONCE
Refills: 0 | Status: COMPLETED | OUTPATIENT
Start: 2024-03-07 | End: 2024-03-07

## 2024-03-07 RX ORDER — LACOSAMIDE 50 MG/1
200 TABLET ORAL
Refills: 0 | Status: DISCONTINUED | OUTPATIENT
Start: 2024-03-07 | End: 2024-03-08

## 2024-03-07 RX ORDER — BRIVARACETAM 25 MG/1
100 TABLET, FILM COATED ORAL
Refills: 0 | Status: DISCONTINUED | OUTPATIENT
Start: 2024-03-07 | End: 2024-03-08

## 2024-03-07 RX ORDER — SODIUM CHLORIDE 9 MG/ML
3 INJECTION INTRAMUSCULAR; INTRAVENOUS; SUBCUTANEOUS EVERY 6 HOURS
Refills: 0 | Status: DISCONTINUED | OUTPATIENT
Start: 2024-03-07 | End: 2024-03-08

## 2024-03-07 RX ORDER — OXYCODONE HYDROCHLORIDE 5 MG/1
10 TABLET ORAL EVERY 4 HOURS
Refills: 0 | Status: DISCONTINUED | OUTPATIENT
Start: 2024-03-07 | End: 2024-03-08

## 2024-03-07 RX ORDER — SODIUM CHLORIDE 5 G/100ML
1000 INJECTION, SOLUTION INTRAVENOUS
Refills: 0 | Status: COMPLETED | OUTPATIENT
Start: 2024-03-07 | End: 2024-03-07

## 2024-03-07 RX ADMIN — BRIVARACETAM 100 MILLIGRAM(S): 25 TABLET, FILM COATED ORAL at 21:10

## 2024-03-07 RX ADMIN — LACOSAMIDE 200 MILLIGRAM(S): 50 TABLET ORAL at 05:56

## 2024-03-07 RX ADMIN — SODIUM CHLORIDE 50 MILLILITER(S): 5 INJECTION, SOLUTION INTRAVENOUS at 19:26

## 2024-03-07 RX ADMIN — TACROLIMUS 3 MILLIGRAM(S): 5 CAPSULE ORAL at 18:38

## 2024-03-07 RX ADMIN — GABAPENTIN 100 MILLIGRAM(S): 400 CAPSULE ORAL at 21:10

## 2024-03-07 RX ADMIN — POLYETHYLENE GLYCOL 3350 17 GRAM(S): 17 POWDER, FOR SOLUTION ORAL at 18:37

## 2024-03-07 RX ADMIN — CHLORHEXIDINE GLUCONATE 15 MILLILITER(S): 213 SOLUTION TOPICAL at 17:00

## 2024-03-07 RX ADMIN — CHLORHEXIDINE GLUCONATE 15 MILLILITER(S): 213 SOLUTION TOPICAL at 05:56

## 2024-03-07 RX ADMIN — CARVEDILOL PHOSPHATE 25 MILLIGRAM(S): 80 CAPSULE, EXTENDED RELEASE ORAL at 05:57

## 2024-03-07 RX ADMIN — HUMAN INSULIN 10 UNIT(S): 100 INJECTION, SUSPENSION SUBCUTANEOUS at 23:18

## 2024-03-07 RX ADMIN — Medication 2: at 23:18

## 2024-03-07 RX ADMIN — BRIVARACETAM 100 MILLIGRAM(S): 25 TABLET, FILM COATED ORAL at 06:28

## 2024-03-07 RX ADMIN — Medication 10 MILLIGRAM(S): at 10:50

## 2024-03-07 RX ADMIN — Medication 2: at 17:00

## 2024-03-07 RX ADMIN — HYDROMORPHONE HYDROCHLORIDE 1 MILLIGRAM(S): 2 INJECTION INTRAMUSCULAR; INTRAVENOUS; SUBCUTANEOUS at 05:15

## 2024-03-07 RX ADMIN — PANTOPRAZOLE SODIUM 40 MILLIGRAM(S): 20 TABLET, DELAYED RELEASE ORAL at 11:12

## 2024-03-07 RX ADMIN — SODIUM CHLORIDE 2 GRAM(S): 9 INJECTION INTRAMUSCULAR; INTRAVENOUS; SUBCUTANEOUS at 05:59

## 2024-03-07 RX ADMIN — CHLORHEXIDINE GLUCONATE 1 APPLICATION(S): 213 SOLUTION TOPICAL at 21:10

## 2024-03-07 RX ADMIN — Medication 2: at 11:14

## 2024-03-07 RX ADMIN — SENNA PLUS 2 TABLET(S): 8.6 TABLET ORAL at 21:10

## 2024-03-07 RX ADMIN — HUMAN INSULIN 10 UNIT(S): 100 INJECTION, SUSPENSION SUBCUTANEOUS at 17:00

## 2024-03-07 RX ADMIN — HUMAN INSULIN 10 UNIT(S): 100 INJECTION, SUSPENSION SUBCUTANEOUS at 05:58

## 2024-03-07 RX ADMIN — SODIUM CHLORIDE 50 MILLILITER(S): 5 INJECTION, SOLUTION INTRAVENOUS at 10:50

## 2024-03-07 RX ADMIN — Medication 100 MILLIGRAM(S): at 14:17

## 2024-03-07 RX ADMIN — SODIUM CHLORIDE 3 GRAM(S): 9 INJECTION INTRAMUSCULAR; INTRAVENOUS; SUBCUTANEOUS at 11:34

## 2024-03-07 RX ADMIN — LACOSAMIDE 200 MILLIGRAM(S): 50 TABLET ORAL at 18:37

## 2024-03-07 RX ADMIN — HEPARIN SODIUM 5000 UNIT(S): 5000 INJECTION INTRAVENOUS; SUBCUTANEOUS at 05:57

## 2024-03-07 RX ADMIN — OXYCODONE HYDROCHLORIDE 10 MILLIGRAM(S): 5 TABLET ORAL at 10:40

## 2024-03-07 RX ADMIN — GABAPENTIN 100 MILLIGRAM(S): 400 CAPSULE ORAL at 05:56

## 2024-03-07 RX ADMIN — Medication 100 MILLIGRAM(S): at 05:56

## 2024-03-07 RX ADMIN — Medication 5 MILLIGRAM(S): at 05:57

## 2024-03-07 RX ADMIN — TACROLIMUS 3 MILLIGRAM(S): 5 CAPSULE ORAL at 05:59

## 2024-03-07 RX ADMIN — Medication 0.1 MILLIGRAM(S): at 14:17

## 2024-03-07 RX ADMIN — Medication 0.1 MILLIGRAM(S): at 05:57

## 2024-03-07 RX ADMIN — GABAPENTIN 100 MILLIGRAM(S): 400 CAPSULE ORAL at 14:17

## 2024-03-07 RX ADMIN — OXYCODONE HYDROCHLORIDE 10 MILLIGRAM(S): 5 TABLET ORAL at 09:40

## 2024-03-07 RX ADMIN — BRIVARACETAM 100 MILLIGRAM(S): 25 TABLET, FILM COATED ORAL at 17:00

## 2024-03-07 RX ADMIN — Medication 1 TABLET(S): at 11:13

## 2024-03-07 RX ADMIN — CARVEDILOL PHOSPHATE 25 MILLIGRAM(S): 80 CAPSULE, EXTENDED RELEASE ORAL at 17:00

## 2024-03-07 RX ADMIN — POLYETHYLENE GLYCOL 3350 17 GRAM(S): 17 POWDER, FOR SOLUTION ORAL at 05:58

## 2024-03-07 RX ADMIN — SODIUM CHLORIDE 3 GRAM(S): 9 INJECTION INTRAMUSCULAR; INTRAVENOUS; SUBCUTANEOUS at 18:38

## 2024-03-07 RX ADMIN — HYDROMORPHONE HYDROCHLORIDE 1 MILLIGRAM(S): 2 INJECTION INTRAMUSCULAR; INTRAVENOUS; SUBCUTANEOUS at 05:00

## 2024-03-07 RX ADMIN — SODIUM CHLORIDE 3 GRAM(S): 9 INJECTION INTRAMUSCULAR; INTRAVENOUS; SUBCUTANEOUS at 23:18

## 2024-03-07 RX ADMIN — HUMAN INSULIN 10 UNIT(S): 100 INJECTION, SUSPENSION SUBCUTANEOUS at 11:14

## 2024-03-07 NOTE — CONSULT NOTE ADULT - SUBJECTIVE AND OBJECTIVE BOX
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 Pt remains vent dependent, unable to tolerate CPAP. Consult for tracheostomy and PEG placement.       PAST MEDICAL & SURGICAL HISTORY:  Hypertension      Anemia in ESRD (end-stage renal disease)      Thrombocytopenia  leukopenia: followed by chele, plan for BMBx 7/22/19      H/O gastroesophageal reflux (GERD)      Breast cancer, female  left 2014      ESRD on hemodialysis      Anuria      Thyroid nodule  yearly Ultrasound, monitoring yearly      Pancreatic cyst      AV fistula thrombosis  history: was treated with coumadin, no more A/C.      S/P lumpectomy, left breast  2014      Adrenal mass  excison 2015      S/P hysterectomy  1994      S/P arteriovenous (AV) fistula creation  2002      History of fracture of right ankle  2014 repaired          MEDICATIONS  (STANDING):  brivaracetam Oral Solution 100 milliGRAM(s) Oral <User Schedule>  carvedilol 25 milliGRAM(s) Oral every 12 hours  chlorhexidine 0.12% Liquid 15 milliLiter(s) Oral Mucosa every 12 hours  chlorhexidine 4% Liquid 1 Application(s) Topical daily  cloNIDine 0.1 milliGRAM(s) Oral three times a day  gabapentin Solution 100 milliGRAM(s) Oral three times a day  hydrALAZINE 100 milliGRAM(s) Oral every 8 hours  influenza  Vaccine (HIGH DOSE) 0.7 milliLiter(s) IntraMuscular once  insulin lispro (ADMELOG) corrective regimen sliding scale   SubCutaneous every 6 hours  insulin NPH human recombinant 10 Unit(s) SubCutaneous every 6 hours  lacosamide Solution 200 milliGRAM(s) Oral two times a day  pantoprazole  Injectable 40 milliGRAM(s) IV Push daily  polyethylene glycol 3350 17 Gram(s) Oral every 12 hours  predniSONE   Tablet 5 milliGRAM(s) Oral daily  senna 2 Tablet(s) Oral at bedtime  sodium chloride 3 Gram(s) Oral every 6 hours  sodium chloride 2% + sodium acetate 50:50 1000 milliLiter(s) (50 mL/Hr) IV Continuous <Continuous>  tacrolimus    0.5 mG/mL Suspension 3 milliGRAM(s) Oral two times a day  trimethoprim   80 mG/sulfamethoxazole 400 mG 1 Tablet(s) Oral daily    MEDICATIONS  (PRN):  acetaminophen   Oral Liquid .. 650 milliGRAM(s) Oral every 6 hours PRN Temp greater or equal to 38C (100.4F), Mild Pain (1 - 3)  fentaNYL    Injectable 25 MICROGram(s) IV Push every 2 hours PRN vent synchroncy  ondansetron Injectable 4 milliGRAM(s) IV Push every 6 hours PRN Nausea and/or Vomiting  oxyCODONE    IR 5 milliGRAM(s) Oral every 4 hours PRN Moderate Pain (4 - 6)  oxyCODONE    Solution 10 milliGRAM(s) Enteral Tube every 4 hours PRN Severe Pain (7 - 10)      Allergies    shellfish (Rash)  ChloraPrep One-Step (Rash)  penicillins (Rash)      Physical Exam:  General: NAD, resting comfortably  HEENT: s/p L taryn, edentulous   Pulmonary: PRVC 14/450/5/40  Cardiovascular: well perfused  Abdominal: well healed incisions, soft, no guarding, OG tube with TF  Extremities: WWP    Vital Signs Last 24 Hrs  T(C): 36.4 (07 Mar 2024 15:00), Max: 36.9 (07 Mar 2024 03:00)  T(F): 97.5 (07 Mar 2024 15:00), Max: 98.5 (07 Mar 2024 07:00)  HR: 82 (07 Mar 2024 18:00) (78 - 100)  BP: 116/62 (07 Mar 2024 18:00) (115/61 - 197/102)  BP(mean): 84 (07 Mar 2024 18:00) (82 - 142)  RR: 14 (07 Mar 2024 18:00) (13 - 20)  SpO2: 100% (07 Mar 2024 18:00) (90% - 100%)    Parameters below as of 07 Mar 2024 07:00  Patient On (Oxygen Delivery Method): ventilator    O2 Concentration (%): 50    I&O's Summary    06 Mar 2024 07:01  -  07 Mar 2024 07:00  --------------------------------------------------------  IN: 1780 mL / OUT: 1315 mL / NET: 465 mL    07 Mar 2024 07:01  -  07 Mar 2024 18:37  --------------------------------------------------------  IN: 1110 mL / OUT: 525 mL / NET: 585 mL        LABS:                        9.4    8.44  )-----------( 178      ( 07 Mar 2024 04:26 )             29.7     03-07    136  |  104  |  48<H>  ----------------------------<  199<H>  5.4<H>   |  23  |  1.33<H>    Ca    9.4      07 Mar 2024 17:21  Phos  3.3     03-07  Mg     2.3     03-07      PT/INR - ( 07 Mar 2024 04:26 )   PT: 9.5 sec;   INR: 0.90 ratio         PTT - ( 07 Mar 2024 04:26 )  PTT:28.2 sec  Urinalysis Basic - ( 07 Mar 2024 17:21 )    Color: x / Appearance: x / SG: x / pH: x  Gluc: 199 mg/dL / Ketone: x  / Bili: x / Urobili: x   Blood: x / Protein: x / Nitrite: x   Leuk Esterase: x / RBC: x / WBC x   Sq Epi: x / Non Sq Epi: x / Bacteria: x      CAPILLARY BLOOD GLUCOSE      POCT Blood Glucose.: 195 mg/dL (07 Mar 2024 16:59)  POCT Blood Glucose.: 170 mg/dL (07 Mar 2024 11:11)  POCT Blood Glucose.: 133 mg/dL (07 Mar 2024 05:55)  POCT Blood Glucose.: 184 mg/dL (06 Mar 2024 23:03)

## 2024-03-07 NOTE — CHART NOTE - NSCHARTNOTEFT_GEN_A_CORE
Patient passed 48-hour clamp trial. cleared for removal per Dr. Torres. EVD was removed at bedside with standand precaution. 3 staples were applied to close the prior EVD exit site. Patient tolerated procedure well. Will f/u post EVD removal CT

## 2024-03-07 NOTE — PROGRESS NOTE ADULT - ASSESSMENT
ASSESSMENT/PLAN: s/p left craniectomy(discarded) and evacuation. POD6    NEURO:  Neurochecks Q1, Vitals Q2  r CTH post-op: s/p clot evacuation, heme in the 4th ventricle, improvement of midline shift, pneumocephalus and hydro  CTH tomorrow (3/7)  EVD clamped since (3/5)  1 YON drain drained 35 cc in 24 hours  VEEG- 4 electrographic seizures, last one 3/5 on 5:15 PM, frequent epileptiform discharges in the left parietal region   Tylenol/Oxy/Fentanyl prn for pain  Briviact 100 mg BID for seizures, Vimpat 200 mg BID ()  Activity: [x] ROM in bed    PULM:  Intubated   PRVC 14/450/5/40  CPAP as tolerated   CXR improved  ABG- f/u   Copious oral secretions, some in line   At risk of aspiration pneumonia     CV:  SBP goal:   History of HTN  Currently on Hydralazine 50 mg Q8H, clonidine 0.1 mg TID, Coreg 25 mg BID     RENAL:  Fluids: IVL   Hyponatremic- euvolemic? Will send urine OSM and lytes  f/u nephro transplant team  patient has been off HD  s/p transplant in 2019  Currently on tacrolimus   Renal function stable, urine output maintained   AVF in the left upper extremity     GI:  Diet: OGT-- continue tube feeds with Glucerna  GI prophylaxis [] not indicated [x] PPI [] other:  Bowel regimen [x] miralax [x] senna  LBM: 3/5    ENDO:   Goal euglycemia (-180)  ISS  A1C: 5.4%  Currently on ISS, NPH 10 units q6H    HEME/ONC:   H/H stable for now  VTE prophylaxis: [x] SCDs [x] chemoprophylaxis- SQH [] high risk of DVT/PE on admission due to:  hx of breast cancer - finished Tamoxifen in March 2023, will discontinue   LED- bilateral above the knee DVT. IR consulted for IVC filter placement.   - Patient now s/p retrievable IVC filter  - Patient receives procrit shots every month, will continue     ID: afebrile    MISC:    SOCIAL/FAMILY:  [x] updated at bedside [] family meeting    CODE STATUS:  [x] Full Code [] DNR [] DNI [] Palliative/Comfort Care    DISPOSITION:  [x] ICU [] Stroke Unit [] Floor [] EMU [] RCU [] PCU    [x] Patient is at high risk of neurologic deterioration/death due to: infection, expansion of hematoma      ASSESSMENT/PLAN: s/p left craniectomy(discarded) and evacuation. POD6    NEURO:  Neurochecks Q1, Vitals Q2  r CTH post-op: s/p clot evacuation, heme in the 4th ventricle, improvement of midline shift, pneumocephalus and hydro  CTH tomorrow (3/7)- stable   EVD clamped since (3/5)  1 YON drain removed 3/6  VEEG- 4 electrographic seizures, last one 3/5 on 5:15 PM, frequent epileptiform discharges in the left parietal region, will follow read 3/7 and disconnect if no seizures   Pain: Oxy 10 mg Q4 PRN (patient on Percocet at home for leg pain)  Briviact 100 mg BID for seizures, Vimpat 200 mg BID ()  Activity: [x] ROM in bed    PULM:  Intubated   PRVC 14/450/5/40  CPAP as tolerated, patient did not tolerate CPAP, started to breath abnormally and became apneic  CXR improved  Copious oral secretions, in line   At risk of aspiration pneumonia   Trach discussion today     CV:  SBP goal:   History of HTN  Currently on Hydralazine 100 mg Q8H, clonidine 0.1 mg TID, Coreg 25 mg BID     RENAL:  Fluids: IVL   Hyponatremic- euvolemic-- will start on 2% at 50 cc for 6 hours, will recheck Na, likely SiADH   Will concentrate feeds for fluid restriction   f/u nephro transplant team  patient has been off HD  s/p transplant in 2019  Currently on tacrolimus   Renal function stable, urine output maintained   AVF in the left upper extremity     GI:  Diet: OGT-- continue tube feeds with Glucerna   GI prophylaxis [] not indicated [x] PPI [] other:  Bowel regimen [x] miralax [x] senna  LBM: 3/5    ENDO:   Goal euglycemia (-180)  ISS  A1C: 5.4%  Currently on ISS, NPH 10 units q6H    HEME/ONC:   H/H stable for now  VTE prophylaxis: [x] SCDs [x] chemoprophylaxis- SQH [] high risk of DVT/PE on admission due to:  hx of breast cancer - finished Tamoxifen in March 2023, will discontinue   LED- bilateral above the knee DVT. IR consulted for IVC filter placement.   - Patient now s/p retrievable IVC filter  - Patient receives procrit shots every month, will continue     ID: afebrile    MISC:    SOCIAL/FAMILY:  [x] updated at bedside [] family meeting    CODE STATUS:  [x] Full Code [] DNR [] DNI [] Palliative/Comfort Care    DISPOSITION:  [x] ICU [] Stroke Unit [] Floor [] EMU [] RCU [] PCU    [x] Patient is at high risk of neurologic deterioration/death due to: infection, expansion of hematoma

## 2024-03-07 NOTE — CONSULT NOTE ADULT - SUBJECTIVE AND OBJECTIVE BOX
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 ICH score 4.     (02 Mar 2024 00:28)      PAST MEDICAL & SURGICAL HISTORY:  Hypertension      Anemia in ESRD (end-stage renal disease)      Thrombocytopenia  leukopenia: followed by heme, plan for BMBx 7/22/19      H/O gastroesophageal reflux (GERD)      Breast cancer, female  left 2014      ESRD on hemodialysis      Anuria      Thyroid nodule  yearly Ultrasound, monitoring yearly      Pancreatic cyst      AV fistula thrombosis  history: was treated with coumadin, no more A/C.      S/P lumpectomy, left breast  2014      Adrenal mass  excison 2015      S/P hysterectomy  1994      S/P arteriovenous (AV) fistula creation  2002      History of fracture of right ankle  2014 repaired              MEDICATIONS:  carvedilol 25 milliGRAM(s) Oral every 12 hours  cloNIDine 0.1 milliGRAM(s) Oral three times a day  hydrALAZINE 100 milliGRAM(s) Oral every 8 hours    trimethoprim   80 mG/sulfamethoxazole 400 mG 1 Tablet(s) Oral daily      acetaminophen   Oral Liquid .. 650 milliGRAM(s) Oral every 6 hours PRN  brivaracetam Oral Solution 100 milliGRAM(s) Oral <User Schedule>  fentaNYL    Injectable 25 MICROGram(s) IV Push every 2 hours PRN  gabapentin Solution 100 milliGRAM(s) Oral three times a day  lacosamide Solution 200 milliGRAM(s) Oral two times a day  ondansetron Injectable 4 milliGRAM(s) IV Push every 6 hours PRN  oxyCODONE    IR 5 milliGRAM(s) Oral every 4 hours PRN  oxyCODONE    Solution 10 milliGRAM(s) Enteral Tube every 4 hours PRN    pantoprazole  Injectable 40 milliGRAM(s) IV Push daily  polyethylene glycol 3350 17 Gram(s) Oral every 12 hours  senna 2 Tablet(s) Oral at bedtime    insulin lispro (ADMELOG) corrective regimen sliding scale   SubCutaneous every 6 hours  insulin NPH human recombinant 10 Unit(s) SubCutaneous every 6 hours  predniSONE   Tablet 5 milliGRAM(s) Oral daily    chlorhexidine 0.12% Liquid 15 milliLiter(s) Oral Mucosa every 12 hours  chlorhexidine 4% Liquid 1 Application(s) Topical daily  influenza  Vaccine (HIGH DOSE) 0.7 milliLiter(s) IntraMuscular once  sodium chloride 3 Gram(s) Oral every 6 hours  tacrolimus    0.5 mG/mL Suspension 3 milliGRAM(s) Oral two times a day      FAMILY HISTORY:      Non-contributory    SOCIAL HISTORY:    [ ] not a smoker    Allergies    shellfish (Rash)  ChloraPrep One-Step (Rash)  penicillins (Rash)    Intolerances    	    REVIEW OF SYSTEMS:  unable to obtain 2/2 sedation    All other ROS negative    PHYSICAL EXAM:  T(C): 36.1 (03-07-24 @ 19:00), Max: 36.9 (03-07-24 @ 03:00)  HR: 81 (03-07-24 @ 22:00) (79 - 100)  BP: 106/50 (03-07-24 @ 22:00) (102/46 - 197/102)  RR: 16 (03-07-24 @ 22:00) (13 - 20)  SpO2: 100% (03-07-24 @ 22:00) (90% - 100%)  Wt(kg): --  I&O's Summary    06 Mar 2024 07:01  -  07 Mar 2024 07:00  --------------------------------------------------------  IN: 1780 mL / OUT: 1315 mL / NET: 465 mL    07 Mar 2024 07:01  -  07 Mar 2024 22:36  --------------------------------------------------------  IN: 1325 mL / OUT: 525 mL / NET: 800 mL        Appearance: intubated, sedated   HEENT:   Normal oral mucosa, EOMI	  Cardiovascular:  S1 S2, No JVD,    Respiratory: Lungs clear to auscultation	  Psychiatry: Alert  Gastrointestinal:  Soft, Non-tender, + BS	  Skin: No rashes   Neurologic: Non-focal  Extremities:  No edema  Vascular: Peripheral pulses palpable    	    	  	  CARDIAC MARKERS:  Labs personally reviewed by me                                  9.4    8.44  )-----------( 178      ( 07 Mar 2024 04:26 )             29.7     03-07    136  |  104  |  48<H>  ----------------------------<  199<H>  5.4<H>   |  23  |  1.33<H>    Ca    9.4      07 Mar 2024 17:21  Phos  3.3     03-07  Mg     2.3     03-07            EKG: Personally reviewed by me - NSR RBBB, nonspecific ST changes  Radiology: Personally reviewed by me - CXR The visualized lungs are clear.  Small right pleural effusion with likely associated passive atelectasis, again seen.    TTE CONCLUSIONS:   1. Left ventricular cavity is small. Left ventricular systolic function is hyperdynamic with an ejection fraction visually estimated at 70 to 75 %. There are no regional wall motion abnormalities seen.   2. Thereis moderate (grade 2) left ventricular diastolic dysfunction, with elevated filling pressure.   3. Moderate left ventricular hypertrophy.   4. Septal bulge noted measures 2.2cm without obstruction.   5. Interatrial septum is aneurysmal.   6. The leftatrium is severely dilated.   7. There is moderate calcification of the mitral valve annulus.   8. Fibrocalcific aortic valve sclerosis without stenosis.   9. Normal right ventricular cavity size, with wall thickness, and normal systolic function.  10. No pericardial effusion seen.             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      1. Abnormal Echo --  Septal bulge noted measures 2.2cm without obstruction.   -- Given no intracavitary obstruction no intervention at this time  - No Myxoma seen on this echo or echo in 2019, ? if hypermobile interatrial septum was confused for on prior imaging     2. HTN - Continue Hydralazine 100 mg Q8H, clonidine 0.1 mg TID, Coreg 25 mg BID     3. 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 DVT PPX - HSQ when safe                 Differential diagnosis and plan of care discussed with patient after the evaluation. Counseling on diet, nutritional counseling, weight management, exercise and medication compliance was done.   Advanced care planning/advanced directives discussed with patient/family. DNR status including forceful chest compressions to attempt to restart the heart, ventilator support/artificial breathing, electric shock, artificial nutrition, health care proxy, Molst form all discussed with pt. Pt wishes to consider. Sixteen minutes spent on discussing advanced directives.           Celio Mcwilliams DO [facility]  Cardiovascular Medicine  [address], [address]  Office [phone]  Available via call/text on [employer] Teams

## 2024-03-07 NOTE — PROGRESS NOTE ADULT - ASSESSMENT
ASSESSMENT/PLAN: s/p left craniectomy(discarded) and evacuation. POD6    NEURO:  Neurochecks Q1, Vitals Q2  r CTH post-op: s/p clot evacuation, heme in the 4th ventricle, improvement of midline shift, pneumocephalus and hydro  CTH tomorrow (3/7)- stable   EVD clamped since (3/5)  1 YON drain removed 3/6  VEEG- 4 electrographic seizures, last one 3/5 on 5:15 PM, frequent epileptiform discharges in the left parietal region, will follow read 3/7 and disconnect if no seizures   Pain: Oxy 10 mg Q4 PRN (patient on Percocet at home for leg pain)  Briviact 100 mg BID for seizures, Vimpat 200 mg BID ()  Activity: [x] ROM in bed    PULM:  Intubated   PRVC 14/450/5/40  CPAP as tolerated, patient did not tolerate CPAP, started to breath abnormally and became apneic  CXR improved  Copious oral secretions, in line   At risk of aspiration pneumonia   Trach discussion today     CV:  SBP goal:   History of HTN  Currently on Hydralazine 100 mg Q8H, clonidine 0.1 mg TID, Coreg 25 mg BID     RENAL:  Fluids: IVL   Hyponatremic- euvolemic-- will start on 2% at 50 cc for 6 hours, will recheck Na, likely SiADH   Will concentrate feeds for fluid restriction   f/u nephro transplant team  patient has been off HD  s/p transplant in 2019  Currently on tacrolimus   Renal function stable, urine output maintained   AVF in the left upper extremity     GI:  Diet: OGT-- continue tube feeds with Glucerna   GI prophylaxis [] not indicated [x] PPI [] other:  Bowel regimen [x] miralax [x] senna  LBM: 3/5    ENDO:   Goal euglycemia (-180)  ISS  A1C: 5.4%  Currently on ISS, NPH 10 units q6H    HEME/ONC:   H/H stable for now  VTE prophylaxis: [x] SCDs [x] chemoprophylaxis- SQH [] high risk of DVT/PE on admission due to:  hx of breast cancer - finished Tamoxifen in March 2023, will discontinue   LED- bilateral above the knee DVT. IR consulted for IVC filter placement.   - Patient now s/p retrievable IVC filter  - Patient receives procrit shots every month, will continue     ID: afebrile    MISC:    SOCIAL/FAMILY:  [x] updated at bedside [] family meeting    CODE STATUS:  [x] Full Code [] DNR [] DNI [] Palliative/Comfort Care    DISPOSITION:  [x] ICU [] Stroke Unit [] Floor [] EMU [] RCU [] PCU    [x] Patient is at high risk of neurologic deterioration/death due to: infection, expansion of hematoma

## 2024-03-07 NOTE — PROGRESS NOTE ADULT - ATTENDING COMMENTS
Still intubated, moving left arm now.    Renal function stable, tacrolimus at goal.   cont current immunosuppression.

## 2024-03-07 NOTE — PROGRESS NOTE ADULT - SUBJECTIVE AND OBJECTIVE BOX
[facility] DIVISION OF KIDNEY DISEASES AND HYPERTENSION -- FOLLOW UP NOTE  --------------------------------------------------------------------------------    Chief Complaint: DDRT (2019), Nontraumatic intracerebral hemorrhage    24 hour events/subjective: Pt. was seen and evaluated in the neurosurgical ICU earlier today. Pt. remains intubated, unable to provide history or ROS.    PAST HISTORY  --------------------------------------------------------------------------------  No significant changes to PMH, PSH, FHx, SHx, unless otherwise noted    ALLERGIES & MEDICATIONS  --------------------------------------------------------------------------------  Allergies    shellfish (Rash)  ChloraPrep One-Step (Rash)  penicillins (Rash)    Intolerances    Standing Inpatient Medications  brivaracetam Oral Solution 100 milliGRAM(s) Oral two times a day  carvedilol 25 milliGRAM(s) Oral every 12 hours  chlorhexidine 0.12% Liquid 15 milliLiter(s) Oral Mucosa every 12 hours  chlorhexidine 4% Liquid 1 Application(s) Topical daily  cloNIDine 0.1 milliGRAM(s) Oral three times a day  gabapentin Solution 100 milliGRAM(s) Oral three times a day  hydrALAZINE 100 milliGRAM(s) Oral every 8 hours  influenza  Vaccine (HIGH DOSE) 0.7 milliLiter(s) IntraMuscular once  insulin lispro (ADMELOG) corrective regimen sliding scale   SubCutaneous every 6 hours  insulin NPH human recombinant 10 Unit(s) SubCutaneous every 6 hours  lacosamide 200 milliGRAM(s) Oral two times a day  pantoprazole  Injectable 40 milliGRAM(s) IV Push daily  polyethylene glycol 3350 17 Gram(s) Oral every 12 hours  predniSONE   Tablet 5 milliGRAM(s) Oral daily  senna 2 Tablet(s) Oral at bedtime  sodium chloride 3 Gram(s) Oral every 6 hours  sodium chloride 2% + sodium acetate 50:50 1000 milliLiter(s) IV Continuous <Continuous>  tacrolimus    0.5 mG/mL Suspension 3 milliGRAM(s) Oral two times a day  trimethoprim   80 mG/sulfamethoxazole 400 mG 1 Tablet(s) Oral daily    PRN Inpatient Medications  acetaminophen   Oral Liquid .. 650 milliGRAM(s) Oral every 6 hours PRN  fentaNYL    Injectable 25 MICROGram(s) IV Push every 2 hours PRN  ondansetron Injectable 4 milliGRAM(s) IV Push every 6 hours PRN  oxyCODONE    IR 5 milliGRAM(s) Oral every 4 hours PRN  oxyCODONE    Solution 10 milliGRAM(s) Enteral Tube every 4 hours PRN      REVIEW OF SYSTEMS  --------------------------------------------------------------------------------  Unable to obtain ROS, see HPI    VITALS/PHYSICAL EXAM  --------------------------------------------------------------------------------  T(C): 36.9 (03-07-24 @ 07:00), Max: 36.9 (03-07-24 @ 03:00)  HR: 91 (03-07-24 @ 10:00) (77 - 100)  BP: 187/100 (03-07-24 @ 10:00) (115/61 - 213/102)  RR: 15 (03-07-24 @ 10:00) (13 - 24)  SpO2: 100% (03-07-24 @ 10:00) (90% - 100%)  Wt(kg): --    03-06-24 @ 07:01  -  03-07-24 @ 07:00  --------------------------------------------------------  IN: 1780 mL / OUT: 1315 mL / NET: 465 mL    03-07-24 @ 07:01  -  03-07-24 @ 10:56  --------------------------------------------------------  IN: 165 mL / OUT: 0 mL / NET: 165 mL    Physical Exam:  Gen: Intubated, sedated  HEENT: +ET tube  Pulm: Mechanical breath sounds BL  CV: RRR, S1S2;  Abd: +BS, soft, nontender/nondistended  : +Urinary catheter with large volume urine noted.  Extremities: no bilateral LE edema noted. +CRISTOE AKA  Neuro: Sedated  Skin: Warm, without rashes    LABS/STUDIES  --------------------------------------------------------------------------------              9.4    8.44  >-----------<  178      [03-07-24 @ 04:26]              29.7     133  |  100  |  46  ----------------------------<  138      [03-07-24 @ 04:26]  5.0   |  22  |  1.35        Ca     9.6     [03-07-24 @ 04:26]      Mg     2.3     [03-07-24 @ 04:26]      Phos  3.3     [03-07-24 @ 04:26]      PT/INR: PT 9.5  , INR 0.90       [03-07-24 @ 04:26]  PTT: 28.2       [03-07-24 @ 04:26]    Serum Osmolality 294      [03-07-24 @ 04:26]    Creatinine Trend:  SCr 1.35 [03-07 @ 04:26]  SCr 1.43 [03-06 @ 02:12]  SCr 1.54 [03-04 @ 23:18]  SCr 1.44 [03-04 @ 16:13]  SCr 1.41 [03-04 @ 05:12]    Tacrolimus (), Serum: 5.1 ng/mL (03-07 @ 05:27)  Tacrolimus (), Serum: 6.1 ng/mL (03-06 @ 03:38)  Tacrolimus (), Serum: 10.3 ng/mL (03-05 @ 05:35)  Tacrolimus (), Serum: 10.2 ng/mL (03-04 @ 05:12)    Urine Creatinine 55      [03-03-24 @ 05:03]  Urine Sodium 120      [03-07-24 @ 05:28]  Urine Osmolality 534      [03-07-24 @ 05:28]    HbA1c 7.2      [01-11-20 @ 09:23]  TSH 1.24      [03-02-24 @ 10:51]  Lipid: chol 136, TG 95, HDL 72, LDL --      [03-02-24 @ 02:10]

## 2024-03-07 NOTE — CONSULT NOTE ADULT - ASSESSMENT
acute respiratory failure  dysphagia  -The risks (bleeding, infection, anoxia, death), benefits and alternatives of percutaneous tracheostomy with flexible bronchoscopy and the risks (bleeding, infection, bowel injury), benefits and alternatives of PEG placement were discussed with the patient's daughter at bedside. Written informed consent was obtained for surgery.  -please hold tube feeds at midnight in preparation for trach/PEG at bedside tomorrow.      I reviewed her labs and radiologic images.  care coordinated with [facility] team.

## 2024-03-07 NOTE — PROGRESS NOTE ADULT - SUBJECTIVE AND OBJECTIVE BOX
Patient seen and examined at bedside.    --Anticoagulation--  heparin   Injectable 5000 Unit(s) SubCutaneous every 12 hours    T(C): 36.6 (03-06-24 @ 19:00), Max: 37.3 (03-06-24 @ 03:00)  HR: 78 (03-06-24 @ 22:00) (77 - 105)  BP: 168/79 (03-06-24 @ 22:00) (125/67 - 213/102)  RR: 16 (03-06-24 @ 22:00) (14 - 24)  SpO2: 99% (03-06-24 @ 22:00) (93% - 100%)  Wt(kg): --    Exam: Intubated, no EO, PERRL, L gaze, +corneals/cough/gag/OB, not FC, LUE WD, RUE 0/5, RLE TF LLE TF (amputated)

## 2024-03-07 NOTE — PROGRESS NOTE ADULT - CRITICAL CARE ATTENDING COMMENT
79yo woman ESRD s/p renal transplant; breast CA s/p lumpectomy, hx DVT unknown when provoked vs not but off Eliquis now, HLD  large expanding L IPH s/p craniectomy and hematoma evacuation, EVD placed.    Recent events:   EVD clamped  awaiting EEG tech to fix head wrap   tolerated PS x2h this AM  NPH increased to 10 for hyperglycemia  tacro level 6.1    Exam: no EO, pupils intermittently anisocoric -> then become equal  +gag/cough/overbreathing, +L corneal  LUE spontaneous, RUE intermittent w/d, RLE TF    ICU vitals/labs reviewed    Plan:  neurochecks q1h while clamped  EVD clamped 10AM -> CTH tmrw 3/7  briviact 100mg q12h, vimpat 200mg q12h  c/w EEG x24 more hours -> if negative d/c  YON drain per surgery  maintain LTVV, PS [id number] BID while on clamp trial  SBP 90 -160, c/w coreg , hydral 50mg q8h, and clonidine  +TF, c/w PPI and bowel regimen  F/u nephrology recs, restart tacro 3mg BID  Remove Gardner for TOV  NPH 10U q6h, ISS  SCDs, holding off on DVT ppx post-op    I have personally provided the above noted minutes of critical care time including review of laboratory values, imaging, interdisciplinary care coordination, updating family, and frequent monitoring for decompensation. I agree with Dr. Jordan's documentation with the above modifications/additions.     Based on my personal evaluation, this patient has a high probability of imminent or life-threatening deterioration due to the presence of: ICH s/p crani, hydrocephalus, encephalopathy, respiratory failure, post-kidney transplant, dysglycemia -  which required my direct attention, intervention, and personal management. Other billable procedures, if performed, are documented separately.
79yo woman ESRD s/p renal transplant; breast CA s/p lumpectomy, hx DVT unknown when provoked vs not but off Eliquis now, HLD  large expanding L IPH s/p craniectomy and hematoma evacuation, EVD placed.    Recent events:   seizures on EEG, switched briviact -> vimpat  tacro dose decreased  exam stable overnight, uptrending leukocytosis  tolerated PS x2.5h this AM    Exam: no EO, L pupil briskly reactive, R pupil larger and sluggish, +gag/cough/overbreathing, +L corneal, LUE spontaneous, R side flaccid    ICU vitals/labs reviewed    Plan:  neurochecks q2h  EVD clamped 10AM -> CTH in AM  vimpat 100mg q12h  maintain LTVV, PS [id number] BID while on clamp trial  SBP 90 -160, c/w coreg and clonidine  +TF, trend Cr and tacro level  F/u nephrology recs  Oliguria - continue to monitor  NPH 7U q6h  SCDs, holding off on DVT ppx post-op    I have personally provided the above noted minutes of critical care time including review of laboratory values, imaging, interdisciplinary care coordination, updating family, and frequent monitoring for decompensation. I agree with Dr. Jordan's documentation with the above modifications/additions.     Based on my personal evaluation, this patient has a high probability of imminent or life-threatening deterioration due to the presence of: ICH s/p crani, encephalopathy, respiratory failure, MEHREEN -  which required my direct attention, intervention, and personal management. Other billable procedures, if performed, are documented separately.
79yo woman ESRD s/p renal transplant; breast CA s/p lumpectomy, hx DVT unknown when provoked vs not but off Eliquis now, HLD  large expanding L IPH s/p craniectomy and hematoma evacuation, EVD placed.    Recent events: IVC filter placed for femoral DVT and not a candidate for AC    Exam: no EO, pupils equal and reactive, +gag/cough/overbreathing, +L corneal, LUE spontaneous, R side flaccid    ICU vitals/labs reviewed  Imaging - CXR with mild L basilar opacity    Plan:  neurochecks q2h  off sedation  briviact 50mg q12h  connect to EEG  maintain LTVV, PS trials as tolerated  SBP 90 -160, c/w coreg and clonidine  +TF, trend Cr and tacro level  F/u nephrology recs  Oliguria - did not respond to boluses, will trial lasix 10mg  BMP in PM, daily tacro level  NPH 20U q6h  SCDs, holding off on DVT ppx post-op    I have personally provided the above noted minutes of critical care time including review of laboratory values, imaging, interdisciplinary care coordination, and frequent monitoring for decompensation. I agree with Dr. Jordan's documentation with the above modifications/additions.     Based on my personal evaluation, this patient has a high probability of imminent or life-threatening deterioration due to the presence of: ICH s/p crani, encephalopathy, respiratory failure, MEHREEN -  which required my direct attention, intervention, and personal management. Other billable procedures, if performed, are documented separately.
77yo woman ESRD s/p renal transplant; breast CA s/p lumpectomy, hx DVT unknown when provoked vs not but off Eliquis now, HLD  large expanding L IPH s/p craniectomy and hematoma evacuation, EVD placed.    Recent events:   - had episode of desat -> improved with inc FiO2  - did not tolerate CPAP trial    Exam: no EO, pupils intermittently anisocoric -> then become equal  +gag/cough/overbreathing, +L corneal  LUE spontaneous, RUE intermittent w/d, RLE TF  + secretions, tongue swollen  regular rate and rhythm  abdomen non-distended    ICU vitals/labs reviewed    Plan:  neurochecks q1h while clamped  EVD clamped 10AM -> CTH tmrw 3/7  briviact 100mg q12h, vimpat 200mg q12h  c/w EEG x24 more hours -> if negative d/c  YON drain per surgery  maintain LTVV, PS [id number] BID while on clamp trial  SBP 90 -160, c/w coreg, hydral, and clonidine  +TF, c/w PPI and bowel regimen  #?SIADH - 2% @50cc x6h, concentrate TFs, rpt BMP 6h  F/u nephrology recs, restart tacro 3mg BID  Remove Gardner for TOV  NPH 10U q6h, ISS  SCDs, holding off on DVT ppx post-op    I have personally provided the above noted minutes of critical care time including review of laboratory values, imaging, interdisciplinary care coordination, updating family, and frequent monitoring for decompensation.     Based on my personal evaluation, this patient has a high probability of imminent or life-threatening deterioration due to the presence of: ICH s/p crani, hydrocephalus, encephalopathy, respiratory failure, hyponatremia -  which required my direct attention, intervention, and personal management. Other billable procedures, if performed, are documented separately.

## 2024-03-07 NOTE — PROGRESS NOTE ADULT - SUBJECTIVE AND OBJECTIVE BOX
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    Admission Scores  GCS: 4 ICH: 4    24 hour Events:  3/2- Patient s/p left craniectomy(discarded) and evacuation. Patient started on insulin drip for elevated BG   3/3: Patient switched off insulin drip given low blood glucose. ISS placed. Patient's A1C 5.4%.  3/4: Patient s/p IVC filter for DVTs. Glucose elevated.   3/5- No acute events overnight. Plan for scan today and clamping of EVD.  3/6- No acute events overnight except adjustment of BP medications due to elevated BP beyond goal.  3/7- No acute events overnight. Patient's EVD clamped, EVD to be removed today.  Allergies    shellfish (Rash)  ChloraPrep One-Step (Rash)  penicillins (Rash)    Intolerances        REVIEW OF SYSTEMS: [x] Unable to Assess due to neurologic exam   [ ] All ROS addressed below are non-contributory, except:  Neuro: [ ] Headache [ ] Back pain [ ] Numbness [ ] Weakness [ ] Ataxia [ ] Dizziness [ ] Aphasia [ ] Dysarthria [ ] Visual disturbance  Resp: [ ] Shortness of breath/dyspnea, [ ] Orthopnea [ ] Cough  CV: [ ] Chest pain [ ] Palpitation [ ] Lightheadedness [ ] Syncope  Renal: [ ] Thirst [ ] Edema  GI: [ ] Nausea [ ] Emesis [ ] Abdominal pain [ ] Constipation [ ] Diarrhea  Hem: [ ] Hematemesis [ ] bright red blood per rectum  ID: [ ] Fever [ ] Chills [ ] Dysuria  ENT: [ ] Rhinorrhea      DEVICES:   [ ] Restraints [ ] ET tube [ ] central line [ ] arterial line [ ] johnson [ ] NGT/OGT [ ] EVD [ ] LD [ ] YON/HMV [ ] Trach [ ] PEG [ ] Chest Tube     VITALS:   Vital Signs Last 24 Hrs  T(C): 36.9 (07 Mar 2024 03:00), Max: 36.9 (07 Mar 2024 03:00)  T(F): 98.4 (07 Mar 2024 03:00), Max: 98.4 (07 Mar 2024 03:00)  HR: 90 (07 Mar 2024 06:00) (77 - 100)  BP: 155/78 (07 Mar 2024 06:00) (142/70 - 213/102)  BP(mean): 110 (07 Mar 2024 06:00) (99 - 147)  RR: 16 (07 Mar 2024 06:00) (13 - 24)  SpO2: 96% (07 Mar 2024 06:00) (90% - 100%)    Parameters below as of 06 Mar 2024 19:00  Patient On (Oxygen Delivery Method): ventilator    O2 Concentration (%): 60  CAPILLARY BLOOD GLUCOSE      POCT Blood Glucose.: 133 mg/dL (07 Mar 2024 05:55)  POCT Blood Glucose.: 184 mg/dL (06 Mar 2024 23:03)  POCT Blood Glucose.: 149 mg/dL (06 Mar 2024 18:05)  POCT Blood Glucose.: 166 mg/dL (06 Mar 2024 12:27)    I&O's Summary    06 Mar 2024 07:01  -  07 Mar 2024 07:00  --------------------------------------------------------  IN: 1780 mL / OUT: 1315 mL / NET: 465 mL        Respiratory:  Mode: AC/ CMV (Assist Control/ Continuous Mandatory Ventilation)  RR (machine): 14  TV (machine): 450  FiO2: 50  PEEP: 5  ITime: 1  MAP: 10  PIP: 21    ABG - ( 06 Mar 2024 18:57 )  pH, Arterial: 7.42  pH, Blood: x     /  pCO2: 38    /  pO2: 204   / HCO3: 25    / Base Excess: 0.2   /  SaO2: 99.1                LABS:                        9.4    8.44  )-----------( 178      ( 07 Mar 2024 04:26 )             29.7     03-07    133<L>  |  100  |  46<H>  ----------------------------<  138<H>  5.0   |  22  |  1.35<H>             MEDICATION LEVELS:     IVF FLUIDS/MEDICATIONS:   MEDICATIONS  (STANDING):  brivaracetam Oral Solution 100 milliGRAM(s) Oral two times a day  carvedilol 25 milliGRAM(s) Oral every 12 hours  chlorhexidine 0.12% Liquid 15 milliLiter(s) Oral Mucosa every 12 hours  chlorhexidine 4% Liquid 1 Application(s) Topical daily  cloNIDine 0.1 milliGRAM(s) Oral three times a day  gabapentin Solution 100 milliGRAM(s) Oral three times a day  heparin   Injectable 5000 Unit(s) SubCutaneous every 12 hours  hydrALAZINE 100 milliGRAM(s) Oral every 8 hours  influenza  Vaccine (HIGH DOSE) 0.7 milliLiter(s) IntraMuscular once  insulin lispro (ADMELOG) corrective regimen sliding scale   SubCutaneous every 6 hours  insulin NPH human recombinant 10 Unit(s) SubCutaneous every 6 hours  lacosamide 200 milliGRAM(s) Oral two times a day  pantoprazole  Injectable 40 milliGRAM(s) IV Push daily  polyethylene glycol 3350 17 Gram(s) Oral every 12 hours  predniSONE   Tablet 5 milliGRAM(s) Oral daily  senna 2 Tablet(s) Oral at bedtime  sodium chloride 2 Gram(s) Oral every 6 hours  tacrolimus    0.5 mG/mL Suspension 3 milliGRAM(s) Oral two times a day  trimethoprim   80 mG/sulfamethoxazole 400 mG 1 Tablet(s) Oral daily    MEDICATIONS  (PRN):  acetaminophen   Oral Liquid .. 650 milliGRAM(s) Oral every 6 hours PRN Temp greater or equal to 38C (100.4F), Mild Pain (1 - 3)  fentaNYL    Injectable 25 MICROGram(s) IV Push every 2 hours PRN vent synchroncy  ondansetron Injectable 4 milliGRAM(s) IV Push every 6 hours PRN Nausea and/or Vomiting  oxyCODONE    IR 5 milliGRAM(s) Oral every 4 hours PRN Moderate Pain (4 - 6)        IMAGING:      EXAMINATION:  PHYSICAL EXAM:    Constitutional: No Acute Distress     Neurological: Eyes open to pain, eyes midline, CAS, moving left UE spontaneously, right UE 0/5, right LE withdraws, not following commands     Reflexes: Deep Tendon Reflexes Intact     Pulmonary: Clear to Auscultation, No rales, No rhonchi, No wheezes     Cardiovascular: S1, S2, Regular rate and rhythm     Gastrointestinal: Soft, Non-tender, Non-distended     Extremities: No calf tenderness    HOSPITAL COURSE: This is a 79 YO F with a PMHx ESRD s/p renal xplant off HD, L AKA, 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    Admission Scores  GCS: 4 ICH: 4    24 hour Events:  3/2- Patient s/p left craniectomy(discarded) and evacuation. Patient started on insulin drip for elevated BG   3/3: Patient switched off insulin drip given low blood glucose. ISS placed. Patient's A1C 5.4%.  3/4: Patient s/p IVC filter for DVTs. Glucose elevated.   3/5- No acute events overnight. Plan for scan today and clamping of EVD.  3/6- No acute events overnight except adjustment of BP medications due to elevated BP beyond goal.  3/7- No acute events overnight. Patient's EVD clamped, EVD to be removed today. BP difficult to control     Allergies    shellfish (Rash)  ChloraPrep One-Step (Rash)  penicillins (Rash)    Intolerances        REVIEW OF SYSTEMS: [x] Unable to Assess due to neurologic exam   [ ] All ROS addressed below are non-contributory, except:  Neuro: [ ] Headache [ ] Back pain [ ] Numbness [ ] Weakness [ ] Ataxia [ ] Dizziness [ ] Aphasia [ ] Dysarthria [ ] Visual disturbance  Resp: [ ] Shortness of breath/dyspnea, [ ] Orthopnea [ ] Cough  CV: [ ] Chest pain [ ] Palpitation [ ] Lightheadedness [ ] Syncope  Renal: [ ] Thirst [ ] Edema  GI: [ ] Nausea [ ] Emesis [ ] Abdominal pain [ ] Constipation [ ] Diarrhea  Hem: [ ] Hematemesis [ ] bright red blood per rectum  ID: [ ] Fever [ ] Chills [ ] Dysuria  ENT: [ ] Rhinorrhea      DEVICES:   [ ] Restraints [ ] ET tube [ ] central line [ ] arterial line [ ] johnson [ ] NGT/OGT [ ] EVD [ ] LD [ ] YON/HMV [ ] Trach [ ] PEG [ ] Chest Tube     VITALS:   Vital Signs Last 24 Hrs  T(C): 36.9 (07 Mar 2024 03:00), Max: 36.9 (07 Mar 2024 03:00)  T(F): 98.4 (07 Mar 2024 03:00), Max: 98.4 (07 Mar 2024 03:00)  HR: 90 (07 Mar 2024 06:00) (77 - 100)  BP: 155/78 (07 Mar 2024 06:00) (142/70 - 213/102)  BP(mean): 110 (07 Mar 2024 06:00) (99 - 147)  RR: 16 (07 Mar 2024 06:00) (13 - 24)  SpO2: 96% (07 Mar 2024 06:00) (90% - 100%)    Parameters below as of 06 Mar 2024 19:00  Patient On (Oxygen Delivery Method): ventilator    O2 Concentration (%): 60  CAPILLARY BLOOD GLUCOSE      POCT Blood Glucose.: 133 mg/dL (07 Mar 2024 05:55)  POCT Blood Glucose.: 184 mg/dL (06 Mar 2024 23:03)  POCT Blood Glucose.: 149 mg/dL (06 Mar 2024 18:05)  POCT Blood Glucose.: 166 mg/dL (06 Mar 2024 12:27)    I&O's Summary    06 Mar 2024 07:01  -  07 Mar 2024 07:00  --------------------------------------------------------  IN: 1780 mL / OUT: 1315 mL / NET: 465 mL        Respiratory:  Mode: AC/ CMV (Assist Control/ Continuous Mandatory Ventilation)  RR (machine): 14  TV (machine): 450  FiO2: 50  PEEP: 5  ITime: 1  MAP: 10  PIP: 21    ABG - ( 06 Mar 2024 18:57 )  pH, Arterial: 7.42  pH, Blood: x     /  pCO2: 38    /  pO2: 204   / HCO3: 25    / Base Excess: 0.2   /  SaO2: 99.1                LABS:                        9.4    8.44  )-----------( 178      ( 07 Mar 2024 04:26 )             29.7     03-07    133<L>  |  100  |  46<H>  ----------------------------<  138<H>  5.0   |  22  |  1.35<H>             MEDICATION LEVELS:     IVF FLUIDS/MEDICATIONS:   MEDICATIONS  (STANDING):  brivaracetam Oral Solution 100 milliGRAM(s) Oral two times a day  carvedilol 25 milliGRAM(s) Oral every 12 hours  chlorhexidine 0.12% Liquid 15 milliLiter(s) Oral Mucosa every 12 hours  chlorhexidine 4% Liquid 1 Application(s) Topical daily  cloNIDine 0.1 milliGRAM(s) Oral three times a day  gabapentin Solution 100 milliGRAM(s) Oral three times a day  heparin   Injectable 5000 Unit(s) SubCutaneous every 12 hours  hydrALAZINE 100 milliGRAM(s) Oral every 8 hours  influenza  Vaccine (HIGH DOSE) 0.7 milliLiter(s) IntraMuscular once  insulin lispro (ADMELOG) corrective regimen sliding scale   SubCutaneous every 6 hours  insulin NPH human recombinant 10 Unit(s) SubCutaneous every 6 hours  lacosamide 200 milliGRAM(s) Oral two times a day  pantoprazole  Injectable 40 milliGRAM(s) IV Push daily  polyethylene glycol 3350 17 Gram(s) Oral every 12 hours  predniSONE   Tablet 5 milliGRAM(s) Oral daily  senna 2 Tablet(s) Oral at bedtime  sodium chloride 2 Gram(s) Oral every 6 hours  tacrolimus    0.5 mG/mL Suspension 3 milliGRAM(s) Oral two times a day  trimethoprim   80 mG/sulfamethoxazole 400 mG 1 Tablet(s) Oral daily    MEDICATIONS  (PRN):  acetaminophen   Oral Liquid .. 650 milliGRAM(s) Oral every 6 hours PRN Temp greater or equal to 38C (100.4F), Mild Pain (1 - 3)  fentaNYL    Injectable 25 MICROGram(s) IV Push every 2 hours PRN vent synchroncy  ondansetron Injectable 4 milliGRAM(s) IV Push every 6 hours PRN Nausea and/or Vomiting  oxyCODONE    IR 5 milliGRAM(s) Oral every 4 hours PRN Moderate Pain (4 - 6)        IMAGING:      EXAMINATION:  PHYSICAL EXAM:    Constitutional: No Acute Distress     Neurological: Eyes open to pain, eyes midline, CAS, moving left UE spontaneously, right UE 0/5, right LE withdraws, not following commands     Reflexes: Deep Tendon Reflexes Intact     Pulmonary: Clear to Auscultation, No rales, No rhonchi, No wheezes     Cardiovascular: S1, S2, Regular rate and rhythm     Gastrointestinal: Soft, Non-tender, Non-distended     Extremities: No calf tenderness

## 2024-03-07 NOTE — PRE PROCEDURE NOTE - PRE PROCEDURE EVALUATION
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 Pt remains vent dependent, unable to tolerate CPAP. Consult for tracheostomy and PEG placement.     Diagnosis: respiratory failure 2/2 IPH  Procedure: Tracheostomy & PEG  Surgeon: Mukesh    Labs:                         9.4    8.44  )-----------( 178      ( 07 Mar 2024 04:26 )             29.7     03-07    136  |  104  |  48<H>  ----------------------------<  199<H>  5.4<H>   |  23  |  1.33<H>    Ca    9.4      07 Mar 2024 17:21  Phos  3.3     03-07  Mg     2.3     03-07      PT/INR - ( 07 Mar 2024 04:26 )   PT: 9.5 sec;   INR: 0.90 ratio         PTT - ( 07 Mar 2024 04:26 )  PTT:28.2 sec  ABO Interpretation: B (03-07 @ 04:22)    Urinalysis Basic - ( 07 Mar 2024 17:21 )    Color: x / Appearance: x / SG: x / pH: x  Gluc: 199 mg/dL / Ketone: x  / Bili: x / Urobili: x   Blood: x / Protein: x / Nitrite: x   Leuk Esterase: x / RBC: x / WBC x   Sq Epi: x / Non Sq Epi: x / Bacteria: x         Medications:  MEDICATIONS  (STANDING):  brivaracetam Oral Solution 100 milliGRAM(s) Oral <User Schedule>  carvedilol 25 milliGRAM(s) Oral every 12 hours  chlorhexidine 0.12% Liquid 15 milliLiter(s) Oral Mucosa every 12 hours  chlorhexidine 4% Liquid 1 Application(s) Topical daily  cloNIDine 0.1 milliGRAM(s) Oral three times a day  gabapentin Solution 100 milliGRAM(s) Oral three times a day  hydrALAZINE 100 milliGRAM(s) Oral every 8 hours  influenza  Vaccine (HIGH DOSE) 0.7 milliLiter(s) IntraMuscular once  insulin lispro (ADMELOG) corrective regimen sliding scale   SubCutaneous every 6 hours  insulin NPH human recombinant 10 Unit(s) SubCutaneous every 6 hours  lacosamide Solution 200 milliGRAM(s) Oral two times a day  pantoprazole  Injectable 40 milliGRAM(s) IV Push daily  polyethylene glycol 3350 17 Gram(s) Oral every 12 hours  predniSONE   Tablet 5 milliGRAM(s) Oral daily  senna 2 Tablet(s) Oral at bedtime  sodium chloride 3 Gram(s) Oral every 6 hours  sodium chloride 2% + sodium acetate 50:50 1000 milliLiter(s) (50 mL/Hr) IV Continuous <Continuous>  tacrolimus    0.5 mG/mL Suspension 3 milliGRAM(s) Oral two times a day  trimethoprim   80 mG/sulfamethoxazole 400 mG 1 Tablet(s) Oral daily    MEDICATIONS  (PRN):  acetaminophen   Oral Liquid .. 650 milliGRAM(s) Oral every 6 hours PRN Temp greater or equal to 38C (100.4F), Mild Pain (1 - 3)  fentaNYL    Injectable 25 MICROGram(s) IV Push every 2 hours PRN vent synchroncy  ondansetron Injectable 4 milliGRAM(s) IV Push every 6 hours PRN Nausea and/or Vomiting  oxyCODONE    IR 5 milliGRAM(s) Oral every 4 hours PRN Moderate Pain (4 - 6)  oxyCODONE    Solution 10 milliGRAM(s) Enteral Tube every 4 hours PRN Severe Pain (7 - 10)      Pre-Op Checklist  [x] NPO after midnight  [ ] AM T&S   [ ] AM CBC -- ordered  [ ] AM BMP -- ordered  [ ] AM PT, PTT, INR -- ordered  [x] Consent

## 2024-03-07 NOTE — PROGRESS NOTE ADULT - ASSESSMENT
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      3/3 S/P IVCF (retrievable) by IR  03/01/2024 S/P L Hemicrani for Evacuation of large ICH; bone discarded     CTH AM, D/C EVD  48hr clamp trial  vEEG in progress  Tacro level to be drawn 30 minutes prior to AM dose   Nephro (Dr. Quiroz) - hold home bicarb and calcitriol  GOC ongoing with family  preop ;abs sent for possible trach/peg

## 2024-03-07 NOTE — CONSULT NOTE ADULT - SUBJECTIVE AND OBJECTIVE BOX
3/1/2024 - left craniectomy for left frontal intraparenchymal hematoma    not on antibiotics  not on therapeutic anticoagulation  not on antiplatelet medications    intubated with 7.0 ET tube  on mechanical vent (14 / 450 / 40% / +5)  small thyroid goiter  trachea palpable superior thyroid isthmus with neck in neutral position  innominate artery palpable deep to manubrium    on Glucerna 1.2 @ 55 mL/hr via OG tube  soft / NT / ND      plats - 178    3/7 coags  -INR - 0.9  -ptt - 28.2 sec    CTA abd/pelvis (11/7/2019) - small hiatal hernia, but most of stomach is in abdomen

## 2024-03-07 NOTE — PROGRESS NOTE ADULT - PROBLEM SELECTOR PLAN 2
Pt's current regimen is Tacrolimus 3 mg BID, and prednisone 5 mg qd. Leflunomide currently on hold. Tacrolimus trough improved to 5.1 today. Recommend to continue Tacrolimus at 3 mg BID. Monitor tacrolimus trough, goal: 4-7.    If you have any questions, please feel free to contact me  Irais Suarez  Nephrology Fellow  [phone] / [employer] Teams(Preferred)  (After 5pm or on weekends please page the on-call fellow)

## 2024-03-07 NOTE — CONSULT NOTE ADULT - ASSESSMENT
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 Pt remains vent dependent, unable to tolerate CPAP. Consult for tracheostomy and PEG placement.     PLAN  - Trach and PEG tomorrow, 3/7  - Consent in chart  - NPO md    Discussed with Dr. Mayorga    Acute Care Surgery  [id number]

## 2024-03-07 NOTE — PROGRESS NOTE ADULT - SUBJECTIVE AND OBJECTIVE BOX
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    Admission Scores  GCS: 4 ICH: 4    24 hour Events:  3/2- Patient s/p left craniectomy(discarded) and evacuation. Patient started on insulin drip for elevated BG   3/3: Patient switched off insulin drip given low blood glucose. ISS placed. Patient's A1C 5.4%.  3/4: Patient s/p IVC filter for DVTs. Glucose elevated.   3/5- No acute events overnight. Plan for scan today and clamping of EVD.  3/6- No acute events overnight except adjustment of BP medications due to elevated BP beyond goal.  3/7- No acute events overnight. Patient's EVD clamped, EVD to be removed today. BP difficult to control     Allergies    shellfish (Rash)  ChloraPrep One-Step (Rash)  penicillins (Rash)    Intolerances        REVIEW OF SYSTEMS: [x] Unable to Assess due to neurologic exam   [ ] All ROS addressed below are non-contributory, except:  Neuro: [ ] Headache [ ] Back pain [ ] Numbness [ ] Weakness [ ] Ataxia [ ] Dizziness [ ] Aphasia [ ] Dysarthria [ ] Visual disturbance  Resp: [ ] Shortness of breath/dyspnea, [ ] Orthopnea [ ] Cough  CV: [ ] Chest pain [ ] Palpitation [ ] Lightheadedness [ ] Syncope  Renal: [ ] Thirst [ ] Edema  GI: [ ] Nausea [ ] Emesis [ ] Abdominal pain [ ] Constipation [ ] Diarrhea  Hem: [ ] Hematemesis [ ] bright red blood per rectum  ID: [ ] Fever [ ] Chills [ ] Dysuria  ENT: [ ] Rhinorrhea      DEVICES:   [ ] Restraints [ ] ET tube [ ] central line [ ] arterial line [ ] johnson [ ] NGT/OGT [ ] EVD [ ] LD [ ] YON/HMV [ ] Trach [ ] PEG [ ] Chest Tube     VITALS:   Vital Signs Last 24 Hrs  T(C): 36.9 (07 Mar 2024 03:00), Max: 36.9 (07 Mar 2024 03:00)  T(F): 98.4 (07 Mar 2024 03:00), Max: 98.4 (07 Mar 2024 03:00)  HR: 90 (07 Mar 2024 06:00) (77 - 100)  BP: 155/78 (07 Mar 2024 06:00) (142/70 - 213/102)  BP(mean): 110 (07 Mar 2024 06:00) (99 - 147)  RR: 16 (07 Mar 2024 06:00) (13 - 24)  SpO2: 96% (07 Mar 2024 06:00) (90% - 100%)    Parameters below as of 06 Mar 2024 19:00  Patient On (Oxygen Delivery Method): ventilator    O2 Concentration (%): 60  CAPILLARY BLOOD GLUCOSE      POCT Blood Glucose.: 133 mg/dL (07 Mar 2024 05:55)  POCT Blood Glucose.: 184 mg/dL (06 Mar 2024 23:03)  POCT Blood Glucose.: 149 mg/dL (06 Mar 2024 18:05)  POCT Blood Glucose.: 166 mg/dL (06 Mar 2024 12:27)    I&O's Summary    06 Mar 2024 07:01  -  07 Mar 2024 07:00  --------------------------------------------------------  IN: 1780 mL / OUT: 1315 mL / NET: 465 mL        Respiratory:  Mode: AC/ CMV (Assist Control/ Continuous Mandatory Ventilation)  RR (machine): 14  TV (machine): 450  FiO2: 50  PEEP: 5  ITime: 1  MAP: 10  PIP: 21    ABG - ( 06 Mar 2024 18:57 )  pH, Arterial: 7.42  pH, Blood: x     /  pCO2: 38    /  pO2: 204   / HCO3: 25    / Base Excess: 0.2   /  SaO2: 99.1                LABS:                        9.4    8.44  )-----------( 178      ( 07 Mar 2024 04:26 )             29.7     03-07    133<L>  |  100  |  46<H>  ----------------------------<  138<H>  5.0   |  22  |  1.35<H>             MEDICATION LEVELS:     IVF FLUIDS/MEDICATIONS:   MEDICATIONS  (STANDING):  brivaracetam Oral Solution 100 milliGRAM(s) Oral two times a day  carvedilol 25 milliGRAM(s) Oral every 12 hours  chlorhexidine 0.12% Liquid 15 milliLiter(s) Oral Mucosa every 12 hours  chlorhexidine 4% Liquid 1 Application(s) Topical daily  cloNIDine 0.1 milliGRAM(s) Oral three times a day  gabapentin Solution 100 milliGRAM(s) Oral three times a day  heparin   Injectable 5000 Unit(s) SubCutaneous every 12 hours  hydrALAZINE 100 milliGRAM(s) Oral every 8 hours  influenza  Vaccine (HIGH DOSE) 0.7 milliLiter(s) IntraMuscular once  insulin lispro (ADMELOG) corrective regimen sliding scale   SubCutaneous every 6 hours  insulin NPH human recombinant 10 Unit(s) SubCutaneous every 6 hours  lacosamide 200 milliGRAM(s) Oral two times a day  pantoprazole  Injectable 40 milliGRAM(s) IV Push daily  polyethylene glycol 3350 17 Gram(s) Oral every 12 hours  predniSONE   Tablet 5 milliGRAM(s) Oral daily  senna 2 Tablet(s) Oral at bedtime  sodium chloride 2 Gram(s) Oral every 6 hours  tacrolimus    0.5 mG/mL Suspension 3 milliGRAM(s) Oral two times a day  trimethoprim   80 mG/sulfamethoxazole 400 mG 1 Tablet(s) Oral daily    MEDICATIONS  (PRN):  acetaminophen   Oral Liquid .. 650 milliGRAM(s) Oral every 6 hours PRN Temp greater or equal to 38C (100.4F), Mild Pain (1 - 3)  fentaNYL    Injectable 25 MICROGram(s) IV Push every 2 hours PRN vent synchroncy  ondansetron Injectable 4 milliGRAM(s) IV Push every 6 hours PRN Nausea and/or Vomiting  oxyCODONE    IR 5 milliGRAM(s) Oral every 4 hours PRN Moderate Pain (4 - 6)        IMAGING:      EXAMINATION:  PHYSICAL EXAM:    Constitutional: No Acute Distress     Neurological: Eyes open to pain, eyes midline, CAS, moving left UE spontaneously, right UE 0/5, right LE withdraws, not following commands     Reflexes: Deep Tendon Reflexes Intact     Pulmonary: Clear to Auscultation, No rales, No rhonchi, No wheezes     Cardiovascular: S1, S2, Regular rate and rhythm     Gastrointestinal: Soft, Non-tender, Non-distended     Extremities: No calf tenderness

## 2024-03-07 NOTE — EEG REPORT - NS EEG TEXT BOX
EEG Report [Charted Location: [address]] [Authored: 06-Mar-2024 09:36]- for Visit: [phone], Complete, Entered, Signed in Full, Available to Patient    EEG REPORT:    EEG Report:  · EEG Report	       REPORT OF CONTINUOUS VIDEO EEG      [facility]: [address], [place] [address], Phone: [phone]  [facility]: [address] [facility], [place] [address], Phone: [phone]  [facility]: [address] [place] [address], Phone: [phone]    Patient Name: Nury Adam    Age: 78 year, : 1945   [id number]  [facility]G #: [id number]    Study Date: 3/6/2024   Start Time: 08:00     End Date: 3/7/2024         End Time: 08:00:03 AM     Study Duration: 24 h     Study Information:    EEG Recording Technique:  The patient underwent continuous Video-EEG monitoring, using Telemetry System hardware on the XLTek Digital System. EEG and video data were stored on a computer hard drive with important events saved in digital archive files. The material was reviewed by a physician (electroencephalographer / epileptologist) on a daily basis. Cosme and seizure detection algorithms were utilized and reviewed. An EEG Technician attended to the patient, and was available throughout daytime work hours.  The epilepsy center neurologist was available in person or on call 24-hours per day.    EEG Placement and Labeling of Electrodes:  The EEG was performed utilizing 20 channel referential EEG connections (coronal over temporal over parasagittal montage) using all standard 10-20 electrode placements with EKG, with additional electrodes placed in the inferior temporal region using the modified 10-10 montage electrode placements for elective admissions, or if deemed necessary. Recording was at a sampling rate of 256 samples per second per channel. Time synchronized digital video recording was done simultaneously with EEG recording. A low light infrared camera was used for low light recording.     History:  -78 year old male is here for seizure evaluation      Medication  Alphagan    Prograf    Deltasone    Oxycodone    Fentanyl    Precedex    Catapres    Briviact      Interpretation:    [[[Abbreviation Key:  PDR=alpha rhythm/posterior dominant rhythm. A-P=anterior posterior.  Amplitude: ‘very low’:<20; ‘low’:20-49; ‘medium’:; ‘high’:>150uV.  Persistence for periodic/rhythmic patterns (% of epoch) ‘rare’:<1%; ‘occasional’:1-10%; ‘frequent’:10-50%; ‘abundant’:50-90%; ‘continuous’:>90%.  Persistence for sporadic discharges: ‘rare’:<1/hr; ‘occasional’:1/min-1/hr; ‘frequent’:>1/min; ‘abundant’:>1/10 sec.  RPP=rhythmic and periodic patterns; GRDA=generalized rhythmic delta activity; FIRDA=frontal intermittent GRDA; LRDA=lateralized rhythmic delta activity; TIRDA=temporal intermittent rhythmic delta activity;  LPD=PLED=lateralized periodic discharges; GPD=generalized periodic discharges; BIPDs =bilateral independent periodic discharges; Mf=multifocal; SIRPDs=stimulus induced rhythmic, periodic, or ictal appearing discharges; BIRDs=brief potentially ictal rhythmic discharges >4 Hz, lasting .5-10s; PFA (paroxysmal bursts >13 Hz or =8 Hz <10s).  Modifiers: +F=with fast component; +S=with spike component; +R=with rhythmic component.  S-B=burst suppression pattern.  Max=maximal. N1-drowsy; N2-stage II sleep; N3-slow wave sleep. SSS/BETS=small sharp spikes/benign epileptiform transients of sleep. HV=hyperventilation; PS=photic stimulation]]]      Daily EEG Visual Analysis    FINDINGS:      Background:  Symmetry: symmetric  Continuous: Overall the background is diffusely suppressed except for intermittent bursts of epileptiform discharges over left hemisphere that lasts up to 5 seconds  PDR: absent  Reactivity: present  Voltage: diffusely suppressed as mentioned above  Anterior Posterior Gradient: absent  Breach: absent    Background Slowing:  Generalized slowing: present. Background is diffusely slow consisting of delta theta activity up to 5 Hz    Focal slowing: unclear.    State Changes:   absent    Sporadic Epileptiform Discharges:   Frequent epileptiform discharges over left parietal region (P3/P7) occurs as sporadic, in runs, or bursts    Rhythmic and Periodic Patterns (RPPs):  Frequent diffuse triphasic waves     Electrographic and Electroclinical seizures:  6 Focal Electrographic seizures of left parietal origin noted @ 13:01, 13:49, 14:27, 15:14, 15:56, and 17:32    Other Clinical Events:  None    Activation Procedures:   -Hyperventilation was not performed.    -Photic stimulation was not performed.      Artifacts:  Intermittent myogenic and movement artifacts were noted.    ECG:  The heart rate on single channel ECG was predominantly between 70-80 BPM.    EEG Summary / Classification:  Abnormal EEG.  •          Background slowing   -          Triphasic waves  -          Epileptiform discharges in left parietal region  -          Electrographic seizures of left parietal origin    EEG Impression / Clinical Correlate:  Abnormal prolonged EEG study due to  1- 6 Focal electrographic seizures of left parietal origin as mentioned above. Last seizure was @ 17:32  2- occasional epileptiform discharges in left parietal region  3- Moderate to severe diffuse cerebral dysfunction that is not specific in etiology  4- Triphasic waves typically seen in toxic/metabolic encephalopathy            ________________________________________    DIVINE Shaw  Attending Physician, [facility]    ------------------------------------  EEG Reading Room: [phone]  On Call Service After Hours: [phone]

## 2024-03-07 NOTE — PROGRESS NOTE ADULT - PROBLEM SELECTOR PLAN 1
Pt. with ESRD, underwent DDRT in 2019. On review of [place], Scr was 1.86 on 10/31/23. SCr was WNL at 1.18 on admission (3/2), and remains elevated/stable at 1.35 today (3/7). Pt. with likley underlying CKD, Scr likely at baseline. Continue with immunosuppression regimen, as outlined below. Monitor labs and urine output. Avoid nephrotoxins. Dose medications as per eGFR.

## 2024-03-08 LAB
ANION GAP SERPL CALC-SCNC: 10 MMOL/L — SIGNIFICANT CHANGE UP (ref 5–17)
ANION GAP SERPL CALC-SCNC: 10 MMOL/L — SIGNIFICANT CHANGE UP (ref 5–17)
ANION GAP SERPL CALC-SCNC: 7 MMOL/L — SIGNIFICANT CHANGE UP (ref 5–17)
APPEARANCE UR: ABNORMAL
APTT BLD: 25.3 SEC — SIGNIFICANT CHANGE UP (ref 24.5–35.6)
BACTERIA # UR AUTO: ABNORMAL /HPF
BASE EXCESS BLDV CALC-SCNC: 2.3 MMOL/L — SIGNIFICANT CHANGE UP (ref -2–3)
BILIRUB UR-MCNC: NEGATIVE — SIGNIFICANT CHANGE UP
BUN SERPL-MCNC: 49 MG/DL — HIGH (ref 7–23)
BUN SERPL-MCNC: 49 MG/DL — HIGH (ref 7–23)
BUN SERPL-MCNC: 51 MG/DL — HIGH (ref 7–23)
CA-I SERPL-SCNC: 1.34 MMOL/L — HIGH (ref 1.15–1.33)
CALCIUM SERPL-MCNC: 9.2 MG/DL — SIGNIFICANT CHANGE UP (ref 8.4–10.5)
CALCIUM SERPL-MCNC: 9.3 MG/DL — SIGNIFICANT CHANGE UP (ref 8.4–10.5)
CALCIUM SERPL-MCNC: 9.4 MG/DL — SIGNIFICANT CHANGE UP (ref 8.4–10.5)
CAST: 3 /LPF — SIGNIFICANT CHANGE UP (ref 0–4)
CHLORIDE BLDV-SCNC: 106 MMOL/L — SIGNIFICANT CHANGE UP (ref 96–108)
CHLORIDE SERPL-SCNC: 106 MMOL/L — SIGNIFICANT CHANGE UP (ref 96–108)
CHLORIDE SERPL-SCNC: 110 MMOL/L — HIGH (ref 96–108)
CHLORIDE SERPL-SCNC: 112 MMOL/L — HIGH (ref 96–108)
CO2 BLDV-SCNC: 29 MMOL/L — HIGH (ref 22–26)
CO2 SERPL-SCNC: 19 MMOL/L — LOW (ref 22–31)
CO2 SERPL-SCNC: 24 MMOL/L — SIGNIFICANT CHANGE UP (ref 22–31)
CO2 SERPL-SCNC: 26 MMOL/L — SIGNIFICANT CHANGE UP (ref 22–31)
COLOR SPEC: YELLOW — SIGNIFICANT CHANGE UP
CREAT SERPL-MCNC: 1.4 MG/DL — HIGH (ref 0.5–1.3)
CREAT SERPL-MCNC: 1.44 MG/DL — HIGH (ref 0.5–1.3)
CREAT SERPL-MCNC: 1.48 MG/DL — HIGH (ref 0.5–1.3)
DIFF PNL FLD: NEGATIVE — SIGNIFICANT CHANGE UP
EGFR: 36 ML/MIN/1.73M2 — LOW
EGFR: 37 ML/MIN/1.73M2 — LOW
EGFR: 38 ML/MIN/1.73M2 — LOW
GAS PNL BLDV: 136 MMOL/L — SIGNIFICANT CHANGE UP (ref 136–145)
GAS PNL BLDV: SIGNIFICANT CHANGE UP
GLUCOSE BLDC GLUCOMTR-MCNC: 101 MG/DL — HIGH (ref 70–99)
GLUCOSE BLDC GLUCOMTR-MCNC: 121 MG/DL — HIGH (ref 70–99)
GLUCOSE BLDC GLUCOMTR-MCNC: 125 MG/DL — HIGH (ref 70–99)
GLUCOSE BLDC GLUCOMTR-MCNC: 147 MG/DL — HIGH (ref 70–99)
GLUCOSE BLDC GLUCOMTR-MCNC: 199 MG/DL — HIGH (ref 70–99)
GLUCOSE BLDV-MCNC: 113 MG/DL — HIGH (ref 70–99)
GLUCOSE SERPL-MCNC: 123 MG/DL — HIGH (ref 70–99)
GLUCOSE SERPL-MCNC: 130 MG/DL — HIGH (ref 70–99)
GLUCOSE SERPL-MCNC: 138 MG/DL — HIGH (ref 70–99)
GLUCOSE UR QL: NEGATIVE MG/DL — SIGNIFICANT CHANGE UP
GRAM STN FLD: SIGNIFICANT CHANGE UP
HCO3 BLDV-SCNC: 28 MMOL/L — SIGNIFICANT CHANGE UP (ref 22–29)
HCT VFR BLD CALC: 25.6 % — LOW (ref 34.5–45)
HCT VFR BLD CALC: 28.5 % — LOW (ref 34.5–45)
HCT VFR BLDA CALC: 27 % — LOW (ref 34.5–46.5)
HGB BLD CALC-MCNC: 8.9 G/DL — LOW (ref 11.7–16.1)
HGB BLD-MCNC: 8.1 G/DL — LOW (ref 11.5–15.5)
HGB BLD-MCNC: 8.8 G/DL — LOW (ref 11.5–15.5)
INR BLD: 0.88 RATIO — SIGNIFICANT CHANGE UP (ref 0.85–1.18)
KETONES UR-MCNC: NEGATIVE MG/DL — SIGNIFICANT CHANGE UP
LACTATE BLDV-MCNC: 0.7 MMOL/L — SIGNIFICANT CHANGE UP (ref 0.5–2)
LEUKOCYTE ESTERASE UR-ACNC: ABNORMAL
MAGNESIUM SERPL-MCNC: 2.6 MG/DL — SIGNIFICANT CHANGE UP (ref 1.6–2.6)
MCHC RBC-ENTMCNC: 28.5 PG — SIGNIFICANT CHANGE UP (ref 27–34)
MCHC RBC-ENTMCNC: 28.8 PG — SIGNIFICANT CHANGE UP (ref 27–34)
MCHC RBC-ENTMCNC: 30.9 GM/DL — LOW (ref 32–36)
MCHC RBC-ENTMCNC: 31.6 GM/DL — LOW (ref 32–36)
MCV RBC AUTO: 91.1 FL — SIGNIFICANT CHANGE UP (ref 80–100)
MCV RBC AUTO: 92.2 FL — SIGNIFICANT CHANGE UP (ref 80–100)
NITRITE UR-MCNC: NEGATIVE — SIGNIFICANT CHANGE UP
NRBC # BLD: 0 /100 WBCS — SIGNIFICANT CHANGE UP (ref 0–0)
NRBC # BLD: 0 /100 WBCS — SIGNIFICANT CHANGE UP (ref 0–0)
PCO2 BLDV: 47 MMHG — HIGH (ref 39–42)
PH BLDV: 7.38 — SIGNIFICANT CHANGE UP (ref 7.32–7.43)
PH UR: 7 — SIGNIFICANT CHANGE UP (ref 5–8)
PHOSPHATE SERPL-MCNC: 3.4 MG/DL — SIGNIFICANT CHANGE UP (ref 2.5–4.5)
PHOSPHATE SERPL-MCNC: 3.7 MG/DL — SIGNIFICANT CHANGE UP (ref 2.5–4.5)
PHOSPHATE SERPL-MCNC: 3.8 MG/DL — SIGNIFICANT CHANGE UP (ref 2.5–4.5)
PLATELET # BLD AUTO: 163 K/UL — SIGNIFICANT CHANGE UP (ref 150–400)
PLATELET # BLD AUTO: 181 K/UL — SIGNIFICANT CHANGE UP (ref 150–400)
PO2 BLDV: 55 MMHG — HIGH (ref 25–45)
POTASSIUM BLDV-SCNC: 5 MMOL/L — SIGNIFICANT CHANGE UP (ref 3.5–5.1)
POTASSIUM SERPL-MCNC: 5.1 MMOL/L — SIGNIFICANT CHANGE UP (ref 3.5–5.3)
POTASSIUM SERPL-MCNC: 5.2 MMOL/L — SIGNIFICANT CHANGE UP (ref 3.5–5.3)
POTASSIUM SERPL-MCNC: 6.1 MMOL/L — HIGH (ref 3.5–5.3)
POTASSIUM SERPL-SCNC: 5.1 MMOL/L — SIGNIFICANT CHANGE UP (ref 3.5–5.3)
POTASSIUM SERPL-SCNC: 5.2 MMOL/L — SIGNIFICANT CHANGE UP (ref 3.5–5.3)
POTASSIUM SERPL-SCNC: 6.1 MMOL/L — HIGH (ref 3.5–5.3)
PROT UR-MCNC: 100 MG/DL
PROTHROM AB SERPL-ACNC: 9.7 SEC — SIGNIFICANT CHANGE UP (ref 9.5–13)
RBC # BLD: 2.81 M/UL — LOW (ref 3.8–5.2)
RBC # BLD: 3.09 M/UL — LOW (ref 3.8–5.2)
RBC # FLD: 15.1 % — HIGH (ref 10.3–14.5)
RBC # FLD: 15.2 % — HIGH (ref 10.3–14.5)
RBC CASTS # UR COMP ASSIST: 11 /HPF — HIGH (ref 0–4)
REVIEW: SIGNIFICANT CHANGE UP
SAO2 % BLDV: 87.9 % — SIGNIFICANT CHANGE UP (ref 67–88)
SODIUM SERPL-SCNC: 140 MMOL/L — SIGNIFICANT CHANGE UP (ref 135–145)
SODIUM SERPL-SCNC: 141 MMOL/L — SIGNIFICANT CHANGE UP (ref 135–145)
SODIUM SERPL-SCNC: 143 MMOL/L — SIGNIFICANT CHANGE UP (ref 135–145)
SP GR SPEC: 1.02 — SIGNIFICANT CHANGE UP (ref 1–1.03)
SPECIMEN SOURCE: SIGNIFICANT CHANGE UP
SQUAMOUS # UR AUTO: 1 /HPF — SIGNIFICANT CHANGE UP (ref 0–5)
UROBILINOGEN FLD QL: 0.2 MG/DL — SIGNIFICANT CHANGE UP (ref 0.2–1)
WBC # BLD: 6.7 K/UL — SIGNIFICANT CHANGE UP (ref 3.8–10.5)
WBC # BLD: 7.29 K/UL — SIGNIFICANT CHANGE UP (ref 3.8–10.5)
WBC # FLD AUTO: 6.7 K/UL — SIGNIFICANT CHANGE UP (ref 3.8–10.5)
WBC # FLD AUTO: 7.29 K/UL — SIGNIFICANT CHANGE UP (ref 3.8–10.5)
WBC UR QL: 638 /HPF — HIGH (ref 0–5)

## 2024-03-08 PROCEDURE — 99232 SBSQ HOSP IP/OBS MODERATE 35: CPT | Mod: GC

## 2024-03-08 PROCEDURE — 31600 PLANNED TRACHEOSTOMY: CPT | Mod: GC

## 2024-03-08 PROCEDURE — 70450 CT HEAD/BRAIN W/O DYE: CPT | Mod: 26

## 2024-03-08 PROCEDURE — 43246 EGD PLACE GASTROSTOMY TUBE: CPT | Mod: GC

## 2024-03-08 PROCEDURE — 99291 CRITICAL CARE FIRST HOUR: CPT

## 2024-03-08 PROCEDURE — 95720 EEG PHY/QHP EA INCR W/VEEG: CPT

## 2024-03-08 RX ORDER — SODIUM CHLORIDE 9 MG/ML
3 INJECTION INTRAMUSCULAR; INTRAVENOUS; SUBCUTANEOUS EVERY 6 HOURS
Refills: 0 | Status: DISCONTINUED | OUTPATIENT
Start: 2024-03-08 | End: 2024-03-10

## 2024-03-08 RX ORDER — MIDAZOLAM HYDROCHLORIDE 1 MG/ML
2 INJECTION, SOLUTION INTRAMUSCULAR; INTRAVENOUS ONCE
Refills: 0 | Status: DISCONTINUED | OUTPATIENT
Start: 2024-03-08 | End: 2024-03-08

## 2024-03-08 RX ORDER — SODIUM CHLORIDE 9 MG/ML
500 INJECTION, SOLUTION INTRAVENOUS ONCE
Refills: 0 | Status: COMPLETED | OUTPATIENT
Start: 2024-03-08 | End: 2024-03-08

## 2024-03-08 RX ORDER — DEXTROSE 50 % IN WATER 50 %
25 SYRINGE (ML) INTRAVENOUS ONCE
Refills: 0 | Status: DISCONTINUED | OUTPATIENT
Start: 2024-03-08 | End: 2024-03-08

## 2024-03-08 RX ORDER — FENTANYL CITRATE 50 UG/ML
100 INJECTION INTRAVENOUS ONCE
Refills: 0 | Status: DISCONTINUED | OUTPATIENT
Start: 2024-03-08 | End: 2024-03-08

## 2024-03-08 RX ORDER — SODIUM CHLORIDE 9 MG/ML
500 INJECTION INTRAMUSCULAR; INTRAVENOUS; SUBCUTANEOUS ONCE
Refills: 0 | Status: COMPLETED | OUTPATIENT
Start: 2024-03-08 | End: 2024-03-08

## 2024-03-08 RX ORDER — ACETAMINOPHEN 500 MG
650 TABLET ORAL EVERY 6 HOURS
Refills: 0 | Status: DISCONTINUED | OUTPATIENT
Start: 2024-03-08 | End: 2024-04-02

## 2024-03-08 RX ORDER — BRIVARACETAM 25 MG/1
100 TABLET, FILM COATED ORAL
Refills: 0 | Status: DISCONTINUED | OUTPATIENT
Start: 2024-03-08 | End: 2024-03-15

## 2024-03-08 RX ORDER — CEFTRIAXONE 500 MG/1
1000 INJECTION, POWDER, FOR SOLUTION INTRAMUSCULAR; INTRAVENOUS EVERY 24 HOURS
Refills: 0 | Status: COMPLETED | OUTPATIENT
Start: 2024-03-08 | End: 2024-03-10

## 2024-03-08 RX ORDER — ACETAMINOPHEN 500 MG
750 TABLET ORAL ONCE
Refills: 0 | Status: COMPLETED | OUTPATIENT
Start: 2024-03-08 | End: 2024-03-08

## 2024-03-08 RX ORDER — OXYCODONE HYDROCHLORIDE 5 MG/1
5 TABLET ORAL EVERY 4 HOURS
Refills: 0 | Status: DISCONTINUED | OUTPATIENT
Start: 2024-03-08 | End: 2024-03-14

## 2024-03-08 RX ORDER — OXYCODONE HYDROCHLORIDE 5 MG/1
10 TABLET ORAL EVERY 4 HOURS
Refills: 0 | Status: DISCONTINUED | OUTPATIENT
Start: 2024-03-08 | End: 2024-03-14

## 2024-03-08 RX ORDER — HUMAN INSULIN 100 [IU]/ML
5 INJECTION, SUSPENSION SUBCUTANEOUS EVERY 6 HOURS
Refills: 0 | Status: DISCONTINUED | OUTPATIENT
Start: 2024-03-08 | End: 2024-03-10

## 2024-03-08 RX ORDER — TACROLIMUS 5 MG/1
3 CAPSULE ORAL
Refills: 0 | Status: DISCONTINUED | OUTPATIENT
Start: 2024-03-08 | End: 2024-03-10

## 2024-03-08 RX ORDER — GABAPENTIN 400 MG/1
100 CAPSULE ORAL THREE TIMES A DAY
Refills: 0 | Status: DISCONTINUED | OUTPATIENT
Start: 2024-03-08 | End: 2024-03-10

## 2024-03-08 RX ORDER — SENNA PLUS 8.6 MG/1
2 TABLET ORAL AT BEDTIME
Refills: 0 | Status: DISCONTINUED | OUTPATIENT
Start: 2024-03-08 | End: 2024-03-11

## 2024-03-08 RX ORDER — POLYETHYLENE GLYCOL 3350 17 G/17G
17 POWDER, FOR SOLUTION ORAL EVERY 12 HOURS
Refills: 0 | Status: DISCONTINUED | OUTPATIENT
Start: 2024-03-08 | End: 2024-03-11

## 2024-03-08 RX ORDER — CARVEDILOL PHOSPHATE 80 MG/1
25 CAPSULE, EXTENDED RELEASE ORAL EVERY 12 HOURS
Refills: 0 | Status: DISCONTINUED | OUTPATIENT
Start: 2024-03-08 | End: 2024-04-02

## 2024-03-08 RX ORDER — INSULIN GLARGINE 100 [IU]/ML
5 INJECTION, SOLUTION SUBCUTANEOUS ONCE
Refills: 0 | Status: DISCONTINUED | OUTPATIENT
Start: 2024-03-08 | End: 2024-03-08

## 2024-03-08 RX ORDER — CALCIUM GLUCONATE 100 MG/ML
1 VIAL (ML) INTRAVENOUS ONCE
Refills: 0 | Status: COMPLETED | OUTPATIENT
Start: 2024-03-08 | End: 2024-03-08

## 2024-03-08 RX ORDER — INSULIN HUMAN 100 [IU]/ML
5 INJECTION, SOLUTION SUBCUTANEOUS ONCE
Refills: 0 | Status: COMPLETED | OUTPATIENT
Start: 2024-03-08 | End: 2024-03-08

## 2024-03-08 RX ORDER — GLUCAGON INJECTION, SOLUTION 0.5 MG/.1ML
1 INJECTION, SOLUTION SUBCUTANEOUS ONCE
Refills: 0 | Status: DISCONTINUED | OUTPATIENT
Start: 2024-03-08 | End: 2024-03-08

## 2024-03-08 RX ORDER — PHENYLEPHRINE HYDROCHLORIDE 10 MG/ML
0.1 INJECTION INTRAVENOUS
Qty: 40 | Refills: 0 | Status: DISCONTINUED | OUTPATIENT
Start: 2024-03-08 | End: 2024-03-08

## 2024-03-08 RX ORDER — SODIUM CHLORIDE 9 MG/ML
1000 INJECTION, SOLUTION INTRAVENOUS
Refills: 0 | Status: DISCONTINUED | OUTPATIENT
Start: 2024-03-08 | End: 2024-03-08

## 2024-03-08 RX ORDER — SODIUM BICARBONATE 1 MEQ/ML
50 SYRINGE (ML) INTRAVENOUS ONCE
Refills: 0 | Status: DISCONTINUED | OUTPATIENT
Start: 2024-03-08 | End: 2024-03-08

## 2024-03-08 RX ORDER — CEFTRIAXONE 500 MG/1
1000 INJECTION, POWDER, FOR SOLUTION INTRAMUSCULAR; INTRAVENOUS EVERY 24 HOURS
Refills: 0 | Status: DISCONTINUED | OUTPATIENT
Start: 2024-03-08 | End: 2024-03-08

## 2024-03-08 RX ORDER — PROPOFOL 10 MG/ML
30 INJECTION, EMULSION INTRAVENOUS
Qty: 500 | Refills: 0 | Status: DISCONTINUED | OUTPATIENT
Start: 2024-03-08 | End: 2024-03-08

## 2024-03-08 RX ORDER — PANTOPRAZOLE SODIUM 20 MG/1
40 TABLET, DELAYED RELEASE ORAL
Refills: 0 | Status: DISCONTINUED | OUTPATIENT
Start: 2024-03-08 | End: 2024-03-11

## 2024-03-08 RX ORDER — DEXTROSE 50 % IN WATER 50 %
12.5 SYRINGE (ML) INTRAVENOUS ONCE
Refills: 0 | Status: DISCONTINUED | OUTPATIENT
Start: 2024-03-08 | End: 2024-03-08

## 2024-03-08 RX ORDER — LACOSAMIDE 50 MG/1
200 TABLET ORAL
Refills: 0 | Status: DISCONTINUED | OUTPATIENT
Start: 2024-03-08 | End: 2024-04-02

## 2024-03-08 RX ORDER — DEXTROSE 50 % IN WATER 50 %
25 SYRINGE (ML) INTRAVENOUS ONCE
Refills: 0 | Status: COMPLETED | OUTPATIENT
Start: 2024-03-08 | End: 2024-03-08

## 2024-03-08 RX ORDER — DEXTROSE 50 % IN WATER 50 %
15 SYRINGE (ML) INTRAVENOUS ONCE
Refills: 0 | Status: DISCONTINUED | OUTPATIENT
Start: 2024-03-08 | End: 2024-03-08

## 2024-03-08 RX ORDER — HYDRALAZINE HCL 50 MG
100 TABLET ORAL EVERY 8 HOURS
Refills: 0 | Status: DISCONTINUED | OUTPATIENT
Start: 2024-03-08 | End: 2024-03-10

## 2024-03-08 RX ADMIN — TACROLIMUS 3 MILLIGRAM(S): 5 CAPSULE ORAL at 05:19

## 2024-03-08 RX ADMIN — SODIUM CHLORIDE 3 GRAM(S): 9 INJECTION INTRAMUSCULAR; INTRAVENOUS; SUBCUTANEOUS at 05:20

## 2024-03-08 RX ADMIN — BRIVARACETAM 100 MILLIGRAM(S): 25 TABLET, FILM COATED ORAL at 22:35

## 2024-03-08 RX ADMIN — SODIUM CHLORIDE 3 GRAM(S): 9 INJECTION INTRAMUSCULAR; INTRAVENOUS; SUBCUTANEOUS at 17:36

## 2024-03-08 RX ADMIN — CHLORHEXIDINE GLUCONATE 1 APPLICATION(S): 213 SOLUTION TOPICAL at 22:36

## 2024-03-08 RX ADMIN — SODIUM CHLORIDE 500 MILLILITER(S): 9 INJECTION, SOLUTION INTRAVENOUS at 23:16

## 2024-03-08 RX ADMIN — POLYETHYLENE GLYCOL 3350 17 GRAM(S): 17 POWDER, FOR SOLUTION ORAL at 17:28

## 2024-03-08 RX ADMIN — SENNA PLUS 2 TABLET(S): 8.6 TABLET ORAL at 22:35

## 2024-03-08 RX ADMIN — GABAPENTIN 100 MILLIGRAM(S): 400 CAPSULE ORAL at 14:21

## 2024-03-08 RX ADMIN — CARVEDILOL PHOSPHATE 25 MILLIGRAM(S): 80 CAPSULE, EXTENDED RELEASE ORAL at 05:20

## 2024-03-08 RX ADMIN — Medication 1 TABLET(S): at 11:26

## 2024-03-08 RX ADMIN — Medication 5 MILLIGRAM(S): at 05:21

## 2024-03-08 RX ADMIN — SODIUM CHLORIDE 3 GRAM(S): 9 INJECTION INTRAMUSCULAR; INTRAVENOUS; SUBCUTANEOUS at 23:17

## 2024-03-08 RX ADMIN — Medication 100 GRAM(S): at 22:58

## 2024-03-08 RX ADMIN — HUMAN INSULIN 5 UNIT(S): 100 INJECTION, SUSPENSION SUBCUTANEOUS at 23:16

## 2024-03-08 RX ADMIN — BRIVARACETAM 100 MILLIGRAM(S): 25 TABLET, FILM COATED ORAL at 14:30

## 2024-03-08 RX ADMIN — INSULIN HUMAN 5 UNIT(S): 100 INJECTION, SOLUTION SUBCUTANEOUS at 23:04

## 2024-03-08 RX ADMIN — GABAPENTIN 100 MILLIGRAM(S): 400 CAPSULE ORAL at 05:19

## 2024-03-08 RX ADMIN — GABAPENTIN 100 MILLIGRAM(S): 400 CAPSULE ORAL at 22:35

## 2024-03-08 RX ADMIN — Medication 300 MILLIGRAM(S): at 05:42

## 2024-03-08 RX ADMIN — MIDAZOLAM HYDROCHLORIDE 2 MILLIGRAM(S): 1 INJECTION, SOLUTION INTRAMUSCULAR; INTRAVENOUS at 12:37

## 2024-03-08 RX ADMIN — Medication 2: at 23:17

## 2024-03-08 RX ADMIN — HUMAN INSULIN 5 UNIT(S): 100 INJECTION, SUSPENSION SUBCUTANEOUS at 11:26

## 2024-03-08 RX ADMIN — LACOSAMIDE 200 MILLIGRAM(S): 50 TABLET ORAL at 17:29

## 2024-03-08 RX ADMIN — SODIUM CHLORIDE 500 MILLILITER(S): 9 INJECTION INTRAMUSCULAR; INTRAVENOUS; SUBCUTANEOUS at 20:30

## 2024-03-08 RX ADMIN — BRIVARACETAM 100 MILLIGRAM(S): 25 TABLET, FILM COATED ORAL at 05:19

## 2024-03-08 RX ADMIN — HUMAN INSULIN 5 UNIT(S): 100 INJECTION, SUSPENSION SUBCUTANEOUS at 06:41

## 2024-03-08 RX ADMIN — FENTANYL CITRATE 100 MICROGRAM(S): 50 INJECTION INTRAVENOUS at 12:35

## 2024-03-08 RX ADMIN — CARVEDILOL PHOSPHATE 25 MILLIGRAM(S): 80 CAPSULE, EXTENDED RELEASE ORAL at 17:29

## 2024-03-08 RX ADMIN — Medication 0.1 MILLIGRAM(S): at 05:21

## 2024-03-08 RX ADMIN — CHLORHEXIDINE GLUCONATE 15 MILLILITER(S): 213 SOLUTION TOPICAL at 05:19

## 2024-03-08 RX ADMIN — PANTOPRAZOLE SODIUM 40 MILLIGRAM(S): 20 TABLET, DELAYED RELEASE ORAL at 11:26

## 2024-03-08 RX ADMIN — CEFTRIAXONE 100 MILLIGRAM(S): 500 INJECTION, POWDER, FOR SOLUTION INTRAMUSCULAR; INTRAVENOUS at 06:03

## 2024-03-08 RX ADMIN — SODIUM CHLORIDE 3 GRAM(S): 9 INJECTION INTRAMUSCULAR; INTRAVENOUS; SUBCUTANEOUS at 11:25

## 2024-03-08 RX ADMIN — Medication 100 MILLIGRAM(S): at 05:20

## 2024-03-08 RX ADMIN — Medication 10 MILLIGRAM(S): at 17:12

## 2024-03-08 RX ADMIN — CHLORHEXIDINE GLUCONATE 15 MILLILITER(S): 213 SOLUTION TOPICAL at 17:29

## 2024-03-08 RX ADMIN — PANTOPRAZOLE SODIUM 40 MILLIGRAM(S): 20 TABLET, DELAYED RELEASE ORAL at 17:29

## 2024-03-08 RX ADMIN — LACOSAMIDE 200 MILLIGRAM(S): 50 TABLET ORAL at 05:19

## 2024-03-08 RX ADMIN — HUMAN INSULIN 5 UNIT(S): 100 INJECTION, SUSPENSION SUBCUTANEOUS at 17:30

## 2024-03-08 RX ADMIN — PROPOFOL 9 MICROGRAM(S)/KG/MIN: 10 INJECTION, EMULSION INTRAVENOUS at 12:30

## 2024-03-08 RX ADMIN — TACROLIMUS 3 MILLIGRAM(S): 5 CAPSULE ORAL at 17:29

## 2024-03-08 RX ADMIN — Medication 25 MILLILITER(S): at 22:58

## 2024-03-08 NOTE — PROGRESS NOTE ADULT - SUBJECTIVE AND OBJECTIVE BOX
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    Admission Scores  GCS: 4 ICH: 4    24 hour Events:  3/2- Patient s/p left craniectomy(discarded) and evacuation. Patient started on insulin drip for elevated BG   3/3: Patient switched off insulin drip given low blood glucose. ISS placed. Patient's A1C 5.4%.  3/4: Patient s/p IVC filter for DVTs. Glucose elevated.   3/5- No acute events overnight. Plan for scan today and clamping of EVD.  3/6- No acute events overnight except adjustment of BP medications due to elevated BP beyond goal.  3/7- No acute events overnight. Patient's EVD clamped, EVD to be removed today. BP difficult to control   3/8- No acute events overnight. Patient NPO for trach/PEG    Allergies    shellfish (Rash)  ChloraPrep One-Step (Rash)  penicillins (Rash)    Intolerances        REVIEW OF SYSTEMS: [x ] Unable to Assess due to neurologic exam   [ ] All ROS addressed below are non-contributory, except:  Neuro: [ ] Headache [ ] Back pain [ ] Numbness [ ] Weakness [ ] Ataxia [ ] Dizziness [ ] Aphasia [ ] Dysarthria [ ] Visual disturbance  Resp: [ ] Shortness of breath/dyspnea, [ ] Orthopnea [ ] Cough  CV: [ ] Chest pain [ ] Palpitation [ ] Lightheadedness [ ] Syncope  Renal: [ ] Thirst [ ] Edema  GI: [ ] Nausea [ ] Emesis [ ] Abdominal pain [ ] Constipation [ ] Diarrhea  Hem: [ ] Hematemesis [ ] bright red blood per rectum  ID: [ ] Fever [ ] Chills [ ] Dysuria  ENT: [ ] Rhinorrhea      DEVICES:   [ ] Restraints [ ] ET tube [ ] central line [ ] arterial line [ ] johnson [ ] NGT/OGT [ ] EVD [ ] LD [ ] YON/HMV [ ] Trach [ ] PEG [ ] Chest Tube     VITALS:   Vital Signs Last 24 Hrs  T(C): 35.7 (08 Mar 2024 07:00), Max: 36.4 (07 Mar 2024 11:00)  T(F): 96.3 (08 Mar 2024 07:00), Max: 97.5 (07 Mar 2024 11:00)  HR: 72 (08 Mar 2024 07:00) (72 - 100)  BP: 99/39 (08 Mar 2024 07:00) (99/39 - 187/100)  BP(mean): 57 (08 Mar 2024 07:00) (57 - 136)  RR: 14 (08 Mar 2024 07:00) (14 - 24)  SpO2: 99% (08 Mar 2024 07:00) (96% - 100%)    Parameters below as of 08 Mar 2024 07:00  Patient On (Oxygen Delivery Method): ventilator      CAPILLARY BLOOD GLUCOSE      POCT Blood Glucose.: 121 mg/dL (08 Mar 2024 05:16)  POCT Blood Glucose.: 166 mg/dL (07 Mar 2024 23:17)  POCT Blood Glucose.: 195 mg/dL (07 Mar 2024 16:59)  POCT Blood Glucose.: 170 mg/dL (07 Mar 2024 11:11)    I&O's Summary    07 Mar 2024 07:01  -  08 Mar 2024 07:00  --------------------------------------------------------  IN: 1580 mL / OUT: 1175 mL / NET: 405 mL    08 Mar 2024 07:01  -  08 Mar 2024 07:46  --------------------------------------------------------  IN: 0 mL / OUT: 0 mL / NET: 0 mL        Respiratory:  Mode: AC/ CMV (Assist Control/ Continuous Mandatory Ventilation)  RR (machine): 14  TV (machine): 450  FiO2: 40  PEEP: 5  ITime: 1  MAP: 9  PIP: 20    ABG - ( 06 Mar 2024 18:57 )  pH, Arterial: 7.42  pH, Blood: x     /  pCO2: 38    /  pO2: 204   / HCO3: 25    / Base Excess: 0.2   /  SaO2: 99.1                LABS:                        8.8    6.70  )-----------( 163      ( 08 Mar 2024 01:34 )             28.5     03-08    140  |  106  |  51<H>  ----------------------------<  130<H>  5.1   |  24  |  1.40<H>             MEDICATION LEVELS:     IVF FLUIDS/MEDICATIONS:   MEDICATIONS  (STANDING):  brivaracetam Oral Solution 100 milliGRAM(s) Oral <User Schedule>  carvedilol 25 milliGRAM(s) Oral every 12 hours  cefTRIAXone   IVPB 1000 milliGRAM(s) IV Intermittent every 24 hours  chlorhexidine 0.12% Liquid 15 milliLiter(s) Oral Mucosa every 12 hours  chlorhexidine 4% Liquid 1 Application(s) Topical daily  cloNIDine 0.1 milliGRAM(s) Oral three times a day  dextrose 5%. 1000 milliLiter(s) (50 mL/Hr) IV Continuous <Continuous>  dextrose 5%. 1000 milliLiter(s) (100 mL/Hr) IV Continuous <Continuous>  dextrose 50% Injectable 25 Gram(s) IV Push once  dextrose 50% Injectable 12.5 Gram(s) IV Push once  dextrose 50% Injectable 25 Gram(s) IV Push once  gabapentin Solution 100 milliGRAM(s) Oral three times a day  glucagon  Injectable 1 milliGRAM(s) IntraMuscular once  hydrALAZINE 100 milliGRAM(s) Oral every 8 hours  influenza  Vaccine (HIGH DOSE) 0.7 milliLiter(s) IntraMuscular once  insulin lispro (ADMELOG) corrective regimen sliding scale   SubCutaneous every 6 hours  insulin NPH human recombinant 5 Unit(s) SubCutaneous every 6 hours  lacosamide Solution 200 milliGRAM(s) Oral two times a day  pantoprazole  Injectable 40 milliGRAM(s) IV Push daily  polyethylene glycol 3350 17 Gram(s) Oral every 12 hours  predniSONE   Tablet 5 milliGRAM(s) Oral daily  senna 2 Tablet(s) Oral at bedtime  sodium chloride 3 Gram(s) Oral every 6 hours  tacrolimus    0.5 mG/mL Suspension 3 milliGRAM(s) Oral two times a day  trimethoprim   80 mG/sulfamethoxazole 400 mG 1 Tablet(s) Oral daily    MEDICATIONS  (PRN):  acetaminophen   Oral Liquid .. 650 milliGRAM(s) Oral every 6 hours PRN Temp greater or equal to 38C (100.4F), Mild Pain (1 - 3)  dextrose Oral Gel 15 Gram(s) Oral once PRN Blood Glucose LESS THAN 70 milliGRAM(s)/deciliter  fentaNYL    Injectable 25 MICROGram(s) IV Push every 2 hours PRN vent synchroncy  ondansetron Injectable 4 milliGRAM(s) IV Push every 6 hours PRN Nausea and/or Vomiting  oxyCODONE    IR 5 milliGRAM(s) Oral every 4 hours PRN Moderate Pain (4 - 6)  oxyCODONE    Solution 10 milliGRAM(s) Enteral Tube every 4 hours PRN Severe Pain (7 - 10)        IMAGING:    EXAMINATION:  PHYSICAL EXAM:    Constitutional: No Acute Distress     Neurological: Eyes open to pain, eyes midline, CAS, moving left UE spontaneously, right UE 0/5, right LE withdraws, not following commands     Reflexes: Deep Tendon Reflexes Intact     Pulmonary: Clear to Auscultation, No rales, No rhonchi, No wheezes     Cardiovascular: S1, S2, Regular rate and rhythm     Gastrointestinal: Soft, Non-tender, Non-distended     Extremities: No calf tenderness    HOSPITAL COURSE: This is a 79 YO F with a PMHx ESRD s/p renal xplant off HD, L AKA, 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    Admission Scores  GCS: 4 ICH: 4    24 hour Events:  3/2- Patient s/p left craniectomy(discarded) and evacuation. Patient started on insulin drip for elevated BG   3/3: Patient switched off insulin drip given low blood glucose. ISS placed. Patient's A1C 5.4%.  3/4: Patient s/p IVC filter for DVTs. Glucose elevated.   3/5- No acute events overnight. Plan for scan today and clamping of EVD.  3/6- No acute events overnight except adjustment of BP medications due to elevated BP beyond goal.  3/7- No acute events overnight. Patient's EVD clamped, EVD to be removed today. BP difficult to control   3/8- No acute events overnight. Patient NPO for trach/PEG, hyperkalemic overnight K shifted     Allergies    shellfish (Rash)  ChloraPrep One-Step (Rash)  penicillins (Rash)    Intolerances      REVIEW OF SYSTEMS: [x ] Unable to Assess due to neurologic exam   [ ] All ROS addressed below are non-contributory, except:  Neuro: [ ] Headache [ ] Back pain [ ] Numbness [ ] Weakness [ ] Ataxia [ ] Dizziness [ ] Aphasia [ ] Dysarthria [ ] Visual disturbance  Resp: [ ] Shortness of breath/dyspnea, [ ] Orthopnea [ ] Cough  CV: [ ] Chest pain [ ] Palpitation [ ] Lightheadedness [ ] Syncope  Renal: [ ] Thirst [ ] Edema  GI: [ ] Nausea [ ] Emesis [ ] Abdominal pain [ ] Constipation [ ] Diarrhea  Hem: [ ] Hematemesis [ ] bright red blood per rectum  ID: [ ] Fever [ ] Chills [ ] Dysuria  ENT: [ ] Rhinorrhea    ICU Vital Signs Last 24 Hrs  T(C): 36.5 (08 Mar 2024 19:00), Max: 36.9 (08 Mar 2024 15:00)  T(F): 97.7 (08 Mar 2024 19:00), Max: 98.4 (08 Mar 2024 15:00)  HR: 101 (08 Mar 2024 22:00) (71 - 107)  BP: 103/56 (08 Mar 2024 22:00) (87/45 - 159/88)  BP(mean): 75 (08 Mar 2024 22:00) (52 - 115)  ABP: --  ABP(mean): --  RR: 14 (08 Mar 2024 22:00) (12 - 51)  SpO2: 100% (08 Mar 2024 22:00) (96% - 100%)    O2 Parameters below as of 08 Mar 2024 19:00  Patient On (Oxygen Delivery Method): ventilator      CAPILLARY BLOOD GLUCOSE  POCT Blood Glucose.: 101 mg/dL (08 Mar 2024 17:27)  POCT Blood Glucose.: 125 mg/dL (08 Mar 2024 11:22)  POCT Blood Glucose.: 121 mg/dL (08 Mar 2024 05:16)  POCT Blood Glucose.: 166 mg/dL (07 Mar 2024 23:17)      I&O's Summary    07 Mar 2024 07:01  -  08 Mar 2024 07:00  --------------------------------------------------------  IN: 1580 mL / OUT: 1175 mL / NET: 405 mL    08 Mar 2024 07:01  -  08 Mar 2024 22:51  --------------------------------------------------------  IN: 441.3 mL / OUT: 825 mL / NET: -383.7 mL      Mode: AC/ CMV (Assist Control/ Continuous Mandatory Ventilation)  RR (machine): 14  TV (machine): 450  FiO2: 50  PEEP: 5  ITime: 1  MAP: 9  PIP: 20                          8.8    6.70  )-----------( 163      ( 08 Mar 2024 01:34 )             28.5   03-08    141  |  112<H>  |  49<H>  ----------------------------<  123<H>  6.1<H>   |  19<L>  |  1.44<H>    Ca    9.2      08 Mar 2024 21:17  Phos  3.7     03-08  Mg     2.6     03-08      MEDICATIONS  (STANDING):  brivaracetam Oral Solution 100 milliGRAM(s) Oral <User Schedule>  calcium gluconate IVPB 1 Gram(s) IV Intermittent once  carvedilol 25 milliGRAM(s) Oral every 12 hours  cefTRIAXone   IVPB 1000 milliGRAM(s) IV Intermittent every 24 hours  chlorhexidine 0.12% Liquid 15 milliLiter(s) Oral Mucosa every 12 hours  chlorhexidine 4% Liquid 1 Application(s) Topical daily  cloNIDine 0.1 milliGRAM(s) Oral three times a day  dextrose 50% Injectable 25 milliLiter(s) IV Push once  gabapentin Solution 100 milliGRAM(s) Oral three times a day  hydrALAZINE 100 milliGRAM(s) Oral every 8 hours  influenza  Vaccine (HIGH DOSE) 0.7 milliLiter(s) IntraMuscular once  insulin lispro (ADMELOG) corrective regimen sliding scale   SubCutaneous every 6 hours  insulin NPH human recombinant 5 Unit(s) SubCutaneous every 6 hours  insulin regular  human recombinant 5 Unit(s) IV Push once  lacosamide Solution 200 milliGRAM(s) Oral two times a day  lactated ringers Bolus 500 milliLiter(s) IV Bolus once  pantoprazole  Injectable 40 milliGRAM(s) IV Push two times a day  polyethylene glycol 3350 17 Gram(s) Oral every 12 hours  predniSONE   Tablet 5 milliGRAM(s) Oral daily  senna 2 Tablet(s) Oral at bedtime  sodium chloride 3 Gram(s) Oral every 6 hours  tacrolimus    0.5 mG/mL Suspension 3 milliGRAM(s) Oral two times a day  trimethoprim   80 mG/sulfamethoxazole 400 mG 1 Tablet(s) Oral daily    MEDICATIONS  (PRN):  acetaminophen   Oral Liquid .. 650 milliGRAM(s) Oral every 6 hours PRN Temp greater or equal to 38C (100.4F), Mild Pain (1 - 3)  fentaNYL    Injectable 25 MICROGram(s) IV Push every 2 hours PRN vent synchroncy  ondansetron Injectable 4 milliGRAM(s) IV Push every 6 hours PRN Nausea and/or Vomiting  oxyCODONE    IR 5 milliGRAM(s) Oral every 4 hours PRN Moderate Pain (4 - 6)  oxyCODONE    Solution 10 milliGRAM(s) Enteral Tube every 4 hours PRN Severe Pain (7 - 10)        IMAGING:    EXAMINATION:  PHYSICAL EXAM:    Constitutional: No Acute Distress     Neurological: Eyes open to pain, eyes midline, CAS, moving left UE spontaneously, right UE 0/5, right LE withdraws, not following commands     Reflexes: Deep Tendon Reflexes Intact     Pulmonary: Clear to Auscultation, No rales, No rhonchi, No wheezes     Cardiovascular: S1, S2, Regular rate and rhythm     Gastrointestinal: Soft, Non-tender, Non-distended     Extremities: No calf tenderness

## 2024-03-08 NOTE — PROGRESS NOTE ADULT - SUBJECTIVE AND OBJECTIVE BOX
Patient seen and examined at bedside.    --Anticoagulation--    T(C): 36.1 (03-07-24 @ 23:00), Max: 36.9 (03-07-24 @ 03:00)  HR: 91 (03-08-24 @ 00:00) (79 - 100)  BP: 130/74 (03-08-24 @ 00:00) (102/46 - 197/102)  RR: 22 (03-08-24 @ 00:00) (13 - 22)  SpO2: 100% (03-08-24 @ 00:00) (90% - 100%)  Wt(kg): --    Exam: Intubated, no EO, PERRL, L gaze, +corneals/cough/gag/OB, not FC, LUE spont, RUE 0/5, RLE TF

## 2024-03-08 NOTE — PROGRESS NOTE ADULT - SUBJECTIVE AND OBJECTIVE BOX
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    Admission Scores  GCS: 4 ICH: 4    24 hour Events:  3/2- Patient s/p left craniectomy(discarded) and evacuation. Patient started on insulin drip for elevated BG   3/3: Patient switched off insulin drip given low blood glucose. ISS placed. Patient's A1C 5.4%.  3/4: Patient s/p IVC filter for DVTs. Glucose elevated.   3/5- No acute events overnight. Plan for scan today and clamping of EVD.  3/6- No acute events overnight except adjustment of BP medications due to elevated BP beyond goal.  3/7- No acute events overnight. Patient's EVD clamped, EVD to be removed today. BP difficult to control   3/8- No acute events overnight. Patient NPO for trach/PEG    Allergies    shellfish (Rash)  ChloraPrep One-Step (Rash)  penicillins (Rash)    Intolerances        REVIEW OF SYSTEMS: [x ] Unable to Assess due to neurologic exam   [ ] All ROS addressed below are non-contributory, except:  Neuro: [ ] Headache [ ] Back pain [ ] Numbness [ ] Weakness [ ] Ataxia [ ] Dizziness [ ] Aphasia [ ] Dysarthria [ ] Visual disturbance  Resp: [ ] Shortness of breath/dyspnea, [ ] Orthopnea [ ] Cough  CV: [ ] Chest pain [ ] Palpitation [ ] Lightheadedness [ ] Syncope  Renal: [ ] Thirst [ ] Edema  GI: [ ] Nausea [ ] Emesis [ ] Abdominal pain [ ] Constipation [ ] Diarrhea  Hem: [ ] Hematemesis [ ] bright red blood per rectum  ID: [ ] Fever [ ] Chills [ ] Dysuria  ENT: [ ] Rhinorrhea      DEVICES:   [ ] Restraints [ ] ET tube [ ] central line [ ] arterial line [ ] johnson [ ] NGT/OGT [ ] EVD [ ] LD [ ] YON/HMV [ ] Trach [ ] PEG [ ] Chest Tube     VITALS:   Vital Signs Last 24 Hrs  T(C): 35.7 (08 Mar 2024 07:00), Max: 36.4 (07 Mar 2024 11:00)  T(F): 96.3 (08 Mar 2024 07:00), Max: 97.5 (07 Mar 2024 11:00)  HR: 72 (08 Mar 2024 07:00) (72 - 100)  BP: 99/39 (08 Mar 2024 07:00) (99/39 - 187/100)  BP(mean): 57 (08 Mar 2024 07:00) (57 - 136)  RR: 14 (08 Mar 2024 07:00) (14 - 24)  SpO2: 99% (08 Mar 2024 07:00) (96% - 100%)    Parameters below as of 08 Mar 2024 07:00  Patient On (Oxygen Delivery Method): ventilator      CAPILLARY BLOOD GLUCOSE      POCT Blood Glucose.: 121 mg/dL (08 Mar 2024 05:16)  POCT Blood Glucose.: 166 mg/dL (07 Mar 2024 23:17)  POCT Blood Glucose.: 195 mg/dL (07 Mar 2024 16:59)  POCT Blood Glucose.: 170 mg/dL (07 Mar 2024 11:11)    I&O's Summary    07 Mar 2024 07:01  -  08 Mar 2024 07:00  --------------------------------------------------------  IN: 1580 mL / OUT: 1175 mL / NET: 405 mL    08 Mar 2024 07:01  -  08 Mar 2024 07:46  --------------------------------------------------------  IN: 0 mL / OUT: 0 mL / NET: 0 mL        Respiratory:  Mode: AC/ CMV (Assist Control/ Continuous Mandatory Ventilation)  RR (machine): 14  TV (machine): 450  FiO2: 40  PEEP: 5  ITime: 1  MAP: 9  PIP: 20    ABG - ( 06 Mar 2024 18:57 )  pH, Arterial: 7.42  pH, Blood: x     /  pCO2: 38    /  pO2: 204   / HCO3: 25    / Base Excess: 0.2   /  SaO2: 99.1                LABS:                        8.8    6.70  )-----------( 163      ( 08 Mar 2024 01:34 )             28.5     03-08    140  |  106  |  51<H>  ----------------------------<  130<H>  5.1   |  24  |  1.40<H>             MEDICATION LEVELS:     IVF FLUIDS/MEDICATIONS:   MEDICATIONS  (STANDING):  brivaracetam Oral Solution 100 milliGRAM(s) Oral <User Schedule>  carvedilol 25 milliGRAM(s) Oral every 12 hours  cefTRIAXone   IVPB 1000 milliGRAM(s) IV Intermittent every 24 hours  chlorhexidine 0.12% Liquid 15 milliLiter(s) Oral Mucosa every 12 hours  chlorhexidine 4% Liquid 1 Application(s) Topical daily  cloNIDine 0.1 milliGRAM(s) Oral three times a day  dextrose 5%. 1000 milliLiter(s) (50 mL/Hr) IV Continuous <Continuous>  dextrose 5%. 1000 milliLiter(s) (100 mL/Hr) IV Continuous <Continuous>  dextrose 50% Injectable 25 Gram(s) IV Push once  dextrose 50% Injectable 12.5 Gram(s) IV Push once  dextrose 50% Injectable 25 Gram(s) IV Push once  gabapentin Solution 100 milliGRAM(s) Oral three times a day  glucagon  Injectable 1 milliGRAM(s) IntraMuscular once  hydrALAZINE 100 milliGRAM(s) Oral every 8 hours  influenza  Vaccine (HIGH DOSE) 0.7 milliLiter(s) IntraMuscular once  insulin lispro (ADMELOG) corrective regimen sliding scale   SubCutaneous every 6 hours  insulin NPH human recombinant 5 Unit(s) SubCutaneous every 6 hours  lacosamide Solution 200 milliGRAM(s) Oral two times a day  pantoprazole  Injectable 40 milliGRAM(s) IV Push daily  polyethylene glycol 3350 17 Gram(s) Oral every 12 hours  predniSONE   Tablet 5 milliGRAM(s) Oral daily  senna 2 Tablet(s) Oral at bedtime  sodium chloride 3 Gram(s) Oral every 6 hours  tacrolimus    0.5 mG/mL Suspension 3 milliGRAM(s) Oral two times a day  trimethoprim   80 mG/sulfamethoxazole 400 mG 1 Tablet(s) Oral daily    MEDICATIONS  (PRN):  acetaminophen   Oral Liquid .. 650 milliGRAM(s) Oral every 6 hours PRN Temp greater or equal to 38C (100.4F), Mild Pain (1 - 3)  dextrose Oral Gel 15 Gram(s) Oral once PRN Blood Glucose LESS THAN 70 milliGRAM(s)/deciliter  fentaNYL    Injectable 25 MICROGram(s) IV Push every 2 hours PRN vent synchroncy  ondansetron Injectable 4 milliGRAM(s) IV Push every 6 hours PRN Nausea and/or Vomiting  oxyCODONE    IR 5 milliGRAM(s) Oral every 4 hours PRN Moderate Pain (4 - 6)  oxyCODONE    Solution 10 milliGRAM(s) Enteral Tube every 4 hours PRN Severe Pain (7 - 10)        IMAGING:    EXAMINATION:  PHYSICAL EXAM:    Constitutional: No Acute Distress     Neurological: Eyes open to pain, eyes midline, CAS, moving left UE spontaneously, right UE 0/5, right LE withdraws, not following commands     Reflexes: Deep Tendon Reflexes Intact     Pulmonary: Clear to Auscultation, No rales, No rhonchi, No wheezes     Cardiovascular: S1, S2, Regular rate and rhythm     Gastrointestinal: Soft, Non-tender, Non-distended     Extremities: No calf tenderness

## 2024-03-08 NOTE — PROGRESS NOTE ADULT - PROBLEM SELECTOR PLAN 1
Pt. with ESRD, underwent DDRT in 2019. On review of [place], Scr was 1.86 on 10/31/23. SCr was WNL at 1.18 on admission (3/2), and remains elevated/stable at 1.40 today (3/7). Pt. with likley underlying CKD, Scr likely at baseline. Continue with immunosuppression regimen, as outlined below. Monitor labs and urine output. Avoid nephrotoxins. Dose medications as per eGFR.

## 2024-03-08 NOTE — PROGRESS NOTE ADULT - ASSESSMENT
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    3/3 S/P IVCF (retrievable) by IR  03/01/2024 S/P L Hemicrani for Evacuation of large ICH; bone discarded     CTH AM (post pull)   Pre-op for trach/peg in AM ([place])  vEEG in progress f/u official read   Nephro (Dr. Quiroz) - hold home bicarb and calcitriol  GOC ongoing with family

## 2024-03-08 NOTE — PROGRESS NOTE ADULT - ATTENDING COMMENTS
Pt seen in evening 3/8/24.  She is now trached/PEGd. Moves left UE spontaneously.  + tongue swelling and protrusion.  Pupils equal.   For RCU when EEG monitoring is completed.  Family at bedside.

## 2024-03-08 NOTE — PROGRESS NOTE ADULT - ASSESSMENT
ASSESSMENT/PLAN: s/p left craniectomy(discarded) and evacuation. POD7    NEURO:  Neurochecks Q4  r CTH post-op: s/p clot evacuation, heme in the 4th ventricle, improvement of midline shift, pneumocephalus and hydro  CTH (3/8)- stable heme   EVD removed   1 YON drain removed 3/6  VEEG- 4 electrographic seizures, last one 3/6 on 5:15 PM- will remove EEG   Pain: Oxy 10 mg Q4 PRN (patient on Percocet at home for leg pain)  Briviact 100 mg TID for seizures, Vimpat 200 mg BID ()  Activity: [x] ROM in bed    PULM:  Intubated   PRVC 14/450/5/40  CPAP as tolerated  CXR improved  Copious oral secretions, in line   At risk of aspiration pneumonia   Trach today     CV:  SBP goal:   History of HTN  Currently on Hydralazine 100 mg Q8H, clonidine 0.1 mg TID, Coreg 25 mg BID     RENAL:  Fluids: IVL   f/u nephro transplant team  patient has been off HD  s/p transplant in 2019  Currently on tacrolimus   Renal function stable, urine output maintained   AVF in the left upper extremity     GI:  Diet: OGT-- NPO  GI prophylaxis [] not indicated [x] PPI [] other:  Bowel regimen [x] miralax [x] senna [x] Dulcolax supp  LBM: 3/5  PEG today 3/8    ENDO:   Goal euglycemia (-180)  ISS  A1C: 5.4%  Currently on ISS, NPH 5 units q6H    HEME/ONC:   H/H stable for now  VTE prophylaxis: [x] SCDs [x] chemoprophylaxis- SQH [] high risk of DVT/PE on admission due to:  hx of breast cancer - finished Tamoxifen in March 2023, will discontinue   LED- bilateral above the knee DVT. IR consulted for IVC filter placement.   - Patient now s/p retrievable IVC filter  - Patient receives procrit shots every month, will continue     ID: Hypothermic overnight  Pan cultures sent  UA positive for infection  Started Ceftriaxone x 3 days duration for UTI (3/8--)  Follow up rest of the cultures     MISC:    SOCIAL/FAMILY:  [x] updated at bedside [] family meeting    CODE STATUS:  [x] Full Code [] DNR [] DNI [] Palliative/Comfort Care    DISPOSITION:  [x] ICU [] Stroke Unit [] Floor [] EMU [] RCU [] PCU    [x] Patient is at high risk of neurologic deterioration/death due to: infection, expansion of hematoma      ASSESSMENT/PLAN: s/p left craniectomy(discarded) and evacuation. POD7    NEURO:  Neurochecks Q4  r CTH post-op: s/p clot evacuation, heme in the 4th ventricle, improvement of midline shift, pneumocephalus and hydro  CTH (3/8)- stable heme   EVD removed   1 YON drain removed 3/6  VEEG- 4 electrographic seizures, last one 3/6 on 5:15 PM- will remove EEG   Pain: Oxy 10 mg Q4 PRN (patient on Percocet at home for leg pain)  Briviact 100 mg TID for seizures, Vimpat 200 mg BID ()  Activity: [x] ROM in bed    PULM:  s/p trach PRVC 14/450/5/40  CXR AM  CPAP as tolerated  Copious oral secretions, in line   At risk of aspiration pneumonia       CV:  SBP goal:   History of HTN  Currently on Hydralazine 100 mg Q8H, clonidine 0.1 mg TID, Coreg 25 mg BID     RENAL:  Fluids: IVL   f/u nephro transplant team  patient has been off HD  s/p transplant in 2019  Currently on tacrolimus   Renal function stable, urine output maintained   AVF in the left upper extremity     GI:  Diet: Glucerna 1.5 @ goal  GI prophylaxis [] not indicated [x] PPI [] other:  Bowel regimen [x] miralax [x] senna [x] Dulcolax supp  LBM: 3/5  s/p PEG    ENDO:   Goal euglycemia (-180)  ISS  A1C: 5.4%  Currently on ISS, NPH 5 units q6H    HEME/ONC:   H/H stable for now  VTE prophylaxis: [x] SCDs [x] chemoprophylaxis- SQH [] high risk of DVT/PE on admission due to:  hx of breast cancer - finished Tamoxifen in March 2023, will discontinue   LED- bilateral above the knee DVT. IR consulted for IVC filter placement.   - Patient now s/p retrievable IVC filter  - Patient receives procrit shots every month, will continue     ID: Hypothermic overnight  Pan cultures sent  UA positive for infection  Started Ceftriaxone x 3 days duration for UTI (3/8--)  Follow up rest of the cultures     MISC:    SOCIAL/FAMILY:  [x] updated at bedside [] family meeting    CODE STATUS:  [x] Full Code [] DNR [] DNI [] Palliative/Comfort Care    DISPOSITION:  [x] ICU [] Stroke Unit [] Floor [] EMU [] RCU [] PCU    [x] Patient is at high risk of neurologic deterioration/death due to: infection, expansion of hematoma

## 2024-03-08 NOTE — PROGRESS NOTE ADULT - SUBJECTIVE AND OBJECTIVE BOX
[facility] DIVISION OF KIDNEY DISEASES AND HYPERTENSION -- FOLLOW UP NOTE  --------------------------------------------------------------------------------    Chief Complaint: DDRT (2019), Nontraumatic intracerebral hemorrhage    24 hour events/subjective: Pt. was seen and evaluated in the neurosurgical ICU earlier today. Pt. remains intubated, unable to provide history or ROS.      PAST HISTORY  --------------------------------------------------------------------------------  No significant changes to PMH, PSH, FHx, SHx, unless otherwise noted    ALLERGIES & MEDICATIONS  --------------------------------------------------------------------------------  Allergies    shellfish (Rash)  ChloraPrep One-Step (Rash)  penicillins (Rash)    Intolerances      Standing Inpatient Medications  bisacodyl Suppository 10 milliGRAM(s) Rectal once  brivaracetam Oral Solution 100 milliGRAM(s) Oral <User Schedule>  carvedilol 25 milliGRAM(s) Oral every 12 hours  cefTRIAXone   IVPB 1000 milliGRAM(s) IV Intermittent every 24 hours  chlorhexidine 0.12% Liquid 15 milliLiter(s) Oral Mucosa every 12 hours  chlorhexidine 4% Liquid 1 Application(s) Topical daily  cloNIDine 0.1 milliGRAM(s) Oral three times a day  fentaNYL    Injectable 100 MICROGram(s) IV Push once  gabapentin Solution 100 milliGRAM(s) Oral three times a day  hydrALAZINE 100 milliGRAM(s) Oral every 8 hours  influenza  Vaccine (HIGH DOSE) 0.7 milliLiter(s) IntraMuscular once  insulin lispro (ADMELOG) corrective regimen sliding scale   SubCutaneous every 6 hours  insulin NPH human recombinant 5 Unit(s) SubCutaneous every 6 hours  lacosamide Solution 200 milliGRAM(s) Oral two times a day  midazolam Injectable 2 milliGRAM(s) IV Push once  midazolam Injectable 2 milliGRAM(s) IV Push once  pantoprazole  Injectable 40 milliGRAM(s) IV Push daily  phenylephrine    Infusion 0.1 MICROgram(s)/kG/Min IV Continuous <Continuous>  polyethylene glycol 3350 17 Gram(s) Oral every 12 hours  predniSONE   Tablet 5 milliGRAM(s) Oral daily  propofol Infusion 30 MICROgram(s)/kG/Min IV Continuous <Continuous>  senna 2 Tablet(s) Oral at bedtime  sodium chloride 3 Gram(s) Oral every 6 hours  tacrolimus    0.5 mG/mL Suspension 3 milliGRAM(s) Oral two times a day  trimethoprim   80 mG/sulfamethoxazole 400 mG 1 Tablet(s) Oral daily    PRN Inpatient Medications  acetaminophen   Oral Liquid .. 650 milliGRAM(s) Oral every 6 hours PRN  fentaNYL    Injectable 25 MICROGram(s) IV Push every 2 hours PRN  ondansetron Injectable 4 milliGRAM(s) IV Push every 6 hours PRN  oxyCODONE    IR 5 milliGRAM(s) Oral every 4 hours PRN  oxyCODONE    Solution 10 milliGRAM(s) Enteral Tube every 4 hours PRN      REVIEW OF SYSTEMS  --------------------------------------------------------------------------------  Unable to obtain ROS, see HPI    VITALS/PHYSICAL EXAM  --------------------------------------------------------------------------------  T(C): 35.8 (03-08-24 @ 10:00), Max: 36.4 (03-07-24 @ 15:00)  HR: 83 (03-08-24 @ 10:00) (71 - 100)  BP: 106/53 (03-08-24 @ 10:00) (96/42 - 182/84)  RR: 18 (03-08-24 @ 10:00) (14 - 24)  SpO2: 100% (03-08-24 @ 10:00) (96% - 100%)  Wt(kg): --  Height (cm): 157.5 (03-08-24 @ 06:53)  Weight (kg): 50 (03-08-24 @ 06:53)  BMI (kg/m2): 20.2 (03-08-24 @ 06:53)  BSA (m2): 1.48 (03-08-24 @ 06:53)    03-07-24 @ 07:01  -  03-08-24 @ 07:00  --------------------------------------------------------  IN: 1580 mL / OUT: 1175 mL / NET: 405 mL    03-08-24 @ 07:01  -  03-08-24 @ 12:28  --------------------------------------------------------  IN: 0 mL / OUT: 0 mL / NET: 0 mL    Physical Exam:  Gen: Intubated, sedated  HEENT: +ET tube  Pulm: Mechanical breath sounds BL  CV: RRR, S1S2;  Abd: +BS, soft, nontender/nondistended  : +Urinary catheter with large volume urine noted.  Extremities: no bilateral LE edema noted. +LLE AKA  Neuro: Sedated  Skin: Warm, without rashes      LABS/STUDIES  --------------------------------------------------------------------------------              8.8    6.70  >-----------<  163      [03-08-24 @ 01:34]              28.5     140  |  106  |  51  ----------------------------<  130      [03-08-24 @ 01:34]  5.1   |  24  |  1.40        Ca     9.4     [03-08-24 @ 01:34]      Mg     2.6     [03-08-24 @ 01:34]      Phos  3.8     [03-08-24 @ 01:34]      PT/INR: PT 9.7  , INR 0.88       [03-08-24 @ 01:34]  PTT: 25.3       [03-08-24 @ 01:34]    Serum Osmolality 294      [03-07-24 @ 04:26]    Creatinine Trend:  SCr 1.40 [03-08 @ 01:34]  SCr 1.33 [03-07 @ 17:21]  SCr 1.35 [03-07 @ 04:26]  SCr 1.43 [03-06 @ 02:12]  SCr 1.54 [03-04 @ 23:18]    Tacrolimus (), Serum: 5.1 ng/mL (03-07 @ 05:27)  Tacrolimus (), Serum: 6.1 ng/mL (03-06 @ 03:38)  Tacrolimus (), Serum: 10.3 ng/mL (03-05 @ 05:35)  Tacrolimus (), Serum: 10.2 ng/mL (03-04 @ 05:12)    Urine Creatinine 55      [03-03-24 @ 05:03]  Urine Sodium 120      [03-07-24 @ 05:28]  Urine Osmolality 534      [03-07-24 @ 05:28]    HbA1c 7.2      [01-11-20 @ 09:23]  TSH 1.24      [03-02-24 @ 10:51]  Lipid: chol 136, TG 95, HDL 72, LDL --      [03-02-24 @ 02:10]

## 2024-03-08 NOTE — PROGRESS NOTE ADULT - SUBJECTIVE AND OBJECTIVE BOX
DATE OF SERVICE: 03-08-24 @ 12:07    Patient is a 78y old  Female who presents with a chief complaint of ICH (08 Mar 2024 07:45)      INTERVAL HISTORY: doing well    REVIEW OF SYSTEMS:  CONSTITUTIONAL: No weakness  EYES/ENT: No visual changes;  No throat pain   NECK: No pain or stiffness  RESPIRATORY: No cough, wheezing; No shortness of breath  CARDIOVASCULAR: No chest pain or palpitations  GASTROINTESTINAL: No abdominal  pain. No nausea, vomiting, or hematemesis  GENITOURINARY: No dysuria, frequency or hematuria  NEUROLOGICAL: No stroke like symptoms  SKIN: No rashes    	  MEDICATIONS:  carvedilol 25 milliGRAM(s) Oral every 12 hours  cloNIDine 0.1 milliGRAM(s) Oral three times a day  hydrALAZINE 100 milliGRAM(s) Oral every 8 hours        PHYSICAL EXAM:  T(C): 35.8 (03-08-24 @ 10:00), Max: 36.4 (03-07-24 @ 15:00)  HR: 83 (03-08-24 @ 10:00) (71 - 100)  BP: 106/53 (03-08-24 @ 10:00) (96/42 - 182/84)  RR: 18 (03-08-24 @ 10:00) (14 - 24)  SpO2: 100% (03-08-24 @ 10:00) (96% - 100%)  Wt(kg): --  I&O's Summary    07 Mar 2024 07:01  -  08 Mar 2024 07:00  --------------------------------------------------------  IN: 1580 mL / OUT: 1175 mL / NET: 405 mL    08 Mar 2024 07:01  -  08 Mar 2024 12:07  --------------------------------------------------------  IN: 0 mL / OUT: 0 mL / NET: 0 mL      Height (cm): 157.5 (03-08 @ 06:53)  Weight (kg): 50 (03-08 @ 06:53)  BMI (kg/m2): 20.2 (03-08 @ 06:53)  BSA (m2): 1.48 (03-08 @ 06:53)    Appearance: In no distress	  HEENT:    PERRL, EOMI	  Cardiovascular:  S1 S2, No JVD  Respiratory: Lungs clear to auscultation	  Gastrointestinal:  Soft, Non-tender, + BS	  Vascularature:  No edema of LE  Psychiatric: Appropriate affect   Neuro: no acute focal deficits                               8.8    6.70  )-----------( 163      ( 08 Mar 2024 01:34 )             28.5     03-08    140  |  106  |  51<H>  ----------------------------<  130<H>  5.1   |  24  |  1.40<H>    Ca    9.4      08 Mar 2024 01:34  Phos  3.8     03-08  Mg     2.6     03-08          Labs personally reviewed      ASSESSMENT/PLAN: 	   78F Hx ESRD s/p renal xplant c/b DGF off HD, L AKA, 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      1. Abnormal Echo --  Septal bulge noted measures 2.2cm without obstruction.   -- Given no intracavitary obstruction no intervention at this time  - No Myxoma seen on this echo or echo in 2019, ? if hypermobile interatrial septum was confused for on prior imaging     2. HTN - Continue Hydralazine 100 mg Q8H, clonidine 0.1 mg TID, Coreg 25 mg BID     3. 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 DVT PPX - HSQ when safe            RANDY Lopez DO [facility]  Cardiovascular Medicine  [address], [address]  Available through call or text on [employer] TEAMs  Office: [phone]

## 2024-03-08 NOTE — CHART NOTE - NSCHARTNOTEFT_GEN_A_CORE
POST-OPERATIVE CHECK    SUBJECTIVE  Patient is s/p tracheostomy with PEG. Pt resting comfortably in NSICU bed with tube feeds running and ventilating appropriately with tracheostomy. No bleeding or oozing around either PEG or tracheostomy site.    Vital Signs Last 24 Hrs  T(C): 36.5 (08 Mar 2024 19:00), Max: 36.9 (08 Mar 2024 15:00)  T(F): 97.7 (08 Mar 2024 19:00), Max: 98.4 (08 Mar 2024 15:00)  HR: 98 (08 Mar 2024 19:00) (71 - 107)  BP: 103/51 (08 Mar 2024 19:00) (87/45 - 159/88)  BP(mean): 73 (08 Mar 2024 19:00) (52 - 115)  RR: 17 (08 Mar 2024 19:00) (12 - 51)  SpO2: 98% (08 Mar 2024 19:00) (96% - 100%)    Parameters below as of 08 Mar 2024 19:00  Patient On (Oxygen Delivery Method): ventilator      I&O's Detail    07 Mar 2024 07:01  -  08 Mar 2024 07:00  --------------------------------------------------------  IN:    Enteral Tube Flush: 200 mL    Glucerna: 880 mL    IV PiggyBack: 50 mL    sodium chloride 2% + sodium acetate 50:50: 450 mL  Total IN: 1580 mL    OUT:    Incontinent per Collection Bag (mL): 850 mL    Intermittent Catheterization - Urethral (mL): 325 mL  Total OUT: 1175 mL    Total NET: 405 mL      08 Mar 2024 07:01  -  08 Mar 2024 20:03  --------------------------------------------------------  IN:    Enteral Tube Flush: 100 mL    Glucerna: 270 mL    Phenylephrine: 41.3 mL    Propofol: 30 mL  Total IN: 441.3 mL    OUT:    Incontinent per Collection Bag (mL): 225 mL    Intermittent Catheterization - Urethral (mL): 600 mL  Total OUT: 825 mL    Total NET: -383.7 mL        cefTRIAXone   IVPB 1000  trimethoprim   80 mG/sulfamethoxazole 400 mG 1  carvedilol 25  cefTRIAXone   IVPB 1000  cloNIDine 0.1  hydrALAZINE 100  phenylephrine    Infusion 0.1  trimethoprim   80 mG/sulfamethoxazole 400 mG 1    PAST MEDICAL & SURGICAL HISTORY:  Hypertension      Anemia in ESRD (end-stage renal disease)      Thrombocytopenia  leukopenia: followed by [facility], plan for BMBx 7/22/19      H/O gastroesophageal reflux (GERD)      Breast cancer, female  left 2014      ESRD on hemodialysis      Anuria      Thyroid nodule  yearly Ultrasound, monitoring yearly      Pancreatic cyst      AV fistula thrombosis  history: was treated with coumadin, no more A/C.      S/P lumpectomy, left breast  2014      Adrenal mass  excison 2015      S/P hysterectomy  1994      S/P arteriovenous (AV) fistula creation  2002      History of fracture of right ankle  2014 repaired    PHYSICAL EXAM  General: NAD, tracheostomy in place, working appropriately.  Pulmonary: PRVC  / PEEP 5 / RR 14 / FiO2 50, 7.0 trach in place working well. No active bleeding around trach.  Cardiovascular: NSR  Abdominal: soft, NT/ND, PEG in place with feeds actively running 4cm at bumper 3cm at skin. No oozing or bleeding.   Extremities: WWP    LABS                        8.8    6.70  )-----------( 163      ( 08 Mar 2024 01:34 )             28.5     03-08    140  |  106  |  51<H>  ----------------------------<  130<H>  5.1   |  24  |  1.40<H>    Ca    9.4      08 Mar 2024 01:34  Phos  3.8     03-08  Mg     2.6     03-08      PT/INR - ( 08 Mar 2024 01:34 )   PT: 9.7 sec;   INR: 0.88 ratio         PTT - ( 08 Mar 2024 01:34 )  PTT:25.3 sec  CAPILLARY BLOOD GLUCOSE      POCT Blood Glucose.: 101 mg/dL (08 Mar 2024 17:27)  POCT Blood Glucose.: 125 mg/dL (08 Mar 2024 11:22)  POCT Blood Glucose.: 121 mg/dL (08 Mar 2024 05:16)  POCT Blood Glucose.: 166 mg/dL (07 Mar 2024 23:17)      IMAGING    ASSESSMENT  78yFemale s/p tracheostomy with PEG 03/08. Pt resting comfortably in NSICU bed with tube feeds running and ventilating appropriately with tracheostomy. Recovering appropriately.     PLAN  - Diet - OK for tube feeds, meds, and water flushes through PEG.   - Surgery to follow  - Rest of care per NSICU

## 2024-03-08 NOTE — PROGRESS NOTE ADULT - PROBLEM SELECTOR PLAN 2
Pt's current regimen is Tacrolimus 3 mg BID, and prednisone 5 mg qd. Leflunomide currently on hold. Tacrolimus trough improved to 5.1 yesterday (3/7). Trough result from today is pending result. Recommend to continue Tacrolimus at 3 mg BID. Monitor tacrolimus trough, goal: 4-7.    If you have any questions, please feel free to contact me  Irais Suarez  Nephrology Fellow  [phone] / [employer] Teams(Preferred)  (After 5pm or on weekends please page the on-call fellow)

## 2024-03-08 NOTE — BRIEF OPERATIVE NOTE - NSICDXBRIEFPROCEDURE_GEN_ALL_CORE_FT
PROCEDURES:  Craniotomy for intracerebral hemorrhage 02-Mar-2024 01:42:19  Lilo Medley  
PROCEDURES:  Tracheostomy, with PEG tube insertion 08-Mar-2024 13:55:42  Jose L Gonzalez

## 2024-03-08 NOTE — EEG REPORT - NS EEG TEXT BOX
REPORT OF CONTINUOUS VIDEO EEG      [facility]: [address], [place] [address], Phone: [phone]  [facility]: [address], [place] [address], Phone: [phone]  [facility]: [address] [place] [address], Phone: [phone]    Patient Name: Nury Adam    Age: 78 year, : 1945   [id number]  [facility]G #: [id number]    Study Date: 3/7/2024   Start Time: 08:00     End Date: 3/8/2024         End Time: 08:00  Study Duration: 24 h     Study Information:    EEG Recording Technique:  The patient underwent continuous Video-EEG monitoring, using Telemetry System hardware on the XLTek Digital System. EEG and video data were stored on a computer hard drive with important events saved in digital archive files. The material was reviewed by a physician (electroencephalographer / epileptologist) on a daily basis. Cosme and seizure detection algorithms were utilized and reviewed. An EEG Technician attended to the patient, and was available throughout daytime work hours.  The epilepsy center neurologist was available in person or on call 24-hours per day.    EEG Placement and Labeling of Electrodes:  The EEG was performed utilizing 20 channel referential EEG connections (coronal over temporal over parasagittal montage) using all standard 10-20 electrode placements with EKG, with additional electrodes placed in the inferior temporal region using the modified 10-10 montage electrode placements for elective admissions, or if deemed necessary. Recording was at a sampling rate of 256 samples per second per channel. Time synchronized digital video recording was done simultaneously with EEG recording. A low light infrared camera was used for low light recording.     History:  -78 year old male is here for seizure evaluation    Medication  Alphagan    Prograf    Deltasone    Oxycodone    Fentanyl    Precedex    Catapres    Briviact      Interpretation:    [[[Abbreviation Key:  PDR=alpha rhythm/posterior dominant rhythm. A-P=anterior posterior.  Amplitude: ‘very low’:<20; ‘low’:20-49; ‘medium’:; ‘high’:>150uV.  Persistence for periodic/rhythmic patterns (% of epoch) ‘rare’:<1%; ‘occasional’:1-10%; ‘frequent’:10-50%; ‘abundant’:50-90%; ‘continuous’:>90%.  Persistence for sporadic discharges: ‘rare’:<1/hr; ‘occasional’:1/min-1/hr; ‘frequent’:>1/min; ‘abundant’:>1/10 sec.  RPP=rhythmic and periodic patterns; GRDA=generalized rhythmic delta activity; FIRDA=frontal intermittent GRDA; LRDA=lateralized rhythmic delta activity; TIRDA=temporal intermittent rhythmic delta activity;  LPD=PLED=lateralized periodic discharges; GPD=generalized periodic discharges; BIPDs =bilateral independent periodic discharges; Mf=multifocal; SIRPDs=stimulus induced rhythmic, periodic, or ictal appearing discharges; BIRDs=brief potentially ictal rhythmic discharges >4 Hz, lasting .5-10s; PFA (paroxysmal bursts >13 Hz or =8 Hz <10s).  Modifiers: +F=with fast component; +S=with spike component; +R=with rhythmic component.  S-B=burst suppression pattern.  Max=maximal. N1-drowsy; N2-stage II sleep; N3-slow wave sleep. SSS/BETS=small sharp spikes/benign epileptiform transients of sleep. HV=hyperventilation; PS=photic stimulation]]]    Daily EEG Visual Analysis    FINDINGS:      Background:  Symmetry: asymmetric  Continuous: yes  PDR: absent  Reactivity: present  Voltage: diffusely suppressed as mentioned above  Anterior Posterior Gradient: absent  Breach: left parietal fast activity at times accentuated    Background Slowing:  Generalized slowing: present. Background is diffusely slow consisting of delta theta activity up to 5 Hz at times    Focal slowing: left posterior quadrant delta    State Changes:   Present clinically.  Relative attenuation of GPDs    Sporadic Epileptiform Discharges:   Frequent sharp wave discharges over left parietal region (P3/P7)     Rhythmic and Periodic Patterns (RPPs):  Frequent sharp wave discharges over left parietal region (P3/P7) at times near 1hz briefly.  BIRDs over the left parietal region x2-4s at times assoc with LPDs.  Frequent diffuse triphasic waves 1hz     Electrographic and Electroclinical seizures:  None    Other Clinical Events:  None    Activation Procedures:   -Hyperventilation was not performed.    -Photic stimulation was not performed.    Artifacts:  Intermittent myogenic and movement artifacts were noted.    ECG:  The heart rate on single channel ECG was predominantly between 70-90 BPM.    EEG Summary / Classification:  Abnormal EEG.  •          Moderate to severe background slowing   -          Triphasic GPDs near 1hz  -          Sharp waves, LPDs, BIRDs over the left parietal region    EEG Impression / Clinical Correlate:  Abnormal prolonged EEG study due to  -Elevated risk of seizures from the left parietal region.  -Left parietal focal cerebral dysfunction, associated skull defect.  -Moderate to severe diffuse cerebral dysfunction     No definite seizures     MD HERNESTO Masterson  Director, Continuous EEG Monitoring Program, [facility]   and Epilepsy Fellowship ,   Department of Neurology, [employer]    [facility] EEG Reading Room Ph#: [phone]  Epilepsy Answering Service after 5PM and before 8:30AM: Ph#: [phone]

## 2024-03-08 NOTE — PROGRESS NOTE ADULT - ATTENDING COMMENTS
78F admitted 3/1/24 with left lobar hemorrhage 2/2 amyloid angiopathy status post left hemicraniectomy with course c/b by seizures, resp failure status post trach/PEG and b/l femoral DVT status post IVCF.     Updated daughter at length, including reviewing imaging.    Continue seizure medications  S/p Trach/PEG; RCU consult  -160mmHg  Wean vent as able  Bactrim prophylaxis  Complete ceftriaxone for UTI  Continue tacrolimus  Adjust NPH as needed for -180    At risk of death/neuro decline: resp failure

## 2024-03-08 NOTE — PROGRESS NOTE ADULT - ASSESSMENT
ASSESSMENT/PLAN: s/p left craniectomy(discarded) and evacuation. POD7    NEURO:  Neurochecks Q1, Vitals Q2  r CTH post-op: s/p clot evacuation, heme in the 4th ventricle, improvement of midline shift, pneumocephalus and hydro  CTH tomorrow (3/7)- stable   EVD clamped since (3/5)  1 YON drain removed 3/6  VEEG- 4 electrographic seizures, last one 3/5 on 5:15 PM, frequent epileptiform discharges in the left parietal region, will follow read 3/7 and disconnect if no seizures   Pain: Oxy 10 mg Q4 PRN (patient on Percocet at home for leg pain)  Briviact 100 mg BID for seizures, Vimpat 200 mg BID ()  Activity: [x] ROM in bed    PULM:  Intubated   PRVC 14/450/5/40  CPAP as tolerated, patient did not tolerate CPAP, started to breath abnormally and became apneic  CXR improved  Copious oral secretions, in line   At risk of aspiration pneumonia   Trach today     CV:  SBP goal:   History of HTN  Currently on Hydralazine 100 mg Q8H, clonidine 0.1 mg TID, Coreg 25 mg BID     RENAL:  Fluids: IVL   Hyponatremic- euvolemic-- will start on 2% at 50 cc for 6 hours, will recheck Na, likely SiADH   Will concentrate feeds for fluid restriction   f/u nephro transplant team  patient has been off HD  s/p transplant in 2019  Currently on tacrolimus   Renal function stable, urine output maintained   AVF in the left upper extremity     GI:  Diet: OGT-- continue tube feeds with Glucerna   GI prophylaxis [] not indicated [x] PPI [] other:  Bowel regimen [x] miralax [x] senna  LBM: 3/5  PEG today 3/8    ENDO:   Goal euglycemia (-180)  ISS  A1C: 5.4%  Currently on ISS, NPH 10 units q6H    HEME/ONC:   H/H stable for now  VTE prophylaxis: [x] SCDs [x] chemoprophylaxis- SQH [] high risk of DVT/PE on admission due to:  hx of breast cancer - finished Tamoxifen in March 2023, will discontinue   LED- bilateral above the knee DVT. IR consulted for IVC filter placement.   - Patient now s/p retrievable IVC filter  - Patient receives procrit shots every month, will continue     ID: afebrile    MISC:    SOCIAL/FAMILY:  [x] updated at bedside [] family meeting    CODE STATUS:  [x] Full Code [] DNR [] DNI [] Palliative/Comfort Care    DISPOSITION:  [x] ICU [] Stroke Unit [] Floor [] EMU [] RCU [] PCU    [x] Patient is at high risk of neurologic deterioration/death due to: infection, expansion of hematoma      ASSESSMENT/PLAN: s/p left craniectomy(discarded) and evacuation. POD7    NEURO:  Neurochecks Q4  r CTH post-op: s/p clot evacuation, heme in the 4th ventricle, improvement of midline shift, pneumocephalus and hydro  CTH (3/8)- stable heme   EVD removed   1 YON drain removed 3/6  VEEG- 4 electrographic seizures, last one 3/6 on 5:15 PM- will remove EEG   Pain: Oxy 10 mg Q4 PRN (patient on Percocet at home for leg pain)  Briviact 100 mg TID for seizures, Vimpat 200 mg BID ()  Activity: [x] ROM in bed    PULM:  Intubated   PRVC 14/450/5/40  CPAP as tolerated  CXR improved  Copious oral secretions, in line   At risk of aspiration pneumonia   Trach today     CV:  SBP goal:   History of HTN  Currently on Hydralazine 100 mg Q8H, clonidine 0.1 mg TID, Coreg 25 mg BID     RENAL:  Fluids: IVL   f/u nephro transplant team  patient has been off HD  s/p transplant in 2019  Currently on tacrolimus   Renal function stable, urine output maintained   AVF in the left upper extremity     GI:  Diet: OGT-- NPO  GI prophylaxis [] not indicated [x] PPI [] other:  Bowel regimen [x] miralax [x] senna [x] Dulcolax supp  LBM: 3/5  PEG today 3/8    ENDO:   Goal euglycemia (-180)  ISS  A1C: 5.4%  Currently on ISS, NPH 5 units q6H    HEME/ONC:   H/H stable for now  VTE prophylaxis: [x] SCDs [x] chemoprophylaxis- SQH [] high risk of DVT/PE on admission due to:  hx of breast cancer - finished Tamoxifen in March 2023, will discontinue   LED- bilateral above the knee DVT. IR consulted for IVC filter placement.   - Patient now s/p retrievable IVC filter  - Patient receives procrit shots every month, will continue     ID: Hypothermic overnight  Pan cultures sent  UA positive for infection  Started Ceftriaxone x 3 days duration for UTI (3/8--)  Follow up rest of the cultures     MISC:    SOCIAL/FAMILY:  [x] updated at bedside [] family meeting    CODE STATUS:  [x] Full Code [] DNR [] DNI [] Palliative/Comfort Care    DISPOSITION:  [x] ICU [] Stroke Unit [] Floor [] EMU [] RCU [] PCU    [x] Patient is at high risk of neurologic deterioration/death due to: infection, expansion of hematoma

## 2024-03-08 NOTE — CHART NOTE - NSCHARTNOTEFT_GEN_A_CORE
Nutrition Follow Up Note  Patient seen for: Nutrition Follow Up     Chart reviewed, events noted. Pt is a 79 yo F with PMH: ESRD s/p renal transplant off HD, L AKA, BK viremia, HTN, breast CA s/p lumpectomy on Tamoxifen, DVT, HLD, Gout. Presented with 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 S/P L hemicrani for evacuation of large ICH, bone discarded. Pending trach/PEG.     Source: [] Patient       [x] EMR        [x] RN        [] Family at bedside       [x] Other: interdisciplinary medical team    -If unable to interview patient: [x] Trach/Vent/BiPAP  [x] Disoriented/confused/inappropriate to interview    Diet Order:   Diet, NPO after Midnight:      NPO Start Date: 07-Mar-2024,   NPO Start Time: 23:59  Except Medications (24)  Diet, NPO with Tube Feed:   Tube Feeding Modality: Orogastric  Glucerna 1.2 David (GLUCERNARTH)  Total Volume for 24 Hours (mL): 1320  Continuous  Starting Tube Feed Rate {mL per Hour}: 20  Increase Tube Feed Rate by (mL): 10     Every 2 hours  Until Goal Tube Feed Rate (mL per Hour): 55  Tube Feed Duration (in Hours): 24  Tube Feed Start Time: 11:00 (24)    EN Order Provides: 1320ml, 1584kcal and 79g protein     EN Provision (per nursing flow sheet): pt has received 100% of feeds between 3/4-3/7. Feeds now on hold as pt pending trach/PEG placement.     Is current diet order appropriate/adequate? [] Yes  []  No:     PO intake :   [] >75%  Adequate    [] 50-75%  Fair       [] <50%  Poor    Nutrition-related concerns:    GI:  Last BM ___.   Bowel Regimen? [] Yes   [] No  NGT Output:  IV Fluids:  Ostomy Output:  Fistula Output:     Weights:   Daily Weight in k.1 ()    MEDICATIONS  (STANDING):  carvedilol  cefTRIAXone   IVPB  cloNIDine  dextrose 5%.  dextrose 5%.  dextrose 50% Injectable  dextrose 50% Injectable  dextrose 50% Injectable  glucagon  Injectable  hydrALAZINE  insulin lispro (ADMELOG) corrective regimen sliding scale  insulin NPH human recombinant  pantoprazole  Injectable  polyethylene glycol 3350  predniSONE   Tablet  senna  sodium chloride  trimethoprim   80 mG/sulfamethoxazole 400 mG    Pertinent Labs:  @ 01:34: Na 140, BUN 51<H>, Cr 1.40<H>, <H>, K+ 5.1, Phos 3.8, Mg 2.6, Alk Phos --, ALT/SGPT --, AST/SGOT --, HbA1c --   @ 17:21: Na 136, BUN 48<H>, Cr 1.33<H>, <H>, K+ 5.4<H>, Phos --, Mg --, Alk Phos --, ALT/SGPT --, AST/SGOT --, HbA1c --    A1C with Estimated Average Glucose Result: 5.7 % (24 @ 11:57)    Finger Sticks:  POCT Blood Glucose.: 121 mg/dL ( @ 05:16)  POCT Blood Glucose.: 166 mg/dL ( @ 23:17)  POCT Blood Glucose.: 195 mg/dL ( @ 16:59)  POCT Blood Glucose.: 170 mg/dL ( @ 11:11)    Triglycerides, Serum: 95 mg/dL (24 @ 02:10)      Skin per nursing documentation:   Edema:     Estimated Energy Needs:  Estimated Protein Needs:  Estimated Fluid Needs:   [place] Equation:    Previous Nutrition Diagnosis:   Nutrition Diagnosis is: [] ongoing  [] resolved [] not applicable     New Nutrition Diagnosis: [] Not applicable    Nutrition Care Plan:  [] In Progress  [] Achieved  [] Not applicable    Nutrition Interventions:     Education Provided:       [] Yes:  [] No:        Recommendations:         [] Continue current diet order            [] Add oral nutrition supplement:     [] Discontinue current diet order. Recommend:      [] Add micronutrient supplementation:      [] Continue current micronutrient supplementation:      [] Other:     Monitoring and Evaluation:   Continue to monitor nutritional intake, tolerance to diet prescription, weights, labs, skin integrity      RD remains available upon request and will follow up per protocol Nutrition Follow Up Note  Patient seen for: Nutrition Follow Up     Chart reviewed, events noted. Pt is a 79 yo F with PMH: ESRD s/p renal transplant off HD, L AKA, BK viremia, HTN, breast CA s/p lumpectomy on Tamoxifen, DVT, HLD, Gout. Presented with 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 S/P L hemicrani for evacuation of large ICH, bone discarded. Pending trach/PEG.     Source: [] Patient       [x] EMR        [x] RN        [] Family at bedside       [x] Other: interdisciplinary medical team    -If unable to interview patient: [x] Trach/Vent/BiPAP  [x] Disoriented/confused/inappropriate to interview    Diet Order:   Diet, NPO after Midnight:      NPO Start Date: 07-Mar-2024,   NPO Start Time: 23:59  Except Medications (24)  Diet, NPO with Tube Feed:   Tube Feeding Modality: Orogastric  Glucerna 1.2 David (GLUCERNARTH)  Total Volume for 24 Hours (mL): 1320  Continuous  Starting Tube Feed Rate {mL per Hour}: 20  Increase Tube Feed Rate by (mL): 10     Every 2 hours  Until Goal Tube Feed Rate (mL per Hour): 55  Tube Feed Duration (in Hours): 24  Tube Feed Start Time: 11:00 (24)    EN Order Provides: 1320ml, 1584kcal and 79g protein     EN Provision (per nursing flow sheet): pt has received 100% of feeds between 3/4-3/7. Feeds now on hold as pt pending trach/PEG placement.     Is current diet order appropriate/adequate? see below for updated diet recommendations    Nutrition-related concerns:  -Last BM (3/5): x 2. On bowel regimen (senna, Miralax). Prescribed IV Protonix, IV Zofran.   -Continues on antibiotics as ordered. Noted at risk for aspiration pneumonia. Pending trach/PEG.   -Continues on NPH 5u q 6 hrs and insulin lispro sliding scale to aid in glycemic control. A1c 5.7%. To note, pt on Prednisone, posing pt at additional risk for elevated FSBG.  -Prescribed NaCl 3g q 6 hrs; s/p NaCl 2% with sodium acetate 50:50 (now discontinued). Likely SIADH. Plan to concentrate feeds for fluid restriction.   -Prescribed Tacrolimus; hx of renal transplant.   -ALLERGY: Shellfish noted and confirmed by family.     Weights:   Daily Weight in k.1 ()    MEDICATIONS  (STANDING):  carvedilol  cefTRIAXone   IVPB  cloNIDine  dextrose 5%.  dextrose 5%.  dextrose 50% Injectable  dextrose 50% Injectable  dextrose 50% Injectable  glucagon  Injectable  hydrALAZINE  insulin lispro (ADMELOG) corrective regimen sliding scale  insulin NPH human recombinant  pantoprazole  Injectable  polyethylene glycol 3350  predniSONE   Tablet  senna  sodium chloride  trimethoprim   80 mG/sulfamethoxazole 400 mG    Pertinent Labs:  @ 01:34: Na 140, BUN 51<H>, Cr 1.40<H>, <H>, K+ 5.1, Phos 3.8, Mg 2.6, Alk Phos --, ALT/SGPT --, AST/SGOT --, HbA1c --   @ 17:21: Na 136, BUN 48<H>, Cr 1.33<H>, <H>, K+ 5.4<H>, Phos --, Mg --, Alk Phos --, ALT/SGPT --, AST/SGOT --, HbA1c --    A1C with Estimated Average Glucose Result: 5.7 % (24 @ 11:57)    Finger Sticks:  POCT Blood Glucose.: 121 mg/dL ( @ 05:16)  POCT Blood Glucose.: 166 mg/dL ( @ 23:17)  POCT Blood Glucose.: 195 mg/dL ( @ 16:59)  POCT Blood Glucose.: 170 mg/dL ( @ 11:11)    Triglycerides, Serum: 95 mg/dL (24 @ 02:10)    Skin per nursing documentation: surgical incision left craniectomy, stage II sacral spine  Edema: 3+ generalized; face; right arm; tongue    (Based on dosing wt 50kg):   Estimated Energy Needs: (30-35kcal/kg): 1500-1750kcal  Estimated Protein Needs: (1.4-1.8g protein/kg): 70-90g protein  Estimated Fluid Needs: defer to team    Previous Nutrition Diagnosis: Increased Nutrient Needs  Nutrition Diagnosis is: [x] ongoing  [] resolved [] not applicable     New Nutrition Diagnosis: [x] Not applicable    Nutrition Care Plan:  [x] In Progress  [] Achieved  [] Not applicable    Nutrition Interventions:     Education Provided:       [] Yes:  [x] No: pt pending trach/PEG       Recommendations:      1. Upon advancement of diet, consider Glucerna 1.5 at GOAL rate 45ml/hr x 24 hrs. To provide (based on dosing wt 50kg): 1080ml, 1620kcal (32.4kcal/kg) and 89g protein (1.78g protein/kg). Team requesting fluid restricted formula as pt with previously low Na, SIADH.   2. Monitor GI tolerance. RD to remain available to adjust EN formulary, volume/rate PRN.   3. Antihyperglycemic regimen deferred to team. Trend K levels as pt prescribed Tacrolimus.   4. Consider multivitamin if feeds held for extended period of time to aid in prevention of micronutrient deficiencies.  5. Monitor wt trends/labs/skin integrity/hydration status/bowel regularity.     Monitoring and Evaluation:   Continue to monitor nutritional intake, tolerance to diet prescription, weights, labs, skin integrity    RD remains available upon request and will follow up per protocol    Uzma Ponce RD, available via TEAMS

## 2024-03-09 LAB
ANION GAP SERPL CALC-SCNC: 7 MMOL/L — SIGNIFICANT CHANGE UP (ref 5–17)
BUN SERPL-MCNC: 50 MG/DL — HIGH (ref 7–23)
CALCIUM SERPL-MCNC: 9.5 MG/DL — SIGNIFICANT CHANGE UP (ref 8.4–10.5)
CHLORIDE SERPL-SCNC: 112 MMOL/L — HIGH (ref 96–108)
CO2 SERPL-SCNC: 26 MMOL/L — SIGNIFICANT CHANGE UP (ref 22–31)
CREAT SERPL-MCNC: 1.52 MG/DL — HIGH (ref 0.5–1.3)
EGFR: 35 ML/MIN/1.73M2 — LOW
GLUCOSE BLDC GLUCOMTR-MCNC: 143 MG/DL — HIGH (ref 70–99)
GLUCOSE BLDC GLUCOMTR-MCNC: 161 MG/DL — HIGH (ref 70–99)
GLUCOSE BLDC GLUCOMTR-MCNC: 216 MG/DL — HIGH (ref 70–99)
GLUCOSE BLDC GLUCOMTR-MCNC: 239 MG/DL — HIGH (ref 70–99)
GLUCOSE BLDC GLUCOMTR-MCNC: 245 MG/DL — HIGH (ref 70–99)
GLUCOSE SERPL-MCNC: 134 MG/DL — HIGH (ref 70–99)
MAGNESIUM SERPL-MCNC: 2.7 MG/DL — HIGH (ref 1.6–2.6)
PHOSPHATE SERPL-MCNC: 3.3 MG/DL — SIGNIFICANT CHANGE UP (ref 2.5–4.5)
POTASSIUM SERPL-MCNC: 5.1 MMOL/L — SIGNIFICANT CHANGE UP (ref 3.5–5.3)
POTASSIUM SERPL-SCNC: 5.1 MMOL/L — SIGNIFICANT CHANGE UP (ref 3.5–5.3)
SODIUM SERPL-SCNC: 145 MMOL/L — SIGNIFICANT CHANGE UP (ref 135–145)
TACROLIMUS SERPL-MCNC: 4.2 NG/ML — SIGNIFICANT CHANGE UP

## 2024-03-09 PROCEDURE — 99232 SBSQ HOSP IP/OBS MODERATE 35: CPT

## 2024-03-09 PROCEDURE — 71045 X-RAY EXAM CHEST 1 VIEW: CPT | Mod: 26

## 2024-03-09 PROCEDURE — 95718 EEG PHYS/QHP 2-12 HR W/VEEG: CPT

## 2024-03-09 RX ORDER — SODIUM CHLORIDE 9 MG/ML
500 INJECTION, SOLUTION INTRAVENOUS ONCE
Refills: 0 | Status: COMPLETED | OUTPATIENT
Start: 2024-03-09 | End: 2024-03-09

## 2024-03-09 RX ORDER — HEPARIN SODIUM 5000 [USP'U]/ML
5000 INJECTION INTRAVENOUS; SUBCUTANEOUS EVERY 12 HOURS
Refills: 0 | Status: DISCONTINUED | OUTPATIENT
Start: 2024-03-09 | End: 2024-04-01

## 2024-03-09 RX ADMIN — PANTOPRAZOLE SODIUM 40 MILLIGRAM(S): 20 TABLET, DELAYED RELEASE ORAL at 06:22

## 2024-03-09 RX ADMIN — CHLORHEXIDINE GLUCONATE 1 APPLICATION(S): 213 SOLUTION TOPICAL at 22:42

## 2024-03-09 RX ADMIN — POLYETHYLENE GLYCOL 3350 17 GRAM(S): 17 POWDER, FOR SOLUTION ORAL at 17:20

## 2024-03-09 RX ADMIN — SODIUM CHLORIDE 250 MILLILITER(S): 9 INJECTION, SOLUTION INTRAVENOUS at 06:49

## 2024-03-09 RX ADMIN — CHLORHEXIDINE GLUCONATE 15 MILLILITER(S): 213 SOLUTION TOPICAL at 06:22

## 2024-03-09 RX ADMIN — HUMAN INSULIN 5 UNIT(S): 100 INJECTION, SUSPENSION SUBCUTANEOUS at 17:18

## 2024-03-09 RX ADMIN — SODIUM CHLORIDE 3 GRAM(S): 9 INJECTION INTRAMUSCULAR; INTRAVENOUS; SUBCUTANEOUS at 06:31

## 2024-03-09 RX ADMIN — GABAPENTIN 100 MILLIGRAM(S): 400 CAPSULE ORAL at 22:42

## 2024-03-09 RX ADMIN — HUMAN INSULIN 5 UNIT(S): 100 INJECTION, SUSPENSION SUBCUTANEOUS at 23:39

## 2024-03-09 RX ADMIN — GABAPENTIN 100 MILLIGRAM(S): 400 CAPSULE ORAL at 13:19

## 2024-03-09 RX ADMIN — LACOSAMIDE 200 MILLIGRAM(S): 50 TABLET ORAL at 17:20

## 2024-03-09 RX ADMIN — HEPARIN SODIUM 5000 UNIT(S): 5000 INJECTION INTRAVENOUS; SUBCUTANEOUS at 17:19

## 2024-03-09 RX ADMIN — HUMAN INSULIN 5 UNIT(S): 100 INJECTION, SUSPENSION SUBCUTANEOUS at 11:11

## 2024-03-09 RX ADMIN — SODIUM CHLORIDE 3 GRAM(S): 9 INJECTION INTRAMUSCULAR; INTRAVENOUS; SUBCUTANEOUS at 17:20

## 2024-03-09 RX ADMIN — GABAPENTIN 100 MILLIGRAM(S): 400 CAPSULE ORAL at 06:32

## 2024-03-09 RX ADMIN — Medication 2: at 17:18

## 2024-03-09 RX ADMIN — Medication 4: at 23:38

## 2024-03-09 RX ADMIN — TACROLIMUS 3 MILLIGRAM(S): 5 CAPSULE ORAL at 17:32

## 2024-03-09 RX ADMIN — Medication 2: at 11:12

## 2024-03-09 RX ADMIN — Medication 5 MILLIGRAM(S): at 06:32

## 2024-03-09 RX ADMIN — CARVEDILOL PHOSPHATE 25 MILLIGRAM(S): 80 CAPSULE, EXTENDED RELEASE ORAL at 17:19

## 2024-03-09 RX ADMIN — BRIVARACETAM 100 MILLIGRAM(S): 25 TABLET, FILM COATED ORAL at 23:21

## 2024-03-09 RX ADMIN — Medication 650 MILLIGRAM(S): at 09:45

## 2024-03-09 RX ADMIN — SODIUM CHLORIDE 3 GRAM(S): 9 INJECTION INTRAMUSCULAR; INTRAVENOUS; SUBCUTANEOUS at 11:13

## 2024-03-09 RX ADMIN — CHLORHEXIDINE GLUCONATE 15 MILLILITER(S): 213 SOLUTION TOPICAL at 17:19

## 2024-03-09 RX ADMIN — TACROLIMUS 3 MILLIGRAM(S): 5 CAPSULE ORAL at 11:10

## 2024-03-09 RX ADMIN — Medication 650 MILLIGRAM(S): at 17:28

## 2024-03-09 RX ADMIN — Medication 1 TABLET(S): at 11:13

## 2024-03-09 RX ADMIN — Medication 2: at 06:24

## 2024-03-09 RX ADMIN — BRIVARACETAM 100 MILLIGRAM(S): 25 TABLET, FILM COATED ORAL at 14:07

## 2024-03-09 RX ADMIN — PANTOPRAZOLE SODIUM 40 MILLIGRAM(S): 20 TABLET, DELAYED RELEASE ORAL at 17:19

## 2024-03-09 RX ADMIN — HUMAN INSULIN 5 UNIT(S): 100 INJECTION, SUSPENSION SUBCUTANEOUS at 06:24

## 2024-03-09 RX ADMIN — SODIUM CHLORIDE 3 GRAM(S): 9 INJECTION INTRAMUSCULAR; INTRAVENOUS; SUBCUTANEOUS at 23:31

## 2024-03-09 RX ADMIN — CEFTRIAXONE 100 MILLIGRAM(S): 500 INJECTION, POWDER, FOR SOLUTION INTRAMUSCULAR; INTRAVENOUS at 06:22

## 2024-03-09 RX ADMIN — Medication 650 MILLIGRAM(S): at 17:58

## 2024-03-09 RX ADMIN — BRIVARACETAM 100 MILLIGRAM(S): 25 TABLET, FILM COATED ORAL at 06:22

## 2024-03-09 RX ADMIN — LACOSAMIDE 200 MILLIGRAM(S): 50 TABLET ORAL at 06:21

## 2024-03-09 RX ADMIN — Medication 650 MILLIGRAM(S): at 10:15

## 2024-03-09 NOTE — PROGRESS NOTE ADULT - ASSESSMENT
ASSESSMENT/PLAN: s/p left craniectomy(discarded) and evacuation. POD8    NEURO:  Neurochecks Q4  r CTH post-op: s/p clot evacuation, heme in the 4th ventricle, improvement of midline shift, pneumocephalus and hydro  CTH (3/8)- stable heme   EVD removed   1 YON drain removed 3/6  VEEG- 4 electrographic seizures, last one 3/6 on 5:15 PM- will remove EEG   Pain: Oxy 10 mg Q4 PRN (patient on Percocet at home for leg pain)  Briviact 100 mg TID for seizures, Vimpat 200 mg BID ()  Activity: [x] ROM in bed    PULM:  s/p trach PRVC 14/450/5/40  CXR AM  CPAP as tolerated  Copious oral secretions, in line   At risk of aspiration pneumonia       CV:  SBP goal:   History of HTN  Currently on Hydralazine 100 mg Q8H, clonidine 0.1 mg TID, Coreg 25 mg BID     RENAL:  Fluids: IVL   f/u nephro transplant team  patient has been off HD  s/p transplant in 2019  Currently on tacrolimus   Renal function stable, urine output maintained   AVF in the left upper extremity     GI:  Diet: Glucerna 1.5 @ goal  GI prophylaxis [] not indicated [x] PPI [] other:  Bowel regimen [x] miralax [x] senna [x] Dulcolax supp  LBM: 3/5  s/p PEG    ENDO:   Goal euglycemia (-180)  ISS  A1C: 5.4%  Currently on ISS, NPH 5 units q6H    HEME/ONC:   H/H stable for now  VTE prophylaxis: [x] SCDs [x] chemoprophylaxis- SQH [] high risk of DVT/PE on admission due to:  hx of breast cancer - finished Tamoxifen in March 2023, will discontinue   LED- bilateral above the knee DVT. IR consulted for IVC filter placement.   - Patient now s/p retrievable IVC filter  - Patient receives procrit shots every month, will continue     ID: Hypothermic overnight  Pan cultures sent  UA positive for infection  Started Ceftriaxone x 3 days duration for UTI (3/8--)  Follow up rest of the cultures     MISC:    SOCIAL/FAMILY:  [x] updated at bedside [] family meeting    CODE STATUS:  [x] Full Code [] DNR [] DNI [] Palliative/Comfort Care    DISPOSITION:  [x] ICU [] Stroke Unit [] Floor [] EMU [] RCU [] PCU    [x] Patient is at high risk of neurologic deterioration/death due to: infection, expansion of hematoma      ASSESSMENT/PLAN: Patient with left ICH (ICH score 4) likely 2/2 amyloid angiopathy s/p left craniectomy(discarded) and evacuation. POD8    NEURO:  Neurochecks Q4  r CTH post-op: s/p clot evacuation, heme in the 4th ventricle, improvement of midline shift, pneumocephalus and hydro  CTH (3/8)- stable heme   EVD removed   1 YON drain removed 3/6  VEEG- Moderate to severe background slowing. Triphasic GPDs near 1hz. Sharp waves, LPDs, BIRDs over the left parietal region, last one 3/6 on 5:15 PM- will remove EEG   Pain: Oxy 10 mg Q4 PRN (patient on Percocet at home for leg pain)  Briviact 100 mg TID for seizures, Vimpat 200 mg BID ()  Activity: [x] ROM in bed    PULM:  s/p trach PRVC 14/450/5/40  CPAP as tolerated  Minimal oral secretions, in line   At risk of aspiration pneumonia     CV:  SBP goal:   History of HTN  Currently on Hydralazine 100 mg Q8H, clonidine 0.1 mg TID, Coreg 25 mg BID     RENAL:  Fluids: IVL   f/u nephro transplant team  patient has been off HD  s/p transplant in 2019  Currently on tacrolimus   Renal function stable, urine output maintained   AVF in the left upper extremity     GI:  Diet: Glucerna 1.5 @ goal  GI prophylaxis [] not indicated [x] PPI [] other:  Bowel regimen [x] miralax [x] senna [x] Dulcolax supp  LBM: 3/9  s/p PEG    ENDO:   Goal euglycemia (-180)  ISS  A1C: 5.4%  Currently on ISS, NPH 5 units q6H    HEME/ONC:   H/H stable for now  VTE prophylaxis: [x] SCDs [x] chemoprophylaxis- SQH [] high risk of DVT/PE on admission due to:  hx of breast cancer - finished Tamoxifen in March 2023, will discontinue   LED- bilateral above the knee DVT. IR consulted for IVC filter placement.   - Patient now s/p retrievable IVC filter  - Patient receives procrit shots every month, will continue     ID: afebrile overnight  UA positive for infection  Started Ceftriaxone x 3 days duration for UTI (3/8--)  Follow up rest of the cultures   Combicath negative     MISC:    SOCIAL/FAMILY:  [x] updated at bedside [] family meeting    CODE STATUS:  [x] Full Code [] DNR [] DNI [] Palliative/Comfort Care    DISPOSITION:  [x] ICU [] Stroke Unit [] Floor [] EMU [] RCU [] PCU    [x] Patient is at high risk of neurologic deterioration/death due to: infection, expansion of hematoma

## 2024-03-09 NOTE — PROGRESS NOTE ADULT - ASSESSMENT
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    3/3 S/P IVCF (retrievable) by IR  03/01/2024 S/P L Hemicrani for Evacuation of large ICH; bone discarded   3/8/24: s/p trach and PEG    Neuro exam stable  vEEG in progress   Urine culture pending   Nephro (Dr. Quiroz) - hold home bicarb and calcitriol  GOC ongoing with family  RCU consulted   f/u H pylori antigen for peptic ulcer, protonix BID, if+, need abx for 2 weeks  Keep trach dressing for 2 days

## 2024-03-09 NOTE — PROGRESS NOTE ADULT - SUBJECTIVE AND OBJECTIVE BOX
SUBJECTIVE: Patient seen and examined on AM rounds. Patient remains on mechanical ventilation by tracheostomy. No new subjective concerns.    Vital Signs Last 24 Hrs  T(C): 36.9 (09 Mar 2024 07:00), Max: 36.9 (08 Mar 2024 15:00)  T(F): 98.5 (09 Mar 2024 07:00), Max: 98.5 (09 Mar 2024 07:00)  HR: 110 (09 Mar 2024 09:00) (82 - 110)  BP: 107/53 (09 Mar 2024 09:00) (87/45 - 148/69)  BP(mean): 77 (09 Mar 2024 09:00) (52 - 99)  RR: 25 (09 Mar 2024 09:00) (12 - 51)  SpO2: 100% (09 Mar 2024 09:00) (98% - 100%)    Parameters below as of 09 Mar 2024 07:00  Patient On (Oxygen Delivery Method): ventilator        General Appearance: Appears well, NAD  Neck: Supple; tracheostomy in place with scant sanguinous drainage on dressing  Chest: Equal expansion bilaterally  CV: Pulse regular presently  Abdomen: Soft, nontense; PEG adjusted to 4cm at mper.  Extremities: WWP    I&O's Summary    08 Mar 2024 07:01  -  09 Mar 2024 07:00  --------------------------------------------------------  IN: 2876.3 mL / OUT: 1125 mL / NET: 1751.3 mL    09 Mar 2024 06:01  -  09 Mar 2024 11:18  --------------------------------------------------------  IN: 45 mL / OUT: 0 mL / NET: 45 mL      I&O's Detail    08 Mar 2024 07:01  -  09 Mar 2024 07:00  --------------------------------------------------------  IN:    Enteral Tube Flush: 250 mL    Glucerna: 855 mL    IV PiggyBack: 200 mL    Lactated Ringers Bolus: 1000 mL    Phenylephrine: 41.3 mL    Propofol: 30 mL    Sodium Chloride 0.9% Bolus: 500 mL  Total IN: 2876.3 mL    OUT:    Incontinent per Collection Bag (mL): 225 mL    Intermittent Catheterization - Urethral (mL): 900 mL  Total OUT: 1125 mL    Total NET: 1751.3 mL      09 Mar 2024 06:01  -  09 Mar 2024 11:18  --------------------------------------------------------  IN:    Glucerna: 45 mL  Total IN: 45 mL    OUT:  Total OUT: 0 mL    Total NET: 45 mL          LABS:                        8.1    7.29  )-----------( 181      ( 08 Mar 2024 22:37 )             25.6     03-09    145  |  112<H>  |  50<H>  ----------------------------<  134<H>  5.1   |  26  |  1.52<H>    Ca    9.5      09 Mar 2024 04:46  Phos  3.3     03-09  Mg     2.7     03-09      PT/INR - ( 08 Mar 2024 01:34 )   PT: 9.7 sec;   INR: 0.88 ratio         PTT - ( 08 Mar 2024 01:34 )  PTT:25.3 sec  Urinalysis Basic - ( 09 Mar 2024 04:46 )    Color: x / Appearance: x / SG: x / pH: x  Gluc: 134 mg/dL / Ketone: x  / Bili: x / Urobili: x   Blood: x / Protein: x / Nitrite: x   Leuk Esterase: x / RBC: x / WBC x   Sq Epi: x / Non Sq Epi: x / Bacteria: x        RADIOLOGY & ADDITIONAL STUDIES:

## 2024-03-09 NOTE — PROGRESS NOTE ADULT - SUBJECTIVE AND OBJECTIVE BOX
DATE OF SERVICE: 03-09-24 @ 10:34    Patient is a 78y old  Female who presents with a chief complaint of ICH (09 Mar 2024 08:02)      INTERVAL HISTORY:     REVIEW OF SYSTEMS:  CONSTITUTIONAL: No weakness  EYES/ENT: No visual changes;  No throat pain   NECK: No pain or stiffness  RESPIRATORY: No cough, wheezing; No shortness of breath  CARDIOVASCULAR: No chest pain or palpitations  GASTROINTESTINAL: No abdominal  pain. No nausea, vomiting, or hematemesis  GENITOURINARY: No dysuria, frequency or hematuria  NEUROLOGICAL: No stroke like symptoms  SKIN: No rashes    	  MEDICATIONS:  carvedilol 25 milliGRAM(s) Oral every 12 hours  cloNIDine 0.1 milliGRAM(s) Oral three times a day  hydrALAZINE 100 milliGRAM(s) Oral every 8 hours        PHYSICAL EXAM:  T(C): 36.9 (03-09-24 @ 07:00), Max: 36.9 (03-08-24 @ 15:00)  HR: 110 (03-09-24 @ 09:00) (82 - 110)  BP: 107/53 (03-09-24 @ 09:00) (87/45 - 148/69)  RR: 25 (03-09-24 @ 09:00) (12 - 51)  SpO2: 100% (03-09-24 @ 09:00) (98% - 100%)  Wt(kg): --  I&O's Summary    08 Mar 2024 07:01  -  09 Mar 2024 07:00  --------------------------------------------------------  IN: 2876.3 mL / OUT: 1125 mL / NET: 1751.3 mL    09 Mar 2024 06:01  -  09 Mar 2024 10:34  --------------------------------------------------------  IN: 45 mL / OUT: 0 mL / NET: 45 mL          Appearance: In no distress	  HEENT:    PERRL, EOMI	  Cardiovascular:  S1 S2, No JVD  Respiratory: Lungs clear to auscultation	  Gastrointestinal:  Soft, Non-tender, + BS	  Vascularature:  No edema of LE  Psychiatric: Appropriate affect   Neuro: no acute focal deficits                               8.1    7.29  )-----------( 181      ( 08 Mar 2024 22:37 )             25.6     03-09    145  |  112<H>  |  50<H>  ----------------------------<  134<H>  5.1   |  26  |  1.52<H>    Ca    9.5      09 Mar 2024 04:46  Phos  3.3     03-09  Mg     2.7     03-09          Labs personally reviewed      ASSESSMENT/PLAN: 	   78F Hx ESRD s/p renal xplant c/b DGF off HD, L AKA, 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      1. Abnormal Echo --  Septal bulge noted measures 2.2cm without obstruction.   -- Given no intracavitary obstruction no intervention at this time  - No Myxoma seen on this echo or echo in 2019, ? if hypermobile interatrial septum was confused for on prior imaging     2. HTN - controlled   Continue Hydralazine 100 mg Q8H, clonidine 0.1 mg TID, Coreg 25 mg BID     3. 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 DVT PPX - HSQ when safe                RANDY Lopez DO [facility]  Cardiovascular Medicine  [address], [address]  Available through call or text on [employer]  Office: [phone]

## 2024-03-09 NOTE — PROGRESS NOTE ADULT - ATTENDING COMMENTS
seen and examined 03-09-24 @ 1355    afeb    on mechanical vent (spont 10 / +5 / 50%) via 7.0 Portex trach  trach site clean without evidence of infection. small amount of dried blood on gauze under flange.    soft / NT / ND  on Glucerna 1.5 @ 45 ml/hr via PEG  PEG site clean without evidence of infection  I loosened the PEG bumper 3.5 -> 4.0 cm from skin    WBC = 7      3/8/2024 - percutaneous tracheostomy / PEG placement  -remove gauze from under flange tomorrow (3/10)  -remove sutures from tracheostomy flange on 3/13/2024  -continue tube feeds via PEG as tolerated    I reviewed her labs.  care coordinated with NSCU team.

## 2024-03-09 NOTE — PROGRESS NOTE ADULT - ATTENDING COMMENTS
78F admitted 3/1/24 with left lobar hemorrhage 2/2 amyloid angiopathy status post left hemicraniectomy with course c/b by seizures, resp failure status post trach/PEG and b/l femoral DVT status post IVCF.     Continue seizure medications  S/p Trach/PEG; RCU consult  -160mmHg  Wean vent as able  Bactrim prophylaxis  Complete ceftriaxone for UTI  Continue tacrolimus  Adjust NPH as needed for -180    At risk of death/neuro decline: resp failure

## 2024-03-09 NOTE — PROGRESS NOTE ADULT - SUBJECTIVE AND OBJECTIVE BOX
Patient seen and examined at bedside.    --Anticoagulation--    T(C): 36.6 (03-08-24 @ 23:00), Max: 36.9 (03-08-24 @ 15:00)  HR: 101 (03-09-24 @ 00:00) (71 - 107)  BP: 112/49 (03-09-24 @ 00:00) (87/45 - 159/88)  RR: 22 (03-09-24 @ 00:00) (12 - 51)  SpO2: 99% (03-09-24 @ 00:00) (96% - 100%)  Wt(kg): --    Exam: trached, PARISH, PERRL, L gaze, +corneals/cough/gag/OB, not FC, LUE spont, RUE 0/5, BLE wd, PEG site dry

## 2024-03-09 NOTE — EEG REPORT - NS EEG TEXT BOX
REPORT OF CONTINUOUS VIDEO EEG      [facility]: [address], [place] [address], Phone: [phone]  [facility]: [address], [place] [address], Phone: [phone]  [facility]: [address] [place] [address], Phone: [phone]    Patient Name: Nury Adam    Age: 78 year, : 1945   [facility]  [facility]G #: [id number]    Study Date: 3/8/2024   Start Time: 08:00     End Date: 3/9/2024         End Time: 08:00  Study Duration: 24 h     Study Information:    EEG Recording Technique:  The patient underwent continuous Video-EEG monitoring, using Telemetry System hardware on the XLTek Digital System. EEG and video data were stored on a computer hard drive with important events saved in digital archive files. The material was reviewed by a physician (electroencephalographer / epileptologist) on a daily basis. Cosme and seizure detection algorithms were utilized and reviewed. An EEG Technician attended to the patient, and was available throughout daytime work hours.  The epilepsy center neurologist was available in person or on call 24-hours per day.    EEG Placement and Labeling of Electrodes:  The EEG was performed utilizing 20 channel referential EEG connections (coronal over temporal over parasagittal montage) using all standard 10-20 electrode placements with EKG, with additional electrodes placed in the inferior temporal region using the modified 10-10 montage electrode placements for elective admissions, or if deemed necessary. Recording was at a sampling rate of 256 samples per second per channel. Time synchronized digital video recording was done simultaneously with EEG recording. A low light infrared camera was used for low light recording.     History:  -78 year old male is here for seizure evaluation    Medication  Alphagan    Prograf    Deltasone    Oxycodone    Fentanyl    Precedex    Catapres    Briviact      Interpretation:    [[[Abbreviation Key:  PDR=alpha rhythm/posterior dominant rhythm. A-P=anterior posterior.  Amplitude: ‘very low’:<20; ‘low’:20-49; ‘medium’:; ‘high’:>150uV.  Persistence for periodic/rhythmic patterns (% of epoch) ‘rare’:<1%; ‘occasional’:1-10%; ‘frequent’:10-50%; ‘abundant’:50-90%; ‘continuous’:>90%.  Persistence for sporadic discharges: ‘rare’:<1/hr; ‘occasional’:1/min-1/hr; ‘frequent’:>1/min; ‘abundant’:>1/10 sec.  RPP=rhythmic and periodic patterns; GRDA=generalized rhythmic delta activity; FIRDA=frontal intermittent GRDA; LRDA=lateralized rhythmic delta activity; TIRDA=temporal intermittent rhythmic delta activity;  LPD=PLED=lateralized periodic discharges; GPD=generalized periodic discharges; BIPDs =bilateral independent periodic discharges; Mf=multifocal; SIRPDs=stimulus induced rhythmic, periodic, or ictal appearing discharges; BIRDs=brief potentially ictal rhythmic discharges >4 Hz, lasting .5-10s; PFA (paroxysmal bursts >13 Hz or =8 Hz <10s).  Modifiers: +F=with fast component; +S=with spike component; +R=with rhythmic component.  S-B=burst suppression pattern.  Max=maximal. N1-drowsy; N2-stage II sleep; N3-slow wave sleep. SSS/BETS=small sharp spikes/benign epileptiform transients of sleep. HV=hyperventilation; PS=photic stimulation]]]    Daily EEG Visual Analysis    FINDINGS:      Background:  Symmetry: asymmetric  Continuous: yes  PDR: absent  Reactivity: present  Voltage: normal  Anterior Posterior Gradient: absent  Breach: left parietal fast activity at times accentuated    Background Slowing:  Generalized slowing: Diffuse polymorphic delta/theta activity    Focal slowing: left posterior quadrant delta    State Changes:   Present clinically.  Relative attenuation of GPDs    Sporadic Epileptiform Discharges:   Occasional left parietal sharp waves P7/P3 maximum.     Rhythmic and Periodic Patterns (RPPs):  Frequent BIRDs over the left parietal region x3-4s assoc with LPDs. less prevalent during latter portions of the study.   Abundant GPDs with diffuse triphasic morphology at 1 to 1.5 hz     Electrographic and Electroclinical seizures:  None    Other Clinical Events:  None    Activation Proced  ures:   -Hyperventilation was not performed.    -Photic stimulation was not performed.    Artifacts:  Intermittent myogenic and movement artifacts were noted. O1 loose electrode intermittently.      ECG:  The heart rate on single channel ECG was predominantly between 70-90 BPM.    EEG Summary / Classification:  Abnormal EEG.  •          Moderate to severe background slowing   -          Triphasic GPDs near 1hz  -          Sharp waves, LPDs, BIRDs over the left parietal region, less prevalent during latter portions of the study.     EEG Impression / Clinical Correlate:  Abnormal prolonged EEG study due to  -Elevated risk of seizures from the left parietal region.  -Left parietal focal cerebral dysfunction, associated skull defect.  -Moderate to severe diffuse cerebral dysfunction     No definite seizures. Last seizure on 3/7 at 1737.     This is fellow preliminary read, pending attending review.    DIVINE Major  Epilepsy Fellow              REPORT OF CONTINUOUS VIDEO EEG      [facility]: [address], [place] [address], Phone: [phone]  [facility]: [address], [place] [address], Phone: [phone]  [facility]: [address] [place] [address], Phone: [phone]    Patient Name: Nury Adam    Age: 78 year, : 1945   Miles: [address] [facility] [address]  EEG #: [id number]    Study Date: 3/8/2024   Start Time: 08:00     End Date: 3/9/2024         End Time: 08:00  Study Duration: 24 h     Study Information:    EEG Recording Technique:  The patient underwent continuous Video-EEG monitoring, using Telemetry System hardware on the XLTek Digital System. EEG and video data were stored on a computer hard drive with important events saved in digital archive files. The material was reviewed by a physician (electroencephalographer / epileptologist) on a daily basis. Cosme and seizure detection algorithms were utilized and reviewed. An EEG Technician attended to the patient, and was available throughout daytime work hours.  The epilepsy center neurologist was available in person or on call 24-hours per day.    EEG Placement and Labeling of Electrodes:  The EEG was performed utilizing 20 channel referential EEG connections (coronal over temporal over parasagittal montage) using all standard 10-20 electrode placements with EKG, with additional electrodes placed in the inferior temporal region using the modified 10-10 montage electrode placements for elective admissions, or if deemed necessary. Recording was at a sampling rate of 256 samples per second per channel. Time synchronized digital video recording was done simultaneously with EEG recording. A low light infrared camera was used for low light recording.     History:  -78 year old male is here for seizure evaluation    Medication  Alphagan    Prograf    Deltasone    Oxycodone    Fentanyl    Precedex    Catapres    Briviact      Interpretation:    [[[Abbreviation Key:  PDR=alpha rhythm/posterior dominant rhythm. A-P=anterior posterior.  Amplitude: ‘very low’:<20; ‘low’:20-49; ‘medium’:; ‘high’:>150uV.  Persistence for periodic/rhythmic patterns (% of epoch) ‘rare’:<1%; ‘occasional’:1-10%; ‘frequent’:10-50%; ‘abundant’:50-90%; ‘continuous’:>90%.  Persistence for sporadic discharges: ‘rare’:<1/hr; ‘occasional’:1/min-1/hr; ‘frequent’:>1/min; ‘abundant’:>1/10 sec.  RPP=rhythmic and periodic patterns; GRDA=generalized rhythmic delta activity; FIRDA=frontal intermittent GRDA; LRDA=lateralized rhythmic delta activity; TIRDA=temporal intermittent rhythmic delta activity;  LPD=PLED=lateralized periodic discharges; GPD=generalized periodic discharges; BIPDs =bilateral independent periodic discharges; Mf=multifocal; SIRPDs=stimulus induced rhythmic, periodic, or ictal appearing discharges; BIRDs=brief potentially ictal rhythmic discharges >4 Hz, lasting .5-10s; PFA (paroxysmal bursts >13 Hz or =8 Hz <10s).  Modifiers: +F=with fast component; +S=with spike component; +R=with rhythmic component.  S-B=burst suppression pattern.  Max=maximal. N1-drowsy; N2-stage II sleep; N3-slow wave sleep. SSS/BETS=small sharp spikes/benign epileptiform transients of sleep. HV=hyperventilation; PS=photic stimulation]]]    Daily EEG Visual Analysis    FINDINGS:      Background:  Symmetry: asymmetric  Continuous: yes  PDR: absent  Reactivity: present  Voltage: normal  Anterior Posterior Gradient: absent  Breach: left parietal fast activity at times accentuated    Background Slowing:  Generalized slowing: Diffuse polymorphic delta/theta activity    Focal slowing: left posterior quadrant delta    State Changes:   Present clinically.  Relative attenuation of GPDs    Sporadic Epileptiform Discharges:   Occasional left parietal sharp waves P7/P3 maximum.     Rhythmic and Periodic Patterns (RPPs):  Frequent BIRDs over the left parietal region x3-4s assoc with LPDs. less prevalent during latter portions of the study.   infrequent intermittent GPDs with diffuse triphasic morphology at 1 to 1.5 hz     Electrographic and Electroclinical seizures:  None    Other Clinical Events:  None    Activation Proced  ures:   -Hyperventilation was not performed.    -Photic stimulation was not performed.    Artifacts:  Intermittent myogenic and movement artifacts were noted. O1 loose electrode intermittently.      ECG:  The heart rate on single channel ECG was predominantly between 70-90 BPM.    EEG Summary / Classification:  Abnormal EEG.  •          Moderate to severe background slowing   -          occasional triphasic GPDs near 1hz  -          Sharp waves, LPDs, BIRDs over the left parietal region, less prevalent during latter portions of the study.     EEG Impression / Clinical Correlate:  Abnormal prolonged EEG study due to  -Elevated risk of seizures from the left parietal region.  -Left parietal focal cerebral dysfunction, associated skull defect.  -Moderate to severe diffuse cerebral dysfunction     No definite seizures. Last seizure on 3/7 at 1737.     DIVINE Major  Epilepsy Fellow    Agustin Andres MD PhD  Director, Epilepsy Division, [facility]         REPORT OF CONTINUOUS VIDEO EEG      [facility]: [address], [place] [address], Phone: [phone]  [facility]: [address], [place] [address], Phone: [phone]  [facility]: [address] [place] [address], Phone: [phone]    Patient Name: Nury Adam    Age: 78 year, : 1945   [facility]  EEG #: [id number]    Study Date: 3/8/2024   Start Time: 08:00     End Date: 3/9/2024         End Time: 16:00  Study Duration: 32 h     Study Information:    EEG Recording Technique:  The patient underwent continuous Video-EEG monitoring, using Telemetry System hardware on the XLTek Digital System. EEG and video data were stored on a computer hard drive with important events saved in digital archive files. The material was reviewed by a physician (electroencephalographer / epileptologist) on a daily basis. Cosme and seizure detection algorithms were utilized and reviewed. An EEG Technician attended to the patient, and was available throughout daytime work hours.  The epilepsy center neurologist was available in person or on call 24-hours per day.    EEG Placement and Labeling of Electrodes:  The EEG was performed utilizing 20 channel referential EEG connections (coronal over temporal over parasagittal montage) using all standard 10-20 electrode placements with EKG, with additional electrodes placed in the inferior temporal region using the modified 10-10 montage electrode placements for elective admissions, or if deemed necessary. Recording was at a sampling rate of 256 samples per second per channel. Time synchronized digital video recording was done simultaneously with EEG recording. A low light infrared camera was used for low light recording.     History:  -78 year old male is here for seizure evaluation    Medication  Alphagan    Prograf    Deltasone    Oxycodone    Fentanyl    Precedex    Catapres    Briviact      Interpretation:    [[[Abbreviation Key:  PDR=alpha rhythm/posterior dominant rhythm. A-P=anterior posterior.  Amplitude: ‘very low’:<20; ‘low’:20-49; ‘medium’:; ‘high’:>150uV.  Persistence for periodic/rhythmic patterns (% of epoch) ‘rare’:<1%; ‘occasional’:1-10%; ‘frequent’:10-50%; ‘abundant’:50-90%; ‘continuous’:>90%.  Persistence for sporadic discharges: ‘rare’:<1/hr; ‘occasional’:1/min-1/hr; ‘frequent’:>1/min; ‘abundant’:>1/10 sec.  RPP=rhythmic and periodic patterns; GRDA=generalized rhythmic delta activity; FIRDA=frontal intermittent GRDA; LRDA=lateralized rhythmic delta activity; TIRDA=temporal intermittent rhythmic delta activity;  LPD=PLED=lateralized periodic discharges; GPD=generalized periodic discharges; BIPDs =bilateral independent periodic discharges; Mf=multifocal; SIRPDs=stimulus induced rhythmic, periodic, or ictal appearing discharges; BIRDs=brief potentially ictal rhythmic discharges >4 Hz, lasting .5-10s; PFA (paroxysmal bursts >13 Hz or =8 Hz <10s).  Modifiers: +F=with fast component; +S=with spike component; +R=with rhythmic component.  S-B=burst suppression pattern.  Max=maximal. N1-drowsy; N2-stage II sleep; N3-slow wave sleep. SSS/BETS=small sharp spikes/benign epileptiform transients of sleep. HV=hyperventilation; PS=photic stimulation]]]    Daily EEG Visual Analysis    FINDINGS:      Background:  Symmetry: asymmetric  Continuous: yes  PDR: absent  Reactivity: present  Voltage: normal  Anterior Posterior Gradient: absent  Breach: left parietal fast activity at times accentuated    Background Slowing:  Generalized slowing: Diffuse polymorphic delta/theta activity    Focal slowing: left posterior quadrant delta    State Changes:   Present clinically.  Relative attenuation of GPDs    Sporadic Epileptiform Discharges:   Occasional left parietal sharp waves P7/P3 maximum.     Rhythmic and Periodic Patterns (RPPs):  Frequent BIRDs over the left parietal region x3-4s assoc with LPDs. less prevalent during latter portions of the study.   infrequent intermittent GPDs with diffuse triphasic morphology at 1 to 1.5 hz     Electrographic and Electroclinical seizures:  None    Other Clinical Events:  None    Activation procedures:   -Hyperventilation was not performed.    -Photic stimulation was not performed.    Artifacts:  Intermittent myogenic and movement artifacts were noted. O1 loose electrode intermittently.      ECG:  The heart rate on single channel ECG was predominantly between 70-90 BPM.    EEG Summary / Classification:  Abnormal EEG.  •          Moderate to severe background slowing   -          occasional triphasic GPDs near 1 at 1.5hz  -          Sharp waves, LPDs, BIRDs over the left parietal region, less prevalent during latter portions of the study.     EEG Impression / Clinical Correlate:  Abnormal prolonged EEG study due to  -Elevated risk of seizures from the left parietal region.  -Left parietal focal cerebral dysfunction, associated skull defect.  -Moderate to severe diffuse cerebral dysfunction     No definite seizures. Last seizure on 3/7 at 1737.     DIVINE Major  Epilepsy Fellow    Agustin Andres MD PhD  Director, Epilepsy Division, [facility]

## 2024-03-09 NOTE — PROGRESS NOTE ADULT - ASSESSMENT
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    3/3 S/P IVCF (retrievable) by IR  03/01/2024 S/P L Hemicrani for Evacuation of large ICH; bone discarded   3/8/24: s/p trach and PEG    Plan:  - Continue feeds by PEG  - Tracheostomy sutures to be removed 3/13    ACS/Trauma  [id number]

## 2024-03-09 NOTE — PROGRESS NOTE ADULT - SUBJECTIVE AND OBJECTIVE BOX
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    Admission Scores  GCS: 4 ICH: 4    24 hour Events:  3/2- Patient s/p left craniectomy(discarded) and evacuation. Patient started on insulin drip for elevated BG   3/3: Patient switched off insulin drip given low blood glucose. ISS placed. Patient's A1C 5.4%.  3/4: Patient s/p IVC filter for DVTs. Glucose elevated.   3/5- No acute events overnight. Plan for scan today and clamping of EVD.  3/6- No acute events overnight except adjustment of BP medications due to elevated BP beyond goal.  3/7- No acute events overnight. Patient's EVD clamped, EVD to be removed today. BP difficult to control   3/8- No acute events overnight. Patient NPO for trach/PEG, hyperkalemic overnight K shifted   3/9- No acute events overnight, patient' s/p trach/PEG.     Allergies    shellfish (Rash)  ChloraPrep One-Step (Rash)  penicillins (Rash)    Intolerances        REVIEW OF SYSTEMS: [x ] Unable to Assess due to neurologic exam   [ ] All ROS addressed below are non-contributory, except:  Neuro: [ ] Headache [ ] Back pain [ ] Numbness [ ] Weakness [ ] Ataxia [ ] Dizziness [ ] Aphasia [ ] Dysarthria [ ] Visual disturbance  Resp: [ ] Shortness of breath/dyspnea, [ ] Orthopnea [ ] Cough  CV: [ ] Chest pain [ ] Palpitation [ ] Lightheadedness [ ] Syncope  Renal: [ ] Thirst [ ] Edema  GI: [ ] Nausea [ ] Emesis [ ] Abdominal pain [ ] Constipation [ ] Diarrhea  Hem: [ ] Hematemesis [ ] bright red blood per rectum  ID: [ ] Fever [ ] Chills [ ] Dysuria  ENT: [ ] Rhinorrhea      DEVICES:   [ ] Restraints [ ] ET tube [ ] central line [ ] arterial line [ ] johnson [ ] NGT/OGT [ ] EVD [ ] LD [ ] YON/HMV [ ] Trach [ ] PEG [ ] Chest Tube     VITALS:   Vital Signs Last 24 Hrs  T(C): 36.9 (09 Mar 2024 07:00), Max: 36.9 (08 Mar 2024 15:00)  T(F): 98.5 (09 Mar 2024 07:00), Max: 98.5 (09 Mar 2024 07:00)  HR: 99 (09 Mar 2024 07:00) (81 - 107)  BP: 99/50 (09 Mar 2024 07:00) (87/45 - 148/69)  BP(mean): 72 (09 Mar 2024 07:00) (52 - 99)  RR: 14 (09 Mar 2024 07:00) (12 - 51)  SpO2: 100% (09 Mar 2024 07:00) (98% - 100%)    Parameters below as of 09 Mar 2024 07:00  Patient On (Oxygen Delivery Method): ventilator      CAPILLARY BLOOD GLUCOSE      POCT Blood Glucose.: 161 mg/dL (09 Mar 2024 06:20)  POCT Blood Glucose.: 143 mg/dL (09 Mar 2024 00:23)  POCT Blood Glucose.: 199 mg/dL (08 Mar 2024 23:14)  POCT Blood Glucose.: 147 mg/dL (08 Mar 2024 22:58)  POCT Blood Glucose.: 101 mg/dL (08 Mar 2024 17:27)  POCT Blood Glucose.: 125 mg/dL (08 Mar 2024 11:22)    I&O's Summary    08 Mar 2024 07:01  -  09 Mar 2024 07:00  --------------------------------------------------------  IN: 2876.3 mL / OUT: 1125 mL / NET: 1751.3 mL    09 Mar 2024 06:01  -  09 Mar 2024 08:04  --------------------------------------------------------  IN: 45 mL / OUT: 0 mL / NET: 45 mL        Respiratory:  Mode: AC/ CMV (Assist Control/ Continuous Mandatory Ventilation)  RR (machine): 14  TV (machine): 450  FiO2: 50  PEEP: 5  ITime: 1  MAP: 9  PIP: 20        LABS:                        8.1    7.29  )-----------( 181      ( 08 Mar 2024 22:37 )             25.6     03-09    145  |  112<H>  |  50<H>  ----------------------------<  134<H>  5.1   |  26  |  1.52<H>             MEDICATION LEVELS:     IVF FLUIDS/MEDICATIONS:   MEDICATIONS  (STANDING):  brivaracetam Oral Solution 100 milliGRAM(s) Oral <User Schedule>  carvedilol 25 milliGRAM(s) Oral every 12 hours  cefTRIAXone   IVPB 1000 milliGRAM(s) IV Intermittent every 24 hours  chlorhexidine 0.12% Liquid 15 milliLiter(s) Oral Mucosa every 12 hours  chlorhexidine 4% Liquid 1 Application(s) Topical daily  cloNIDine 0.1 milliGRAM(s) Oral three times a day  gabapentin Solution 100 milliGRAM(s) Oral three times a day  hydrALAZINE 100 milliGRAM(s) Oral every 8 hours  influenza  Vaccine (HIGH DOSE) 0.7 milliLiter(s) IntraMuscular once  insulin lispro (ADMELOG) corrective regimen sliding scale   SubCutaneous every 6 hours  insulin NPH human recombinant 5 Unit(s) SubCutaneous every 6 hours  lacosamide Solution 200 milliGRAM(s) Oral two times a day  pantoprazole  Injectable 40 milliGRAM(s) IV Push two times a day  polyethylene glycol 3350 17 Gram(s) Oral every 12 hours  predniSONE   Tablet 5 milliGRAM(s) Oral daily  senna 2 Tablet(s) Oral at bedtime  sodium chloride 3 Gram(s) Oral every 6 hours  tacrolimus    0.5 mG/mL Suspension 3 milliGRAM(s) Oral two times a day  trimethoprim   80 mG/sulfamethoxazole 400 mG 1 Tablet(s) Oral daily    MEDICATIONS  (PRN):  acetaminophen   Oral Liquid .. 650 milliGRAM(s) Oral every 6 hours PRN Temp greater or equal to 38C (100.4F), Mild Pain (1 - 3)  fentaNYL    Injectable 25 MICROGram(s) IV Push every 2 hours PRN vent synchroncy  ondansetron Injectable 4 milliGRAM(s) IV Push every 6 hours PRN Nausea and/or Vomiting  oxyCODONE    IR 5 milliGRAM(s) Oral every 4 hours PRN Moderate Pain (4 - 6)  oxyCODONE    Solution 10 milliGRAM(s) Enteral Tube every 4 hours PRN Severe Pain (7 - 10)        EXAMINATION:  PHYSICAL EXAM:    Constitutional: No Acute Distress     Neurological: Eyes open to pain, eyes midline, CAS, moving left UE spontaneously, right UE 0/5, right LE withdraws, not following commands     Reflexes: Deep Tendon Reflexes Intact     Pulmonary: Clear to Auscultation, No rales, No rhonchi, No wheezes     Cardiovascular: S1, S2, Regular rate and rhythm     Gastrointestinal: Soft, Non-tender, Non-distended     Extremities: No calf tenderness      HOSPITAL COURSE: This is a 79 YO F with a PMHx ESRD s/p renal xplant off HD, L AKA, 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    Admission Scores  GCS: 4 ICH: 4    24 hour Events:  3/2- Patient s/p left craniectomy(discarded) and evacuation. Patient started on insulin drip for elevated BG   3/3: Patient switched off insulin drip given low blood glucose. ISS placed. Patient's A1C 5.4%.  3/4: Patient s/p IVC filter for DVTs. Glucose elevated.   3/5- No acute events overnight. Plan for scan today and clamping of EVD.  3/6- No acute events overnight except adjustment of BP medications due to elevated BP beyond goal.  3/7- No acute events overnight. Patient's EVD clamped, EVD to be removed today. BP difficult to control   3/8- No acute events overnight. Patient NPO for trach/PEG, hyperkalemic overnight K shifted   3/9- No acute events overnight, patient' s/p trach/PEG.     Allergies    shellfish (Rash)  ChloraPrep One-Step (Rash)  penicillins (Rash)    Intolerances        REVIEW OF SYSTEMS: [x ] Unable to Assess due to neurologic exam   [ ] All ROS addressed below are non-contributory, except:  Neuro: [ ] Headache [ ] Back pain [ ] Numbness [ ] Weakness [ ] Ataxia [ ] Dizziness [ ] Aphasia [ ] Dysarthria [ ] Visual disturbance  Resp: [ ] Shortness of breath/dyspnea, [ ] Orthopnea [ ] Cough  CV: [ ] Chest pain [ ] Palpitation [ ] Lightheadedness [ ] Syncope  Renal: [ ] Thirst [ ] Edema  GI: [ ] Nausea [ ] Emesis [ ] Abdominal pain [ ] Constipation [ ] Diarrhea  Hem: [ ] Hematemesis [ ] bright red blood per rectum  ID: [ ] Fever [ ] Chills [ ] Dysuria  ENT: [ ] Rhinorrhea      DEVICES:   [ ] Restraints [ ] ET tube [ ] central line [ ] arterial line [ ] johnson [ ] NGT/OGT [ ] EVD [ ] LD [ ] YON/HMV [ ] Trach [ ] PEG [ ] Chest Tube     VITALS:   Vital Signs Last 24 Hrs  T(C): 36.9 (09 Mar 2024 07:00), Max: 36.9 (08 Mar 2024 15:00)  T(F): 98.5 (09 Mar 2024 07:00), Max: 98.5 (09 Mar 2024 07:00)  HR: 99 (09 Mar 2024 07:00) (81 - 107)  BP: 99/50 (09 Mar 2024 07:00) (87/45 - 148/69)  BP(mean): 72 (09 Mar 2024 07:00) (52 - 99)  RR: 14 (09 Mar 2024 07:00) (12 - 51)  SpO2: 100% (09 Mar 2024 07:00) (98% - 100%)    Parameters below as of 09 Mar 2024 07:00  Patient On (Oxygen Delivery Method): ventilator      CAPILLARY BLOOD GLUCOSE      POCT Blood Glucose.: 161 mg/dL (09 Mar 2024 06:20)  POCT Blood Glucose.: 143 mg/dL (09 Mar 2024 00:23)  POCT Blood Glucose.: 199 mg/dL (08 Mar 2024 23:14)  POCT Blood Glucose.: 147 mg/dL (08 Mar 2024 22:58)  POCT Blood Glucose.: 101 mg/dL (08 Mar 2024 17:27)  POCT Blood Glucose.: 125 mg/dL (08 Mar 2024 11:22)    I&O's Summary    08 Mar 2024 07:01  -  09 Mar 2024 07:00  --------------------------------------------------------  IN: 2876.3 mL / OUT: 1125 mL / NET: 1751.3 mL    09 Mar 2024 06:01  -  09 Mar 2024 08:04  --------------------------------------------------------  IN: 45 mL / OUT: 0 mL / NET: 45 mL        Respiratory:  Mode: AC/ CMV (Assist Control/ Continuous Mandatory Ventilation)  RR (machine): 14  TV (machine): 450  FiO2: 50  PEEP: 5  ITime: 1  MAP: 9  PIP: 20        LABS:                        8.1    7.29  )-----------( 181      ( 08 Mar 2024 22:37 )             25.6     03-09    145  |  112<H>  |  50<H>  ----------------------------<  134<H>  5.1   |  26  |  1.52<H>             MEDICATION LEVELS:     IVF FLUIDS/MEDICATIONS:   MEDICATIONS  (STANDING):  brivaracetam Oral Solution 100 milliGRAM(s) Oral <User Schedule>  carvedilol 25 milliGRAM(s) Oral every 12 hours  cefTRIAXone   IVPB 1000 milliGRAM(s) IV Intermittent every 24 hours  chlorhexidine 0.12% Liquid 15 milliLiter(s) Oral Mucosa every 12 hours  chlorhexidine 4% Liquid 1 Application(s) Topical daily  cloNIDine 0.1 milliGRAM(s) Oral three times a day  gabapentin Solution 100 milliGRAM(s) Oral three times a day  hydrALAZINE 100 milliGRAM(s) Oral every 8 hours  influenza  Vaccine (HIGH DOSE) 0.7 milliLiter(s) IntraMuscular once  insulin lispro (ADMELOG) corrective regimen sliding scale   SubCutaneous every 6 hours  insulin NPH human recombinant 5 Unit(s) SubCutaneous every 6 hours  lacosamide Solution 200 milliGRAM(s) Oral two times a day  pantoprazole  Injectable 40 milliGRAM(s) IV Push two times a day  polyethylene glycol 3350 17 Gram(s) Oral every 12 hours  predniSONE   Tablet 5 milliGRAM(s) Oral daily  senna 2 Tablet(s) Oral at bedtime  sodium chloride 3 Gram(s) Oral every 6 hours  tacrolimus    0.5 mG/mL Suspension 3 milliGRAM(s) Oral two times a day  trimethoprim   80 mG/sulfamethoxazole 400 mG 1 Tablet(s) Oral daily    MEDICATIONS  (PRN):  acetaminophen   Oral Liquid .. 650 milliGRAM(s) Oral every 6 hours PRN Temp greater or equal to 38C (100.4F), Mild Pain (1 - 3)  fentaNYL    Injectable 25 MICROGram(s) IV Push every 2 hours PRN vent synchroncy  ondansetron Injectable 4 milliGRAM(s) IV Push every 6 hours PRN Nausea and/or Vomiting  oxyCODONE    IR 5 milliGRAM(s) Oral every 4 hours PRN Moderate Pain (4 - 6)  oxyCODONE    Solution 10 milliGRAM(s) Enteral Tube every 4 hours PRN Severe Pain (7 - 10)        EXAMINATION:  PHYSICAL EXAM:    Constitutional: No Acute Distress     Neurological: Eyes open to pain, eyes midline, CAS, moving left UE spontaneously, right UE 0/5, right LE withdraws, not following commands     Pulmonary: Clear to Auscultation, No rales, No rhonchi, No wheezes     Cardiovascular: S1, S2, Regular rate and rhythm     Gastrointestinal: Soft, Non-tender, Non-distended     Extremities: No calf tenderness

## 2024-03-10 LAB
ANION GAP SERPL CALC-SCNC: 10 MMOL/L — SIGNIFICANT CHANGE UP (ref 5–17)
ANION GAP SERPL CALC-SCNC: 11 MMOL/L — SIGNIFICANT CHANGE UP (ref 5–17)
APPEARANCE UR: CLEAR — SIGNIFICANT CHANGE UP
BILIRUB UR-MCNC: NEGATIVE — SIGNIFICANT CHANGE UP
BUN SERPL-MCNC: 50 MG/DL — HIGH (ref 7–23)
BUN SERPL-MCNC: 50 MG/DL — HIGH (ref 7–23)
CALCIUM SERPL-MCNC: 9.1 MG/DL — SIGNIFICANT CHANGE UP (ref 8.4–10.5)
CALCIUM SERPL-MCNC: 9.6 MG/DL — SIGNIFICANT CHANGE UP (ref 8.4–10.5)
CHLORIDE SERPL-SCNC: 112 MMOL/L — HIGH (ref 96–108)
CHLORIDE SERPL-SCNC: 114 MMOL/L — HIGH (ref 96–108)
CO2 SERPL-SCNC: 22 MMOL/L — SIGNIFICANT CHANGE UP (ref 22–31)
CO2 SERPL-SCNC: 22 MMOL/L — SIGNIFICANT CHANGE UP (ref 22–31)
COLOR SPEC: YELLOW — SIGNIFICANT CHANGE UP
CREAT SERPL-MCNC: 1.45 MG/DL — HIGH (ref 0.5–1.3)
CREAT SERPL-MCNC: 1.54 MG/DL — HIGH (ref 0.5–1.3)
CULTURE RESULTS: SIGNIFICANT CHANGE UP
DIFF PNL FLD: NEGATIVE — SIGNIFICANT CHANGE UP
EGFR: 34 ML/MIN/1.73M2 — LOW
EGFR: 37 ML/MIN/1.73M2 — LOW
GLUCOSE BLDC GLUCOMTR-MCNC: 110 MG/DL — HIGH (ref 70–99)
GLUCOSE BLDC GLUCOMTR-MCNC: 113 MG/DL — HIGH (ref 70–99)
GLUCOSE BLDC GLUCOMTR-MCNC: 139 MG/DL — HIGH (ref 70–99)
GLUCOSE BLDC GLUCOMTR-MCNC: 169 MG/DL — HIGH (ref 70–99)
GLUCOSE SERPL-MCNC: 174 MG/DL — HIGH (ref 70–99)
GLUCOSE SERPL-MCNC: 189 MG/DL — HIGH (ref 70–99)
GLUCOSE UR QL: NEGATIVE MG/DL — SIGNIFICANT CHANGE UP
H PYLORI AG STL QL: POSITIVE
HCT VFR BLD CALC: 28.1 % — LOW (ref 34.5–45)
HCT VFR BLD CALC: 28.3 % — LOW (ref 34.5–45)
HGB BLD-MCNC: 8.3 G/DL — LOW (ref 11.5–15.5)
HGB BLD-MCNC: 8.7 G/DL — LOW (ref 11.5–15.5)
KETONES UR-MCNC: NEGATIVE MG/DL — SIGNIFICANT CHANGE UP
LEUKOCYTE ESTERASE UR-ACNC: ABNORMAL
MAGNESIUM SERPL-MCNC: 2.6 MG/DL — SIGNIFICANT CHANGE UP (ref 1.6–2.6)
MAGNESIUM SERPL-MCNC: 2.7 MG/DL — HIGH (ref 1.6–2.6)
MCHC RBC-ENTMCNC: 28.3 PG — SIGNIFICANT CHANGE UP (ref 27–34)
MCHC RBC-ENTMCNC: 28.7 PG — SIGNIFICANT CHANGE UP (ref 27–34)
MCHC RBC-ENTMCNC: 29.5 GM/DL — LOW (ref 32–36)
MCHC RBC-ENTMCNC: 30.7 GM/DL — LOW (ref 32–36)
MCV RBC AUTO: 93.4 FL — SIGNIFICANT CHANGE UP (ref 80–100)
MCV RBC AUTO: 95.9 FL — SIGNIFICANT CHANGE UP (ref 80–100)
NITRITE UR-MCNC: POSITIVE
NRBC # BLD: 0 /100 WBCS — SIGNIFICANT CHANGE UP (ref 0–0)
NRBC # BLD: 0 /100 WBCS — SIGNIFICANT CHANGE UP (ref 0–0)
PH UR: 7.5 — SIGNIFICANT CHANGE UP (ref 5–8)
PHOSPHATE SERPL-MCNC: 3 MG/DL — SIGNIFICANT CHANGE UP (ref 2.5–4.5)
PHOSPHATE SERPL-MCNC: 3.3 MG/DL — SIGNIFICANT CHANGE UP (ref 2.5–4.5)
PLATELET # BLD AUTO: 158 K/UL — SIGNIFICANT CHANGE UP (ref 150–400)
PLATELET # BLD AUTO: 180 K/UL — SIGNIFICANT CHANGE UP (ref 150–400)
POTASSIUM SERPL-MCNC: 5.4 MMOL/L — HIGH (ref 3.5–5.3)
POTASSIUM SERPL-MCNC: 5.5 MMOL/L — HIGH (ref 3.5–5.3)
POTASSIUM SERPL-SCNC: 5.4 MMOL/L — HIGH (ref 3.5–5.3)
POTASSIUM SERPL-SCNC: 5.5 MMOL/L — HIGH (ref 3.5–5.3)
PROT UR-MCNC: 100 MG/DL
RBC # BLD: 2.93 M/UL — LOW (ref 3.8–5.2)
RBC # BLD: 3.03 M/UL — LOW (ref 3.8–5.2)
RBC # FLD: 15.6 % — HIGH (ref 10.3–14.5)
RBC # FLD: 15.6 % — HIGH (ref 10.3–14.5)
SODIUM SERPL-SCNC: 145 MMOL/L — SIGNIFICANT CHANGE UP (ref 135–145)
SODIUM SERPL-SCNC: 146 MMOL/L — HIGH (ref 135–145)
SP GR SPEC: 1.02 — SIGNIFICANT CHANGE UP (ref 1–1.03)
SPECIMEN SOURCE: SIGNIFICANT CHANGE UP
TACROLIMUS SERPL-MCNC: 2.5 NG/ML — SIGNIFICANT CHANGE UP
UROBILINOGEN FLD QL: 0.2 MG/DL — SIGNIFICANT CHANGE UP (ref 0.2–1)
WBC # BLD: 6.04 K/UL — SIGNIFICANT CHANGE UP (ref 3.8–10.5)
WBC # BLD: 7.04 K/UL — SIGNIFICANT CHANGE UP (ref 3.8–10.5)
WBC # FLD AUTO: 6.04 K/UL — SIGNIFICANT CHANGE UP (ref 3.8–10.5)
WBC # FLD AUTO: 7.04 K/UL — SIGNIFICANT CHANGE UP (ref 3.8–10.5)

## 2024-03-10 PROCEDURE — 71045 X-RAY EXAM CHEST 1 VIEW: CPT | Mod: 26

## 2024-03-10 PROCEDURE — 99232 SBSQ HOSP IP/OBS MODERATE 35: CPT

## 2024-03-10 RX ORDER — HUMAN INSULIN 100 [IU]/ML
10 INJECTION, SUSPENSION SUBCUTANEOUS EVERY 6 HOURS
Refills: 0 | Status: DISCONTINUED | OUTPATIENT
Start: 2024-03-10 | End: 2024-03-12

## 2024-03-10 RX ORDER — ACETAMINOPHEN 500 MG
750 TABLET ORAL ONCE
Refills: 0 | Status: COMPLETED | OUTPATIENT
Start: 2024-03-10 | End: 2024-03-10

## 2024-03-10 RX ORDER — SODIUM CHLORIDE 9 MG/ML
500 INJECTION, SOLUTION INTRAVENOUS ONCE
Refills: 0 | Status: COMPLETED | OUTPATIENT
Start: 2024-03-10 | End: 2024-03-10

## 2024-03-10 RX ORDER — IPRATROPIUM/ALBUTEROL SULFATE 18-103MCG
3 AEROSOL WITH ADAPTER (GRAM) INHALATION EVERY 6 HOURS
Refills: 0 | Status: DISCONTINUED | OUTPATIENT
Start: 2024-03-10 | End: 2024-03-14

## 2024-03-10 RX ORDER — HUMAN INSULIN 100 [IU]/ML
5 INJECTION, SUSPENSION SUBCUTANEOUS ONCE
Refills: 0 | Status: COMPLETED | OUTPATIENT
Start: 2024-03-10 | End: 2024-03-10

## 2024-03-10 RX ORDER — SODIUM CHLORIDE 9 MG/ML
4 INJECTION INTRAMUSCULAR; INTRAVENOUS; SUBCUTANEOUS EVERY 12 HOURS
Refills: 0 | Status: DISCONTINUED | OUTPATIENT
Start: 2024-03-10 | End: 2024-03-11

## 2024-03-10 RX ORDER — TACROLIMUS 5 MG/1
4 CAPSULE ORAL
Refills: 0 | Status: DISCONTINUED | OUTPATIENT
Start: 2024-03-10 | End: 2024-03-11

## 2024-03-10 RX ADMIN — Medication 3 MILLILITER(S): at 17:06

## 2024-03-10 RX ADMIN — HUMAN INSULIN 10 UNIT(S): 100 INJECTION, SUSPENSION SUBCUTANEOUS at 06:27

## 2024-03-10 RX ADMIN — CHLORHEXIDINE GLUCONATE 15 MILLILITER(S): 213 SOLUTION TOPICAL at 06:24

## 2024-03-10 RX ADMIN — BRIVARACETAM 100 MILLIGRAM(S): 25 TABLET, FILM COATED ORAL at 22:00

## 2024-03-10 RX ADMIN — FENTANYL CITRATE 25 MICROGRAM(S): 50 INJECTION INTRAVENOUS at 04:09

## 2024-03-10 RX ADMIN — Medication 3 MILLILITER(S): at 06:21

## 2024-03-10 RX ADMIN — CEFTRIAXONE 100 MILLIGRAM(S): 500 INJECTION, POWDER, FOR SOLUTION INTRAMUSCULAR; INTRAVENOUS at 06:28

## 2024-03-10 RX ADMIN — Medication 3 MILLILITER(S): at 11:50

## 2024-03-10 RX ADMIN — Medication 0: at 06:27

## 2024-03-10 RX ADMIN — TACROLIMUS 3 MILLIGRAM(S): 5 CAPSULE ORAL at 06:48

## 2024-03-10 RX ADMIN — SODIUM CHLORIDE 3 GRAM(S): 9 INJECTION INTRAMUSCULAR; INTRAVENOUS; SUBCUTANEOUS at 06:24

## 2024-03-10 RX ADMIN — CHLORHEXIDINE GLUCONATE 1 APPLICATION(S): 213 SOLUTION TOPICAL at 22:00

## 2024-03-10 RX ADMIN — TACROLIMUS 4 MILLIGRAM(S): 5 CAPSULE ORAL at 17:25

## 2024-03-10 RX ADMIN — Medication 1 TABLET(S): at 12:39

## 2024-03-10 RX ADMIN — HUMAN INSULIN 10 UNIT(S): 100 INJECTION, SUSPENSION SUBCUTANEOUS at 12:10

## 2024-03-10 RX ADMIN — BRIVARACETAM 100 MILLIGRAM(S): 25 TABLET, FILM COATED ORAL at 14:00

## 2024-03-10 RX ADMIN — LACOSAMIDE 200 MILLIGRAM(S): 50 TABLET ORAL at 17:03

## 2024-03-10 RX ADMIN — Medication 2: at 12:07

## 2024-03-10 RX ADMIN — SODIUM CHLORIDE 4 MILLILITER(S): 9 INJECTION INTRAMUSCULAR; INTRAVENOUS; SUBCUTANEOUS at 17:10

## 2024-03-10 RX ADMIN — Medication 750 MILLIGRAM(S): at 21:00

## 2024-03-10 RX ADMIN — Medication 0.1 MILLIGRAM(S): at 13:33

## 2024-03-10 RX ADMIN — SODIUM CHLORIDE 500 MILLILITER(S): 9 INJECTION, SOLUTION INTRAVENOUS at 10:51

## 2024-03-10 RX ADMIN — Medication 5 MILLIGRAM(S): at 06:27

## 2024-03-10 RX ADMIN — FENTANYL CITRATE 25 MICROGRAM(S): 50 INJECTION INTRAVENOUS at 03:39

## 2024-03-10 RX ADMIN — CARVEDILOL PHOSPHATE 25 MILLIGRAM(S): 80 CAPSULE, EXTENDED RELEASE ORAL at 17:04

## 2024-03-10 RX ADMIN — BRIVARACETAM 100 MILLIGRAM(S): 25 TABLET, FILM COATED ORAL at 06:23

## 2024-03-10 RX ADMIN — HEPARIN SODIUM 5000 UNIT(S): 5000 INJECTION INTRAVENOUS; SUBCUTANEOUS at 17:04

## 2024-03-10 RX ADMIN — HEPARIN SODIUM 5000 UNIT(S): 5000 INJECTION INTRAVENOUS; SUBCUTANEOUS at 06:27

## 2024-03-10 RX ADMIN — PANTOPRAZOLE SODIUM 40 MILLIGRAM(S): 20 TABLET, DELAYED RELEASE ORAL at 17:04

## 2024-03-10 RX ADMIN — Medication 3 MILLILITER(S): at 23:55

## 2024-03-10 RX ADMIN — CHLORHEXIDINE GLUCONATE 15 MILLILITER(S): 213 SOLUTION TOPICAL at 17:13

## 2024-03-10 RX ADMIN — HUMAN INSULIN 10 UNIT(S): 100 INJECTION, SUSPENSION SUBCUTANEOUS at 17:12

## 2024-03-10 RX ADMIN — HUMAN INSULIN 10 UNIT(S): 100 INJECTION, SUSPENSION SUBCUTANEOUS at 23:25

## 2024-03-10 RX ADMIN — LACOSAMIDE 200 MILLIGRAM(S): 50 TABLET ORAL at 06:34

## 2024-03-10 RX ADMIN — GABAPENTIN 100 MILLIGRAM(S): 400 CAPSULE ORAL at 06:34

## 2024-03-10 RX ADMIN — HUMAN INSULIN 5 UNIT(S): 100 INJECTION, SUSPENSION SUBCUTANEOUS at 00:20

## 2024-03-10 RX ADMIN — PANTOPRAZOLE SODIUM 40 MILLIGRAM(S): 20 TABLET, DELAYED RELEASE ORAL at 06:34

## 2024-03-10 RX ADMIN — Medication 300 MILLIGRAM(S): at 20:45

## 2024-03-10 NOTE — PROGRESS NOTE ADULT - SUBJECTIVE AND OBJECTIVE BOX
DATE OF SERVICE: 03-10-24 @ 09:21    Patient is a 78y old  Female who presents with a chief complaint of ICH (10 Mar 2024 05:51)      INTERVAL HISTORY:     REVIEW OF SYSTEMS:  CONSTITUTIONAL: No weakness  EYES/ENT: No visual changes;  No throat pain   NECK: No pain or stiffness  RESPIRATORY: No cough, wheezing; No shortness of breath  CARDIOVASCULAR: No chest pain or palpitations  GASTROINTESTINAL: No abdominal  pain. No nausea, vomiting, or hematemesis  GENITOURINARY: No dysuria, frequency or hematuria  NEUROLOGICAL: No stroke like symptoms  SKIN: No rashes    TELEMETRY Personally reviewed: [facility]    	  MEDICATIONS:  carvedilol 25 milliGRAM(s) Oral every 12 hours  cloNIDine 0.1 milliGRAM(s) Oral three times a day  hydrALAZINE 100 milliGRAM(s) Oral every 8 hours        PHYSICAL EXAM:  T(C): 36.9 (03-10-24 @ 07:00), Max: 37.6 (03-09-24 @ 15:00)  HR: 113 (03-10-24 @ 09:01) (104 - 115)  BP: 92/55 (03-10-24 @ 07:00) (91/42 - 125/59)  RR: 19 (03-10-24 @ 07:00) (14 - 28)  SpO2: 100% (03-10-24 @ 09:01) (97% - 100%)  Wt(kg): --  I&O's Summary    09 Mar 2024 06:01  -  10 Mar 2024 07:00  --------------------------------------------------------  IN: 1435 mL / OUT: 1250 mL / NET: 185 mL          Appearance: In no distress	  HEENT:    PERRL, EOMI	  Cardiovascular:  S1 S2, No JVD  Respiratory: Lungs clear to auscultation	  Gastrointestinal:  Soft, Non-tender, + BS	  Vascularature:  No edema of LE  Psychiatric: Appropriate affect   Neuro: no acute focal deficits                               8.3    6.04  )-----------( 158      ( 10 Mar 2024 03:31 )             28.1     03-10    146<H>  |  114<H>  |  50<H>  ----------------------------<  189<H>  5.4<H>   |  22  |  1.54<H>    Ca    9.1      10 Mar 2024 01:33  Phos  3.3     03-10  Mg     2.6     03-10          Labs personally reviewed      ASSESSMENT/PLAN: 	   78F Hx ESRD s/p renal xplant c/b DGF off HD, L AKA, 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      1. Abnormal Echo --  Septal bulge noted measures 2.2cm without obstruction.   -- Given no intracavitary obstruction no intervention at this time  - No Myxoma seen on this echo or echo in 2019, ? if hypermobile interatrial septum was confused for on prior imaging     2. HTN - Continue Hydralazine 100 mg Q8H, clonidine 0.1 mg TID, Coreg 25 mg BID     3. 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 DVT PPX - HSQ when safe            RANDY Lopez DO [facility]  Cardiovascular Medicine  [address], [address]  Available through call or text on [employer]  Office: [phone]

## 2024-03-10 NOTE — PROGRESS NOTE ADULT - SUBJECTIVE AND OBJECTIVE BOX
Patient seen and examined at bedside.    --Anticoagulation--  heparin   Injectable 5000 Unit(s) SubCutaneous every 12 hours    T(C): 37.2 (03-10-24 @ 03:00), Max: 37.6 (03-09-24 @ 15:00)  HR: 110 (03-10-24 @ 03:24) (99 - 115)  BP: 105/46 (03-10-24 @ 03:00) (88/46 - 125/59)  RR: 14 (03-10-24 @ 03:00) (13 - 28)  SpO2: 100% (03-10-24 @ 03:24) (97% - 100%)  Wt(kg): --    EXAMINATION:  PHYSICAL EXAM:    Constitutional: No Acute Distress     Neurological: Eyes open to pain, eyes midline, CAS, moving left UE spontaneously, right UE 0/5, right LE withdraws, not following commands     Pulmonary: Clear to Auscultation, No rales, No rhonchi, No wheezes     Cardiovascular: S1, S2, Regular rate and rhythm     Gastrointestinal: Soft, Non-tender, Non-distended     Extremities: No calf tenderness

## 2024-03-10 NOTE — PROGRESS NOTE ADULT - ASSESSMENT
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    3/3 S/P IVCF (retrievable) by IR  03/01/2024 S/P L Hemicrani for Evacuation of large ICH; bone discarded   3/8/24: s/p trach and PEG    Plan:  - Continue feeds by PEG at 4.0cm at skin  - Tracheostomy sutures to be removed 3/13    ACS/Trauma  [id number]

## 2024-03-10 NOTE — PROGRESS NOTE ADULT - ASSESSMENT
ASSESSMENT/PLAN: Patient with left ICH (ICH score 4) likely 2/2 amyloid angiopathy s/p left craniectomy(discarded) and evacuation. POD8    NEURO:  Neurochecks Q4  r CTH post-op: s/p clot evacuation, heme in the 4th ventricle, improvement of midline shift, pneumocephalus and hydro  CTH (3/8)- stable heme   EVD removed   1 YON drain removed 3/6  VEEG- Moderate to severe background slowing. Triphasic GPDs near 1hz. Sharp waves, LPDs, BIRDs over the left parietal region, last one 3/6 on 5:15 PM- will remove EEG   Pain: Oxy 10 mg Q4 PRN (patient on Percocet at home for leg pain)  Briviact 100 mg TID for seizures, Vimpat 200 mg BID ()  Activity: [x] ROM in bed    PULM:  s/p trach PRVC 14/450/5/40  CPAP as tolerated  Minimal oral secretions, in line   At risk of aspiration pneumonia     CV:  SBP goal:   History of HTN  Currently on Hydralazine 100 mg Q8H, clonidine 0.1 mg TID, Coreg 25 mg BID     RENAL:  Fluids: IVL   f/u nephro transplant team  patient has been off HD  s/p transplant in 2019  Currently on tacrolimus   Renal function stable, urine output maintained   AVF in the left upper extremity     GI:  Diet: Glucerna 1.5 @ goal  GI prophylaxis [] not indicated [x] PPI [] other:  Bowel regimen [x] miralax [x] senna [x] Dulcolax supp  LBM: 3/9  s/p PEG    ENDO:   Goal euglycemia (-180)  ISS  A1C: 5.4%  Currently on ISS, NPH 5 units q6H    HEME/ONC:   H/H stable for now  VTE prophylaxis: [x] SCDs [x] chemoprophylaxis- SQH [] high risk of DVT/PE on admission due to:  hx of breast cancer - finished Tamoxifen in March 2023, will discontinue   LED- bilateral above the knee DVT. IR consulted for IVC filter placement.   - Patient now s/p retrievable IVC filter  - Patient receives procrit shots every month, will continue     ID: afebrile overnight  UA positive for infection  Started Ceftriaxone x 3 days duration for UTI (3/8--)  Follow up rest of the cultures   Combicath negative     MISC:    SOCIAL/FAMILY:  [x] updated at bedside [] family meeting    CODE STATUS:  [x] Full Code [] DNR [] DNI [] Palliative/Comfort Care    DISPOSITION:  [x] ICU [] Stroke Unit [] Floor [] EMU [] RCU [] PCU    [x] Patient is at high risk of neurologic deterioration/death due to: infection, expansion of hematoma      ASSESSMENT/PLAN: Patient with left ICH (ICH score 4) likely 2/2 amyloid angiopathy s/p left craniectomy(discarded) and evacuation. POD8    NEURO:  Neurochecks Q4  r CTH post-op: s/p clot evacuation, heme in the 4th ventricle, improvement of midline shift, pneumocephalus and hydro  CTH (3/8)- stable heme   EVD removed   1 YON drain removed 3/6  VEEG- Moderate to severe background slowing. Triphasic GPDs near 1hz. Sharp waves, LPDs, BIRDs over the left parietal region, last one 3/6 on 5:15 PM- will remove EEG   Pain: Oxy 10 mg Q4 PRN (patient on Percocet at home for leg pain)  Briviact 100 mg TID for seizures, Vimpat 200 mg BID ()  Activity: [x] ROM in bed    PULM:  s/p trach PRVC 14/450/5/40  CPAP as tolerated      CV:  SBP goal:   History of HTN  Currently on Hydralazine 100 mg Q8H, clonidine 0.1 mg TID, Coreg 25 mg BID     RENAL:  Fluids: IVL   f/u nephro transplant team  patient has been off HD  s/p transplant in 2019  Currently on tacrolimus   Renal function stable, urine output maintained   AVF in the left upper extremity     GI:  Diet: Glucerna 1.5 @ goal  GI prophylaxis [] not indicated [x] PPI [] other:  Bowel regimen [x] miralax [x] senna [x] Dulcolax supp  LBM: 3/10  s/p PEG    ENDO:   Goal euglycemia (-180)  ISS  A1C: 5.4%  Currently on ISS, NPH 5 units q6H    HEME/ONC:   H/H stable for now  VTE prophylaxis: [x] SCDs [x] chemoprophylaxis- SQH [] high risk of DVT/PE on admission due to:  hx of breast cancer - finished Tamoxifen in March 2023, will discontinue   LED- bilateral above the knee DVT. IR consulted for IVC filter placement.   - Patient now s/p retrievable IVC filter  - Patient receives procrit shots every month, will continue   heparin sc for chemorpophylaxis     ID: afebrile   UA positive for infection  Started Ceftriaxone x 3 days duration for UTI (3/8--)  Follow up rest of the cultures   Combicath negative     MISC:    SOCIAL/FAMILY:  [x] updated at bedside [] family meeting    CODE STATUS:  [x] Full Code [] DNR [] DNI [] Palliative/Comfort Care    DISPOSITION:  [x] ICU [] Stroke Unit [] Floor [] EMU [] RCU [] PCU    [x] Patient is at high risk of neurologic deterioration/death due to: infection, expansion of hematoma

## 2024-03-10 NOTE — PROGRESS NOTE ADULT - SUBJECTIVE AND OBJECTIVE BOX
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    Admission Scores  GCS: 4 ICH: 4    24 hour Events:  3/2- Patient s/p left craniectomy(discarded) and evacuation. Patient started on insulin drip for elevated BG   3/3: Patient switched off insulin drip given low blood glucose. ISS placed. Patient's A1C 5.4%.  3/4: Patient s/p IVC filter for DVTs. Glucose elevated.   3/5- No acute events overnight. Plan for scan today and clamping of EVD.  3/6- No acute events overnight except adjustment of BP medications due to elevated BP beyond goal.  3/7- No acute events overnight. Patient's EVD clamped, EVD to be removed today. BP difficult to control   3/8- No acute events overnight. Patient NPO for trach/PEG, hyperkalemic overnight K shifted   3/9- No acute events overnight, patient' s/p trach/PEG.     Allergies    shellfish (Rash)  ChloraPrep One-Step (Rash)  penicillins (Rash)    Intolerances        REVIEW OF SYSTEMS: [x ] Unable to Assess due to neurologic exam   [ ] All ROS addressed below are non-contributory, except:  Neuro: [ ] Headache [ ] Back pain [ ] Numbness [ ] Weakness [ ] Ataxia [ ] Dizziness [ ] Aphasia [ ] Dysarthria [ ] Visual disturbance  Resp: [ ] Shortness of breath/dyspnea, [ ] Orthopnea [ ] Cough  CV: [ ] Chest pain [ ] Palpitation [ ] Lightheadedness [ ] Syncope  Renal: [ ] Thirst [ ] Edema  GI: [ ] Nausea [ ] Emesis [ ] Abdominal pain [ ] Constipation [ ] Diarrhea  Hem: [ ] Hematemesis [ ] bright red blood per rectum  ID: [ ] Fever [ ] Chills [ ] Dysuria  ENT: [ ] Rhinorrhea      DEVICES:   [ ] Restraints [ ] ET tube [ ] central line [ ] arterial line [ ] johnson [ ] NGT/OGT [ ] EVD [ ] LD [ ] YON/HMV [ ] Trach [ ] PEG [ ] Chest Tube     VITALS:   Vital Signs Last 24 Hrs  T(C): 36.9 (09 Mar 2024 07:00), Max: 36.9 (08 Mar 2024 15:00)  T(F): 98.5 (09 Mar 2024 07:00), Max: 98.5 (09 Mar 2024 07:00)  HR: 99 (09 Mar 2024 07:00) (81 - 107)  BP: 99/50 (09 Mar 2024 07:00) (87/45 - 148/69)  BP(mean): 72 (09 Mar 2024 07:00) (52 - 99)  RR: 14 (09 Mar 2024 07:00) (12 - 51)  SpO2: 100% (09 Mar 2024 07:00) (98% - 100%)    Parameters below as of 09 Mar 2024 07:00  Patient On (Oxygen Delivery Method): ventilator      CAPILLARY BLOOD GLUCOSE      POCT Blood Glucose.: 161 mg/dL (09 Mar 2024 06:20)  POCT Blood Glucose.: 143 mg/dL (09 Mar 2024 00:23)  POCT Blood Glucose.: 199 mg/dL (08 Mar 2024 23:14)  POCT Blood Glucose.: 147 mg/dL (08 Mar 2024 22:58)  POCT Blood Glucose.: 101 mg/dL (08 Mar 2024 17:27)  POCT Blood Glucose.: 125 mg/dL (08 Mar 2024 11:22)    I&O's Summary    08 Mar 2024 07:01  -  09 Mar 2024 07:00  --------------------------------------------------------  IN: 2876.3 mL / OUT: 1125 mL / NET: 1751.3 mL    09 Mar 2024 06:01  -  09 Mar 2024 08:04  --------------------------------------------------------  IN: 45 mL / OUT: 0 mL / NET: 45 mL        Respiratory:  Mode: AC/ CMV (Assist Control/ Continuous Mandatory Ventilation)  RR (machine): 14  TV (machine): 450  FiO2: 50  PEEP: 5  ITime: 1  MAP: 9  PIP: 20        LABS:                        8.1    7.29  )-----------( 181      ( 08 Mar 2024 22:37 )             25.6     03-09    145  |  112<H>  |  50<H>  ----------------------------<  134<H>  5.1   |  26  |  1.52<H>             MEDICATION LEVELS:     IVF FLUIDS/MEDICATIONS:   MEDICATIONS  (STANDING):  brivaracetam Oral Solution 100 milliGRAM(s) Oral <User Schedule>  carvedilol 25 milliGRAM(s) Oral every 12 hours  cefTRIAXone   IVPB 1000 milliGRAM(s) IV Intermittent every 24 hours  chlorhexidine 0.12% Liquid 15 milliLiter(s) Oral Mucosa every 12 hours  chlorhexidine 4% Liquid 1 Application(s) Topical daily  cloNIDine 0.1 milliGRAM(s) Oral three times a day  gabapentin Solution 100 milliGRAM(s) Oral three times a day  hydrALAZINE 100 milliGRAM(s) Oral every 8 hours  influenza  Vaccine (HIGH DOSE) 0.7 milliLiter(s) IntraMuscular once  insulin lispro (ADMELOG) corrective regimen sliding scale   SubCutaneous every 6 hours  insulin NPH human recombinant 5 Unit(s) SubCutaneous every 6 hours  lacosamide Solution 200 milliGRAM(s) Oral two times a day  pantoprazole  Injectable 40 milliGRAM(s) IV Push two times a day  polyethylene glycol 3350 17 Gram(s) Oral every 12 hours  predniSONE   Tablet 5 milliGRAM(s) Oral daily  senna 2 Tablet(s) Oral at bedtime  sodium chloride 3 Gram(s) Oral every 6 hours  tacrolimus    0.5 mG/mL Suspension 3 milliGRAM(s) Oral two times a day  trimethoprim   80 mG/sulfamethoxazole 400 mG 1 Tablet(s) Oral daily    MEDICATIONS  (PRN):  acetaminophen   Oral Liquid .. 650 milliGRAM(s) Oral every 6 hours PRN Temp greater or equal to 38C (100.4F), Mild Pain (1 - 3)  fentaNYL    Injectable 25 MICROGram(s) IV Push every 2 hours PRN vent synchroncy  ondansetron Injectable 4 milliGRAM(s) IV Push every 6 hours PRN Nausea and/or Vomiting  oxyCODONE    IR 5 milliGRAM(s) Oral every 4 hours PRN Moderate Pain (4 - 6)  oxyCODONE    Solution 10 milliGRAM(s) Enteral Tube every 4 hours PRN Severe Pain (7 - 10)        EXAMINATION:  PHYSICAL EXAM:    Constitutional: No Acute Distress     Neurological: Eyes open to pain, eyes midline, CAS, moving left UE spontaneously, right UE 0/5, right LE withdraws, not following commands     Pulmonary: Clear to Auscultation, No rales, No rhonchi, No wheezes     Cardiovascular: S1, S2, Regular rate and rhythm     Gastrointestinal: Soft, Non-tender, Non-distended     Extremities: No calf tenderness      HOSPITAL COURSE: This is a 79 YO F with a PMHx ESRD s/p renal xplant off HD, L AKA, 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    Admission Scores  GCS: 4 ICH: 4    24 hour Events:  3/2- Patient s/p left craniectomy(discarded) and evacuation. Patient started on insulin drip for elevated BG   3/3: Patient switched off insulin drip given low blood glucose. ISS placed. Patient's A1C 5.4%.  3/4: Patient s/p IVC filter for DVTs. Glucose elevated.   3/5- No acute events overnight. Plan for scan today and clamping of EVD.  3/6- No acute events overnight except adjustment of BP medications due to elevated BP beyond goal.  3/7- No acute events overnight. Patient's EVD clamped, EVD to be removed today. BP difficult to control   3/8- No acute events overnight. Patient NPO for trach/PEG, hyperkalemic overnight K shifted   3/9- No acute events overnight, patient' s/p trach/PEG.   3/10 Cheyne stock breathing     Allergies    shellfish (Rash)  ChloraPrep One-Step (Rash)  penicillins (Rash)    Intolerances        REVIEW OF SYSTEMS: [x ] Unable to Assess due to neurologic exam   [ ] All ROS addressed below are non-contributory, except:  Neuro: [ ] Headache [ ] Back pain [ ] Numbness [ ] Weakness [ ] Ataxia [ ] Dizziness [ ] Aphasia [ ] Dysarthria [ ] Visual disturbance  Resp: [ ] Shortness of breath/dyspnea, [ ] Orthopnea [ ] Cough  CV: [ ] Chest pain [ ] Palpitation [ ] Lightheadedness [ ] Syncope  Renal: [ ] Thirst [ ] Edema  GI: [ ] Nausea [ ] Emesis [ ] Abdominal pain [ ] Constipation [ ] Diarrhea  Hem: [ ] Hematemesis [ ] bright red blood per rectum  ID: [ ] Fever [ ] Chills [ ] Dysuria  ENT: [ ] Rhinorrhea      DEVICES:   [ ] Restraints [ ] ET tube [ ] central line [ ] arterial line [ ] johnson [ ] NGT/OGT [ ] EVD [ ] LD [ ] YON/HMV [ ] Trach [ ] PEG [ ] Chest Tube     T(C): 36.9 (03-10-24 @ 07:00), Max: 37.6 (03-09-24 @ 15:00)  HR: 113 (03-10-24 @ 09:01) (104 - 115)  BP: 92/55 (03-10-24 @ 07:00) (91/42 - 125/59)  RR: 19 (03-10-24 @ 07:00) (14 - 28)  SpO2: 100% (03-10-24 @ 09:01) (97% - 100%)  03-09-24 @ 06:01  -  03-10-24 @ 07:00  --------------------------------------------------------  IN: 1435 mL / OUT: 1250 mL / NET: 185 mL    acetaminophen   Oral Liquid .. 650 milliGRAM(s) Oral every 6 hours PRN  albuterol/ipratropium for Nebulization 3 milliLiter(s) Nebulizer every 6 hours  brivaracetam Oral Solution 100 milliGRAM(s) Oral <User Schedule>  carvedilol 25 milliGRAM(s) Oral every 12 hours  chlorhexidine 0.12% Liquid 15 milliLiter(s) Oral Mucosa every 12 hours  chlorhexidine 4% Liquid 1 Application(s) Topical daily  cloNIDine 0.1 milliGRAM(s) Oral three times a day  fentaNYL    Injectable 25 MICROGram(s) IV Push every 2 hours PRN  gabapentin Solution 100 milliGRAM(s) Oral three times a day  heparin   Injectable 5000 Unit(s) SubCutaneous every 12 hours  hydrALAZINE 100 milliGRAM(s) Oral every 8 hours  influenza  Vaccine (HIGH DOSE) 0.7 milliLiter(s) IntraMuscular once  insulin lispro (ADMELOG) corrective regimen sliding scale   SubCutaneous every 6 hours  insulin NPH human recombinant 10 Unit(s) SubCutaneous every 6 hours  lacosamide Solution 200 milliGRAM(s) Oral two times a day  ondansetron Injectable 4 milliGRAM(s) IV Push every 6 hours PRN  oxyCODONE    IR 5 milliGRAM(s) Oral every 4 hours PRN  oxyCODONE    Solution 10 milliGRAM(s) Enteral Tube every 4 hours PRN  pantoprazole  Injectable 40 milliGRAM(s) IV Push two times a day  polyethylene glycol 3350 17 Gram(s) Oral every 12 hours  predniSONE   Tablet 5 milliGRAM(s) Oral daily  senna 2 Tablet(s) Oral at bedtime  sodium chloride 3 Gram(s) Oral every 6 hours  tacrolimus    0.5 mG/mL Suspension 3 milliGRAM(s) Oral two times a day  trimethoprim   80 mG/sulfamethoxazole 400 mG 1 Tablet(s) Oral daily  Mode: CPAP with PS, FiO2: 50, PEEP: 5, PS: 10, MAP: 9, PIP: 15          LABS:                        8.1    7.29  )-----------( 181      ( 08 Mar 2024 22:37 )             25.6     03-09    145  |  112<H>  |  50<H>  ----------------------------<  134<H>  5.1   |  26  |  1.52<H>             MEDICATION LEVELS:     IVF FLUIDS/MEDICATIONS:   MEDICATIONS  (STANDING):  brivaracetam Oral Solution 100 milliGRAM(s) Oral <User Schedule>  carvedilol 25 milliGRAM(s) Oral every 12 hours  cefTRIAXone   IVPB 1000 milliGRAM(s) IV Intermittent every 24 hours  chlorhexidine 0.12% Liquid 15 milliLiter(s) Oral Mucosa every 12 hours  chlorhexidine 4% Liquid 1 Application(s) Topical daily  cloNIDine 0.1 milliGRAM(s) Oral three times a day  gabapentin Solution 100 milliGRAM(s) Oral three times a day  hydrALAZINE 100 milliGRAM(s) Oral every 8 hours  influenza  Vaccine (HIGH DOSE) 0.7 milliLiter(s) IntraMuscular once  insulin lispro (ADMELOG) corrective regimen sliding scale   SubCutaneous every 6 hours  insulin NPH human recombinant 5 Unit(s) SubCutaneous every 6 hours  lacosamide Solution 200 milliGRAM(s) Oral two times a day  pantoprazole  Injectable 40 milliGRAM(s) IV Push two times a day  polyethylene glycol 3350 17 Gram(s) Oral every 12 hours  predniSONE   Tablet 5 milliGRAM(s) Oral daily  senna 2 Tablet(s) Oral at bedtime  sodium chloride 3 Gram(s) Oral every 6 hours  tacrolimus    0.5 mG/mL Suspension 3 milliGRAM(s) Oral two times a day  trimethoprim   80 mG/sulfamethoxazole 400 mG 1 Tablet(s) Oral daily    MEDICATIONS  (PRN):  acetaminophen   Oral Liquid .. 650 milliGRAM(s) Oral every 6 hours PRN Temp greater or equal to 38C (100.4F), Mild Pain (1 - 3)  fentaNYL    Injectable 25 MICROGram(s) IV Push every 2 hours PRN vent synchroncy  ondansetron Injectable 4 milliGRAM(s) IV Push every 6 hours PRN Nausea and/or Vomiting  oxyCODONE    IR 5 milliGRAM(s) Oral every 4 hours PRN Moderate Pain (4 - 6)  oxyCODONE    Solution 10 milliGRAM(s) Enteral Tube every 4 hours PRN Severe Pain (7 - 10)        EXAMINATION:  PHYSICAL EXAM:    Constitutional: No Acute Distress     Neurological: Eyes open to pain, eyes midline, CAS, moving left UE spontaneously, right UE 0/5, right LE withdraws, not following commands     Pulmonary: Clear to Auscultation, No rales, No rhonchi, No wheezes     Cardiovascular: S1, S2, Regular rate and rhythm     Gastrointestinal: Soft, Non-tender, Non-distended     Extremities: No calf tenderness

## 2024-03-10 NOTE — PROGRESS NOTE ADULT - ATTENDING COMMENTS
seen and examined 03-10-24 @ 0830    afeb    on mechanical vent (spont 10 / +5 / 50%) via 7.0 Portex trach  trach site clean without evidence of infection. I removed the gauze from under the flange, and there was no evidence of recurrent bleeding when I checked on the patient several minutes later.    soft / NT / ND  on Glucerna 1.5 @ 45 ml/hr via PEG  PEG site clean without evidence of infection  PEG bumper @ 4.0 cm from skin    WBC = 6      3/8/2024 - percutaneous tracheostomy / PEG placement  -remove sutures from tracheostomy flange on Wednesday 3/13/2024  -continue tube feeds via PEG as tolerated    I reviewed her labs.  care coordinated with [facility] team. seen and examined 03-10-24 @ 0930    afeb    on mechanical vent (spont 10 / +5 / 50%) via 7.0 Portex trach  trach site clean without evidence of infection. I removed the gauze from under the flange, and there was no evidence of recurrent bleeding when I checked on the patient several minutes later.    soft / NT / ND  on Glucerna 1.5 @ 45 ml/hr via PEG  PEG site clean without evidence of infection  PEG bumper @ 4.0 cm from skin    WBC = 6      3/8/2024 - percutaneous tracheostomy / PEG placement  -remove sutures from tracheostomy flange on Wednesday 3/13/2024  -continue tube feeds via PEG as tolerated    I reviewed her labs.  care coordinated with [facility] team.

## 2024-03-11 LAB
ANION GAP SERPL CALC-SCNC: 11 MMOL/L — SIGNIFICANT CHANGE UP (ref 5–17)
BACTERIA # UR AUTO: NEGATIVE /HPF — SIGNIFICANT CHANGE UP
BLD GP AB SCN SERPL QL: NEGATIVE — SIGNIFICANT CHANGE UP
BUN SERPL-MCNC: 48 MG/DL — HIGH (ref 7–23)
CALCIUM SERPL-MCNC: 9.4 MG/DL — SIGNIFICANT CHANGE UP (ref 8.4–10.5)
CAST: 1 /LPF — SIGNIFICANT CHANGE UP (ref 0–4)
CHLORIDE SERPL-SCNC: 110 MMOL/L — HIGH (ref 96–108)
CO2 SERPL-SCNC: 23 MMOL/L — SIGNIFICANT CHANGE UP (ref 22–31)
CREAT SERPL-MCNC: 1.47 MG/DL — HIGH (ref 0.5–1.3)
EGFR: 36 ML/MIN/1.73M2 — LOW
GLUCOSE BLDC GLUCOMTR-MCNC: 112 MG/DL — HIGH (ref 70–99)
GLUCOSE BLDC GLUCOMTR-MCNC: 267 MG/DL — HIGH (ref 70–99)
GLUCOSE BLDC GLUCOMTR-MCNC: 274 MG/DL — HIGH (ref 70–99)
GLUCOSE BLDC GLUCOMTR-MCNC: 84 MG/DL — SIGNIFICANT CHANGE UP (ref 70–99)
GLUCOSE SERPL-MCNC: 112 MG/DL — HIGH (ref 70–99)
HCT VFR BLD CALC: 25.9 % — LOW (ref 34.5–45)
HGB BLD-MCNC: 8.2 G/DL — LOW (ref 11.5–15.5)
MAGNESIUM SERPL-MCNC: 2.6 MG/DL — SIGNIFICANT CHANGE UP (ref 1.6–2.6)
MCHC RBC-ENTMCNC: 29.7 PG — SIGNIFICANT CHANGE UP (ref 27–34)
MCHC RBC-ENTMCNC: 31.7 GM/DL — LOW (ref 32–36)
MCV RBC AUTO: 93.8 FL — SIGNIFICANT CHANGE UP (ref 80–100)
NRBC # BLD: 0 /100 WBCS — SIGNIFICANT CHANGE UP (ref 0–0)
PHOSPHATE SERPL-MCNC: 3.3 MG/DL — SIGNIFICANT CHANGE UP (ref 2.5–4.5)
PLATELET # BLD AUTO: 182 K/UL — SIGNIFICANT CHANGE UP (ref 150–400)
POTASSIUM SERPL-MCNC: 5.2 MMOL/L — SIGNIFICANT CHANGE UP (ref 3.5–5.3)
POTASSIUM SERPL-SCNC: 5.2 MMOL/L — SIGNIFICANT CHANGE UP (ref 3.5–5.3)
PROCALCITONIN SERPL-MCNC: 0.52 NG/ML — HIGH (ref 0.02–0.1)
RBC # BLD: 2.76 M/UL — LOW (ref 3.8–5.2)
RBC # FLD: 15.7 % — HIGH (ref 10.3–14.5)
RBC CASTS # UR COMP ASSIST: 3 /HPF — SIGNIFICANT CHANGE UP (ref 0–4)
REVIEW: SIGNIFICANT CHANGE UP
RH IG SCN BLD-IMP: POSITIVE — SIGNIFICANT CHANGE UP
SODIUM SERPL-SCNC: 144 MMOL/L — SIGNIFICANT CHANGE UP (ref 135–145)
SQUAMOUS # UR AUTO: 1 /HPF — SIGNIFICANT CHANGE UP (ref 0–5)
TACROLIMUS SERPL-MCNC: 2 NG/ML — SIGNIFICANT CHANGE UP
WBC # BLD: 6.77 K/UL — SIGNIFICANT CHANGE UP (ref 3.8–10.5)
WBC # FLD AUTO: 6.77 K/UL — SIGNIFICANT CHANGE UP (ref 3.8–10.5)
WBC UR QL: 105 /HPF — HIGH (ref 0–5)

## 2024-03-11 PROCEDURE — 99232 SBSQ HOSP IP/OBS MODERATE 35: CPT | Mod: GC

## 2024-03-11 PROCEDURE — 99223 1ST HOSP IP/OBS HIGH 75: CPT

## 2024-03-11 PROCEDURE — 99232 SBSQ HOSP IP/OBS MODERATE 35: CPT

## 2024-03-11 RX ORDER — CEFEPIME 1 G/1
1000 INJECTION, POWDER, FOR SOLUTION INTRAMUSCULAR; INTRAVENOUS DAILY
Refills: 0 | Status: DISCONTINUED | OUTPATIENT
Start: 2024-03-12 | End: 2024-03-12

## 2024-03-11 RX ORDER — SODIUM CHLORIDE 9 MG/ML
4 INJECTION INTRAMUSCULAR; INTRAVENOUS; SUBCUTANEOUS EVERY 6 HOURS
Refills: 0 | Status: COMPLETED | OUTPATIENT
Start: 2024-03-11 | End: 2024-03-14

## 2024-03-11 RX ORDER — ACETAMINOPHEN 500 MG
750 TABLET ORAL ONCE
Refills: 0 | Status: COMPLETED | OUTPATIENT
Start: 2024-03-11 | End: 2024-03-11

## 2024-03-11 RX ORDER — CEFEPIME 1 G/1
INJECTION, POWDER, FOR SOLUTION INTRAMUSCULAR; INTRAVENOUS
Refills: 0 | Status: DISCONTINUED | OUTPATIENT
Start: 2024-03-11 | End: 2024-03-12

## 2024-03-11 RX ORDER — CEFEPIME 1 G/1
1000 INJECTION, POWDER, FOR SOLUTION INTRAMUSCULAR; INTRAVENOUS ONCE
Refills: 0 | Status: COMPLETED | OUTPATIENT
Start: 2024-03-11 | End: 2024-03-11

## 2024-03-11 RX ORDER — PANTOPRAZOLE SODIUM 20 MG/1
40 TABLET, DELAYED RELEASE ORAL
Refills: 0 | Status: DISCONTINUED | OUTPATIENT
Start: 2024-03-11 | End: 2024-04-02

## 2024-03-11 RX ORDER — TACROLIMUS 5 MG/1
5 CAPSULE ORAL
Refills: 0 | Status: DISCONTINUED | OUTPATIENT
Start: 2024-03-11 | End: 2024-03-21

## 2024-03-11 RX ADMIN — Medication 3 MILLILITER(S): at 06:05

## 2024-03-11 RX ADMIN — Medication 300 MILLIGRAM(S): at 21:51

## 2024-03-11 RX ADMIN — Medication 3 MILLILITER(S): at 23:01

## 2024-03-11 RX ADMIN — CARVEDILOL PHOSPHATE 25 MILLIGRAM(S): 80 CAPSULE, EXTENDED RELEASE ORAL at 17:07

## 2024-03-11 RX ADMIN — CHLORHEXIDINE GLUCONATE 15 MILLILITER(S): 213 SOLUTION TOPICAL at 17:07

## 2024-03-11 RX ADMIN — Medication 3 MILLILITER(S): at 11:18

## 2024-03-11 RX ADMIN — OXYCODONE HYDROCHLORIDE 10 MILLIGRAM(S): 5 TABLET ORAL at 19:51

## 2024-03-11 RX ADMIN — LACOSAMIDE 200 MILLIGRAM(S): 50 TABLET ORAL at 17:07

## 2024-03-11 RX ADMIN — HUMAN INSULIN 10 UNIT(S): 100 INJECTION, SUSPENSION SUBCUTANEOUS at 18:14

## 2024-03-11 RX ADMIN — SODIUM CHLORIDE 4 MILLILITER(S): 9 INJECTION INTRAMUSCULAR; INTRAVENOUS; SUBCUTANEOUS at 17:26

## 2024-03-11 RX ADMIN — CHLORHEXIDINE GLUCONATE 15 MILLILITER(S): 213 SOLUTION TOPICAL at 05:40

## 2024-03-11 RX ADMIN — Medication 650 MILLIGRAM(S): at 12:12

## 2024-03-11 RX ADMIN — SODIUM CHLORIDE 4 MILLILITER(S): 9 INJECTION INTRAMUSCULAR; INTRAVENOUS; SUBCUTANEOUS at 11:18

## 2024-03-11 RX ADMIN — BRIVARACETAM 100 MILLIGRAM(S): 25 TABLET, FILM COATED ORAL at 05:40

## 2024-03-11 RX ADMIN — Medication 0.1 MILLIGRAM(S): at 21:49

## 2024-03-11 RX ADMIN — Medication 750 MILLIGRAM(S): at 22:20

## 2024-03-11 RX ADMIN — CEFEPIME 100 MILLIGRAM(S): 1 INJECTION, POWDER, FOR SOLUTION INTRAMUSCULAR; INTRAVENOUS at 11:05

## 2024-03-11 RX ADMIN — HUMAN INSULIN 10 UNIT(S): 100 INJECTION, SUSPENSION SUBCUTANEOUS at 23:47

## 2024-03-11 RX ADMIN — OXYCODONE HYDROCHLORIDE 10 MILLIGRAM(S): 5 TABLET ORAL at 08:03

## 2024-03-11 RX ADMIN — Medication 5 MILLIGRAM(S): at 05:40

## 2024-03-11 RX ADMIN — SODIUM CHLORIDE 4 MILLILITER(S): 9 INJECTION INTRAMUSCULAR; INTRAVENOUS; SUBCUTANEOUS at 06:05

## 2024-03-11 RX ADMIN — TACROLIMUS 4 MILLIGRAM(S): 5 CAPSULE ORAL at 07:50

## 2024-03-11 RX ADMIN — PANTOPRAZOLE SODIUM 40 MILLIGRAM(S): 20 TABLET, DELAYED RELEASE ORAL at 17:07

## 2024-03-11 RX ADMIN — BRIVARACETAM 100 MILLIGRAM(S): 25 TABLET, FILM COATED ORAL at 21:49

## 2024-03-11 RX ADMIN — BRIVARACETAM 100 MILLIGRAM(S): 25 TABLET, FILM COATED ORAL at 14:27

## 2024-03-11 RX ADMIN — SODIUM CHLORIDE 4 MILLILITER(S): 9 INJECTION INTRAMUSCULAR; INTRAVENOUS; SUBCUTANEOUS at 23:01

## 2024-03-11 RX ADMIN — HEPARIN SODIUM 5000 UNIT(S): 5000 INJECTION INTRAVENOUS; SUBCUTANEOUS at 05:40

## 2024-03-11 RX ADMIN — OXYCODONE HYDROCHLORIDE 10 MILLIGRAM(S): 5 TABLET ORAL at 08:35

## 2024-03-11 RX ADMIN — PANTOPRAZOLE SODIUM 40 MILLIGRAM(S): 20 TABLET, DELAYED RELEASE ORAL at 05:40

## 2024-03-11 RX ADMIN — Medication 3 MILLILITER(S): at 17:26

## 2024-03-11 RX ADMIN — HEPARIN SODIUM 5000 UNIT(S): 5000 INJECTION INTRAVENOUS; SUBCUTANEOUS at 18:18

## 2024-03-11 RX ADMIN — Medication 6: at 23:46

## 2024-03-11 RX ADMIN — LACOSAMIDE 200 MILLIGRAM(S): 50 TABLET ORAL at 05:40

## 2024-03-11 RX ADMIN — Medication 6: at 18:14

## 2024-03-11 RX ADMIN — OXYCODONE HYDROCHLORIDE 10 MILLIGRAM(S): 5 TABLET ORAL at 20:25

## 2024-03-11 RX ADMIN — TACROLIMUS 5 MILLIGRAM(S): 5 CAPSULE ORAL at 19:51

## 2024-03-11 RX ADMIN — HUMAN INSULIN 10 UNIT(S): 100 INJECTION, SUSPENSION SUBCUTANEOUS at 05:40

## 2024-03-11 RX ADMIN — Medication 1 TABLET(S): at 11:06

## 2024-03-11 NOTE — CONSULT NOTE ADULT - SUBJECTIVE AND OBJECTIVE BOX
ALLERGIES & MEDICATIONS  --------------------------------------------------------------------------------  Allergies    shellfish (Rash)  ChloraPrep One-Step (Rash)  penicillins (Rash)    Intolerances      Standing Inpatient Medications  albuterol/ipratropium for Nebulization 3 milliLiter(s) Nebulizer every 6 hours  brivaracetam Oral Solution 100 milliGRAM(s) Oral <User Schedule>  carvedilol 25 milliGRAM(s) Oral every 12 hours  cefepime   IVPB      chlorhexidine 0.12% Liquid 15 milliLiter(s) Oral Mucosa every 12 hours  chlorhexidine 4% Liquid 1 Application(s) Topical daily  cloNIDine 0.1 milliGRAM(s) Oral three times a day  heparin   Injectable 5000 Unit(s) SubCutaneous every 12 hours  influenza  Vaccine (HIGH DOSE) 0.7 milliLiter(s) IntraMuscular once  insulin lispro (ADMELOG) corrective regimen sliding scale   SubCutaneous every 6 hours  insulin NPH human recombinant 10 Unit(s) SubCutaneous every 6 hours  lacosamide Solution 200 milliGRAM(s) Oral two times a day  pantoprazole   Suspension 40 milliGRAM(s) Oral two times a day  predniSONE   Tablet 5 milliGRAM(s) Oral daily  sodium chloride 3%  Inhalation 4 milliLiter(s) Inhalation every 6 hours  tacrolimus    0.5 mG/mL Suspension 5 milliGRAM(s) Oral two times a day  trimethoprim   80 mG/sulfamethoxazole 400 mG 1 Tablet(s) Oral daily    PRN Inpatient Medications  acetaminophen   Oral Liquid .. 650 milliGRAM(s) Oral every 6 hours PRN  ondansetron Injectable 4 milliGRAM(s) IV Push every 6 hours PRN  oxyCODONE    IR 5 milliGRAM(s) Oral every 4 hours PRN  oxyCODONE    Solution 10 milliGRAM(s) Enteral Tube every 4 hours PRN      REVIEW OF SYSTEMS  --------------------------------------------------------------------------------  See HPI    VITALS/PHYSICAL EXAM  --------------------------------------------------------------------------------  T(C): 38.5 (03-11-24 @ 12:10), Max: 38.7 (03-10-24 @ 19:00)  HR: 122 (03-11-24 @ 13:11) (97 - 122)  BP: 99/50 (03-11-24 @ 11:00) (99/50 - 160/92)  RR: 20 (03-11-24 @ 11:00) (16 - 37)  SpO2: 100% (03-11-24 @ 13:11) (92% - 100%)  Wt(kg): --    03-10-24 @ 07:01  -  03-11-24 @ 07:00  --------------------------------------------------------  IN: 2705 mL / OUT: 1320 mL / NET: 1385 mL    03-11-24 @ 07:01  -  03-11-24 @ 13:43  --------------------------------------------------------  IN: 470 mL / OUT: 0 mL / NET: 470 mL    Physical Exam:  Gen: Intubated, sedated  HEENT: +ET tube  Pulm: Mechanical breath sounds BL  CV: RRR, S1S2;  Abd: +BS, soft, nontender/nondistended  : +Urinary catheter with large volume urine noted.  Extremities: no bilateral LE edema noted. +LLE AKA  Neuro: Sedated  Skin: Warm, without rashes    LABS/STUDIES  --------------------------------------------------------------------------------              8.2    6.77  >-----------<  182      [03-11-24 @ 01:28]              25.9     144  |  110  |  48  ----------------------------<  112      [03-11-24 @ 01:28]  5.2   |  23  |  1.47        Ca     9.4     [03-11-24 @ 01:28]      Mg     2.6     [03-11-24 @ 01:28]      Phos  3.3     [03-11-24 @ 01:28]    Creatinine Trend:  SCr 1.47 [03-11 @ 01:28]  SCr 1.45 [03-10 @ 13:34]  SCr 1.54 [03-10 @ 01:33]  SCr 1.52 [03-09 @ 04:46]  SCr 1.48 [03-08 @ 22:37]    Tacrolimus (), Serum: 2.0 ng/mL (03-11 @ 05:16)  Tacrolimus (), Serum: 2.5 ng/mL (03-10 @ 03:31)  Tacrolimus (), Serum: 4.2 ng/mL (03-09 @ 06:23)  Tacrolimus (), Serum: 5.1 ng/mL (03-07 @ 05:27)    Urine Sodium 120      [03-07-24 @ 05:28]  Urine Osmolality 534      [03-07-24 @ 05:28]    HbA1c 7.2      [01-11-20 @ 09:23]  TSH 1.24      [03-02-24 @ 10:51]  Lipid: chol 136, TG 95, HDL 72, LDL --      [03-02-24 @ 02:10] HISTORY OF PRESENT ILLNESS:  Nury Adam   79 yo F w/ PMHx ESRD s/p renal xplant (2019) c/b DGF off HD, L AKA, 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 ICH score 4. S/p craniectomy and evacuation course c/b seizures, last seen on 3/7/24 EEG currently on AEDS.  Now s/p trach/peg 3/9/24.        PAST MEDICAL & SURGICAL HISTORY:  Hypertension  Anemia in ESRD (end-stage renal disease)  Thrombocytopenia  leukopenia: followed by chele, plan for BMBx 7/22/19  H/O gastroesophageal reflux (GERD)  Breast cancer, female  left 2014  ESRD on hemodialysis  Anuria  Thyroid nodule  yearly Ultrasound, monitoring yearly  Pancreatic cyst  AV fistula thrombosis  history: was treated with coumadin, no more A/C.  S/P lumpectomy, left breast  2014  Adrenal mass  excison 2015      S/P hysterectomy  1994      S/P arteriovenous (AV) fistula creation  2002      History of fracture of right ankle  2014 repaired        ALLERGIES & MEDICATIONS  --------------------------------------------------------------------------------  Allergies    shellfish (Rash)  ChloraPrep One-Step (Rash)  penicillins (Rash)    Intolerances      Standing Inpatient Medications  albuterol/ipratropium for Nebulization 3 milliLiter(s) Nebulizer every 6 hours  brivaracetam Oral Solution 100 milliGRAM(s) Oral <User Schedule>  carvedilol 25 milliGRAM(s) Oral every 12 hours  cefepime   IVPB      chlorhexidine 0.12% Liquid 15 milliLiter(s) Oral Mucosa every 12 hours  chlorhexidine 4% Liquid 1 Application(s) Topical daily  cloNIDine 0.1 milliGRAM(s) Oral three times a day  heparin   Injectable 5000 Unit(s) SubCutaneous every 12 hours  influenza  Vaccine (HIGH DOSE) 0.7 milliLiter(s) IntraMuscular once  insulin lispro (ADMELOG) corrective regimen sliding scale   SubCutaneous every 6 hours  insulin NPH human recombinant 10 Unit(s) SubCutaneous every 6 hours  lacosamide Solution 200 milliGRAM(s) Oral two times a day  pantoprazole   Suspension 40 milliGRAM(s) Oral two times a day  predniSONE   Tablet 5 milliGRAM(s) Oral daily  sodium chloride 3%  Inhalation 4 milliLiter(s) Inhalation every 6 hours  tacrolimus    0.5 mG/mL Suspension 5 milliGRAM(s) Oral two times a day  trimethoprim   80 mG/sulfamethoxazole 400 mG 1 Tablet(s) Oral daily    PRN Inpatient Medications  acetaminophen   Oral Liquid .. 650 milliGRAM(s) Oral every 6 hours PRN  ondansetron Injectable 4 milliGRAM(s) IV Push every 6 hours PRN  oxyCODONE    IR 5 milliGRAM(s) Oral every 4 hours PRN  oxyCODONE    Solution 10 milliGRAM(s) Enteral Tube every 4 hours PRN      REVIEW OF SYSTEMS  --------------------------------------------------------------------------------  See HPI    VITALS/PHYSICAL EXAM  --------------------------------------------------------------------------------  T(C): 38.5 (03-11-24 @ 12:10), Max: 38.7 (03-10-24 @ 19:00)  HR: 122 (03-11-24 @ 13:11) (97 - 122)  BP: 99/50 (03-11-24 @ 11:00) (99/50 - 160/92)  RR: 20 (03-11-24 @ 11:00) (16 - 37)  SpO2: 100% (03-11-24 @ 13:11) (92% - 100%)  Wt(kg): --    03-10-24 @ 07:01  -  03-11-24 @ 07:00  --------------------------------------------------------  IN: 2705 mL / OUT: 1320 mL / NET: 1385 mL    03-11-24 @ 07:01  -  03-11-24 @ 13:43  --------------------------------------------------------  IN: 470 mL / OUT: 0 mL / NET: 470 mL    Physical Exam:  Gen: Intubated, sedated  HEENT: +ET tube  Pulm: Mechanical breath sounds BL  CV: RRR, S1S2;  Abd: +BS, soft, nontender/nondistended  : +Urinary catheter with large volume urine noted.  Extremities: no bilateral LE edema noted. +LLE AKA  Neuro: Sedated  Skin: Warm, without rashes    LABS/STUDIES  --------------------------------------------------------------------------------              8.2    6.77  >-----------<  182      [03-11-24 @ 01:28]              25.9     144  |  110  |  48  ----------------------------<  112      [03-11-24 @ 01:28]  5.2   |  23  |  1.47        Ca     9.4     [03-11-24 @ 01:28]      Mg     2.6     [03-11-24 @ 01:28]      Phos  3.3     [03-11-24 @ 01:28]    Creatinine Trend:  SCr 1.47 [03-11 @ 01:28]  SCr 1.45 [03-10 @ 13:34]  SCr 1.54 [03-10 @ 01:33]  SCr 1.52 [03-09 @ 04:46]  SCr 1.48 [03-08 @ 22:37]    Tacrolimus (), Serum: 2.0 ng/mL (03-11 @ 05:16)  Tacrolimus (), Serum: 2.5 ng/mL (03-10 @ 03:31)  Tacrolimus (), Serum: 4.2 ng/mL (03-09 @ 06:23)  Tacrolimus (), Serum: 5.1 ng/mL (03-07 @ 05:27)    Urine Sodium 120      [03-07-24 @ 05:28]  Urine Osmolality 534      [03-07-24 @ 05:28]    HbA1c 7.2      [01-11-20 @ 09:23]  TSH 1.24      [03-02-24 @ 10:51]  Lipid: chol 136, TG 95, HDL 72, LDL --      [03-02-24 @ 02:10] HISTORY OF PRESENT ILLNESS:  Nury Adam   79 yo F w/ PMHx ESRD s/p renal xplant (2019) c/b DGF off HD, L AKA, 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 ICH score 4. S/p craniectomy and evacuation course c/b seizures, last seen on 3/7/24 EEG currently on AEDS.  Now s/p trach/peg 3/9/24. Febrile, on treatment for UTI, coverage broadened from Ceftriaxone to Cefepime.  RCU consult for transfer for ventilator weaning.        PAST MEDICAL & SURGICAL HISTORY:  Hypertension  Anemia in ESRD (end-stage renal disease)  Thrombocytopenia  leukopenia: followed by chele, plan for BMBx 7/22/19  H/O gastroesophageal reflux (GERD)  Breast cancer, female  left 2014  ESRD on hemodialysis  Anuria  Thyroid nodule  yearly Ultrasound, monitoring yearly  Pancreatic cyst  AV fistula thrombosis  history: was treated with coumadin, no more A/C.  S/P lumpectomy, left breast  2014  Adrenal mass  excison 2015      S/P hysterectomy  1994      S/P arteriovenous (AV) fistula creation  2002      History of fracture of right ankle  2014 repaired        ALLERGIES & MEDICATIONS  --------------------------------------------------------------------------------  Allergies    shellfish (Rash)  ChloraPrep One-Step (Rash)  penicillins (Rash)    Intolerances      Standing Inpatient Medications  albuterol/ipratropium for Nebulization 3 milliLiter(s) Nebulizer every 6 hours  brivaracetam Oral Solution 100 milliGRAM(s) Oral <User Schedule>  carvedilol 25 milliGRAM(s) Oral every 12 hours  cefepime   IVPB      chlorhexidine 0.12% Liquid 15 milliLiter(s) Oral Mucosa every 12 hours  chlorhexidine 4% Liquid 1 Application(s) Topical daily  cloNIDine 0.1 milliGRAM(s) Oral three times a day  heparin   Injectable 5000 Unit(s) SubCutaneous every 12 hours  influenza  Vaccine (HIGH DOSE) 0.7 milliLiter(s) IntraMuscular once  insulin lispro (ADMELOG) corrective regimen sliding scale   SubCutaneous every 6 hours  insulin NPH human recombinant 10 Unit(s) SubCutaneous every 6 hours  lacosamide Solution 200 milliGRAM(s) Oral two times a day  pantoprazole   Suspension 40 milliGRAM(s) Oral two times a day  predniSONE   Tablet 5 milliGRAM(s) Oral daily  sodium chloride 3%  Inhalation 4 milliLiter(s) Inhalation every 6 hours  tacrolimus    0.5 mG/mL Suspension 5 milliGRAM(s) Oral two times a day  trimethoprim   80 mG/sulfamethoxazole 400 mG 1 Tablet(s) Oral daily    PRN Inpatient Medications  acetaminophen   Oral Liquid .. 650 milliGRAM(s) Oral every 6 hours PRN  ondansetron Injectable 4 milliGRAM(s) IV Push every 6 hours PRN  oxyCODONE    IR 5 milliGRAM(s) Oral every 4 hours PRN  oxyCODONE    Solution 10 milliGRAM(s) Enteral Tube every 4 hours PRN      REVIEW OF SYSTEMS  --------------------------------------------------------------------------------  Unable to obtain as patient is minimally responsive and unable to phonate.    VITALS/PHYSICAL EXAM  --------------------------------------------------------------------------------  T(C): 38.5 (03-11-24 @ 12:10), Max: 38.7 (03-10-24 @ 19:00)  HR: 122 (03-11-24 @ 13:11) (97 - 122)  BP: 99/50 (03-11-24 @ 11:00) (99/50 - 160/92)  RR: 20 (03-11-24 @ 11:00) (16 - 37)  SpO2: 100% (03-11-24 @ 13:11) (92% - 100%)  Wt(kg): --    03-10-24 @ 07:01  -  03-11-24 @ 07:00  --------------------------------------------------------  IN: 2705 mL / OUT: 1320 mL / NET: 1385 mL    03-11-24 @ 07:01  -  03-11-24 @ 13:43  --------------------------------------------------------  IN: 470 mL / OUT: 0 mL / NET: 470 mL    Physical Exam:  Gen: Intubated, not following commands, no spontaneous mvmts seen at time of exam  HEENT: +ET tube  Pulm: Mechanical breath sounds BL  CV: RRR, S1S2;  Abd: +BS, soft, nontender/nondistended  : +Urinary catheter with large volume urine noted.  Extremities: no bilateral LE edema noted. +LLE AKA  Neuro: Sedated  Skin: Warm, without rashes    LABS/STUDIES  --------------------------------------------------------------------------------              8.2    6.77  >-----------<  182      [03-11-24 @ 01:28]              25.9     144  |  110  |  48  ----------------------------<  112      [03-11-24 @ 01:28]  5.2   |  23  |  1.47        Ca     9.4     [03-11-24 @ 01:28]      Mg     2.6     [03-11-24 @ 01:28]      Phos  3.3     [03-11-24 @ 01:28]    Creatinine Trend:  SCr 1.47 [03-11 @ 01:28]  SCr 1.45 [03-10 @ 13:34]  SCr 1.54 [03-10 @ 01:33]  SCr 1.52 [03-09 @ 04:46]  SCr 1.48 [03-08 @ 22:37]    Tacrolimus (), Serum: 2.0 ng/mL (03-11 @ 05:16)  Tacrolimus (), Serum: 2.5 ng/mL (03-10 @ 03:31)  Tacrolimus (), Serum: 4.2 ng/mL (03-09 @ 06:23)  Tacrolimus (), Serum: 5.1 ng/mL (03-07 @ 05:27)    Urine Sodium 120      [03-07-24 @ 05:28]  Urine Osmolality 534      [03-07-24 @ 05:28]    HbA1c 7.2      [01-11-20 @ 09:23]  TSH 1.24      [03-02-24 @ 10:51]  Lipid: chol 136, TG 95, HDL 72, LDL --      [03-02-24 @ 02:10]

## 2024-03-11 NOTE — PROGRESS NOTE ADULT - SUBJECTIVE AND OBJECTIVE BOX
DATE OF SERVICE: 03-11-24 @ 23:36    Patient is a 78y old  Female who presents with a chief complaint of ICH (11 Mar 2024 17:49)        TELEMETRY Personally reviewed: no events  	  MEDICATIONS:  carvedilol 25 milliGRAM(s) Oral every 12 hours  cloNIDine 0.1 milliGRAM(s) Oral three times a day        PHYSICAL EXAM:  T(C): 37.4 (03-11-24 @ 21:00), Max: 38.5 (03-11-24 @ 12:10)  HR: 105 (03-11-24 @ 23:01) (97 - 130)  BP: 153/81 (03-11-24 @ 21:55) (95/45 - 180/110)  RR: 15 (03-11-24 @ 21:55) (14 - 37)  SpO2: 100% (03-11-24 @ 23:01) (92% - 100%)  Wt(kg): --  I&O's Summary    10 Mar 2024 07:01  -  11 Mar 2024 07:00  --------------------------------------------------------  IN: 2705 mL / OUT: 1320 mL / NET: 1385 mL    11 Mar 2024 07:01  -  11 Mar 2024 23:36  --------------------------------------------------------  IN: 470 mL / OUT: 0 mL / NET: 470 mL          Appearance: In no distress	  HEENT:    PERRL, EOMI	  Cardiovascular:  S1 S2, No JVD  Respiratory: Lungs clear to auscultation	  Gastrointestinal:  Soft, Non-tender, + BS	  Vascularature:  No edema of LE  Psychiatric: Appropriate affect   Neuro: no acute focal deficits                               8.2    6.77  )-----------( 182      ( 11 Mar 2024 01:28 )             25.9     03-11    144  |  110<H>  |  48<H>  ----------------------------<  112<H>  5.2   |  23  |  1.47<H>    Ca    9.4      11 Mar 2024 01:28  Phos  3.3     03-11  Mg     2.6     03-11          Labs personally reviewed      ASSESSMENT/PLAN: 	   78F Hx ESRD s/p renal xplant c/b DGF off HD, L AKA, 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      1. Abnormal Echo --  Septal bulge noted measures 2.2cm without obstruction.   -- Given no intracavitary obstruction no intervention at this time  - No Myxoma seen on this echo or echo in 2019, ? if hypermobile interatrial septum was confused for on prior imaging     2. HTN - Continue Hydralazine 100 mg Q8H, clonidine 0.1 mg TID, Coreg 25 mg BID     3. 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 DVT PPX - HSQ when safe          Celio Mcwilliams DO [facility]  Cardiovascular Medicine  [address], [address]  Office: [phone]  Available via Text/call on Microsoft Teams

## 2024-03-11 NOTE — PROGRESS NOTE ADULT - SUBJECTIVE AND OBJECTIVE BOX
[facility] DIVISION OF KIDNEY DISEASES AND HYPERTENSION -- FOLLOW UP NOTE  --------------------------------------------------------------------------------    Chief Complaint: DDRT (2019), Nontraumatic intracerebral hemorrhage    24 hour events/subjective: Pt. was seen and evaluated in the neurosurgical ICU earlier today. Pt. remains intubated, unable to provide history or ROS.    PAST HISTORY  --------------------------------------------------------------------------------  No significant changes to PMH, PSH, FHx, SHx, unless otherwise noted    ALLERGIES & MEDICATIONS  --------------------------------------------------------------------------------  Allergies    shellfish (Rash)  ChloraPrep One-Step (Rash)  penicillins (Rash)    Intolerances      Standing Inpatient Medications  albuterol/ipratropium for Nebulization 3 milliLiter(s) Nebulizer every 6 hours  brivaracetam Oral Solution 100 milliGRAM(s) Oral <User Schedule>  carvedilol 25 milliGRAM(s) Oral every 12 hours  cefepime   IVPB      chlorhexidine 0.12% Liquid 15 milliLiter(s) Oral Mucosa every 12 hours  chlorhexidine 4% Liquid 1 Application(s) Topical daily  cloNIDine 0.1 milliGRAM(s) Oral three times a day  heparin   Injectable 5000 Unit(s) SubCutaneous every 12 hours  influenza  Vaccine (HIGH DOSE) 0.7 milliLiter(s) IntraMuscular once  insulin lispro (ADMELOG) corrective regimen sliding scale   SubCutaneous every 6 hours  insulin NPH human recombinant 10 Unit(s) SubCutaneous every 6 hours  lacosamide Solution 200 milliGRAM(s) Oral two times a day  pantoprazole   Suspension 40 milliGRAM(s) Oral two times a day  predniSONE   Tablet 5 milliGRAM(s) Oral daily  sodium chloride 3%  Inhalation 4 milliLiter(s) Inhalation every 6 hours  tacrolimus    0.5 mG/mL Suspension 5 milliGRAM(s) Oral two times a day  trimethoprim   80 mG/sulfamethoxazole 400 mG 1 Tablet(s) Oral daily    PRN Inpatient Medications  acetaminophen   Oral Liquid .. 650 milliGRAM(s) Oral every 6 hours PRN  ondansetron Injectable 4 milliGRAM(s) IV Push every 6 hours PRN  oxyCODONE    IR 5 milliGRAM(s) Oral every 4 hours PRN  oxyCODONE    Solution 10 milliGRAM(s) Enteral Tube every 4 hours PRN      REVIEW OF SYSTEMS  --------------------------------------------------------------------------------  See HPI    VITALS/PHYSICAL EXAM  --------------------------------------------------------------------------------  T(C): 38.5 (03-11-24 @ 12:10), Max: 38.7 (03-10-24 @ 19:00)  HR: 122 (03-11-24 @ 13:11) (97 - 122)  BP: 99/50 (03-11-24 @ 11:00) (99/50 - 160/92)  RR: 20 (03-11-24 @ 11:00) (16 - 37)  SpO2: 100% (03-11-24 @ 13:11) (92% - 100%)  Wt(kg): --    03-10-24 @ 07:01  -  03-11-24 @ 07:00  --------------------------------------------------------  IN: 2705 mL / OUT: 1320 mL / NET: 1385 mL    03-11-24 @ 07:01  -  03-11-24 @ 13:43  --------------------------------------------------------  IN: 470 mL / OUT: 0 mL / NET: 470 mL    Physical Exam:  Gen: Intubated, sedated  HEENT: +ET tube  Pulm: Mechanical breath sounds BL  CV: RRR, S1S2;  Abd: +BS, soft, nontender/nondistended  : +Urinary catheter with large volume urine noted.  Extremities: no bilateral LE edema noted. +CRISTOE AKA  Neuro: Sedated  Skin: Warm, without rashes    LABS/STUDIES  --------------------------------------------------------------------------------              8.2    6.77  >-----------<  182      [03-11-24 @ 01:28]              25.9     144  |  110  |  48  ----------------------------<  112      [03-11-24 @ 01:28]  5.2   |  23  |  1.47        Ca     9.4     [03-11-24 @ 01:28]      Mg     2.6     [03-11-24 @ 01:28]      Phos  3.3     [03-11-24 @ 01:28]    Creatinine Trend:  SCr 1.47 [03-11 @ 01:28]  SCr 1.45 [03-10 @ 13:34]  SCr 1.54 [03-10 @ 01:33]  SCr 1.52 [03-09 @ 04:46]  SCr 1.48 [03-08 @ 22:37]    Tacrolimus (), Serum: 2.0 ng/mL (03-11 @ 05:16)  Tacrolimus (), Serum: 2.5 ng/mL (03-10 @ 03:31)  Tacrolimus (), Serum: 4.2 ng/mL (03-09 @ 06:23)  Tacrolimus (), Serum: 5.1 ng/mL (03-07 @ 05:27)    Urine Sodium 120      [03-07-24 @ 05:28]  Urine Osmolality 534      [03-07-24 @ 05:28]    HbA1c 7.2      [01-11-20 @ 09:23]  TSH 1.24      [03-02-24 @ 10:51]  Lipid: chol 136, TG 95, HDL 72, LDL --      [03-02-24 @ 02:10]

## 2024-03-11 NOTE — CONSULT NOTE ADULT - ASSESSMENT
77 yo F with PMHx ESRD s/p renal transplant (now off HD), left AKA, BK viremia, HTN, breast ca s/p lumpectomy (on Tamoxifen), DVT (off Eliquis), HLD, gout presenting with left ICH (ICH score 4) likely 2/2 amyloid angiopathy s/p left craniectomy(discarded) and evacuation POD9. She is s/p trach/peg 3/9/24.    NEURO:  Neurochecks Q4  CT stereo    seizures Briviact 100 mg TID for seizures, Vimpat 200 mg BID ()  Activity: [x] ROM in bed    PULM:  s/p trach PRVC 14/450/5/40 Mode: CPAP with PS, FiO2: 50, PEEP: 5, PS: 10, MAP: 9, PIP: 16  CPAP  tolerating today  moderate thick secretions, 3 % inhalation       CV:  SBP goal:   History of HTN  clonidine 0.1 mg TID, Coreg 25 mg BID     RENAL:  Fluids: IVL , decrease free water to 200 ml q 8 hr   f/u nephro transplant team  patient has been off HD  s/p transplant in 2019  adjust  tacrolimus dise according to transplant recs   creat stable   AVF in the left upper extremity     GI:  Diet: Glucerna 1.5 @ goal  GI prophylaxis [] not indicated [x] PPI [] other:  Bowel regimen [x] miralax [x] senna [x] Dulcolax supp  pantoprazole   LBM: 3/10  rectal tube has diarrhea , d/c stool softener   s/p PEG  H.pylori Pantoptazole 40 mg BID, will need treatment, GI advised to do it when she is more stable     ENDO:   Goal euglycemia (-180)  ISS  A1C: 5.4%  insulin NPH human recombinant 10 Unit(s) SubCutaneous every 6 hours    HEME/ONC:   H/H stable for now  hx of breast cancer - finished Tamoxifen in March 2023, will discontinue   LED- bilateral above the knee DVT. s/p IVL filter    Patient receives procrit shots every month, will continue   heparin sc for chemorpophylaxis     ID:febrile, UA positive despite ceftriaxone, urine culture gram neg pavel , change ceftriaxone to cefepime , until we get sensitivity   Follow up rest of the cultures   Combicath negative   wound care for skin   chest xray clear    77 yo F with PMHx ESRD s/p renal transplant (2019, now off HD), left AKA, BK viremia, HTN, breast ca s/p lumpectomy (completed Tamoxifen 03/2023), DVT (off Eliquis), HLD, gout presenting with left ICH (ICH score 4) likely 2/2 amyloid angiopathy s/p left craniectomy(discarded) and evacuation POD9 as well as EVD placement and removal. She is s/p trach/peg 3/9/24.    #Seizures   - Briviact 100 mg TID for seizures, Vimpat 200 mg BID     #Respiratory Failure  - s/p trach PRVC 14/450/5/40  - CPAP trials daily  - having moderately thick secretions, Duonebs q6h + Hypersal 3 % q6h ATC    #HTN  - SBP goal:   - Clonidine 0.1 mg TID, Coreg 25 mg BID   - Echo: LVEF 70-75%, Grade II diastolic dysfcn    #Renal transplant (2019), AVF in the left upper extremity  - cont free water 200 ml q8h for now  - f/u nephro transplant team  - increase Tracrolimus to 5mg BID per transplant nephro recs       #Nutrition  - Glucerna 1.5 @ goal via PEG    #H. pylori infection  - Protonix  - H.pylori, will need treatment, f/u with GI    #UTI  - UA still positive on Ceftriaxone, switched to Cefepime pending culture results    #Hyperglycemia   - Goal euglycemia (-180), A1C: 5.4%  - ISS    # Hx of bilateral above the knee DVT. s/p IVC filter   - HSQ q12h, ok per Neurosurgery    #Ethics: Full code    Lidya Cancino MD, MSCR       79 yo F with PMHx ESRD s/p renal transplant (2019, now off HD), left AKA, BK viremia, HTN, breast ca s/p lumpectomy (completed Tamoxifen 03/2023), DVT (off Eliquis), HLD, gout presenting with left ICH (ICH score 4) likely 2/2 amyloid angiopathy s/p left craniectomy(discarded) and evacuation POD9 as well as EVD placement and removal. She is s/p trach/peg 3/9/24.  RCU consulted for transfer for vent weaning.    #Seizures   - Briviact 100 mg TID for seizures, Vimpat 200 mg BID     #Respiratory Failure  - s/p trach PRVC 14/450/5/40  - CPAP trials daily  - having moderately thick secretions, Duonebs q6h + Hypersal 3 % q6h ATC    #HTN  - SBP goal:   - Clonidine 0.1 mg TID, Coreg 25 mg BID   - Echo: LVEF 70-75%, Grade II diastolic dysfcn    #Renal transplant (2019), AVF in the left upper extremity  - cont free water 200 ml q8h for now  - ped 5mg, bactrim ppx  - increase Tracrolimus to 5mg BID per transplant nephro recs       #Nutrition  - Glucerna 1.5 @ goal via PEG    #H. pylori infection  - Protonix  - H.pylori, will need treatment, f/u with GI    #UTI  - UA still positive on Ceftriaxone, switched to Cefepime pending culture results    #Hyperglycemia   - Goal euglycemia (-180), A1C: 5.4%  - ISS  - humulin 10u q6h    # Hx of bilateral above the knee DVT. s/p IVC filter   - HSQ q12h, ok per Neurosurgery    #Ethics: Full code  #DISPO: Acceptable for transfer to [facility], team made aware.    Lidya Cancino MD, MSCR

## 2024-03-11 NOTE — PROGRESS NOTE ADULT - ASSESSMENT
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    3/3 S/P IVCF (retrievable) by IR  03/01/2024 S/P L Hemicrani for Evacuation of large ICH; bone discarded   3/8/24: s/p trach and PEG    RCU consulted (have not seen yet)  Urine cx- pending   Nephro (Dr. Quiroz) - hold home bicarb and calcitriol  H pylori antigen for peptic ulcer, protonix BID, if +, will need abx for 2 weeks  Keep trach dressing for 2 days  *Send [place] level 1/2 hr before dose due*

## 2024-03-11 NOTE — PROGRESS NOTE ADULT - PROBLEM SELECTOR PLAN 1
Pt. with ESRD, underwent DDRT in 2019. On review of [place], Scr was 1.86 on 10/31/23. SCr was WNL at 1.18 on admission (3/2), and remains elevated/stable at 1.47 today (3/11). Pt. with likley underlying CKD, Scr likely at baseline. Continue with immunosuppression regimen, as outlined below. Monitor labs and urine output. Avoid nephrotoxins. Dose medications as per eGFR.

## 2024-03-11 NOTE — PROGRESS NOTE ADULT - SUBJECTIVE AND OBJECTIVE BOX
Patient seen and examined at bedside.    --Anticoagulation--  heparin   Injectable 5000 Unit(s) SubCutaneous every 12 hours    T(C): 37.5 (03-10-24 @ 23:00), Max: 37.5 (03-10-24 @ 23:00)  HR: 99 (03-11-24 @ 00:08) (99 - 116)  BP: 111/49 (03-10-24 @ 23:00) (91/42 - 130/60)  RR: 23 (03-10-24 @ 23:00) (14 - 23)  SpO2: 100% (03-11-24 @ 00:08) (93% - 100%)  Wt(kg): --    Exam: trached, PARISH, PERRL, left gaze, +corneals/cough/gag/OB, not FC, LUE spont, RUE 0/5, RLE TF

## 2024-03-11 NOTE — PROGRESS NOTE ADULT - ATTENDING COMMENTS
PT seen in evening 3/11/24.  Pt s/p tracheostomy and PEG.  Tongue swelling slowly decreasing. No eye opening, pt with left .  Wound c/d/i.  Defect is flat.  Pending RCU, will order custom implant for eventual cranioplasty within next few weeks.

## 2024-03-11 NOTE — PROGRESS NOTE ADULT - PROBLEM SELECTOR PLAN 2
Pt's current regimen is Tacrolimus 4 mg BID, and prednisone 5 mg qd. Leflunomide currently on hold. Tacrolimus trough remains low at 2.0 today. Recommend to increase Tacrolimus dose to 5 mg BID. Monitor tacrolimus trough, goal: 4-7.    If you have any questions, please feel free to contact me  Irais Suarez  Nephrology Fellow  [phone] / [employer] Teams(Preferred)  (After 5pm or on weekends please page the on-call fellow)

## 2024-03-11 NOTE — PROGRESS NOTE ADULT - SUBJECTIVE AND OBJECTIVE BOX
· Subjective and Objective:   HOSPITAL COURSE: This is a 79 YO F with a PMHx ESRD s/p renal xplant off HD, L AKA, 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    Admission Scores  GCS: 4 ICH: 4    24 hour Events:  3/2- Patient s/p left craniectomy(discarded) and evacuation. Patient started on insulin drip for elevated BG   3/3: Patient switched off insulin drip given low blood glucose. ISS placed. Patient's A1C 5.4%.  3/4: Patient s/p IVC filter for DVTs. Glucose elevated.   3/5- No acute events overnight. Plan for scan today and clamping of EVD.  3/6- No acute events overnight except adjustment of BP medications due to elevated BP beyond goal.  3/7- No acute events overnight. Patient's EVD clamped, EVD to be removed today. BP difficult to control   3/8- No acute events overnight. Patient NPO for trach/PEG, hyperkalemic overnight K shifted   3/9- No acute events overnight, patient' s/p trach/PEG.   3/10 Cheyne stock breathing   3/11 fever, panculture     Allergies    shellfish (Rash)  ChloraPrep One-Step (Rash)  penicillins (Rash)    Intolerances        REVIEW OF SYSTEMS: [x ] Unable to Assess due to neurologic exam   [ ] All ROS addressed below are non-contributory, except:  Neuro: [ ] Headache [ ] Back pain [ ] Numbness [ ] Weakness [ ] Ataxia [ ] Dizziness [ ] Aphasia [ ] Dysarthria [ ] Visual disturbance  Resp: [ ] Shortness of breath/dyspnea, [ ] Orthopnea [ ] Cough  CV: [ ] Chest pain [ ] Palpitation [ ] Lightheadedness [ ] Syncope  Renal: [ ] Thirst [ ] Edema  GI: [ ] Nausea [ ] Emesis [ ] Abdominal pain [ ] Constipation [ ] Diarrhea  Hem: [ ] Hematemesis [ ] bright red blood per rectum  ID: [ ] Fever [ ] Chills [ ] Dysuria  ENT: [ ] Rhinorrhea      DEVICES:   [ ] Restraints [ ] ET tube [ ] central line [ ] arterial line [ ] johnson [ ] NGT/OGT [ ] EVD [ ] LD [ ] YON/HMV [ ] Trach [ ] PEG [ ] Chest Tube           T(C): 36.1 (03-11-24 @ 03:00), Max: 38.7 (03-10-24 @ 19:00)  HR: 118 (03-11-24 @ 09:39) (97 - 118)  BP: 134/60 (03-11-24 @ 09:39) (94/50 - 160/92)  RR: 30 (03-11-24 @ 09:39) (16 - 37)  SpO2: 100% (03-11-24 @ 09:39) (92% - 100%)  03-10-24 @ 07:01  -  03-11-24 @ 07:00  --------------------------------------------------------  IN: 2705 mL / OUT: 1320 mL / NET: 1385 mL    acetaminophen   Oral Liquid .. 650 milliGRAM(s) Oral every 6 hours PRN  albuterol/ipratropium for Nebulization 3 milliLiter(s) Nebulizer every 6 hours  brivaracetam Oral Solution 100 milliGRAM(s) Oral <User Schedule>  carvedilol 25 milliGRAM(s) Oral every 12 hours  chlorhexidine 0.12% Liquid 15 milliLiter(s) Oral Mucosa every 12 hours  chlorhexidine 4% Liquid 1 Application(s) Topical daily  cloNIDine 0.1 milliGRAM(s) Oral three times a day  heparin   Injectable 5000 Unit(s) SubCutaneous every 12 hours  influenza  Vaccine (HIGH DOSE) 0.7 milliLiter(s) IntraMuscular once  insulin lispro (ADMELOG) corrective regimen sliding scale   SubCutaneous every 6 hours  insulin NPH human recombinant 10 Unit(s) SubCutaneous every 6 hours  lacosamide Solution 200 milliGRAM(s) Oral two times a day  ondansetron Injectable 4 milliGRAM(s) IV Push every 6 hours PRN  oxyCODONE    IR 5 milliGRAM(s) Oral every 4 hours PRN  oxyCODONE    Solution 10 milliGRAM(s) Enteral Tube every 4 hours PRN  pantoprazole  Injectable 40 milliGRAM(s) IV Push two times a day  polyethylene glycol 3350 17 Gram(s) Oral every 12 hours  predniSONE   Tablet 5 milliGRAM(s) Oral daily  senna 2 Tablet(s) Oral at bedtime  sodium chloride 3%  Inhalation 4 milliLiter(s) Inhalation every 12 hours  tacrolimus    0.5 mG/mL Suspension 4 milliGRAM(s) Oral two times a day  trimethoprim   80 mG/sulfamethoxazole 400 mG 1 Tablet(s) Oral daily  Mode: CPAP with PS, FiO2: 50, PEEP: 5, PS: 10, MAP: 9, PIP: 16                EXAMINATION:  PHYSICAL EXAM:    Constitutional: No Acute Distress     Neurological: Eyes open to pain, eyes midline, CAS, moving left UE spontaneously, right UE 0/5, right LE withdraws, not following commands     Pulmonary: Clear to Auscultation, No rales, No rhonchi, No wheezes     Cardiovascular: S1, S2, Regular rate and rhythm     Gastrointestinal: Soft, Non-tender, Non-distended     Extremities: No calf tenderness         LABS:  Na: 144 (03-11 @ 01:28), 145 (03-10 @ 13:34), 146 (03-10 @ 01:33), 145 (03-09 @ 04:46), 143 (03-08 @ 22:37), 141 (03-08 @ 21:17)  K: 5.2 (03-11 @ 01:28), 5.5 (03-10 @ 13:34), 5.4 (03-10 @ 01:33), 5.1 (03-09 @ 04:46), 5.2 (03-08 @ 22:37), 6.1 (03-08 @ 21:17)  Cl: 110 (03-11 @ 01:28), 112 (03-10 @ 13:34), 114 (03-10 @ 01:33), 112 (03-09 @ 04:46), 110 (03-08 @ 22:37), 112 (03-08 @ 21:17)  CO2: 23 (03-11 @ 01:28), 22 (03-10 @ 13:34), 22 (03-10 @ 01:33), 26 (03-09 @ 04:46), 26 (03-08 @ 22:37), 19 (03-08 @ 21:17)  BUN: 48 (03-11 @ 01:28), 50 (03-10 @ 13:34), 50 (03-10 @ 01:33), 50 (03-09 @ 04:46), 49 (03-08 @ 22:37), 49 (03-08 @ 21:17)  Cr: 1.47 (03-11 @ 01:28), 1.45 (03-10 @ 13:34), 1.54 (03-10 @ 01:33), 1.52 (03-09 @ 04:46), 1.48 (03-08 @ 22:37), 1.44 (03-08 @ 21:17)  Glu: 112(03-11 @ 01:28), 174(03-10 @ 13:34), 189(03-10 @ 01:33), 134(03-09 @ 04:46), 138(03-08 @ 22:37), 123(03-08 @ 21:17)    Hgb: 8.2 (03-11 @ 01:28), 8.7 (03-10 @ 13:34), 8.3 (03-10 @ 03:31), 8.1 (03-08 @ 22:37)  Hct: 25.9 (03-11 @ 01:28), 28.3 (03-10 @ 13:34), 28.1 (03-10 @ 03:31), 25.6 (03-08 @ 22:37)  WBC: 6.77 (03-11 @ 01:28), 7.04 (03-10 @ 13:34), 6.04 (03-10 @ 03:31), 7.29 (03-08 @ 22:37)  Plt: 182 (03-11 @ 01:28), 180 (03-10 @ 13:34), 158 (03-10 @ 03:31), 181 (03-08 @ 22:37)    INR:   PTT:           ASSESSMENT/PLAN: Patient with left ICH (ICH score 4) likely 2/2 amyloid angiopathy s/p left craniectomy(discarded) and evacuation. POD9    NEURO:  Neurochecks Q4  CT stereo    seizures Briviact 100 mg TID for seizures, Vimpat 200 mg BID ()  Activity: [x] ROM in bed    PULM:  s/p trach PRVC 14/450/5/40 Mode: CPAP with PS, FiO2: 50, PEEP: 5, PS: 10, MAP: 9, PIP: 16  CPAP  tolerating today  moderate thick secretions, 3 % inhalation       CV:  SBP goal:   History of HTN  clonidine 0.1 mg TID, Coreg 25 mg BID     RENAL:  Fluids: IVL , decrease free water to 200 ml q 8 hr   f/u nephro transplant team  patient has been off HD  s/p transplant in 2019  adjust  tacrolimus dise according to transplant recs   creat stable   AVF in the left upper extremity     GI:  Diet: Glucerna 1.5 @ goal  GI prophylaxis [] not indicated [x] PPI [] other:  Bowel regimen [x] miralax [x] senna [x] Dulcolax supp  pantoprazole   LBM: 3/10  rectal tube has diarrhea , d/c stool softener   s/p PEG  H.pylori Pantoptazole 40 mg BID, will need treatment, GI advised to do it when she is more stable     ENDO:   Goal euglycemia (-180)  ISS  A1C: 5.4%  insulin NPH human recombinant 10 Unit(s) SubCutaneous every 6 hours    HEME/ONC:   H/H stable for now  hx of breast cancer - finished Tamoxifen in March 2023, will discontinue   LED- bilateral above the knee DVT. s/p IVL filter    Patient receives procrit shots every month, will continue   heparin sc for chemorpophylaxis     ID:febrile, UA positive despite ceftriaxone, urine culture gram neg pavel , change ceftriaxone to cefepime , until we get sensitivity   Follow up rest of the cultures   Combicath negative   wound care for skin   chest xray clear   MISC:    SOCIAL/FAMILY:  [x] updated at bedside [] family meeting    CODE STATUS:  [x] Full Code [] DNR [] DNI [] Palliative/Comfort Care    DISPOSITION:  [x] ICU [] Stroke Unit [] Floor [] EMU [] RCU [] PCU    [x] Patient is at high risk of neurologic deterioration/death due to: infection, expansion of hematoma     moderate complex medical decision

## 2024-03-12 DIAGNOSIS — Z29.9 ENCOUNTER FOR PROPHYLACTIC MEASURES, UNSPECIFIED: ICD-10-CM

## 2024-03-12 DIAGNOSIS — R13.10 DYSPHAGIA, UNSPECIFIED: ICD-10-CM

## 2024-03-12 DIAGNOSIS — R73.9 HYPERGLYCEMIA, UNSPECIFIED: ICD-10-CM

## 2024-03-12 DIAGNOSIS — R56.9 UNSPECIFIED CONVULSIONS: ICD-10-CM

## 2024-03-12 DIAGNOSIS — J96.00 ACUTE RESPIRATORY FAILURE, UNSPECIFIED WHETHER WITH HYPOXIA OR HYPERCAPNIA: ICD-10-CM

## 2024-03-12 DIAGNOSIS — N39.0 URINARY TRACT INFECTION, SITE NOT SPECIFIED: ICD-10-CM

## 2024-03-12 DIAGNOSIS — Z71.89 OTHER SPECIFIED COUNSELING: ICD-10-CM

## 2024-03-12 DIAGNOSIS — I10 ESSENTIAL (PRIMARY) HYPERTENSION: ICD-10-CM

## 2024-03-12 LAB
-  AMOXICILLIN/CLAVULANIC ACID: SIGNIFICANT CHANGE UP
-  AMPICILLIN/SULBACTAM: SIGNIFICANT CHANGE UP
-  AMPICILLIN: SIGNIFICANT CHANGE UP
-  AZTREONAM: SIGNIFICANT CHANGE UP
-  CEFAZOLIN: SIGNIFICANT CHANGE UP
-  CEFEPIME: SIGNIFICANT CHANGE UP
-  CEFTRIAXONE: SIGNIFICANT CHANGE UP
-  CEFUROXIME: SIGNIFICANT CHANGE UP
-  CIPROFLOXACIN: SIGNIFICANT CHANGE UP
-  ERTAPENEM: SIGNIFICANT CHANGE UP
-  GENTAMICIN: SIGNIFICANT CHANGE UP
-  IMIPENEM: SIGNIFICANT CHANGE UP
-  LEVOFLOXACIN: SIGNIFICANT CHANGE UP
-  MEROPENEM: SIGNIFICANT CHANGE UP
-  NITROFURANTOIN: SIGNIFICANT CHANGE UP
-  PIPERACILLIN/TAZOBACTAM: SIGNIFICANT CHANGE UP
-  TOBRAMYCIN: SIGNIFICANT CHANGE UP
-  TRIMETHOPRIM/SULFAMETHOXAZOLE: SIGNIFICANT CHANGE UP
ANION GAP SERPL CALC-SCNC: 10 MMOL/L — SIGNIFICANT CHANGE UP (ref 5–17)
BUN SERPL-MCNC: 49 MG/DL — HIGH (ref 7–23)
CALCIUM SERPL-MCNC: 9.4 MG/DL — SIGNIFICANT CHANGE UP (ref 8.4–10.5)
CHLORIDE SERPL-SCNC: 107 MMOL/L — SIGNIFICANT CHANGE UP (ref 96–108)
CO2 SERPL-SCNC: 21 MMOL/L — LOW (ref 22–31)
CREAT SERPL-MCNC: 1.49 MG/DL — HIGH (ref 0.5–1.3)
CULTURE RESULTS: ABNORMAL
EGFR: 36 ML/MIN/1.73M2 — LOW
GLUCOSE BLDC GLUCOMTR-MCNC: 143 MG/DL — HIGH (ref 70–99)
GLUCOSE BLDC GLUCOMTR-MCNC: 150 MG/DL — HIGH (ref 70–99)
GLUCOSE BLDC GLUCOMTR-MCNC: 171 MG/DL — HIGH (ref 70–99)
GLUCOSE BLDC GLUCOMTR-MCNC: 214 MG/DL — HIGH (ref 70–99)
GLUCOSE BLDC GLUCOMTR-MCNC: 247 MG/DL — HIGH (ref 70–99)
GLUCOSE SERPL-MCNC: 171 MG/DL — HIGH (ref 70–99)
HCT VFR BLD CALC: 26.2 % — LOW (ref 34.5–45)
HGB BLD-MCNC: 7.9 G/DL — LOW (ref 11.5–15.5)
MAGNESIUM SERPL-MCNC: 2.4 MG/DL — SIGNIFICANT CHANGE UP (ref 1.6–2.6)
MCHC RBC-ENTMCNC: 28.7 PG — SIGNIFICANT CHANGE UP (ref 27–34)
MCHC RBC-ENTMCNC: 30.2 GM/DL — LOW (ref 32–36)
MCV RBC AUTO: 95.3 FL — SIGNIFICANT CHANGE UP (ref 80–100)
METHOD TYPE: SIGNIFICANT CHANGE UP
NRBC # BLD: 0 /100 WBCS — SIGNIFICANT CHANGE UP (ref 0–0)
ORGANISM # SPEC MICROSCOPIC CNT: ABNORMAL
ORGANISM # SPEC MICROSCOPIC CNT: ABNORMAL
PHOSPHATE SERPL-MCNC: 3.3 MG/DL — SIGNIFICANT CHANGE UP (ref 2.5–4.5)
PLATELET # BLD AUTO: 197 K/UL — SIGNIFICANT CHANGE UP (ref 150–400)
POTASSIUM SERPL-MCNC: 4.8 MMOL/L — SIGNIFICANT CHANGE UP (ref 3.5–5.3)
POTASSIUM SERPL-SCNC: 4.8 MMOL/L — SIGNIFICANT CHANGE UP (ref 3.5–5.3)
RBC # BLD: 2.75 M/UL — LOW (ref 3.8–5.2)
RBC # FLD: 15.2 % — HIGH (ref 10.3–14.5)
SODIUM SERPL-SCNC: 138 MMOL/L — SIGNIFICANT CHANGE UP (ref 135–145)
SPECIMEN SOURCE: SIGNIFICANT CHANGE UP
TACROLIMUS SERPL-MCNC: 2.3 NG/ML — SIGNIFICANT CHANGE UP
WBC # BLD: 6.49 K/UL — SIGNIFICANT CHANGE UP (ref 3.8–10.5)
WBC # FLD AUTO: 6.49 K/UL — SIGNIFICANT CHANGE UP (ref 3.8–10.5)

## 2024-03-12 PROCEDURE — 70450 CT HEAD/BRAIN W/O DYE: CPT | Mod: 26

## 2024-03-12 PROCEDURE — 99221 1ST HOSP IP/OBS SF/LOW 40: CPT

## 2024-03-12 PROCEDURE — 99233 SBSQ HOSP IP/OBS HIGH 50: CPT

## 2024-03-12 RX ORDER — MEROPENEM 1 G/30ML
1000 INJECTION INTRAVENOUS EVERY 12 HOURS
Refills: 0 | Status: DISCONTINUED | OUTPATIENT
Start: 2024-03-12 | End: 2024-03-12

## 2024-03-12 RX ORDER — ACETAMINOPHEN 500 MG
750 TABLET ORAL ONCE
Refills: 0 | Status: COMPLETED | OUTPATIENT
Start: 2024-03-12 | End: 2024-03-12

## 2024-03-12 RX ORDER — DOCOSANOL 100 MG/G
1 CREAM TOPICAL DAILY
Refills: 0 | Status: DISCONTINUED | OUTPATIENT
Start: 2024-03-12 | End: 2024-03-23

## 2024-03-12 RX ORDER — NYSTATIN 500MM UNIT
500000 POWDER (EA) MISCELLANEOUS EVERY 6 HOURS
Refills: 0 | Status: DISCONTINUED | OUTPATIENT
Start: 2024-03-12 | End: 2024-04-02

## 2024-03-12 RX ORDER — HUMAN INSULIN 100 [IU]/ML
12 INJECTION, SUSPENSION SUBCUTANEOUS EVERY 6 HOURS
Refills: 0 | Status: DISCONTINUED | OUTPATIENT
Start: 2024-03-12 | End: 2024-03-12

## 2024-03-12 RX ORDER — VANCOMYCIN HCL 1 G
750 VIAL (EA) INTRAVENOUS EVERY 24 HOURS
Refills: 0 | Status: DISCONTINUED | OUTPATIENT
Start: 2024-03-12 | End: 2024-03-13

## 2024-03-12 RX ORDER — HUMAN INSULIN 100 [IU]/ML
13 INJECTION, SUSPENSION SUBCUTANEOUS EVERY 6 HOURS
Refills: 0 | Status: DISCONTINUED | OUTPATIENT
Start: 2024-03-12 | End: 2024-03-12

## 2024-03-12 RX ORDER — MEROPENEM 1 G/30ML
1000 INJECTION INTRAVENOUS EVERY 12 HOURS
Refills: 0 | Status: COMPLETED | OUTPATIENT
Start: 2024-03-12 | End: 2024-03-19

## 2024-03-12 RX ORDER — INSULIN LISPRO 100/ML
2 VIAL (ML) SUBCUTANEOUS ONCE
Refills: 0 | Status: COMPLETED | OUTPATIENT
Start: 2024-03-12 | End: 2024-03-12

## 2024-03-12 RX ORDER — HUMAN INSULIN 100 [IU]/ML
10 INJECTION, SUSPENSION SUBCUTANEOUS EVERY 6 HOURS
Refills: 0 | Status: DISCONTINUED | OUTPATIENT
Start: 2024-03-12 | End: 2024-03-13

## 2024-03-12 RX ADMIN — MEROPENEM 100 MILLIGRAM(S): 1 INJECTION INTRAVENOUS at 17:13

## 2024-03-12 RX ADMIN — SODIUM CHLORIDE 4 MILLILITER(S): 9 INJECTION INTRAMUSCULAR; INTRAVENOUS; SUBCUTANEOUS at 05:06

## 2024-03-12 RX ADMIN — SODIUM CHLORIDE 4 MILLILITER(S): 9 INJECTION INTRAMUSCULAR; INTRAVENOUS; SUBCUTANEOUS at 23:12

## 2024-03-12 RX ADMIN — Medication 500000 UNIT(S): at 23:48

## 2024-03-12 RX ADMIN — SODIUM CHLORIDE 4 MILLILITER(S): 9 INJECTION INTRAMUSCULAR; INTRAVENOUS; SUBCUTANEOUS at 17:37

## 2024-03-12 RX ADMIN — Medication 3 MILLILITER(S): at 05:06

## 2024-03-12 RX ADMIN — HUMAN INSULIN 10 UNIT(S): 100 INJECTION, SUSPENSION SUBCUTANEOUS at 17:40

## 2024-03-12 RX ADMIN — Medication 4: at 23:45

## 2024-03-12 RX ADMIN — BRIVARACETAM 100 MILLIGRAM(S): 25 TABLET, FILM COATED ORAL at 17:31

## 2024-03-12 RX ADMIN — Medication 650 MILLIGRAM(S): at 06:00

## 2024-03-12 RX ADMIN — HUMAN INSULIN 10 UNIT(S): 100 INJECTION, SUSPENSION SUBCUTANEOUS at 11:51

## 2024-03-12 RX ADMIN — CEFEPIME 100 MILLIGRAM(S): 1 INJECTION, POWDER, FOR SOLUTION INTRAMUSCULAR; INTRAVENOUS at 11:14

## 2024-03-12 RX ADMIN — Medication 650 MILLIGRAM(S): at 05:15

## 2024-03-12 RX ADMIN — Medication 0.1 MILLIGRAM(S): at 05:16

## 2024-03-12 RX ADMIN — Medication 2 UNIT(S): at 01:35

## 2024-03-12 RX ADMIN — Medication 300 MILLIGRAM(S): at 17:48

## 2024-03-12 RX ADMIN — HUMAN INSULIN 10 UNIT(S): 100 INJECTION, SUSPENSION SUBCUTANEOUS at 23:46

## 2024-03-12 RX ADMIN — Medication 250 MILLIGRAM(S): at 17:51

## 2024-03-12 RX ADMIN — CARVEDILOL PHOSPHATE 25 MILLIGRAM(S): 80 CAPSULE, EXTENDED RELEASE ORAL at 05:16

## 2024-03-12 RX ADMIN — HEPARIN SODIUM 5000 UNIT(S): 5000 INJECTION INTRAVENOUS; SUBCUTANEOUS at 05:16

## 2024-03-12 RX ADMIN — BRIVARACETAM 100 MILLIGRAM(S): 25 TABLET, FILM COATED ORAL at 05:15

## 2024-03-12 RX ADMIN — TACROLIMUS 5 MILLIGRAM(S): 5 CAPSULE ORAL at 17:12

## 2024-03-12 RX ADMIN — Medication 3 MILLILITER(S): at 17:37

## 2024-03-12 RX ADMIN — TACROLIMUS 5 MILLIGRAM(S): 5 CAPSULE ORAL at 06:50

## 2024-03-12 RX ADMIN — Medication 1 TABLET(S): at 11:14

## 2024-03-12 RX ADMIN — Medication 3 MILLILITER(S): at 23:12

## 2024-03-12 RX ADMIN — LACOSAMIDE 200 MILLIGRAM(S): 50 TABLET ORAL at 05:16

## 2024-03-12 RX ADMIN — Medication 0.1 MILLIGRAM(S): at 22:13

## 2024-03-12 RX ADMIN — LACOSAMIDE 200 MILLIGRAM(S): 50 TABLET ORAL at 17:12

## 2024-03-12 RX ADMIN — Medication 3 MILLILITER(S): at 11:02

## 2024-03-12 RX ADMIN — Medication 5 MILLIGRAM(S): at 05:16

## 2024-03-12 RX ADMIN — CARVEDILOL PHOSPHATE 25 MILLIGRAM(S): 80 CAPSULE, EXTENDED RELEASE ORAL at 17:12

## 2024-03-12 RX ADMIN — BRIVARACETAM 100 MILLIGRAM(S): 25 TABLET, FILM COATED ORAL at 22:13

## 2024-03-12 RX ADMIN — DOCOSANOL 1 APPLICATION(S): 100 CREAM TOPICAL at 17:13

## 2024-03-12 RX ADMIN — HUMAN INSULIN 10 UNIT(S): 100 INJECTION, SUSPENSION SUBCUTANEOUS at 05:44

## 2024-03-12 RX ADMIN — PANTOPRAZOLE SODIUM 40 MILLIGRAM(S): 20 TABLET, DELAYED RELEASE ORAL at 17:12

## 2024-03-12 RX ADMIN — Medication 0.1 MILLIGRAM(S): at 17:12

## 2024-03-12 RX ADMIN — SODIUM CHLORIDE 4 MILLILITER(S): 9 INJECTION INTRAMUSCULAR; INTRAVENOUS; SUBCUTANEOUS at 11:02

## 2024-03-12 RX ADMIN — HEPARIN SODIUM 5000 UNIT(S): 5000 INJECTION INTRAVENOUS; SUBCUTANEOUS at 17:13

## 2024-03-12 RX ADMIN — Medication 1 DROP(S): at 17:40

## 2024-03-12 RX ADMIN — Medication 2: at 17:40

## 2024-03-12 RX ADMIN — PANTOPRAZOLE SODIUM 40 MILLIGRAM(S): 20 TABLET, DELAYED RELEASE ORAL at 05:16

## 2024-03-12 NOTE — PROGRESS NOTE ADULT - NS ATTEND AMEND GEN_ALL_CORE FT
77 yo F with PMHx ESRD s/p renal transplant (2019, now off HD), left AKA, BK viremia, HTN, breast ca s/p lumpectomy (completed Tamoxifen 03/2023), DVT (off Eliquis), HLD, gout presenting with left ICH (ICH score 4) likely 2/2 amyloid angiopathy s/p left craniectomy(discarded) and evacuation POD9 as well as EVD placement and removal. She is s/p trach/peg 3/9/24.      #Left ICH  - Follow-up Head CT today  #Seizures   - Briviact 100 mg TID for seizures, Vimpat 200 mg BID   - may require repeat EEG  #Respiratory Failure  - s/p trach PRVC 14/450/5/40  - CPAP trials daily  - having moderately thick secretions, Duonebs q6h + Hypersal 3 % q6h ATC  #HTN  - SBP goal:   - Clonidine 0.1 mg TID, Coreg 25 mg BID   - Echo: LVEF 70-75%, Grade II diastolic dysfcn, septal bulge noted  #Renal transplant (2019), AVF in the left upper extremity  - cont free water 200 ml q8h for now  - ped 5mg, bactrim ppx  - Tacrolimus to 5mg BID per transplant nephro recs, f/u levels  #H. pylori infection  - Protonix  - H.pylori, f/u with GI re: treatment, initiation of macrolide based regimen vs bismuth/flagyl based if indicated  #UTI  - UA still positive on Ceftriaxone, switched to Cefepime, 3/8 culture with Kleb pna >100K, 3/10 culture with 10-49K suggesting response, will keep Cefepime for now pending susceptibilities  #Hyperglycemia   - Goal euglycemia (-180), A1C: 5.4%  - ISS  - humulin 10u q6h  # Hx of bilateral above the knee DVT. s/p IVC filter   - HSQ q12h, ok per Neurosurgery  #Ethics: Full code      Lidya Cancino MD, MSCR

## 2024-03-12 NOTE — PROGRESS NOTE ADULT - PROBLEM SELECTOR PLAN 2
- s/p trach 3/68 by surgery - sutures to be removed 3/13  - [facility] 14/450/5/40  - PS trials daily  - continue airway clearance, Duonebs q6h + Hypersal 3 % q6h ATC
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
## 2024-03-12 NOTE — PROGRESS NOTE ADULT - PROBLEM SELECTOR PLAN 5
- SBP goal:   - Clonidine 0.1 mg TID, Coreg 25 mg BID   - Echo: LVEF 70-75%, Grade II diastolic dysfcn, septal bulge without obstruction  - cardiology following

## 2024-03-12 NOTE — PROGRESS NOTE ADULT - PROBLEM SELECTOR PLAN 8
- s/p PEG 3/8  - tolerating tube feeds    [] H. 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 pylori stool antigen positive  - protonix 40mg PO BID x3 months  - awaiting GI recs for h. pylori treatment   - GI (Dr. Laguerre) called

## 2024-03-12 NOTE — PROGRESS NOTE ADULT - SUBJECTIVE AND OBJECTIVE BOX
Patient is a 78y old  Female who presents with a chief complaint of ICH (11 Mar 2024 17:49)      Interval Events:    REVIEW OF SYSTEMS:  [ ] Positive  [ ] All other systems negative  [ ] Unable to assess ROS because ________    Vital Signs Last 24 Hrs  T(C): 37.5 (03-12-24 @ 05:50), Max: 38.5 (03-11-24 @ 12:10)  T(F): 99.5 (03-12-24 @ 05:50), Max: 101.3 (03-11-24 @ 12:10)  HR: 111 (03-12-24 @ 07:00) (99 - 130)  BP: 148/87 (03-12-24 @ 07:00) (95/45 - 180/110)  RR: 17 (03-12-24 @ 07:00) (14 - 30)  SpO2: 100% (03-12-24 @ 07:00) (98% - 100%)    PHYSICAL EXAM:  HEENT:   [ ]Tracheostomy:  [ ]Pupils equal  [ ]No oral lesions  [ ]Abnormal    SKIN  [ ]No Rash  [ ] Abnormal  [ ] pressure    CARDIAC  [ ]Regular  [ ]Abnormal    PULMONARY  [ ]Bilateral Clear Breath Sounds  [ ]Normal Excursion  [ ]Abnormal    GI  [ ]PEG      [ ] +BS		              [ ]Soft, nondistended, nontender	  [ ]Abnormal    MUSCULOSKELETAL                                   [ ]Bedbound                 [ ]Abnormal    [ ]Ambulatory/OOB to chair                           EXTREMITIES                                         [ ]Normal  [ ]Edema                           NEUROLOGIC  [ ] Normal, non focal  [ ] Focal findings:    PSYCHIATRIC  [ ]Alert and appropriate  [ ] Sedated	 [ ]Agitated    :  Gardner: [ ] Yes, if yes: Date of Placement:                   [  ] No    LINES: Central Lines [ ] Yes, if yes: Date of Placement                                     [  ] No    HOSPITAL MEDICATIONS:  MEDICATIONS  (STANDING):  albuterol/ipratropium for Nebulization 3 milliLiter(s) Nebulizer every 6 hours  brivaracetam Oral Solution 100 milliGRAM(s) Oral <User Schedule>  carvedilol 25 milliGRAM(s) Oral every 12 hours  cefepime   IVPB      cefepime   IVPB 1000 milliGRAM(s) IV Intermittent daily  cloNIDine 0.1 milliGRAM(s) Oral three times a day  heparin   Injectable 5000 Unit(s) SubCutaneous every 12 hours  influenza  Vaccine (HIGH DOSE) 0.7 milliLiter(s) IntraMuscular once  insulin lispro (ADMELOG) corrective regimen sliding scale   SubCutaneous every 6 hours  insulin NPH human recombinant 10 Unit(s) SubCutaneous every 6 hours  lacosamide Solution 200 milliGRAM(s) Oral two times a day  pantoprazole   Suspension 40 milliGRAM(s) Oral two times a day  predniSONE   Tablet 5 milliGRAM(s) Oral daily  sodium chloride 3%  Inhalation 4 milliLiter(s) Inhalation every 6 hours  tacrolimus    0.5 mG/mL Suspension 5 milliGRAM(s) Oral two times a day  trimethoprim   80 mG/sulfamethoxazole 400 mG 1 Tablet(s) Oral daily    MEDICATIONS  (PRN):  acetaminophen   Oral Liquid .. 650 milliGRAM(s) Oral every 6 hours PRN Temp greater or equal to 38C (100.4F), Mild Pain (1 - 3)  ondansetron Injectable 4 milliGRAM(s) IV Push every 6 hours PRN Nausea and/or Vomiting  oxyCODONE    IR 5 milliGRAM(s) Oral every 4 hours PRN Moderate Pain (4 - 6)  oxyCODONE    Solution 10 milliGRAM(s) Enteral Tube every 4 hours PRN Severe Pain (7 - 10)      LABS:                        8.2    6.77  )-----------( 182      ( 11 Mar 2024 01:28 )             25.9     03-11    144  |  110<H>  |  48<H>  ----------------------------<  112<H>  5.2   |  23  |  1.47<H>    Ca    9.4      11 Mar 2024 01:28  Phos  3.3     03-11  Mg     2.6     03-11        Urinalysis Basic - ( 11 Mar 2024 01:28 )    Color: x / Appearance: x / SG: x / pH: x  Gluc: 112 mg/dL / Ketone: x  / Bili: x / Urobili: x   Blood: x / Protein: x / Nitrite: x   Leuk Esterase: x / RBC: x / WBC x   Sq Epi: x / Non Sq Epi: x / Bacteria: x          CAPILLARY BLOOD GLUCOSE    MICROBIOLOGY:     RADIOLOGY:  [ ] Reviewed and interpreted by me    Mode: AC/ CMV (Assist Control/ Continuous Mandatory Ventilation)  RR (machine): 14  TV (machine): 450  FiO2: 40  PEEP: 5  ITime: 1  MAP: 10  PIP: 20   Patient is a 78y old  Female who presents with a chief complaint of ICH (11 Mar 2024 17:49)      Interval Events:  [facility]     REVIEW OF SYSTEMS:  [ ] Positive  [ ] All other systems negative  [X] Unable to assess ROS because ___Unresponsive_____    Vital Signs Last 24 Hrs  T(C): 37.5 (03-12-24 @ 05:50), Max: 38.5 (03-11-24 @ 12:10)  T(F): 99.5 (03-12-24 @ 05:50), Max: 101.3 (03-11-24 @ 12:10)  HR: 111 (03-12-24 @ 07:00) (99 - 130)  BP: 148/87 (03-12-24 @ 07:00) (95/45 - 180/110)  RR: 17 (03-12-24 @ 07:00) (14 - 30)  SpO2: 100% (03-12-24 @ 07:00) (98% - 100%)    PHYSICAL EXAM:  HEENT:   [X]Tracheostomy:  #7 cuffed portex  [X]Pupils equal  [ ]No oral lesions  [X]Abnormal - + scleral edema, + lip sores x2, + tongue swelling     SKIN  [ ]No Rash  [X] Abnormal - LUE AVF no thrill, no bruit  [X] pressure - sacral pressure injury    CARDIAC  [X]Regular  [ ]Abnormal    PULMONARY  [ ]Bilateral Clear Breath Sounds  [ ]Normal Excursion  [X]Abnormal - BL coarse BS    GI  [X]PEG      [X] +BS		              [X]Soft, nondistended, nontender	  [X]Abnormal - + rectal tube    MUSCULOSKELETAL                                   [X]Bedbound                 [ ]Abnormal    [ ]Ambulatory/OOB to chair                           EXTREMITIES                                         [ ]Normal  [X]Edema - L thigh edema             NEUROLOGIC  [ ] Normal, non focal  [X] Focal findings: eyes open spontaneously, no tracking, did not follow commands of any extremity, withdraws RLE    PSYCHIATRIC  [X] Unresponsive   [ ] Sedated	 [ ]Agitated    :  Gardner: [ ] Yes, if yes: Date of Placement:                   [X] No    LINES: Central Lines [ ] Yes, if yes: Date of Placement                                     [X] No    HOSPITAL MEDICATIONS:  MEDICATIONS  (STANDING):  albuterol/ipratropium for Nebulization 3 milliLiter(s) Nebulizer every 6 hours  brivaracetam Oral Solution 100 milliGRAM(s) Oral <User Schedule>  carvedilol 25 milliGRAM(s) Oral every 12 hours  cefepime   IVPB      cefepime   IVPB 1000 milliGRAM(s) IV Intermittent daily  cloNIDine 0.1 milliGRAM(s) Oral three times a day  heparin   Injectable 5000 Unit(s) SubCutaneous every 12 hours  influenza  Vaccine (HIGH DOSE) 0.7 milliLiter(s) IntraMuscular once  insulin lispro (ADMELOG) corrective regimen sliding scale   SubCutaneous every 6 hours  insulin NPH human recombinant 10 Unit(s) SubCutaneous every 6 hours  lacosamide Solution 200 milliGRAM(s) Oral two times a day  pantoprazole   Suspension 40 milliGRAM(s) Oral two times a day  predniSONE   Tablet 5 milliGRAM(s) Oral daily  sodium chloride 3%  Inhalation 4 milliLiter(s) Inhalation every 6 hours  tacrolimus    0.5 mG/mL Suspension 5 milliGRAM(s) Oral two times a day  trimethoprim   80 mG/sulfamethoxazole 400 mG 1 Tablet(s) Oral daily    MEDICATIONS  (PRN):  acetaminophen   Oral Liquid .. 650 milliGRAM(s) Oral every 6 hours PRN Temp greater or equal to 38C (100.4F), Mild Pain (1 - 3)  ondansetron Injectable 4 milliGRAM(s) IV Push every 6 hours PRN Nausea and/or Vomiting  oxyCODONE    IR 5 milliGRAM(s) Oral every 4 hours PRN Moderate Pain (4 - 6)  oxyCODONE    Solution 10 milliGRAM(s) Enteral Tube every 4 hours PRN Severe Pain (7 - 10)      LABS:                        8.2    6.77  )-----------( 182      ( 11 Mar 2024 01:28 )             25.9     03-11    144  |  110<H>  |  48<H>  ----------------------------<  112<H>  5.2   |  23  |  1.47<H>    Ca    9.4      11 Mar 2024 01:28  Phos  3.3     03-11  Mg     2.6     03-11    Urinalysis Basic - ( 11 Mar 2024 01:28 )    Color: x / Appearance: x / SG: x / pH: x  Gluc: 112 mg/dL / Ketone: x  / Bili: x / Urobili: x   Blood: x / Protein: x / Nitrite: x   Leuk Esterase: x / RBC: x / WBC x   Sq Epi: x / Non Sq Epi: x / Bacteria: x    CAPILLARY BLOOD GLUCOSE    MICROBIOLOGY:     RADIOLOGY:  [ ] Reviewed and interpreted by me    Mode: AC/ CMV (Assist Control/ Continuous Mandatory Ventilation)  RR (machine): 14  TV (machine): 450  FiO2: 40  PEEP: 5  ITime: 1  MAP: 10  PIP: 20

## 2024-03-12 NOTE — CONSULT NOTE ADULT - ASSESSMENT
Impression:    Sacral/bilateral Buttocks deep tissue injury present on admission  Incontinence of bowel and bladder  Incontinence Dermatitis    Recommend:  1.) topical therapy: sacral/buttock injury - cleanse with incontinence cleanser, pat dry, apply cavilon skin protectant daily  2.) Incontinence Management - incontinence cleanser, pads, pericare BID  3.) Maintain on an alternating air with low air loss surface  4.) Turn and reposition Q 2 hours  5.) Nutrition optimization - please add Sebas  6.) Offload heels/feet with complete cair air fluidized boots; ensure that the soles of the feet are not resting on the foot board of the bed.    Care as per medicine. Will not actively follow but will remain available. Please recall for new issues or deterioration.  Upon [facility] [address] [place] [address] [phone]  Thank you for this consult  Seen and discussed with clinical nurse  Charley Rivera, RANDY-C, CWOCN via TEAMS Impression:    Sacral/bilateral Buttocks deep tissue injury   Incontinence of bowel and bladder  Incontinence Dermatitis    Recommend:  1.) topical therapy: sacral/buttock injury - cleanse with incontinence cleanser, pat dry, apply cavilon skin protectant daily  2.) Incontinence Management - incontinence cleanser, pads, pericare BID  3.) Maintain on an alternating air with low air loss surface  4.) Turn and reposition Q 2 hours  5.) Nutrition optimization - please add Sebas  6.) Offload heels/feet with complete cair air fluidized boots; ensure that the soles of the feet are not resting on the foot board of the bed.    Care as per medicine. Will not actively follow but will remain available. Please recall for new issues or deterioration.  Upon discharge f/u as outpatient at [facility] [address] [place] [address] [phone]  Thank you for this consult  Seen and discussed with clinical nurse  Charley Rivera, NP-C, CWOCN via TEAMS

## 2024-03-12 NOTE — PROGRESS NOTE ADULT - ASSESSMENT
77 yo F with PMHx ESRD s/p renal transplant (2019, now off HD), left AKA, BK viremia, HTN, breast ca s/p lumpectomy (completed Tamoxifen 03/2023), DVT (off Eliquis), HLD, gout presenting with left ICH (ICH score 4) likely 2/2 amyloid angiopathy s/p left craniectomy(discarded) and evacuation POD9 as well as EVD placement and removal. She is s/p trach/peg 3/9/24.  [facility] consulted for transfer for vent weaning.    #Seizures   - Briviact 100 mg TID for seizures, Vimpat 200 mg BID     #Respiratory Failure  - s/p trach PRVC 14/450/5/40  - CPAP trials daily  - having moderately thick secretions, Duonebs q6h + Hypersal 3 % q6h ATC    #HTN  - SBP goal:   - Clonidine 0.1 mg TID, Coreg 25 mg BID   - Echo: LVEF 70-75%, Grade II diastolic dysfcn    #Renal transplant (2019), AVF in the left upper extremity  - cont free water 200 ml q8h for now  - ped 5mg, bactrim ppx  - increase Tracrolimus to 5mg BID per transplant nephro recs       #Nutrition  - Glucerna 1.5 @ goal via PEG    #H. pylori infection  - Protonix  - H.pylori, will need treatment, f/u with GI    #UTI  - UA still positive on Ceftriaxone, switched to Cefepime pending culture results    #Hyperglycemia   - Goal euglycemia (-180), A1C: 5.4%  - ISS  - humulin 10u q6h    # Hx of bilateral above the knee DVT. s/p IVC filter   - HSQ q12h, ok per Neurosurgery    #Ethics: Full code  #DISPO: Acceptable for transfer to [facility], team made aware.    Lidya Cancino MD, MSCR         77 yo F with PMHx ESRD s/p renal transplant (2019, now off HD), left AKA, BK viremia, HTN, breast ca s/p lumpectomy (completed Tamoxifen 03/2023), DVT (off Eliquis), HLD, gout presenting 3/1 with left ICH (ICH score 4) likely 2/2 amyloid angiopathy s/p left craniectomy(discarded) and evacuation as well as EVD placement and removal (3/7). She is s/p trach/peg 3/8/24.  Febrile 3/10 with + UA, blood/sputum culture NGTD.  Treatment for UTI initiated with ceftriaxone, eventually broadened to cefepime.  Transferred to [facility] 3/11 for continued management.  Pt arrived from [facility] with minimal mental status with only response of spontaneous eye opening and withdrawal on RLE.  Urine culture grew positive for klebsiella and enterococcus, cefepime d/c'd meropenem and vanc started 3/12.  Will continue with airway clearance management and wean from ventilator as tolerated.      3/12:  Did not tolerate PS trials.  Spontaneous eye opening, RLE withdrawal to pain.  CT H performed - follow up with nsx regarding results.  Pt requiring intermittent catherization.  Rectal tube in place.  Dr. Laguerre called for GI consult regarding h. pylori treatment.  Daughter and son in law updated at bedside during AM rounds.

## 2024-03-12 NOTE — PROGRESS NOTE ADULT - PROBLEM SELECTOR PLAN 4
- + UA  - ceftriaxone broadened to cefepime  - culture - + Klebsiella and + Enterococcus - sensitivities pending  - cefepime d/c'd, meropenem and vanc started 3/12

## 2024-03-12 NOTE — PROGRESS NOTE ADULT - SUBJECTIVE AND OBJECTIVE BOX
DATE OF SERVICE: 03-12-24 @ 14:40    Patient is a 78y old  Female who presents with a chief complaint of ICH (12 Mar 2024 07:08)      INTERVAL HISTORY:     REVIEW OF SYSTEMS:  CONSTITUTIONAL: No weakness  EYES/ENT: No visual changes;  No throat pain   NECK: No pain or stiffness  RESPIRATORY: No cough, wheezing; No shortness of breath  CARDIOVASCULAR: No chest pain or palpitations  GASTROINTESTINAL: No abdominal  pain. No nausea, vomiting, or hematemesis  GENITOURINARY: No dysuria, frequency or hematuria  NEUROLOGICAL: No stroke like symptoms  SKIN: No rashes      	  MEDICATIONS:  carvedilol 25 milliGRAM(s) Oral every 12 hours  cloNIDine 0.1 milliGRAM(s) Oral three times a day        PHYSICAL EXAM:  T(C): 37.6 (03-12-24 @ 11:30), Max: 38.2 (03-11-24 @ 15:00)  HR: 91 (03-12-24 @ 14:00) (91 - 130)  BP: 176/80 (03-12-24 @ 11:30) (95/45 - 180/110)  RR: 18 (03-12-24 @ 11:30) (14 - 22)  SpO2: 100% (03-12-24 @ 14:00) (98% - 100%)  Wt(kg): --  I&O's Summary    11 Mar 2024 07:01  -  12 Mar 2024 07:00  --------------------------------------------------------  IN: 1575 mL / OUT: 1050 mL / NET: 525 mL          Appearance: In no distress	  HEENT:    PERRL, EOMI	  Cardiovascular:  S1 S2, No JVD  Respiratory: Lungs clear to auscultation	  Gastrointestinal:  Soft, Non-tender, + BS	  Vascularature:  No edema of LE  Psychiatric: Appropriate affect   Neuro: no acute focal deficits                               7.9    6.49  )-----------( 197      ( 12 Mar 2024 07:16 )             26.2     03-12    138  |  107  |  49<H>  ----------------------------<  171<H>  4.8   |  21<L>  |  1.49<H>    Ca    9.4      12 Mar 2024 07:16  Phos  3.3     03-12  Mg     2.4     03-12          Labs personally reviewed      ASSESSMENT/PLAN: 	   78F Hx ESRD s/p renal xplant c/b DGF off HD, L AKA, 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      1. Abnormal Echo --  Septal bulge noted measures 2.2cm without obstruction.   -- Given no intracavitary obstruction no intervention at this time  - No Myxoma seen on this echo or echo in 2019, ? if hypermobile interatrial septum was confused for on prior imaging     2. HTN - uncontrolled, needs improvement   Continue Hydralazine 100 mg Q8H, clonidine 0.1 mg TID, Coreg 25 mg BID   - consider amlodipine 5mg and titrate up as needed for better b/p measurements     3. 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 DVT PPX - HSQ when safe            RANDY Lopez DO [facility]  Cardiovascular Medicine  [address], [address]  Available through call or text on [employer] TEAMs  Office: [phone]

## 2024-03-12 NOTE — PROGRESS NOTE ADULT - PROBLEM SELECTOR PLAN 6
- AVF in the left upper extremity  - s/p renal transplant in 2019  - cont free water 200 ml q12hr  - ped 5mg, bactrim ppx  - tacro 5mg BID as per transplant nephro  - trach goal 4-7, daily tacro levels 30 mins prior to dose administration

## 2024-03-12 NOTE — PROGRESS NOTE ADULT - PROBLEM SELECTOR PLAN 1
- found to have L IPH on CTH likely 2/2 amyloid angiopathy  - s/p L hemicrani for evacuation of large ICH; bone discarded  - CT H 3/8 stable  - repeat CT H stereo 3/12 performed - follow up nsx  - eventual cranioplasty  - neuro checks

## 2024-03-13 DIAGNOSIS — I82.409 ACUTE EMBOLISM AND THROMBOSIS OF UNSPECIFIED DEEP VEINS OF UNSPECIFIED LOWER EXTREMITY: ICD-10-CM

## 2024-03-13 LAB
-  AMOXICILLIN/CLAVULANIC ACID: SIGNIFICANT CHANGE UP
-  AMPICILLIN/SULBACTAM: SIGNIFICANT CHANGE UP
-  AMPICILLIN: SIGNIFICANT CHANGE UP
-  AMPICILLIN: SIGNIFICANT CHANGE UP
-  AZTREONAM: SIGNIFICANT CHANGE UP
-  CEFAZOLIN: SIGNIFICANT CHANGE UP
-  CEFEPIME: SIGNIFICANT CHANGE UP
-  CEFTRIAXONE: SIGNIFICANT CHANGE UP
-  CEFUROXIME: SIGNIFICANT CHANGE UP
-  CIPROFLOXACIN: SIGNIFICANT CHANGE UP
-  CIPROFLOXACIN: SIGNIFICANT CHANGE UP
-  DAPTOMYCIN: SIGNIFICANT CHANGE UP
-  ERTAPENEM: SIGNIFICANT CHANGE UP
-  GENTAMICIN: SIGNIFICANT CHANGE UP
-  IMIPENEM: SIGNIFICANT CHANGE UP
-  LEVOFLOXACIN: SIGNIFICANT CHANGE UP
-  LEVOFLOXACIN: SIGNIFICANT CHANGE UP
-  LINEZOLID: SIGNIFICANT CHANGE UP
-  MEROPENEM: SIGNIFICANT CHANGE UP
-  NITROFURANTOIN: SIGNIFICANT CHANGE UP
-  NITROFURANTOIN: SIGNIFICANT CHANGE UP
-  PIPERACILLIN/TAZOBACTAM: SIGNIFICANT CHANGE UP
-  TETRACYCLINE: SIGNIFICANT CHANGE UP
-  TOBRAMYCIN: SIGNIFICANT CHANGE UP
-  TRIMETHOPRIM/SULFAMETHOXAZOLE: SIGNIFICANT CHANGE UP
-  VANCOMYCIN: SIGNIFICANT CHANGE UP
ANION GAP SERPL CALC-SCNC: 11 MMOL/L — SIGNIFICANT CHANGE UP (ref 5–17)
BUN SERPL-MCNC: 52 MG/DL — HIGH (ref 7–23)
CALCIUM SERPL-MCNC: 9.3 MG/DL — SIGNIFICANT CHANGE UP (ref 8.4–10.5)
CHLORIDE SERPL-SCNC: 103 MMOL/L — SIGNIFICANT CHANGE UP (ref 96–108)
CO2 SERPL-SCNC: 22 MMOL/L — SIGNIFICANT CHANGE UP (ref 22–31)
CREAT SERPL-MCNC: 1.52 MG/DL — HIGH (ref 0.5–1.3)
CULTURE RESULTS: ABNORMAL
CULTURE RESULTS: SIGNIFICANT CHANGE UP
CULTURE RESULTS: SIGNIFICANT CHANGE UP
EGFR: 35 ML/MIN/1.73M2 — LOW
GLUCOSE BLDC GLUCOMTR-MCNC: 155 MG/DL — HIGH (ref 70–99)
GLUCOSE BLDC GLUCOMTR-MCNC: 186 MG/DL — HIGH (ref 70–99)
GLUCOSE BLDC GLUCOMTR-MCNC: 199 MG/DL — HIGH (ref 70–99)
GLUCOSE BLDC GLUCOMTR-MCNC: 214 MG/DL — HIGH (ref 70–99)
GLUCOSE SERPL-MCNC: 176 MG/DL — HIGH (ref 70–99)
HCT VFR BLD CALC: 24 % — LOW (ref 34.5–45)
HGB BLD-MCNC: 7.3 G/DL — LOW (ref 11.5–15.5)
MAGNESIUM SERPL-MCNC: 2.5 MG/DL — SIGNIFICANT CHANGE UP (ref 1.6–2.6)
MCHC RBC-ENTMCNC: 28.4 PG — SIGNIFICANT CHANGE UP (ref 27–34)
MCHC RBC-ENTMCNC: 30.4 GM/DL — LOW (ref 32–36)
MCV RBC AUTO: 93.4 FL — SIGNIFICANT CHANGE UP (ref 80–100)
METHOD TYPE: SIGNIFICANT CHANGE UP
METHOD TYPE: SIGNIFICANT CHANGE UP
NRBC # BLD: 0 /100 WBCS — SIGNIFICANT CHANGE UP (ref 0–0)
ORGANISM # SPEC MICROSCOPIC CNT: ABNORMAL
PHOSPHATE SERPL-MCNC: 3.2 MG/DL — SIGNIFICANT CHANGE UP (ref 2.5–4.5)
PLATELET # BLD AUTO: 152 K/UL — SIGNIFICANT CHANGE UP (ref 150–400)
POTASSIUM SERPL-MCNC: 4.9 MMOL/L — SIGNIFICANT CHANGE UP (ref 3.5–5.3)
POTASSIUM SERPL-SCNC: 4.9 MMOL/L — SIGNIFICANT CHANGE UP (ref 3.5–5.3)
RBC # BLD: 2.57 M/UL — LOW (ref 3.8–5.2)
RBC # FLD: 15.1 % — HIGH (ref 10.3–14.5)
SODIUM SERPL-SCNC: 136 MMOL/L — SIGNIFICANT CHANGE UP (ref 135–145)
SPECIMEN SOURCE: SIGNIFICANT CHANGE UP
TACROLIMUS SERPL-MCNC: 4.9 NG/ML — SIGNIFICANT CHANGE UP
WBC # BLD: 8.27 K/UL — SIGNIFICANT CHANGE UP (ref 3.8–10.5)
WBC # FLD AUTO: 8.27 K/UL — SIGNIFICANT CHANGE UP (ref 3.8–10.5)

## 2024-03-13 PROCEDURE — 99232 SBSQ HOSP IP/OBS MODERATE 35: CPT | Mod: GC

## 2024-03-13 PROCEDURE — 99233 SBSQ HOSP IP/OBS HIGH 50: CPT

## 2024-03-13 RX ORDER — SALIVA SUBSTITUTE COMB NO.11 351 MG
5 POWDER IN PACKET (EA) MUCOUS MEMBRANE EVERY 6 HOURS
Refills: 0 | Status: DISCONTINUED | OUTPATIENT
Start: 2024-03-13 | End: 2024-04-02

## 2024-03-13 RX ORDER — METRONIDAZOLE 500 MG
250 TABLET ORAL EVERY 6 HOURS
Refills: 0 | Status: DISCONTINUED | OUTPATIENT
Start: 2024-03-13 | End: 2024-03-14

## 2024-03-13 RX ORDER — HUMAN INSULIN 100 [IU]/ML
12 INJECTION, SUSPENSION SUBCUTANEOUS EVERY 6 HOURS
Refills: 0 | Status: DISCONTINUED | OUTPATIENT
Start: 2024-03-13 | End: 2024-03-22

## 2024-03-13 RX ADMIN — Medication 3 MILLILITER(S): at 17:14

## 2024-03-13 RX ADMIN — HUMAN INSULIN 12 UNIT(S): 100 INJECTION, SUSPENSION SUBCUTANEOUS at 17:33

## 2024-03-13 RX ADMIN — Medication 0.2 MILLIGRAM(S): at 22:01

## 2024-03-13 RX ADMIN — PANTOPRAZOLE SODIUM 40 MILLIGRAM(S): 20 TABLET, DELAYED RELEASE ORAL at 05:26

## 2024-03-13 RX ADMIN — Medication 500000 UNIT(S): at 17:35

## 2024-03-13 RX ADMIN — Medication 3 MILLILITER(S): at 11:03

## 2024-03-13 RX ADMIN — Medication 650 MILLIGRAM(S): at 04:35

## 2024-03-13 RX ADMIN — Medication 30 MILLILITER(S): at 23:34

## 2024-03-13 RX ADMIN — MEROPENEM 100 MILLIGRAM(S): 1 INJECTION INTRAVENOUS at 05:24

## 2024-03-13 RX ADMIN — HEPARIN SODIUM 5000 UNIT(S): 5000 INJECTION INTRAVENOUS; SUBCUTANEOUS at 05:26

## 2024-03-13 RX ADMIN — Medication 3 MILLILITER(S): at 23:10

## 2024-03-13 RX ADMIN — SODIUM CHLORIDE 4 MILLILITER(S): 9 INJECTION INTRAMUSCULAR; INTRAVENOUS; SUBCUTANEOUS at 05:43

## 2024-03-13 RX ADMIN — Medication 500000 UNIT(S): at 12:27

## 2024-03-13 RX ADMIN — CARVEDILOL PHOSPHATE 25 MILLIGRAM(S): 80 CAPSULE, EXTENDED RELEASE ORAL at 17:37

## 2024-03-13 RX ADMIN — Medication 5 MILLIGRAM(S): at 05:26

## 2024-03-13 RX ADMIN — Medication 1 DROP(S): at 05:26

## 2024-03-13 RX ADMIN — Medication 500 MILLIGRAM(S): at 23:34

## 2024-03-13 RX ADMIN — BRIVARACETAM 100 MILLIGRAM(S): 25 TABLET, FILM COATED ORAL at 22:01

## 2024-03-13 RX ADMIN — Medication 5 MILLILITER(S): at 12:27

## 2024-03-13 RX ADMIN — HUMAN INSULIN 12 UNIT(S): 100 INJECTION, SUSPENSION SUBCUTANEOUS at 23:36

## 2024-03-13 RX ADMIN — LACOSAMIDE 200 MILLIGRAM(S): 50 TABLET ORAL at 05:25

## 2024-03-13 RX ADMIN — Medication 500000 UNIT(S): at 23:34

## 2024-03-13 RX ADMIN — Medication 2: at 17:34

## 2024-03-13 RX ADMIN — Medication 2: at 12:28

## 2024-03-13 RX ADMIN — Medication 250 MILLIGRAM(S): at 23:34

## 2024-03-13 RX ADMIN — Medication 0.1 MILLIGRAM(S): at 05:26

## 2024-03-13 RX ADMIN — Medication 250 MILLIGRAM(S): at 18:58

## 2024-03-13 RX ADMIN — Medication 3 MILLILITER(S): at 05:40

## 2024-03-13 RX ADMIN — MEROPENEM 100 MILLIGRAM(S): 1 INJECTION INTRAVENOUS at 17:35

## 2024-03-13 RX ADMIN — BRIVARACETAM 100 MILLIGRAM(S): 25 TABLET, FILM COATED ORAL at 05:25

## 2024-03-13 RX ADMIN — TACROLIMUS 5 MILLIGRAM(S): 5 CAPSULE ORAL at 17:55

## 2024-03-13 RX ADMIN — HUMAN INSULIN 12 UNIT(S): 100 INJECTION, SUSPENSION SUBCUTANEOUS at 12:27

## 2024-03-13 RX ADMIN — SODIUM CHLORIDE 4 MILLILITER(S): 9 INJECTION INTRAMUSCULAR; INTRAVENOUS; SUBCUTANEOUS at 23:10

## 2024-03-13 RX ADMIN — SODIUM CHLORIDE 4 MILLILITER(S): 9 INJECTION INTRAMUSCULAR; INTRAVENOUS; SUBCUTANEOUS at 17:14

## 2024-03-13 RX ADMIN — Medication 0.1 MILLIGRAM(S): at 13:02

## 2024-03-13 RX ADMIN — Medication 1 DROP(S): at 22:01

## 2024-03-13 RX ADMIN — HEPARIN SODIUM 5000 UNIT(S): 5000 INJECTION INTRAVENOUS; SUBCUTANEOUS at 17:36

## 2024-03-13 RX ADMIN — Medication 5 MILLILITER(S): at 23:35

## 2024-03-13 RX ADMIN — LACOSAMIDE 200 MILLIGRAM(S): 50 TABLET ORAL at 17:36

## 2024-03-13 RX ADMIN — Medication 5 MILLILITER(S): at 17:36

## 2024-03-13 RX ADMIN — CARVEDILOL PHOSPHATE 25 MILLIGRAM(S): 80 CAPSULE, EXTENDED RELEASE ORAL at 05:26

## 2024-03-13 RX ADMIN — Medication 1 TABLET(S): at 13:02

## 2024-03-13 RX ADMIN — BRIVARACETAM 100 MILLIGRAM(S): 25 TABLET, FILM COATED ORAL at 13:03

## 2024-03-13 RX ADMIN — DOCOSANOL 1 APPLICATION(S): 100 CREAM TOPICAL at 17:32

## 2024-03-13 RX ADMIN — Medication 500000 UNIT(S): at 05:24

## 2024-03-13 RX ADMIN — PANTOPRAZOLE SODIUM 40 MILLIGRAM(S): 20 TABLET, DELAYED RELEASE ORAL at 17:37

## 2024-03-13 RX ADMIN — SODIUM CHLORIDE 4 MILLILITER(S): 9 INJECTION INTRAMUSCULAR; INTRAVENOUS; SUBCUTANEOUS at 11:03

## 2024-03-13 RX ADMIN — Medication 2: at 23:35

## 2024-03-13 RX ADMIN — TACROLIMUS 5 MILLIGRAM(S): 5 CAPSULE ORAL at 05:49

## 2024-03-13 RX ADMIN — Medication 4: at 05:49

## 2024-03-13 RX ADMIN — HUMAN INSULIN 10 UNIT(S): 100 INJECTION, SUSPENSION SUBCUTANEOUS at 05:49

## 2024-03-13 RX ADMIN — Medication 500 MILLIGRAM(S): at 18:58

## 2024-03-13 RX ADMIN — Medication 30 MILLILITER(S): at 17:55

## 2024-03-13 RX ADMIN — Medication 650 MILLIGRAM(S): at 05:15

## 2024-03-13 RX ADMIN — Medication 1 DROP(S): at 17:38

## 2024-03-13 NOTE — PROGRESS NOTE ADULT - PROBLEM SELECTOR PLAN 2
Pt's current regimen is Tacrolimus 5 mg BID, and prednisone 5 mg qd. Leflunomide currently on hold. Tacrolimus trough remains is  4.9 today. Recommend to continue with current regimen. Monitor tacrolimus trough, goal: 4-7.    If you have any questions, please feel free to contact me  Irais Suarez  Nephrology Fellow  [phone] / [employer] Teams(Preferred)  (After 5pm or on weekends please page the on-call fellow)

## 2024-03-13 NOTE — PROGRESS NOTE ADULT - SUBJECTIVE AND OBJECTIVE BOX
DATE OF SERVICE: 03-13-24 @ 16:48    Patient is a 78y old  Female who presents with a chief complaint of ICH (13 Mar 2024 13:57)      INTERVAL HISTORY: doing well     REVIEW OF SYSTEMS:  CONSTITUTIONAL: No weakness  EYES/ENT: No visual changes;  No throat pain   NECK: No pain or stiffness  RESPIRATORY: No cough, wheezing; No shortness of breath  CARDIOVASCULAR: No chest pain or palpitations  GASTROINTESTINAL: No abdominal  pain. No nausea, vomiting, or hematemesis  GENITOURINARY: No dysuria, frequency or hematuria  NEUROLOGICAL: No stroke like symptoms  SKIN: No rashes      MEDICATIONS:  carvedilol 25 milliGRAM(s) Oral every 12 hours  cloNIDine 0.2 milliGRAM(s) Oral three times a day        PHYSICAL EXAM:  T(C): 37.8 (03-13-24 @ 14:06), Max: 38.6 (03-12-24 @ 17:32)  HR: 103 (03-13-24 @ 15:00) (83 - 116)  BP: 172/90 (03-13-24 @ 14:06) (111/64 - 180/106)  RR: 20 (03-13-24 @ 14:06) (16 - 23)  SpO2: 100% (03-13-24 @ 15:00) (98% - 100%)  Wt(kg): --  I&O's Summary    12 Mar 2024 07:01  -  13 Mar 2024 07:00  --------------------------------------------------------  IN: 965 mL / OUT: 1825 mL / NET: -860 mL          Appearance: In no distress	  HEENT:    PERRL, EOMI	  Cardiovascular:  S1 S2, No JVD  Respiratory: Lungs clear to auscultation	  Gastrointestinal:  Soft, Non-tender, + BS	  Vascularature:  L AKA  Psychiatric: Appropriate affect   Neuro: no acute focal deficits                               7.3    8.27  )-----------( 152      ( 13 Mar 2024 09:53 )             24.0     03-13    136  |  103  |  52<H>  ----------------------------<  176<H>  4.9   |  22  |  1.52<H>    Ca    9.3      13 Mar 2024 09:53  Phos  3.2     03-13  Mg     2.5     03-13          Labs personally reviewed      ASSESSMENT/PLAN: 	   78F Hx ESRD s/p renal xplant c/b DGF off HD, L AKA, 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      1. Abnormal Echo --  Septal bulge noted measures 2.2cm without obstruction.   -- Given no intracavitary obstruction no intervention at this time  - No Myxoma seen on this echo or echo in 2019, ? if hypermobile interatrial septum was confused for on prior imaging     2. HTN - uncontrolled, needs improvement   Continue Hydralazine 100 mg Q8H, clonidine 0.1 mg TID, Coreg 25 mg BID   - consider amlodipine 5mg and titrate up as needed for better b/p measurements     3. 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 DVT PPX - HSQ when safe            RANDY Lopez DO [facility]  Cardiovascular Medicine  [address] [facility] [address], [address]  Available through call or text on [employer] TEAMs  Office: [phone]

## 2024-03-13 NOTE — PROGRESS NOTE ADULT - PROBLEM SELECTOR PLAN 4
- + UA on 3/10  - Urine Cx 3/10: + Klebsiella and + Enterococcus 10- 49 K ( Sensitivities pending)  - Cefepime d/c'd on 3/12 broadened to meropenem and vanco  - Febrile overnight; T.max 101.5   - Repeat Blood and Urine cx sent ( Results pending)   - CXR 3/10: No focal consolidation - + UA on 3/10  - Urine Cx 3/10: ESBL Klebsiella and VRE 10- 49 K   - Cefepime d/c'd on 3/12 broadened to meropenem and vanco  - Vanco d/d today in setting of VRE   - Febrile overnight; T.max 101.5   - Repeat Blood and Urine cx sent ( Results pending)   - CXR 3/10: No focal consolidation - + UA on 3/10  - Urine Cx 3/10: ESBL Klebsiella and VRE 10- 49 K   - Cefepime d/c'd on 3/12 broadened to meropenem and vanco  - Vanco d/cd today in setting of VRE   - Febrile overnight; T.max 101.5   - Repeat Blood and Urine cx sent ( Results pending)   - CXR 3/10: No focal consolidation

## 2024-03-13 NOTE — PROGRESS NOTE ADULT - PROBLEM SELECTOR PLAN 7
- Goal euglycemia (-180), A1C: 5.4%  - Continue ISS  - Humulin 10units q 6h - Goal euglycemia (-180), A1C: 5.4%  - Continue ISS  - Humulin increased to 12 units q 6h

## 2024-03-13 NOTE — PROGRESS NOTE ADULT - SUBJECTIVE AND OBJECTIVE BOX
SURGERY DAILY PROGRESS NOTE:     SUBJECTIVE/ROS: No acute events overnight. Patient seen and examined bedside on AM rounds. Trach sutures removed          PHYSICAL EXAM:  Constitutional: NAD  Respiratory: vent via trach, no bleeding  Gastrointestinal: abdomen soft, nontender, nondistended, TF via PEG at 4cm  Extremities: warm    OBJECTIVE  T(C): 37.1 (03-13-24 @ 10:00), Max: 38.6 (03-12-24 @ 17:32)  HR: 83 (03-13-24 @ 11:09) (83 - 116)  BP: 146/78 (03-13-24 @ 10:00) (111/64 - 180/106)  RR: 18 (03-13-24 @ 10:00) (16 - 23)  SpO2: 100% (03-13-24 @ 11:09) (98% - 100%)  I&O's Summary    12 Mar 2024 07:01  -  13 Mar 2024 07:00  --------------------------------------------------------  IN: 965 mL / OUT: 1825 mL / NET: -860 mL      I&O's Detail    12 Mar 2024 07:01  -  13 Mar 2024 07:00  --------------------------------------------------------  IN:    Enteral Tube Flush: 220 mL    Free Water: 200 mL    IV PiggyBack: 50 mL    Nepro with Carb Steady: 495 mL  Total IN: 965 mL    OUT:    Intermittent Catheterization - Urethral (mL): 1550 mL    Rectal Tube (mL): 275 mL  Total OUT: 1825 mL    Total NET: -860 mL        LABS                        7.3    8.27  )-----------( 152      ( 13 Mar 2024 09:53 )             24.0     03-13    136  |  103  |  52<H>  ----------------------------<  176<H>  4.9   |  22  |  1.52<H>    Ca    9.3      13 Mar 2024 09:53  Phos  3.2     03-13  Mg     2.5     03-13        Urinalysis Basic - ( 13 Mar 2024 09:53 )    Color: x / Appearance: x / SG: x / pH: x  Gluc: 176 mg/dL / Ketone: x  / Bili: x / Urobili: x   Blood: x / Protein: x / Nitrite: x   Leuk Esterase: x / RBC: x / WBC x   Sq Epi: x / Non Sq Epi: x / Bacteria: x      ORDERS/MEDICATIONS  MEDICATIONS  (STANDING):  albuterol/ipratropium for Nebulization 3 milliLiter(s) Nebulizer every 6 hours  artificial  tears Solution 1 Drop(s) Both EYES two times a day  Biotene Dry Mouth Oral Rinse 5 milliLiter(s) Swish and Spit every 6 hours  brivaracetam Oral Solution 100 milliGRAM(s) Oral <User Schedule>  carvedilol 25 milliGRAM(s) Oral every 12 hours  cloNIDine 0.1 milliGRAM(s) Oral three times a day  docosanol 10% Cream 1 Application(s) Topical daily  heparin   Injectable 5000 Unit(s) SubCutaneous every 12 hours  influenza  Vaccine (HIGH DOSE) 0.7 milliLiter(s) IntraMuscular once  insulin lispro (ADMELOG) corrective regimen sliding scale   SubCutaneous every 6 hours  insulin NPH human recombinant 12 Unit(s) SubCutaneous every 6 hours  lacosamide Solution 200 milliGRAM(s) Oral two times a day  meropenem  IVPB 1000 milliGRAM(s) IV Intermittent every 12 hours  nystatin    Suspension 518390 Unit(s) Swish and Swallow every 6 hours  pantoprazole   Suspension 40 milliGRAM(s) Oral two times a day  predniSONE   Tablet 5 milliGRAM(s) Oral daily  sodium chloride 3%  Inhalation 4 milliLiter(s) Inhalation every 6 hours  tacrolimus    0.5 mG/mL Suspension 5 milliGRAM(s) Oral two times a day  trimethoprim   80 mG/sulfamethoxazole 400 mG 1 Tablet(s) Oral daily  vancomycin  IVPB 750 milliGRAM(s) IV Intermittent every 24 hours    MEDICATIONS  (PRN):  acetaminophen   Oral Liquid .. 650 milliGRAM(s) Oral every 6 hours PRN Temp greater or equal to 38C (100.4F), Mild Pain (1 - 3)  ondansetron Injectable 4 milliGRAM(s) IV Push every 6 hours PRN Nausea and/or Vomiting  oxyCODONE    IR 5 milliGRAM(s) Oral every 4 hours PRN Moderate Pain (4 - 6)  oxyCODONE    Solution 10 milliGRAM(s) Enteral Tube every 4 hours PRN Severe Pain (7 - 10)

## 2024-03-13 NOTE — PROGRESS NOTE ADULT - PROBLEM SELECTOR PLAN 6
- AVF in the left upper extremity  - s/p renal transplant in 2019  - cont free water 200 ml q12hr  - prednisone 5mg, bactrim ppx  - tacro 5mg BID as per transplant nephro  - trach goal 4-7, daily tacro levels 30 mins prior to dose administration

## 2024-03-13 NOTE — PROGRESS NOTE ADULT - SUBJECTIVE AND OBJECTIVE BOX
Patient is a 78y old  Female who presents with a chief complaint of ICH (12 Mar 2024 15:33)      Interval Events: Pt febrile overnight     REVIEW OF SYSTEMS:  [ ] Positive  [ ] All other systems negative  [ ] Unable to assess ROS because ________    Vital Signs Last 24 Hrs  T(C): 36.9 (03-13-24 @ 06:00), Max: 38.6 (03-12-24 @ 17:32)  T(F): 98.4 (03-13-24 @ 06:00), Max: 101.5 (03-12-24 @ 17:32)  HR: 95 (03-13-24 @ 06:00) (91 - 116)  BP: 114/66 (03-13-24 @ 06:00) (111/64 - 180/106)  RR: 16 (03-13-24 @ 06:00) (16 - 23)  SpO2: 100% (03-13-24 @ 06:00) (98% - 100%)    PHYSICAL EXAM:  HEENT:   [ ]Tracheostomy:  [ ]Pupils equal  [ ]No oral lesions  [ ]Abnormal    SKIN  [ ]No Rash  [ ] Abnormal  [ ] pressure    CARDIAC  [ ]Regular  [ ]Abnormal    PULMONARY  [ ]Bilateral Clear Breath Sounds  [ ]Normal Excursion  [ ]Abnormal    GI  [ ]PEG      [ ] +BS		              [ ]Soft, nondistended, nontender	  [ ]Abnormal    MUSCULOSKELETAL                                   [ ]Bedbound                 [ ]Abnormal    [ ]Ambulatory/OOB to chair                           EXTREMITIES                                         [ ]Normal  [ ]Edema                           NEUROLOGIC  [ ] Normal, non focal  [ ] Focal findings:    PSYCHIATRIC  [ ]Alert and appropriate  [ ] Sedated	 [ ]Agitated    :  Gardner: [ ] Yes, if yes: Date of Placement:                   [  ] No    LINES: Central Lines [ ] Yes, if yes: Date of Placement                                     [  ] No    HOSPITAL MEDICATIONS:  MEDICATIONS  (STANDING):  albuterol/ipratropium for Nebulization 3 milliLiter(s) Nebulizer every 6 hours  artificial  tears Solution 1 Drop(s) Both EYES two times a day  brivaracetam Oral Solution 100 milliGRAM(s) Oral <User Schedule>  carvedilol 25 milliGRAM(s) Oral every 12 hours  cloNIDine 0.1 milliGRAM(s) Oral three times a day  docosanol 10% Cream 1 Application(s) Topical daily  heparin   Injectable 5000 Unit(s) SubCutaneous every 12 hours  influenza  Vaccine (HIGH DOSE) 0.7 milliLiter(s) IntraMuscular once  insulin lispro (ADMELOG) corrective regimen sliding scale   SubCutaneous every 6 hours  insulin NPH human recombinant 10 Unit(s) SubCutaneous every 6 hours  lacosamide Solution 200 milliGRAM(s) Oral two times a day  meropenem  IVPB 1000 milliGRAM(s) IV Intermittent every 12 hours  nystatin    Suspension 478999 Unit(s) Swish and Swallow every 6 hours  pantoprazole   Suspension 40 milliGRAM(s) Oral two times a day  predniSONE   Tablet 5 milliGRAM(s) Oral daily  sodium chloride 3%  Inhalation 4 milliLiter(s) Inhalation every 6 hours  tacrolimus    0.5 mG/mL Suspension 5 milliGRAM(s) Oral two times a day  trimethoprim   80 mG/sulfamethoxazole 400 mG 1 Tablet(s) Oral daily  vancomycin  IVPB 750 milliGRAM(s) IV Intermittent every 24 hours    MEDICATIONS  (PRN):  acetaminophen   Oral Liquid .. 650 milliGRAM(s) Oral every 6 hours PRN Temp greater or equal to 38C (100.4F), Mild Pain (1 - 3)  ondansetron Injectable 4 milliGRAM(s) IV Push every 6 hours PRN Nausea and/or Vomiting  oxyCODONE    IR 5 milliGRAM(s) Oral every 4 hours PRN Moderate Pain (4 - 6)  oxyCODONE    Solution 10 milliGRAM(s) Enteral Tube every 4 hours PRN Severe Pain (7 - 10)      LABS:                        7.9    6.49  )-----------( 197      ( 12 Mar 2024 07:16 )             26.2     03-12    138  |  107  |  49<H>  ----------------------------<  171<H>  4.8   |  21<L>  |  1.49<H>    Ca    9.4      12 Mar 2024 07:16  Phos  3.3     03-12  Mg     2.4     03-12        Urinalysis Basic - ( 12 Mar 2024 07:16 )    Color: x / Appearance: x / SG: x / pH: x  Gluc: 171 mg/dL / Ketone: x  / Bili: x / Urobili: x   Blood: x / Protein: x / Nitrite: x   Leuk Esterase: x / RBC: x / WBC x   Sq Epi: x / Non Sq Epi: x / Bacteria: x          CAPILLARY BLOOD GLUCOSE    MICROBIOLOGY:     RADIOLOGY:  [ ] Reviewed and interpreted by me    Mode: AC/ CMV (Assist Control/ Continuous Mandatory Ventilation)  RR (machine): 14  TV (machine): 450  FiO2: 40  PEEP: 5  ITime: 1  MAP: 11  PIP: 18   Patient is a 78y old  Female who presents with a chief complaint of ICH (12 Mar 2024 15:33)      Interval Events: Pt febrile overnight T.max 101.5     REVIEW OF SYSTEMS:  [ ] Positive  [ ] All other systems negative  [x] Unable to assess ROS because patient is not answering verbal questioning     Vital Signs Last 24 Hrs  T(C): 36.9 (03-13-24 @ 06:00), Max: 38.6 (03-12-24 @ 17:32)  T(F): 98.4 (03-13-24 @ 06:00), Max: 101.5 (03-12-24 @ 17:32)  HR: 95 (03-13-24 @ 06:00) (91 - 116)  BP: 114/66 (03-13-24 @ 06:00) (111/64 - 180/106)  RR: 16 (03-13-24 @ 06:00) (16 - 23)  SpO2: 100% (03-13-24 @ 06:00) (98% - 100%)    PHYSICAL EXAM:  HEENT:   [x]Tracheostomy: # 7 Cuffed Portex ( Sutures remain in place)   [ ]Pupils equal  [ ]No oral lesions  [x]Abnormal: + Tongue protruding from right side of mouth no noted impingement from remaining teeth, Lip sores      SKIN  [ ]No Rash  [x] Abnormal: Sacral / Buttock Injury   [ ] pressure    CARDIAC  [x]Regular  [ ]Abnormal    PULMONARY  [x]Bilateral Clear Breath Sounds  [ ]Normal Excursion  [ ]Abnormal    GI  [x]PEG      [x] +BS		              [x]Soft, nondistended, nontender	  [ ]Abnormal    MUSCULOSKELETAL                                   [x]Bedbound                 [ ]Abnormal    [ ]Ambulatory/OOB to chair                           EXTREMITIES                                         [ ]Normal  [x]Edema: + Bilateral Upper extremity edema                        NEUROLOGIC  [ ] Normal, non focal  [x] Focal findings: Eyes open spontaneously, no tracking, not following commands of any extremity, withdraws RLE    PSYCHIATRIC  [ ]Alert and appropriate  [x] Sedated	 [ ]Agitated    :  Gardner: [ ] Yes, if yes: Date of Placement:                   [x] No; Requiring intermittent straight catheterization     LINES: Central Lines [ ] Yes, if yes: Date of Placement                                     [x] No    HOSPITAL MEDICATIONS:  MEDICATIONS  (STANDING):  albuterol/ipratropium for Nebulization 3 milliLiter(s) Nebulizer every 6 hours  artificial  tears Solution 1 Drop(s) Both EYES two times a day  brivaracetam Oral Solution 100 milliGRAM(s) Oral <User Schedule>  carvedilol 25 milliGRAM(s) Oral every 12 hours  cloNIDine 0.1 milliGRAM(s) Oral three times a day  docosanol 10% Cream 1 Application(s) Topical daily  heparin   Injectable 5000 Unit(s) SubCutaneous every 12 hours  influenza  Vaccine (HIGH DOSE) 0.7 milliLiter(s) IntraMuscular once  insulin lispro (ADMELOG) corrective regimen sliding scale   SubCutaneous every 6 hours  insulin NPH human recombinant 10 Unit(s) SubCutaneous every 6 hours  lacosamide Solution 200 milliGRAM(s) Oral two times a day  meropenem  IVPB 1000 milliGRAM(s) IV Intermittent every 12 hours  nystatin    Suspension [id number] Unit(s) Swish and Swallow every 6 hours  pantoprazole   Suspension 40 milliGRAM(s) Oral two times a day  predniSONE   Tablet 5 milliGRAM(s) Oral daily  sodium chloride 3%  Inhalation 4 milliLiter(s) Inhalation every 6 hours  tacrolimus    0.5 mG/mL Suspension 5 milliGRAM(s) Oral two times a day  trimethoprim   80 mG/sulfamethoxazole 400 mG 1 Tablet(s) Oral daily  vancomycin  IVPB 750 milliGRAM(s) IV Intermittent every 24 hours    MEDICATIONS  (PRN):  acetaminophen   Oral Liquid .. 650 milliGRAM(s) Oral every 6 hours PRN Temp greater or equal to 38C (100.4F), Mild Pain (1 - 3)  ondansetron Injectable 4 milliGRAM(s) IV Push every 6 hours PRN Nausea and/or Vomiting  oxyCODONE    IR 5 milliGRAM(s) Oral every 4 hours PRN Moderate Pain (4 - 6)  oxyCODONE    Solution 10 milliGRAM(s) Enteral Tube every 4 hours PRN Severe Pain (7 - 10)      LABS:                        7.9    6.49  )-----------( 197      ( 12 Mar 2024 07:16 )             26.2     03-12    138  |  107  |  49<H>  ----------------------------<  171<H>  4.8   |  21<L>  |  1.49<H>    Ca    9.4      12 Mar 2024 07:16  Phos  3.3     03-12  Mg     2.4     03-12        Urinalysis Basic - ( 12 Mar 2024 07:16 )    Color: x / Appearance: x / SG: x / pH: x  Gluc: 171 mg/dL / Ketone: x  / Bili: x / Urobili: x   Blood: x / Protein: x / Nitrite: x   Leuk Esterase: x / RBC: x / WBC x   Sq Epi: x / Non Sq Epi: x / Bacteria: x          CAPILLARY BLOOD GLUCOSE    MICROBIOLOGY:     RADIOLOGY:  [ ] Reviewed and interpreted by me    Mode: AC/ CMV (Assist Control/ Continuous Mandatory Ventilation)  RR (machine): 14  TV (machine): 450  FiO2: 40  PEEP: 5  ITime: 1  MAP: 11  PIP: 18

## 2024-03-13 NOTE — PROGRESS NOTE ADULT - PROBLEM SELECTOR PLAN 3
- S/p EEG w/ seizures last seen 3/7  - Briviact 100 mg TID ( Renew 3/14), Vimpat 200 mg BID (Renew 4/6)

## 2024-03-13 NOTE — PROGRESS NOTE ADULT - PROBLEM SELECTOR PLAN 1
- Found to have L IPH on CTH likely 2/2 amyloid angiopathy  - S/p L hemicrani for evacuation of large ICH; bone discarded  - CT H Stero 3/12: S/p Left Frontal Crainectomy, Remains stable from prior CT on 3/8   - Patient will require eventual cranioplasty   - Maintain Neuro checks

## 2024-03-13 NOTE — PROGRESS NOTE ADULT - ASSESSMENT
77 yo F with PMHx ESRD s/p renal transplant (2019, now off HD), left AKA, BK viremia, HTN, breast ca s/p lumpectomy (completed Tamoxifen 03/2023), DVT (off Eliquis), HLD, gout presenting 3/1 with left ICH (ICH score 4) likely 2/2 amyloid angiopathy s/p left craniectomy(discarded) and evacuation as well as EVD placement and removal (3/7). She is s/p trach/peg 3/8/24.  Febrile 3/10 with + UA, blood/sputum culture NGTD.  Treatment for UTI initiated with ceftriaxone, eventually broadened to cefepime.  Transferred to [facility] 3/11 for continued management.  Pt arrived from NSCU with minimal mental status with only response of spontaneous eye opening and withdrawal on RLE.  Urine culture grew positive for klebsiella and enterococcus, cefepime d/c'd meropenem and vanc started 3/12.  Will continue with airway clearance management and wean from ventilator as tolerated.      3/13: Patient febrile again overnight T.max 101.5. Blood and Urine cxs sent yesterday ( Results pending). CXR 3/10 no focal consolidations. Patients rectal tube was dislodged overnight, no reports of diarrhea this morning will continue to monitor stooling and possible need to send C.Diff if reoccurs. Currently remains on Meropenem and Vancomycin.    77 yo F with PMHx ESRD s/p renal transplant (2019, now off HD), left AKA, BK viremia, HTN, breast ca s/p lumpectomy (completed Tamoxifen 03/2023), DVT (off Eliquis), HLD, gout presenting 3/1 with left ICH (ICH score 4) likely 2/2 amyloid angiopathy s/p left craniectomy(discarded) and evacuation as well as EVD placement and removal (3/7). She is s/p trach/peg 3/8/24.  Febrile 3/10 with + UA, blood/sputum culture NGTD.  Treatment for UTI initiated with ceftriaxone, eventually broadened to cefepime.  Transferred to [facility] 3/11 for continued management.  Pt arrived from NSCU with minimal mental status with only response of spontaneous eye opening and withdrawal on RLE.  Urine culture grew positive for klebsiella and enterococcus, cefepime d/c'd meropenem and vanc started 3/12.  Will continue with airway clearance management and wean from ventilator as tolerated.      3/13: Patient febrile again overnight T.max 101.5. Blood and Urine cxs sent yesterday ( Results pending). CXR 3/10 no focal consolidations. Patients rectal tube was dislodged overnight, no reports of diarrhea this morning will continue to monitor stooling and possible need to send C.Diff if reoccurs. Currently remains on Meropenem and Vancomycin. BGMs running high will increase Humulin to 12 units q 6 hrs.    77 yo F with PMHx ESRD s/p renal transplant (2019, now off HD), left AKA, BK viremia, HTN, breast ca s/p lumpectomy (completed Tamoxifen 03/2023), DVT (off Eliquis), HLD, gout presenting 3/1 with left ICH (ICH score 4) likely 2/2 amyloid angiopathy s/p left craniectomy(discarded) and evacuation as well as EVD placement and removal (3/7). She is s/p trach/peg 3/8/24.  Febrile 3/10 with + UA, blood/sputum culture NGTD.  Treatment for UTI initiated with ceftriaxone, eventually broadened to cefepime.  Transferred to [facility] 3/11 for continued management.  Pt arrived from NSCU with minimal mental status with only response of spontaneous eye opening and withdrawal on RLE.  Urine culture grew positive for klebsiella and enterococcus, cefepime d/c'd meropenem and vanc started 3/12.  Will continue with airway clearance management and wean from ventilator as tolerated.      3/13: Patient febrile again overnight T.max 101.5. Blood and Urine cxs sent yesterday ( Results pending). CXR 3/10 no focal consolidations. Patients rectal tube was dislodged overnight, no reports of diarrhea this morning will continue to monitor stooling and possible need to send C.Diff if reoccurs. Currently remains on Meropenem and Vancomycin dcd as patient growing VRE from urine cx on 3/10. Will await repeat urine cx studies to determine if will require additional coverage with Linezolid.  BGMs running high will increase Humulin to 12 units q 6 hrs.

## 2024-03-13 NOTE — CONSULT NOTE ADULT - SUBJECTIVE AND OBJECTIVE BOX
Chief Complaint:  Patient is a 78y old  Female who presents with a chief complaint of ICH (13 Mar 2024 11:51)      Date of service: 03-13-24 @ 13:33    HPI:    The patient is a 79 yo F w/ PMHx ESRD s/p renal xplant (2019) c/b DGF off HD, L AKA, BK viremia on leflunomide, HTN, BreastCa s/p lumpectomy (on tamoxifen), DVT off eliquis, presented with L IPH. S/p craniectomy and evacuation course c/b seizures, last seen on 3/7/24 EEG currently on AEDS. Now s/p trach/peg 3/9/24. During EGD/PEG found to have superficial gastric ulcers. H. Pylori Stool Ag positive, for which GI is consulted.       Allergies:  shellfish (Rash)  ChloraPrep One-Step (Rash)  penicillins (Rash)      Home Medications:    Hospital Medications:  acetaminophen   Oral Liquid .. 650 milliGRAM(s) Oral every 6 hours PRN  albuterol/ipratropium for Nebulization 3 milliLiter(s) Nebulizer every 6 hours  artificial  tears Solution 1 Drop(s) Both EYES two times a day  Biotene Dry Mouth Oral Rinse 5 milliLiter(s) Swish and Spit every 6 hours  brivaracetam Oral Solution 100 milliGRAM(s) Oral <User Schedule>  carvedilol 25 milliGRAM(s) Oral every 12 hours  cloNIDine 0.1 milliGRAM(s) Oral three times a day  docosanol 10% Cream 1 Application(s) Topical daily  heparin   Injectable 5000 Unit(s) SubCutaneous every 12 hours  influenza  Vaccine (HIGH DOSE) 0.7 milliLiter(s) IntraMuscular once  insulin lispro (ADMELOG) corrective regimen sliding scale   SubCutaneous every 6 hours  insulin NPH human recombinant 12 Unit(s) SubCutaneous every 6 hours  lacosamide Solution 200 milliGRAM(s) Oral two times a day  meropenem  IVPB 1000 milliGRAM(s) IV Intermittent every 12 hours  nystatin    Suspension [id number] Unit(s) Swish and Swallow every 6 hours  ondansetron Injectable 4 milliGRAM(s) IV Push every 6 hours PRN  oxyCODONE    IR 5 milliGRAM(s) Oral every 4 hours PRN  oxyCODONE    Solution 10 milliGRAM(s) Enteral Tube every 4 hours PRN  pantoprazole   Suspension 40 milliGRAM(s) Oral two times a day  predniSONE   Tablet 5 milliGRAM(s) Oral daily  sodium chloride 3%  Inhalation 4 milliLiter(s) Inhalation every 6 hours  tacrolimus    0.5 mG/mL Suspension 5 milliGRAM(s) Oral two times a day  trimethoprim   80 mG/sulfamethoxazole 400 mG 1 Tablet(s) Oral daily  vancomycin  IVPB 750 milliGRAM(s) IV Intermittent every 24 hours      PMHX/PSHX:  Hypertension    Anemia in ESRD (end-stage renal disease)    Thrombocytopenia    H/O gastroesophageal reflux (GERD)    Breast cancer, female    ESRD on hemodialysis    Anuria    Thyroid nodule    Pancreatic cyst    AV fistula thrombosis    S/P lumpectomy, left breast    Adrenal mass    S/P hysterectomy    S/P arteriovenous (AV) fistula creation    History of fracture of right ankle        Family history:  No pertinent family history in first degree relatives        Social History:   Denies ethanol use.  Denies illicit drug use.    ROS:     unable to obtain       PHYSICAL EXAM:     GENERAL:  Appears stated age, well-groomed, well-nourished, no distress  HEENT:  NC/AT,  conjunctivae anicteric, clear and pink,   NECK: supple, trachea midline  CHEST:  Full & symmetric excursion, no increased effort, breath sounds clear  HEART:  Regular rhythm, no JVD  ABDOMEN:  Soft, non-tender, non-distended, normoactive bowel sounds,  no masses , no hepatosplenomegaly  EXTREMITIES:  no cyanosis,clubbing or edema  SKIN:  No rash, erythema, or, ecchymoses, no jaundice  NEURO:  Alert, non-focal, no asterixis  PSYCH: Appropriate affect, oriented to place and time  RECTAL: Deferred      Vital Signs:  Vital Signs Last 24 Hrs  T(C): 37.1 (13 Mar 2024 10:00), Max: 38.6 (12 Mar 2024 17:32)  T(F): 98.8 (13 Mar 2024 10:00), Max: 101.5 (12 Mar 2024 17:32)  HR: 87 (13 Mar 2024 12:16) (83 - 116)  BP: 146/78 (13 Mar 2024 10:00) (111/64 - 180/106)  BP(mean): --  RR: 18 (13 Mar 2024 10:00) (16 - 23)  SpO2: 100% (13 Mar 2024 12:16) (98% - 100%)    Parameters below as of 13 Mar 2024 11:09  Patient On (Oxygen Delivery Method): ventilator      Daily     Daily     LABS: Labs personally reviewed by me:                        7.3    8.27  )-----------( 152      ( 13 Mar 2024 09:53 )             24.0     03-13    136  |  103  |  52<H>  ----------------------------<  176<H>  4.9   |  22  |  1.52<H>    Ca    9.3      13 Mar 2024 09:53  Phos  3.2     03-13  Mg     2.5     03-13          Urinalysis Basic - ( 13 Mar 2024 09:53 )    Color: x / Appearance: x / SG: x / pH: x  Gluc: 176 mg/dL / Ketone: x  / Bili: x / Urobili: x   Blood: x / Protein: x / Nitrite: x   Leuk Esterase: x / RBC: x / WBC x   Sq Epi: x / Non Sq Epi: x / Bacteria: x          Imaging personally reviewed by me:

## 2024-03-13 NOTE — PROGRESS NOTE ADULT - SUBJECTIVE AND OBJECTIVE BOX
[facility] DIVISION OF KIDNEY DISEASES AND HYPERTENSION -- FOLLOW UP NOTE  --------------------------------------------------------------------------------    Chief Complaint: DDRT (2019), Nontraumatic intracerebral hemorrhage    24 hour events/subjective: Pt. was seen and evaluated in the RCU earlier today. Pt. with trach, vent dependent, unable to provide history or ROS.    PAST HISTORY  --------------------------------------------------------------------------------  No significant changes to PMH, PSH, FHx, SHx, unless otherwise noted    ALLERGIES & MEDICATIONS  --------------------------------------------------------------------------------  Allergies    shellfish (Rash)  ChloraPrep One-Step (Rash)  penicillins (Rash)    Intolerances      Standing Inpatient Medications  albuterol/ipratropium for Nebulization 3 milliLiter(s) Nebulizer every 6 hours  artificial  tears Solution 1 Drop(s) Both EYES two times a day  Biotene Dry Mouth Oral Rinse 5 milliLiter(s) Swish and Spit every 6 hours  brivaracetam Oral Solution 100 milliGRAM(s) Oral <User Schedule>  carvedilol 25 milliGRAM(s) Oral every 12 hours  cloNIDine 0.1 milliGRAM(s) Oral three times a day  docosanol 10% Cream 1 Application(s) Topical daily  heparin   Injectable 5000 Unit(s) SubCutaneous every 12 hours  influenza  Vaccine (HIGH DOSE) 0.7 milliLiter(s) IntraMuscular once  insulin lispro (ADMELOG) corrective regimen sliding scale   SubCutaneous every 6 hours  insulin NPH human recombinant 12 Unit(s) SubCutaneous every 6 hours  lacosamide Solution 200 milliGRAM(s) Oral two times a day  meropenem  IVPB 1000 milliGRAM(s) IV Intermittent every 12 hours  nystatin    Suspension 648355 Unit(s) Swish and Swallow every 6 hours  pantoprazole   Suspension 40 milliGRAM(s) Oral two times a day  predniSONE   Tablet 5 milliGRAM(s) Oral daily  sodium chloride 3%  Inhalation 4 milliLiter(s) Inhalation every 6 hours  tacrolimus    0.5 mG/mL Suspension 5 milliGRAM(s) Oral two times a day  trimethoprim   80 mG/sulfamethoxazole 400 mG 1 Tablet(s) Oral daily  vancomycin  IVPB 750 milliGRAM(s) IV Intermittent every 24 hours    PRN Inpatient Medications  acetaminophen   Oral Liquid .. 650 milliGRAM(s) Oral every 6 hours PRN  ondansetron Injectable 4 milliGRAM(s) IV Push every 6 hours PRN  oxyCODONE    IR 5 milliGRAM(s) Oral every 4 hours PRN  oxyCODONE    Solution 10 milliGRAM(s) Enteral Tube every 4 hours PRN      REVIEW OF SYSTEMS  --------------------------------------------------------------------------------  Unable to obtain ROS, see HPI    VITALS/PHYSICAL EXAM  --------------------------------------------------------------------------------  T(C): 37.1 (03-13-24 @ 10:00), Max: 38.6 (03-12-24 @ 17:32)  HR: 87 (03-13-24 @ 12:16) (83 - 116)  BP: 146/78 (03-13-24 @ 10:00) (111/64 - 180/106)  RR: 18 (03-13-24 @ 10:00) (16 - 23)  SpO2: 100% (03-13-24 @ 12:16) (98% - 100%)  Wt(kg): --    03-12-24 @ 07:01  -  03-13-24 @ 07:00  --------------------------------------------------------  IN: 965 mL / OUT: 1825 mL / NET: -860 mL    Physical Exam:  Gen: Intubated, sedated  HEENT: +Trach  Pulm: Mechanical breath sounds BL  CV: RRR, S1S2;  Abd: +BS, soft, nontender/nondistended, +PEG  : +Urinary catheter with large volume urine noted.  Extremities: no bilateral LE edema noted. +LLE AKA  Neuro: Lethargic  Skin: Warm, without rashes    LABS/STUDIES  --------------------------------------------------------------------------------              7.3    8.27  >-----------<  152      [03-13-24 @ 09:53]              24.0     136  |  103  |  52  ----------------------------<  176      [03-13-24 @ 09:53]  4.9   |  22  |  1.52        Ca     9.3     [03-13-24 @ 09:53]      Mg     2.5     [03-13-24 @ 09:53]      Phos  3.2     [03-13-24 @ 09:53]    Creatinine Trend:  SCr 1.52 [03-13 @ 09:53]  SCr 1.49 [03-12 @ 07:16]  SCr 1.47 [03-11 @ 01:28]  SCr 1.45 [03-10 @ 13:34]  SCr 1.54 [03-10 @ 01:33]    Tacrolimus (), Serum: 4.9 ng/mL (03-13 @ 07:24)  Tacrolimus (), Serum: 2.3 ng/mL (03-12 @ 07:16)  Tacrolimus (), Serum: 2.0 ng/mL (03-11 @ 05:16)  Tacrolimus (), Serum: 2.5 ng/mL (03-10 @ 03:31)    Urine Sodium 120      [03-07-24 @ 05:28]  Urine Osmolality 534      [03-07-24 @ 05:28]    HbA1c 7.2      [01-11-20 @ 09:23]  TSH 1.24      [03-02-24 @ 10:51]  Lipid: chol 136, TG 95, HDL 72, LDL --      [03-02-24 @ 02:10]

## 2024-03-13 NOTE — PROGRESS NOTE ADULT - PROBLEM SELECTOR PLAN 2
- S/p trach 3/8 by surgery by Dr.Eric Mayorga   - Tracheostomy Sutures to be removed today 3/13  - Currently remains on Mechanical Ventilation   - Attempt CPAP Trials as tolerated   - Vent Settings: PRVC 14/450/5/40  - Continue airway clearance; Duonebs q6h + Hypersal 3 % q6h ATC

## 2024-03-13 NOTE — PROGRESS NOTE ADULT - PROBLEM SELECTOR PLAN 9
- PCP ppx: bactrim  - DVT ppx: heparin subq, s/p IVCF 3/4  - GI ppx: protonix - [facility] Duplex 3/2: DVT RT cfv, femoral, popliteal and gastrocnemius veins. DVT LEFT cfv and femoral vein  - S/p IVCF by IR on 3/3  - Cont Heparin Sub Q

## 2024-03-13 NOTE — PROGRESS NOTE ADULT - PROBLEM SELECTOR PLAN 1
Pt. with ESRD, underwent DDRT in 2019. On review of [place], Scr was 1.86 on 10/31/23. SCr was WNL at 1.18 on admission (3/2), and remains elevated/stable at 1.52 today (3/13). Pt. with likley underlying CKD, Scr likely at baseline. Continue with immunosuppression regimen, as outlined below. Monitor labs and urine output. Avoid nephrotoxins. Dose medications as per eGFR.

## 2024-03-14 LAB
ANION GAP SERPL CALC-SCNC: 12 MMOL/L — SIGNIFICANT CHANGE UP (ref 5–17)
BLD GP AB SCN SERPL QL: NEGATIVE — SIGNIFICANT CHANGE UP
BUN SERPL-MCNC: 48 MG/DL — HIGH (ref 7–23)
CALCIUM SERPL-MCNC: 9.4 MG/DL — SIGNIFICANT CHANGE UP (ref 8.4–10.5)
CHLORIDE SERPL-SCNC: 102 MMOL/L — SIGNIFICANT CHANGE UP (ref 96–108)
CO2 SERPL-SCNC: 20 MMOL/L — LOW (ref 22–31)
CREAT SERPL-MCNC: 1.46 MG/DL — HIGH (ref 0.5–1.3)
CULTURE RESULTS: SIGNIFICANT CHANGE UP
EGFR: 36 ML/MIN/1.73M2 — LOW
GLUCOSE BLDC GLUCOMTR-MCNC: 133 MG/DL — HIGH (ref 70–99)
GLUCOSE BLDC GLUCOMTR-MCNC: 135 MG/DL — HIGH (ref 70–99)
GLUCOSE BLDC GLUCOMTR-MCNC: 163 MG/DL — HIGH (ref 70–99)
GLUCOSE BLDC GLUCOMTR-MCNC: 166 MG/DL — HIGH (ref 70–99)
GLUCOSE SERPL-MCNC: 149 MG/DL — HIGH (ref 70–99)
HAPTOGLOB SERPL-MCNC: 366 MG/DL — HIGH (ref 34–200)
HCT VFR BLD CALC: 24.5 % — LOW (ref 34.5–45)
HGB BLD-MCNC: 7.7 G/DL — LOW (ref 11.5–15.5)
IRON SATN MFR SERPL: 13 % — LOW (ref 14–50)
IRON SATN MFR SERPL: 27 UG/DL — LOW (ref 30–160)
LDH SERPL L TO P-CCNC: 359 U/L — HIGH (ref 50–242)
MAGNESIUM SERPL-MCNC: 2.2 MG/DL — SIGNIFICANT CHANGE UP (ref 1.6–2.6)
MCHC RBC-ENTMCNC: 29.3 PG — SIGNIFICANT CHANGE UP (ref 27–34)
MCHC RBC-ENTMCNC: 31.4 GM/DL — LOW (ref 32–36)
MCV RBC AUTO: 93.2 FL — SIGNIFICANT CHANGE UP (ref 80–100)
MRSA PCR RESULT.: SIGNIFICANT CHANGE UP
NRBC # BLD: 0 /100 WBCS — SIGNIFICANT CHANGE UP (ref 0–0)
PHOSPHATE SERPL-MCNC: 2.8 MG/DL — SIGNIFICANT CHANGE UP (ref 2.5–4.5)
PLATELET # BLD AUTO: 146 K/UL — LOW (ref 150–400)
POTASSIUM SERPL-MCNC: 4.6 MMOL/L — SIGNIFICANT CHANGE UP (ref 3.5–5.3)
POTASSIUM SERPL-SCNC: 4.6 MMOL/L — SIGNIFICANT CHANGE UP (ref 3.5–5.3)
RBC # BLD: 2.63 M/UL — LOW (ref 3.8–5.2)
RBC # BLD: 2.63 M/UL — LOW (ref 3.8–5.2)
RBC # FLD: 15.1 % — HIGH (ref 10.3–14.5)
RETICS #: 67.3 K/UL — SIGNIFICANT CHANGE UP (ref 25–125)
RETICS/RBC NFR: 2.6 % — HIGH (ref 0.5–2.5)
RH IG SCN BLD-IMP: POSITIVE — SIGNIFICANT CHANGE UP
S AUREUS DNA NOSE QL NAA+PROBE: SIGNIFICANT CHANGE UP
SODIUM SERPL-SCNC: 134 MMOL/L — LOW (ref 135–145)
SPECIMEN SOURCE: SIGNIFICANT CHANGE UP
TACROLIMUS SERPL-MCNC: 6 NG/ML — SIGNIFICANT CHANGE UP
TIBC SERPL-MCNC: 201 UG/DL — LOW (ref 220–430)
TRANSFERRIN SERPL-MCNC: 135 MG/DL — LOW (ref 200–360)
UIBC SERPL-MCNC: 174 UG/DL — SIGNIFICANT CHANGE UP (ref 110–370)
WBC # BLD: 7.07 K/UL — SIGNIFICANT CHANGE UP (ref 3.8–10.5)
WBC # FLD AUTO: 7.07 K/UL — SIGNIFICANT CHANGE UP (ref 3.8–10.5)

## 2024-03-14 PROCEDURE — 99233 SBSQ HOSP IP/OBS HIGH 50: CPT

## 2024-03-14 RX ORDER — IPRATROPIUM/ALBUTEROL SULFATE 18-103MCG
3 AEROSOL WITH ADAPTER (GRAM) INHALATION EVERY 6 HOURS
Refills: 0 | Status: DISCONTINUED | OUTPATIENT
Start: 2024-03-14 | End: 2024-04-02

## 2024-03-14 RX ADMIN — PANTOPRAZOLE SODIUM 40 MILLIGRAM(S): 20 TABLET, DELAYED RELEASE ORAL at 17:22

## 2024-03-14 RX ADMIN — Medication 3 MILLILITER(S): at 17:26

## 2024-03-14 RX ADMIN — Medication 5 MILLIGRAM(S): at 05:14

## 2024-03-14 RX ADMIN — BRIVARACETAM 100 MILLIGRAM(S): 25 TABLET, FILM COATED ORAL at 15:07

## 2024-03-14 RX ADMIN — TACROLIMUS 5 MILLIGRAM(S): 5 CAPSULE ORAL at 17:23

## 2024-03-14 RX ADMIN — LACOSAMIDE 200 MILLIGRAM(S): 50 TABLET ORAL at 05:14

## 2024-03-14 RX ADMIN — Medication 0.2 MILLIGRAM(S): at 17:22

## 2024-03-14 RX ADMIN — Medication 3 MILLILITER(S): at 05:08

## 2024-03-14 RX ADMIN — Medication 2: at 17:53

## 2024-03-14 RX ADMIN — BRIVARACETAM 100 MILLIGRAM(S): 25 TABLET, FILM COATED ORAL at 21:39

## 2024-03-14 RX ADMIN — Medication 500000 UNIT(S): at 17:22

## 2024-03-14 RX ADMIN — MEROPENEM 100 MILLIGRAM(S): 1 INJECTION INTRAVENOUS at 05:16

## 2024-03-14 RX ADMIN — HEPARIN SODIUM 5000 UNIT(S): 5000 INJECTION INTRAVENOUS; SUBCUTANEOUS at 17:21

## 2024-03-14 RX ADMIN — BRIVARACETAM 100 MILLIGRAM(S): 25 TABLET, FILM COATED ORAL at 05:15

## 2024-03-14 RX ADMIN — HUMAN INSULIN 12 UNIT(S): 100 INJECTION, SUSPENSION SUBCUTANEOUS at 17:33

## 2024-03-14 RX ADMIN — Medication 5 MILLILITER(S): at 17:25

## 2024-03-14 RX ADMIN — Medication 250 MILLIGRAM(S): at 05:17

## 2024-03-14 RX ADMIN — Medication 1 DROP(S): at 05:15

## 2024-03-14 RX ADMIN — HUMAN INSULIN 12 UNIT(S): 100 INJECTION, SUSPENSION SUBCUTANEOUS at 05:52

## 2024-03-14 RX ADMIN — Medication 2: at 05:52

## 2024-03-14 RX ADMIN — Medication 5 MILLILITER(S): at 05:13

## 2024-03-14 RX ADMIN — Medication 1 DROP(S): at 17:34

## 2024-03-14 RX ADMIN — Medication 30 MILLILITER(S): at 05:14

## 2024-03-14 RX ADMIN — Medication 1 DROP(S): at 01:48

## 2024-03-14 RX ADMIN — CARVEDILOL PHOSPHATE 25 MILLIGRAM(S): 80 CAPSULE, EXTENDED RELEASE ORAL at 05:14

## 2024-03-14 RX ADMIN — Medication 500000 UNIT(S): at 05:13

## 2024-03-14 RX ADMIN — HEPARIN SODIUM 5000 UNIT(S): 5000 INJECTION INTRAVENOUS; SUBCUTANEOUS at 05:15

## 2024-03-14 RX ADMIN — MEROPENEM 100 MILLIGRAM(S): 1 INJECTION INTRAVENOUS at 17:21

## 2024-03-14 RX ADMIN — TACROLIMUS 5 MILLIGRAM(S): 5 CAPSULE ORAL at 05:52

## 2024-03-14 RX ADMIN — Medication 3 MILLILITER(S): at 11:07

## 2024-03-14 RX ADMIN — Medication 500 MILLIGRAM(S): at 05:13

## 2024-03-14 RX ADMIN — CARVEDILOL PHOSPHATE 25 MILLIGRAM(S): 80 CAPSULE, EXTENDED RELEASE ORAL at 17:22

## 2024-03-14 RX ADMIN — Medication 1 DROP(S): at 21:40

## 2024-03-14 RX ADMIN — SODIUM CHLORIDE 4 MILLILITER(S): 9 INJECTION INTRAMUSCULAR; INTRAVENOUS; SUBCUTANEOUS at 05:08

## 2024-03-14 RX ADMIN — Medication 0.2 MILLIGRAM(S): at 05:16

## 2024-03-14 RX ADMIN — PANTOPRAZOLE SODIUM 40 MILLIGRAM(S): 20 TABLET, DELAYED RELEASE ORAL at 05:15

## 2024-03-14 RX ADMIN — LACOSAMIDE 200 MILLIGRAM(S): 50 TABLET ORAL at 17:33

## 2024-03-14 RX ADMIN — OXYCODONE HYDROCHLORIDE 5 MILLIGRAM(S): 5 TABLET ORAL at 01:43

## 2024-03-14 RX ADMIN — OXYCODONE HYDROCHLORIDE 5 MILLIGRAM(S): 5 TABLET ORAL at 02:30

## 2024-03-14 NOTE — PROGRESS NOTE ADULT - PROBLEM SELECTOR PLAN 9
- VA Duplex 3/2: DVT RT cfv, femoral, popliteal and gastrocnemius veins. DVT LEFT cfv and femoral vein  - S/p IVCF by IR on 3/3  - Cont Heparin Sub Q

## 2024-03-14 NOTE — PROGRESS NOTE ADULT - NS ATTEND AMEND GEN_ALL_CORE FT
77 yo F with PMHx ESRD s/p renal transplant (2019, now off HD), left AKA, BK viremia, HTN, breast ca s/p lumpectomy (completed Tamoxifen 03/2023), DVT (off Eliquis), HLD, gout presenting with left ICH (ICH score 4) likely 2/2 amyloid angiopathy s/p left craniectomy(discarded) and evacuation POD9 as well as EVD placement and removal. She is s/p trach/peg 3/9/24.      #Left ICH  - Head CT 3/12 stable  #Metabolic encephalopathy  - per family had been more alert earlier in admission  - remains obtunded, some eye opening with sternal rub  #Seizures   - Briviact 100 mg TID for seizures, Vimpat 200 mg BID   - may require repeat EEG  #Respiratory Failure  - s/p trach PRVC 14/450/5/40  - CPAP trials daily  - having moderately thick secretions, Duonebs q6h + Hypersal 3 % q6h ATC  #HTN  - SBP goal:   - Clonidine 0.2mg BID  - Coreg 25 mg BID   - Echo: LVEF 70-75%, Grade II diastolic dysfcn, septal bulge noted - not signif per Cardiology  #Renal transplant (2019), AVF in the left upper extremity  - cont free water 200 ml q8h for now  - pred 5mg, bactrim ppx  - Tacrolimus 5mg BID per transplant nephro recs, f/u levels  #H. pylori infection  - Protonix  - H.pylori treatment can be held for 1 month until acute issues have resolved  #UTI  - UA culture with Kleb pna >100K + Enterococcus faecium (VRE), 3/10 culture with 10-49K  - Switched from Cefepime to Meropenem  - holding on treating E. faecium given CFU, defervescing  #Hyperglycemia   - Goal euglycemia (-180), A1C: 5.4%  - ISS  - increase humulin to 12u q6h  # Hx of bilateral above the knee DVT. s/p IVC filter   - HSQ q12h, ok per Neurosurgery  #Ethics: Full code      Lidya Cancino MD, MSCR

## 2024-03-14 NOTE — PROGRESS NOTE ADULT - ASSESSMENT
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 S/p craniectomy and evacuation course c/b seizures, last seen on 3/7/24 EEG currently on AEDS. Now s/p trach/peg 3/9/24. During EGD/PEG found to have superficial gastric ulcers. H. Pylori Stool Ag positive, for which GI is consulted.     1. H. Pylori   recc Bismuth quadruple therapy x 14 days to be started post discharge (Omeprazole 20 mg BID, Tetracycline 500 mg QID, Metronidazole 250 mg QID and bismuth subsalicylate 2 tabs QID). Bismuth may turn her stool black. After completing treatment would cont once daily PPI for ppx. Pending clinical course, consider repeat stool Ag n 8 weeks to confirm clearance.    2. Resp failure s/p trach/PEG    3. IPH s/p craniotomy       I had a prolonged conversation with the patient regarding the hospital course, differential diagnosis, results of diagnostic tests this far, and therapeutic modalities available. Plan of care discussed with the patient after the evaluation. Patient expresses a clear understanding of the plan of care. Fifty minutes spent on the total encounter, of which more than fifty percent of the encounter was spent on counseling and/or coordinating care by the attending physician. Advanced care planning forms were discussed. Code status including forceful chest compressions, defibrillation and intubation were discussed. The risks benefits and alternatives to pertinent gastrointestinal procedures and interventions were discussed in detail and all questions were answered. Duration: 15 Minutes.      Jose Antonio Sepulveda D.O.  Gastroenterology and Hepatology  [address], [address]  [place], [place]  Office: [phone]

## 2024-03-14 NOTE — PROGRESS NOTE ADULT - SUBJECTIVE AND OBJECTIVE BOX
INTERVAL HPI/OVERNIGHT EVENTS:    seen this morning with family bedside   without new gi events    MEDICATIONS  (STANDING):  albuterol/ipratropium for Nebulization 3 milliLiter(s) Nebulizer every 6 hours  artificial  tears Solution 1 Drop(s) Both EYES two times a day  artificial  tears Solution 1 Drop(s) Both EYES every 4 hours  Biotene Dry Mouth Oral Rinse 5 milliLiter(s) Swish and Spit every 6 hours  brivaracetam Oral Solution 100 milliGRAM(s) Oral <User Schedule>  carvedilol 25 milliGRAM(s) Oral every 12 hours  cloNIDine 0.2 milliGRAM(s) Oral three times a day  docosanol 10% Cream 1 Application(s) Topical daily  heparin   Injectable 5000 Unit(s) SubCutaneous every 12 hours  influenza  Vaccine (HIGH DOSE) 0.7 milliLiter(s) IntraMuscular once  insulin lispro (ADMELOG) corrective regimen sliding scale   SubCutaneous every 6 hours  insulin NPH human recombinant 12 Unit(s) SubCutaneous every 6 hours  lacosamide Solution 200 milliGRAM(s) Oral two times a day  meropenem  IVPB 1000 milliGRAM(s) IV Intermittent every 12 hours  nystatin    Suspension 399343 Unit(s) Swish and Swallow every 6 hours  pantoprazole   Suspension 40 milliGRAM(s) Oral two times a day  predniSONE   Tablet 5 milliGRAM(s) Oral daily  tacrolimus    0.5 mG/mL Suspension 5 milliGRAM(s) Oral two times a day  trimethoprim   80 mG/sulfamethoxazole 400 mG 1 Tablet(s) Oral daily    MEDICATIONS  (PRN):  acetaminophen   Oral Liquid .. 650 milliGRAM(s) Oral every 6 hours PRN Temp greater or equal to 38C (100.4F), Mild Pain (1 - 3)  ondansetron Injectable 4 milliGRAM(s) IV Push every 6 hours PRN Nausea and/or Vomiting  oxyCODONE    IR 5 milliGRAM(s) Oral every 4 hours PRN Moderate Pain (4 - 6)  oxyCODONE    Solution 10 milliGRAM(s) Enteral Tube every 4 hours PRN Severe Pain (7 - 10)      Allergies    shellfish (Rash)  ChloraPrep One-Step (Rash)  penicillins (Rash)    Intolerances        Review of Systems: *pt minimally verbal to nonverbal, unable to obtain ROS         Vital Signs Last 24 Hrs  T(C): 37.1 (14 Mar 2024 05:00), Max: 37.8 (13 Mar 2024 14:06)  T(F): 98.7 (14 Mar 2024 05:00), Max: 100 (13 Mar 2024 14:06)  HR: 94 (14 Mar 2024 11:55) (84 - 103)  BP: 165/90 (14 Mar 2024 11:55) (126/76 - 172/90)  BP(mean): --  RR: 27 (14 Mar 2024 11:55) (17 - 27)  SpO2: 100% (14 Mar 2024 11:55) (99% - 100%)    Parameters below as of 14 Mar 2024 11:55  Patient On (Oxygen Delivery Method): ventilator        PHYSICAL EXAM:    Constitutional: NAD  HEENT: EOMI, throat clear  Neck: No LAD, supple  Respiratory: CTA and P  Cardiovascular: S1 and S2, RRR, no M  Gastrointestinal: BS+, soft, NT/ND, neg HSM,  Extremities: No peripheral edema, neg clubbing, cyanosis  Vascular: 2+ peripheral pulses  Neurological: A/O x 0  Psychiatric: Normal mood, normal affect  Skin: No rashes      LABS:                        7.7    7.07  )-----------( 146      ( 14 Mar 2024 06:49 )             24.5     03-14    134<L>  |  102  |  48<H>  ----------------------------<  149<H>  4.6   |  20<L>  |  1.46<H>    Ca    9.4      14 Mar 2024 06:49  Phos  2.8     03-14  Mg     2.2     03-14        Urinalysis Basic - ( 14 Mar 2024 06:49 )    Color: x / Appearance: x / SG: x / pH: x  Gluc: 149 mg/dL / Ketone: x  / Bili: x / Urobili: x   Blood: x / Protein: x / Nitrite: x   Leuk Esterase: x / RBC: x / WBC x   Sq Epi: x / Non Sq Epi: x / Bacteria: x        RADIOLOGY & ADDITIONAL TESTS:

## 2024-03-14 NOTE — CONSULT NOTE ADULT - CONSULT REQUESTED DATE/TIME
11-Mar-2024 14:49
12-Mar-2024 15:33
03-Mar-2024 08:22
07-Mar-2024 16:00
13-Mar-2024 13:00
14-Mar-2024 18:03
07-Mar-2024
07-Mar-2024 18:36
02-Mar-2024 16:22

## 2024-03-14 NOTE — PROGRESS NOTE ADULT - SUBJECTIVE AND OBJECTIVE BOX
DATE OF SERVICE: 03-14-24 @ 12:03    Patient is a 78y old  Female who presents with a chief complaint of ICH (14 Mar 2024 07:29)      INTERVAL HISTORY: No acute events    REVIEW OF SYSTEMS:  CONSTITUTIONAL: No weakness  EYES/ENT: No visual changes;  No throat pain   NECK: No pain or stiffness  RESPIRATORY: No cough, wheezing; No shortness of breath  CARDIOVASCULAR: No chest pain or palpitations  GASTROINTESTINAL: No abdominal  pain. No nausea, vomiting, or hematemesis  GENITOURINARY: No dysuria, frequency or hematuria  NEUROLOGICAL: No stroke like symptoms  SKIN: No rashes      	  MEDICATIONS:  carvedilol 25 milliGRAM(s) Oral every 12 hours  cloNIDine 0.2 milliGRAM(s) Oral three times a day        PHYSICAL EXAM:  T(C): 37.1 (03-14-24 @ 05:00), Max: 37.8 (03-13-24 @ 14:06)  HR: 94 (03-14-24 @ 11:55) (84 - 103)  BP: 165/90 (03-14-24 @ 11:55) (126/76 - 172/90)  RR: 27 (03-14-24 @ 11:55) (17 - 27)  SpO2: 100% (03-14-24 @ 11:55) (99% - 100%)  Wt(kg): --  I&O's Summary    13 Mar 2024 07:01  -  14 Mar 2024 07:00  --------------------------------------------------------  IN: 1820 mL / OUT: 1150 mL / NET: 670 mL          Appearance: In no distress	  HEENT:    PERRL, EOMI	  Cardiovascular:  S1 S2, No JVD  Respiratory: Lungs clear to auscultation	  Gastrointestinal:  Soft, Non-tender, + BS	  Vascularature:  No edema of LE  Psychiatric: Appropriate affect   Neuro: no acute focal deficits                               7.7    7.07  )-----------( 146      ( 14 Mar 2024 06:49 )             24.5     03-14    134<L>  |  102  |  48<H>  ----------------------------<  149<H>  4.6   |  20<L>  |  1.46<H>    Ca    9.4      14 Mar 2024 06:49  Phos  2.8     03-14  Mg     2.2     03-14          Labs personally reviewed      ASSESSMENT/PLAN: 	   78F Hx ESRD s/p renal xplant c/b DGF off HD, L AKA, 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      1. Abnormal Echo --  Septal bulge noted measures 2.2cm without obstruction.   -- Given no intracavitary obstruction no intervention at this time  - No Myxoma seen on this echo or echo in 2019, ? if hypermobile interatrial septum was confused for on prior imaging     2. HTN - uncontrolled, needs improvement   Continue Hydralazine 100 mg Q8H, clonidine 0.1 mg TID, Coreg 25 mg BID   - consider amlodipine 5mg and titrate up as needed for better b/p measurements     3. 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 DVT PPX - HSQ when safe              LAURA Gomez DO [facility]  Cardiovascular Medicine  [address], [address]  Available via call or text on [employer]  Office: [phone]

## 2024-03-14 NOTE — PROGRESS NOTE ADULT - PROBLEM SELECTOR PLAN 4
- + UA on 3/10  - Urine Cx 3/10: ESBL Klebsiella and VRE 10- 49 K   - Cefepime d/c'd on 3/12 broadened to meropenem and vanco  - Vanco d/cd today in setting of VRE   - Febrile overnight; T.max 101.5   - Repeat Blood and Urine cx sent ( Results pending)   - CXR 3/10: No focal consolidation - + UA on 3/10  - Urine Cx 3/10: ESBL Klebsiella and VRE 10- 49 K   - Treating the ESBL klebsiella with Meropenem 1GM Q12H 3/12 -->3/19

## 2024-03-14 NOTE — PROGRESS NOTE ADULT - PROBLEM SELECTOR PLAN 5
- SBP goal:   - Clonidine 0.1 mg TID, Coreg 25 mg BID   - Echo: LVEF 70-75%, Grade II diastolic dysfcn, septal bulge without obstruction  - cardiology following - SBP goal:   - Clonidine 0.1 mg TID, Coreg 25 mg BID   - Echo: LVEF 70-75%, Grade II diastolic dysfunction, septal bulge without obstruction  - cardiology following

## 2024-03-14 NOTE — PROGRESS NOTE ADULT - PROBLEM SELECTOR PLAN 8
- S/p PEG 3/8  - tolerating tube feeds    [] H. 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 pylori stool antigen positive  - Protonix 40mg PO BID x3 months  - Awaiting GI recs for h. pylori treatment   - GI (Dr. Laguerre) called - S/p PEG 3/8  - tolerating tube feeds    [] H. 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  recc Bismuth quadruple therapy x 14 days to be started post discharge (Omeprazole 20 mg BID, Tetracycline 500 mg QID, Metronidazole 250 mg QID and bismuth subsalicylate 2 tabs QID). Bismuth may turn her stool black. After completing treatment would cont once daily PPI for ppx. Pending clinical course, consider repeat stool Ag n 8 weeks to confirm clearance.

## 2024-03-14 NOTE — CONSULT NOTE ADULT - CONSULT REASON
ICH
Abnormal Echo
Trach & PEG
Sacral/bilateral buttocks skin damage
H. Pylori
trach/PEG
Transfer to RCU for ventilator weaning
ivc filter placement
Post renal transplant

## 2024-03-14 NOTE — CONSULT NOTE ADULT - SUBJECTIVE AND OBJECTIVE BOX
Neurology Consult    Reason for Consult: Patient is a 78y old  Female who presents with a chief complaint of ICH (14 Mar 2024 12:39)      HPI:     77 yo F with ESRD s/p renal transplant (2019, now off HD), left AKA, BK viremia, HTN, breast ca s/p lumpectomy (completed Tamoxifen 03/2023), DVT (off Eliquis), HLD, gout presenting 3/1 with left ICH (ICH score 4) likely 2/2 amyloid angiopathy s/p left craniectomy(discarded) and evacuation as well as EVD placement and removal (3/7). She is s/p trach/peg 3/8/24.  Febrile 3/10 with + UA, blood/sputum culture NGTD.  Treatment for UTI initiated with ceftriaxone, eventually broadened to cefepime.  Transferred to RCU 3/11 for continued management.  Pt arrived from N[facility]U with minimal mental status with only response of spontaneous eye opening and withdrawal on RLE.  Urine culture grew positive for klebsiella and enterococcus, cefepime d/c'd meropenem and vanc started 3/12.  Will continue with airway clearance management and wean from ventilator as tolerated.       PAST MEDICAL & SURGICAL HISTORY:  Hypertension      Anemia in ESRD (end-stage renal disease)      Thrombocytopenia  leukopenia: followed by chele, plan for BMBx 7/22/19      H/O gastroesophageal reflux (GERD)      Breast cancer, female  left 2014      ESRD on hemodialysis      Anuria      Thyroid nodule  yearly Ultrasound, monitoring yearly      Pancreatic cyst      AV fistula thrombosis  history: was treated with coumadin, no more A/C.      S/P lumpectomy, left breast  2014      Adrenal mass  excison 2015      S/P hysterectomy  1994      S/P arteriovenous (AV) fistula creation  2002      History of fracture of right ankle  2014 repaired          Allergies: Allergies    shellfish (Rash)  ChloraPrep One-Step (Rash)  penicillins (Rash)    Intolerances        Social History: Denies toxic habits including tobacco, ETOH or illicit drugs.    Family History: FAMILY HISTORY:  . No family history of strokes    Medications: MEDICATIONS  (STANDING):  albuterol/ipratropium for Nebulization 3 milliLiter(s) Nebulizer every 6 hours  artificial  tears Solution 1 Drop(s) Both EYES every 4 hours  Biotene Dry Mouth Oral Rinse 5 milliLiter(s) Swish and Spit every 6 hours  brivaracetam Oral Solution 100 milliGRAM(s) Oral <User Schedule>  carvedilol 25 milliGRAM(s) Oral every 12 hours  cloNIDine 0.2 milliGRAM(s) Oral two times a day  docosanol 10% Cream 1 Application(s) Topical daily  heparin   Injectable 5000 Unit(s) SubCutaneous every 12 hours  insulin lispro (ADMELOG) corrective regimen sliding scale   SubCutaneous every 6 hours  insulin NPH human recombinant 12 Unit(s) SubCutaneous every 6 hours  lacosamide Solution 200 milliGRAM(s) Oral two times a day  meropenem  IVPB 1000 milliGRAM(s) IV Intermittent every 12 hours  nystatin    Suspension 665063 Unit(s) Swish and Swallow every 6 hours  pantoprazole   Suspension 40 milliGRAM(s) Oral two times a day  predniSONE   Tablet 5 milliGRAM(s) Oral daily  tacrolimus    0.5 mG/mL Suspension 5 milliGRAM(s) Oral two times a day  trimethoprim   80 mG/sulfamethoxazole 400 mG 1 Tablet(s) Oral daily    MEDICATIONS  (PRN):  acetaminophen   Oral Liquid .. 650 milliGRAM(s) Oral every 6 hours PRN Temp greater or equal to 38C (100.4F), Mild Pain (1 - 3)      Review of Systems:    unable given mental status   Vitals:  Vital Signs Last 24 Hrs  T(C): 37.1 (14 Mar 2024 05:00), Max: 37.3 (14 Mar 2024 02:00)  T(F): 98.7 (14 Mar 2024 05:00), Max: 99.2 (14 Mar 2024 02:00)  HR: 89 (14 Mar 2024 17:39) (84 - 100)  BP: 165/90 (14 Mar 2024 11:55) (126/76 - 166/86)  BP(mean): --  RR: 27 (14 Mar 2024 11:55) (17 - 27)  SpO2: 100% (14 Mar 2024 17:39) (96% - 100%)    Parameters below as of 14 Mar 2024 17:39  Patient On (Oxygen Delivery Method): ventilator        General Exam:   General Appearance: Appropriately dressed    Head: Normocephalic, atraumatic and no dysmorphic features s/p trach   Ear, Nose, and Throat: Moist mucous membranes  CVS: S1S2+  Resp: No SOB, no wheeze or rhonchi on vent   GI: soft NT/ND s/p PEG   Extremities:  L AKA  Skin: No bruises or rashes     Neurological Exam:  Mental Status:  opens eyes to noxious. no verbal. not following   Cranial Nerves: PERRL, EOMI, no blink to threat on R, R facial,    Motor: minimal/no spontaneous movement   Sensation: grimaces to noxious at times. some minimal withdrawal LUE and RLE.    Coordination: unable   Gait: unable     Data/Labs/Imaging which I personally reviewed.     Labs:     CBC Full  -  ( 14 Mar 2024 06:49 )  WBC Count : 7.07 K/uL  RBC Count : 2.63 M/uL  Hemoglobin : 7.7 g/dL  Hematocrit : 24.5 %  Platelet Count - Automated : 146 K/uL  Mean Cell Volume : 93.2 fl  Mean Cell Hemoglobin : 29.3 pg  Mean Cell Hemoglobin Concentration : 31.4 gm/dL  Auto Neutrophil # : x  Auto Lymphocyte # : x  Auto Monocyte # : x  Auto Eosinophil # : x  Auto Basophil # : x  Auto Neutrophil % : x  Auto Lymphocyte % : x  Auto Monocyte % : x  Auto Eosinophil % : x  Auto Basophil % : x    03-14    134<L>  |  102  |  48<H>  ----------------------------<  149<H>  4.6   |  20<L>  |  1.46<H>    Ca    9.4      14 Mar 2024 06:49  Phos  2.8     03-14  Mg     2.2     03-14          Urinalysis Basic - ( 14 Mar 2024 06:49 )    Color: x / Appearance: x / SG: x / pH: x  Gluc: 149 mg/dL / Ketone: x  / Bili: x / Urobili: x   Blood: x / Protein: x / Nitrite: x   Leuk Esterase: x / RBC: x / WBC x   Sq Epi: x / Non Sq Epi: x / Bacteria: x

## 2024-03-14 NOTE — CHART NOTE - NSCHARTNOTEFT_GEN_A_CORE
Nutrition Follow Up Note  Patient seen for: Nutrition Follow Up     Source: [] Patient       [x] EMR        [x] RN        [] Family at bedside       [x] Other: interdisciplinary medical team    -If unable to interview patient: [x] Trach/Vent/BiPAP  [x] Disoriented/confused/inappropriate to interview    Diet Order:   Diet, NPO with Tube Feed:   Tube Feeding Modality: Gastrostomy  Nepro with Carb Steady (NEPRORTH)  Total Volume for 24 Hours (mL): 1080  Continuous  Starting Tube Feed Rate {mL per Hour}: 20  Increase Tube Feed Rate by (mL): 10     Every 2 hours  Until Goal Tube Feed Rate (mL per Hour): 45  Tube Feed Duration (in Hours): 24  Tube Feed Start Time: 11:00  Banatrol TF     Qty per Day:  2  Supplement Feeding Modality:  Gastrostomy  Probiotic Yogurt/Smoothie Cans or Servings Per Day:  2       Frequency:  Daily (24 @ 11:34) [Active]    EN Order Provides: 1080ml, 1944kcal and --g protein.  Meets -- kcals/kG and -- gm protein/kG based on IBW -- kG  Current Rate: -- mL/hr with no known intolerances thus far    3-Day EN Average per flowsheet:     EN Provision (per nursing flow sheet): pt has received 100% of feeds between 3/4-3/7. Feeds now on hold as pt pending trach/PEG placement.     Is current diet order appropriate/adequate? see below for updated diet recommendations    Nutrition-related concerns:  - Visited pt at bedside, observed TF regimen, Nepro 1.8 currently on hold. Per RN, no intolerance noted.   -GI: Last BM (3/13): x 2. On bowel regimen (Senna, Miralax). Prescribed IV Protonix, IV Zofran.   -Transferred to RCU 3/11 for continued management.  -H. pylori stool antigen+; GI following   -S/P L hemicrani for evacuation of large ICH, bone discarded (3-01)  -S/P trach and PEG (3-08)  -Currently remains on Mechanical Ventilation  -Endo: HbA1c 5.7% (3/02/2024). Continues on NPH 12u q 6 hrs and insulin lispro sliding scale to aid in glycemic control. To note, pt on Prednisone, posing pt at additional risk for elevated FSBG.  -Renal: S/p renal transplant (2019); ordered for Tacrolimus.   -ALLERGY: Shellfish noted and confirmed by family.     Weights:   Dosing weight: 50 kg ()  Daily Weight in k.1 ()    MEDICATIONS  (STANDING):  carvedilol  cefTRIAXone   IVPB  cloNIDine  dextrose 5%.  dextrose 5%.  dextrose 50% Injectable  dextrose 50% Injectable  dextrose 50% Injectable  glucagon  Injectable  hydrALAZINE  insulin lispro (ADMELOG) corrective regimen sliding scale  insulin NPH human recombinant  pantoprazole  Injectable  polyethylene glycol 3350  predniSONE   Tablet  senna  sodium chloride  trimethoprim   80 mG/sulfamethoxazole 400 mG    Pertinent Labs:  @ 01:34: Na 140, BUN 51<H>, Cr 1.40<H>, <H>, K+ 5.1, Phos 3.8, Mg 2.6, Alk Phos --, ALT/SGPT --, AST/SGOT --, HbA1c --   @ 17:21: Na 136, BUN 48<H>, Cr 1.33<H>, <H>, K+ 5.4<H>, Phos --, Mg --, Alk Phos --, ALT/SGPT --, AST/SGOT --, HbA1c --    A1C with Estimated Average Glucose Result: 5.7 % (24 @ 11:57)    Finger Sticks:  POCT Blood Glucose.: 121 mg/dL ( @ 05:16)  POCT Blood Glucose.: 166 mg/dL ( @ 23:17)  POCT Blood Glucose.: 195 mg/dL ( @ 16:59)  POCT Blood Glucose.: 170 mg/dL ( @ 11:11)    Triglycerides, Serum: 95 mg/dL (24 @ 02:10)    Skin per nursing documentation: surgical incision left craniectomy, stage II sacral bilateral buttocks DTI present on admission   Edema: 3+ generalized; face; right arm; tongue    (Based on dosing wt 50kg):   Estimated Energy Needs: (30-35kcal/kg): 1500-1750kcal  Estimated Protein Needs: (1.4-1.8g protein/kg): 70-90g protein  Estimated Fluid Needs: defer to team    Previous Nutrition Diagnosis: Increased Nutrient Needs  Nutrition Diagnosis is: [x] ongoing  [] resolved [] not applicable     New Nutrition Diagnosis: [x] Not applicable    Nutrition Care Plan:  [x] In Progress  [] Achieved  [] Not applicable    Nutrition Interventions:     Education Provided:       [] Yes:  [x] No: not appropriate at this time        Recommendations:      1. Consider changing EN regimen to Glucerna 1.5 at GOAL rate of 45ml/hr x 24 hrs. To provide (based on dosing wt 50kg): 1080ml, 1620kcal (32.4kcal/kg) and 89g protein (1.78g protein/kg).    2. Monitor GI tolerance. RD to remain available to adjust EN formulary, volume/rate PRN.   3. Antihyperglycemic regimen deferred to team. Trend K levels as pt prescribed Tacrolimus.   4. Consider multivitamin if feeds held for extended period of time to aid in prevention of micronutrient deficiencies.  5.     Monitoring and Evaluation:   Continue to monitor nutritional intake, tolerance to diet prescription, weights, labs, skin integrity    RD remains available upon request and will follow up per protocol    Audrey Vasquez RDN CDN (TEAMS) Nutrition Follow Up Note  Patient seen for: Nutrition Follow Up     Source: [] Patient       [x] EMR        [x] RN        [] Family at bedside       [] Other:     -If unable to interview patient: [x] Trach/Vent/BiPAP  [x] Disoriented/confused/inappropriate to interview    Diet Order:   Diet, NPO with Tube Feed:   Tube Feeding Modality: Gastrostomy  Nepro with Carb Steady (NEPRORTH)  Total Volume for 24 Hours (mL): 1080  Continuous  Starting Tube Feed Rate {mL per Hour}: 20  Increase Tube Feed Rate by (mL): 10     Every 2 hours  Until Goal Tube Feed Rate (mL per Hour): 45  Tube Feed Duration (in Hours): 24  Tube Feed Start Time: 11:00  Banatrol TF     Qty per Day:  2  Supplement Feeding Modality:  Gastrostomy  Probiotic Yogurt/Smoothie Cans or Servings Per Day:  2       Frequency:  Daily (24 @ 11:34) [Active]    EN Order Provides: 1080ml, 1944kcal and 87g protein.  Meets 39kcals/kG and 1.7 gm protein/kG based on dosing weight 50 kG    EN Provision (per nursing flow sheet): pt has received 100% of feeds between 3/11-3/14.    Is current diet order appropriate/adequate? see below for updated diet recommendations    Nutrition-related concerns:  -Visited pt at bedside, observed TF regimen, Nepro 1.8 currently on hold. Per RN, no intolerances noted.   -GI: Last BM (3/13): x 2. On bowel regimen (Senna, Miralax). Prescribed Protonix, Zofran.   -Transferred to RCU 3/11 for continued management.  -H. pylori stool antigen+; GI following   -S/P L hemicrani for evacuation of large ICH, bone discarded (3-01)  -S/P trach and PEG (3-08)  -Resp: Currently remains on Mechanical Ventilation  -Endo: HbA1c 5.7% (3/02/2024). Continues on NPH 12u q 6 hrs and insulin lispro sliding scale to aid in glycemic control. To note, pt on Prednisone, risk for elevated BG levels.  -Renal: S/p hx of renal transplant (); ordered for Tacrolimus.   -ALLERGY: Shellfish noted and confirmed by family.     Weights:   Dosing weight: 50 kg ()  Daily Weight in k.8 (3-13), 50.1 (03-06)  ** Weight fluctuating - Weight changes likely secondary to fluid shifts. RD to continue to monitor weight trends as able.     MEDICATIONS  (STANDING):  albuterol/ipratropium for Nebulization 3 milliLiter(s) Nebulizer every 6 hours  artificial  tears Solution 1 Drop(s) Both EYES two times a day  artificial  tears Solution 1 Drop(s) Both EYES every 4 hours  Biotene Dry Mouth Oral Rinse 5 milliLiter(s) Swish and Spit every 6 hours  bismuth subsalicylate Liquid 30 milliLiter(s) Oral four times a day  brivaracetam Oral Solution 100 milliGRAM(s) Oral <User Schedule>  carvedilol 25 milliGRAM(s) Oral every 12 hours  cloNIDine 0.2 milliGRAM(s) Oral three times a day  docosanol 10% Cream 1 Application(s) Topical daily  heparin   Injectable 5000 Unit(s) SubCutaneous every 12 hours  influenza  Vaccine (HIGH DOSE) 0.7 milliLiter(s) IntraMuscular once  insulin lispro (ADMELOG) corrective regimen sliding scale   SubCutaneous every 6 hours  insulin NPH human recombinant 12 Unit(s) SubCutaneous every 6 hours  lacosamide Solution 200 milliGRAM(s) Oral two times a day  meropenem  IVPB 1000 milliGRAM(s) IV Intermittent every 12 hours  metroNIDAZOLE    Tablet 250 milliGRAM(s) Oral every 6 hours  nystatin    Suspension 795797 Unit(s) Swish and Swallow every 6 hours  pantoprazole   Suspension 40 milliGRAM(s) Oral two times a day  predniSONE   Tablet 5 milliGRAM(s) Oral daily  tacrolimus    0.5 mG/mL Suspension 5 milliGRAM(s) Oral two times a day  tetracycline 500 milliGRAM(s) Oral four times a day  trimethoprim   80 mG/sulfamethoxazole 400 mG 1 Tablet(s) Oral daily    MEDICATIONS  (PRN):  acetaminophen   Oral Liquid .. 650 milliGRAM(s) Oral every 6 hours PRN Temp greater or equal to 38C (100.4F), Mild Pain (1 - 3)  ondansetron Injectable 4 milliGRAM(s) IV Push every 6 hours PRN Nausea and/or Vomiting  oxyCODONE    IR 5 milliGRAM(s) Oral every 4 hours PRN Moderate Pain (4 - 6)  oxyCODONE    Solution 10 milliGRAM(s) Enteral Tube every 4 hours PRN Severe Pain (7 - 10)      Pertinent Labs:  @ 06:49: Na 134<L>, BUN 48<H>, Cr 1.46<H>, <H>, K+ 4.6, Phos 2.8, Mg 2.2, Alk Phos --, ALT/SGPT --, AST/SGOT --, HbA1c --    A1C with Estimated Average Glucose Result: 5.7 % (24 @ 11:57)    Finger Sticks:  POCT Blood Glucose.: 133 mg/dL (24 @ 11:31)  POCT Blood Glucose.: 163 mg/dL (24 @ 05:31)  POCT Blood Glucose.: 186 mg/dL (24 @ 23:32)  POCT Blood Glucose.: 199 mg/dL (24 @ 17:20)  POCT Blood Glucose.: 155 mg/dL (24 @ 12:08)      Skin per nursing documentation: surgical incision left craniectomy, stage II sacral bilateral buttocks DTI present on admission   Edema: 1+ generalized, 2+ dependent     (Based on dosing wt 50kg):   Estimated Energy Needs: (30-35kcal/kg): 1500-1750kcal  Estimated Protein Needs: (1.4-1.8g protein/kg): 70-90g protein  Estimated Fluid Needs: defer to team    Previous Nutrition Diagnosis: Increased Nutrient Needs  Nutrition Diagnosis is: [x] ongoing  [] resolved [] not applicable     New Nutrition Diagnosis: [x] Not applicable    Nutrition Care Plan:  [x] In Progress  [] Achieved  [] Not applicable    Nutrition Interventions:     Education Provided:       [] Yes:  [x] No: not appropriate at this time        Recommendations:      1. Consider changing EN regimen to Glucerna 1.5 at GOAL rate of 45ml/hr x 24 hrs. To provide (based on dosing wt 50kg): 1080ml, 1620kcal (32.4kcal/kg) and 89g protein (1.78g protein/kg).   2. Monitor GI tolerance. RD to remain available to adjust EN formulary, volume/rate PRN.   3. Antihyperglycemic regimen deferred to team. Trend K levels as pt prescribed Tacrolimus.   4. Consider multivitamin if feeds held for extended period of time to aid in prevention of micronutrient deficiencies.  5. Continue Banatrol and Probiotic as ordered by team.     Monitoring and Evaluation:   Continue to monitor nutritional intake, tolerance to diet prescription, weights, labs, skin integrity    RD remains available upon request and will follow up per protocol    Audrey Vasquez RDN CDN (TEAMS) Nutrition Follow Up Note  Patient seen for: Nutrition Follow Up     Source: [] Patient       [x] EMR        [x] RN        [] Family at bedside       [] Other:     -If unable to interview patient: [x] Trach/Vent/BiPAP  [x] Disoriented/confused/inappropriate to interview    Diet Order:   Diet, NPO with Tube Feed:   Tube Feeding Modality: Gastrostomy  Nepro with Carb Steady (NEPRORTH)  Total Volume for 24 Hours (mL): 1080  Continuous  Starting Tube Feed Rate {mL per Hour}: 20  Increase Tube Feed Rate by (mL): 10     Every 2 hours  Until Goal Tube Feed Rate (mL per Hour): 45  Tube Feed Duration (in Hours): 24  Tube Feed Start Time: 11:00  Banatrol TF     Qty per Day:  2  Supplement Feeding Modality:  Gastrostomy  Probiotic Yogurt/Smoothie Cans or Servings Per Day:  2       Frequency:  Daily (24 @ 11:34) [Active]    EN Order Provides: 1080ml, 1944kcal and 87g protein.  Meets 39kcals/kG and 1.7 gm protein/kG based on dosing weight 50 kG    EN Provision (per nursing flow sheet): pt has received 100% of feeds between 3/11-3/14.    Is current diet order appropriate/adequate? see below for updated diet recommendations    Nutrition-related concerns:  -Visited pt at bedside, observed TF regimen, Nepro 1.8 currently on hold. Per RN, no intolerances noted.   -GI: Last BM (3/13): x 2. On bowel regimen (Senna, Miralax). Prescribed Protonix, Zofran.   -Transferred to [facility] 3/11 for continued management.  -H. pylori stool antigen+; GI following   -S/P L hemicrani for evacuation of large ICH, bone discarded (3-01)  -S/P trach and PEG (3-08)  -Resp: Currently remains on Mechanical Ventilation  -Endo: HbA1c 5.7% (3/02/2024). Continues on NPH 12u q 6 hrs and insulin lispro sliding scale to aid in glycemic control. To note, pt on Prednisone, risk for elevated BG levels.  -Renal: S/p hx of renal transplant (); ordered for Tacrolimus.   -ALLERGY: Shellfish noted and confirmed by family.     Weights:   Dosing weight: 50 kg ()  Daily Weight in k.8 (3-13), 50.1 (03-06)  ** Weight fluctuating - Weight changes likely secondary to fluid shifts. RD to continue to monitor weight trends as able.     MEDICATIONS  (STANDING):  albuterol/ipratropium for Nebulization 3 milliLiter(s) Nebulizer every 6 hours  artificial  tears Solution 1 Drop(s) Both EYES two times a day  artificial  tears Solution 1 Drop(s) Both EYES every 4 hours  Biotene Dry Mouth Oral Rinse 5 milliLiter(s) Swish and Spit every 6 hours  bismuth subsalicylate Liquid 30 milliLiter(s) Oral four times a day  brivaracetam Oral Solution 100 milliGRAM(s) Oral <User Schedule>  carvedilol 25 milliGRAM(s) Oral every 12 hours  cloNIDine 0.2 milliGRAM(s) Oral three times a day  docosanol 10% Cream 1 Application(s) Topical daily  heparin   Injectable 5000 Unit(s) SubCutaneous every 12 hours  influenza  Vaccine (HIGH DOSE) 0.7 milliLiter(s) IntraMuscular once  insulin lispro (ADMELOG) corrective regimen sliding scale   SubCutaneous every 6 hours  insulin NPH human recombinant 12 Unit(s) SubCutaneous every 6 hours  lacosamide Solution 200 milliGRAM(s) Oral two times a day  meropenem  IVPB 1000 milliGRAM(s) IV Intermittent every 12 hours  metroNIDAZOLE    Tablet 250 milliGRAM(s) Oral every 6 hours  nystatin    Suspension 506401 Unit(s) Swish and Swallow every 6 hours  pantoprazole   Suspension 40 milliGRAM(s) Oral two times a day  predniSONE   Tablet 5 milliGRAM(s) Oral daily  tacrolimus    0.5 mG/mL Suspension 5 milliGRAM(s) Oral two times a day  tetracycline 500 milliGRAM(s) Oral four times a day  trimethoprim   80 mG/sulfamethoxazole 400 mG 1 Tablet(s) Oral daily    MEDICATIONS  (PRN):  acetaminophen   Oral Liquid .. 650 milliGRAM(s) Oral every 6 hours PRN Temp greater or equal to 38C (100.4F), Mild Pain (1 - 3)  ondansetron Injectable 4 milliGRAM(s) IV Push every 6 hours PRN Nausea and/or Vomiting  oxyCODONE    IR 5 milliGRAM(s) Oral every 4 hours PRN Moderate Pain (4 - 6)  oxyCODONE    Solution 10 milliGRAM(s) Enteral Tube every 4 hours PRN Severe Pain (7 - 10)      Pertinent Labs:  @ 06:49: Na 134<L>, BUN 48<H>, Cr 1.46<H>, <H>, K+ 4.6, Phos 2.8, Mg 2.2, Alk Phos --, ALT/SGPT --, AST/SGOT --, HbA1c --    A1C with Estimated Average Glucose Result: 5.7 % (24 @ 11:57)    Finger Sticks:  POCT Blood Glucose.: 133 mg/dL (24 @ 11:31)  POCT Blood Glucose.: 163 mg/dL (24 @ 05:31)  POCT Blood Glucose.: 186 mg/dL (24 @ 23:32)  POCT Blood Glucose.: 199 mg/dL (24 @ 17:20)  POCT Blood Glucose.: 155 mg/dL (24 @ 12:08)      Skin per nursing documentation: surgical incision left craniectomy, stage II sacral bilateral buttocks DTI present on admission   Edema: 1+ generalized, 2+ dependent     (Based on dosing wt 50kg):   Estimated Energy Needs: (30-35kcal/kg): 1500-1750kcal  Estimated Protein Needs: (1.4-1.8g protein/kg): 70-90g protein  Estimated Fluid Needs: defer to team    Previous Nutrition Diagnosis: Increased Nutrient Needs  Nutrition Diagnosis is: [x] ongoing  [] resolved [] not applicable     New Nutrition Diagnosis: [x] Not applicable    Nutrition Care Plan:  [x] In Progress  [] Achieved  [] Not applicable    Nutrition Interventions:     Education Provided:       [] Yes:  [x] No: not appropriate at this time        Recommendations:      1. Consider changing EN regimen to Glucerna 1.5 at GOAL rate of 45ml/hr x 24 hrs. To provide (based on dosing wt 50kg): 1080ml, 1620kcal (32.4kcal/kg) and 89g protein (1.78g protein/kg).   2. Monitor GI tolerance. RD to remain available to adjust EN formulary, volume/rate PRN.   3. Antihyperglycemic regimen deferred to team. Trend K levels as pt prescribed Tacrolimus.   4. Consider multivitamin and Vitamin C to help aid in wound and surgical healing.   5. Continue Banatrol and Probiotic as ordered by team.     Monitoring and Evaluation:   Continue to monitor nutritional intake, tolerance to diet prescription, weights, labs, skin integrity    RD remains available upon request and will follow up per protocol    Audrey Vasquez RDN CDN (TEAMS)

## 2024-03-14 NOTE — PROGRESS NOTE ADULT - ASSESSMENT
77 yo F with PMHx ESRD s/p renal transplant (2019, now off HD), left AKA, BK viremia, HTN, breast ca s/p lumpectomy (completed Tamoxifen 03/2023), DVT (off Eliquis), HLD, gout presenting 3/1 with left ICH (ICH score 4) likely 2/2 amyloid angiopathy s/p left craniectomy(discarded) and evacuation as well as EVD placement and removal (3/7). She is s/p trach/peg 3/8/24.  Febrile 3/10 with + UA, blood/sputum culture NGTD.  Treatment for UTI initiated with ceftriaxone, eventually broadened to cefepime.  Transferred to [facility] 3/11 for continued management.  Pt arrived from [facility] with minimal mental status with only response of spontaneous eye opening and withdrawal on RLE.  Urine culture grew positive for klebsiella and enterococcus, cefepime d/c'd meropenem and vanc started 3/12.  Will continue with airway clearance management and wean from ventilator as tolerated.      3/13: Patient febrile again overnight T.max 101.5. Blood and Urine cxs sent yesterday ( Results pending). CXR 3/10 no focal consolidations. Patients rectal tube was dislodged overnight, no reports of diarrhea this morning will continue to monitor stooling and possible need to send C.Diff if reoccurs. Currently remains on Meropenem and Vancomycin dcd as patient growing VRE from urine cx on 3/10. Will await repeat urine cx studies to determine if will require additional coverage with Linezolid.  BGMs running high will increase Humulin to 12 units q 6 hrs.    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 She is s/p trach/peg 3/8/24.  Febrile 3/10 with + UA, blood/sputum culture NGTD.  Treatment for UTI initiated with ceftriaxone, eventually broadened to cefepime.  Transferred to [facility] 3/11 for continued management.  Pt arrived from NSCU with minimal mental status with only response of spontaneous eye opening and withdrawal on RLE.  Urine culture grew positive for klebsiella and enterococcus, cefepime d/c'd meropenem and vanc started 3/12.  Will continue with airway clearance management and wean from ventilator as tolerated.      3/13: Patient febrile again overnight T.max 101.5. Blood and Urine cxs sent yesterday ( Results pending). CXR 3/10 no focal consolidations. Patients rectal tube was dislodged overnight, no reports of diarrhea this morning will continue to monitor stooling and possible need to send C.Diff if reoccurs. Currently remains on Meropenem and Vancomycin dcd as patient growing VRE from urine cx on 3/10. Will await repeat urine cx studies to determine if will require additional coverage with Linezolid.  BGMs running high will increase Humulin to 12 units q 6 hrs. .  3/14:  Patient tolerated PS 10/5 for almost hours yesterday.  Will continue PS trials as tolerated.    77 yo F with PMHx ESRD s/p renal transplant (2019, now off HD), left AKA, BK viremia, HTN, breast ca s/p lumpectomy (completed Tamoxifen 03/2023), DVT (off Eliquis), HLD, gout presenting 3/1 with left ICH (ICH score 4) likely 2/2 amyloid angiopathy s/p left craniectomy(discarded) and evacuation as well as EVD placement and removal (3/7). She is s/p trach/peg 3/8/24.  Febrile 3/10 with + UA, blood/sputum culture NGTD.  Treatment for UTI initiated with ceftriaxone, eventually broadened to cefepime.  Transferred to [facility] 3/11 for continued management.  Pt arrived from NSCU with minimal mental status with only response of spontaneous eye opening and withdrawal on RLE.  Urine culture grew positive for klebsiella and enterococcus, cefepime d/c'd meropenem and vanc started 3/12.  Will continue with airway clearance management and wean from ventilator as tolerated.      3/13: Patient febrile again overnight T.max 101.5. Blood and Urine cxs sent yesterday ( Results pending). CXR 3/10 no focal consolidations. Patients rectal tube was dislodged overnight, no reports of diarrhea this morning will continue to monitor stooling and possible need to send C.Diff if reoccurs. Currently remains on Meropenem and Vancomycin dcd as patient growing VRE from urine cx on 3/10. Will await repeat urine cx studies to determine if will require additional coverage with Linezolid.  BGMs running high will increase Humulin to 12 units q 6 hrs. .  3/14:  Patient tolerated PS 10/5 for almost hours yesterday.  Will continue PS trials as tolerated.  Free water discontinued due to decreasing sodium levels.   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 She is s/p trach/peg 3/8/24.  Febrile 3/10 with + UA, blood/sputum culture NGTD.  Treatment for UTI initiated with ceftriaxone, eventually broadened to cefepime.  Transferred to [facility] 3/11 for continued management.  Pt arrived from NSCU with minimal mental status with only response of spontaneous eye opening and withdrawal on RLE.  Urine culture grew positive for klebsiella and enterococcus, cefepime d/c'd meropenem and vanc started 3/12.  Will continue with airway clearance management and wean from ventilator as tolerated.      3/13: Patient febrile again overnight T.max 101.5. Blood and Urine cxs sent yesterday ( Results pending). CXR 3/10 no focal consolidations. Patients rectal tube was dislodged overnight, no reports of diarrhea this morning will continue to monitor stooling and possible need to send C.Diff if reoccurs. Currently remains on Meropenem and Vancomycin dcd as patient growing VRE from urine cx on 3/10. Will await repeat urine cx studies to determine if will require additional coverage with Linezolid.  BGMs running high will increase Humulin to 12 units q 6 hrs. .  3/14:  Patient tolerated PS 10/5 for almost hours yesterday.  Will continue PS trials as tolerated.  Free water discontinued due to decreasing sodium levels.  EEG requested evaluate for sub-clinical seizures given fixed gaze.

## 2024-03-14 NOTE — PROGRESS NOTE ADULT - SUBJECTIVE AND OBJECTIVE BOX
Patient is a 78y old  Female who presents with a chief complaint of ICH (13 Mar 2024 16:47)      Interval Events:    REVIEW OF SYSTEMS:  [ ] Positive  [ ] All other systems negative  [ ] Unable to assess ROS because ________    Vital Signs Last 24 Hrs  T(C): 37.1 (03-14-24 @ 05:00), Max: 37.8 (03-13-24 @ 14:06)  T(F): 98.7 (03-14-24 @ 05:00), Max: 100 (03-13-24 @ 14:06)  HR: 88 (03-14-24 @ 05:30) (83 - 103)  BP: 152/87 (03-14-24 @ 05:00) (126/76 - 172/90)  RR: 19 (03-14-24 @ 05:00) (17 - 20)  SpO2: 100% (03-14-24 @ 05:30) (99% - 100%)    PHYSICAL EXAM:  HEENT:   [ ]Tracheostomy:  [ ]Pupils equal  [ ]No oral lesions  [ ]Abnormal    SKIN  [ ]No Rash  [ ] Abnormal  [ ] pressure    CARDIAC  [ ]Regular  [ ]Abnormal    PULMONARY  [ ]Bilateral Clear Breath Sounds  [ ]Normal Excursion  [ ]Abnormal    GI  [ ]PEG      [ ] +BS		              [ ]Soft, nondistended, nontender	  [ ]Abnormal    MUSCULOSKELETAL                                   [ ]Bedbound                 [ ]Abnormal    [ ]Ambulatory/OOB to chair                           EXTREMITIES                                         [ ]Normal  [ ]Edema                           NEUROLOGIC  [ ] Normal, non focal  [ ] Focal findings:    PSYCHIATRIC  [ ]Alert and appropriate  [ ] Sedated	 [ ]Agitated    :  Gardner: [ ] Yes, if yes: Date of Placement:                   [  ] No    LINES: Central Lines [ ] Yes, if yes: Date of Placement                                     [  ] No    HOSPITAL MEDICATIONS:  MEDICATIONS  (STANDING):  albuterol/ipratropium for Nebulization 3 milliLiter(s) Nebulizer every 6 hours  artificial  tears Solution 1 Drop(s) Both EYES two times a day  artificial  tears Solution 1 Drop(s) Both EYES every 4 hours  Biotene Dry Mouth Oral Rinse 5 milliLiter(s) Swish and Spit every 6 hours  bismuth subsalicylate Liquid 30 milliLiter(s) Oral four times a day  brivaracetam Oral Solution 100 milliGRAM(s) Oral <User Schedule>  carvedilol 25 milliGRAM(s) Oral every 12 hours  cloNIDine 0.2 milliGRAM(s) Oral three times a day  docosanol 10% Cream 1 Application(s) Topical daily  heparin   Injectable 5000 Unit(s) SubCutaneous every 12 hours  influenza  Vaccine (HIGH DOSE) 0.7 milliLiter(s) IntraMuscular once  insulin lispro (ADMELOG) corrective regimen sliding scale   SubCutaneous every 6 hours  insulin NPH human recombinant 12 Unit(s) SubCutaneous every 6 hours  lacosamide Solution 200 milliGRAM(s) Oral two times a day  meropenem  IVPB 1000 milliGRAM(s) IV Intermittent every 12 hours  metroNIDAZOLE    Tablet 250 milliGRAM(s) Oral every 6 hours  nystatin    Suspension 498795 Unit(s) Swish and Swallow every 6 hours  pantoprazole   Suspension 40 milliGRAM(s) Oral two times a day  predniSONE   Tablet 5 milliGRAM(s) Oral daily  tacrolimus    0.5 mG/mL Suspension 5 milliGRAM(s) Oral two times a day  tetracycline 500 milliGRAM(s) Oral four times a day  trimethoprim   80 mG/sulfamethoxazole 400 mG 1 Tablet(s) Oral daily    MEDICATIONS  (PRN):  acetaminophen   Oral Liquid .. 650 milliGRAM(s) Oral every 6 hours PRN Temp greater or equal to 38C (100.4F), Mild Pain (1 - 3)  ondansetron Injectable 4 milliGRAM(s) IV Push every 6 hours PRN Nausea and/or Vomiting  oxyCODONE    IR 5 milliGRAM(s) Oral every 4 hours PRN Moderate Pain (4 - 6)  oxyCODONE    Solution 10 milliGRAM(s) Enteral Tube every 4 hours PRN Severe Pain (7 - 10)      LABS:                        7.7    7.07  )-----------( 146      ( 14 Mar 2024 06:49 )             24.5     03-14    134<L>  |  102  |  48<H>  ----------------------------<  149<H>  4.6   |  20<L>  |  1.46<H>    Ca    9.4      14 Mar 2024 06:49  Phos  2.8     03-14  Mg     2.2     03-14        Urinalysis Basic - ( 14 Mar 2024 06:49 )    Color: x / Appearance: x / SG: x / pH: x  Gluc: 149 mg/dL / Ketone: x  / Bili: x / Urobili: x   Blood: x / Protein: x / Nitrite: x   Leuk Esterase: x / RBC: x / WBC x   Sq Epi: x / Non Sq Epi: x / Bacteria: x          CAPILLARY BLOOD GLUCOSE    MICROBIOLOGY:     RADIOLOGY:  [ ] Reviewed and interpreted by me    Mode: AC/ CMV (Assist Control/ Continuous Mandatory Ventilation)  RR (machine): 18  TV (machine): 450  FiO2: 30  PEEP: 5  ITime: 1  MAP: 11  PIP: 18   Patient is a 78y old  Female who presents with a chief complaint of ICH (13 Mar 2024 16:47)      Interval Events:  No events reported over night.     REVIEW OF SYSTEMS:  [ ] Positive  [ ] All other systems negative  [X] Unable to assess ROS because ____Obtunded    Vital Signs Last 24 Hrs  T(C): 37.1 (03-14-24 @ 05:00), Max: 37.8 (03-13-24 @ 14:06)  T(F): 98.7 (03-14-24 @ 05:00), Max: 100 (03-13-24 @ 14:06)  HR: 88 (03-14-24 @ 05:30) (83 - 103)  BP: 152/87 (03-14-24 @ 05:00) (126/76 - 172/90)  RR: 19 (03-14-24 @ 05:00) (17 - 20)  SpO2: 100% (03-14-24 @ 05:30) (99% - 100%)    PHYSICAL EXAM:  HEENT:   [X] Tracheostomy:  #7 Cuffed Portex  [X] Pupils 4mm B/L with mild reaction to light  [ ] No oral lesions  [ ] Abnormal    SKIN  [ ]No Rash  [ ] Abnormal  [ ] pressure    CARDIAC  [ ]Regular  [ ]Abnormal    PULMONARY  [ ]Bilateral Clear Breath Sounds  [ ]Normal Excursion  [ ]Abnormal    GI  [ ]PEG      [ ] +BS		              [ ]Soft, nondistended, nontender	  [ ]Abnormal    MUSCULOSKELETAL                                   [ ]Bedbound                 [ ]Abnormal    [ ]Ambulatory/OOB to chair                           EXTREMITIES                                         [ ]Normal  [ ]Edema                           NEUROLOGIC  [ ] Normal, non focal  [ ] Focal findings:    PSYCHIATRIC  [ ]Alert and appropriate  [ ] Sedated	 [ ]Agitated    :  Gardner: [ ] Yes, if yes: Date of Placement:                   [  ] No    LINES: Central Lines [ ] Yes, if yes: Date of Placement                                     [  ] No    HOSPITAL MEDICATIONS:  MEDICATIONS  (STANDING):  albuterol/ipratropium for Nebulization 3 milliLiter(s) Nebulizer every 6 hours  artificial  tears Solution 1 Drop(s) Both EYES two times a day  artificial  tears Solution 1 Drop(s) Both EYES every 4 hours  Biotene Dry Mouth Oral Rinse 5 milliLiter(s) Swish and Spit every 6 hours  bismuth subsalicylate Liquid 30 milliLiter(s) Oral four times a day  brivaracetam Oral Solution 100 milliGRAM(s) Oral <User Schedule>  carvedilol 25 milliGRAM(s) Oral every 12 hours  cloNIDine 0.2 milliGRAM(s) Oral three times a day  docosanol 10% Cream 1 Application(s) Topical daily  heparin   Injectable 5000 Unit(s) SubCutaneous every 12 hours  influenza  Vaccine (HIGH DOSE) 0.7 milliLiter(s) IntraMuscular once  insulin lispro (ADMELOG) corrective regimen sliding scale   SubCutaneous every 6 hours  insulin NPH human recombinant 12 Unit(s) SubCutaneous every 6 hours  lacosamide Solution 200 milliGRAM(s) Oral two times a day  meropenem  IVPB 1000 milliGRAM(s) IV Intermittent every 12 hours  metroNIDAZOLE    Tablet 250 milliGRAM(s) Oral every 6 hours  nystatin    Suspension [id number] Unit(s) Swish and Swallow every 6 hours  pantoprazole   Suspension 40 milliGRAM(s) Oral two times a day  predniSONE   Tablet 5 milliGRAM(s) Oral daily  tacrolimus    0.5 mG/mL Suspension 5 milliGRAM(s) Oral two times a day  tetracycline 500 milliGRAM(s) Oral four times a day  trimethoprim   80 mG/sulfamethoxazole 400 mG 1 Tablet(s) Oral daily    MEDICATIONS  (PRN):  acetaminophen   Oral Liquid .. 650 milliGRAM(s) Oral every 6 hours PRN Temp greater or equal to 38C (100.4F), Mild Pain (1 - 3)  ondansetron Injectable 4 milliGRAM(s) IV Push every 6 hours PRN Nausea and/or Vomiting  oxyCODONE    IR 5 milliGRAM(s) Oral every 4 hours PRN Moderate Pain (4 - 6)  oxyCODONE    Solution 10 milliGRAM(s) Enteral Tube every 4 hours PRN Severe Pain (7 - 10)      LABS:                        7.7    7.07  )-----------( 146      ( 14 Mar 2024 06:49 )             24.5     03-14    134<L>  |  102  |  48<H>  ----------------------------<  149<H>  4.6   |  20<L>  |  1.46<H>    Ca    9.4      14 Mar 2024 06:49  Phos  2.8     03-14  Mg     2.2     03-14        Urinalysis Basic - ( 14 Mar 2024 06:49 )    Color: x / Appearance: x / SG: x / pH: x  Gluc: 149 mg/dL / Ketone: x  / Bili: x / Urobili: x   Blood: x / Protein: x / Nitrite: x   Leuk Esterase: x / RBC: x / WBC x   Sq Epi: x / Non Sq Epi: x / Bacteria: x          CAPILLARY BLOOD GLUCOSE    MICROBIOLOGY:     RADIOLOGY:  [ ] Reviewed and interpreted by me    Mode: AC/ CMV (Assist Control/ Continuous Mandatory Ventilation)  RR (machine): 18  TV (machine): 450  FiO2: 30  PEEP: 5  ITime: 1  MAP: 11  PIP: 18   Patient is a 78y old  Female who presents with a chief complaint of ICH (13 Mar 2024 16:47)      Interval Events:  No events reported over night.     REVIEW OF SYSTEMS:  [ ] Positive  [ ] All other systems negative  [X] Unable to assess ROS because ____Obtunded    Vital Signs Last 24 Hrs  T(C): 37.1 (03-14-24 @ 05:00), Max: 37.8 (03-13-24 @ 14:06)  T(F): 98.7 (03-14-24 @ 05:00), Max: 100 (03-13-24 @ 14:06)  HR: 88 (03-14-24 @ 05:30) (83 - 103)  BP: 152/87 (03-14-24 @ 05:00) (126/76 - 172/90)  RR: 19 (03-14-24 @ 05:00) (17 - 20)  SpO2: 100% (03-14-24 @ 05:30) (99% - 100%)    PHYSICAL EXAM:  HEENT:   [X] Tracheostomy:  #7 Cuffed Portex  [X] Pupils 4mm B/L with mild reaction to light; not tracking  [ ] No oral lesions  [ ] Abnormal    SKIN  [X] No Rash  [ ] Abnormal  [ ] pressure    CARDIAC  [X] Regular  [ ] Abnormal    PULMONARY  [X] Bilateral Clear Breath Sounds  [ ] Normal Excursion  [ ] Abnormal    GI  [X] PEG      [X] +BS		              [X] Soft, nondistended, nontender	  [ ] Abnormal    MUSCULOSKELETAL                                   [X] Bedbound                 [ ] Abnormal    [ ] Ambulatory/OOB to chair                           EXTREMITIES                                         [ ] Normal  [X] Edema  1+ RUE edema                           NEUROLOGIC  [ ] Normal, non focal  [X] Focal findings:  Obtunded; eyes closed during exam; not following commands; not active movement observed; withdraws only on RLE    PSYCHIATRIC  [X] Obtunded  [ ] Sedated	 [ ]Agitated    :  Gardner: [ ] Yes, if yes: Date of Placement:                   [X] No    LINES: Central Lines [ ] Yes, if yes: Date of Placement                                     [X] No    HOSPITAL MEDICATIONS:  MEDICATIONS  (STANDING):  albuterol/ipratropium for Nebulization 3 milliLiter(s) Nebulizer every 6 hours  artificial  tears Solution 1 Drop(s) Both EYES two times a day  artificial  tears Solution 1 Drop(s) Both EYES every 4 hours  Biotene Dry Mouth Oral Rinse 5 milliLiter(s) Swish and Spit every 6 hours  bismuth subsalicylate Liquid 30 milliLiter(s) Oral four times a day  brivaracetam Oral Solution 100 milliGRAM(s) Oral <User Schedule>  carvedilol 25 milliGRAM(s) Oral every 12 hours  cloNIDine 0.2 milliGRAM(s) Oral three times a day  docosanol 10% Cream 1 Application(s) Topical daily  heparin   Injectable 5000 Unit(s) SubCutaneous every 12 hours  influenza  Vaccine (HIGH DOSE) 0.7 milliLiter(s) IntraMuscular once  insulin lispro (ADMELOG) corrective regimen sliding scale   SubCutaneous every 6 hours  insulin NPH human recombinant 12 Unit(s) SubCutaneous every 6 hours  lacosamide Solution 200 milliGRAM(s) Oral two times a day  meropenem  IVPB 1000 milliGRAM(s) IV Intermittent every 12 hours  metroNIDAZOLE    Tablet 250 milliGRAM(s) Oral every 6 hours  nystatin    Suspension [id number] Unit(s) Swish and Swallow every 6 hours  pantoprazole   Suspension 40 milliGRAM(s) Oral two times a day  predniSONE   Tablet 5 milliGRAM(s) Oral daily  tacrolimus    0.5 mG/mL Suspension 5 milliGRAM(s) Oral two times a day  tetracycline 500 milliGRAM(s) Oral four times a day  trimethoprim   80 mG/sulfamethoxazole 400 mG 1 Tablet(s) Oral daily    MEDICATIONS  (PRN):  acetaminophen   Oral Liquid .. 650 milliGRAM(s) Oral every 6 hours PRN Temp greater or equal to 38C (100.4F), Mild Pain (1 - 3)  ondansetron Injectable 4 milliGRAM(s) IV Push every 6 hours PRN Nausea and/or Vomiting  oxyCODONE    IR 5 milliGRAM(s) Oral every 4 hours PRN Moderate Pain (4 - 6)  oxyCODONE    Solution 10 milliGRAM(s) Enteral Tube every 4 hours PRN Severe Pain (7 - 10)      LABS:                        7.7    7.07  )-----------( 146      ( 14 Mar 2024 06:49 )             24.5     03-14    134<L>  |  102  |  48<H>  ----------------------------<  149<H>  4.6   |  20<L>  |  1.46<H>    Ca    9.4      14 Mar 2024 06:49  Phos  2.8     03-14  Mg     2.2     03-14        Urinalysis Basic - ( 14 Mar 2024 06:49 )    Color: x / Appearance: x / SG: x / pH: x  Gluc: 149 mg/dL / Ketone: x  / Bili: x / Urobili: x   Blood: x / Protein: x / Nitrite: x   Leuk Esterase: x / RBC: x / WBC x   Sq Epi: x / Non Sq Epi: x / Bacteria: x          CAPILLARY BLOOD GLUCOSE    MICROBIOLOGY:     RADIOLOGY:  [ ] Reviewed and interpreted by me    Mode: AC/ CMV (Assist Control/ Continuous Mandatory Ventilation)  RR (machine): 18  TV (machine): 450  FiO2: 30  PEEP: 5  ITime: 1  MAP: 11  PIP: 18

## 2024-03-14 NOTE — CONSULT NOTE ADULT - ASSESSMENT
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   initial CTH3/1  with 8.9cm left frontal IPH with IVH .09cm rightward shift   3/2 CTH s/p L hmeicrani and resection of IPH. stable   3/5 CTH post op changes. some reorption of post op pneumocephalus.    s/p trach/peg 3/8/24   3/8 CTH L frontal craniectomy.  L MCA hemorrhage and infarct ; still IVH.   3/10 with + UA  A1c 5.7 LDL 46   TTE done 3/2: LA is severely dilated    Urine culture grew positive for klebsiella and enterococcus  3/9 EEG no seiuzres since 3/7;  risk of seiuzre from L parietal region. mod to severe diffuse cerebral dysfunction   Transferred to [facility] 3/11 for continued management.  3/12 CTH   sterotactic imaging of L frontal crani for hemorrhagic L MCA ifnarct. L frontal temporal pseudmeningocele  noted. no hcange since 3/8   o/e awake, no verbal, not followiing. some minimal withdrawal to noxious RLE and LUE.  s/p trach /vent     Impression: L ICH possible 2/2  CAA but hemorrhagic infarct also needs to be considered.    - had recent CTH 3/12.  would repeat EEG at this time given high risk for seiuzres   - tolerating CPAP   - difficult to determine IPH 2/2 CAA without MRI to look at SWI or GRE sequeneses for hemosiderin deposition  - never had vessel imaging. consider CTA H/N   - no AC/AP. dvt ppx okay  - briviact 100mg TID  and vimpat 200mg BID for seiuzre ppx   - infectious workup. on meropenum.  this can lower seiuzre threshold Tmax yesterday 101.5   - immunosuppression on tacrolimus and prednisone.  ppx bactrim     -telemetry   - PT/OT   - check FS, glucose control <180  - GI/DVT ppx  - Counseling on diet, exercise, and medication adherence was done  - Counseling on smoking cessation and alcohol consumption offered when appropriate.  - Pain assessed and judicious use of narcotics when appropriate was discussed.    - Stroke education given when appropriate.  - Importance of fall prevention discussed.   - Differential diagnosis and plan of care discussed with patient and/or family and primary team  - Thank you for allowing me to participate in the care of this patient. Call with questions.   - GOC with family.  currenlty full code; in terms of functional meaningful recovery the likelihood is low.  patient will require 24hr care and likely be bedbound at this time.    consider recalling palliative  Dieter Wilkinson MD  Vascular Neurology  Office: [phone]

## 2024-03-15 LAB
ANION GAP SERPL CALC-SCNC: 11 MMOL/L — SIGNIFICANT CHANGE UP (ref 5–17)
BUN SERPL-MCNC: 50 MG/DL — HIGH (ref 7–23)
CALCIUM SERPL-MCNC: 9.5 MG/DL — SIGNIFICANT CHANGE UP (ref 8.4–10.5)
CHLORIDE SERPL-SCNC: 99 MMOL/L — SIGNIFICANT CHANGE UP (ref 96–108)
CO2 SERPL-SCNC: 21 MMOL/L — LOW (ref 22–31)
CREAT SERPL-MCNC: 1.46 MG/DL — HIGH (ref 0.5–1.3)
EGFR: 36 ML/MIN/1.73M2 — LOW
GLUCOSE BLDC GLUCOMTR-MCNC: 104 MG/DL — HIGH (ref 70–99)
GLUCOSE BLDC GLUCOMTR-MCNC: 134 MG/DL — HIGH (ref 70–99)
GLUCOSE BLDC GLUCOMTR-MCNC: 147 MG/DL — HIGH (ref 70–99)
GLUCOSE BLDC GLUCOMTR-MCNC: 212 MG/DL — HIGH (ref 70–99)
GLUCOSE SERPL-MCNC: 118 MG/DL — HIGH (ref 70–99)
HCT VFR BLD CALC: 26.1 % — LOW (ref 34.5–45)
HGB BLD-MCNC: 8.2 G/DL — LOW (ref 11.5–15.5)
MAGNESIUM SERPL-MCNC: 2.1 MG/DL — SIGNIFICANT CHANGE UP (ref 1.6–2.6)
MCHC RBC-ENTMCNC: 28.6 PG — SIGNIFICANT CHANGE UP (ref 27–34)
MCHC RBC-ENTMCNC: 31.4 GM/DL — LOW (ref 32–36)
MCV RBC AUTO: 90.9 FL — SIGNIFICANT CHANGE UP (ref 80–100)
NRBC # BLD: 0 /100 WBCS — SIGNIFICANT CHANGE UP (ref 0–0)
PHOSPHATE SERPL-MCNC: 2.7 MG/DL — SIGNIFICANT CHANGE UP (ref 2.5–4.5)
PLATELET # BLD AUTO: 177 K/UL — SIGNIFICANT CHANGE UP (ref 150–400)
POTASSIUM SERPL-MCNC: 4.5 MMOL/L — SIGNIFICANT CHANGE UP (ref 3.5–5.3)
POTASSIUM SERPL-SCNC: 4.5 MMOL/L — SIGNIFICANT CHANGE UP (ref 3.5–5.3)
RBC # BLD: 2.87 M/UL — LOW (ref 3.8–5.2)
RBC # FLD: 15 % — HIGH (ref 10.3–14.5)
SODIUM SERPL-SCNC: 131 MMOL/L — LOW (ref 135–145)
TACROLIMUS SERPL-MCNC: 7 NG/ML — SIGNIFICANT CHANGE UP
WBC # BLD: 9.68 K/UL — SIGNIFICANT CHANGE UP (ref 3.8–10.5)
WBC # FLD AUTO: 9.68 K/UL — SIGNIFICANT CHANGE UP (ref 3.8–10.5)

## 2024-03-15 PROCEDURE — 99233 SBSQ HOSP IP/OBS HIGH 50: CPT

## 2024-03-15 PROCEDURE — 95816 EEG AWAKE AND DROWSY: CPT | Mod: 26

## 2024-03-15 PROCEDURE — 99232 SBSQ HOSP IP/OBS MODERATE 35: CPT

## 2024-03-15 RX ORDER — FUROSEMIDE 40 MG
40 TABLET ORAL ONCE
Refills: 0 | Status: COMPLETED | OUTPATIENT
Start: 2024-03-15 | End: 2024-03-15

## 2024-03-15 RX ORDER — AMLODIPINE BESYLATE 2.5 MG/1
5 TABLET ORAL DAILY
Refills: 0 | Status: DISCONTINUED | OUTPATIENT
Start: 2024-03-15 | End: 2024-03-24

## 2024-03-15 RX ORDER — BRIVARACETAM 25 MG/1
100 TABLET, FILM COATED ORAL
Refills: 0 | Status: DISCONTINUED | OUTPATIENT
Start: 2024-03-15 | End: 2024-04-02

## 2024-03-15 RX ADMIN — Medication 5 MILLILITER(S): at 11:43

## 2024-03-15 RX ADMIN — Medication 0.2 MILLIGRAM(S): at 20:27

## 2024-03-15 RX ADMIN — LACOSAMIDE 200 MILLIGRAM(S): 50 TABLET ORAL at 18:00

## 2024-03-15 RX ADMIN — BRIVARACETAM 100 MILLIGRAM(S): 25 TABLET, FILM COATED ORAL at 22:22

## 2024-03-15 RX ADMIN — HUMAN INSULIN 12 UNIT(S): 100 INJECTION, SUSPENSION SUBCUTANEOUS at 18:02

## 2024-03-15 RX ADMIN — CARVEDILOL PHOSPHATE 25 MILLIGRAM(S): 80 CAPSULE, EXTENDED RELEASE ORAL at 18:01

## 2024-03-15 RX ADMIN — Medication 5 MILLILITER(S): at 06:20

## 2024-03-15 RX ADMIN — Medication 500000 UNIT(S): at 18:07

## 2024-03-15 RX ADMIN — Medication 1 TABLET(S): at 11:44

## 2024-03-15 RX ADMIN — HUMAN INSULIN 12 UNIT(S): 100 INJECTION, SUSPENSION SUBCUTANEOUS at 00:02

## 2024-03-15 RX ADMIN — Medication 5 MILLIGRAM(S): at 06:21

## 2024-03-15 RX ADMIN — Medication 5 MILLILITER(S): at 18:00

## 2024-03-15 RX ADMIN — Medication 500000 UNIT(S): at 11:43

## 2024-03-15 RX ADMIN — Medication 500000 UNIT(S): at 23:24

## 2024-03-15 RX ADMIN — Medication 0.2 MILLIGRAM(S): at 06:25

## 2024-03-15 RX ADMIN — BRIVARACETAM 100 MILLIGRAM(S): 25 TABLET, FILM COATED ORAL at 13:33

## 2024-03-15 RX ADMIN — Medication 500000 UNIT(S): at 00:02

## 2024-03-15 RX ADMIN — Medication 1 DROP(S): at 06:20

## 2024-03-15 RX ADMIN — Medication 1 DROP(S): at 13:33

## 2024-03-15 RX ADMIN — PANTOPRAZOLE SODIUM 40 MILLIGRAM(S): 20 TABLET, DELAYED RELEASE ORAL at 06:20

## 2024-03-15 RX ADMIN — TACROLIMUS 5 MILLIGRAM(S): 5 CAPSULE ORAL at 07:07

## 2024-03-15 RX ADMIN — HUMAN INSULIN 12 UNIT(S): 100 INJECTION, SUSPENSION SUBCUTANEOUS at 06:16

## 2024-03-15 RX ADMIN — Medication 5 MILLILITER(S): at 23:24

## 2024-03-15 RX ADMIN — Medication 4: at 23:41

## 2024-03-15 RX ADMIN — MEROPENEM 100 MILLIGRAM(S): 1 INJECTION INTRAVENOUS at 06:21

## 2024-03-15 RX ADMIN — Medication 1 DROP(S): at 22:27

## 2024-03-15 RX ADMIN — Medication 40 MILLIGRAM(S): at 18:00

## 2024-03-15 RX ADMIN — TACROLIMUS 5 MILLIGRAM(S): 5 CAPSULE ORAL at 18:05

## 2024-03-15 RX ADMIN — CARVEDILOL PHOSPHATE 25 MILLIGRAM(S): 80 CAPSULE, EXTENDED RELEASE ORAL at 06:18

## 2024-03-15 RX ADMIN — Medication 1 DROP(S): at 10:27

## 2024-03-15 RX ADMIN — Medication 5 MILLILITER(S): at 00:04

## 2024-03-15 RX ADMIN — HUMAN INSULIN 12 UNIT(S): 100 INJECTION, SUSPENSION SUBCUTANEOUS at 23:40

## 2024-03-15 RX ADMIN — Medication 500000 UNIT(S): at 06:20

## 2024-03-15 RX ADMIN — DOCOSANOL 1 APPLICATION(S): 100 CREAM TOPICAL at 11:43

## 2024-03-15 RX ADMIN — HEPARIN SODIUM 5000 UNIT(S): 5000 INJECTION INTRAVENOUS; SUBCUTANEOUS at 06:22

## 2024-03-15 RX ADMIN — HUMAN INSULIN 12 UNIT(S): 100 INJECTION, SUSPENSION SUBCUTANEOUS at 11:50

## 2024-03-15 RX ADMIN — LACOSAMIDE 200 MILLIGRAM(S): 50 TABLET ORAL at 06:19

## 2024-03-15 RX ADMIN — AMLODIPINE BESYLATE 5 MILLIGRAM(S): 2.5 TABLET ORAL at 20:34

## 2024-03-15 RX ADMIN — PANTOPRAZOLE SODIUM 40 MILLIGRAM(S): 20 TABLET, DELAYED RELEASE ORAL at 18:07

## 2024-03-15 RX ADMIN — Medication 1 DROP(S): at 01:44

## 2024-03-15 RX ADMIN — BRIVARACETAM 100 MILLIGRAM(S): 25 TABLET, FILM COATED ORAL at 06:21

## 2024-03-15 RX ADMIN — HEPARIN SODIUM 5000 UNIT(S): 5000 INJECTION INTRAVENOUS; SUBCUTANEOUS at 18:00

## 2024-03-15 RX ADMIN — MEROPENEM 100 MILLIGRAM(S): 1 INJECTION INTRAVENOUS at 18:01

## 2024-03-15 RX ADMIN — Medication 1 DROP(S): at 18:03

## 2024-03-15 NOTE — PROGRESS NOTE ADULT - ASSESSMENT
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 S/p craniectomy and evacuation course c/b seizures, last seen on 3/7/24 EEG currently on AEDS. Now s/p trach/peg 3/9/24. During EGD/PEG found to have superficial gastric ulcers. H. Pylori Stool Ag positive, for which GI is consulted.     1. H. Pylori   recc Bismuth quadruple therapy x 14 dayspost discharge     2. Resp failure s/p trach/PEG    3. IPH s/p craniotomy       I had a prolonged conversation with the patient regarding the hospital course, differential diagnosis, results of diagnostic tests this far, and therapeutic modalities available. Plan of care discussed with the patient after the evaluation. Patient expresses a clear understanding of the plan of care.    Jose Antonio Sepulveda D.O.  Gastroenterology and Hepatology  [address] [place] [address], [address]  [place], [place]  Office: [phone]

## 2024-03-15 NOTE — PROGRESS NOTE ADULT - SUBJECTIVE AND OBJECTIVE BOX
[facility]-- WOUND TEAM -- FOLLOW UP NOTE  --------------------------------------------------------------------------------    24 hour events/subjective:          Diet:  Diet, NPO with Tube Feed:   Tube Feeding Modality: Gastrostomy  Glucerna 1.5 David (GLUCERNA1.5RTH)  Total Volume for 24 Hours (mL): 1080  Continuous  Starting Tube Feed Rate mL per Hour: 45  Until Goal Tube Feed Rate (mL per Hour): 45  Tube Feed Duration (in Hours): 24  Tube Feed Start Time: 20:00  Supplement Feeding Modality:  Gastrostomy  Probiotic Yogurt/Smoothie Cans or Servings Per Day:  1       Frequency:  Two Times a day (03-14-24 @ 19:31)      ROS: General/ SKIN/ MSK/ Neuro/ GI see HPI  all other systems negative  pt unable to offer    ALLERGIES & MEDICATIONS  --------------------------------------------------------------------------------  Allergies    shellfish (Rash)  ChloraPrep One-Step (Rash)  penicillins (Rash)    Intolerances          STANDING INPATIENT MEDICATIONS    amLODIPine   Tablet 5 milliGRAM(s) Oral daily  artificial  tears Solution 1 Drop(s) Both EYES every 4 hours  Biotene Dry Mouth Oral Rinse 5 milliLiter(s) Swish and Spit every 6 hours  brivaracetam Oral Solution 100 milliGRAM(s) Oral <User Schedule>  carvedilol 25 milliGRAM(s) Oral every 12 hours  cloNIDine 0.2 milliGRAM(s) Oral two times a day  docosanol 10% Cream 1 Application(s) Topical daily  furosemide   Injectable 40 milliGRAM(s) IV Push once  heparin   Injectable 5000 Unit(s) SubCutaneous every 12 hours  insulin lispro (ADMELOG) corrective regimen sliding scale   SubCutaneous every 6 hours  insulin NPH human recombinant 12 Unit(s) SubCutaneous every 6 hours  lacosamide Solution 200 milliGRAM(s) Oral two times a day  meropenem  IVPB 1000 milliGRAM(s) IV Intermittent every 12 hours  nystatin    Suspension 230505 Unit(s) Swish and Swallow every 6 hours  pantoprazole   Suspension 40 milliGRAM(s) Oral two times a day  predniSONE   Tablet 5 milliGRAM(s) Oral daily  tacrolimus    0.5 mG/mL Suspension 5 milliGRAM(s) Oral two times a day  trimethoprim   80 mG/sulfamethoxazole 400 mG 1 Tablet(s) Oral daily      PRN INPATIENT MEDICATION  acetaminophen   Oral Liquid .. 650 milliGRAM(s) Oral every 6 hours PRN  albuterol/ipratropium for Nebulization 3 milliLiter(s) Nebulizer every 6 hours PRN        VITALS/PHYSICAL EXAM  --------------------------------------------------------------------------------  T(C): 36.8 (03-15-24 @ 14:00), Max: 37.6 (03-15-24 @ 10:01)  HR: 92 (03-15-24 @ 14:52) (88 - 98)  BP: 178/94 (03-15-24 @ 14:00) (111/54 - 178/94)  RR: 18 (03-15-24 @ 14:00) (18 - 25)  SpO2: 100% (03-15-24 @ 14:52) (98% - 100%)  Wt(kg): --        03-14-24 @ 07:01  -  03-15-24 @ 07:00  --------------------------------------------------------  IN: 1235 mL / OUT: 1400 mL / NET: -165 mL    03-15-24 @ 07:01  -  03-15-24 @ 16:40  --------------------------------------------------------  IN: 375 mL / OUT: 700 mL / NET: -325 mL            LABS/ CULTURES/ RADIOLOGY:              8.2    9.68  >-----------<  177      [03-15-24 @ 06:06]              26.1     131  |  99  |  50  ----------------------------<  118      [03-15-24 @ 06:06]  4.5   |  21  |  1.46        Ca     9.5     [03-15-24 @ 06:06]      Mg     2.1     [03-15-24 @ 06:06]      Phos  2.7     [03-15-24 @ 06:06]                [03-14-24 @ 06:49]        CAPILLARY BLOOD GLUCOSE      POCT Blood Glucose.: 104 mg/dL (15 Mar 2024 11:47)  POCT Blood Glucose.: 134 mg/dL (15 Mar 2024 05:23)  POCT Blood Glucose.: 135 mg/dL (14 Mar 2024 23:51)  POCT Blood Glucose.: 166 mg/dL (14 Mar 2024 17:27)        Triglycerides, Serum: 95 mg/dL (03-02-24 @ 02:10)            Culture - Blood (collected 03-13-24 @ 08:34)  Source: .Blood Blood-Peripheral  Preliminary Report (03-15-24 @ 15:01):    No growth at 48 Hours    Culture - Blood (collected 03-13-24 @ 07:14)  Source: .Blood Blood-Peripheral  Preliminary Report (03-15-24 @ 12:01):    No growth at 48 Hours    Culture - Blood (collected 03-10-24 @ 23:21)  Source: .Blood Blood-Peripheral  Preliminary Report (03-15-24 @ 07:01):    No growth at 4 days          A1C with Estimated Average Glucose Result: 5.7 % (03-02-24 @ 11:57)   [facility]-- WOUND TEAM -- FOLLOW UP NOTE  --------------------------------------------------------------------------------    24 hour events/subjective:    afebrile  tolerating TF  incontinent  requested reeval as buttock wound now open and moist      Diet:  Diet, NPO with Tube Feed:   Tube Feeding Modality: Gastrostomy  Glucerna 1.5 David (GLUCERNA1.5RTH)  Total Volume for 24 Hours (mL): 1080  Continuous  Starting Tube Feed Rate mL per Hour: 45  Until Goal Tube Feed Rate (mL per Hour): 45  Tube Feed Duration (in Hours): 24  Tube Feed Start Time: 20:00  Supplement Feeding Modality:  Gastrostomy  Probiotic Yogurt/Smoothie Cans or Servings Per Day:  1       Frequency:  Two Times a day (03-14-24 @ 19:31)      ROS: pt unable to offer    ALLERGIES & MEDICATIONS  --------------------------------------------------------------------------------  Allergies  shellfish (Rash)  ChloraPrep One-Step (Rash)  penicillins (Rash)      STANDING INPATIENT MEDICATIONS  amLODIPine   Tablet 5 milliGRAM(s) Oral daily  artificial  tears Solution 1 Drop(s) Both EYES every 4 hours  Biotene Dry Mouth Oral Rinse 5 milliLiter(s) Swish and Spit every 6 hours  brivaracetam Oral Solution 100 milliGRAM(s) Oral <User Schedule>  carvedilol 25 milliGRAM(s) Oral every 12 hours  cloNIDine 0.2 milliGRAM(s) Oral two times a day  docosanol 10% Cream 1 Application(s) Topical daily  furosemide   Injectable 40 milliGRAM(s) IV Push once  heparin   Injectable 5000 Unit(s) SubCutaneous every 12 hours  insulin lispro (ADMELOG) corrective regimen sliding scale   SubCutaneous every 6 hours  insulin NPH human recombinant 12 Unit(s) SubCutaneous every 6 hours  lacosamide Solution 200 milliGRAM(s) Oral two times a day  meropenem  IVPB 1000 milliGRAM(s) IV Intermittent every 12 hours  nystatin    Suspension 345061 Unit(s) Swish and Swallow every 6 hours  pantoprazole   Suspension 40 milliGRAM(s) Oral two times a day  predniSONE   Tablet 5 milliGRAM(s) Oral daily  tacrolimus    0.5 mG/mL Suspension 5 milliGRAM(s) Oral two times a day  trimethoprim   80 mG/sulfamethoxazole 400 mG 1 Tablet(s) Oral daily      PRN INPATIENT MEDICATION  acetaminophen   Oral Liquid .. 650 milliGRAM(s) Oral every 6 hours PRN  albuterol/ipratropium for Nebulization 3 milliLiter(s) Nebulizer every 6 hours PRN        VITALS/PHYSICAL EXAM  --------------------------------------------------------------------------------  T(C): 36.8 (03-15-24 @ 14:00), Max: 37.6 (03-15-24 @ 10:01)  HR: 92 (03-15-24 @ 14:52) (88 - 98)  BP: 178/94 (03-15-24 @ 14:00) (111/54 - 178/94)  RR: 18 (03-15-24 @ 14:00) (18 - 25)  SpO2: 100% (03-15-24 @ 14:52) (98% - 100%)  Wt(kg): --      General: NAD, obtunded, frail, wd/wn/wg  Total Care sport bed  HEENT: NC, Rt side scalp soft- absent bone, incision c/d/i- helmet placed for exam     sclera clear/ moist, moist mucous membranes, trachea midline, trach  Respiratory: equal chest rise with respirations, vented  Gastrointestinal: soft NT/ND  Neurology: nonverbal, not following commands  Psych: not responsive to verval stimulus  Musculoskeletal: no contractures  Vascular: BLE edema equal no c/c/e  Skin:  moist w/ good turgor  Sacral/bilateral buttocks w/ evolving DTI    denuded and partial thickness skin     moist / friable - w/ purple areas of skin   7cm X 10cm x0.1cm,   No odor, erythema, increased warmth, tenderness, induration, fluctuance, nor crepitus          LABS/ CULTURES/ RADIOLOGY:              8.2    9.68  >-----------<  177      [03-15-24 @ 06:06]              26.1     131  |  99  |  50  ----------------------------<  118      [03-15-24 @ 06:06]  4.5   |  21  |  1.46        Ca     9.5     [03-15-24 @ 06:06]      Mg     2.1     [03-15-24 @ 06:06]      Phos  2.7     [03-15-24 @ 06:06]      A1C with Estimated Average Glucose Result: 5.7 % (03-02-24 @ 11:57)      CAPILLARY BLOOD GLUCOSE  POCT Blood Glucose.: 104 mg/dL (15 Mar 2024 11:47)  POCT Blood Glucose.: 134 mg/dL (15 Mar 2024 05:23)  POCT Blood Glucose.: 135 mg/dL (14 Mar 2024 23:51)  POCT Blood Glucose.: 166 mg/dL (14 Mar 2024 17:27)      Culture - Blood (collected 03-13-24 @ 08:34)  Source: .Blood Blood-Peripheral  Preliminary Report (03-15-24 @ 15:01):    No growth at 48 Hours    Culture - Blood (collected 03-13-24 @ 07:14)  Source: .Blood Blood-Peripheral  Preliminary Report (03-15-24 @ 12:01):    No growth at 48 Hours    Culture - Blood (collected 03-10-24 @ 23:21)  Source: .Blood Blood-Peripheral  Preliminary Report (03-15-24 @ 07:01):    No growth at 4 days

## 2024-03-15 NOTE — PROGRESS NOTE ADULT - PROBLEM SELECTOR PLAN 8
- S/p PEG 3/8  - tolerating tube feeds    [] H. 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  recc Bismuth quadruple therapy x 14 days to be started post discharge (Omeprazole 20 mg BID, Tetracycline 500 mg QID, Metronidazole 250 mg QID and bismuth subsalicylate 2 tabs QID). Bismuth may turn her stool black. After completing treatment would cont once daily PPI for ppx. Pending clinical course, consider repeat stool Ag n 8 weeks to confirm clearance.

## 2024-03-15 NOTE — PROGRESS NOTE ADULT - SUBJECTIVE AND OBJECTIVE BOX
Chief Complaint:  Patient is a 78y old  Female who presents with a chief complaint of ICH (15 Mar 2024 09:40)      Date of service 03-15-24 @ 11:32      Interval Events:   no acute events     Hospital Medications:  acetaminophen   Oral Liquid .. 650 milliGRAM(s) Oral every 6 hours PRN  albuterol/ipratropium for Nebulization 3 milliLiter(s) Nebulizer every 6 hours PRN  amLODIPine   Tablet 5 milliGRAM(s) Oral daily  artificial  tears Solution 1 Drop(s) Both EYES every 4 hours  Biotene Dry Mouth Oral Rinse 5 milliLiter(s) Swish and Spit every 6 hours  brivaracetam Oral Solution 100 milliGRAM(s) Oral <User Schedule>  carvedilol 25 milliGRAM(s) Oral every 12 hours  cloNIDine 0.2 milliGRAM(s) Oral two times a day  docosanol 10% Cream 1 Application(s) Topical daily  heparin   Injectable 5000 Unit(s) SubCutaneous every 12 hours  insulin lispro (ADMELOG) corrective regimen sliding scale   SubCutaneous every 6 hours  insulin NPH human recombinant 12 Unit(s) SubCutaneous every 6 hours  lacosamide Solution 200 milliGRAM(s) Oral two times a day  meropenem  IVPB 1000 milliGRAM(s) IV Intermittent every 12 hours  nystatin    Suspension 811065 Unit(s) Swish and Swallow every 6 hours  pantoprazole   Suspension 40 milliGRAM(s) Oral two times a day  predniSONE   Tablet 5 milliGRAM(s) Oral daily  tacrolimus    0.5 mG/mL Suspension 5 milliGRAM(s) Oral two times a day  trimethoprim   80 mG/sulfamethoxazole 400 mG 1 Tablet(s) Oral daily        Review of Systems:  General:  No wt loss, fevers, chills, night sweats, fatigue,   Eyes:  Good vision, no reported pain  ENT:  No sore throat, pain, runny nose, dysphagia  CV:  No pain, palpitations, hypo/hypertension  Resp:  No dyspnea, cough, tachypnea, wheezing  GI:  See HPI  :  No pain, bleeding, incontinence, nocturia  Muscle:  No pain, weakness  Neuro:  No weakness, tingling, memory problems  Psych:  No fatigue, insomnia, mood problems, depression  Endocrine:  No polyuria, polydipsia, cold/heat intolerance  Heme:  No petechiae, ecchymosis, easy bruisability  Integumentary:  No rash, edema    PHYSICAL EXAM:   Vital Signs:  Vital Signs Last 24 Hrs  T(C): 37.6 (15 Mar 2024 10:01), Max: 37.6 (15 Mar 2024 10:01)  T(F): 99.7 (15 Mar 2024 10:01), Max: 99.7 (15 Mar 2024 10:01)  HR: 95 (15 Mar 2024 10:01) (88 - 98)  BP: 111/54 (15 Mar 2024 10:01) (111/54 - 165/90)  BP(mean): --  RR: 25 (15 Mar 2024 05:33) (22 - 27)  SpO2: 100% (15 Mar 2024 10:01) (96% - 100%)    Parameters below as of 15 Mar 2024 05:33  Patient On (Oxygen Delivery Method): ventilator      Daily     Daily       PHYSICAL EXAM:     GENERAL:  Appears stated age, well-groomed, well-nourished, no distress  HEENT:  NC/AT,  conjunctivae anicteric, clear and pink,   NECK: supple, trachea midline  CHEST:  Full & symmetric excursion, no increased effort, breath sounds clear  HEART:  Regular rhythm, no JVD  ABDOMEN:  Soft, non-tender, non-distended, normoactive bowel sounds,  no masses , no hepatosplenomegaly  EXTREMITIES:  no cyanosis,clubbing or edema  SKIN:  No rash, erythema, or, ecchymoses, no jaundice  NEURO:  Alert, non-focal, no asterixis  PSYCH: Appropriate affect, oriented to place and time  RECTAL: Deferred      LABS Personally reviewed by me:                        8.2    9.68  )-----------( 177      ( 15 Mar 2024 06:06 )             26.1     Mean Cell Volume: 90.9 fl (03-15-24 @ 06:06)    03-15    131<L>  |  99  |  50<H>  ----------------------------<  118<H>  4.5   |  21<L>  |  1.46<H>    Ca    9.5      15 Mar 2024 06:06  Phos  2.7     03-15  Mg     2.1     03-15          Urinalysis Basic - ( 15 Mar 2024 06:06 )    Color: x / Appearance: x / SG: x / pH: x  Gluc: 118 mg/dL / Ketone: x  / Bili: x / Urobili: x   Blood: x / Protein: x / Nitrite: x   Leuk Esterase: x / RBC: x / WBC x   Sq Epi: x / Non Sq Epi: x / Bacteria: x                              8.2    9.68  )-----------( 177      ( 15 Mar 2024 06:06 )             26.1                         7.7    7.07  )-----------( 146      ( 14 Mar 2024 06:49 )             24.5                         7.3    8.27  )-----------( 152      ( 13 Mar 2024 09:53 )             24.0       Imaging personally reviewed by me:

## 2024-03-15 NOTE — PROGRESS NOTE ADULT - PROBLEM SELECTOR PLAN 2
- S/p trach 3/8 by surgery by Dr.Eric Mayorga   - Tracheostomy Sutures to be removed today 3/13  - Currently remains on Mechanical Ventilation   - Attempt CPAP Trials as tolerated   - Vent Settings: PRVC 14/450/5/40  - Continue airway clearance; Duonebs q6h + Hypersal 3 % q6h ATC - S/p trach 3/8 by surgery by Dr.Eric Mayorga   - Tracheostomy Sutures removed 3/13  - Currently remains on Mechanical Ventilation   - Attempt PS Trials as tolerated   - Vent Settings: PRVC 14/450/+5/30%  - Continue airway clearance; Duonebs q6h + Hypersal 3 % q6h ATC

## 2024-03-15 NOTE — PROGRESS NOTE ADULT - PROBLEM SELECTOR PLAN 3
- S/p EEG w/ seizures last seen 3/7  - Briviact 100 mg TID ( Renew 3/14), Vimpat 200 mg BID (Renew 4/6) - S/p EEG w/ seizures last seen 3/7  - Briviact 100 mg TID ( Renew 3/14), Vimpat 200 mg BID (Renew 4/6)  - 3/15 EEG: Negative for seizures

## 2024-03-15 NOTE — EEG REPORT - NS EEG TEXT BOX
ANTONELLA DOBSON [id number]     Study Date: 		03-15-24  Duration:  x 20 mins    --------------------------------------------------------------------------------------------------  History:  CC/ HPI Patient is a 78y old  Female who presents with a chief complaint of ICH (15 Mar 2024 11:32)    MEDICATIONS  (STANDING):  amLODIPine   Tablet 5 milliGRAM(s) Oral daily  artificial  tears Solution 1 Drop(s) Both EYES every 4 hours  Biotene Dry Mouth Oral Rinse 5 milliLiter(s) Swish and Spit every 6 hours  brivaracetam Oral Solution 100 milliGRAM(s) Oral <User Schedule>  carvedilol 25 milliGRAM(s) Oral every 12 hours  cloNIDine 0.2 milliGRAM(s) Oral two times a day  docosanol 10% Cream 1 Application(s) Topical daily  heparin   Injectable 5000 Unit(s) SubCutaneous every 12 hours  insulin lispro (ADMELOG) corrective regimen sliding scale   SubCutaneous every 6 hours  insulin NPH human recombinant 12 Unit(s) SubCutaneous every 6 hours  lacosamide Solution 200 milliGRAM(s) Oral two times a day  meropenem  IVPB 1000 milliGRAM(s) IV Intermittent every 12 hours  nystatin    Suspension 381067 Unit(s) Swish and Swallow every 6 hours  pantoprazole   Suspension 40 milliGRAM(s) Oral two times a day  predniSONE   Tablet 5 milliGRAM(s) Oral daily  tacrolimus    0.5 mG/mL Suspension 5 milliGRAM(s) Oral two times a day  trimethoprim   80 mG/sulfamethoxazole 400 mG 1 Tablet(s) Oral daily    --------------------------------------------------------------------------------------------------  Study Interpretation:    [Abbreviation Key:  PDR=alpha rhythm/posterior dominant rhythm. A-P=anterior posterior.  Amplitude: ‘very low’:<20; ‘low’:20-49; ‘medium’:; ‘high’:>150uV.  Persistence for periodic/rhythmic patterns (% of epoch) ‘rare’:<1%; ‘occasional’:1-10%; ‘frequent’:10-50%; ‘abundant’:50-90%; ‘continuous’:>90%.  Persistence for sporadic discharges: ‘rare’:<1/hr; ‘occasional’:1/min-1/hr; ‘frequent’:>1/min; ‘abundant’:>1/10 sec.  RPP=rhythmic and periodic patterns; GRDA=generalized rhythmic delta activity; FIRDA=frontal intermittent GRDA; LRDA=lateralized rhythmic delta activity; TIRDA=temporal intermittent rhythmic delta activity;  LPD=PLED=lateralized periodic discharges; GPD=generalized periodic discharges; BIPDs =bilateral independent periodic discharges; Mf=multifocal; SIRPDs=stimulus induced rhythmic, periodic, or ictal appearing discharges; BIRDs=brief potentially ictal rhythmic discharges >4 Hz, lasting .5-10s; PFA (paroxysmal bursts >13 Hz or =8 Hz <10s).  Modifiers: +F=with fast component; +S=with spike component; +R=with rhythmic component.  S-B=burst suppression pattern.  Max=maximal. N1-drowsy; N2-stage II sleep; N3-slow wave sleep. SSS/BETS=small sharp spikes/benign epileptiform transients of sleep. HV=hyperventilation; PS=photic stimulation]    FINDINGS:      Background:  Continuity: continuous  Symmetry: symmetric  PDR: absent  Reactivity: present  Voltage: normal (between 20-150uV)  Anterior Posterior Gradient: absent  Other background findings: none  Breach: absent    Background Slowing:  Generalized slowing: diffuse irregular delta and theta activity.  Focal slowing: Left posterior quadrant predominant irregular delta/theta activity was present.    State Changes:   -N2 sleep transients were not recorded.    Sporadic Epileptiform Discharges/Rhythmic and Periodic Patterns (RPPs):    Frequent parietal sharp waves, max P3 (at times P7), intermittently periodic appearing at ~1 hz.  Intermittent generalized periodic discharges (GPDs) with triphasic morphology, more clear morphology noted over right side likely due to underlying focal dysfunction in the left hemisphere.    Electrographic and Electroclinical seizures:  None    Other Clinical Events:  None    Activation Procedures:   -Hyperventilation was not performed.    -Photic stimulation was not performed.     Artifacts:  Intermittent myogenic and movement artifacts were noted.    ECG:  The heart rate on single channel ECG was predominantly between 80-90 BPM.    EEG Classification / Summary:  Abnormal EEG study  Frequent parietal sharp waves, intermittently periodic appearing at ~1 hz.  Focal left posterior quadrant slowing.  Intermittent generalized periodic discharges (GPDs) with triphasic morphology.  Moderate generalized background slowing.    -----------------------------------------------------------------------------------------------------    Clinical Impression:  Increased risk for focal seizures from left parietal region.  Focal cerebral dysfunction in the left parietal region.  Moderate to severe diffuse/multi-focal cerebral dysfunction, might be associated with a toxic/metabolic etiology in part.  There were no seizures recorded.    This is a preliminary report pending attending review and attestation.    Erick Lanza MD  Fellow, [facility]      -------------------------------------------------------------------------------------------------------  [facility] EEG Reading Room Ph#: [phone]  Epilepsy Answering Service after 5PM and before 8:30AM: Ph#: [phone]   ANTONELLA DOBSON [id number]     Study Date: 03-15-24  Duration: 20 min    --------------------------------------------------------------------------------------------------  History:  CC/ HPI Patient is a 78y old  Female who presents with a chief complaint of ICH (15 Mar 2024 11:32)    MEDICATIONS  (STANDING):  amLODIPine   Tablet 5 milliGRAM(s) Oral daily  artificial  tears Solution 1 Drop(s) Both EYES every 4 hours  Biotene Dry Mouth Oral Rinse 5 milliLiter(s) Swish and Spit every 6 hours  brivaracetam Oral Solution 100 milliGRAM(s) Oral <User Schedule>  carvedilol 25 milliGRAM(s) Oral every 12 hours  cloNIDine 0.2 milliGRAM(s) Oral two times a day  docosanol 10% Cream 1 Application(s) Topical daily  heparin   Injectable 5000 Unit(s) SubCutaneous every 12 hours  insulin lispro (ADMELOG) corrective regimen sliding scale   SubCutaneous every 6 hours  insulin NPH human recombinant 12 Unit(s) SubCutaneous every 6 hours  lacosamide Solution 200 milliGRAM(s) Oral two times a day  meropenem  IVPB 1000 milliGRAM(s) IV Intermittent every 12 hours  nystatin    Suspension [id number] Unit(s) Swish and Swallow every 6 hours  pantoprazole   Suspension 40 milliGRAM(s) Oral two times a day  predniSONE   Tablet 5 milliGRAM(s) Oral daily  tacrolimus    0.5 mG/mL Suspension 5 milliGRAM(s) Oral two times a day  trimethoprim   80 mG/sulfamethoxazole 400 mG 1 Tablet(s) Oral daily    --------------------------------------------------------------------------------------------------  Study Interpretation:    [Abbreviation Key:  PDR=alpha rhythm/posterior dominant rhythm. A-P=anterior posterior.  Amplitude: ‘very low’:<20; ‘low’:20-49; ‘medium’:; ‘high’:>150uV.  Persistence for periodic/rhythmic patterns (% of epoch) ‘rare’:<1%; ‘occasional’:1-10%; ‘frequent’:10-50%; ‘abundant’:50-90%; ‘continuous’:>90%.  Persistence for sporadic discharges: ‘rare’:<1/hr; ‘occasional’:1/min-1/hr; ‘frequent’:>1/min; ‘abundant’:>1/10 sec.  RPP=rhythmic and periodic patterns; GRDA=generalized rhythmic delta activity; FIRDA=frontal intermittent GRDA; LRDA=lateralized rhythmic delta activity; TIRDA=temporal intermittent rhythmic delta activity;  LPD=PLED=lateralized periodic discharges; GPD=generalized periodic discharges; BIPDs =bilateral independent periodic discharges; Mf=multifocal; SIRPDs=stimulus induced rhythmic, periodic, or ictal appearing discharges; BIRDs=brief potentially ictal rhythmic discharges >4 Hz, lasting .5-10s; PFA (paroxysmal bursts >13 Hz or =8 Hz <10s).  Modifiers: +F=with fast component; +S=with spike component; +R=with rhythmic component.  S-B=burst suppression pattern.  Max=maximal. N1-drowsy; N2-stage II sleep; N3-slow wave sleep. SSS/BETS=small sharp spikes/benign epileptiform transients of sleep. HV=hyperventilation; PS=photic stimulation]    FINDINGS:      Background:  The background is continuous and symmetric. The predominant background consists of diffuse polymorphic delta > theta slowing. There is no posterior dominant rhythm.     Background Slowing:  Generalized slowing: as above  Focal slowing: No clear focal slowing    State Changes:   None captured    Interictal Findings:  -Abundant right frontally predominant sharp waves with broad field in the right hemisphere (Fp2/F4 maximal), periodic at 1-1.5 Hz  -Frequent left parietal or posterior quadrant sharp waves, max P3 (at times P3/P7 or P7/T7), occasionally semi-periodic ~1 Hz.  -Rare left temporal (T7) sharp wave    Electrographic and Electroclinical seizures:  None    Other Clinical Events:  None    Activation Procedures:   -Hyperventilation was not performed.    -Photic stimulation was not performed.     Artifacts:  Intermittent electrode movement artifacts present.    Single-lead EKG: Regular rhythm    -----------------------------------------------------------------------------------------------------    EEG Classification / Summary:  Abnormal routine EEG in a lethargic patient.  -Abundant right frontally predominant sharp waves with broad field in the right hemisphere, periodic at 1-1.5 Hz  -Frequent left parietal or posterior quadrant sharp waves, occasionally semi-periodic ~1 Hz.  -Rare left temporal sharp wave  -Moderate-severe diffuse slowing  -No electrographic seizures captured    Clinical Impression:  -Risk of focal-onset seizures from the right frontal region, left parietal region/posterior quadrant, and left temporal region  -Moderate-severe diffuse cerebral dysfunction is nonspecific in etiology.  -No seizures captured      -------------------------------------------------------------------------------------------------------  Erick Lanza MD  Fellow, [facility] [place]    Lilo Herrera MD  Attending Physician, [facility] EEG Reading Room Ph#: [phone]  Epilepsy Answering Service after 5PM and before 8:30AM: Ph#: [phone]

## 2024-03-15 NOTE — PROGRESS NOTE ADULT - ASSESSMENT
79 yo F with ESRD s/p renal transplant (2019, now off HD), left AKA, BK viremia, HTN, breast ca s/p lumpectomy (completed Tamoxifen 03/2023), DVT (off Eliquis), HLD, gout presenting 3/1 with left ICH (ICH score 4) likely 2/2 amyloid angiopathy s/p left craniectomy  and evacuation as well as EVD placement and removal (3/7).   initial CTH3/1  with 8.9cm left frontal IPH with IVH .09cm rightward shift   3/2 CTH s/p L hmeicrani and resection of IPH. stable   3/5 CTH post op changes. some reorption of post op pneumocephalus.    s/p trach/peg 3/8/24   3/8 CTH L frontal craniectomy.  L MCA hemorrhage and infarct ; still IVH.   3/10 with + UA  A1c 5.7 LDL 46   TTE done 3/2: LA is severely dilated    Urine culture grew positive for klebsiella and enterococcus  3/9 EEG no seiuzres since 3/7;  risk of seiuzre from L parietal region. mod to severe diffuse cerebral dysfunction   Transferred to [facility] 3/11 for continued management.  3/12 CTH   sterotactic imaging of L frontal crani for hemorrhagic L MCA ifnarct. L frontal temporal pseudmeningocele  noted. no hcange since 3/8   o/e awake, no verbal, not followiing. L gaze pref  some minimal withdrawal to noxious RLE and LUE.  s/p trach /vent     Impression: L ICH possible 2/2  CAA but hemorrhagic infarct also needs to be considered.    - had recent CTH 3/12.  would repeat EEG at this time given high risk for seiuzres   - tolerating CPAP at times   - difficult to determine IPH 2/2 CAA without MRI to look at SWI or GRE sequeneses for hemosiderin deposition  - never had vessel imaging. consider CTA H/N   - no AC/AP. dvt ppx okay  - briviact 100mg TID  and vimpat 200mg BID for seiuzre ppx   - infectious workup. on meropenum.  this can lower seiuzre threshold Tmax yesterday 101.5   - immunosuppression on tacrolimus and prednisone.  ppx bactrim     -telemetry   - PT/OT   - check FS, glucose control <180  - GI/DVT ppx   - GOC with family.  currenlty full code; in terms of functional meaningful recovery the likelihood is low.  patient will require 24hr care and likely be bedbound at this time.    consider recalling palliative  Dieter Wilkinson MD  Vascular Neurology  Office: [phone]

## 2024-03-15 NOTE — PROGRESS NOTE ADULT - SUBJECTIVE AND OBJECTIVE BOX
Neurology        S: patient seen. no neuro changes, ill appearing     Medications: MEDICATIONS  (STANDING):  artificial  tears Solution 1 Drop(s) Both EYES every 4 hours  Biotene Dry Mouth Oral Rinse 5 milliLiter(s) Swish and Spit every 6 hours  brivaracetam Oral Solution 100 milliGRAM(s) Oral <User Schedule>  carvedilol 25 milliGRAM(s) Oral every 12 hours  cloNIDine 0.2 milliGRAM(s) Oral two times a day  docosanol 10% Cream 1 Application(s) Topical daily  heparin   Injectable 5000 Unit(s) SubCutaneous every 12 hours  insulin lispro (ADMELOG) corrective regimen sliding scale   SubCutaneous every 6 hours  insulin NPH human recombinant 12 Unit(s) SubCutaneous every 6 hours  lacosamide Solution 200 milliGRAM(s) Oral two times a day  meropenem  IVPB 1000 milliGRAM(s) IV Intermittent every 12 hours  nystatin    Suspension [id number] Unit(s) Swish and Swallow every 6 hours  pantoprazole   Suspension 40 milliGRAM(s) Oral two times a day  predniSONE   Tablet 5 milliGRAM(s) Oral daily  tacrolimus    0.5 mG/mL Suspension 5 milliGRAM(s) Oral two times a day  trimethoprim   80 mG/sulfamethoxazole 400 mG 1 Tablet(s) Oral daily    MEDICATIONS  (PRN):  acetaminophen   Oral Liquid .. 650 milliGRAM(s) Oral every 6 hours PRN Temp greater or equal to 38C (100.4F), Mild Pain (1 - 3)  albuterol/ipratropium for Nebulization 3 milliLiter(s) Nebulizer every 6 hours PRN Shortness of Breath and/or Wheezing       Vitals:  Vital Signs Last 24 Hrs  T(C): 37.3 (15 Mar 2024 05:33), Max: 37.3 (15 Mar 2024 00:00)  T(F): 99.1 (15 Mar 2024 05:33), Max: 99.2 (15 Mar 2024 00:00)  HR: 95 (15 Mar 2024 09:25) (88 - 98)  BP: 162/77 (15 Mar 2024 05:33) (118/90 - 165/90)  BP(mean): --  RR: 25 (15 Mar 2024 05:33) (22 - 27)  SpO2: 100% (15 Mar 2024 09:25) (96% - 100%)    Parameters below as of 15 Mar 2024 05:33  Patient On (Oxygen Delivery Method): ventilator                General Exam:   General Appearance: Appropriately dressed    Head: Normocephalic, atraumatic and no dysmorphic features s/p trach   Ear, Nose, and Throat: Moist mucous membranes  CVS: S1S2+  Resp: No SOB, no wheeze or rhonchi on vent   GI: soft NT/ND s/p PEG   Extremities:  L AKA  Skin: No bruises or rashes     Neurological Exam:  Mental Status:  opens eyes to noxious. no verbal. not following   Cranial Nerves: PERRL, EOMI but gaze pref to L, no blink to threat on R, R facial,    Motor: minimal/no spontaneous movement   Sensation: grimaces to noxious at times. some minimal withdrawal LUE and RLE.    Coordination: unable   Gait: unable     Data/Labs/Imaging which I personally reviewed.     Labs:       LABS:                          8.2    9.68  )-----------( 177      ( 15 Mar 2024 06:06 )             26.1     03-15    131<L>  |  99  |  50<H>  ----------------------------<  118<H>  4.5   |  21<L>  |  1.46<H>    Ca    9.5      15 Mar 2024 06:06  Phos  2.7     03-15  Mg     2.1     03-15          Urinalysis Basic - ( 15 Mar 2024 06:06 )    Color: x / Appearance: x / SG: x / pH: x  Gluc: 118 mg/dL / Ketone: x  / Bili: x / Urobili: x   Blood: x / Protein: x / Nitrite: x   Leuk Esterase: x / RBC: x / WBC x   Sq Epi: x / Non Sq Epi: x / Bacteria: x              Urinalysis Basic - ( 14 Mar 2024 06:49 )    Color: x / Appearance: x / SG: x / pH: x  Gluc: 149 mg/dL / Ketone: x  / Bili: x / Urobili: x   Blood: x / Protein: x / Nitrite: x   Leuk Esterase: x / RBC: x / WBC x   Sq Epi: x / Non Sq Epi: x / Bacteria: x

## 2024-03-15 NOTE — PROGRESS NOTE ADULT - PROBLEM SELECTOR PLAN 4
- + UA on 3/10  - Urine Cx 3/10: ESBL Klebsiella and VRE 10- 49 K   - Treating the ESBL klebsiella with Meropenem 1GM Q12H 3/12 -->3/19

## 2024-03-15 NOTE — PROGRESS NOTE ADULT - ASSESSMENT
77 yo F with PMHx ESRD s/p renal transplant (2019, now off HD), left AKA, BK viremia, HTN, breast ca s/p lumpectomy (completed Tamoxifen 03/2023), DVT (off Eliquis), HLD, gout presenting 3/1 with left ICH (ICH score 4) likely 2/2 amyloid angiopathy s/p left craniectomy(discarded) and evacuation as well as EVD placement and removal (3/7). She is s/p trach/peg 3/8/24.  Febrile 3/10 with + UA, blood/sputum culture NGTD.  Treatment for UTI initiated with ceftriaxone, eventually broadened to cefepime.  Transferred to [facility] 3/11 for continued management.  Pt arrived from [facility] with minimal mental status with only response of spontaneous eye opening and withdrawal on RLE.  Urine culture grew positive for klebsiella and enterococcus, cefepime d/c'd meropenem and vanc started 3/12.  Will continue with airway clearance management and wean from ventilator as tolerated.      3/13: Patient febrile again overnight T.max 101.5. Blood and Urine cxs sent yesterday ( Results pending). CXR 3/10 no focal consolidations. Patients rectal tube was dislodged overnight, no reports of diarrhea this morning will continue to monitor stooling and possible need to send C.Diff if reoccurs. Currently remains on Meropenem and Vancomycin dcd as patient growing VRE from urine cx on 3/10. Will await repeat urine cx studies to determine if will require additional coverage with Linezolid.  BGMs running high will increase Humulin to 12 units q 6 hrs. .  3/14:  Patient tolerated PS 10/5 for almost hours yesterday.  Will continue PS trials as tolerated.  Free water discontinued due to decreasing sodium levels.  EEG requested evaluate for sub-clinical seizures given fixed gaze.   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 She is s/p trach/peg 3/8/24.  Febrile 3/10 with + UA, blood/sputum culture NGTD.  Treatment for UTI initiated with ceftriaxone, eventually broadened to cefepime.  Transferred to [facility] 3/11 for continued management.  Pt arrived from NSCU with minimal mental status with only response of spontaneous eye opening and withdrawal on RLE.  Urine culture grew positive for klebsiella and enterococcus, cefepime d/c'd meropenem and vanc started 3/12.  Will continue with airway clearance management and wean from ventilator as tolerated.      3/13: Patient febrile again overnight T.max 101.5. Blood and Urine cxs sent yesterday ( Results pending). CXR 3/10 no focal consolidations. Patients rectal tube was dislodged overnight, no reports of diarrhea this morning will continue to monitor stooling and possible need to send C.Diff if reoccurs. Currently remains on Meropenem and Vancomycin dcd as patient growing VRE from urine cx on 3/10. Will await repeat urine cx studies to determine if will require additional coverage with Linezolid.  BGMs running high will increase Humulin to 12 units q 6 hrs. .  3/14:  Patient tolerated PS 10/5 for almost hours yesterday.  Will continue PS trials as tolerated.  Free water discontinued due to decreasing sodium levels.  EEG requested evaluate for sub-clinical seizures given fixed gaze.  3/15:  Patient tolerated continuous PS 10/5 over night without issue.  Will progress PS as tolerated.  Spot EEG obtained today, negative for seizure activity.  Sodium remains low, continuing to monitor off free water, PEG feeds were changed yesterday evening.    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 She is s/p trach/peg 3/8/24.  Febrile 3/10 with + UA, blood/sputum culture NGTD.  Treatment for UTI initiated with ceftriaxone, eventually broadened to cefepime.  Transferred to [facility] 3/11 for continued management.  Pt arrived from NSCU with minimal mental status with only response of spontaneous eye opening and withdrawal on RLE.  Urine culture grew positive for klebsiella and enterococcus, cefepime d/c'd meropenem and vanc started 3/12.  Will continue with airway clearance management and wean from ventilator as tolerated.      3/13: Patient febrile again overnight T.max 101.5. Blood and Urine cxs sent yesterday ( Results pending). CXR 3/10 no focal consolidations. Patients rectal tube was dislodged overnight, no reports of diarrhea this morning will continue to monitor stooling and possible need to send C.Diff if reoccurs. Currently remains on Meropenem and Vancomycin dcd as patient growing VRE from urine cx on 3/10. Will await repeat urine cx studies to determine if will require additional coverage with Linezolid.  BGMs running high will increase Humulin to 12 units q 6 hrs. .  3/14:  Patient tolerated PS 10/5 for almost hours yesterday.  Will continue PS trials as tolerated.  Free water discontinued due to decreasing sodium levels.  EEG requested evaluate for sub-clinical seizures given fixed gaze.  3/15:  Patient tolerated continuous PS 10/5 over night without issue.  Will progress PS as tolerated.  Spot EEG obtained today, negative for seizure activity.  Sodium remains low, continuing to monitor off free water, PEG feeds were changed yesterday evening.  Lasix 40mg IV given once.

## 2024-03-15 NOTE — PROGRESS NOTE ADULT - ASSESSMENT
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 She is s/p trach/peg 3/8/24.  Febrile 3/10 with + UA, blood/sputum culture NGTD.  Treatment for UTI initiated with ceftriaxone, eventually broadened to cefepime.  Transferred to [facility] 3/11 for continued management.  Pt arrived from [facility] with minimal mental status with only response of spontaneous eye opening and withdrawal on RLE.  Urine culture grew positive for klebsiella and enterococcus, cefepime d/c'd meropenem and vanc started 3/12.  Will continue with airway clearance management and wean from ventilator as tolerated.      3/13: Patient febrile again overnight T.max 101.5. Blood and Urine cxs sent yesterday ( Results pending). CXR 3/10 no focal consolidations. Patients rectal tube was dislodged overnight, no reports of diarrhea this morning will continue to monitor stooling and possible need to send C.Diff if reoccurs. Currently remains on Meropenem and Vancomycin dcd as patient growing VRE from urine cx on 3/10. Will await repeat urine cx studies to determine if will require additional coverage with Linezolid.  BGMs running high will increase Humulin to 12 units q 6 hrs. .  3/14:  Patient tolerated PS 10/5 for almost hours yesterday.  Will continue PS trials as tolerated.  Free water discontinued due to decreasing sodium levels.  EEG requested evaluate for sub-clinical seizures given fixed gaze.  3/15:  Patient tolerated continuous PS 10/5 over night without issue.  Will progress PS as tolerated.  Spot EEG obtained today, negative for seizure activity.  Sodium remains low, continuing to monitor off free water, PEG feeds were changed yesterday evening.  Lasix 40mg IV given once.       Wound Consult requested to assist w/ management of Sacral Evolving DTI    Buttocks/ Sacrum change to TRIAD BID  and prn soiling        Continue w/ attends under pads and Pericare w/ purewick as per protocol  Abx per [facility]/ ID  Moisturize intact skin w/ SWEEN cream BID  Nutrition optimization in pt w/  Increased nutritional needs    high quality protein TF, radha/ prosource, MVI & Vit C to promote wound healing  Continue turning and positioning w/ offloading assistive devices as per protocol  Waffle Cushion to chair when oob to chair  Continue w/ low air loss pressure redistribution bed surface   Pt will need Group 2 mattress on hospital bed and ROHO cushion for wheel chair upon discharge home  Care as per medicine, will follow w/ you  Upon discharge f/u as outpatient at [facility] [address] [place] [address] [phone]  Seen w/  RN and D/w team & attng  Heather Grier PA-C CWS [id number]  Nights/ Weekends/ Holidays please call:  General Surgery Consult pager ([phone]) for emergencies  Wound PT for multilayer leg wrapping or VAC issues (x [phone])   I spent 35  minutes face to face w/ this pt of which more than 50% of the time was spent counseling & coordinating care of this pt.

## 2024-03-15 NOTE — PROGRESS NOTE ADULT - SUBJECTIVE AND OBJECTIVE BOX
Patient is a 78y old  Female who presents with a chief complaint of ICH (14 Mar 2024 18:02)      Interval Events:    REVIEW OF SYSTEMS:  [ ] Positive  [ ] All other systems negative  [ ] Unable to assess ROS because ________    Vital Signs Last 24 Hrs  T(C): 37.3 (03-15-24 @ 05:33), Max: 37.3 (03-15-24 @ 00:00)  T(F): 99.1 (03-15-24 @ 05:33), Max: 99.2 (03-15-24 @ 00:00)  HR: 98 (03-15-24 @ 05:33) (85 - 98)  BP: 162/77 (03-15-24 @ 05:33) (118/90 - 165/90)  RR: 25 (03-15-24 @ 05:33) (22 - 27)  SpO2: 100% (03-15-24 @ 05:33) (96% - 100%)    PHYSICAL EXAM:  HEENT:   [ ]Tracheostomy:  [ ]Pupils equal  [ ]No oral lesions  [ ]Abnormal    SKIN  [ ]No Rash  [ ] Abnormal  [ ] pressure    CARDIAC  [ ]Regular  [ ]Abnormal    PULMONARY  [ ]Bilateral Clear Breath Sounds  [ ]Normal Excursion  [ ]Abnormal    GI  [ ]PEG      [ ] +BS		              [ ]Soft, nondistended, nontender	  [ ]Abnormal    MUSCULOSKELETAL                                   [ ]Bedbound                 [ ]Abnormal    [ ]Ambulatory/OOB to chair                           EXTREMITIES                                         [ ]Normal  [ ]Edema                           NEUROLOGIC  [ ] Normal, non focal  [ ] Focal findings:    PSYCHIATRIC  [ ]Alert and appropriate  [ ] Sedated	 [ ]Agitated    :  Gardner: [ ] Yes, if yes: Date of Placement:                   [  ] No    LINES: Central Lines [ ] Yes, if yes: Date of Placement                                     [  ] No    HOSPITAL MEDICATIONS:  MEDICATIONS  (STANDING):  artificial  tears Solution 1 Drop(s) Both EYES every 4 hours  Biotene Dry Mouth Oral Rinse 5 milliLiter(s) Swish and Spit every 6 hours  brivaracetam Oral Solution 100 milliGRAM(s) Oral <User Schedule>  carvedilol 25 milliGRAM(s) Oral every 12 hours  cloNIDine 0.2 milliGRAM(s) Oral two times a day  docosanol 10% Cream 1 Application(s) Topical daily  heparin   Injectable 5000 Unit(s) SubCutaneous every 12 hours  insulin lispro (ADMELOG) corrective regimen sliding scale   SubCutaneous every 6 hours  insulin NPH human recombinant 12 Unit(s) SubCutaneous every 6 hours  lacosamide Solution 200 milliGRAM(s) Oral two times a day  meropenem  IVPB 1000 milliGRAM(s) IV Intermittent every 12 hours  nystatin    Suspension 869626 Unit(s) Swish and Swallow every 6 hours  pantoprazole   Suspension 40 milliGRAM(s) Oral two times a day  predniSONE   Tablet 5 milliGRAM(s) Oral daily  tacrolimus    0.5 mG/mL Suspension 5 milliGRAM(s) Oral two times a day  trimethoprim   80 mG/sulfamethoxazole 400 mG 1 Tablet(s) Oral daily    MEDICATIONS  (PRN):  acetaminophen   Oral Liquid .. 650 milliGRAM(s) Oral every 6 hours PRN Temp greater or equal to 38C (100.4F), Mild Pain (1 - 3)  albuterol/ipratropium for Nebulization 3 milliLiter(s) Nebulizer every 6 hours PRN Shortness of Breath and/or Wheezing      LABS:                        8.2    9.68  )-----------( 177      ( 15 Mar 2024 06:06 )             26.1     03-15    131<L>  |  99  |  50<H>  ----------------------------<  118<H>  4.5   |  21<L>  |  1.46<H>    Ca    9.5      15 Mar 2024 06:06  Phos  2.7     03-15  Mg     2.1     03-15        Urinalysis Basic - ( 15 Mar 2024 06:06 )    Color: x / Appearance: x / SG: x / pH: x  Gluc: 118 mg/dL / Ketone: x  / Bili: x / Urobili: x   Blood: x / Protein: x / Nitrite: x   Leuk Esterase: x / RBC: x / WBC x   Sq Epi: x / Non Sq Epi: x / Bacteria: x          CAPILLARY BLOOD GLUCOSE    MICROBIOLOGY:     RADIOLOGY:  [ ] Reviewed and interpreted by me    Mode: CPAP with PS  FiO2: 30  PEEP: 5  MAP: 10  PIP: 16   Patient is a 78y old  Female who presents with a chief complaint of ICH (14 Mar 2024 18:02)      Interval Events: No events reported over night    REVIEW OF SYSTEMS:  [ ] Positive  [ ] All other systems negative  [X] Unable to assess ROS because ___Lacks capacity to communicate    Vital Signs Last 24 Hrs  T(C): 37.3 (03-15-24 @ 05:33), Max: 37.3 (03-15-24 @ 00:00)  T(F): 99.1 (03-15-24 @ 05:33), Max: 99.2 (03-15-24 @ 00:00)  HR: 98 (03-15-24 @ 05:33) (85 - 98)  BP: 162/77 (03-15-24 @ 05:33) (118/90 - 165/90)  RR: 25 (03-15-24 @ 05:33) (22 - 27)  SpO2: 100% (03-15-24 @ 05:33) (96% - 100%)    PHYSICAL EXAM:  HEENT:   [X] Tracheostomy:  #7 Cuffed Portex  [X] Pupils 4mm B/L with mild reaction to light; not tracking  [ ] No oral lesions  [ ] Abnormal    SKIN  [X] No Rash  [ ] Abnormal  [ ] pressure    CARDIAC  [X] Regular  [ ] Abnormal    PULMONARY  [X] Bilateral Clear Breath Sounds  [ ] Normal Excursion  [ ] Abnormal    GI  [X] PEG      [X] +BS		              [X] Soft, nondistended, nontender	  [ ] Abnormal    MUSCULOSKELETAL                                   [X] Bedbound                 [ ] Abnormal    [ ] Ambulatory/OOB to chair                           EXTREMITIES                                         [ ] Normal  [X] Edema  1+ RUE edema                           NEUROLOGIC  [ ] Normal, non focal  [X] Focal findings:  Obtunded; eyes closed during exam; not following commands; not active movement observed; withdraws only on RLE    PSYCHIATRIC  [X] Obtunded  [ ] Sedated	 [ ]Agitated    :  Gardner: [ ] Yes, if yes: Date of Placement:                   [X] No    LINES: Central Lines [ ] Yes, if yes: Date of Placement                                     [X] No    HOSPITAL MEDICATIONS:  MEDICATIONS  (STANDING):  artificial  tears Solution 1 Drop(s) Both EYES every 4 hours  Biotene Dry Mouth Oral Rinse 5 milliLiter(s) Swish and Spit every 6 hours  brivaracetam Oral Solution 100 milliGRAM(s) Oral <User Schedule>  carvedilol 25 milliGRAM(s) Oral every 12 hours  cloNIDine 0.2 milliGRAM(s) Oral two times a day  docosanol 10% Cream 1 Application(s) Topical daily  heparin   Injectable 5000 Unit(s) SubCutaneous every 12 hours  insulin lispro (ADMELOG) corrective regimen sliding scale   SubCutaneous every 6 hours  insulin NPH human recombinant 12 Unit(s) SubCutaneous every 6 hours  lacosamide Solution 200 milliGRAM(s) Oral two times a day  meropenem  IVPB 1000 milliGRAM(s) IV Intermittent every 12 hours  nystatin    Suspension 431969 Unit(s) Swish and Swallow every 6 hours  pantoprazole   Suspension 40 milliGRAM(s) Oral two times a day  predniSONE   Tablet 5 milliGRAM(s) Oral daily  tacrolimus    0.5 mG/mL Suspension 5 milliGRAM(s) Oral two times a day  trimethoprim   80 mG/sulfamethoxazole 400 mG 1 Tablet(s) Oral daily    MEDICATIONS  (PRN):  acetaminophen   Oral Liquid .. 650 milliGRAM(s) Oral every 6 hours PRN Temp greater or equal to 38C (100.4F), Mild Pain (1 - 3)  albuterol/ipratropium for Nebulization 3 milliLiter(s) Nebulizer every 6 hours PRN Shortness of Breath and/or Wheezing      LABS:                        8.2    9.68  )-----------( 177      ( 15 Mar 2024 06:06 )             26.1     03-15    131<L>  |  99  |  50<H>  ----------------------------<  118<H>  4.5   |  21<L>  |  1.46<H>    Ca    9.5      15 Mar 2024 06:06  Phos  2.7     03-15  Mg     2.1     03-15        Urinalysis Basic - ( 15 Mar 2024 06:06 )    Color: x / Appearance: x / SG: x / pH: x  Gluc: 118 mg/dL / Ketone: x  / Bili: x / Urobili: x   Blood: x / Protein: x / Nitrite: x   Leuk Esterase: x / RBC: x / WBC x   Sq Epi: x / Non Sq Epi: x / Bacteria: x          CAPILLARY BLOOD GLUCOSE    MICROBIOLOGY:     RADIOLOGY:  [ ] Reviewed and interpreted by me    Mode: CPAP with PS  FiO2: 30  PEEP: 5  MAP: 10  PIP: 16   Patient is a 78y old  Female who presents with a chief complaint of ICH (14 Mar 2024 18:02)      Interval Events: No events reported over night    REVIEW OF SYSTEMS:  [ ] Positive  [ ] All other systems negative  [X] Unable to assess ROS because ___Lacks capacity to communicate    Vital Signs Last 24 Hrs  T(C): 37.3 (03-15-24 @ 05:33), Max: 37.3 (03-15-24 @ 00:00)  T(F): 99.1 (03-15-24 @ 05:33), Max: 99.2 (03-15-24 @ 00:00)  HR: 98 (03-15-24 @ 05:33) (85 - 98)  BP: 162/77 (03-15-24 @ 05:33) (118/90 - 165/90)  RR: 25 (03-15-24 @ 05:33) (22 - 27)  SpO2: 100% (03-15-24 @ 05:33) (96% - 100%)    PHYSICAL EXAM:  HEENT:   [X] Tracheostomy:  #7 Cuffed Portex  [X] Pupils 4mm B/L with mild reaction to light; not tracking  [ ] No oral lesions  [ ] Abnormal    SKIN  [ ] No Rash  [ ] Abnormal  [X] pressure: Stage 2 sacral wound    CARDIAC  [X] Regular  [ ] Abnormal    PULMONARY  [X] Bilateral Clear Breath Sounds  [ ] Normal Excursion  [ ] Abnormal    GI  [X] PEG      [X] +BS		              [X] Soft, nondistended, nontender	  [ ] Abnormal    MUSCULOSKELETAL                                   [X] Bedbound                 [ ] Abnormal    [ ] Ambulatory/OOB to chair                           EXTREMITIES                                         [ ] Normal  [X] Edema  1+ RUE edema                           NEUROLOGIC  [ ] Normal, non focal  [X] Focal findings:  Obtunded; eyes closed during exam; not following commands; not active movement observed; withdraws only on RLE    PSYCHIATRIC  [X] Obtunded  [ ] Sedated	 [ ]Agitated    :  Gardner: [ ] Yes, if yes: Date of Placement:                   [X] No    LINES: Central Lines [ ] Yes, if yes: Date of Placement                                     [X] No    HOSPITAL MEDICATIONS:  MEDICATIONS  (STANDING):  artificial  tears Solution 1 Drop(s) Both EYES every 4 hours  Biotene Dry Mouth Oral Rinse 5 milliLiter(s) Swish and Spit every 6 hours  brivaracetam Oral Solution 100 milliGRAM(s) Oral <User Schedule>  carvedilol 25 milliGRAM(s) Oral every 12 hours  cloNIDine 0.2 milliGRAM(s) Oral two times a day  docosanol 10% Cream 1 Application(s) Topical daily  heparin   Injectable 5000 Unit(s) SubCutaneous every 12 hours  insulin lispro (ADMELOG) corrective regimen sliding scale   SubCutaneous every 6 hours  insulin NPH human recombinant 12 Unit(s) SubCutaneous every 6 hours  lacosamide Solution 200 milliGRAM(s) Oral two times a day  meropenem  IVPB 1000 milliGRAM(s) IV Intermittent every 12 hours  nystatin    Suspension 794177 Unit(s) Swish and Swallow every 6 hours  pantoprazole   Suspension 40 milliGRAM(s) Oral two times a day  predniSONE   Tablet 5 milliGRAM(s) Oral daily  tacrolimus    0.5 mG/mL Suspension 5 milliGRAM(s) Oral two times a day  trimethoprim   80 mG/sulfamethoxazole 400 mG 1 Tablet(s) Oral daily    MEDICATIONS  (PRN):  acetaminophen   Oral Liquid .. 650 milliGRAM(s) Oral every 6 hours PRN Temp greater or equal to 38C (100.4F), Mild Pain (1 - 3)  albuterol/ipratropium for Nebulization 3 milliLiter(s) Nebulizer every 6 hours PRN Shortness of Breath and/or Wheezing      LABS:                        8.2    9.68  )-----------( 177      ( 15 Mar 2024 06:06 )             26.1     03-15    131<L>  |  99  |  50<H>  ----------------------------<  118<H>  4.5   |  21<L>  |  1.46<H>    Ca    9.5      15 Mar 2024 06:06  Phos  2.7     03-15  Mg     2.1     03-15        Urinalysis Basic - ( 15 Mar 2024 06:06 )    Color: x / Appearance: x / SG: x / pH: x  Gluc: 118 mg/dL / Ketone: x  / Bili: x / Urobili: x   Blood: x / Protein: x / Nitrite: x   Leuk Esterase: x / RBC: x / WBC x   Sq Epi: x / Non Sq Epi: x / Bacteria: x          CAPILLARY BLOOD GLUCOSE    MICROBIOLOGY:     RADIOLOGY:  [ ] Reviewed and interpreted by me    Mode: CPAP with PS  FiO2: 30  PEEP: 5  MAP: 10  PIP: 16

## 2024-03-15 NOTE — PROGRESS NOTE ADULT - PROBLEM SELECTOR PLAN 9
- VA Duplex 3/2: DVT RT cfv, femoral, popliteal and gastrocnemius veins. DVT LEFT cfv and femoral vein  - S/p IVCF by IR on 3/3  - Cont Heparin Sub Q none

## 2024-03-15 NOTE — PROGRESS NOTE ADULT - SUBJECTIVE AND OBJECTIVE BOX
DATE OF SERVICE: 03-15-24 @ 17:13    Patient is a 78y old  Female who presents with a chief complaint of ICH (15 Mar 2024 16:40)      INTERVAL HISTORY:     REVIEW OF SYSTEMS:  CONSTITUTIONAL: No weakness  EYES/ENT: No visual changes;  No throat pain   NECK: No pain or stiffness  RESPIRATORY: No cough, wheezing; No shortness of breath  CARDIOVASCULAR: No chest pain or palpitations  GASTROINTESTINAL: No abdominal  pain. No nausea, vomiting, or hematemesis  GENITOURINARY: No dysuria, frequency or hematuria  NEUROLOGICAL: No stroke like symptoms  SKIN: No rashes    	  MEDICATIONS:  amLODIPine   Tablet 5 milliGRAM(s) Oral daily  carvedilol 25 milliGRAM(s) Oral every 12 hours  cloNIDine 0.2 milliGRAM(s) Oral two times a day  furosemide   Injectable 40 milliGRAM(s) IV Push once        PHYSICAL EXAM:  T(C): 36.8 (03-15-24 @ 14:00), Max: 37.6 (03-15-24 @ 10:01)  HR: 92 (03-15-24 @ 14:52) (88 - 98)  BP: 178/94 (03-15-24 @ 14:00) (111/54 - 178/94)  RR: 18 (03-15-24 @ 14:00) (18 - 25)  SpO2: 100% (03-15-24 @ 14:52) (98% - 100%)  Wt(kg): --  I&O's Summary    14 Mar 2024 07:01  -  15 Mar 2024 07:00  --------------------------------------------------------  IN: 1235 mL / OUT: 1400 mL / NET: -165 mL    15 Mar 2024 07:01  -  15 Mar 2024 17:13  --------------------------------------------------------  IN: 540 mL / OUT: 700 mL / NET: -160 mL          Appearance: In no distress	  HEENT:    PERRL, EOMI	  Cardiovascular:  S1 S2, No JVD  Respiratory: Lungs clear to auscultation	  Gastrointestinal:  Soft, Non-tender, + BS	  Vascularature:  No edema of LE  Psychiatric: Appropriate affect   Neuro: no acute focal deficits                               8.2    9.68  )-----------( 177      ( 15 Mar 2024 06:06 )             26.1     03-15    131<L>  |  99  |  50<H>  ----------------------------<  118<H>  4.5   |  21<L>  |  1.46<H>    Ca    9.5      15 Mar 2024 06:06  Phos  2.7     03-15  Mg     2.1     03-15          Labs personally reviewed      ASSESSMENT/PLAN: 	   78F Hx ESRD s/p renal xplant c/b DGF off HD, L AKA, 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    1. Abnormal Echo --  Septal bulge noted measures 2.2cm without obstruction.   -- Given no intracavitary obstruction no intervention at this time  - No Myxoma seen on this echo or echo in 2019, ? if hypermobile interatrial septum was confused for on prior imaging     2. HTN - uncontrolled, needs improvement   Continue Hydralazine 100 mg Q8H, clonidine 0.1 mg TID, Coreg 25 mg BID   - consider amlodipine 5mg and titrate up as needed for better b/p measurements     3. 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 DVT PPX - HSQ when safe              RANDY Lopez DO [facility]  Cardiovascular Medicine  [address], [address]  Available through call or text on [employer]  Office: [phone]

## 2024-03-15 NOTE — PROGRESS NOTE ADULT - NS ATTEND AMEND GEN_ALL_CORE FT
79 yo F with PMHx ESRD s/p renal transplant (2019, now off HD), left AKA, BK viremia, HTN, breast ca s/p lumpectomy (completed Tamoxifen 03/2023), DVT (off Eliquis), HLD, gout presenting with left ICH (ICH score 4) likely 2/2 amyloid angiopathy s/p left craniectomy(discarded) and evacuation POD9 as well as EVD placement and removal. She is s/p trach/peg 3/9/24.      #Left ICH  - Head CT 3/12 stable  - remains obtunded, occasionally opens eyes to sternal rub, withdraws on LE   #Metabolic encephalopathy  - per family had been more alert earlier in admission  - remains obtunded, some eye opening with sternal rub  #Seizures   - Briviact 100 mg TID for seizures, Vimpat 200 mg BID   - spot EEG completed AM 3/15, will f/u results  #Respiratory Failure  - Cheyne Wesley breathing pattern observed since admission to [facility]  - tolerating PS ATC  - trial TC during the day  - Duonebs q6h + Hypersal 3 % q6h ATC  #HTN  - SBP goal:   - Clonidine 0.2mg BID  - Coreg 25 mg BID   - Add Norvasc 5mg  - Echo: LVEF 70-75%, Grade II diastolic dysfcn, septal bulge noted - not signif per Cardiology  #Renal transplant (2019), AVF in the left upper extremity  - pred 5mg, bactrim ppx  - Tacrolimus 5mg BID per transplant nephro recs, f/u levels  #Hyponatremia  - hold free H2O  - Lasix x 1, monitor BMP daily  #H. pylori infection  - Protonix  - H.pylori treatment can be held for 1 month until acute issues have resolved  #UTI  - UA culture with Kleb pna >100K + Enterococcus faecium (VRE), 3/10 culture with 10-49K  - Switched from Cefepime to Meropenem  - holding on treating E. faecium given CFU, defervescing  #Hyperglycemia   - Goal euglycemia (-180), A1C: 5.4%  - ISS  - increase humulin to 12u q6h  # Hx of bilateral above the knee DVT. s/p IVC filter   - HSQ q12h, ok per Neurosurgery  #Ethics: Full code      Lidya Cancino MD, MSCR

## 2024-03-15 NOTE — PROGRESS NOTE ADULT - PROBLEM SELECTOR PLAN 5
- SBP goal:   - Clonidine 0.1 mg TID, Coreg 25 mg BID   - Echo: LVEF 70-75%, Grade II diastolic dysfunction, septal bulge without obstruction  - cardiology following

## 2024-03-16 LAB
ALBUMIN SERPL ELPH-MCNC: 2.8 G/DL — LOW (ref 3.3–5)
ALP SERPL-CCNC: 79 U/L — SIGNIFICANT CHANGE UP (ref 40–120)
ALT FLD-CCNC: 39 U/L — SIGNIFICANT CHANGE UP (ref 10–45)
ANION GAP SERPL CALC-SCNC: 14 MMOL/L — SIGNIFICANT CHANGE UP (ref 5–17)
AST SERPL-CCNC: 39 U/L — SIGNIFICANT CHANGE UP (ref 10–40)
BILIRUB SERPL-MCNC: 0.2 MG/DL — SIGNIFICANT CHANGE UP (ref 0.2–1.2)
BUN SERPL-MCNC: 51 MG/DL — HIGH (ref 7–23)
CALCIUM SERPL-MCNC: 9.3 MG/DL — SIGNIFICANT CHANGE UP (ref 8.4–10.5)
CHLORIDE SERPL-SCNC: 97 MMOL/L — SIGNIFICANT CHANGE UP (ref 96–108)
CO2 SERPL-SCNC: 20 MMOL/L — LOW (ref 22–31)
CREAT SERPL-MCNC: 1.36 MG/DL — HIGH (ref 0.5–1.3)
CULTURE RESULTS: SIGNIFICANT CHANGE UP
CULTURE RESULTS: SIGNIFICANT CHANGE UP
EGFR: 40 ML/MIN/1.73M2 — LOW
GLUCOSE BLDC GLUCOMTR-MCNC: 117 MG/DL — HIGH (ref 70–99)
GLUCOSE BLDC GLUCOMTR-MCNC: 147 MG/DL — HIGH (ref 70–99)
GLUCOSE BLDC GLUCOMTR-MCNC: 200 MG/DL — HIGH (ref 70–99)
GLUCOSE BLDC GLUCOMTR-MCNC: 201 MG/DL — HIGH (ref 70–99)
GLUCOSE SERPL-MCNC: 156 MG/DL — HIGH (ref 70–99)
HCT VFR BLD CALC: 27.4 % — LOW (ref 34.5–45)
HGB BLD-MCNC: 8.6 G/DL — LOW (ref 11.5–15.5)
MAGNESIUM SERPL-MCNC: 2.1 MG/DL — SIGNIFICANT CHANGE UP (ref 1.6–2.6)
MCHC RBC-ENTMCNC: 28.8 PG — SIGNIFICANT CHANGE UP (ref 27–34)
MCHC RBC-ENTMCNC: 31.4 GM/DL — LOW (ref 32–36)
MCV RBC AUTO: 91.6 FL — SIGNIFICANT CHANGE UP (ref 80–100)
NRBC # BLD: 0 /100 WBCS — SIGNIFICANT CHANGE UP (ref 0–0)
PHOSPHATE SERPL-MCNC: 3.3 MG/DL — SIGNIFICANT CHANGE UP (ref 2.5–4.5)
PLATELET # BLD AUTO: 181 K/UL — SIGNIFICANT CHANGE UP (ref 150–400)
POTASSIUM SERPL-MCNC: 4.2 MMOL/L — SIGNIFICANT CHANGE UP (ref 3.5–5.3)
POTASSIUM SERPL-SCNC: 4.2 MMOL/L — SIGNIFICANT CHANGE UP (ref 3.5–5.3)
PROT SERPL-MCNC: 6.3 G/DL — SIGNIFICANT CHANGE UP (ref 6–8.3)
RBC # BLD: 2.99 M/UL — LOW (ref 3.8–5.2)
RBC # FLD: 14.8 % — HIGH (ref 10.3–14.5)
SODIUM SERPL-SCNC: 131 MMOL/L — LOW (ref 135–145)
SPECIMEN SOURCE: SIGNIFICANT CHANGE UP
SPECIMEN SOURCE: SIGNIFICANT CHANGE UP
TACROLIMUS SERPL-MCNC: 4.7 NG/ML — SIGNIFICANT CHANGE UP
WBC # BLD: 5.93 K/UL — SIGNIFICANT CHANGE UP (ref 3.8–10.5)
WBC # FLD AUTO: 5.93 K/UL — SIGNIFICANT CHANGE UP (ref 3.8–10.5)

## 2024-03-16 PROCEDURE — 99233 SBSQ HOSP IP/OBS HIGH 50: CPT | Mod: FS

## 2024-03-16 RX ADMIN — TACROLIMUS 5 MILLIGRAM(S): 5 CAPSULE ORAL at 08:34

## 2024-03-16 RX ADMIN — PANTOPRAZOLE SODIUM 40 MILLIGRAM(S): 20 TABLET, DELAYED RELEASE ORAL at 05:25

## 2024-03-16 RX ADMIN — PANTOPRAZOLE SODIUM 40 MILLIGRAM(S): 20 TABLET, DELAYED RELEASE ORAL at 17:33

## 2024-03-16 RX ADMIN — TACROLIMUS 5 MILLIGRAM(S): 5 CAPSULE ORAL at 17:32

## 2024-03-16 RX ADMIN — AMLODIPINE BESYLATE 5 MILLIGRAM(S): 2.5 TABLET ORAL at 05:23

## 2024-03-16 RX ADMIN — CARVEDILOL PHOSPHATE 25 MILLIGRAM(S): 80 CAPSULE, EXTENDED RELEASE ORAL at 17:33

## 2024-03-16 RX ADMIN — BRIVARACETAM 100 MILLIGRAM(S): 25 TABLET, FILM COATED ORAL at 14:58

## 2024-03-16 RX ADMIN — Medication 1 DROP(S): at 01:09

## 2024-03-16 RX ADMIN — Medication 5 MILLILITER(S): at 11:35

## 2024-03-16 RX ADMIN — Medication 1 DROP(S): at 10:24

## 2024-03-16 RX ADMIN — Medication 5 MILLILITER(S): at 05:25

## 2024-03-16 RX ADMIN — HUMAN INSULIN 12 UNIT(S): 100 INJECTION, SUSPENSION SUBCUTANEOUS at 12:14

## 2024-03-16 RX ADMIN — Medication 5 MILLILITER(S): at 23:32

## 2024-03-16 RX ADMIN — Medication 500000 UNIT(S): at 23:32

## 2024-03-16 RX ADMIN — HUMAN INSULIN 12 UNIT(S): 100 INJECTION, SUSPENSION SUBCUTANEOUS at 23:32

## 2024-03-16 RX ADMIN — HEPARIN SODIUM 5000 UNIT(S): 5000 INJECTION INTRAVENOUS; SUBCUTANEOUS at 05:28

## 2024-03-16 RX ADMIN — HUMAN INSULIN 12 UNIT(S): 100 INJECTION, SUSPENSION SUBCUTANEOUS at 17:48

## 2024-03-16 RX ADMIN — Medication 1 DROP(S): at 21:47

## 2024-03-16 RX ADMIN — Medication 2: at 23:34

## 2024-03-16 RX ADMIN — Medication 1 DROP(S): at 14:58

## 2024-03-16 RX ADMIN — Medication 500000 UNIT(S): at 17:33

## 2024-03-16 RX ADMIN — CARVEDILOL PHOSPHATE 25 MILLIGRAM(S): 80 CAPSULE, EXTENDED RELEASE ORAL at 05:23

## 2024-03-16 RX ADMIN — MEROPENEM 100 MILLIGRAM(S): 1 INJECTION INTRAVENOUS at 05:20

## 2024-03-16 RX ADMIN — MEROPENEM 100 MILLIGRAM(S): 1 INJECTION INTRAVENOUS at 17:34

## 2024-03-16 RX ADMIN — BRIVARACETAM 100 MILLIGRAM(S): 25 TABLET, FILM COATED ORAL at 05:22

## 2024-03-16 RX ADMIN — LACOSAMIDE 200 MILLIGRAM(S): 50 TABLET ORAL at 17:33

## 2024-03-16 RX ADMIN — DOCOSANOL 1 APPLICATION(S): 100 CREAM TOPICAL at 11:35

## 2024-03-16 RX ADMIN — Medication 5 MILLILITER(S): at 17:33

## 2024-03-16 RX ADMIN — HEPARIN SODIUM 5000 UNIT(S): 5000 INJECTION INTRAVENOUS; SUBCUTANEOUS at 17:33

## 2024-03-16 RX ADMIN — HUMAN INSULIN 12 UNIT(S): 100 INJECTION, SUSPENSION SUBCUTANEOUS at 05:26

## 2024-03-16 RX ADMIN — Medication 0.2 MILLIGRAM(S): at 17:34

## 2024-03-16 RX ADMIN — Medication 5 MILLIGRAM(S): at 05:23

## 2024-03-16 RX ADMIN — BRIVARACETAM 100 MILLIGRAM(S): 25 TABLET, FILM COATED ORAL at 21:45

## 2024-03-16 RX ADMIN — LACOSAMIDE 200 MILLIGRAM(S): 50 TABLET ORAL at 05:20

## 2024-03-16 RX ADMIN — Medication 4: at 05:27

## 2024-03-16 RX ADMIN — Medication 1 DROP(S): at 17:34

## 2024-03-16 RX ADMIN — Medication 1 DROP(S): at 05:25

## 2024-03-16 RX ADMIN — Medication 1 TABLET(S): at 11:35

## 2024-03-16 RX ADMIN — Medication 500000 UNIT(S): at 11:43

## 2024-03-16 RX ADMIN — Medication 500000 UNIT(S): at 05:19

## 2024-03-16 RX ADMIN — Medication 0.2 MILLIGRAM(S): at 05:23

## 2024-03-16 NOTE — PROGRESS NOTE ADULT - SUBJECTIVE AND OBJECTIVE BOX
Chief Complaint:  Patient is a 78y old  Female who presents with a chief complaint of ICH (15 Mar 2024 17:13)      Date of service 03-16-24 @ 07:29      Interval Events:   no acute events     Hospital Medications:  acetaminophen   Oral Liquid .. 650 milliGRAM(s) Oral every 6 hours PRN  albuterol/ipratropium for Nebulization 3 milliLiter(s) Nebulizer every 6 hours PRN  amLODIPine   Tablet 5 milliGRAM(s) Oral daily  artificial  tears Solution 1 Drop(s) Both EYES every 4 hours  Biotene Dry Mouth Oral Rinse 5 milliLiter(s) Swish and Spit every 6 hours  brivaracetam Oral Solution 100 milliGRAM(s) Oral <User Schedule>  carvedilol 25 milliGRAM(s) Oral every 12 hours  cloNIDine 0.2 milliGRAM(s) Oral two times a day  docosanol 10% Cream 1 Application(s) Topical daily  heparin   Injectable 5000 Unit(s) SubCutaneous every 12 hours  insulin lispro (ADMELOG) corrective regimen sliding scale   SubCutaneous every 6 hours  insulin NPH human recombinant 12 Unit(s) SubCutaneous every 6 hours  lacosamide Solution 200 milliGRAM(s) Oral two times a day  meropenem  IVPB 1000 milliGRAM(s) IV Intermittent every 12 hours  nystatin    Suspension 160084 Unit(s) Swish and Swallow every 6 hours  pantoprazole   Suspension 40 milliGRAM(s) Oral two times a day  predniSONE   Tablet 5 milliGRAM(s) Oral daily  tacrolimus    0.5 mG/mL Suspension 5 milliGRAM(s) Oral two times a day  trimethoprim   80 mG/sulfamethoxazole 400 mG 1 Tablet(s) Oral daily        Review of Systems:  unable to obtain     PHYSICAL EXAM:   Vital Signs:  Vital Signs Last 24 Hrs  T(C): 37.1 (15 Mar 2024 23:26), Max: 37.6 (15 Mar 2024 10:01)  T(F): 98.8 (15 Mar 2024 23:26), Max: 99.7 (15 Mar 2024 10:01)  HR: 90 (16 Mar 2024 02:50) (84 - 95)  BP: 129/90 (15 Mar 2024 23:26) (111/54 - 178/94)  BP(mean): --  RR: 16 (15 Mar 2024 23:26) (16 - 20)  SpO2: 100% (16 Mar 2024 02:50) (97% - 100%)    Parameters below as of 16 Mar 2024 02:50  Patient On (Oxygen Delivery Method): ventilator      Daily     Daily       PHYSICAL EXAM:     GENERAL:  Appears stated age, well-groomed, well-nourished, no distress  HEENT:  NC/AT,  conjunctivae anicteric, clear and pink,   NECK: supple, trachea midline  CHEST:  Full & symmetric excursion, no increased effort, breath sounds clear  HEART:  Regular rhythm, no JVD  ABDOMEN:  Soft, non-tender, non-distended, normoactive bowel sounds,  no masses , no hepatosplenomegaly  EXTREMITIES:  no cyanosis,clubbing or edema  SKIN:  No rash, erythema, or, ecchymoses, no jaundice  NEURO:  Alert, non-focal, no asterixis  PSYCH: Appropriate affect, oriented to place and time  RECTAL: Deferred      LABS Personally reviewed by me:                        8.6    5.93  )-----------( 181      ( 16 Mar 2024 06:59 )             27.4     Mean Cell Volume: 91.6 fl (03-16-24 @ 06:59)    03-15    131<L>  |  99  |  50<H>  ----------------------------<  118<H>  4.5   |  21<L>  |  1.46<H>    Ca    9.5      15 Mar 2024 06:06  Phos  2.7     03-15  Mg     2.1     03-15          Urinalysis Basic - ( 15 Mar 2024 06:06 )    Color: x / Appearance: x / SG: x / pH: x  Gluc: 118 mg/dL / Ketone: x  / Bili: x / Urobili: x   Blood: x / Protein: x / Nitrite: x   Leuk Esterase: x / RBC: x / WBC x   Sq Epi: x / Non Sq Epi: x / Bacteria: x                              8.6    5.93  )-----------( 181      ( 16 Mar 2024 06:59 )             27.4                         8.2    9.68  )-----------( 177      ( 15 Mar 2024 06:06 )             26.1                         7.7    7.07  )-----------( 146      ( 14 Mar 2024 06:49 )             24.5                         7.3    8.27  )-----------( 152      ( 13 Mar 2024 09:53 )             24.0       Imaging personally reviewed by me:

## 2024-03-16 NOTE — PROGRESS NOTE ADULT - PROBLEM SELECTOR PLAN 2
- S/p trach 3/8 by surgery by Dr.Eric Mayorga   - Tracheostomy Sutures removed 3/13  - Currently remains on Mechanical Ventilation   - Attempt PS Trials as tolerated   - Vent Settings: PRVC 14/450/+5/30%  - Continue airway clearance; Duonebs q6h + Hypersal 3 % q6h ATC - S/p trach 3/8 by surgery by Dr.Eric Mayorga   - Tracheostomy Sutures removed 3/13  - Attempt PS Trials as tolerated   - Vent Settings: PRVC 14/450/+5/30%  - Cheynes-clay breathing pattern observed however toleating weaning trials  - Continue airway clearance; Duonebs q6h PRN

## 2024-03-16 NOTE — PROGRESS NOTE ADULT - NS ATTEND AMEND GEN_ALL_CORE FT
Pt is a 78F with MHx ESRD s/p renal transplant (2019, now off HD), left AKA, BK viremia, HTN, breast ca s/p lumpectomy (completed Tamoxifen 03/2023), DVT (off Eliquis), HLD, gout presenting with left ICH (ICH score 4) likely 2/2 amyloid angiopathy s/p left craniectomy(discarded) and evacuation POD9 as well as EVD placement and removal. She is s/p trach/peg 3/9/24 and [facility] transfer.      Pt initially p/w left ICH  likely 2/2 amyloid angiopathy s/p left craniectomy(discarded) and evacuation POD9 as well as EVD placement and removal. Head CT 3/12 stable. Pt remains obtunded, occasionally opens eyes to sternal rub, withdraws on LE but otherwise shows no purposeful communication or active consciousness and is functionally dependent on all ADLs. Metabolic encephalopathy possibly superimposed i/s/o active infection. As per family had been more alert earlier in admission. Remains obtunded, some eye opening with sternal rub. Will CTM closely. Pt further with seizures now controlled on briviact 100 mg TID for seizures, Vimpat 200 mg BID. Spot EEG completed AM 3/15, no evidence of epileptiform discharges.     Pt with acute hypercapnic and hypoxemic respiratory failure in the setting of CVA. Cheyne Wesley breathing pattern observed since admission to [facility]. Tolerating PS ATC. Trial TC during the day as tolerated. Duonebs q6h + Hypersal 3 % q6h ATC. Wean O2 supplementation for goal O2 saturation 90-95%. Trach care and suctioning as per [facility] team. Oral hygiene and aspiration precautions.     Pt with HTN (SBP goal: ) now on clonidine 0.2mg BID + Coreg 25 mg BID + Norvasc 5mg. TTE LVEF 70-75%, Grade II diastolic dysfcn, septal bulge noted which is not significant as per Cardiology. No current acute cardiac issues. Pt with known hx of bilateral above the knee DVT. s/p IVC filter. HSQ q12h restarted, okay for neurosx standpoint.     Pt with hx renal transplant (2019), AVF remains in the left upper extremity. Will c/w home prednisone 5mg with bactrim ppx and tacrolimus 5mg BID per transplant nephro recs, f/u levels. Pt further with hypervolemic hyponatremia. Continue to hold free H2O. Lasix x 1 yesterday, monitor BMP daily. No change today. Will increase diuresis.     Hospital course further c/b oropharyngeal dysphagia s/p PEG feeds. Tolerating at goal rate. H. pylori infection (+). Protonix in place. H. pylori treatment can be held for 1 month until acute issues have resolved as per GI team. Pt with DMII with hyperglycemic episodes this week.  Goal euglycemia (-180), A1C: 5.4%. c/w NPH with ISS/BGFS monitoring. Adjusted regimen.    Pt with UTI with UA culture with Kleb pna >100K + Enterococcus faecium (VRE), 3/10 culture with 10-49K. Switched from Cefepime to Meropenem. Holding on treating E. faecium given CFU, defervescing. Clinically better today. Will continue on current regimen.     Dispo pending medical optimization. Pt full code. DVT ppx HSQ. Will continue to update family throughout hospitalization.

## 2024-03-16 NOTE — PROGRESS NOTE ADULT - ASSESSMENT
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 S/p craniectomy and evacuation course c/b seizures, last seen on 3/7/24 EEG currently on AEDS. Now s/p trach/peg 3/9/24. During EGD/PEG found to have superficial gastric ulcers. H. Pylori Stool Ag positive, for which GI is consulted.     1. H. Pylori   recc Bismuth quadruple therapy x 14 days post discharge     2. Resp failure s/p trach/PEG    3. IPH s/p craniotomy       I had a prolonged conversation with the patient regarding the hospital course, differential diagnosis, results of diagnostic tests this far, and therapeutic modalities available. Plan of care discussed with the patient after the evaluation. Patient expresses a clear understanding of the plan of care.    Jose Antonio Sepulveda D.O.  Gastroenterology and Hepatology  [address] [place] [address], [address]  [place], [place]  Office: [phone]        The patient is a 79 yo F w/ PMHx ESRD s/p renal xplant (2019) c/b DGF off HD, L AKA, BK viremia on leflunomide, HTN, BreastCa s/p lumpectomy (on tamoxifen), DVT off eliquis, presented with L IPH. S/p craniectomy and evacuation course c/b seizures, last seen on 3/7/24 EEG currently on AEDS. Now s/p trach/peg 3/9/24. During EGD/PEG found to have superficial gastric ulcers. H. Pylori Stool Ag positive, for which GI is consulted.     1. H. Pylori   recc Bismuth quadruple therapy x 14 days post discharge     2. Resp failure s/p trach/PEG    3. IPH s/p craniotomy     4. PUD   seen on recent EGD  daily PPI      I had a prolonged conversation with the patient regarding the hospital course, differential diagnosis, results of diagnostic tests this far, and therapeutic modalities available. Plan of care discussed with the patient after the evaluation. Patient expresses a clear understanding of the plan of care.    Jose Antonio Sepulveda D.O.  Gastroenterology and Hepatology  [address] [place] [address], [address]  [place], [place]  Office: [phone]

## 2024-03-16 NOTE — PROGRESS NOTE ADULT - PROBLEM SELECTOR PLAN 10
- PCP ppx: bactrim  - DVT ppx: heparin subq, s/p IVCF 3/3  - GI ppx: protonix - PCP ppx: bactrim  - DVT ppx: heparin subq, s/p IVCF 3/3  - GI ppx: protonix BID

## 2024-03-16 NOTE — PROGRESS NOTE ADULT - SUBJECTIVE AND OBJECTIVE BOX
Patient is a 78y old  Female who presents with a chief complaint of ICH (16 Mar 2024 07:29)      Interval Events:    REVIEW OF SYSTEMS:  [ ] Positive  [ ] All other systems negative  [ ] Unable to assess ROS because ________    Vital Signs Last 24 Hrs  T(C): 37.1 (03-15-24 @ 23:26), Max: 37.6 (03-15-24 @ 10:01)  T(F): 98.8 (03-15-24 @ 23:26), Max: 99.7 (03-15-24 @ 10:01)  HR: 90 (03-16-24 @ 02:50) (84 - 95)  BP: 129/90 (03-15-24 @ 23:26) (111/54 - 178/94)  RR: 16 (03-15-24 @ 23:26) (16 - 20)  SpO2: 100% (03-16-24 @ 02:50) (97% - 100%)    PHYSICAL EXAM:  HEENT:   [ ]Tracheostomy:  [ ]Pupils equal  [ ]No oral lesions  [ ]Abnormal    SKIN  [ ]No Rash  [ ] Abnormal  [ ] pressure    CARDIAC  [ ]Regular  [ ]Abnormal    PULMONARY  [ ]Bilateral Clear Breath Sounds  [ ]Normal Excursion  [ ]Abnormal    GI  [ ]PEG      [ ] +BS		              [ ]Soft, nondistended, nontender	  [ ]Abnormal    MUSCULOSKELETAL                                   [ ]Bedbound                 [ ]Abnormal    [ ]Ambulatory/OOB to chair                           EXTREMITIES                                         [ ]Normal  [ ]Edema                           NEUROLOGIC  [ ] Normal, non focal  [ ] Focal findings:    PSYCHIATRIC  [ ]Alert and appropriate  [ ] Sedated	 [ ]Agitated    :  Gardner: [ ] Yes, if yes: Date of Placement:                   [  ] No    LINES: Central Lines [ ] Yes, if yes: Date of Placement                                     [  ] No    HOSPITAL MEDICATIONS:  MEDICATIONS  (STANDING):  amLODIPine   Tablet 5 milliGRAM(s) Oral daily  artificial  tears Solution 1 Drop(s) Both EYES every 4 hours  Biotene Dry Mouth Oral Rinse 5 milliLiter(s) Swish and Spit every 6 hours  brivaracetam Oral Solution 100 milliGRAM(s) Oral <User Schedule>  carvedilol 25 milliGRAM(s) Oral every 12 hours  cloNIDine 0.2 milliGRAM(s) Oral two times a day  docosanol 10% Cream 1 Application(s) Topical daily  heparin   Injectable 5000 Unit(s) SubCutaneous every 12 hours  insulin lispro (ADMELOG) corrective regimen sliding scale   SubCutaneous every 6 hours  insulin NPH human recombinant 12 Unit(s) SubCutaneous every 6 hours  lacosamide Solution 200 milliGRAM(s) Oral two times a day  meropenem  IVPB 1000 milliGRAM(s) IV Intermittent every 12 hours  nystatin    Suspension 498159 Unit(s) Swish and Swallow every 6 hours  pantoprazole   Suspension 40 milliGRAM(s) Oral two times a day  predniSONE   Tablet 5 milliGRAM(s) Oral daily  tacrolimus    0.5 mG/mL Suspension 5 milliGRAM(s) Oral two times a day  trimethoprim   80 mG/sulfamethoxazole 400 mG 1 Tablet(s) Oral daily    MEDICATIONS  (PRN):  acetaminophen   Oral Liquid .. 650 milliGRAM(s) Oral every 6 hours PRN Temp greater or equal to 38C (100.4F), Mild Pain (1 - 3)  albuterol/ipratropium for Nebulization 3 milliLiter(s) Nebulizer every 6 hours PRN Shortness of Breath and/or Wheezing      LABS:                        8.6    5.93  )-----------( 181      ( 16 Mar 2024 06:59 )             27.4     03-15    131<L>  |  99  |  50<H>  ----------------------------<  118<H>  4.5   |  21<L>  |  1.46<H>    Ca    9.5      15 Mar 2024 06:06  Phos  2.7     03-15  Mg     2.1     03-15        Urinalysis Basic - ( 15 Mar 2024 06:06 )    Color: x / Appearance: x / SG: x / pH: x  Gluc: 118 mg/dL / Ketone: x  / Bili: x / Urobili: x   Blood: x / Protein: x / Nitrite: x   Leuk Esterase: x / RBC: x / WBC x   Sq Epi: x / Non Sq Epi: x / Bacteria: x          CAPILLARY BLOOD GLUCOSE    MICROBIOLOGY:     RADIOLOGY:  [ ] Reviewed and interpreted by me    Mode: AC/ CMV (Assist Control/ Continuous Mandatory Ventilation)  RR (machine): 14  TV (machine): 450  FiO2: 30  PEEP: 5  ITime: 1  MAP: 10  PIP: 21   Patient is a 78y old  Female who presents with a chief complaint of ICH (16 Mar 2024 07:29)      Interval Events: no events reported over night.     REVIEW OF SYSTEMS:  [ ] Positive  [ ] All other systems negative  [X] Unable to assess ROS because __Obtunded    Vital Signs Last 24 Hrs  T(C): 37.1 (03-15-24 @ 23:26), Max: 37.6 (03-15-24 @ 10:01)  T(F): 98.8 (03-15-24 @ 23:26), Max: 99.7 (03-15-24 @ 10:01)  HR: 90 (03-16-24 @ 02:50) (84 - 95)  BP: 129/90 (03-15-24 @ 23:26) (111/54 - 178/94)  RR: 16 (03-15-24 @ 23:26) (16 - 20)  SpO2: 100% (03-16-24 @ 02:50) (97% - 100%)      PHYSICAL EXAM:  HEENT:   [X] Tracheostomy:  #7 Cuffed Portex  [X] Pupils 4mm B/L with mild reaction to light; not tracking  [ ] No oral lesions  [ ] Abnormal    SKIN  [ ] No Rash  [ ] Abnormal  [X] pressure: Stage 2 sacral wound    CARDIAC  [X] Regular  [ ] Abnormal    PULMONARY  [X] Bilateral Clear Breath Sounds  [ ] Normal Excursion  [ ] Abnormal    GI  [X] PEG      [X] +BS		              [X] Soft, nondistended, nontender	  [ ] Abnormal    MUSCULOSKELETAL                                   [X] Bedbound                 [ ] Abnormal    [ ] Ambulatory/OOB to chair                           EXTREMITIES                                         [ ] Normal  [X] Edema  1+ RUE edema                           NEUROLOGIC  [ ] Normal, non focal  [X] Focal findings:  Obtunded; eyes closed during exam; not following commands; not active movement observed; withdraws only on RLE    PSYCHIATRIC  [X] Obtunded  [ ] Sedated	 [ ]Agitated    :  Gardner: [ ] Yes, if yes: Date of Placement:                   [X] No    LINES: Central Lines [ ] Yes, if yes: Date of Placement                                     [X] No      HOSPITAL MEDICATIONS:  MEDICATIONS  (STANDING):  amLODIPine   Tablet 5 milliGRAM(s) Oral daily  artificial  tears Solution 1 Drop(s) Both EYES every 4 hours  Biotene Dry Mouth Oral Rinse 5 milliLiter(s) Swish and Spit every 6 hours  brivaracetam Oral Solution 100 milliGRAM(s) Oral <User Schedule>  carvedilol 25 milliGRAM(s) Oral every 12 hours  cloNIDine 0.2 milliGRAM(s) Oral two times a day  docosanol 10% Cream 1 Application(s) Topical daily  heparin   Injectable 5000 Unit(s) SubCutaneous every 12 hours  insulin lispro (ADMELOG) corrective regimen sliding scale   SubCutaneous every 6 hours  insulin NPH human recombinant 12 Unit(s) SubCutaneous every 6 hours  lacosamide Solution 200 milliGRAM(s) Oral two times a day  meropenem  IVPB 1000 milliGRAM(s) IV Intermittent every 12 hours  nystatin    Suspension [id number] Unit(s) Swish and Swallow every 6 hours  pantoprazole   Suspension 40 milliGRAM(s) Oral two times a day  predniSONE   Tablet 5 milliGRAM(s) Oral daily  tacrolimus    0.5 mG/mL Suspension 5 milliGRAM(s) Oral two times a day  trimethoprim   80 mG/sulfamethoxazole 400 mG 1 Tablet(s) Oral daily    MEDICATIONS  (PRN):  acetaminophen   Oral Liquid .. 650 milliGRAM(s) Oral every 6 hours PRN Temp greater or equal to 38C (100.4F), Mild Pain (1 - 3)  albuterol/ipratropium for Nebulization 3 milliLiter(s) Nebulizer every 6 hours PRN Shortness of Breath and/or Wheezing      LABS:                        8.6    5.93  )-----------( 181      ( 16 Mar 2024 06:59 )             27.4     03-15    131<L>  |  99  |  50<H>  ----------------------------<  118<H>  4.5   |  21<L>  |  1.46<H>    Ca    9.5      15 Mar 2024 06:06  Phos  2.7     03-15  Mg     2.1     03-15        Urinalysis Basic - ( 15 Mar 2024 06:06 )    Color: x / Appearance: x / SG: x / pH: x  Gluc: 118 mg/dL / Ketone: x  / Bili: x / Urobili: x   Blood: x / Protein: x / Nitrite: x   Leuk Esterase: x / RBC: x / WBC x   Sq Epi: x / Non Sq Epi: x / Bacteria: x          CAPILLARY BLOOD GLUCOSE    MICROBIOLOGY:     RADIOLOGY:  [ ] Reviewed and interpreted by me    Mode: AC/ CMV (Assist Control/ Continuous Mandatory Ventilation)  RR (machine): 14  TV (machine): 450  FiO2: 30  PEEP: 5  ITime: 1  MAP: 10  PIP: 21   Patient is a 78y old  Female who presents with a chief complaint of ICH (16 Mar 2024 07:29)      Interval Events: no events reported over night.     REVIEW OF SYSTEMS:  [ ] Positive  [ ] All other systems negative  [X] Unable to assess ROS because __Obtunded    Vital Signs Last 24 Hrs  T(C): 37.1 (03-15-24 @ 23:26), Max: 37.6 (03-15-24 @ 10:01)  T(F): 98.8 (03-15-24 @ 23:26), Max: 99.7 (03-15-24 @ 10:01)  HR: 90 (03-16-24 @ 02:50) (84 - 95)  BP: 129/90 (03-15-24 @ 23:26) (111/54 - 178/94)  RR: 16 (03-15-24 @ 23:26) (16 - 20)  SpO2: 100% (03-16-24 @ 02:50) (97% - 100%)      PHYSICAL EXAM:  HEENT:   [X] Tracheostomy:  #7 Cuffed Portex  [X] Pupils 4mm B/L with mild reaction to light; not tracking  [X] No oral lesions  [X] Abnormal:  missing upper dentition, partially absent right lower dentition; tongue not swollen but partially protrudes through mouth    SKIN  [ ] No Rash  [ ] Abnormal  [X] pressure: Stage 2 sacral wound    CARDIAC  [X] Regular  [ ] Abnormal    PULMONARY  [X] Bilateral Clear Breath Sounds  [ ] Normal Excursion  [ ] Abnormal    GI  [X] PEG      [X] +BS		              [X] Soft, nondistended, nontender	  [ ] Abnormal    MUSCULOSKELETAL                                   [X] Bedbound                 [X] Abnormal:  Left  BKA, appears clean without wounds  [ ] Ambulatory/OOB to chair                           EXTREMITIES                                         [ ] Normal  [X] Edema  1+ RUE edema                           NEUROLOGIC  [ ] Normal, non focal  [X] Focal findings:  Obtunded; eyes closed during exam; not following commands; not active movement observed; withdraws on RLE, trigger withdrawal of RUE    PSYCHIATRIC  [X] Obtunded  [ ] Sedated	 [ ]Agitated    :  Gardner: [ ] Yes, if yes: Date of Placement:                   [X] No    LINES: Central Lines [ ] Yes, if yes: Date of Placement                                     [X] No      HOSPITAL MEDICATIONS:  MEDICATIONS  (STANDING):  amLODIPine   Tablet 5 milliGRAM(s) Oral daily  artificial  tears Solution 1 Drop(s) Both EYES every 4 hours  Biotene Dry Mouth Oral Rinse 5 milliLiter(s) Swish and Spit every 6 hours  brivaracetam Oral Solution 100 milliGRAM(s) Oral <User Schedule>  carvedilol 25 milliGRAM(s) Oral every 12 hours  cloNIDine 0.2 milliGRAM(s) Oral two times a day  docosanol 10% Cream 1 Application(s) Topical daily  heparin   Injectable 5000 Unit(s) SubCutaneous every 12 hours  insulin lispro (ADMELOG) corrective regimen sliding scale   SubCutaneous every 6 hours  insulin NPH human recombinant 12 Unit(s) SubCutaneous every 6 hours  lacosamide Solution 200 milliGRAM(s) Oral two times a day  meropenem  IVPB 1000 milliGRAM(s) IV Intermittent every 12 hours  nystatin    Suspension [id number] Unit(s) Swish and Swallow every 6 hours  pantoprazole   Suspension 40 milliGRAM(s) Oral two times a day  predniSONE   Tablet 5 milliGRAM(s) Oral daily  tacrolimus    0.5 mG/mL Suspension 5 milliGRAM(s) Oral two times a day  trimethoprim   80 mG/sulfamethoxazole 400 mG 1 Tablet(s) Oral daily    MEDICATIONS  (PRN):  acetaminophen   Oral Liquid .. 650 milliGRAM(s) Oral every 6 hours PRN Temp greater or equal to 38C (100.4F), Mild Pain (1 - 3)  albuterol/ipratropium for Nebulization 3 milliLiter(s) Nebulizer every 6 hours PRN Shortness of Breath and/or Wheezing      LABS:                        8.6    5.93  )-----------( 181      ( 16 Mar 2024 06:59 )             27.4     03-16    131<L>  |  97  |  51<H>  ----------------------------<  156<H>  4.2   |  20<L>  |  1.36<H>    Ca    9.3      16 Mar 2024 07:00  Phos  3.3     03-16  Mg     2.1     03-16    TPro  6.3  /  Alb  2.8<L>  /  TBili  0.2  /  DBili  x   /  AST  39  /  ALT  39  /  AlkPhos  79  03-16            CAPILLARY BLOOD GLUCOSE    MICROBIOLOGY:     RADIOLOGY:  [ ] Reviewed and interpreted by me    Mode: AC/ CMV (Assist Control/ Continuous Mandatory Ventilation)  RR (machine): 14  TV (machine): 450  FiO2: 30  PEEP: 5  ITime: 1  MAP: 10  PIP: 21   Patient is a 78y old  Female who presents with a chief complaint of ICH (16 Mar 2024 07:29)      Interval Events: no events reported over night.     REVIEW OF SYSTEMS:  [ ] Positive  [ ] All other systems negative  [X] Unable to assess ROS because __Obtunded    Vital Signs Last 24 Hrs  T(C): 37.1 (03-15-24 @ 23:26), Max: 37.6 (03-15-24 @ 10:01)  T(F): 98.8 (03-15-24 @ 23:26), Max: 99.7 (03-15-24 @ 10:01)  HR: 90 (03-16-24 @ 02:50) (84 - 95)  BP: 129/90 (03-15-24 @ 23:26) (111/54 - 178/94)  RR: 16 (03-15-24 @ 23:26) (16 - 20)  SpO2: 100% (03-16-24 @ 02:50) (97% - 100%)    PHYSICAL EXAM:  HEENT:   [X] Tracheostomy:  #7 Cuffed Portex  [X] Pupils 4mm B/L with mild reaction to light; not tracking  [X] No oral lesions  [X] Abnormal:  missing upper dentition, partially absent right lower dentition; tongue not swollen but partially protrudes through mouth    SKIN  [ ] No Rash  [ ] Abnormal  [X] pressure: Stage 2 sacral wound    CARDIAC  [X] Regular  [ ] Abnormal    PULMONARY  [X] Bilateral Clear Breath Sounds  [ ] Normal Excursion  [ ] Abnormal    GI  [X] PEG      [X] +BS		              [X] Soft, nondistended, nontender	  [ ] Abnormal    MUSCULOSKELETAL                                   [X] Bedbound                 [X] Abnormal:  Left  BKA, appears clean without wounds; +LUE Fistula  [ ] Ambulatory/OOB to chair                           EXTREMITIES                                         [ ] Normal  [X] Edema  1+ RUE edema                           NEUROLOGIC  [ ] Normal, non focal  [X] Focal findings:  Obtunded; eyes closed during exam; not following commands; not active movement observed; withdraws on RLE, trigger withdrawal of RUE    PSYCHIATRIC  [X] Obtunded  [ ] Sedated	 [ ]Agitated    :  Gardner: [ ] Yes, if yes: Date of Placement:                   [X] No    LINES: Central Lines [ ] Yes, if yes: Date of Placement                                     [X] No      HOSPITAL MEDICATIONS:  MEDICATIONS  (STANDING):  amLODIPine   Tablet 5 milliGRAM(s) Oral daily  artificial  tears Solution 1 Drop(s) Both EYES every 4 hours  Biotene Dry Mouth Oral Rinse 5 milliLiter(s) Swish and Spit every 6 hours  brivaracetam Oral Solution 100 milliGRAM(s) Oral <User Schedule>  carvedilol 25 milliGRAM(s) Oral every 12 hours  cloNIDine 0.2 milliGRAM(s) Oral two times a day  docosanol 10% Cream 1 Application(s) Topical daily  heparin   Injectable 5000 Unit(s) SubCutaneous every 12 hours  insulin lispro (ADMELOG) corrective regimen sliding scale   SubCutaneous every 6 hours  insulin NPH human recombinant 12 Unit(s) SubCutaneous every 6 hours  lacosamide Solution 200 milliGRAM(s) Oral two times a day  meropenem  IVPB 1000 milliGRAM(s) IV Intermittent every 12 hours  nystatin    Suspension [id number] Unit(s) Swish and Swallow every 6 hours  pantoprazole   Suspension 40 milliGRAM(s) Oral two times a day  predniSONE   Tablet 5 milliGRAM(s) Oral daily  tacrolimus    0.5 mG/mL Suspension 5 milliGRAM(s) Oral two times a day  trimethoprim   80 mG/sulfamethoxazole 400 mG 1 Tablet(s) Oral daily    MEDICATIONS  (PRN):  acetaminophen   Oral Liquid .. 650 milliGRAM(s) Oral every 6 hours PRN Temp greater or equal to 38C (100.4F), Mild Pain (1 - 3)  albuterol/ipratropium for Nebulization 3 milliLiter(s) Nebulizer every 6 hours PRN Shortness of Breath and/or Wheezing      LABS:                        8.6    5.93  )-----------( 181      ( 16 Mar 2024 06:59 )             27.4     03-16    131<L>  |  97  |  51<H>  ----------------------------<  156<H>  4.2   |  20<L>  |  1.36<H>    Ca    9.3      16 Mar 2024 07:00  Phos  3.3     03-16  Mg     2.1     03-16    TPro  6.3  /  Alb  2.8<L>  /  TBili  0.2  /  DBili  x   /  AST  39  /  ALT  39  /  AlkPhos  79  03-16            CAPILLARY BLOOD GLUCOSE    MICROBIOLOGY:     RADIOLOGY:  [ ] Reviewed and interpreted by me    Mode: AC/ CMV (Assist Control/ Continuous Mandatory Ventilation)  RR (machine): 14  TV (machine): 450  FiO2: 30  PEEP: 5  ITime: 1  MAP: 10  PIP: 21

## 2024-03-16 NOTE — PROGRESS NOTE ADULT - PROBLEM SELECTOR PLAN 3
- S/p EEG w/ seizures last seen 3/7  - Briviact 100 mg TID ( Renew 3/14), Vimpat 200 mg BID (Renew 4/6)  - 3/15 EEG: Negative for seizures - S/p EEG w/ seizures last seen 3/7  - Briviact 100 mg TID and Vimpat 200 mg BID (Renew 4/6)  - 3/15 EEG: Negative for seizures

## 2024-03-16 NOTE — PROGRESS NOTE ADULT - ASSESSMENT
77 yo F with PMHx ESRD s/p renal transplant (2019, now off HD), left AKA, BK viremia, HTN, breast ca s/p lumpectomy (completed Tamoxifen 03/2023), DVT (off Eliquis), HLD, gout presenting 3/1 with left ICH (ICH score 4) likely 2/2 amyloid angiopathy s/p left craniectomy(discarded) and evacuation as well as EVD placement and removal (3/7). She is s/p trach/peg 3/8/24.  Febrile 3/10 with + UA, blood/sputum culture NGTD.  Treatment for UTI initiated with ceftriaxone, eventually broadened to cefepime.  Transferred to [facility] 3/11 for continued management.  Pt arrived from NSCU with minimal mental status with only response of spontaneous eye opening and withdrawal on RLE.  Urine culture grew positive for klebsiella and enterococcus, cefepime d/c'd meropenem and vanc started 3/12.  Will continue with airway clearance management and wean from ventilator as tolerated.      3/13: Patient febrile again overnight T.max 101.5. Blood and Urine cxs sent yesterday ( Results pending). CXR 3/10 no focal consolidations. Patients rectal tube was dislodged overnight, no reports of diarrhea this morning will continue to monitor stooling and possible need to send C.Diff if reoccurs. Currently remains on Meropenem and Vancomycin dcd as patient growing VRE from urine cx on 3/10. Will await repeat urine cx studies to determine if will require additional coverage with Linezolid.  BGMs running high will increase Humulin to 12 units q 6 hrs. .  3/14:  Patient tolerated PS 10/5 for almost hours yesterday.  Will continue PS trials as tolerated.  Free water discontinued due to decreasing sodium levels.  EEG requested evaluate for sub-clinical seizures given fixed gaze.  3/15:  Patient tolerated continuous PS 10/5 over night without issue.  Will progress PS as tolerated.  Spot EEG obtained today, negative for seizure activity.  Sodium remains low, continuing to monitor off free water, PEG feeds were changed yesterday evening.  Lasix 40mg IV given once.    77 yo F with PMHx ESRD s/p renal transplant (2019, now off HD), left AKA, BK viremia, HTN, breast ca s/p lumpectomy (completed Tamoxifen 03/2023), DVT (off Eliquis), HLD, gout presenting 3/1 with left ICH (ICH score 4) likely 2/2 amyloid angiopathy s/p left craniectomy(discarded) and evacuation as well as EVD placement and removal (3/7). She is s/p trach/peg 3/8/24.  Febrile 3/10 with + UA, blood/sputum culture NGTD.  Treatment for UTI initiated with ceftriaxone, eventually broadened to cefepime.  Transferred to R[facility] 3/11 for continued management.  Pt arrived from NSCU with minimal mental status with only response of spontaneous eye opening and withdrawal on RLE.  Urine culture grew positive for klebsiella and enterococcus, cefepime d/c'd meropenem and vanc started 3/12.  Will continue with airway clearance management and wean from ventilator as tolerated.      3/13: Patient febrile again overnight T.max 101.5. Blood and Urine cxs sent yesterday ( Results pending). CXR 3/10 no focal consolidations. Patients rectal tube was dislodged overnight, no reports of diarrhea this morning will continue to monitor stooling and possible need to send C.Diff if reoccurs. Currently remains on Meropenem and Vancomycin dcd as patient growing VRE from urine cx on 3/10. Will await repeat urine cx studies to determine if will require additional coverage with Linezolid.  BGMs running high will increase Humulin to 12 units q 6 hrs. .  3/14:  Patient tolerated PS 10/5 for almost hours yesterday.  Will continue PS trials as tolerated.  Free water discontinued due to decreasing sodium levels.  EEG requested evaluate for sub-clinical seizures given fixed gaze.  3/15:  Patient tolerated continuous PS 10/5 over night without issue.  Will progress PS as tolerated.  Spot EEG obtained today, negative for seizure activity.  Sodium remains low, continuing to monitor off free water, PEG feeds were changed yesterday evening.  Lasix 40mg IV given once.   3/16:     77 yo F with PMHx ESRD s/p renal transplant (2019, now off HD), left AKA, BK viremia, HTN, breast ca s/p lumpectomy (completed Tamoxifen 03/2023), DVT (off Eliquis), HLD, gout presenting 3/1 with left ICH (ICH score 4) likely 2/2 amyloid angiopathy s/p left craniectomy(discarded) and evacuation as well as EVD placement and removal (3/7). She is s/p trach/peg 3/8/24.  Febrile 3/10 with + UA, blood/sputum culture NGTD.  Treatment for UTI initiated with ceftriaxone, eventually broadened to cefepime.  Transferred to RCU 3/11 for continued management.  Pt arrived from NSCU with minimal mental status with only response of spontaneous eye opening and withdrawal on RLE.  Urine culture positive for klebsiella.  Treated for 7 days with Meropenem 3/12 -->3/19.     3/13: Patient febrile again overnight T.max 101.5. Blood and Urine cxs sent yesterday ( Results pending). CXR 3/10 no focal consolidations. Patients rectal tube was dislodged overnight, no reports of diarrhea this morning will continue to monitor stooling and possible need to send C.Diff if reoccurs. Currently remains on Meropenem and Vancomycin dcd as patient growing VRE from urine cx on 3/10. Will await repeat urine cx studies to determine if will require additional coverage with Linezolid.  BGMs running high will increase Humulin to 12 units q 6 hrs. .  3/14:  Patient tolerated PS 10/5 for almost hours yesterday.  Will continue PS trials as tolerated.  Free water discontinued due to decreasing sodium levels.  EEG requested evaluate for sub-clinical seizures given fixed gaze.  3/15:  Patient tolerated continuous PS 10/5 over night without issue.  Will progress PS as tolerated.  Spot EEG obtained today, negative for seizure activity.  Sodium remains low, continuing to monitor off free water, PEG feeds were changed yesterday evening.  Lasix 40mg IV given once.   3/16:  Patient placed back on full support due to persistent apnea episodes last night.  Will re-attempt PS today.  Sodium remains at 131, Cr stable.  Will continue to monitor.

## 2024-03-16 NOTE — PROGRESS NOTE ADULT - SUBJECTIVE AND OBJECTIVE BOX
DATE OF SERVICE: 03-16-24 @ 09:59    Patient is a 78y old  Female who presents with a chief complaint of ICH (16 Mar 2024 07:33)      INTERVAL HISTORY: doing well    REVIEW OF SYSTEMS:  CONSTITUTIONAL: No weakness  EYES/ENT: No visual changes;  No throat pain   NECK: No pain or stiffness  RESPIRATORY: No cough, wheezing; No shortness of breath  CARDIOVASCULAR: No chest pain or palpitations  GASTROINTESTINAL: No abdominal  pain. No nausea, vomiting, or hematemesis  GENITOURINARY: No dysuria, frequency or hematuria  NEUROLOGICAL: No stroke like symptoms  SKIN: No rashes    	  MEDICATIONS:  amLODIPine   Tablet 5 milliGRAM(s) Oral daily  carvedilol 25 milliGRAM(s) Oral every 12 hours  cloNIDine 0.2 milliGRAM(s) Oral two times a day        PHYSICAL EXAM:  T(C): 36.7 (03-16-24 @ 05:20), Max: 37.6 (03-15-24 @ 10:01)  HR: 93 (03-16-24 @ 08:25) (84 - 95)  BP: 116/73 (03-16-24 @ 05:20) (111/54 - 178/94)  RR: 19 (03-16-24 @ 05:20) (16 - 20)  SpO2: 100% (03-16-24 @ 08:25) (97% - 100%)  Wt(kg): --  I&O's Summary    15 Mar 2024 07:01  -  16 Mar 2024 07:00  --------------------------------------------------------  IN: 1080 mL / OUT: 2700 mL / NET: -1620 mL          Appearance: In no distress	  HEENT:    PERRL, EOMI	  Cardiovascular:  S1 S2, No JVD  Respiratory: Lungs clear to auscultation	  Gastrointestinal:  Soft, Non-tender, + BS	  Vascularature:  No edema of LE  Psychiatric: Appropriate affect   Neuro: no acute focal deficits                               8.6    5.93  )-----------( 181      ( 16 Mar 2024 06:59 )             27.4     03-16    131<L>  |  97  |  51<H>  ----------------------------<  156<H>  4.2   |  20<L>  |  1.36<H>    Ca    9.3      16 Mar 2024 07:00  Phos  3.3     03-16  Mg     2.1     03-16    TPro  6.3  /  Alb  2.8<L>  /  TBili  0.2  /  DBili  x   /  AST  39  /  ALT  39  /  AlkPhos  79  03-16        Labs personally reviewed      ASSESSMENT/PLAN: 	   78F Hx ESRD s/p renal xplant c/b DGF off HD, L AKA, 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    1. Abnormal Echo --  Septal bulge noted measures 2.2cm without obstruction.   -- Given no intracavitary obstruction no intervention at this time  - No Myxoma seen on this echo or echo in 2019, ? if hypermobile interatrial septum was confused for on prior imaging     2. HTN - uncontrolled, needs improvement   Continue Hydralazine 100 mg Q8H, clonidine 0.1 mg TID, Coreg 25 mg BID   - consider amlodipine 5mg and titrate up as needed for better b/p measurements     3. 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 DVT PPX - HSQ when safe          RANDY Lopez DO [facility]  Cardiovascular Medicine  [address], [address]  Available through call or text on [employer]  Office: [phone]

## 2024-03-17 LAB
ALBUMIN SERPL ELPH-MCNC: 3.3 G/DL — SIGNIFICANT CHANGE UP (ref 3.3–5)
ALP SERPL-CCNC: 101 U/L — SIGNIFICANT CHANGE UP (ref 40–120)
ALT FLD-CCNC: 44 U/L — SIGNIFICANT CHANGE UP (ref 10–45)
ANION GAP SERPL CALC-SCNC: 15 MMOL/L — SIGNIFICANT CHANGE UP (ref 5–17)
AST SERPL-CCNC: 42 U/L — HIGH (ref 10–40)
BILIRUB SERPL-MCNC: 0.2 MG/DL — SIGNIFICANT CHANGE UP (ref 0.2–1.2)
BUN SERPL-MCNC: 56 MG/DL — HIGH (ref 7–23)
CALCIUM SERPL-MCNC: 9.7 MG/DL — SIGNIFICANT CHANGE UP (ref 8.4–10.5)
CHLORIDE SERPL-SCNC: 95 MMOL/L — LOW (ref 96–108)
CO2 SERPL-SCNC: 20 MMOL/L — LOW (ref 22–31)
CREAT SERPL-MCNC: 1.41 MG/DL — HIGH (ref 0.5–1.3)
EGFR: 38 ML/MIN/1.73M2 — LOW
GLUCOSE BLDC GLUCOMTR-MCNC: 119 MG/DL — HIGH (ref 70–99)
GLUCOSE BLDC GLUCOMTR-MCNC: 156 MG/DL — HIGH (ref 70–99)
GLUCOSE BLDC GLUCOMTR-MCNC: 172 MG/DL — HIGH (ref 70–99)
GLUCOSE BLDC GLUCOMTR-MCNC: 227 MG/DL — HIGH (ref 70–99)
GLUCOSE SERPL-MCNC: 165 MG/DL — HIGH (ref 70–99)
MAGNESIUM SERPL-MCNC: 2.3 MG/DL — SIGNIFICANT CHANGE UP (ref 1.6–2.6)
PHOSPHATE SERPL-MCNC: 3.2 MG/DL — SIGNIFICANT CHANGE UP (ref 2.5–4.5)
POTASSIUM SERPL-MCNC: 4.8 MMOL/L — SIGNIFICANT CHANGE UP (ref 3.5–5.3)
POTASSIUM SERPL-SCNC: 4.8 MMOL/L — SIGNIFICANT CHANGE UP (ref 3.5–5.3)
PROT SERPL-MCNC: 6.9 G/DL — SIGNIFICANT CHANGE UP (ref 6–8.3)
SODIUM SERPL-SCNC: 130 MMOL/L — LOW (ref 135–145)
TACROLIMUS SERPL-MCNC: 7.8 NG/ML — SIGNIFICANT CHANGE UP

## 2024-03-17 PROCEDURE — 99233 SBSQ HOSP IP/OBS HIGH 50: CPT | Mod: FS

## 2024-03-17 RX ORDER — SODIUM CHLORIDE 9 MG/ML
1 INJECTION INTRAMUSCULAR; INTRAVENOUS; SUBCUTANEOUS
Refills: 0 | Status: DISCONTINUED | OUTPATIENT
Start: 2024-03-17 | End: 2024-03-17

## 2024-03-17 RX ORDER — SODIUM CHLORIDE 9 MG/ML
1 INJECTION INTRAMUSCULAR; INTRAVENOUS; SUBCUTANEOUS
Refills: 0 | Status: DISCONTINUED | OUTPATIENT
Start: 2024-03-17 | End: 2024-03-26

## 2024-03-17 RX ORDER — SODIUM CHLORIDE 9 MG/ML
1 INJECTION INTRAMUSCULAR; INTRAVENOUS; SUBCUTANEOUS DAILY
Refills: 0 | Status: DISCONTINUED | OUTPATIENT
Start: 2024-03-17 | End: 2024-03-17

## 2024-03-17 RX ADMIN — BRIVARACETAM 100 MILLIGRAM(S): 25 TABLET, FILM COATED ORAL at 06:24

## 2024-03-17 RX ADMIN — Medication 1 DROP(S): at 06:27

## 2024-03-17 RX ADMIN — BRIVARACETAM 100 MILLIGRAM(S): 25 TABLET, FILM COATED ORAL at 21:09

## 2024-03-17 RX ADMIN — Medication 5 MILLIGRAM(S): at 06:25

## 2024-03-17 RX ADMIN — Medication 500000 UNIT(S): at 23:53

## 2024-03-17 RX ADMIN — Medication 0.2 MILLIGRAM(S): at 17:09

## 2024-03-17 RX ADMIN — HUMAN INSULIN 12 UNIT(S): 100 INJECTION, SUSPENSION SUBCUTANEOUS at 23:54

## 2024-03-17 RX ADMIN — HUMAN INSULIN 12 UNIT(S): 100 INJECTION, SUSPENSION SUBCUTANEOUS at 17:38

## 2024-03-17 RX ADMIN — Medication 500000 UNIT(S): at 06:25

## 2024-03-17 RX ADMIN — Medication 1 DROP(S): at 13:43

## 2024-03-17 RX ADMIN — AMLODIPINE BESYLATE 5 MILLIGRAM(S): 2.5 TABLET ORAL at 06:25

## 2024-03-17 RX ADMIN — CARVEDILOL PHOSPHATE 25 MILLIGRAM(S): 80 CAPSULE, EXTENDED RELEASE ORAL at 06:25

## 2024-03-17 RX ADMIN — Medication 500000 UNIT(S): at 17:11

## 2024-03-17 RX ADMIN — LACOSAMIDE 200 MILLIGRAM(S): 50 TABLET ORAL at 06:24

## 2024-03-17 RX ADMIN — LACOSAMIDE 200 MILLIGRAM(S): 50 TABLET ORAL at 17:10

## 2024-03-17 RX ADMIN — HUMAN INSULIN 12 UNIT(S): 100 INJECTION, SUSPENSION SUBCUTANEOUS at 05:41

## 2024-03-17 RX ADMIN — Medication 1 DROP(S): at 10:18

## 2024-03-17 RX ADMIN — DOCOSANOL 1 APPLICATION(S): 100 CREAM TOPICAL at 12:55

## 2024-03-17 RX ADMIN — TACROLIMUS 5 MILLIGRAM(S): 5 CAPSULE ORAL at 07:43

## 2024-03-17 RX ADMIN — TACROLIMUS 5 MILLIGRAM(S): 5 CAPSULE ORAL at 17:09

## 2024-03-17 RX ADMIN — SODIUM CHLORIDE 1 GRAM(S): 9 INJECTION INTRAMUSCULAR; INTRAVENOUS; SUBCUTANEOUS at 17:10

## 2024-03-17 RX ADMIN — PANTOPRAZOLE SODIUM 40 MILLIGRAM(S): 20 TABLET, DELAYED RELEASE ORAL at 06:26

## 2024-03-17 RX ADMIN — Medication 1 DROP(S): at 21:09

## 2024-03-17 RX ADMIN — HEPARIN SODIUM 5000 UNIT(S): 5000 INJECTION INTRAVENOUS; SUBCUTANEOUS at 17:11

## 2024-03-17 RX ADMIN — Medication 4: at 17:38

## 2024-03-17 RX ADMIN — Medication 1 DROP(S): at 17:12

## 2024-03-17 RX ADMIN — BRIVARACETAM 100 MILLIGRAM(S): 25 TABLET, FILM COATED ORAL at 13:42

## 2024-03-17 RX ADMIN — Medication 0.2 MILLIGRAM(S): at 06:36

## 2024-03-17 RX ADMIN — Medication 5 MILLILITER(S): at 23:50

## 2024-03-17 RX ADMIN — Medication 1 TABLET(S): at 12:55

## 2024-03-17 RX ADMIN — HUMAN INSULIN 12 UNIT(S): 100 INJECTION, SUSPENSION SUBCUTANEOUS at 12:45

## 2024-03-17 RX ADMIN — MEROPENEM 100 MILLIGRAM(S): 1 INJECTION INTRAVENOUS at 17:10

## 2024-03-17 RX ADMIN — Medication 2: at 23:53

## 2024-03-17 RX ADMIN — MEROPENEM 100 MILLIGRAM(S): 1 INJECTION INTRAVENOUS at 06:23

## 2024-03-17 RX ADMIN — Medication 500000 UNIT(S): at 12:55

## 2024-03-17 RX ADMIN — Medication 5 MILLILITER(S): at 06:24

## 2024-03-17 RX ADMIN — Medication 5 MILLILITER(S): at 12:55

## 2024-03-17 RX ADMIN — Medication 5 MILLILITER(S): at 17:11

## 2024-03-17 RX ADMIN — Medication 1 DROP(S): at 02:42

## 2024-03-17 RX ADMIN — CARVEDILOL PHOSPHATE 25 MILLIGRAM(S): 80 CAPSULE, EXTENDED RELEASE ORAL at 17:11

## 2024-03-17 RX ADMIN — Medication 2: at 05:41

## 2024-03-17 RX ADMIN — HEPARIN SODIUM 5000 UNIT(S): 5000 INJECTION INTRAVENOUS; SUBCUTANEOUS at 06:26

## 2024-03-17 RX ADMIN — PANTOPRAZOLE SODIUM 40 MILLIGRAM(S): 20 TABLET, DELAYED RELEASE ORAL at 17:10

## 2024-03-17 NOTE — PROGRESS NOTE ADULT - PROBLEM SELECTOR PLAN 5
- SBP goal:   - Clonidine 0.1 mg TID, Coreg 25 mg BID   - Echo: LVEF 70-75%, Grade II diastolic dysfunction, septal bulge without obstruction  - cardiology following - SBP goal:   - Clonidine 0.2 mg TID, Coreg 25 mg BID, Amlodipine 5mg daily  - Echo: LVEF 70-75%, Grade II diastolic dysfunction, septal bulge without obstruction  - cardiology following

## 2024-03-17 NOTE — PROGRESS NOTE ADULT - ASSESSMENT
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 She is s/p trach/peg 3/8/24.  Febrile 3/10 with + UA, blood/sputum culture NGTD.  Treatment for UTI initiated with ceftriaxone, eventually broadened to cefepime.  Transferred to RCU 3/11 for continued management.  Pt arrived from NSCU with minimal mental status with only response of spontaneous eye opening and withdrawal on RLE.  Urine culture positive for klebsiella.  Treated for 7 days with Meropenem 3/12 -->3/19.     3/13: Patient febrile again overnight T.max 101.5. Blood and Urine cxs sent yesterday ( Results pending). CXR 3/10 no focal consolidations. Patients rectal tube was dislodged overnight, no reports of diarrhea this morning will continue to monitor stooling and possible need to send C.Diff if reoccurs. Currently remains on Meropenem and Vancomycin dcd as patient growing VRE from urine cx on 3/10. Will await repeat urine cx studies to determine if will require additional coverage with Linezolid.  BGMs running high will increase Humulin to 12 units q 6 hrs. .  3/14:  Patient tolerated PS 10/5 for almost hours yesterday.  Will continue PS trials as tolerated.  Free water discontinued due to decreasing sodium levels.  EEG requested evaluate for sub-clinical seizures given fixed gaze.  3/15:  Patient tolerated continuous PS 10/5 over night without issue.  Will progress PS as tolerated.  Spot EEG obtained today, negative for seizure activity.  Sodium remains low, continuing to monitor off free water, PEG feeds were changed yesterday evening.  Lasix 40mg IV given once.   3/16:  Patient placed back on full support due to persistent apnea episodes last night.  Will re-attempt PS today.  Sodium remains at 131, Cr stable.  Will continue to monitor.    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 She is s/p trach/peg 3/8/24.  Febrile 3/10 with + UA, blood/sputum culture NGTD.  Treatment for UTI initiated with ceftriaxone, eventually broadened to cefepime.  Transferred to RCU 3/11 for continued management.  Pt arrived from NSCU with minimal mental status with only response of spontaneous eye opening and withdrawal on RLE.  Urine culture resulted - positive for klebsiella, treated for 7 days with Meropenem 3/12 -->3/19.     3/17:   79 yo F with PMHx ESRD s/p renal transplant (2019, now off HD), left AKA, BK viremia, HTN, breast ca s/p lumpectomy (completed Tamoxifen 03/2023), DVT (off Eliquis), HLD, gout presenting 3/1 with left ICH (ICH score 4) likely 2/2 amyloid angiopathy s/p left craniectomy(discarded) and evacuation as well as EVD placement and removal (3/7). She is s/p trach/peg 3/8/24.  Febrile 3/10 with + UA, blood/sputum culture NGTD.  Treatment for UTI initiated with ceftriaxone, eventually broadened to cefepime.  Transferred to RCU 3/11 for continued management.  Pt arrived from NSCU with minimal mental status with only response of spontaneous eye opening and withdrawal on RLE.  Urine culture resulted - positive for klebsiella, treated for 7 days with Meropenem 3/12 -->3/19.       3/17:  Tacro level 7.8 this am.  Spoke with Dr. MARAL Perea from transplant nephro - plan is to keep dosing as is, will f/u with tomorrows AM tacro level.  Pt did not tolerate PS trail, became tachypneic to mid 40s.  No improvement neurologically thus far, will continue to monitor.  Sodium Chloride tabs added.

## 2024-03-17 NOTE — PROGRESS NOTE ADULT - PROBLEM SELECTOR PLAN 3
- S/p EEG w/ seizures last seen 3/7  - Briviact 100 mg TID and Vimpat 200 mg BID (Renew 4/6)  - 3/15 EEG: Negative for seizures

## 2024-03-17 NOTE — PROGRESS NOTE ADULT - NS ATTEND AMEND GEN_ALL_CORE FT
Pt is a 78F with MHx ESRD s/p renal transplant (2019, now off HD), left AKA, BK viremia, HTN, breast ca s/p lumpectomy (completed Tamoxifen 03/2023), DVT (off Eliquis), HLD, gout presenting with left ICH (ICH score 4) likely 2/2 amyloid angiopathy s/p left craniectomy(discarded) and evacuation POD9 as well as EVD placement and removal. She is s/p trach/peg 3/9/24 and RCU transfer.      Pt initially p/w left ICH  likely 2/2 amyloid angiopathy s/p left craniectomy(discarded) and evacuation POD9 as well as EVD placement and removal. Head CT 3/12 stable. Pt remains obtunded, occasionally opens eyes to sternal rub, withdraws on LE but otherwise shows no purposeful communication or active consciousness and is functionally dependent on all ADLs. Metabolic encephalopathy possibly superimposed i/s/o active infection. As per family had been more alert earlier in admission. Remains obtunded, some eye opening with sternal rub. Will CTM closely. Pt further with seizures now controlled on briviact 100 mg TID for seizures, Vimpat 200 mg BID. Spot EEG completed AM 3/15, no evidence of epileptiform discharges.     Pt with acute hypercapnic and hypoxemic respiratory failure in the setting of CVA. Cheyne Wesley breathing pattern observed since admission to RCU. Tolerating PS ATC. Trial TC during the day as tolerated. Duonebs q6h + Hypersal 3 % q6h ATC. Wean O2 supplementation for goal O2 saturation 90-95%. Trach care and suctioning as per RCU team. Oral hygiene and aspiration precautions.     Pt with HTN (SBP goal: ) now on clonidine 0.2mg BID + Coreg 25 mg BID + Norvasc 5mg. TTE LVEF 70-75%, Grade II diastolic dysfcn, septal bulge noted which is not significant as per Cardiology. No current acute cardiac issues. Pt with known hx of bilateral above the knee DVT. s/p IVC filter. HSQ q12h restarted, okay for neurosx standpoint.     Pt with hx renal transplant (2019), AVF remains in the left upper extremity. Will c/w home prednisone 5mg with bactrim ppx and tacrolimus 5mg BID per transplant nephro recs, f/u levels. Pt further with hypervolemic hyponatremia. Continue to hold free H2O. Lasix x 1 yesterday, monitor BMP daily. No change today. Will increase diuresis.     Hospital course further c/b oropharyngeal dysphagia s/p PEG feeds. Tolerating at goal rate. H. pylori infection (+). Protonix in place. H. pylori treatment can be held for 1 month until acute issues have resolved as per GI team. Pt with DMII with hyperglycemic episodes this week.  Goal euglycemia (-180), A1C: 5.4%. c/w NPH with ISS/BGFS monitoring. Adjusted regimen.    Pt with UTI with UA culture with Kleb pna >100K + Enterococcus faecium (VRE), 3/10 culture with 10-49K. Switched from Cefepime to Meropenem. Holding on treating E. faecium given CFU, defervescing. Clinically better today. Will continue on current regimen.     Dispo pending medical optimization. Pt full code. DVT ppx HSQ. Will continue to update family throughout hospitalization. Pt is a 78F with MHx ESRD s/p renal transplant (2019, now off HD), left AKA, BK viremia, HTN, breast ca s/p lumpectomy (completed Tamoxifen 03/2023), DVT (off Eliquis), HLD, gout presenting with left ICH (ICH score 4) likely 2/2 amyloid angiopathy s/p left craniectomy(discarded) and evacuation POD9 as well as EVD placement and removal. She is s/p trach/peg 3/9/24 and RCU transfer.      Pt initially p/w left ICH  likely 2/2 amyloid angiopathy s/p left craniectomy(discarded) and evacuation POD9 as well as EVD placement and removal. Head CT 3/12 stable. Pt remains obtunded, occasionally opens eyes to sternal rub, withdraws on LE but otherwise shows no purposeful communication or active consciousness and is functionally dependent on all ADLs. Metabolic encephalopathy possibly superimposed i/s/o active infection. As per family had been more alert earlier in admission. Remains obtunded, some eye opening with sternal rub. Will CTM closely. Pt further with seizures now controlled on Briviact 100 mg TID for seizures, Vimpat 200 mg BID. Spot EEG completed AM 3/15, no evidence of epileptiform discharges.     Pt with acute hypercapnic and hypoxemic respiratory failure in the setting of CVA. Cheyne Wesley breathing pattern observed since admission to RCU. Tolerating PS ATC. Trial TC during the day as tolerated. Duonebs q6h + Hypersal 3 % q6h ATC. Wean O2 supplementation for goal O2 saturation 90-95%. Trach care and suctioning as per RCU team. Oral hygiene and aspiration precautions.     Pt with HTN (SBP goal: ) now on clonidine 0.2mg BID + Coreg 25 mg BID + Norvasc 5mg. TTE LVEF 70-75%, Grade II diastolic dysfcn, septal bulge noted which is not significant as per Cardiology. No current acute cardiac issues. Pt with known hx of bilateral above the knee DVT. s/p IVC filter. HSQ q12h restarted, okay from a neurosx standpoint.     Pt with hx renal transplant (2019), AVF remains in the left upper extremity. Will c/w home prednisone 5mg with bactrim ppx and tacrolimus 5mg BID per transplant nephro recs, f/u levels. Pt further with worsening hyponatremia. Continue to hold free H2O. Lasix x 1, salt tabs initiated. monitor BMP daily.    Hospital course further c/b oropharyngeal dysphagia s/p PEG feeds. Tolerating at goal rate. H. pylori infection (+). Protonix in place. H. pylori treatment can be held for 1 month until acute issues have resolved as per GI team. Pt with DMII with hyperglycemic episodes this week.  Goal euglycemia (-180), A1C: 5.4%. c/w NPH with ISS/BGFS monitoring. Adjusted regimen.    Pt with UTI with UA culture with Kleb pna >100K + Enterococcus faecium (VRE), 3/10 culture with 10-49K. Switched from Cefepime to Meropenem x7 days for complicated UTI. Holding on treating E. faecium given CFU, defervescing. Clinically improving. Will continue on current regimen.     Dispo pending medical optimization. Pt full code. DVT ppx HSQ. Will continue to update family throughout hospitalization.

## 2024-03-17 NOTE — PROGRESS NOTE ADULT - PROBLEM SELECTOR PLAN 2
- S/p trach 3/8 by surgery by Dr.Eric Mayorga   - Tracheostomy Sutures removed 3/13  - Attempt PS Trials as tolerated   - Vent Settings: PRVC 14/450/+5/30%  - Cheynes-clay breathing pattern observed however toleating weaning trials  - Continue airway clearance; Duonebs q6h PRN - S/p trach 3/8 by surgery by Dr. Joselo Mayorga   - Tracheostomy Sutures removed 3/13  - Vent Settings: PRVC 14/450/+5/30%  - Attempt PS Trials as tolerated   - Cheynes-clay breathing pattern observed however tolerating weaning trials  - Continue airway clearance; Duonebs q6h PRN

## 2024-03-17 NOTE — PROGRESS NOTE ADULT - PROBLEM SELECTOR PLAN 6
- AVF in the left upper extremity  - s/p renal transplant in 2019  - cont free water 200 ml q12hr  - prednisone 5mg, bactrim ppx  - tacro 5mg BID as per transplant nephro  - trach goal 4-7, daily tacro levels 30 mins prior to dose administration - AVF in the left upper extremity  - s/p renal transplant in 2019  - prednisone 5mg, bactrim ppx  - tacro 5mg BID as per transplant nephro  - trach goal 4-7, daily tacro levels 30 mins prior to dose administration

## 2024-03-17 NOTE — PROGRESS NOTE ADULT - PROBLEM SELECTOR PLAN 8
- S/p PEG 3/8  - tolerating tube feeds    [] H. 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  recc Bismuth quadruple therapy x 14 days to be started post discharge (Omeprazole 20 mg BID, Tetracycline 500 mg QID, Metronidazole 250 mg QID and bismuth subsalicylate 2 tabs QID). Bismuth may turn her stool black. After completing treatment would cont once daily PPI for ppx. Pending clinical course, consider repeat stool Ag n 8 weeks to confirm clearance. - S/p PEG 3/8  - tolerating tube feeds    [] H. 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 Bismuth may turn her stool black. After completing treatment would cont once daily PPI for ppx. Pending clinical course, consider repeat stool Ag in 8 weeks to confirm clearance

## 2024-03-17 NOTE — PROGRESS NOTE ADULT - PROBLEM SELECTOR PLAN 4
Duration Of Freeze Thaw-Cycle (Seconds): 15-20 Render Post-Care Instructions In Note?: no Post-Care Instructions: I reviewed with the patient in detail post-care instructions. Patient is to wear sunprotection, and avoid picking at any of the treated lesions. Pt may apply Vaseline to crusted or scabbing areas. Include Z78.9 (Other Specified Conditions Influencing Health Status) As An Associated Diagnosis?: Yes Consent: The patient's consent was obtained including but not limited to risks of crusting, scabbing, blistering, scarring, darker or lighter pigmentary change, recurrence, incomplete removal and infection. Detail Level: Detailed Medical Necessity Clause: This procedure was medically necessary because the lesions that were treated were: Medical Necessity Information: It is in your best interest to select a reason for this procedure from the list below. All of these items fulfill various CMS LCD requirements except the new and changing color options. Number Of Freeze-Thaw Cycles: 2 freeze-thaw cycles - + UA on 3/10  - Urine Cx 3/10: ESBL Klebsiella and VRE 10- 49 K   - Treating the ESBL klebsiella with Meropenem 1GM Q12H 3/12 -->3/19 - + UA on 3/10  - Urine Cx 3/10: ESBL Klebsiella and VRE 10-49 K   - Treating the ESBL klebsiella with Meropenem 1GM Q12H 3/12 -->3/19

## 2024-03-17 NOTE — PROGRESS NOTE ADULT - ASSESSMENT
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 S/p craniectomy and evacuation course c/b seizures, last seen on 3/7/24 EEG currently on AEDS. Now s/p trach/peg 3/9/24. During EGD/PEG found to have superficial gastric ulcers. H. Pylori Stool Ag positive, for which GI is consulted.     1. H. Pylori   recc Bismuth quadruple therapy x 14 days, to start post discharge     2. Resp failure s/p trach/PEG    3. IPH s/p craniotomy     4. PUD   daily PPI      I had a prolonged conversation with the patient regarding the hospital course, differential diagnosis, results of diagnostic tests this far, and therapeutic modalities available. Plan of care discussed with the patient after the evaluation. Patient expresses a clear understanding of the plan of care.    Jose Antonio Sepulveda D.O.  Gastroenterology and Hepatology  [address], [address]  [place], [place]  Office: [phone]

## 2024-03-17 NOTE — PROGRESS NOTE ADULT - PROBLEM SELECTOR PLAN 9
- VA Duplex 3/2: DVT RT cfv, femoral, popliteal and gastrocnemius veins. DVT LEFT cfv and femoral vein  - S/p IVCF by IR on 3/3  - Cont Heparin Sub Q - VA Duplex 3/2: DVT RT cfv, femoral, popliteal and gastrocnemius veins; DVT LEFT cfv and femoral vein  - S/p IVCF by IR on 3/3  - Cont Heparin Sub Q

## 2024-03-17 NOTE — PROGRESS NOTE ADULT - SUBJECTIVE AND OBJECTIVE BOX
Chief Complaint:  Patient is a 78y old  Female who presents with a chief complaint of ICH (17 Mar 2024 07:03)      Date of service 03-17-24 @ 11:08      Interval Events:   no acute events     Hospital Medications:  acetaminophen   Oral Liquid .. 650 milliGRAM(s) Oral every 6 hours PRN  albuterol/ipratropium for Nebulization 3 milliLiter(s) Nebulizer every 6 hours PRN  amLODIPine   Tablet 5 milliGRAM(s) Oral daily  artificial  tears Solution 1 Drop(s) Both EYES every 4 hours  Biotene Dry Mouth Oral Rinse 5 milliLiter(s) Swish and Spit every 6 hours  brivaracetam Oral Solution 100 milliGRAM(s) Oral <User Schedule>  carvedilol 25 milliGRAM(s) Oral every 12 hours  cloNIDine 0.2 milliGRAM(s) Oral two times a day  docosanol 10% Cream 1 Application(s) Topical daily  heparin   Injectable 5000 Unit(s) SubCutaneous every 12 hours  insulin lispro (ADMELOG) corrective regimen sliding scale   SubCutaneous every 6 hours  insulin NPH human recombinant 12 Unit(s) SubCutaneous every 6 hours  lacosamide Solution 200 milliGRAM(s) Oral two times a day  meropenem  IVPB 1000 milliGRAM(s) IV Intermittent every 12 hours  nystatin    Suspension 979352 Unit(s) Swish and Swallow every 6 hours  pantoprazole   Suspension 40 milliGRAM(s) Oral two times a day  predniSONE   Tablet 5 milliGRAM(s) Oral daily  tacrolimus    0.5 mG/mL Suspension 5 milliGRAM(s) Oral two times a day  trimethoprim   80 mG/sulfamethoxazole 400 mG 1 Tablet(s) Oral daily        Review of Systems:  unable to obtain     PHYSICAL EXAM:   Vital Signs:  Vital Signs Last 24 Hrs  T(C): 37.1 (17 Mar 2024 05:07), Max: 37.2 (16 Mar 2024 12:00)  T(F): 98.7 (17 Mar 2024 05:07), Max: 99 (16 Mar 2024 12:00)  HR: 94 (17 Mar 2024 08:25) (83 - 104)  BP: 161/84 (17 Mar 2024 05:07) (129/83 - 161/84)  BP(mean): --  RR: 18 (17 Mar 2024 05:07) (18 - 18)  SpO2: 100% (17 Mar 2024 08:25) (100% - 100%)    Parameters below as of 17 Mar 2024 00:33  Patient On (Oxygen Delivery Method): ventilator      Daily     Daily       PHYSICAL EXAM:     GENERAL:  Appears stated age, well-groomed, well-nourished, no distress  HEENT:  NC/AT,  conjunctivae anicteric, clear and pink,   NECK: supple, trachea midline  CHEST:  Full & symmetric excursion, no increased effort, breath sounds clear  HEART:  Regular rhythm, no JVD  ABDOMEN:  Soft, non-tender, non-distended, normoactive bowel sounds,  no masses , no hepatosplenomegaly  EXTREMITIES:  no cyanosis,clubbing or edema  SKIN:  No rash, erythema, or, ecchymoses, no jaundice  NEURO:  Alert, non-focal, no asterixis  PSYCH: Appropriate affect, oriented to place and time  RECTAL: Deferred      LABS Personally reviewed by me:                        8.6    5.93  )-----------( 181      ( 16 Mar 2024 06:59 )             27.4       03-17    130<L>  |  95<L>  |  56<H>  ----------------------------<  165<H>  4.8   |  20<L>  |  1.41<H>    Ca    9.7      17 Mar 2024 06:24  Phos  3.2     03-17  Mg     2.3     03-17    TPro  6.9  /  Alb  3.3  /  TBili  0.2  /  DBili  x   /  AST  42<H>  /  ALT  44  /  AlkPhos  101  03-17    LIVER FUNCTIONS - ( 17 Mar 2024 06:24 )  Alb: 3.3 g/dL / Pro: 6.9 g/dL / ALK PHOS: 101 U/L / ALT: 44 U/L / AST: 42 U/L / GGT: x             Urinalysis Basic - ( 17 Mar 2024 06:24 )    Color: x / Appearance: x / SG: x / pH: x  Gluc: 165 mg/dL / Ketone: x  / Bili: x / Urobili: x   Blood: x / Protein: x / Nitrite: x   Leuk Esterase: x / RBC: x / WBC x   Sq Epi: x / Non Sq Epi: x / Bacteria: x                              8.6    5.93  )-----------( 181      ( 16 Mar 2024 06:59 )             27.4                         8.2    9.68  )-----------( 177      ( 15 Mar 2024 06:06 )             26.1       Imaging personally reviewed by me:

## 2024-03-17 NOTE — PROGRESS NOTE ADULT - SUBJECTIVE AND OBJECTIVE BOX
Patient is a 78y old  Female who presents with a chief complaint of ICH (16 Mar 2024 09:58)      Interval Events:    REVIEW OF SYSTEMS:  [ ] Positive  [ ] All other systems negative  [ ] Unable to assess ROS because ________    Vital Signs Last 24 Hrs  T(C): 37.1 (03-17-24 @ 05:07), Max: 37.2 (03-16-24 @ 12:00)  T(F): 98.7 (03-17-24 @ 05:07), Max: 99 (03-16-24 @ 12:00)  HR: 104 (03-17-24 @ 05:07) (83 - 104)  BP: 161/84 (03-17-24 @ 05:07) (129/83 - 161/84)  RR: 18 (03-17-24 @ 05:07) (18 - 18)  SpO2: 100% (03-17-24 @ 05:07) (100% - 100%)    PHYSICAL EXAM:  HEENT:   [ ]Tracheostomy:  [ ]Pupils equal  [ ]No oral lesions  [ ]Abnormal    SKIN  [ ]No Rash  [ ] Abnormal  [ ] pressure    CARDIAC  [ ]Regular  [ ]Abnormal    PULMONARY  [ ]Bilateral Clear Breath Sounds  [ ]Normal Excursion  [ ]Abnormal    GI  [ ]PEG      [ ] +BS		              [ ]Soft, nondistended, nontender	  [ ]Abnormal    MUSCULOSKELETAL                                   [ ]Bedbound                 [ ]Abnormal    [ ]Ambulatory/OOB to chair                           EXTREMITIES                                         [ ]Normal  [ ]Edema                           NEUROLOGIC  [ ] Normal, non focal  [ ] Focal findings:    PSYCHIATRIC  [ ]Alert and appropriate  [ ] Sedated	 [ ]Agitated    :  Gardner: [ ] Yes, if yes: Date of Placement:                   [  ] No    LINES: Central Lines [ ] Yes, if yes: Date of Placement                                     [  ] No    HOSPITAL MEDICATIONS:  MEDICATIONS  (STANDING):  amLODIPine   Tablet 5 milliGRAM(s) Oral daily  artificial  tears Solution 1 Drop(s) Both EYES every 4 hours  Biotene Dry Mouth Oral Rinse 5 milliLiter(s) Swish and Spit every 6 hours  brivaracetam Oral Solution 100 milliGRAM(s) Oral <User Schedule>  carvedilol 25 milliGRAM(s) Oral every 12 hours  cloNIDine 0.2 milliGRAM(s) Oral two times a day  docosanol 10% Cream 1 Application(s) Topical daily  heparin   Injectable 5000 Unit(s) SubCutaneous every 12 hours  insulin lispro (ADMELOG) corrective regimen sliding scale   SubCutaneous every 6 hours  insulin NPH human recombinant 12 Unit(s) SubCutaneous every 6 hours  lacosamide Solution 200 milliGRAM(s) Oral two times a day  meropenem  IVPB 1000 milliGRAM(s) IV Intermittent every 12 hours  nystatin    Suspension 737949 Unit(s) Swish and Swallow every 6 hours  pantoprazole   Suspension 40 milliGRAM(s) Oral two times a day  predniSONE   Tablet 5 milliGRAM(s) Oral daily  tacrolimus    0.5 mG/mL Suspension 5 milliGRAM(s) Oral two times a day  trimethoprim   80 mG/sulfamethoxazole 400 mG 1 Tablet(s) Oral daily    MEDICATIONS  (PRN):  acetaminophen   Oral Liquid .. 650 milliGRAM(s) Oral every 6 hours PRN Temp greater or equal to 38C (100.4F), Mild Pain (1 - 3)  albuterol/ipratropium for Nebulization 3 milliLiter(s) Nebulizer every 6 hours PRN Shortness of Breath and/or Wheezing      LABS:                        8.6    5.93  )-----------( 181      ( 16 Mar 2024 06:59 )             27.4     03-17    130<L>  |  95<L>  |  56<H>  ----------------------------<  165<H>  4.8   |  20<L>  |  1.41<H>    Ca    9.7      17 Mar 2024 06:24  Phos  3.2     03-17  Mg     2.3     03-17    TPro  6.9  /  Alb  3.3  /  TBili  0.2  /  DBili  x   /  AST  42<H>  /  ALT  44  /  AlkPhos  101  03-17      Urinalysis Basic - ( 17 Mar 2024 06:24 )    Color: x / Appearance: x / SG: x / pH: x  Gluc: 165 mg/dL / Ketone: x  / Bili: x / Urobili: x   Blood: x / Protein: x / Nitrite: x   Leuk Esterase: x / RBC: x / WBC x   Sq Epi: x / Non Sq Epi: x / Bacteria: x          CAPILLARY BLOOD GLUCOSE    MICROBIOLOGY:     RADIOLOGY:  [ ] Reviewed and interpreted by me    Mode: AC/ CMV (Assist Control/ Continuous Mandatory Ventilation)  RR (machine): 14  TV (machine): 450  FiO2: 30  PEEP: 5  ITime: 1  MAP: 13  PIP: 21   Patient is a 78y old  Female who presents with a chief complaint of ICH (16 Mar 2024 09:58)      Interval Events:  No acute events overnight.    REVIEW OF SYSTEMS:  [ ] Positive  [ ] All other systems negative  [X] Unable to assess ROS because ___Obtunded 2/2 CVA events___    Vital Signs Last 24 Hrs  T(C): 37.1 (03-17-24 @ 05:07), Max: 37.2 (03-16-24 @ 12:00)  T(F): 98.7 (03-17-24 @ 05:07), Max: 99 (03-16-24 @ 12:00)  HR: 104 (03-17-24 @ 05:07) (83 - 104)  BP: 161/84 (03-17-24 @ 05:07) (129/83 - 161/84)  RR: 18 (03-17-24 @ 05:07) (18 - 18)  SpO2: 100% (03-17-24 @ 05:07) (100% - 100%)    PHYSICAL EXAM:  HEENT:   [X]Tracheostomy: #7 cuffed portex  [X]Pupils equal  [ ]No oral lesions  [X]Abnormal - missing upper dentition, partially absent right lower dentition; tongue not swollen but partially protrudes through mouth    SKIN  [X]No Rash - L BKA skin intact  [X] Abnormal - LUE AVF no bruit no thrill  [X] pressure - sacral DTI    CARDIAC  [X]Regular  [ ]Abnormal    PULMONARY  [ ]Bilateral Clear Breath Sounds  [ ]Normal Excursion  [X]Abnormal - BL Coarse BS    GI  [X]PEG      [X] +BS		              [X]Soft, nondistended, nontender	  [ ]Abnormal    MUSCULOSKELETAL                                   [X]Bedbound                 [X]Abnormal - L BKA  [ ]Ambulatory/OOB to chair                           EXTREMITIES                                         [X]Normal  [ ]Edema                         NEUROLOGIC  [ ] Normal, non focal  [X] Focal findings: does not open eyes to sternal rub, does not follow commands, withdraws on RLE, does not withdraw from pain BL UE    PSYCHIATRIC  [X] Obtunded  [ ] Sedated	 [ ]Agitated    :  Gardner: [ ] Yes, if yes: Date of Placement:                   [X] No    LINES: Central Lines [ ] Yes, if yes: Date of Placement                                     [X] No    HOSPITAL MEDICATIONS:  MEDICATIONS  (STANDING):  amLODIPine   Tablet 5 milliGRAM(s) Oral daily  artificial  tears Solution 1 Drop(s) Both EYES every 4 hours  Biotene Dry Mouth Oral Rinse 5 milliLiter(s) Swish and Spit every 6 hours  brivaracetam Oral Solution 100 milliGRAM(s) Oral <User Schedule>  carvedilol 25 milliGRAM(s) Oral every 12 hours  cloNIDine 0.2 milliGRAM(s) Oral two times a day  docosanol 10% Cream 1 Application(s) Topical daily  heparin   Injectable 5000 Unit(s) SubCutaneous every 12 hours  insulin lispro (ADMELOG) corrective regimen sliding scale   SubCutaneous every 6 hours  insulin NPH human recombinant 12 Unit(s) SubCutaneous every 6 hours  lacosamide Solution 200 milliGRAM(s) Oral two times a day  meropenem  IVPB 1000 milliGRAM(s) IV Intermittent every 12 hours  nystatin    Suspension [id number] Unit(s) Swish and Swallow every 6 hours  pantoprazole   Suspension 40 milliGRAM(s) Oral two times a day  predniSONE   Tablet 5 milliGRAM(s) Oral daily  tacrolimus    0.5 mG/mL Suspension 5 milliGRAM(s) Oral two times a day  trimethoprim   80 mG/sulfamethoxazole 400 mG 1 Tablet(s) Oral daily    MEDICATIONS  (PRN):  acetaminophen   Oral Liquid .. 650 milliGRAM(s) Oral every 6 hours PRN Temp greater or equal to 38C (100.4F), Mild Pain (1 - 3)  albuterol/ipratropium for Nebulization 3 milliLiter(s) Nebulizer every 6 hours PRN Shortness of Breath and/or Wheezing    LABS:                        8.6    5.93  )-----------( 181      ( 16 Mar 2024 06:59 )             27.4     03-17    130<L>  |  95<L>  |  56<H>  ----------------------------<  165<H>  4.8   |  20<L>  |  1.41<H>    Ca    9.7      17 Mar 2024 06:24  Phos  3.2     03-17  Mg     2.3     03-17    TPro  6.9  /  Alb  3.3  /  TBili  0.2  /  DBili  x   /  AST  42<H>  /  ALT  44  /  AlkPhos  101  03-17      Urinalysis Basic - ( 17 Mar 2024 06:24 )    Color: x / Appearance: x / SG: x / pH: x  Gluc: 165 mg/dL / Ketone: x  / Bili: x / Urobili: x   Blood: x / Protein: x / Nitrite: x   Leuk Esterase: x / RBC: x / WBC x   Sq Epi: x / Non Sq Epi: x / Bacteria: x    CAPILLARY BLOOD GLUCOSE    MICROBIOLOGY:     RADIOLOGY:  [ ] Reviewed and interpreted by me    Mode: AC/ CMV (Assist Control/ Continuous Mandatory Ventilation)  RR (machine): 14  TV (machine): 450  FiO2: 30  PEEP: 5  ITime: 1  MAP: 13  PIP: 21

## 2024-03-18 LAB
ALBUMIN SERPL ELPH-MCNC: 3.2 G/DL — LOW (ref 3.3–5)
ALP SERPL-CCNC: 92 U/L — SIGNIFICANT CHANGE UP (ref 40–120)
ALT FLD-CCNC: 40 U/L — SIGNIFICANT CHANGE UP (ref 10–45)
ANION GAP SERPL CALC-SCNC: 15 MMOL/L — SIGNIFICANT CHANGE UP (ref 5–17)
AST SERPL-CCNC: 37 U/L — SIGNIFICANT CHANGE UP (ref 10–40)
BILIRUB SERPL-MCNC: 0.2 MG/DL — SIGNIFICANT CHANGE UP (ref 0.2–1.2)
BUN SERPL-MCNC: 55 MG/DL — HIGH (ref 7–23)
CALCIUM SERPL-MCNC: 9.9 MG/DL — SIGNIFICANT CHANGE UP (ref 8.4–10.5)
CHLORIDE SERPL-SCNC: 100 MMOL/L — SIGNIFICANT CHANGE UP (ref 96–108)
CO2 SERPL-SCNC: 20 MMOL/L — LOW (ref 22–31)
CREAT SERPL-MCNC: 1.45 MG/DL — HIGH (ref 0.5–1.3)
CULTURE RESULTS: SIGNIFICANT CHANGE UP
CULTURE RESULTS: SIGNIFICANT CHANGE UP
EGFR: 37 ML/MIN/1.73M2 — LOW
GLUCOSE BLDC GLUCOMTR-MCNC: 126 MG/DL — HIGH (ref 70–99)
GLUCOSE BLDC GLUCOMTR-MCNC: 127 MG/DL — HIGH (ref 70–99)
GLUCOSE BLDC GLUCOMTR-MCNC: 214 MG/DL — HIGH (ref 70–99)
GLUCOSE BLDC GLUCOMTR-MCNC: 83 MG/DL — SIGNIFICANT CHANGE UP (ref 70–99)
GLUCOSE SERPL-MCNC: 97 MG/DL — SIGNIFICANT CHANGE UP (ref 70–99)
MAGNESIUM SERPL-MCNC: 2.7 MG/DL — HIGH (ref 1.6–2.6)
PHOSPHATE SERPL-MCNC: 3.4 MG/DL — SIGNIFICANT CHANGE UP (ref 2.5–4.5)
POTASSIUM SERPL-MCNC: 5 MMOL/L — SIGNIFICANT CHANGE UP (ref 3.5–5.3)
POTASSIUM SERPL-SCNC: 5 MMOL/L — SIGNIFICANT CHANGE UP (ref 3.5–5.3)
PROT SERPL-MCNC: 6.6 G/DL — SIGNIFICANT CHANGE UP (ref 6–8.3)
SODIUM SERPL-SCNC: 135 MMOL/L — SIGNIFICANT CHANGE UP (ref 135–145)
SPECIMEN SOURCE: SIGNIFICANT CHANGE UP
SPECIMEN SOURCE: SIGNIFICANT CHANGE UP
TACROLIMUS SERPL-MCNC: 6.9 NG/ML — SIGNIFICANT CHANGE UP

## 2024-03-18 PROCEDURE — 99232 SBSQ HOSP IP/OBS MODERATE 35: CPT

## 2024-03-18 RX ORDER — HUMAN INSULIN 100 [IU]/ML
6 INJECTION, SUSPENSION SUBCUTANEOUS ONCE
Refills: 0 | Status: COMPLETED | OUTPATIENT
Start: 2024-03-18 | End: 2024-03-18

## 2024-03-18 RX ADMIN — Medication 500000 UNIT(S): at 12:41

## 2024-03-18 RX ADMIN — AMLODIPINE BESYLATE 5 MILLIGRAM(S): 2.5 TABLET ORAL at 05:24

## 2024-03-18 RX ADMIN — Medication 5 MILLILITER(S): at 23:22

## 2024-03-18 RX ADMIN — BRIVARACETAM 100 MILLIGRAM(S): 25 TABLET, FILM COATED ORAL at 21:55

## 2024-03-18 RX ADMIN — TACROLIMUS 5 MILLIGRAM(S): 5 CAPSULE ORAL at 18:33

## 2024-03-18 RX ADMIN — HEPARIN SODIUM 5000 UNIT(S): 5000 INJECTION INTRAVENOUS; SUBCUTANEOUS at 05:22

## 2024-03-18 RX ADMIN — Medication 500000 UNIT(S): at 18:32

## 2024-03-18 RX ADMIN — DOCOSANOL 1 APPLICATION(S): 100 CREAM TOPICAL at 12:38

## 2024-03-18 RX ADMIN — HUMAN INSULIN 12 UNIT(S): 100 INJECTION, SUSPENSION SUBCUTANEOUS at 18:35

## 2024-03-18 RX ADMIN — Medication 1 DROP(S): at 21:29

## 2024-03-18 RX ADMIN — Medication 500000 UNIT(S): at 23:22

## 2024-03-18 RX ADMIN — CARVEDILOL PHOSPHATE 25 MILLIGRAM(S): 80 CAPSULE, EXTENDED RELEASE ORAL at 05:21

## 2024-03-18 RX ADMIN — LACOSAMIDE 200 MILLIGRAM(S): 50 TABLET ORAL at 05:21

## 2024-03-18 RX ADMIN — Medication 4: at 18:34

## 2024-03-18 RX ADMIN — LACOSAMIDE 200 MILLIGRAM(S): 50 TABLET ORAL at 18:31

## 2024-03-18 RX ADMIN — TACROLIMUS 5 MILLIGRAM(S): 5 CAPSULE ORAL at 05:46

## 2024-03-18 RX ADMIN — Medication 500000 UNIT(S): at 05:23

## 2024-03-18 RX ADMIN — SODIUM CHLORIDE 1 GRAM(S): 9 INJECTION INTRAMUSCULAR; INTRAVENOUS; SUBCUTANEOUS at 05:22

## 2024-03-18 RX ADMIN — Medication 5 MILLILITER(S): at 12:33

## 2024-03-18 RX ADMIN — MEROPENEM 100 MILLIGRAM(S): 1 INJECTION INTRAVENOUS at 05:23

## 2024-03-18 RX ADMIN — Medication 1 DROP(S): at 01:33

## 2024-03-18 RX ADMIN — BRIVARACETAM 100 MILLIGRAM(S): 25 TABLET, FILM COATED ORAL at 05:22

## 2024-03-18 RX ADMIN — PANTOPRAZOLE SODIUM 40 MILLIGRAM(S): 20 TABLET, DELAYED RELEASE ORAL at 18:32

## 2024-03-18 RX ADMIN — Medication 5 MILLILITER(S): at 18:35

## 2024-03-18 RX ADMIN — MEROPENEM 100 MILLIGRAM(S): 1 INJECTION INTRAVENOUS at 18:33

## 2024-03-18 RX ADMIN — CARVEDILOL PHOSPHATE 25 MILLIGRAM(S): 80 CAPSULE, EXTENDED RELEASE ORAL at 18:32

## 2024-03-18 RX ADMIN — PANTOPRAZOLE SODIUM 40 MILLIGRAM(S): 20 TABLET, DELAYED RELEASE ORAL at 05:22

## 2024-03-18 RX ADMIN — Medication 1 DROP(S): at 15:07

## 2024-03-18 RX ADMIN — Medication 5 MILLILITER(S): at 05:22

## 2024-03-18 RX ADMIN — Medication 5 MILLIGRAM(S): at 05:21

## 2024-03-18 RX ADMIN — BRIVARACETAM 100 MILLIGRAM(S): 25 TABLET, FILM COATED ORAL at 15:06

## 2024-03-18 RX ADMIN — HUMAN INSULIN 12 UNIT(S): 100 INJECTION, SUSPENSION SUBCUTANEOUS at 12:43

## 2024-03-18 RX ADMIN — Medication 1 DROP(S): at 05:21

## 2024-03-18 RX ADMIN — HUMAN INSULIN 6 UNIT(S): 100 INJECTION, SUSPENSION SUBCUTANEOUS at 13:00

## 2024-03-18 RX ADMIN — Medication 1 DROP(S): at 10:00

## 2024-03-18 RX ADMIN — HUMAN INSULIN 12 UNIT(S): 100 INJECTION, SUSPENSION SUBCUTANEOUS at 23:22

## 2024-03-18 RX ADMIN — Medication 1 TABLET(S): at 12:32

## 2024-03-18 RX ADMIN — HUMAN INSULIN 12 UNIT(S): 100 INJECTION, SUSPENSION SUBCUTANEOUS at 05:46

## 2024-03-18 RX ADMIN — Medication 0.2 MILLIGRAM(S): at 05:21

## 2024-03-18 RX ADMIN — HEPARIN SODIUM 5000 UNIT(S): 5000 INJECTION INTRAVENOUS; SUBCUTANEOUS at 18:31

## 2024-03-18 NOTE — PROGRESS NOTE ADULT - ASSESSMENT
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   initial CTH3/1  with 8.9cm left frontal IPH with IVH .09cm rightward shift   3/2 CTH s/p L hmeicrani and resection of IPH. stable   3/5 CTH post op changes. some reorption of post op pneumocephalus.    s/p trach/peg 3/8/24   3/8 CTH L frontal craniectomy.  L MCA hemorrhage and infarct ; still IVH.   3/10 with + UA  A1c 5.7 LDL 46   TTE done 3/2: LA is severely dilated    Urine culture grew positive for klebsiella and enterococcus  3/9 EEG no seiuzres since 3/7;  risk of seiuzre from L parietal region. mod to severe diffuse cerebral dysfunction   Transferred to [facility] 3/11 for continued management.  3/12 CTH   sterotactic imaging of L frontal crani for hemorrhagic L MCA ifnarct. L frontal temporal pseudmeningocele  noted. no hcange since 3/8   o/e awake, no verbal, not followiing. L gaze pref  some minimal withdrawal to noxious RLE and LUE.  s/p trach /vent     Impression: L ICH possible 2/2  CAA but hemorrhagic infarct also needs to be considered.    - had recent CTH 3/12.  would repeat EEG at this time given high risk for seiuzres   - tolerating CPAP at times   - difficult to determine IPH 2/2 CAA without MRI to look at SWI or GRE sequeneses for hemosiderin deposition  - never had vessel imaging. consider CTA H/N   - no AC/AP. dvt ppx okay  - briviact 100mg TID  and vimpat 200mg BID for seiuzre ppx   - infectious workup. on meropenum.  this can lower seiuzre threshold   - immunosuppression on tacrolimus and prednisone.  ppx bactrim     -telemetry   - PT/OT   - check FS, glucose control <180  - GI/DVT ppx   - GOC with family.  currenlty full code; in terms of functional meaningful recovery the likelihood is low.  patient will require 24hr care and likely be bedbound at this time.    consider recalling palliative  Dieter Wilkinson MD  Vascular Neurology  Office: [phone]

## 2024-03-18 NOTE — PROGRESS NOTE ADULT - PROBLEM SELECTOR PLAN 5
- SBP goal:   - Clonidine 0.2 mg TID, Coreg 25 mg BID, Amlodipine 5mg daily  - Echo: LVEF 70-75%, Grade II diastolic dysfunction, septal bulge without obstruction  - cardiology following

## 2024-03-18 NOTE — PROGRESS NOTE ADULT - SUBJECTIVE AND OBJECTIVE BOX
Neurology        S: patient seen. no neuro changes, ill appearing       Medications: MEDICATIONS  (STANDING):  amLODIPine   Tablet 5 milliGRAM(s) Oral daily  artificial  tears Solution 1 Drop(s) Both EYES every 4 hours  Biotene Dry Mouth Oral Rinse 5 milliLiter(s) Swish and Spit every 6 hours  brivaracetam Oral Solution 100 milliGRAM(s) Oral <User Schedule>  carvedilol 25 milliGRAM(s) Oral every 12 hours  cloNIDine 0.2 milliGRAM(s) Oral two times a day  docosanol 10% Cream 1 Application(s) Topical daily  heparin   Injectable 5000 Unit(s) SubCutaneous every 12 hours  insulin lispro (ADMELOG) corrective regimen sliding scale   SubCutaneous every 6 hours  insulin NPH human recombinant 12 Unit(s) SubCutaneous every 6 hours  lacosamide Solution 200 milliGRAM(s) Oral two times a day  meropenem  IVPB 1000 milliGRAM(s) IV Intermittent every 12 hours  nystatin    Suspension [id number] Unit(s) Swish and Swallow every 6 hours  pantoprazole   Suspension 40 milliGRAM(s) Oral two times a day  predniSONE   Tablet 5 milliGRAM(s) Oral daily  sodium chloride 1 Gram(s) Oral two times a day  tacrolimus    0.5 mG/mL Suspension 5 milliGRAM(s) Oral two times a day  trimethoprim   80 mG/sulfamethoxazole 400 mG 1 Tablet(s) Oral daily    MEDICATIONS  (PRN):  acetaminophen   Oral Liquid .. 650 milliGRAM(s) Oral every 6 hours PRN Temp greater or equal to 38C (100.4F), Mild Pain (1 - 3)  albuterol/ipratropium for Nebulization 3 milliLiter(s) Nebulizer every 6 hours PRN Shortness of Breath and/or Wheezing       Vitals:  Vital Signs Last 24 Hrs  T(C): 36.7 (18 Mar 2024 05:00), Max: 37 (17 Mar 2024 17:51)  T(F): 98 (18 Mar 2024 05:00), Max: 98.6 (17 Mar 2024 17:51)  HR: 85 (18 Mar 2024 08:06) (85 - 105)  BP: 149/81 (18 Mar 2024 05:00) (113/73 - 163/88)  BP(mean): --  RR: 17 (18 Mar 2024 05:00) (16 - 20)  SpO2: 100% (18 Mar 2024 08:06) (98% - 102%)    Parameters below as of 18 Mar 2024 05:00  Patient On (Oxygen Delivery Method): ventilator            General Exam:   General Appearance: Appropriately dressed    Head: Normocephalic, atraumatic and no dysmorphic features s/p trach   Ear, Nose, and Throat: Moist mucous membranes  CVS: S1S2+  Resp: No SOB, no wheeze or rhonchi on vent   GI: soft NT/ND s/p PEG   Extremities:  L AKA  Skin: No bruises or rashes     Neurological Exam:  Mental Status:  opens eyes to noxious. no verbal. not following   Cranial Nerves: PERRL, EOMI but gaze pref to L, no blink to threat on R, R facial,    Motor: minimal/no spontaneous movement ;   Sensation: grimaces to noxious at times. some minimal withdrawal LUE 2/5 and RLE.    Coordination: unable   Gait: unable     Data/Labs/Imaging which I personally reviewed.       LABS:      03-18    135  |  100  |  55<H>  ----------------------------<  97  5.0   |  20<L>  |  1.45<H>    Ca    9.9      18 Mar 2024 07:32  Phos  3.4     03-18  Mg     2.7     03-18    TPro  6.6  /  Alb  3.2<L>  /  TBili  0.2  /  DBili  x   /  AST  37  /  ALT  40  /  AlkPhos  92  03-18    LIVER FUNCTIONS - ( 18 Mar 2024 07:32 )  Alb: 3.2 g/dL / Pro: 6.6 g/dL / ALK PHOS: 92 U/L / ALT: 40 U/L / AST: 37 U/L / GGT: x             Urinalysis Basic - ( 18 Mar 2024 07:32 )    Color: x / Appearance: x / SG: x / pH: x  Gluc: 97 mg/dL / Ketone: x  / Bili: x / Urobili: x   Blood: x / Protein: x / Nitrite: x   Leuk Esterase: x / RBC: x / WBC x   Sq Epi: x / Non Sq Epi: x / Bacteria: x        Urinalysis Basic - ( 14 Mar 2024 06:49 )    Color: x / Appearance: x / SG: x / pH: x  Gluc: 149 mg/dL / Ketone: x  / Bili: x / Urobili: x   Blood: x / Protein: x / Nitrite: x   Leuk Esterase: x / RBC: x / WBC x   Sq Epi: x / Non Sq Epi: x / Bacteria: x

## 2024-03-18 NOTE — PROGRESS NOTE ADULT - ASSESSMENT
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 She is s/p trach/peg 3/8/24.  Febrile 3/10 with + UA, blood/sputum culture NGTD.  Treatment for UTI initiated with ceftriaxone, eventually broadened to cefepime.  Transferred to [facility] 3/11 for continued management.  Pt arrived from [facility] with minimal mental status with only response of spontaneous eye opening and withdrawal on RLE.  Urine culture resulted - positive for klebsiella, treated for 7 days with Meropenem 3/12 -->3/19.       3/17:  Tacro level 7.8 this am.  Spoke with Dr. MARAL Perea from transplant nephro - plan is to keep dosing as is, will f/u with tomorrows AM tacro level.  Pt did not tolerate PS trail, became tachypneic to mid 40s.  No improvement neurologically thus far, will continue to monitor.  Sodium Chloride tabs added.     77 yo F with PMHx ESRD s/p renal transplant (2019, now off HD), left AKA, BK viremia, HTN, breast ca s/p lumpectomy (completed Tamoxifen 03/2023), DVT (off Eliquis), HLD, gout presenting 3/1 with left ICH (ICH score 4) likely 2/2 amyloid angiopathy s/p left craniectomy(discarded) and evacuation as well as EVD placement and removal (3/7). She is s/p trach/peg 3/8/24.  Febrile 3/10 with + UA, blood/sputum culture NGTD.  Treatment for UTI initiated with ceftriaxone, eventually broadened to cefepime.  Transferred to [facility] 3/11 for continued management.  Pt arrived from NSCU with minimal mental status with only response of spontaneous eye opening and withdrawal on RLE.  Urine culture resulted - positive for klebsiella, treated for 7 days with Meropenem 3/12 -->3/19.       3/18:   77 yo F with PMHx ESRD s/p renal transplant (2019, now off HD), left AKA, BK viremia, HTN, breast ca s/p lumpectomy (completed Tamoxifen 03/2023), DVT (off Eliquis), HLD, gout presenting 3/1 with left ICH (ICH score 4) likely 2/2 amyloid angiopathy s/p left craniectomy(discarded) and evacuation as well as EVD placement and removal (3/7). She is s/p trach/peg 3/8/24.  Febrile 3/10 with + UA, blood/sputum culture NGTD.  Treatment for UTI initiated with ceftriaxone, eventually broadened to cefepime.  Transferred to [facility] 3/11 for continued management.  Pt arrived from NSCU with minimal mental status with only response of spontaneous eye opening and withdrawal on RLE.  Urine culture resulted - positive for klebsiella, treated for 7 days with Meropenem 3/12 -->3/19.       3/18:  Some improvement in mental status today - opened eyes with spontaneous movements in all extremities.  Reached out to nsx regarding cranioplasty planning - they will see patient.  Unable to tolerate PS.  Frequent liquid stooling, rectal re-placed.  Family updated at bedside.

## 2024-03-18 NOTE — PROGRESS NOTE ADULT - PROBLEM SELECTOR PLAN 4
- + UA on 3/10  - Urine Cx 3/10: ESBL Klebsiella and VRE 10-49 K   - Treating the ESBL klebsiella with Meropenem 1GM Q12H 3/12 -->3/19

## 2024-03-18 NOTE — PROGRESS NOTE ADULT - PROBLEM SELECTOR PLAN 8
- S/p PEG 3/8  - tolerating tube feeds    [] H. 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 Bismuth may turn her stool black. After completing treatment would cont once daily PPI for ppx. Pending clinical course, consider repeat stool Ag in 8 weeks to confirm clearance

## 2024-03-18 NOTE — PROGRESS NOTE ADULT - PROBLEM SELECTOR PLAN 2
- S/p trach 3/8 by surgery by Dr. Joselo Mayorga   - Tracheostomy Sutures removed 3/13  - Vent Settings: PRVC 14/450/+5/30%  - Attempt PS Trials as tolerated   - Cheynes-clay breathing pattern observed however tolerating weaning trials  - Continue airway clearance; Duonebs q6h PRN

## 2024-03-18 NOTE — PROGRESS NOTE ADULT - PROBLEM SELECTOR PLAN 6
- AVF in the left upper extremity  - s/p renal transplant in 2019  - prednisone 5mg, bactrim ppx  - tacro 5mg BID as per transplant nephro  - trach goal 4-7, daily tacro levels 30 mins prior to dose administration

## 2024-03-18 NOTE — PROGRESS NOTE ADULT - SUBJECTIVE AND OBJECTIVE BOX
Patient is a 78y old  Female who presents with a chief complaint of ICH (17 Mar 2024 11:08)      Interval Events:    REVIEW OF SYSTEMS:  [ ] Positive  [ ] All other systems negative  [ ] Unable to assess ROS because ________    Vital Signs Last 24 Hrs  T(C): 36.7 (03-18-24 @ 05:00), Max: 37 (03-17-24 @ 17:51)  T(F): 98 (03-18-24 @ 05:00), Max: 98.6 (03-17-24 @ 17:51)  HR: 100 (03-18-24 @ 05:00) (88 - 105)  BP: 149/81 (03-18-24 @ 05:00) (113/73 - 163/88)  RR: 17 (03-18-24 @ 05:00) (16 - 20)  SpO2: 100% (03-18-24 @ 05:00) (98% - 102%)    PHYSICAL EXAM:  HEENT:   [ ]Tracheostomy:  [ ]Pupils equal  [ ]No oral lesions  [ ]Abnormal    SKIN  [ ]No Rash  [ ] Abnormal  [ ] pressure    CARDIAC  [ ]Regular  [ ]Abnormal    PULMONARY  [ ]Bilateral Clear Breath Sounds  [ ]Normal Excursion  [ ]Abnormal    GI  [ ]PEG      [ ] +BS		              [ ]Soft, nondistended, nontender	  [ ]Abnormal    MUSCULOSKELETAL                                   [ ]Bedbound                 [ ]Abnormal    [ ]Ambulatory/OOB to chair                           EXTREMITIES                                         [ ]Normal  [ ]Edema                           NEUROLOGIC  [ ] Normal, non focal  [ ] Focal findings:    PSYCHIATRIC  [ ]Alert and appropriate  [ ] Sedated	 [ ]Agitated    :  Gardner: [ ] Yes, if yes: Date of Placement:                   [  ] No    LINES: Central Lines [ ] Yes, if yes: Date of Placement                                     [  ] No    HOSPITAL MEDICATIONS:  MEDICATIONS  (STANDING):  amLODIPine   Tablet 5 milliGRAM(s) Oral daily  artificial  tears Solution 1 Drop(s) Both EYES every 4 hours  Biotene Dry Mouth Oral Rinse 5 milliLiter(s) Swish and Spit every 6 hours  brivaracetam Oral Solution 100 milliGRAM(s) Oral <User Schedule>  carvedilol 25 milliGRAM(s) Oral every 12 hours  cloNIDine 0.2 milliGRAM(s) Oral two times a day  docosanol 10% Cream 1 Application(s) Topical daily  heparin   Injectable 5000 Unit(s) SubCutaneous every 12 hours  insulin lispro (ADMELOG) corrective regimen sliding scale   SubCutaneous every 6 hours  insulin NPH human recombinant 12 Unit(s) SubCutaneous every 6 hours  lacosamide Solution 200 milliGRAM(s) Oral two times a day  meropenem  IVPB 1000 milliGRAM(s) IV Intermittent every 12 hours  nystatin    Suspension 999245 Unit(s) Swish and Swallow every 6 hours  pantoprazole   Suspension 40 milliGRAM(s) Oral two times a day  predniSONE   Tablet 5 milliGRAM(s) Oral daily  sodium chloride 1 Gram(s) Oral two times a day  tacrolimus    0.5 mG/mL Suspension 5 milliGRAM(s) Oral two times a day  trimethoprim   80 mG/sulfamethoxazole 400 mG 1 Tablet(s) Oral daily    MEDICATIONS  (PRN):  acetaminophen   Oral Liquid .. 650 milliGRAM(s) Oral every 6 hours PRN Temp greater or equal to 38C (100.4F), Mild Pain (1 - 3)  albuterol/ipratropium for Nebulization 3 milliLiter(s) Nebulizer every 6 hours PRN Shortness of Breath and/or Wheezing      LABS:    03-17    130<L>  |  95<L>  |  56<H>  ----------------------------<  165<H>  4.8   |  20<L>  |  1.41<H>    Ca    9.7      17 Mar 2024 06:24  Phos  3.2     03-17  Mg     2.3     03-17    TPro  6.9  /  Alb  3.3  /  TBili  0.2  /  DBili  x   /  AST  42<H>  /  ALT  44  /  AlkPhos  101  03-17      Urinalysis Basic - ( 17 Mar 2024 06:24 )    Color: x / Appearance: x / SG: x / pH: x  Gluc: 165 mg/dL / Ketone: x  / Bili: x / Urobili: x   Blood: x / Protein: x / Nitrite: x   Leuk Esterase: x / RBC: x / WBC x   Sq Epi: x / Non Sq Epi: x / Bacteria: x          CAPILLARY BLOOD GLUCOSE    MICROBIOLOGY:     RADIOLOGY:  [ ] Reviewed and interpreted by me    Mode: AC/ CMV (Assist Control/ Continuous Mandatory Ventilation)  RR (machine): 14  TV (machine): 450  FiO2: 30  PEEP: 5  ITime: 1  MAP: 12  PIP: 21   Patient is a 78y old  Female who presents with a chief complaint of ICH (17 Mar 2024 11:08)      Interval Events:  No acute events overnight.     REVIEW OF SYSTEMS:  [ ] Positive  [ ] All other systems negative  [X] Unable to assess ROS because ___Obtunded 2/2 CVA events___    Vital Signs Last 24 Hrs  T(C): 36.7 (03-18-24 @ 05:00), Max: 37 (03-17-24 @ 17:51)  T(F): 98 (03-18-24 @ 05:00), Max: 98.6 (03-17-24 @ 17:51)  HR: 100 (03-18-24 @ 05:00) (88 - 105)  BP: 149/81 (03-18-24 @ 05:00) (113/73 - 163/88)  RR: 17 (03-18-24 @ 05:00) (16 - 20)  SpO2: 100% (03-18-24 @ 05:00) (98% - 102%)    PHYSICAL EXAM:  HEENT:   [X]Tracheostomy: #7 cuffed portex  [X]Pupils equal  [ ]No oral lesions  [X]Abnormal - missing upper dentition, partially absent right lower dentition; tongue not swollen but partially protrudes through mouth    SKIN  [X]No Rash - L BKA skin intact  [X] Abnormal - LUE AVF no bruit no thrill  [X] pressure - sacral DTI    CARDIAC  [X]Regular  [ ]Abnormal    PULMONARY  [ ]Bilateral Clear Breath Sounds  [ ]Normal Excursion  [X]Abnormal - BL Coarse BS    GI  [X]PEG      [X] +BS		              [X]Soft, nondistended, nontender	  [ ]Abnormal    MUSCULOSKELETAL                                   [X]Bedbound                 [X]Abnormal - L BKA  [ ]Ambulatory/OOB to chair                           EXTREMITIES                                         [X]Normal  [ ]Edema                         NEUROLOGIC  [ ] Normal, non focal  [X] Focal findings: opened eyes to sternal rub, does not follow commands, withdraws on RLE and LUE, did not withdraw to pain on RUE    PSYCHIATRIC  [X] Lethargic   [ ] Sedated	 [ ]Agitated    :  Gardner: [ ] Yes, if yes: Date of Placement:                   [X] No    LINES: Central Lines [ ] Yes, if yes: Date of Placement                                     [X] No    HOSPITAL MEDICATIONS:  MEDICATIONS  (STANDING):  amLODIPine   Tablet 5 milliGRAM(s) Oral daily  artificial  tears Solution 1 Drop(s) Both EYES every 4 hours  Biotene Dry Mouth Oral Rinse 5 milliLiter(s) Swish and Spit every 6 hours  brivaracetam Oral Solution 100 milliGRAM(s) Oral <User Schedule>  carvedilol 25 milliGRAM(s) Oral every 12 hours  cloNIDine 0.2 milliGRAM(s) Oral two times a day  docosanol 10% Cream 1 Application(s) Topical daily  heparin   Injectable 5000 Unit(s) SubCutaneous every 12 hours  insulin lispro (ADMELOG) corrective regimen sliding scale   SubCutaneous every 6 hours  insulin NPH human recombinant 12 Unit(s) SubCutaneous every 6 hours  lacosamide Solution 200 milliGRAM(s) Oral two times a day  meropenem  IVPB 1000 milliGRAM(s) IV Intermittent every 12 hours  nystatin    Suspension 145164 Unit(s) Swish and Swallow every 6 hours  pantoprazole   Suspension 40 milliGRAM(s) Oral two times a day  predniSONE   Tablet 5 milliGRAM(s) Oral daily  sodium chloride 1 Gram(s) Oral two times a day  tacrolimus    0.5 mG/mL Suspension 5 milliGRAM(s) Oral two times a day  trimethoprim   80 mG/sulfamethoxazole 400 mG 1 Tablet(s) Oral daily    MEDICATIONS  (PRN):  acetaminophen   Oral Liquid .. 650 milliGRAM(s) Oral every 6 hours PRN Temp greater or equal to 38C (100.4F), Mild Pain (1 - 3)  albuterol/ipratropium for Nebulization 3 milliLiter(s) Nebulizer every 6 hours PRN Shortness of Breath and/or Wheezing    LABS:    03-17    130<L>  |  95<L>  |  56<H>  ----------------------------<  165<H>  4.8   |  20<L>  |  1.41<H>    Ca    9.7      17 Mar 2024 06:24  Phos  3.2     03-17  Mg     2.3     03-17    TPro  6.9  /  Alb  3.3  /  TBili  0.2  /  DBili  x   /  AST  42<H>  /  ALT  44  /  AlkPhos  101  03-17    Urinalysis Basic - ( 17 Mar 2024 06:24 )    Color: x / Appearance: x / SG: x / pH: x  Gluc: 165 mg/dL / Ketone: x  / Bili: x / Urobili: x   Blood: x / Protein: x / Nitrite: x   Leuk Esterase: x / RBC: x / WBC x   Sq Epi: x / Non Sq Epi: x / Bacteria: x    CAPILLARY BLOOD GLUCOSE    MICROBIOLOGY:     RADIOLOGY:  [ ] Reviewed and interpreted by me    Mode: AC/ CMV (Assist Control/ Continuous Mandatory Ventilation)  RR (machine): 14  TV (machine): 450  FiO2: 30  PEEP: 5  ITime: 1  MAP: 12  PIP: 21

## 2024-03-18 NOTE — PROGRESS NOTE ADULT - PROBLEM SELECTOR PLAN 9
- VA Duplex 3/2: DVT RT cfv, femoral, popliteal and gastrocnemius veins; DVT LEFT cfv and femoral vein  - S/p IVCF by IR on 3/3  - Cont Heparin Sub Q

## 2024-03-18 NOTE — PROGRESS NOTE ADULT - ASSESSMENT
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 S/p craniectomy and evacuation course c/b seizures, last seen on 3/7/24 EEG currently on AEDS. Now s/p trach/peg 3/9/24. During EGD/PEG found to have superficial gastric ulcers. H. Pylori Stool Ag positive, for which GI is consulted.     1. H. Pylori   Bismuth quadruple therapy x 14 days, to start post discharge     2. Resp failure s/p trach/PEG    3. IPH s/p craniotomy     4. PUD   daily PPI      I had a prolonged conversation with the patient regarding the hospital course, differential diagnosis, results of diagnostic tests this far, and therapeutic modalities available. Plan of care discussed with the patient after the evaluation. Patient expresses a clear understanding of the plan of care.    Jose Antonio Sepulveda D.O.  Gastroenterology and Hepatology  [address], [address]  [place], [place]  Office: [phone]

## 2024-03-18 NOTE — PROGRESS NOTE ADULT - NS ATTEND AMEND GEN_ALL_CORE FT
77 y/o F w/ESRD s/p transplant on immunosuppression, BK viremia, breast CA s/p lumpectomy + course of tamoxifen, DVT not on AC admitted for ICH s/p L craniectomy w/evacuation w/hospital course c/b acute respiratory failure now s/p trach/PEG and DVT s/p IVC filter.    - AEDs as per neuro  - Tolerating trach collar ATC  - Immunosuppresives as per renal  - H. Pylori treatment after discharge as per GI  - No therapeutic AC  - Complete course of abx for UTI  - Dispo planning 77 y/o F w/ESRD s/p transplant on immunosuppression, BK viremia, breast CA s/p lumpectomy + course of tamoxifen, DVT not on AC admitted for ICH s/p L craniectomy w/evacuation w/hospital course c/b acute respiratory failure now s/p trach/PEG and DVT s/p IVC filter.    - AEDs as per neuro  - - Wean from vent as tolerated  - Immunosuppresives as per renal  - H. Pylori treatment after discharge as per GI  - No therapeutic AC  - Complete course of abx for UTI  - Dispo planning 77 y/o F w/ESRD s/p transplant on immunosuppression, BK viremia, breast CA s/p lumpectomy + course of tamoxifen, DVT not on AC admitted for ICH s/p L craniectomy w/evacuation w/hospital course c/b acute respiratory failure now s/p trach/PEG and DVT s/p IVC filter.    - AEDs as per neuro  - Wean from vent as tolerated  - Immunosuppresives as per renal  - H. Pylori treatment after discharge as per GI  - No therapeutic AC  - Complete course of abx for UTI  - Dispo planning

## 2024-03-18 NOTE — PROGRESS NOTE ADULT - SUBJECTIVE AND OBJECTIVE BOX
DATE OF SERVICE: 03-18-24 @ 12:31    Patient is a 78y old  Female who presents with a chief complaint of ICH (18 Mar 2024 11:58)      INTERVAL HISTORY: feels well    REVIEW OF SYSTEMS:  CONSTITUTIONAL: No weakness  EYES/ENT: No visual changes;  No throat pain   NECK: No pain or stiffness  RESPIRATORY: No cough, wheezing; No shortness of breath  CARDIOVASCULAR: No chest pain or palpitations  GASTROINTESTINAL: No abdominal  pain. No nausea, vomiting, or hematemesis  GENITOURINARY: No dysuria, frequency or hematuria  NEUROLOGICAL: No stroke like symptoms  SKIN: No rashes      	  MEDICATIONS:  amLODIPine   Tablet 5 milliGRAM(s) Oral daily  carvedilol 25 milliGRAM(s) Oral every 12 hours  cloNIDine 0.2 milliGRAM(s) Oral two times a day        PHYSICAL EXAM:  T(C): 37.1 (03-18-24 @ 09:00), Max: 37.1 (03-18-24 @ 09:00)  HR: 104 (03-18-24 @ 09:00) (85 - 105)  BP: 141/62 (03-18-24 @ 09:00) (113/73 - 163/88)  RR: 130 (03-18-24 @ 09:00) (16 - 130)  SpO2: 98% (03-18-24 @ 09:00) (98% - 102%)  Wt(kg): --  I&O's Summary    17 Mar 2024 07:01  -  18 Mar 2024 07:00  --------------------------------------------------------  IN: 1450 mL / OUT: 1050 mL / NET: 400 mL          Appearance: In no distress	  HEENT:    PERRL, EOMI	  Cardiovascular:  S1 S2, No JVD  Respiratory: Lungs clear to auscultation	  Gastrointestinal:  Soft, Non-tender, + BS	  Vascularature:  No edema of LE  Psychiatric: Appropriate affect   Neuro: no acute focal deficits           03-18    135  |  100  |  55<H>  ----------------------------<  97  5.0   |  20<L>  |  1.45<H>    Ca    9.9      18 Mar 2024 07:32  Phos  3.4     03-18  Mg     2.7     03-18    TPro  6.6  /  Alb  3.2<L>  /  TBili  0.2  /  DBili  x   /  AST  37  /  ALT  40  /  AlkPhos  92  03-18        Labs personally reviewed      ASSESSMENT/PLAN: 	  78F Hx ESRD s/p renal xplant c/b DGF off HD, L AKA, 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    1. Abnormal Echo --  Septal bulge noted measures 2.2cm without obstruction.   -- Given no intracavitary obstruction no intervention at this time  - No Myxoma seen on this echo or echo in 2019, ? if hypermobile interatrial septum was confused for on prior imaging     2. HTN - uncontrolled, needs improvement   Continue Hydralazine 100 mg Q8H, clonidine 0.1 mg TID, Coreg 25 mg BID   - consider amlodipine 5mg and titrate up as needed for better b/p measurements     3. 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 DVT PPX - HSQ when safe          LAURA Gomez DO [facility]  Cardiovascular Medicine  [address], [address]  Available via call or text on [employer]  Office: [phone]   DATE OF SERVICE: 03-18-24 @ 12:31    Patient is a 78y old  Female who presents with a chief complaint of ICH (18 Mar 2024 11:58)      INTERVAL HISTORY: feels well    REVIEW OF SYSTEMS:  CONSTITUTIONAL: No weakness  EYES/ENT: No visual changes;  No throat pain   NECK: No pain or stiffness  RESPIRATORY: No cough, wheezing; No shortness of breath  CARDIOVASCULAR: No chest pain or palpitations  GASTROINTESTINAL: No abdominal  pain. No nausea, vomiting, or hematemesis  GENITOURINARY: No dysuria, frequency or hematuria  NEUROLOGICAL: No stroke like symptoms  SKIN: No rashes      	  MEDICATIONS:  amLODIPine   Tablet 5 milliGRAM(s) Oral daily  carvedilol 25 milliGRAM(s) Oral every 12 hours  cloNIDine 0.2 milliGRAM(s) Oral two times a day        PHYSICAL EXAM:  T(C): 37.1 (03-18-24 @ 09:00), Max: 37.1 (03-18-24 @ 09:00)  HR: 104 (03-18-24 @ 09:00) (85 - 105)  BP: 141/62 (03-18-24 @ 09:00) (113/73 - 163/88)  RR: 130 (03-18-24 @ 09:00) (16 - 130)  SpO2: 98% (03-18-24 @ 09:00) (98% - 102%)  Wt(kg): --  I&O's Summary    17 Mar 2024 07:01  -  18 Mar 2024 07:00  --------------------------------------------------------  IN: 1450 mL / OUT: 1050 mL / NET: 400 mL          Appearance: In no distress	  HEENT:    PERRL, EOMI	  Cardiovascular:  S1 S2, No JVD  Respiratory: Lungs clear to auscultation	  Gastrointestinal:  Soft, Non-tender, + BS	  Vascularature:  No edema of LE  Psychiatric: Appropriate affect   Neuro: no acute focal deficits           03-18    135  |  100  |  55<H>  ----------------------------<  97  5.0   |  20<L>  |  1.45<H>    Ca    9.9      18 Mar 2024 07:32  Phos  3.4     03-18  Mg     2.7     03-18    TPro  6.6  /  Alb  3.2<L>  /  TBili  0.2  /  DBili  x   /  AST  37  /  ALT  40  /  AlkPhos  92  03-18        Labs personally reviewed      ASSESSMENT/PLAN: 	  78F Hx ESRD s/p renal xplant c/b DGF off HD, L AKA, 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    1. Abnormal Echo --  Septal bulge noted measures 2.2cm without obstruction.   -- Given no intracavitary obstruction no intervention at this time  - No Myxoma seen on this echo or echo in 2019, ? if hypermobile interatrial septum was confused for on prior imaging     2. HTN - uncontrolled, needs improvement   Continue Hydralazine 100 mg Q8H, clonidine 0.1 mg TID, Coreg 25 mg BID   - consider amlodipine 5mg and titrate up as needed for better b/p measurements     3. 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 DVT PPX - HSQ when safe          LAURA Gomez DO [facility]  Cardiovascular Medicine  [address], [address]  Available via call or text on [employer]  Office: [phone]

## 2024-03-18 NOTE — PROGRESS NOTE ADULT - SUBJECTIVE AND OBJECTIVE BOX
Chief Complaint:  Patient is a 78y old  Female who presents with a chief complaint of ICH (18 Mar 2024 09:01)      Date of service 03-18-24 @ 11:59      Interval Events:   no acute events     Hospital Medications:  acetaminophen   Oral Liquid .. 650 milliGRAM(s) Oral every 6 hours PRN  albuterol/ipratropium for Nebulization 3 milliLiter(s) Nebulizer every 6 hours PRN  amLODIPine   Tablet 5 milliGRAM(s) Oral daily  artificial  tears Solution 1 Drop(s) Both EYES every 4 hours  Biotene Dry Mouth Oral Rinse 5 milliLiter(s) Swish and Spit every 6 hours  brivaracetam Oral Solution 100 milliGRAM(s) Oral <User Schedule>  carvedilol 25 milliGRAM(s) Oral every 12 hours  cloNIDine 0.2 milliGRAM(s) Oral two times a day  docosanol 10% Cream 1 Application(s) Topical daily  heparin   Injectable 5000 Unit(s) SubCutaneous every 12 hours  insulin lispro (ADMELOG) corrective regimen sliding scale   SubCutaneous every 6 hours  insulin NPH human recombinant 12 Unit(s) SubCutaneous every 6 hours  lacosamide Solution 200 milliGRAM(s) Oral two times a day  meropenem  IVPB 1000 milliGRAM(s) IV Intermittent every 12 hours  nystatin    Suspension [id number] Unit(s) Swish and Swallow every 6 hours  pantoprazole   Suspension 40 milliGRAM(s) Oral two times a day  predniSONE   Tablet 5 milliGRAM(s) Oral daily  sodium chloride 1 Gram(s) Oral two times a day  tacrolimus    0.5 mG/mL Suspension 5 milliGRAM(s) Oral two times a day  trimethoprim   80 mG/sulfamethoxazole 400 mG 1 Tablet(s) Oral daily        Review of Systems:  no acute events     PHYSICAL EXAM:   Vital Signs:  Vital Signs Last 24 Hrs  T(C): 37.1 (18 Mar 2024 09:00), Max: 37.1 (18 Mar 2024 09:00)  T(F): 98.8 (18 Mar 2024 09:00), Max: 98.8 (18 Mar 2024 09:00)  HR: 104 (18 Mar 2024 09:00) (85 - 105)  BP: 141/62 (18 Mar 2024 09:00) (113/73 - 163/88)  BP(mean): --  RR: 130 (18 Mar 2024 09:00) (16 - 130)  SpO2: 98% (18 Mar 2024 09:00) (98% - 102%)    Parameters below as of 18 Mar 2024 09:00  Patient On (Oxygen Delivery Method): ventilator      Daily     Daily       PHYSICAL EXAM:     GENERAL:  Appears stated age, well-groomed, well-nourished, no distress  HEENT:  NC/AT,  conjunctivae anicteric, clear and pink,   NECK: supple, trachea midline  CHEST:  Full & symmetric excursion, no increased effort, breath sounds clear  HEART:  Regular rhythm, no JVD  ABDOMEN:  Soft, non-tender, non-distended, normoactive bowel sounds,  no masses , no hepatosplenomegaly  EXTREMITIES:  no cyanosis,clubbing or edema  SKIN:  No rash, erythema, or, ecchymoses, no jaundice  NEURO:  Alert, non-focal, no asterixis  PSYCH: Appropriate affect, oriented to place and time  RECTAL: Deferred      LABS Personally reviewed by me:      03-18    135  |  100  |  55<H>  ----------------------------<  97  5.0   |  20<L>  |  1.45<H>    Ca    9.9      18 Mar 2024 07:32  Phos  3.4     03-18  Mg     2.7     03-18    TPro  6.6  /  Alb  3.2<L>  /  TBili  0.2  /  DBili  x   /  AST  37  /  ALT  40  /  AlkPhos  92  03-18    LIVER FUNCTIONS - ( 18 Mar 2024 07:32 )  Alb: 3.2 g/dL / Pro: 6.6 g/dL / ALK PHOS: 92 U/L / ALT: 40 U/L / AST: 37 U/L / GGT: x             Urinalysis Basic - ( 18 Mar 2024 07:32 )    Color: x / Appearance: x / SG: x / pH: x  Gluc: 97 mg/dL / Ketone: x  / Bili: x / Urobili: x   Blood: x / Protein: x / Nitrite: x   Leuk Esterase: x / RBC: x / WBC x   Sq Epi: x / Non Sq Epi: x / Bacteria: x                              8.6    5.93  )-----------( 181      ( 16 Mar 2024 06:59 )             27.4       Imaging personally reviewed by me:

## 2024-03-19 LAB
ALBUMIN SERPL ELPH-MCNC: 3.3 G/DL — SIGNIFICANT CHANGE UP (ref 3.3–5)
ALP SERPL-CCNC: 81 U/L — SIGNIFICANT CHANGE UP (ref 40–120)
ALT FLD-CCNC: 35 U/L — SIGNIFICANT CHANGE UP (ref 10–45)
ANION GAP SERPL CALC-SCNC: 13 MMOL/L — SIGNIFICANT CHANGE UP (ref 5–17)
AST SERPL-CCNC: 32 U/L — SIGNIFICANT CHANGE UP (ref 10–40)
BILIRUB SERPL-MCNC: 0.2 MG/DL — SIGNIFICANT CHANGE UP (ref 0.2–1.2)
BUN SERPL-MCNC: 51 MG/DL — HIGH (ref 7–23)
CALCIUM SERPL-MCNC: 10.1 MG/DL — SIGNIFICANT CHANGE UP (ref 8.4–10.5)
CHLORIDE SERPL-SCNC: 103 MMOL/L — SIGNIFICANT CHANGE UP (ref 96–108)
CO2 SERPL-SCNC: 21 MMOL/L — LOW (ref 22–31)
CREAT SERPL-MCNC: 1.42 MG/DL — HIGH (ref 0.5–1.3)
EGFR: 38 ML/MIN/1.73M2 — LOW
GLUCOSE BLDC GLUCOMTR-MCNC: 125 MG/DL — HIGH (ref 70–99)
GLUCOSE BLDC GLUCOMTR-MCNC: 219 MG/DL — HIGH (ref 70–99)
GLUCOSE BLDC GLUCOMTR-MCNC: 230 MG/DL — HIGH (ref 70–99)
GLUCOSE SERPL-MCNC: 111 MG/DL — HIGH (ref 70–99)
HCT VFR BLD CALC: 25.5 % — LOW (ref 34.5–45)
HGB BLD-MCNC: 8.1 G/DL — LOW (ref 11.5–15.5)
MAGNESIUM SERPL-MCNC: 2.5 MG/DL — SIGNIFICANT CHANGE UP (ref 1.6–2.6)
MCHC RBC-ENTMCNC: 28.9 PG — SIGNIFICANT CHANGE UP (ref 27–34)
MCHC RBC-ENTMCNC: 31.8 GM/DL — LOW (ref 32–36)
MCV RBC AUTO: 91.1 FL — SIGNIFICANT CHANGE UP (ref 80–100)
NRBC # BLD: 0 /100 WBCS — SIGNIFICANT CHANGE UP (ref 0–0)
PHOSPHATE SERPL-MCNC: 3.6 MG/DL — SIGNIFICANT CHANGE UP (ref 2.5–4.5)
PLATELET # BLD AUTO: 209 K/UL — SIGNIFICANT CHANGE UP (ref 150–400)
POTASSIUM SERPL-MCNC: 5 MMOL/L — SIGNIFICANT CHANGE UP (ref 3.5–5.3)
POTASSIUM SERPL-SCNC: 5 MMOL/L — SIGNIFICANT CHANGE UP (ref 3.5–5.3)
PROT SERPL-MCNC: 6.4 G/DL — SIGNIFICANT CHANGE UP (ref 6–8.3)
RBC # BLD: 2.8 M/UL — LOW (ref 3.8–5.2)
RBC # FLD: 15.4 % — HIGH (ref 10.3–14.5)
SODIUM SERPL-SCNC: 137 MMOL/L — SIGNIFICANT CHANGE UP (ref 135–145)
TACROLIMUS SERPL-MCNC: 6.8 NG/ML — SIGNIFICANT CHANGE UP
WBC # BLD: 6.47 K/UL — SIGNIFICANT CHANGE UP (ref 3.8–10.5)
WBC # FLD AUTO: 6.47 K/UL — SIGNIFICANT CHANGE UP (ref 3.8–10.5)

## 2024-03-19 PROCEDURE — 70450 CT HEAD/BRAIN W/O DYE: CPT | Mod: 26

## 2024-03-19 PROCEDURE — 99232 SBSQ HOSP IP/OBS MODERATE 35: CPT

## 2024-03-19 RX ADMIN — HUMAN INSULIN 12 UNIT(S): 100 INJECTION, SUSPENSION SUBCUTANEOUS at 05:37

## 2024-03-19 RX ADMIN — TACROLIMUS 5 MILLIGRAM(S): 5 CAPSULE ORAL at 05:50

## 2024-03-19 RX ADMIN — LACOSAMIDE 200 MILLIGRAM(S): 50 TABLET ORAL at 05:36

## 2024-03-19 RX ADMIN — PANTOPRAZOLE SODIUM 40 MILLIGRAM(S): 20 TABLET, DELAYED RELEASE ORAL at 05:35

## 2024-03-19 RX ADMIN — AMLODIPINE BESYLATE 5 MILLIGRAM(S): 2.5 TABLET ORAL at 05:36

## 2024-03-19 RX ADMIN — HEPARIN SODIUM 5000 UNIT(S): 5000 INJECTION INTRAVENOUS; SUBCUTANEOUS at 17:13

## 2024-03-19 RX ADMIN — BRIVARACETAM 100 MILLIGRAM(S): 25 TABLET, FILM COATED ORAL at 22:21

## 2024-03-19 RX ADMIN — CARVEDILOL PHOSPHATE 25 MILLIGRAM(S): 80 CAPSULE, EXTENDED RELEASE ORAL at 05:36

## 2024-03-19 RX ADMIN — SODIUM CHLORIDE 1 GRAM(S): 9 INJECTION INTRAMUSCULAR; INTRAVENOUS; SUBCUTANEOUS at 18:00

## 2024-03-19 RX ADMIN — Medication 1 DROP(S): at 12:53

## 2024-03-19 RX ADMIN — PANTOPRAZOLE SODIUM 40 MILLIGRAM(S): 20 TABLET, DELAYED RELEASE ORAL at 17:11

## 2024-03-19 RX ADMIN — HUMAN INSULIN 12 UNIT(S): 100 INJECTION, SUSPENSION SUBCUTANEOUS at 12:49

## 2024-03-19 RX ADMIN — HUMAN INSULIN 12 UNIT(S): 100 INJECTION, SUSPENSION SUBCUTANEOUS at 17:12

## 2024-03-19 RX ADMIN — Medication 1 DROP(S): at 13:56

## 2024-03-19 RX ADMIN — Medication 5 MILLILITER(S): at 17:12

## 2024-03-19 RX ADMIN — TACROLIMUS 5 MILLIGRAM(S): 5 CAPSULE ORAL at 17:25

## 2024-03-19 RX ADMIN — Medication 5 MILLILITER(S): at 12:49

## 2024-03-19 RX ADMIN — Medication 1 DROP(S): at 17:12

## 2024-03-19 RX ADMIN — BRIVARACETAM 100 MILLIGRAM(S): 25 TABLET, FILM COATED ORAL at 05:35

## 2024-03-19 RX ADMIN — Medication 4: at 17:12

## 2024-03-19 RX ADMIN — SODIUM CHLORIDE 1 GRAM(S): 9 INJECTION INTRAMUSCULAR; INTRAVENOUS; SUBCUTANEOUS at 05:37

## 2024-03-19 RX ADMIN — DOCOSANOL 1 APPLICATION(S): 100 CREAM TOPICAL at 12:52

## 2024-03-19 RX ADMIN — Medication 5 MILLIGRAM(S): at 05:36

## 2024-03-19 RX ADMIN — Medication 5 MILLILITER(S): at 05:37

## 2024-03-19 RX ADMIN — Medication 0.2 MILLIGRAM(S): at 17:11

## 2024-03-19 RX ADMIN — MEROPENEM 100 MILLIGRAM(S): 1 INJECTION INTRAVENOUS at 05:35

## 2024-03-19 RX ADMIN — Medication 500000 UNIT(S): at 05:37

## 2024-03-19 RX ADMIN — LACOSAMIDE 200 MILLIGRAM(S): 50 TABLET ORAL at 17:10

## 2024-03-19 RX ADMIN — Medication 1 TABLET(S): at 12:52

## 2024-03-19 RX ADMIN — Medication 1 DROP(S): at 21:18

## 2024-03-19 RX ADMIN — BRIVARACETAM 100 MILLIGRAM(S): 25 TABLET, FILM COATED ORAL at 13:56

## 2024-03-19 RX ADMIN — Medication 0.2 MILLIGRAM(S): at 05:34

## 2024-03-19 RX ADMIN — Medication 500000 UNIT(S): at 12:49

## 2024-03-19 RX ADMIN — CARVEDILOL PHOSPHATE 25 MILLIGRAM(S): 80 CAPSULE, EXTENDED RELEASE ORAL at 17:13

## 2024-03-19 RX ADMIN — Medication 5 MILLILITER(S): at 23:18

## 2024-03-19 RX ADMIN — Medication 4: at 12:51

## 2024-03-19 RX ADMIN — Medication 500000 UNIT(S): at 17:11

## 2024-03-19 RX ADMIN — Medication 500000 UNIT(S): at 23:18

## 2024-03-19 RX ADMIN — HEPARIN SODIUM 5000 UNIT(S): 5000 INJECTION INTRAVENOUS; SUBCUTANEOUS at 05:36

## 2024-03-19 RX ADMIN — Medication 1 DROP(S): at 05:35

## 2024-03-19 RX ADMIN — Medication 1 DROP(S): at 01:54

## 2024-03-19 NOTE — PROGRESS NOTE ADULT - SUBJECTIVE AND OBJECTIVE BOX
DATE OF SERVICE: 03-19-24 @ 13:38    Patient is a 78y old  Female who presents with a chief complaint of ICH (19 Mar 2024 07:21)      INTERVAL HISTORY: In no acute distress.     REVIEW OF SYSTEMS: Unable to participate in ROS  CONSTITUTIONAL: No weakness  EYES/ENT: No visual changes;  No throat pain   NECK: No pain or stiffness  RESPIRATORY: No cough, wheezing; No shortness of breath  CARDIOVASCULAR: No chest pain or palpitations  GASTROINTESTINAL: No abdominal  pain. No nausea, vomiting, or hematemesis  GENITOURINARY: No dysuria, frequency or hematuria  NEUROLOGICAL: No stroke like symptoms  SKIN: No rashes    	  MEDICATIONS:  amLODIPine   Tablet 5 milliGRAM(s) Oral daily  carvedilol 25 milliGRAM(s) Oral every 12 hours  cloNIDine 0.2 milliGRAM(s) Oral two times a day        PHYSICAL EXAM:  T(C): 36.9 (03-19-24 @ 10:49), Max: 37.3 (03-19-24 @ 05:09)  HR: 101 (03-19-24 @ 10:49) (87 - 112)  BP: 136/50 (03-19-24 @ 10:49) (136/50 - 156/119)  RR: 18 (03-19-24 @ 10:49) (15 - 20)  SpO2: 100% (03-19-24 @ 10:49) (99% - 100%)  Wt(kg): --  I&O's Summary    18 Mar 2024 07:01  -  19 Mar 2024 07:00  --------------------------------------------------------  IN: 750 mL / OUT: 800 mL / NET: -50 mL          Appearance: In no distress	  HEENT:    PERRL, EOMI	  Cardiovascular:  S1 S2, No JVD  Respiratory: Lungs clear to auscultation	  Gastrointestinal:  Soft, Non-tender, + BS	  Vascularature:  L AKA  Psychiatric: Appropriate affect   Neuro: no acute focal deficits                               8.1    6.47  )-----------( 209      ( 19 Mar 2024 07:18 )             25.5     03-19    137  |  103  |  51<H>  ----------------------------<  111<H>  5.0   |  21<L>  |  1.42<H>    Ca    10.1      19 Mar 2024 07:18  Phos  3.6     03-19  Mg     2.5     03-19    TPro  6.4  /  Alb  3.3  /  TBili  0.2  /  DBili  x   /  AST  32  /  ALT  35  /  AlkPhos  81  03-19        Labs personally reviewed      ASSESSMENT/PLAN: 	    78F Hx ESRD s/p renal xplant c/b DGF off HD, L AKA, 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    1. Abnormal Echo --  Septal bulge noted measures 2.2cm without obstruction.   -- Given no intracavitary obstruction no intervention at this time  - No Myxoma seen on this echo or echo in 2019, ? if hypermobile interatrial septum was confused for on prior imaging     2. HTN - uncontrolled, needs improvement   Continue clonidine 0.2 mg BID, Coreg 25 mg BID,  amlodipine 5mgents     3. 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 DVT PPX - HSQ           MARGARITA Dominguez-RANDY Mcwilliams DO [facility]  Cardiovascular Medicine  [address], [address]  Available through call or text on [employer]  Office: [phone]

## 2024-03-19 NOTE — PROGRESS NOTE ADULT - PROBLEM SELECTOR PLAN 1
- Found to have L IPH on CTH likely 2/2 amyloid angiopathy  - S/p L hemicrani for evacuation of large ICH; bone discarded  - CT H Stero 3/12: S/p Left Frontal Crainectomy, Remains stable from prior CT on 3/8   - Patient will require eventual cranioplasty   - Maintain Neuro checks - Found to have L IPH on CTH likely 2/2 amyloid angiopathy  - S/p L hemicrani for evacuation of large ICH; bone discarded  - CT H Stero 3/12: S/p Left Frontal Crainectomy, Remains stable from prior CT on 3/8   - Patient will require eventual cranioplasty - tentative OR date 4/2  - Maintain Neuro checks

## 2024-03-19 NOTE — PROGRESS NOTE ADULT - ASSESSMENT
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 S/p craniectomy and evacuation course c/b seizures, last seen on 3/7/24 EEG currently on AEDS. Now s/p trach/peg 3/9/24. During EGD/PEG found to have superficial gastric ulcers. H. Pylori Stool Ag positive, for which GI is consulted.     1. H. Pylori   Bismuth quadruple therapy x 14 days, to start post discharge     2. Resp failure s/p trach/PEG  tolerating tube feeds    3. IPH s/p craniotomy     4. PUD   daily PPI      I had a prolonged conversation with the patient regarding the hospital course, differential diagnosis, results of diagnostic tests this far, and therapeutic modalities available. Plan of care discussed with the patient after the evaluation. Patient expresses a clear understanding of the plan of care.    Jose Antonio Sepulveda D.O.  Gastroenterology and Hepatology  [address] [place] [address], [address]  [place], [place]  Office: [phone]

## 2024-03-19 NOTE — PROGRESS NOTE ADULT - ASSESSMENT
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 She is s/p trach/peg 3/8/24.  Febrile 3/10 with + UA, blood/sputum culture NGTD.  Treatment for UTI initiated with ceftriaxone, eventually broadened to cefepime.  Transferred to [facility] 3/11 for continued management.  Pt arrived from NSCU with minimal mental status with only response of spontaneous eye opening and withdrawal on RLE.  Urine culture resulted - positive for klebsiella, treated for 7 days with Meropenem 3/12 -->3/19.       3/18:  Some improvement in mental status today - opened eyes with spontaneous movements in all extremities.  Reached out to nsx regarding cranioplasty planning - they will see patient.  Unable to tolerate PS.  Frequent liquid stooling, rectal re-placed.  Family updated at bedside.  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 She is s/p trach/peg 3/8/24.  Febrile 3/10 with + UA, blood/sputum culture NGTD.  Treatment for UTI initiated with ceftriaxone, eventually broadened to cefepime.  Transferred to [facility] 3/11 for continued management.  Pt arrived from NSCU with minimal mental status with only response of spontaneous eye opening and withdrawal on RLE.  Urine culture resulted - positive for klebsiella, treated for 7 days with Meropenem 3/12 -->3/19.       3/19:  79 yo F with PMHx ESRD s/p renal transplant (2019, now off HD), left AKA, BK viremia, HTN, breast ca s/p lumpectomy (completed Tamoxifen 03/2023), DVT (off Eliquis), HLD, gout presenting 3/1 with left ICH (ICH score 4) likely 2/2 amyloid angiopathy s/p left craniectomy(discarded) and evacuation as well as EVD placement and removal (3/7). She is s/p trach/peg 3/8/24.  Febrile 3/10 with + UA, blood/sputum culture NGTD.  Treatment for UTI initiated with ceftriaxone, eventually broadened to cefepime.  Transferred to [facility] 3/11 for continued management.  Pt arrived from NSCU with minimal mental status with only response of spontaneous eye opening and withdrawal on RLE.  Urine culture resulted - positive for klebsiella, treated for 7 days with Meropenem 3/12 -->3/19.  Continuing weaning from ventilator as tolerated.  Continuing to monitor for neurological improvement.       3/19:   Slow neurological improvement.  Is not tolerating PS.  Cranioplasty discussed with nsx, CT H pending and tentative OR date for 4/2.  Stables will be removed by nsx today.  Family updated at bedside.

## 2024-03-19 NOTE — PROGRESS NOTE ADULT - PROBLEM SELECTOR PLAN 4
- + UA on 3/10  - Urine Cx 3/10: ESBL Klebsiella and VRE 10-49 K   - Treating the ESBL klebsiella with Meropenem 1GM Q12H 3/12 -->3/19 - + UA on 3/10  - Urine Cx 3/10: ESBL Klebsiella and VRE 10-49 K   - Treating the ESBL klebsiella with Meropenem 1GM Q12H 3/12 --> 3/19

## 2024-03-19 NOTE — PROGRESS NOTE ADULT - PROBLEM SELECTOR PROBLEM 9
Orders Placed This Encounter   • SERVICE TO GASTROENTEROLOGY     Patient has a diagnosed R inguinal hernia, referral placed.     DVT, lower extremity

## 2024-03-19 NOTE — PROGRESS NOTE ADULT - SUBJECTIVE AND OBJECTIVE BOX
Chief Complaint:  Patient is a 78y old  Female who presents with a chief complaint of ICH (19 Mar 2024 13:37)      Date of service 03-19-24 @ 14:37      Interval Events:   no acute events     Hospital Medications:  acetaminophen   Oral Liquid .. 650 milliGRAM(s) Oral every 6 hours PRN  albuterol/ipratropium for Nebulization 3 milliLiter(s) Nebulizer every 6 hours PRN  amLODIPine   Tablet 5 milliGRAM(s) Oral daily  artificial  tears Solution 1 Drop(s) Both EYES every 4 hours  Biotene Dry Mouth Oral Rinse 5 milliLiter(s) Swish and Spit every 6 hours  brivaracetam Oral Solution 100 milliGRAM(s) Oral <User Schedule>  carvedilol 25 milliGRAM(s) Oral every 12 hours  cloNIDine 0.2 milliGRAM(s) Oral two times a day  docosanol 10% Cream 1 Application(s) Topical daily  heparin   Injectable 5000 Unit(s) SubCutaneous every 12 hours  insulin lispro (ADMELOG) corrective regimen sliding scale   SubCutaneous every 6 hours  insulin NPH human recombinant 12 Unit(s) SubCutaneous every 6 hours  lacosamide Solution 200 milliGRAM(s) Oral two times a day  nystatin    Suspension 077755 Unit(s) Swish and Swallow every 6 hours  pantoprazole   Suspension 40 milliGRAM(s) Oral two times a day  predniSONE   Tablet 5 milliGRAM(s) Oral daily  sodium chloride 1 Gram(s) Oral two times a day  tacrolimus    0.5 mG/mL Suspension 5 milliGRAM(s) Oral two times a day  trimethoprim   80 mG/sulfamethoxazole 400 mG 1 Tablet(s) Oral daily        Review of Systems:  unable to obtain     PHYSICAL EXAM:   Vital Signs:  Vital Signs Last 24 Hrs  T(C): 36.9 (19 Mar 2024 10:49), Max: 37.3 (19 Mar 2024 05:09)  T(F): 98.4 (19 Mar 2024 10:49), Max: 99.2 (19 Mar 2024 05:09)  HR: 101 (19 Mar 2024 10:49) (89 - 112)  BP: 136/50 (19 Mar 2024 10:49) (136/50 - 156/119)  BP(mean): --  RR: 18 (19 Mar 2024 10:49) (15 - 20)  SpO2: 100% (19 Mar 2024 10:49) (99% - 100%)    Parameters below as of 19 Mar 2024 10:49  Patient On (Oxygen Delivery Method): ventilator      Daily     Daily       PHYSICAL EXAM:     GENERAL:  Appears stated age, well-groomed, well-nourished, no distress  HEENT:  NC/AT,  conjunctivae anicteric, clear and pink,   NECK: supple, trachea midline  CHEST:  Full & symmetric excursion, no increased effort, breath sounds clear  HEART:  Regular rhythm, no JVD  ABDOMEN:  Soft, non-tender, non-distended, normoactive bowel sounds,  no masses , no hepatosplenomegaly  EXTREMITIES:  no cyanosis,clubbing or edema  SKIN:  No rash, erythema, or, ecchymoses, no jaundice  NEURO:  Alert, non-focal, no asterixis  PSYCH: Appropriate affect, oriented to place and time  RECTAL: Deferred      LABS Personally reviewed by me:                        8.1    6.47  )-----------( 209      ( 19 Mar 2024 07:18 )             25.5     Mean Cell Volume: 91.1 fl (03-19-24 @ 07:18)    03-19    137  |  103  |  51<H>  ----------------------------<  111<H>  5.0   |  21<L>  |  1.42<H>    Ca    10.1      19 Mar 2024 07:18  Phos  3.6     03-19  Mg     2.5     03-19    TPro  6.4  /  Alb  3.3  /  TBili  0.2  /  DBili  x   /  AST  32  /  ALT  35  /  AlkPhos  81  03-19    LIVER FUNCTIONS - ( 19 Mar 2024 07:18 )  Alb: 3.3 g/dL / Pro: 6.4 g/dL / ALK PHOS: 81 U/L / ALT: 35 U/L / AST: 32 U/L / GGT: x             Urinalysis Basic - ( 19 Mar 2024 07:18 )    Color: x / Appearance: x / SG: x / pH: x  Gluc: 111 mg/dL / Ketone: x  / Bili: x / Urobili: x   Blood: x / Protein: x / Nitrite: x   Leuk Esterase: x / RBC: x / WBC x   Sq Epi: x / Non Sq Epi: x / Bacteria: x                              8.1    6.47  )-----------( 209      ( 19 Mar 2024 07:18 )             25.5       Imaging personally reviewed by me:

## 2024-03-19 NOTE — PROGRESS NOTE ADULT - SUBJECTIVE AND OBJECTIVE BOX
Patient is a 78y old  Female who presents with a chief complaint of ICH (18 Mar 2024 12:31)      Interval Events:    REVIEW OF SYSTEMS:  [ ] Positive  [ ] All other systems negative  [ ] Unable to assess ROS because ________    Vital Signs Last 24 Hrs  T(C): 37.3 (03-19-24 @ 05:09), Max: 37.3 (03-19-24 @ 05:09)  T(F): 99.2 (03-19-24 @ 05:09), Max: 99.2 (03-19-24 @ 05:09)  HR: 92 (03-19-24 @ 05:09) (85 - 112)  BP: 138/59 (03-19-24 @ 05:09) (138/59 - 156/119)  RR: 15 (03-19-24 @ 05:09) (15 - 130)  SpO2: 100% (03-19-24 @ 05:09) (98% - 100%)    PHYSICAL EXAM:  HEENT:   [ ]Tracheostomy:  [ ]Pupils equal  [ ]No oral lesions  [ ]Abnormal    SKIN  [ ]No Rash  [ ] Abnormal  [ ] pressure    CARDIAC  [ ]Regular  [ ]Abnormal    PULMONARY  [ ]Bilateral Clear Breath Sounds  [ ]Normal Excursion  [ ]Abnormal    GI  [ ]PEG      [ ] +BS		              [ ]Soft, nondistended, nontender	  [ ]Abnormal    MUSCULOSKELETAL                                   [ ]Bedbound                 [ ]Abnormal    [ ]Ambulatory/OOB to chair                           EXTREMITIES                                         [ ]Normal  [ ]Edema                           NEUROLOGIC  [ ] Normal, non focal  [ ] Focal findings:    PSYCHIATRIC  [ ]Alert and appropriate  [ ] Sedated	 [ ]Agitated    :  Gardner: [ ] Yes, if yes: Date of Placement:                   [  ] No    LINES: Central Lines [ ] Yes, if yes: Date of Placement                                     [  ] No    HOSPITAL MEDICATIONS:  MEDICATIONS  (STANDING):  amLODIPine   Tablet 5 milliGRAM(s) Oral daily  artificial  tears Solution 1 Drop(s) Both EYES every 4 hours  Biotene Dry Mouth Oral Rinse 5 milliLiter(s) Swish and Spit every 6 hours  brivaracetam Oral Solution 100 milliGRAM(s) Oral <User Schedule>  carvedilol 25 milliGRAM(s) Oral every 12 hours  cloNIDine 0.2 milliGRAM(s) Oral two times a day  docosanol 10% Cream 1 Application(s) Topical daily  heparin   Injectable 5000 Unit(s) SubCutaneous every 12 hours  insulin lispro (ADMELOG) corrective regimen sliding scale   SubCutaneous every 6 hours  insulin NPH human recombinant 12 Unit(s) SubCutaneous every 6 hours  lacosamide Solution 200 milliGRAM(s) Oral two times a day  nystatin    Suspension 569233 Unit(s) Swish and Swallow every 6 hours  pantoprazole   Suspension 40 milliGRAM(s) Oral two times a day  predniSONE   Tablet 5 milliGRAM(s) Oral daily  sodium chloride 1 Gram(s) Oral two times a day  tacrolimus    0.5 mG/mL Suspension 5 milliGRAM(s) Oral two times a day  trimethoprim   80 mG/sulfamethoxazole 400 mG 1 Tablet(s) Oral daily    MEDICATIONS  (PRN):  acetaminophen   Oral Liquid .. 650 milliGRAM(s) Oral every 6 hours PRN Temp greater or equal to 38C (100.4F), Mild Pain (1 - 3)  albuterol/ipratropium for Nebulization 3 milliLiter(s) Nebulizer every 6 hours PRN Shortness of Breath and/or Wheezing      LABS:    03-18    135  |  100  |  55<H>  ----------------------------<  97  5.0   |  20<L>  |  1.45<H>    Ca    9.9      18 Mar 2024 07:32  Phos  3.4     03-18  Mg     2.7     03-18    TPro  6.6  /  Alb  3.2<L>  /  TBili  0.2  /  DBili  x   /  AST  37  /  ALT  40  /  AlkPhos  92  03-18      Urinalysis Basic - ( 18 Mar 2024 07:32 )    Color: x / Appearance: x / SG: x / pH: x  Gluc: 97 mg/dL / Ketone: x  / Bili: x / Urobili: x   Blood: x / Protein: x / Nitrite: x   Leuk Esterase: x / RBC: x / WBC x   Sq Epi: x / Non Sq Epi: x / Bacteria: x          CAPILLARY BLOOD GLUCOSE    MICROBIOLOGY:     RADIOLOGY:  [ ] Reviewed and interpreted by me    Mode: AC/ CMV (Assist Control/ Continuous Mandatory Ventilation)  RR (machine): 14  TV (machine): 450  FiO2: 30  PEEP: 5  ITime: 1  MAP: 10  PIP: 19   Patient is a 78y old  Female who presents with a chief complaint of ICH (18 Mar 2024 12:31)      Interval Events:  No acute events overnight.    REVIEW OF SYSTEMS:  [ ] Positive  [ ] All other systems negative  [X] Unable to assess ROS because ___Obtunded 2/2 CVA events___    Vital Signs Last 24 Hrs  T(C): 37.3 (03-19-24 @ 05:09), Max: 37.3 (03-19-24 @ 05:09)  T(F): 99.2 (03-19-24 @ 05:09), Max: 99.2 (03-19-24 @ 05:09)  HR: 92 (03-19-24 @ 05:09) (85 - 112)  BP: 138/59 (03-19-24 @ 05:09) (138/59 - 156/119)  RR: 15 (03-19-24 @ 05:09) (15 - 130)  SpO2: 100% (03-19-24 @ 05:09) (98% - 100%)    PHYSICAL EXAM:  HEENT:   [X]Tracheostomy: #7 cuffed portex  [X]Pupils equal  [ ]No oral lesions  [X]Abnormal - missing upper dentition, partially absent right lower dentition; tongue not swollen but partially protrudes through mouth    SKIN  [X]No Rash - L BKA skin intact  [X] Abnormal - LUE AVF no bruit no thrill  [X] pressure - sacral DTI    CARDIAC  [X]Regular  [ ]Abnormal    PULMONARY  [ ]Bilateral Clear Breath Sounds  [ ]Normal Excursion  [X]Abnormal - BL Coarse BS    GI  [X]PEG      [X] +BS		              [X]Soft, nondistended, nontender	  [ ]Abnormal    MUSCULOSKELETAL                                   [X]Bedbound                 [X]Abnormal - L BKA  [ ]Ambulatory/OOB to chair                           EXTREMITIES                                         [X]Normal  [ ]Edema                         NEUROLOGIC  [ ] Normal, non focal  [X] Focal findings: opened eyes to sternal rub, does not follow commands, withdraws on RLE and LUE, did not withdraw to pain on RUE    PSYCHIATRIC  [X] Lethargic   [ ] Sedated	 [ ]Agitated    :  Gardner: [ ] Yes, if yes: Date of Placement:                   [X] No    LINES: Central Lines [ ] Yes, if yes: Date of Placement                                     [X] No    HOSPITAL MEDICATIONS:  MEDICATIONS  (STANDING):  amLODIPine   Tablet 5 milliGRAM(s) Oral daily  artificial  tears Solution 1 Drop(s) Both EYES every 4 hours  Biotene Dry Mouth Oral Rinse 5 milliLiter(s) Swish and Spit every 6 hours  brivaracetam Oral Solution 100 milliGRAM(s) Oral <User Schedule>  carvedilol 25 milliGRAM(s) Oral every 12 hours  cloNIDine 0.2 milliGRAM(s) Oral two times a day  docosanol 10% Cream 1 Application(s) Topical daily  heparin   Injectable 5000 Unit(s) SubCutaneous every 12 hours  insulin lispro (ADMELOG) corrective regimen sliding scale   SubCutaneous every 6 hours  insulin NPH human recombinant 12 Unit(s) SubCutaneous every 6 hours  lacosamide Solution 200 milliGRAM(s) Oral two times a day  nystatin    Suspension [id number] Unit(s) Swish and Swallow every 6 hours  pantoprazole   Suspension 40 milliGRAM(s) Oral two times a day  predniSONE   Tablet 5 milliGRAM(s) Oral daily  sodium chloride 1 Gram(s) Oral two times a day  tacrolimus    0.5 mG/mL Suspension 5 milliGRAM(s) Oral two times a day  trimethoprim   80 mG/sulfamethoxazole 400 mG 1 Tablet(s) Oral daily    MEDICATIONS  (PRN):  acetaminophen   Oral Liquid .. 650 milliGRAM(s) Oral every 6 hours PRN Temp greater or equal to 38C (100.4F), Mild Pain (1 - 3)  albuterol/ipratropium for Nebulization 3 milliLiter(s) Nebulizer every 6 hours PRN Shortness of Breath and/or Wheezing      LABS:    03-18    135  |  100  |  55<H>  ----------------------------<  97  5.0   |  20<L>  |  1.45<H>    Ca    9.9      18 Mar 2024 07:32  Phos  3.4     03-18  Mg     2.7     03-18    TPro  6.6  /  Alb  3.2<L>  /  TBili  0.2  /  DBili  x   /  AST  37  /  ALT  40  /  AlkPhos  92  03-18      Urinalysis Basic - ( 18 Mar 2024 07:32 )    Color: x / Appearance: x / SG: x / pH: x  Gluc: 97 mg/dL / Ketone: x  / Bili: x / Urobili: x   Blood: x / Protein: x / Nitrite: x   Leuk Esterase: x / RBC: x / WBC x   Sq Epi: x / Non Sq Epi: x / Bacteria: x          CAPILLARY BLOOD GLUCOSE    MICROBIOLOGY:     RADIOLOGY:  [ ] Reviewed and interpreted by me    Mode: AC/ CMV (Assist Control/ Continuous Mandatory Ventilation)  RR (machine): 14  TV (machine): 450  FiO2: 30  PEEP: 5  ITime: 1  MAP: 10  PIP: 19   Patient is a 78y old  Female who presents with a chief complaint of ICH (18 Mar 2024 12:31)      Interval Events:  No acute events overnight.    REVIEW OF SYSTEMS:  [ ] Positive  [ ] All other systems negative  [X] Unable to assess ROS because ___Obtunded 2/2 CVA events___    Vital Signs Last 24 Hrs  T(C): 37.3 (03-19-24 @ 05:09), Max: 37.3 (03-19-24 @ 05:09)  T(F): 99.2 (03-19-24 @ 05:09), Max: 99.2 (03-19-24 @ 05:09)  HR: 92 (03-19-24 @ 05:09) (85 - 112)  BP: 138/59 (03-19-24 @ 05:09) (138/59 - 156/119)  RR: 15 (03-19-24 @ 05:09) (15 - 130)  SpO2: 100% (03-19-24 @ 05:09) (98% - 100%)    PHYSICAL EXAM:  HEENT:   [X]Tracheostomy: #7 cuffed portex  [X]Pupils equal  [ ]No oral lesions  [X]Abnormal - missing upper dentition, partially absent right lower dentition; tongue not swollen but partially protrudes through mouth    SKIN  [X]No Rash - L BKA skin intact  [X] Abnormal - LUE AVF no bruit no thrill  [X] pressure - sacral DTI    CARDIAC  [X]Regular  [ ]Abnormal    PULMONARY  [ ]Bilateral Clear Breath Sounds  [ ]Normal Excursion  [X]Abnormal - BL Coarse BS    GI  [X]PEG      [X] +BS		              [X]Soft, nondistended, nontender	  [ ]Abnormal    MUSCULOSKELETAL                                   [X]Bedbound                 [X]Abnormal - L BKA  [ ]Ambulatory/OOB to chair                           EXTREMITIES                                         [X]Normal  [ ]Edema                         NEUROLOGIC  [ ] Normal, non focal  [X] Focal findings: opened eyes to sternal rub, does not follow commands, withdraws on RLE and BL UE, spontaneously moves extremities at times    PSYCHIATRIC  [X] Lethargic   [ ] Sedated	 [ ]Agitated    :  Gardner: [ ] Yes, if yes: Date of Placement:                   [X] No    LINES: Central Lines [ ] Yes, if yes: Date of Placement                                     [X] No    HOSPITAL MEDICATIONS:  MEDICATIONS  (STANDING):  amLODIPine   Tablet 5 milliGRAM(s) Oral daily  artificial  tears Solution 1 Drop(s) Both EYES every 4 hours  Biotene Dry Mouth Oral Rinse 5 milliLiter(s) Swish and Spit every 6 hours  brivaracetam Oral Solution 100 milliGRAM(s) Oral <User Schedule>  carvedilol 25 milliGRAM(s) Oral every 12 hours  cloNIDine 0.2 milliGRAM(s) Oral two times a day  docosanol 10% Cream 1 Application(s) Topical daily  heparin   Injectable 5000 Unit(s) SubCutaneous every 12 hours  insulin lispro (ADMELOG) corrective regimen sliding scale   SubCutaneous every 6 hours  insulin NPH human recombinant 12 Unit(s) SubCutaneous every 6 hours  lacosamide Solution 200 milliGRAM(s) Oral two times a day  nystatin    Suspension 655206 Unit(s) Swish and Swallow every 6 hours  pantoprazole   Suspension 40 milliGRAM(s) Oral two times a day  predniSONE   Tablet 5 milliGRAM(s) Oral daily  sodium chloride 1 Gram(s) Oral two times a day  tacrolimus    0.5 mG/mL Suspension 5 milliGRAM(s) Oral two times a day  trimethoprim   80 mG/sulfamethoxazole 400 mG 1 Tablet(s) Oral daily    MEDICATIONS  (PRN):  acetaminophen   Oral Liquid .. 650 milliGRAM(s) Oral every 6 hours PRN Temp greater or equal to 38C (100.4F), Mild Pain (1 - 3)  albuterol/ipratropium for Nebulization 3 milliLiter(s) Nebulizer every 6 hours PRN Shortness of Breath and/or Wheezing      LABS:    03-18    135  |  100  |  55<H>  ----------------------------<  97  5.0   |  20<L>  |  1.45<H>    Ca    9.9      18 Mar 2024 07:32  Phos  3.4     03-18  Mg     2.7     03-18    TPro  6.6  /  Alb  3.2<L>  /  TBili  0.2  /  DBili  x   /  AST  37  /  ALT  40  /  AlkPhos  92  03-18      Urinalysis Basic - ( 18 Mar 2024 07:32 )    Color: x / Appearance: x / SG: x / pH: x  Gluc: 97 mg/dL / Ketone: x  / Bili: x / Urobili: x   Blood: x / Protein: x / Nitrite: x   Leuk Esterase: x / RBC: x / WBC x   Sq Epi: x / Non Sq Epi: x / Bacteria: x          CAPILLARY BLOOD GLUCOSE    MICROBIOLOGY:     RADIOLOGY:  [ ] Reviewed and interpreted by me    Mode: AC/ CMV (Assist Control/ Continuous Mandatory Ventilation)  RR (machine): 14  TV (machine): 450  FiO2: 30  PEEP: 5  ITime: 1  MAP: 10  PIP: 19

## 2024-03-19 NOTE — PROGRESS NOTE ADULT - NS ATTEND AMEND GEN_ALL_CORE FT
79 y/o F w/ESRD s/p transplant on immunosuppression, BK viremia, breast CA s/p lumpectomy + course of tamoxifen, DVT not on AC admitted for ICH s/p L craniectomy w/evacuation w/hospital course c/b acute respiratory failure now s/p trach/PEG and DVT s/p IVC filter.    - AEDs as per neuro  - Tolerating trach collar ATC  - Immunosuppresives as per renal  - H. Pylori treatment after discharge as per GI  - No therapeutic AC  - Complete course of abx for UTI  - Dispo planning 79 y/o F w/ESRD s/p transplant on immunosuppression, BK viremia, breast CA s/p lumpectomy + course of tamoxifen, DVT not on AC admitted for ICH s/p L craniectomy w/evacuation w/hospital course c/b acute respiratory failure now s/p trach/PEG and DVT s/p IVC filter.    - AEDs as per neuro  - Wean from vent as tolerated  - Immunosuppresives as per renal  - H. Pylori treatment after discharge as per GI  - No therapeutic AC  - Complete course of abx for UTI  - Dispo planning

## 2024-03-20 LAB
ALBUMIN SERPL ELPH-MCNC: 3.6 G/DL — SIGNIFICANT CHANGE UP (ref 3.3–5)
ALP SERPL-CCNC: 99 U/L — SIGNIFICANT CHANGE UP (ref 40–120)
ALT FLD-CCNC: 47 U/L — HIGH (ref 10–45)
ANION GAP SERPL CALC-SCNC: 14 MMOL/L — SIGNIFICANT CHANGE UP (ref 5–17)
AST SERPL-CCNC: 47 U/L — HIGH (ref 10–40)
BILIRUB SERPL-MCNC: 0.2 MG/DL — SIGNIFICANT CHANGE UP (ref 0.2–1.2)
BUN SERPL-MCNC: 50 MG/DL — HIGH (ref 7–23)
CALCIUM SERPL-MCNC: 10.4 MG/DL — SIGNIFICANT CHANGE UP (ref 8.4–10.5)
CHLORIDE SERPL-SCNC: 101 MMOL/L — SIGNIFICANT CHANGE UP (ref 96–108)
CO2 SERPL-SCNC: 22 MMOL/L — SIGNIFICANT CHANGE UP (ref 22–31)
CREAT SERPL-MCNC: 1.47 MG/DL — HIGH (ref 0.5–1.3)
EGFR: 36 ML/MIN/1.73M2 — LOW
GLUCOSE BLDC GLUCOMTR-MCNC: 141 MG/DL — HIGH (ref 70–99)
GLUCOSE BLDC GLUCOMTR-MCNC: 151 MG/DL — HIGH (ref 70–99)
GLUCOSE BLDC GLUCOMTR-MCNC: 162 MG/DL — HIGH (ref 70–99)
GLUCOSE BLDC GLUCOMTR-MCNC: 29 MG/DL — CRITICAL LOW (ref 70–99)
GLUCOSE BLDC GLUCOMTR-MCNC: 33 MG/DL — CRITICAL LOW (ref 70–99)
GLUCOSE BLDC GLUCOMTR-MCNC: 33 MG/DL — CRITICAL LOW (ref 70–99)
GLUCOSE BLDC GLUCOMTR-MCNC: 68 MG/DL — LOW (ref 70–99)
GLUCOSE BLDC GLUCOMTR-MCNC: 73 MG/DL — SIGNIFICANT CHANGE UP (ref 70–99)
GLUCOSE BLDC GLUCOMTR-MCNC: 84 MG/DL — SIGNIFICANT CHANGE UP (ref 70–99)
GLUCOSE SERPL-MCNC: 145 MG/DL — HIGH (ref 70–99)
HCT VFR BLD CALC: 29.9 % — LOW (ref 34.5–45)
HGB BLD-MCNC: 9.1 G/DL — LOW (ref 11.5–15.5)
MAGNESIUM SERPL-MCNC: 2.7 MG/DL — HIGH (ref 1.6–2.6)
MCHC RBC-ENTMCNC: 28.6 PG — SIGNIFICANT CHANGE UP (ref 27–34)
MCHC RBC-ENTMCNC: 30.4 GM/DL — LOW (ref 32–36)
MCV RBC AUTO: 94 FL — SIGNIFICANT CHANGE UP (ref 80–100)
NRBC # BLD: 0 /100 WBCS — SIGNIFICANT CHANGE UP (ref 0–0)
PHOSPHATE SERPL-MCNC: 3.8 MG/DL — SIGNIFICANT CHANGE UP (ref 2.5–4.5)
PLATELET # BLD AUTO: 187 K/UL — SIGNIFICANT CHANGE UP (ref 150–400)
POTASSIUM SERPL-MCNC: 5.3 MMOL/L — SIGNIFICANT CHANGE UP (ref 3.5–5.3)
POTASSIUM SERPL-SCNC: 5.3 MMOL/L — SIGNIFICANT CHANGE UP (ref 3.5–5.3)
PROT SERPL-MCNC: 7.2 G/DL — SIGNIFICANT CHANGE UP (ref 6–8.3)
RBC # BLD: 3.18 M/UL — LOW (ref 3.8–5.2)
RBC # FLD: 15.8 % — HIGH (ref 10.3–14.5)
SODIUM SERPL-SCNC: 137 MMOL/L — SIGNIFICANT CHANGE UP (ref 135–145)
TACROLIMUS SERPL-MCNC: 15.8 NG/ML — SIGNIFICANT CHANGE UP
WBC # BLD: 7.3 K/UL — SIGNIFICANT CHANGE UP (ref 3.8–10.5)
WBC # FLD AUTO: 7.3 K/UL — SIGNIFICANT CHANGE UP (ref 3.8–10.5)

## 2024-03-20 PROCEDURE — 99231 SBSQ HOSP IP/OBS SF/LOW 25: CPT

## 2024-03-20 PROCEDURE — 99232 SBSQ HOSP IP/OBS MODERATE 35: CPT

## 2024-03-20 RX ORDER — DEXTROSE 50 % IN WATER 50 %
25 SYRINGE (ML) INTRAVENOUS ONCE
Refills: 0 | Status: COMPLETED | OUTPATIENT
Start: 2024-03-20 | End: 2024-03-20

## 2024-03-20 RX ADMIN — CARVEDILOL PHOSPHATE 25 MILLIGRAM(S): 80 CAPSULE, EXTENDED RELEASE ORAL at 05:57

## 2024-03-20 RX ADMIN — HUMAN INSULIN 12 UNIT(S): 100 INJECTION, SUSPENSION SUBCUTANEOUS at 17:46

## 2024-03-20 RX ADMIN — SODIUM CHLORIDE 1 GRAM(S): 9 INJECTION INTRAMUSCULAR; INTRAVENOUS; SUBCUTANEOUS at 05:56

## 2024-03-20 RX ADMIN — HEPARIN SODIUM 5000 UNIT(S): 5000 INJECTION INTRAVENOUS; SUBCUTANEOUS at 05:57

## 2024-03-20 RX ADMIN — LACOSAMIDE 200 MILLIGRAM(S): 50 TABLET ORAL at 17:35

## 2024-03-20 RX ADMIN — Medication 1 DROP(S): at 05:55

## 2024-03-20 RX ADMIN — Medication 500000 UNIT(S): at 17:35

## 2024-03-20 RX ADMIN — Medication 1 DROP(S): at 22:21

## 2024-03-20 RX ADMIN — TACROLIMUS 5 MILLIGRAM(S): 5 CAPSULE ORAL at 05:55

## 2024-03-20 RX ADMIN — Medication 500000 UNIT(S): at 05:56

## 2024-03-20 RX ADMIN — Medication 25 MILLILITER(S): at 00:13

## 2024-03-20 RX ADMIN — SODIUM CHLORIDE 1 GRAM(S): 9 INJECTION INTRAMUSCULAR; INTRAVENOUS; SUBCUTANEOUS at 17:36

## 2024-03-20 RX ADMIN — Medication 1 TABLET(S): at 11:36

## 2024-03-20 RX ADMIN — Medication 5 MILLIGRAM(S): at 05:57

## 2024-03-20 RX ADMIN — Medication 1 DROP(S): at 14:05

## 2024-03-20 RX ADMIN — BRIVARACETAM 100 MILLIGRAM(S): 25 TABLET, FILM COATED ORAL at 22:20

## 2024-03-20 RX ADMIN — Medication 5 MILLILITER(S): at 17:34

## 2024-03-20 RX ADMIN — PANTOPRAZOLE SODIUM 40 MILLIGRAM(S): 20 TABLET, DELAYED RELEASE ORAL at 05:56

## 2024-03-20 RX ADMIN — BRIVARACETAM 100 MILLIGRAM(S): 25 TABLET, FILM COATED ORAL at 05:55

## 2024-03-20 RX ADMIN — Medication 500000 UNIT(S): at 11:37

## 2024-03-20 RX ADMIN — LACOSAMIDE 200 MILLIGRAM(S): 50 TABLET ORAL at 05:56

## 2024-03-20 RX ADMIN — Medication 5 MILLILITER(S): at 11:37

## 2024-03-20 RX ADMIN — PANTOPRAZOLE SODIUM 40 MILLIGRAM(S): 20 TABLET, DELAYED RELEASE ORAL at 17:34

## 2024-03-20 RX ADMIN — BRIVARACETAM 100 MILLIGRAM(S): 25 TABLET, FILM COATED ORAL at 14:05

## 2024-03-20 RX ADMIN — AMLODIPINE BESYLATE 5 MILLIGRAM(S): 2.5 TABLET ORAL at 05:57

## 2024-03-20 RX ADMIN — CARVEDILOL PHOSPHATE 25 MILLIGRAM(S): 80 CAPSULE, EXTENDED RELEASE ORAL at 17:34

## 2024-03-20 RX ADMIN — Medication 2: at 12:04

## 2024-03-20 RX ADMIN — Medication 1 DROP(S): at 02:54

## 2024-03-20 RX ADMIN — HUMAN INSULIN 12 UNIT(S): 100 INJECTION, SUSPENSION SUBCUTANEOUS at 06:26

## 2024-03-20 RX ADMIN — Medication 0.2 MILLIGRAM(S): at 17:34

## 2024-03-20 RX ADMIN — TACROLIMUS 5 MILLIGRAM(S): 5 CAPSULE ORAL at 18:51

## 2024-03-20 RX ADMIN — DOCOSANOL 1 APPLICATION(S): 100 CREAM TOPICAL at 11:37

## 2024-03-20 RX ADMIN — HUMAN INSULIN 12 UNIT(S): 100 INJECTION, SUSPENSION SUBCUTANEOUS at 12:04

## 2024-03-20 RX ADMIN — Medication 5 MILLILITER(S): at 05:56

## 2024-03-20 RX ADMIN — Medication 1 DROP(S): at 09:59

## 2024-03-20 RX ADMIN — Medication 1 DROP(S): at 17:36

## 2024-03-20 RX ADMIN — Medication 0.2 MILLIGRAM(S): at 05:55

## 2024-03-20 RX ADMIN — HEPARIN SODIUM 5000 UNIT(S): 5000 INJECTION INTRAVENOUS; SUBCUTANEOUS at 17:34

## 2024-03-20 NOTE — PROGRESS NOTE ADULT - PROBLEM SELECTOR PLAN 2
- S/p trach 3/8 by surgery by Dr. Joselo Mayorga   - Tracheostomy Sutures removed 3/13  - Vent Settings: PRVC 14/450/+5/30%  - Attempt PS Trials as tolerated   - [place]-clay breathing pattern observed however tolerating weaning trials  - Continue airway clearance; Duonebs q6h PRN - S/p trach 3/8 by surgery by Dr. Joselo Mayorga   - Tracheostomy Sutures removed 3/13  - Vent Settings: PRVC 14/450/+5/30%  - Attempt PS Trials as tolerated   - [place]-clay breathing pattern observed however tolerating weaning trials  - Continue airway clearance; Duonebs q6h PRN

## 2024-03-20 NOTE — PROGRESS NOTE ADULT - SUBJECTIVE AND OBJECTIVE BOX
[facility]-- WOUND TEAM -- FOLLOW UP NOTE  --------------------------------------------------------------------------------    24 hour events/subjective:    afebrile  tolerating TF  incontinent  obtunded,  Chart reviewed  Hx of   79 yo F with PMHx ESRD s/p renal transplant (2019, now off HD), left AKA, BK viremia, HTN, breast ca s/p lumpectomy (completed Tamoxifen 03/2023), DVT (off Eliquis), HLD, gout presenting 3/1 with left ICH (ICH score 4) likely 2/2 amyloid angiopathy s/p left craniectomy(discarded) and evacuation as well as EVD placement and removal (3/7).  dc planning ongoing        Diet:  Diet, NPO with Tube Feed:   Tube Feeding Modality: Gastrostomy  Glucerna 1.5 David ([id number])  Total Volume for 24 Hours (mL): 1080  Continuous  Starting Tube Feed Rate mL per Hour: 45  Until Goal Tube Feed Rate (mL per Hour): 45  Tube Feed Duration (in Hours): 24  Tube Feed Start Time: 20:00  Supplement Feeding Modality:  Gastrostomy  Probiotic Yogurt/Smoothie Cans or Servings Per Day:  1       Frequency:  Two Times a day (03-14-24 @ 19:31)      ROS:   pt unable to participate    ALLERGIES & MEDICATIONS  --------------------------------------------------------------------------------  Allergies    shellfish (Rash)  ChloraPrep One-Step (Rash)  penicillins (Rash)    Intolerances          STANDING INPATIENT MEDICATIONS    amLODIPine   Tablet 5 milliGRAM(s) Oral daily  artificial  tears Solution 1 Drop(s) Both EYES every 4 hours  Biotene Dry Mouth Oral Rinse 5 milliLiter(s) Swish and Spit every 6 hours  brivaracetam Oral Solution 100 milliGRAM(s) Oral <User Schedule>  carvedilol 25 milliGRAM(s) Oral every 12 hours  cloNIDine 0.2 milliGRAM(s) Oral two times a day  docosanol 10% Cream 1 Application(s) Topical daily  heparin   Injectable 5000 Unit(s) SubCutaneous every 12 hours  insulin lispro (ADMELOG) corrective regimen sliding scale   SubCutaneous every 6 hours  insulin NPH human recombinant 12 Unit(s) SubCutaneous every 6 hours  lacosamide Solution 200 milliGRAM(s) Oral two times a day  nystatin    Suspension 671165 Unit(s) Swish and Swallow every 6 hours  pantoprazole   Suspension 40 milliGRAM(s) Oral two times a day  predniSONE   Tablet 5 milliGRAM(s) Oral daily  sodium chloride 1 Gram(s) Oral two times a day  tacrolimus    0.5 mG/mL Suspension 5 milliGRAM(s) Oral two times a day  trimethoprim   80 mG/sulfamethoxazole 400 mG 1 Tablet(s) Oral daily      PRN INPATIENT MEDICATION  acetaminophen   Oral Liquid .. 650 milliGRAM(s) Oral every 6 hours PRN  albuterol/ipratropium for Nebulization 3 milliLiter(s) Nebulizer every 6 hours PRN        VITALS/PHYSICAL EXAM  --------------------------------------------------------------------------------  T(C): 36.9 (03-20-24 @ 12:00), Max: 37 (03-20-24 @ 05:47)  HR: 81 (03-20-24 @ 15:35) (80 - 97)  BP: 167/95 (03-20-24 @ 12:00) (120/59 - 167/95)  RR: 23 (03-20-24 @ 15:34) (15 - 23)  SpO2: 100% (03-20-24 @ 15:35) (98% - 100%)  Wt(kg): --        03-19-24 @ 07:01  -  03-20-24 @ 07:00  --------------------------------------------------------  IN: 1380 mL / OUT: 450 mL / NET: 930 mL    03-20-24 @ 07:01  -  03-20-24 @ 17:42  --------------------------------------------------------  IN: 0 mL / OUT: 150 mL / NET: -150 mL      HEENT: , Rt side scalp soft- absent bone, incision c/d/i-      sclera clear/ moist, moist mucous membranes, trachea midline, trach  Respiratory: equal chest rise with respirations, vented  Gastrointestinal: soft NT/ND  Neurology: nonverbal, not following commands  Psych: not responsive   Musculoskeletal: no contractures  Vascular: BLE edema equal   Skin:  moist w/ good turgor  Sacral/bilateral buttocks w/ evolving DTI    denuded and partial thickness skin     moist / friable - w/ purple areas of skin   7.5cm X 9.4cm x 0.1cm,   No odor, erythema, increased warmth, tenderness, induration, fluctuance, nor crepitus        LABS/ CULTURES/ RADIOLOGY:                     9.1    7.30  >-----------<  187      [03-20-24 @ 07:21]              29.9     137  |  101  |  50  ----------------------------<  145      [03-20-24 @ 07:18]  5.3   |  22  |  1.47        Ca     10.4     [03-20-24 @ 07:18]      Mg     2.7     [03-20-24 @ 07:18]      Phos  3.8     [03-20-24 @ 07:18]    TPro  7.2  /  Alb  3.6  /  TBili  0.2  /  DBili  x   /  AST  47  /  ALT  47  /  AlkPhos  99  [03-20-24 @ 07:18]              CAPILLARY BLOOD GLUCOSE      POCT Blood Glucose.: 162 mg/dL (20 Mar 2024 11:45)  POCT Blood Glucose.: 141 mg/dL (20 Mar 2024 06:08)  POCT Blood Glucose.: 151 mg/dL (20 Mar 2024 00:28)  POCT Blood Glucose.: 33 mg/dL (20 Mar 2024 00:07)  POCT Blood Glucose.: 29 mg/dL (20 Mar 2024 00:01)  POCT Blood Glucose.: 33 mg/dL (19 Mar 2024 23:58)        Triglycerides, Serum: 95 mg/dL (03-02-24 @ 02:10)                A1C with Estimated Average Glucose Result: 5.7 % (03-02-24 @ 11:57)      >>> <<<

## 2024-03-20 NOTE — PROGRESS NOTE ADULT - SUBJECTIVE AND OBJECTIVE BOX
Neurology        S: patient seen. no neuro changes, ill appearing         Medications: MEDICATIONS  (STANDING):  amLODIPine   Tablet 5 milliGRAM(s) Oral daily  artificial  tears Solution 1 Drop(s) Both EYES every 4 hours  Biotene Dry Mouth Oral Rinse 5 milliLiter(s) Swish and Spit every 6 hours  brivaracetam Oral Solution 100 milliGRAM(s) Oral <User Schedule>  carvedilol 25 milliGRAM(s) Oral every 12 hours  cloNIDine 0.2 milliGRAM(s) Oral two times a day  docosanol 10% Cream 1 Application(s) Topical daily  heparin   Injectable 5000 Unit(s) SubCutaneous every 12 hours  insulin lispro (ADMELOG) corrective regimen sliding scale   SubCutaneous every 6 hours  insulin NPH human recombinant 12 Unit(s) SubCutaneous every 6 hours  lacosamide Solution 200 milliGRAM(s) Oral two times a day  nystatin    Suspension 597998 Unit(s) Swish and Swallow every 6 hours  pantoprazole   Suspension 40 milliGRAM(s) Oral two times a day  predniSONE   Tablet 5 milliGRAM(s) Oral daily  sodium chloride 1 Gram(s) Oral two times a day  tacrolimus    0.5 mG/mL Suspension 5 milliGRAM(s) Oral two times a day  trimethoprim   80 mG/sulfamethoxazole 400 mG 1 Tablet(s) Oral daily    MEDICATIONS  (PRN):  acetaminophen   Oral Liquid .. 650 milliGRAM(s) Oral every 6 hours PRN Temp greater or equal to 38C (100.4F), Mild Pain (1 - 3)  albuterol/ipratropium for Nebulization 3 milliLiter(s) Nebulizer every 6 hours PRN Shortness of Breath and/or Wheezing       Vitals:  Vital Signs Last 24 Hrs  T(C): 37 (20 Mar 2024 05:47), Max: 37 (20 Mar 2024 05:47)  T(F): 98.6 (20 Mar 2024 05:47), Max: 98.6 (20 Mar 2024 05:47)  HR: 80 (20 Mar 2024 09:41) (80 - 97)  BP: 150/54 (20 Mar 2024 05:47) (120/59 - 156/74)  BP(mean): --  RR: 15 (20 Mar 2024 08:57) (15 - 20)  SpO2: 100% (20 Mar 2024 09:41) (98% - 100%)    Parameters below as of 20 Mar 2024 08:57  Patient On (Oxygen Delivery Method): ventilator               General Exam:   General Appearance: Appropriately dressed    Head: Normocephalic, atraumatic and no dysmorphic features s/p trach   Ear, Nose, and Throat: Moist mucous membranes  CVS: S1S2+  Resp: No SOB, no wheeze or rhonchi on vent   GI: soft NT/ND s/p PEG   Extremities:  L AKA  Skin: No bruises or rashes     Neurological Exam:  Mental Status:  opens eyes to noxious. no verbal. not following   Cranial Nerves: PERRL, EOMI but gaze pref to L, no blink to threat on R, R facial,    Motor: minimal/no spontaneous movement ;   Sensation: grimaces to noxious at times. some minimal withdrawal LUE 2/5 and RLE.    Coordination: unable   Gait: unable     Data/Labs/Imaging which I personally reviewed.                LABS:                          9.1    7.30  )-----------( 187      ( 20 Mar 2024 07:21 )             29.9     03-20    137  |  101  |  50<H>  ----------------------------<  145<H>  5.3   |  22  |  1.47<H>    Ca    10.4      20 Mar 2024 07:18  Phos  3.8     03-20  Mg     2.7     03-20    TPro  7.2  /  Alb  3.6  /  TBili  0.2  /  DBili  x   /  AST  47<H>  /  ALT  47<H>  /  AlkPhos  99  03-20    LIVER FUNCTIONS - ( 20 Mar 2024 07:18 )  Alb: 3.6 g/dL / Pro: 7.2 g/dL / ALK PHOS: 99 U/L / ALT: 47 U/L / AST: 47 U/L / GGT: x             Urinalysis Basic - ( 20 Mar 2024 07:18 )    Color: x / Appearance: x / SG: x / pH: x  Gluc: 145 mg/dL / Ketone: x  / Bili: x / Urobili: x   Blood: x / Protein: x / Nitrite: x   Leuk Esterase: x / RBC: x / WBC x   Sq Epi: x / Non Sq Epi: x / Bacteria: x

## 2024-03-20 NOTE — PROGRESS NOTE ADULT - ASSESSMENT
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   initial CTH3/1  with 8.9cm left frontal IPH with IVH .09cm rightward shift   3/2 CTH s/p L hmeicrani and resection of IPH. stable   3/5 CTH post op changes. some reorption of post op pneumocephalus.    s/p trach/peg 3/8/24   3/8 CTH L frontal craniectomy.  L MCA hemorrhage and infarct ; still IVH.   3/10 with + UA  A1c 5.7 LDL 46   TTE done 3/2: LA is severely dilated    Urine culture grew positive for klebsiella and enterococcus  3/9 EEG no seiuzres since 3/7;  risk of seiuzre from L parietal region. mod to severe diffuse cerebral dysfunction   Transferred to [facility] 3/11 for continued management.  3/12 CTH   sterotactic imaging of L frontal crani for hemorrhagic L MCA ifnarct. L frontal temporal pseudmeningocele  noted. no hcange since 3/8   o/e awake, no verbal, not followiing. L gaze pref  some minimal withdrawal to noxious RLE and LUE.  s/p trach /vent     Impression: L ICH possible 2/2  CAA but hemorrhagic infarct also needs to be considered.    - plan for cranioplasty 4/2   - had recent CTH 3/12.  would repeat EEG at this time given high risk for seiuzres   - tolerating CPAP at times   - difficult to determine IPH 2/2 CAA without MRI to look at SWI or GRE sequeneses for hemosiderin deposition  - never had vessel imaging. consider CTA H/N   - no AC/AP. dvt ppx okay  - briviact 100mg TID  and vimpat 200mg BID for seiuzre ppx   - infectious workup. on meropenum.  this can lower seiuzre threshold   - immunosuppression on tacrolimus and prednisone.  ppx bactrim     -telemetry   - PT/OT   - check FS, glucose control <180  - GI/DVT ppx   - GOC with family.  currenlty full code; in terms of functional meaningful recovery the likelihood is low.  patient will require 24hr care and likely be bedbound at this time.    consider recalling palliative  Dieter Wilkinson MD  Vascular Neurology  Office: [phone]

## 2024-03-20 NOTE — PROGRESS NOTE ADULT - PROBLEM SELECTOR PLAN 1
- Found to have L IPH on CTH likely 2/2 amyloid angiopathy  - S/p L hemicrani for evacuation of large ICH; bone discarded  - CT H Stero 3/12: S/p Left Frontal Crainectomy, Remains stable from prior CT on 3/8   - Patient will require eventual cranioplasty - tentative OR date 4/2  - Maintain Neuro checks

## 2024-03-20 NOTE — PROGRESS NOTE ADULT - NS ATTEND AMEND GEN_ALL_CORE FT
79 y/o F w/ESRD s/p transplant on immunosuppression, BK viremia, breast CA s/p lumpectomy + course of tamoxifen, DVT not on AC admitted for ICH s/p L craniectomy w/evacuation w/hospital course c/b acute respiratory failure now s/p trach/PEG and DVT s/p IVC filter.    - AEDs as per neuro  - Tolerating trach collar ATC  - Immunosuppresives as per renal  - H. Pylori treatment after discharge as per GI  - No therapeutic AC  - Complete course of abx for UTI  - Dispo planning 77 y/o F w/ESRD s/p transplant on immunosuppression, BK viremia, breast CA s/p lumpectomy + course of tamoxifen, DVT not on AC admitted for ICH s/p L craniectomy w/evacuation w/hospital course c/b acute respiratory failure now s/p trach/PEG and DVT s/p IVC filter.    - AEDs as per neuro  - Tolerating trach collar ATC  - Cranioplasty likely as outpatient as per neurosurgery  - Immunosuppresives as per renal  - H. Pylori treatment after discharge as per GI  - No therapeutic AC  - Completed course of abx for UTI  - Dispo planning 79 y/o F w/ESRD s/p transplant on immunosuppression, BK viremia, breast CA s/p lumpectomy + course of tamoxifen, DVT not on AC admitted for ICH s/p L craniectomy w/evacuation w/hospital course c/b acute respiratory failure now s/p trach/PEG and DVT s/p IVC filter.    - AEDs as per neuro  - Tolerating trach collar ATC  - Cranioplasty pending currently scheduled for 4/2  - Immunosuppresives as per renal  - H. Pylori treatment after discharge as per GI  - No therapeutic AC  - Completed course of abx for UTI  - Dispo planning after cranioplasty 77 y/o F w/ESRD s/p transplant on immunosuppression, BK viremia, breast CA s/p lumpectomy + course of tamoxifen, DVT not on AC admitted for ICH s/p L craniectomy w/evacuation w/hospital course c/b acute respiratory failure now s/p trach/PEG and DVT s/p IVC filter.    - AEDs as per neuro  - Wean from vent as tolerated  - Cranioplasty pending currently scheduled for 4/2  - Immunosuppresives as per renal  - H. Pylori treatment after discharge as per GI  - No therapeutic AC  - Completed course of abx for UTI  - Dispo planning after cranioplasty

## 2024-03-20 NOTE — PROGRESS NOTE ADULT - SUBJECTIVE AND OBJECTIVE BOX
Patient is a 78y old  Female who presents with a chief complaint of ICH (19 Mar 2024 14:36)      Interval Events:    REVIEW OF SYSTEMS:  [ ] Positive  [ ] All other systems negative  [ ] Unable to assess ROS because ________    Vital Signs Last 24 Hrs  T(C): 37 (03-20-24 @ 05:47), Max: 37 (03-20-24 @ 05:47)  T(F): 98.6 (03-20-24 @ 05:47), Max: 98.6 (03-20-24 @ 05:47)  HR: 97 (03-20-24 @ 05:47) (81 - 101)  BP: 150/54 (03-20-24 @ 05:47) (120/59 - 156/74)  RR: 20 (03-20-24 @ 05:47) (17 - 20)  SpO2: 100% (03-20-24 @ 05:47) (100% - 100%)    PHYSICAL EXAM:  HEENT:   [ ]Tracheostomy:  [ ]Pupils equal  [ ]No oral lesions  [ ]Abnormal    SKIN  [ ]No Rash  [ ] Abnormal  [ ] pressure    CARDIAC  [ ]Regular  [ ]Abnormal    PULMONARY  [ ]Bilateral Clear Breath Sounds  [ ]Normal Excursion  [ ]Abnormal    GI  [ ]PEG      [ ] +BS		              [ ]Soft, nondistended, nontender	  [ ]Abnormal    MUSCULOSKELETAL                                   [ ]Bedbound                 [ ]Abnormal    [ ]Ambulatory/OOB to chair                           EXTREMITIES                                         [ ]Normal  [ ]Edema                           NEUROLOGIC  [ ] Normal, non focal  [ ] Focal findings:    PSYCHIATRIC  [ ]Alert and appropriate  [ ] Sedated	 [ ]Agitated    :  Gardner: [ ] Yes, if yes: Date of Placement:                   [  ] No    LINES: Central Lines [ ] Yes, if yes: Date of Placement                                     [  ] No    HOSPITAL MEDICATIONS:  MEDICATIONS  (STANDING):  amLODIPine   Tablet 5 milliGRAM(s) Oral daily  artificial  tears Solution 1 Drop(s) Both EYES every 4 hours  Biotene Dry Mouth Oral Rinse 5 milliLiter(s) Swish and Spit every 6 hours  brivaracetam Oral Solution 100 milliGRAM(s) Oral <User Schedule>  carvedilol 25 milliGRAM(s) Oral every 12 hours  cloNIDine 0.2 milliGRAM(s) Oral two times a day  docosanol 10% Cream 1 Application(s) Topical daily  heparin   Injectable 5000 Unit(s) SubCutaneous every 12 hours  insulin lispro (ADMELOG) corrective regimen sliding scale   SubCutaneous every 6 hours  insulin NPH human recombinant 12 Unit(s) SubCutaneous every 6 hours  lacosamide Solution 200 milliGRAM(s) Oral two times a day  nystatin    Suspension 654920 Unit(s) Swish and Swallow every 6 hours  pantoprazole   Suspension 40 milliGRAM(s) Oral two times a day  predniSONE   Tablet 5 milliGRAM(s) Oral daily  sodium chloride 1 Gram(s) Oral two times a day  tacrolimus    0.5 mG/mL Suspension 5 milliGRAM(s) Oral two times a day  trimethoprim   80 mG/sulfamethoxazole 400 mG 1 Tablet(s) Oral daily    MEDICATIONS  (PRN):  acetaminophen   Oral Liquid .. 650 milliGRAM(s) Oral every 6 hours PRN Temp greater or equal to 38C (100.4F), Mild Pain (1 - 3)  albuterol/ipratropium for Nebulization 3 milliLiter(s) Nebulizer every 6 hours PRN Shortness of Breath and/or Wheezing      LABS:                        8.1    6.47  )-----------( 209      ( 19 Mar 2024 07:18 )             25.5     03-19    137  |  103  |  51<H>  ----------------------------<  111<H>  5.0   |  21<L>  |  1.42<H>    Ca    10.1      19 Mar 2024 07:18  Phos  3.6     03-19  Mg     2.5     03-19    TPro  6.4  /  Alb  3.3  /  TBili  0.2  /  DBili  x   /  AST  32  /  ALT  35  /  AlkPhos  81  03-19      Urinalysis Basic - ( 19 Mar 2024 07:18 )    Color: x / Appearance: x / SG: x / pH: x  Gluc: 111 mg/dL / Ketone: x  / Bili: x / Urobili: x   Blood: x / Protein: x / Nitrite: x   Leuk Esterase: x / RBC: x / WBC x   Sq Epi: x / Non Sq Epi: x / Bacteria: x          CAPILLARY BLOOD GLUCOSE    MICROBIOLOGY:     RADIOLOGY:  [ ] Reviewed and interpreted by me    Mode: AC/ CMV (Assist Control/ Continuous Mandatory Ventilation)  RR (machine): 14  TV (machine): 450  FiO2: 30  PEEP: 5  ITime: 1  MAP: 9  PIP: 21   Patient is a 78y old  Female who presents with a chief complaint of ICH (19 Mar 2024 14:36)      Interval Events:  No acute events overnight.     REVIEW OF SYSTEMS:  [ ] Positive  [ ] All other systems negative  [X] Unable to assess ROS because ___Obtunded 2/2 CVA events___    Vital Signs Last 24 Hrs  T(C): 37 (03-20-24 @ 05:47), Max: 37 (03-20-24 @ 05:47)  T(F): 98.6 (03-20-24 @ 05:47), Max: 98.6 (03-20-24 @ 05:47)  HR: 97 (03-20-24 @ 05:47) (81 - 101)  BP: 150/54 (03-20-24 @ 05:47) (120/59 - 156/74)  RR: 20 (03-20-24 @ 05:47) (17 - 20)  SpO2: 100% (03-20-24 @ 05:47) (100% - 100%)    PHYSICAL EXAM:  HEENT: left craniectomy site c/d/i - staples removed 3/19  [X]Tracheostomy: #7 cuffed portex  [X]Pupils equal  [ ]No oral lesions  [X]Abnormal - missing upper dentition, partially absent right lower dentition; tongue not swollen but partially protrudes through mouth    SKIN  [X]No Rash - L BKA skin intact  [X] Abnormal - LUE AVF no bruit no thrill  [X] pressure - sacral DTI    CARDIAC  [X]Regular  [ ]Abnormal    PULMONARY  [ ]Bilateral Clear Breath Sounds  [ ]Normal Excursion  [X]Abnormal - BL Coarse BS    GI  [X]PEG      [X] +BS		              [X]Soft, nondistended, nontender	  [ ]Abnormal    MUSCULOSKELETAL                                   [X]Bedbound                 [X]Abnormal - L BKA  [ ]Ambulatory/OOB to chair                           EXTREMITIES                                         [X]Normal  [ ]Edema                         NEUROLOGIC  [ ] Normal, non focal  [X] Focal findings: opened eyes to sternal rub, does not follow commands, withdraws on RLE and BL UE, spontaneously moves extremities at times    PSYCHIATRIC  [X] Lethargic   [ ] Sedated	 [ ]Agitated    :  Gardner: [ ] Yes, if yes: Date of Placement:                   [X] No    LINES: Central Lines [ ] Yes, if yes: Date of Placement                                     [X] No    HOSPITAL MEDICATIONS:  MEDICATIONS  (STANDING):  amLODIPine   Tablet 5 milliGRAM(s) Oral daily  artificial  tears Solution 1 Drop(s) Both EYES every 4 hours  Biotene Dry Mouth Oral Rinse 5 milliLiter(s) Swish and Spit every 6 hours  brivaracetam Oral Solution 100 milliGRAM(s) Oral <User Schedule>  carvedilol 25 milliGRAM(s) Oral every 12 hours  cloNIDine 0.2 milliGRAM(s) Oral two times a day  docosanol 10% Cream 1 Application(s) Topical daily  heparin   Injectable 5000 Unit(s) SubCutaneous every 12 hours  insulin lispro (ADMELOG) corrective regimen sliding scale   SubCutaneous every 6 hours  insulin NPH human recombinant 12 Unit(s) SubCutaneous every 6 hours  lacosamide Solution 200 milliGRAM(s) Oral two times a day  nystatin    Suspension 600897 Unit(s) Swish and Swallow every 6 hours  pantoprazole   Suspension 40 milliGRAM(s) Oral two times a day  predniSONE   Tablet 5 milliGRAM(s) Oral daily  sodium chloride 1 Gram(s) Oral two times a day  tacrolimus    0.5 mG/mL Suspension 5 milliGRAM(s) Oral two times a day  trimethoprim   80 mG/sulfamethoxazole 400 mG 1 Tablet(s) Oral daily    MEDICATIONS  (PRN):  acetaminophen   Oral Liquid .. 650 milliGRAM(s) Oral every 6 hours PRN Temp greater or equal to 38C (100.4F), Mild Pain (1 - 3)  albuterol/ipratropium for Nebulization 3 milliLiter(s) Nebulizer every 6 hours PRN Shortness of Breath and/or Wheezing      LABS:                        8.1    6.47  )-----------( 209      ( 19 Mar 2024 07:18 )             25.5     03-19    137  |  103  |  51<H>  ----------------------------<  111<H>  5.0   |  21<L>  |  1.42<H>    Ca    10.1      19 Mar 2024 07:18  Phos  3.6     03-19  Mg     2.5     03-19    TPro  6.4  /  Alb  3.3  /  TBili  0.2  /  DBili  x   /  AST  32  /  ALT  35  /  AlkPhos  81  03-19      Urinalysis Basic - ( 19 Mar 2024 07:18 )    Color: x / Appearance: x / SG: x / pH: x  Gluc: 111 mg/dL / Ketone: x  / Bili: x / Urobili: x   Blood: x / Protein: x / Nitrite: x   Leuk Esterase: x / RBC: x / WBC x   Sq Epi: x / Non Sq Epi: x / Bacteria: x    CAPILLARY BLOOD GLUCOSE    MICROBIOLOGY:     RADIOLOGY:  [ ] Reviewed and interpreted by me    Mode: AC/ CMV (Assist Control/ Continuous Mandatory Ventilation)  RR (machine): 14  TV (machine): 450  FiO2: 30  PEEP: 5  ITime: 1  MAP: 9  PIP: 21

## 2024-03-20 NOTE — PROGRESS NOTE ADULT - ASSESSMENT
Assessment and Plan:   · Assessment    79 yo F with PMHx ESRD s/p renal transplant (2019, now off HD), left AKA, BK viremia, HTN, breast ca s/p lumpectomy (completed Tamoxifen 03/2023), DVT (off Eliquis), HLD, gout presenting 3/1 with left ICH (ICH score 4) likely 2/2 amyloid angiopathy s/p left craniectomy(discarded) and evacuation as well as EVD placement and removal (3/7). She is s/p trach/peg 3/8/24.  Febrile 3/10 with + UA, blood/sputum culture NGTD.  Treatment for UTI initiated with ceftriaxone, eventually broadened to cefepime.  Transferred to [facility] 3/11 for continued management.  Pt arrived from [facility] with minimal mental status with only response of spontaneous eye opening and withdrawal on RLE.  Urine culture grew positive for klebsiella and enterococcus, cefepime d/c'd meropenem and vanc started 3/12.  Will continue with airway clearance management and wean from ventilator as tolerated.    Wound Consult requested to assist w/ management of Sacral Evolving DTI    Buttocks/ Sacrum continue TRIAD BID  and prn soiling        Continue w/ attends under pads and Pericare w/ purewick as per protocol  Moisturize intact skin w/ SWEEN cream BID  Nutrition optimization in pt w/  Increased nutritional needs    high quality protein TF, radha/ prosource, MVI & Vit C to promote wound healing  Continue turning and positioning w/ offloading assistive devices as per protocol  Waffle Cushion to chair when oob to chair  Continue w/ low air loss pressure redistribution bed surface   Pt will need Group 2 mattress on hospital bed and ROHO cushion for wheel chair upon discharge home  Care as per medicine, will follow w/ you  Upon discharge f/u as outpatient at [facility] [address] [facility] [phone]  Seen w/  RN and D/w team  Thank you for this consult  Delmis BARNES-BC, [facility]   [phone]  Nights/ Weekends/ Holidays please call:  General Surgery Consult pager [phone]) for emergencies  Wound PT for multilayer leg wrapping or VAC issues (x [phone])

## 2024-03-20 NOTE — PROGRESS NOTE ADULT - ASSESSMENT
77 yo F with PMHx ESRD s/p renal transplant (2019, now off HD), left AKA, BK viremia, HTN, breast ca s/p lumpectomy (completed Tamoxifen 03/2023), DVT (off Eliquis), HLD, gout presenting 3/1 with left ICH (ICH score 4) likely 2/2 amyloid angiopathy s/p left craniectomy(discarded) and evacuation as well as EVD placement and removal (3/7). She is s/p trach/peg 3/8/24.  Febrile 3/10 with + UA, blood/sputum culture NGTD.  Treatment for UTI initiated with ceftriaxone, eventually broadened to cefepime.  Transferred to RCU 3/11 for continued management.  Pt arrived from NSCU with minimal mental status with only response of spontaneous eye opening and withdrawal on RLE.  Urine culture resulted - positive for klebsiella, treated for 7 days with Meropenem 3/12 -->3/19.  Continuing weaning from ventilator as tolerated.  Continuing to monitor for neurological improvement.       3/19:   Slow neurological improvement.  Is not tolerating PS.  Cranioplasty discussed with nsx, CT H pending and tentative OR date for 4/2.  Stables will be removed by nsx today.  Family updated at bedside.  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 She is s/p trach/peg 3/8/24.  Febrile 3/10 with + UA, blood/sputum culture NGTD.  Treatment for UTI initiated with ceftriaxone, eventually broadened to cefepime.  Transferred to RCU 3/11 for continued management.  Pt arrived from NSCU with minimal mental status with only response of spontaneous eye opening and withdrawal on RLE.  Urine culture resulted - positive for klebsiella, treated for 7 days with Meropenem 3/12 --> 3/19.  Cranioplasty planning 4/2 as per neurosurgery.  Continuing weaning from ventilator as tolerated.  Continuing to monitor for neurological improvement.       3/20: 79 yo F with PMHx ESRD s/p renal transplant (2019, now off HD), left AKA, BK viremia, HTN, breast ca s/p lumpectomy (completed Tamoxifen 03/2023), DVT (off Eliquis), HLD, gout presenting 3/1 with left ICH (ICH score 4) likely 2/2 amyloid angiopathy s/p left craniectomy(discarded) and evacuation as well as EVD placement and removal (3/7). She is s/p trach/peg 3/8/24.  Febrile 3/10 with + UA, blood/sputum culture NGTD.  Treatment for UTI initiated with ceftriaxone, eventually broadened to cefepime.  Transferred to RCU 3/11 for continued management.  Pt arrived from NSCU with minimal mental status with only response of spontaneous eye opening and withdrawal on RLE.  Urine culture resulted - positive for klebsiella, treated for 7 days with Meropenem 3/12 --> 3/19.  Cranioplasty planning 4/2 as per neurosurgery.  Continuing weaning from ventilator as tolerated.  Continuing to monitor for neurological improvement.       3/20:  Pt with some neurological improvement.  Appears more awake in the afternoon/earlier evening, moving all extremities spontaneously.  Tolerated PS 15/5 throughout the day.  Neurosurgery called yesterday - craniectomy sutures were removed 3/19 by nsx and cranioplasty is planned tentatively for 4/2.  Meropenem for UC + klebsiella completed 3/19, remains afebrile and hemodynamically stable.  Family at bedside today and was updated in patients clinical status. 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 She is s/p trach/peg 3/8/24.  Febrile 3/10 with + UA, blood/sputum culture NGTD.  Treatment for UTI initiated with ceftriaxone, eventually broadened to cefepime.  Transferred to RCU 3/11 for continued management.  Pt arrived from NSCU with minimal mental status with only response of spontaneous eye opening and withdrawal on RLE.  Urine culture resulted - positive for klebsiella, treated for 7 days with Meropenem 3/12 --> 3/19.  Cranioplasty planning 4/2 as per neurosurgery.  Continuing weaning from ventilator as tolerated.  Continuing to monitor for neurological improvement.       3/20:  Pt with some neurological improvement.  Appears more awake in the afternoon/earlier evening, moving all extremities spontaneously.  Tolerated PS 15/5 throughout the day.  Had hypoglycemia overnight - resolved with 1 amp - continue to monitor BG throughout day/night before adjusting.  Neurosurgery called yesterday - craniectomy sutures were removed 3/19 by nsx and cranioplasty is planned tentatively for 4/2.  Meropenem for UC + klebsiella completed 3/19, remains afebrile and hemodynamically stable.  Family at bedside today and was updated in patients clinical status.

## 2024-03-20 NOTE — PROGRESS NOTE ADULT - SUBJECTIVE AND OBJECTIVE BOX
DATE OF SERVICE: 03-20-24 @ 14:29    Patient is a 78y old  Female who presents with a chief complaint of ICH (20 Mar 2024 09:56)      INTERVAL HISTORY: In no acute distress.     REVIEW OF SYSTEMS: Unable to participate in ROS  CONSTITUTIONAL: No weakness  EYES/ENT: No visual changes;  No throat pain   NECK: No pain or stiffness  RESPIRATORY: No cough, wheezing; No shortness of breath  CARDIOVASCULAR: No chest pain or palpitations  GASTROINTESTINAL: No abdominal  pain. No nausea, vomiting, or hematemesis  GENITOURINARY: No dysuria, frequency or hematuria  NEUROLOGICAL: No stroke like symptoms  SKIN: No rashes    	  MEDICATIONS:  amLODIPine   Tablet 5 milliGRAM(s) Oral daily  carvedilol 25 milliGRAM(s) Oral every 12 hours  cloNIDine 0.2 milliGRAM(s) Oral two times a day        PHYSICAL EXAM:  T(C): 36.9 (03-20-24 @ 12:00), Max: 37 (03-20-24 @ 05:47)  HR: 93 (03-20-24 @ 12:00) (80 - 97)  BP: 167/95 (03-20-24 @ 12:00) (120/59 - 167/95)  RR: 18 (03-20-24 @ 12:00) (15 - 20)  SpO2: 100% (03-20-24 @ 12:00) (98% - 100%)  Wt(kg): --  I&O's Summary    19 Mar 2024 07:01  -  20 Mar 2024 07:00  --------------------------------------------------------  IN: 1380 mL / OUT: 450 mL / NET: 930 mL    20 Mar 2024 07:01  -  20 Mar 2024 14:29  --------------------------------------------------------  IN: 0 mL / OUT: 150 mL / NET: -150 mL          Appearance: In no distress	  HEENT:    PERRL, EOMI	  Cardiovascular:  S1 S2, No JVD  Respiratory: Lungs clear to auscultation	  Gastrointestinal:  Soft, Non-tender, + BS	  Vascularature:  No edema of LE  Psychiatric: Appropriate affect   Neuro: no acute focal deficits                               9.1    7.30  )-----------( 187      ( 20 Mar 2024 07:21 )             29.9     03-20    137  |  101  |  50<H>  ----------------------------<  145<H>  5.3   |  22  |  1.47<H>    Ca    10.4      20 Mar 2024 07:18  Phos  3.8     03-20  Mg     2.7     03-20    TPro  7.2  /  Alb  3.6  /  TBili  0.2  /  DBili  x   /  AST  47<H>  /  ALT  47<H>  /  AlkPhos  99  03-20        Labs personally reviewed      ASSESSMENT/PLAN: 	    78F Hx ESRD s/p renal xplant c/b DGF off HD, L AKA, 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    1. Abnormal Echo --  Septal bulge noted measures 2.2cm without obstruction.   -- Given no intracavitary obstruction no intervention at this time  - No Myxoma seen on this echo or echo in 2019, ? if hypermobile interatrial septum was confused for on prior imaging     2. HTN - uncontrolled, needs improvement   Continue clonidine 0.2 mg BID, Coreg 25 mg BID, amlodipine 5mg  - Will cont to trend BP and increase amlodipine to 10 if needed    3. Hyponatremia - likely SIADH  - management as per primary team    4. DVT PPX - HSQ        Yodit Umaña, AG-NP   Celio Mcwilliams DO [facility]  Cardiovascular Medicine  [address], [address]  Available through call or text on [employer]  Office: [phone]

## 2024-03-20 NOTE — PROGRESS NOTE ADULT - PROBLEM SELECTOR PLAN 4
- + UA on 3/10  - Urine Cx 3/10: ESBL Klebsiella and VRE 10-49 K   - Treating the ESBL klebsiella with Meropenem 1GM Q12H 3/12 --> 3/19

## 2024-03-20 NOTE — CHART NOTE - NSCHARTNOTEFT_GEN_A_CORE
Nutrition Follow Up Note  Patient seen for: Nutrition Follow Up     Source: [] Patient       [x] EMR        [x] RN        [] Family at bedside       [] Other:     -If unable to interview patient: [x] Trach/Vent/BiPAP  [x] Disoriented/confused/inappropriate to interview    Diet Order:   Diet, NPO with Tube Feed:   Tube Feeding Modality: Gastrostomy  Glucerna 1.5 David (GLUCERNA1.5RTH)  Total Volume for 24 Hours (mL): 1080  Continuous  Starting Tube Feed Rate {mL per Hour}: 45  Until Goal Tube Feed Rate (mL per Hour): 45  Tube Feed Duration (in Hours): 24  Tube Feed Start Time: 20:00  Supplement Feeding Modality:  Gastrostomy  Probiotic Yogurt/Smoothie Cans or Servings Per Day:  1       Frequency:  Two Times a day (24 @ 19:31) [Active]    EN Order Provides: 1080ml, 1620kcal and 89g protein.  Meets 39kcals/kG and 1.7 gm protein/kG based on dosing weight 50 kG    EN Provision (per nursing flow sheet): pt has received ~80% of feeds between 3/14-3/19.     Is current diet order appropriate/adequate? [x] Yes    Nutrition-related concerns:  -Visited pt at bedside, observed TF regimen, Glucerna 1.5 infusing at goal rate. Per RN, no intolerances noted.   -GI: Rectal tube inserted on 3/18. Last output documented on (3/18): 45 ml. Prescribed Protonix, Zofran.   -S/p EGD for PEG, see GI note on 3/19 for details.   -S/P L hemicrani for evacuation of large ICH, bone discarded (3-01)  -Cranioplasty planning  as per neurosurgery.  -S/P trach and PEG (3-08)  -Resp: Continuing weaning from ventilator as tolerated.  -Endo: HbA1c 5.7% (3/02/2024). Continues on NPH 12u q 6 hrs and insulin lispro sliding scale to aid in glycemic control. To note, pt on Prednisone, risk for elevated BG levels. Hypoglycemic episodes noted today.   -Renal: S/p hx of renal transplant (); ordered for Tacrolimus. Of note, ordered for NaCl oral tabs via PEG 2x/day.   -ALLERGY: Shellfish noted and confirmed by family.     Weights:   Dosing weight: 50 kg (-)  Daily Weight in k.8 (3-13), 50.1 (03-06) **No additional weights to assess.   ** Weight fluctuating - Weight changes likely secondary to fluid shifts. RD to continue to monitor weight trends as able.     MEDICATIONS  (STANDING):  amLODIPine   Tablet 5 milliGRAM(s) Oral daily  artificial  tears Solution 1 Drop(s) Both EYES every 4 hours  Biotene Dry Mouth Oral Rinse 5 milliLiter(s) Swish and Spit every 6 hours  brivaracetam Oral Solution 100 milliGRAM(s) Oral <User Schedule>  carvedilol 25 milliGRAM(s) Oral every 12 hours  cloNIDine 0.2 milliGRAM(s) Oral two times a day  docosanol 10% Cream 1 Application(s) Topical daily  heparin   Injectable 5000 Unit(s) SubCutaneous every 12 hours  insulin lispro (ADMELOG) corrective regimen sliding scale   SubCutaneous every 6 hours  insulin NPH human recombinant 12 Unit(s) SubCutaneous every 6 hours  lacosamide Solution 200 milliGRAM(s) Oral two times a day  nystatin    Suspension 255505 Unit(s) Swish and Swallow every 6 hours  pantoprazole   Suspension 40 milliGRAM(s) Oral two times a day  predniSONE   Tablet 5 milliGRAM(s) Oral daily  sodium chloride 1 Gram(s) Oral two times a day  tacrolimus    0.5 mG/mL Suspension 5 milliGRAM(s) Oral two times a day  trimethoprim   80 mG/sulfamethoxazole 400 mG 1 Tablet(s) Oral daily    MEDICATIONS  (PRN):  acetaminophen   Oral Liquid .. 650 milliGRAM(s) Oral every 6 hours PRN Temp greater or equal to 38C (100.4F), Mild Pain (1 - 3)  albuterol/ipratropium for Nebulization 3 milliLiter(s) Nebulizer every 6 hours PRN Shortness of Breath and/or Wheezing      Pertinent Labs:  @ 07:18: Na 137, BUN 50<H>, Cr 1.47<H>, <H>, K+ 5.3, Phos 3.8, Mg 2.7<H>, Alk Phos 99, ALT/SGPT 47<H>, AST/SGOT 47<H>, HbA1c --    A1C with Estimated Average Glucose Result: 5.7 % (24 @ 11:57)    Finger Sticks:  POCT Blood Glucose.: 141 mg/dL (24 @ 06:08)  POCT Blood Glucose.: 151 mg/dL (24 @ 00:28)  POCT Blood Glucose.: 33 mg/dL (24 @ 00:07)  POCT Blood Glucose.: 29 mg/dL (24 @ 00:01)  POCT Blood Glucose.: 33 mg/dL (24 @ 23:58)  POCT Blood Glucose.: 230 mg/dL (24 @ 17:03)  POCT Blood Glucose.: 219 mg/dL (24 @ 11:49)      Skin per nursing documentation: surgical incision left craniectomy, stage II sacral bilateral buttocks DTI present on admission   Edema: No peripheral edema noted per nursing flow sheets on 3/20    (Based on dosing wt 50kg):   Estimated Energy Needs: (30-35kcal/kg): 1500-1750kcal  Estimated Protein Needs: (1.4-1.8g protein/kg): 70-90g protein  Estimated Fluid Needs: defer to team    Previous Nutrition Diagnosis: Increased Nutrient Needs  Nutrition Diagnosis is: [x] ongoing  [] resolved [] not applicable     New Nutrition Diagnosis: [x] Not applicable    Nutrition Care Plan:  [x] In Progress  [] Achieved  [] Not applicable    Nutrition Interventions:     Education Provided:       [] Yes:  [x] No: not appropriate at this time        Recommendations:      1. Continue Glucerna 1.5 at GOAL rate of 45ml/hr x 24 hrs. To provide (based on dosing wt 50kg): 1080ml, 1620kcal (32.4kcal/kg) and 89g protein (1.78g protein/kg).   2. Monitor GI tolerance. RD to remain available to adjust EN formulary, volume/rate PRN.   3. Antihyperglycemic regimen deferred to team. Trend K levels as pt prescribed Tacrolimus.   4. Consider multivitamin and Vitamin C to help aid in wound and surgical healing.   5. Continue Probiotic as ordered by team, may consider discontinuing as pt no longer on antibiotics.     Monitoring and Evaluation:   Continue to monitor nutritional intake, tolerance to diet prescription, weights, labs, skin integrity    RD remains available upon request and will follow up per protocol    Audrey Vasquez RDN CDN (TEAMS)

## 2024-03-21 LAB
ALBUMIN SERPL ELPH-MCNC: 3.5 G/DL — SIGNIFICANT CHANGE UP (ref 3.3–5)
ALP SERPL-CCNC: 102 U/L — SIGNIFICANT CHANGE UP (ref 40–120)
ALT FLD-CCNC: 49 U/L — HIGH (ref 10–45)
ANION GAP SERPL CALC-SCNC: 12 MMOL/L — SIGNIFICANT CHANGE UP (ref 5–17)
AST SERPL-CCNC: 40 U/L — SIGNIFICANT CHANGE UP (ref 10–40)
BILIRUB SERPL-MCNC: 0.2 MG/DL — SIGNIFICANT CHANGE UP (ref 0.2–1.2)
BUN SERPL-MCNC: 62 MG/DL — HIGH (ref 7–23)
CALCIUM SERPL-MCNC: 10.1 MG/DL — SIGNIFICANT CHANGE UP (ref 8.4–10.5)
CHLORIDE SERPL-SCNC: 105 MMOL/L — SIGNIFICANT CHANGE UP (ref 96–108)
CO2 SERPL-SCNC: 21 MMOL/L — LOW (ref 22–31)
CREAT SERPL-MCNC: 1.46 MG/DL — HIGH (ref 0.5–1.3)
EGFR: 36 ML/MIN/1.73M2 — LOW
GLUCOSE BLDC GLUCOMTR-MCNC: 119 MG/DL — HIGH (ref 70–99)
GLUCOSE BLDC GLUCOMTR-MCNC: 137 MG/DL — HIGH (ref 70–99)
GLUCOSE BLDC GLUCOMTR-MCNC: 156 MG/DL — HIGH (ref 70–99)
GLUCOSE BLDC GLUCOMTR-MCNC: 230 MG/DL — HIGH (ref 70–99)
GLUCOSE SERPL-MCNC: 117 MG/DL — HIGH (ref 70–99)
HCT VFR BLD CALC: 29.7 % — LOW (ref 34.5–45)
HGB BLD-MCNC: 9 G/DL — LOW (ref 11.5–15.5)
MAGNESIUM SERPL-MCNC: 2.7 MG/DL — HIGH (ref 1.6–2.6)
MCHC RBC-ENTMCNC: 28.7 PG — SIGNIFICANT CHANGE UP (ref 27–34)
MCHC RBC-ENTMCNC: 30.3 GM/DL — LOW (ref 32–36)
MCV RBC AUTO: 94.6 FL — SIGNIFICANT CHANGE UP (ref 80–100)
NRBC # BLD: 0 /100 WBCS — SIGNIFICANT CHANGE UP (ref 0–0)
PHOSPHATE SERPL-MCNC: 4 MG/DL — SIGNIFICANT CHANGE UP (ref 2.5–4.5)
PLATELET # BLD AUTO: 196 K/UL — SIGNIFICANT CHANGE UP (ref 150–400)
POTASSIUM SERPL-MCNC: 5.5 MMOL/L — HIGH (ref 3.5–5.3)
POTASSIUM SERPL-SCNC: 5.5 MMOL/L — HIGH (ref 3.5–5.3)
PROT SERPL-MCNC: 6.8 G/DL — SIGNIFICANT CHANGE UP (ref 6–8.3)
RBC # BLD: 3.14 M/UL — LOW (ref 3.8–5.2)
RBC # FLD: 16 % — HIGH (ref 10.3–14.5)
SODIUM SERPL-SCNC: 138 MMOL/L — SIGNIFICANT CHANGE UP (ref 135–145)
TACROLIMUS SERPL-MCNC: 16.4 NG/ML — SIGNIFICANT CHANGE UP
WBC # BLD: 7.59 K/UL — SIGNIFICANT CHANGE UP (ref 3.8–10.5)
WBC # FLD AUTO: 7.59 K/UL — SIGNIFICANT CHANGE UP (ref 3.8–10.5)

## 2024-03-21 PROCEDURE — 99232 SBSQ HOSP IP/OBS MODERATE 35: CPT

## 2024-03-21 RX ORDER — SODIUM ZIRCONIUM CYCLOSILICATE 10 G/10G
5 POWDER, FOR SUSPENSION ORAL ONCE
Refills: 0 | Status: COMPLETED | OUTPATIENT
Start: 2024-03-21 | End: 2024-03-21

## 2024-03-21 RX ADMIN — Medication 500000 UNIT(S): at 17:29

## 2024-03-21 RX ADMIN — CARVEDILOL PHOSPHATE 25 MILLIGRAM(S): 80 CAPSULE, EXTENDED RELEASE ORAL at 05:26

## 2024-03-21 RX ADMIN — Medication 500000 UNIT(S): at 12:06

## 2024-03-21 RX ADMIN — CARVEDILOL PHOSPHATE 25 MILLIGRAM(S): 80 CAPSULE, EXTENDED RELEASE ORAL at 17:47

## 2024-03-21 RX ADMIN — SODIUM CHLORIDE 1 GRAM(S): 9 INJECTION INTRAMUSCULAR; INTRAVENOUS; SUBCUTANEOUS at 17:47

## 2024-03-21 RX ADMIN — TACROLIMUS 5 MILLIGRAM(S): 5 CAPSULE ORAL at 05:26

## 2024-03-21 RX ADMIN — SODIUM CHLORIDE 1 GRAM(S): 9 INJECTION INTRAMUSCULAR; INTRAVENOUS; SUBCUTANEOUS at 05:25

## 2024-03-21 RX ADMIN — LACOSAMIDE 200 MILLIGRAM(S): 50 TABLET ORAL at 05:30

## 2024-03-21 RX ADMIN — Medication 1 DROP(S): at 17:47

## 2024-03-21 RX ADMIN — SODIUM ZIRCONIUM CYCLOSILICATE 5 GRAM(S): 10 POWDER, FOR SUSPENSION ORAL at 09:31

## 2024-03-21 RX ADMIN — Medication 0.2 MILLIGRAM(S): at 05:25

## 2024-03-21 RX ADMIN — Medication 5 MILLILITER(S): at 17:29

## 2024-03-21 RX ADMIN — Medication 5 MILLILITER(S): at 11:32

## 2024-03-21 RX ADMIN — BRIVARACETAM 100 MILLIGRAM(S): 25 TABLET, FILM COATED ORAL at 05:26

## 2024-03-21 RX ADMIN — Medication 1 TABLET(S): at 11:32

## 2024-03-21 RX ADMIN — HEPARIN SODIUM 5000 UNIT(S): 5000 INJECTION INTRAVENOUS; SUBCUTANEOUS at 17:28

## 2024-03-21 RX ADMIN — Medication 5 MILLIGRAM(S): at 05:26

## 2024-03-21 RX ADMIN — BRIVARACETAM 100 MILLIGRAM(S): 25 TABLET, FILM COATED ORAL at 21:38

## 2024-03-21 RX ADMIN — Medication 500000 UNIT(S): at 05:24

## 2024-03-21 RX ADMIN — Medication 1 DROP(S): at 05:24

## 2024-03-21 RX ADMIN — LACOSAMIDE 200 MILLIGRAM(S): 50 TABLET ORAL at 17:28

## 2024-03-21 RX ADMIN — AMLODIPINE BESYLATE 5 MILLIGRAM(S): 2.5 TABLET ORAL at 05:26

## 2024-03-21 RX ADMIN — HUMAN INSULIN 12 UNIT(S): 100 INJECTION, SUSPENSION SUBCUTANEOUS at 17:46

## 2024-03-21 RX ADMIN — Medication 2: at 17:46

## 2024-03-21 RX ADMIN — PANTOPRAZOLE SODIUM 40 MILLIGRAM(S): 20 TABLET, DELAYED RELEASE ORAL at 05:25

## 2024-03-21 RX ADMIN — Medication 5 MILLILITER(S): at 00:31

## 2024-03-21 RX ADMIN — Medication 1 DROP(S): at 21:37

## 2024-03-21 RX ADMIN — HUMAN INSULIN 12 UNIT(S): 100 INJECTION, SUSPENSION SUBCUTANEOUS at 05:24

## 2024-03-21 RX ADMIN — BRIVARACETAM 100 MILLIGRAM(S): 25 TABLET, FILM COATED ORAL at 13:07

## 2024-03-21 RX ADMIN — PANTOPRAZOLE SODIUM 40 MILLIGRAM(S): 20 TABLET, DELAYED RELEASE ORAL at 17:28

## 2024-03-21 RX ADMIN — Medication 1 DROP(S): at 13:06

## 2024-03-21 RX ADMIN — Medication 4: at 12:05

## 2024-03-21 RX ADMIN — Medication 1 DROP(S): at 09:40

## 2024-03-21 RX ADMIN — DOCOSANOL 1 APPLICATION(S): 100 CREAM TOPICAL at 11:33

## 2024-03-21 RX ADMIN — HEPARIN SODIUM 5000 UNIT(S): 5000 INJECTION INTRAVENOUS; SUBCUTANEOUS at 05:25

## 2024-03-21 RX ADMIN — Medication 500000 UNIT(S): at 00:31

## 2024-03-21 RX ADMIN — Medication 5 MILLILITER(S): at 05:25

## 2024-03-21 RX ADMIN — Medication 0.2 MILLIGRAM(S): at 17:58

## 2024-03-21 RX ADMIN — HUMAN INSULIN 12 UNIT(S): 100 INJECTION, SUSPENSION SUBCUTANEOUS at 12:04

## 2024-03-21 RX ADMIN — Medication 1 DROP(S): at 02:54

## 2024-03-21 NOTE — PROGRESS NOTE ADULT - ASSESSMENT
77 yo F with PMHx ESRD s/p renal transplant (2019, now off HD), left AKA, BK viremia, HTN, breast ca s/p lumpectomy (completed Tamoxifen 03/2023), DVT (off Eliquis), HLD, gout presenting 3/1 with left ICH (ICH score 4) likely 2/2 amyloid angiopathy s/p left craniectomy(discarded) and evacuation as well as EVD placement and removal (3/7). She is s/p trach/peg 3/8/24.  Febrile 3/10 with + UA, blood/sputum culture NGTD.  Treatment for UTI initiated with ceftriaxone, eventually broadened to cefepime.  Transferred to RCU 3/11 for continued management.  Pt arrived from NSCU with minimal mental status with only response of spontaneous eye opening and withdrawal on RLE.  Urine culture resulted - positive for klebsiella, treated for 7 days with Meropenem 3/12 --> 3/19.  Cranioplasty planning 4/2 as per neurosurgery.  Continuing weaning from ventilator as tolerated.  Continuing to monitor for neurological improvement.       3/20:  Pt with some neurological improvement.  Appears more awake in the afternoon/earlier evening, moving all extremities spontaneously.  Tolerated PS 15/5 throughout the day.  Had hypoglycemia overnight - resolved with 1 amp - continue to monitor BG throughout day/night before adjusting.  Neurosurgery called yesterday - craniectomy sutures were removed 3/19 by nsx and cranioplasty is planned tentatively for 4/2.  Meropenem for UC + klebsiella completed 3/19, remains afebrile and hemodynamically stable.  Family at bedside today and was updated in patients clinical status. 77 yo F with PMHx ESRD s/p renal transplant (2019, now off HD), left AKA, BK viremia, HTN, breast ca s/p lumpectomy (completed Tamoxifen 03/2023), DVT (off Eliquis), HLD, gout presenting 3/1 with left ICH (ICH score 4) likely 2/2 amyloid angiopathy s/p left craniectomy(discarded) and evacuation as well as EVD placement and removal (3/7). She is s/p trach/peg 3/8/24.  Febrile 3/10 with + UA, blood/sputum culture NGTD.  Treatment for UTI initiated with ceftriaxone, eventually broadened to cefepime.  Transferred to RCU 3/11 for continued management.  Pt arrived from NSCU with minimal mental status with only response of spontaneous eye opening and withdrawal on RLE.  Urine culture resulted - positive for klebsiella, treated for 7 days with Meropenem 3/12 --> 3/19.  Cranioplasty planning 4/2 as per neurosurgery.  Continuing weaning from ventilator as tolerated.  Continuing to monitor for neurological improvement.       3/20:  Pt with some neurological improvement.  Appears more awake in the afternoon/earlier evening, moving all extremities spontaneously.  Tolerated PS 15/5 throughout the day.  Had hypoglycemia overnight - resolved with 1 amp - continue to monitor BG throughout day/night before adjusting.  Neurosurgery called yesterday - craniectomy sutures were removed 3/19 by nsx and cranioplasty is planned tentatively for 4/2.  Meropenem for UC + klebsiella completed 3/19, remains afebrile and hemodynamically stable.  Family at bedside today and was updated in patients clinical status.  3/21: Potassium elevated, given lokelma.  Cr stable.  Continuing PS weaning as tolerated however not tolerating below 15/5, limited by tachypnea.  Tacrolimus level elevated.  Will hold further dosing of tacro for today, trend next morning level.  Renal transplant re-consutled for further dosing plans.

## 2024-03-21 NOTE — PROGRESS NOTE ADULT - SUBJECTIVE AND OBJECTIVE BOX
INTERVAL HPI/OVERNIGHT EVENTS:    no reported gi events     MEDICATIONS  (STANDING):  amLODIPine   Tablet 5 milliGRAM(s) Oral daily  artificial  tears Solution 1 Drop(s) Both EYES every 4 hours  Biotene Dry Mouth Oral Rinse 5 milliLiter(s) Swish and Spit every 6 hours  brivaracetam Oral Solution 100 milliGRAM(s) Oral <User Schedule>  carvedilol 25 milliGRAM(s) Oral every 12 hours  cloNIDine 0.2 milliGRAM(s) Oral two times a day  docosanol 10% Cream 1 Application(s) Topical daily  heparin   Injectable 5000 Unit(s) SubCutaneous every 12 hours  insulin lispro (ADMELOG) corrective regimen sliding scale   SubCutaneous every 6 hours  insulin NPH human recombinant 12 Unit(s) SubCutaneous every 6 hours  lacosamide Solution 200 milliGRAM(s) Oral two times a day  nystatin    Suspension 085990 Unit(s) Swish and Swallow every 6 hours  pantoprazole   Suspension 40 milliGRAM(s) Oral two times a day  predniSONE   Tablet 5 milliGRAM(s) Oral daily  sodium chloride 1 Gram(s) Oral two times a day  tacrolimus    0.5 mG/mL Suspension 5 milliGRAM(s) Oral two times a day  trimethoprim   80 mG/sulfamethoxazole 400 mG 1 Tablet(s) Oral daily    MEDICATIONS  (PRN):  acetaminophen   Oral Liquid .. 650 milliGRAM(s) Oral every 6 hours PRN Temp greater or equal to 38C (100.4F), Mild Pain (1 - 3)  albuterol/ipratropium for Nebulization 3 milliLiter(s) Nebulizer every 6 hours PRN Shortness of Breath and/or Wheezing      Allergies    shellfish (Rash)  ChloraPrep One-Step (Rash)  penicillins (Rash)    Intolerances        Review of Systems: *pt minimally verbal to nonverbal, unable to obtain ROS           Vital Signs Last 24 Hrs  T(C): 36.9 (21 Mar 2024 11:20), Max: 36.9 (20 Mar 2024 18:00)  T(F): 98.5 (21 Mar 2024 11:20), Max: 98.5 (20 Mar 2024 18:00)  HR: 99 (21 Mar 2024 11:20) (81 - 102)  BP: 136/85 (21 Mar 2024 11:20) (115/90 - 185/99)  BP(mean): --  RR: 18 (21 Mar 2024 11:20) (18 - 23)  SpO2: 98% (21 Mar 2024 11:20) (98% - 100%)    Parameters below as of 21 Mar 2024 11:20  Patient On (Oxygen Delivery Method): ventilator        PHYSICAL EXAM:    Constitutional: NAD  HEENT: EOMI, throat clear  Neck: No LAD, supple  Respiratory: CTA and P  Cardiovascular: S1 and S2, RRR, no M  Gastrointestinal: BS+, soft, NT/ND, neg HSM,  Extremities: No peripheral edema, neg clubbing, cyanosis  Vascular: 2+ peripheral pulses  Neurological: A/O x 0  Psychiatric: Normal mood, normal affect  Skin: No rashes      LABS:                        9.0    7.59  )-----------( 196      ( 21 Mar 2024 04:10 )             29.7     03-21    138  |  105  |  62<H>  ----------------------------<  117<H>  5.5<H>   |  21<L>  |  1.46<H>    Ca    10.1      21 Mar 2024 04:10  Phos  4.0     03-21  Mg     2.7     03-21    TPro  6.8  /  Alb  3.5  /  TBili  0.2  /  DBili  x   /  AST  40  /  ALT  49<H>  /  AlkPhos  102  03-21      Urinalysis Basic - ( 21 Mar 2024 04:10 )    Color: x / Appearance: x / SG: x / pH: x  Gluc: 117 mg/dL / Ketone: x  / Bili: x / Urobili: x   Blood: x / Protein: x / Nitrite: x   Leuk Esterase: x / RBC: x / WBC x   Sq Epi: x / Non Sq Epi: x / Bacteria: x        RADIOLOGY & ADDITIONAL TESTS:

## 2024-03-21 NOTE — PROGRESS NOTE ADULT - SUBJECTIVE AND OBJECTIVE BOX
[facility] DIVISION OF KIDNEY DISEASES AND HYPERTENSION -- FOLLOW UP NOTE  --------------------------------------------------------------------------------  Chief Complaint: DDRT (2019), Nontraumatic intracerebral hemorrhage    24 hour events/subjective: Pt. was seen and evaluated in the RCU earlier today. Pt. lethargic, unable to provide history or ROS.    PAST HISTORY  --------------------------------------------------------------------------------  No significant changes to PMH, PSH, FHx, SHx, unless otherwise noted    ALLERGIES & MEDICATIONS  --------------------------------------------------------------------------------  Allergies    shellfish (Rash)  ChloraPrep One-Step (Rash)  penicillins (Rash)    Intolerances    Standing Inpatient Medications  amLODIPine   Tablet 5 milliGRAM(s) Oral daily  artificial  tears Solution 1 Drop(s) Both EYES every 4 hours  Biotene Dry Mouth Oral Rinse 5 milliLiter(s) Swish and Spit every 6 hours  brivaracetam Oral Solution 100 milliGRAM(s) Oral <User Schedule>  carvedilol 25 milliGRAM(s) Oral every 12 hours  cloNIDine 0.2 milliGRAM(s) Oral two times a day  docosanol 10% Cream 1 Application(s) Topical daily  heparin   Injectable 5000 Unit(s) SubCutaneous every 12 hours  insulin lispro (ADMELOG) corrective regimen sliding scale   SubCutaneous every 6 hours  insulin NPH human recombinant 12 Unit(s) SubCutaneous every 6 hours  lacosamide Solution 200 milliGRAM(s) Oral two times a day  nystatin    Suspension 667613 Unit(s) Swish and Swallow every 6 hours  pantoprazole   Suspension 40 milliGRAM(s) Oral two times a day  predniSONE   Tablet 5 milliGRAM(s) Oral daily  sodium chloride 1 Gram(s) Oral two times a day  trimethoprim   80 mG/sulfamethoxazole 400 mG 1 Tablet(s) Oral daily    PRN Inpatient Medications  acetaminophen   Oral Liquid .. 650 milliGRAM(s) Oral every 6 hours PRN  albuterol/ipratropium for Nebulization 3 milliLiter(s) Nebulizer every 6 hours PRN    REVIEW OF SYSTEMS  --------------------------------------------------------------------------------  Unable to obtain ROS, see HPI    VITALS/PHYSICAL EXAM  --------------------------------------------------------------------------------  T(C): 36.9 (03-21-24 @ 11:20), Max: 36.9 (03-20-24 @ 18:00)  HR: 99 (03-21-24 @ 11:20) (81 - 102)  BP: 136/85 (03-21-24 @ 11:20) (115/90 - 185/99)  RR: 18 (03-21-24 @ 11:20) (18 - 23)  SpO2: 98% (03-21-24 @ 11:20) (98% - 100%)  Wt(kg): --    03-20-24 @ 07:01  -  03-21-24 @ 07:00  --------------------------------------------------------  IN: 1260 mL / OUT: 600 mL / NET: 660 mL    03-21-24 @ 07:01  -  03-21-24 @ 15:25  --------------------------------------------------------  IN: 270 mL / OUT: 300 mL / NET: -30 mL    Physical Exam:  Gen: ill appearing, NAD  HEENT: MMM, +Trach to vent   Pulm: Mechanical breath sounds BL  CV: RRR, S1S2;  Abd: +BS, soft, nontender/nondistended  Extremities: no bilateral LE edema noted. +LLE AKA  Neuro: lethargic   Skin: Warm    LABS/STUDIES  --------------------------------------------------------------------------------              9.0    7.59  >-----------<  196      [03-21-24 @ 04:10]              29.7     138  |  105  |  62  ----------------------------<  117      [03-21-24 @ 04:10]  5.5   |  21  |  1.46        Ca     10.1     [03-21-24 @ 04:10]      Mg     2.7     [03-21-24 @ 04:10]      Phos  4.0     [03-21-24 @ 04:10]    TPro  6.8  /  Alb  3.5  /  TBili  0.2  /  DBili  x   /  AST  40  /  ALT  49  /  AlkPhos  102  [03-21-24 @ 04:10]    Creatinine Trend:  SCr 1.46 [03-21 @ 04:10]  SCr 1.47 [03-20 @ 07:18]  SCr 1.42 [03-19 @ 07:18]  SCr 1.45 [03-18 @ 07:32]  SCr 1.41 [03-17 @ 06:24]    Tacrolimus (), Serum: 16.4 ng/mL (03-21 @ 04:10)  Tacrolimus (), Serum: 15.8 ng/mL (03-20 @ 07:21)  Tacrolimus (), Serum: 6.8 ng/mL (03-19 @ 07:18)  Tacrolimus (), Serum: 6.9 ng/mL (03-18 @ 07:31)    Urinalysis - [03-21-24 @ 04:10]      Color  / Appearance  / SG  / pH       Gluc 117 / Ketone   / Bili  / Urobili        Blood  / Protein  / Leuk Est  / Nitrite       RBC  / WBC  / Hyaline  / Gran  / Sq Epi  / Non Sq Epi  / Bacteria     Iron 27, TIBC 201, %sat 13      [03-14-24 @ 06:49]  HbA1c 7.2      [01-11-20 @ 09:23]  TSH 1.24      [03-02-24 @ 10:51]  Lipid: chol 136, TG 95, HDL 72, LDL --      [03-02-24 @ 02:10]

## 2024-03-21 NOTE — PROGRESS NOTE ADULT - ASSESSMENT
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 S/p craniectomy and evacuation course c/b seizures, last seen on 3/7/24 EEG currently on AEDS. Now s/p trach/peg 3/9/24. During EGD/PEG found to have superficial gastric ulcers. H. Pylori Stool Ag positive, for which GI is consulted.     1. H. Pylori   Bismuth quadruple therapy x 14 days, to start post discharge     2. Resp failure s/p trach/PEG  tolerating tube feeds    3. IPH s/p craniotomy     4. PUD   daily PPI      I had a prolonged conversation with the patient regarding the hospital course, differential diagnosis, results of diagnostic tests this far, and therapeutic modalities available. Plan of care discussed with the patient after the evaluation. Patient expresses a clear understanding of the plan of care.    Jose Antonio Sepulveda D.O.  Gastroenterology and Hepatology  [address] [place] [address], [address]  [place], [place]  Office: [phone]

## 2024-03-21 NOTE — PROGRESS NOTE ADULT - ASSESSMENT
79 y/o Female with polycystic kidney disease ESRD s/p renal transplant in 2019 presented with ICH - had Craniotomy and EVD    1. DDRT in 2019 - Pt. with ESRD, underwent DDRT in 2019. On review of [facility], Scr was 1.86 on 10/31/23. SCr was WNL at 1.18 on admission (3/2), peaked to 1.54 (3/13). Today elevated/stable at 1.46 today. Pt. with likely underlying CKD, and Scr likely at baseline. Continue with immunosuppression regimen, as outlined below. Monitor labs and urine output. Avoid nephrotoxins. Dose medications as per eGFR.    2. IS  Tacro lvl 15.8 on 3/20, labs were drawn 2hrs after the morning dose. Today tacro level elevated at 16.4.   -Please hold tacro today. Will follow level tomorrow morning (goal: 4-7).   -Continue prednisone 5 mg qd.   -Leflunomide currently on hold (? BK viremia vs RA).     If you have any questions, please feel free to contact me  Alana Rawls  Nephrology Fellow  [employer]/Page [phone]  (After 5pm or on weekends please page the on-call fellow) 79 y/o Female with polycystic kidney disease ESRD s/p renal transplant in 2019 presented with ICH - had Craniotomy and EVD    1. DDRT in 2019 - Pt. with ESRD, underwent DDRT in 2019. On review of [facility], Scr was 1.86 on 10/31/23. SCr was WNL at 1.18 on admission (3/2), peaked to 1.54 (3/13). Today elevated/stable at 1.46 today. Pt. with likely underlying CKD, and Scr likely at baseline. Continue with immunosuppression regimen, as outlined below. Monitor labs and urine output. Avoid nephrotoxins. Dose medications as per eGFR.    2. IS  Tacro lvl 15.8 on 3/20, labs were drawn 2hrs after the morning dose. Today tacro level elevated at 16.4.   -Please hold tacro today. Will follow level tomorrow morning (goal: 4-7).   -Continue prednisone 5 mg qd.   -Leflunomide currently on hold (? BK viremia vs RA).     3. Hyperkalemia  -Pt received lokelma   -continue medical management   -Monitor labs, low K/renal diet    If you have any questions, please feel free to contact me  Alana Rawls  Nephrology Fellow  [employer]/Page [phone]  (After 5pm or on weekends please page the on-call fellow)

## 2024-03-21 NOTE — PROGRESS NOTE ADULT - SUBJECTIVE AND OBJECTIVE BOX
DATE OF SERVICE: 03-21-24 @ 11:43    Patient is a 78y old  Female who presents with a chief complaint of ICH (21 Mar 2024 07:22)      INTERVAL HISTORY: Obtunded in no acute distress.      REVIEW OF SYSTEMS: Unable to participate in ROS  CONSTITUTIONAL: No weakness  EYES/ENT: No visual changes;  No throat pain   NECK: No pain or stiffness  RESPIRATORY: No cough, wheezing; No shortness of breath  CARDIOVASCULAR: No chest pain or palpitations  GASTROINTESTINAL: No abdominal  pain. No nausea, vomiting, or hematemesis  GENITOURINARY: No dysuria, frequency or hematuria  NEUROLOGICAL: No stroke like symptoms  SKIN: No rashes    	  MEDICATIONS:  amLODIPine   Tablet 5 milliGRAM(s) Oral daily  carvedilol 25 milliGRAM(s) Oral every 12 hours  cloNIDine 0.2 milliGRAM(s) Oral two times a day        PHYSICAL EXAM:  T(C): 36.9 (03-21-24 @ 11:20), Max: 36.9 (03-20-24 @ 12:00)  HR: 99 (03-21-24 @ 11:20) (81 - 102)  BP: 136/85 (03-21-24 @ 11:20) (115/90 - 185/99)  RR: 18 (03-21-24 @ 11:20) (18 - 23)  SpO2: 98% (03-21-24 @ 11:20) (98% - 100%)  Wt(kg): --  I&O's Summary    20 Mar 2024 07:01  -  21 Mar 2024 07:00  --------------------------------------------------------  IN: 1260 mL / OUT: 600 mL / NET: 660 mL          Appearance: In no distress	  HEENT:    PERRL, EOMI	  Cardiovascular:  S1 S2, No JVD  Respiratory: Lungs clear to auscultation	  Gastrointestinal:  Soft, Non-tender, + BS	  Vascularature:  No edema of LE  Psychiatric: Appropriate affect   Neuro: no acute focal deficits                               9.0    7.59  )-----------( 196      ( 21 Mar 2024 04:10 )             29.7     03-21    138  |  105  |  62<H>  ----------------------------<  117<H>  5.5<H>   |  21<L>  |  1.46<H>    Ca    10.1      21 Mar 2024 04:10  Phos  4.0     03-21  Mg     2.7     03-21    TPro  6.8  /  Alb  3.5  /  TBili  0.2  /  DBili  x   /  AST  40  /  ALT  49<H>  /  AlkPhos  102  03-21        Labs personally reviewed      ASSESSMENT/PLAN: 	    78F Hx ESRD s/p renal xplant c/b DGF off HD, L AKA, 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    1. Abnormal Echo --  Septal bulge noted measures 2.2cm without obstruction.   -- Given no intracavitary obstruction no intervention at this time  - No Myxoma seen on this echo or echo in 2019, ? if hypermobile interatrial septum was confused for on prior imaging     2. HTN - uncontrolled, needs improvement   Continue clonidine 0.2 mg BID, Coreg 25 mg BID, amlodipine 5mg  - Will cont to trend BP and increase amlodipine to 10 if needed--- BP labile but mostly within target therefore would not adjust meds at this time    3. Hyponatremia - likely SIADH  - management as per primary team  - Na2+ 138 3/21    4. DVT PPX - HSQ        Yodit Umaña, AG-NP   Celio Mcwilliams DO [facility]  Cardiovascular Medicine  [address] [place] [address], [address]  Available through call or text on [employer]  Office: [phone]

## 2024-03-21 NOTE — PROGRESS NOTE ADULT - NS ATTEND AMEND GEN_ALL_CORE FT
79 y/o F w/ESRD s/p transplant on immunosuppression, BK viremia, breast CA s/p lumpectomy + course of tamoxifen, DVT not on AC admitted for ICH s/p L craniectomy w/evacuation w/hospital course c/b acute respiratory failure now s/p trach/PEG and DVT s/p IVC filter.    - AEDs as per neuro  - Tolerating trach collar ATC  - Cranioplasty pending currently scheduled for 4/2  - Immunosuppresives as per renal  - H. Pylori treatment after discharge as per GI  - No therapeutic AC  - Completed course of abx for UTI  - Dispo planning after cranioplasty 77 y/o F w/ESRD s/p transplant on immunosuppression, BK viremia, breast CA s/p lumpectomy + course of tamoxifen, DVT not on AC admitted for ICH s/p L craniectomy w/evacuation w/hospital course c/b acute respiratory failure now s/p trach/PEG and DVT s/p IVC filter.    - AEDs as per neuro  - Wean from vent as tolerated  - Cranioplasty pending currently scheduled for 4/2  - Immunosuppresives as per renal  - H. Pylori treatment after discharge as per GI  - No therapeutic AC  - Completed course of abx for UTI  - Dispo planning after cranioplasty

## 2024-03-21 NOTE — PROGRESS NOTE ADULT - SUBJECTIVE AND OBJECTIVE BOX
Patient is a 78y old  Female who presents with a chief complaint of ICH (20 Mar 2024 17:42)      Interval Events:    REVIEW OF SYSTEMS:  [ ] Positive  [ ] All other systems negative  [ ] Unable to assess ROS because ________    Vital Signs Last 24 Hrs  T(C): 36.9 (03-21-24 @ 05:18), Max: 36.9 (03-20-24 @ 12:00)  T(F): 98.4 (03-21-24 @ 05:18), Max: 98.5 (03-20-24 @ 12:00)  HR: 102 (03-21-24 @ 05:18) (80 - 102)  BP: 156/87 (03-21-24 @ 05:18) (115/90 - 185/99)  RR: 20 (03-21-24 @ 05:18) (15 - 23)  SpO2: 100% (03-21-24 @ 05:18) (98% - 100%)    PHYSICAL EXAM:  HEENT:   [ ]Tracheostomy:  [ ]Pupils equal  [ ]No oral lesions  [ ]Abnormal    SKIN  [ ]No Rash  [ ] Abnormal  [ ] pressure    CARDIAC  [ ]Regular  [ ]Abnormal    PULMONARY  [ ]Bilateral Clear Breath Sounds  [ ]Normal Excursion  [ ]Abnormal    GI  [ ]PEG      [ ] +BS		              [ ]Soft, nondistended, nontender	  [ ]Abnormal    MUSCULOSKELETAL                                   [ ]Bedbound                 [ ]Abnormal    [ ]Ambulatory/OOB to chair                           EXTREMITIES                                         [ ]Normal  [ ]Edema                           NEUROLOGIC  [ ] Normal, non focal  [ ] Focal findings:    PSYCHIATRIC  [ ]Alert and appropriate  [ ] Sedated	 [ ]Agitated    :  [place]: [ ] Yes, if yes: Date of Placement:                   [  ] No    LINES: Central Lines [ ] Yes, if yes: Date of Placement                                     [  ] No    HOSPITAL MEDICATIONS:  MEDICATIONS  (STANDING):  amLODIPine   Tablet 5 milliGRAM(s) Oral daily  artificial  tears Solution 1 Drop(s) Both EYES every 4 hours  Biotene Dry Mouth Oral Rinse 5 milliLiter(s) Swish and Spit every 6 hours  brivaracetam Oral Solution 100 milliGRAM(s) Oral <User Schedule>  carvedilol 25 milliGRAM(s) Oral every 12 hours  cloNIDine 0.2 milliGRAM(s) Oral two times a day  docosanol 10% Cream 1 Application(s) Topical daily  heparin   Injectable 5000 Unit(s) SubCutaneous every 12 hours  insulin lispro (ADMELOG) corrective regimen sliding scale   SubCutaneous every 6 hours  insulin NPH human recombinant 12 Unit(s) SubCutaneous every 6 hours  lacosamide Solution 200 milliGRAM(s) Oral two times a day  nystatin    Suspension 440048 Unit(s) Swish and Swallow every 6 hours  pantoprazole   Suspension 40 milliGRAM(s) Oral two times a day  predniSONE   Tablet 5 milliGRAM(s) Oral daily  sodium chloride 1 Gram(s) Oral two times a day  tacrolimus    0.5 mG/mL Suspension 5 milliGRAM(s) Oral two times a day  trimethoprim   80 mG/sulfamethoxazole 400 mG 1 Tablet(s) Oral daily    MEDICATIONS  (PRN):  acetaminophen   Oral Liquid .. 650 milliGRAM(s) Oral every 6 hours PRN Temp greater or equal to 38C (100.4F), Mild Pain (1 - 3)  albuterol/ipratropium for Nebulization 3 milliLiter(s) Nebulizer every 6 hours PRN Shortness of Breath and/or Wheezing      LABS:                        9.0    7.59  )-----------( 196      ( 21 Mar 2024 04:10 )             29.7     03-21    138  |  105  |  62<H>  ----------------------------<  117<H>  5.5<H>   |  21<L>  |  1.46<H>    Ca    10.1      21 Mar 2024 04:10  Phos  4.0     03-21  Mg     2.7     03-21    TPro  6.8  /  Alb  3.5  /  TBili  0.2  /  DBili  x   /  AST  40  /  ALT  49<H>  /  AlkPhos  102  03-21      Urinalysis Basic - ( 21 Mar 2024 04:10 )    Color: x / Appearance: x / SG: x / pH: x  Gluc: 117 mg/dL / Ketone: x  / Bili: x / Urobili: x   Blood: x / Protein: x / Nitrite: x   Leuk Esterase: x / RBC: x / WBC x   Sq Epi: x / Non Sq Epi: x / Bacteria: x          CAPILLARY BLOOD GLUCOSE    MICROBIOLOGY:     RADIOLOGY:  [ ] Reviewed and interpreted by me    Mode: AC/ CMV (Assist Control/ Continuous Mandatory Ventilation)  RR (machine): 14  TV (machine): 450  FiO2: 30  PEEP: 5  ITime: 1  MAP: 10  PIP: 22   Patient is a 78y old  Female who presents with a chief complaint of ICH (20 Mar 2024 17:42)      Interval Events: No events over night.     REVIEW OF SYSTEMS:  [ ] Positive  [ ] All other systems negative  [X] Unable to assess ROS because ___Obtunded    Vital Signs Last 24 Hrs  T(C): 36.9 (03-21-24 @ 05:18), Max: 36.9 (03-20-24 @ 12:00)  T(F): 98.4 (03-21-24 @ 05:18), Max: 98.5 (03-20-24 @ 12:00)  HR: 102 (03-21-24 @ 05:18) (80 - 102)  BP: 156/87 (03-21-24 @ 05:18) (115/90 - 185/99)  RR: 20 (03-21-24 @ 05:18) (15 - 23)  SpO2: 100% (03-21-24 @ 05:18) (98% - 100%)      PHYSICAL EXAM:  HEENT: Left skull defect post craniectomy with mild depression observed  [X] Tracheostomy:  #7 Cuffed Portex  [X] Pupils 4mm B/L with mild reaction to light; not tracking  [ ] No oral lesions  [ ] Abnormal    SKIN  [ ] No Rash  [ ] Abnormal  [X] pressure: Stage 2 sacral wound    CARDIAC  [X] Regular  [ ] Abnormal    PULMONARY  [X] Bilateral Clear Breath Sounds  [ ] Normal Excursion  [ ] Abnormal    GI  [X] PEG      [X] +BS		              [X] Soft, nondistended, nontender	  [ ] Abnormal    MUSCULOSKELETAL                                   [X] Bedbound                 [ ] Abnormal    [ ] Ambulatory/OOB to chair                           EXTREMITIES                                         [X] Normal  [ ] Edema                            NEUROLOGIC  [ ] Normal, non focal  [X] Focal findings:  Obtunded; eyes closed during exam; not following commands; not active movement observed; withdraws only on RLE    PSYCHIATRIC  [X] Obtunded  [ ] Sedated	 [ ]Agitated    :  [place]: [ ] Yes, if yes: Date of Placement:                   [X] No    LINES: Central Lines [ ] Yes, if yes: Date of Placement                                     [X] No      HOSPITAL MEDICATIONS:  MEDICATIONS  (STANDING):  amLODIPine   Tablet 5 milliGRAM(s) Oral daily  artificial  tears Solution 1 Drop(s) Both EYES every 4 hours  Biotene Dry Mouth Oral Rinse 5 milliLiter(s) Swish and Spit every 6 hours  brivaracetam Oral Solution 100 milliGRAM(s) Oral <User Schedule>  carvedilol 25 milliGRAM(s) Oral every 12 hours  cloNIDine 0.2 milliGRAM(s) Oral two times a day  docosanol 10% Cream 1 Application(s) Topical daily  heparin   Injectable 5000 Unit(s) SubCutaneous every 12 hours  insulin lispro (ADMELOG) corrective regimen sliding scale   SubCutaneous every 6 hours  insulin NPH human recombinant 12 Unit(s) SubCutaneous every 6 hours  lacosamide Solution 200 milliGRAM(s) Oral two times a day  nystatin    Suspension 110903 Unit(s) Swish and Swallow every 6 hours  pantoprazole   Suspension 40 milliGRAM(s) Oral two times a day  predniSONE   Tablet 5 milliGRAM(s) Oral daily  sodium chloride 1 Gram(s) Oral two times a day  tacrolimus    0.5 mG/mL Suspension 5 milliGRAM(s) Oral two times a day  trimethoprim   80 mG/sulfamethoxazole 400 mG 1 Tablet(s) Oral daily    MEDICATIONS  (PRN):  acetaminophen   Oral Liquid .. 650 milliGRAM(s) Oral every 6 hours PRN Temp greater or equal to 38C (100.4F), Mild Pain (1 - 3)  albuterol/ipratropium for Nebulization 3 milliLiter(s) Nebulizer every 6 hours PRN Shortness of Breath and/or Wheezing      LABS:                        9.0    7.59  )-----------( 196      ( 21 Mar 2024 04:10 )             29.7     03-21    138  |  105  |  62<H>  ----------------------------<  117<H>  5.5<H>   |  21<L>  |  1.46<H>    Ca    10.1      21 Mar 2024 04:10  Phos  4.0     03-21  Mg     2.7     03-21    TPro  6.8  /  Alb  3.5  /  TBili  0.2  /  DBili  x   /  AST  40  /  ALT  49<H>  /  AlkPhos  102  03-21      Urinalysis Basic - ( 21 Mar 2024 04:10 )    Color: x / Appearance: x / SG: x / pH: x  Gluc: 117 mg/dL / Ketone: x  / Bili: x / Urobili: x   Blood: x / Protein: x / Nitrite: x   Leuk Esterase: x / RBC: x / WBC x   Sq Epi: x / Non Sq Epi: x / Bacteria: x          CAPILLARY BLOOD GLUCOSE    MICROBIOLOGY:     RADIOLOGY:  [ ] Reviewed and interpreted by me    Mode: AC/ CMV (Assist Control/ Continuous Mandatory Ventilation)  RR (machine): 14  TV (machine): 450  FiO2: 30  PEEP: 5  ITime: 1  MAP: 10  PIP: 22

## 2024-03-21 NOTE — PROGRESS NOTE ADULT - PROBLEM SELECTOR PLAN 2
- S/p trach 3/8 by surgery by Dr. Joselo Mayorga   - Tracheostomy Sutures removed 3/13  - Vent Settings: PRVC 14/450/+5/30%  - Attempt PS Trials as tolerated   - [place]-clay breathing pattern observed however tolerating weaning trials  - Continue airway clearance; Duonebs q6h PRN

## 2024-03-21 NOTE — PROGRESS NOTE ADULT - PROBLEM SELECTOR PLAN 6
- AVF in the left upper extremity  - s/p renal transplant in 2019  - prednisone 5mg, bactrim ppx  - tacro 5mg BID as per transplant nephro  - trach goal 4-7, daily tacro levels 30 mins prior to dose administration - AVF in the left upper extremity  - s/p renal transplant in 2019  - prednisone 5mg, bactrim ppx  - tacro 5mg BID as per transplant nephro  - trach goal 4-7, daily tacro levels 30 mins prior to dose administration  - 3/21:  Tacro levels elevated at 16.4.  Further dosing held, transplant team re-consulted.

## 2024-03-22 LAB
ALBUMIN SERPL ELPH-MCNC: 3.4 G/DL — SIGNIFICANT CHANGE UP (ref 3.3–5)
ALP SERPL-CCNC: 96 U/L — SIGNIFICANT CHANGE UP (ref 40–120)
ALT FLD-CCNC: 42 U/L — SIGNIFICANT CHANGE UP (ref 10–45)
ANION GAP SERPL CALC-SCNC: 15 MMOL/L — SIGNIFICANT CHANGE UP (ref 5–17)
AST SERPL-CCNC: 29 U/L — SIGNIFICANT CHANGE UP (ref 10–40)
BILIRUB SERPL-MCNC: 0.2 MG/DL — SIGNIFICANT CHANGE UP (ref 0.2–1.2)
BUN SERPL-MCNC: 69 MG/DL — HIGH (ref 7–23)
CALCIUM SERPL-MCNC: 10.4 MG/DL — SIGNIFICANT CHANGE UP (ref 8.4–10.5)
CHLORIDE SERPL-SCNC: 107 MMOL/L — SIGNIFICANT CHANGE UP (ref 96–108)
CO2 SERPL-SCNC: 20 MMOL/L — LOW (ref 22–31)
CREAT SERPL-MCNC: 1.64 MG/DL — HIGH (ref 0.5–1.3)
CULTURE RESULTS: SIGNIFICANT CHANGE UP
EGFR: 32 ML/MIN/1.73M2 — LOW
GLUCOSE BLDC GLUCOMTR-MCNC: 111 MG/DL — HIGH (ref 70–99)
GLUCOSE BLDC GLUCOMTR-MCNC: 130 MG/DL — HIGH (ref 70–99)
GLUCOSE BLDC GLUCOMTR-MCNC: 160 MG/DL — HIGH (ref 70–99)
GLUCOSE BLDC GLUCOMTR-MCNC: 220 MG/DL — HIGH (ref 70–99)
GLUCOSE BLDC GLUCOMTR-MCNC: 47 MG/DL — CRITICAL LOW (ref 70–99)
GLUCOSE BLDC GLUCOMTR-MCNC: 49 MG/DL — CRITICAL LOW (ref 70–99)
GLUCOSE BLDC GLUCOMTR-MCNC: 90 MG/DL — SIGNIFICANT CHANGE UP (ref 70–99)
GLUCOSE SERPL-MCNC: 127 MG/DL — HIGH (ref 70–99)
HCT VFR BLD CALC: 28.3 % — LOW (ref 34.5–45)
HGB BLD-MCNC: 8.9 G/DL — LOW (ref 11.5–15.5)
MAGNESIUM SERPL-MCNC: 2.9 MG/DL — HIGH (ref 1.6–2.6)
MCHC RBC-ENTMCNC: 30 PG — SIGNIFICANT CHANGE UP (ref 27–34)
MCHC RBC-ENTMCNC: 31.4 GM/DL — LOW (ref 32–36)
MCV RBC AUTO: 95.3 FL — SIGNIFICANT CHANGE UP (ref 80–100)
NRBC # BLD: 0 /100 WBCS — SIGNIFICANT CHANGE UP (ref 0–0)
PHOSPHATE SERPL-MCNC: 5 MG/DL — HIGH (ref 2.5–4.5)
PLATELET # BLD AUTO: 171 K/UL — SIGNIFICANT CHANGE UP (ref 150–400)
POTASSIUM SERPL-MCNC: 5 MMOL/L — SIGNIFICANT CHANGE UP (ref 3.5–5.3)
POTASSIUM SERPL-SCNC: 5 MMOL/L — SIGNIFICANT CHANGE UP (ref 3.5–5.3)
PROT SERPL-MCNC: 6.7 G/DL — SIGNIFICANT CHANGE UP (ref 6–8.3)
RBC # BLD: 2.97 M/UL — LOW (ref 3.8–5.2)
RBC # FLD: 16.6 % — HIGH (ref 10.3–14.5)
SODIUM SERPL-SCNC: 142 MMOL/L — SIGNIFICANT CHANGE UP (ref 135–145)
SPECIMEN SOURCE: SIGNIFICANT CHANGE UP
TACROLIMUS SERPL-MCNC: 7 NG/ML — SIGNIFICANT CHANGE UP
WBC # BLD: 6.44 K/UL — SIGNIFICANT CHANGE UP (ref 3.8–10.5)
WBC # FLD AUTO: 6.44 K/UL — SIGNIFICANT CHANGE UP (ref 3.8–10.5)

## 2024-03-22 PROCEDURE — 99232 SBSQ HOSP IP/OBS MODERATE 35: CPT

## 2024-03-22 RX ORDER — DEXTROSE 50 % IN WATER 50 %
50 SYRINGE (ML) INTRAVENOUS ONCE
Refills: 0 | Status: COMPLETED | OUTPATIENT
Start: 2024-03-22 | End: 2024-03-22

## 2024-03-22 RX ORDER — HUMAN INSULIN 100 [IU]/ML
6 INJECTION, SUSPENSION SUBCUTANEOUS EVERY 6 HOURS
Refills: 0 | Status: DISCONTINUED | OUTPATIENT
Start: 2024-03-22 | End: 2024-03-23

## 2024-03-22 RX ORDER — TACROLIMUS 5 MG/1
3 CAPSULE ORAL EVERY 12 HOURS
Refills: 0 | Status: DISCONTINUED | OUTPATIENT
Start: 2024-03-22 | End: 2024-04-02

## 2024-03-22 RX ORDER — SODIUM ZIRCONIUM CYCLOSILICATE 10 G/10G
5 POWDER, FOR SUSPENSION ORAL ONCE
Refills: 0 | Status: COMPLETED | OUTPATIENT
Start: 2024-03-22 | End: 2024-03-22

## 2024-03-22 RX ADMIN — Medication 500000 UNIT(S): at 00:53

## 2024-03-22 RX ADMIN — CARVEDILOL PHOSPHATE 25 MILLIGRAM(S): 80 CAPSULE, EXTENDED RELEASE ORAL at 05:40

## 2024-03-22 RX ADMIN — Medication 5 MILLILITER(S): at 17:14

## 2024-03-22 RX ADMIN — SODIUM ZIRCONIUM CYCLOSILICATE 5 GRAM(S): 10 POWDER, FOR SUSPENSION ORAL at 18:22

## 2024-03-22 RX ADMIN — Medication 0.2 MILLIGRAM(S): at 05:37

## 2024-03-22 RX ADMIN — PANTOPRAZOLE SODIUM 40 MILLIGRAM(S): 20 TABLET, DELAYED RELEASE ORAL at 05:40

## 2024-03-22 RX ADMIN — Medication 5 MILLIGRAM(S): at 05:40

## 2024-03-22 RX ADMIN — Medication 500000 UNIT(S): at 11:56

## 2024-03-22 RX ADMIN — Medication 1 DROP(S): at 17:14

## 2024-03-22 RX ADMIN — TACROLIMUS 3 MILLIGRAM(S): 5 CAPSULE ORAL at 17:21

## 2024-03-22 RX ADMIN — SODIUM CHLORIDE 1 GRAM(S): 9 INJECTION INTRAMUSCULAR; INTRAVENOUS; SUBCUTANEOUS at 05:39

## 2024-03-22 RX ADMIN — HUMAN INSULIN 6 UNIT(S): 100 INJECTION, SUSPENSION SUBCUTANEOUS at 17:35

## 2024-03-22 RX ADMIN — Medication 5 MILLILITER(S): at 11:57

## 2024-03-22 RX ADMIN — Medication 5 MILLILITER(S): at 23:59

## 2024-03-22 RX ADMIN — Medication 0.2 MILLIGRAM(S): at 17:13

## 2024-03-22 RX ADMIN — DOCOSANOL 1 APPLICATION(S): 100 CREAM TOPICAL at 11:57

## 2024-03-22 RX ADMIN — LACOSAMIDE 200 MILLIGRAM(S): 50 TABLET ORAL at 05:38

## 2024-03-22 RX ADMIN — Medication 4: at 17:35

## 2024-03-22 RX ADMIN — Medication 1 TABLET(S): at 11:56

## 2024-03-22 RX ADMIN — LACOSAMIDE 200 MILLIGRAM(S): 50 TABLET ORAL at 17:15

## 2024-03-22 RX ADMIN — Medication 500000 UNIT(S): at 05:39

## 2024-03-22 RX ADMIN — Medication 1 DROP(S): at 14:06

## 2024-03-22 RX ADMIN — PANTOPRAZOLE SODIUM 40 MILLIGRAM(S): 20 TABLET, DELAYED RELEASE ORAL at 17:14

## 2024-03-22 RX ADMIN — HUMAN INSULIN 6 UNIT(S): 100 INJECTION, SUSPENSION SUBCUTANEOUS at 11:56

## 2024-03-22 RX ADMIN — Medication 5 MILLILITER(S): at 05:39

## 2024-03-22 RX ADMIN — SODIUM CHLORIDE 1 GRAM(S): 9 INJECTION INTRAMUSCULAR; INTRAVENOUS; SUBCUTANEOUS at 17:36

## 2024-03-22 RX ADMIN — Medication 500000 UNIT(S): at 17:14

## 2024-03-22 RX ADMIN — Medication 1 DROP(S): at 21:57

## 2024-03-22 RX ADMIN — CARVEDILOL PHOSPHATE 25 MILLIGRAM(S): 80 CAPSULE, EXTENDED RELEASE ORAL at 17:13

## 2024-03-22 RX ADMIN — BRIVARACETAM 100 MILLIGRAM(S): 25 TABLET, FILM COATED ORAL at 21:57

## 2024-03-22 RX ADMIN — BRIVARACETAM 100 MILLIGRAM(S): 25 TABLET, FILM COATED ORAL at 14:06

## 2024-03-22 RX ADMIN — Medication 1 DROP(S): at 05:37

## 2024-03-22 RX ADMIN — Medication 5 MILLILITER(S): at 00:53

## 2024-03-22 RX ADMIN — Medication 500000 UNIT(S): at 23:58

## 2024-03-22 RX ADMIN — BRIVARACETAM 100 MILLIGRAM(S): 25 TABLET, FILM COATED ORAL at 05:42

## 2024-03-22 RX ADMIN — Medication 50 MILLILITER(S): at 05:47

## 2024-03-22 RX ADMIN — HEPARIN SODIUM 5000 UNIT(S): 5000 INJECTION INTRAVENOUS; SUBCUTANEOUS at 17:14

## 2024-03-22 RX ADMIN — Medication 1 DROP(S): at 10:34

## 2024-03-22 RX ADMIN — Medication 1 DROP(S): at 02:55

## 2024-03-22 RX ADMIN — AMLODIPINE BESYLATE 5 MILLIGRAM(S): 2.5 TABLET ORAL at 05:40

## 2024-03-22 RX ADMIN — HUMAN INSULIN 12 UNIT(S): 100 INJECTION, SUSPENSION SUBCUTANEOUS at 00:53

## 2024-03-22 RX ADMIN — HEPARIN SODIUM 5000 UNIT(S): 5000 INJECTION INTRAVENOUS; SUBCUTANEOUS at 05:40

## 2024-03-22 NOTE — PROGRESS NOTE ADULT - ASSESSMENT
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 She is s/p trach/peg 3/8/24.  Febrile 3/10 with + UA, blood/sputum culture NGTD.  Treatment for UTI initiated with ceftriaxone, eventually broadened to cefepime.  Transferred to [facility] 3/11 for continued management.  Pt arrived from NSCU with minimal mental status with only response of spontaneous eye opening and withdrawal on RLE.  Urine culture resulted - positive for klebsiella, treated for 7 days with Meropenem 3/12 --> 3/19.  Cranioplasty planning 4/2 as per neurosurgery.  Continuing weaning from ventilator as tolerated.  Continuing to monitor for neurological improvement.       3/20:  Pt with some neurological improvement.  Appears more awake in the afternoon/earlier evening, moving all extremities spontaneously.  Tolerated PS 15/5 throughout the day.  Had hypoglycemia overnight - resolved with 1 amp - continue to monitor BG throughout day/night before adjusting.  Neurosurgery called yesterday - craniectomy sutures were removed 3/19 by nsx and cranioplasty is planned tentatively for 4/2.  Meropenem for UC + klebsiella completed 3/19, remains afebrile and hemodynamically stable.  Family at bedside today and was updated in patients clinical status.  3/21: Potassium elevated, given lokelma.  Cr stable.  Continuing PS weaning as tolerated however not tolerating below 15/5, limited by tachypnea.  Tacrolimus level elevated.  Will hold further dosing of tacro for today, trend next morning level.  Renal transplant re-consutled for further dosing plans.  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 She is s/p trach/peg 3/8/24.  Febrile 3/10 with + UA, blood/sputum culture NGTD.  Treatment for UTI initiated with ceftriaxone, eventually broadened to cefepime.  Transferred to RCU 3/11 for continued management.  Pt arrived from NSCU with minimal mental status with only response of spontaneous eye opening and withdrawal on RLE.  Urine culture resulted - positive for klebsiella, treated for 7 days with Meropenem 3/12 --> 3/19.  Cranioplasty planning 4/2 as per neurosurgery.  Continuing weaning from ventilator as tolerated.  Continuing to monitor for neurological improvement.       3/20:  Pt with some neurological improvement.  Appears more awake in the afternoon/earlier evening, moving all extremities spontaneously.  Tolerated PS 15/5 throughout the day.  Had hypoglycemia overnight - resolved with 1 amp - continue to monitor BG throughout day/night before adjusting.  Neurosurgery called yesterday - craniectomy sutures were removed 3/19 by nsx and cranioplasty is planned tentatively for 4/2.  Meropenem for UC + klebsiella completed 3/19, remains afebrile and hemodynamically stable.  Family at bedside today and was updated in patients clinical status.  3/21: Potassium elevated, given lokelma.  Cr stable.  Continuing PS weaning as tolerated however not tolerating below 15/5, limited by tachypnea.  Tacrolimus level elevated.  Will hold further dosing of tacro for today, trend next morning level.  Renal transplant re-consulted for further dosing plans.   3/22:  Tacro level 7.0.  Per Renal transplant team will resume tacrolimus at 3mg BID.  PS weaning as tolerated.  Awaiting inpatient cranioplasty on 4/2.  Given additional lokelma as Cr increased today.  Will add free water 300ml Q6H.  Hypoglycemia this morning.  NPH reduced to 6 units Q6H, changed to bolus feeds.

## 2024-03-22 NOTE — PROGRESS NOTE ADULT - PROBLEM SELECTOR PLAN 6
- AVF in the left upper extremity  - s/p renal transplant in 2019  - prednisone 5mg, bactrim ppx  - tacro 5mg BID as per transplant nephro  - trach goal 4-7, daily tacro levels 30 mins prior to dose administration  - 3/21:  Tacro levels elevated at 16.4.  Further dosing held, transplant team re-consulted. - AVF in the left upper extremity  - s/p renal transplant in 2019  - prednisone 5mg, bactrim ppx  - tacro 5mg BID as per transplant nephro  - trach goal 4-7, daily tacro levels 30 mins prior to dose administration  - 3/21:  Tacro levels elevated at 16.4.  Further dosing held, transplant team re-consulted.  -3/22: Tacro lvl 7.0.  Tacro resumed at 3mg BID

## 2024-03-22 NOTE — PROGRESS NOTE ADULT - SUBJECTIVE AND OBJECTIVE BOX
Patient is a 78y old  Female who presents with a chief complaint of ICH (21 Mar 2024 15:25)      Interval Events:    REVIEW OF SYSTEMS:  [ ] Positive  [ ] All other systems negative  [ ] Unable to assess ROS because ________    Vital Signs Last 24 Hrs  T(C): 36.4 (03-22-24 @ 05:55), Max: 36.9 (03-21-24 @ 11:20)  T(F): 97.5 (03-22-24 @ 05:55), Max: 98.5 (03-21-24 @ 11:20)  HR: 83 (03-22-24 @ 05:55) (83 - 101)  BP: 126/65 (03-22-24 @ 05:55) (118/70 - 158/70)  RR: 18 (03-22-24 @ 05:55) (18 - 23)  SpO2: 98% (03-22-24 @ 05:55) (97% - 100%)    PHYSICAL EXAM:  HEENT:   [ ]Tracheostomy:  [ ]Pupils equal  [ ]No oral lesions  [ ]Abnormal    SKIN  [ ]No Rash  [ ] Abnormal  [ ] pressure    CARDIAC  [ ]Regular  [ ]Abnormal    PULMONARY  [ ]Bilateral Clear Breath Sounds  [ ]Normal Excursion  [ ]Abnormal    GI  [ ]PEG      [ ] +BS		              [ ]Soft, nondistended, nontender	  [ ]Abnormal    MUSCULOSKELETAL                                   [ ]Bedbound                 [ ]Abnormal    [ ]Ambulatory/OOB to chair                           EXTREMITIES                                         [ ]Normal  [ ]Edema                           NEUROLOGIC  [ ] Normal, non focal  [ ] Focal findings:    PSYCHIATRIC  [ ]Alert and appropriate  [ ] Sedated	 [ ]Agitated    :  Gardner: [ ] Yes, if yes: Date of Placement:                   [  ] No    LINES: Central Lines [ ] Yes, if yes: Date of Placement                                     [  ] No    HOSPITAL MEDICATIONS:  MEDICATIONS  (STANDING):  amLODIPine   Tablet 5 milliGRAM(s) Oral daily  artificial  tears Solution 1 Drop(s) Both EYES every 4 hours  Biotene Dry Mouth Oral Rinse 5 milliLiter(s) Swish and Spit every 6 hours  brivaracetam Oral Solution 100 milliGRAM(s) Oral <User Schedule>  carvedilol 25 milliGRAM(s) Oral every 12 hours  cloNIDine 0.2 milliGRAM(s) Oral two times a day  docosanol 10% Cream 1 Application(s) Topical daily  heparin   Injectable 5000 Unit(s) SubCutaneous every 12 hours  insulin lispro (ADMELOG) corrective regimen sliding scale   SubCutaneous every 6 hours  insulin NPH human recombinant 12 Unit(s) SubCutaneous every 6 hours  lacosamide Solution 200 milliGRAM(s) Oral two times a day  nystatin    Suspension 865537 Unit(s) Swish and Swallow every 6 hours  pantoprazole   Suspension 40 milliGRAM(s) Oral two times a day  predniSONE   Tablet 5 milliGRAM(s) Oral daily  sodium chloride 1 Gram(s) Oral two times a day  trimethoprim   80 mG/sulfamethoxazole 400 mG 1 Tablet(s) Oral daily    MEDICATIONS  (PRN):  acetaminophen   Oral Liquid .. 650 milliGRAM(s) Oral every 6 hours PRN Temp greater or equal to 38C (100.4F), Mild Pain (1 - 3)  albuterol/ipratropium for Nebulization 3 milliLiter(s) Nebulizer every 6 hours PRN Shortness of Breath and/or Wheezing      LABS:                        8.9    6.44  )-----------( 171      ( 22 Mar 2024 07:29 )             28.3     03-21    138  |  105  |  62<H>  ----------------------------<  117<H>  5.5<H>   |  21<L>  |  1.46<H>    Ca    10.1      21 Mar 2024 04:10  Phos  4.0     03-21  Mg     2.7     03-21    TPro  6.8  /  Alb  3.5  /  TBili  0.2  /  DBili  x   /  AST  40  /  ALT  49<H>  /  AlkPhos  102  03-21      Urinalysis Basic - ( 21 Mar 2024 04:10 )    Color: x / Appearance: x / SG: x / pH: x  Gluc: 117 mg/dL / Ketone: x  / Bili: x / Urobili: x   Blood: x / Protein: x / Nitrite: x   Leuk Esterase: x / RBC: x / WBC x   Sq Epi: x / Non Sq Epi: x / Bacteria: x          CAPILLARY BLOOD GLUCOSE    MICROBIOLOGY:     RADIOLOGY:  [ ] Reviewed and interpreted by me    Mode: AC/ CMV (Assist Control/ Continuous Mandatory Ventilation)  RR (machine): 14  TV (machine): 450  FiO2: 30  PEEP: 5  ITime: 1  MAP: 9  PIP: 21   Patient is a 78y old  Female who presents with a chief complaint of ICH (21 Mar 2024 15:25)      Interval Events: No events reported over night.     REVIEW OF SYSTEMS:  [ ] Positive  [ ] All other systems negative  [X] Unable to assess ROS because ____Obtunded    Vital Signs Last 24 Hrs  T(C): 36.4 (03-22-24 @ 05:55), Max: 36.9 (03-21-24 @ 11:20)  T(F): 97.5 (03-22-24 @ 05:55), Max: 98.5 (03-21-24 @ 11:20)  HR: 83 (03-22-24 @ 05:55) (83 - 101)  BP: 126/65 (03-22-24 @ 05:55) (118/70 - 158/70)  RR: 18 (03-22-24 @ 05:55) (18 - 23)  SpO2: 98% (03-22-24 @ 05:55) (97% - 100%)      PHYSICAL EXAM:  HEENT: Left skull defect post craniectomy with mild depression observed  [X] Tracheostomy:  #7 Cuffed Portex  [X] Pupils 4mm B/L with mild reaction to light; not tracking  [ ] No oral lesions  [ ] Abnormal    SKIN  [ ] No Rash  [ ] Abnormal  [X] pressure: Stage 2 sacral wound    CARDIAC  [X] Regular  [ ] Abnormal    PULMONARY  [X] Bilateral Clear Breath Sounds  [ ] Normal Excursion  [ ] Abnormal    GI  [X] PEG      [X] +BS		              [X] Soft, nondistended, nontender	  [ ] Abnormal    MUSCULOSKELETAL                                   [X] Bedbound                 [ ] Abnormal    [ ] Ambulatory/OOB to chair                           EXTREMITIES                                         [X] Normal  [ ] Edema                            NEUROLOGIC  [ ] Normal, non focal  [X] Focal findings:  Obtunded; eyes closed during exam; not following commands; not active movement observed; active movement of RUE observed.     PSYCHIATRIC  [X] Obtunded  [ ] Sedated	 [ ]Agitated    :  Gardner: [ ] Yes, if yes: Date of Placement:                   [X] No    LINES: Central Lines [ ] Yes, if yes: Date of Placement                                     [X] No      HOSPITAL MEDICATIONS:  MEDICATIONS  (STANDING):  amLODIPine   Tablet 5 milliGRAM(s) Oral daily  artificial  tears Solution 1 Drop(s) Both EYES every 4 hours  Biotene Dry Mouth Oral Rinse 5 milliLiter(s) Swish and Spit every 6 hours  brivaracetam Oral Solution 100 milliGRAM(s) Oral <User Schedule>  carvedilol 25 milliGRAM(s) Oral every 12 hours  cloNIDine 0.2 milliGRAM(s) Oral two times a day  docosanol 10% Cream 1 Application(s) Topical daily  heparin   Injectable 5000 Unit(s) SubCutaneous every 12 hours  insulin lispro (ADMELOG) corrective regimen sliding scale   SubCutaneous every 6 hours  insulin NPH human recombinant 12 Unit(s) SubCutaneous every 6 hours  lacosamide Solution 200 milliGRAM(s) Oral two times a day  nystatin    Suspension 814916 Unit(s) Swish and Swallow every 6 hours  pantoprazole   Suspension 40 milliGRAM(s) Oral two times a day  predniSONE   Tablet 5 milliGRAM(s) Oral daily  sodium chloride 1 Gram(s) Oral two times a day  trimethoprim   80 mG/sulfamethoxazole 400 mG 1 Tablet(s) Oral daily    MEDICATIONS  (PRN):  acetaminophen   Oral Liquid .. 650 milliGRAM(s) Oral every 6 hours PRN Temp greater or equal to 38C (100.4F), Mild Pain (1 - 3)  albuterol/ipratropium for Nebulization 3 milliLiter(s) Nebulizer every 6 hours PRN Shortness of Breath and/or Wheezing      LABS:                        8.9    6.44  )-----------( 171      ( 22 Mar 2024 07:29 )             28.3     03-22    142  |  107  |  69<H>  ----------------------------<  127<H>  5.0   |  20<L>  |  1.64<H>    Ca    10.4      22 Mar 2024 07:32  Phos  5.0     03-22  Mg     2.9     03-22    TPro  6.7  /  Alb  3.4  /  TBili  0.2  /  DBili  x   /  AST  29  /  ALT  42  /  AlkPhos  96  03-22        CAPILLARY BLOOD GLUCOSE    MICROBIOLOGY:     RADIOLOGY:  [ ] Reviewed and interpreted by me    Mode: AC/ CMV (Assist Control/ Continuous Mandatory Ventilation)  RR (machine): 14  TV (machine): 450  FiO2: 30  PEEP: 5  ITime: 1  MAP: 9  PIP: 21

## 2024-03-22 NOTE — PROGRESS NOTE ADULT - SUBJECTIVE AND OBJECTIVE BOX
Neurology        S: patient seen. no neuro changes, ill appearing         Medications: MEDICATIONS  (STANDING):  amLODIPine   Tablet 5 milliGRAM(s) Oral daily  artificial  tears Solution 1 Drop(s) Both EYES every 4 hours  Biotene Dry Mouth Oral Rinse 5 milliLiter(s) Swish and Spit every 6 hours  brivaracetam Oral Solution 100 milliGRAM(s) Oral <User Schedule>  carvedilol 25 milliGRAM(s) Oral every 12 hours  cloNIDine 0.2 milliGRAM(s) Oral two times a day  docosanol 10% Cream 1 Application(s) Topical daily  heparin   Injectable 5000 Unit(s) SubCutaneous every 12 hours  insulin lispro (ADMELOG) corrective regimen sliding scale   SubCutaneous every 6 hours  insulin NPH human recombinant 6 Unit(s) SubCutaneous every 6 hours  lacosamide Solution 200 milliGRAM(s) Oral two times a day  nystatin    Suspension 193764 Unit(s) Swish and Swallow every 6 hours  pantoprazole   Suspension 40 milliGRAM(s) Oral two times a day  predniSONE   Tablet 5 milliGRAM(s) Oral daily  sodium chloride 1 Gram(s) Oral two times a day  tacrolimus    0.5 mG/mL Suspension 3 milliGRAM(s) Oral every 12 hours  trimethoprim   80 mG/sulfamethoxazole 400 mG 1 Tablet(s) Oral daily    MEDICATIONS  (PRN):  acetaminophen   Oral Liquid .. 650 milliGRAM(s) Oral every 6 hours PRN Temp greater or equal to 38C (100.4F), Mild Pain (1 - 3)  albuterol/ipratropium for Nebulization 3 milliLiter(s) Nebulizer every 6 hours PRN Shortness of Breath and/or Wheezing       Vitals:  Vital Signs Last 24 Hrs  T(C): 37.2 (22 Mar 2024 18:00), Max: 37.2 (22 Mar 2024 18:00)  T(F): 98.9 (22 Mar 2024 18:00), Max: 98.9 (22 Mar 2024 18:00)  HR: 90 (22 Mar 2024 18:00) (69 - 91)  BP: 142/61 (22 Mar 2024 18:00) (118/70 - 142/61)  BP(mean): --  RR: 18 (22 Mar 2024 18:00) (18 - 18)  SpO2: 99% (22 Mar 2024 18:00) (98% - 100%)    Parameters below as of 22 Mar 2024 18:00  Patient On (Oxygen Delivery Method): ventilator                     General Exam:   General Appearance: Appropriately dressed    Head: Normocephalic, atraumatic and no dysmorphic features s/p trach   Ear, Nose, and Throat: Moist mucous membranes  CVS: S1S2+  Resp: No SOB, no wheeze or rhonchi on vent   GI: soft NT/ND s/p PEG   Extremities:  L AKA  Skin: No bruises or rashes     Neurological Exam:  Mental Status:  opens eyes to noxious. no verbal. not following   Cranial Nerves: PERRL, EOMI but gaze pref to L, no blink to threat on R, R facial,    Motor: minimal/no spontaneous movement ;   Sensation: grimaces to noxious at times. some minimal withdrawal LUE 2/5 and RLE.    Coordination: unable   Gait: unable     Data/Labs/Imaging which I personally reviewed.               LABS:                          8.9    6.44  )-----------( 171      ( 22 Mar 2024 07:29 )             28.3     03-22    142  |  107  |  69<H>  ----------------------------<  127<H>  5.0   |  20<L>  |  1.64<H>    Ca    10.4      22 Mar 2024 07:32  Phos  5.0     03-22  Mg     2.9     03-22    TPro  6.7  /  Alb  3.4  /  TBili  0.2  /  DBili  x   /  AST  29  /  ALT  42  /  AlkPhos  96  03-22    LIVER FUNCTIONS - ( 22 Mar 2024 07:32 )  Alb: 3.4 g/dL / Pro: 6.7 g/dL / ALK PHOS: 96 U/L / ALT: 42 U/L / AST: 29 U/L / GGT: x             Urinalysis Basic - ( 22 Mar 2024 07:32 )    Color: x / Appearance: x / SG: x / pH: x  Gluc: 127 mg/dL / Ketone: x  / Bili: x / Urobili: x   Blood: x / Protein: x / Nitrite: x   Leuk Esterase: x / RBC: x / WBC x   Sq Epi: x / Non Sq Epi: x / Bacteria: x

## 2024-03-22 NOTE — PROGRESS NOTE ADULT - ASSESSMENT
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 S/p craniectomy and evacuation course c/b seizures, last seen on 3/7/24 EEG currently on AEDS. Now s/p trach/peg 3/9/24. During EGD/PEG found to have superficial gastric ulcers. H. Pylori Stool Ag positive, for which GI is consulted.     1. H. Pylori   Bismuth quadruple therapy x 14 days, to start post discharge     2. Resp failure s/p trach/PEG  tolerating tube feeds    3. IPH s/p craniotomy     4. PUD   daily PPI      I had a prolonged conversation with the patient regarding the hospital course, differential diagnosis, results of diagnostic tests this far, and therapeutic modalities available. Plan of care discussed with the patient after the evaluation. Patient expresses a clear understanding of the plan of care.    Jose Antonio Sepulveda D.O.  Gastroenterology and Hepatology  [address] [place] [address], [address]  [place], [place]  Office: [phone]

## 2024-03-22 NOTE — PROGRESS NOTE ADULT - SUBJECTIVE AND OBJECTIVE BOX
DATE OF SERVICE: 03-22-24 @ 16:59    Patient is a 78y old  Female who presents with a chief complaint of ICH (22 Mar 2024 10:46)      INTERVAL HISTORY: In no acute distress. Family at bedside    REVIEW OF SYSTEMS: Unable to participate in ROS  CONSTITUTIONAL: No weakness  EYES/ENT: No visual changes;  No throat pain   NECK: No pain or stiffness  RESPIRATORY: No cough, wheezing; No shortness of breath  CARDIOVASCULAR: No chest pain or palpitations  GASTROINTESTINAL: No abdominal  pain. No nausea, vomiting, or hematemesis  GENITOURINARY: No dysuria, frequency or hematuria  NEUROLOGICAL: No stroke like symptoms  SKIN: No rashes      	  MEDICATIONS:  amLODIPine   Tablet 5 milliGRAM(s) Oral daily  carvedilol 25 milliGRAM(s) Oral every 12 hours  cloNIDine 0.2 milliGRAM(s) Oral two times a day        PHYSICAL EXAM:  T(C): 36.2 (03-22-24 @ 11:58), Max: 36.9 (03-21-24 @ 18:00)  HR: 78 (03-22-24 @ 14:40) (69 - 101)  BP: 127/70 (03-22-24 @ 11:58) (118/70 - 158/70)  RR: 18 (03-22-24 @ 11:58) (18 - 20)  SpO2: 99% (03-22-24 @ 14:40) (98% - 100%)  Wt(kg): --  I&O's Summary    21 Mar 2024 07:01  -  22 Mar 2024 07:00  --------------------------------------------------------  IN: 1280 mL / OUT: 550 mL / NET: 730 mL    22 Mar 2024 07:01  -  22 Mar 2024 16:59  --------------------------------------------------------  IN: 640 mL / OUT: 501 mL / NET: 139 mL          Appearance: In no distress	  HEENT:    PERRL, EOMI	  Cardiovascular:  S1 S2, No JVD  Respiratory: Lungs clear to auscultation	  Gastrointestinal:  Soft, Non-tender, + BS	  Vascularature:  No edema of LE  Psychiatric: Appropriate affect   Neuro: no acute focal deficits                               8.9    6.44  )-----------( 171      ( 22 Mar 2024 07:29 )             28.3     03-22    142  |  107  |  69<H>  ----------------------------<  127<H>  5.0   |  20<L>  |  1.64<H>    Ca    10.4      22 Mar 2024 07:32  Phos  5.0     03-22  Mg     2.9     03-22    TPro  6.7  /  Alb  3.4  /  TBili  0.2  /  DBili  x   /  AST  29  /  ALT  42  /  AlkPhos  96  03-22        Labs personally reviewed      ASSESSMENT/PLAN: 	  78F Hx ESRD s/p renal xplant c/b DGF off HD, L AKA, 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    1. Abnormal Echo --  Septal bulge noted measures 2.2cm without obstruction.   -- Given no intracavitary obstruction no intervention at this time  - No Myxoma seen on this echo or echo in 2019, ? if hypermobile interatrial septum was confused for on prior imaging     2. HTN - uncontrolled, needs improvement   Continue clonidine 0.2 mg BID, Coreg 25 mg BID, amlodipine 5mg  - Will cont to trend BP and increase amlodipine to 10 if needed--- BP labile but mostly within target therefore would not adjust meds at this time    3. Hyponatremia - likely SIADH  - management as per primary team  - Na2+ 138 3/21    4. DVT PPX - HSQ                Iolani Behrbom, AG-NP   Celio Mcwilliams DO [facility]  Cardiovascular Medicine  [address], [address]  Available through call or text on [employer]  Office: [phone]

## 2024-03-22 NOTE — PROGRESS NOTE ADULT - NS ATTEND AMEND GEN_ALL_CORE FT
77 y/o F w/ESRD s/p transplant on immunosuppression, BK viremia, breast CA s/p lumpectomy + course of tamoxifen, DVT not on AC admitted for ICH s/p L craniectomy w/evacuation w/hospital course c/b acute respiratory failure now s/p trach/PEG and DVT s/p IVC filter.    - AEDs as per neuro  - Tolerating trach collar ATC  - Cranioplasty pending currently scheduled for 4/2  - Immunosuppresives as per renal  - H. Pylori treatment after discharge as per GI  - No therapeutic AC  - Decrease insulin for repeated episodes of hypoglycemia  - Completed course of abx for UTI  - Dispo planning after cranioplasty

## 2024-03-22 NOTE — PROGRESS NOTE ADULT - PROBLEM SELECTOR PLAN 3
190 - S/p EEG w/ seizures last seen 3/7  - Briviact 100 mg TID and Vimpat 200 mg BID (Renew 4/6)  - 3/15 EEG: Negative for seizures

## 2024-03-22 NOTE — CHART NOTE - NSCHARTNOTEFT_GEN_A_CORE
79 y/o Female with polycystic kidney disease ESRD s/p renal transplant in 2019 presented with ICH - had Craniotomy and EVD    1. DDRT in 2019 - Pt. with ESRD, underwent DDRT in 2019. On review of [facility], Scr was 1.86 on 10/31/23. SCr was WNL at 1.18 on admission (3/2), peaked to 1.54 (3/13). Today Scr increased to 1.64. Pt. with likely underlying CKD, and Scr likely at baseline. Continue with immunosuppression regimen, as outlined below. Monitor labs and urine output. Avoid nephrotoxins. Dose medications as per eGFR.    2. IS  Tacro lvl 15.8 on 3/20, labs were drawn 2hrs after the morning dose. Tacro level elevated at 16.4 on 3/21 therefore tacrolimus held. Today tacro level 7.  -Please resume tacro at 3mg BID today. Will follow level tomorrow morning (goal: 4-7).   -Continue prednisone 5 mg qd.   -Leflunomide currently on hold (? BK viremia vs RA).    If you have any questions, please feel free to contact me  Alana Rawls  Nephrology Fellow  Sk[employer]/Page [phone]  (After 5pm or on weekends please page the on-call fellow)

## 2024-03-22 NOTE — PROGRESS NOTE ADULT - PROBLEM SELECTOR PLAN 7
- Goal euglycemia (-180), A1C: 5.4%  - Continue ISS  - Humulin increased to 12 units q 6h - Goal euglycemia (-180), A1C: 5.4%  - Continue ISS  - 3/22: Humulin reduced from 12 to 6 units q 6h

## 2024-03-22 NOTE — PROGRESS NOTE ADULT - SUBJECTIVE AND OBJECTIVE BOX
Chief Complaint:  Patient is a 78y old  Female who presents with a chief complaint of ICH (22 Mar 2024 08:11)      Date of service 03-22-24 @ 10:46      Interval Events:   no acute events     Hospital Medications:  acetaminophen   Oral Liquid .. 650 milliGRAM(s) Oral every 6 hours PRN  albuterol/ipratropium for Nebulization 3 milliLiter(s) Nebulizer every 6 hours PRN  amLODIPine   Tablet 5 milliGRAM(s) Oral daily  artificial  tears Solution 1 Drop(s) Both EYES every 4 hours  Biotene Dry Mouth Oral Rinse 5 milliLiter(s) Swish and Spit every 6 hours  brivaracetam Oral Solution 100 milliGRAM(s) Oral <User Schedule>  carvedilol 25 milliGRAM(s) Oral every 12 hours  cloNIDine 0.2 milliGRAM(s) Oral two times a day  docosanol 10% Cream 1 Application(s) Topical daily  heparin   Injectable 5000 Unit(s) SubCutaneous every 12 hours  insulin lispro (ADMELOG) corrective regimen sliding scale   SubCutaneous every 6 hours  insulin NPH human recombinant 6 Unit(s) SubCutaneous every 6 hours  lacosamide Solution 200 milliGRAM(s) Oral two times a day  nystatin    Suspension 899422 Unit(s) Swish and Swallow every 6 hours  pantoprazole   Suspension 40 milliGRAM(s) Oral two times a day  predniSONE   Tablet 5 milliGRAM(s) Oral daily  sodium chloride 1 Gram(s) Oral two times a day  trimethoprim   80 mG/sulfamethoxazole 400 mG 1 Tablet(s) Oral daily        Review of Systems:  unable to obtain     PHYSICAL EXAM:   Vital Signs:  Vital Signs Last 24 Hrs  T(C): 36.4 (22 Mar 2024 05:55), Max: 36.9 (21 Mar 2024 11:20)  T(F): 97.5 (22 Mar 2024 05:55), Max: 98.5 (21 Mar 2024 11:20)  HR: 70 (22 Mar 2024 08:20) (70 - 101)  BP: 126/65 (22 Mar 2024 05:55) (118/70 - 158/70)  BP(mean): --  RR: 18 (22 Mar 2024 05:55) (18 - 20)  SpO2: 100% (22 Mar 2024 08:20) (97% - 100%)    Parameters below as of 22 Mar 2024 00:13  Patient On (Oxygen Delivery Method): ventilator      Daily     Daily       PHYSICAL EXAM:     GENERAL:  Appears stated age, well-groomed, well-nourished, no distress  HEENT:  NC/AT,  conjunctivae anicteric, clear and pink,   NECK: supple, trachea midline  CHEST:  Full & symmetric excursion, no increased effort, breath sounds clear  HEART:  Regular rhythm, no JVD  ABDOMEN:  Soft, non-tender, non-distended, normoactive bowel sounds,  no masses , no hepatosplenomegaly  EXTREMITIES:  no cyanosis,clubbing or edema  SKIN:  No rash, erythema, or, ecchymoses, no jaundice  NEURO:  Alert, non-focal, no asterixis  PSYCH: Appropriate affect, oriented to place and time  RECTAL: Deferred      LABS Personally reviewed by me:                        8.9    6.44  )-----------( 171      ( 22 Mar 2024 07:29 )             28.3     Mean Cell Volume: 95.3 fl (03-22-24 @ 07:29)    03-22    142  |  107  |  69<H>  ----------------------------<  127<H>  5.0   |  20<L>  |  1.64<H>    Ca    10.4      22 Mar 2024 07:32  Phos  5.0     03-22  Mg     2.9     03-22    TPro  6.7  /  Alb  3.4  /  TBili  0.2  /  DBili  x   /  AST  29  /  ALT  42  /  AlkPhos  96  03-22    LIVER FUNCTIONS - ( 22 Mar 2024 07:32 )  Alb: 3.4 g/dL / Pro: 6.7 g/dL / ALK PHOS: 96 U/L / ALT: 42 U/L / AST: 29 U/L / GGT: x             Urinalysis Basic - ( 22 Mar 2024 07:32 )    Color: x / Appearance: x / SG: x / pH: x  Gluc: 127 mg/dL / Ketone: x  / Bili: x / Urobili: x   Blood: x / Protein: x / Nitrite: x   Leuk Esterase: x / RBC: x / WBC x   Sq Epi: x / Non Sq Epi: x / Bacteria: x                              8.9    6.44  )-----------( 171      ( 22 Mar 2024 07:29 )             28.3                         9.0    7.59  )-----------( 196      ( 21 Mar 2024 04:10 )             29.7                         9.1    7.30  )-----------( 187      ( 20 Mar 2024 07:21 )             29.9       Imaging personally reviewed by me:

## 2024-03-22 NOTE — PROVIDER CONTACT NOTE (HYPOGLYCEMIA EVENT) - NS PROVIDER CONTACT BACKGROUND-HYPO
Age: 78y    Gender: Female    POCT Blood Glucose:  160 mg/dL (03-22-24 @ 06:15)  49 mg/dL (03-22-24 @ 05:42)  47 mg/dL (03-22-24 @ 05:40)  111 mg/dL (03-22-24 @ 00:14)  156 mg/dL (03-21-24 @ 17:31)  230 mg/dL (03-21-24 @ 11:37)      eMAR:  dextrose 50% Injectable   50 milliLiter(s) IV Push (03-22-24 @ 05:47)    insulin lispro (ADMELOG) corrective regimen sliding scale   2 Unit(s) SubCutaneous (03-21-24 @ 17:46)   4 Unit(s) SubCutaneous (03-21-24 @ 12:05)    insulin NPH human recombinant   12 Unit(s) SubCutaneous (03-22-24 @ 00:53)   12 Unit(s) SubCutaneous (03-21-24 @ 17:46)   12 Unit(s) SubCutaneous (03-21-24 @ 12:04)    predniSONE   Tablet   5 milliGRAM(s) Oral (03-22-24 @ 05:40)

## 2024-03-23 LAB
ALBUMIN SERPL ELPH-MCNC: 3.6 G/DL — SIGNIFICANT CHANGE UP (ref 3.3–5)
ALP SERPL-CCNC: 98 U/L — SIGNIFICANT CHANGE UP (ref 40–120)
ALT FLD-CCNC: 45 U/L — SIGNIFICANT CHANGE UP (ref 10–45)
ANION GAP SERPL CALC-SCNC: 17 MMOL/L — SIGNIFICANT CHANGE UP (ref 5–17)
AST SERPL-CCNC: 29 U/L — SIGNIFICANT CHANGE UP (ref 10–40)
BASE EXCESS BLDA CALC-SCNC: -3.1 MMOL/L — LOW (ref -2–3)
BILIRUB SERPL-MCNC: 0.2 MG/DL — SIGNIFICANT CHANGE UP (ref 0.2–1.2)
BUN SERPL-MCNC: 67 MG/DL — HIGH (ref 7–23)
CALCIUM SERPL-MCNC: 10.3 MG/DL — SIGNIFICANT CHANGE UP (ref 8.4–10.5)
CHLORIDE SERPL-SCNC: 104 MMOL/L — SIGNIFICANT CHANGE UP (ref 96–108)
CO2 BLDA-SCNC: 23 MMOL/L — SIGNIFICANT CHANGE UP (ref 19–24)
CO2 SERPL-SCNC: 18 MMOL/L — LOW (ref 22–31)
CREAT SERPL-MCNC: 1.37 MG/DL — HIGH (ref 0.5–1.3)
EGFR: 39 ML/MIN/1.73M2 — LOW
GAS PNL BLDA: SIGNIFICANT CHANGE UP
GLUCOSE BLDC GLUCOMTR-MCNC: 120 MG/DL — HIGH (ref 70–99)
GLUCOSE BLDC GLUCOMTR-MCNC: 195 MG/DL — HIGH (ref 70–99)
GLUCOSE BLDC GLUCOMTR-MCNC: 212 MG/DL — HIGH (ref 70–99)
GLUCOSE BLDC GLUCOMTR-MCNC: 84 MG/DL — SIGNIFICANT CHANGE UP (ref 70–99)
GLUCOSE SERPL-MCNC: 94 MG/DL — SIGNIFICANT CHANGE UP (ref 70–99)
HCO3 BLDA-SCNC: 22 MMOL/L — SIGNIFICANT CHANGE UP (ref 21–28)
MAGNESIUM SERPL-MCNC: 2.7 MG/DL — HIGH (ref 1.6–2.6)
PCO2 BLDA: 39 MMHG — SIGNIFICANT CHANGE UP (ref 32–45)
PH BLDA: 7.36 — SIGNIFICANT CHANGE UP (ref 7.35–7.45)
PHOSPHATE SERPL-MCNC: 4.3 MG/DL — SIGNIFICANT CHANGE UP (ref 2.5–4.5)
PO2 BLDA: 47 MMHG — CRITICAL LOW (ref 83–108)
POTASSIUM SERPL-MCNC: 5.3 MMOL/L — SIGNIFICANT CHANGE UP (ref 3.5–5.3)
POTASSIUM SERPL-SCNC: 5.3 MMOL/L — SIGNIFICANT CHANGE UP (ref 3.5–5.3)
PROT SERPL-MCNC: 6.9 G/DL — SIGNIFICANT CHANGE UP (ref 6–8.3)
SAO2 % BLDA: 77.6 % — LOW (ref 94–98)
SODIUM SERPL-SCNC: 139 MMOL/L — SIGNIFICANT CHANGE UP (ref 135–145)
TACROLIMUS SERPL-MCNC: 6.1 NG/ML — SIGNIFICANT CHANGE UP

## 2024-03-23 PROCEDURE — 99232 SBSQ HOSP IP/OBS MODERATE 35: CPT | Mod: FS

## 2024-03-23 RX ORDER — SODIUM ZIRCONIUM CYCLOSILICATE 10 G/10G
5 POWDER, FOR SUSPENSION ORAL ONCE
Refills: 0 | Status: COMPLETED | OUTPATIENT
Start: 2024-03-23 | End: 2024-03-23

## 2024-03-23 RX ADMIN — Medication 1 DROP(S): at 02:43

## 2024-03-23 RX ADMIN — AMLODIPINE BESYLATE 5 MILLIGRAM(S): 2.5 TABLET ORAL at 05:31

## 2024-03-23 RX ADMIN — PANTOPRAZOLE SODIUM 40 MILLIGRAM(S): 20 TABLET, DELAYED RELEASE ORAL at 17:45

## 2024-03-23 RX ADMIN — BRIVARACETAM 100 MILLIGRAM(S): 25 TABLET, FILM COATED ORAL at 21:56

## 2024-03-23 RX ADMIN — TACROLIMUS 3 MILLIGRAM(S): 5 CAPSULE ORAL at 17:45

## 2024-03-23 RX ADMIN — CARVEDILOL PHOSPHATE 25 MILLIGRAM(S): 80 CAPSULE, EXTENDED RELEASE ORAL at 05:31

## 2024-03-23 RX ADMIN — HEPARIN SODIUM 5000 UNIT(S): 5000 INJECTION INTRAVENOUS; SUBCUTANEOUS at 17:45

## 2024-03-23 RX ADMIN — Medication 5 MILLILITER(S): at 05:32

## 2024-03-23 RX ADMIN — Medication 1 TABLET(S): at 12:29

## 2024-03-23 RX ADMIN — BRIVARACETAM 100 MILLIGRAM(S): 25 TABLET, FILM COATED ORAL at 05:30

## 2024-03-23 RX ADMIN — Medication 1 DROP(S): at 21:25

## 2024-03-23 RX ADMIN — Medication 1 DROP(S): at 05:31

## 2024-03-23 RX ADMIN — Medication 0.2 MILLIGRAM(S): at 17:47

## 2024-03-23 RX ADMIN — Medication 500000 UNIT(S): at 12:28

## 2024-03-23 RX ADMIN — SODIUM CHLORIDE 1 GRAM(S): 9 INJECTION INTRAMUSCULAR; INTRAVENOUS; SUBCUTANEOUS at 17:45

## 2024-03-23 RX ADMIN — SODIUM ZIRCONIUM CYCLOSILICATE 5 GRAM(S): 10 POWDER, FOR SUSPENSION ORAL at 12:28

## 2024-03-23 RX ADMIN — Medication 5 MILLILITER(S): at 17:43

## 2024-03-23 RX ADMIN — TACROLIMUS 3 MILLIGRAM(S): 5 CAPSULE ORAL at 05:30

## 2024-03-23 RX ADMIN — SODIUM CHLORIDE 1 GRAM(S): 9 INJECTION INTRAMUSCULAR; INTRAVENOUS; SUBCUTANEOUS at 05:32

## 2024-03-23 RX ADMIN — PANTOPRAZOLE SODIUM 40 MILLIGRAM(S): 20 TABLET, DELAYED RELEASE ORAL at 05:32

## 2024-03-23 RX ADMIN — Medication 2: at 23:49

## 2024-03-23 RX ADMIN — Medication 5 MILLILITER(S): at 12:28

## 2024-03-23 RX ADMIN — Medication 5 MILLIGRAM(S): at 05:32

## 2024-03-23 RX ADMIN — Medication 4: at 17:43

## 2024-03-23 RX ADMIN — BRIVARACETAM 100 MILLIGRAM(S): 25 TABLET, FILM COATED ORAL at 13:08

## 2024-03-23 RX ADMIN — Medication 0.2 MILLIGRAM(S): at 05:33

## 2024-03-23 RX ADMIN — Medication 500000 UNIT(S): at 17:43

## 2024-03-23 RX ADMIN — Medication 500000 UNIT(S): at 23:49

## 2024-03-23 RX ADMIN — HUMAN INSULIN 6 UNIT(S): 100 INJECTION, SUSPENSION SUBCUTANEOUS at 05:30

## 2024-03-23 RX ADMIN — HEPARIN SODIUM 5000 UNIT(S): 5000 INJECTION INTRAVENOUS; SUBCUTANEOUS at 05:32

## 2024-03-23 RX ADMIN — Medication 1 DROP(S): at 10:11

## 2024-03-23 RX ADMIN — Medication 1 DROP(S): at 17:44

## 2024-03-23 RX ADMIN — Medication 5 MILLILITER(S): at 23:47

## 2024-03-23 RX ADMIN — Medication 1 DROP(S): at 13:09

## 2024-03-23 RX ADMIN — LACOSAMIDE 200 MILLIGRAM(S): 50 TABLET ORAL at 05:31

## 2024-03-23 RX ADMIN — DOCOSANOL 1 APPLICATION(S): 100 CREAM TOPICAL at 12:29

## 2024-03-23 RX ADMIN — Medication 500000 UNIT(S): at 05:32

## 2024-03-23 RX ADMIN — LACOSAMIDE 200 MILLIGRAM(S): 50 TABLET ORAL at 17:43

## 2024-03-23 RX ADMIN — CARVEDILOL PHOSPHATE 25 MILLIGRAM(S): 80 CAPSULE, EXTENDED RELEASE ORAL at 17:47

## 2024-03-23 NOTE — PROGRESS NOTE ADULT - PROBLEM SELECTOR PLAN 2
- S/p trach 3/8 by surgery by Dr. Joselo Mayorga   - Tracheostomy Sutures removed 3/13  - Vent Settings: PRVC 14/450/+5/30%  - Attempt PS Trials as tolerated   - Cheyennes-clay breathing pattern observed however tolerating weaning trials  - Continue airway clearance; Duonebs q6h PRN - S/p trach 3/8 by surgery by Dr. Joselo Mayorga   - Tracheostomy Sutures removed 3/13  - Vent Settings: PRVC 14/450/+5/30%  - Cheyennes-clay breathing pattern observed however tolerating weaning trials  - 3/23:  Will attempt trach collar over night.  FiO2 increased to 40% based on low O2 on gas (likely venous specimen)  - Continue airway clearance; Duonebs q6h PRN

## 2024-03-23 NOTE — PROGRESS NOTE ADULT - PROBLEM SELECTOR PLAN 1
- Found to have L IPH on CTH likely 2/2 amyloid angiopathy  - S/p L hemicrani for evacuation of large ICH; bone discarded  - CT H Stero 3/12: S/p Left Frontal Crainectomy, Remains stable from prior CT on 3/8   - Patient will require eventual cranioplasty - tentative OR date 4/2  - Maintain Neuro checks - Found to have L IPH on CTH likely 2/2 amyloid angiopathy  - S/p L hemicrani for evacuation of large ICH; bone discarded  - CT H Stereo 3/12: S/p Left Frontal Craniectomy, Remains stable from prior CT on 3/8   - Patient will require eventual cranioplasty - tentative OR date 4/2  - Maintain Neuro checks

## 2024-03-23 NOTE — PROGRESS NOTE ADULT - PROBLEM SELECTOR PLAN 7
- Goal euglycemia (-180), A1C: 5.4%  - Continue ISS  - 3/22: Humulin reduced from 12 to 6 units q 6h

## 2024-03-23 NOTE — PROGRESS NOTE ADULT - NS ATTEND AMEND GEN_ALL_CORE FT
79 y/o F w/ESRD s/p transplant on immunosuppression, BK viremia, breast CA s/p lumpectomy + course of tamoxifen, DVT not on AC admitted for ICH s/p L craniectomy w/evacuation w/hospital course c/b acute respiratory failure now s/p trach/PEG and DVT s/p IVC filter.    - AEDs as per neuro  - Cranioplasty pending currently scheduled for 4/2  - Immunosuppresives as per renal  - H. Pylori treatment after discharge as per GI  - No therapeutic AC  - hypoglycemic event overnight - NPH now discontinued  - Decrease insulin for repeated episodes of hypoglycemia  - Completed course of abx for UTI  - Dispo planning after cranioplasty.

## 2024-03-23 NOTE — PROGRESS NOTE ADULT - ASSESSMENT
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 She is s/p trach/peg 3/8/24.  Febrile 3/10 with + UA, blood/sputum culture NGTD.  Treatment for UTI initiated with ceftriaxone, eventually broadened to cefepime.  Transferred to [facility] 3/11 for continued management.  Pt arrived from NSCU with minimal mental status with only response of spontaneous eye opening and withdrawal on RLE.  Urine culture resulted - positive for klebsiella, treated for 7 days with Meropenem 3/12 --> 3/19.  Cranioplasty planning 4/2 as per neurosurgery.  Continuing weaning from ventilator as tolerated.  Continuing to monitor for neurological improvement.       3/20:  Pt with some neurological improvement.  Appears more awake in the afternoon/earlier evening, moving all extremities spontaneously.  Tolerated PS 15/5 throughout the day.  Had hypoglycemia overnight - resolved with 1 amp - continue to monitor BG throughout day/night before adjusting.  Neurosurgery called yesterday - craniectomy sutures were removed 3/19 by nsx and cranioplasty is planned tentatively for 4/2.  Meropenem for UC + klebsiella completed 3/19, remains afebrile and hemodynamically stable.  Family at bedside today and was updated in patients clinical status.  3/21: Potassium elevated, given lokelma.  Cr stable.  Continuing PS weaning as tolerated however not tolerating below 15/5, limited by tachypnea.  Tacrolimus level elevated.  Will hold further dosing of tacro for today, trend next morning level.  Renal transplant re-consulted for further dosing plans.   3/22:  Tacro level 7.0.  Per Renal transplant team will resume tacrolimus at 3mg BID.  PS weaning as tolerated.  Awaiting inpatient cranioplasty on 4/2.  Given additional lokelma as Cr increased today.  Will add free water 300ml Q6H.  Hypoglycemia this morning.  NPH reduced to 6 units Q6H, changed to bolus feeds.   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 She is s/p trach/peg 3/8/24.  Febrile 3/10 with + UA, blood/sputum culture NGTD.  Treatment for UTI initiated with ceftriaxone, eventually broadened to cefepime.  Transferred to [facility] 3/11 for continued management.  Pt arrived from NSCU with minimal mental status with only response of spontaneous eye opening and withdrawal on RLE.  Urine culture resulted - positive for klebsiella, treated for 7 days with Meropenem 3/12 --> 3/19.  Cranioplasty planning 4/2 as per neurosurgery.  Continuing weaning from ventilator as tolerated.  Continuing to monitor for neurological improvement.       3/20:  Pt with some neurological improvement.  Appears more awake in the afternoon/earlier evening, moving all extremities spontaneously.  Tolerated PS 15/5 throughout the day.  Had hypoglycemia overnight - resolved with 1 amp - continue to monitor BG throughout day/night before adjusting.  Neurosurgery called yesterday - craniectomy sutures were removed 3/19 by nsx and cranioplasty is planned tentatively for 4/2.  Meropenem for UC + klebsiella completed 3/19, remains afebrile and hemodynamically stable.  Family at bedside today and was updated in patients clinical status.  3/21: Potassium elevated, given lokelma.  Cr stable.  Continuing PS weaning as tolerated however not tolerating below 15/5, limited by tachypnea.  Tacrolimus level elevated.  Will hold further dosing of tacro for today, trend next morning level.  Renal transplant re-consulted for further dosing plans.   3/22:  Tacro level 7.0.  Per Renal transplant team will resume tacrolimus at 3mg BID.  PS weaning as tolerated.  Awaiting inpatient cranioplasty on 4/2.  Given additional lokelma as Cr increased today.  Will add free water 300ml Q6H.  Hypoglycemia this morning.  NPH reduced to 6 units Q6H, changed to bolus feeds.  3/23:  Cr improved, potassium slightly increased to 5.3, will give 5mg lokelma again today.  Patient trialed on trach collar this morning.  Will monitor throughout the day and obtain ABG.   77 yo F with PMHx ESRD s/p renal transplant (2019, now off HD), left AKA, BK viremia, HTN, breast ca s/p lumpectomy (completed Tamoxifen 03/2023), DVT (off Eliquis), HLD, gout presenting 3/1 with left ICH (ICH score 4) likely 2/2 amyloid angiopathy s/p left craniectomy(discarded) and evacuation as well as EVD placement and removal (3/7). She is s/p trach/peg 3/8/24.  Febrile 3/10 with + UA, blood/sputum culture NGTD.  Treatment for UTI initiated with ceftriaxone, eventually broadened to cefepime.  Transferred to [facility] 3/11 for continued management.  Pt arrived from NSCU with minimal mental status with only response of spontaneous eye opening and withdrawal on RLE.  Urine culture resulted - positive for klebsiella, treated for 7 days with Meropenem 3/12 --> 3/19.  Cranioplasty planning 4/2 as per neurosurgery.  Continuing weaning from ventilator as tolerated.  Continuing to monitor for neurological improvement.       3/20:  Pt with some neurological improvement.  Appears more awake in the afternoon/earlier evening, moving all extremities spontaneously.  Tolerated PS 15/5 throughout the day.  Had hypoglycemia overnight - resolved with 1 amp - continue to monitor BG throughout day/night before adjusting.  Neurosurgery called yesterday - craniectomy sutures were removed 3/19 by nsx and cranioplasty is planned tentatively for 4/2.  Meropenem for UC + klebsiella completed 3/19, remains afebrile and hemodynamically stable.  Family at bedside today and was updated in patients clinical status.  3/21: Potassium elevated, given lokelma.  Cr stable.  Continuing PS weaning as tolerated however not tolerating below 15/5, limited by tachypnea.  Tacrolimus level elevated.  Will hold further dosing of tacro for today, trend next morning level.  Renal transplant re-consulted for further dosing plans.   3/22:  Tacro level 7.0.  Per Renal transplant team will resume tacrolimus at 3mg BID.  PS weaning as tolerated.  Awaiting inpatient cranioplasty on 4/2.  Given additional lokelma as Cr increased today.  Will add free water 300ml Q6H.  Hypoglycemia this morning.  NPH reduced to 6 units Q6H, changed to bolus feeds.  3/23:  Cr improved, potassium slightly increased to 5.3, will give 5mg lokelma again today.  Patient trialed on trach collar this morning.  Attempted ABG however hard to palpate pulse, may have obtained venous specimen.  Specimen without abnormalities except for low O2.  Likely venous specimen.  Patient's O2 sat during ABG was 100% however patient known to have cheyne-clay pattern with apnea.  Will increase FiO2 to 40%.  Will continue trach collar as tolerated and over night.

## 2024-03-23 NOTE — PROGRESS NOTE ADULT - SUBJECTIVE AND OBJECTIVE BOX
Patient is a 78y old  Female who presents with a chief complaint of ICH (22 Mar 2024 16:59)      Interval Events:    REVIEW OF SYSTEMS:  [ ] Positive  [ ] All other systems negative  [ ] Unable to assess ROS because ________    Vital Signs Last 24 Hrs  T(C): 36.8 (03-23-24 @ 04:22), Max: 37.2 (03-22-24 @ 18:00)  T(F): 98.2 (03-23-24 @ 04:22), Max: 98.9 (03-22-24 @ 18:00)  HR: 89 (03-23-24 @ 04:22) (69 - 93)  BP: 128/100 (03-23-24 @ 04:22) (121/63 - 142/61)  RR: 16 (03-23-24 @ 04:22) (16 - 18)  SpO2: 98% (03-23-24 @ 04:22) (98% - 100%)    PHYSICAL EXAM:  HEENT:   [ ]Tracheostomy:  [ ]Pupils equal  [ ]No oral lesions  [ ]Abnormal    SKIN  [ ]No Rash  [ ] Abnormal  [ ] pressure    CARDIAC  [ ]Regular  [ ]Abnormal    PULMONARY  [ ]Bilateral Clear Breath Sounds  [ ]Normal Excursion  [ ]Abnormal    GI  [ ]PEG      [ ] +BS		              [ ]Soft, nondistended, nontender	  [ ]Abnormal    MUSCULOSKELETAL                                   [ ]Bedbound                 [ ]Abnormal    [ ]Ambulatory/OOB to chair                           EXTREMITIES                                         [ ]Normal  [ ]Edema                           NEUROLOGIC  [ ] Normal, non focal  [ ] Focal findings:    PSYCHIATRIC  [ ]Alert and appropriate  [ ] Sedated	 [ ]Agitated    :  Gardner: [ ] Yes, if yes: Date of Placement:                   [  ] No    LINES: Central Lines [ ] Yes, if yes: Date of Placement                                     [  ] No    HOSPITAL MEDICATIONS:  MEDICATIONS  (STANDING):  amLODIPine   Tablet 5 milliGRAM(s) Oral daily  artificial  tears Solution 1 Drop(s) Both EYES every 4 hours  Biotene Dry Mouth Oral Rinse 5 milliLiter(s) Swish and Spit every 6 hours  brivaracetam Oral Solution 100 milliGRAM(s) Oral <User Schedule>  carvedilol 25 milliGRAM(s) Oral every 12 hours  cloNIDine 0.2 milliGRAM(s) Oral two times a day  docosanol 10% Cream 1 Application(s) Topical daily  heparin   Injectable 5000 Unit(s) SubCutaneous every 12 hours  insulin lispro (ADMELOG) corrective regimen sliding scale   SubCutaneous every 6 hours  insulin NPH human recombinant 6 Unit(s) SubCutaneous every 6 hours  lacosamide Solution 200 milliGRAM(s) Oral two times a day  nystatin    Suspension 541686 Unit(s) Swish and Swallow every 6 hours  pantoprazole   Suspension 40 milliGRAM(s) Oral two times a day  predniSONE   Tablet 5 milliGRAM(s) Oral daily  sodium chloride 1 Gram(s) Oral two times a day  tacrolimus    0.5 mG/mL Suspension 3 milliGRAM(s) Oral every 12 hours  trimethoprim   80 mG/sulfamethoxazole 400 mG 1 Tablet(s) Oral daily    MEDICATIONS  (PRN):  acetaminophen   Oral Liquid .. 650 milliGRAM(s) Oral every 6 hours PRN Temp greater or equal to 38C (100.4F), Mild Pain (1 - 3)  albuterol/ipratropium for Nebulization 3 milliLiter(s) Nebulizer every 6 hours PRN Shortness of Breath and/or Wheezing      LABS:                              03-23    139  |  104  |  67<H>  ----------------------------<  94  5.3   |  18<L>  |  1.37<H>    Ca    10.3      23 Mar 2024 06:47  Phos  4.3     03-23  Mg     2.7     03-23    TPro  6.9  /  Alb  3.6  /  TBili  0.2  /  DBili  x   /  AST  29  /  ALT  45  /  AlkPhos  98  03-23      Urinalysis Basic - ( 23 Mar 2024 06:47 )    Color: x / Appearance: x / SG: x / pH: x  Gluc: 94 mg/dL / Ketone: x  / Bili: x / Urobili: x   Blood: x / Protein: x / Nitrite: x   Leuk Esterase: x / RBC: x / WBC x   Sq Epi: x / Non Sq Epi: x / Bacteria: x          CAPILLARY BLOOD GLUCOSE    MICROBIOLOGY:     RADIOLOGY:  [ ] Reviewed and interpreted by me    Mode: AC/ CMV (Assist Control/ Continuous Mandatory Ventilation)  RR (machine): 14  TV (machine): 450  FiO2: 30  PEEP: 5  ITime: 1  MAP: 10  PIP: 20   Patient is a 78y old  Female who presents with a chief complaint of ICH (22 Mar 2024 16:59)      Interval Events:  no events reported over night.    REVIEW OF SYSTEMS:  [ ] Positive  [ ] All other systems negative  [X] Unable to assess ROS because ____Obtunded    Vital Signs Last 24 Hrs  T(C): 36.8 (03-23-24 @ 04:22), Max: 37.2 (03-22-24 @ 18:00)  T(F): 98.2 (03-23-24 @ 04:22), Max: 98.9 (03-22-24 @ 18:00)  HR: 89 (03-23-24 @ 04:22) (69 - 93)  BP: 128/100 (03-23-24 @ 04:22) (121/63 - 142/61)  RR: 16 (03-23-24 @ 04:22) (16 - 18)  SpO2: 98% (03-23-24 @ 04:22) (98% - 100%)    PHYSICAL EXAM:  HEENT: Left skull defect post craniectomy with mild depression observed  [X] Tracheostomy:  #7 Cuffed Portex  [X] Pupils 4mm B/L with mild reaction to light; not tracking  [ ] No oral lesions  [X] Abnormal:  missing dentition in upper and lowers    SKIN  [ ] No Rash  [ ] Abnormal  [X] pressure: Stage 2 sacral wound    CARDIAC  [X] Regular  [ ] Abnormal    PULMONARY  [X] Bilateral Clear Breath Sounds  [ ] Normal Excursion  [ ] Abnormal    GI  [X] PEG      [X] +BS		              [X] Soft, nondistended, nontender	  [ ] Abnormal    MUSCULOSKELETAL                                   [X] Bedbound                 [X] Abnormal : Left BKA appears clean  [ ] Ambulatory/OOB to chair                           EXTREMITIES                                         [X] Normal  [ ] Edema                            NEUROLOGIC  [ ] Normal, non focal  [X] Focal findings:  Obtunded; eyes closed during exam; not following commands; active movement of RUE observed.     PSYCHIATRIC  [X] Obtunded  [ ] Sedated	 [ ]Agitated    :  Gardner: [ ] Yes, if yes: Date of Placement:                   [X] No    LINES: Central Lines [ ] Yes, if yes: Date of Placement                                     [X] No      HOSPITAL MEDICATIONS:  MEDICATIONS  (STANDING):  amLODIPine   Tablet 5 milliGRAM(s) Oral daily  artificial  tears Solution 1 Drop(s) Both EYES every 4 hours  Biotene Dry Mouth Oral Rinse 5 milliLiter(s) Swish and Spit every 6 hours  brivaracetam Oral Solution 100 milliGRAM(s) Oral <User Schedule>  carvedilol 25 milliGRAM(s) Oral every 12 hours  cloNIDine 0.2 milliGRAM(s) Oral two times a day  docosanol 10% Cream 1 Application(s) Topical daily  heparin   Injectable 5000 Unit(s) SubCutaneous every 12 hours  insulin lispro (ADMELOG) corrective regimen sliding scale   SubCutaneous every 6 hours  insulin NPH human recombinant 6 Unit(s) SubCutaneous every 6 hours  lacosamide Solution 200 milliGRAM(s) Oral two times a day  nystatin    Suspension 719446 Unit(s) Swish and Swallow every 6 hours  pantoprazole   Suspension 40 milliGRAM(s) Oral two times a day  predniSONE   Tablet 5 milliGRAM(s) Oral daily  sodium chloride 1 Gram(s) Oral two times a day  tacrolimus    0.5 mG/mL Suspension 3 milliGRAM(s) Oral every 12 hours  trimethoprim   80 mG/sulfamethoxazole 400 mG 1 Tablet(s) Oral daily    MEDICATIONS  (PRN):  acetaminophen   Oral Liquid .. 650 milliGRAM(s) Oral every 6 hours PRN Temp greater or equal to 38C (100.4F), Mild Pain (1 - 3)  albuterol/ipratropium for Nebulization 3 milliLiter(s) Nebulizer every 6 hours PRN Shortness of Breath and/or Wheezing      LABS:                            03-23    139  |  104  |  67<H>  ----------------------------<  94  5.3   |  18<L>  |  1.37<H>    Ca    10.3      23 Mar 2024 06:47  Phos  4.3     03-23  Mg     2.7     03-23    TPro  6.9  /  Alb  3.6  /  TBili  0.2  /  DBili  x   /  AST  29  /  ALT  45  /  AlkPhos  98  03-23      Urinalysis Basic - ( 23 Mar 2024 06:47 )    Color: x / Appearance: x / SG: x / pH: x  Gluc: 94 mg/dL / Ketone: x  / Bili: x / Urobili: x   Blood: x / Protein: x / Nitrite: x   Leuk Esterase: x / RBC: x / WBC x   Sq Epi: x / Non Sq Epi: x / Bacteria: x          CAPILLARY BLOOD GLUCOSE    MICROBIOLOGY:     RADIOLOGY:  [ ] Reviewed and interpreted by me    Mode: AC/ CMV (Assist Control/ Continuous Mandatory Ventilation)  RR (machine): 14  TV (machine): 450  FiO2: 30  PEEP: 5  ITime: 1  MAP: 10  PIP: 20

## 2024-03-23 NOTE — PROGRESS NOTE ADULT - PROBLEM SELECTOR PLAN 6
- AVF in the left upper extremity  - s/p renal transplant in 2019  - prednisone 5mg, bactrim ppx  - tacro 5mg BID as per transplant nephro  - trach goal 4-7, daily tacro levels 30 mins prior to dose administration  - 3/21:  Tacro levels elevated at 16.4.  Further dosing held, transplant team re-consulted.  -3/22: Tacro lvl 7.0.  Tacro resumed at 3mg BID

## 2024-03-24 LAB
ALBUMIN SERPL ELPH-MCNC: 3.5 G/DL — SIGNIFICANT CHANGE UP (ref 3.3–5)
ALP SERPL-CCNC: 104 U/L — SIGNIFICANT CHANGE UP (ref 40–120)
ALT FLD-CCNC: 45 U/L — SIGNIFICANT CHANGE UP (ref 10–45)
ANION GAP SERPL CALC-SCNC: 15 MMOL/L — SIGNIFICANT CHANGE UP (ref 5–17)
AST SERPL-CCNC: 24 U/L — SIGNIFICANT CHANGE UP (ref 10–40)
BASE EXCESS BLDA CALC-SCNC: -1.1 MMOL/L — SIGNIFICANT CHANGE UP (ref -2–3)
BILIRUB SERPL-MCNC: 0.2 MG/DL — SIGNIFICANT CHANGE UP (ref 0.2–1.2)
BUN SERPL-MCNC: 61 MG/DL — HIGH (ref 7–23)
CALCIUM SERPL-MCNC: 10.1 MG/DL — SIGNIFICANT CHANGE UP (ref 8.4–10.5)
CHLORIDE SERPL-SCNC: 102 MMOL/L — SIGNIFICANT CHANGE UP (ref 96–108)
CO2 BLDA-SCNC: 25 MMOL/L — HIGH (ref 19–24)
CO2 SERPL-SCNC: 19 MMOL/L — LOW (ref 22–31)
CREAT SERPL-MCNC: 1.21 MG/DL — SIGNIFICANT CHANGE UP (ref 0.5–1.3)
EGFR: 46 ML/MIN/1.73M2 — LOW
GLUCOSE BLDC GLUCOMTR-MCNC: 214 MG/DL — HIGH (ref 70–99)
GLUCOSE BLDC GLUCOMTR-MCNC: 223 MG/DL — HIGH (ref 70–99)
GLUCOSE BLDC GLUCOMTR-MCNC: 239 MG/DL — HIGH (ref 70–99)
GLUCOSE BLDC GLUCOMTR-MCNC: 252 MG/DL — HIGH (ref 70–99)
GLUCOSE SERPL-MCNC: 216 MG/DL — HIGH (ref 70–99)
HCO3 BLDA-SCNC: 24 MMOL/L — SIGNIFICANT CHANGE UP (ref 21–28)
MAGNESIUM SERPL-MCNC: 2.6 MG/DL — SIGNIFICANT CHANGE UP (ref 1.6–2.6)
PCO2 BLDA: 38 MMHG — SIGNIFICANT CHANGE UP (ref 32–45)
PH BLDA: 7.4 — SIGNIFICANT CHANGE UP (ref 7.35–7.45)
PHOSPHATE SERPL-MCNC: 3.9 MG/DL — SIGNIFICANT CHANGE UP (ref 2.5–4.5)
PO2 BLDA: 63 MMHG — LOW (ref 83–108)
POTASSIUM SERPL-MCNC: 5.2 MMOL/L — SIGNIFICANT CHANGE UP (ref 3.5–5.3)
POTASSIUM SERPL-SCNC: 5.2 MMOL/L — SIGNIFICANT CHANGE UP (ref 3.5–5.3)
PROT SERPL-MCNC: 6.9 G/DL — SIGNIFICANT CHANGE UP (ref 6–8.3)
SAO2 % BLDA: 91.9 % — LOW (ref 94–98)
SODIUM SERPL-SCNC: 136 MMOL/L — SIGNIFICANT CHANGE UP (ref 135–145)
TACROLIMUS SERPL-MCNC: 6.6 NG/ML — SIGNIFICANT CHANGE UP

## 2024-03-24 PROCEDURE — 99232 SBSQ HOSP IP/OBS MODERATE 35: CPT | Mod: FS

## 2024-03-24 RX ORDER — HYDRALAZINE HCL 50 MG
5 TABLET ORAL ONCE
Refills: 0 | Status: DISCONTINUED | OUTPATIENT
Start: 2024-03-24 | End: 2024-03-24

## 2024-03-24 RX ORDER — AMLODIPINE BESYLATE 2.5 MG/1
10 TABLET ORAL DAILY
Refills: 0 | Status: DISCONTINUED | OUTPATIENT
Start: 2024-03-24 | End: 2024-03-26

## 2024-03-24 RX ADMIN — HEPARIN SODIUM 5000 UNIT(S): 5000 INJECTION INTRAVENOUS; SUBCUTANEOUS at 17:20

## 2024-03-24 RX ADMIN — Medication 5 MILLILITER(S): at 11:55

## 2024-03-24 RX ADMIN — LACOSAMIDE 200 MILLIGRAM(S): 50 TABLET ORAL at 17:21

## 2024-03-24 RX ADMIN — Medication 4: at 12:03

## 2024-03-24 RX ADMIN — Medication 5 MILLILITER(S): at 05:22

## 2024-03-24 RX ADMIN — TACROLIMUS 3 MILLIGRAM(S): 5 CAPSULE ORAL at 05:28

## 2024-03-24 RX ADMIN — Medication 500000 UNIT(S): at 05:28

## 2024-03-24 RX ADMIN — Medication 1 DROP(S): at 10:26

## 2024-03-24 RX ADMIN — SODIUM CHLORIDE 1 GRAM(S): 9 INJECTION INTRAMUSCULAR; INTRAVENOUS; SUBCUTANEOUS at 05:25

## 2024-03-24 RX ADMIN — Medication 1 DROP(S): at 14:33

## 2024-03-24 RX ADMIN — Medication 1 TABLET(S): at 11:55

## 2024-03-24 RX ADMIN — Medication 500000 UNIT(S): at 11:55

## 2024-03-24 RX ADMIN — Medication 500000 UNIT(S): at 17:22

## 2024-03-24 RX ADMIN — Medication 1 DROP(S): at 02:17

## 2024-03-24 RX ADMIN — TACROLIMUS 3 MILLIGRAM(S): 5 CAPSULE ORAL at 17:30

## 2024-03-24 RX ADMIN — BRIVARACETAM 100 MILLIGRAM(S): 25 TABLET, FILM COATED ORAL at 22:01

## 2024-03-24 RX ADMIN — Medication 5 MILLIGRAM(S): at 05:23

## 2024-03-24 RX ADMIN — LACOSAMIDE 200 MILLIGRAM(S): 50 TABLET ORAL at 05:21

## 2024-03-24 RX ADMIN — Medication 1 DROP(S): at 17:22

## 2024-03-24 RX ADMIN — AMLODIPINE BESYLATE 5 MILLIGRAM(S): 2.5 TABLET ORAL at 05:24

## 2024-03-24 RX ADMIN — CARVEDILOL PHOSPHATE 25 MILLIGRAM(S): 80 CAPSULE, EXTENDED RELEASE ORAL at 17:20

## 2024-03-24 RX ADMIN — Medication 1 DROP(S): at 22:01

## 2024-03-24 RX ADMIN — Medication 500000 UNIT(S): at 23:52

## 2024-03-24 RX ADMIN — Medication 0.2 MILLIGRAM(S): at 17:21

## 2024-03-24 RX ADMIN — CARVEDILOL PHOSPHATE 25 MILLIGRAM(S): 80 CAPSULE, EXTENDED RELEASE ORAL at 05:23

## 2024-03-24 RX ADMIN — BRIVARACETAM 100 MILLIGRAM(S): 25 TABLET, FILM COATED ORAL at 14:34

## 2024-03-24 RX ADMIN — Medication 5 MILLILITER(S): at 17:22

## 2024-03-24 RX ADMIN — HEPARIN SODIUM 5000 UNIT(S): 5000 INJECTION INTRAVENOUS; SUBCUTANEOUS at 05:23

## 2024-03-24 RX ADMIN — Medication 6: at 18:08

## 2024-03-24 RX ADMIN — Medication 4: at 05:36

## 2024-03-24 RX ADMIN — SODIUM CHLORIDE 1 GRAM(S): 9 INJECTION INTRAMUSCULAR; INTRAVENOUS; SUBCUTANEOUS at 17:21

## 2024-03-24 RX ADMIN — Medication 4: at 23:49

## 2024-03-24 RX ADMIN — Medication 5 MILLILITER(S): at 23:52

## 2024-03-24 RX ADMIN — BRIVARACETAM 100 MILLIGRAM(S): 25 TABLET, FILM COATED ORAL at 05:20

## 2024-03-24 RX ADMIN — PANTOPRAZOLE SODIUM 40 MILLIGRAM(S): 20 TABLET, DELAYED RELEASE ORAL at 17:21

## 2024-03-24 RX ADMIN — Medication 0.2 MILLIGRAM(S): at 05:35

## 2024-03-24 RX ADMIN — PANTOPRAZOLE SODIUM 40 MILLIGRAM(S): 20 TABLET, DELAYED RELEASE ORAL at 05:24

## 2024-03-24 RX ADMIN — Medication 1 DROP(S): at 05:22

## 2024-03-24 NOTE — PROGRESS NOTE ADULT - ASSESSMENT
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 She is s/p trach/peg 3/8/24.  Febrile 3/10 with + UA, blood/sputum culture NGTD.  Treatment for UTI initiated with ceftriaxone, eventually broadened to cefepime.  Transferred to RCU 3/11 for continued management.  Pt arrived from NSCU with minimal mental status with only response of spontaneous eye opening and withdrawal on RLE.  Urine culture resulted - positive for klebsiella, treated for 7 days with Meropenem 3/12 --> 3/19.  Cranioplasty planning 4/2 as per neurosurgery.  Continuing weaning from ventilator as tolerated.  Continuing to monitor for neurological improvement.       3/20:  Pt with some neurological improvement.  Appears more awake in the afternoon/earlier evening, moving all extremities spontaneously.  Tolerated PS 15/5 throughout the day.  Had hypoglycemia overnight - resolved with 1 amp - continue to monitor BG throughout day/night before adjusting.  Neurosurgery called yesterday - craniectomy sutures were removed 3/19 by nsx and cranioplasty is planned tentatively for 4/2.  Meropenem for UC + klebsiella completed 3/19, remains afebrile and hemodynamically stable.  Family at bedside today and was updated in patients clinical status.  3/21: Potassium elevated, given lokelma.  Cr stable.  Continuing PS weaning as tolerated however not tolerating below 15/5, limited by tachypnea.  Tacrolimus level elevated.  Will hold further dosing of tacro for today, trend next morning level.  Renal transplant re-consulted for further dosing plans.   3/22:  Tacro level 7.0.  Per Renal transplant team will resume tacrolimus at 3mg BID.  PS weaning as tolerated.  Awaiting inpatient cranioplasty on 4/2.  Given additional lokelma as Cr increased today.  Will add free water 300ml Q6H.  Hypoglycemia this morning.  NPH reduced to 6 units Q6H, changed to bolus feeds.  3/23:  Cr improved, potassium slightly increased to 5.3, will give 5mg lokelma again today.  Patient trialed on trach collar this morning.  Attempted ABG however hard to palpate pulse, may have obtained venous specimen.  Specimen without abnormalities except for low O2.  Likely venous specimen.  Patient's O2 sat during ABG was 100% however patient known to have cheyne-clay pattern with apnea.  Will increase FiO2 to 40%.  Will continue trach collar as tolerated and over night.    77 yo F with PMHx ESRD s/p renal transplant (2019, now off HD), left AKA, BK viremia, HTN, breast ca s/p lumpectomy (completed Tamoxifen 03/2023), DVT (off Eliquis), HLD, gout presenting 3/1 with left ICH (ICH score 4) likely 2/2 amyloid angiopathy s/p left craniectomy(discarded) and evacuation as well as EVD placement and removal (3/7). She is s/p trach/peg 3/8/24.  Febrile 3/10 with + UA, blood/sputum culture NGTD.  Treatment for UTI initiated with ceftriaxone, eventually broadened to cefepime.  Transferred to RCU 3/11 for continued management.  Pt arrived from NSCU with minimal mental status with only response of spontaneous eye opening and withdrawal on RLE.  Urine culture resulted - positive for klebsiella, treated for 7 days with Meropenem 3/12 --> 3/19.  Cranioplasty planning 4/2 as per neurosurgery.  Continuing weaning from ventilator as tolerated.  Continuing to monitor for neurological improvement.       3/21: 79 yo F with PMHx ESRD s/p renal transplant (2019, now off HD), left AKA, BK viremia, HTN, breast ca s/p lumpectomy (completed Tamoxifen 03/2023), DVT (off Eliquis), HLD, gout presenting 3/1 with left ICH (ICH score 4) likely 2/2 amyloid angiopathy s/p left craniectomy(discarded) and evacuation as well as EVD placement and removal (3/7). She is s/p trach/peg 3/8/24.  Febrile 3/10 with + UA, blood/sputum culture NGTD.  Treatment for UTI initiated with ceftriaxone, eventually broadened to cefepime.  Transferred to RCU 3/11 for continued management.  Pt arrived from NSCU with minimal mental status with only response of spontaneous eye opening and withdrawal on RLE.  Urine culture resulted - positive for klebsiella, treated for 7 days with Meropenem 3/12 --> 3/19.  Cranioplasty planning 4/2 as per neurosurgery.  Continuing weaning from ventilator as tolerated.  Continuing to monitor for neurological improvement.       3/24:  Continues to tolerate TC - ABG good this AM.  Tacro 6.6 this am, continuing with current dosage.  Rectal tube remains, with liquid stool.  Afebrile, hemodynamically stable, awaiting cranioplasty.   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 She is s/p trach/peg 3/8/24.  Febrile 3/10 with + UA, blood/sputum culture NGTD.  Treatment for UTI initiated with ceftriaxone, eventually broadened to cefepime.  Transferred to RCU 3/11 for continued management.  Pt arrived from NSCU with minimal mental status with only response of spontaneous eye opening and withdrawal on RLE.  Urine culture resulted - positive for klebsiella, treated for 7 days with Meropenem 3/12 --> 3/19.  Cranioplasty planning 4/2 as per neurosurgery.  Continuing weaning from ventilator as tolerated.  Continuing to monitor for neurological improvement.       3/24:  Continues to tolerate TC - ABG good this AM.  Tacro 6.6 this am, continuing with current dosage.  Rectal tube remains, with liquid stool.  Hypertensive , PM meds clonidine, coreg due - will repeat, hydralazine 5mg IVP x1 ordered if needed.  Afebrile.  Awaiting cranioplasty.  Family at bedside.

## 2024-03-24 NOTE — PROGRESS NOTE ADULT - ASSESSMENT
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   initial CTH3/1  with 8.9cm left frontal IPH with IVH .09cm rightward shift   3/2 CTH s/p L hmeicrani and resection of IPH. stable   3/5 CTH post op changes. some reorption of post op pneumocephalus.    s/p trach/peg 3/8/24   3/8 CTH L frontal craniectomy.  L MCA hemorrhage and infarct ; still IVH.   3/10 with + UA  A1c 5.7 LDL 46   TTE done 3/2: LA is severely dilated    Urine culture grew positive for klebsiella and enterococcus  3/9 EEG no seiuzres since 3/7;  risk of seiuzre from L parietal region. mod to severe diffuse cerebral dysfunction   Transferred to RCU 3/11 for continued management.  3/12 CTH   sterotactic imaging of L frontal crani for hemorrhagic L MCA ifnarct. L frontal temporal pseudmeningocele  noted. no hcange since 3/8   o/e awake, no verbal, not followiing. L gaze pref  some minimal withdrawal to noxious RLE and LUE.  s/p trach /vent     Impression: L ICH possible 2/2  CAA but hemorrhagic infarct also needs to be considered.    - plan for cranioplasty 4/2   - had recent CTH 3/12.  would repeat EEG at this time given high risk for seiuzres   - tolerating CPAP at times   - difficult to determine IPH 2/2 CAA without MRI to look at SWI or GRE sequeneses for hemosiderin deposition  - never had vessel imaging. consider CTA H/N   - no AC/AP. dvt ppx okay  - briviact 100mg TID  and vimpat 200mg BID for seiuzre ppx   - infectious workup. on meropenum.  this can lower seiuzre threshold   - immunosuppression on tacrolimus and prednisone.  ppx bactrim     -telemetry   - PT/OT   - check FS, glucose control <180  - GI/DVT ppx   - GOC with family.  currenlty full code; in terms of functional meaningful recovery the likelihood is low.  patient will require 24hr care and likely be bedbound at this time.    consider recalling palliative  Dieter Wilkinson MD  Vascular Neurology  Office: [phone]

## 2024-03-24 NOTE — PROGRESS NOTE ADULT - SUBJECTIVE AND OBJECTIVE BOX
DATE OF SERVICE: 03-24-24 @ 11:36    Patient is a 78y old  Female who presents with a chief complaint of ICH (24 Mar 2024 10:29)      INTERVAL HISTORY: In no acute distress.     REVIEW OF SYSTEMS: Unable to participate in ROS  CONSTITUTIONAL: No weakness  EYES/ENT: No visual changes;  No throat pain   NECK: No pain or stiffness  RESPIRATORY: No cough, wheezing; No shortness of breath  CARDIOVASCULAR: No chest pain or palpitations  GASTROINTESTINAL: No abdominal  pain. No nausea, vomiting, or hematemesis  GENITOURINARY: No dysuria, frequency or hematuria  NEUROLOGICAL: No stroke like symptoms  SKIN: No rashes    	  MEDICATIONS:  amLODIPine   Tablet 5 milliGRAM(s) Oral daily  carvedilol 25 milliGRAM(s) Oral every 12 hours  cloNIDine 0.2 milliGRAM(s) Oral two times a day        PHYSICAL EXAM:  T(C): 36.4 (03-24-24 @ 10:24), Max: 36.7 (03-23-24 @ 21:00)  HR: 74 (03-24-24 @ 10:24) (74 - 96)  BP: 166/96 (03-24-24 @ 10:24) (128/70 - 166/96)  RR: 20 (03-24-24 @ 10:24) (16 - 20)  SpO2: 100% (03-24-24 @ 10:24) (100% - 100%)  Wt(kg): --  I&O's Summary    23 Mar 2024 07:01  -  24 Mar 2024 07:00  --------------------------------------------------------  IN: 2450 mL / OUT: 950 mL / NET: 1500 mL          Appearance: In no distress	  HEENT:    PERRL, EOMI	  Cardiovascular:  S1 S2, No JVD  Respiratory: Lungs clear to auscultation	  Gastrointestinal:  Soft, Non-tender, + BS	  Vascularature:  No edema of LE  Psychiatric: Appropriate affect   Neuro: no acute focal deficits           03-24    136  |  102  |  61<H>  ----------------------------<  216<H>  5.2   |  19<L>  |  1.21    Ca    10.1      24 Mar 2024 08:01  Phos  3.9     03-24  Mg     2.6     03-24    TPro  6.9  /  Alb  3.5  /  TBili  0.2  /  DBili  x   /  AST  24  /  ALT  45  /  AlkPhos  104  03-24        Labs personally reviewed      ASSESSMENT/PLAN: 	    78F Hx ESRD s/p renal xplant c/b DGF off HD, L AKA, 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    1. Abnormal Echo --  Septal bulge noted measures 2.2cm without obstruction.   -- Given no intracavitary obstruction no intervention at this time  - No Myxoma seen on this echo or echo in 2019, ? if hypermobile interatrial septum was confused for on prior imaging     2. HTN - uncontrolled, needs improvement   Continue clonidine 0.2 mg BID, Coreg 25 mg BID, amlodipine 5mg  - Will cont to trend BP and increase amlodipine to 10 if needed  - BP was well controlled yesterday and somewhat above target today. Will cont to trend.     3. Hyponatremia - likely SIADH  - management as per primary team  - 3/24 Na2+ 136    4. DVT PPX - HSQ            Yodit Umaña, MARGARITA-NP   Celio Mcwilliams DO [facility]  Cardiovascular Medicine  [address], [address]  Available through call or text on [employer]  Office: [phone]

## 2024-03-24 NOTE — PROGRESS NOTE ADULT - PROBLEM SELECTOR PLAN 6
- AVF in the left upper extremity  - s/p renal transplant in 2019  - prednisone 5mg, bactrim ppx  - tacro 5mg BID as per transplant nephro  - trach goal 4-7, daily tacro levels 30 mins prior to dose administration  - 3/21:  Tacro levels elevated at 16.4.  Further dosing held, transplant team re-consulted.  -3/22: Tacro lvl 7.0.  Tacro resumed at 3mg BID - AVF in the left upper extremity  - s/p renal transplant in 2019  - prednisone 5mg, bactrim ppx  - tacro 5mg BID as per transplant nephro  - trach goal 4-7, daily tacro levels 30 mins prior to dose administration  - 3/21: Tacro levels elevated at 16.4.  Further dosing held, transplant team re-consulted.  - 3/22: Tacro lvl 7.0.  Tacro resumed at 3mg BID

## 2024-03-24 NOTE — PROGRESS NOTE ADULT - PROBLEM SELECTOR PLAN 2
- S/p trach 3/8 by surgery by Dr. Joselo Mayorga   - Tracheostomy Sutures removed 3/13  - Vent Settings: PRVC 14/450/+5/30%  - Cheyennes-clay breathing pattern observed however tolerating weaning trials  - 3/23:  Will attempt trach collar over night.  FiO2 increased to 40% based on low O2 on gas (likely venous specimen)  - Continue airway clearance; Duonebs q6h PRN - S/p trach 3/8 by surgery by Dr. Joselo Mayorga   - Tracheostomy Sutures removed 3/13  - Vent Settings: PRVC 14/450/+5/30%  - Cheyennes-clay breathing pattern observed however tolerating weaning trials  - 3/23: Will attempt trach collar over night.  FiO2 increased to 40% based on low O2 on gas (likely venous specimen)  - Continue airway clearance; Duonebs q6h PRN - S/p trach 3/8 by surgery by Dr. Joselo Mayorga   - Tracheostomy Sutures removed 3/13  - Vent Settings: PRVC 14/450/+5/30%  - [place]yennes-clay breathing pattern observed however tolerating weaning trials  - 3/23: Will attempt trach collar over night, FiO2 increased to 40% based on low O2 on gas (likely venous specimen)  - 3/4 tolerating TC ATC  - Continue airway clearance; Duonebs q6h PRN

## 2024-03-24 NOTE — PROGRESS NOTE ADULT - PROBLEM SELECTOR PLAN 1
- Found to have L IPH on CTH likely 2/2 amyloid angiopathy  - S/p L hemicrani for evacuation of large ICH; bone discarded  - CT H Stereo 3/12: S/p Left Frontal Craniectomy, Remains stable from prior CT on 3/8   - Patient will require eventual cranioplasty - tentative OR date 4/2  - Maintain Neuro checks

## 2024-03-24 NOTE — PROGRESS NOTE ADULT - SUBJECTIVE AND OBJECTIVE BOX
Patient is a 78y old  Female who presents with a chief complaint of ICH (23 Mar 2024 07:54)      Interval Events:    REVIEW OF SYSTEMS:  [ ] Positive  [ ] All other systems negative  [ ] Unable to assess ROS because ________    Vital Signs Last 24 Hrs  T(C): 36.4 (03-24-24 @ 10:24), Max: 36.8 (03-23-24 @ 11:08)  T(F): 97.6 (03-24-24 @ 10:24), Max: 98.2 (03-23-24 @ 11:08)  HR: 74 (03-24-24 @ 10:24) (74 - 96)  BP: 166/96 (03-24-24 @ 10:24) (128/70 - 166/96)  RR: 20 (03-24-24 @ 10:24) (16 - 20)  SpO2: 100% (03-24-24 @ 10:24) (100% - 100%)    PHYSICAL EXAM:  HEENT:   [ ]Tracheostomy:  [ ]Pupils equal  [ ]No oral lesions  [ ]Abnormal    SKIN  [ ]No Rash  [ ] Abnormal  [ ] pressure    CARDIAC  [ ]Regular  [ ]Abnormal    PULMONARY  [ ]Bilateral Clear Breath Sounds  [ ]Normal Excursion  [ ]Abnormal    GI  [ ]PEG      [ ] +BS		              [ ]Soft, nondistended, nontender	  [ ]Abnormal    MUSCULOSKELETAL                                   [ ]Bedbound                 [ ]Abnormal    [ ]Ambulatory/OOB to chair                           EXTREMITIES                                         [ ]Normal  [ ]Edema                           NEUROLOGIC  [ ] Normal, non focal  [ ] Focal findings:    PSYCHIATRIC  [ ]Alert and appropriate  [ ] Sedated	 [ ]Agitated    :  Gardner: [ ] Yes, if yes: Date of Placement:                   [  ] No    LINES: Central Lines [ ] Yes, if yes: Date of Placement                                     [  ] No    HOSPITAL MEDICATIONS:  MEDICATIONS  (STANDING):  amLODIPine   Tablet 5 milliGRAM(s) Oral daily  artificial  tears Solution 1 Drop(s) Both EYES every 4 hours  Biotene Dry Mouth Oral Rinse 5 milliLiter(s) Swish and Spit every 6 hours  brivaracetam Oral Solution 100 milliGRAM(s) Oral <User Schedule>  carvedilol 25 milliGRAM(s) Oral every 12 hours  cloNIDine 0.2 milliGRAM(s) Oral two times a day  heparin   Injectable 5000 Unit(s) SubCutaneous every 12 hours  insulin lispro (ADMELOG) corrective regimen sliding scale   SubCutaneous every 6 hours  lacosamide Solution 200 milliGRAM(s) Oral two times a day  nystatin    Suspension 253647 Unit(s) Swish and Swallow every 6 hours  pantoprazole   Suspension 40 milliGRAM(s) Oral two times a day  predniSONE   Tablet 5 milliGRAM(s) Oral daily  sodium chloride 1 Gram(s) Oral two times a day  tacrolimus    0.5 mG/mL Suspension 3 milliGRAM(s) Oral every 12 hours  trimethoprim   80 mG/sulfamethoxazole 400 mG 1 Tablet(s) Oral daily    MEDICATIONS  (PRN):  acetaminophen   Oral Liquid .. 650 milliGRAM(s) Oral every 6 hours PRN Temp greater or equal to 38C (100.4F), Mild Pain (1 - 3)  albuterol/ipratropium for Nebulization 3 milliLiter(s) Nebulizer every 6 hours PRN Shortness of Breath and/or Wheezing      LABS:    03-24    136  |  102  |  61<H>  ----------------------------<  216<H>  5.2   |  19<L>  |  1.21    Ca    10.1      24 Mar 2024 08:01  Phos  3.9     03-24  Mg     2.6     03-24    TPro  6.9  /  Alb  3.5  /  TBili  0.2  /  DBili  x   /  AST  24  /  ALT  45  /  AlkPhos  104  03-24      Urinalysis Basic - ( 24 Mar 2024 08:01 )    Color: x / Appearance: x / SG: x / pH: x  Gluc: 216 mg/dL / Ketone: x  / Bili: x / Urobili: x   Blood: x / Protein: x / Nitrite: x   Leuk Esterase: x / RBC: x / WBC x   Sq Epi: x / Non Sq Epi: x / Bacteria: x      Arterial Blood Gas:  03-24 @ 05:15  7.40/38/63/24/91.9/-1.1  ABG lactate: --  Arterial Blood Gas:  03-23 @ 14:30  7.36/39/47/22/77.6/-3.1  ABG lactate: --      CAPILLARY BLOOD GLUCOSE    MICROBIOLOGY:     RADIOLOGY:  [ ] Reviewed and interpreted by me    Mode: Off  FiO2: 40   Patient is a 78y old  Female who presents with a chief complaint of ICH (23 Mar 2024 07:54)      Interval Events:  No acute events overnight.    REVIEW OF SYSTEMS:  [ ] Positive  [ ] All other systems negative  [X] Unable to assess ROS because ___Obtunded_____    Vital Signs Last 24 Hrs  T(C): 36.4 (03-24-24 @ 10:24), Max: 36.8 (03-23-24 @ 11:08)  T(F): 97.6 (03-24-24 @ 10:24), Max: 98.2 (03-23-24 @ 11:08)  HR: 74 (03-24-24 @ 10:24) (74 - 96)  BP: 166/96 (03-24-24 @ 10:24) (128/70 - 166/96)  RR: 20 (03-24-24 @ 10:24) (16 - 20)  SpO2: 100% (03-24-24 @ 10:24) (100% - 100%)    PHYSICAL EXAM:  HEENT: left skull with mild depression, incision site c/d/i  [X]Tracheostomy: #7 cuffed portex  [X]Pupils equal  [ ]No oral lesions  [ ]Abnormal    SKIN  [ ]No Rash  [X] Abnormal - left temporal incision s/p craniectomy site c/d/i, L BKA site c/d/i  [X] pressure - sacral DTI    CARDIAC  [X]Regular  [ ]Abnormal    PULMONARY  [X]Bilateral Clear Breath Sounds  [ ]Normal Excursion  [ ]Abnormal    GI  [X]PEG      [X] +BS		              [X]Soft, nondistended, nontender	  [ ]Abnormal    MUSCULOSKELETAL                                   [X]Bedbound                 [X]Abnormal - L BKA  [ ]Ambulatory/OOB to chair                           EXTREMITIES                                         [X]Normal  [ ]Edema                           NEUROLOGIC  [ ] Normal, non focal  [X] Focal findings: opens eyes to sternal rub, withdraws from pain on all extremities    PSYCHIATRIC  [X] Obtunded  [ ] Sedated	 [ ]Agitated    :  Gardner: [ ] Yes, if yes: Date of Placement:                   [X] No    LINES: Central Lines [ ] Yes, if yes: Date of Placement                                     [X] No    HOSPITAL MEDICATIONS:  MEDICATIONS  (STANDING):  amLODIPine   Tablet 5 milliGRAM(s) Oral daily  artificial  tears Solution 1 Drop(s) Both EYES every 4 hours  Biotene Dry Mouth Oral Rinse 5 milliLiter(s) Swish and Spit every 6 hours  brivaracetam Oral Solution 100 milliGRAM(s) Oral <User Schedule>  carvedilol 25 milliGRAM(s) Oral every 12 hours  cloNIDine 0.2 milliGRAM(s) Oral two times a day  heparin   Injectable 5000 Unit(s) SubCutaneous every 12 hours  insulin lispro (ADMELOG) corrective regimen sliding scale   SubCutaneous every 6 hours  lacosamide Solution 200 milliGRAM(s) Oral two times a day  nystatin    Suspension 994146 Unit(s) Swish and Swallow every 6 hours  pantoprazole   Suspension 40 milliGRAM(s) Oral two times a day  predniSONE   Tablet 5 milliGRAM(s) Oral daily  sodium chloride 1 Gram(s) Oral two times a day  tacrolimus    0.5 mG/mL Suspension 3 milliGRAM(s) Oral every 12 hours  trimethoprim   80 mG/sulfamethoxazole 400 mG 1 Tablet(s) Oral daily    MEDICATIONS  (PRN):  acetaminophen   Oral Liquid .. 650 milliGRAM(s) Oral every 6 hours PRN Temp greater or equal to 38C (100.4F), Mild Pain (1 - 3)  albuterol/ipratropium for Nebulization 3 milliLiter(s) Nebulizer every 6 hours PRN Shortness of Breath and/or Wheezing    LABS:    03-24    136  |  102  |  61<H>  ----------------------------<  216<H>  5.2   |  19<L>  |  1.21    Ca    10.1      24 Mar 2024 08:01  Phos  3.9     03-24  Mg     2.6     03-24    TPro  6.9  /  Alb  3.5  /  TBili  0.2  /  DBili  x   /  AST  24  /  ALT  45  /  AlkPhos  104  03-24    Urinalysis Basic - ( 24 Mar 2024 08:01 )    Color: x / Appearance: x / SG: x / pH: x  Gluc: 216 mg/dL / Ketone: x  / Bili: x / Urobili: x   Blood: x / Protein: x / Nitrite: x   Leuk Esterase: x / RBC: x / WBC x   Sq Epi: x / Non Sq Epi: x / Bacteria: x    Arterial Blood Gas:  03-24 @ 05:15  7.40/38/63/24/91.9/-1.1  ABG lactate: --  Arterial Blood Gas:  03-23 @ 14:30  7.36/39/47/22/77.6/-3.1  ABG lactate: --    CAPILLARY BLOOD GLUCOSE    MICROBIOLOGY:     RADIOLOGY:  [ ] Reviewed and interpreted by me    Mode: Off  FiO2: 40   Patient is a 78y old  Female who presents with a chief complaint of ICH (23 Mar 2024 07:54)      Interval Events:  No acute events overnight.    REVIEW OF SYSTEMS:  [ ] Positive  [ ] All other systems negative  [X] Unable to assess ROS because ___Obtunded_____    Vital Signs Last 24 Hrs  T(C): 36.4 (03-24-24 @ 10:24), Max: 36.8 (03-23-24 @ 11:08)  T(F): 97.6 (03-24-24 @ 10:24), Max: 98.2 (03-23-24 @ 11:08)  HR: 74 (03-24-24 @ 10:24) (74 - 96)  BP: 166/96 (03-24-24 @ 10:24) (128/70 - 166/96)  RR: 20 (03-24-24 @ 10:24) (16 - 20)  SpO2: 100% (03-24-24 @ 10:24) (100% - 100%)    PHYSICAL EXAM:  HEENT: left skull with mild depression, incision site c/d/i  [X]Tracheostomy: #7 cuffed portex  [X]Pupils equal  [ ]No oral lesions  [ ]Abnormal    SKIN  [ ]No Rash  [X] Abnormal - left temporal incision s/p craniectomy site c/d/i, L BKA site c/d/i  [X] pressure - sacral DTI    CARDIAC  [X]Regular  [ ]Abnormal    PULMONARY  [X]Bilateral Clear Breath Sounds  [ ]Normal Excursion  [ ]Abnormal    GI  [X]PEG      [X] +BS		              [X]Soft, nondistended, nontender	  [X]Abnormal - rectal tube    MUSCULOSKELETAL                                   [X]Bedbound                 [X]Abnormal - L BKA  [ ]Ambulatory/OOB to chair                           EXTREMITIES                                         [X]Normal  [ ]Edema                           NEUROLOGIC  [ ] Normal, non focal  [X] Focal findings: opens eyes to sternal rub, withdraws from pain on all extremities    PSYCHIATRIC  [X] Obtunded  [ ] Sedated	 [ ]Agitated    :  Gardner: [ ] Yes, if yes: Date of Placement:                   [X] No    LINES: Central Lines [ ] Yes, if yes: Date of Placement                                     [X] No    HOSPITAL MEDICATIONS:  MEDICATIONS  (STANDING):  amLODIPine   Tablet 5 milliGRAM(s) Oral daily  artificial  tears Solution 1 Drop(s) Both EYES every 4 hours  Biotene Dry Mouth Oral Rinse 5 milliLiter(s) Swish and Spit every 6 hours  brivaracetam Oral Solution 100 milliGRAM(s) Oral <User Schedule>  carvedilol 25 milliGRAM(s) Oral every 12 hours  cloNIDine 0.2 milliGRAM(s) Oral two times a day  heparin   Injectable 5000 Unit(s) SubCutaneous every 12 hours  insulin lispro (ADMELOG) corrective regimen sliding scale   SubCutaneous every 6 hours  lacosamide Solution 200 milliGRAM(s) Oral two times a day  nystatin    Suspension 817507 Unit(s) Swish and Swallow every 6 hours  pantoprazole   Suspension 40 milliGRAM(s) Oral two times a day  predniSONE   Tablet 5 milliGRAM(s) Oral daily  sodium chloride 1 Gram(s) Oral two times a day  tacrolimus    0.5 mG/mL Suspension 3 milliGRAM(s) Oral every 12 hours  trimethoprim   80 mG/sulfamethoxazole 400 mG 1 Tablet(s) Oral daily    MEDICATIONS  (PRN):  acetaminophen   Oral Liquid .. 650 milliGRAM(s) Oral every 6 hours PRN Temp greater or equal to 38C (100.4F), Mild Pain (1 - 3)  albuterol/ipratropium for Nebulization 3 milliLiter(s) Nebulizer every 6 hours PRN Shortness of Breath and/or Wheezing    LABS:    03-24    136  |  102  |  61<H>  ----------------------------<  216<H>  5.2   |  19<L>  |  1.21    Ca    10.1      24 Mar 2024 08:01  Phos  3.9     03-24  Mg     2.6     03-24    TPro  6.9  /  Alb  3.5  /  TBili  0.2  /  DBili  x   /  AST  24  /  ALT  45  /  AlkPhos  104  03-24    Urinalysis Basic - ( 24 Mar 2024 08:01 )    Color: x / Appearance: x / SG: x / pH: x  Gluc: 216 mg/dL / Ketone: x  / Bili: x / Urobili: x   Blood: x / Protein: x / Nitrite: x   Leuk Esterase: x / RBC: x / WBC x   Sq Epi: x / Non Sq Epi: x / Bacteria: x    Arterial Blood Gas:  03-24 @ 05:15  7.40/38/63/24/91.9/-1.1  ABG lactate: --  Arterial Blood Gas:  03-23 @ 14:30  7.36/39/47/22/77.6/-3.1  ABG lactate: --    CAPILLARY BLOOD GLUCOSE    MICROBIOLOGY:     RADIOLOGY:  [ ] Reviewed and interpreted by me    Mode: Off  FiO2: 40

## 2024-03-24 NOTE — PROGRESS NOTE ADULT - SUBJECTIVE AND OBJECTIVE BOX
Neurology        S: patient seen. no neuro changes, ill appearing         Medications: MEDICATIONS  (STANDING):  amLODIPine   Tablet 5 milliGRAM(s) Oral daily  artificial  tears Solution 1 Drop(s) Both EYES every 4 hours  Biotene Dry Mouth Oral Rinse 5 milliLiter(s) Swish and Spit every 6 hours  brivaracetam Oral Solution 100 milliGRAM(s) Oral <User Schedule>  carvedilol 25 milliGRAM(s) Oral every 12 hours  cloNIDine 0.2 milliGRAM(s) Oral two times a day  heparin   Injectable 5000 Unit(s) SubCutaneous every 12 hours  insulin lispro (ADMELOG) corrective regimen sliding scale   SubCutaneous every 6 hours  lacosamide Solution 200 milliGRAM(s) Oral two times a day  nystatin    Suspension 017768 Unit(s) Swish and Swallow every 6 hours  pantoprazole   Suspension 40 milliGRAM(s) Oral two times a day  predniSONE   Tablet 5 milliGRAM(s) Oral daily  sodium chloride 1 Gram(s) Oral two times a day  tacrolimus    0.5 mG/mL Suspension 3 milliGRAM(s) Oral every 12 hours  trimethoprim   80 mG/sulfamethoxazole 400 mG 1 Tablet(s) Oral daily    MEDICATIONS  (PRN):  acetaminophen   Oral Liquid .. 650 milliGRAM(s) Oral every 6 hours PRN Temp greater or equal to 38C (100.4F), Mild Pain (1 - 3)  albuterol/ipratropium for Nebulization 3 milliLiter(s) Nebulizer every 6 hours PRN Shortness of Breath and/or Wheezing       Vitals:  Vital Signs Last 24 Hrs  T(C): 36.4 (24 Mar 2024 10:24), Max: 36.7 (23 Mar 2024 21:00)  T(F): 97.6 (24 Mar 2024 10:24), Max: 98 (23 Mar 2024 21:00)  HR: 74 (24 Mar 2024 10:24) (74 - 96)  BP: 166/96 (24 Mar 2024 10:24) (128/70 - 166/96)  BP(mean): --  RR: 20 (24 Mar 2024 10:24) (16 - 20)  SpO2: 100% (24 Mar 2024 10:24) (100% - 100%)    Parameters below as of 24 Mar 2024 10:24  Patient On (Oxygen Delivery Method): tracheostomy collar           General Exam:   General Appearance: Appropriately dressed    Head: Normocephalic, atraumatic and no dysmorphic features s/p trach   Ear, Nose, and Throat: Moist mucous membranes  CVS: S1S2+  Resp: No SOB, no wheeze or rhonchi on vent   GI: soft NT/ND s/p PEG   Extremities:  L AKA  Skin: No bruises or rashes     Neurological Exam:  Mental Status:  opens eyes to noxious. no verbal. not following   Cranial Nerves: PERRL, EOMI but gaze pref to L, no blink to threat on R, R facial,    Motor: minimal/no spontaneous movement ;   Sensation: grimaces to noxious at times. some minimal withdrawal LUE 2/5 and RLE.    Coordination: unable   Gait: unable     Data/Labs/Imaging which I personally reviewed.           LABS:      03-24    136  |  102  |  61<H>  ----------------------------<  216<H>  5.2   |  19<L>  |  1.21    Ca    10.1      24 Mar 2024 08:01  Phos  3.9     03-24  Mg     2.6     03-24    TPro  6.9  /  Alb  3.5  /  TBili  0.2  /  DBili  x   /  AST  24  /  ALT  45  /  AlkPhos  104  03-24    LIVER FUNCTIONS - ( 24 Mar 2024 08:01 )  Alb: 3.5 g/dL / Pro: 6.9 g/dL / ALK PHOS: 104 U/L / ALT: 45 U/L / AST: 24 U/L / GGT: x             Urinalysis Basic - ( 24 Mar 2024 08:01 )    Color: x / Appearance: x / SG: x / pH: x  Gluc: 216 mg/dL / Ketone: x  / Bili: x / Urobili: x   Blood: x / Protein: x / Nitrite: x   Leuk Esterase: x / RBC: x / WBC x   Sq Epi: x / Non Sq Epi: x / Bacteria: x

## 2024-03-24 NOTE — PROGRESS NOTE ADULT - NS ATTEND AMEND GEN_ALL_CORE FT
77 y/o F w/ESRD s/p transplant on immunosuppression, BK viremia, breast CA s/p lumpectomy + course of tamoxifen, DVT not on AC admitted for ICH s/p L craniectomy w/evacuation w/hospital course c/b acute respiratory failure now s/p trach/PEG and DVT s/p IVC filter.    - AEDs as per neuro  - Cranioplasty pending currently scheduled for 4/2  - Immunosuppresives as per renal  - H. Pylori treatment after discharge as per GI  - No therapeutic AC  - hypoglycemic event overnight - NPH now discontinued  - Decrease insulin for repeated episodes of hypoglycemia  - Completed course of abx for UTI  - Dispo planning after cranioplasty.

## 2024-03-25 LAB
ALBUMIN SERPL ELPH-MCNC: 3.5 G/DL — SIGNIFICANT CHANGE UP (ref 3.3–5)
ALP SERPL-CCNC: 110 U/L — SIGNIFICANT CHANGE UP (ref 40–120)
ALT FLD-CCNC: 36 U/L — SIGNIFICANT CHANGE UP (ref 10–45)
ANION GAP SERPL CALC-SCNC: 14 MMOL/L — SIGNIFICANT CHANGE UP (ref 5–17)
AST SERPL-CCNC: 18 U/L — SIGNIFICANT CHANGE UP (ref 10–40)
BILIRUB SERPL-MCNC: 0.2 MG/DL — SIGNIFICANT CHANGE UP (ref 0.2–1.2)
BUN SERPL-MCNC: 53 MG/DL — HIGH (ref 7–23)
CALCIUM SERPL-MCNC: 10.2 MG/DL — SIGNIFICANT CHANGE UP (ref 8.4–10.5)
CHLORIDE SERPL-SCNC: 103 MMOL/L — SIGNIFICANT CHANGE UP (ref 96–108)
CO2 SERPL-SCNC: 19 MMOL/L — LOW (ref 22–31)
CREAT SERPL-MCNC: 1 MG/DL — SIGNIFICANT CHANGE UP (ref 0.5–1.3)
EGFR: 57 ML/MIN/1.73M2 — LOW
GLUCOSE BLDC GLUCOMTR-MCNC: 216 MG/DL — HIGH (ref 70–99)
GLUCOSE BLDC GLUCOMTR-MCNC: 224 MG/DL — HIGH (ref 70–99)
GLUCOSE BLDC GLUCOMTR-MCNC: 265 MG/DL — HIGH (ref 70–99)
GLUCOSE SERPL-MCNC: 230 MG/DL — HIGH (ref 70–99)
HCT VFR BLD CALC: 32.3 % — LOW (ref 34.5–45)
HGB BLD-MCNC: 10 G/DL — LOW (ref 11.5–15.5)
MAGNESIUM SERPL-MCNC: 2.4 MG/DL — SIGNIFICANT CHANGE UP (ref 1.6–2.6)
MCHC RBC-ENTMCNC: 29.3 PG — SIGNIFICANT CHANGE UP (ref 27–34)
MCHC RBC-ENTMCNC: 31 GM/DL — LOW (ref 32–36)
MCV RBC AUTO: 94.7 FL — SIGNIFICANT CHANGE UP (ref 80–100)
NRBC # BLD: 0 /100 WBCS — SIGNIFICANT CHANGE UP (ref 0–0)
PHOSPHATE SERPL-MCNC: 3.4 MG/DL — SIGNIFICANT CHANGE UP (ref 2.5–4.5)
PLATELET # BLD AUTO: 166 K/UL — SIGNIFICANT CHANGE UP (ref 150–400)
POTASSIUM SERPL-MCNC: 5.5 MMOL/L — HIGH (ref 3.5–5.3)
POTASSIUM SERPL-SCNC: 5.5 MMOL/L — HIGH (ref 3.5–5.3)
PROT SERPL-MCNC: 7 G/DL — SIGNIFICANT CHANGE UP (ref 6–8.3)
RBC # BLD: 3.41 M/UL — LOW (ref 3.8–5.2)
RBC # FLD: 16.3 % — HIGH (ref 10.3–14.5)
SODIUM SERPL-SCNC: 136 MMOL/L — SIGNIFICANT CHANGE UP (ref 135–145)
TACROLIMUS SERPL-MCNC: 5.6 NG/ML — SIGNIFICANT CHANGE UP
WBC # BLD: 5.9 K/UL — SIGNIFICANT CHANGE UP (ref 3.8–10.5)
WBC # FLD AUTO: 5.9 K/UL — SIGNIFICANT CHANGE UP (ref 3.8–10.5)

## 2024-03-25 PROCEDURE — 99232 SBSQ HOSP IP/OBS MODERATE 35: CPT | Mod: FS

## 2024-03-25 PROCEDURE — 99232 SBSQ HOSP IP/OBS MODERATE 35: CPT | Mod: GC

## 2024-03-25 RX ORDER — SODIUM ZIRCONIUM CYCLOSILICATE 10 G/10G
10 POWDER, FOR SUSPENSION ORAL DAILY
Refills: 0 | Status: DISCONTINUED | OUTPATIENT
Start: 2024-03-26 | End: 2024-03-29

## 2024-03-25 RX ORDER — FUROSEMIDE 40 MG
20 TABLET ORAL ONCE
Refills: 0 | Status: COMPLETED | OUTPATIENT
Start: 2024-03-25 | End: 2024-03-25

## 2024-03-25 RX ADMIN — Medication 500000 UNIT(S): at 12:43

## 2024-03-25 RX ADMIN — Medication 4: at 05:27

## 2024-03-25 RX ADMIN — Medication 1 DROP(S): at 02:52

## 2024-03-25 RX ADMIN — TACROLIMUS 3 MILLIGRAM(S): 5 CAPSULE ORAL at 06:07

## 2024-03-25 RX ADMIN — PANTOPRAZOLE SODIUM 40 MILLIGRAM(S): 20 TABLET, DELAYED RELEASE ORAL at 05:04

## 2024-03-25 RX ADMIN — Medication 1 DROP(S): at 05:17

## 2024-03-25 RX ADMIN — SODIUM CHLORIDE 1 GRAM(S): 9 INJECTION INTRAMUSCULAR; INTRAVENOUS; SUBCUTANEOUS at 18:08

## 2024-03-25 RX ADMIN — CARVEDILOL PHOSPHATE 25 MILLIGRAM(S): 80 CAPSULE, EXTENDED RELEASE ORAL at 05:04

## 2024-03-25 RX ADMIN — PANTOPRAZOLE SODIUM 40 MILLIGRAM(S): 20 TABLET, DELAYED RELEASE ORAL at 18:08

## 2024-03-25 RX ADMIN — Medication 1 DROP(S): at 22:30

## 2024-03-25 RX ADMIN — BRIVARACETAM 100 MILLIGRAM(S): 25 TABLET, FILM COATED ORAL at 05:17

## 2024-03-25 RX ADMIN — Medication 500000 UNIT(S): at 18:08

## 2024-03-25 RX ADMIN — TACROLIMUS 3 MILLIGRAM(S): 5 CAPSULE ORAL at 18:09

## 2024-03-25 RX ADMIN — BRIVARACETAM 100 MILLIGRAM(S): 25 TABLET, FILM COATED ORAL at 13:49

## 2024-03-25 RX ADMIN — Medication 6: at 12:13

## 2024-03-25 RX ADMIN — Medication 0.2 MILLIGRAM(S): at 05:17

## 2024-03-25 RX ADMIN — Medication 5 MILLIGRAM(S): at 05:04

## 2024-03-25 RX ADMIN — HEPARIN SODIUM 5000 UNIT(S): 5000 INJECTION INTRAVENOUS; SUBCUTANEOUS at 05:04

## 2024-03-25 RX ADMIN — Medication 1 TABLET(S): at 12:43

## 2024-03-25 RX ADMIN — Medication 0.2 MILLIGRAM(S): at 18:32

## 2024-03-25 RX ADMIN — Medication 4: at 18:09

## 2024-03-25 RX ADMIN — LACOSAMIDE 200 MILLIGRAM(S): 50 TABLET ORAL at 18:08

## 2024-03-25 RX ADMIN — Medication 5 MILLILITER(S): at 05:04

## 2024-03-25 RX ADMIN — Medication 1 DROP(S): at 18:11

## 2024-03-25 RX ADMIN — Medication 1 PATCH: at 19:47

## 2024-03-25 RX ADMIN — Medication 5 MILLILITER(S): at 18:08

## 2024-03-25 RX ADMIN — Medication 1 DROP(S): at 10:00

## 2024-03-25 RX ADMIN — AMLODIPINE BESYLATE 10 MILLIGRAM(S): 2.5 TABLET ORAL at 10:00

## 2024-03-25 RX ADMIN — Medication 1 PATCH: at 18:25

## 2024-03-25 RX ADMIN — HEPARIN SODIUM 5000 UNIT(S): 5000 INJECTION INTRAVENOUS; SUBCUTANEOUS at 18:07

## 2024-03-25 RX ADMIN — Medication 20 MILLIGRAM(S): at 10:01

## 2024-03-25 RX ADMIN — SODIUM CHLORIDE 1 GRAM(S): 9 INJECTION INTRAMUSCULAR; INTRAVENOUS; SUBCUTANEOUS at 05:03

## 2024-03-25 RX ADMIN — BRIVARACETAM 100 MILLIGRAM(S): 25 TABLET, FILM COATED ORAL at 22:30

## 2024-03-25 RX ADMIN — LACOSAMIDE 200 MILLIGRAM(S): 50 TABLET ORAL at 05:02

## 2024-03-25 RX ADMIN — Medication 500000 UNIT(S): at 05:03

## 2024-03-25 RX ADMIN — Medication 1 DROP(S): at 13:50

## 2024-03-25 RX ADMIN — Medication 5 MILLILITER(S): at 12:43

## 2024-03-25 RX ADMIN — CARVEDILOL PHOSPHATE 25 MILLIGRAM(S): 80 CAPSULE, EXTENDED RELEASE ORAL at 18:07

## 2024-03-25 NOTE — PROGRESS NOTE ADULT - ATTENDING COMMENTS
77 y/o Female with polycystic kidney disease ESRD s/p renal transplant in 2019 followed by Dr. Caicedo.  Course complicated by BK viremia, off MMF and on leflunomide, baseline creatinine was 1.8. 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 She was admitted with left ICH secondary to amyloid angiopathy. S/p Left craniotomy and evacuation on 3/8/24.  S/p Trach and PEG. Non verbal.     1. DDRT in 2019 - Pt. with ESRD, underwent DDRT in 2019 baseline creatinine 1.8mg/dL.    Cr improved to 1mg/dL likely due to loss of muscle mass, prolonged hospitalization.     2. IS - off MMF for h/o BK viremia   Continue tacrolimus 3mg q12h, trough goal 4-7, continue prednisone 5mg po daily.   Was on leflunomide for BK viremia, currently on hold. Check serum BK PCR     3. Hyperkalemia - d/c bactrim   - Give Lokelma 10g x1     4. HTN - On Amlodipine 10mg daily, Coreg 25mg po bid, Clonidine 0.1 patch and 0.2mg po bid.     BP uncontrolled - can increase clonidine patch to 0.3mg/24hr patch

## 2024-03-25 NOTE — PROGRESS NOTE ADULT - ASSESSMENT
79 y/o Female with polycystic kidney disease ESRD s/p renal transplant in 2019 presented with ICH - had Craniotomy and EVD    1. DDRT in 2019 - Pt. with ESRD, underwent DDRT in 2019. On review of [place], Scr was 1.86 on 10/31/23. SCr was WNL at 1.18 on admission (3/2), peaked to 1.64 (3/122). Today improved to 1.0. Pt. with likely underlying CKD, and Scr likely at baseline. Continue with immunosuppression regimen, as outlined below. Monitor labs and urine output. Avoid nephrotoxins. Dose medications as per eGFR.    2. IS  -Monitor tacro trough, (goal: 4-7).   -Continue prednisone 5 mg qd and tacro 3mg bid  -Leflunomide currently on hold (? BK viremia vs RA).     3. Hyperkalemia  -Start lokelma 10g qd  -continue medical management   -Monitor labs, low K/renal diet    If you have any questions, please feel free to contact me  Alana Rawls  Nephrology Fellow  [employer]/Page [phone]  (After 5pm or on weekends please page the on-call fellow)

## 2024-03-25 NOTE — PROGRESS NOTE ADULT - NS ATTEND AMEND GEN_ALL_CORE FT
Agree with above  78F ESRD s/p renal transplant p/w IPH s/p L cranie with poor mental status, now s/p trache / PEG  - Renal function stable, Na 136. D/C free water boluses  - BP control with clonidine / Coreg / amlodipine. Transition clonidine to patch in order to get more consistent dosing, thereby avoiding rebound hypertension  - Remains trache to trache collar ATC. Due to mental status, no plans to decannulate  - c/w tacro + low-dose prednisone for transplant  - DVT ppx: Hep SC, s/p IVC filter  - Dispo awaiting cranioplasty scheduled 4/2.

## 2024-03-25 NOTE — PROGRESS NOTE ADULT - SUBJECTIVE AND OBJECTIVE BOX
DATE OF SERVICE: 03-25-24 @ 12:00    Patient is a 78y old  Female who presents with a chief complaint of ICH (25 Mar 2024 09:05)      INTERVAL HISTORY: Appears comfortable, no acute events    REVIEW OF SYSTEMS:  CONSTITUTIONAL: No weakness  EYES/ENT: No visual changes;  No throat pain   NECK: No pain or stiffness  RESPIRATORY: No cough, wheezing; No shortness of breath  CARDIOVASCULAR: No chest pain or palpitations  GASTROINTESTINAL: No abdominal  pain. No nausea, vomiting, or hematemesis  GENITOURINARY: No dysuria, frequency or hematuria  NEUROLOGICAL: No stroke like symptoms  SKIN: No rashes      	  MEDICATIONS:  amLODIPine   Tablet 10 milliGRAM(s) Oral daily  carvedilol 25 milliGRAM(s) Oral every 12 hours  cloNIDine 0.2 milliGRAM(s) Oral two times a day        PHYSICAL EXAM:  T(C): 36.2 (03-25-24 @ 10:00), Max: 36.6 (03-24-24 @ 16:57)  HR: 78 (03-25-24 @ 10:00) (72 - 82)  BP: 187/90 (03-25-24 @ 10:00) (167/86 - 199/112)  RR: 20 (03-25-24 @ 10:00) (18 - 20)  SpO2: 100% (03-25-24 @ 10:00) (100% - 100%)  Wt(kg): --  I&O's Summary    24 Mar 2024 07:01  -  25 Mar 2024 07:00  --------------------------------------------------------  IN: 2270 mL / OUT: 2650 mL / NET: -380 mL          Appearance: In no distress	  HEENT:    PERRL, EOMI	  Cardiovascular:  S1 S2, No JVD  Respiratory: Lungs clear to auscultation	  Gastrointestinal:  Soft, Non-tender, + BS	  Vascularature:  No edema of LE  Psychiatric: Appropriate affect   Neuro: no acute focal deficits           03-25    136  |  103  |  53<H>  ----------------------------<  230<H>  5.5<H>   |  19<L>  |  1.00    Ca    10.2      25 Mar 2024 05:35  Phos  3.4     03-25  Mg     2.4     03-25    TPro  7.0  /  Alb  3.5  /  TBili  0.2  /  DBili  x   /  AST  18  /  ALT  36  /  AlkPhos  110  03-25        Labs personally reviewed      ASSESSMENT/PLAN: 	  78F Hx ESRD s/p renal xplant c/b DGF off HD, L AKA, 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    1. Abnormal Echo --  Septal bulge noted measures 2.2cm without obstruction.   -- Given no intracavitary obstruction no intervention at this time  - No Myxoma seen on this echo or echo in 2019, ? if hypermobile interatrial septum was confused for on prior imaging     2. HTN - uncontrolled, needs improvement   Continue clonidine 0.2 mg BID, Coreg 25 mg BID, amlodipine 5mg  - Will cont to trend BP and increase amlodipine to 10 if needed  - BP was well controlled yesterday and somewhat above target today. Will cont to trend.     3. Hyponatremia - likely SIADH  - management as per primary team  - 3/24 Na2+ 136    4. DVT PPX - HSQ          LAURA Gomez DO [facility]  Cardiovascular Medicine  [address], [address]  Available via call or text on [employer]  Office: [phone]

## 2024-03-25 NOTE — PROGRESS NOTE ADULT - SUBJECTIVE AND OBJECTIVE BOX
Patient is a 78y old  Female who presents with a chief complaint of ICH (24 Mar 2024 11:36)      Interval Events:    REVIEW OF SYSTEMS:  [ ] Positive  [ ] All other systems negative  [ ] Unable to assess ROS because ________    Vital Signs Last 24 Hrs  T(C): 36.4 (03-25-24 @ 04:00), Max: 36.6 (03-24-24 @ 16:57)  T(F): 97.5 (03-25-24 @ 04:00), Max: 97.8 (03-24-24 @ 16:57)  HR: 79 (03-25-24 @ 04:00) (72 - 82)  BP: 168/78 (03-25-24 @ 04:00) (166/96 - 199/112)  RR: 20 (03-25-24 @ 04:00) (18 - 20)  SpO2: 100% (03-25-24 @ 04:00) (100% - 100%)    PHYSICAL EXAM:  HEENT:   [ ]Tracheostomy:  [ ]Pupils equal  [ ]No oral lesions  [ ]Abnormal    SKIN  [ ]No Rash  [ ] Abnormal  [ ] pressure    CARDIAC  [ ]Regular  [ ]Abnormal    PULMONARY  [ ]Bilateral Clear Breath Sounds  [ ]Normal Excursion  [ ]Abnormal    GI  [ ]PEG      [ ] +BS		              [ ]Soft, nondistended, nontender	  [ ]Abnormal    MUSCULOSKELETAL                                   [ ]Bedbound                 [ ]Abnormal    [ ]Ambulatory/OOB to chair                           EXTREMITIES                                         [ ]Normal  [ ]Edema                           NEUROLOGIC  [ ] Normal, non focal  [ ] Focal findings:    PSYCHIATRIC  [ ]Alert and appropriate  [ ] Sedated	 [ ]Agitated    :  Gardner: [ ] Yes, if yes: Date of Placement:                   [  ] No    LINES: Central Lines [ ] Yes, if yes: Date of Placement                                     [  ] No    HOSPITAL MEDICATIONS:  MEDICATIONS  (STANDING):  amLODIPine   Tablet 10 milliGRAM(s) Oral daily  artificial  tears Solution 1 Drop(s) Both EYES every 4 hours  Biotene Dry Mouth Oral Rinse 5 milliLiter(s) Swish and Spit every 6 hours  brivaracetam Oral Solution 100 milliGRAM(s) Oral <User Schedule>  carvedilol 25 milliGRAM(s) Oral every 12 hours  cloNIDine 0.2 milliGRAM(s) Oral two times a day  heparin   Injectable 5000 Unit(s) SubCutaneous every 12 hours  insulin lispro (ADMELOG) corrective regimen sliding scale   SubCutaneous every 6 hours  lacosamide Solution 200 milliGRAM(s) Oral two times a day  nystatin    Suspension 714786 Unit(s) Swish and Swallow every 6 hours  pantoprazole   Suspension 40 milliGRAM(s) Oral two times a day  predniSONE   Tablet 5 milliGRAM(s) Oral daily  sodium chloride 1 Gram(s) Oral two times a day  tacrolimus    0.5 mG/mL Suspension 3 milliGRAM(s) Oral every 12 hours  trimethoprim   80 mG/sulfamethoxazole 400 mG 1 Tablet(s) Oral daily    MEDICATIONS  (PRN):  acetaminophen   Oral Liquid .. 650 milliGRAM(s) Oral every 6 hours PRN Temp greater or equal to 38C (100.4F), Mild Pain (1 - 3)  albuterol/ipratropium for Nebulization 3 milliLiter(s) Nebulizer every 6 hours PRN Shortness of Breath and/or Wheezing      LABS:    03-25    136  |  103  |  53<H>  ----------------------------<  230<H>  5.5<H>   |  19<L>  |  1.00    Ca    10.2      25 Mar 2024 05:35  Phos  3.4     03-25  Mg     2.4     03-25    TPro  7.0  /  Alb  3.5  /  TBili  0.2  /  DBili  x   /  AST  18  /  ALT  36  /  AlkPhos  110  03-25      Urinalysis Basic - ( 25 Mar 2024 05:35 )    Color: x / Appearance: x / SG: x / pH: x  Gluc: 230 mg/dL / Ketone: x  / Bili: x / Urobili: x   Blood: x / Protein: x / Nitrite: x   Leuk Esterase: x / RBC: x / WBC x   Sq Epi: x / Non Sq Epi: x / Bacteria: x      Arterial Blood Gas:  03-24 @ 05:15  7.40/38/63/24/91.9/-1.1  ABG lactate: --  Arterial Blood Gas:  03-23 @ 14:30  7.36/39/47/22/77.6/-3.1  ABG lactate: --      CAPILLARY BLOOD GLUCOSE    MICROBIOLOGY:     RADIOLOGY:  [ ] Reviewed and interpreted by me    Mode: Off  FiO2: 40   Patient is a 78y old  Female who presents with a chief complaint of ICH (24 Mar 2024 11:36)      Interval Events:    REVIEW OF SYSTEMS:  [ ] Positive  [ ] All other systems negative  [X] Unable to assess ROS because ___Obtunded_____    Vital Signs Last 24 Hrs  T(C): 36.4 (03-25-24 @ 04:00), Max: 36.6 (03-24-24 @ 16:57)  T(F): 97.5 (03-25-24 @ 04:00), Max: 97.8 (03-24-24 @ 16:57)  HR: 79 (03-25-24 @ 04:00) (72 - 82)  BP: 168/78 (03-25-24 @ 04:00) (166/96 - 199/112)  RR: 20 (03-25-24 @ 04:00) (18 - 20)  SpO2: 100% (03-25-24 @ 04:00) (100% - 100%)    PHYSICAL EXAM:  HEENT: left skull with mild depression, incision site c/d/i  [X]Tracheostomy: #7 cuffed portex  [X]Pupils equal  [ ]No oral lesions  [ ]Abnormal    SKIN  [ ]No Rash  [X] Abnormal - left temporal incision s/p craniectomy site c/d/i, L BKA site c/d/i  [X] pressure - sacral DTI    CARDIAC  [X]Regular  [ ]Abnormal    PULMONARY  [X]Bilateral Clear Breath Sounds  [ ]Normal Excursion  [ ]Abnormal    GI  [X]PEG      [X] +BS		              [X]Soft, nondistended, nontender	  [X]Abnormal - rectal tube    MUSCULOSKELETAL                                   [X]Bedbound                 [X]Abnormal - L BKA  [ ]Ambulatory/OOB to chair                           EXTREMITIES                                         [X]Normal  [ ]Edema                           NEUROLOGIC  [ ] Normal, non focal  [X] Focal findings: opens eyes to sternal rub, withdraws from pain on all extremities    PSYCHIATRIC  [X] Obtunded  [ ] Sedated	 [ ]Agitated    :  Gardner: [ ] Yes, if yes: Date of Placement:                   [X] No    LINES: Central Lines [ ] Yes, if yes: Date of Placement                                     [X] No    HOSPITAL MEDICATIONS:  MEDICATIONS  (STANDING):  amLODIPine   Tablet 10 milliGRAM(s) Oral daily  artificial  tears Solution 1 Drop(s) Both EYES every 4 hours  Biotene Dry Mouth Oral Rinse 5 milliLiter(s) Swish and Spit every 6 hours  brivaracetam Oral Solution 100 milliGRAM(s) Oral <User Schedule>  carvedilol 25 milliGRAM(s) Oral every 12 hours  cloNIDine 0.2 milliGRAM(s) Oral two times a day  heparin   Injectable 5000 Unit(s) SubCutaneous every 12 hours  insulin lispro (ADMELOG) corrective regimen sliding scale   SubCutaneous every 6 hours  lacosamide Solution 200 milliGRAM(s) Oral two times a day  nystatin    Suspension 411741 Unit(s) Swish and Swallow every 6 hours  pantoprazole   Suspension 40 milliGRAM(s) Oral two times a day  predniSONE   Tablet 5 milliGRAM(s) Oral daily  sodium chloride 1 Gram(s) Oral two times a day  tacrolimus    0.5 mG/mL Suspension 3 milliGRAM(s) Oral every 12 hours  trimethoprim   80 mG/sulfamethoxazole 400 mG 1 Tablet(s) Oral daily    MEDICATIONS  (PRN):  acetaminophen   Oral Liquid .. 650 milliGRAM(s) Oral every 6 hours PRN Temp greater or equal to 38C (100.4F), Mild Pain (1 - 3)  albuterol/ipratropium for Nebulization 3 milliLiter(s) Nebulizer every 6 hours PRN Shortness of Breath and/or Wheezing      LABS:    03-25    136  |  103  |  53<H>  ----------------------------<  230<H>  5.5<H>   |  19<L>  |  1.00    Ca    10.2      25 Mar 2024 05:35  Phos  3.4     03-25  Mg     2.4     03-25    TPro  7.0  /  Alb  3.5  /  TBili  0.2  /  DBili  x   /  AST  18  /  ALT  36  /  AlkPhos  110  03-25      Urinalysis Basic - ( 25 Mar 2024 05:35 )    Color: x / Appearance: x / SG: x / pH: x  Gluc: 230 mg/dL / Ketone: x  / Bili: x / Urobili: x   Blood: x / Protein: x / Nitrite: x   Leuk Esterase: x / RBC: x / WBC x   Sq Epi: x / Non Sq Epi: x / Bacteria: x      Arterial Blood Gas:  03-24 @ 05:15  7.40/38/63/24/91.9/-1.1  ABG lactate: --  Arterial Blood Gas:  03-23 @ 14:30  7.36/39/47/22/77.6/-3.1  ABG lactate: --      CAPILLARY BLOOD GLUCOSE    MICROBIOLOGY:     RADIOLOGY:  [ ] Reviewed and interpreted by me    Mode: Off  FiO2: 40   Patient is a 78y old  Female who presents with a chief complaint of ICH (24 Mar 2024 11:36)      Interval Events:  No acute events overnight.     REVIEW OF SYSTEMS:  [ ] Positive  [ ] All other systems negative  [X] Unable to assess ROS because ___Obtunded_____    Vital Signs Last 24 Hrs  T(C): 36.4 (03-25-24 @ 04:00), Max: 36.6 (03-24-24 @ 16:57)  T(F): 97.5 (03-25-24 @ 04:00), Max: 97.8 (03-24-24 @ 16:57)  HR: 79 (03-25-24 @ 04:00) (72 - 82)  BP: 168/78 (03-25-24 @ 04:00) (166/96 - 199/112)  RR: 20 (03-25-24 @ 04:00) (18 - 20)  SpO2: 100% (03-25-24 @ 04:00) (100% - 100%)    PHYSICAL EXAM:  HEENT: left skull with mild depression, incision site c/d/i  [X]Tracheostomy: #7 cuffed portex  [X]Pupils equal  [ ]No oral lesions  [ ]Abnormal    SKIN  [ ]No Rash  [X] Abnormal - left temporal incision s/p craniectomy site c/d/i, L BKA site c/d/i  [X] pressure - sacral DTI    CARDIAC  [X]Regular  [ ]Abnormal    PULMONARY  [X]Bilateral Clear Breath Sounds  [ ]Normal Excursion  [ ]Abnormal    GI  [X]PEG      [X] +BS		              [X]Soft, nondistended, nontender	  [X]Abnormal - rectal tube    MUSCULOSKELETAL                                   [X]Bedbound                 [X]Abnormal - L BKA  [ ]Ambulatory/OOB to chair                           EXTREMITIES                                         [X]Normal  [ ]Edema                           NEUROLOGIC  [ ] Normal, non focal  [X] Focal findings: opens eyes to sternal rub, withdraws from pain on all extremities    PSYCHIATRIC  [X] Obtunded  [ ] Sedated	 [ ]Agitated    :  Gardner: [ ] Yes, if yes: Date of Placement:                   [X] No    LINES: Central Lines [ ] Yes, if yes: Date of Placement                                     [X] No    HOSPITAL MEDICATIONS:  MEDICATIONS  (STANDING):  amLODIPine   Tablet 10 milliGRAM(s) Oral daily  artificial  tears Solution 1 Drop(s) Both EYES every 4 hours  Biotene Dry Mouth Oral Rinse 5 milliLiter(s) Swish and Spit every 6 hours  brivaracetam Oral Solution 100 milliGRAM(s) Oral <User Schedule>  carvedilol 25 milliGRAM(s) Oral every 12 hours  cloNIDine 0.2 milliGRAM(s) Oral two times a day  heparin   Injectable 5000 Unit(s) SubCutaneous every 12 hours  insulin lispro (ADMELOG) corrective regimen sliding scale   SubCutaneous every 6 hours  lacosamide Solution 200 milliGRAM(s) Oral two times a day  nystatin    Suspension [id number] Unit(s) Swish and Swallow every 6 hours  pantoprazole   Suspension 40 milliGRAM(s) Oral two times a day  predniSONE   Tablet 5 milliGRAM(s) Oral daily  sodium chloride 1 Gram(s) Oral two times a day  tacrolimus    0.5 mG/mL Suspension 3 milliGRAM(s) Oral every 12 hours  trimethoprim   80 mG/sulfamethoxazole 400 mG 1 Tablet(s) Oral daily    MEDICATIONS  (PRN):  acetaminophen   Oral Liquid .. 650 milliGRAM(s) Oral every 6 hours PRN Temp greater or equal to 38C (100.4F), Mild Pain (1 - 3)  albuterol/ipratropium for Nebulization 3 milliLiter(s) Nebulizer every 6 hours PRN Shortness of Breath and/or Wheezing      LABS:    03-25    136  |  103  |  53<H>  ----------------------------<  230<H>  5.5<H>   |  19<L>  |  1.00    Ca    10.2      25 Mar 2024 05:35  Phos  3.4     03-25  Mg     2.4     03-25    TPro  7.0  /  Alb  3.5  /  TBili  0.2  /  DBili  x   /  AST  18  /  ALT  36  /  AlkPhos  110  03-25      Urinalysis Basic - ( 25 Mar 2024 05:35 )    Color: x / Appearance: x / SG: x / pH: x  Gluc: 230 mg/dL / Ketone: x  / Bili: x / Urobili: x   Blood: x / Protein: x / Nitrite: x   Leuk Esterase: x / RBC: x / WBC x   Sq Epi: x / Non Sq Epi: x / Bacteria: x      Arterial Blood Gas:  03-24 @ 05:15  7.40/38/63/24/91.9/-1.1  ABG lactate: --  Arterial Blood Gas:  03-23 @ 14:30  7.36/39/47/22/77.6/-3.1  ABG lactate: --      CAPILLARY BLOOD GLUCOSE    MICROBIOLOGY:     RADIOLOGY:  [ ] Reviewed and interpreted by me    Mode: Off  FiO2: 40

## 2024-03-25 NOTE — PROGRESS NOTE ADULT - ASSESSMENT
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   initial CTH3/1  with 8.9cm left frontal IPH with IVH .09cm rightward shift   3/2 CTH s/p L hmeicrani and resection of IPH. stable   3/5 CTH post op changes. some reorption of post op pneumocephalus.    s/p trach/peg 3/8/24   3/8 CTH L frontal craniectomy.  L MCA hemorrhage and infarct ; still IVH.   3/10 with + UA  A1c 5.7 LDL 46   TTE done 3/2: LA is severely dilated    Urine culture grew positive for klebsiella and enterococcus  3/9 EEG no seiuzres since 3/7;  risk of seiuzre from L parietal region. mod to severe diffuse cerebral dysfunction   Transferred to [facility] 3/11 for continued management.  3/12 CTH   sterotactic imaging of L frontal crani for hemorrhagic L MCA ifnarct. L frontal temporal pseudmeningocele  noted. no hcange since 3/8   o/e awake, no verbal, not followiing. L gaze pref  some minimal withdrawal to noxious RLE and LUE.  s/p trach /vent     Impression: L ICH possible 2/2  CAA but hemorrhagic infarct also needs to be considered.    - plan for cranioplasty 4/2   - had recent CTH 3/12.  would repeat EEG at this time given high risk for seiuzres   - tolerating CPAP at times   - difficult to determine IPH 2/2 CAA without MRI to look at SWI or GRE sequeneses for hemosiderin deposition  - never had vessel imaging. consider CTA H/N   - no AC/AP. dvt ppx okay  - briviact 100mg TID  and vimpat 200mg BID for seiuzre ppx   - infectious workup. on meropenum.  this can lower seiuzre threshold   - immunosuppression on tacrolimus and prednisone.  ppx bactrim     -telemetry   - PT/OT   - check FS, glucose control <180  - GI/DVT ppx   - GOC with family.  currenlty full code; in terms of functional meaningful recovery the likelihood is low.  patient will require 24hr care and likely be bedbound at this time.    consider recalling palliative  Dieter Wilkinson MD  Vascular Neurology  Office: [phone]

## 2024-03-25 NOTE — PROGRESS NOTE ADULT - PROBLEM SELECTOR PLAN 5
- SBP goal:   - Clonidine 0.2 mg TID, Coreg 25 mg BID, Amlodipine 5mg daily  - Echo: LVEF 70-75%, Grade II diastolic dysfunction, septal bulge without obstruction  - cardiology following - SBP goal:   - Clonidine 0.2 mg TID, Coreg 25 mg BID, Amlodipine 5mg daily  - Echo: LVEF 70-75%, Grade II diastolic dysfunction, septal bulge without obstruction  - 3/25 added clonidine patch - titrate off clonidine PO   - cardiology following

## 2024-03-25 NOTE — PROGRESS NOTE ADULT - ASSESSMENT
77 yo F with PMHx ESRD s/p renal transplant (2019, now off HD), left AKA, BK viremia, HTN, breast ca s/p lumpectomy (completed Tamoxifen 03/2023), DVT (off Eliquis), HLD, gout presenting 3/1 with left ICH (ICH score 4) likely 2/2 amyloid angiopathy s/p left craniectomy(discarded) and evacuation as well as EVD placement and removal (3/7). She is s/p trach/peg 3/8/24.  Febrile 3/10 with + UA, blood/sputum culture NGTD.  Treatment for UTI initiated with ceftriaxone, eventually broadened to cefepime.  Transferred to [facility] 3/11 for continued management.  Pt arrived from NSCU with minimal mental status with only response of spontaneous eye opening and withdrawal on RLE.  Urine culture resulted - positive for klebsiella, treated for 7 days with Meropenem 3/12 --> 3/19.  Cranioplasty planning 4/2 as per neurosurgery.  Continuing weaning from ventilator as tolerated.  Continuing to monitor for neurological improvement.       3/24:  Continues to tolerate TC - ABG good this AM.  Tacro 6.6 this am, continuing with current dosage.  Rectal tube remains, with liquid stool.  Hypertensive , PM meds clonidine, coreg due - will repeat, hydralazine 5mg IVP x1 ordered if needed.  Afebrile.  Awaiting cranioplasty.  Family at bedside.   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 She is s/p trach/peg 3/8/24.  Febrile 3/10 with + UA, blood/sputum culture NGTD.  Treatment for UTI initiated with ceftriaxone, eventually broadened to cefepime.  Transferred to [facility] 3/11 for continued management.  Pt arrived from NSCU with minimal mental status with only response of spontaneous eye opening and withdrawal on RLE.  Urine culture resulted - positive for klebsiella, treated for 7 days with Meropenem 3/12 --> 3/19.  Cranioplasty planning 4/2 as per neurosurgery.  Continuing weaning from ventilator as tolerated.  Continuing to monitor for neurological improvement.       3/25:  TC ATC.  BP medications adjusted for better control - clonidine patch added, plan to titrate clonidine PO down tomorrow.  Hyperkalemia - lasix 20mg IVP x1 given - as per transplant nephro add lokelma 10mg qd --> will start tomorrow.  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 She is s/p trach/peg 3/8/24.  Febrile 3/10 with + UA, blood/sputum culture NGTD.  Treatment for UTI initiated with ceftriaxone, eventually broadened to cefepime.  Transferred to [facility] 3/11 for continued management.  Pt arrived from NSCU with minimal mental status with only response of spontaneous eye opening and withdrawal on RLE.  Urine culture resulted - positive for klebsiella, treated for 7 days with Meropenem 3/12 --> 3/19.  Cranioplasty planning 4/2 as per neurosurgery.  Continuing weaning from ventilator as tolerated.  Continuing to monitor for neurological improvement.       3/25:  TC ATC.  BP medications adjusted for better control - clonidine patch added, plan to titrate clonidine PO down tomorrow.  Hyperkalemia - lasix 20mg IVP x1 given - as per transplant nephro add lokelma 10mg qd --> will start tomorrow.  Na normalized, free water d/c'd - c/w salt tabs. 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 She is s/p trach/peg 3/8/24.  Febrile 3/10 with + UA, blood/sputum culture NGTD.  Treatment for UTI initiated with ceftriaxone, eventually broadened to cefepime.  Transferred to [facility] 3/11 for continued management.  Pt arrived from NSCU with minimal mental status with only response of spontaneous eye opening and withdrawal on RLE.  Urine culture resulted - positive for klebsiella, treated for 7 days with Meropenem 3/12 --> 3/19.  Cranioplasty planning 4/2 as per neurosurgery.  Continuing weaning from ventilator as tolerated.  Continuing to monitor for neurological improvement.       3/25:  TC ATC.  BP medications adjusted for better control - clonidine patch added, plan to titrate clonidine PO down tomorrow.  Hyperkalemia - lasix 20mg IVP x1 given - as per transplant nephro add lokelma 10mg qd --> will start tomorrow.  Na normalized, free water d/c'd - c/w salt tabs.  Previously had hypoglycemic events, now FS in 200s, ok for now, if trend to the 300s can start lantus, for now monitor.

## 2024-03-25 NOTE — PROGRESS NOTE ADULT - PROBLEM SELECTOR PLAN 2
- S/p trach 3/8 by surgery by Dr. Joselo Mayorga   - Tracheostomy Sutures removed 3/13  - Vent Settings: PRVC 14/450/+5/30%  - [place]-clay breathing pattern observed however tolerating weaning trials  - 3/23: Will attempt trach collar over night, FiO2 increased to 40% based on low O2 on gas (likely venous specimen)  - 3/4 tolerating TC ATC  - Continue airway clearance; Duonebs q6h PRN

## 2024-03-25 NOTE — PROGRESS NOTE ADULT - SUBJECTIVE AND OBJECTIVE BOX
Neurology        S: patient seen. no neuro changes, ill appearing         Medications: MEDICATIONS  (STANDING):  amLODIPine   Tablet 10 milliGRAM(s) Oral daily  artificial  tears Solution 1 Drop(s) Both EYES every 4 hours  Biotene Dry Mouth Oral Rinse 5 milliLiter(s) Swish and Spit every 6 hours  brivaracetam Oral Solution 100 milliGRAM(s) Oral <User Schedule>  carvedilol 25 milliGRAM(s) Oral every 12 hours  cloNIDine 0.2 milliGRAM(s) Oral two times a day  furosemide   Injectable 20 milliGRAM(s) IV Push once  heparin   Injectable 5000 Unit(s) SubCutaneous every 12 hours  insulin lispro (ADMELOG) corrective regimen sliding scale   SubCutaneous every 6 hours  lacosamide Solution 200 milliGRAM(s) Oral two times a day  nystatin    Suspension 083755 Unit(s) Swish and Swallow every 6 hours  pantoprazole   Suspension 40 milliGRAM(s) Oral two times a day  predniSONE   Tablet 5 milliGRAM(s) Oral daily  sodium chloride 1 Gram(s) Oral two times a day  tacrolimus    0.5 mG/mL Suspension 3 milliGRAM(s) Oral every 12 hours  trimethoprim   80 mG/sulfamethoxazole 400 mG 1 Tablet(s) Oral daily    MEDICATIONS  (PRN):  acetaminophen   Oral Liquid .. 650 milliGRAM(s) Oral every 6 hours PRN Temp greater or equal to 38C (100.4F), Mild Pain (1 - 3)  albuterol/ipratropium for Nebulization 3 milliLiter(s) Nebulizer every 6 hours PRN Shortness of Breath and/or Wheezing       Vitals:  Vital Signs Last 24 Hrs  T(C): 36.4 (25 Mar 2024 04:00), Max: 36.6 (24 Mar 2024 16:57)  T(F): 97.5 (25 Mar 2024 04:00), Max: 97.8 (24 Mar 2024 16:57)  HR: 81 (25 Mar 2024 08:17) (72 - 82)  BP: 168/78 (25 Mar 2024 04:00) (166/96 - 199/112)  BP(mean): --  RR: 20 (25 Mar 2024 04:00) (18 - 20)  SpO2: 100% (25 Mar 2024 08:17) (100% - 100%)    Parameters below as of 25 Mar 2024 08:17  Patient On (Oxygen Delivery Method): tracheostomy collar  O2 Flow (L/min): 40           General Exam:   General Appearance: Appropriately dressed    Head: Normocephalic, atraumatic and no dysmorphic features s/p trach   Ear, Nose, and Throat: Moist mucous membranes  CVS: S1S2+  Resp: No SOB, no wheeze or rhonchi on vent   GI: soft NT/ND s/p PEG   Extremities:  L AKA  Skin: No bruises or rashes     Neurological Exam:  Mental Status:  opens eyes to noxious. no verbal. not following   Cranial Nerves: PERRL, EOMI but gaze pref to L, no blink to threat on R, R facial,    Motor: minimal/no spontaneous movement ;   Sensation: grimaces to noxious at times. some minimal withdrawal LUE 2/5 and RLE.    Coordination: unable   Gait: unable     Data/Labs/Imaging which I personally reviewed.            LABS:      03-25    136  |  103  |  53<H>  ----------------------------<  230<H>  5.5<H>   |  19<L>  |  1.00    Ca    10.2      25 Mar 2024 05:35  Phos  3.4     03-25  Mg     2.4     03-25    TPro  7.0  /  Alb  3.5  /  TBili  0.2  /  DBili  x   /  AST  18  /  ALT  36  /  AlkPhos  110  03-25    LIVER FUNCTIONS - ( 25 Mar 2024 05:35 )  Alb: 3.5 g/dL / Pro: 7.0 g/dL / ALK PHOS: 110 U/L / ALT: 36 U/L / AST: 18 U/L / GGT: x             Urinalysis Basic - ( 25 Mar 2024 05:35 )    Color: x / Appearance: x / SG: x / pH: x  Gluc: 230 mg/dL / Ketone: x  / Bili: x / Urobili: x   Blood: x / Protein: x / Nitrite: x   Leuk Esterase: x / RBC: x / WBC x   Sq Epi: x / Non Sq Epi: x / Bacteria: x

## 2024-03-25 NOTE — PROGRESS NOTE ADULT - PROBLEM SELECTOR PLAN 6
- AVF in the left upper extremity  - s/p renal transplant in 2019  - prednisone 5mg, bactrim ppx  - tacro 5mg BID as per transplant nephro  - trach goal 4-7, daily tacro levels 30 mins prior to dose administration  - 3/21: Tacro levels elevated at 16.4.  Further dosing held, transplant team re-consulted.  - 3/22: Tacro lvl 7.0.  Tacro resumed at 3mg BID

## 2024-03-25 NOTE — PROGRESS NOTE ADULT - SUBJECTIVE AND OBJECTIVE BOX
[facility] DIVISION OF KIDNEY DISEASES AND HYPERTENSION -- FOLLOW UP NOTE  --------------------------------------------------------------------------------  Chief Complaint: DDRT (2019), Nontraumatic intracerebral hemorrhage    24 hour events/subjective: Pt. was seen and evaluated in the RCU earlier today. Pt. lethargic, unable to provide history or ROS.    PAST HISTORY  --------------------------------------------------------------------------------  No significant changes to PMH, PSH, FHx, SHx, unless otherwise noted    ALLERGIES & MEDICATIONS  --------------------------------------------------------------------------------  Allergies    shellfish (Rash)  ChloraPrep One-Step (Rash)  penicillins (Rash)    Intolerances    Standing Inpatient Medications  amLODIPine   Tablet 10 milliGRAM(s) Oral daily  artificial  tears Solution 1 Drop(s) Both EYES every 4 hours  Biotene Dry Mouth Oral Rinse 5 milliLiter(s) Swish and Spit every 6 hours  brivaracetam Oral Solution 100 milliGRAM(s) Oral <User Schedule>  carvedilol 25 milliGRAM(s) Oral every 12 hours  cloNIDine 0.2 milliGRAM(s) Oral two times a day  cloNIDine Patch 0.1 mG/24Hr(s) 1 patch Transdermal every 7 days  heparin   Injectable 5000 Unit(s) SubCutaneous every 12 hours  insulin lispro (ADMELOG) corrective regimen sliding scale   SubCutaneous every 6 hours  lacosamide Solution 200 milliGRAM(s) Oral two times a day  nystatin    Suspension 800740 Unit(s) Swish and Swallow every 6 hours  pantoprazole   Suspension 40 milliGRAM(s) Oral two times a day  predniSONE   Tablet 5 milliGRAM(s) Oral daily  sodium chloride 1 Gram(s) Oral two times a day  tacrolimus    0.5 mG/mL Suspension 3 milliGRAM(s) Oral every 12 hours  trimethoprim   80 mG/sulfamethoxazole 400 mG 1 Tablet(s) Oral daily    PRN Inpatient Medications  acetaminophen   Oral Liquid .. 650 milliGRAM(s) Oral every 6 hours PRN  albuterol/ipratropium for Nebulization 3 milliLiter(s) Nebulizer every 6 hours PRN    REVIEW OF SYSTEMS  --------------------------------------------------------------------------------  unable to obtain due to mental status     VITALS/PHYSICAL EXAM  --------------------------------------------------------------------------------  T(C): 36.2 (03-25-24 @ 10:00), Max: 36.6 (03-24-24 @ 16:57)  HR: 78 (03-25-24 @ 10:00) (72 - 82)  BP: 187/90 (03-25-24 @ 10:00) (167/86 - 199/112)  RR: 20 (03-25-24 @ 10:00) (18 - 20)  SpO2: 100% (03-25-24 @ 10:00) (100% - 100%)  Wt(kg): --    03-24-24 @ 07:01  -  03-25-24 @ 07:00  --------------------------------------------------------  IN: 2270 mL / OUT: 2650 mL / NET: -380 mL    03-25-24 @ 07:01  -  03-25-24 @ 14:06  --------------------------------------------------------  IN: 0 mL / OUT: 700 mL / NET: -700 mL    Physical Exam:  Gen: ill appearing, NAD  HEENT: MMM, +Trach   Pulm: CTAB, trach collar   CV: RRR, S1S2;  Abd: +BS, soft, nontender/nondistended  Extremities: no bilateral LE edema noted. +LLE AKA  Neuro: lethargic   Skin: Warm      LABS/STUDIES  --------------------------------------------------------------------------------              10.0   5.90  >-----------<  166      [03-25-24 @ 12:39]              32.3     136  |  103  |  53  ----------------------------<  230      [03-25-24 @ 05:35]  5.5   |  19  |  1.00        Ca     10.2     [03-25-24 @ 05:35]      Mg     2.4     [03-25-24 @ 05:35]      Phos  3.4     [03-25-24 @ 05:35]    TPro  7.0  /  Alb  3.5  /  TBili  0.2  /  DBili  x   /  AST  18  /  ALT  36  /  AlkPhos  110  [03-25-24 @ 05:35]    Creatinine Trend:  SCr 1.00 [03-25 @ 05:35]  SCr 1.21 [03-24 @ 08:01]  SCr 1.37 [03-23 @ 06:47]  SCr 1.64 [03-22 @ 07:32]  SCr 1.46 [03-21 @ 04:10]    Tacrolimus (), Serum: 5.6 ng/mL (03-25 @ 05:35)  Tacrolimus (), Serum: 6.6 ng/mL (03-24 @ 06:45)  Tacrolimus (), Serum: 6.1 ng/mL (03-23 @ 06:47)  Tacrolimus (), Serum: 7.0 ng/mL (03-22 @ 07:29)    Urinalysis - [03-25-24 @ 05:35]      Color  / Appearance  / SG  / pH       Gluc 230 / Ketone   / Bili  / Urobili        Blood  / Protein  / Leuk Est  / Nitrite       RBC  / WBC  / Hyaline  / Gran  / Sq Epi  / Non Sq Epi  / Bacteria     Iron 27, TIBC 201, %sat 13      [03-14-24 @ 06:49]  HbA1c 7.2      [01-11-20 @ 09:23]  TSH 1.24      [03-02-24 @ 10:51]  Lipid: chol 136, TG 95, HDL 72, LDL --      [03-02-24 @ 02:10]

## 2024-03-26 LAB
ALBUMIN SERPL ELPH-MCNC: 3.4 G/DL — SIGNIFICANT CHANGE UP (ref 3.3–5)
ALP SERPL-CCNC: 94 U/L — SIGNIFICANT CHANGE UP (ref 40–120)
ALT FLD-CCNC: 28 U/L — SIGNIFICANT CHANGE UP (ref 10–45)
ANION GAP SERPL CALC-SCNC: 15 MMOL/L — SIGNIFICANT CHANGE UP (ref 5–17)
AST SERPL-CCNC: 16 U/L — SIGNIFICANT CHANGE UP (ref 10–40)
BILIRUB SERPL-MCNC: 0.2 MG/DL — SIGNIFICANT CHANGE UP (ref 0.2–1.2)
BUN SERPL-MCNC: 59 MG/DL — HIGH (ref 7–23)
CALCIUM SERPL-MCNC: 10.6 MG/DL — HIGH (ref 8.4–10.5)
CHLORIDE SERPL-SCNC: 105 MMOL/L — SIGNIFICANT CHANGE UP (ref 96–108)
CO2 SERPL-SCNC: 19 MMOL/L — LOW (ref 22–31)
CREAT SERPL-MCNC: 1.18 MG/DL — SIGNIFICANT CHANGE UP (ref 0.5–1.3)
EGFR: 47 ML/MIN/1.73M2 — LOW
GLUCOSE BLDC GLUCOMTR-MCNC: 252 MG/DL — HIGH (ref 70–99)
GLUCOSE BLDC GLUCOMTR-MCNC: 263 MG/DL — HIGH (ref 70–99)
GLUCOSE BLDC GLUCOMTR-MCNC: 274 MG/DL — HIGH (ref 70–99)
GLUCOSE BLDC GLUCOMTR-MCNC: 275 MG/DL — HIGH (ref 70–99)
GLUCOSE BLDC GLUCOMTR-MCNC: 78 MG/DL — SIGNIFICANT CHANGE UP (ref 70–99)
GLUCOSE SERPL-MCNC: 68 MG/DL — LOW (ref 70–99)
MAGNESIUM SERPL-MCNC: 2.5 MG/DL — SIGNIFICANT CHANGE UP (ref 1.6–2.6)
PHOSPHATE SERPL-MCNC: 4.2 MG/DL — SIGNIFICANT CHANGE UP (ref 2.5–4.5)
POTASSIUM SERPL-MCNC: 5.1 MMOL/L — SIGNIFICANT CHANGE UP (ref 3.5–5.3)
POTASSIUM SERPL-SCNC: 5.1 MMOL/L — SIGNIFICANT CHANGE UP (ref 3.5–5.3)
PROT SERPL-MCNC: 7 G/DL — SIGNIFICANT CHANGE UP (ref 6–8.3)
SODIUM SERPL-SCNC: 139 MMOL/L — SIGNIFICANT CHANGE UP (ref 135–145)
TACROLIMUS SERPL-MCNC: 7 NG/ML — SIGNIFICANT CHANGE UP

## 2024-03-26 PROCEDURE — 99232 SBSQ HOSP IP/OBS MODERATE 35: CPT | Mod: FS

## 2024-03-26 RX ORDER — AMLODIPINE BESYLATE 2.5 MG/1
10 TABLET ORAL DAILY
Refills: 0 | Status: DISCONTINUED | OUTPATIENT
Start: 2024-03-26 | End: 2024-04-02

## 2024-03-26 RX ORDER — SODIUM CHLORIDE 9 MG/ML
500 INJECTION INTRAMUSCULAR; INTRAVENOUS; SUBCUTANEOUS
Refills: 0 | Status: COMPLETED | OUTPATIENT
Start: 2024-03-26 | End: 2024-03-26

## 2024-03-26 RX ADMIN — Medication 500000 UNIT(S): at 01:44

## 2024-03-26 RX ADMIN — Medication 1 DROP(S): at 09:48

## 2024-03-26 RX ADMIN — AMLODIPINE BESYLATE 10 MILLIGRAM(S): 2.5 TABLET ORAL at 11:57

## 2024-03-26 RX ADMIN — Medication 1 DROP(S): at 13:51

## 2024-03-26 RX ADMIN — Medication 500000 UNIT(S): at 17:34

## 2024-03-26 RX ADMIN — CARVEDILOL PHOSPHATE 25 MILLIGRAM(S): 80 CAPSULE, EXTENDED RELEASE ORAL at 17:32

## 2024-03-26 RX ADMIN — BRIVARACETAM 100 MILLIGRAM(S): 25 TABLET, FILM COATED ORAL at 06:06

## 2024-03-26 RX ADMIN — Medication 5 MILLIGRAM(S): at 06:17

## 2024-03-26 RX ADMIN — TACROLIMUS 3 MILLIGRAM(S): 5 CAPSULE ORAL at 17:32

## 2024-03-26 RX ADMIN — CARVEDILOL PHOSPHATE 25 MILLIGRAM(S): 80 CAPSULE, EXTENDED RELEASE ORAL at 06:17

## 2024-03-26 RX ADMIN — BRIVARACETAM 100 MILLIGRAM(S): 25 TABLET, FILM COATED ORAL at 13:51

## 2024-03-26 RX ADMIN — SODIUM CHLORIDE 75 MILLILITER(S): 9 INJECTION INTRAMUSCULAR; INTRAVENOUS; SUBCUTANEOUS at 09:49

## 2024-03-26 RX ADMIN — Medication 0.2 MILLIGRAM(S): at 06:10

## 2024-03-26 RX ADMIN — PANTOPRAZOLE SODIUM 40 MILLIGRAM(S): 20 TABLET, DELAYED RELEASE ORAL at 06:09

## 2024-03-26 RX ADMIN — LACOSAMIDE 200 MILLIGRAM(S): 50 TABLET ORAL at 17:32

## 2024-03-26 RX ADMIN — Medication 5 MILLILITER(S): at 11:51

## 2024-03-26 RX ADMIN — Medication 1 DROP(S): at 17:34

## 2024-03-26 RX ADMIN — SODIUM ZIRCONIUM CYCLOSILICATE 10 GRAM(S): 10 POWDER, FOR SUSPENSION ORAL at 11:51

## 2024-03-26 RX ADMIN — Medication 1 PATCH: at 19:37

## 2024-03-26 RX ADMIN — Medication 6: at 23:19

## 2024-03-26 RX ADMIN — HEPARIN SODIUM 5000 UNIT(S): 5000 INJECTION INTRAVENOUS; SUBCUTANEOUS at 17:33

## 2024-03-26 RX ADMIN — Medication 5 MILLILITER(S): at 23:20

## 2024-03-26 RX ADMIN — Medication 5 MILLILITER(S): at 06:06

## 2024-03-26 RX ADMIN — TACROLIMUS 3 MILLIGRAM(S): 5 CAPSULE ORAL at 06:04

## 2024-03-26 RX ADMIN — SODIUM CHLORIDE 1 GRAM(S): 9 INJECTION INTRAMUSCULAR; INTRAVENOUS; SUBCUTANEOUS at 06:17

## 2024-03-26 RX ADMIN — Medication 5 MILLILITER(S): at 17:34

## 2024-03-26 RX ADMIN — Medication 6: at 00:34

## 2024-03-26 RX ADMIN — Medication 500000 UNIT(S): at 23:20

## 2024-03-26 RX ADMIN — PANTOPRAZOLE SODIUM 40 MILLIGRAM(S): 20 TABLET, DELAYED RELEASE ORAL at 17:32

## 2024-03-26 RX ADMIN — Medication 6: at 17:33

## 2024-03-26 RX ADMIN — Medication 500000 UNIT(S): at 06:04

## 2024-03-26 RX ADMIN — HEPARIN SODIUM 5000 UNIT(S): 5000 INJECTION INTRAVENOUS; SUBCUTANEOUS at 06:08

## 2024-03-26 RX ADMIN — BRIVARACETAM 100 MILLIGRAM(S): 25 TABLET, FILM COATED ORAL at 21:13

## 2024-03-26 RX ADMIN — Medication 1 TABLET(S): at 11:51

## 2024-03-26 RX ADMIN — Medication 5 MILLILITER(S): at 01:45

## 2024-03-26 RX ADMIN — Medication 1 PATCH: at 07:54

## 2024-03-26 RX ADMIN — Medication 1 DROP(S): at 01:46

## 2024-03-26 RX ADMIN — LACOSAMIDE 200 MILLIGRAM(S): 50 TABLET ORAL at 06:17

## 2024-03-26 RX ADMIN — Medication 6: at 11:51

## 2024-03-26 RX ADMIN — Medication 1 DROP(S): at 06:07

## 2024-03-26 RX ADMIN — Medication 500000 UNIT(S): at 11:51

## 2024-03-26 RX ADMIN — Medication 1 DROP(S): at 21:12

## 2024-03-26 NOTE — PROGRESS NOTE ADULT - SUBJECTIVE AND OBJECTIVE BOX
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 ICH score 4.     (02 Mar 2024 00:28)    Interval Events:    REVIEW OF SYSTEMS:  [ ] Positive  [ ] All other systems negative  [ ] Unable to assess ROS because ________    Vital Signs Last 24 Hrs  T(C): 36.1 (03-26-24 @ 04:52), Max: 37.1 (03-25-24 @ 18:00)  T(F): 97 (03-26-24 @ 04:52), Max: 98.7 (03-25-24 @ 18:00)  HR: 83 (03-26-24 @ 04:52) (74 - 93)  BP: 129/69 (03-26-24 @ 04:52) (128/53 - 187/90)  RR: 20 (03-26-24 @ 04:52) (19 - 21)  SpO2: 100% (03-26-24 @ 04:52) (98% - 100%)    PHYSICAL EXAM:  HEENT:   [ ]Tracheostomy:  [ ]Pupils equal  [ ]No oral lesions  [ ]Abnormal    SKIN  [ ] No Rash  [ ] Abnormal  [ ] pressure    CARDIAC  [ ]Regular  [ ]Abnormal    PULMONARY  [ ]Bilateral Clear Breath Sounds  [ ]Normal Excursion  [ ]Abnormal    GI  [ ]PEG      [ ] +BS		              [ ]Soft, nondistended, nontender	  [ ]Abnormal    MUSCULOSKELETAL                                   [ ]Bedbound                 [ ]Abnormal    [ ]Ambulatory/OOB to chair                           EXTREMITIES                                         [ ]Normal  [ ]Edema                           NEUROLOGIC  [ ] Normal, non focal  [ ] Focal findings:    PSYCHIATRIC  [ ]Alert and appropriate  [ ] Sedated	 [ ]Agitated    :  Gardner: [ ] Yes, if yes: Date of Placement:                   [  ] No    LINES: Central Lines [ ] Yes, if yes: Date of Placement                                     [  ] No    HOSPITAL MEDICATIONS:  MEDICATIONS  (STANDING):  amLODIPine   Tablet 10 milliGRAM(s) Oral daily  artificial  tears Solution 1 Drop(s) Both EYES every 4 hours  Biotene Dry Mouth Oral Rinse 5 milliLiter(s) Swish and Spit every 6 hours  brivaracetam Oral Solution 100 milliGRAM(s) Oral <User Schedule>  carvedilol 25 milliGRAM(s) Oral every 12 hours  cloNIDine 0.2 milliGRAM(s) Oral two times a day  cloNIDine Patch 0.1 mG/24Hr(s) 1 patch Transdermal every 7 days  heparin   Injectable 5000 Unit(s) SubCutaneous every 12 hours  insulin lispro (ADMELOG) corrective regimen sliding scale   SubCutaneous every 6 hours  lacosamide Solution 200 milliGRAM(s) Oral two times a day  nystatin    Suspension 588190 Unit(s) Swish and Swallow every 6 hours  pantoprazole   Suspension 40 milliGRAM(s) Oral two times a day  predniSONE   Tablet 5 milliGRAM(s) Oral daily  sodium chloride 1 Gram(s) Oral two times a day  sodium zirconium cyclosilicate 10 Gram(s) Oral daily  tacrolimus    0.5 mG/mL Suspension 3 milliGRAM(s) Oral every 12 hours  trimethoprim   80 mG/sulfamethoxazole 400 mG 1 Tablet(s) Oral daily    MEDICATIONS  (PRN):  acetaminophen   Oral Liquid .. 650 milliGRAM(s) Oral every 6 hours PRN Temp greater or equal to 38C (100.4F), Mild Pain (1 - 3)  albuterol/ipratropium for Nebulization 3 milliLiter(s) Nebulizer every 6 hours PRN Shortness of Breath and/or Wheezing      LABS:                        10.0   5.90  )-----------( 166      ( 25 Mar 2024 12:39 )             32.3     03-26    139  |  105  |  59<H>  ----------------------------<  68<L>  5.1   |  19<L>  |  1.18    Ca    10.6<H>      26 Mar 2024 07:17  Phos  4.2     03-26  Mg     2.5     03-26    TPro  7.0  /  Alb  3.4  /  TBili  0.2  /  DBili  x   /  AST  16  /  ALT  28  /  AlkPhos  94  03-26     Patient is a 78y old  Female who presents with a chief complaint of ICH (25 Mar 2024 14:06)    HPI:  Nury Adam   78F Hx ESRD s/p renal xplant c/b DGF off HD, L AKA, 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 ICH score 4.  (02 Mar 2024 00:28)    Interval Events: No issues overnight    REVIEW OF SYSTEMS:  [ ] Positive  [ ] All other systems negative  [x ] Unable to assess ROS because patient is obtunded    Vital Signs Last 24 Hrs  T(C): 36.1 (03-26-24 @ 04:52), Max: 37.1 (03-25-24 @ 18:00)  T(F): 97 (03-26-24 @ 04:52), Max: 98.7 (03-25-24 @ 18:00)  HR: 83 (03-26-24 @ 04:52) (74 - 93)  BP: 129/69 (03-26-24 @ 04:52) (128/53 - 187/90)  RR: 20 (03-26-24 @ 04:52) (19 - 21)  SpO2: 100% (03-26-24 @ 04:52) (98% - 100%)      PHYSICAL EXAM:  HEENT: left skull with mild depression, incision site c/d/i  [X]Tracheostomy: #7 cuffed portex  [X]Pupils equal  [ ]No oral lesions  [ ]Abnormal    SKIN  [ ]No Rash  [X] Abnormal - left temporal incision s/p craniectomy site c/d/i, L BKA site c/d/i  [X] pressure - sacral DTI    CARDIAC  [X]Regular  [ ]Abnormal    PULMONARY  [X]Bilateral Clear Breath Sounds  [ ]Normal Excursion  [ ]Abnormal    GI  [X]PEG      [X] +BS		              [X]Soft, nondistended, nontender	  [X]Abnormal - rectal tube    MUSCULOSKELETAL                                   [X]Bedbound                 [X]Abnormal - L BKA  [ ]Ambulatory/OOB to chair                           EXTREMITIES                                         [X]Normal  [ ]Edema                           NEUROLOGIC  [ ] Normal, non focal  [X] Focal findings: opens eyes to sternal rub, withdraws from pain on all extremities    PSYCHIATRIC  [X] Obtunded  [ ] Sedated	 [ ]Agitated    :  Gardner: [ ] Yes, if yes: Date of Placement:                   [X] No    LINES: Central Lines [ ] Yes, if yes: Date of Placement                                     [X] No    HOSPITAL MEDICATIONS:  MEDICATIONS  (STANDING):  amLODIPine   Tablet 10 milliGRAM(s) Oral daily  artificial  tears Solution 1 Drop(s) Both EYES every 4 hours  Biotene Dry Mouth Oral Rinse 5 milliLiter(s) Swish and Spit every 6 hours  brivaracetam Oral Solution 100 milliGRAM(s) Oral <User Schedule>  carvedilol 25 milliGRAM(s) Oral every 12 hours  cloNIDine 0.2 milliGRAM(s) Oral two times a day  cloNIDine Patch 0.1 mG/24Hr(s) 1 patch Transdermal every 7 days  heparin   Injectable 5000 Unit(s) SubCutaneous every 12 hours  insulin lispro (ADMELOG) corrective regimen sliding scale   SubCutaneous every 6 hours  lacosamide Solution 200 milliGRAM(s) Oral two times a day  nystatin    Suspension 752113 Unit(s) Swish and Swallow every 6 hours  pantoprazole   Suspension 40 milliGRAM(s) Oral two times a day  predniSONE   Tablet 5 milliGRAM(s) Oral daily  sodium chloride 1 Gram(s) Oral two times a day  sodium zirconium cyclosilicate 10 Gram(s) Oral daily  tacrolimus    0.5 mG/mL Suspension 3 milliGRAM(s) Oral every 12 hours  trimethoprim   80 mG/sulfamethoxazole 400 mG 1 Tablet(s) Oral daily    MEDICATIONS  (PRN):  acetaminophen   Oral Liquid .. 650 milliGRAM(s) Oral every 6 hours PRN Temp greater or equal to 38C (100.4F), Mild Pain (1 - 3)  albuterol/ipratropium for Nebulization 3 milliLiter(s) Nebulizer every 6 hours PRN Shortness of Breath and/or Wheezing      LABS:                        10.0   5.90  )-----------( 166      ( 25 Mar 2024 12:39 )             32.3     03-26    139  |  105  |  59<H>  ----------------------------<  68<L>  5.1   |  19<L>  |  1.18    Ca    10.6<H>      26 Mar 2024 07:17  Phos  4.2     03-26  Mg     2.5     03-26    TPro  7.0  /  Alb  3.4  /  TBili  0.2  /  DBili  x   /  AST  16  /  ALT  28  /  AlkPhos  94  03-26

## 2024-03-26 NOTE — PROGRESS NOTE ADULT - ASSESSMENT
77 yo F with PMHx ESRD s/p renal transplant (2019, now off HD), left AKA, BK viremia, HTN, breast ca s/p lumpectomy (completed Tamoxifen 03/2023), DVT (off Eliquis), HLD, gout presenting 3/1 with left ICH (ICH score 4) likely 2/2 amyloid angiopathy s/p left craniectomy(discarded) and evacuation as well as EVD placement and removal (3/7). She is s/p trach/peg 3/8/24.  Febrile 3/10 with + UA, blood/sputum culture NGTD.  Treatment for UTI initiated with ceftriaxone, eventually broadened to cefepime.  Transferred to RCU 3/11 for continued management.  Pt arrived from NSCU with minimal mental status with only response of spontaneous eye opening and withdrawal on RLE.  Urine culture resulted - positive for klebsiella, treated for 7 days with Meropenem 3/12 --> 3/19.  Cranioplasty planning 4/2 as per neurosurgery.  Continuing weaning from ventilator as tolerated.  Continuing to monitor for neurological improvement.       3/25:  TC ATC.  BP medications adjusted for better control - clonidine patch added, plan to titrate clonidine PO down tomorrow.  Hyperkalemia - lasix 20mg IVP x1 given - as per transplant nephro add lokelma 10mg qd --> will start tomorrow.  Na normalized, free water d/c'd - c/w salt tabs.  Previously had hypoglycemic events, now FS in 200s, ok for now, if trend to the 300s can start lantus, for now monitor.  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 She is s/p trach/peg 3/8/24.  Febrile 3/10 with + UA, blood/sputum culture NGTD.  Treatment for UTI initiated with ceftriaxone, eventually broadened to cefepime.  Transferred to RCU 3/11 for continued management.  Pt arrived from NSCU with minimal mental status with only response of spontaneous eye opening and withdrawal on RLE.  Urine culture resulted - positive for klebsiella, treated for 7 days with Meropenem 3/12 --> 3/19.  Cranioplasty planning 4/2 as per neurosurgery.  Continuing weaning from ventilator as tolerated.  Continuing to monitor for neurological improvement.       3/26:  TC ATC.  BP medications adjusted for better control - clonidine patch added, titrated clonidine PO down to off.  Hyperkalemia - as per transplant nephro add Lokelma 10mg qd. Stopped Na tabs.

## 2024-03-26 NOTE — PROGRESS NOTE ADULT - PROBLEM SELECTOR PLAN 2
- S/p trach 3/8 by surgery by Dr. Joselo Mayorga   - Tracheostomy Sutures removed 3/13  - Vent Settings: PRVC 14/450/+5/30%  - Cheyennes-clay breathing pattern observed however tolerating weaning trials  - 3/23: Will attempt trach collar over night, FiO2 increased to 40% based on low O2 on gas (likely venous specimen)  - 3/4 tolerating TC ATC  - Continue airway clearance; Duonebs q6h PRN - S/p trach 3/8 by surgery by Dr. Joselo Mayorga   - Tracheostomy Sutures removed 3/13  - Vent Settings: PRVC 14/450/+5/30%  - [place]yennes-clay breathing pattern observed however tolerating weaning trials  - 3/23: Will attempt trach collar over night, FiO2 increased to 40% based on low O2 on gas (likely venous specimen)  - 3/4 tolerating TC ATC  - Continue airway clearance; Duoneb q6h PRN

## 2024-03-26 NOTE — PROGRESS NOTE ADULT - NS ATTEND AMEND GEN_ALL_CORE FT
Agree with above  78F ESRD s/p renal transplant p/w IPH s/p L cranie with poor mental status, now s/p trache / PEG  - Now on clonidine patch, c/w PO as well but can hold if patient is normotensive; will also c/w Coreg / amlodipine  - Na 139, titrate salt tabs down, renal function stable  - c/w tacro + prednisone for transplant  - Trache to TC @40%, doing well  - DVT ppx: Hep SC, s/p IVC filter  - Dispo awaiting cranioplasty scheduled 4/2.

## 2024-03-26 NOTE — PROGRESS NOTE ADULT - PROBLEM SELECTOR PLAN 8
- S/p PEG 3/8  - tolerating tube feeds    [] H. 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 Bismuth may turn her stool black. After completing treatment would cont once daily PPI for ppx. Pending clinical course, consider repeat stool Ag in 8 weeks to confirm clearance - S/p PEG 3/8  - tolerating tube feeds    [] H. 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 Bismuth may turn her stool black. After completing treatment would cont once daily PPI for ppx. Pending clinical course, consider repeat stool Ag in 8 weeks to confirm clearance

## 2024-03-26 NOTE — CHART NOTE - NSCHARTNOTEFT_GEN_A_CORE
Nutrition Follow Up Note  Patient seen for: Nutrition Follow Up. Chart reviewed, events noted.     Source: [] Patient       [x] medical record         [x] RN        [] Family at bedside       [] Other:     -If unable to interview patient: [x] Trach/Vent/BiPAP  [x] Disoriented/confused/inappropriate to interview    Diet, NPO with Tube Feed:   Tube Feeding Modality: Gastrostomy  Glucerna 1.5 David ([id number])  Total Volume for 24 Hours (mL): 1080  Bolus  Total Volume of Bolus (mL):  270  Total # of Feeds: 4  Tube Feed Frequency: Every 6 hours   Tube Feed Start Time: 12:00  Bolus Feed Rate (mL per Hour): 270   Bolus Feed Duration (in Hours): 1  Supplement Feeding Modality:  Gastrostomy  Probiotic Yogurt/Smoothie Cans or Servings Per Day:  1       Frequency:  Two Times a day (24 @ 08:31)    EN Order Provides: 1080ml, 1620kcal, 89g protein, 820ml free water.     Is current diet order appropriate/adequate? See recommendations below.     Nutrition-related concerns:  - Enteral feeds changed to bolus feeds 3/22 from continuous for episodes of hypoglycemia per notes.   - S/P L hemicrani for evacuation of large ICH, bone discarded (3-01)  - Cranioplasty planning  as per neurosurgery.  - S/P trach and PEG (3-08)  - Endo: HbA1c 5.7% (3/02/2024). Continues on NPH 12u q 6 hrs and insulin lispro sliding scale to aid in glycemic control. To note, pt on Prednisone, risk for elevated BG levels.   - Renal: S/p hx of renal transplant (); ordered for Tacrolimus. Fluctuating hyperkalemia noted, pt ordered for Lokelma   - ALLERGY: Shellfish noted and confirmed by family.     GI: Rectal tube remains in place since 3/18. Output: 200ml (3/26), 950ml (3/25), 350ml (3/24)     Weights:   Daily Weight in k.1 (-20), 57.8 (3-), 50.1 () **No additional weights to assess.   ** Weight fluctuating - Weight changes likely secondary to fluid shifts. RD to continue to monitor weight trends as able.     Drug Dosing Weight  Height (cm): 157.5 (08 Mar 2024 06:53)  Weight (kg): 50 (08 Mar 2024 06:53)  BMI (kg/m2): 20.2 (08 Mar 2024 06:53)  BSA (m2): 1.48 (08 Mar 2024 06:53)    MEDICATIONS  (STANDING):  amLODIPine   Tablet 10 milliGRAM(s) Oral daily  artificial  tears Solution 1 Drop(s) Both EYES every 4 hours  Biotene Dry Mouth Oral Rinse 5 milliLiter(s) Swish and Spit every 6 hours  brivaracetam Oral Solution 100 milliGRAM(s) Oral <User Schedule>  carvedilol 25 milliGRAM(s) Oral every 12 hours  cloNIDine Patch 0.1 mG/24Hr(s) 1 patch Transdermal every 7 days  heparin   Injectable 5000 Unit(s) SubCutaneous every 12 hours  insulin lispro (ADMELOG) corrective regimen sliding scale   SubCutaneous every 6 hours  lacosamide Solution 200 milliGRAM(s) Oral two times a day  nystatin    Suspension 785041 Unit(s) Swish and Swallow every 6 hours  pantoprazole   Suspension 40 milliGRAM(s) Oral two times a day  predniSONE   Tablet 5 milliGRAM(s) Oral daily  sodium zirconium cyclosilicate 10 Gram(s) Oral daily  tacrolimus    0.5 mG/mL Suspension 3 milliGRAM(s) Oral every 12 hours  trimethoprim   80 mG/sulfamethoxazole 400 mG 1 Tablet(s) Oral daily    MEDICATIONS  (PRN):  acetaminophen   Oral Liquid .. 650 milliGRAM(s) Oral every 6 hours PRN Temp greater or equal to 38C (100.4F), Mild Pain (1 - 3)  albuterol/ipratropium for Nebulization 3 milliLiter(s) Nebulizer every 6 hours PRN Shortness of Breath and/or Wheezing    Pertinent Labs:  @ 07:17: Na 139, BUN 59<H>, Cr 1.18, BG 68<L>, K+ 5.1, Phos 4.2, Mg 2.5, Alk Phos 94, ALT/SGPT 28, AST/SGOT 16, HbA1c --    A1C with Estimated Average Glucose Result: 5.7 % (24 @ 11:57)    Finger Sticks: CAPILLARY BLOOD GLUCOSE  POCT Blood Glucose.: 275 mg/dL (26 Mar 2024 11:34)  POCT Blood Glucose.: 78 mg/dL (26 Mar 2024 05:43)  POCT Blood Glucose.: 263 mg/dL (26 Mar 2024 00:01)  POCT Blood Glucose.: 224 mg/dL (25 Mar 2024 17:29)    Skin per nursing documentation: surgical incision left craniectomy, stage II sacral bilateral buttocks DTI present on admission   Edema: No peripheral edema noted per nursing flow sheets on 3/20    (Based on dosing wt 50kg):   Estimated Energy Needs: (30-35kcal/kg): 1500-1750kcal  Estimated Protein Needs: (1.4-1.8g protein/kg): 70-90g protein  Estimated Fluid Needs: defer to team    Previous Nutrition Diagnosis: Increased Nutrient Needs  Nutrition Diagnosis is: [x] ongoing  [] resolved [] not applicable     New Nutrition Diagnosis: [x] Not applicable    Nutrition Care Plan:  [x] In Progress  [] Achieved  [] Not applicable    Nutrition Interventions:     Education Provided:       [] Yes:  [x] No: not appropriate at this time     Recommendations:      1) Continue Glucerna 1.5 bolus feeds 270ml q6H as tolerated. Regimen provides (based on dosing wt 50kg): 1080ml, 1620kcal (32kcal/kg) and 89g protein (1.78g/kg protein), 820ml free water.    - Given persistently elevated K levels may consider change in feeds to Nepro bolus feeds of 240ml q6H. Provides (based on dosing wt 50kg): 960ml volume, 1728kcal (34kcal/kg), 78g protein(1.6g/kg protein), 698ml free water.    - Free water flushes per team.   2) Monitor GI tolerance. RD to remain available to adjust EN formulary, volume/rate PRN.   3) Consider Metamucil for stool bulking  4) Consider multivitamin and Vitamin C to help aid in wound and surgical healing.     Monitoring and Evaluation:   Continue to monitor nutritional intake, tolerance to diet prescription, weights, labs, skin integrity    RD remains available upon request and will follow up per protocol    Jessica Ahmadi MS, RD, CDN, CNSC; available via MS Teams

## 2024-03-26 NOTE — PROGRESS NOTE ADULT - SUBJECTIVE AND OBJECTIVE BOX
DATE OF SERVICE: 03-26-24 @ 15:50    Patient is a 78y old  Female who presents with a chief complaint of ICH (26 Mar 2024 08:24)      INTERVAL HISTORY: In no acute distress.     REVIEW OF SYSTEMS: Unable to participate in ROS  CONSTITUTIONAL: No weakness  EYES/ENT: No visual changes;  No throat pain   NECK: No pain or stiffness  RESPIRATORY: No cough, wheezing; No shortness of breath  CARDIOVASCULAR: No chest pain or palpitations  GASTROINTESTINAL: No abdominal  pain. No nausea, vomiting, or hematemesis  GENITOURINARY: No dysuria, frequency or hematuria  NEUROLOGICAL: No stroke like symptoms  SKIN: No rashes      	  MEDICATIONS:  amLODIPine   Tablet 10 milliGRAM(s) Oral daily  carvedilol 25 milliGRAM(s) Oral every 12 hours  cloNIDine Patch 0.1 mG/24Hr(s) 1 patch Transdermal every 7 days        PHYSICAL EXAM:  T(C): 35.2 (03-26-24 @ 13:35), Max: 37.1 (03-25-24 @ 18:00)  HR: 73 (03-26-24 @ 11:57) (63 - 93)  BP: 136/60 (03-26-24 @ 11:57) (111/54 - 153/86)  RR: 28 (03-26-24 @ 10:04) (19 - 28)  SpO2: 100% (03-26-24 @ 10:04) (98% - 100%)  Wt(kg): --  I&O's Summary    25 Mar 2024 07:01  -  26 Mar 2024 07:00  --------------------------------------------------------  IN: 640 mL / OUT: 2200 mL / NET: -1560 mL    26 Mar 2024 07:01  -  26 Mar 2024 15:50  --------------------------------------------------------  IN: 0 mL / OUT: 550 mL / NET: -550 mL          Appearance: In no distress	  HEENT:    PERRL, EOMI	  Cardiovascular:  S1 S2, No JVD  Respiratory: Lungs clear to auscultation	  Gastrointestinal:  Soft, Non-tender, + BS	  Vascularature:  No edema of LE  Psychiatric: Appropriate affect   Neuro: no acute focal deficits                               10.0   5.90  )-----------( 166      ( 25 Mar 2024 12:39 )             32.3     03-26    139  |  105  |  59<H>  ----------------------------<  68<L>  5.1   |  19<L>  |  1.18    Ca    10.6<H>      26 Mar 2024 07:17  Phos  4.2     03-26  Mg     2.5     03-26    TPro  7.0  /  Alb  3.4  /  TBili  0.2  /  DBili  x   /  AST  16  /  ALT  28  /  AlkPhos  94  03-26        Labs personally reviewed      ASSESSMENT/PLAN: 	    78F Hx ESRD s/p renal xplant c/b DGF off HD, L AKA, 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    1. Abnormal Echo --  Septal bulge noted measures 2.2cm without obstruction.   -- Given no intracavitary obstruction no intervention at this time  - No Myxoma seen on this echo or echo in 2019, ? if hypermobile interatrial septum was confused for on prior imaging     2. HTN - uncontrolled, needs improvement   Continue clonidine patch, Coreg 25 mg BID, amlodipine 10mg    3. 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 DVT PPX - HSQ          Yodit Umaña, AG-NP   Celio Mcwilliams DO [facility]  Cardiovascular Medicine  [address], [address]  Available through call or text on [employer] TEAMs  Office: [phone]

## 2024-03-26 NOTE — PROGRESS NOTE ADULT - PROBLEM SELECTOR PLAN 5
- SBP goal:   - Clonidine 0.2 mg TID, Coreg 25 mg BID, Amlodipine 5mg daily  - Echo: LVEF 70-75%, Grade II diastolic dysfunction, septal bulge without obstruction  - 3/25 added clonidine patch - titrate off clonidine PO   - cardiology following - SBP goal:   - Coreg 25 mg BID, Amlodipine 10mg daily  - Echo: LVEF 70-75%, Grade II diastolic dysfunction, septal bulge without obstruction  - 3/25 added clonidine patch - titrated off clonidine PO   - cardiology following

## 2024-03-27 LAB
ALBUMIN SERPL ELPH-MCNC: 3.5 G/DL — SIGNIFICANT CHANGE UP (ref 3.3–5)
ALP SERPL-CCNC: 101 U/L — SIGNIFICANT CHANGE UP (ref 40–120)
ALT FLD-CCNC: 26 U/L — SIGNIFICANT CHANGE UP (ref 10–45)
ANION GAP SERPL CALC-SCNC: 14 MMOL/L — SIGNIFICANT CHANGE UP (ref 5–17)
AST SERPL-CCNC: 16 U/L — SIGNIFICANT CHANGE UP (ref 10–40)
BILIRUB SERPL-MCNC: 0.2 MG/DL — SIGNIFICANT CHANGE UP (ref 0.2–1.2)
BUN SERPL-MCNC: 66 MG/DL — HIGH (ref 7–23)
CALCIUM SERPL-MCNC: 10.4 MG/DL — SIGNIFICANT CHANGE UP (ref 8.4–10.5)
CHLORIDE SERPL-SCNC: 103 MMOL/L — SIGNIFICANT CHANGE UP (ref 96–108)
CO2 SERPL-SCNC: 21 MMOL/L — LOW (ref 22–31)
CREAT SERPL-MCNC: 1.26 MG/DL — SIGNIFICANT CHANGE UP (ref 0.5–1.3)
EGFR: 43 ML/MIN/1.73M2 — LOW
GLUCOSE BLDC GLUCOMTR-MCNC: 111 MG/DL — HIGH (ref 70–99)
GLUCOSE BLDC GLUCOMTR-MCNC: 111 MG/DL — HIGH (ref 70–99)
GLUCOSE BLDC GLUCOMTR-MCNC: 192 MG/DL — HIGH (ref 70–99)
GLUCOSE BLDC GLUCOMTR-MCNC: 302 MG/DL — HIGH (ref 70–99)
GLUCOSE SERPL-MCNC: 184 MG/DL — HIGH (ref 70–99)
HCT VFR BLD CALC: 29.9 % — LOW (ref 34.5–45)
HGB BLD-MCNC: 9.4 G/DL — LOW (ref 11.5–15.5)
MAGNESIUM SERPL-MCNC: 2.7 MG/DL — HIGH (ref 1.6–2.6)
MCHC RBC-ENTMCNC: 29.5 PG — SIGNIFICANT CHANGE UP (ref 27–34)
MCHC RBC-ENTMCNC: 31.4 GM/DL — LOW (ref 32–36)
MCV RBC AUTO: 93.7 FL — SIGNIFICANT CHANGE UP (ref 80–100)
NRBC # BLD: 0 /100 WBCS — SIGNIFICANT CHANGE UP (ref 0–0)
PHOSPHATE SERPL-MCNC: 3.9 MG/DL — SIGNIFICANT CHANGE UP (ref 2.5–4.5)
PLATELET # BLD AUTO: 206 K/UL — SIGNIFICANT CHANGE UP (ref 150–400)
POTASSIUM SERPL-MCNC: 5.5 MMOL/L — HIGH (ref 3.5–5.3)
POTASSIUM SERPL-SCNC: 5.5 MMOL/L — HIGH (ref 3.5–5.3)
PROT SERPL-MCNC: 6.8 G/DL — SIGNIFICANT CHANGE UP (ref 6–8.3)
RBC # BLD: 3.19 M/UL — LOW (ref 3.8–5.2)
RBC # FLD: 16.7 % — HIGH (ref 10.3–14.5)
SODIUM SERPL-SCNC: 138 MMOL/L — SIGNIFICANT CHANGE UP (ref 135–145)
TACROLIMUS SERPL-MCNC: 6.1 NG/ML — SIGNIFICANT CHANGE UP
WBC # BLD: 5.42 K/UL — SIGNIFICANT CHANGE UP (ref 3.8–10.5)
WBC # FLD AUTO: 5.42 K/UL — SIGNIFICANT CHANGE UP (ref 3.8–10.5)

## 2024-03-27 PROCEDURE — 99232 SBSQ HOSP IP/OBS MODERATE 35: CPT | Mod: FS

## 2024-03-27 RX ORDER — INSULIN GLARGINE 100 [IU]/ML
10 INJECTION, SOLUTION SUBCUTANEOUS AT BEDTIME
Refills: 0 | Status: DISCONTINUED | OUTPATIENT
Start: 2024-03-27 | End: 2024-03-31

## 2024-03-27 RX ORDER — SODIUM ZIRCONIUM CYCLOSILICATE 10 G/10G
5 POWDER, FOR SUSPENSION ORAL ONCE
Refills: 0 | Status: COMPLETED | OUTPATIENT
Start: 2024-03-27 | End: 2024-03-27

## 2024-03-27 RX ORDER — INSULIN LISPRO 100/ML
VIAL (ML) SUBCUTANEOUS
Refills: 0 | Status: DISCONTINUED | OUTPATIENT
Start: 2024-03-27 | End: 2024-04-02

## 2024-03-27 RX ADMIN — CARVEDILOL PHOSPHATE 25 MILLIGRAM(S): 80 CAPSULE, EXTENDED RELEASE ORAL at 17:48

## 2024-03-27 RX ADMIN — HEPARIN SODIUM 5000 UNIT(S): 5000 INJECTION INTRAVENOUS; SUBCUTANEOUS at 17:48

## 2024-03-27 RX ADMIN — SODIUM ZIRCONIUM CYCLOSILICATE 10 GRAM(S): 10 POWDER, FOR SUSPENSION ORAL at 12:28

## 2024-03-27 RX ADMIN — Medication 5 MILLILITER(S): at 12:26

## 2024-03-27 RX ADMIN — SODIUM ZIRCONIUM CYCLOSILICATE 5 GRAM(S): 10 POWDER, FOR SUSPENSION ORAL at 15:07

## 2024-03-27 RX ADMIN — Medication 1 PATCH: at 00:30

## 2024-03-27 RX ADMIN — Medication 1 DROP(S): at 05:29

## 2024-03-27 RX ADMIN — CARVEDILOL PHOSPHATE 25 MILLIGRAM(S): 80 CAPSULE, EXTENDED RELEASE ORAL at 05:28

## 2024-03-27 RX ADMIN — Medication 1 DROP(S): at 10:30

## 2024-03-27 RX ADMIN — Medication 1 DROP(S): at 21:56

## 2024-03-27 RX ADMIN — LACOSAMIDE 200 MILLIGRAM(S): 50 TABLET ORAL at 05:28

## 2024-03-27 RX ADMIN — BRIVARACETAM 100 MILLIGRAM(S): 25 TABLET, FILM COATED ORAL at 15:27

## 2024-03-27 RX ADMIN — AMLODIPINE BESYLATE 10 MILLIGRAM(S): 2.5 TABLET ORAL at 05:28

## 2024-03-27 RX ADMIN — Medication 5 MILLILITER(S): at 23:38

## 2024-03-27 RX ADMIN — Medication 2: at 05:47

## 2024-03-27 RX ADMIN — Medication 1 PATCH: at 23:31

## 2024-03-27 RX ADMIN — PANTOPRAZOLE SODIUM 40 MILLIGRAM(S): 20 TABLET, DELAYED RELEASE ORAL at 17:47

## 2024-03-27 RX ADMIN — Medication 5 MILLILITER(S): at 05:28

## 2024-03-27 RX ADMIN — Medication 500000 UNIT(S): at 17:55

## 2024-03-27 RX ADMIN — Medication 1 DROP(S): at 01:30

## 2024-03-27 RX ADMIN — HEPARIN SODIUM 5000 UNIT(S): 5000 INJECTION INTRAVENOUS; SUBCUTANEOUS at 05:28

## 2024-03-27 RX ADMIN — Medication 1 DROP(S): at 18:01

## 2024-03-27 RX ADMIN — Medication 500000 UNIT(S): at 12:28

## 2024-03-27 RX ADMIN — BRIVARACETAM 100 MILLIGRAM(S): 25 TABLET, FILM COATED ORAL at 21:56

## 2024-03-27 RX ADMIN — Medication 5 MILLIGRAM(S): at 05:29

## 2024-03-27 RX ADMIN — Medication 1 DROP(S): at 15:07

## 2024-03-27 RX ADMIN — TACROLIMUS 3 MILLIGRAM(S): 5 CAPSULE ORAL at 17:47

## 2024-03-27 RX ADMIN — PANTOPRAZOLE SODIUM 40 MILLIGRAM(S): 20 TABLET, DELAYED RELEASE ORAL at 05:28

## 2024-03-27 RX ADMIN — LACOSAMIDE 200 MILLIGRAM(S): 50 TABLET ORAL at 17:47

## 2024-03-27 RX ADMIN — TACROLIMUS 3 MILLIGRAM(S): 5 CAPSULE ORAL at 05:46

## 2024-03-27 RX ADMIN — Medication 8: at 12:27

## 2024-03-27 RX ADMIN — Medication 5 MILLILITER(S): at 17:47

## 2024-03-27 RX ADMIN — Medication 500000 UNIT(S): at 05:28

## 2024-03-27 RX ADMIN — BRIVARACETAM 100 MILLIGRAM(S): 25 TABLET, FILM COATED ORAL at 05:29

## 2024-03-27 RX ADMIN — Medication 1 TABLET(S): at 12:28

## 2024-03-27 RX ADMIN — INSULIN GLARGINE 10 UNIT(S): 100 INJECTION, SOLUTION SUBCUTANEOUS at 21:56

## 2024-03-27 RX ADMIN — Medication 500000 UNIT(S): at 23:38

## 2024-03-27 NOTE — PROGRESS NOTE ADULT - PROBLEM SELECTOR PLAN 2
- S/p trach 3/8 by surgery by Dr. Joselo Mayorga   - Tracheostomy Sutures removed 3/13  - Patient weaned to tc ATC since 3/23  - Will maintain cuffed trach until cranioplasty   - Continue airway clearance; Duoneb q6h PRN

## 2024-03-27 NOTE — PROGRESS NOTE ADULT - SUBJECTIVE AND OBJECTIVE BOX
Patient is a 78y old  Female who presents with a chief complaint of ICH (26 Mar 2024 15:50)      Interval Events:    REVIEW OF SYSTEMS:  [ ] Positive  [ ] All other systems negative  [ ] Unable to assess ROS because ________    Vital Signs Last 24 Hrs  T(C): 36.5 (03-27-24 @ 04:13), Max: 36.9 (03-26-24 @ 23:38)  T(F): 97.7 (03-27-24 @ 04:13), Max: 98.5 (03-26-24 @ 23:38)  HR: 79 (03-27-24 @ 08:28) (66 - 99)  BP: 165/96 (03-27-24 @ 04:13) (111/54 - 165/96)  RR: 17 (03-27-24 @ 08:28) (17 - 28)  SpO2: 100% (03-27-24 @ 08:28) (97% - 100%)    PHYSICAL EXAM:  HEENT:   [ ]Tracheostomy:  [ ]Pupils equal  [ ]No oral lesions  [ ]Abnormal    SKIN  [ ]No Rash  [ ] Abnormal  [ ] pressure    CARDIAC  [ ]Regular  [ ]Abnormal    PULMONARY  [ ]Bilateral Clear Breath Sounds  [ ]Normal Excursion  [ ]Abnormal    GI  [ ]PEG      [ ] +BS		              [ ]Soft, nondistended, nontender	  [ ]Abnormal    MUSCULOSKELETAL                                   [ ]Bedbound                 [ ]Abnormal    [ ]Ambulatory/OOB to chair                           EXTREMITIES                                         [ ]Normal  [ ]Edema                           NEUROLOGIC  [ ] Normal, non focal  [ ] Focal findings:    PSYCHIATRIC  [ ]Alert and appropriate  [ ] Sedated	 [ ]Agitated    :  Gardner: [ ] Yes, if yes: Date of Placement:                   [  ] No    LINES: Central Lines [ ] Yes, if yes: Date of Placement                                     [  ] No    HOSPITAL MEDICATIONS:  MEDICATIONS  (STANDING):  amLODIPine   Tablet 10 milliGRAM(s) Oral daily  artificial  tears Solution 1 Drop(s) Both EYES every 4 hours  Biotene Dry Mouth Oral Rinse 5 milliLiter(s) Swish and Spit every 6 hours  brivaracetam Oral Solution 100 milliGRAM(s) Oral <User Schedule>  carvedilol 25 milliGRAM(s) Oral every 12 hours  cloNIDine Patch 0.1 mG/24Hr(s) 1 patch Transdermal every 7 days  heparin   Injectable 5000 Unit(s) SubCutaneous every 12 hours  insulin lispro (ADMELOG) corrective regimen sliding scale   SubCutaneous every 6 hours  lacosamide Solution 200 milliGRAM(s) Oral two times a day  nystatin    Suspension 404593 Unit(s) Swish and Swallow every 6 hours  pantoprazole   Suspension 40 milliGRAM(s) Oral two times a day  predniSONE   Tablet 5 milliGRAM(s) Oral daily  sodium zirconium cyclosilicate 10 Gram(s) Oral daily  tacrolimus    0.5 mG/mL Suspension 3 milliGRAM(s) Oral every 12 hours  trimethoprim   80 mG/sulfamethoxazole 400 mG 1 Tablet(s) Oral daily    MEDICATIONS  (PRN):  acetaminophen   Oral Liquid .. 650 milliGRAM(s) Oral every 6 hours PRN Temp greater or equal to 38C (100.4F), Mild Pain (1 - 3)  albuterol/ipratropium for Nebulization 3 milliLiter(s) Nebulizer every 6 hours PRN Shortness of Breath and/or Wheezing      LABS:                        10.0   5.90  )-----------( 166      ( 25 Mar 2024 12:39 )             32.3     03-27    138  |  103  |  66<H>  ----------------------------<  184<H>  5.5<H>   |  21<L>  |  1.26    Ca    10.4      27 Mar 2024 06:40  Phos  3.9     03-27  Mg     2.7     03-27    TPro  6.8  /  Alb  3.5  /  TBili  0.2  /  DBili  x   /  AST  16  /  ALT  26  /  AlkPhos  101  03-27      Urinalysis Basic - ( 27 Mar 2024 06:40 )    Color: x / Appearance: x / SG: x / pH: x  Gluc: 184 mg/dL / Ketone: x  / Bili: x / Urobili: x   Blood: x / Protein: x / Nitrite: x   Leuk Esterase: x / RBC: x / WBC x   Sq Epi: x / Non Sq Epi: x / Bacteria: x          CAPILLARY BLOOD GLUCOSE    MICROBIOLOGY:     RADIOLOGY:  [ ] Reviewed and interpreted by me     Patient is a 78y old  Female who presents with a chief complaint of ICH (26 Mar 2024 15:50)      Interval Events: Patient with hypothermia 96.7 this morning required Bear hugger     REVIEW OF SYSTEMS:  [ ] Positive  [ ] All other systems negative  [x] Unable to assess ROS because patient not answering verbal questioning     Vital Signs Last 24 Hrs  T(C): 36.5 (03-27-24 @ 04:13), Max: 36.9 (03-26-24 @ 23:38)  T(F): 97.7 (03-27-24 @ 04:13), Max: 98.5 (03-26-24 @ 23:38)  HR: 79 (03-27-24 @ 08:28) (66 - 99)  BP: 165/96 (03-27-24 @ 04:13) (111/54 - 165/96)  RR: 17 (03-27-24 @ 08:28) (17 - 28)  SpO2: 100% (03-27-24 @ 08:28) (97% - 100%)    PHYSICAL EXAM:  HEENT:  [X]Tracheostomy: #7 cuffed portex  [X]Pupils equal  [ ]No oral lesions  [X]Abnormal: left skull with mild depression, incision site c/d/i    SKIN  [ ]No Rash  [X] Abnormal - left temporal incision s/p craniectomy site c/d/i, L BKA site c/d/i  [X] pressure - sacral DTI    CARDIAC  [X]Regular  [ ]Abnormal    PULMONARY  [X]Bilateral Clear Breath Sounds  [ ]Normal Excursion  [ ]Abnormal    GI  [X]PEG      [X] +BS		              [X]Soft, nondistended, nontender	  [X]Abnormal - rectal tube    MUSCULOSKELETAL                                   [X]Bedbound                 [X]Abnormal - L BKA  [ ]Ambulatory/OOB to chair                           EXTREMITIES                                         [X]Normal  [ ]Edema                           NEUROLOGIC  [ ] Normal, non focal  [X] Focal findings: opens eyes to sternal rub, withdraws from pain on all extremities, Spontaneously moving Left UE     PSYCHIATRIC  [X] Alert  [ ] Sedated	 [ ]Agitated    :  Gardner: [ ] Yes, if yes: Date of Placement:                   [X] No    LINES: Central Lines [ ] Yes, if yes: Date of Placement                                     [X] No    HOSPITAL MEDICATIONS:  MEDICATIONS  (STANDING):  amLODIPine   Tablet 10 milliGRAM(s) Oral daily  artificial  tears Solution 1 Drop(s) Both EYES every 4 hours  Biotene Dry Mouth Oral Rinse 5 milliLiter(s) Swish and Spit every 6 hours  brivaracetam Oral Solution 100 milliGRAM(s) Oral <User Schedule>  carvedilol 25 milliGRAM(s) Oral every 12 hours  cloNIDine Patch 0.1 mG/24Hr(s) 1 patch Transdermal every 7 days  heparin   Injectable 5000 Unit(s) SubCutaneous every 12 hours  insulin lispro (ADMELOG) corrective regimen sliding scale   SubCutaneous every 6 hours  lacosamide Solution 200 milliGRAM(s) Oral two times a day  nystatin    Suspension 089024 Unit(s) Swish and Swallow every 6 hours  pantoprazole   Suspension 40 milliGRAM(s) Oral two times a day  predniSONE   Tablet 5 milliGRAM(s) Oral daily  sodium zirconium cyclosilicate 10 Gram(s) Oral daily  tacrolimus    0.5 mG/mL Suspension 3 milliGRAM(s) Oral every 12 hours  trimethoprim   80 mG/sulfamethoxazole 400 mG 1 Tablet(s) Oral daily    MEDICATIONS  (PRN):  acetaminophen   Oral Liquid .. 650 milliGRAM(s) Oral every 6 hours PRN Temp greater or equal to 38C (100.4F), Mild Pain (1 - 3)  albuterol/ipratropium for Nebulization 3 milliLiter(s) Nebulizer every 6 hours PRN Shortness of Breath and/or Wheezing      LABS:                        10.0   5.90  )-----------( 166      ( 25 Mar 2024 12:39 )             32.3     03-27    138  |  103  |  66<H>  ----------------------------<  184<H>  5.5<H>   |  21<L>  |  1.26    Ca    10.4      27 Mar 2024 06:40  Phos  3.9     03-27  Mg     2.7     03-27    TPro  6.8  /  Alb  3.5  /  TBili  0.2  /  DBili  x   /  AST  16  /  ALT  26  /  AlkPhos  101  03-27      Urinalysis Basic - ( 27 Mar 2024 06:40 )    Color: x / Appearance: x / SG: x / pH: x  Gluc: 184 mg/dL / Ketone: x  / Bili: x / Urobili: x   Blood: x / Protein: x / Nitrite: x   Leuk Esterase: x / RBC: x / WBC x   Sq Epi: x / Non Sq Epi: x / Bacteria: x          CAPILLARY BLOOD GLUCOSE    MICROBIOLOGY:     RADIOLOGY:  [ ] Reviewed and interpreted by me

## 2024-03-27 NOTE — PROGRESS NOTE ADULT - PROBLEM SELECTOR PLAN 6
- AVF in the left upper extremity  - s/p renal transplant in 2019  - prednisone 5mg, bactrim ppx  - tacro 3 mg BID as per transplant nephro  - tacro goal 4-7, daily tacro levels 30 mins prior to dose administration

## 2024-03-27 NOTE — PROGRESS NOTE ADULT - NS ATTEND AMEND GEN_ALL_CORE FT
Agree with above  78F ESRD s/p renal transplant p/w IPH s/p L cranie with poor mental status, now s/p trache / PEG  - No acute events overnight  - Now on clonidine patch, titating off PO. Also c/w Coreg / amlodipine  - Na 138, titrated salt tabs down, renal function stable  - c/w tacro + prednisone for transplant  - Trache to TC @40%, doing well  - DVT ppx: Hep SC, s/p IVC filter  - Dispo: discharge pending cranioplasty scheduled 4/2.

## 2024-03-27 NOTE — PROGRESS NOTE ADULT - PROBLEM SELECTOR PLAN 4
- + UA on 3/10  - Urine Cx 3/10: ESBL Klebsiella and VRE 10-49 K   - Treated with Meropenem 1GM Q12H 3/12 --> 3/19  - Hypothermic this morning; Bld cxs sent 3/26 ( Results pending)

## 2024-03-27 NOTE — PROGRESS NOTE ADULT - PROBLEM SELECTOR PLAN 5
- SBP goal:   - Coreg 25 mg BID, Amlodipine 10mg daily  - Transitioned off Enteral Clonidine; Now on Clonidine patch   - Echo: LVEF 70-75%, Grade II diastolic dysfunction, septal bulge without obstruction  - cardiology following

## 2024-03-27 NOTE — PROGRESS NOTE ADULT - ASSESSMENT
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 She is s/p trach/peg 3/8/24.  Febrile 3/10 with + UA, blood/sputum culture NGTD.  Treatment for UTI initiated with ceftriaxone, eventually broadened to cefepime.  Transferred to RCU 3/11 for continued management.  Pt arrived from NSCU with minimal mental status with only response of spontaneous eye opening and withdrawal on RLE.  Urine culture resulted - positive for klebsiella, treated for 7 days with Meropenem 3/12 --> 3/19.  Cranioplasty planning 4/2 as per neurosurgery.  Continuing weaning from ventilator as tolerated.  Continuing to monitor for neurological improvement.       3/27: Patient hypothermic this morning improved with bear hugger. Blood cxs sent 3/26 ( Results pending). Patient with hyperkalemia remains on standing Lokelma as per transplant team additional dose given this afternoon. Paitent with hyperglycemia evening Lantus added.

## 2024-03-27 NOTE — PROGRESS NOTE ADULT - PROBLEM SELECTOR PLAN 8
- S/p PEG 3/8  - tolerating tube feeds    [] H. 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 Bismuth may turn her stool black. After completing treatment would cont once daily PPI for ppx. Pending clinical course, consider repeat stool Ag in 8 weeks to confirm clearance

## 2024-03-27 NOTE — PROGRESS NOTE ADULT - SUBJECTIVE AND OBJECTIVE BOX
DATE OF SERVICE: 03-27-24 @ 15:21    Patient is a 78y old  Female who presents with a chief complaint of ICH (27 Mar 2024 09:49)      INTERVAL HISTORY: In no acute distress.     REVIEW OF SYSTEMS: Unable to participate in ROS  CONSTITUTIONAL: No weakness  EYES/ENT: No visual changes;  No throat pain   NECK: No pain or stiffness  RESPIRATORY: No cough, wheezing; No shortness of breath  CARDIOVASCULAR: No chest pain or palpitations  GASTROINTESTINAL: No abdominal  pain. No nausea, vomiting, or hematemesis  GENITOURINARY: No dysuria, frequency or hematuria  NEUROLOGICAL: No stroke like symptoms  SKIN: No rashes    	  MEDICATIONS:  amLODIPine   Tablet 10 milliGRAM(s) Oral daily  carvedilol 25 milliGRAM(s) Oral every 12 hours  cloNIDine Patch 0.1 mG/24Hr(s) 1 patch Transdermal every 7 days        PHYSICAL EXAM:  T(C): 36.5 (03-27-24 @ 14:05), Max: 36.9 (03-26-24 @ 23:38)  HR: 87 (03-27-24 @ 14:45) (77 - 99)  BP: 132/65 (03-27-24 @ 14:05) (123/53 - 165/96)  RR: 20 (03-27-24 @ 14:45) (17 - 23)  SpO2: 97% (03-27-24 @ 14:45) (97% - 100%)  Wt(kg): --  I&O's Summary    26 Mar 2024 07:01  -  27 Mar 2024 07:00  --------------------------------------------------------  IN: 1425 mL / OUT: 1300 mL / NET: 125 mL          Appearance: In no distress	  HEENT:    PERRL, EOMI	  Cardiovascular:  S1 S2, No JVD  Respiratory: Lungs clear to auscultation	  Gastrointestinal:  Soft, Non-tender, + BS	  Vascularature:  No edema of LE  Psychiatric: Appropriate affect   Neuro: no acute focal deficits                               9.4    5.42  )-----------( 206      ( 27 Mar 2024 13:45 )             29.9     03-27    138  |  103  |  66<H>  ----------------------------<  184<H>  5.5<H>   |  21<L>  |  1.26    Ca    10.4      27 Mar 2024 06:40  Phos  3.9     03-27  Mg     2.7     03-27    TPro  6.8  /  Alb  3.5  /  TBili  0.2  /  DBili  x   /  AST  16  /  ALT  26  /  AlkPhos  101  03-27        Labs personally reviewed      ASSESSMENT/PLAN: 	      78F Hx ESRD s/p renal xplant c/b DGF off HD, L AKA, 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    1. Abnormal Echo --  Septal bulge noted measures 2.2cm without obstruction.   -- Given no intracavitary obstruction no intervention at this time  - No Myxoma seen on this echo or echo in 2019, ? if hypermobile interatrial septum was confused for on prior imaging     2. HTN - uncontrolled, needs improvement   Continue clonidine patch, Coreg 25 mg BID, amlodipine 10mg    3. 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 DVT PPX - HSQ      Yodit Umaña, MARGARITA-NP   Celio Mcwilliams DO [facility]  Cardiovascular Medicine  [address], [address]  Available through call or text on [employer]  Office: [phone]

## 2024-03-28 DIAGNOSIS — T14.8XXA OTHER INJURY OF UNSPECIFIED BODY REGION, INITIAL ENCOUNTER: ICD-10-CM

## 2024-03-28 LAB
ALBUMIN SERPL ELPH-MCNC: 3.5 G/DL — SIGNIFICANT CHANGE UP (ref 3.3–5)
ALP SERPL-CCNC: 97 U/L — SIGNIFICANT CHANGE UP (ref 40–120)
ALT FLD-CCNC: 30 U/L — SIGNIFICANT CHANGE UP (ref 10–45)
ANION GAP SERPL CALC-SCNC: 14 MMOL/L — SIGNIFICANT CHANGE UP (ref 5–17)
AST SERPL-CCNC: 19 U/L — SIGNIFICANT CHANGE UP (ref 10–40)
BILIRUB SERPL-MCNC: 0.2 MG/DL — SIGNIFICANT CHANGE UP (ref 0.2–1.2)
BUN SERPL-MCNC: 66 MG/DL — HIGH (ref 7–23)
CALCIUM SERPL-MCNC: 10.4 MG/DL — SIGNIFICANT CHANGE UP (ref 8.4–10.5)
CHLORIDE SERPL-SCNC: 104 MMOL/L — SIGNIFICANT CHANGE UP (ref 96–108)
CO2 SERPL-SCNC: 21 MMOL/L — LOW (ref 22–31)
CREAT SERPL-MCNC: 1.3 MG/DL — SIGNIFICANT CHANGE UP (ref 0.5–1.3)
EGFR: 42 ML/MIN/1.73M2 — LOW
GLUCOSE BLDC GLUCOMTR-MCNC: 143 MG/DL — HIGH (ref 70–99)
GLUCOSE BLDC GLUCOMTR-MCNC: 205 MG/DL — HIGH (ref 70–99)
GLUCOSE BLDC GLUCOMTR-MCNC: 246 MG/DL — HIGH (ref 70–99)
GLUCOSE BLDC GLUCOMTR-MCNC: 254 MG/DL — HIGH (ref 70–99)
GLUCOSE SERPL-MCNC: 193 MG/DL — HIGH (ref 70–99)
HCT VFR BLD CALC: 29.6 % — LOW (ref 34.5–45)
HGB BLD-MCNC: 8.9 G/DL — LOW (ref 11.5–15.5)
MAGNESIUM SERPL-MCNC: 2.7 MG/DL — HIGH (ref 1.6–2.6)
MCHC RBC-ENTMCNC: 29.1 PG — SIGNIFICANT CHANGE UP (ref 27–34)
MCHC RBC-ENTMCNC: 30.1 GM/DL — LOW (ref 32–36)
MCV RBC AUTO: 96.7 FL — SIGNIFICANT CHANGE UP (ref 80–100)
NRBC # BLD: 0 /100 WBCS — SIGNIFICANT CHANGE UP (ref 0–0)
PHOSPHATE SERPL-MCNC: 4.1 MG/DL — SIGNIFICANT CHANGE UP (ref 2.5–4.5)
PLATELET # BLD AUTO: 212 K/UL — SIGNIFICANT CHANGE UP (ref 150–400)
POTASSIUM SERPL-MCNC: 5.1 MMOL/L — SIGNIFICANT CHANGE UP (ref 3.5–5.3)
POTASSIUM SERPL-SCNC: 5.1 MMOL/L — SIGNIFICANT CHANGE UP (ref 3.5–5.3)
PROT SERPL-MCNC: 6.8 G/DL — SIGNIFICANT CHANGE UP (ref 6–8.3)
RBC # BLD: 3.06 M/UL — LOW (ref 3.8–5.2)
RBC # FLD: 17.2 % — HIGH (ref 10.3–14.5)
SODIUM SERPL-SCNC: 139 MMOL/L — SIGNIFICANT CHANGE UP (ref 135–145)
TACROLIMUS SERPL-MCNC: 5.2 NG/ML — SIGNIFICANT CHANGE UP
WBC # BLD: 6 K/UL — SIGNIFICANT CHANGE UP (ref 3.8–10.5)
WBC # FLD AUTO: 6 K/UL — SIGNIFICANT CHANGE UP (ref 3.8–10.5)

## 2024-03-28 PROCEDURE — 99233 SBSQ HOSP IP/OBS HIGH 50: CPT

## 2024-03-28 PROCEDURE — 99232 SBSQ HOSP IP/OBS MODERATE 35: CPT | Mod: FS

## 2024-03-28 RX ADMIN — LACOSAMIDE 200 MILLIGRAM(S): 50 TABLET ORAL at 05:25

## 2024-03-28 RX ADMIN — Medication 6: at 08:28

## 2024-03-28 RX ADMIN — Medication 5 MILLILITER(S): at 12:11

## 2024-03-28 RX ADMIN — Medication 500000 UNIT(S): at 12:11

## 2024-03-28 RX ADMIN — TACROLIMUS 3 MILLIGRAM(S): 5 CAPSULE ORAL at 17:34

## 2024-03-28 RX ADMIN — Medication 1 DROP(S): at 21:20

## 2024-03-28 RX ADMIN — Medication 1 DROP(S): at 05:22

## 2024-03-28 RX ADMIN — SODIUM ZIRCONIUM CYCLOSILICATE 10 GRAM(S): 10 POWDER, FOR SUSPENSION ORAL at 12:11

## 2024-03-28 RX ADMIN — Medication 5 MILLIGRAM(S): at 05:23

## 2024-03-28 RX ADMIN — TACROLIMUS 3 MILLIGRAM(S): 5 CAPSULE ORAL at 05:23

## 2024-03-28 RX ADMIN — Medication 5 MILLILITER(S): at 17:24

## 2024-03-28 RX ADMIN — BRIVARACETAM 100 MILLIGRAM(S): 25 TABLET, FILM COATED ORAL at 21:20

## 2024-03-28 RX ADMIN — Medication 1 DROP(S): at 17:34

## 2024-03-28 RX ADMIN — Medication 500000 UNIT(S): at 23:29

## 2024-03-28 RX ADMIN — LACOSAMIDE 200 MILLIGRAM(S): 50 TABLET ORAL at 17:24

## 2024-03-28 RX ADMIN — Medication 500000 UNIT(S): at 17:24

## 2024-03-28 RX ADMIN — Medication 1 TABLET(S): at 12:11

## 2024-03-28 RX ADMIN — Medication 1 DROP(S): at 02:11

## 2024-03-28 RX ADMIN — BRIVARACETAM 100 MILLIGRAM(S): 25 TABLET, FILM COATED ORAL at 05:23

## 2024-03-28 RX ADMIN — PANTOPRAZOLE SODIUM 40 MILLIGRAM(S): 20 TABLET, DELAYED RELEASE ORAL at 05:23

## 2024-03-28 RX ADMIN — Medication 1 DROP(S): at 09:43

## 2024-03-28 RX ADMIN — AMLODIPINE BESYLATE 10 MILLIGRAM(S): 2.5 TABLET ORAL at 05:23

## 2024-03-28 RX ADMIN — Medication 5 MILLILITER(S): at 05:22

## 2024-03-28 RX ADMIN — Medication 4: at 17:29

## 2024-03-28 RX ADMIN — CARVEDILOL PHOSPHATE 25 MILLIGRAM(S): 80 CAPSULE, EXTENDED RELEASE ORAL at 05:23

## 2024-03-28 RX ADMIN — Medication 1 DROP(S): at 13:26

## 2024-03-28 RX ADMIN — Medication 1 PATCH: at 10:18

## 2024-03-28 RX ADMIN — Medication 500000 UNIT(S): at 05:22

## 2024-03-28 RX ADMIN — BRIVARACETAM 100 MILLIGRAM(S): 25 TABLET, FILM COATED ORAL at 13:26

## 2024-03-28 RX ADMIN — HEPARIN SODIUM 5000 UNIT(S): 5000 INJECTION INTRAVENOUS; SUBCUTANEOUS at 05:22

## 2024-03-28 RX ADMIN — Medication 1 PATCH: at 19:51

## 2024-03-28 RX ADMIN — INSULIN GLARGINE 10 UNIT(S): 100 INJECTION, SOLUTION SUBCUTANEOUS at 23:30

## 2024-03-28 RX ADMIN — PANTOPRAZOLE SODIUM 40 MILLIGRAM(S): 20 TABLET, DELAYED RELEASE ORAL at 17:24

## 2024-03-28 RX ADMIN — HEPARIN SODIUM 5000 UNIT(S): 5000 INJECTION INTRAVENOUS; SUBCUTANEOUS at 17:21

## 2024-03-28 RX ADMIN — Medication 5 MILLILITER(S): at 23:29

## 2024-03-28 RX ADMIN — CARVEDILOL PHOSPHATE 25 MILLIGRAM(S): 80 CAPSULE, EXTENDED RELEASE ORAL at 17:24

## 2024-03-28 NOTE — PROGRESS NOTE ADULT - PROBLEM SELECTOR PLAN 11
- FULL CODE  - Patients Daughter Lidia updated at bedside this afternoon - PCP ppx: Bactrim  - DVT ppx: heparin subq, s/p IVCF 3/3  - GI ppx: Protonix BID

## 2024-03-28 NOTE — PROGRESS NOTE ADULT - NS ATTEND AMEND GEN_ALL_CORE FT
Agree with above  78F ESRD s/p renal transplant p/w IPH s/p L cranie with poor mental status, now s/p trache / PEG  - Episode of hypothermia, pan-cultured  - Now on clonidine patch, off PO. Also c/w Coreg / amlodipine  - Hyperkalemia: adding Lokelma standing and changing feeds to Nepro  - Hyperglycemia: Lantus + insulin sliding scale with bolus feeds  - c/w tacro + prednisone for transplant  - Trache to TC @40%, doing well  - DVT ppx: Hep SC, s/p IVC filter  - Dispo: discharge pending cranioplasty scheduled 4/2.

## 2024-03-28 NOTE — PROGRESS NOTE ADULT - ASSESSMENT
77 yo F with PMHx ESRD s/p renal transplant (2019, now off HD), left AKA, BK viremia, HTN, breast ca s/p lumpectomy (completed Tamoxifen 03/2023), DVT (off Eliquis), HLD, gout presenting 3/1 with left ICH (ICH score 4) likely 2/2 amyloid angiopathy s/p left craniectomy(discarded) and evacuation as well as EVD placement and removal (3/7). She is s/p trach/peg 3/8/24.  Febrile 3/10 with + UA, blood/sputum culture NGTD.  Treatment for UTI initiated with ceftriaxone, eventually broadened to cefepime.  Transferred to RCU 3/11 for continued management.  Pt arrived from NSCU with minimal mental status with only response of spontaneous eye opening and withdrawal on RLE.  Urine culture resulted - positive for klebsiella, treated for 7 days with Meropenem 3/12 --> 3/19.  Cranioplasty planning 4/2 as per neurosurgery.  Continuing weaning from ventilator as tolerated.  Continuing to monitor for neurological improvement.     Wound Consult requested to assist w/ management of Sacral Stage 3 pressure imjury  Buttocks/ Sacrum change to TRIAD BID  and prn soiling        Continue w/ attends under pads and Pericare w/ purewick as per protocol  Abx per RCU/ ID  Moisturize intact skin w/ SWEEN cream BID  Nutrition optimization in pt w/  Increased nutritional needs    high quality protein TF, radha/ prosource, MVI & Vit C to promote wound healing  Continue turning and positioning w/ offloading assistive devices as per protocol  Waffle Cushion to chair when oob to chair  Continue w/ low air loss pressure redistribution bed surface   Pt will need Group 2 mattress on hospital bed and ROHO cushion for wheel chair upon discharge home  Care as per medicine, will follow w/ you  Upon discharge f/u as outpatient at [facility] [address] [facility] [phone]  Seen w/ RN and D/w team & attng  Heather Grier PA-C CWS [id number]  Nights/ Weekends/ Holidays please call:  General Surgery Consult pager ([phone]) for emergencies  Wound PT for multilayer leg wrapping or VAC issues (x [phone])   I spent 50minutes face to face w/ this pt of which more than 50% of the time was spent counseling & coordinating care of this pt.      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 She is s/p trach/peg 3/8/24.  Febrile 3/10 with + UA, blood/sputum culture NGTD.  Treatment for UTI initiated with ceftriaxone, eventually broadened to cefepime.  Transferred to RCU 3/11 for continued management.  Pt arrived from NSCU with minimal mental status with only response of spontaneous eye opening and withdrawal on RLE.  Urine culture resulted - positive for klebsiella, treated for 7 days with Meropenem 3/12 --> 3/19.  Cranioplasty planning 4/2 as per neurosurgery.  Continuing weaning from ventilator as tolerated.  Continuing to monitor for neurological improvement.     Wound Consult requested to assist w/ management of:   Sacral Stage 3 pressure imjury  Buttocks/ Sacrum change to TRIAD BID  and prn soiling        Continue w/ attends under pads and Pericare w/ purewick as per protocol  Abx per RCU/ ID  Moisturize intact skin w/ SWEEN cream BID  Nutrition optimization in pt w/  Increased nutritional needs    high quality protein TF, radha/ prosource, MVI & Vit C to promote wound healing  Continue turning and positioning w/ offloading assistive devices as per protocol  Waffle Cushion to chair when oob to chair  Continue w/ low air loss pressure redistribution bed surface   Pt will need Group 2 mattress on hospital bed and ROHO cushion for wheel chair upon discharge home  Care as per medicine, will follow w/ you  Upon discharge f/u as outpatient at Wound Center [address] [facility] [phone]  Seen w/ RN and D/w team & attng  Heather Grier PA-C CWS [id number]  Nights/ Weekends/ Holidays please call:  General Surgery Consult pager ([phone]) for emergencies  Wound PT for multilayer leg wrapping or VAC issues (x [phone])

## 2024-03-28 NOTE — PROGRESS NOTE ADULT - PROBLEM SELECTOR PLAN 6
- AVF in the left upper extremity  - s/p renal transplant in 2019  - prednisone 5mg, bactrim ppx  - tacro 3 mg BID as per transplant nephro  - tacro goal 4-7, daily tacro levels 30 mins prior to dose administration - AVF in the left upper extremity  - s/p renal transplant in 2019  - prednisone 5mg, bactrim ppx  - tacro 3 mg BID as per transplant nephro  - tacro goal 4-7, daily tacro levels 30 mins prior to dose administration  - Continue Free water for rising Bun/Creat  - Continue Lokelma for mild hyperkalemia - AVF in the left upper extremity  - s/p renal transplant in 2019  - prednisone 5mg, bactrim ppx  - tacro 3 mg BID as per transplant nephro  - tacro goal 4-7, daily tacro levels 30 mins prior to dose administration  - Continue Free water for rising Bun/Creat  - Continue Lokelma for mild hyperkalemia  - Feeds changed to Nepro

## 2024-03-28 NOTE — PROGRESS NOTE ADULT - PROBLEM SELECTOR PLAN 2
- S/p trach 3/8 by surgery by Dr. Joselo Mayorga   - Tracheostomy Sutures removed 3/13  - Patient weaned to tc ATC since 3/23  - FIO2 Decreased to 30 %  - Will maintain cuffed trach until cranioplasty   - Continue airway clearance; Duoneb q6h PRN

## 2024-03-28 NOTE — PROGRESS NOTE ADULT - PROBLEM SELECTOR PLAN 1
- Found to have L IPH on CTH likely 2/2 amyloid angiopathy  - S/p L hemicrani for evacuation of large ICH; bone discarded  - CT H Stereo 3/12: S/p Left Frontal Craniectomy, Remains stable from prior CT on 3/8   - Patient will require eventual cranioplasty; tentative OR date 4/2  - Maintain Neuro checks

## 2024-03-28 NOTE — PROGRESS NOTE ADULT - SUBJECTIVE AND OBJECTIVE BOX
DATE OF SERVICE: 03-28-24 @ 13:25    Patient is a 78y old  Female who presents with a chief complaint of ICH (28 Mar 2024 07:57)      INTERVAL HISTORY: In no acute distress.     REVIEW OF SYSTEMS: Unable to participate in ROS  CONSTITUTIONAL: No weakness  EYES/ENT: No visual changes;  No throat pain   NECK: No pain or stiffness  RESPIRATORY: No cough, wheezing; No shortness of breath  CARDIOVASCULAR: No chest pain or palpitations  GASTROINTESTINAL: No abdominal  pain. No nausea, vomiting, or hematemesis  GENITOURINARY: No dysuria, frequency or hematuria  NEUROLOGICAL: No stroke like symptoms  SKIN: No rashes    	  MEDICATIONS:  amLODIPine   Tablet 10 milliGRAM(s) Oral daily  carvedilol 25 milliGRAM(s) Oral every 12 hours  cloNIDine Patch 0.1 mG/24Hr(s) 1 patch Transdermal every 7 days        PHYSICAL EXAM:  T(C): 36.7 (03-28-24 @ 10:00), Max: 36.9 (03-28-24 @ 04:19)  HR: 82 (03-28-24 @ 10:00) (82 - 98)  BP: 123/63 (03-28-24 @ 10:00) (120/65 - 156/84)  RR: 20 (03-28-24 @ 10:00) (17 - 22)  SpO2: 100% (03-28-24 @ 10:00) (97% - 100%)  Wt(kg): --  I&O's Summary    27 Mar 2024 07:01  -  28 Mar 2024 07:00  --------------------------------------------------------  IN: 1180 mL / OUT: 500 mL / NET: 680 mL          Appearance: In no distress	  HEENT:    PERRL, EOMI	  Cardiovascular:  S1 S2, No JVD  Respiratory: Lungs clear to auscultation	  Gastrointestinal:  Soft, Non-tender, + BS	  Vascularature:  No edema of LE  Psychiatric: Appropriate affect   Neuro: no acute focal deficits                               8.9    6.00  )-----------( 212      ( 28 Mar 2024 05:10 )             29.6     03-28    139  |  104  |  66<H>  ----------------------------<  193<H>  5.1   |  21<L>  |  1.30    Ca    10.4      28 Mar 2024 05:09  Phos  4.1     03-28  Mg     2.7     03-28    TPro  6.8  /  Alb  3.5  /  TBili  0.2  /  DBili  x   /  AST  19  /  ALT  30  /  AlkPhos  97  03-28        Labs personally reviewed      ASSESSMENT/PLAN: 	      78F Hx ESRD s/p renal xplant c/b DGF off HD, L AKA, 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    1. Abnormal Echo --  Septal bulge noted measures 2.2cm without obstruction.   -- Given no intracavitary obstruction no intervention at this time  - No Myxoma seen on this echo or echo in 2019, ? if hypermobile interatrial septum was confused for on prior imaging     2. HTN - uncontrolled, needs improvement   Continue clonidine patch, Coreg 25 mg BID, amlodipine 10mg    3. 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 DVT PPX - HSQ        Yodit Umaña, MARGARITA-NP   Celio Mcwilliams DO [facility]  Cardiovascular Medicine  [address], [address]  Available through call or text on [employer]  Office: [phone]

## 2024-03-28 NOTE — PROGRESS NOTE ADULT - SUBJECTIVE AND OBJECTIVE BOX
[facility]-- WOUND TEAM -- FOLLOW UP NOTE  --------------------------------------------------------------------------------    24 hour events/subjective:    afebrile  tolerating TF  incontinent  diarrhea- now w/ FMS    Diet:  Diet, NPO with Tube Feed:   Tube Feeding Modality: Gastrostomy  Nepro with Carb Steady (NEPRORTH)  Total Volume for 24 Hours (mL): 960  Bolus  Total Volume of Bolus (mL):  240  Total # of Feeds: 4  Tube Feed Frequency: Every 6 hours   Tube Feed Start Time: 12:00  Bolus Feed Rate (mL per Hour): 240   Bolus Feed Duration (in Hours): 1  Supplement Feeding Modality:  Gastrostomy  Probiotic Yogurt/Smoothie Cans or Servings Per Day:  1       Frequency:  Two Times a day (03-28-24 @ 13:18)      ROS: pt unable to offer    ALLERGIES & MEDICATIONS  --------------------------------------------------------------------------------  Allergies  shellfish (Rash)  ChloraPrep One-Step (Rash)  penicillins (Rash)          STANDING INPATIENT MEDICATIONS  amLODIPine Tablet 10 milliGRAM(s) Oral daily  artificial  tears Solution 1 Drop(s) Both EYES every 4 hours  Biotene Dry Mouth Oral Rinse 5 milliLiter(s) Swish and Spit every 6 hours  brivaracetam Oral Solution 100 milliGRAM(s) Oral <User Schedule>  carvedilol 25 milliGRAM(s) Oral every 12 hours  cloNIDine Patch 0.1 mG/24Hr(s) 1 patch Transdermal every 7 days  heparin   Injectable 5000 Unit(s) SubCutaneous every 12 hours  insulin glargine Injectable (LANTUS) 10 Unit(s) SubCutaneous at bedtime  insulin lispro (ADMELOG) corrective regimen sliding scale   SubCutaneous three times a day before meals  lacosamide Solution 200 milliGRAM(s) Oral two times a day  nystatin Suspension 041836 Unit(s) Swish and Swallow every 6 hours  pantoprazole   Suspension 40 milliGRAM(s) Oral two times a day  predniSONE Tablet 5 milliGRAM(s) Oral daily  sodium zirconium cyclosilicate 10 Gram(s) Oral daily  tacrolimus 0.5 mG/mL Suspension 3 milliGRAM(s) Oral every 12 hours  trimethoprim 80 mG/ sulfamethoxazole 400 mG 1 Tablet(s) Oral daily      PRN INPATIENT MEDICATION  acetaminophen Oral Liquid 650 milliGRAM(s) Oral every 6 hours PRN  albuterol/ipratropium for Nebulization 3 milliLiter(s) Nebulizer every 6 hours PRN        VITALS/PHYSICAL EXAM  --------------------------------------------------------------------------------  T(C): 36.7 (03-28-24 @ 10:00), Max: 36.9 (03-28-24 @ 04:19)  HR: 82 (03-28-24 @ 10:00) (82 - 86)  BP: 123/63 (03-28-24 @ 10:00) (120/65 - 156/84)  RR: 20 (03-28-24 @ 10:00) (17 - 20)  SpO2: 100% (03-28-24 @ 10:00) (98% - 100%)  Wt(kg): --    General: NAD, obtunded, frail, wd/wn/wg  Total Care sport bed  HEENT: NC, Rt side scalp soft- absent bone, incision c/d/i- helmet placed for exam     sclera clear/ moist, moist mucous membranes, trachea midline, trach  Respiratory: equal chest rise with respirations, vented  Gastrointestinal: soft NT/ND  Neurology: nonverbal, not following commands  Psych: not responsive to verval stimulus  Musculoskeletal: no contractures  Vascular: BLE edema equal no c/c/e  Skin:  moist w/ good turgor  Sacral/bilateral buttocks w/ evolving DTI    denuded and granular skin  w/ hypopigmented newly healed skin    6cm X 8cm x 0.3cm,   No odor, erythema, increased warmth, tenderness, induration, fluctuance, nor crepitus          LABS/ CULTURES/ RADIOLOGY:              8.9    6.00  >-----------<  212      [03-28-24 @ 05:10]              29.6     139  |  104  |  66  ----------------------------<  193      [03-28-24 @ 05:09]  5.1   |  21  |  1.30        Ca     10.4     [03-28-24 @ 05:09]      Mg     2.7     [03-28-24 @ 05:09]      Phos  4.1     [03-28-24 @ 05:09]    TPro  6.8  /  Alb  3.5  /  TBili  0.2  /  DBili  x   /  AST  19  /  ALT  30  /  AlkPhos  97  [03-28-24 @ 05:09]    A1C with Estimated Average Glucose Result: 5.7 % (03-02-24 @ 11:57)    CAPILLARY BLOOD GLUCOSE  POCT Blood Glucose.: 143 mg/dL (28 Mar 2024 11:54)  POCT Blood Glucose.: 254 mg/dL (28 Mar 2024 08:22)  POCT Blood Glucose.: 111 mg/dL (27 Mar 2024 21:55)  POCT Blood Glucose.: 111 mg/dL (27 Mar 2024 17:15)    Culture - Blood (collected 03-26-24 @ 17:52)  Source: .Blood Blood  Preliminary Report (03-27-24 @ 22:02):    No growth at 24 hours    Culture - Blood (collected 03-26-24 @ 13:40)  Source: .Blood Blood  Preliminary Report (03-27-24 @ 19:02):    No growth at 24 hours         [facility]-- WOUND TEAM -- FOLLOW UP NOTE  --------------------------------------------------------------------------------    24 hour events/subjective:    afebrile  tolerating TF  incontinent  diarrhea- now w/ FMS    Diet:  Diet, NPO with Tube Feed:   Tube Feeding Modality: Gastrostomy  Nepro with Carb Steady (NEPRORTH)  Total Volume for 24 Hours (mL): 960  Bolus  Total Volume of Bolus (mL):  240  Total # of Feeds: 4  Tube Feed Frequency: Every 6 hours   Tube Feed Start Time: 12:00  Bolus Feed Rate (mL per Hour): 240   Bolus Feed Duration (in Hours): 1  Supplement Feeding Modality:  Gastrostomy  Probiotic Yogurt/Smoothie Cans or Servings Per Day:  1       Frequency:  Two Times a day (03-28-24 @ 13:18)      ROS: pt unable to offer    ALLERGIES & MEDICATIONS  --------------------------------------------------------------------------------  Allergies  shellfish (Rash)  ChloraPrep One-Step (Rash)  penicillins (Rash)          STANDING INPATIENT MEDICATIONS  amLODIPine Tablet 10 milliGRAM(s) Oral daily  artificial  tears Solution 1 Drop(s) Both EYES every 4 hours  Biotene Dry Mouth Oral Rinse 5 milliLiter(s) Swish and Spit every 6 hours  brivaracetam Oral Solution 100 milliGRAM(s) Oral <User Schedule>  carvedilol 25 milliGRAM(s) Oral every 12 hours  cloNIDine Patch 0.1 mG/24Hr(s) 1 patch Transdermal every 7 days  heparin   Injectable 5000 Unit(s) SubCutaneous every 12 hours  insulin glargine Injectable (LANTUS) 10 Unit(s) SubCutaneous at bedtime  insulin lispro (ADMELOG) corrective regimen sliding scale   SubCutaneous three times a day before meals  lacosamide Solution 200 milliGRAM(s) Oral two times a day  nystatin Suspension 339701 Unit(s) Swish and Swallow every 6 hours  pantoprazole   Suspension 40 milliGRAM(s) Oral two times a day  predniSONE Tablet 5 milliGRAM(s) Oral daily  sodium zirconium cyclosilicate 10 Gram(s) Oral daily  tacrolimus 0.5 mG/mL Suspension 3 milliGRAM(s) Oral every 12 hours  trimethoprim 80 mG/ sulfamethoxazole 400 mG 1 Tablet(s) Oral daily      PRN INPATIENT MEDICATION  acetaminophen Oral Liquid 650 milliGRAM(s) Oral every 6 hours PRN  albuterol/ipratropium for Nebulization 3 milliLiter(s) Nebulizer every 6 hours PRN        VITALS/PHYSICAL EXAM  --------------------------------------------------------------------------------  T(C): 36.7 (03-28-24 @ 10:00), Max: 36.9 (03-28-24 @ 04:19)  HR: 82 (03-28-24 @ 10:00) (82 - 86)  BP: 123/63 (03-28-24 @ 10:00) (120/65 - 156/84)  RR: 20 (03-28-24 @ 10:00) (17 - 20)  SpO2: 100% (03-28-24 @ 10:00) (98% - 100%)  Wt(kg): --    General: NAD, obtunded, frail, wd/wn/wg  Total Care sport bed  HEENT: NC, Rt side scalp soft- absent bone, incision c/d/i- helmet placed for exam     sclera clear/ moist, moist mucous membranes, trachea midline, trach  Respiratory: equal chest rise with respirations, vented  Gastrointestinal: soft NT/ND  Neurology: nonverbal, not following commands  Psych: not responsive to verval stimulus  Musculoskeletal: no contractures  Vascular: BLE edema equal no c/c/e  Skin:  moist w/ good turgor  Sacral/bilateral buttocks with    denuded and granular skin  w/ hypopigmented newly healed skin    6cm X 8cm x 0.3cm,   No odor, erythema, increased warmth, tenderness, induration, fluctuance, nor crepitus          LABS/ CULTURES/ RADIOLOGY:              8.9    6.00  >-----------<  212      [03-28-24 @ 05:10]              29.6     139  |  104  |  66  ----------------------------<  193      [03-28-24 @ 05:09]  5.1   |  21  |  1.30        Ca     10.4     [03-28-24 @ 05:09]      Mg     2.7     [03-28-24 @ 05:09]      Phos  4.1     [03-28-24 @ 05:09]    TPro  6.8  /  Alb  3.5  /  TBili  0.2  /  DBili  x   /  AST  19  /  ALT  30  /  AlkPhos  97  [03-28-24 @ 05:09]    A1C with Estimated Average Glucose Result: 5.7 % (03-02-24 @ 11:57)    CAPILLARY BLOOD GLUCOSE  POCT Blood Glucose.: 143 mg/dL (28 Mar 2024 11:54)  POCT Blood Glucose.: 254 mg/dL (28 Mar 2024 08:22)  POCT Blood Glucose.: 111 mg/dL (27 Mar 2024 21:55)  POCT Blood Glucose.: 111 mg/dL (27 Mar 2024 17:15)    Culture - Blood (collected 03-26-24 @ 17:52)  Source: .Blood Blood  Preliminary Report (03-27-24 @ 22:02):    No growth at 24 hours    Culture - Blood (collected 03-26-24 @ 13:40)  Source: .Blood Blood  Preliminary Report (03-27-24 @ 19:02):    No growth at 24 hours

## 2024-03-28 NOTE — PROGRESS NOTE ADULT - ASSESSMENT
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 She is s/p trach/peg 3/8/24.  Febrile 3/10 with + UA, blood/sputum culture NGTD.  Treatment for UTI initiated with ceftriaxone, eventually broadened to cefepime.  Transferred to [facility] 3/11 for continued management.  Pt arrived from NSCU with minimal mental status with only response of spontaneous eye opening and withdrawal on RLE.  Urine culture resulted - positive for klebsiella, treated for 7 days with Meropenem 3/12 --> 3/19.  Cranioplasty planning 4/2 as per neurosurgery.  Continuing weaning from ventilator as tolerated.  Continuing to monitor for neurological improvement.       3/28: Patient hypothermic yesterday improved with bear hugger. Blood cxs sent 3/26 NGTD. Patient Remains with mild hyperkalemia will change feeds to Nepro. Patient remains on TC ATC ; FIO2 decreased to 30%.

## 2024-03-28 NOTE — PROGRESS NOTE ADULT - PROBLEM SELECTOR PLAN 8
- S/p PEG 3/8  - tolerating tube feeds    [] H. 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   - Repeat stool Ag in 8 weeks to confirm clearance Post treatement

## 2024-03-28 NOTE — PROGRESS NOTE ADULT - PROBLEM SELECTOR PLAN 4
- + UA on 3/10  - Urine Cx 3/10: ESBL Klebsiella and VRE 10-49 K   - Treated with Meropenem 1GM Q12H 3/12 --> 3/19  - Hypothermic 3/27; Bld cxs sent 3/26: NGTD  - Currently remains off ABX

## 2024-03-28 NOTE — PROGRESS NOTE ADULT - PROBLEM SELECTOR PLAN 7
- Goal euglycemia (-180), A1C: 5.4%  - Feeds changed to Nepro in setting of Hyperkalemia   - Continue ISS and Lantus  - Monitor BGMs

## 2024-03-28 NOTE — PROGRESS NOTE ADULT - SUBJECTIVE AND OBJECTIVE BOX
Patient is a 78y old  Female who presents with a chief complaint of ICH (27 Mar 2024 15:21)      Interval Events: No events reported overnight     REVIEW OF SYSTEMS:  [ ] Positive  [ ] All other systems negative  [ ] Unable to assess ROS because ________    Vital Signs Last 24 Hrs  T(C): 36.9 (03-28-24 @ 04:19), Max: 36.9 (03-28-24 @ 04:19)  T(F): 98.5 (03-28-24 @ 04:19), Max: 98.5 (03-28-24 @ 04:19)  HR: 84 (03-28-24 @ 04:19) (79 - 98)  BP: 120/65 (03-28-24 @ 04:19) (120/65 - 156/84)  RR: 20 (03-28-24 @ 04:19) (17 - 22)  SpO2: 99% (03-28-24 @ 04:19) (97% - 100%)    PHYSICAL EXAM:  HEENT:   [ ]Tracheostomy:  [ ]Pupils equal  [ ]No oral lesions  [ ]Abnormal    SKIN  [ ]No Rash  [ ] Abnormal  [ ] pressure    CARDIAC  [ ]Regular  [ ]Abnormal    PULMONARY  [ ]Bilateral Clear Breath Sounds  [ ]Normal Excursion  [ ]Abnormal    GI  [ ]PEG      [ ] +BS		              [ ]Soft, nondistended, nontender	  [ ]Abnormal    MUSCULOSKELETAL                                   [ ]Bedbound                 [ ]Abnormal    [ ]Ambulatory/OOB to chair                           EXTREMITIES                                         [ ]Normal  [ ]Edema                           NEUROLOGIC  [ ] Normal, non focal  [ ] Focal findings:    PSYCHIATRIC  [ ]Alert and appropriate  [ ] Sedated	 [ ]Agitated    :  Gardner: [ ] Yes, if yes: Date of Placement:                   [  ] No    LINES: Central Lines [ ] Yes, if yes: Date of Placement                                     [  ] No    HOSPITAL MEDICATIONS:  MEDICATIONS  (STANDING):  amLODIPine   Tablet 10 milliGRAM(s) Oral daily  artificial  tears Solution 1 Drop(s) Both EYES every 4 hours  Biotene Dry Mouth Oral Rinse 5 milliLiter(s) Swish and Spit every 6 hours  brivaracetam Oral Solution 100 milliGRAM(s) Oral <User Schedule>  carvedilol 25 milliGRAM(s) Oral every 12 hours  cloNIDine Patch 0.1 mG/24Hr(s) 1 patch Transdermal every 7 days  heparin   Injectable 5000 Unit(s) SubCutaneous every 12 hours  insulin glargine Injectable (LANTUS) 10 Unit(s) SubCutaneous at bedtime  insulin lispro (ADMELOG) corrective regimen sliding scale   SubCutaneous three times a day before meals  lacosamide Solution 200 milliGRAM(s) Oral two times a day  nystatin    Suspension 125256 Unit(s) Swish and Swallow every 6 hours  pantoprazole   Suspension 40 milliGRAM(s) Oral two times a day  predniSONE   Tablet 5 milliGRAM(s) Oral daily  sodium zirconium cyclosilicate 10 Gram(s) Oral daily  tacrolimus    0.5 mG/mL Suspension 3 milliGRAM(s) Oral every 12 hours  trimethoprim   80 mG/sulfamethoxazole 400 mG 1 Tablet(s) Oral daily    MEDICATIONS  (PRN):  acetaminophen   Oral Liquid .. 650 milliGRAM(s) Oral every 6 hours PRN Temp greater or equal to 38C (100.4F), Mild Pain (1 - 3)  albuterol/ipratropium for Nebulization 3 milliLiter(s) Nebulizer every 6 hours PRN Shortness of Breath and/or Wheezing      LABS:                        8.9    6.00  )-----------( 212      ( 28 Mar 2024 05:10 )             29.6     03-28    139  |  104  |  66<H>  ----------------------------<  193<H>  5.1   |  21<L>  |  1.30    Ca    10.4      28 Mar 2024 05:09  Phos  4.1     03-28  Mg     2.7     03-28    TPro  6.8  /  Alb  3.5  /  TBili  0.2  /  DBili  x   /  AST  19  /  ALT  30  /  AlkPhos  97  03-28      Urinalysis Basic - ( 28 Mar 2024 05:09 )    Color: x / Appearance: x / SG: x / pH: x  Gluc: 193 mg/dL / Ketone: x  / Bili: x / Urobili: x   Blood: x / Protein: x / Nitrite: x   Leuk Esterase: x / RBC: x / WBC x   Sq Epi: x / Non Sq Epi: x / Bacteria: x          CAPILLARY BLOOD GLUCOSE    MICROBIOLOGY:     RADIOLOGY:  [ ] Reviewed and interpreted by me     Patient is a 78y old  Female who presents with a chief complaint of ICH (27 Mar 2024 15:21)      Interval Events: No events reported overnight     REVIEW OF SYSTEMS:  [ ] Positive  [ ] All other systems negative  [x] Unable to assess ROS because patient not answering verbal questioning     Vital Signs Last 24 Hrs  T(C): 36.9 (03-28-24 @ 04:19), Max: 36.9 (03-28-24 @ 04:19)  T(F): 98.5 (03-28-24 @ 04:19), Max: 98.5 (03-28-24 @ 04:19)  HR: 84 (03-28-24 @ 04:19) (79 - 98)  BP: 120/65 (03-28-24 @ 04:19) (120/65 - 156/84)  RR: 20 (03-28-24 @ 04:19) (17 - 22)  SpO2: 99% (03-28-24 @ 04:19) (97% - 100%)    PHYSICAL EXAM:  HEENT:  [x]Tracheostomy: #7 cuffed portex  [x]Pupils equal  [ ]No oral lesions  [x]Abnormal: left skull with mild depression, incision site c/d/i    SKIN  [ ]No Rash  [x] Abnormal - left temporal incision s/p craniectomy site c/d/i, L BKA site c/d/i  [x] pressure - sacral DTI    CARDIAC  [x]Regular  [ ]Abnormal    PULMONARY  [x]Bilateral Clear Breath Sounds  [ ]Normal Excursion  [ ]Abnormal    GI  [x]PEG      [x] +BS		              [x]Soft, nondistended, nontender	  [x]Abnormal: Rectal tube    MUSCULOSKELETAL                                   [x]Bedbound                 [x]Abnormal: L BKA  [ ]Ambulatory/OOB to chair                           EXTREMITIES                                         [x]Normal  [ ]Edema                           NEUROLOGIC  [ ] Normal, non focal  [x] Focal findings: Eyes open, Alert, withdraws from pain on all extremities, Spontaneously moving Left UE     PSYCHIATRIC  [x] Alert  [ ] Sedated	 [ ]Agitated    :  Gardner: [ ] Yes, if yes: Date of Placement:                   [x] No    LINES: Central Lines [ ] Yes, if yes: Date of Placement                                     [x] No    HOSPITAL MEDICATIONS:  MEDICATIONS  (STANDING):  amLODIPine   Tablet 10 milliGRAM(s) Oral daily  artificial  tears Solution 1 Drop(s) Both EYES every 4 hours  Biotene Dry Mouth Oral Rinse 5 milliLiter(s) Swish and Spit every 6 hours  brivaracetam Oral Solution 100 milliGRAM(s) Oral <User Schedule>  carvedilol 25 milliGRAM(s) Oral every 12 hours  cloNIDine Patch 0.1 mG/24Hr(s) 1 patch Transdermal every 7 days  heparin   Injectable 5000 Unit(s) SubCutaneous every 12 hours  insulin glargine Injectable (LANTUS) 10 Unit(s) SubCutaneous at bedtime  insulin lispro (ADMELOG) corrective regimen sliding scale   SubCutaneous three times a day before meals  lacosamide Solution 200 milliGRAM(s) Oral two times a day  nystatin    Suspension 672959 Unit(s) Swish and Swallow every 6 hours  pantoprazole   Suspension 40 milliGRAM(s) Oral two times a day  predniSONE   Tablet 5 milliGRAM(s) Oral daily  sodium zirconium cyclosilicate 10 Gram(s) Oral daily  tacrolimus    0.5 mG/mL Suspension 3 milliGRAM(s) Oral every 12 hours  trimethoprim   80 mG/sulfamethoxazole 400 mG 1 Tablet(s) Oral daily    MEDICATIONS  (PRN):  acetaminophen   Oral Liquid .. 650 milliGRAM(s) Oral every 6 hours PRN Temp greater or equal to 38C (100.4F), Mild Pain (1 - 3)  albuterol/ipratropium for Nebulization 3 milliLiter(s) Nebulizer every 6 hours PRN Shortness of Breath and/or Wheezing      LABS:                        8.9    6.00  )-----------( 212      ( 28 Mar 2024 05:10 )             29.6     03-28    139  |  104  |  66<H>  ----------------------------<  193<H>  5.1   |  21<L>  |  1.30    Ca    10.4      28 Mar 2024 05:09  Phos  4.1     03-28  Mg     2.7     03-28    TPro  6.8  /  Alb  3.5  /  TBili  0.2  /  DBili  x   /  AST  19  /  ALT  30  /  AlkPhos  97  03-28      Urinalysis Basic - ( 28 Mar 2024 05:09 )    Color: x / Appearance: x / SG: x / pH: x  Gluc: 193 mg/dL / Ketone: x  / Bili: x / Urobili: x   Blood: x / Protein: x / Nitrite: x   Leuk Esterase: x / RBC: x / WBC x   Sq Epi: x / Non Sq Epi: x / Bacteria: x          CAPILLARY BLOOD GLUCOSE    MICROBIOLOGY:     RADIOLOGY:  [ ] Reviewed and interpreted by me

## 2024-03-28 NOTE — PROGRESS NOTE ADULT - PROBLEM SELECTOR PLAN 10
- PCP ppx: bactrim  - DVT ppx: heparin subq, s/p IVCF 3/3  - GI ppx: Protonix BID - PCP ppx: Bactrim  - DVT ppx: heparin subq, s/p IVCF 3/3  - GI ppx: Protonix BID - Patient with Stage 3 Sacral Pressure Injury   - Continue Local wound care   - Frequent Turning and repositioning  - Wound care team continues to follow

## 2024-03-28 NOTE — PROGRESS NOTE ADULT - PROBLEM SELECTOR PLAN 3
- S/p EEG w/ seizures last seen 3/7  - Briviact 100 mg TID ( Renew 4/13) and Vimpat 200 mg BID (Renew 4/6)  - 3/15 EEG: Negative for seizures

## 2024-03-29 LAB
ALBUMIN SERPL ELPH-MCNC: 3.6 G/DL — SIGNIFICANT CHANGE UP (ref 3.3–5)
ALP SERPL-CCNC: 96 U/L — SIGNIFICANT CHANGE UP (ref 40–120)
ALT FLD-CCNC: 33 U/L — SIGNIFICANT CHANGE UP (ref 10–45)
ANION GAP SERPL CALC-SCNC: 13 MMOL/L — SIGNIFICANT CHANGE UP (ref 5–17)
AST SERPL-CCNC: 24 U/L — SIGNIFICANT CHANGE UP (ref 10–40)
BILIRUB SERPL-MCNC: 0.2 MG/DL — SIGNIFICANT CHANGE UP (ref 0.2–1.2)
BUN SERPL-MCNC: 58 MG/DL — HIGH (ref 7–23)
CALCIUM SERPL-MCNC: 10.1 MG/DL — SIGNIFICANT CHANGE UP (ref 8.4–10.5)
CHLORIDE SERPL-SCNC: 105 MMOL/L — SIGNIFICANT CHANGE UP (ref 96–108)
CO2 SERPL-SCNC: 21 MMOL/L — LOW (ref 22–31)
CREAT SERPL-MCNC: 1.26 MG/DL — SIGNIFICANT CHANGE UP (ref 0.5–1.3)
EGFR: 43 ML/MIN/1.73M2 — LOW
GLUCOSE BLDC GLUCOMTR-MCNC: 245 MG/DL — HIGH (ref 70–99)
GLUCOSE BLDC GLUCOMTR-MCNC: 266 MG/DL — HIGH (ref 70–99)
GLUCOSE BLDC GLUCOMTR-MCNC: 293 MG/DL — HIGH (ref 70–99)
GLUCOSE BLDC GLUCOMTR-MCNC: 310 MG/DL — HIGH (ref 70–99)
GLUCOSE SERPL-MCNC: 262 MG/DL — HIGH (ref 70–99)
HCT VFR BLD CALC: 30.4 % — LOW (ref 34.5–45)
HGB BLD-MCNC: 9.3 G/DL — LOW (ref 11.5–15.5)
MAGNESIUM SERPL-MCNC: 2.6 MG/DL — SIGNIFICANT CHANGE UP (ref 1.6–2.6)
MCHC RBC-ENTMCNC: 29.5 PG — SIGNIFICANT CHANGE UP (ref 27–34)
MCHC RBC-ENTMCNC: 30.6 GM/DL — LOW (ref 32–36)
MCV RBC AUTO: 96.5 FL — SIGNIFICANT CHANGE UP (ref 80–100)
NRBC # BLD: 0 /100 WBCS — SIGNIFICANT CHANGE UP (ref 0–0)
PHOSPHATE SERPL-MCNC: 3.4 MG/DL — SIGNIFICANT CHANGE UP (ref 2.5–4.5)
PLATELET # BLD AUTO: 191 K/UL — SIGNIFICANT CHANGE UP (ref 150–400)
POTASSIUM SERPL-MCNC: 4.8 MMOL/L — SIGNIFICANT CHANGE UP (ref 3.5–5.3)
POTASSIUM SERPL-SCNC: 4.8 MMOL/L — SIGNIFICANT CHANGE UP (ref 3.5–5.3)
PROT SERPL-MCNC: 6.7 G/DL — SIGNIFICANT CHANGE UP (ref 6–8.3)
RBC # BLD: 3.15 M/UL — LOW (ref 3.8–5.2)
RBC # FLD: 17 % — HIGH (ref 10.3–14.5)
SODIUM SERPL-SCNC: 139 MMOL/L — SIGNIFICANT CHANGE UP (ref 135–145)
TACROLIMUS SERPL-MCNC: 4.2 NG/ML — SIGNIFICANT CHANGE UP
WBC # BLD: 6.06 K/UL — SIGNIFICANT CHANGE UP (ref 3.8–10.5)
WBC # FLD AUTO: 6.06 K/UL — SIGNIFICANT CHANGE UP (ref 3.8–10.5)

## 2024-03-29 PROCEDURE — 99232 SBSQ HOSP IP/OBS MODERATE 35: CPT | Mod: FS

## 2024-03-29 RX ORDER — SODIUM ZIRCONIUM CYCLOSILICATE 10 G/10G
10 POWDER, FOR SUSPENSION ORAL EVERY OTHER DAY
Refills: 0 | Status: DISCONTINUED | OUTPATIENT
Start: 2024-03-29 | End: 2024-04-02

## 2024-03-29 RX ADMIN — Medication 500000 UNIT(S): at 11:47

## 2024-03-29 RX ADMIN — Medication 1 DROP(S): at 21:12

## 2024-03-29 RX ADMIN — HEPARIN SODIUM 5000 UNIT(S): 5000 INJECTION INTRAVENOUS; SUBCUTANEOUS at 05:37

## 2024-03-29 RX ADMIN — CARVEDILOL PHOSPHATE 25 MILLIGRAM(S): 80 CAPSULE, EXTENDED RELEASE ORAL at 17:44

## 2024-03-29 RX ADMIN — Medication 6: at 17:44

## 2024-03-29 RX ADMIN — Medication 1 DROP(S): at 05:38

## 2024-03-29 RX ADMIN — Medication 5 MILLILITER(S): at 05:37

## 2024-03-29 RX ADMIN — Medication 1 PATCH: at 19:36

## 2024-03-29 RX ADMIN — LACOSAMIDE 200 MILLIGRAM(S): 50 TABLET ORAL at 05:37

## 2024-03-29 RX ADMIN — Medication 5 MILLILITER(S): at 23:22

## 2024-03-29 RX ADMIN — PANTOPRAZOLE SODIUM 40 MILLIGRAM(S): 20 TABLET, DELAYED RELEASE ORAL at 05:37

## 2024-03-29 RX ADMIN — Medication 1 DROP(S): at 02:23

## 2024-03-29 RX ADMIN — LACOSAMIDE 200 MILLIGRAM(S): 50 TABLET ORAL at 17:43

## 2024-03-29 RX ADMIN — Medication 1 DROP(S): at 17:44

## 2024-03-29 RX ADMIN — Medication 500000 UNIT(S): at 05:37

## 2024-03-29 RX ADMIN — TACROLIMUS 3 MILLIGRAM(S): 5 CAPSULE ORAL at 05:38

## 2024-03-29 RX ADMIN — Medication 1 PATCH: at 07:00

## 2024-03-29 RX ADMIN — Medication 8: at 11:48

## 2024-03-29 RX ADMIN — Medication 500000 UNIT(S): at 17:43

## 2024-03-29 RX ADMIN — TACROLIMUS 3 MILLIGRAM(S): 5 CAPSULE ORAL at 17:44

## 2024-03-29 RX ADMIN — Medication 1 DROP(S): at 14:42

## 2024-03-29 RX ADMIN — Medication 1 TABLET(S): at 11:47

## 2024-03-29 RX ADMIN — Medication 1 DROP(S): at 10:32

## 2024-03-29 RX ADMIN — AMLODIPINE BESYLATE 10 MILLIGRAM(S): 2.5 TABLET ORAL at 05:37

## 2024-03-29 RX ADMIN — CARVEDILOL PHOSPHATE 25 MILLIGRAM(S): 80 CAPSULE, EXTENDED RELEASE ORAL at 05:37

## 2024-03-29 RX ADMIN — Medication 500000 UNIT(S): at 23:23

## 2024-03-29 RX ADMIN — BRIVARACETAM 100 MILLIGRAM(S): 25 TABLET, FILM COATED ORAL at 21:13

## 2024-03-29 RX ADMIN — Medication 5 MILLILITER(S): at 17:43

## 2024-03-29 RX ADMIN — Medication 4: at 05:38

## 2024-03-29 RX ADMIN — SODIUM ZIRCONIUM CYCLOSILICATE 10 GRAM(S): 10 POWDER, FOR SUSPENSION ORAL at 14:41

## 2024-03-29 RX ADMIN — HEPARIN SODIUM 5000 UNIT(S): 5000 INJECTION INTRAVENOUS; SUBCUTANEOUS at 17:45

## 2024-03-29 RX ADMIN — INSULIN GLARGINE 10 UNIT(S): 100 INJECTION, SOLUTION SUBCUTANEOUS at 23:23

## 2024-03-29 RX ADMIN — BRIVARACETAM 100 MILLIGRAM(S): 25 TABLET, FILM COATED ORAL at 14:41

## 2024-03-29 RX ADMIN — PANTOPRAZOLE SODIUM 40 MILLIGRAM(S): 20 TABLET, DELAYED RELEASE ORAL at 17:43

## 2024-03-29 RX ADMIN — Medication 5 MILLIGRAM(S): at 05:37

## 2024-03-29 RX ADMIN — Medication 5 MILLILITER(S): at 11:47

## 2024-03-29 RX ADMIN — BRIVARACETAM 100 MILLIGRAM(S): 25 TABLET, FILM COATED ORAL at 05:38

## 2024-03-29 NOTE — PROGRESS NOTE ADULT - PROBLEM SELECTOR PLAN 10
- Patient with Stage 3 Sacral Pressure Injury   - Continue Local wound care   - Frequent Turning and repositioning  - Wound care team continues to follow

## 2024-03-29 NOTE — PROGRESS NOTE ADULT - ASSESSMENT
77 yo F with PMHx ESRD s/p renal transplant (2019, now off HD), left AKA, BK viremia, HTN, breast ca s/p lumpectomy (completed Tamoxifen 03/2023), DVT (off Eliquis), HLD, gout presenting 3/1 with left ICH (ICH score 4) likely 2/2 amyloid angiopathy s/p left craniectomy(discarded) and evacuation as well as EVD placement and removal (3/7). She is s/p trach/peg 3/8/24.  Febrile 3/10 with + UA, blood/sputum culture NGTD.  Treatment for UTI initiated with ceftriaxone, eventually broadened to cefepime.  Transferred to RCU 3/11 for continued management.  Pt arrived from NSCU with minimal mental status with only response of spontaneous eye opening and withdrawal on RLE.  Urine culture resulted - positive for klebsiella, treated for 7 days with Meropenem 3/12 --> 3/19.  Cranioplasty planning 4/2 as per neurosurgery.  Continuing weaning from ventilator as tolerated.  Continuing to monitor for neurological improvement.       3/28: Patient hypothermic yesterday improved with bear hugger. Blood cxs sent 3/26 NGTD. Patient Remains with mild hyperkalemia will change feeds to Nepro. Patient remains on TC ATC ; FIO2 decreased to 30%. 77 yo F with PMHx ESRD s/p renal transplant (2019, now off HD), left AKA, BK viremia, HTN, breast ca s/p lumpectomy (completed Tamoxifen 03/2023), DVT (off Eliquis), HLD, gout presenting 3/1 with left ICH (ICH score 4) likely 2/2 amyloid angiopathy s/p left craniectomy(discarded) and evacuation as well as EVD placement and removal (3/7). She is s/p trach/peg 3/8/24.  Febrile 3/10 with + UA, blood/sputum culture NGTD.  Treatment for UTI initiated with ceftriaxone, eventually broadened to cefepime.  Transferred to RCU 3/11 for continued management.  Pt arrived from NSCU with minimal mental status with only response of spontaneous eye opening and withdrawal on RLE.  Urine culture resulted - positive for klebsiella, treated for 7 days with Meropenem 3/12 --> 3/19.  Cranioplasty planning 4/2 as per neurosurgery.  Continuing weaning from ventilator as tolerated.  Continuing to monitor for neurological improvement.       3/29: Patient has been hypothermic, Blood cxs sent 3/26 NGTD. Patient Remains with mild hyperkalemia will change feeds to Nepro. Patient remains on TC ATC ; FIO2 30%.

## 2024-03-29 NOTE — PROGRESS NOTE ADULT - PROBLEM SELECTOR PLAN 6
- AVF in the left upper extremity  - s/p renal transplant in 2019  - prednisone 5mg, bactrim ppx  - tacro 3 mg BID as per transplant nephro  - tacro goal 4-7, daily tacro levels 30 mins prior to dose administration  - Continue Free water for rising Bun/Creat  - Continue Lokelma for mild hyperkalemia  - Feeds changed to Nepro

## 2024-03-29 NOTE — PROGRESS NOTE ADULT - SUBJECTIVE AND OBJECTIVE BOX
Neurology        S: patient seen. no neuro changes, ill appearing         Medications: MEDICATIONS  (STANDING):  amLODIPine   Tablet 10 milliGRAM(s) Oral daily  artificial  tears Solution 1 Drop(s) Both EYES every 4 hours  Biotene Dry Mouth Oral Rinse 5 milliLiter(s) Swish and Spit every 6 hours  brivaracetam Oral Solution 100 milliGRAM(s) Oral <User Schedule>  carvedilol 25 milliGRAM(s) Oral every 12 hours  cloNIDine Patch 0.1 mG/24Hr(s) 1 patch Transdermal every 7 days  heparin   Injectable 5000 Unit(s) SubCutaneous every 12 hours  insulin glargine Injectable (LANTUS) 10 Unit(s) SubCutaneous at bedtime  insulin lispro (ADMELOG) corrective regimen sliding scale   SubCutaneous three times a day before meals  lacosamide Solution 200 milliGRAM(s) Oral two times a day  nystatin    Suspension 063965 Unit(s) Swish and Swallow every 6 hours  pantoprazole   Suspension 40 milliGRAM(s) Oral two times a day  predniSONE   Tablet 5 milliGRAM(s) Oral daily  sodium zirconium cyclosilicate 10 Gram(s) Oral every other day  tacrolimus    0.5 mG/mL Suspension 3 milliGRAM(s) Oral every 12 hours  trimethoprim   80 mG/sulfamethoxazole 400 mG 1 Tablet(s) Oral daily    MEDICATIONS  (PRN):  acetaminophen   Oral Liquid .. 650 milliGRAM(s) Oral every 6 hours PRN Temp greater or equal to 38C (100.4F), Mild Pain (1 - 3)  albuterol/ipratropium for Nebulization 3 milliLiter(s) Nebulizer every 6 hours PRN Shortness of Breath and/or Wheezing       Vitals:  Vital Signs Last 24 Hrs  T(C): 36.5 (29 Mar 2024 17:00), Max: 36.5 (29 Mar 2024 17:00)  T(F): 97.7 (29 Mar 2024 17:00), Max: 97.7 (29 Mar 2024 17:00)  HR: 86 (29 Mar 2024 17:00) (75 - 87)  BP: 158/89 (29 Mar 2024 17:00) (140/82 - 158/89)  BP(mean): --  RR: 18 (29 Mar 2024 17:00) (17 - 20)  SpO2: 99% (29 Mar 2024 17:00) (98% - 100%)    Parameters below as of 29 Mar 2024 17:00  Patient On (Oxygen Delivery Method): tracheostomy collar    O2 Concentration (%): 30        General Exam:   General Appearance: Appropriately dressed    Head: Normocephalic, atraumatic and no dysmorphic features s/p trach   Ear, Nose, and Throat: Moist mucous membranes  CVS: S1S2+  Resp: No SOB, no wheeze or rhonchi on vent   GI: soft NT/ND s/p PEG   Extremities:  L AKA  Skin: No bruises or rashes     Neurological Exam:  Mental Status:  opens eyes to noxious. no verbal. not following   Cranial Nerves: PERRL, EOMI but gaze pref to L, no blink to threat on R, R facial,    Motor: minimal/no spontaneous movement ;   Sensation: grimaces to noxious at times. some minimal withdrawal LUE 2/5 and RLE.    Coordination: unable   Gait: unable     Data/Labs/Imaging which I personally reviewed.        LABS:                          9.3    6.06  )-----------( 191      ( 29 Mar 2024 05:43 )             30.4     03-29    139  |  105  |  58<H>  ----------------------------<  262<H>  4.8   |  21<L>  |  1.26    Ca    10.1      29 Mar 2024 05:43  Phos  3.4     03-29  Mg     2.6     03-29    TPro  6.7  /  Alb  3.6  /  TBili  0.2  /  DBili  x   /  AST  24  /  ALT  33  /  AlkPhos  96  03-29    LIVER FUNCTIONS - ( 29 Mar 2024 05:43 )  Alb: 3.6 g/dL / Pro: 6.7 g/dL / ALK PHOS: 96 U/L / ALT: 33 U/L / AST: 24 U/L / GGT: x             Urinalysis Basic - ( 29 Mar 2024 05:43 )    Color: x / Appearance: x / SG: x / pH: x  Gluc: 262 mg/dL / Ketone: x  / Bili: x / Urobili: x   Blood: x / Protein: x / Nitrite: x   Leuk Esterase: x / RBC: x / WBC x   Sq Epi: x / Non Sq Epi: x / Bacteria: x

## 2024-03-29 NOTE — PROGRESS NOTE ADULT - NS ATTEND AMEND GEN_ALL_CORE FT
Agree with above  78F ESRD s/p renal transplant p/w IPH s/p L cranie with poor mental status, now s/p trache / PEG  - Episode of hypothermia, pan-cultured  - Now on clonidine patch, off PO. Also c/w Coreg / amlodipine  - Hyperkalemia: adding Lokelma standing and changing feeds to Nepro  - Hyperglycemia: Lantus + insulin sliding scale with bolus feeds  - c/w tacro + prednisone for transplant  - Trache to TC @30%, doing well. Mental status likely precludes decannulation.  - DVT ppx: Hep SC, s/p IVC filter  - Dispo: discharge pending cranioplasty scheduled 4/2.

## 2024-03-29 NOTE — PROGRESS NOTE ADULT - SUBJECTIVE AND OBJECTIVE BOX
Patient is a 78y old  Female who presents with a chief complaint of ICH (28 Mar 2024 14:55)    HPI:  Nury Adam   78F Hx ESRD s/p renal xplant c/b DGF off HD, L AKA, 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 ICH score 4.     (02 Mar 2024 00:28)    Interval Events:    REVIEW OF SYSTEMS:  [ ] Positive  [ ] All other systems negative  [ ] Unable to assess ROS because ________    Vital Signs Last 24 Hrs  T(C): 36.4 (03-29-24 @ 05:00), Max: 36.7 (03-28-24 @ 10:00)  T(F): 97.5 (03-29-24 @ 05:00), Max: 98 (03-28-24 @ 10:00)  HR: 86 (03-29-24 @ 05:00) (75 - 90)  BP: 146/79 (03-29-24 @ 05:00) (123/63 - 158/83)  RR: 20 (03-29-24 @ 05:00) (17 - 20)  SpO2: 98% (03-29-24 @ 05:00) (96% - 100%)    PHYSICAL EXAM:  HEENT:   [ ]Tracheostomy:  [ ]Pupils equal  [ ]No oral lesions  [ ]Abnormal    SKIN  [ ] No Rash  [ ] Abnormal  [ ] pressure    CARDIAC  [ ]Regular  [ ]Abnormal    PULMONARY  [ ]Bilateral Clear Breath Sounds  [ ]Normal Excursion  [ ]Abnormal    GI  [ ]PEG      [ ] +BS		              [ ]Soft, nondistended, nontender	  [ ]Abnormal    MUSCULOSKELETAL                                   [ ]Bedbound                 [ ]Abnormal    [ ]Ambulatory/OOB to chair                           EXTREMITIES                                         [ ]Normal  [ ]Edema                           NEUROLOGIC  [ ] Normal, non focal  [ ] Focal findings:    PSYCHIATRIC  [ ]Alert and appropriate  [ ] Sedated	 [ ]Agitated    :  Gardner: [ ] Yes, if yes: Date of Placement:                   [  ] No    LINES: Central Lines [ ] Yes, if yes: Date of Placement                                     [  ] No    HOSPITAL MEDICATIONS:  MEDICATIONS  (STANDING):  amLODIPine   Tablet 10 milliGRAM(s) Oral daily  artificial  tears Solution 1 Drop(s) Both EYES every 4 hours  Biotene Dry Mouth Oral Rinse 5 milliLiter(s) Swish and Spit every 6 hours  brivaracetam Oral Solution 100 milliGRAM(s) Oral <User Schedule>  carvedilol 25 milliGRAM(s) Oral every 12 hours  cloNIDine Patch 0.1 mG/24Hr(s) 1 patch Transdermal every 7 days  heparin   Injectable 5000 Unit(s) SubCutaneous every 12 hours  insulin glargine Injectable (LANTUS) 10 Unit(s) SubCutaneous at bedtime  insulin lispro (ADMELOG) corrective regimen sliding scale   SubCutaneous three times a day before meals  lacosamide Solution 200 milliGRAM(s) Oral two times a day  nystatin    Suspension 184267 Unit(s) Swish and Swallow every 6 hours  pantoprazole   Suspension 40 milliGRAM(s) Oral two times a day  predniSONE   Tablet 5 milliGRAM(s) Oral daily  sodium zirconium cyclosilicate 10 Gram(s) Oral daily  tacrolimus    0.5 mG/mL Suspension 3 milliGRAM(s) Oral every 12 hours  trimethoprim   80 mG/sulfamethoxazole 400 mG 1 Tablet(s) Oral daily    MEDICATIONS  (PRN):  acetaminophen   Oral Liquid .. 650 milliGRAM(s) Oral every 6 hours PRN Temp greater or equal to 38C (100.4F), Mild Pain (1 - 3)  albuterol/ipratropium for Nebulization 3 milliLiter(s) Nebulizer every 6 hours PRN Shortness of Breath and/or Wheezing      LABS:                        9.3    6.06  )-----------( 191      ( 29 Mar 2024 05:43 )             30.4     03-29    139  |  105  |  58<H>  ----------------------------<  262<H>  4.8   |  21<L>  |  1.26    Ca    10.1      29 Mar 2024 05:43  Phos  3.4     03-29  Mg     2.6     03-29    TPro  6.7  /  Alb  3.6  /  TBili  0.2  /  DBili  x   /  AST  24  /  ALT  33  /  AlkPhos  96  03-29   Patient is a 78y old  Female who presents with a chief complaint of ICH (28 Mar 2024 14:55)    HPI:  Nury Adam   78F Hx ESRD s/p renal xplant c/b DGF off HD, L AKA, 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 ICH score 4.     (02 Mar 2024 00:28)    Interval Events: No issues overnight    REVIEW OF SYSTEMS:  [ ] Positive  [ ] All other systems negative  [x ] Unable to assess ROS because patient is obtunded    Vital Signs Last 24 Hrs  T(C): 36.4 (03-29-24 @ 05:00), Max: 36.7 (03-28-24 @ 10:00)  T(F): 97.5 (03-29-24 @ 05:00), Max: 98 (03-28-24 @ 10:00)  HR: 86 (03-29-24 @ 05:00) (75 - 90)  BP: 146/79 (03-29-24 @ 05:00) (123/63 - 158/83)  RR: 20 (03-29-24 @ 05:00) (17 - 20)  SpO2: 98% (03-29-24 @ 05:00) (96% - 100%)    PHYSICAL EXAM:  HEENT:  [x]Tracheostomy: #7 cuffed portex  [x]Pupils equal  [ ]No oral lesions  [x]Abnormal: left skull with mild depression, incision site c/d/i    SKIN  [ ]No Rash  [x] Abnormal - left temporal incision s/p craniectomy site c/d/i, L BKA site c/d/i  [x] pressure - sacral DTI    CARDIAC  [x]Regular  [ ]Abnormal    PULMONARY  [x]Bilateral Clear Breath Sounds  [ ]Normal Excursion  [ ]Abnormal    GI  [x]PEG      [x] +BS		              [x]Soft, nondistended, nontender	  [x]Abnormal: Rectal tube    MUSCULOSKELETAL                                   [x]Bedbound                 [x]Abnormal: L BKA  [ ]Ambulatory/OOB to chair                           EXTREMITIES                                         [x]Normal  [ ]Edema                           NEUROLOGIC  [ ] Normal, non focal  [x] Focal findings: Eyes open, Alert, withdraws from pain on all extremities, Spontaneously moving Left UE     PSYCHIATRIC  [x] Unable to assess  [ ] Sedated	 [ ]Agitated    :  Gardner: [ ] Yes, if yes: Date of Placement:                   [x] No    LINES: Central Lines [ ] Yes, if yes: Date of Placement                                     [x] No    HOSPITAL MEDICATIONS:  MEDICATIONS  (STANDING):  amLODIPine   Tablet 10 milliGRAM(s) Oral daily  artificial  tears Solution 1 Drop(s) Both EYES every 4 hours  Biotene Dry Mouth Oral Rinse 5 milliLiter(s) Swish and Spit every 6 hours  brivaracetam Oral Solution 100 milliGRAM(s) Oral <User Schedule>  carvedilol 25 milliGRAM(s) Oral every 12 hours  cloNIDine Patch 0.1 mG/24Hr(s) 1 patch Transdermal every 7 days  heparin   Injectable 5000 Unit(s) SubCutaneous every 12 hours  insulin glargine Injectable (LANTUS) 10 Unit(s) SubCutaneous at bedtime  insulin lispro (ADMELOG) corrective regimen sliding scale   SubCutaneous three times a day before meals  lacosamide Solution 200 milliGRAM(s) Oral two times a day  nystatin    Suspension 358274 Unit(s) Swish and Swallow every 6 hours  pantoprazole   Suspension 40 milliGRAM(s) Oral two times a day  predniSONE   Tablet 5 milliGRAM(s) Oral daily  sodium zirconium cyclosilicate 10 Gram(s) Oral daily  tacrolimus    0.5 mG/mL Suspension 3 milliGRAM(s) Oral every 12 hours  trimethoprim   80 mG/sulfamethoxazole 400 mG 1 Tablet(s) Oral daily    MEDICATIONS  (PRN):  acetaminophen   Oral Liquid .. 650 milliGRAM(s) Oral every 6 hours PRN Temp greater or equal to 38C (100.4F), Mild Pain (1 - 3)  albuterol/ipratropium for Nebulization 3 milliLiter(s) Nebulizer every 6 hours PRN Shortness of Breath and/or Wheezing      LABS:                        9.3    6.06  )-----------( 191      ( 29 Mar 2024 05:43 )             30.4     03-29    139  |  105  |  58<H>  ----------------------------<  262<H>  4.8   |  21<L>  |  1.26    Ca    10.1      29 Mar 2024 05:43  Phos  3.4     03-29  Mg     2.6     03-29    TPro  6.7  /  Alb  3.6  /  TBili  0.2  /  DBili  x   /  AST  24  /  ALT  33  /  AlkPhos  96  03-29

## 2024-03-29 NOTE — PROGRESS NOTE ADULT - SUBJECTIVE AND OBJECTIVE BOX
DATE OF SERVICE: 03-29-24 @ 16:45    Patient is a 78y old  Female who presents with a chief complaint of ICH (29 Mar 2024 07:47)      INTERVAL HISTORY: in no acute distress    REVIEW OF SYSTEMS: unable to participate in ROS due to AMS  CONSTITUTIONAL: No weakness  EYES/ENT: No visual changes;  No throat pain   NECK: No pain or stiffness  RESPIRATORY: No cough, wheezing; No shortness of breath  CARDIOVASCULAR: No chest pain or palpitations  GASTROINTESTINAL: No abdominal  pain. No nausea, vomiting, or hematemesis  GENITOURINARY: No dysuria, frequency or hematuria  NEUROLOGICAL: No stroke like symptoms  SKIN: No rashes      	  MEDICATIONS:  amLODIPine   Tablet 10 milliGRAM(s) Oral daily  carvedilol 25 milliGRAM(s) Oral every 12 hours  cloNIDine Patch 0.1 mG/24Hr(s) 1 patch Transdermal every 7 days        PHYSICAL EXAM:  T(C): 36.3 (03-29-24 @ 11:00), Max: 36.6 (03-28-24 @ 18:00)  HR: 86 (03-29-24 @ 15:03) (75 - 90)  BP: 140/82 (03-29-24 @ 11:00) (140/82 - 158/83)  RR: 18 (03-29-24 @ 15:03) (17 - 20)  SpO2: 100% (03-29-24 @ 15:03) (96% - 100%)  Wt(kg): --  I&O's Summary    28 Mar 2024 07:01  -  29 Mar 2024 07:00  --------------------------------------------------------  IN: 1540 mL / OUT: 2200 mL / NET: -660 mL    29 Mar 2024 07:01  -  29 Mar 2024 16:45  --------------------------------------------------------  IN: 440 mL / OUT: 900 mL / NET: -460 mL          Appearance: In no distress	  HEENT:    PERRL, EOMI	  Cardiovascular:  S1 S2, No JVD  Respiratory: Lungs clear to auscultation	  Gastrointestinal:  Soft, Non-tender, + BS	  Vascularature:  No edema of LE  Psychiatric: Appropriate affect   Neuro: no acute focal deficits                               9.3    6.06  )-----------( 191      ( 29 Mar 2024 05:43 )             30.4     03-29    139  |  105  |  58<H>  ----------------------------<  262<H>  4.8   |  21<L>  |  1.26    Ca    10.1      29 Mar 2024 05:43  Phos  3.4     03-29  Mg     2.6     03-29    TPro  6.7  /  Alb  3.6  /  TBili  0.2  /  DBili  x   /  AST  24  /  ALT  33  /  AlkPhos  96  03-29        Labs personally reviewed      ASSESSMENT/PLAN: 	  78F Hx ESRD s/p renal xplant c/b DGF off HD, L AKA, 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    1. Abnormal Echo --  Septal bulge noted measures 2.2cm without obstruction.   -- Given no intracavitary obstruction no intervention at this time  - No Myxoma seen on this echo or echo in 2019, ? if hypermobile interatrial septum was confused for on prior imaging     2. HTN - uncontrolled, needs improvement   Continue clonidine patch, Coreg 25 mg BID, amlodipine 10mg    3. 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 DVT PPX - HSQ          Iolani Behrbom, AG-NP   Celio Mcwilliams DO [facility]  Cardiovascular Medicine  [address], [address]  Available through call or text on [employer]  Office: [phone]

## 2024-03-29 NOTE — PROGRESS NOTE ADULT - ASSESSMENT
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   initial CTH3/1  with 8.9cm left frontal IPH with IVH .09cm rightward shift   3/2 CTH s/p L hmeicrani and resection of IPH. stable   3/5 CTH post op changes. some reorption of post op pneumocephalus.    s/p trach/peg 3/8/24   3/8 CTH L frontal craniectomy.  L MCA hemorrhage and infarct ; still IVH.   3/10 with + UA  A1c 5.7 LDL 46   TTE done 3/2: LA is severely dilated    Urine culture grew positive for klebsiella and enterococcus  3/9 EEG no seiuzres since 3/7;  risk of seiuzre from L parietal region. mod to severe diffuse cerebral dysfunction   Transferred to RCU 3/11 for continued management.  3/12 CTH   sterotactic imaging of L frontal crani for hemorrhagic L MCA ifnarct. L frontal temporal pseudmeningocele  noted. no hcange since 3/8   o/e awake, no verbal, not followiing. L gaze pref  some minimal withdrawal to noxious RLE and LUE.  s/p trach /vent     Impression: L ICH possible 2/2  CAA but hemorrhagic infarct also needs to be considered.    - plan for cranioplasty 4/2   - had recent CTH 3/12.  would repeat EEG at this time given high risk for seiuzres   - tolerating CPAP at times   - difficult to determine IPH 2/2 CAA without MRI to look at SWI or GRE sequeneses for hemosiderin deposition  - never had vessel imaging. consider CTA H/N   - no AC/AP. dvt ppx okay  - briviact 100mg TID  and vimpat 200mg BID for seiuzre ppx   - infectious workup. on meropenum.  this can lower seiuzre threshold   - immunosuppression on tacrolimus and prednisone.  ppx bactrim     -telemetry   - PT/OT   - check FS, glucose control <180  - GI/DVT ppx   - GOC with family.  currenlty full code; in terms of functional meaningful recovery the likelihood is low.  patient will require 24hr care and likely be bedbound at this time.    consider recalling palliative  Dieter Wilkinson MD  Vascular Neurology  Office: [phone]

## 2024-03-30 LAB
ALBUMIN SERPL ELPH-MCNC: 3.4 G/DL — SIGNIFICANT CHANGE UP (ref 3.3–5)
ALP SERPL-CCNC: 120 U/L — SIGNIFICANT CHANGE UP (ref 40–120)
ALT FLD-CCNC: 38 U/L — SIGNIFICANT CHANGE UP (ref 10–45)
ANION GAP SERPL CALC-SCNC: 13 MMOL/L — SIGNIFICANT CHANGE UP (ref 5–17)
AST SERPL-CCNC: 25 U/L — SIGNIFICANT CHANGE UP (ref 10–40)
BILIRUB SERPL-MCNC: 0.2 MG/DL — SIGNIFICANT CHANGE UP (ref 0.2–1.2)
BUN SERPL-MCNC: 58 MG/DL — HIGH (ref 7–23)
CALCIUM SERPL-MCNC: 11 MG/DL — HIGH (ref 8.4–10.5)
CHLORIDE SERPL-SCNC: 103 MMOL/L — SIGNIFICANT CHANGE UP (ref 96–108)
CO2 SERPL-SCNC: 22 MMOL/L — SIGNIFICANT CHANGE UP (ref 22–31)
CREAT SERPL-MCNC: 1.22 MG/DL — SIGNIFICANT CHANGE UP (ref 0.5–1.3)
EGFR: 45 ML/MIN/1.73M2 — LOW
GLUCOSE BLDC GLUCOMTR-MCNC: 218 MG/DL — HIGH (ref 70–99)
GLUCOSE BLDC GLUCOMTR-MCNC: 268 MG/DL — HIGH (ref 70–99)
GLUCOSE BLDC GLUCOMTR-MCNC: 270 MG/DL — HIGH (ref 70–99)
GLUCOSE BLDC GLUCOMTR-MCNC: 337 MG/DL — HIGH (ref 70–99)
GLUCOSE SERPL-MCNC: 330 MG/DL — HIGH (ref 70–99)
HCT VFR BLD CALC: 35.1 % — SIGNIFICANT CHANGE UP (ref 34.5–45)
HGB BLD-MCNC: 10.3 G/DL — LOW (ref 11.5–15.5)
MAGNESIUM SERPL-MCNC: 2.4 MG/DL — SIGNIFICANT CHANGE UP (ref 1.6–2.6)
MCHC RBC-ENTMCNC: 29.3 GM/DL — LOW (ref 32–36)
MCHC RBC-ENTMCNC: 29.4 PG — SIGNIFICANT CHANGE UP (ref 27–34)
MCV RBC AUTO: 100.3 FL — HIGH (ref 80–100)
NRBC # BLD: 0 /100 WBCS — SIGNIFICANT CHANGE UP (ref 0–0)
PHOSPHATE SERPL-MCNC: 3.2 MG/DL — SIGNIFICANT CHANGE UP (ref 2.5–4.5)
PLATELET # BLD AUTO: 179 K/UL — SIGNIFICANT CHANGE UP (ref 150–400)
POTASSIUM SERPL-MCNC: 4.5 MMOL/L — SIGNIFICANT CHANGE UP (ref 3.5–5.3)
POTASSIUM SERPL-SCNC: 4.5 MMOL/L — SIGNIFICANT CHANGE UP (ref 3.5–5.3)
PROT SERPL-MCNC: 7.1 G/DL — SIGNIFICANT CHANGE UP (ref 6–8.3)
RBC # BLD: 3.5 M/UL — LOW (ref 3.8–5.2)
RBC # FLD: 17 % — HIGH (ref 10.3–14.5)
SODIUM SERPL-SCNC: 138 MMOL/L — SIGNIFICANT CHANGE UP (ref 135–145)
TACROLIMUS SERPL-MCNC: 6.2 NG/ML — SIGNIFICANT CHANGE UP
WBC # BLD: 6.2 K/UL — SIGNIFICANT CHANGE UP (ref 3.8–10.5)
WBC # FLD AUTO: 6.2 K/UL — SIGNIFICANT CHANGE UP (ref 3.8–10.5)

## 2024-03-30 PROCEDURE — 99233 SBSQ HOSP IP/OBS HIGH 50: CPT | Mod: FS

## 2024-03-30 PROCEDURE — 70450 CT HEAD/BRAIN W/O DYE: CPT | Mod: 26

## 2024-03-30 RX ADMIN — Medication 6: at 11:50

## 2024-03-30 RX ADMIN — Medication 500000 UNIT(S): at 05:35

## 2024-03-30 RX ADMIN — BRIVARACETAM 100 MILLIGRAM(S): 25 TABLET, FILM COATED ORAL at 14:14

## 2024-03-30 RX ADMIN — Medication 5 MILLILITER(S): at 11:49

## 2024-03-30 RX ADMIN — Medication 1 DROP(S): at 17:43

## 2024-03-30 RX ADMIN — AMLODIPINE BESYLATE 10 MILLIGRAM(S): 2.5 TABLET ORAL at 05:37

## 2024-03-30 RX ADMIN — TACROLIMUS 3 MILLIGRAM(S): 5 CAPSULE ORAL at 17:42

## 2024-03-30 RX ADMIN — HEPARIN SODIUM 5000 UNIT(S): 5000 INJECTION INTRAVENOUS; SUBCUTANEOUS at 05:36

## 2024-03-30 RX ADMIN — LACOSAMIDE 200 MILLIGRAM(S): 50 TABLET ORAL at 17:41

## 2024-03-30 RX ADMIN — Medication 1 DROP(S): at 23:55

## 2024-03-30 RX ADMIN — Medication 6: at 17:43

## 2024-03-30 RX ADMIN — CARVEDILOL PHOSPHATE 25 MILLIGRAM(S): 80 CAPSULE, EXTENDED RELEASE ORAL at 17:41

## 2024-03-30 RX ADMIN — BRIVARACETAM 100 MILLIGRAM(S): 25 TABLET, FILM COATED ORAL at 05:37

## 2024-03-30 RX ADMIN — Medication 5 MILLILITER(S): at 05:35

## 2024-03-30 RX ADMIN — LACOSAMIDE 200 MILLIGRAM(S): 50 TABLET ORAL at 05:36

## 2024-03-30 RX ADMIN — BRIVARACETAM 100 MILLIGRAM(S): 25 TABLET, FILM COATED ORAL at 23:56

## 2024-03-30 RX ADMIN — HEPARIN SODIUM 5000 UNIT(S): 5000 INJECTION INTRAVENOUS; SUBCUTANEOUS at 17:42

## 2024-03-30 RX ADMIN — Medication 5 MILLILITER(S): at 23:56

## 2024-03-30 RX ADMIN — Medication 500000 UNIT(S): at 23:56

## 2024-03-30 RX ADMIN — PANTOPRAZOLE SODIUM 40 MILLIGRAM(S): 20 TABLET, DELAYED RELEASE ORAL at 17:41

## 2024-03-30 RX ADMIN — Medication 1 TABLET(S): at 11:49

## 2024-03-30 RX ADMIN — Medication 5 MILLIGRAM(S): at 05:36

## 2024-03-30 RX ADMIN — Medication 500000 UNIT(S): at 17:41

## 2024-03-30 RX ADMIN — Medication 5 MILLILITER(S): at 17:41

## 2024-03-30 RX ADMIN — INSULIN GLARGINE 10 UNIT(S): 100 INJECTION, SOLUTION SUBCUTANEOUS at 23:56

## 2024-03-30 RX ADMIN — Medication 1 DROP(S): at 15:14

## 2024-03-30 RX ADMIN — Medication 500000 UNIT(S): at 11:54

## 2024-03-30 RX ADMIN — CARVEDILOL PHOSPHATE 25 MILLIGRAM(S): 80 CAPSULE, EXTENDED RELEASE ORAL at 05:37

## 2024-03-30 RX ADMIN — Medication 1 DROP(S): at 10:07

## 2024-03-30 RX ADMIN — TACROLIMUS 3 MILLIGRAM(S): 5 CAPSULE ORAL at 05:59

## 2024-03-30 RX ADMIN — Medication 1 DROP(S): at 05:37

## 2024-03-30 RX ADMIN — Medication 8: at 05:59

## 2024-03-30 RX ADMIN — PANTOPRAZOLE SODIUM 40 MILLIGRAM(S): 20 TABLET, DELAYED RELEASE ORAL at 05:35

## 2024-03-30 RX ADMIN — Medication 1 DROP(S): at 01:08

## 2024-03-30 NOTE — PROGRESS NOTE ADULT - ASSESSMENT
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 She is s/p trach/peg 3/8/24.  Febrile 3/10 with + UA, blood/sputum culture NGTD.  Treatment for UTI initiated with ceftriaxone, eventually broadened to cefepime.  Transferred to RCU 3/11 for continued management.  Pt arrived from NSCU with minimal mental status with only response of spontaneous eye opening and withdrawal on RLE.  Urine culture resulted - positive for klebsiella, treated for 7 days with Meropenem 3/12 --> 3/19.  Cranioplasty planning 4/2 as per neurosurgery.  Continuing weaning from ventilator as tolerated.  Continuing to monitor for neurological improvement.       3/29: Patient has been hypothermic, Blood cxs sent 3/26 NGTD. Patient Remains with mild hyperkalemia will change feeds to Nepro. Patient remains on TC ATC ; FIO2 30%. 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 She is s/p trach/peg 3/8/24.  Febrile 3/10 with + UA, blood/sputum culture NGTD.  Treatment for UTI initiated with ceftriaxone, eventually broadened to cefepime.  Transferred to RCU 3/11 for continued management.  Pt arrived from NSCU with minimal mental status with only response of spontaneous eye opening and withdrawal on RLE.  Urine culture resulted - positive for klebsiella, treated for 7 days with Meropenem 3/12 --> 3/19.  Cranioplasty planning 4/2 as per neurosurgery.  Continuing weaning from ventilator as tolerated.  Continuing to monitor for neurological improvement.       3/29: Patient has been hypothermic, Blood cxs sent 3/26 NGTD. Patient remains on TC ATC ; FIO2 30%. Plan for cranioplasty 4/2.

## 2024-03-30 NOTE — PROGRESS NOTE ADULT - SUBJECTIVE AND OBJECTIVE BOX
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 ICH score 4.     (02 Mar 2024 00:28)    Interval Events:    REVIEW OF SYSTEMS:  [ ] Positive  [ ] All other systems negative  [ ] Unable to assess ROS because ________    Vital Signs Last 24 Hrs  T(C): 36.5 (03-30-24 @ 04:18), Max: 36.5 (03-29-24 @ 17:00)  T(F): 97.7 (03-30-24 @ 04:18), Max: 97.7 (03-29-24 @ 17:00)  HR: 86 (03-30-24 @ 04:18) (74 - 86)  BP: 131/71 (03-30-24 @ 04:18) (131/71 - 158/89)  RR: 20 (03-30-24 @ 04:18) (17 - 20)  SpO2: 99% (03-30-24 @ 04:18) (98% - 100%)    PHYSICAL EXAM:  HEENT:   [ ]Tracheostomy:  [ ]Pupils equal  [ ]No oral lesions  [ ]Abnormal    SKIN  [ ] No Rash  [ ] Abnormal  [ ] pressure    CARDIAC  [ ]Regular  [ ]Abnormal    PULMONARY  [ ]Bilateral Clear Breath Sounds  [ ]Normal Excursion  [ ]Abnormal    GI  [ ]PEG      [ ] +BS		              [ ]Soft, nondistended, nontender	  [ ]Abnormal    MUSCULOSKELETAL                                   [ ]Bedbound                 [ ]Abnormal    [ ]Ambulatory/OOB to chair                           EXTREMITIES                                         [ ]Normal  [ ]Edema                           NEUROLOGIC  [ ] Normal, non focal  [ ] Focal findings:    PSYCHIATRIC  [ ]Alert and appropriate  [ ] Sedated	 [ ]Agitated    :  Gardner: [ ] Yes, if yes: Date of Placement:                   [  ] No    LINES: Central Lines [ ] Yes, if yes: Date of Placement                                     [  ] No    HOSPITAL MEDICATIONS:  MEDICATIONS  (STANDING):  amLODIPine   Tablet 10 milliGRAM(s) Oral daily  artificial  tears Solution 1 Drop(s) Both EYES every 4 hours  Biotene Dry Mouth Oral Rinse 5 milliLiter(s) Swish and Spit every 6 hours  brivaracetam Oral Solution 100 milliGRAM(s) Oral <User Schedule>  carvedilol 25 milliGRAM(s) Oral every 12 hours  cloNIDine Patch 0.1 mG/24Hr(s) 1 patch Transdermal every 7 days  heparin   Injectable 5000 Unit(s) SubCutaneous every 12 hours  insulin glargine Injectable (LANTUS) 10 Unit(s) SubCutaneous at bedtime  insulin lispro (ADMELOG) corrective regimen sliding scale   SubCutaneous three times a day before meals  lacosamide Solution 200 milliGRAM(s) Oral two times a day  nystatin    Suspension 112677 Unit(s) Swish and Swallow every 6 hours  pantoprazole   Suspension 40 milliGRAM(s) Oral two times a day  predniSONE   Tablet 5 milliGRAM(s) Oral daily  sodium zirconium cyclosilicate 10 Gram(s) Oral every other day  tacrolimus    0.5 mG/mL Suspension 3 milliGRAM(s) Oral every 12 hours  trimethoprim   80 mG/sulfamethoxazole 400 mG 1 Tablet(s) Oral daily    MEDICATIONS  (PRN):  acetaminophen   Oral Liquid .. 650 milliGRAM(s) Oral every 6 hours PRN Temp greater or equal to 38C (100.4F), Mild Pain (1 - 3)  albuterol/ipratropium for Nebulization 3 milliLiter(s) Nebulizer every 6 hours PRN Shortness of Breath and/or Wheezing      LABS:                        9.3    6.06  )-----------( 191      ( 29 Mar 2024 05:43 )             30.4     03-30    138  |  103  |  58<H>  ----------------------------<  330<H>  4.5   |  22  |  1.22    Ca    11.0<H>      30 Mar 2024 06:48  Phos  3.2     03-30  Mg     2.4     03-30    TPro  7.1  /  Alb  3.4  /  TBili  0.2  /  DBili  x   /  AST  25  /  ALT  38  /  AlkPhos  120  03-30   Patient is a 78y old  Female who presents with a chief complaint of ICH (29 Mar 2024 16:44)    HPI:  Nury Adam   78F Hx ESRD s/p renal xplant c/b DGF off HD, L AKA, 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 ICH score 4.     (02 Mar 2024 00:28)    Interval Events: No issues overnight    REVIEW OF SYSTEMS:  [ ] Positive  [ ] All other systems negative  [x ] Unable to assess ROS because patient is obtunded    Vital Signs Last 24 Hrs  T(C): 36.5 (03-30-24 @ 04:18), Max: 36.5 (03-29-24 @ 17:00)  T(F): 97.7 (03-30-24 @ 04:18), Max: 97.7 (03-29-24 @ 17:00)  HR: 86 (03-30-24 @ 04:18) (74 - 86)  BP: 131/71 (03-30-24 @ 04:18) (131/71 - 158/89)  RR: 20 (03-30-24 @ 04:18) (17 - 20)  SpO2: 99% (03-30-24 @ 04:18) (98% - 100%)    PHYSICAL EXAM:  HEENT:  [x]Tracheostomy: #7 cuffed portex  [x]Pupils equal  [ ]No oral lesions  [x]Abnormal: left skull with mild depression, incision site c/d/i    SKIN  [ ]No Rash  [x] Abnormal - left temporal incision s/p craniectomy site c/d/i, L BKA site c/d/i  [x] pressure - sacral DTI    CARDIAC  [x]Regular  [ ]Abnormal    PULMONARY  [x]Bilateral Clear Breath Sounds  [ ]Normal Excursion  [ ]Abnormal    GI  [x]PEG      [x] +BS		              [x]Soft, nondistended, nontender	  [x]Abnormal: Rectal tube    MUSCULOSKELETAL                                   [x]Bedbound                 [x]Abnormal: L BKA  [ ]Ambulatory/OOB to chair                           EXTREMITIES                                         [x]Normal  [ ]Edema                           NEUROLOGIC  [ ] Normal, non focal  [x] Focal findings: Eyes open, Alert, withdraws from pain on all extremities, Spontaneously moving Left UE     PSYCHIATRIC  [x] Unable to assess  [ ] Sedated	 [ ]Agitated    :  Gardner: [ ] Yes, if yes: Date of Placement:                   [x] No    LINES: Central Lines [ ] Yes, if yes: Date of Placement                                     [x] No    HOSPITAL MEDICATIONS:  MEDICATIONS  (STANDING):  amLODIPine   Tablet 10 milliGRAM(s) Oral daily  artificial  tears Solution 1 Drop(s) Both EYES every 4 hours  Biotene Dry Mouth Oral Rinse 5 milliLiter(s) Swish and Spit every 6 hours  brivaracetam Oral Solution 100 milliGRAM(s) Oral <User Schedule>  carvedilol 25 milliGRAM(s) Oral every 12 hours  cloNIDine Patch 0.1 mG/24Hr(s) 1 patch Transdermal every 7 days  heparin   Injectable 5000 Unit(s) SubCutaneous every 12 hours  insulin glargine Injectable (LANTUS) 10 Unit(s) SubCutaneous at bedtime  insulin lispro (ADMELOG) corrective regimen sliding scale   SubCutaneous three times a day before meals  lacosamide Solution 200 milliGRAM(s) Oral two times a day  nystatin    Suspension 826871 Unit(s) Swish and Swallow every 6 hours  pantoprazole   Suspension 40 milliGRAM(s) Oral two times a day  predniSONE   Tablet 5 milliGRAM(s) Oral daily  sodium zirconium cyclosilicate 10 Gram(s) Oral every other day  tacrolimus    0.5 mG/mL Suspension 3 milliGRAM(s) Oral every 12 hours  trimethoprim   80 mG/sulfamethoxazole 400 mG 1 Tablet(s) Oral daily    MEDICATIONS  (PRN):  acetaminophen   Oral Liquid .. 650 milliGRAM(s) Oral every 6 hours PRN Temp greater or equal to 38C (100.4F), Mild Pain (1 - 3)  albuterol/ipratropium for Nebulization 3 milliLiter(s) Nebulizer every 6 hours PRN Shortness of Breath and/or Wheezing      LABS:                        9.3    6.06  )-----------( 191      ( 29 Mar 2024 05:43 )             30.4     03-30    138  |  103  |  58<H>  ----------------------------<  330<H>  4.5   |  22  |  1.22    Ca    11.0<H>      30 Mar 2024 06:48  Phos  3.2     03-30  Mg     2.4     03-30    TPro  7.1  /  Alb  3.4  /  TBili  0.2  /  DBili  x   /  AST  25  /  ALT  38  /  AlkPhos  120  03-30

## 2024-03-30 NOTE — PROGRESS NOTE ADULT - SUBJECTIVE AND OBJECTIVE BOX
DATE OF SERVICE: 03-30-24 @ 13:46    Patient is a 78y old  Female who presents with a chief complaint of ICH (30 Mar 2024 08:03)      INTERVAL HISTORY: no complaints     REVIEW OF SYSTEMS:  CONSTITUTIONAL: No weakness  EYES/ENT: No visual changes;  No throat pain   NECK: No pain or stiffness  RESPIRATORY: No cough, wheezing; No shortness of breath  CARDIOVASCULAR: No chest pain or palpitations  GASTROINTESTINAL: No abdominal  pain. No nausea, vomiting, or hematemesis  GENITOURINARY: No dysuria, frequency or hematuria  NEUROLOGICAL: No stroke like symptoms  SKIN: No rashes      	  MEDICATIONS:  amLODIPine   Tablet 10 milliGRAM(s) Oral daily  carvedilol 25 milliGRAM(s) Oral every 12 hours  cloNIDine Patch 0.1 mG/24Hr(s) 1 patch Transdermal every 7 days        PHYSICAL EXAM:  T(C): 36.7 (03-30-24 @ 10:00), Max: 36.7 (03-30-24 @ 10:00)  HR: 85 (03-30-24 @ 10:00) (74 - 92)  BP: 110/68 (03-30-24 @ 10:00) (110/68 - 158/89)  RR: 18 (03-30-24 @ 10:00) (18 - 20)  SpO2: 98% (03-30-24 @ 10:00) (98% - 100%)  Wt(kg): --  I&O's Summary    29 Mar 2024 07:01  -  30 Mar 2024 07:00  --------------------------------------------------------  IN: 1930 mL / OUT: 1400 mL / NET: 530 mL          Appearance: In no distress	  HEENT:    PERRL, EOMI	  Cardiovascular:  S1 S2, No JVD  Respiratory: Lungs clear to auscultation	  Gastrointestinal:  Soft, Non-tender, + BS	  Vascularature:  No edema of LE  Psychiatric: Appropriate affect   Neuro: no acute focal deficits                               10.3   6.20  )-----------( 179      ( 30 Mar 2024 08:49 )             35.1     03-30    138  |  103  |  58<H>  ----------------------------<  330<H>  4.5   |  22  |  1.22    Ca    11.0<H>      30 Mar 2024 06:48  Phos  3.2     03-30  Mg     2.4     03-30    TPro  7.1  /  Alb  3.4  /  TBili  0.2  /  DBili  x   /  AST  25  /  ALT  38  /  AlkPhos  120  03-30        Labs personally reviewed      ASSESSMENT/PLAN: 	    78F Hx ESRD s/p renal xplant c/b DGF off HD, L AKA, 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    1. Abnormal Echo --  Septal bulge noted measures 2.2cm without obstruction.   -- Given no intracavitary obstruction no intervention at this time  - No Myxoma seen on this echo or echo in 2019, ? if hypermobile interatrial septum was confused for on prior imaging     2. HTN - uncontrolled, needs improvement   Continue clonidine patch, Coreg 25 mg BID, amlodipine 10mg    3. 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 DVT PPX - HSQ          IMAN Moser DO [facility]  Cardiovascular Medicine  [address] [place] [address], [address]  Office: [phone]  Available via call/text on [employer] Teams

## 2024-03-31 LAB
ALBUMIN SERPL ELPH-MCNC: 3.5 G/DL — SIGNIFICANT CHANGE UP (ref 3.3–5)
ALP SERPL-CCNC: 101 U/L — SIGNIFICANT CHANGE UP (ref 40–120)
ALT FLD-CCNC: 30 U/L — SIGNIFICANT CHANGE UP (ref 10–45)
ANION GAP SERPL CALC-SCNC: 16 MMOL/L — SIGNIFICANT CHANGE UP (ref 5–17)
AST SERPL-CCNC: 17 U/L — SIGNIFICANT CHANGE UP (ref 10–40)
BILIRUB SERPL-MCNC: 0.2 MG/DL — SIGNIFICANT CHANGE UP (ref 0.2–1.2)
BUN SERPL-MCNC: 61 MG/DL — HIGH (ref 7–23)
CALCIUM SERPL-MCNC: 10.6 MG/DL — HIGH (ref 8.4–10.5)
CHLORIDE SERPL-SCNC: 102 MMOL/L — SIGNIFICANT CHANGE UP (ref 96–108)
CO2 SERPL-SCNC: 19 MMOL/L — LOW (ref 22–31)
CREAT SERPL-MCNC: 1.24 MG/DL — SIGNIFICANT CHANGE UP (ref 0.5–1.3)
CULTURE RESULTS: SIGNIFICANT CHANGE UP
CULTURE RESULTS: SIGNIFICANT CHANGE UP
EGFR: 44 ML/MIN/1.73M2 — LOW
GLUCOSE BLDC GLUCOMTR-MCNC: 123 MG/DL — HIGH (ref 70–99)
GLUCOSE BLDC GLUCOMTR-MCNC: 199 MG/DL — HIGH (ref 70–99)
GLUCOSE BLDC GLUCOMTR-MCNC: 260 MG/DL — HIGH (ref 70–99)
GLUCOSE BLDC GLUCOMTR-MCNC: 282 MG/DL — HIGH (ref 70–99)
GLUCOSE SERPL-MCNC: 261 MG/DL — HIGH (ref 70–99)
HCT VFR BLD CALC: 31.2 % — LOW (ref 34.5–45)
HGB BLD-MCNC: 9.4 G/DL — LOW (ref 11.5–15.5)
MAGNESIUM SERPL-MCNC: 2.4 MG/DL — SIGNIFICANT CHANGE UP (ref 1.6–2.6)
MCHC RBC-ENTMCNC: 28.9 PG — SIGNIFICANT CHANGE UP (ref 27–34)
MCHC RBC-ENTMCNC: 30.1 GM/DL — LOW (ref 32–36)
MCV RBC AUTO: 96 FL — SIGNIFICANT CHANGE UP (ref 80–100)
NRBC # BLD: 0 /100 WBCS — SIGNIFICANT CHANGE UP (ref 0–0)
PHOSPHATE SERPL-MCNC: 3.7 MG/DL — SIGNIFICANT CHANGE UP (ref 2.5–4.5)
PLATELET # BLD AUTO: 193 K/UL — SIGNIFICANT CHANGE UP (ref 150–400)
POTASSIUM SERPL-MCNC: 4.6 MMOL/L — SIGNIFICANT CHANGE UP (ref 3.5–5.3)
POTASSIUM SERPL-SCNC: 4.6 MMOL/L — SIGNIFICANT CHANGE UP (ref 3.5–5.3)
PROT SERPL-MCNC: 6.9 G/DL — SIGNIFICANT CHANGE UP (ref 6–8.3)
RBC # BLD: 3.25 M/UL — LOW (ref 3.8–5.2)
RBC # FLD: 16.9 % — HIGH (ref 10.3–14.5)
SODIUM SERPL-SCNC: 137 MMOL/L — SIGNIFICANT CHANGE UP (ref 135–145)
SPECIMEN SOURCE: SIGNIFICANT CHANGE UP
SPECIMEN SOURCE: SIGNIFICANT CHANGE UP
TACROLIMUS SERPL-MCNC: 7.1 NG/ML — SIGNIFICANT CHANGE UP
WBC # BLD: 6.72 K/UL — SIGNIFICANT CHANGE UP (ref 3.8–10.5)
WBC # FLD AUTO: 6.72 K/UL — SIGNIFICANT CHANGE UP (ref 3.8–10.5)

## 2024-03-31 PROCEDURE — 99233 SBSQ HOSP IP/OBS HIGH 50: CPT | Mod: FS

## 2024-03-31 RX ORDER — INSULIN GLARGINE 100 [IU]/ML
16 INJECTION, SOLUTION SUBCUTANEOUS AT BEDTIME
Refills: 0 | Status: DISCONTINUED | OUTPATIENT
Start: 2024-03-31 | End: 2024-04-01

## 2024-03-31 RX ADMIN — Medication 6: at 08:44

## 2024-03-31 RX ADMIN — Medication 5 MILLILITER(S): at 17:53

## 2024-03-31 RX ADMIN — Medication 500000 UNIT(S): at 12:24

## 2024-03-31 RX ADMIN — INSULIN GLARGINE 16 UNIT(S): 100 INJECTION, SOLUTION SUBCUTANEOUS at 23:18

## 2024-03-31 RX ADMIN — Medication 500000 UNIT(S): at 07:08

## 2024-03-31 RX ADMIN — PANTOPRAZOLE SODIUM 40 MILLIGRAM(S): 20 TABLET, DELAYED RELEASE ORAL at 07:09

## 2024-03-31 RX ADMIN — Medication 5 MILLILITER(S): at 12:24

## 2024-03-31 RX ADMIN — Medication 1 DROP(S): at 11:04

## 2024-03-31 RX ADMIN — PANTOPRAZOLE SODIUM 40 MILLIGRAM(S): 20 TABLET, DELAYED RELEASE ORAL at 17:54

## 2024-03-31 RX ADMIN — TACROLIMUS 3 MILLIGRAM(S): 5 CAPSULE ORAL at 07:09

## 2024-03-31 RX ADMIN — Medication 1 DROP(S): at 17:55

## 2024-03-31 RX ADMIN — HEPARIN SODIUM 5000 UNIT(S): 5000 INJECTION INTRAVENOUS; SUBCUTANEOUS at 17:54

## 2024-03-31 RX ADMIN — Medication 1 DROP(S): at 03:03

## 2024-03-31 RX ADMIN — CARVEDILOL PHOSPHATE 25 MILLIGRAM(S): 80 CAPSULE, EXTENDED RELEASE ORAL at 17:54

## 2024-03-31 RX ADMIN — BRIVARACETAM 100 MILLIGRAM(S): 25 TABLET, FILM COATED ORAL at 07:09

## 2024-03-31 RX ADMIN — Medication 5 MILLILITER(S): at 07:07

## 2024-03-31 RX ADMIN — Medication 1 DROP(S): at 07:08

## 2024-03-31 RX ADMIN — Medication 1 DROP(S): at 21:20

## 2024-03-31 RX ADMIN — Medication 5 MILLIGRAM(S): at 07:07

## 2024-03-31 RX ADMIN — Medication 500000 UNIT(S): at 23:18

## 2024-03-31 RX ADMIN — BRIVARACETAM 100 MILLIGRAM(S): 25 TABLET, FILM COATED ORAL at 16:12

## 2024-03-31 RX ADMIN — Medication 1 PATCH: at 19:09

## 2024-03-31 RX ADMIN — SODIUM ZIRCONIUM CYCLOSILICATE 10 GRAM(S): 10 POWDER, FOR SUSPENSION ORAL at 12:24

## 2024-03-31 RX ADMIN — LACOSAMIDE 200 MILLIGRAM(S): 50 TABLET ORAL at 07:08

## 2024-03-31 RX ADMIN — AMLODIPINE BESYLATE 10 MILLIGRAM(S): 2.5 TABLET ORAL at 07:07

## 2024-03-31 RX ADMIN — TACROLIMUS 3 MILLIGRAM(S): 5 CAPSULE ORAL at 17:54

## 2024-03-31 RX ADMIN — CARVEDILOL PHOSPHATE 25 MILLIGRAM(S): 80 CAPSULE, EXTENDED RELEASE ORAL at 07:07

## 2024-03-31 RX ADMIN — Medication 1 TABLET(S): at 12:26

## 2024-03-31 RX ADMIN — Medication 2: at 12:16

## 2024-03-31 RX ADMIN — HEPARIN SODIUM 5000 UNIT(S): 5000 INJECTION INTRAVENOUS; SUBCUTANEOUS at 07:08

## 2024-03-31 RX ADMIN — BRIVARACETAM 100 MILLIGRAM(S): 25 TABLET, FILM COATED ORAL at 21:19

## 2024-03-31 RX ADMIN — Medication 1 DROP(S): at 15:19

## 2024-03-31 RX ADMIN — LACOSAMIDE 200 MILLIGRAM(S): 50 TABLET ORAL at 17:53

## 2024-03-31 RX ADMIN — Medication 5 MILLILITER(S): at 23:18

## 2024-03-31 RX ADMIN — Medication 500000 UNIT(S): at 17:54

## 2024-03-31 NOTE — PROGRESS NOTE ADULT - SUBJECTIVE AND OBJECTIVE BOX
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 ICH score 4.  (02 Mar 2024 00:28)    Interval Events:    REVIEW OF SYSTEMS:  [ ] Positive  [ ] All other systems negative  [ ] Unable to assess ROS because ________    Vital Signs Last 24 Hrs  T(C): 36.6 (03-31-24 @ 06:11), Max: 36.7 (03-30-24 @ 10:00)  T(F): 97.9 (03-31-24 @ 06:11), Max: 98.1 (03-30-24 @ 10:00)  HR: 87 (03-31-24 @ 06:11) (85 - 97)  BP: 138/80 (03-31-24 @ 06:11) (110/68 - 154/87)  RR: 20 (03-31-24 @ 06:11) (18 - 20)  SpO2: 100% (03-31-24 @ 06:11) (98% - 100%)    PHYSICAL EXAM:  HEENT:   [ ]Tracheostomy:  [ ]Pupils equal  [ ]No oral lesions  [ ]Abnormal    SKIN  [ ] No Rash  [ ] Abnormal  [ ] pressure    CARDIAC  [ ]Regular  [ ]Abnormal    PULMONARY  [ ]Bilateral Clear Breath Sounds  [ ]Normal Excursion  [ ]Abnormal    GI  [ ]PEG      [ ] +BS		              [ ]Soft, nondistended, nontender	  [ ]Abnormal    MUSCULOSKELETAL                                   [ ]Bedbound                 [ ]Abnormal    [ ]Ambulatory/OOB to chair                           EXTREMITIES                                         [ ]Normal  [ ]Edema                           NEUROLOGIC  [ ] Normal, non focal  [ ] Focal findings:    PSYCHIATRIC  [ ]Alert and appropriate  [ ] Sedated	 [ ]Agitated    :  Gardner: [ ] Yes, if yes: Date of Placement:                   [  ] No    LINES: Central Lines [ ] Yes, if yes: Date of Placement                                     [  ] No    HOSPITAL MEDICATIONS:  MEDICATIONS  (STANDING):  amLODIPine   Tablet 10 milliGRAM(s) Oral daily  artificial  tears Solution 1 Drop(s) Both EYES every 4 hours  Biotene Dry Mouth Oral Rinse 5 milliLiter(s) Swish and Spit every 6 hours  brivaracetam Oral Solution 100 milliGRAM(s) Oral <User Schedule>  carvedilol 25 milliGRAM(s) Oral every 12 hours  cloNIDine Patch 0.1 mG/24Hr(s) 1 patch Transdermal every 7 days  heparin   Injectable 5000 Unit(s) SubCutaneous every 12 hours  insulin glargine Injectable (LANTUS) 10 Unit(s) SubCutaneous at bedtime  insulin lispro (ADMELOG) corrective regimen sliding scale   SubCutaneous three times a day before meals  lacosamide Solution 200 milliGRAM(s) Oral two times a day  nystatin    Suspension 708864 Unit(s) Swish and Swallow every 6 hours  pantoprazole   Suspension 40 milliGRAM(s) Oral two times a day  predniSONE   Tablet 5 milliGRAM(s) Oral daily  sodium zirconium cyclosilicate 10 Gram(s) Oral every other day  tacrolimus    0.5 mG/mL Suspension 3 milliGRAM(s) Oral every 12 hours  trimethoprim   80 mG/sulfamethoxazole 400 mG 1 Tablet(s) Oral daily    MEDICATIONS  (PRN):  acetaminophen   Oral Liquid .. 650 milliGRAM(s) Oral every 6 hours PRN Temp greater or equal to 38C (100.4F), Mild Pain (1 - 3)  albuterol/ipratropium for Nebulization 3 milliLiter(s) Nebulizer every 6 hours PRN Shortness of Breath and/or Wheezing      LABS:                        9.4    6.72  )-----------( 193      ( 31 Mar 2024 07:06 )             31.2     03-30    138  |  103  |  58<H>  ----------------------------<  330<H>  4.5   |  22  |  1.22    Ca    11.0<H>      30 Mar 2024 06:48  Phos  3.2     03-30  Mg     2.4     03-30    TPro  7.1  /  Alb  3.4  /  TBili  0.2  /  DBili  x   /  AST  25  /  ALT  38  /  AlkPhos  120  03-30      Urinalysis Basic - ( 30 Mar 2024 06:48 )    Color: x / Appearance: x / SG: x / pH: x  Gluc: 330 mg/dL / Ketone: x  / Bili: x / Urobili: x   Blood: x / Protein: x / Nitrite: x   Leuk Esterase: x / RBC: x / WBC x   Sq Epi: x / Non Sq Epi: x / Bacteria: x   Patient is a 78y old  Female who presents with a chief complaint of ICH (30 Mar 2024 13:45)    HPI:  Nury Adam   78F Hx ESRD s/p renal xplant c/b DGF off HD, L AKA, 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 ICH score 4.  (02 Mar 2024 00:28)    Interval Events: No issues overnight    REVIEW OF SYSTEMS:  [ ] Positive  [ ] All other systems negative  [x] Unable to assess ROS because patient is obtunded    Vital Signs Last 24 Hrs  T(C): 36.6 (03-31-24 @ 06:11), Max: 36.7 (03-30-24 @ 10:00)  T(F): 97.9 (03-31-24 @ 06:11), Max: 98.1 (03-30-24 @ 10:00)  HR: 87 (03-31-24 @ 06:11) (85 - 97)  BP: 138/80 (03-31-24 @ 06:11) (110/68 - 154/87)  RR: 20 (03-31-24 @ 06:11) (18 - 20)  SpO2: 100% (03-31-24 @ 06:11) (98% - 100%)    PHYSICAL EXAM:  HEENT:  [x]Tracheostomy: #7 cuffed portex  [x]Pupils equal  [ ]No oral lesions  [x]Abnormal: left skull with mild depression, incision site c/d/i    SKIN  [ ]No Rash  [x] Abnormal - left temporal incision s/p craniectomy site c/d/i, L BKA site c/d/i  [x] pressure - sacral DTI    CARDIAC  [x]Regular  [ ]Abnormal    PULMONARY  [x]Bilateral Clear Breath Sounds  [ ]Normal Excursion  [ ]Abnormal    GI  [x]PEG      [x] +BS		              [x]Soft, nondistended, nontender	  [x]Abnormal: Rectal tube    MUSCULOSKELETAL                                   [x]Bedbound                 [x]Abnormal: L BKA  [ ]Ambulatory/OOB to chair                           EXTREMITIES                                         [x]Normal  [ ]Edema                           NEUROLOGIC  [ ] Normal, non focal  [x] Focal findings: Eyes open, withdraws from pain on all extremities, Spontaneously moving Left UE     PSYCHIATRIC  [x] Unable to assess  [ ] Sedated	 [ ]Agitated    :  Gardner: [ ] Yes, if yes: Date of Placement:                   [x] No    LINES: Central Lines [ ] Yes, if yes: Date of Placement                                     [x] No    HOSPITAL MEDICATIONS:  MEDICATIONS  (STANDING):  amLODIPine   Tablet 10 milliGRAM(s) Oral daily  artificial  tears Solution 1 Drop(s) Both EYES every 4 hours  Biotene Dry Mouth Oral Rinse 5 milliLiter(s) Swish and Spit every 6 hours  brivaracetam Oral Solution 100 milliGRAM(s) Oral <User Schedule>  carvedilol 25 milliGRAM(s) Oral every 12 hours  cloNIDine Patch 0.1 mG/24Hr(s) 1 patch Transdermal every 7 days  heparin   Injectable 5000 Unit(s) SubCutaneous every 12 hours  insulin glargine Injectable (LANTUS) 10 Unit(s) SubCutaneous at bedtime  insulin lispro (ADMELOG) corrective regimen sliding scale   SubCutaneous three times a day before meals  lacosamide Solution 200 milliGRAM(s) Oral two times a day  nystatin    Suspension 368772 Unit(s) Swish and Swallow every 6 hours  pantoprazole   Suspension 40 milliGRAM(s) Oral two times a day  predniSONE   Tablet 5 milliGRAM(s) Oral daily  sodium zirconium cyclosilicate 10 Gram(s) Oral every other day  tacrolimus    0.5 mG/mL Suspension 3 milliGRAM(s) Oral every 12 hours  trimethoprim   80 mG/sulfamethoxazole 400 mG 1 Tablet(s) Oral daily    MEDICATIONS  (PRN):  acetaminophen   Oral Liquid .. 650 milliGRAM(s) Oral every 6 hours PRN Temp greater or equal to 38C (100.4F), Mild Pain (1 - 3)  albuterol/ipratropium for Nebulization 3 milliLiter(s) Nebulizer every 6 hours PRN Shortness of Breath and/or Wheezing      LABS:                        9.4    6.72  )-----------( 193      ( 31 Mar 2024 07:06 )             31.2     03-30    138  |  103  |  58<H>  ----------------------------<  330<H>  4.5   |  22  |  1.22    Ca    11.0<H>      30 Mar 2024 06:48  Phos  3.2     03-30  Mg     2.4     03-30    TPro  7.1  /  Alb  3.4  /  TBili  0.2  /  DBili  x   /  AST  25  /  ALT  38  /  AlkPhos  120  03-30      Urinalysis Basic - ( 30 Mar 2024 06:48 )    Color: x / Appearance: x / SG: x / pH: x  Gluc: 330 mg/dL / Ketone: x  / Bili: x / Urobili: x   Blood: x / Protein: x / Nitrite: x   Leuk Esterase: x / RBC: x / WBC x   Sq Epi: x / Non Sq Epi: x / Bacteria: x

## 2024-03-31 NOTE — PROGRESS NOTE ADULT - ASSESSMENT
77 yo F with PMHx ESRD s/p renal transplant (2019, now off HD), left AKA, BK viremia, HTN, breast ca s/p lumpectomy (completed Tamoxifen 03/2023), DVT (off Eliquis), HLD, gout presenting 3/1 with left ICH (ICH score 4) likely 2/2 amyloid angiopathy s/p left craniectomy(discarded) and evacuation as well as EVD placement and removal (3/7). She is s/p trach/peg 3/8/24.  Febrile 3/10 with + UA, blood/sputum culture NGTD.  Treatment for UTI initiated with ceftriaxone, eventually broadened to cefepime.  Transferred to [facility] 3/11 for continued management.  Pt arrived from NSCU with minimal mental status with only response of spontaneous eye opening and withdrawal on RLE.  Urine culture resulted - positive for klebsiella, treated for 7 days with Meropenem 3/12 --> 3/19.  Cranioplasty planning 4/2 as per neurosurgery.  Continuing weaning from ventilator as tolerated.  Continuing to monitor for neurological improvement.       3/29: Patient has been hypothermic, Blood cxs sent 3/26 NGTD. Patient remains on TC ATC ; FIO2 30%. Plan for cranioplasty 4/2. 77 yo F with PMHx ESRD s/p renal transplant (2019, now off HD), left AKA, BK viremia, HTN, breast ca s/p lumpectomy (completed Tamoxifen 03/2023), DVT (off Eliquis), HLD, gout presenting 3/1 with left ICH (ICH score 4) likely 2/2 amyloid angiopathy s/p left craniectomy(discarded) and evacuation as well as EVD placement and removal (3/7). She is s/p trach/peg 3/8/24.  Febrile 3/10 with + UA, blood/sputum culture NGTD.  Treatment for UTI initiated with ceftriaxone, eventually broadened to cefepime.  Transferred to [facility] 3/11 for continued management.  Pt arrived from NSCU with minimal mental status with only response of spontaneous eye opening and withdrawal on RLE.  Urine culture resulted - positive for klebsiella, treated for 7 days with Meropenem 3/12 --> 3/19.  Cranioplasty planning 4/2 as per neurosurgery.  Continuing weaning from ventilator as tolerated.  Continuing to monitor for neurological improvement.       3/30: Blood cxs sent 3/26 NGTD. Patient remains on TC ATC ; FIO2 30%. Plan for cranioplasty 4/2.

## 2024-03-31 NOTE — PROGRESS NOTE ADULT - SUBJECTIVE AND OBJECTIVE BOX
DATE OF SERVICE: 03-31-24 @ 13:25    Patient is a 78y old  Female who presents with a chief complaint of ICH (31 Mar 2024 08:40)      INTERVAL HISTORY: no complaints     REVIEW OF SYSTEMS:  CONSTITUTIONAL: No weakness  EYES/ENT: No visual changes;  No throat pain   NECK: No pain or stiffness  RESPIRATORY: No cough, wheezing; No shortness of breath  CARDIOVASCULAR: No chest pain or palpitations  GASTROINTESTINAL: No abdominal  pain. No nausea, vomiting, or hematemesis  GENITOURINARY: No dysuria, frequency or hematuria  NEUROLOGICAL: No stroke like symptoms  SKIN: No rashes   	  MEDICATIONS:  amLODIPine   Tablet 10 milliGRAM(s) Oral daily  carvedilol 25 milliGRAM(s) Oral every 12 hours  cloNIDine Patch 0.1 mG/24Hr(s) 1 patch Transdermal every 7 days        PHYSICAL EXAM:  T(C): 36.4 (03-31-24 @ 10:00), Max: 36.7 (03-31-24 @ 01:00)  HR: 88 (03-31-24 @ 10:00) (77 - 97)  BP: 135/61 (03-31-24 @ 10:00) (113/56 - 154/87)  RR: 22 (03-31-24 @ 10:00) (18 - 22)  SpO2: 93% (03-31-24 @ 10:00) (93% - 100%)  Wt(kg): --  I&O's Summary    30 Mar 2024 07:01  -  31 Mar 2024 07:00  --------------------------------------------------------  IN: 730 mL / OUT: 600 mL / NET: 130 mL    31 Mar 2024 07:01  -  31 Mar 2024 13:25  --------------------------------------------------------  IN: 150 mL / OUT: 700 mL / NET: -550 mL          Appearance: In no distress	  HEENT:    PERRL, EOMI	  Cardiovascular:  S1 S2, No JVD  Respiratory: Lungs clear to auscultation	  Gastrointestinal:  Soft, Non-tender, + BS	  Vascularature:  No edema of LE  Psychiatric: Appropriate affect   Neuro: no acute focal deficits                               9.4    6.72  )-----------( 193      ( 31 Mar 2024 07:06 )             31.2     03-31    137  |  102  |  61<H>  ----------------------------<  261<H>  4.6   |  19<L>  |  1.24    Ca    10.6<H>      31 Mar 2024 07:08  Phos  3.7     03-31  Mg     2.4     03-31    TPro  6.9  /  Alb  3.5  /  TBili  0.2  /  DBili  x   /  AST  17  /  ALT  30  /  AlkPhos  101  03-31        Labs personally reviewed      ASSESSMENT/PLAN: 	      78F Hx ESRD s/p renal xplant c/b DGF off HD, L AKA, 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    1. Abnormal Echo --  Septal bulge noted measures 2.2cm without obstruction.   -- Given no intracavitary obstruction no intervention at this time  - No Myxoma seen on this echo or echo in 2019, ? if hypermobile interatrial septum was confused for on prior imaging     2. HTN - uncontrolled, needs improvement   Continue clonidine patch, Coreg 25 mg BID, amlodipine 10mg    3. 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 DVT PPX - HSQ            IMAN Moser DO [facility]  Cardiovascular Medicine  [address], [address]  Office: [phone]  Available via call/text on [employer] Teams

## 2024-03-31 NOTE — PROGRESS NOTE ADULT - ASSESSMENT
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   initial CTH3/1  with 8.9cm left frontal IPH with IVH .09cm rightward shift   3/2 CTH s/p L hmeicrani and resection of IPH. stable   3/5 CTH post op changes. some reorption of post op pneumocephalus.    s/p trach/peg 3/8/24   3/8 CTH L frontal craniectomy.  L MCA hemorrhage and infarct ; still IVH.   3/10 with + UA  A1c 5.7 LDL 46   TTE done 3/2: LA is severely dilated    Urine culture grew positive for klebsiella and enterococcus  3/9 EEG no seiuzres since 3/7;  risk of seiuzre from L parietal region. mod to severe diffuse cerebral dysfunction   Transferred to [facility] 3/11 for continued management.  3/12 CTH   sterotactic imaging of L frontal crani for hemorrhagic L MCA ifnarct. L frontal temporal pseudmeningocele  noted. no hcange since 3/8   o/e awake, no verbal, not followiing. L gaze pref  some minimal withdrawal to noxious RLE and LUE.  s/p trach /vent     Impression: L ICH possible 2/2  CAA but hemorrhagic infarct also needs to be considered.    - plan for cranioplasty 4/2   - hypothermic, infectious wokrup   - had recent CTH 3/12.  would repeat EEG at this time given high risk for seiuzres   - tolerating CPAP at times   - difficult to determine IPH 2/2 CAA without MRI to look at SWI or GRE sequeneses for hemosiderin deposition  - never had vessel imaging. consider CTA H/N   - no AC/AP. dvt ppx okay  - briviact 100mg TID  and vimpat 200mg BID for seiuzre ppx   - infectious workup. on meropenum.  this can lower seiuzre threshold   - immunosuppression on tacrolimus and prednisone.  ppx bactrim     -telemetry   - PT/OT   - check FS, glucose control <180  - GI/DVT ppx   - GOC with family.  currenlty full code; in terms of functional meaningful recovery the likelihood is low.  patient will require 24hr care and likely be bedbound at this time.    consider recalling palliative  Dieter Wilkinson MD  Vascular Neurology  Office: [phone]

## 2024-03-31 NOTE — PROGRESS NOTE ADULT - SUBJECTIVE AND OBJECTIVE BOX
Neurology        S: patient seen. no neuro changes, ill appearing         Medications: MEDICATIONS  (STANDING):  amLODIPine   Tablet 10 milliGRAM(s) Oral daily  artificial  tears Solution 1 Drop(s) Both EYES every 4 hours  Biotene Dry Mouth Oral Rinse 5 milliLiter(s) Swish and Spit every 6 hours  brivaracetam Oral Solution 100 milliGRAM(s) Oral <User Schedule>  carvedilol 25 milliGRAM(s) Oral every 12 hours  cloNIDine Patch 0.1 mG/24Hr(s) 1 patch Transdermal every 7 days  heparin   Injectable 5000 Unit(s) SubCutaneous every 12 hours  insulin glargine Injectable (LANTUS) 16 Unit(s) SubCutaneous at bedtime  insulin lispro (ADMELOG) corrective regimen sliding scale   SubCutaneous three times a day before meals  lacosamide Solution 200 milliGRAM(s) Oral two times a day  nystatin    Suspension 190099 Unit(s) Swish and Swallow every 6 hours  pantoprazole   Suspension 40 milliGRAM(s) Oral two times a day  predniSONE   Tablet 5 milliGRAM(s) Oral daily  sodium zirconium cyclosilicate 10 Gram(s) Oral every other day  tacrolimus    0.5 mG/mL Suspension 3 milliGRAM(s) Oral every 12 hours  trimethoprim   80 mG/sulfamethoxazole 400 mG 1 Tablet(s) Oral daily    MEDICATIONS  (PRN):  acetaminophen   Oral Liquid .. 650 milliGRAM(s) Oral every 6 hours PRN Temp greater or equal to 38C (100.4F), Mild Pain (1 - 3)  albuterol/ipratropium for Nebulization 3 milliLiter(s) Nebulizer every 6 hours PRN Shortness of Breath and/or Wheezing       Vitals:  Vital Signs Last 24 Hrs  T(C): 36.6 (31 Mar 2024 06:11), Max: 36.7 (30 Mar 2024 10:00)  T(F): 97.9 (31 Mar 2024 06:11), Max: 98.1 (30 Mar 2024 10:00)  HR: 77 (31 Mar 2024 09:00) (77 - 97)  BP: 138/80 (31 Mar 2024 06:11) (110/68 - 154/87)  BP(mean): --  RR: 18 (31 Mar 2024 09:00) (18 - 20)  SpO2: 100% (31 Mar 2024 09:00) (98% - 100%)    Parameters below as of 31 Mar 2024 09:00  Patient On (Oxygen Delivery Method): tracheostomy collar    O2 Concentration (%): 28        General Exam:   General Appearance: Appropriately dressed    Head: Normocephalic, atraumatic and no dysmorphic features s/p trach   Ear, Nose, and Throat: Moist mucous membranes  CVS: S1S2+  Resp: No SOB, no wheeze or rhonchi on vent   GI: soft NT/ND s/p PEG   Extremities:  L AKA  Skin: No bruises or rashes     Neurological Exam:  Mental Status:  opens eyes to noxious. no verbal. not following   Cranial Nerves: PERRL, EOMI but gaze pref to L, no blink to threat on R, R facial,    Motor: minimal/no spontaneous movement ;   Sensation: grimaces to noxious at times. some minimal withdrawal LUE 2/5 and RLE.    Coordination: unable   Gait: unable     Data/Labs/Imaging which I personally reviewed.          LABS:                          9.4    6.72  )-----------( 193      ( 31 Mar 2024 07:06 )             31.2     03-31    137  |  102  |  61<H>  ----------------------------<  261<H>  4.6   |  19<L>  |  1.24    Ca    10.6<H>      31 Mar 2024 07:08  Phos  3.7     03-31  Mg     2.4     03-31    TPro  6.9  /  Alb  3.5  /  TBili  0.2  /  DBili  x   /  AST  17  /  ALT  30  /  AlkPhos  101  03-31    LIVER FUNCTIONS - ( 31 Mar 2024 07:08 )  Alb: 3.5 g/dL / Pro: 6.9 g/dL / ALK PHOS: 101 U/L / ALT: 30 U/L / AST: 17 U/L / GGT: x             Urinalysis Basic - ( 31 Mar 2024 07:08 )    Color: x / Appearance: x / SG: x / pH: x  Gluc: 261 mg/dL / Ketone: x  / Bili: x / Urobili: x   Blood: x / Protein: x / Nitrite: x   Leuk Esterase: x / RBC: x / WBC x   Sq Epi: x / Non Sq Epi: x / Bacteria: x

## 2024-04-01 ENCOUNTER — TRANSCRIPTION ENCOUNTER (OUTPATIENT)
Age: 79
End: 2024-04-01

## 2024-04-01 LAB
ALBUMIN SERPL ELPH-MCNC: 3.6 G/DL — SIGNIFICANT CHANGE UP (ref 3.3–5)
ALP SERPL-CCNC: 105 U/L — SIGNIFICANT CHANGE UP (ref 40–120)
ALT FLD-CCNC: 27 U/L — SIGNIFICANT CHANGE UP (ref 10–45)
ANION GAP SERPL CALC-SCNC: 17 MMOL/L — SIGNIFICANT CHANGE UP (ref 5–17)
APTT BLD: 26.3 SEC — SIGNIFICANT CHANGE UP (ref 24.5–35.6)
AST SERPL-CCNC: 18 U/L — SIGNIFICANT CHANGE UP (ref 10–40)
BILIRUB SERPL-MCNC: 0.2 MG/DL — SIGNIFICANT CHANGE UP (ref 0.2–1.2)
BLD GP AB SCN SERPL QL: NEGATIVE — SIGNIFICANT CHANGE UP
BUN SERPL-MCNC: 63 MG/DL — HIGH (ref 7–23)
CALCIUM SERPL-MCNC: 11 MG/DL — HIGH (ref 8.4–10.5)
CHLORIDE SERPL-SCNC: 100 MMOL/L — SIGNIFICANT CHANGE UP (ref 96–108)
CO2 SERPL-SCNC: 21 MMOL/L — LOW (ref 22–31)
CREAT SERPL-MCNC: 1.31 MG/DL — HIGH (ref 0.5–1.3)
EGFR: 41 ML/MIN/1.73M2 — LOW
GLUCOSE BLDC GLUCOMTR-MCNC: 238 MG/DL — HIGH (ref 70–99)
GLUCOSE BLDC GLUCOMTR-MCNC: 291 MG/DL — HIGH (ref 70–99)
GLUCOSE BLDC GLUCOMTR-MCNC: 311 MG/DL — HIGH (ref 70–99)
GLUCOSE BLDC GLUCOMTR-MCNC: 343 MG/DL — HIGH (ref 70–99)
GLUCOSE BLDC GLUCOMTR-MCNC: 344 MG/DL — HIGH (ref 70–99)
GLUCOSE SERPL-MCNC: 300 MG/DL — HIGH (ref 70–99)
HCT VFR BLD CALC: 33.9 % — LOW (ref 34.5–45)
HGB BLD-MCNC: 10.1 G/DL — LOW (ref 11.5–15.5)
INR BLD: 1.01 RATIO — SIGNIFICANT CHANGE UP (ref 0.85–1.18)
MAGNESIUM SERPL-MCNC: 2.2 MG/DL — SIGNIFICANT CHANGE UP (ref 1.6–2.6)
MCHC RBC-ENTMCNC: 29.3 PG — SIGNIFICANT CHANGE UP (ref 27–34)
MCHC RBC-ENTMCNC: 29.8 GM/DL — LOW (ref 32–36)
MCV RBC AUTO: 98.3 FL — SIGNIFICANT CHANGE UP (ref 80–100)
MRSA PCR RESULT.: SIGNIFICANT CHANGE UP
NRBC # BLD: 0 /100 WBCS — SIGNIFICANT CHANGE UP (ref 0–0)
PHOSPHATE SERPL-MCNC: 4.1 MG/DL — SIGNIFICANT CHANGE UP (ref 2.5–4.5)
PLATELET # BLD AUTO: 174 K/UL — SIGNIFICANT CHANGE UP (ref 150–400)
POTASSIUM SERPL-MCNC: 4.7 MMOL/L — SIGNIFICANT CHANGE UP (ref 3.5–5.3)
POTASSIUM SERPL-SCNC: 4.7 MMOL/L — SIGNIFICANT CHANGE UP (ref 3.5–5.3)
PROT SERPL-MCNC: 7.1 G/DL — SIGNIFICANT CHANGE UP (ref 6–8.3)
PROTHROM AB SERPL-ACNC: 11.1 SEC — SIGNIFICANT CHANGE UP (ref 9.5–13)
RBC # BLD: 3.45 M/UL — LOW (ref 3.8–5.2)
RBC # FLD: 17.2 % — HIGH (ref 10.3–14.5)
RH IG SCN BLD-IMP: POSITIVE — SIGNIFICANT CHANGE UP
S AUREUS DNA NOSE QL NAA+PROBE: SIGNIFICANT CHANGE UP
SODIUM SERPL-SCNC: 138 MMOL/L — SIGNIFICANT CHANGE UP (ref 135–145)
TACROLIMUS SERPL-MCNC: 8.2 NG/ML — SIGNIFICANT CHANGE UP
WBC # BLD: 4.54 K/UL — SIGNIFICANT CHANGE UP (ref 3.8–10.5)
WBC # FLD AUTO: 4.54 K/UL — SIGNIFICANT CHANGE UP (ref 3.8–10.5)

## 2024-04-01 PROCEDURE — 99232 SBSQ HOSP IP/OBS MODERATE 35: CPT | Mod: GC

## 2024-04-01 PROCEDURE — 99233 SBSQ HOSP IP/OBS HIGH 50: CPT

## 2024-04-01 PROCEDURE — 93970 EXTREMITY STUDY: CPT | Mod: 26

## 2024-04-01 RX ORDER — INSULIN GLARGINE 100 [IU]/ML
20 INJECTION, SOLUTION SUBCUTANEOUS AT BEDTIME
Refills: 0 | Status: DISCONTINUED | OUTPATIENT
Start: 2024-04-01 | End: 2024-04-02

## 2024-04-01 RX ORDER — CHLORHEXIDINE GLUCONATE 213 G/1000ML
1 SOLUTION TOPICAL ONCE
Refills: 0 | Status: COMPLETED | OUTPATIENT
Start: 2024-04-01 | End: 2024-04-01

## 2024-04-01 RX ORDER — SODIUM CHLORIDE 9 MG/ML
1000 INJECTION INTRAMUSCULAR; INTRAVENOUS; SUBCUTANEOUS
Refills: 0 | Status: DISCONTINUED | OUTPATIENT
Start: 2024-04-01 | End: 2024-04-02

## 2024-04-01 RX ORDER — FUROSEMIDE 40 MG
40 TABLET ORAL ONCE
Refills: 0 | Status: COMPLETED | OUTPATIENT
Start: 2024-04-01 | End: 2024-04-01

## 2024-04-01 RX ADMIN — BRIVARACETAM 100 MILLIGRAM(S): 25 TABLET, FILM COATED ORAL at 05:19

## 2024-04-01 RX ADMIN — Medication 1 TABLET(S): at 11:11

## 2024-04-01 RX ADMIN — TACROLIMUS 3 MILLIGRAM(S): 5 CAPSULE ORAL at 17:16

## 2024-04-01 RX ADMIN — BRIVARACETAM 100 MILLIGRAM(S): 25 TABLET, FILM COATED ORAL at 21:13

## 2024-04-01 RX ADMIN — Medication 1 PATCH: at 17:37

## 2024-04-01 RX ADMIN — Medication 5 MILLILITER(S): at 17:16

## 2024-04-01 RX ADMIN — Medication 500000 UNIT(S): at 23:21

## 2024-04-01 RX ADMIN — CHLORHEXIDINE GLUCONATE 1 APPLICATION(S): 213 SOLUTION TOPICAL at 21:14

## 2024-04-01 RX ADMIN — Medication 1 PATCH: at 11:11

## 2024-04-01 RX ADMIN — HEPARIN SODIUM 5000 UNIT(S): 5000 INJECTION INTRAVENOUS; SUBCUTANEOUS at 05:18

## 2024-04-01 RX ADMIN — PANTOPRAZOLE SODIUM 40 MILLIGRAM(S): 20 TABLET, DELAYED RELEASE ORAL at 17:18

## 2024-04-01 RX ADMIN — Medication 5 MILLILITER(S): at 05:17

## 2024-04-01 RX ADMIN — CARVEDILOL PHOSPHATE 25 MILLIGRAM(S): 80 CAPSULE, EXTENDED RELEASE ORAL at 17:17

## 2024-04-01 RX ADMIN — TACROLIMUS 3 MILLIGRAM(S): 5 CAPSULE ORAL at 06:35

## 2024-04-01 RX ADMIN — Medication 500000 UNIT(S): at 17:17

## 2024-04-01 RX ADMIN — Medication 500000 UNIT(S): at 11:11

## 2024-04-01 RX ADMIN — LACOSAMIDE 200 MILLIGRAM(S): 50 TABLET ORAL at 06:20

## 2024-04-01 RX ADMIN — Medication 1 PATCH: at 07:24

## 2024-04-01 RX ADMIN — Medication 5 MILLILITER(S): at 23:21

## 2024-04-01 RX ADMIN — Medication 1 PATCH: at 12:00

## 2024-04-01 RX ADMIN — Medication 5 MILLILITER(S): at 11:11

## 2024-04-01 RX ADMIN — Medication 1 DROP(S): at 21:13

## 2024-04-01 RX ADMIN — PANTOPRAZOLE SODIUM 40 MILLIGRAM(S): 20 TABLET, DELAYED RELEASE ORAL at 05:18

## 2024-04-01 RX ADMIN — Medication 1 DROP(S): at 10:02

## 2024-04-01 RX ADMIN — Medication 40 MILLIGRAM(S): at 10:02

## 2024-04-01 RX ADMIN — Medication 8: at 12:29

## 2024-04-01 RX ADMIN — Medication 5 MILLIGRAM(S): at 05:18

## 2024-04-01 RX ADMIN — LACOSAMIDE 200 MILLIGRAM(S): 50 TABLET ORAL at 17:22

## 2024-04-01 RX ADMIN — Medication 1 DROP(S): at 13:19

## 2024-04-01 RX ADMIN — INSULIN GLARGINE 20 UNIT(S): 100 INJECTION, SOLUTION SUBCUTANEOUS at 21:15

## 2024-04-01 RX ADMIN — Medication 500000 UNIT(S): at 05:18

## 2024-04-01 RX ADMIN — BRIVARACETAM 100 MILLIGRAM(S): 25 TABLET, FILM COATED ORAL at 13:16

## 2024-04-01 RX ADMIN — Medication 1 DROP(S): at 05:18

## 2024-04-01 RX ADMIN — Medication 4: at 17:17

## 2024-04-01 RX ADMIN — Medication 1 DROP(S): at 17:16

## 2024-04-01 RX ADMIN — CARVEDILOL PHOSPHATE 25 MILLIGRAM(S): 80 CAPSULE, EXTENDED RELEASE ORAL at 05:18

## 2024-04-01 RX ADMIN — Medication 6: at 05:33

## 2024-04-01 RX ADMIN — AMLODIPINE BESYLATE 10 MILLIGRAM(S): 2.5 TABLET ORAL at 05:18

## 2024-04-01 RX ADMIN — Medication 1 DROP(S): at 01:42

## 2024-04-01 NOTE — PROGRESS NOTE ADULT - SUBJECTIVE AND OBJECTIVE BOX
Neurology        S: patient seen. no neuro changes, ill appearing           Medications: MEDICATIONS  (STANDING):  amLODIPine   Tablet 10 milliGRAM(s) Oral daily  artificial  tears Solution 1 Drop(s) Both EYES every 4 hours  Biotene Dry Mouth Oral Rinse 5 milliLiter(s) Swish and Spit every 6 hours  brivaracetam Oral Solution 100 milliGRAM(s) Oral <User Schedule>  carvedilol 25 milliGRAM(s) Oral every 12 hours  cloNIDine Patch 0.1 mG/24Hr(s) 1 patch Transdermal every 7 days  heparin   Injectable 5000 Unit(s) SubCutaneous every 12 hours  insulin glargine Injectable (LANTUS) 16 Unit(s) SubCutaneous at bedtime  insulin lispro (ADMELOG) corrective regimen sliding scale   SubCutaneous three times a day before meals  lacosamide Solution 200 milliGRAM(s) Oral two times a day  nystatin    Suspension 721971 Unit(s) Swish and Swallow every 6 hours  pantoprazole   Suspension 40 milliGRAM(s) Oral two times a day  predniSONE   Tablet 5 milliGRAM(s) Oral daily  sodium zirconium cyclosilicate 10 Gram(s) Oral every other day  tacrolimus    0.5 mG/mL Suspension 3 milliGRAM(s) Oral every 12 hours  trimethoprim   80 mG/sulfamethoxazole 400 mG 1 Tablet(s) Oral daily    MEDICATIONS  (PRN):  acetaminophen   Oral Liquid .. 650 milliGRAM(s) Oral every 6 hours PRN Temp greater or equal to 38C (100.4F), Mild Pain (1 - 3)  albuterol/ipratropium for Nebulization 3 milliLiter(s) Nebulizer every 6 hours PRN Shortness of Breath and/or Wheezing       Vitals:  Vital Signs Last 24 Hrs  T(C): 37 (01 Apr 2024 06:00), Max: 37 (01 Apr 2024 06:00)  T(F): 98.6 (01 Apr 2024 06:00), Max: 98.6 (01 Apr 2024 06:00)  HR: 91 (01 Apr 2024 10:18) (74 - 96)  BP: 128/60 (01 Apr 2024 10:18) (128/60 - 163/78)  BP(mean): --  RR: 18 (01 Apr 2024 10:18) (17 - 20)  SpO2: 100% (01 Apr 2024 10:18) (97% - 100%)    Parameters below as of 01 Apr 2024 10:18  Patient On (Oxygen Delivery Method): tracheostomy collar    O2 Concentration (%): 28          General Exam:   General Appearance: Appropriately dressed    Head: Normocephalic, atraumatic and no dysmorphic features s/p trach   Ear, Nose, and Throat: Moist mucous membranes  CVS: S1S2+  Resp: No SOB, no wheeze or rhonchi on vent   GI: soft NT/ND s/p PEG   Extremities:  L AKA  Skin: No bruises or rashes     Neurological Exam:  Mental Status:  opens eyes to noxious. no verbal. not following   Cranial Nerves: PERRL, EOMI but gaze pref to L, no blink to threat on R, R facial,    Motor: minimal/no spontaneous movement ;   Sensation: grimaces to noxious at times. some minimal withdrawal LUE 2/5 and RLE.    Coordination: unable   Gait: unable     Data/Labs/Imaging which I personally reviewed.           LABS:                          10.1   4.54  )-----------( 174      ( 01 Apr 2024 07:48 )             33.9     04-01    138  |  100  |  63<H>  ----------------------------<  300<H>  4.7   |  21<L>  |  1.31<H>    Ca    11.0<H>      01 Apr 2024 07:48  Phos  4.1     04-01  Mg     2.2     04-01    TPro  7.1  /  Alb  3.6  /  TBili  0.2  /  DBili  x   /  AST  18  /  ALT  27  /  AlkPhos  105  04-01    LIVER FUNCTIONS - ( 01 Apr 2024 07:48 )  Alb: 3.6 g/dL / Pro: 7.1 g/dL / ALK PHOS: 105 U/L / ALT: 27 U/L / AST: 18 U/L / GGT: x           PT/INR - ( 01 Apr 2024 07:48 )   PT: 11.1 sec;   INR: 1.01 ratio         PTT - ( 01 Apr 2024 07:48 )  PTT:26.3 sec  Urinalysis Basic - ( 01 Apr 2024 07:48 )    Color: x / Appearance: x / SG: x / pH: x  Gluc: 300 mg/dL / Ketone: x  / Bili: x / Urobili: x   Blood: x / Protein: x / Nitrite: x   Leuk Esterase: x / RBC: x / WBC x   Sq Epi: x / Non Sq Epi: x / Bacteria: x

## 2024-04-01 NOTE — PROGRESS NOTE ADULT - SUBJECTIVE AND OBJECTIVE BOX
DATE OF SERVICE: 04-01-24 @ 12:32    Patient is a 78y old  Female who presents with a chief complaint of ICH (01 Apr 2024 09:27)      INTERVAL HISTORY: No acute events    REVIEW OF SYSTEMS:  CONSTITUTIONAL: No weakness  EYES/ENT: No visual changes;  No throat pain   NECK: No pain or stiffness  RESPIRATORY: No cough, wheezing; No shortness of breath  CARDIOVASCULAR: No chest pain or palpitations  GASTROINTESTINAL: No abdominal  pain. No nausea, vomiting, or hematemesis  GENITOURINARY: No dysuria, frequency or hematuria  NEUROLOGICAL: No stroke like symptoms  SKIN: No rashes      	  MEDICATIONS:  amLODIPine   Tablet 10 milliGRAM(s) Oral daily  carvedilol 25 milliGRAM(s) Oral every 12 hours  cloNIDine Patch 0.1 mG/24Hr(s) 1 patch Transdermal every 7 days        PHYSICAL EXAM:  T(C): 37 (04-01-24 @ 06:00), Max: 37 (04-01-24 @ 06:00)  HR: 91 (04-01-24 @ 10:18) (74 - 96)  BP: 128/60 (04-01-24 @ 10:18) (128/60 - 163/78)  RR: 18 (04-01-24 @ 10:18) (17 - 20)  SpO2: 100% (04-01-24 @ 10:18) (97% - 100%)  Wt(kg): --  I&O's Summary    31 Mar 2024 07:01  -  01 Apr 2024 07:00  --------------------------------------------------------  IN: 5949 mL / OUT: 2001 mL / NET: 3948 mL          Appearance: In no distress	  HEENT:    PERRL, EOMI	  Cardiovascular:  S1 S2, No JVD  Respiratory: Lungs clear to auscultation	  Gastrointestinal:  Soft, Non-tender, + BS	  Vascularature:  No edema of LE  Psychiatric: Appropriate affect   Neuro: no acute focal deficits                               10.1   4.54  )-----------( 174      ( 01 Apr 2024 07:48 )             33.9     04-01    138  |  100  |  63<H>  ----------------------------<  300<H>  4.7   |  21<L>  |  1.31<H>    Ca    11.0<H>      01 Apr 2024 07:48  Phos  4.1     04-01  Mg     2.2     04-01    TPro  7.1  /  Alb  3.6  /  TBili  0.2  /  DBili  x   /  AST  18  /  ALT  27  /  AlkPhos  105  04-01        Labs personally reviewed      ASSESSMENT/PLAN: 	  78F Hx ESRD s/p renal xplant c/b DGF off HD, L AKA, 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    1. Abnormal Echo --  Septal bulge noted measures 2.2cm without obstruction.   -- Given no intracavitary obstruction no intervention at this time  - No Myxoma seen on this echo or echo in 2019, ? if hypermobile interatrial septum was confused for on prior imaging     2. HTN - uncontrolled, needs improvement   Continue clonidine patch, Coreg 25 mg BID, amlodipine 10mg    3. 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 DVT PPX - HSQ          LAURA Gomez DO [facility]  Cardiovascular Medicine  [address], [address]  Available via call or text on [employer]  Office: [phone]

## 2024-04-01 NOTE — PRE PROCEDURE NOTE - PRE PROCEDURE EVALUATION
Patient is optimized for left cranioplasty with Dr. Torres tomorrow     Informed consent was obtained from patient's daughter    Exam: Trached, PARISH, PERRL, L gaze, +corneals, not FC, LUE non spont twitch to pain, RUE brisk wd, BLE TF.

## 2024-04-01 NOTE — PROGRESS NOTE ADULT - ASSESSMENT
77 yo F with PMHx ESRD s/p renal transplant (2019, now off HD), left AKA, BK viremia, HTN, breast ca s/p lumpectomy (completed Tamoxifen 03/2023), DVT (off Eliquis), HLD, gout presenting 3/1 with left ICH (ICH score 4) likely 2/2 amyloid angiopathy s/p left craniectomy(discarded) and evacuation as well as EVD placement and removal (3/7). She is s/p trach/peg 3/8/24.  Febrile 3/10 with + UA, blood/sputum culture NGTD.  Treatment for UTI initiated with ceftriaxone, eventually broadened to cefepime.  Transferred to [facility] 3/11 for continued management.  Pt arrived from [facility] with minimal mental status with only response of spontaneous eye opening and withdrawal on RLE.  Urine culture resulted - positive for klebsiella, treated for 7 days with Meropenem 3/12 --> 3/19.  Cranioplasty planning 4/2 as per neurosurgery.  Continuing weaning from ventilator as tolerated.  Continuing to monitor for neurological improvement.       3/30: Blood cxs sent 3/26 NGTD. Patient remains on TC ATC ; FIO2 30%. Plan for cranioplasty 4/2. 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 She is s/p trach/peg 3/8/24.  Febrile 3/10 with + UA, blood/sputum culture NGTD.  Treatment for UTI initiated with ceftriaxone, eventually broadened to cefepime.  Transferred to [facility] 3/11 for continued management.  Pt arrived from NSCU with minimal mental status with only response of spontaneous eye opening and withdrawal on RLE.  Urine culture resulted - positive for klebsiella, treated for 7 days with Meropenem 3/12 --> 3/19.  Cranioplasty planning 4/2 as per neurosurgery.  Continuing weaning from ventilator as tolerated.  Continuing to monitor for neurological improvement.       4/1: Blood cxs sent 3/26 NGTD. Patient remains on TC ATC ; FIO2 30%. NPO after midnight for cranioplasty 4/2.

## 2024-04-01 NOTE — PROGRESS NOTE ADULT - ASSESSMENT
79 y/o Female with polycystic kidney disease ESRD s/p renal transplant in 2019 presented with ICH - had Craniotomy and EVD    1. DDRT in 2019 - Pt. with ESRD, underwent DDRT in 2019. On review of [place], Scr was 1.86 on 10/31/23. SCr was WNL at 1.18 on admission (3/2), peaked to 1.64 (3/122). Improved to 10 and today Scr is 1.3. Pt. with likely underlying CKD, and Scr likely at baseline. Continue with immunosuppression regimen, as outlined below. Monitor labs and urine output. Avoid nephrotoxins. Dose medications as per eGFR.    2. IS  -Monitor tacro trough, (goal: 4-7). Today tacro trough is 8.2.   - Please recheck tacro trough - 30min before the AM dose.   -Continue prednisone 5 mg qd and tacro 3mg bid  -Leflunomide currently on hold (? BK viremia vs RA).     3. Hyperkalemia  Resolved. Trend and monitor K.   -continue medical management   -Monitor labs, low K/renal diet as appropriate.

## 2024-04-01 NOTE — PROGRESS NOTE ADULT - SUBJECTIVE AND OBJECTIVE BOX
Patient is a 78y old  Female who presents with a chief complaint of ICH (31 Mar 2024 13:25)    HPI:  Nury Adam   78F Hx ESRD s/p renal xplant c/b DGF off HD, L AKA, 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 ICH score 4.     (02 Mar 2024 00:28)    Interval Events:    REVIEW OF SYSTEMS:  [ ] Positive  [ ] All other systems negative  [ ] Unable to assess ROS because ________    Vital Signs Last 24 Hrs  T(C): 37 (04-01-24 @ 06:00), Max: 37 (04-01-24 @ 06:00)  T(F): 98.6 (04-01-24 @ 06:00), Max: 98.6 (04-01-24 @ 06:00)  HR: 92 (04-01-24 @ 06:00) (74 - 92)  BP: 159/86 (04-01-24 @ 06:00) (130/70 - 163/78)  RR: 19 (04-01-24 @ 06:00) (18 - 22)  SpO2: 100% (04-01-24 @ 06:00) (93% - 100%)    PHYSICAL EXAM:  HEENT:   [ ]Tracheostomy:  [ ]Pupils equal  [ ]No oral lesions  [ ]Abnormal    SKIN  [ ] No Rash  [ ] Abnormal  [ ] pressure    CARDIAC  [ ]Regular  [ ]Abnormal    PULMONARY  [ ]Bilateral Clear Breath Sounds  [ ]Normal Excursion  [ ]Abnormal    GI  [ ]PEG      [ ] +BS		              [ ]Soft, nondistended, nontender	  [ ]Abnormal    MUSCULOSKELETAL                                   [ ]Bedbound                 [ ]Abnormal    [ ]Ambulatory/OOB to chair                           EXTREMITIES                                         [ ]Normal  [ ]Edema                           NEUROLOGIC  [ ] Normal, non focal  [ ] Focal findings:    PSYCHIATRIC  [ ]Alert and appropriate  [ ] Sedated	 [ ]Agitated    :  [place]: [ ] Yes, if yes: Date of Placement:                   [  ] No    LINES: Central Lines [ ] Yes, if yes: Date of Placement                                     [  ] No    HOSPITAL MEDICATIONS:  MEDICATIONS  (STANDING):  amLODIPine   Tablet 10 milliGRAM(s) Oral daily  artificial  tears Solution 1 Drop(s) Both EYES every 4 hours  Biotene Dry Mouth Oral Rinse 5 milliLiter(s) Swish and Spit every 6 hours  brivaracetam Oral Solution 100 milliGRAM(s) Oral <User Schedule>  carvedilol 25 milliGRAM(s) Oral every 12 hours  cloNIDine Patch 0.1 mG/24Hr(s) 1 patch Transdermal every 7 days  heparin   Injectable 5000 Unit(s) SubCutaneous every 12 hours  insulin glargine Injectable (LANTUS) 16 Unit(s) SubCutaneous at bedtime  insulin lispro (ADMELOG) corrective regimen sliding scale   SubCutaneous three times a day before meals  lacosamide Solution 200 milliGRAM(s) Oral two times a day  nystatin    Suspension 988168 Unit(s) Swish and Swallow every 6 hours  pantoprazole   Suspension 40 milliGRAM(s) Oral two times a day  predniSONE   Tablet 5 milliGRAM(s) Oral daily  sodium zirconium cyclosilicate 10 Gram(s) Oral every other day  tacrolimus    0.5 mG/mL Suspension 3 milliGRAM(s) Oral every 12 hours  trimethoprim   80 mG/sulfamethoxazole 400 mG 1 Tablet(s) Oral daily    MEDICATIONS  (PRN):  acetaminophen   Oral Liquid .. 650 milliGRAM(s) Oral every 6 hours PRN Temp greater or equal to 38C (100.4F), Mild Pain (1 - 3)  albuterol/ipratropium for Nebulization 3 milliLiter(s) Nebulizer every 6 hours PRN Shortness of Breath and/or Wheezing      LABS:                        9.4    6.72  )-----------( 193      ( 31 Mar 2024 07:06 )             31.2     03-31    137  |  102  |  61<H>  ----------------------------<  261<H>  4.6   |  19<L>  |  1.24    Ca    10.6<H>      31 Mar 2024 07:08  Phos  3.7     03-31  Mg     2.4     03-31    TPro  6.9  /  Alb  3.5  /  TBili  0.2  /  DBili  x   /  AST  17  /  ALT  30  /  AlkPhos  101  03-31 Patient is a 78y old  Female who presents with a chief complaint of ICH (31 Mar 2024 13:25)    HPI:  Nury Adam   78F Hx ESRD s/p renal xplant c/b DGF off HD, L AKA, 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 ICH score 4.     (02 Mar 2024 00:28)    Interval Events: No issues overnight    REVIEW OF SYSTEMS:  [ ] Positive  [ ] All other systems negative  [x ] Unable to assess ROS because patient is obtunded    Vital Signs Last 24 Hrs  T(C): 37 (04-01-24 @ 06:00), Max: 37 (04-01-24 @ 06:00)  T(F): 98.6 (04-01-24 @ 06:00), Max: 98.6 (04-01-24 @ 06:00)  HR: 92 (04-01-24 @ 06:00) (74 - 92)  BP: 159/86 (04-01-24 @ 06:00) (130/70 - 163/78)  RR: 19 (04-01-24 @ 06:00) (18 - 22)  SpO2: 100% (04-01-24 @ 06:00) (93% - 100%)    PHYSICAL EXAM:  HEENT:  [x]Tracheostomy: #7 cuffed portex  [x]Pupils equal  [ ]No oral lesions  [x]Abnormal: left skull with mild depression, incision site c/d/i    SKIN  [ ]No Rash  [x] Abnormal - left temporal incision s/p craniectomy site c/d/i, L BKA site c/d/i  [x] pressure - sacral DTI    CARDIAC  [x]Regular  [ ]Abnormal    PULMONARY  [x]Bilateral Clear Breath Sounds  [ ]Normal Excursion  [ ]Abnormal    GI  [x]PEG      [x] +BS		              [x]Soft, nondistended, nontender	  [x]Abnormal: Rectal tube    MUSCULOSKELETAL                                   [x]Bedbound                 [x]Abnormal: L BKA  [ ]Ambulatory/OOB to chair                           EXTREMITIES                                         [x]Normal  [ ]Edema                           NEUROLOGIC  [ ] Normal, non focal  [x] Focal findings: Eyes open, withdraws from pain on all extremities, Spontaneously moving Left UE     PSYCHIATRIC  [x] Unable to assess  [ ] Sedated	 [ ]Agitated    :  Jj: [ ] Yes, if yes: Date of Placement:                   [x] No    LINES: Central Lines [ ] Yes, if yes: Date of Placement                                     [x] No    HOSPITAL MEDICATIONS:  MEDICATIONS  (STANDING):  amLODIPine   Tablet 10 milliGRAM(s) Oral daily  artificial  tears Solution 1 Drop(s) Both EYES every 4 hours  Biotene Dry Mouth Oral Rinse 5 milliLiter(s) Swish and Spit every 6 hours  brivaracetam Oral Solution 100 milliGRAM(s) Oral <User Schedule>  carvedilol 25 milliGRAM(s) Oral every 12 hours  cloNIDine Patch 0.1 mG/24Hr(s) 1 patch Transdermal every 7 days  heparin   Injectable 5000 Unit(s) SubCutaneous every 12 hours  insulin glargine Injectable (LANTUS) 16 Unit(s) SubCutaneous at bedtime  insulin lispro (ADMELOG) corrective regimen sliding scale   SubCutaneous three times a day before meals  lacosamide Solution 200 milliGRAM(s) Oral two times a day  nystatin    Suspension 573106 Unit(s) Swish and Swallow every 6 hours  pantoprazole   Suspension 40 milliGRAM(s) Oral two times a day  predniSONE   Tablet 5 milliGRAM(s) Oral daily  sodium zirconium cyclosilicate 10 Gram(s) Oral every other day  tacrolimus    0.5 mG/mL Suspension 3 milliGRAM(s) Oral every 12 hours  trimethoprim   80 mG/sulfamethoxazole 400 mG 1 Tablet(s) Oral daily    MEDICATIONS  (PRN):  acetaminophen   Oral Liquid .. 650 milliGRAM(s) Oral every 6 hours PRN Temp greater or equal to 38C (100.4F), Mild Pain (1 - 3)  albuterol/ipratropium for Nebulization 3 milliLiter(s) Nebulizer every 6 hours PRN Shortness of Breath and/or Wheezing      LABS:                        9.4    6.72  )-----------( 193      ( 31 Mar 2024 07:06 )             31.2     03-31    137  |  102  |  61<H>  ----------------------------<  261<H>  4.6   |  19<L>  |  1.24    Ca    10.6<H>      31 Mar 2024 07:08  Phos  3.7     03-31  Mg     2.4     03-31    TPro  6.9  /  Alb  3.5  /  TBili  0.2  /  DBili  x   /  AST  17  /  ALT  30  /  AlkPhos  101  03-31

## 2024-04-01 NOTE — PROGRESS NOTE ADULT - SUBJECTIVE AND OBJECTIVE BOX
[facility] DIVISION OF KIDNEY DISEASES AND HYPERTENSION -- FOLLOW UP NOTE  --------------------------------------------------------------------------------  Chief Complaint:    24 hour events/subjective:  Pt was seen at the bedside with family members at bedside. No acute overnight events. Pt is non- verbal at baseline. Not responding to commands.         PAST HISTORY  --------------------------------------------------------------------------------  No significant changes to PMH, PSH, FHx, SHx, unless otherwise noted    ALLERGIES & MEDICATIONS  --------------------------------------------------------------------------------  Allergies    shellfish (Rash)  ChloraPrep One-Step (Rash)  penicillins (Rash)    Intolerances      Standing Inpatient Medications  amLODIPine   Tablet 10 milliGRAM(s) Oral daily  artificial  tears Solution 1 Drop(s) Both EYES every 4 hours  Biotene Dry Mouth Oral Rinse 5 milliLiter(s) Swish and Spit every 6 hours  brivaracetam Oral Solution 100 milliGRAM(s) Oral <User Schedule>  carvedilol 25 milliGRAM(s) Oral every 12 hours  chlorhexidine 4% Liquid 1 Application(s) Topical once  cloNIDine Patch 0.1 mG/24Hr(s) 1 patch Transdermal every 7 days  insulin glargine Injectable (LANTUS) 16 Unit(s) SubCutaneous at bedtime  insulin lispro (ADMELOG) corrective regimen sliding scale   SubCutaneous three times a day before meals  lacosamide Solution 200 milliGRAM(s) Oral two times a day  nystatin    Suspension 457138 Unit(s) Swish and Swallow every 6 hours  pantoprazole   Suspension 40 milliGRAM(s) Oral two times a day  predniSONE   Tablet 5 milliGRAM(s) Oral daily  sodium chloride 0.9%. 1000 milliLiter(s) IV Continuous <Continuous>  sodium zirconium cyclosilicate 10 Gram(s) Oral every other day  tacrolimus    0.5 mG/mL Suspension 3 milliGRAM(s) Oral every 12 hours  trimethoprim   80 mG/sulfamethoxazole 400 mG 1 Tablet(s) Oral daily    PRN Inpatient Medications  acetaminophen   Oral Liquid .. 650 milliGRAM(s) Oral every 6 hours PRN  albuterol/ipratropium for Nebulization 3 milliLiter(s) Nebulizer every 6 hours PRN      REVIEW OF SYSTEMS  --------------------------------------------------------------------------------  Not obtainable.     VITALS/PHYSICAL EXAM  --------------------------------------------------------------------------------  T(C): 36.9 (04-01-24 @ 14:30), Max: 37.7 (04-01-24 @ 11:30)  HR: 96 (04-01-24 @ 15:14) (74 - 96)  BP: 155/84 (04-01-24 @ 14:00) (128/60 - 163/78)  RR: 18 (04-01-24 @ 15:14) (17 - 20)  SpO2: 97% (04-01-24 @ 15:14) (97% - 100%)  Wt(kg): --        03-31-24 @ 07:01  -  04-01-24 @ 07:00  --------------------------------------------------------  IN: 5949 mL / OUT: 2001 mL / NET: 3948 mL    04-01-24 @ 07:01  -  04-01-24 @ 17:19  --------------------------------------------------------  IN: 390 mL / OUT: 850 mL / NET: -460 mL      Physical Exam:  Gen: ill appearing, NAD  HEENT: Lt sided craniotomy done with out bony reconstruction.  +Trach   Pulm: trach collar   CV: RRR, S1S2;  Abd: +BS, soft, nontender/nondistended  Extremities: no bilateral LE edema noted. +LLE AKA  Neuro: lethargic   Skin: Warm        LABS/STUDIES  --------------------------------------------------------------------------------              10.1   4.54  >-----------<  174      [04-01-24 @ 07:48]              33.9     138  |  100  |  63  ----------------------------<  300      [04-01-24 @ 07:48]  4.7   |  21  |  1.31        Ca     11.0     [04-01-24 @ 07:48]      Mg     2.2     [04-01-24 @ 07:48]      Phos  4.1     [04-01-24 @ 07:48]    TPro  7.1  /  Alb  3.6  /  TBili  0.2  /  DBili  x   /  AST  18  /  ALT  27  /  AlkPhos  105  [04-01-24 @ 07:48]    PT/INR: PT 11.1 , INR 1.01       [04-01-24 @ 07:48]  PTT: 26.3       [04-01-24 @ 07:48]      Creatinine Trend:  SCr 1.31 [04-01 @ 07:48]  SCr 1.24 [03-31 @ 07:08]  SCr 1.22 [03-30 @ 06:48]  SCr 1.26 [03-29 @ 05:43]  SCr 1.30 [03-28 @ 05:09]    Tacrolimus (), Serum: 8.2 ng/mL (04-01 @ 07:48)  Tacrolimus (), Serum: 7.1 ng/mL (03-31 @ 07:14)  Tacrolimus (), Serum: 6.2 ng/mL (03-30 @ 05:51)  Tacrolimus (), Serum: 4.2 ng/mL (03-29 @ 05:43)            Urinalysis - [04-01-24 @ 07:48]      Color  / Appearance  / SG  / pH       Gluc 300 / Ketone   / Bili  / Urobili        Blood  / Protein  / Leuk Est  / Nitrite       RBC  / WBC  / Hyaline  / Gran  / Sq Epi  / Non Sq Epi  / Bacteria       Iron 27, TIBC 201, %sat 13      [03-14-24 @ 06:49]  HbA1c 7.2      [01-11-20 @ 09:23]  TSH 1.24      [03-02-24 @ 10:51]  Lipid: chol 136, TG 95, HDL 72, LDL --      [03-02-24 @ 02:10]

## 2024-04-01 NOTE — PROGRESS NOTE ADULT - NS ATTEND AMEND GEN_ALL_CORE FT
78 year old female with a history of ESRD s/p renal transplant (2019) c/b BK viremia, left AKA, HTN, breast ca s/p lumpectomy (completed Tamoxifen 3/2023), DVT (off Eliquis), HLD, gout presenting with left ICH likely in the setting of amyloid angiopathy s/p left craniectomy (discarded) and evacuation (3/2/24), EVD placement and removal (3/7), c/b prolonged respiratory failure s/p trach/PEG (3/9/24) and RCU transfer.     #ICH 2/2 amyloid angiopathy s/p left craniectomy and evacuation, EVD placement and subsequent removal.   She clinically remains obtunded, reportedly will occasionally open her eyes to sternal rub, no meaningful communication.  - She is planned for the OR tomorrow 4/2/24 with neurosurgery  - From a respiratory perspective, she is tolerated trach collar without issue, no active infection. Her electrolytes are appropriate and continues to make good urine. She is euvolemic on my exam. She is medically optimized for planned procedure.    #Seizure: Her last seizures were seen 3/7/24  - Controlled on Briviact 100 TID, Vimpat 200 BID    #Respiratory failure: Initially with mixed hypoxic and hypercapnic respiratory failure. Now clinically improved. Tolerating trach collar aronud the clock since 3/23/24  - Wean O2 as tolerated, goal SpO2 90-95%  - Duonebs and hypersal q6 for airways clearance  - Will maintain cuffed trach until cranioplasty    #Urinary tract infection: Cultures with ESBL Kleb and VRE (3/10), she is s/p Meropenem  - Continue to monitor off antibiotics    #Hypertension: Well controlled on current regimen    #Renal transplant: Continue with Prednisone, Tacrolimus. Will confirm Bactrim with prophylaxis with Nephrology    #Hyperkalemia: On standing lokelma, adjusting based on measured potasssium    #Hyperglycemia: Goal blood glucose 140-180. Adjusting insulin accordingly.    #H.Pylori positivity: Per GI evaluation, will plan to start treatment as outlined above at the time of discharge.    #DVT 3/2 R CFV, Fem, Pop, Gastrocnemius and L CFV, Fem s/p IVC filter (3/3/24)  - Tolerating SQH without issue    Updated daughter at bedside. All questions answered. 78 year old female with a history of ESRD s/p renal transplant (2019) c/b BK viremia, left AKA, HTN, breast ca s/p lumpectomy (completed Tamoxifen 3/2023), DVT (off Eliquis), HLD, gout presenting with left ICH likely in the setting of amyloid angiopathy s/p left craniectomy (discarded) and evacuation (3/2/24), EVD placement and removal (3/7), c/b prolonged respiratory failure s/p trach/PEG (3/9/24) and RCU transfer.     #ICH 2/2 amyloid angiopathy s/p left craniectomy and evacuation, EVD placement and subsequent removal.   She clinically remains obtunded, reportedly will occasionally open her eyes to sternal rub, no meaningful communication.  - She is planned for the OR tomorrow 4/2/24 with neurosurgery  - From a respiratory perspective, she is tolerated trach collar without issue, no active infection. Her electrolytes are appropriate and continues to make good urine. She is euvolemic on my exam. She is medically optimized for planned procedure.    #Seizure: Her last seizures were seen 3/7/24  - Controlled on Briviact 100 TID, Vimpat 200 BID    #Respiratory failure: Initially with mixed hypoxic and hypercapnic respiratory failure. Now clinically improved. Tolerating trach collar aronud the clock since 3/23/24  - Wean O2 as tolerated, goal SpO2 90-95%  - Duonebs and hypersal q6 for airways clearance  - Will maintain cuffed trach until cranioplasty    #Urinary tract infection: Cultures with ESBL Kleb and VRE (3/10), she is s/p Meropenem  - Continue to monitor off antibiotics    #Hypertension: Well controlled on current regimen    #Renal transplant: Continue with Prednisone, Tacrolimus. Will confirm Bactrim with prophylaxis with Nephrology    #Hyperkalemia: On standing lokelma, adjusting based on measured potasssium    #Hyperglycemia: Goal blood glucose 140-180. Adjusting insulin accordingly.    #H.Pylori positivity: Per GI evaluation, will plan to start treatment as outlined above at the time of discharge.    #DVT 3/2 R CFV, Fem, Pop, Gastrocnemius and L CFV, Fem s/p IVC filter (3/3/24)  - Tolerating SQH without issue    Updated daughter at bedside. We discussed that likelihood of meaningful neurologic recovery was poor. For the family, blinking or opening her eyes for them is meaningful. She is to remain full code for now. All questions answered.

## 2024-04-01 NOTE — PRE PROCEDURE NOTE - ATTENDING COMMENTS
Pt seen on 4/1/24.  Pt has eyes open, moving left arm a little.  Left wound is well-healed and defect is moderately sunken.  CT with resolving left frontal hemorrhage and mildly sunken defect.  Pt afebrile with no active issues except for glucose control.  Plan is for cranioplasty 4/2/24.

## 2024-04-01 NOTE — PROGRESS NOTE ADULT - ASSESSMENT
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   initial CTH3/1  with 8.9cm left frontal IPH with IVH .09cm rightward shift   3/2 CTH s/p L hmeicrani and resection of IPH. stable   3/5 CTH post op changes. some reorption of post op pneumocephalus.    s/p trach/peg 3/8/24   3/8 CTH L frontal craniectomy.  L MCA hemorrhage and infarct ; still IVH.   3/10 with + UA  A1c 5.7 LDL 46   TTE done 3/2: LA is severely dilated    Urine culture grew positive for klebsiella and enterococcus  3/9 EEG no seiuzres since 3/7;  risk of seiuzre from L parietal region. mod to severe diffuse cerebral dysfunction   Transferred to RCU 3/11 for continued management.  3/12 CTH   sterotactic imaging of L frontal crani for hemorrhagic L MCA ifnarct. L frontal temporal pseudmeningocele  noted. no hcange since 3/8   o/e awake, no verbal, not followiing. L gaze pref  some minimal withdrawal to noxious RLE and LUE.  s/p trach /vent     Impression: L ICH possible 2/2  CAA but hemorrhagic infarct also needs to be considered.    - plan for cranioplasty 4/2   - hypothermic, infectious wokrup   - had recent CTH 3/12.  would repeat EEG at this time given high risk for seiuzres   - tolerating CPAP at times   - difficult to determine IPH 2/2 CAA without MRI to look at SWI or GRE sequeneses for hemosiderin deposition  - never had vessel imaging. consider CTA H/N   - no AC/AP. dvt ppx okay  - briviact 100mg TID  and vimpat 200mg BID for seiuzre ppx   - infectious workup. on meropenum.  this can lower seiuzre threshold   - immunosuppression on tacrolimus and prednisone.  ppx bactrim     -telemetry   - PT/OT   - check FS, glucose control <180  - GI/DVT ppx   - GOC with family.  currenlty full code; in terms of functional meaningful recovery the likelihood is low.  patient will require 24hr care and likely be bedbound at this time.    consider recalling palliative  Dieter Wilkinson MD  Vascular Neurology  Office: [phone]

## 2024-04-01 NOTE — PROGRESS NOTE ADULT - PROBLEM SELECTOR PLAN 3
Assessment and Recommendation:   · Assessment	  IMPRESSION:    Acute hypoxemic respiratory failure  HO COVID pneumonia   Worsening infiltrates RO opportunistic infection VS inflammatory / Fibrotic stage   HO RA Chronic steroid use     PLAN:    CNS:  No depressants     HEENT: Oral care    PULMONARY:  HOB @ 45 degrees.  Aspiration precautions.  Wean O2 as tolerated.  Solumedrol 60 mg Q8.    -d-dimer 493  -LDH >300  -procalcitonin 0.12  -legionella urine antigen negative      CARDIOVASCULAR:  I=O>  Avoid volume overload     GI: GI prophylaxis.  Feeding.  Bowel regimen     RENAL:  Follow up lytes.  Correct as needed  -mag 1.7 will replete with 2mg iv    INFECTIOUS DISEASE: Follow up cultures.  Procal, LDH, Galactomannan, Fungitell.  Cefepime, Vanc, Levaquin, and Vori.  Urine strep adn Legionella Ag.  Inflammatory markers     HEMATOLOGICAL:  DVT prophylaxis.  Dimer     ENDOCRINE:  Follow up FS.  Insulin protocol if needed.    MUSCULOSKELETAL:  Bed Rest     MICU - S/p EEG w/ seizures last seen 3/7  - Briviact 100 mg TID ( Renew 4/13) and Vimpat 200 mg BID (Renew 4/6)  - 3/15 EEG: Negative for seizures

## 2024-04-02 ENCOUNTER — APPOINTMENT (OUTPATIENT)
Dept: NEUROSURGERY | Facility: HOSPITAL | Age: 79
End: 2024-04-02

## 2024-04-02 LAB
ALBUMIN SERPL ELPH-MCNC: 3.6 G/DL — SIGNIFICANT CHANGE UP (ref 3.3–5)
ALBUMIN SERPL ELPH-MCNC: 3.7 G/DL — SIGNIFICANT CHANGE UP (ref 3.3–5)
ALP SERPL-CCNC: 90 U/L — SIGNIFICANT CHANGE UP (ref 40–120)
ALP SERPL-CCNC: 90 U/L — SIGNIFICANT CHANGE UP (ref 40–120)
ALT FLD-CCNC: 21 U/L — SIGNIFICANT CHANGE UP (ref 10–45)
ALT FLD-CCNC: 22 U/L — SIGNIFICANT CHANGE UP (ref 10–45)
ANION GAP SERPL CALC-SCNC: 17 MMOL/L — SIGNIFICANT CHANGE UP (ref 5–17)
ANION GAP SERPL CALC-SCNC: 18 MMOL/L — HIGH (ref 5–17)
APTT BLD: 25.3 SEC — SIGNIFICANT CHANGE UP (ref 24.5–35.6)
AST SERPL-CCNC: 12 U/L — SIGNIFICANT CHANGE UP (ref 10–40)
AST SERPL-CCNC: 20 U/L — SIGNIFICANT CHANGE UP (ref 10–40)
BILIRUB SERPL-MCNC: 0.3 MG/DL — SIGNIFICANT CHANGE UP (ref 0.2–1.2)
BILIRUB SERPL-MCNC: 0.3 MG/DL — SIGNIFICANT CHANGE UP (ref 0.2–1.2)
BUN SERPL-MCNC: 61 MG/DL — HIGH (ref 7–23)
BUN SERPL-MCNC: 69 MG/DL — HIGH (ref 7–23)
CALCIUM SERPL-MCNC: 10.4 MG/DL — SIGNIFICANT CHANGE UP (ref 8.4–10.5)
CALCIUM SERPL-MCNC: 12.2 MG/DL — HIGH (ref 8.4–10.5)
CHLORIDE SERPL-SCNC: 102 MMOL/L — SIGNIFICANT CHANGE UP (ref 96–108)
CHLORIDE SERPL-SCNC: 102 MMOL/L — SIGNIFICANT CHANGE UP (ref 96–108)
CO2 SERPL-SCNC: 19 MMOL/L — LOW (ref 22–31)
CO2 SERPL-SCNC: 21 MMOL/L — LOW (ref 22–31)
CREAT SERPL-MCNC: 1.34 MG/DL — HIGH (ref 0.5–1.3)
CREAT SERPL-MCNC: 1.43 MG/DL — HIGH (ref 0.5–1.3)
EGFR: 37 ML/MIN/1.73M2 — LOW
EGFR: 40 ML/MIN/1.73M2 — LOW
GLUCOSE BLDC GLUCOMTR-MCNC: 149 MG/DL — HIGH (ref 70–99)
GLUCOSE BLDC GLUCOMTR-MCNC: 157 MG/DL — HIGH (ref 70–99)
GLUCOSE BLDC GLUCOMTR-MCNC: 186 MG/DL — HIGH (ref 70–99)
GLUCOSE BLDC GLUCOMTR-MCNC: 232 MG/DL — HIGH (ref 70–99)
GLUCOSE BLDC GLUCOMTR-MCNC: 348 MG/DL — HIGH (ref 70–99)
GLUCOSE SERPL-MCNC: 232 MG/DL — HIGH (ref 70–99)
GLUCOSE SERPL-MCNC: 331 MG/DL — HIGH (ref 70–99)
HCT VFR BLD CALC: 31.2 % — LOW (ref 34.5–45)
HCT VFR BLD CALC: 33.5 % — LOW (ref 34.5–45)
HGB BLD-MCNC: 10.4 G/DL — LOW (ref 11.5–15.5)
HGB BLD-MCNC: 9.7 G/DL — LOW (ref 11.5–15.5)
INR BLD: 1.11 RATIO — SIGNIFICANT CHANGE UP (ref 0.85–1.18)
MAGNESIUM SERPL-MCNC: 2.3 MG/DL — SIGNIFICANT CHANGE UP (ref 1.6–2.6)
MAGNESIUM SERPL-MCNC: 2.3 MG/DL — SIGNIFICANT CHANGE UP (ref 1.6–2.6)
MCHC RBC-ENTMCNC: 29.3 PG — SIGNIFICANT CHANGE UP (ref 27–34)
MCHC RBC-ENTMCNC: 29.5 PG — SIGNIFICANT CHANGE UP (ref 27–34)
MCHC RBC-ENTMCNC: 31 GM/DL — LOW (ref 32–36)
MCHC RBC-ENTMCNC: 31.1 GM/DL — LOW (ref 32–36)
MCV RBC AUTO: 94.4 FL — SIGNIFICANT CHANGE UP (ref 80–100)
MCV RBC AUTO: 94.8 FL — SIGNIFICANT CHANGE UP (ref 80–100)
NRBC # BLD: 0 /100 WBCS — SIGNIFICANT CHANGE UP (ref 0–0)
NRBC # BLD: 0 /100 WBCS — SIGNIFICANT CHANGE UP (ref 0–0)
PHOSPHATE SERPL-MCNC: 4.2 MG/DL — SIGNIFICANT CHANGE UP (ref 2.5–4.5)
PHOSPHATE SERPL-MCNC: 4.5 MG/DL — SIGNIFICANT CHANGE UP (ref 2.5–4.5)
PLATELET # BLD AUTO: 158 K/UL — SIGNIFICANT CHANGE UP (ref 150–400)
PLATELET # BLD AUTO: 166 K/UL — SIGNIFICANT CHANGE UP (ref 150–400)
POTASSIUM SERPL-MCNC: 4.7 MMOL/L — SIGNIFICANT CHANGE UP (ref 3.5–5.3)
POTASSIUM SERPL-MCNC: 5.4 MMOL/L — HIGH (ref 3.5–5.3)
POTASSIUM SERPL-SCNC: 4.7 MMOL/L — SIGNIFICANT CHANGE UP (ref 3.5–5.3)
POTASSIUM SERPL-SCNC: 5.4 MMOL/L — HIGH (ref 3.5–5.3)
PROT SERPL-MCNC: 6.7 G/DL — SIGNIFICANT CHANGE UP (ref 6–8.3)
PROT SERPL-MCNC: 7 G/DL — SIGNIFICANT CHANGE UP (ref 6–8.3)
PROTHROM AB SERPL-ACNC: 11.6 SEC — SIGNIFICANT CHANGE UP (ref 9.5–13)
RBC # BLD: 3.29 M/UL — LOW (ref 3.8–5.2)
RBC # BLD: 3.55 M/UL — LOW (ref 3.8–5.2)
RBC # FLD: 16.6 % — HIGH (ref 10.3–14.5)
RBC # FLD: 16.7 % — HIGH (ref 10.3–14.5)
SODIUM SERPL-SCNC: 139 MMOL/L — SIGNIFICANT CHANGE UP (ref 135–145)
SODIUM SERPL-SCNC: 140 MMOL/L — SIGNIFICANT CHANGE UP (ref 135–145)
TACROLIMUS SERPL-MCNC: 7.7 NG/ML — SIGNIFICANT CHANGE UP
WBC # BLD: 5.55 K/UL — SIGNIFICANT CHANGE UP (ref 3.8–10.5)
WBC # BLD: 7.01 K/UL — SIGNIFICANT CHANGE UP (ref 3.8–10.5)
WBC # FLD AUTO: 5.55 K/UL — SIGNIFICANT CHANGE UP (ref 3.8–10.5)
WBC # FLD AUTO: 7.01 K/UL — SIGNIFICANT CHANGE UP (ref 3.8–10.5)

## 2024-04-02 PROCEDURE — 99233 SBSQ HOSP IP/OBS HIGH 50: CPT | Mod: FS

## 2024-04-02 PROCEDURE — 62141 CRNOP SKULL DEFECT>5 CM DIAM: CPT | Mod: 58

## 2024-04-02 DEVICE — IMPLANTABLE DEVICE: Type: IMPLANTABLE DEVICE | Site: LEFT | Status: FUNCTIONAL

## 2024-04-02 DEVICE — SURGIFOAM PAD 8CM X 12.5CM X 10MM (100): Type: IMPLANTABLE DEVICE | Site: LEFT | Status: FUNCTIONAL

## 2024-04-02 DEVICE — PLATE UN3 2 HOLE RIGID: Type: IMPLANTABLE DEVICE | Site: LEFT | Status: FUNCTIONAL

## 2024-04-02 DEVICE — SCREW UN3 AXS SELF DRILL 1.5X4MM: Type: IMPLANTABLE DEVICE | Site: LEFT | Status: FUNCTIONAL

## 2024-04-02 DEVICE — PLATE BONE MICRO 10MM 2H: Type: IMPLANTABLE DEVICE | Site: LEFT | Status: FUNCTIONAL

## 2024-04-02 DEVICE — SCREW 1.5X5MM: Type: IMPLANTABLE DEVICE | Site: LEFT | Status: FUNCTIONAL

## 2024-04-02 RX ORDER — PROPOFOL 10 MG/ML
50 INJECTION, EMULSION INTRAVENOUS
Qty: 1000 | Refills: 0 | Status: DISCONTINUED | OUTPATIENT
Start: 2024-04-02 | End: 2024-04-02

## 2024-04-02 RX ORDER — INSULIN GLARGINE 100 [IU]/ML
25 INJECTION, SOLUTION SUBCUTANEOUS AT BEDTIME
Refills: 0 | Status: DISCONTINUED | OUTPATIENT
Start: 2024-04-02 | End: 2024-04-07

## 2024-04-02 RX ORDER — AMLODIPINE BESYLATE 2.5 MG/1
10 TABLET ORAL DAILY
Refills: 0 | Status: DISCONTINUED | OUTPATIENT
Start: 2024-04-02 | End: 2024-04-15

## 2024-04-02 RX ORDER — ACETAMINOPHEN 500 MG
750 TABLET ORAL ONCE
Refills: 0 | Status: DISCONTINUED | OUTPATIENT
Start: 2024-04-02 | End: 2024-04-05

## 2024-04-02 RX ORDER — ACETAMINOPHEN 500 MG
650 TABLET ORAL EVERY 6 HOURS
Refills: 0 | Status: DISCONTINUED | OUTPATIENT
Start: 2024-04-02 | End: 2024-04-15

## 2024-04-02 RX ORDER — LACOSAMIDE 50 MG/1
200 TABLET ORAL
Refills: 0 | Status: DISCONTINUED | OUTPATIENT
Start: 2024-04-02 | End: 2024-04-02

## 2024-04-02 RX ORDER — INSULIN LISPRO 100/ML
VIAL (ML) SUBCUTANEOUS EVERY 6 HOURS
Refills: 0 | Status: DISCONTINUED | OUTPATIENT
Start: 2024-04-02 | End: 2024-04-02

## 2024-04-02 RX ORDER — NYSTATIN 500MM UNIT
500000 POWDER (EA) MISCELLANEOUS EVERY 6 HOURS
Refills: 0 | Status: DISCONTINUED | OUTPATIENT
Start: 2024-04-02 | End: 2024-04-15

## 2024-04-02 RX ORDER — OXYCODONE HYDROCHLORIDE 5 MG/1
5 TABLET ORAL EVERY 4 HOURS
Refills: 0 | Status: DISCONTINUED | OUTPATIENT
Start: 2024-04-02 | End: 2024-04-05

## 2024-04-02 RX ORDER — OXYCODONE HYDROCHLORIDE 5 MG/1
10 TABLET ORAL EVERY 4 HOURS
Refills: 0 | Status: DISCONTINUED | OUTPATIENT
Start: 2024-04-02 | End: 2024-04-02

## 2024-04-02 RX ORDER — IPRATROPIUM/ALBUTEROL SULFATE 18-103MCG
3 AEROSOL WITH ADAPTER (GRAM) INHALATION EVERY 6 HOURS
Refills: 0 | Status: DISCONTINUED | OUTPATIENT
Start: 2024-04-02 | End: 2024-04-15

## 2024-04-02 RX ORDER — SODIUM CHLORIDE 9 MG/ML
1000 INJECTION, SOLUTION INTRAVENOUS
Refills: 0 | Status: DISCONTINUED | OUTPATIENT
Start: 2024-04-02 | End: 2024-04-02

## 2024-04-02 RX ORDER — INSULIN GLARGINE 100 [IU]/ML
25 INJECTION, SOLUTION SUBCUTANEOUS AT BEDTIME
Refills: 0 | Status: DISCONTINUED | OUTPATIENT
Start: 2024-04-02 | End: 2024-04-02

## 2024-04-02 RX ORDER — LABETALOL HCL 100 MG
10 TABLET ORAL ONCE
Refills: 0 | Status: COMPLETED | OUTPATIENT
Start: 2024-04-02 | End: 2024-04-02

## 2024-04-02 RX ORDER — LACOSAMIDE 50 MG/1
200 TABLET ORAL EVERY 12 HOURS
Refills: 0 | Status: DISCONTINUED | OUTPATIENT
Start: 2024-04-02 | End: 2024-04-03

## 2024-04-02 RX ORDER — CEFAZOLIN SODIUM 1 G
2000 VIAL (EA) INJECTION EVERY 8 HOURS
Refills: 0 | Status: COMPLETED | OUTPATIENT
Start: 2024-04-02 | End: 2024-04-03

## 2024-04-02 RX ORDER — BRIVARACETAM 25 MG/1
100 TABLET, FILM COATED ORAL
Refills: 0 | Status: DISCONTINUED | OUTPATIENT
Start: 2024-04-02 | End: 2024-04-02

## 2024-04-02 RX ORDER — TACROLIMUS 5 MG/1
3 CAPSULE ORAL EVERY 12 HOURS
Refills: 0 | Status: DISCONTINUED | OUTPATIENT
Start: 2024-04-02 | End: 2024-04-15

## 2024-04-02 RX ORDER — ACETAMINOPHEN 500 MG
1000 TABLET ORAL EVERY 6 HOURS
Refills: 0 | Status: DISCONTINUED | OUTPATIENT
Start: 2024-04-02 | End: 2024-04-12

## 2024-04-02 RX ORDER — HYDRALAZINE HCL 50 MG
10 TABLET ORAL ONCE
Refills: 0 | Status: COMPLETED | OUTPATIENT
Start: 2024-04-02 | End: 2024-04-02

## 2024-04-02 RX ORDER — SALIVA SUBSTITUTE COMB NO.11 351 MG
5 POWDER IN PACKET (EA) MUCOUS MEMBRANE EVERY 6 HOURS
Refills: 0 | Status: DISCONTINUED | OUTPATIENT
Start: 2024-04-02 | End: 2024-04-15

## 2024-04-02 RX ORDER — CARVEDILOL PHOSPHATE 80 MG/1
25 CAPSULE, EXTENDED RELEASE ORAL EVERY 12 HOURS
Refills: 0 | Status: DISCONTINUED | OUTPATIENT
Start: 2024-04-02 | End: 2024-04-15

## 2024-04-02 RX ORDER — FENTANYL CITRATE 50 UG/ML
25 INJECTION INTRAVENOUS ONCE
Refills: 0 | Status: DISCONTINUED | OUTPATIENT
Start: 2024-04-02 | End: 2024-04-02

## 2024-04-02 RX ORDER — LACOSAMIDE 50 MG/1
200 TABLET ORAL ONCE
Refills: 0 | Status: DISCONTINUED | OUTPATIENT
Start: 2024-04-02 | End: 2024-04-03

## 2024-04-02 RX ORDER — SODIUM ZIRCONIUM CYCLOSILICATE 10 G/10G
10 POWDER, FOR SUSPENSION ORAL EVERY OTHER DAY
Refills: 0 | Status: DISCONTINUED | OUTPATIENT
Start: 2024-04-02 | End: 2024-04-03

## 2024-04-02 RX ORDER — PANTOPRAZOLE SODIUM 20 MG/1
40 TABLET, DELAYED RELEASE ORAL
Refills: 0 | Status: DISCONTINUED | OUTPATIENT
Start: 2024-04-02 | End: 2024-04-15

## 2024-04-02 RX ORDER — BRIVARACETAM 25 MG/1
100 TABLET, FILM COATED ORAL
Refills: 0 | Status: DISCONTINUED | OUTPATIENT
Start: 2024-04-02 | End: 2024-04-03

## 2024-04-02 RX ORDER — INSULIN LISPRO 100/ML
VIAL (ML) SUBCUTANEOUS EVERY 6 HOURS
Refills: 0 | Status: DISCONTINUED | OUTPATIENT
Start: 2024-04-02 | End: 2024-04-15

## 2024-04-02 RX ADMIN — FENTANYL CITRATE 25 MICROGRAM(S): 50 INJECTION INTRAVENOUS at 22:30

## 2024-04-02 RX ADMIN — Medication 1 DROP(S): at 14:29

## 2024-04-02 RX ADMIN — Medication 10 MILLIGRAM(S): at 22:16

## 2024-04-02 RX ADMIN — BRIVARACETAM 100 MILLIGRAM(S): 25 TABLET, FILM COATED ORAL at 14:29

## 2024-04-02 RX ADMIN — Medication 1 DROP(S): at 05:21

## 2024-04-02 RX ADMIN — Medication 5 MILLIGRAM(S): at 22:20

## 2024-04-02 RX ADMIN — CARVEDILOL PHOSPHATE 25 MILLIGRAM(S): 80 CAPSULE, EXTENDED RELEASE ORAL at 05:20

## 2024-04-02 RX ADMIN — Medication 5 MILLILITER(S): at 13:43

## 2024-04-02 RX ADMIN — Medication 8: at 06:06

## 2024-04-02 RX ADMIN — PANTOPRAZOLE SODIUM 40 MILLIGRAM(S): 20 TABLET, DELAYED RELEASE ORAL at 05:21

## 2024-04-02 RX ADMIN — Medication 5 MILLIGRAM(S): at 05:21

## 2024-04-02 RX ADMIN — Medication 1 PATCH: at 13:56

## 2024-04-02 RX ADMIN — Medication 1 TABLET(S): at 22:24

## 2024-04-02 RX ADMIN — FENTANYL CITRATE 25 MICROGRAM(S): 50 INJECTION INTRAVENOUS at 22:15

## 2024-04-02 RX ADMIN — CARVEDILOL PHOSPHATE 25 MILLIGRAM(S): 80 CAPSULE, EXTENDED RELEASE ORAL at 22:24

## 2024-04-02 RX ADMIN — PANTOPRAZOLE SODIUM 40 MILLIGRAM(S): 20 TABLET, DELAYED RELEASE ORAL at 22:20

## 2024-04-02 RX ADMIN — Medication 500000 UNIT(S): at 05:21

## 2024-04-02 RX ADMIN — SODIUM CHLORIDE 75 MILLILITER(S): 9 INJECTION, SOLUTION INTRAVENOUS at 10:38

## 2024-04-02 RX ADMIN — Medication 5 MILLILITER(S): at 05:21

## 2024-04-02 RX ADMIN — Medication 1 DROP(S): at 22:18

## 2024-04-02 RX ADMIN — AMLODIPINE BESYLATE 10 MILLIGRAM(S): 2.5 TABLET ORAL at 05:21

## 2024-04-02 RX ADMIN — Medication 10 MILLIGRAM(S): at 20:45

## 2024-04-02 RX ADMIN — BRIVARACETAM 100 MILLIGRAM(S): 25 TABLET, FILM COATED ORAL at 05:23

## 2024-04-02 RX ADMIN — Medication 1 TABLET(S): at 13:42

## 2024-04-02 RX ADMIN — TACROLIMUS 3 MILLIGRAM(S): 5 CAPSULE ORAL at 06:06

## 2024-04-02 RX ADMIN — SODIUM CHLORIDE 75 MILLILITER(S): 9 INJECTION INTRAMUSCULAR; INTRAVENOUS; SUBCUTANEOUS at 00:36

## 2024-04-02 RX ADMIN — SODIUM CHLORIDE 75 MILLILITER(S): 9 INJECTION, SOLUTION INTRAVENOUS at 22:21

## 2024-04-02 RX ADMIN — SODIUM ZIRCONIUM CYCLOSILICATE 10 GRAM(S): 10 POWDER, FOR SUSPENSION ORAL at 13:55

## 2024-04-02 RX ADMIN — Medication 1 DROP(S): at 01:02

## 2024-04-02 RX ADMIN — Medication 1 PATCH: at 22:47

## 2024-04-02 RX ADMIN — Medication 100 MILLIGRAM(S): at 22:30

## 2024-04-02 RX ADMIN — Medication 1 DROP(S): at 10:38

## 2024-04-02 RX ADMIN — Medication 500000 UNIT(S): at 13:43

## 2024-04-02 RX ADMIN — Medication 1 PATCH: at 07:00

## 2024-04-02 RX ADMIN — LACOSAMIDE 200 MILLIGRAM(S): 50 TABLET ORAL at 05:27

## 2024-04-02 NOTE — PROVIDER CONTACT NOTE (CRITICAL VALUE NOTIFICATION) - PERSON GIVING RESULT:
[facility] lab, Florian Zurita
marry reyes/lab
oneal/[facility]
lab Marni Jack
hedy franco from lab

## 2024-04-02 NOTE — PROVIDER CONTACT NOTE (CRITICAL VALUE NOTIFICATION) - TEST AND RESULT REPORTED:
pO2- 47
urine culture from 3/8 positive-greater than 100,000CFU per ml of klebsiella pneumoniae
critical urine cx from 3/10  [phone] colonies for enterococus faciem and klebsiella pneumoniae
BMP Ca -0.1, K 23.3, Mg 0.05
urine culture sent on 3/10 is positive fro gram negative rods

## 2024-04-02 NOTE — PROGRESS NOTE ADULT - SUBJECTIVE AND OBJECTIVE BOX
Patient is a 78y old  Female who presents with a chief complaint of ICH (02 Apr 2024 05:34)      Interval Events: No events reported overnight, Pt scheduled for Cranioplasty this afternoon     REVIEW OF SYSTEMS:  [ ] Positive  [ ] All other systems negative  [ ] Unable to assess ROS because ________    Vital Signs Last 24 Hrs  T(C): 36.7 (04-02-24 @ 04:33), Max: 37.7 (04-01-24 @ 11:30)  T(F): 98.1 (04-02-24 @ 04:33), Max: 99.8 (04-01-24 @ 11:30)  HR: 85 (04-02-24 @ 04:33) (83 - 96)  BP: 140/55 (04-02-24 @ 04:33) (128/60 - 159/92)  RR: 20 (04-02-24 @ 04:33) (17 - 20)  SpO2: 100% (04-02-24 @ 04:33) (97% - 100%)    PHYSICAL EXAM:  HEENT:   [ ]Tracheostomy:  [ ]Pupils equal  [ ]No oral lesions  [ ]Abnormal    SKIN  [ ]No Rash  [ ] Abnormal  [ ] pressure    CARDIAC  [ ]Regular  [ ]Abnormal    PULMONARY  [ ]Bilateral Clear Breath Sounds  [ ]Normal Excursion  [ ]Abnormal    GI  [ ]PEG      [ ] +BS		              [ ]Soft, nondistended, nontender	  [ ]Abnormal    MUSCULOSKELETAL                                   [ ]Bedbound                 [ ]Abnormal    [ ]Ambulatory/OOB to chair                           EXTREMITIES                                         [ ]Normal  [ ]Edema                           NEUROLOGIC  [ ] Normal, non focal  [ ] Focal findings:    PSYCHIATRIC  [ ]Alert and appropriate  [ ] Sedated	 [ ]Agitated    :  Gardner: [ ] Yes, if yes: Date of Placement:                   [  ] No    LINES: Central Lines [ ] Yes, if yes: Date of Placement                                     [  ] No    HOSPITAL MEDICATIONS:  MEDICATIONS  (STANDING):  amLODIPine   Tablet 10 milliGRAM(s) Oral daily  artificial  tears Solution 1 Drop(s) Both EYES every 4 hours  Biotene Dry Mouth Oral Rinse 5 milliLiter(s) Swish and Spit every 6 hours  brivaracetam Oral Solution 100 milliGRAM(s) Oral <User Schedule>  carvedilol 25 milliGRAM(s) Oral every 12 hours  cloNIDine Patch 0.1 mG/24Hr(s) 1 patch Transdermal every 7 days  insulin glargine Injectable (LANTUS) 20 Unit(s) SubCutaneous at bedtime  insulin lispro (ADMELOG) corrective regimen sliding scale   SubCutaneous three times a day before meals  lacosamide Solution 200 milliGRAM(s) Oral two times a day  nystatin    Suspension 889954 Unit(s) Swish and Swallow every 6 hours  pantoprazole   Suspension 40 milliGRAM(s) Oral two times a day  predniSONE   Tablet 5 milliGRAM(s) Oral daily  sodium chloride 0.9%. 1000 milliLiter(s) (75 mL/Hr) IV Continuous <Continuous>  sodium zirconium cyclosilicate 10 Gram(s) Oral every other day  tacrolimus    0.5 mG/mL Suspension 3 milliGRAM(s) Oral every 12 hours  trimethoprim   80 mG/sulfamethoxazole 400 mG 1 Tablet(s) Oral daily    MEDICATIONS  (PRN):  acetaminophen   Oral Liquid .. 650 milliGRAM(s) Oral every 6 hours PRN Temp greater or equal to 38C (100.4F), Mild Pain (1 - 3)  albuterol/ipratropium for Nebulization 3 milliLiter(s) Nebulizer every 6 hours PRN Shortness of Breath and/or Wheezing      LABS:                        9.7    5.55  )-----------( 166      ( 02 Apr 2024 05:57 )             31.2     04-01    138  |  100  |  63<H>  ----------------------------<  300<H>  4.7   |  21<L>  |  1.31<H>    Ca    11.0<H>      01 Apr 2024 07:48  Phos  4.1     04-01  Mg     2.2     04-01    TPro  7.1  /  Alb  3.6  /  TBili  0.2  /  DBili  x   /  AST  18  /  ALT  27  /  AlkPhos  105  04-01    PT/INR - ( 01 Apr 2024 07:48 )   PT: 11.1 sec;   INR: 1.01 ratio         PTT - ( 01 Apr 2024 07:48 )  PTT:26.3 sec  Urinalysis Basic - ( 01 Apr 2024 07:48 )    Color: x / Appearance: x / SG: x / pH: x  Gluc: 300 mg/dL / Ketone: x  / Bili: x / Urobili: x   Blood: x / Protein: x / Nitrite: x   Leuk Esterase: x / RBC: x / WBC x   Sq Epi: x / Non Sq Epi: x / Bacteria: x          CAPILLARY BLOOD GLUCOSE    MICROBIOLOGY:     RADIOLOGY:  [ ] Reviewed and interpreted by me     Patient is a 78y old  Female who presents with a chief complaint of ICH (02 Apr 2024 05:34)      Interval Events: No events reported overnight, Pt scheduled for Cranioplasty this afternoon     REVIEW OF SYSTEMS:  [ ] Positive  [ ] All other systems negative  [x] Unable to assess ROS because ________    Vital Signs Last 24 Hrs  T(C): 36.7 (04-02-24 @ 04:33), Max: 37.7 (04-01-24 @ 11:30)  T(F): 98.1 (04-02-24 @ 04:33), Max: 99.8 (04-01-24 @ 11:30)  HR: 85 (04-02-24 @ 04:33) (83 - 96)  BP: 140/55 (04-02-24 @ 04:33) (128/60 - 159/92)  RR: 20 (04-02-24 @ 04:33) (17 - 20)  SpO2: 100% (04-02-24 @ 04:33) (97% - 100%)    PHYSICAL EXAM:  HEENT:  [x]Tracheostomy: # 7 cuffed portex  [x]Pupils equal  [ ]No oral lesions  [x]Abnormal: left skull with depression, incision site c/d/i    SKIN  [ ]No Rash  [x] Abnormal: left temporal incision s/p craniectomy site c/d/i, L BKA site c/d/i  [x] pressure: sacral DTI    CARDIAC  [x]Regular  [ ]Abnormal    PULMONARY  [x]Bilateral Clear Breath Sounds  [ ]Normal Excursion  [ ]Abnormal    GI  [x]PEG      [x] +BS		              [x]Soft, nondistended, nontender	  [x]Abnormal: Rectal tube    MUSCULOSKELETAL                                   [x]Bedbound                 [x]Abnormal: L BKA  [ ]Ambulatory/OOB to chair                           EXTREMITIES                                         [x]Normal  [ ]Edema                           NEUROLOGIC  [ ] Normal, non focal  [x] Focal findings: Eyes open, withdraws from pain on all extremities, Spontaneously moving Left UE     PSYCHIATRIC  [x] Unable to assess  [ ] Sedated	 [ ]Agitated    :  Gardner: [ ] Yes, if yes: Date of Placement:                   [x] No    LINES: Central Lines [ ] Yes, if yes: Date of Placement                                     [x] No    HOSPITAL MEDICATIONS:  MEDICATIONS  (STANDING):  amLODIPine   Tablet 10 milliGRAM(s) Oral daily  artificial  tears Solution 1 Drop(s) Both EYES every 4 hours  Biotene Dry Mouth Oral Rinse 5 milliLiter(s) Swish and Spit every 6 hours  brivaracetam Oral Solution 100 milliGRAM(s) Oral <User Schedule>  carvedilol 25 milliGRAM(s) Oral every 12 hours  cloNIDine Patch 0.1 mG/24Hr(s) 1 patch Transdermal every 7 days  insulin glargine Injectable (LANTUS) 20 Unit(s) SubCutaneous at bedtime  insulin lispro (ADMELOG) corrective regimen sliding scale   SubCutaneous three times a day before meals  lacosamide Solution 200 milliGRAM(s) Oral two times a day  nystatin    Suspension 410661 Unit(s) Swish and Swallow every 6 hours  pantoprazole   Suspension 40 milliGRAM(s) Oral two times a day  predniSONE   Tablet 5 milliGRAM(s) Oral daily  sodium chloride 0.9%. 1000 milliLiter(s) (75 mL/Hr) IV Continuous <Continuous>  sodium zirconium cyclosilicate 10 Gram(s) Oral every other day  tacrolimus    0.5 mG/mL Suspension 3 milliGRAM(s) Oral every 12 hours  trimethoprim   80 mG/sulfamethoxazole 400 mG 1 Tablet(s) Oral daily    MEDICATIONS  (PRN):  acetaminophen   Oral Liquid .. 650 milliGRAM(s) Oral every 6 hours PRN Temp greater or equal to 38C (100.4F), Mild Pain (1 - 3)  albuterol/ipratropium for Nebulization 3 milliLiter(s) Nebulizer every 6 hours PRN Shortness of Breath and/or Wheezing      LABS:                        9.7    5.55  )-----------( 166      ( 02 Apr 2024 05:57 )             31.2     04-01    138  |  100  |  63<H>  ----------------------------<  300<H>  4.7   |  21<L>  |  1.31<H>    Ca    11.0<H>      01 Apr 2024 07:48  Phos  4.1     04-01  Mg     2.2     04-01    TPro  7.1  /  Alb  3.6  /  TBili  0.2  /  DBili  x   /  AST  18  /  ALT  27  /  AlkPhos  105  04-01    PT/INR - ( 01 Apr 2024 07:48 )   PT: 11.1 sec;   INR: 1.01 ratio         PTT - ( 01 Apr 2024 07:48 )  PTT:26.3 sec  Urinalysis Basic - ( 01 Apr 2024 07:48 )    Color: x / Appearance: x / SG: x / pH: x  Gluc: 300 mg/dL / Ketone: x  / Bili: x / Urobili: x   Blood: x / Protein: x / Nitrite: x   Leuk Esterase: x / RBC: x / WBC x   Sq Epi: x / Non Sq Epi: x / Bacteria: x          CAPILLARY BLOOD GLUCOSE    MICROBIOLOGY:     RADIOLOGY:  [ ] Reviewed and interpreted by me

## 2024-04-02 NOTE — PROVIDER CONTACT NOTE (CRITICAL VALUE NOTIFICATION) - BACKGROUND
pt her for ICH and has positive u/a
Admitted for ICH, post craniectomy, spiked fever, cultures done on the 10th
78 year old F admitted for intracerebral bleed

## 2024-04-02 NOTE — BRIEF OPERATIVE NOTE - OPERATION/FINDINGS
Pt was transferred from OSH 2/2 large L hololobar L frontal lobe ICH. Pt was taken for emergent L hemicraniectomy and evacuation of clot. There were no issues in the case. A YON drain was left, and the pt remained intubated prior to being transported to CT for scan. Once reviewed, she was brought to NICU.
Left cranioplasty 
Bedside tracheostomy w/ PEG placement. PEG bumper @ 4cm. Trach 7.0

## 2024-04-02 NOTE — PRE-ANESTHESIA EVALUATION ADULT - NSATTENDATTESTRD_GEN_ALL_CORE
Patient in office for Gardisil and Hep B Vaccine  
The patient has been re-examined and I agree with the above assessment or I updated with my findings.
The patient has been re-examined and I agree with the above assessment or I updated with my findings.

## 2024-04-02 NOTE — PROGRESS NOTE ADULT - SUBJECTIVE AND OBJECTIVE BOX
Patient seen and examined at bedside.    --Anticoagulation--    T(C): 36.7 (04-02-24 @ 04:33), Max: 37.7 (04-01-24 @ 11:30)  HR: 85 (04-02-24 @ 04:33) (83 - 96)  BP: 140/55 (04-02-24 @ 04:33) (128/60 - 159/92)  RR: 20 (04-02-24 @ 04:33) (17 - 20)  SpO2: 100% (04-02-24 @ 04:33) (97% - 100%)  Wt(kg): --    Exam: Trached, PARISH, PERRL, L gaze, +corneals, not FC, LUE non spont twitch to pain, RUE brisk wd, BLE TF.

## 2024-04-02 NOTE — BRIEF OPERATIVE NOTE - SPECIMENS
OB POSTPARTUM PROGRESS NOTE    Pili Azul [id number]    1990 31 year old female    Patient is postpartum day # 2 from a  delivery. Patient has Pain is well controlled on oral medication. Bleeding is mild. Patient is providing Breast milk to her infant.  She is ambulating well,  tolerating a general diet, and is voiding spontaneously. Pt would like to go home later today if feeling OK.    Vital signs :    Vitals:    22   BP: 122/78   Pulse: 82   Resp: 18   Temp: 97.7 °F (36.5 °C)       Physical Exam:    General appearance: alert, cooperative and no distress  Lungs: CTA bilaterally  Cardiovascular exam:  RRR  Abdomen: soft, non-tender; uterus firm and below umbilicus  Extremities: extremities normal, NT    Labs:    HGB   Date Value Ref Range Status   2022 11.5 (L) 12.0 - 15.5 g/dL Final     HCT   Date Value Ref Range Status   2022 36.0 36.0 - 46.5 % Final       Assessment/Plan:    Postpartum day # 2  from  delivery    GI: continue normal diet as tolerated  Heme: hemoglobin stable  Pain: controlled with po medication  DVT Prophylaxis: encourage ambulation  Continue routine PP care  Home later this evening if feeling OK.    Signed:    Mishel Meléndez MD     :32 AM  
Postoperative/Postpartum Note:    Patient without complaints,     Vitals with min/max:      Vital Last Value 24 Hour Range   Temperature 97.7 °F (36.5 °C) (22 0600) Temp  Min: 97.2 °F (36.2 °C)  Max: 97.9 °F (36.6 °C)   Pulse 94 (22 0600) Pulse  Min: 73  Max: 100   Respiratory 20 (22 06) Resp  Min: 15  Max: 28   Non-Invasive  Blood Pressure 126/86 (22 0600) BP  Min: 91/55  Max: 126/86   Pulse Oximetry 98 % (22 1700) SpO2  Min: 98 %  Max: 100 %   Arterial   Blood Pressure   No data recorded         Lochia as expected    Abdomen-soft, approp. tender, +bowel sounds normoactive    Incision clean dry & intact     Heart regular rate & rhythm,    Lungs CTA bilaterally    Extremities no clubbing cyanosis or edema      WBC (K/mcL)   Date Value   2022 8.9     RBC (mil/mcL)   Date Value   2022 3.85 (L)     HCT (%)   Date Value   2022 36.0     HGB (g/dL)   Date Value   2022 11.5 (L)     PLT (K/mcL)   Date Value   2022 273          Progressing as expected,       Postoperative Day # 1 S/P   Section    Continue routine post-operative and post-partum care  Routine teaching  Ambulate    Elaina Rush MD   
None
none
x2, L frontal lobe Bx, L frontal lobe clot

## 2024-04-02 NOTE — PROGRESS NOTE ADULT - PROBLEM SELECTOR PLAN 1
- Found to have L IPH on CTH likely 2/2 amyloid angiopathy  - S/p L hemicrani for evacuation of large ICH; bone discarded  - CT H Stereo 3/12: S/p Left Frontal Craniectomy, Remains stable from prior CT on 3/8   - Patient will require eventual cranioplasty; tentative OR date 4/2  - Maintain Neuro checks - Found to have L IPH on CTH likely 2/2 amyloid angiopathy  - S/p L hemicrani for evacuation of large ICH; bone discarded  - CT H Stereo 3/12: S/p Left Frontal Craniectomy, Remains stable from prior CT on 3/8   - CT Head 3/30: Improved lft frontal hemorrhage and extradural fluid collections   - Patient scheduled for Cranioplasty this afternoon  - Maintain Neuro checks

## 2024-04-02 NOTE — PROGRESS NOTE ADULT - PROBLEM SELECTOR PLAN 9
- VA Duplex 3/2: DVT RT cfv, femoral, popliteal and gastrocnemius veins; DVT LEFT cfv and femoral vein  - S/p IVCF by IR on 3/3  - Cont Heparin Sub Q - VA Duplex 3/2: DVT RT cfv, femoral, popliteal and gastrocnemius veins; DVT LEFT cfv and femoral vein  - S/p IVCF by IR on 3/3  - Heparin Sub Q held in setting of planned cranioplasty

## 2024-04-02 NOTE — PROGRESS NOTE ADULT - SUBJECTIVE AND OBJECTIVE BOX
Neurology        S: patient seen. no neuro changes, ill appearing       \    Medications: MEDICATIONS  (STANDING):  amLODIPine   Tablet 10 milliGRAM(s) Oral daily  artificial  tears Solution 1 Drop(s) Both EYES every 4 hours  Biotene Dry Mouth Oral Rinse 5 milliLiter(s) Swish and Spit every 6 hours  brivaracetam Oral Solution 100 milliGRAM(s) Oral <User Schedule>  carvedilol 25 milliGRAM(s) Oral every 12 hours  cloNIDine Patch 0.1 mG/24Hr(s) 1 patch Transdermal every 7 days  insulin glargine Injectable (LANTUS) 20 Unit(s) SubCutaneous at bedtime  insulin lispro (ADMELOG) corrective regimen sliding scale   SubCutaneous three times a day before meals  lacosamide Solution 200 milliGRAM(s) Oral two times a day  nystatin    Suspension 752004 Unit(s) Swish and Swallow every 6 hours  pantoprazole   Suspension 40 milliGRAM(s) Oral two times a day  predniSONE   Tablet 5 milliGRAM(s) Oral daily  sodium chloride 0.9%. 1000 milliLiter(s) (75 mL/Hr) IV Continuous <Continuous>  sodium zirconium cyclosilicate 10 Gram(s) Oral every other day  tacrolimus    0.5 mG/mL Suspension 3 milliGRAM(s) Oral every 12 hours  trimethoprim   80 mG/sulfamethoxazole 400 mG 1 Tablet(s) Oral daily    MEDICATIONS  (PRN):  acetaminophen   Oral Liquid .. 650 milliGRAM(s) Oral every 6 hours PRN Temp greater or equal to 38C (100.4F), Mild Pain (1 - 3)  albuterol/ipratropium for Nebulization 3 milliLiter(s) Nebulizer every 6 hours PRN Shortness of Breath and/or Wheezing       Vitals:  Vital Signs Last 24 Hrs  T(C): 36.4 (02 Apr 2024 08:35), Max: 37.7 (01 Apr 2024 11:30)  T(F): 97.6 (02 Apr 2024 08:35), Max: 99.8 (01 Apr 2024 11:30)  HR: 75 (02 Apr 2024 08:35) (73 - 96)  BP: 129/59 (02 Apr 2024 08:35) (128/60 - 159/92)  BP(mean): --  RR: 18 (02 Apr 2024 08:35) (17 - 20)  SpO2: 100% (02 Apr 2024 08:35) (97% - 100%)    Parameters below as of 02 Apr 2024 08:35  Patient On (Oxygen Delivery Method): tracheostomy collar    O2 Concentration (%): 28          General Exam:   General Appearance: Appropriately dressed    Head: Normocephalic, atraumatic and no dysmorphic features s/p trach   Ear, Nose, and Throat: Moist mucous membranes  CVS: S1S2+  Resp: No SOB, no wheeze or rhonchi on vent   GI: soft NT/ND s/p PEG   Extremities:  L AKA  Skin: No bruises or rashes     Neurological Exam:  Mental Status:  opens eyes to noxious. no verbal. not following   Cranial Nerves: PERRL, EOMI but gaze pref to L, no blink to threat on R, R facial,    Motor: minimal/no spontaneous movement ;   Sensation: grimaces to noxious at times. some minimal withdrawal LUE 2/5 and RLE.    Coordination: unable   Gait: unable     Data/Labs/Imaging which I personally reviewed.             LABS:                          9.7    5.55  )-----------( 166      ( 02 Apr 2024 05:57 )             31.2     04-02    140  |  102  |  69<H>  ----------------------------<  331<H>  4.7   |  21<L>  |  1.43<H>    Ca    10.4      02 Apr 2024 07:00  Phos  4.2     04-02  Mg     2.3     04-02    TPro  6.7  /  Alb  3.6  /  TBili  0.3  /  DBili  x   /  AST  12  /  ALT  22  /  AlkPhos  90  04-02    LIVER FUNCTIONS - ( 02 Apr 2024 07:00 )  Alb: 3.6 g/dL / Pro: 6.7 g/dL / ALK PHOS: 90 U/L / ALT: 22 U/L / AST: 12 U/L / GGT: x           PT/INR - ( 01 Apr 2024 07:48 )   PT: 11.1 sec;   INR: 1.01 ratio         PTT - ( 01 Apr 2024 07:48 )  PTT:26.3 sec  Urinalysis Basic - ( 02 Apr 2024 07:00 )    Color: x / Appearance: x / SG: x / pH: x  Gluc: 331 mg/dL / Ketone: x  / Bili: x / Urobili: x   Blood: x / Protein: x / Nitrite: x   Leuk Esterase: x / RBC: x / WBC x   Sq Epi: x / Non Sq Epi: x / Bacteria: x

## 2024-04-02 NOTE — PROGRESS NOTE ADULT - ASSESSMENT
77 yo F with PMHx ESRD s/p renal transplant (2019, now off HD), left AKA, BK viremia, HTN, breast ca s/p lumpectomy (completed Tamoxifen 03/2023), DVT (off Eliquis), HLD, gout presenting 3/1 with left ICH (ICH score 4) likely 2/2 amyloid angiopathy s/p left craniectomy(discarded) and evacuation as well as EVD placement and removal (3/7). She is s/p trach/peg 3/8/24.  Febrile 3/10 with + UA, blood/sputum culture NGTD.  Treatment for UTI initiated with ceftriaxone, eventually broadened to cefepime.  Transferred to RCU 3/11 for continued management.  Pt arrived from NSCU with minimal mental status with only response of spontaneous eye opening and withdrawal on RLE.  Urine culture resulted - positive for klebsiella, treated for 7 days with Meropenem 3/12 --> 3/19.  Cranioplasty planning 4/2 as per neurosurgery.  Continuing weaning from ventilator as tolerated.  Continuing to monitor for neurological improvement.        77 yo F with PMHx ESRD s/p renal transplant (2019, now off HD), left AKA, BK viremia, HTN, breast ca s/p lumpectomy (completed Tamoxifen 03/2023), DVT (off Eliquis), HLD, gout presenting 3/1 with left ICH (ICH score 4) likely 2/2 amyloid angiopathy s/p left craniectomy(discarded) and evacuation as well as EVD placement and removal (3/7). Hospital course c/b extensive LE DVTs, S/p IVCF on 3/3. She is s/p trach/peg 3/8/24.  Febrile 3/10 with + UA, blood/sputum culture NGTD.  Treatment for UTI initiated with ceftriaxone, eventually broadened to cefepime. Transferred to RCU 3/11 for continued management. Urine culture resulted - positive for klebsiella, treated for 7 days with Meropenem 3/12 --> 3/19. Patient weaned from MV, tolerating TC ATC since 3/23.    4/2: Patient scheduled for Cranioplasty today with Neurosurgery. Tacro Level remains slightly elevated but trending down, transplant recommendations appreciated will cont to monitor daily levels. Will increase Lantus to 25 units and increase ISS frequency q 6 hrs while NPO.

## 2024-04-02 NOTE — PROGRESS NOTE ADULT - PROBLEM SELECTOR PLAN 4
- + UA on 3/10  - Urine Cx 3/10: ESBL Klebsiella and VRE 10-49 K   - Treated with Meropenem 1GM Q12H 3/12 --> 3/19  - Hypothermic 3/27; Bld cxs sent 3/26: NGTD  - Currently remains off ABX - + UA on 3/10  - Urine Cx 3/10: ESBL Klebsiella and VRE 10-49 K   - Treated with Meropenem 1GM Q12H 3/12 --> 3/19  - Currently remains off ABX

## 2024-04-02 NOTE — PROGRESS NOTE ADULT - PROBLEM SELECTOR PLAN 2
- S/p trach 3/8 by surgery by Dr. Joselo Mayorga   - Tracheostomy Sutures removed 3/13  - Patient weaned to tc ATC since 3/23  - FIO2 Decreased to 30 %  - Will maintain cuffed trach until cranioplasty   - Continue airway clearance; Duoneb q6h PRN - S/p trach 3/8 by surgery by Dr. Joselo Mayorga   - Tracheostomy Sutures removed 3/13  - Patient weaned to tc ATC since 3/23  - Currently receiving FIO2 28 %  - Will maintain cuffed trach until after cranioplasty   - Continue airway clearance; Duoneb q6h PRN

## 2024-04-02 NOTE — PROGRESS NOTE ADULT - NS ATTEND AMEND GEN_ALL_CORE FT
78 year old female with a history of ESRD s/p renal transplant (2019) c/b BK viremia, left AKA, HTN, breast ca s/p lumpectomy (completed Tamoxifen 3/2023), DVT (off Eliquis), HLD, gout presenting with left ICH likely in the setting of amyloid angiopathy s/p left craniectomy (discarded) and evacuation (3/2/24), EVD placement and removal (3/7), c/b prolonged respiratory failure s/p trach/PEG (3/9/24) and RCU transfer.     From a neurologic perspective she has had an ICH 2/2 amyloid angiopathy s/p left craniectomy and evacuation, EVD placement and subsequent removal. She clinically remains obtunded, reportedly will occasionally open her eyes to sternal rub, no meaningful communication.  She is planned for the OR today with neurosurgery for cranioplasty. Her hospital course has been complicated by seizure, her last recorded seizures were seen 3/7/24. She is currently controlled on Briviact 100 TID, Vimpat 200 BID    She initially presented with respiratory failure, mixed hypoxic and hypercapnic respiratory failure. Now clinically improved. Tolerating trach collar aronud the clock since 3/23/24. Will wean O2 as tolerated, goal SpO2 90-95%. Continue airways clearance with duonebs and hypersal q6 for airways clearance. Will maintain cuffed trach until cranioplasty    Her hospital course was otherwise complicated by ESBL Kleb and VRE (3/10) urinary tract infection. She is s/p Meropenem. Now clinically stable and being monitored off antibiotics.    She has a history of renal transplant. Transplant is following and appreciate their recommendations. Continue with Prednisone, Tacrolimus. Will confirm Bactrim with prophylaxis with Nephrology. Her creatinine is trending up slightly. Will continue to monitor for now.     She has had issues with hyperglycemia: Goal blood glucose 140-180. Adjusting insulin accordingly.    She was found to have H.Pylori positivity: Per GI evaluation, will plan to start treatment as outlined above at the time of discharge.    Finally, with DVT 3/2 R CFV, Fem, Pop, Gastrocnemius and L CFV, Fem s/p IVC filter (3/3/24). Tolerating SQH without issue    Updated daughter at bedside. We discussed that likelihood of meaningful neurologic recovery was poor. For the family, blinking or opening her eyes for them is meaningful. She is to remain full code for now. All questions answered. Familys goal is to take her home ultimately and care for her there long term. Her daughter is LPN.

## 2024-04-02 NOTE — PROGRESS NOTE ADULT - ATTENDING COMMENTS
Pt seen today, eyes open and looking at family, moving left UE spontaneously, moderate flap sinking.  R/B/A discussed with family by team and me.  Risks include, but not limited to, bleeding, infection, seizures.   Family ok to proceed.

## 2024-04-02 NOTE — PROVIDER CONTACT NOTE (CRITICAL VALUE NOTIFICATION) - ACTION/TREATMENT ORDERED:
MD made aware, redrew labs, waiting results
No new orders, will continue to monitor
WAITING FOR ORDERS
waiting for orders
new abx ordered

## 2024-04-02 NOTE — PROVIDER CONTACT NOTE (CRITICAL VALUE NOTIFICATION) - SITUATION
BMP  results, Ca -0,1, K 23.3, Mg 0.05
pt's urine culture done on the 10 th of march is positive for .> 10,000-49,000 CFU/ml GRAM NEGATIVE RODS
critical urine cx from 3/10  [id number] colonies for enterococus faciem and klebsiella pneumoniae

## 2024-04-02 NOTE — PROGRESS NOTE ADULT - ASSESSMENT
79 yo F with ESRD s/p renal transplant (2019, now off HD), left AKA, BK viremia, HTN, breast ca s/p lumpectomy (completed Tamoxifen 03/2023), DVT (off Eliquis), HLD, gout presenting 3/1 with left ICH (ICH score 4) likely 2/2 amyloid angiopathy s/p left craniectomy  and evacuation as well as EVD placement and removal (3/7).   initial CTH3/1  with 8.9cm left frontal IPH with IVH .09cm rightward shift   3/2 CTH s/p L hmeicrani and resection of IPH. stable   3/5 CTH post op changes. some reorption of post op pneumocephalus.    s/p trach/peg 3/8/24   3/8 CTH L frontal craniectomy.  L MCA hemorrhage and infarct ; still IVH.   3/10 with + UA  A1c 5.7 LDL 46   TTE done 3/2: LA is severely dilated    Urine culture grew positive for klebsiella and enterococcus  3/9 EEG no seiuzres since 3/7;  risk of seiuzre from L parietal region. mod to severe diffuse cerebral dysfunction   Transferred to RCU 3/11 for continued management.  3/12 CTH   sterotactic imaging of L frontal crani for hemorrhagic L MCA ifnarct. L frontal temporal pseudmeningocele  noted. no hcange since 3/8   o/e awake, no verbal, not followiing. L gaze pref  some minimal withdrawal to noxious RLE and LUE.  s/p trach /vent     Impression: L ICH possible 2/2  CAA but hemorrhagic infarct also needs to be considered.    - plan for cranioplasty 4/2 with Dr. Neil  - hypothermic, infectious wokrup   - had recent CTH 3/12.  would repeat EEG at this time given high risk for seiuzres   - tolerating CPAP at times   - difficult to determine IPH 2/2 CAA without MRI to look at SWI or GRE sequeneses for hemosiderin deposition  - never had vessel imaging. consider CTA H/N   - no AC/AP. dvt ppx okay  - briviact 100mg TID  and vimpat 200mg BID for seiuzre ppx   - infectious workup. on meropenum.  this can lower seiuzre threshold   - immunosuppression on tacrolimus and prednisone.  ppx bactrim     -telemetry   - PT/OT   - check FS, glucose control <180  - GI/DVT ppx   - GOC with family.  currenlty full code; in terms of functional meaningful recovery the likelihood is low.  patient will require 24hr care and likely be bedbound at this time.    consider recalling palliative  Dieter Wilkinson MD  Vascular Neurology  Office: [phone]

## 2024-04-02 NOTE — PROGRESS NOTE ADULT - PROBLEM SELECTOR PLAN 7
- Goal euglycemia (-180), A1C: 5.4%  - Feeds changed to Nepro in setting of Hyperkalemia   - Continue ISS and Lantus  - Monitor BGMs - Goal euglycemia (-180), A1C: 5.4%  - Feeds Previously changed to Nepro in setting of Hyperkalemia   - ISS Changed to q 6 hrs while NPO   - Lantus increased to 25 units QHS in setting of hyperglycemia   - Monitor BGMs

## 2024-04-02 NOTE — PRE-ANESTHESIA EVALUATION ADULT - NSANTHOSAYNRD_GEN_A_CORE
No. MARLA screening performed.  STOP BANG Legend: 0-2 = LOW Risk; 3-4 = INTERMEDIATE Risk; 5-8 = HIGH Risk
No. MARLA screening performed.  STOP BANG Legend: 0-2 = LOW Risk; 3-4 = INTERMEDIATE Risk; 5-8 = HIGH Risk

## 2024-04-02 NOTE — PROGRESS NOTE ADULT - PROBLEM SELECTOR PLAN 6
- AVF in the left upper extremity  - s/p renal transplant in 2019  - prednisone 5mg, bactrim ppx  - tacro 3 mg BID as per transplant nephro  - tacro goal 4-7, daily tacro levels 30 mins prior to dose administration  - Continue Free water for rising Bun/Creat  - Continue Lokelma for mild hyperkalemia  - Feeds changed to Nepro - AVF in the left upper extremity  - s/p renal transplant in 2019  - prednisone 5mg, bactrim ppx  - tacro 3 mg BID as per transplant nephro  - tacro goal 4-7, daily tacro levels 30 mins prior to dose administration  - Continue Free water for rising Bun/Creat; LR @ 75 cc/hr added   - Continue Lokelma EOD  - Feeds changed to Nepro w/ improved hyperkalemia

## 2024-04-02 NOTE — PROGRESS NOTE ADULT - SUBJECTIVE AND OBJECTIVE BOX
DATE OF SERVICE: 04-02-24 @ 14:57    Patient is a 78y old  Female who presents with a chief complaint of ICH (02 Apr 2024 08:55)      INTERVAL HISTORY: In no acute distress. Daughter at bedside.     REVIEW OF SYSTEMS: Unable to participate in ROS  CONSTITUTIONAL: No weakness  EYES/ENT: No visual changes;  No throat pain   NECK: No pain or stiffness  RESPIRATORY: No cough, wheezing; No shortness of breath  CARDIOVASCULAR: No chest pain or palpitations  GASTROINTESTINAL: No abdominal  pain. No nausea, vomiting, or hematemesis  GENITOURINARY: No dysuria, frequency or hematuria  NEUROLOGICAL: No stroke like symptoms  SKIN: No rashes    	  MEDICATIONS:  amLODIPine   Tablet 10 milliGRAM(s) Oral daily  carvedilol 25 milliGRAM(s) Oral every 12 hours  cloNIDine Patch 0.1 mG/24Hr(s) 1 patch Transdermal every 7 days        PHYSICAL EXAM:  T(C): 36.3 (04-02-24 @ 11:00), Max: 36.7 (04-02-24 @ 04:33)  HR: 74 (04-02-24 @ 11:00) (73 - 96)  BP: 128/60 (04-02-24 @ 11:00) (128/60 - 159/92)  RR: 18 (04-02-24 @ 11:00) (17 - 20)  SpO2: 100% (04-02-24 @ 11:00) (97% - 100%)  Wt(kg): --  I&O's Summary    01 Apr 2024 07:01  -  02 Apr 2024 07:00  --------------------------------------------------------  IN: 1350 mL / OUT: 1250 mL / NET: 100 mL    02 Apr 2024 07:01  -  02 Apr 2024 14:57  --------------------------------------------------------  IN: 675 mL / OUT: 0 mL / NET: 675 mL          Appearance: In no distress	  HEENT:    PERRL, EOMI	  Cardiovascular:  S1 S2, No JVD  Respiratory: Lungs clear to auscultation	  Gastrointestinal:  Soft, Non-tender, + BS	  Vascularature:  L AKA  Psychiatric: Appropriate affect   Neuro: no acute focal deficits                               9.7    5.55  )-----------( 166      ( 02 Apr 2024 05:57 )             31.2     04-02    140  |  102  |  69<H>  ----------------------------<  331<H>  4.7   |  21<L>  |  1.43<H>    Ca    10.4      02 Apr 2024 07:00  Phos  4.2     04-02  Mg     2.3     04-02    TPro  6.7  /  Alb  3.6  /  TBili  0.3  /  DBili  x   /  AST  12  /  ALT  22  /  AlkPhos  90  04-02        Labs personally reviewed      ASSESSMENT/PLAN: 	    78F Hx ESRD s/p renal xplant c/b DGF off HD, L AKA, 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    1. Abnormal Echo --  Septal bulge noted measures 2.2cm without obstruction.   -- Given no intracavitary obstruction no intervention at this time  - No Myxoma seen on this echo or echo in 2019, ? if hypermobile interatrial septum was confused for on prior imaging     2. HTN - uncontrolled, needs improvement   Continue clonidine patch, Coreg 25 mg BID, amlodipine 10mg    3. 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 DVT PPX - HSQ        Yodit Umaña, AG-NP   Celio Mcwilliams DO [facility]  Cardiovascular Medicine  [address], [address]  Available through call or text on [employer]  Office: [phone]

## 2024-04-02 NOTE — PROGRESS NOTE ADULT - PROBLEM SELECTOR PLAN 11
- PCP ppx: Bactrim  - DVT ppx: heparin subq, s/p IVCF 3/3  - GI ppx: Protonix BID - PCP ppx: Bactrim  - DVT ppx: heparin subq held for Sx, s/p IVCF 3/3  - GI ppx: Protonix BID

## 2024-04-03 LAB
ALBUMIN SERPL ELPH-MCNC: 3.5 G/DL — SIGNIFICANT CHANGE UP (ref 3.3–5)
ALP SERPL-CCNC: 92 U/L — SIGNIFICANT CHANGE UP (ref 40–120)
ALT FLD-CCNC: 19 U/L — SIGNIFICANT CHANGE UP (ref 10–45)
ANION GAP SERPL CALC-SCNC: 15 MMOL/L — SIGNIFICANT CHANGE UP (ref 5–17)
AST SERPL-CCNC: 14 U/L — SIGNIFICANT CHANGE UP (ref 10–40)
BILIRUB SERPL-MCNC: 0.2 MG/DL — SIGNIFICANT CHANGE UP (ref 0.2–1.2)
BUN SERPL-MCNC: 66 MG/DL — HIGH (ref 7–23)
CALCIUM SERPL-MCNC: 11.8 MG/DL — HIGH (ref 8.4–10.5)
CHLORIDE SERPL-SCNC: 101 MMOL/L — SIGNIFICANT CHANGE UP (ref 96–108)
CO2 SERPL-SCNC: 19 MMOL/L — LOW (ref 22–31)
CREAT SERPL-MCNC: 1.36 MG/DL — HIGH (ref 0.5–1.3)
EGFR: 40 ML/MIN/1.73M2 — LOW
GAS PNL BLDA: SIGNIFICANT CHANGE UP
GLUCOSE BLDC GLUCOMTR-MCNC: 116 MG/DL — HIGH (ref 70–99)
GLUCOSE BLDC GLUCOMTR-MCNC: 119 MG/DL — HIGH (ref 70–99)
GLUCOSE BLDC GLUCOMTR-MCNC: 140 MG/DL — HIGH (ref 70–99)
GLUCOSE BLDC GLUCOMTR-MCNC: 148 MG/DL — HIGH (ref 70–99)
GLUCOSE BLDC GLUCOMTR-MCNC: 153 MG/DL — HIGH (ref 70–99)
GLUCOSE BLDC GLUCOMTR-MCNC: 173 MG/DL — HIGH (ref 70–99)
GLUCOSE BLDC GLUCOMTR-MCNC: 259 MG/DL — HIGH (ref 70–99)
GLUCOSE BLDC GLUCOMTR-MCNC: 272 MG/DL — HIGH (ref 70–99)
GLUCOSE BLDC GLUCOMTR-MCNC: 324 MG/DL — HIGH (ref 70–99)
GLUCOSE BLDC GLUCOMTR-MCNC: 331 MG/DL — HIGH (ref 70–99)
GLUCOSE BLDC GLUCOMTR-MCNC: 333 MG/DL — HIGH (ref 70–99)
GLUCOSE BLDC GLUCOMTR-MCNC: 357 MG/DL — HIGH (ref 70–99)
GLUCOSE BLDC GLUCOMTR-MCNC: 74 MG/DL — SIGNIFICANT CHANGE UP (ref 70–99)
GLUCOSE SERPL-MCNC: 340 MG/DL — HIGH (ref 70–99)
HCT VFR BLD CALC: 32.2 % — LOW (ref 34.5–45)
HGB BLD-MCNC: 10.1 G/DL — LOW (ref 11.5–15.5)
LACTATE SERPL-SCNC: 2.1 MMOL/L — HIGH (ref 0.5–2)
MAGNESIUM SERPL-MCNC: 2 MG/DL — SIGNIFICANT CHANGE UP (ref 1.6–2.6)
MCHC RBC-ENTMCNC: 29.6 PG — SIGNIFICANT CHANGE UP (ref 27–34)
MCHC RBC-ENTMCNC: 31.4 GM/DL — LOW (ref 32–36)
MCV RBC AUTO: 94.4 FL — SIGNIFICANT CHANGE UP (ref 80–100)
NRBC # BLD: 0 /100 WBCS — SIGNIFICANT CHANGE UP (ref 0–0)
PHOSPHATE SERPL-MCNC: 4.3 MG/DL — SIGNIFICANT CHANGE UP (ref 2.5–4.5)
PLATELET # BLD AUTO: 155 K/UL — SIGNIFICANT CHANGE UP (ref 150–400)
POTASSIUM SERPL-MCNC: 4.5 MMOL/L — SIGNIFICANT CHANGE UP (ref 3.5–5.3)
POTASSIUM SERPL-SCNC: 4.5 MMOL/L — SIGNIFICANT CHANGE UP (ref 3.5–5.3)
PROT SERPL-MCNC: 6.6 G/DL — SIGNIFICANT CHANGE UP (ref 6–8.3)
RBC # BLD: 3.41 M/UL — LOW (ref 3.8–5.2)
RBC # FLD: 16.7 % — HIGH (ref 10.3–14.5)
SODIUM SERPL-SCNC: 135 MMOL/L — SIGNIFICANT CHANGE UP (ref 135–145)
TACROLIMUS SERPL-MCNC: 7.2 NG/ML — SIGNIFICANT CHANGE UP
TROPONIN T, HIGH SENSITIVITY RESULT: 95 NG/L — HIGH (ref 0–51)
WBC # BLD: 8.12 K/UL — SIGNIFICANT CHANGE UP (ref 3.8–10.5)
WBC # FLD AUTO: 8.12 K/UL — SIGNIFICANT CHANGE UP (ref 3.8–10.5)

## 2024-04-03 PROCEDURE — 93010 ELECTROCARDIOGRAM REPORT: CPT

## 2024-04-03 PROCEDURE — 99232 SBSQ HOSP IP/OBS MODERATE 35: CPT

## 2024-04-03 PROCEDURE — 99233 SBSQ HOSP IP/OBS HIGH 50: CPT | Mod: FS

## 2024-04-03 PROCEDURE — 70450 CT HEAD/BRAIN W/O DYE: CPT | Mod: 26

## 2024-04-03 PROCEDURE — 99232 SBSQ HOSP IP/OBS MODERATE 35: CPT | Mod: GC

## 2024-04-03 RX ORDER — DEXTROSE 50 % IN WATER 50 %
25 SYRINGE (ML) INTRAVENOUS ONCE
Refills: 0 | Status: DISCONTINUED | OUTPATIENT
Start: 2024-04-03 | End: 2024-04-15

## 2024-04-03 RX ORDER — SODIUM ZIRCONIUM CYCLOSILICATE 10 G/10G
10 POWDER, FOR SUSPENSION ORAL EVERY OTHER DAY
Refills: 0 | Status: DISCONTINUED | OUTPATIENT
Start: 2024-04-03 | End: 2024-04-12

## 2024-04-03 RX ORDER — INSULIN HUMAN 100 [IU]/ML
3 INJECTION, SOLUTION SUBCUTANEOUS
Qty: 100 | Refills: 0 | Status: DISCONTINUED | OUTPATIENT
Start: 2024-04-03 | End: 2024-04-03

## 2024-04-03 RX ORDER — INSULIN HUMAN 100 [IU]/ML
3 INJECTION, SOLUTION SUBCUTANEOUS ONCE
Refills: 0 | Status: COMPLETED | OUTPATIENT
Start: 2024-04-03 | End: 2024-04-03

## 2024-04-03 RX ORDER — DEXTROSE 50 % IN WATER 50 %
25 SYRINGE (ML) INTRAVENOUS ONCE
Refills: 0 | Status: DISCONTINUED | OUTPATIENT
Start: 2024-04-03 | End: 2024-04-05

## 2024-04-03 RX ORDER — INSULIN LISPRO 100/ML
7 VIAL (ML) SUBCUTANEOUS ONCE
Refills: 0 | Status: DISCONTINUED | OUTPATIENT
Start: 2024-04-03 | End: 2024-04-03

## 2024-04-03 RX ORDER — SODIUM CHLORIDE 9 MG/ML
1000 INJECTION, SOLUTION INTRAVENOUS
Refills: 0 | Status: DISCONTINUED | OUTPATIENT
Start: 2024-04-03 | End: 2024-04-15

## 2024-04-03 RX ORDER — BRIVARACETAM 25 MG/1
50 TABLET, FILM COATED ORAL
Refills: 0 | Status: DISCONTINUED | OUTPATIENT
Start: 2024-04-03 | End: 2024-04-03

## 2024-04-03 RX ORDER — BRIVARACETAM 25 MG/1
100 TABLET, FILM COATED ORAL
Refills: 0 | Status: DISCONTINUED | OUTPATIENT
Start: 2024-04-03 | End: 2024-04-15

## 2024-04-03 RX ORDER — HUMAN INSULIN 100 [IU]/ML
10 INJECTION, SUSPENSION SUBCUTANEOUS ONCE
Refills: 0 | Status: COMPLETED | OUTPATIENT
Start: 2024-04-03 | End: 2024-04-03

## 2024-04-03 RX ORDER — DEXTROSE 50 % IN WATER 50 %
12.5 SYRINGE (ML) INTRAVENOUS ONCE
Refills: 0 | Status: DISCONTINUED | OUTPATIENT
Start: 2024-04-03 | End: 2024-04-05

## 2024-04-03 RX ORDER — GLUCAGON INJECTION, SOLUTION 0.5 MG/.1ML
1 INJECTION, SOLUTION SUBCUTANEOUS ONCE
Refills: 0 | Status: DISCONTINUED | OUTPATIENT
Start: 2024-04-03 | End: 2024-04-15

## 2024-04-03 RX ORDER — INSULIN LISPRO 100/ML
7 VIAL (ML) SUBCUTANEOUS EVERY 6 HOURS
Refills: 0 | Status: DISCONTINUED | OUTPATIENT
Start: 2024-04-03 | End: 2024-04-03

## 2024-04-03 RX ORDER — DEXTROSE 50 % IN WATER 50 %
15 SYRINGE (ML) INTRAVENOUS ONCE
Refills: 0 | Status: DISCONTINUED | OUTPATIENT
Start: 2024-04-03 | End: 2024-04-05

## 2024-04-03 RX ORDER — LACOSAMIDE 50 MG/1
200 TABLET ORAL
Refills: 0 | Status: DISCONTINUED | OUTPATIENT
Start: 2024-04-04 | End: 2024-04-15

## 2024-04-03 RX ADMIN — TACROLIMUS 3 MILLIGRAM(S): 5 CAPSULE ORAL at 05:28

## 2024-04-03 RX ADMIN — Medication 5 MILLIGRAM(S): at 22:54

## 2024-04-03 RX ADMIN — BRIVARACETAM 240 MILLIGRAM(S): 25 TABLET, FILM COATED ORAL at 15:51

## 2024-04-03 RX ADMIN — Medication 6: at 00:14

## 2024-04-03 RX ADMIN — Medication 500000 UNIT(S): at 12:10

## 2024-04-03 RX ADMIN — CARVEDILOL PHOSPHATE 25 MILLIGRAM(S): 80 CAPSULE, EXTENDED RELEASE ORAL at 10:57

## 2024-04-03 RX ADMIN — AMLODIPINE BESYLATE 10 MILLIGRAM(S): 2.5 TABLET ORAL at 06:00

## 2024-04-03 RX ADMIN — Medication 5 MILLILITER(S): at 12:09

## 2024-04-03 RX ADMIN — Medication 1 DROP(S): at 17:13

## 2024-04-03 RX ADMIN — Medication 5 MILLILITER(S): at 17:12

## 2024-04-03 RX ADMIN — Medication 5 MILLILITER(S): at 05:28

## 2024-04-03 RX ADMIN — BRIVARACETAM 240 MILLIGRAM(S): 25 TABLET, FILM COATED ORAL at 08:19

## 2024-04-03 RX ADMIN — Medication 7 UNIT(S): at 13:24

## 2024-04-03 RX ADMIN — Medication 100 MILLIGRAM(S): at 05:29

## 2024-04-03 RX ADMIN — BRIVARACETAM 240 MILLIGRAM(S): 25 TABLET, FILM COATED ORAL at 00:13

## 2024-04-03 RX ADMIN — Medication 1 TABLET(S): at 12:28

## 2024-04-03 RX ADMIN — CARVEDILOL PHOSPHATE 25 MILLIGRAM(S): 80 CAPSULE, EXTENDED RELEASE ORAL at 22:54

## 2024-04-03 RX ADMIN — TACROLIMUS 3 MILLIGRAM(S): 5 CAPSULE ORAL at 17:14

## 2024-04-03 RX ADMIN — PANTOPRAZOLE SODIUM 40 MILLIGRAM(S): 20 TABLET, DELAYED RELEASE ORAL at 10:57

## 2024-04-03 RX ADMIN — LACOSAMIDE 140 MILLIGRAM(S): 50 TABLET ORAL at 12:09

## 2024-04-03 RX ADMIN — Medication 500000 UNIT(S): at 05:28

## 2024-04-03 RX ADMIN — Medication 500000 UNIT(S): at 23:42

## 2024-04-03 RX ADMIN — INSULIN HUMAN 3 UNIT(S): 100 INJECTION, SOLUTION SUBCUTANEOUS at 06:00

## 2024-04-03 RX ADMIN — Medication 5 MILLILITER(S): at 23:41

## 2024-04-03 RX ADMIN — Medication 1 DROP(S): at 05:27

## 2024-04-03 RX ADMIN — Medication 1 DROP(S): at 02:45

## 2024-04-03 RX ADMIN — Medication 5 MILLILITER(S): at 00:12

## 2024-04-03 RX ADMIN — Medication 500000 UNIT(S): at 00:12

## 2024-04-03 RX ADMIN — Medication 1 DROP(S): at 10:57

## 2024-04-03 RX ADMIN — INSULIN GLARGINE 25 UNIT(S): 100 INJECTION, SOLUTION SUBCUTANEOUS at 00:11

## 2024-04-03 RX ADMIN — PANTOPRAZOLE SODIUM 40 MILLIGRAM(S): 20 TABLET, DELAYED RELEASE ORAL at 22:54

## 2024-04-03 RX ADMIN — Medication 500000 UNIT(S): at 17:12

## 2024-04-03 RX ADMIN — Medication 1 PATCH: at 07:52

## 2024-04-03 RX ADMIN — HUMAN INSULIN 10 UNIT(S): 100 INJECTION, SUSPENSION SUBCUTANEOUS at 09:21

## 2024-04-03 RX ADMIN — INSULIN GLARGINE 25 UNIT(S): 100 INJECTION, SOLUTION SUBCUTANEOUS at 22:53

## 2024-04-03 RX ADMIN — SODIUM ZIRCONIUM CYCLOSILICATE 10 GRAM(S): 10 POWDER, FOR SUSPENSION ORAL at 00:00

## 2024-04-03 RX ADMIN — LACOSAMIDE 200 MILLIGRAM(S): 50 TABLET ORAL at 23:47

## 2024-04-03 RX ADMIN — Medication 1 DROP(S): at 22:48

## 2024-04-03 RX ADMIN — BRIVARACETAM 100 MILLIGRAM(S): 25 TABLET, FILM COATED ORAL at 23:38

## 2024-04-03 RX ADMIN — LACOSAMIDE 140 MILLIGRAM(S): 50 TABLET ORAL at 00:22

## 2024-04-03 RX ADMIN — Medication 1 DROP(S): at 14:47

## 2024-04-03 RX ADMIN — INSULIN HUMAN 3 UNIT(S)/HR: 100 INJECTION, SOLUTION SUBCUTANEOUS at 06:00

## 2024-04-03 NOTE — PROGRESS NOTE ADULT - SUBJECTIVE AND OBJECTIVE BOX
DATE OF SERVICE: 04-03-24 @ 17:06    Patient is a 78y old  Female who presents with a chief complaint of ICH (03 Apr 2024 16:02)      INTERVAL HISTORY: s/p cranioplasty, remains on vent via tracheostomy     REVIEW OF SYSTEMS:  CONSTITUTIONAL: No weakness  EYES/ENT: No visual changes;  No throat pain   NECK: No pain or stiffness  RESPIRATORY: No cough, wheezing; No shortness of breath  CARDIOVASCULAR: No chest pain or palpitations  GASTROINTESTINAL: No abdominal  pain. No nausea, vomiting, or hematemesis  GENITOURINARY: No dysuria, frequency or hematuria  NEUROLOGICAL: No stroke like symptoms  SKIN: No rashes    TELEMETRY Personally reviewed:  	  MEDICATIONS:  amLODIPine   Tablet 10 milliGRAM(s) Oral daily  carvedilol 25 milliGRAM(s) Oral every 12 hours  cloNIDine Patch 0.1 mG/24Hr(s) 1 patch Transdermal every 7 days        PHYSICAL EXAM:  T(C): 36.9 (04-03-24 @ 15:00), Max: 37.4 (04-02-24 @ 21:10)  HR: 96 (04-03-24 @ 15:15) (80 - 104)  BP: 100/61 (04-03-24 @ 15:00) (93/54 - 196/100)  RR: 38 (04-03-24 @ 15:00) (14 - 38)  SpO2: 98% (04-03-24 @ 15:15) (94% - 100%)  Wt(kg): --  I&O's Summary    02 Apr 2024 07:01  -  03 Apr 2024 07:00  --------------------------------------------------------  IN: 1710.3 mL / OUT: 895 mL / NET: 815.3 mL    03 Apr 2024 07:01  -  03 Apr 2024 17:06  --------------------------------------------------------  IN: 407 mL / OUT: 80 mL / NET: 327 mL          Appearance: In no distress	  HEENT:    PERRL, EOMI	  Cardiovascular:  S1 S2, No JVD  Respiratory: Lungs clear to auscultation	  Gastrointestinal:  Soft, Non-tender, + BS	  Vascularature:  No edema of LE  Psychiatric: Appropriate affect   Neuro: no acute focal deficits                               10.1   8.12  )-----------( 155      ( 03 Apr 2024 03:16 )             32.2     04-03    135  |  101  |  66<H>  ----------------------------<  340<H>  4.5   |  19<L>  |  1.36<H>    Ca    11.8<H>      03 Apr 2024 03:16  Phos  4.3     04-03  Mg     2.0     04-03    TPro  6.6  /  Alb  3.5  /  TBili  0.2  /  DBili  x   /  AST  14  /  ALT  19  /  AlkPhos  92  04-03        Labs personally reviewed      ASSESSMENT/PLAN: 	  78F Hx ESRD s/p renal xplant c/b DGF off HD, L AKA, 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    1. Abnormal Echo --  Septal bulge noted measures 2.2cm without obstruction.   -- Given no intracavitary obstruction no intervention at this time  - No Myxoma seen on this echo or echo in 2019, ? if hypermobile interatrial septum was confused for on prior imaging     2. HTN - uncontrolled, needs improvement   Continue clonidine patch, Coreg 25 mg BID, amlodipine 10mg    3. Hyponatremia - likely SIADH  - management as per primary team  - now wnl  - Transferred to NSICU after taryn-cranioplasty    4. DVT PPX - HSQ          LAURA Gomez DO [facility]  Cardiovascular Medicine  [address], [address]  Available via call or text on [employer]  Office: [phone]

## 2024-04-03 NOTE — PROGRESS NOTE ADULT - PROBLEM SELECTOR PLAN 1
- Found to have L IPH on CTH likely 2/2 amyloid angiopathy  - S/p L hemicrani for evacuation of large ICH; bone discarded  - CT H Stereo 3/12: S/p Left Frontal Craniectomy, Remains stable from prior CT on 3/8   - CT Head 3/30: Improved lft frontal hemorrhage and extradural fluid collections   - S/p Cranioplasty on 4/2; Post op head CT Stable   - Remains with Subgaleal YON Drain   - Maintain Neuro checks

## 2024-04-03 NOTE — PROGRESS NOTE ADULT - SUBJECTIVE AND OBJECTIVE BOX
[facility]-- WOUND TEAM -- FOLLOW UP NOTE  --------------------------------------------------------------------------------    24 hour events/subjective:          Diet:  Diet, NPO with Tube Feed:   Tube Feeding Modality: Gastrostomy  Nepro with Carb Steady (NEPRORTH)  Total Volume for 24 Hours (mL): 960  Bolus  Total Volume of Bolus (mL):  240  Total # of Feeds: 4  Tube Feed Frequency: Every 6 hours   Tube Feed Start Time: 12:00  Bolus Feed Rate (mL per Hour): 240   Bolus Feed Duration (in Hours): 1  Supplement Feeding Modality:  Gastrostomy  Probiotic Yogurt/Smoothie Cans or Servings Per Day:  1       Frequency:  Two Times a day (04-02-24 @ 20:18)      ROS: General/ SKIN/ MSK/ Neuro/ GI see HPI  all other systems negative  pt unable to offer    ALLERGIES & MEDICATIONS  --------------------------------------------------------------------------------  Allergies    shellfish (Rash)  ChloraPrep One-Step (Rash)  penicillins (Rash)    Intolerances          STANDING INPATIENT MEDICATIONS    acetaminophen   IVPB .. 750 milliGRAM(s) IV Intermittent once  amLODIPine   Tablet 10 milliGRAM(s) Oral daily  artificial  tears Solution 1 Drop(s) Both EYES every 4 hours  Biotene Dry Mouth Oral Rinse 5 milliLiter(s) Swish and Spit every 6 hours  brivaracetam Oral Solution 100 milliGRAM(s) Oral <User Schedule>  carvedilol 25 milliGRAM(s) Oral every 12 hours  cloNIDine Patch 0.1 mG/24Hr(s) 1 patch Transdermal every 7 days  dextrose 5%. 1000 milliLiter(s) IV Continuous <Continuous>  dextrose 5%. 1000 milliLiter(s) IV Continuous <Continuous>  dextrose 50% Injectable 25 Gram(s) IV Push once  dextrose 50% Injectable 25 Gram(s) IV Push once  dextrose 50% Injectable 12.5 Gram(s) IV Push once  glucagon  Injectable 1 milliGRAM(s) IntraMuscular once  insulin glargine Injectable (LANTUS) 25 Unit(s) SubCutaneous at bedtime  insulin lispro (ADMELOG) corrective regimen sliding scale   SubCutaneous every 6 hours  multivitamin 1 Tablet(s) Oral daily  nystatin    Suspension 450640 Unit(s) Swish and Swallow every 6 hours  pantoprazole   Suspension 40 milliGRAM(s) Oral two times a day  predniSONE   Tablet 5 milliGRAM(s) Oral daily  sodium zirconium cyclosilicate 10 Gram(s) Oral every other day  tacrolimus    0.5 mG/mL Suspension 3 milliGRAM(s) Oral every 12 hours  trimethoprim   80 mG/sulfamethoxazole 400 mG 1 Tablet(s) Oral daily      PRN INPATIENT MEDICATION  acetaminophen     Tablet .. 650 milliGRAM(s) Oral every 6 hours PRN  acetaminophen   IVPB .. 1000 milliGRAM(s) IV Intermittent every 6 hours PRN  albuterol/ipratropium for Nebulization 3 milliLiter(s) Nebulizer every 6 hours PRN  bisacodyl 5 milliGRAM(s) Oral daily PRN  dextrose Oral Gel 15 Gram(s) Oral once PRN  oxyCODONE    IR 5 milliGRAM(s) Oral every 4 hours PRN  oxyCODONE    IR 10 milliGRAM(s) Oral every 4 hours PRN        VITALS/PHYSICAL EXAM  --------------------------------------------------------------------------------  T(C): 36.9 (04-03-24 @ 15:00), Max: 37.4 (04-02-24 @ 21:10)  HR: 95 (04-03-24 @ 18:30) (80 - 104)  BP: 98/58 (04-03-24 @ 17:00) (93/54 - 196/100)  RR: 25 (04-03-24 @ 17:00) (14 - 38)  SpO2: 98% (04-03-24 @ 18:30) (94% - 100%)  Wt(kg): --  Height (cm): 157.5 (04-02-24 @ 15:08)  Weight (kg): 50 (04-02-24 @ 15:08)  BMI (kg/m2): 20.2 (04-02-24 @ 15:08)  BSA (m2): 1.48 (04-02-24 @ 15:08)      04-02-24 @ 07:01  -  04-03-24 @ 07:00  --------------------------------------------------------  IN: 1710.3 mL / OUT: 895 mL / NET: 815.3 mL    04-03-24 @ 07:01  -  04-03-24 @ 18:36  --------------------------------------------------------  IN: 857 mL / OUT: 415 mL / NET: 442 mL            LABS/ CULTURES/ RADIOLOGY:              10.1   8.12  >-----------<  155      [04-03-24 @ 03:16]              32.2     135  |  101  |  66  ----------------------------<  340      [04-03-24 @ 03:16]  4.5   |  19  |  1.36        Ca     11.8     [04-03-24 @ 03:16]      Mg     2.0     [04-03-24 @ 03:16]      Phos  4.3     [04-03-24 @ 03:16]    TPro  6.6  /  Alb  3.5  /  TBili  0.2  /  DBili  x   /  AST  14  /  ALT  19  /  AlkPhos  92  [04-03-24 @ 03:16]    PT/INR: PT 11.6 , INR 1.11       [04-02-24 @ 20:51]  PTT: 25.3       [04-02-24 @ 20:51]      Blood Gas Arterial - Calcium, Ionized: 1.60 mmol/L (04-03-24 @ 02:50)      CAPILLARY BLOOD GLUCOSE      POCT Blood Glucose.: 74 mg/dL (03 Apr 2024 17:18)  POCT Blood Glucose.: 116 mg/dL (03 Apr 2024 15:09)  POCT Blood Glucose.: 153 mg/dL (03 Apr 2024 13:06)  POCT Blood Glucose.: 119 mg/dL (03 Apr 2024 12:01)  POCT Blood Glucose.: 173 mg/dL (03 Apr 2024 11:01)  POCT Blood Glucose.: 259 mg/dL (03 Apr 2024 10:01)  POCT Blood Glucose.: 324 mg/dL (03 Apr 2024 09:12)  POCT Blood Glucose.: 333 mg/dL (03 Apr 2024 07:58)  POCT Blood Glucose.: 331 mg/dL (03 Apr 2024 06:58)  POCT Blood Glucose.: 357 mg/dL (03 Apr 2024 05:32)  POCT Blood Glucose.: 272 mg/dL (03 Apr 2024 00:09)  POCT Blood Glucose.: 232 mg/dL (02 Apr 2024 20:44)  POCT Blood Glucose.: 157 mg/dL (02 Apr 2024 18:38)                      A1C with Estimated Average Glucose Result: 5.7 % (03-02-24 @ 11:57)   [facility]-- WOUND TEAM -- FOLLOW UP NOTE  --------------------------------------------------------------------------------    24 hour events/subjective:    afebrile  tolerating TF  incontinent  s/p Lt Cranioplasty      Diet:  Diet, NPO with Tube Feed:   Tube Feeding Modality: Gastrostomy  Nepro with Carb Steady (NEPRORTH)  Total Volume for 24 Hours (mL): 960  Bolus  Total Volume of Bolus (mL):  240  Total # of Feeds: 4  Tube Feed Frequency: Every 6 hours   Tube Feed Start Time: 12:00  Bolus Feed Rate (mL per Hour): 240   Bolus Feed Duration (in Hours): 1  Supplement Feeding Modality:  Gastrostomy  Probiotic Yogurt/Smoothie Cans or Servings Per Day:  1       Frequency:  Two Times a day (04-02-24 @ 20:18)      ROS: pt unable to offer    ALLERGIES & MEDICATIONS  --------------------------------------------------------------------------------  Allergies  shellfish (Rash)  ChloraPrep One-Step (Rash)  penicillins (Rash)      STANDING INPATIENT MEDICATIONS  acetaminophen IVPB 750 milliGRAM(s) IV Intermittent once  amLODIPine Tablet 10 milliGRAM(s) Oral daily  artificial  tears Solution 1 Drop(s) Both EYES every 4 hours  Biotene Dry Mouth Oral Rinse 5 milliLiter(s) Swish and Spit every 6 hours  brivaracetam Oral Solution 100 milliGRAM(s) Oral <User Schedule>  carvedilol 25 milliGRAM(s) Oral every 12 hours  cloNIDine Patch 0.1 mG/24Hr(s) 1 patch Transdermal every 7 days  dextrose 5%. 1000 milliLiter(s) IV Continuous <Continuous>  dextrose 5%. 1000 milliLiter(s) IV Continuous <Continuous>  dextrose 50% Injectable 25 Gram(s) IV Push once  dextrose 50% Injectable 25 Gram(s) IV Push once  dextrose 50% Injectable 12.5 Gram(s) IV Push once  glucagon  Injectable 1 milliGRAM(s) IntraMuscular once  insulin glargine Injectable (LANTUS) 25 Unit(s) SubCutaneous at bedtime  insulin lispro (ADMELOG) corrective regimen sliding scale   SubCutaneous every 6 hours  multivitamin 1 Tablet(s) Oral daily  nystatin    Suspension 537449 Unit(s) Swish and Swallow every 6 hours  pantoprazole   Suspension 40 milliGRAM(s) Oral two times a day  predniSONE   Tablet 5 milliGRAM(s) Oral daily  sodium zirconium cyclosilicate 10 Gram(s) Oral every other day  tacrolimus    0.5 mG/mL Suspension 3 milliGRAM(s) Oral every 12 hours  trimethoprim   80 mG/sulfamethoxazole 400 mG 1 Tablet(s) Oral daily      PRN INPATIENT MEDICATION  acetaminophen Tablet 650 milliGRAM(s) Oral every 6 hours PRN  acetaminophen IVPB 1000 milliGRAM(s) IV Intermittent every 6 hours PRN  albuterol/ipratropium for Nebulization 3 milliLiter(s) Nebulizer every 6 hours PRN  bisacodyl 5 milliGRAM(s) Oral daily PRN  dextrose Oral Gel 15 Gram(s) Oral once PRN  oxyCODONE  IR 5 milliGRAM(s) Oral every 4 hours PRN  oxyCODONE  IR 10 milliGRAM(s) Oral every 4 hours PRN        VITALS/PHYSICAL EXAM  --------------------------------------------------------------------------------  T(C): 36.9 (04-03-24 @ 15:00), Max: 37.4 (04-02-24 @ 21:10)  HR: 95 (04-03-24 @ 18:30) (80 - 104)  BP: 98/58 (04-03-24 @ 17:00) (93/54 - 196/100)  RR: 25 (04-03-24 @ 17:00) (14 - 38)  SpO2: 98% (04-03-24 @ 18:30) (94% - 100%)  Wt(kg): --  Height (cm): 157.5 (04-02-24 @ 15:08)  Weight (kg): 50 (04-02-24 @ 15:08)  BMI (kg/m2): 20.2 (04-02-24 @ 15:08)  BSA (m2): 1.48 (04-02-24 @ 15:08)    General: NAD, obtunded, frail, wd/wn/wg  Total Care sport bed  HEENT: NC, Rt side scalp soft- absent bone, incision c/d/i- helmet placed for exam     sclera clear/ moist, moist mucous membranes, trachea midline, trach  Respiratory: equal chest rise with respirations, vented  Gastrointestinal: soft NT/ND  Neurology: nonverbal, not following commands  Psych: not responsive to verval stimulus  Musculoskeletal: no contractures  Vascular: BLE edema equal no c/c/e  Skin:  moist w/ good turgor  Sacral/bilateral buttocks with    denuded and granular skin  w/ hypopigmented newly healed skin    6cm X 8cm x 0.3cm,   No odor, erythema, increased warmth, tenderness, induration, fluctuance, nor crepitus            LABS/ CULTURES/ RADIOLOGY:              10.1   8.12  >-----------<  155      [04-03-24 @ 03:16]              32.2     135  |  101  |  66  ----------------------------<  340      [04-03-24 @ 03:16]  4.5   |  19  |  1.36        Ca     11.8     [04-03-24 @ 03:16]      Mg     2.0     [04-03-24 @ 03:16]      Phos  4.3     [04-03-24 @ 03:16]    TPro  6.6  /  Alb  3.5  /  TBili  0.2  /  DBili  x   /  AST  14  /  ALT  19  /  AlkPhos  92  [04-03-24 @ 03:16]    PT/INR: PT 11.6 , INR 1.11       [04-02-24 @ 20:51]  PTT: 25.3       [04-02-24 @ 20:51]      Blood Gas Arterial - Calcium, Ionized: 1.60 mmol/L (04-03-24 @ 02:50)      CAPILLARY BLOOD GLUCOSE      POCT Blood Glucose.: 74 mg/dL (03 Apr 2024 17:18)  POCT Blood Glucose.: 116 mg/dL (03 Apr 2024 15:09)  POCT Blood Glucose.: 153 mg/dL (03 Apr 2024 13:06)  POCT Blood Glucose.: 119 mg/dL (03 Apr 2024 12:01)  POCT Blood Glucose.: 173 mg/dL (03 Apr 2024 11:01)  POCT Blood Glucose.: 259 mg/dL (03 Apr 2024 10:01)  POCT Blood Glucose.: 324 mg/dL (03 Apr 2024 09:12)  POCT Blood Glucose.: 333 mg/dL (03 Apr 2024 07:58)  POCT Blood Glucose.: 331 mg/dL (03 Apr 2024 06:58)  POCT Blood Glucose.: 357 mg/dL (03 Apr 2024 05:32)  POCT Blood Glucose.: 272 mg/dL (03 Apr 2024 00:09)  POCT Blood Glucose.: 232 mg/dL (02 Apr 2024 20:44)  POCT Blood Glucose.: 157 mg/dL (02 Apr 2024 18:38)                      A1C with Estimated Average Glucose Result: 5.7 % (03-02-24 @ 11:57)

## 2024-04-03 NOTE — PROGRESS NOTE ADULT - PROBLEM SELECTOR PLAN 7
- Goal euglycemia (-180), A1C: 5.4%  - Required ISS post op in setting of hyperglycemia   - Cont Lantus and ISS   - Monitor BGMs

## 2024-04-03 NOTE — PROGRESS NOTE ADULT - ASSESSMENT
79 yo F with ESRD s/p renal transplant (2019, now off HD), left AKA, BK viremia, HTN, breast ca s/p lumpectomy (completed Tamoxifen 03/2023), DVT (off Eliquis), HLD, gout presenting 3/1 with left ICH (ICH score 4) likely 2/2 amyloid angiopathy s/p left craniectomy  and evacuation as well as EVD placement and removal (3/7).   initial CTH3/1  with 8.9cm left frontal IPH with IVH .09cm rightward shift   3/2 CTH s/p L hmeicrani and resection of IPH. stable   3/5 CTH post op changes. some reorption of post op pneumocephalus.    s/p trach/peg 3/8/24   3/8 CTH L frontal craniectomy.  L MCA hemorrhage and infarct ; still IVH.   3/10 with + UA  A1c 5.7 LDL 46   TTE done 3/2: LA is severely dilated    Urine culture grew positive for klebsiella and enterococcus  3/9 EEG no seiuzres since 3/7;  risk of seiuzre from L parietal region. mod to severe diffuse cerebral dysfunction   Transferred to RCU 3/11 for continued management.  3/12 CTH   sterotactic imaging of L frontal crani for hemorrhagic L MCA ifnarct. L frontal temporal pseudmeningocele  noted. no hcange since 3/8   o/e awake, no verbal, not followiing. L gaze pref  some minimal withdrawal to noxious RLE and LUE.  s/p trach /vent   s/p cranioplasty 4/3, no change in post op exam     Impression: L ICH possible 2/2  CAA but hemorrhagic infarct also needs to be considered.     - on insulin drip.  plan for RCU when off insulin drip and after repeat CTH today if stable   - had recent CTH 3/12.  would repeat EEG at this time given high risk for seiuzres   - tolerating CPAP at times   - difficult to determine IPH 2/2 CAA without MRI to look at SWI or GRE sequeneses for hemosiderin deposition  - never had vessel imaging. consider CTA H/N   - no AC/AP. dvt ppx okay  - briviact 100mg TID  and vimpat 200mg BID for seiuzre ppx   - infectious workup. on meropenum.  this can lower seiuzre threshold   - immunosuppression on tacrolimus and prednisone.  ppx bactrim     -telemetry   - PT/OT   - check FS, glucose control <180  - GI/DVT ppx   - GOC with family.  currenlty full code; in terms of functional meaningful recovery the likelihood is low.  patient will require 24hr care and likely be bedbound at this time.    consider recalling palliative  Dieter Wilkinson MD  Vascular Neurology  Office: [phone]

## 2024-04-03 NOTE — CHART NOTE - NSCHARTNOTEFT_GEN_A_CORE
Patient was accepted to the RCU under Dr. Alise Vidales. Patient was signed out to WILMER [facility]rosco, to go to bed 501 in RCU.     Plan  - YON drain as per neurosurgery, monitor output   - transitioned off insulin drip, monitor fingersticks   - monitor SBP, Bp soft, fentanyl patch dc'ed  - monitor for seizure activity  - wean vent as tolerated

## 2024-04-03 NOTE — PROGRESS NOTE ADULT - ATTENDING COMMENTS
Pt seen and examined earlier today POD 1 left cranioplasty.  Pt opened eyes briefly, moving left arm spontaneously, no commands.  CT with no heme and good expansion of brain to skull.  Dressing dry. Continue monitoring.  Pt ready for return to RICU.

## 2024-04-03 NOTE — PROGRESS NOTE ADULT - PROBLEM SELECTOR PLAN 9
- VA Duplex 3/2: DVT RT cfv, femoral, popliteal and gastrocnemius veins; DVT LEFT cfv and femoral vein  - S/p IVCF by IR on 3/3  - Heparin Sub Q held in setting of recent cranioplasty

## 2024-04-03 NOTE — PROGRESS NOTE ADULT - PROBLEM SELECTOR PLAN 3
- S/p EEG w/ seizures last seen 3/7  - Briviact 100 mg TID ( Renew 5/2) and Vimpat 200 mg BID (Renew 5/2)  - 3/15 EEG: Negative for seizures

## 2024-04-03 NOTE — PROGRESS NOTE ADULT - SUBJECTIVE AND OBJECTIVE BOX
Neurology        S: patient seen now in NSCU s/p cranioplasty       Medications: MEDICATIONS  (STANDING):  acetaminophen   IVPB .. 750 milliGRAM(s) IV Intermittent once  amLODIPine   Tablet 10 milliGRAM(s) Oral daily  artificial  tears Solution 1 Drop(s) Both EYES every 4 hours  Biotene Dry Mouth Oral Rinse 5 milliLiter(s) Swish and Spit every 6 hours  brivaracetam  IVPB 100 milliGRAM(s) IV Intermittent <User Schedule>  carvedilol 25 milliGRAM(s) Oral every 12 hours  cloNIDine Patch 0.1 mG/24Hr(s) 1 patch Transdermal every 7 days  dextrose 5%. 1000 milliLiter(s) (50 mL/Hr) IV Continuous <Continuous>  dextrose 5%. 1000 milliLiter(s) (100 mL/Hr) IV Continuous <Continuous>  dextrose 50% Injectable 25 Gram(s) IV Push once  dextrose 50% Injectable 25 Gram(s) IV Push once  dextrose 50% Injectable 12.5 Gram(s) IV Push once  glucagon  Injectable 1 milliGRAM(s) IntraMuscular once  insulin glargine Injectable (LANTUS) 25 Unit(s) SubCutaneous at bedtime  insulin lispro (ADMELOG) corrective regimen sliding scale   SubCutaneous every 6 hours  insulin lispro Injectable (ADMELOG). 7 Unit(s) SubCutaneous once  insulin NPH human recombinant 10 Unit(s) SubCutaneous once  insulin regular Infusion 3 Unit(s)/Hr (3 mL/Hr) IV Continuous <Continuous>  lacosamide IVPB 200 milliGRAM(s) IV Intermittent every 12 hours  nystatin    Suspension 710452 Unit(s) Swish and Swallow every 6 hours  pantoprazole   Suspension 40 milliGRAM(s) Oral two times a day  predniSONE   Tablet 5 milliGRAM(s) Oral daily  sodium zirconium cyclosilicate 10 Gram(s) Oral every other day  tacrolimus    0.5 mG/mL Suspension 3 milliGRAM(s) Oral every 12 hours  trimethoprim   80 mG/sulfamethoxazole 400 mG 1 Tablet(s) Oral daily    MEDICATIONS  (PRN):  acetaminophen     Tablet .. 650 milliGRAM(s) Oral every 6 hours PRN Mild Pain (1 - 3)  acetaminophen   IVPB .. 1000 milliGRAM(s) IV Intermittent every 6 hours PRN Temp greater or equal to 38.5C (101.3F), Severe Pain (7 - 10)  albuterol/ipratropium for Nebulization 3 milliLiter(s) Nebulizer every 6 hours PRN Shortness of Breath and/or Wheezing  bisacodyl 5 milliGRAM(s) Oral daily PRN Constipation  dextrose Oral Gel 15 Gram(s) Oral once PRN Blood Glucose LESS THAN 70 milliGRAM(s)/deciliter  oxyCODONE    IR 5 milliGRAM(s) Oral every 4 hours PRN Moderate Pain (4 - 6)  oxyCODONE    IR 10 milliGRAM(s) Oral every 4 hours PRN Severe Pain (7 - 10)       Vitals:  Vital Signs Last 24 Hrs  T(C): 36.5 (03 Apr 2024 07:00), Max: 37.4 (02 Apr 2024 21:10)  T(F): 97.7 (03 Apr 2024 07:00), Max: 99.3 (02 Apr 2024 21:10)  HR: 83 (03 Apr 2024 09:04) (74 - 104)  BP: 119/78 (03 Apr 2024 08:00) (105/57 - 196/100)  BP(mean): 91 (03 Apr 2024 08:00) (75 - 140)  RR: 25 (03 Apr 2024 08:00) (14 - 25)  SpO2: 100% (03 Apr 2024 09:04) (98% - 100%)    Parameters below as of 03 Apr 2024 03:03  Patient On (Oxygen Delivery Method): ventilator             General Exam:   General Appearance: Appropriately dressed    Head: Normocephalic, atraumatic and no dysmorphic features s/p trach   Ear, Nose, and Throat: Moist mucous membranes  CVS: S1S2+  Resp: No SOB, no wheeze or rhonchi on vent   GI: soft NT/ND s/p PEG   Extremities:  L AKA  Skin: No bruises or rashes     Neurological Exam:  Mental Status:  opens eyes to noxious. no verbal. not following   Cranial Nerves: PERRL, EOMI but gaze pref to L, no blink to threat on R, R facial,    Motor: minimal/no spontaneous movement on RUE.  RLE some minimal movement. LUE 2/5 at times   Sensation: grimaces to noxious at times. some minimal withdrawal LUE 2/5 and RLE.    Coordination: unable   Gait: unable     Data/Labs/Imaging which I personally reviewed.           LABS:                          10.1   8.12  )-----------( 155      ( 03 Apr 2024 03:16 )             32.2     04-03    135  |  101  |  66<H>  ----------------------------<  340<H>  4.5   |  19<L>  |  1.36<H>    Ca    11.8<H>      03 Apr 2024 03:16  Phos  4.3     04-03  Mg     2.0     04-03    TPro  6.6  /  Alb  3.5  /  TBili  0.2  /  DBili  x   /  AST  14  /  ALT  19  /  AlkPhos  92  04-03    LIVER FUNCTIONS - ( 03 Apr 2024 03:16 )  Alb: 3.5 g/dL / Pro: 6.6 g/dL / ALK PHOS: 92 U/L / ALT: 19 U/L / AST: 14 U/L / GGT: x           PT/INR - ( 02 Apr 2024 20:51 )   PT: 11.6 sec;   INR: 1.11 ratio         PTT - ( 02 Apr 2024 20:51 )  PTT:25.3 sec  Urinalysis Basic - ( 03 Apr 2024 03:16 )    Color: x / Appearance: x / SG: x / pH: x  Gluc: 340 mg/dL / Ketone: x  / Bili: x / Urobili: x   Blood: x / Protein: x / Nitrite: x   Leuk Esterase: x / RBC: x / WBC x   Sq Epi: x / Non Sq Epi: x / Bacteria: x

## 2024-04-03 NOTE — PROGRESS NOTE ADULT - ASSESSMENT
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 She is s/p trach/peg 3/8/24.  Febrile 3/10 with + UA, blood/sputum culture NGTD.  Treatment for UTI initiated with ceftriaxone, eventually broadened to cefepime.  Transferred to RCU 3/11 for continued management.  Pt arrived from NSCU with minimal mental status with only response of spontaneous eye opening and withdrawal on RLE.  Urine culture resulted - positive for klebsiella, treated for 7 days with Meropenem 3/12 --> 3/19.  Cranioplasty planning 4/2 as per neurosurgery.  Continuing weaning from ventilator as tolerated.  Continuing to monitor for neurological improvement.     Wound Consult requested to assist w/ management of:   Sacral Stage 3 pressure imjury  Buttocks/ Sacrum change to TRIAD BID  and prn soiling        Continue w/ attends under pads and Pericare w/ purewick as per protocol  Abx per RCU/ ID  Moisturize intact skin w/ SWEEN cream BID  Nutrition optimization in pt w/  Increased nutritional needs    high quality protein TF, radha/ prosource, MVI & Vit C to promote wound healing  Continue turning and positioning w/ offloading assistive devices as per protocol  Waffle Cushion to chair when oob to chair  Continue w/ low air loss pressure redistribution bed surface   Pt will need Group 2 mattress on hospital bed and ROHO cushion for wheel chair upon discharge home  Care as per medicine, will follow w/ you  Upon discharge f/u as outpatient at [facility] [address] [facility] [phone]  Seen w/ RN and D/w team & attng  Heather Grier PA-C CWS [id number]  Nights/ Weekends/ Holidays please call:  General Surgery Consult pager ([phone]) for emergencies  Wound PT for multilayer leg wrapping or VAC issues (x [phone])   I spent 35minutes face to face w/ this pt of which more than 50% of the time was spent counseling & coordinating care of this pt.

## 2024-04-03 NOTE — PROGRESS NOTE ADULT - PROBLEM SELECTOR PLAN 6
- AVF in the left upper extremity  - s/p renal transplant in 2019  - prednisone 5mg, bactrim ppx  - tacro 3 mg BID as per transplant nephro  - tacro goal 4-7, daily tacro levels 30 mins prior to dose administration  - Continue Free water   - Continue Lokelma EOD  - Feeds changed to Nepro w/ improved hyperkalemia

## 2024-04-03 NOTE — PROGRESS NOTE ADULT - ATTENDING COMMENTS
Insulin gtt transitioned off after NPH 10 x 1.     Will continue glargine and add short-acting pre-bolus feeds.   Recommend Endo c/s when on floor as will need improved glycemic control for wound healing; goal -180.  Cleared for transfer back to RCU by Dr. Torres.  Wean vent as tolerated; was tachypneic on PSV today.

## 2024-04-03 NOTE — PROGRESS NOTE ADULT - ASSESSMENT
79 y/o Female with polycystic kidney disease ESRD s/p renal transplant in 2019 presented with ICH - had Craniotomy and EVD    1. DDRT in 2019 - Pt. with ESRD, underwent DDRT in 2019. On review of IXL, Scr was 1.86 on 10/31/23. SCr was WNL at 1.18 on admission (3/2), peaked to 1.64 (3/122). Improved 1.0. Today Scr is 1.3. Pt. with likely underlying CKD, and Scr likely at baseline. Continue with immunosuppression regimen, as outlined below. Monitor labs and urine output. Avoid nephrotoxins. Dose medications as per eGFR.    2. IS  -Monitor tacro trough, (goal: 4-7). Today tacro trough is 7.2. On tacro 3mg q12.   - Please recheck tacro trough - 30min before the AM dose.   -Continue prednisone 5 mg qd and tacro 3mg bid  -Leflunomide currently on hold (? BK viremia vs RA).     3. Hyperkalemia  - Resolved. Trend and monitor K.   -Monitor labs, low K/renal diet as appropriate.

## 2024-04-03 NOTE — PROGRESS NOTE ADULT - PROBLEM SELECTOR PLAN 2
- S/p trach 3/8 by surgery by Dr. Joselo Mayorga   - Tracheostomy Sutures removed 3/13  - Patient weaned to tc ATC while in the RCU   - Patient returned to full vent support post op   - Will attempt to wean back to TC   - Continue airway clearance; Duoneb q6h PRN

## 2024-04-03 NOTE — PROGRESS NOTE ADULT - NS ATTEND AMEND GEN_ALL_CORE FT
78 year old female with a history of ESRD s/p renal transplant (2019) c/b BK viremia, left AKA, HTN, breast ca s/p lumpectomy (completed Tamoxifen 3/2023), DVT (off Eliquis), HLD, gout presenting with left ICH likely in the setting of amyloid angiopathy s/p left craniectomy (discarded) and evacuation (3/2/24), EVD placement and removal (3/7), c/b prolonged respiratory failure s/p trach/PEG (3/9/24).     Currently s/p cranioplasty 4/2/24.     Her post operative course complicated by relative hypotension, clonidine has been held and hyperglycemia requiring insulin drip. She is now off the drip and blood pressure has stabilized. At the time of my exam, open eyes to sternal rub. Otherwise non-communicative, did not withdraw to pain. YON drain in place and draining sanguinous fluid. Otherwise stable exam.  She is accepted for transfer back to [facility].     Rest as above.

## 2024-04-03 NOTE — CHART NOTE - NSCHARTNOTEFT_GEN_A_CORE
Nutrition Follow Up Note  Patient seen for: follow up    Source: [] Patient       [x] Medical Record        [x] Nursing        [] Family at bedside       [x] Other: team during interdisciplinary rounds     -If unable to interview patient: [x] Trach/Vent/BiPAP  [] Disoriented/confused/inappropriate to interview    Diet Order:   Diet, NPO with Tube Feed:   Tube Feeding Modality: Gastrostomy  Nepro with Carb Steady (NEPRORTH)  Total Volume for 24 Hours (mL): 960  Bolus  Total Volume of Bolus (mL):  240  Total # of Feeds: 4  Tube Feed Frequency: Every 6 hours   Tube Feed Start Time: 12:00  Bolus Feed Rate (mL per Hour): 240   Bolus Feed Duration (in Hours): 1  Supplement Feeding Modality:  Gastrostomy  Probiotic Yogurt/Smoothie Cans or Servings Per Day:  1       Frequency:  Two Times a day (24)    EN order providing at 100% provision: 1699kcal (34kcal/kg) and 78g protein (1.58g/kg)   EN provision:  3/29: 960ml  3/30: 1440ml  3/31: 480ml  : 960ml  : NPO  RD will continue to monitor trends.     - Is current order appropriate/adequate? see below for recommendations.     Nutrition-related concerns:  -Enteral feeds changed to bolus feeds 3/22 from continuous for episodes of hypoglycemia per notes.   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   -S/P trach and PEG (3-08)  -Renal: S/p hx of renal transplant (); ordered for Tacrolimus.  -Hgb A1c 5.7%. insulin regimen as below to maintain glycemic control.   -ALLERGY: Shellfish noted and confirmed by family.   -Free water 150ml every 8 hours per orders.   -YON drain see flow sheets for output     GI: rectal tube discontinued. Last BM .   Bowel Regimen? [] Yes   [x] No    Weights:   Dosing weight 50kg  Daily Weight in k.5 (), Weight in k.2 (), Weight in k.1 ()    Nutritionally Pertinent MEDICATIONS  (STANDING):  amLODIPine   Tablet  carvedilol  cloNIDine Patch 0.1 mG/24Hr(s)  dextrose 5%.  dextrose 5%.  dextrose 50% Injectable  dextrose 50% Injectable  dextrose 50% Injectable  glucagon  Injectable  insulin glargine Injectable (LANTUS)  insulin lispro (ADMELOG) corrective regimen sliding scale  insulin lispro Injectable (ADMELOG)  insulin regular Infusion  nystatin    Suspension  pantoprazole   Suspension  predniSONE   Tablet  trimethoprim   80 mG/sulfamethoxazole 400 mG    Pertinent Labs:  @ 03:16: Na 135, BUN 66<H>, Cr 1.36<H>, <H>, K+ 4.5, Phos 4.3, Mg 2.0, Alk Phos 92, ALT/SGPT 19, AST/SGOT 14, HbA1c --   @ 21:33: Na 139, BUN 61<H>, Cr 1.34<H>, <H>, K+ 5.4<H>, Phos 4.5, Mg 2.3, Alk Phos 90, ALT/SGPT 21, AST/SGOT 20, HbA1c --    A1C with Estimated Average Glucose Result: 5.7 % (24 @ 11:57)    Finger Sticks:  POCT Blood Glucose.: 324 mg/dL ( @ 09:12)  POCT Blood Glucose.: 333 mg/dL ( @ 07:58)  POCT Blood Glucose.: 331 mg/dL ( @ 06:58)  POCT Blood Glucose.: 357 mg/dL ( @ 05:32)  POCT Blood Glucose.: 272 mg/dL ( @ 00:09)  POCT Blood Glucose.: 232 mg/dL ( @ 20:44)  POCT Blood Glucose.: 157 mg/dL ( @ 18:38)  POCT Blood Glucose.: 186 mg/dL ( @ 17:29)  POCT Blood Glucose.: 149 mg/dL ( @ 11:15)    Skin per nursing documentation: crani site, sacrum suspected deep tissue injury, right and left ear suspected deep tissue injury, left elbow suspected deep tissue injury, right ankle suspected deep tissue injury.   Edema per nursing documentation: trace generalized per flow sheets    (Based on dosing wt 50kg):   Estimated Energy Needs: (30-35kcal/kg): 1500-1750kcal  Estimated Protein Needs: (1.4-1.8g protein/kg): 70-90g protein  Estimated Fluid Needs: defer to team    Previous Nutrition Diagnosis:  Increased Nutrient Needs  Nutrition Diagnosis is: [x] ongoing  [] resolved [] not applicable     Nutrition Care Plan:  [x] In Progress  [] Achieved  [] Not applicable    New Nutrition Diagnosis: [x] Not applicable    Nutrition Interventions:     Education Provided   [] Yes:  [x] No:     Recommendations:      -Can continue bolus per team. Current regimen meeting nutritional needs, see above for what it is providing pt with. Changed from Glucerna 1.5 to Nepro for persistently elevated K levels. Can continue.     -Free water flushes per team.   -Continue Probiotic Yogurt per team.   -Consider addition of Multivitamin to aid in wound healing if no contraindications     Monitoring and Evaluation:   Continue to monitor nutritional intake, tolerance to diet prescription, weights, labs, skin integrity    RD remains available upon request and will follow up per protocol  Shala Campbell, MS, RD, CDN / Teams

## 2024-04-03 NOTE — PROGRESS NOTE ADULT - SUBJECTIVE AND OBJECTIVE BOX
Elective cranioplasty after IPH     24h events  admitted to [facility] on insulin drip    Exam: EOS, trached, PERRL, no FC, R side flaccid, LUE spont AG/LOC, LLE wds    VITALS:   Vital Signs Last 24 Hrs  T(C): 36.5 (03 Apr 2024 07:00), Max: 37.4 (02 Apr 2024 21:10)  T(F): 97.7 (03 Apr 2024 07:00), Max: 99.3 (02 Apr 2024 21:10)  HR: 85 (03 Apr 2024 08:00) (74 - 104)  BP: 119/78 (03 Apr 2024 08:00) (105/57 - 196/100)  BP(mean): 91 (03 Apr 2024 08:00) (75 - 140)  RR: 25 (03 Apr 2024 08:00) (14 - 25)  SpO2: 100% (03 Apr 2024 08:00) (98% - 100%)    Parameters below as of 03 Apr 2024 03:03  Patient On (Oxygen Delivery Method): ventilator      CAPILLARY BLOOD GLUCOSE      POCT Blood Glucose.: 333 mg/dL (03 Apr 2024 07:58)  POCT Blood Glucose.: 331 mg/dL (03 Apr 2024 06:58)  POCT Blood Glucose.: 357 mg/dL (03 Apr 2024 05:32)  POCT Blood Glucose.: 272 mg/dL (03 Apr 2024 00:09)  POCT Blood Glucose.: 232 mg/dL (02 Apr 2024 20:44)  POCT Blood Glucose.: 157 mg/dL (02 Apr 2024 18:38)  POCT Blood Glucose.: 186 mg/dL (02 Apr 2024 17:29)  POCT Blood Glucose.: 149 mg/dL (02 Apr 2024 11:15)    I&O's Summary    02 Apr 2024 07:01  -  03 Apr 2024 07:00  --------------------------------------------------------  IN: 1710.3 mL / OUT: 895 mL / NET: 815.3 mL    03 Apr 2024 07:01  -  03 Apr 2024 08:48  --------------------------------------------------------  IN: 6 mL / OUT: 0 mL / NET: 6 mL        Respiratory:  Mode: AC/ CMV (Assist Control/ Continuous Mandatory Ventilation)  RR (machine): 14  TV (machine): 450  FiO2: 40  PEEP: 5  ITime: 1  MAP: 9  PIP: 18    ABG - ( 03 Apr 2024 02:50 )  pH, Arterial: 7.42  pH, Blood: x     /  pCO2: 33    /  pO2: 182   / HCO3: 21    / Base Excess: -2.5  /  SaO2: 99.9                LABS:                        10.1   8.12  )-----------( 155      ( 03 Apr 2024 03:16 )             32.2     04-03    135  |  101  |  66<H>  ----------------------------<  340<H>  4.5   |  19<L>  |  1.36<H>        MEDICATION LEVELS:     IVF FLUIDS/MEDICATIONS:   MEDICATIONS  (STANDING):  acetaminophen   IVPB .. 750 milliGRAM(s) IV Intermittent once  amLODIPine   Tablet 10 milliGRAM(s) Oral daily  artificial  tears Solution 1 Drop(s) Both EYES every 4 hours  Biotene Dry Mouth Oral Rinse 5 milliLiter(s) Swish and Spit every 6 hours  brivaracetam  IVPB 100 milliGRAM(s) IV Intermittent <User Schedule>  carvedilol 25 milliGRAM(s) Oral every 12 hours  cloNIDine Patch 0.1 mG/24Hr(s) 1 patch Transdermal every 7 days  insulin glargine Injectable (LANTUS) 25 Unit(s) SubCutaneous at bedtime  insulin lispro (ADMELOG) corrective regimen sliding scale   SubCutaneous every 6 hours  insulin regular Infusion 3 Unit(s)/Hr (3 mL/Hr) IV Continuous <Continuous>  lacosamide IVPB 200 milliGRAM(s) IV Intermittent every 12 hours  nystatin    Suspension 294825 Unit(s) Swish and Swallow every 6 hours  pantoprazole   Suspension 40 milliGRAM(s) Oral two times a day  predniSONE   Tablet 5 milliGRAM(s) Oral daily  sodium zirconium cyclosilicate 10 Gram(s) Oral every other day  tacrolimus    0.5 mG/mL Suspension 3 milliGRAM(s) Oral every 12 hours  trimethoprim   80 mG/sulfamethoxazole 400 mG 1 Tablet(s) Oral daily    MEDICATIONS  (PRN):  acetaminophen     Tablet .. 650 milliGRAM(s) Oral every 6 hours PRN Mild Pain (1 - 3)  acetaminophen   IVPB .. 1000 milliGRAM(s) IV Intermittent every 6 hours PRN Temp greater or equal to 38.5C (101.3F), Severe Pain (7 - 10)  albuterol/ipratropium for Nebulization 3 milliLiter(s) Nebulizer every 6 hours PRN Shortness of Breath and/or Wheezing  bisacodyl 5 milliGRAM(s) Oral daily PRN Constipation  oxyCODONE    IR 5 milliGRAM(s) Oral every 4 hours PRN Moderate Pain (4 - 6)  oxyCODONE    IR 10 milliGRAM(s) Oral every 4 hours PRN Severe Pain (7 - 10)

## 2024-04-03 NOTE — PROGRESS NOTE ADULT - SUBJECTIVE AND OBJECTIVE BOX
[facility] DIVISION OF KIDNEY DISEASES AND HYPERTENSION -- FOLLOW UP NOTE  --------------------------------------------------------------------------------  Chief Complaint:    24 hour events/subjective:  [facility]- [facility] 4/2/24.  Pt remained on Venst support via tracheostomy tube. Pt's family members are the bedside at the time of examination.         PAST HISTORY  --------------------------------------------------------------------------------  No significant changes to PMH, PSH, FHx, SHx, unless otherwise noted    ALLERGIES & MEDICATIONS  --------------------------------------------------------------------------------  Allergies    shellfish (Rash)  ChloraPrep One-Step (Rash)  penicillins (Rash)    Intolerances      Standing Inpatient Medications  acetaminophen   IVPB .. 750 milliGRAM(s) IV Intermittent once  amLODIPine   Tablet 10 milliGRAM(s) Oral daily  artificial  tears Solution 1 Drop(s) Both EYES every 4 hours  Biotene Dry Mouth Oral Rinse 5 milliLiter(s) Swish and Spit every 6 hours  brivaracetam  IVPB 100 milliGRAM(s) IV Intermittent <User Schedule>  carvedilol 25 milliGRAM(s) Oral every 12 hours  cloNIDine Patch 0.1 mG/24Hr(s) 1 patch Transdermal every 7 days  dextrose 5%. 1000 milliLiter(s) IV Continuous <Continuous>  dextrose 5%. 1000 milliLiter(s) IV Continuous <Continuous>  dextrose 50% Injectable 12.5 Gram(s) IV Push once  dextrose 50% Injectable 25 Gram(s) IV Push once  dextrose 50% Injectable 25 Gram(s) IV Push once  glucagon  Injectable 1 milliGRAM(s) IntraMuscular once  insulin glargine Injectable (LANTUS) 25 Unit(s) SubCutaneous at bedtime  insulin lispro (ADMELOG) corrective regimen sliding scale   SubCutaneous every 6 hours  insulin lispro Injectable (ADMELOG) 7 Unit(s) SubCutaneous every 6 hours  lacosamide IVPB 200 milliGRAM(s) IV Intermittent every 12 hours  multivitamin 1 Tablet(s) Oral daily  nystatin    Suspension 233329 Unit(s) Swish and Swallow every 6 hours  pantoprazole   Suspension 40 milliGRAM(s) Oral two times a day  predniSONE   Tablet 5 milliGRAM(s) Oral daily  sodium zirconium cyclosilicate 10 Gram(s) Oral every other day  tacrolimus    0.5 mG/mL Suspension 3 milliGRAM(s) Oral every 12 hours  trimethoprim   80 mG/sulfamethoxazole 400 mG 1 Tablet(s) Oral daily    PRN Inpatient Medications  acetaminophen     Tablet .. 650 milliGRAM(s) Oral every 6 hours PRN  acetaminophen   IVPB .. 1000 milliGRAM(s) IV Intermittent every 6 hours PRN  albuterol/ipratropium for Nebulization 3 milliLiter(s) Nebulizer every 6 hours PRN  bisacodyl 5 milliGRAM(s) Oral daily PRN  dextrose Oral Gel 15 Gram(s) Oral once PRN  oxyCODONE    IR 5 milliGRAM(s) Oral every 4 hours PRN  oxyCODONE    IR 10 milliGRAM(s) Oral every 4 hours PRN      REVIEW OF SYSTEMS  --------------------------------------------------------------------------------  Not obtainable.     VITALS/PHYSICAL EXAM  --------------------------------------------------------------------------------  T(C): 36.9 (04-03-24 @ 15:00), Max: 37.4 (04-02-24 @ 21:10)  HR: 96 (04-03-24 @ 15:15) (80 - 104)  BP: 100/61 (04-03-24 @ 15:00) (93/54 - 196/100)  RR: 38 (04-03-24 @ 15:00) (14 - 38)  SpO2: 98% (04-03-24 @ 15:15) (94% - 100%)  Wt(kg): --  Height (cm): 157.5 (04-02-24 @ 15:08)  Weight (kg): 50 (04-02-24 @ 15:08)  BMI (kg/m2): 20.2 (04-02-24 @ 15:08)  BSA (m2): 1.48 (04-02-24 @ 15:08)      04-02-24 @ 07:01  -  04-03-24 @ 07:00  --------------------------------------------------------  IN: 1710.3 mL / OUT: 895 mL / NET: 815.3 mL    04-03-24 @ 07:01  -  04-03-24 @ 16:03  --------------------------------------------------------  IN: 407 mL / OUT: 80 mL / NET: 327 mL      Physical Exam:  Gen: ill appearing, NAD  HEENT: Lt sided craniotomy done with out bony reconstruction.  +Trach   Pulm: trach collar   CV: RRR, S1S2;  Abd: +BS, soft, nontender/nondistended  Extremities: no bilateral LE edema noted. +LLE AKA  Neuro: lethargic   Skin: Warm        LABS/STUDIES  --------------------------------------------------------------------------------              10.1   8.12  >-----------<  155      [04-03-24 @ 03:16]              32.2     135  |  101  |  66  ----------------------------<  340      [04-03-24 @ 03:16]  4.5   |  19  |  1.36        Ca     11.8     [04-03-24 @ 03:16]      Mg     2.0     [04-03-24 @ 03:16]      Phos  4.3     [04-03-24 @ 03:16]    TPro  6.6  /  Alb  3.5  /  TBili  0.2  /  DBili  x   /  AST  14  /  ALT  19  /  AlkPhos  92  [04-03-24 @ 03:16]    PT/INR: PT 11.6 , INR 1.11       [04-02-24 @ 20:51]  PTT: 25.3       [04-02-24 @ 20:51]      Creatinine Trend:  SCr 1.36 [04-03 @ 03:16]  SCr 1.34 [04-02 @ 21:33]  SCr 1.43 [04-02 @ 07:00]  SCr 1.31 [04-01 @ 07:48]  SCr 1.24 [03-31 @ 07:08]    Tacrolimus (), Serum: 7.2 ng/mL (04-03 @ 05:47)  Tacrolimus (), Serum: 7.7 ng/mL (04-02 @ 05:57)  Tacrolimus (), Serum: 8.2 ng/mL (04-01 @ 07:48)  Tacrolimus (), Serum: 7.1 ng/mL (03-31 @ 07:14)            Urinalysis - [04-03-24 @ 03:16]      Color  / Appearance  / SG  / pH       Gluc 340 / Ketone   / Bili  / Urobili        Blood  / Protein  / Leuk Est  / Nitrite       RBC  / WBC  / Hyaline  / Gran  / Sq Epi  / Non Sq Epi  / Bacteria       Iron 27, TIBC 201, %sat 13      [03-14-24 @ 06:49]  HbA1c 7.2      [01-11-20 @ 09:23]  TSH 1.24      [03-02-24 @ 10:51]  Lipid: chol 136, TG 95, HDL 72, LDL --      [03-02-24 @ 02:10]

## 2024-04-03 NOTE — PROGRESS NOTE ADULT - ASSESSMENT
77 yo F with PMHx ESRD s/p renal transplant (2019, now off HD), left AKA, BK viremia, HTN, breast ca s/p lumpectomy (completed Tamoxifen 03/2023), DVT (off Eliquis), HLD, gout presenting 3/1 with left ICH (ICH score 4) likely 2/2 amyloid angiopathy s/p left craniectomy(discarded) and evacuation as well as EVD placement and removal (3/7). Hospital course c/b extensive LE DVTs, S/p IVCF on 3/3. She is s/p trach/peg 3/8/24.  Febrile 3/10 with + UA, blood/sputum culture NGTD.  Treatment for UTI initiated with ceftriaxone, eventually broadened to cefepime. Transferred to [facility] 3/11 for continued management. Urine culture resulted positive for klebsiella, treated for 7 days with Meropenem 3/12 --> 3/19. Patient weaned from MV, tolerating TC ATC since 3/23. Patient S/p Cranioplasty on 4/2.    4/3: Patient returned to [facility] this evening. Patient S/p Cranioplasty on 4/2 post op head ct stable. Patient with hyperglycemia post op required insulin drip, since discontinued. Patient placed back on Full support post op, previously on tc atc prior to cranioplasty. Will attempt weaning attempts tomorrow. Clonidine patch held by NSCU this afternoon in setting of borderline bp.

## 2024-04-03 NOTE — PROGRESS NOTE ADULT - ASSESSMENT
ASSESSMENT/PLAN:   Cranioplasty     N   CT Head in AM  MRI post-op  Surgical drains per NSGY  tylenol and oxycodone PRN   Activity: [] OOB as tolerated [] Bedrest [] PT [] OT [] PMNR    CV   -160mmHg  d/c a-line in AM    P   vent AC 14/450/5/40%   Incentive spirometry, mobilize as tolerated    R  IVL   d/c johnson in AM    GI  Diet: bolus tube feeds   senna and miralax     ID  Soraida-op antibiotics    E  Goal euglycemia (-180)  on insulin drip 6U   lantus 25U  NPH   sp prednisone 4mg     H   SCDs  Chemoppx  LED     CODE STATUS: FULL     DISPOSITION: ICU  ASSESSMENT/PLAN:   Cranioplasty     N   CT Head in AM  tylenol and oxycodone PRN   Activity: [] OOB as tolerated [] Bedrest [] PT [] OT [] PMNR    CV   -160mmHg  d/c a-line in AM    P   vent AC 14/450/5/40%     R  IVL     GI  Diet: bolus tube feeds   senna and miralax     ID  Soraida-op antibiotics    E  Goal euglycemia (-180)  on insulin drip 6U   lantus 25U  NPH   sp prednisone 4mg     H   SCDs  Chemoppx  LED     CODE STATUS: FULL     DISPOSITION: [facility] when insulin drip off

## 2024-04-03 NOTE — PROGRESS NOTE ADULT - PROBLEM SELECTOR PLAN 4
- Urine Cx 3/10: ESBL Klebsiella and VRE 10-49 K   - Treated with Meropenem 1GM Q12H 3/12 --> 3/19  - Currently remains off ABX

## 2024-04-03 NOTE — PROGRESS NOTE ADULT - SUBJECTIVE AND OBJECTIVE BOX
Patient is a 78y old  Female who presents with a chief complaint of ICH (03 Apr 2024 17:05)      Interval Events: Patient transferred back to [facility] this evening     REVIEW OF SYSTEMS:  [ ] Positive  [ ] All other systems negative  [x] Unable to assess ROS because patient is Non-Verbal     Vital Signs Last 24 Hrs  T(C): 36.9 (04-03-24 @ 15:00), Max: 37.4 (04-02-24 @ 21:10)  T(F): 98.4 (04-03-24 @ 15:00), Max: 99.3 (04-02-24 @ 21:10)  HR: 92 (04-03-24 @ 17:00) (80 - 104)  BP: 98/58 (04-03-24 @ 17:00) (93/54 - 196/100)  RR: 25 (04-03-24 @ 17:00) (14 - 38)  SpO2: 99% (04-03-24 @ 17:00) (94% - 100%)    PHYSICAL EXAM:  HEENT:  [x]Tracheostomy: # 7 cuffed portex  [x]Pupils equal  [ ]No oral lesions  [x]Abnormal: left side surgical incision site with clean dry dressing, + Subgaleal YON Drain in place    SKIN  [ ]No Rash  [ ] Abnormal:  [x] pressure: sacral DTI    CARDIAC  [x]Regular  [ ]Abnormal    PULMONARY  [x]Bilateral Clear Breath Sounds  [ ]Normal Excursion  [ ]Abnormal    GI  [x]PEG      [x] +BS		              [x]Soft, nondistended, nontender	  [x]Abnormal: Rectal tube    MUSCULOSKELETAL                                   [x]Bedbound                 [x]Abnormal: L BKA  [ ]Ambulatory/OOB to chair                           EXTREMITIES                                         [x]Normal  [ ]Edema                           NEUROLOGIC  [ ] Normal, non focal  [x] Focal findings: Eyes open, withdraws from pain on all extremities, Spontaneously moving Left UE     PSYCHIATRIC  [x] Unable to assess  [ ] Sedated	 [ ]Agitated    :  Gardner: [ ] Yes, if yes: Date of Placement:                   [x] No    LINES: Central Lines [ ] Yes, if yes: Date of Placement                                     [x] No  HOSPITAL MEDICATIONS:  MEDICATIONS  (STANDING):  acetaminophen   IVPB .. 750 milliGRAM(s) IV Intermittent once  amLODIPine   Tablet 10 milliGRAM(s) Oral daily  artificial  tears Solution 1 Drop(s) Both EYES every 4 hours  Biotene Dry Mouth Oral Rinse 5 milliLiter(s) Swish and Spit every 6 hours  brivaracetam Oral Solution 100 milliGRAM(s) Oral <User Schedule>  carvedilol 25 milliGRAM(s) Oral every 12 hours  cloNIDine Patch 0.1 mG/24Hr(s) 1 patch Transdermal every 7 days  dextrose 5%. 1000 milliLiter(s) (50 mL/Hr) IV Continuous <Continuous>  dextrose 5%. 1000 milliLiter(s) (100 mL/Hr) IV Continuous <Continuous>  dextrose 50% Injectable 12.5 Gram(s) IV Push once  dextrose 50% Injectable 25 Gram(s) IV Push once  dextrose 50% Injectable 25 Gram(s) IV Push once  glucagon  Injectable 1 milliGRAM(s) IntraMuscular once  insulin glargine Injectable (LANTUS) 25 Unit(s) SubCutaneous at bedtime  insulin lispro (ADMELOG) corrective regimen sliding scale   SubCutaneous every 6 hours  multivitamin 1 Tablet(s) Oral daily  nystatin    Suspension [id number] Unit(s) Swish and Swallow every 6 hours  pantoprazole   Suspension 40 milliGRAM(s) Oral two times a day  predniSONE   Tablet 5 milliGRAM(s) Oral daily  sodium zirconium cyclosilicate 10 Gram(s) Oral every other day  tacrolimus    0.5 mG/mL Suspension 3 milliGRAM(s) Oral every 12 hours  trimethoprim   80 mG/sulfamethoxazole 400 mG 1 Tablet(s) Oral daily    MEDICATIONS  (PRN):  acetaminophen     Tablet .. 650 milliGRAM(s) Oral every 6 hours PRN Mild Pain (1 - 3)  acetaminophen   IVPB .. 1000 milliGRAM(s) IV Intermittent every 6 hours PRN Temp greater or equal to 38.5C (101.3F), Severe Pain (7 - 10)  albuterol/ipratropium for Nebulization 3 milliLiter(s) Nebulizer every 6 hours PRN Shortness of Breath and/or Wheezing  bisacodyl 5 milliGRAM(s) Oral daily PRN Constipation  dextrose Oral Gel 15 Gram(s) Oral once PRN Blood Glucose LESS THAN 70 milliGRAM(s)/deciliter  oxyCODONE    IR 5 milliGRAM(s) Oral every 4 hours PRN Moderate Pain (4 - 6)  oxyCODONE    IR 10 milliGRAM(s) Oral every 4 hours PRN Severe Pain (7 - 10)      LABS:                        10.1   8.12  )-----------( 155      ( 03 Apr 2024 03:16 )             32.2     04-03    135  |  101  |  66<H>  ----------------------------<  340<H>  4.5   |  19<L>  |  1.36<H>    Ca    11.8<H>      03 Apr 2024 03:16  Phos  4.3     04-03  Mg     2.0     04-03    TPro  6.6  /  Alb  3.5  /  TBili  0.2  /  DBili  x   /  AST  14  /  ALT  19  /  AlkPhos  92  04-03    PT/INR - ( 02 Apr 2024 20:51 )   PT: 11.6 sec;   INR: 1.11 ratio         PTT - ( 02 Apr 2024 20:51 )  PTT:25.3 sec  Urinalysis Basic - ( 03 Apr 2024 03:16 )    Color: x / Appearance: x / SG: x / pH: x  Gluc: 340 mg/dL / Ketone: x  / Bili: x / Urobili: x   Blood: x / Protein: x / Nitrite: x   Leuk Esterase: x / RBC: x / WBC x   Sq Epi: x / Non Sq Epi: x / Bacteria: x      Arterial Blood Gas:  04-03 @ 02:50  7.42/33/182/21/99.9/-2.5  ABG lactate: --      CAPILLARY BLOOD GLUCOSE    MICROBIOLOGY:     RADIOLOGY:  [ ] Reviewed and interpreted by me    Mode: AC/ CMV (Assist Control/ Continuous Mandatory Ventilation)  RR (machine): 14  TV (machine): 450  FiO2: 40  PEEP: 5  ITime: 1  MAP: 10  PIP: 20

## 2024-04-03 NOTE — PROGRESS NOTE ADULT - SUBJECTIVE AND OBJECTIVE BOX
Patient seen and examined at bedside.    --Anticoagulation--    T(C): 36.8 (04-02-24 @ 23:00), Max: 37.4 (04-02-24 @ 21:10)  HR: 91 (04-02-24 @ 23:10) (73 - 104)  BP: 131/73 (04-02-24 @ 23:10) (120/60 - 196/100)  RR: 16 (04-02-24 @ 23:10) (14 - 20)  SpO2: 100% (04-02-24 @ 23:10) (99% - 100%)  Wt(kg): --    Exam: EOS, trached, PERRL, no FC, R side flaccid, LUE spont AG/LOC, LLE wds

## 2024-04-04 LAB
ALBUMIN SERPL ELPH-MCNC: 3.4 G/DL — SIGNIFICANT CHANGE UP (ref 3.3–5)
ALP SERPL-CCNC: 89 U/L — SIGNIFICANT CHANGE UP (ref 40–120)
ALT FLD-CCNC: <5 U/L — LOW (ref 10–45)
ANION GAP SERPL CALC-SCNC: 16 MMOL/L — SIGNIFICANT CHANGE UP (ref 5–17)
APPEARANCE UR: ABNORMAL
AST SERPL-CCNC: 20 U/L — SIGNIFICANT CHANGE UP (ref 10–40)
BACTERIA # UR AUTO: ABNORMAL /HPF
BILIRUB SERPL-MCNC: 0.2 MG/DL — SIGNIFICANT CHANGE UP (ref 0.2–1.2)
BILIRUB UR-MCNC: NEGATIVE — SIGNIFICANT CHANGE UP
BUN SERPL-MCNC: 80 MG/DL — HIGH (ref 7–23)
CALCIUM SERPL-MCNC: 10.6 MG/DL — HIGH (ref 8.4–10.5)
CAST: 2 /LPF — SIGNIFICANT CHANGE UP (ref 0–4)
CHLORIDE SERPL-SCNC: 99 MMOL/L — SIGNIFICANT CHANGE UP (ref 96–108)
CO2 SERPL-SCNC: 20 MMOL/L — LOW (ref 22–31)
COLOR SPEC: YELLOW — SIGNIFICANT CHANGE UP
CREAT SERPL-MCNC: 1.69 MG/DL — HIGH (ref 0.5–1.3)
DIFF PNL FLD: NEGATIVE — SIGNIFICANT CHANGE UP
EGFR: 31 ML/MIN/1.73M2 — LOW
GLUCOSE BLDC GLUCOMTR-MCNC: 174 MG/DL — HIGH (ref 70–99)
GLUCOSE BLDC GLUCOMTR-MCNC: 184 MG/DL — HIGH (ref 70–99)
GLUCOSE BLDC GLUCOMTR-MCNC: 188 MG/DL — HIGH (ref 70–99)
GLUCOSE BLDC GLUCOMTR-MCNC: 207 MG/DL — HIGH (ref 70–99)
GLUCOSE BLDC GLUCOMTR-MCNC: 224 MG/DL — HIGH (ref 70–99)
GLUCOSE SERPL-MCNC: 228 MG/DL — HIGH (ref 70–99)
GLUCOSE UR QL: NEGATIVE MG/DL — SIGNIFICANT CHANGE UP
HCT VFR BLD CALC: 29.3 % — LOW (ref 34.5–45)
HGB BLD-MCNC: 9.1 G/DL — LOW (ref 11.5–15.5)
KETONES UR-MCNC: NEGATIVE MG/DL — SIGNIFICANT CHANGE UP
LEUKOCYTE ESTERASE UR-ACNC: ABNORMAL
MAGNESIUM SERPL-MCNC: 2.2 MG/DL — SIGNIFICANT CHANGE UP (ref 1.6–2.6)
MCHC RBC-ENTMCNC: 29.4 PG — SIGNIFICANT CHANGE UP (ref 27–34)
MCHC RBC-ENTMCNC: 31.1 GM/DL — LOW (ref 32–36)
MCV RBC AUTO: 94.5 FL — SIGNIFICANT CHANGE UP (ref 80–100)
NITRITE UR-MCNC: NEGATIVE — SIGNIFICANT CHANGE UP
NRBC # BLD: 0 /100 WBCS — SIGNIFICANT CHANGE UP (ref 0–0)
PH UR: 5.5 — SIGNIFICANT CHANGE UP (ref 5–8)
PHOSPHATE SERPL-MCNC: 3.8 MG/DL — SIGNIFICANT CHANGE UP (ref 2.5–4.5)
PLATELET # BLD AUTO: 127 K/UL — LOW (ref 150–400)
POTASSIUM SERPL-MCNC: 4.1 MMOL/L — SIGNIFICANT CHANGE UP (ref 3.5–5.3)
POTASSIUM SERPL-SCNC: 4.1 MMOL/L — SIGNIFICANT CHANGE UP (ref 3.5–5.3)
PROT SERPL-MCNC: 6.3 G/DL — SIGNIFICANT CHANGE UP (ref 6–8.3)
PROT UR-MCNC: SIGNIFICANT CHANGE UP MG/DL
RBC # BLD: 3.1 M/UL — LOW (ref 3.8–5.2)
RBC # FLD: 16.9 % — HIGH (ref 10.3–14.5)
RBC CASTS # UR COMP ASSIST: 2 /HPF — SIGNIFICANT CHANGE UP (ref 0–4)
SODIUM SERPL-SCNC: 135 MMOL/L — SIGNIFICANT CHANGE UP (ref 135–145)
SP GR SPEC: 1.01 — SIGNIFICANT CHANGE UP (ref 1–1.03)
SQUAMOUS # UR AUTO: 0 /HPF — SIGNIFICANT CHANGE UP (ref 0–5)
TACROLIMUS SERPL-MCNC: 7.1 NG/ML — SIGNIFICANT CHANGE UP
UROBILINOGEN FLD QL: 0.2 MG/DL — SIGNIFICANT CHANGE UP (ref 0.2–1)
WBC # BLD: 8.33 K/UL — SIGNIFICANT CHANGE UP (ref 3.8–10.5)
WBC # FLD AUTO: 8.33 K/UL — SIGNIFICANT CHANGE UP (ref 3.8–10.5)
WBC UR QL: 191 /HPF — HIGH (ref 0–5)

## 2024-04-04 PROCEDURE — 95720 EEG PHY/QHP EA INCR W/VEEG: CPT

## 2024-04-04 PROCEDURE — 99233 SBSQ HOSP IP/OBS HIGH 50: CPT | Mod: FS

## 2024-04-04 RX ORDER — ENOXAPARIN SODIUM 100 MG/ML
30 INJECTION SUBCUTANEOUS EVERY 24 HOURS
Refills: 0 | Status: DISCONTINUED | OUTPATIENT
Start: 2024-04-04 | End: 2024-04-05

## 2024-04-04 RX ADMIN — PANTOPRAZOLE SODIUM 40 MILLIGRAM(S): 20 TABLET, DELAYED RELEASE ORAL at 10:08

## 2024-04-04 RX ADMIN — Medication 1 DROP(S): at 05:37

## 2024-04-04 RX ADMIN — Medication 2: at 17:53

## 2024-04-04 RX ADMIN — BRIVARACETAM 100 MILLIGRAM(S): 25 TABLET, FILM COATED ORAL at 16:30

## 2024-04-04 RX ADMIN — CARVEDILOL PHOSPHATE 25 MILLIGRAM(S): 80 CAPSULE, EXTENDED RELEASE ORAL at 22:29

## 2024-04-04 RX ADMIN — BRIVARACETAM 100 MILLIGRAM(S): 25 TABLET, FILM COATED ORAL at 08:31

## 2024-04-04 RX ADMIN — Medication 1 TABLET(S): at 12:01

## 2024-04-04 RX ADMIN — Medication 5 MILLIGRAM(S): at 22:29

## 2024-04-04 RX ADMIN — Medication 500000 UNIT(S): at 17:52

## 2024-04-04 RX ADMIN — Medication 4: at 05:41

## 2024-04-04 RX ADMIN — INSULIN GLARGINE 25 UNIT(S): 100 INJECTION, SOLUTION SUBCUTANEOUS at 22:24

## 2024-04-04 RX ADMIN — Medication 1 DROP(S): at 23:37

## 2024-04-04 RX ADMIN — TACROLIMUS 3 MILLIGRAM(S): 5 CAPSULE ORAL at 17:53

## 2024-04-04 RX ADMIN — TACROLIMUS 3 MILLIGRAM(S): 5 CAPSULE ORAL at 06:56

## 2024-04-04 RX ADMIN — Medication 1 DROP(S): at 10:09

## 2024-04-04 RX ADMIN — Medication 5 MILLILITER(S): at 05:37

## 2024-04-04 RX ADMIN — Medication 5 MILLILITER(S): at 23:37

## 2024-04-04 RX ADMIN — CARVEDILOL PHOSPHATE 25 MILLIGRAM(S): 80 CAPSULE, EXTENDED RELEASE ORAL at 10:08

## 2024-04-04 RX ADMIN — AMLODIPINE BESYLATE 10 MILLIGRAM(S): 2.5 TABLET ORAL at 05:38

## 2024-04-04 RX ADMIN — Medication 2: at 23:49

## 2024-04-04 RX ADMIN — Medication 1 DROP(S): at 14:53

## 2024-04-04 RX ADMIN — Medication 500000 UNIT(S): at 13:00

## 2024-04-04 RX ADMIN — Medication 5 MILLILITER(S): at 12:01

## 2024-04-04 RX ADMIN — Medication 5 MILLILITER(S): at 17:52

## 2024-04-04 RX ADMIN — Medication 1 DROP(S): at 17:54

## 2024-04-04 RX ADMIN — PANTOPRAZOLE SODIUM 40 MILLIGRAM(S): 20 TABLET, DELAYED RELEASE ORAL at 22:29

## 2024-04-04 RX ADMIN — Medication 4: at 12:33

## 2024-04-04 RX ADMIN — ENOXAPARIN SODIUM 30 MILLIGRAM(S): 100 INJECTION SUBCUTANEOUS at 17:52

## 2024-04-04 RX ADMIN — Medication 1 DROP(S): at 02:55

## 2024-04-04 RX ADMIN — Medication 500000 UNIT(S): at 23:38

## 2024-04-04 RX ADMIN — LACOSAMIDE 200 MILLIGRAM(S): 50 TABLET ORAL at 13:02

## 2024-04-04 RX ADMIN — Medication 500000 UNIT(S): at 05:41

## 2024-04-04 NOTE — PROVIDER CONTACT NOTE (OTHER) - NAME OF MD/NP/PA/DO NOTIFIED:
NIGHT ACP Belinda Santillan
Smooth Navarrete
Jared Hodges
Keith Euceda
Manuelito FRANCE
ROMÁN Oconnor
ROMÁN Quinn
ROMÁN Langford
RANDY Matthews
Rosalba FRANCE

## 2024-04-04 NOTE — PROGRESS NOTE ADULT - PROBLEM SELECTOR PLAN 5
- SBP goal:   - Coreg 25 mg BID, Amlodipine 10mg daily  - Clonidine patch held by NSCU in setting of borderline bp prior to transfer   - Monitor BP and possible need to resume   - Echo: LVEF 70-75%, Grade II diastolic dysfunction, septal bulge without obstruction  - cardiology following

## 2024-04-04 NOTE — PROGRESS NOTE ADULT - ASSESSMENT
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 Hospital course c/b extensive LE DVTs, S/p IVCF on 3/3. She is s/p trach/peg 3/8/24.  Febrile 3/10 with + UA, blood/sputum culture NGTD.  Treatment for UTI initiated with ceftriaxone, eventually broadened to cefepime. Transferred to RCU 3/11 for continued management. Urine culture resulted positive for klebsiella, treated for 7 days with Meropenem 3/12 --> 3/19. Patient weaned from MV, tolerating TC ATC since 3/23. Patient S/p Cranioplasty on 4/2.    4/3: Patient returned to RCU this evening. Patient S/p Cranioplasty on 4/2 post op head ct stable. Patient with hyperglycemia post op required insulin drip, since discontinued. Patient placed back on Full support post op, previously on tc atc prior to cranioplasty. Will attempt weaning attempts tomorrow. Clonidine patch held by NSCU this afternoon in setting of borderline bp. 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 Hospital course c/b extensive LE DVTs, S/p IVCF on 3/3. She is s/p trach/peg 3/8/24.  Febrile 3/10 with + UA, blood/sputum culture NGTD.  Treatment for UTI initiated with ceftriaxone, eventually broadened to cefepime. Transferred to RCU 3/11 for continued management. Urine culture resulted positive for klebsiella, treated for 7 days with Meropenem 3/12 --> 3/19. Patient weaned from MV, tolerating TC ATC since 3/23. Patient S/p Cranioplasty on 4/2.      4/4: PST as tolerated, tolerated 5/5 today.  Bump in Cr/BUN, reached out to transplant nephrology, awaiting to hear back.  Left shoulder twitching noted on AM rounds with team, discussed with neurology, suggested EEG, EEG ordered.  BP's stable for now, clonidine patch was d/c'd 4/3 for soft BPs in NSCU.     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 Hospital course c/b extensive LE DVTs, S/p IVCF on 3/3. She is s/p trach/peg 3/8/24.  Febrile 3/10 with + UA, blood/sputum culture NGTD.  Treatment for UTI initiated with ceftriaxone, eventually broadened to cefepime. Transferred to RCU 3/11 for continued management. Urine culture resulted positive for klebsiella, treated for 7 days with Meropenem 3/12 --> 3/19. Patient weaned from MV, tolerating TC ATC since 3/23. Patient S/p Cranioplasty on 4/2.      4/4: PST as tolerated, tolerated 5/5 today.  Bump in Cr/BUN, reached out to transplant nephrology - transplant will follow up in AM, urine lytes ordered.  Left shoulder twitching noted on AM rounds with team, discussed with neurology, suggested EEG, EEG ordered.  Discussed with Nsx restarting heparin subq - Dr. Brian majano with Lovenox.  BP's stable for now, clonidine patch was d/c'd 4/3 for soft BPs in NSCU.     77 yo F with PMHx ESRD s/p renal transplant (2019, now off HD), left AKA, BK viremia, HTN, breast ca s/p lumpectomy (completed Tamoxifen 03/2023), DVT (off Eliquis), HLD, gout presenting 3/1 with left ICH (ICH score 4) likely 2/2 amyloid angiopathy s/p left craniectomy(discarded) and evacuation as well as EVD placement and removal (3/7). Hospital course c/b extensive LE DVTs, S/p IVCF on 3/3. She is s/p trach/peg 3/8/24.  Febrile 3/10 with + UA, blood/sputum culture NGTD.  Treatment for UTI initiated with ceftriaxone, eventually broadened to cefepime. Transferred to RCU 3/11 for continued management. Urine culture resulted positive for klebsiella, treated for 7 days with Meropenem 3/12 --> 3/19. Patient weaned from MV, tolerating TC ATC since 3/23. Patient S/p Cranioplasty on 4/2.      4/4: PST as tolerated, tolerated 5/5 today.  Bump in Cr/BUN, reached out to transplant nephrology - transplant will follow up in AM, urine studies ordered.  Left shoulder twitching noted on AM rounds with team, discussed with neurology, suggested EEG, EEG ordered.  Discussed with Nsx restarting heparin subq - Dr. Brian majano with Lovenox.  BP's stable for now, clonidine patch was d/c'd 4/3 for soft BPs in NSCU.

## 2024-04-04 NOTE — PROVIDER CONTACT NOTE (OTHER) - ASSESSMENT
Obtunded, . 108, , RR 22, TEMP 99.7, O2 sat 100% on the vent
Patient is not in distress
Pt is obtunded, no acute distress
Patient is not in distress upon assessment. Vital signs stable
Patient is noted sleeping. Vital signs stable
Pt was tachypenic with resp rate 28 and 
pt given dose of clonidine. pt has order for amlodipine 5 mg once daily. pt did not receive dose today.
Pt. intubated, unable to follow verbal commands at this time, pupils reactive, no movement x4. Pt. on no sedation, currently on insulin gtt
spasm noted on Right shoulder. first occurrence noted during AM rounds, NP was in room
obtunded, unable to follow any commands, no acute distress
No acute distress, no signs of bleeding,

## 2024-04-04 NOTE — PROGRESS NOTE ADULT - ATTENDING SUPERVISION STATEMENT
Fellow
Resident/Fellow
Fellow
Resident
Fellow

## 2024-04-04 NOTE — PROVIDER CONTACT NOTE (OTHER) - RECOMMENDATIONS
Np ordered blood cultures at 1158
EEG ordered post AM rounds, NP made aware
[id number] mg ivssx 1
will  continue to monitor BM  frequency, and to reinsert if needed
Bladder scan and straight cath if nmeeded
Tylenol via peg, urine cx, and blood cx x2

## 2024-04-04 NOTE — PROGRESS NOTE ADULT - NS ATTEND AMEND GEN_ALL_CORE FT
78 year old female with a history of ESRD s/p renal transplant (2019) c/b BK viremia, left AKA, HTN, breast ca s/p lumpectomy (completed Tamoxifen 3/2023), DVT (off Eliquis), HLD, gout presenting with left ICH likely in the setting of amyloid angiopathy s/p left craniectomy (discarded) and evacuation (3/2/24), EVD placement and removal (3/7), c/b prolonged respiratory failure s/p trach/PEG (3/9/24) and RCU transfer.     #ICH  #Seizure  From a neurologic perspective she has had an ICH 2/2 amyloid angiopathy s/p left craniectomy and evacuation, EVD placement and subsequent removal. She is s/p cranioplasty 4/2. YON drain is still in place.   She clinically remains obtunded, reportedly will occasionally open her eyes to sternal rub, no meaningful communication. She has some myoclonic twitching on exam in her left shoulder. Able to open eyes to command however.   Her hospital course has been complicated by seizure, her last recorded seizures were seen 3/7/24. She is currently controlled on Briviact 100 TID, Vimpat 200 BID  - Will reconsult neuro re: twitching  - Appreciate neurosurgery input  - Follow neuro exam closely  - C/W AED  - Monitor YON drain output    #HTN: Hypotension after OR. Holding Clonidine patch. Continue with other antihypertensives, currently normotensive on my exam.     #Mixed respiratory failure  She initially presented with respiratory failure, mixed hypoxic and hypercapnic respiratory failure. She was tolerating trach collar around the clock since 3/23/24. She was placed back on full support after her cranioplasty. Will rapidly wean again as able. Will wean O2 as tolerated, goal SpO2 90-95%. Continue airways clearance with duonebs and hypersal q6 for airways clearance.     #UTI  #ESBL Kleb  Her hospital course was otherwise complicated by ESBL Kleb and VRE (3/10) urinary tract infection. She is s/p Meropenem. Now clinically stable and being monitored off antibiotics.    #H.Pylori  She was found to have H.Pylori positivity: Per GI evaluation, will plan to start treatment as outlined above at the time of discharge.    #Renal Transplant  #MEHREEN  She has a history of renal transplant. Transplant is following and appreciate their recommendations. Continue with Prednisone, Tacrolimus. Will confirm Bactrim with prophylaxis with Nephrology. Her creatinine continues to trend up. Urine output has dropped off. Will d/w transplant. ? related to hypoperfusion in OR. Trend creat closely, avoid nephrotoxins, trend UOP. Tacro levels daily. Will continue to monitor for now.     #DM  She has had issues with hyperglycemia: Goal blood glucose 140-180. Adjusting insulin accordingly.      #Heme: DVT 3/2 R CFV, Fem, Pop, Gastrocnemius and L CFV, Fem s/p IVC filter (3/3/24). Tolerating SQH without issue    Rest as above

## 2024-04-04 NOTE — PROVIDER CONTACT NOTE (OTHER) - DATE AND TIME:
04-Apr-2024 14:00
15-Mar-2024 20:30
12-Mar-2024 21:21
11-Mar-2024 21:05
13-Mar-2024 06:50
21-Mar-2024 00:24
26-Mar-2024 10:30
20-Mar-2024 00:10
31-Mar-2024 03:05
02-Mar-2024 07:15
11-Mar-2024 20:05

## 2024-04-04 NOTE — PROVIDER CONTACT NOTE (OTHER) - SITUATION
PT. EVD waveform dampened s.p craniectomy, EVD remains patent at this time with anterograde flow only, no retro grade flow present
Pt's BP is 180/110, , even after the [id number] mg iv
pt has 101 axillary temp, has UI on vancomycin and meropenem, last cultures were done on 3/10.24
pt's rectal tube got dislodged, came out with inflated balloon
bp 165/99 hr 90
spasm noted on Right shoulder
PT'S /108. , Oxycodone 10 mg via peg x1 given

## 2024-04-04 NOTE — PROGRESS NOTE ADULT - ASSESSMENT
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 S/p craniectomy and evacuation course c/b seizures, last seen on 3/7/24 EEG currently on AEDS. Now s/p trach/peg 3/9/24. During EGD/PEG found to have superficial gastric ulcers. H. Pylori Stool Ag positive, for which GI is consulted.     1. H. Pylori   Bismuth quadruple therapy x 14 days, to start post discharge     2. Resp failure s/p trach/PEG  tolerating tube feeds    3. IPH s/p craniotomy     4. PUD   daily PPI      I had a prolonged conversation with the patient regarding the hospital course, differential diagnosis, results of diagnostic tests this far, and therapeutic modalities available. Plan of care discussed with the patient after the evaluation. Patient expresses a clear understanding of the plan of care.    Jose Antonio Sepulveda D.O.  Gastroenterology and Hepatology  [address] [place] [address], [address]  [place], [place]  Office: [phone]

## 2024-04-04 NOTE — PROGRESS NOTE ADULT - ATTENDING COMMENTS
PT seen on 4/5/24.  PT with eyes open briefly and moves left hand.  She is undergoing VEEG.  YON with sanguo-serous output.  Continue monitoring per RICU.  Monitor drain output.  Had discussed with family regarding the diagnosis of cerebral amyloid angiopathy and risks of future bleeds.

## 2024-04-04 NOTE — PROGRESS NOTE ADULT - PROBLEM SELECTOR PLAN 11
- PCP ppx: Bactrim  - DVT ppx: heparin sub q held in setting of recent crainoplasty , s/p IVCF 3/3  - GI ppx: Protonix BID - PCP ppx: Bactrim  - DVT ppx: heparin sub q held in setting of recent crainoplasty, s/p IVCF 3/3  - GI ppx: Protonix BID

## 2024-04-04 NOTE — CHART NOTE - NSCHARTNOTEFT_GEN_A_CORE
EEG preliminary read (not final) on the initial recording hour(s) = x 1     No clear seizures recorded. Please press event button for any witnessed events of concern.  Right frontotemporal LPDs, ~1hz.  Left hemispheric focal slowing.  Moderate slowing noted, nonspecific.  Final report to follow tomorrow morning after completion of study.    [facility] EEG Reading Room Ph#: [phone]  Epilepsy Answering Service after 5PM and before 8:30AM: Ph#: [phone]

## 2024-04-04 NOTE — PROGRESS NOTE ADULT - SUBJECTIVE AND OBJECTIVE BOX
Patient is a 78y old  Female who presents with a chief complaint of ICH (03 Apr 2024 18:36)      Interval Events:    REVIEW OF SYSTEMS:  [ ] Positive  [ ] All other systems negative  [ ] Unable to assess ROS because ________    Vital Signs Last 24 Hrs  T(C): 36.8 (04-04-24 @ 05:30), Max: 36.9 (04-03-24 @ 15:00)  T(F): 98.2 (04-04-24 @ 05:30), Max: 98.4 (04-03-24 @ 15:00)  HR: 90 (04-04-24 @ 05:30) (83 - 96)  BP: 141/85 (04-04-24 @ 05:30) (93/54 - 141/85)  RR: 16 (04-04-24 @ 05:30) (14 - 38)  SpO2: 100% (04-04-24 @ 05:30) (94% - 100%)    PHYSICAL EXAM:  HEENT:   [ ]Tracheostomy:  [ ]Pupils equal  [ ]No oral lesions  [ ]Abnormal    SKIN  [ ]No Rash  [ ] Abnormal  [ ] pressure    CARDIAC  [ ]Regular  [ ]Abnormal    PULMONARY  [ ]Bilateral Clear Breath Sounds  [ ]Normal Excursion  [ ]Abnormal    GI  [ ]PEG      [ ] +BS		              [ ]Soft, nondistended, nontender	  [ ]Abnormal    MUSCULOSKELETAL                                   [ ]Bedbound                 [ ]Abnormal    [ ]Ambulatory/OOB to chair                           EXTREMITIES                                         [ ]Normal  [ ]Edema                           NEUROLOGIC  [ ] Normal, non focal  [ ] Focal findings:    PSYCHIATRIC  [ ]Alert and appropriate  [ ] Sedated	 [ ]Agitated    :  Gardner: [ ] Yes, if yes: Date of Placement:                   [  ] No    LINES: Central Lines [ ] Yes, if yes: Date of Placement                                     [  ] No    HOSPITAL MEDICATIONS:  MEDICATIONS  (STANDING):  acetaminophen   IVPB .. 750 milliGRAM(s) IV Intermittent once  amLODIPine   Tablet 10 milliGRAM(s) Oral daily  artificial  tears Solution 1 Drop(s) Both EYES every 4 hours  Biotene Dry Mouth Oral Rinse 5 milliLiter(s) Swish and Spit every 6 hours  brivaracetam Oral Solution 100 milliGRAM(s) Oral <User Schedule>  carvedilol 25 milliGRAM(s) Oral every 12 hours  dextrose 5%. 1000 milliLiter(s) (50 mL/Hr) IV Continuous <Continuous>  dextrose 5%. 1000 milliLiter(s) (100 mL/Hr) IV Continuous <Continuous>  dextrose 50% Injectable 25 Gram(s) IV Push once  dextrose 50% Injectable 25 Gram(s) IV Push once  dextrose 50% Injectable 12.5 Gram(s) IV Push once  glucagon  Injectable 1 milliGRAM(s) IntraMuscular once  insulin glargine Injectable (LANTUS) 25 Unit(s) SubCutaneous at bedtime  insulin lispro (ADMELOG) corrective regimen sliding scale   SubCutaneous every 6 hours  lacosamide Solution 200 milliGRAM(s) Oral <User Schedule>  multivitamin 1 Tablet(s) Oral daily  nystatin    Suspension [id number] Unit(s) Swish and Swallow every 6 hours  pantoprazole   Suspension 40 milliGRAM(s) Oral two times a day  predniSONE   Tablet 5 milliGRAM(s) Oral daily  sodium zirconium cyclosilicate 10 Gram(s) Oral every other day  tacrolimus    0.5 mG/mL Suspension 3 milliGRAM(s) Oral every 12 hours  trimethoprim   80 mG/sulfamethoxazole 400 mG 1 Tablet(s) Oral daily    MEDICATIONS  (PRN):  acetaminophen     Tablet .. 650 milliGRAM(s) Oral every 6 hours PRN Mild Pain (1 - 3)  acetaminophen   IVPB .. 1000 milliGRAM(s) IV Intermittent every 6 hours PRN Temp greater or equal to 38.5C (101.3F), Severe Pain (7 - 10)  albuterol/ipratropium for Nebulization 3 milliLiter(s) Nebulizer every 6 hours PRN Shortness of Breath and/or Wheezing  bisacodyl 5 milliGRAM(s) Oral daily PRN Constipation  dextrose Oral Gel 15 Gram(s) Oral once PRN Blood Glucose LESS THAN 70 milliGRAM(s)/deciliter  oxyCODONE    IR 5 milliGRAM(s) Oral every 4 hours PRN Moderate Pain (4 - 6)  oxyCODONE    IR 10 milliGRAM(s) Oral every 4 hours PRN Severe Pain (7 - 10)      LABS:                        9.1    8.33  )-----------( 127      ( 04 Apr 2024 06:42 )             29.3     04-03    135  |  101  |  66<H>  ----------------------------<  340<H>  4.5   |  19<L>  |  1.36<H>    Ca    11.8<H>      03 Apr 2024 03:16  Phos  4.3     04-03  Mg     2.0     04-03    TPro  6.6  /  Alb  3.5  /  TBili  0.2  /  DBili  x   /  AST  14  /  ALT  19  /  AlkPhos  92  04-03    PT/INR - ( 02 Apr 2024 20:51 )   PT: 11.6 sec;   INR: 1.11 ratio         PTT - ( 02 Apr 2024 20:51 )  PTT:25.3 sec  Urinalysis Basic - ( 03 Apr 2024 03:16 )    Color: x / Appearance: x / SG: x / pH: x  Gluc: 340 mg/dL / Ketone: x  / Bili: x / Urobili: x   Blood: x / Protein: x / Nitrite: x   Leuk Esterase: x / RBC: x / WBC x   Sq Epi: x / Non Sq Epi: x / Bacteria: x      Arterial Blood Gas:  04-03 @ 02:50  7.42/33/182/21/99.9/-2.5  ABG lactate: --      CAPILLARY BLOOD GLUCOSE    MICROBIOLOGY:     RADIOLOGY:  [ ] Reviewed and interpreted by me    Mode: AC/ CMV (Assist Control/ Continuous Mandatory Ventilation)  RR (machine): 14  TV (machine): 450  FiO2: 40  PEEP: 5  ITime: 1  MAP: 8  PIP: 18   Patient is a 78y old  Female who presents with a chief complaint of ICH (03 Apr 2024 18:36)      Interval Events:  No acute events overnight.     REVIEW OF SYSTEMS:  [ ] Positive  [ ] All other systems negative  [X] Unable to assess ROS because ___Obtunded_____    Vital Signs Last 24 Hrs  T(C): 36.8 (04-04-24 @ 05:30), Max: 36.9 (04-03-24 @ 15:00)  T(F): 98.2 (04-04-24 @ 05:30), Max: 98.4 (04-03-24 @ 15:00)  HR: 90 (04-04-24 @ 05:30) (83 - 96)  BP: 141/85 (04-04-24 @ 05:30) (93/54 - 141/85)  RR: 16 (04-04-24 @ 05:30) (14 - 38)  SpO2: 100% (04-04-24 @ 05:30) (94% - 100%)    PHYSICAL EXAM:  HEENT:   [X]Tracheostomy: #7 Cuffed Portex  [X]Pupils equal  [ ]No oral lesions  [X]Abnormal - s/p cranioplasty, site w/ staples c/d/i. + YON drain    SKIN  [ ]No Rash  [X] Abnormal - LUE AVF, L AKA  [X] pressure - stage 3 sacral pressure injury     CARDIAC  [X]Regular  [ ]Abnormal    PULMONARY  [ ]Bilateral Clear Breath Sounds  [ ]Normal Excursion  [X]Abnormal - BL Coarse BS    GI  [X]PEG      [X] +BS		              [X]Soft, nondistended, nontender	  [ ]Abnormal    MUSCULOSKELETAL                                   [X]Bedbound                 [ ]Abnormal    [ ]Ambulatory/OOB to chair                           EXTREMITIES                                         [X]Normal  [ ]Edema                           NEUROLOGIC  [ ] Normal, non focal  [X] Focal findings: arouses to voice, withdraws BL UE and RLE    PSYCHIATRIC  [X]Alert and appropriate  [ ] Sedated	 [ ]Agitated    :  Gardner: [ ] Yes, if yes: Date of Placement:                   [X] No    LINES: Central Lines [ ] Yes, if yes: Date of Placement                                     [X] No    HOSPITAL MEDICATIONS:  MEDICATIONS  (STANDING):  acetaminophen   IVPB .. 750 milliGRAM(s) IV Intermittent once  amLODIPine   Tablet 10 milliGRAM(s) Oral daily  artificial  tears Solution 1 Drop(s) Both EYES every 4 hours  Biotene Dry Mouth Oral Rinse 5 milliLiter(s) Swish and Spit every 6 hours  brivaracetam Oral Solution 100 milliGRAM(s) Oral <User Schedule>  carvedilol 25 milliGRAM(s) Oral every 12 hours  dextrose 5%. 1000 milliLiter(s) (50 mL/Hr) IV Continuous <Continuous>  dextrose 5%. 1000 milliLiter(s) (100 mL/Hr) IV Continuous <Continuous>  dextrose 50% Injectable 25 Gram(s) IV Push once  dextrose 50% Injectable 25 Gram(s) IV Push once  dextrose 50% Injectable 12.5 Gram(s) IV Push once  glucagon  Injectable 1 milliGRAM(s) IntraMuscular once  insulin glargine Injectable (LANTUS) 25 Unit(s) SubCutaneous at bedtime  insulin lispro (ADMELOG) corrective regimen sliding scale   SubCutaneous every 6 hours  lacosamide Solution 200 milliGRAM(s) Oral <User Schedule>  multivitamin 1 Tablet(s) Oral daily  nystatin    Suspension [id number] Unit(s) Swish and Swallow every 6 hours  pantoprazole   Suspension 40 milliGRAM(s) Oral two times a day  predniSONE   Tablet 5 milliGRAM(s) Oral daily  sodium zirconium cyclosilicate 10 Gram(s) Oral every other day  tacrolimus    0.5 mG/mL Suspension 3 milliGRAM(s) Oral every 12 hours  trimethoprim   80 mG/sulfamethoxazole 400 mG 1 Tablet(s) Oral daily    MEDICATIONS  (PRN):  acetaminophen     Tablet .. 650 milliGRAM(s) Oral every 6 hours PRN Mild Pain (1 - 3)  acetaminophen   IVPB .. 1000 milliGRAM(s) IV Intermittent every 6 hours PRN Temp greater or equal to 38.5C (101.3F), Severe Pain (7 - 10)  albuterol/ipratropium for Nebulization 3 milliLiter(s) Nebulizer every 6 hours PRN Shortness of Breath and/or Wheezing  bisacodyl 5 milliGRAM(s) Oral daily PRN Constipation  dextrose Oral Gel 15 Gram(s) Oral once PRN Blood Glucose LESS THAN 70 milliGRAM(s)/deciliter  oxyCODONE    IR 5 milliGRAM(s) Oral every 4 hours PRN Moderate Pain (4 - 6)  oxyCODONE    IR 10 milliGRAM(s) Oral every 4 hours PRN Severe Pain (7 - 10)      LABS:                        9.1    8.33  )-----------( 127      ( 04 Apr 2024 06:42 )             29.3     04-03    135  |  101  |  66<H>  ----------------------------<  340<H>  4.5   |  19<L>  |  1.36<H>    Ca    11.8<H>      03 Apr 2024 03:16  Phos  4.3     04-03  Mg     2.0     04-03    TPro  6.6  /  Alb  3.5  /  TBili  0.2  /  DBili  x   /  AST  14  /  ALT  19  /  AlkPhos  92  04-03    PT/INR - ( 02 Apr 2024 20:51 )   PT: 11.6 sec;   INR: 1.11 ratio      PTT - ( 02 Apr 2024 20:51 )  PTT:25.3 sec  Urinalysis Basic - ( 03 Apr 2024 03:16 )    Color: x / Appearance: x / SG: x / pH: x  Gluc: 340 mg/dL / Ketone: x  / Bili: x / Urobili: x   Blood: x / Protein: x / Nitrite: x   Leuk Esterase: x / RBC: x / WBC x   Sq Epi: x / Non Sq Epi: x / Bacteria: x    Arterial Blood Gas:  04-03 @ 02:50  7.42/33/182/21/99.9/-2.5  ABG lactate: --    CAPILLARY BLOOD GLUCOSE    MICROBIOLOGY:     RADIOLOGY:  [ ] Reviewed and interpreted by me    Mode: AC/ CMV (Assist Control/ Continuous Mandatory Ventilation)  RR (machine): 14  TV (machine): 450  FiO2: 40  PEEP: 5  ITime: 1  MAP: 8  PIP: 18

## 2024-04-04 NOTE — PROVIDER CONTACT NOTE (OTHER) - BACKGROUND
ESRD  Breast cancer   HTN
ESRD  Breast cancer   Thrombocytopenia
ESRD  Intracerebral bleed
Patient is a 78y old  Female who presents with a chief complaint of ICH
transferred from NICU,  post craniectomy for ICH
s/p craniectomy for clot evacuation.
transferred in from NICU this evening, Post ICH and craniectomy, hx of renal transplant,
Admitted for ICH, post craniectomy, trached and on the vent
Admitted for ICH, post craniectomy, trache and  on the vent

## 2024-04-04 NOTE — PROGRESS NOTE ADULT - SUBJECTIVE AND OBJECTIVE BOX
Neurology        S: patient seen back in RCU.  twitching noted per primary team         Medications: MEDICATIONS  (STANDING):  acetaminophen   IVPB .. 750 milliGRAM(s) IV Intermittent once  amLODIPine   Tablet 10 milliGRAM(s) Oral daily  artificial  tears Solution 1 Drop(s) Both EYES every 4 hours  Biotene Dry Mouth Oral Rinse 5 milliLiter(s) Swish and Spit every 6 hours  brivaracetam Oral Solution 100 milliGRAM(s) Oral <User Schedule>  carvedilol 25 milliGRAM(s) Oral every 12 hours  dextrose 5%. 1000 milliLiter(s) (50 mL/Hr) IV Continuous <Continuous>  dextrose 5%. 1000 milliLiter(s) (100 mL/Hr) IV Continuous <Continuous>  dextrose 50% Injectable 12.5 Gram(s) IV Push once  dextrose 50% Injectable 25 Gram(s) IV Push once  dextrose 50% Injectable 25 Gram(s) IV Push once  glucagon  Injectable 1 milliGRAM(s) IntraMuscular once  insulin glargine Injectable (LANTUS) 25 Unit(s) SubCutaneous at bedtime  insulin lispro (ADMELOG) corrective regimen sliding scale   SubCutaneous every 6 hours  lacosamide Solution 200 milliGRAM(s) Oral <User Schedule>  multivitamin 1 Tablet(s) Oral daily  nystatin    Suspension 536438 Unit(s) Swish and Swallow every 6 hours  pantoprazole   Suspension 40 milliGRAM(s) Oral two times a day  predniSONE   Tablet 5 milliGRAM(s) Oral daily  sodium zirconium cyclosilicate 10 Gram(s) Oral every other day  tacrolimus    0.5 mG/mL Suspension 3 milliGRAM(s) Oral every 12 hours  trimethoprim   80 mG/sulfamethoxazole 400 mG 1 Tablet(s) Oral daily    MEDICATIONS  (PRN):  acetaminophen     Tablet .. 650 milliGRAM(s) Oral every 6 hours PRN Mild Pain (1 - 3)  acetaminophen   IVPB .. 1000 milliGRAM(s) IV Intermittent every 6 hours PRN Temp greater or equal to 38.5C (101.3F), Severe Pain (7 - 10)  albuterol/ipratropium for Nebulization 3 milliLiter(s) Nebulizer every 6 hours PRN Shortness of Breath and/or Wheezing  bisacodyl 5 milliGRAM(s) Oral daily PRN Constipation  dextrose Oral Gel 15 Gram(s) Oral once PRN Blood Glucose LESS THAN 70 milliGRAM(s)/deciliter  oxyCODONE    IR 5 milliGRAM(s) Oral every 4 hours PRN Moderate Pain (4 - 6)  oxyCODONE    IR 10 milliGRAM(s) Oral every 4 hours PRN Severe Pain (7 - 10)       Vitals:  Vital Signs Last 24 Hrs  T(C): 36.7 (04 Apr 2024 09:11), Max: 36.9 (03 Apr 2024 15:00)  T(F): 98 (04 Apr 2024 09:11), Max: 98.4 (03 Apr 2024 15:00)  HR: 90 (04 Apr 2024 11:12) (83 - 96)  BP: 145/69 (04 Apr 2024 09:11) (93/54 - 145/69)  BP(mean): 73 (03 Apr 2024 17:00) (69 - 81)  RR: 16 (04 Apr 2024 09:12) (14 - 38)  SpO2: 100% (04 Apr 2024 11:12) (94% - 100%)    Parameters below as of 04 Apr 2024 09:12  Patient On (Oxygen Delivery Method): ventilator           General Exam:   General Appearance: Appropriately dressed    Head: Normocephalic, atraumatic and no dysmorphic features s/p trach   Ear, Nose, and Throat: Moist mucous membranes  CVS: S1S2+  Resp: No SOB, no wheeze or rhonchi on vent   GI: soft NT/ND s/p PEG   Extremities:  L AKA  Skin: No bruises or rashes     Neurological Exam:  Mental Status:  opens eyes to noxious. no verbal. not following   Cranial Nerves: PERRL, EOMI but gaze pref to L, no blink to threat on R, R facial,    Motor: minimal/no spontaneous movement on RUE.  RLE some minimal movement. LUE 2/5 at times   Sensation: grimaces to noxious at times. some minimal withdrawal LUE 2/5 and RLE.    Coordination: unable   Gait: unable     Data/Labs/Imaging which I personally reviewed.                LABS:                          9.1    8.33  )-----------( 127      ( 04 Apr 2024 06:42 )             29.3     04-04    135  |  99  |  80<H>  ----------------------------<  228<H>  4.1   |  20<L>  |  1.69<H>    Ca    10.6<H>      04 Apr 2024 06:42  Phos  3.8     04-04  Mg     2.2     04-04    TPro  6.3  /  Alb  3.4  /  TBili  0.2  /  DBili  x   /  AST  20  /  ALT  <5<L>  /  AlkPhos  89  04-04    LIVER FUNCTIONS - ( 04 Apr 2024 06:42 )  Alb: 3.4 g/dL / Pro: 6.3 g/dL / ALK PHOS: 89 U/L / ALT: <5 U/L / AST: 20 U/L / GGT: x           PT/INR - ( 02 Apr 2024 20:51 )   PT: 11.6 sec;   INR: 1.11 ratio         PTT - ( 02 Apr 2024 20:51 )  PTT:25.3 sec  Urinalysis Basic - ( 04 Apr 2024 06:42 )    Color: x / Appearance: x / SG: x / pH: x  Gluc: 228 mg/dL / Ketone: x  / Bili: x / Urobili: x   Blood: x / Protein: x / Nitrite: x   Leuk Esterase: x / RBC: x / WBC x   Sq Epi: x / Non Sq Epi: x / Bacteria: x

## 2024-04-04 NOTE — PROVIDER CONTACT NOTE (OTHER) - REASON
Blood sugar level 33
Bp 160/57 hr86
Sugar level 73 at around midnight
Pt hypothermic to 93.5Rectally
Pt. EVD waveform dampened, EVD has anterograde flow, no retrograde flow present
temp 101 axillary
dislodged rectal tube
/108, 
spasm noted on Right shoulder
/110, 
pt with elevated BP

## 2024-04-04 NOTE — PROVIDER CONTACT NOTE (OTHER) - ACTION/TREATMENT ORDERED:
tepid sponging done, Tylenol given as ordered
First set of blood cultures sent.
team made aware, no further interventions at this time
tepid sponging done, ice packs applied, Tylenol given, one set of blood cx and urine cx collected.
D50
Bladder scan showed 607 cc, straight cath done 500 cc yellow clear urine drained and the pt had one incontinence, pt's blood pressure down to 150/80, 
MD stated to recheck in 1 hour
Will monitor
ok to give daily dose of amlodipine now,. will repeat pt bp
PA  stated to recheck sugar level at 3am
no new orders at this time. Patient pending EEG monitoring

## 2024-04-04 NOTE — PROGRESS NOTE ADULT - ASSESSMENT
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   initial CTH3/1  with 8.9cm left frontal IPH with IVH .09cm rightward shift   3/2 CTH s/p L hmeicrani and resection of IPH. stable   3/5 CTH post op changes. some reorption of post op pneumocephalus.    s/p trach/peg 3/8/24   3/8 CTH L frontal craniectomy.  L MCA hemorrhage and infarct ; still IVH.   3/10 with + UA  A1c 5.7 LDL 46   TTE done 3/2: LA is severely dilated    Urine culture grew positive for klebsiella and enterococcus  3/9 EEG no seiuzres since 3/7;  risk of seiuzre from L parietal region. mod to severe diffuse cerebral dysfunction   Transferred to [facility] 3/11 for continued management.  3/12 CTH   sterotactic imaging of L frontal crani for hemorrhagic L MCA ifnarct. L frontal temporal pseudmeningocele  noted. no hcange since 3/8   o/e awake, no verbal, not followiing. L gaze pref  some minimal withdrawal to noxious RLE and LUE.  s/p trach /vent   s/p cranioplasty 4/3, no change in post op exam   4/3 CTH removal of drain noted.  post op changes.   4/4 c/f seizure; repeat EEG     Impression: L ICH possible 2/2  CAA but hemorrhagic infarct also needs to be considered.      - would repeat EEG at this time given high risk for seiuzres   - tolerating CPAP at times   - difficult to determine IPH 2/2 CAA without MRI to look at SWI or GRE sequeneses for hemosiderin deposition  - never had vessel imaging. consider CTA H/N   - no AC/AP. dvt ppx okay  - briviact 100mg TID  and vimpat 200mg BID for seiuzre ppx   - infectious workup. on meropenum.  this can lower seiuzre threshold   - immunosuppression on tacrolimus and prednisone.  ppx bactrim     -telemetry   - PT/OT   - check FS, glucose control <180  - GI/DVT ppx   - GOC with family.  currenlty full code; in terms of functional meaningful recovery the likelihood is low.  patient will require 24hr care and likely be bedbound at this time.    consider recalling palliative  Dieter Wilkinson MD  Vascular Neurology  Office: [phone]

## 2024-04-04 NOTE — PROGRESS NOTE ADULT - SUBJECTIVE AND OBJECTIVE BOX
INTERVAL HPI/OVERNIGHT EVENTS:    no new concerning gi events    MEDICATIONS  (STANDING):  acetaminophen   IVPB .. 750 milliGRAM(s) IV Intermittent once  amLODIPine   Tablet 10 milliGRAM(s) Oral daily  artificial  tears Solution 1 Drop(s) Both EYES every 4 hours  Biotene Dry Mouth Oral Rinse 5 milliLiter(s) Swish and Spit every 6 hours  brivaracetam Oral Solution 100 milliGRAM(s) Oral <User Schedule>  carvedilol 25 milliGRAM(s) Oral every 12 hours  dextrose 5%. 1000 milliLiter(s) (50 mL/Hr) IV Continuous <Continuous>  dextrose 5%. 1000 milliLiter(s) (100 mL/Hr) IV Continuous <Continuous>  dextrose 50% Injectable 12.5 Gram(s) IV Push once  dextrose 50% Injectable 25 Gram(s) IV Push once  dextrose 50% Injectable 25 Gram(s) IV Push once  glucagon  Injectable 1 milliGRAM(s) IntraMuscular once  insulin glargine Injectable (LANTUS) 25 Unit(s) SubCutaneous at bedtime  insulin lispro (ADMELOG) corrective regimen sliding scale   SubCutaneous every 6 hours  lacosamide Solution 200 milliGRAM(s) Oral <User Schedule>  multivitamin 1 Tablet(s) Oral daily  nystatin    Suspension 251584 Unit(s) Swish and Swallow every 6 hours  pantoprazole   Suspension 40 milliGRAM(s) Oral two times a day  predniSONE   Tablet 5 milliGRAM(s) Oral daily  sodium zirconium cyclosilicate 10 Gram(s) Oral every other day  tacrolimus    0.5 mG/mL Suspension 3 milliGRAM(s) Oral every 12 hours  trimethoprim   80 mG/sulfamethoxazole 400 mG 1 Tablet(s) Oral daily    MEDICATIONS  (PRN):  acetaminophen     Tablet .. 650 milliGRAM(s) Oral every 6 hours PRN Mild Pain (1 - 3)  acetaminophen   IVPB .. 1000 milliGRAM(s) IV Intermittent every 6 hours PRN Temp greater or equal to 38.5C (101.3F), Severe Pain (7 - 10)  albuterol/ipratropium for Nebulization 3 milliLiter(s) Nebulizer every 6 hours PRN Shortness of Breath and/or Wheezing  bisacodyl 5 milliGRAM(s) Oral daily PRN Constipation  dextrose Oral Gel 15 Gram(s) Oral once PRN Blood Glucose LESS THAN 70 milliGRAM(s)/deciliter  oxyCODONE    IR 5 milliGRAM(s) Oral every 4 hours PRN Moderate Pain (4 - 6)  oxyCODONE    IR 10 milliGRAM(s) Oral every 4 hours PRN Severe Pain (7 - 10)      Allergies    shellfish (Rash)  ChloraPrep One-Step (Rash)  penicillins (Rash)    Intolerances        Review of Systems: *pt minimally verbal to nonverbal, unable to obtain ROS           Vital Signs Last 24 Hrs  T(C): 36.7 (04 Apr 2024 09:11), Max: 36.9 (03 Apr 2024 15:00)  T(F): 98 (04 Apr 2024 09:11), Max: 98.4 (03 Apr 2024 15:00)  HR: 90 (04 Apr 2024 11:12) (87 - 96)  BP: 145/69 (04 Apr 2024 09:11) (93/54 - 145/69)  BP(mean): 73 (03 Apr 2024 17:00) (69 - 76)  RR: 16 (04 Apr 2024 09:12) (14 - 38)  SpO2: 100% (04 Apr 2024 11:12) (94% - 100%)    Parameters below as of 04 Apr 2024 09:12  Patient On (Oxygen Delivery Method): ventilator        PHYSICAL EXAM:    Constitutional: NAD  HEENT: EOMI, throat clear  Neck: No LAD, supple  Respiratory: CTA and P  Cardiovascular: S1 and S2, RRR, no M  Gastrointestinal: BS+, soft, NT/ND, neg HSM, peg  Extremities: No peripheral edema, neg clubbing, cyanosis  Vascular: 2+ peripheral pulses  Neurological: A/O x0  Psychiatric: lethargic  Skin: No rashes      LABS:                        9.1    8.33  )-----------( 127      ( 04 Apr 2024 06:42 )             29.3     04-04    135  |  99  |  80<H>  ----------------------------<  228<H>  4.1   |  20<L>  |  1.69<H>    Ca    10.6<H>      04 Apr 2024 06:42  Phos  3.8     04-04  Mg     2.2     04-04    TPro  6.3  /  Alb  3.4  /  TBili  0.2  /  DBili  x   /  AST  20  /  ALT  <5<L>  /  AlkPhos  89  04-04    PT/INR - ( 02 Apr 2024 20:51 )   PT: 11.6 sec;   INR: 1.11 ratio         PTT - ( 02 Apr 2024 20:51 )  PTT:25.3 sec  Urinalysis Basic - ( 04 Apr 2024 06:42 )    Color: x / Appearance: x / SG: x / pH: x  Gluc: 228 mg/dL / Ketone: x  / Bili: x / Urobili: x   Blood: x / Protein: x / Nitrite: x   Leuk Esterase: x / RBC: x / WBC x   Sq Epi: x / Non Sq Epi: x / Bacteria: x        RADIOLOGY & ADDITIONAL TESTS:

## 2024-04-04 NOTE — PROGRESS NOTE ADULT - SUBJECTIVE AND OBJECTIVE BOX
DATE OF SERVICE: 04-04-24 @ 14:40    Patient is a 78y old  Female who presents with a chief complaint of ICH (04 Apr 2024 12:03)      INTERVAL HISTORY: In no acute distress.     REVIEW OF SYSTEMS: Unable to participate in ROS  CONSTITUTIONAL: No weakness  EYES/ENT: No visual changes;  No throat pain   NECK: No pain or stiffness  RESPIRATORY: No cough, wheezing; No shortness of breath  CARDIOVASCULAR: No chest pain or palpitations  GASTROINTESTINAL: No abdominal  pain. No nausea, vomiting, or hematemesis  GENITOURINARY: No dysuria, frequency or hematuria  NEUROLOGICAL: No stroke like symptoms  SKIN: No rashes  	  MEDICATIONS:  amLODIPine   Tablet 10 milliGRAM(s) Oral daily  carvedilol 25 milliGRAM(s) Oral every 12 hours        PHYSICAL EXAM:  T(C): 36.3 (04-04-24 @ 13:40), Max: 36.9 (04-03-24 @ 15:00)  HR: 80 (04-04-24 @ 13:40) (80 - 96)  BP: 147/75 (04-04-24 @ 13:40) (98/58 - 147/75)  RR: 18 (04-04-24 @ 13:40) (14 - 38)  SpO2: 100% (04-04-24 @ 13:40) (97% - 100%)  Wt(kg): --  I&O's Summary    03 Apr 2024 07:01  -  04 Apr 2024 07:00  --------------------------------------------------------  IN: 1637 mL / OUT: 495 mL / NET: 1142 mL          Appearance: In no distress	  HEENT:    PERRL, EOMI	  Cardiovascular:  S1 S2, No JVD  Respiratory: Lungs clear to auscultation	  Gastrointestinal:  Soft, Non-tender, + BS	  Vascularature:  L: AKA  Psychiatric: Appropriate affect   Neuro: no acute focal deficits                               9.1    8.33  )-----------( 127      ( 04 Apr 2024 06:42 )             29.3     04-04    135  |  99  |  80<H>  ----------------------------<  228<H>  4.1   |  20<L>  |  1.69<H>    Ca    10.6<H>      04 Apr 2024 06:42  Phos  3.8     04-04  Mg     2.2     04-04    TPro  6.3  /  Alb  3.4  /  TBili  0.2  /  DBili  x   /  AST  20  /  ALT  <5<L>  /  AlkPhos  89  04-04        Labs personally reviewed      ASSESSMENT/PLAN: 	  78F Hx ESRD s/p renal xplant c/b DGF off HD, L AKA, 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    1. Abnormal Echo --  Septal bulge noted measures 2.2cm without obstruction.   -- Given no intracavitary obstruction no intervention at this time  - No Myxoma seen on this echo or echo in 2019, ? if hypermobile interatrial septum was confused for on prior imaging     2. HTN - Was soft now with Clonidine DC'd  Continue Coreg 25 mg BID, amlodipine 10mg    3. 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 DVT PPX - HSQ      Yodit Umaña, AG-NP   Celio Mcwilliams DO [facility]  Cardiovascular Medicine  [address] [place] [address], [address]  Available through call or text on [employer]  Office: [phone]

## 2024-04-04 NOTE — PROGRESS NOTE ADULT - SUBJECTIVE AND OBJECTIVE BOX
Patient seen and examined at bedside.    --Anticoagulation--  enoxaparin Injectable 30 milliGRAM(s) SubCutaneous every 24 hours    T(C): 36.5 (04-04-24 @ 17:47), Max: 36.8 (04-04-24 @ 05:30)  HR: 85 (04-04-24 @ 17:47) (80 - 93)  BP: 146/76 (04-04-24 @ 17:47) (114/66 - 147/75)  RR: 20 (04-04-24 @ 17:47) (14 - 20)  SpO2: 100% (04-04-24 @ 17:47) (100% - 100%)  Wt(kg): --    Exam: Trached, EOV, PERRL, not FC, LUE spont in plane of bed

## 2024-04-05 LAB
ALBUMIN SERPL ELPH-MCNC: 3.2 G/DL — LOW (ref 3.3–5)
ALP SERPL-CCNC: 79 U/L — SIGNIFICANT CHANGE UP (ref 40–120)
ALT FLD-CCNC: <5 U/L — LOW (ref 10–45)
ANION GAP SERPL CALC-SCNC: 12 MMOL/L — SIGNIFICANT CHANGE UP (ref 5–17)
AST SERPL-CCNC: 14 U/L — SIGNIFICANT CHANGE UP (ref 10–40)
BILIRUB SERPL-MCNC: 0.1 MG/DL — LOW (ref 0.2–1.2)
BUN SERPL-MCNC: 78 MG/DL — HIGH (ref 7–23)
CALCIUM SERPL-MCNC: 10.2 MG/DL — SIGNIFICANT CHANGE UP (ref 8.4–10.5)
CHLORIDE SERPL-SCNC: 102 MMOL/L — SIGNIFICANT CHANGE UP (ref 96–108)
CO2 SERPL-SCNC: 23 MMOL/L — SIGNIFICANT CHANGE UP (ref 22–31)
CREAT ?TM UR-MCNC: 44 MG/DL — SIGNIFICANT CHANGE UP
CREAT SERPL-MCNC: 1.64 MG/DL — HIGH (ref 0.5–1.3)
EGFR: 32 ML/MIN/1.73M2 — LOW
GLUCOSE BLDC GLUCOMTR-MCNC: 103 MG/DL — HIGH (ref 70–99)
GLUCOSE BLDC GLUCOMTR-MCNC: 180 MG/DL — HIGH (ref 70–99)
GLUCOSE BLDC GLUCOMTR-MCNC: 219 MG/DL — HIGH (ref 70–99)
GLUCOSE BLDC GLUCOMTR-MCNC: 224 MG/DL — HIGH (ref 70–99)
GLUCOSE BLDC GLUCOMTR-MCNC: 249 MG/DL — HIGH (ref 70–99)
GLUCOSE SERPL-MCNC: 187 MG/DL — HIGH (ref 70–99)
HCT VFR BLD CALC: 26.7 % — LOW (ref 34.5–45)
HGB BLD-MCNC: 8.6 G/DL — LOW (ref 11.5–15.5)
MAGNESIUM SERPL-MCNC: 2.3 MG/DL — SIGNIFICANT CHANGE UP (ref 1.6–2.6)
MCHC RBC-ENTMCNC: 30.2 PG — SIGNIFICANT CHANGE UP (ref 27–34)
MCHC RBC-ENTMCNC: 32.2 GM/DL — SIGNIFICANT CHANGE UP (ref 32–36)
MCV RBC AUTO: 93.7 FL — SIGNIFICANT CHANGE UP (ref 80–100)
NRBC # BLD: 0 /100 WBCS — SIGNIFICANT CHANGE UP (ref 0–0)
OSMOLALITY UR: 351 MOS/KG — SIGNIFICANT CHANGE UP (ref 300–900)
PHOSPHATE SERPL-MCNC: 2.6 MG/DL — SIGNIFICANT CHANGE UP (ref 2.5–4.5)
PLATELET # BLD AUTO: 101 K/UL — LOW (ref 150–400)
POTASSIUM SERPL-MCNC: 4.7 MMOL/L — SIGNIFICANT CHANGE UP (ref 3.5–5.3)
POTASSIUM SERPL-SCNC: 4.7 MMOL/L — SIGNIFICANT CHANGE UP (ref 3.5–5.3)
POTASSIUM UR-SCNC: 9 MMOL/L — SIGNIFICANT CHANGE UP
PROT ?TM UR-MCNC: 18 MG/DL — HIGH (ref 0–12)
PROT SERPL-MCNC: 6.2 G/DL — SIGNIFICANT CHANGE UP (ref 6–8.3)
PROT/CREAT UR-RTO: 0.4 RATIO — HIGH (ref 0–0.2)
RBC # BLD: 2.85 M/UL — LOW (ref 3.8–5.2)
RBC # FLD: 16.7 % — HIGH (ref 10.3–14.5)
SODIUM SERPL-SCNC: 137 MMOL/L — SIGNIFICANT CHANGE UP (ref 135–145)
SODIUM UR-SCNC: 33 MMOL/L — SIGNIFICANT CHANGE UP
TACROLIMUS SERPL-MCNC: 5.4 NG/ML — SIGNIFICANT CHANGE UP
UUN UR-MCNC: 693 MG/DL — SIGNIFICANT CHANGE UP
WBC # BLD: 6.48 K/UL — SIGNIFICANT CHANGE UP (ref 3.8–10.5)
WBC # FLD AUTO: 6.48 K/UL — SIGNIFICANT CHANGE UP (ref 3.8–10.5)

## 2024-04-05 PROCEDURE — 99232 SBSQ HOSP IP/OBS MODERATE 35: CPT | Mod: GC

## 2024-04-05 PROCEDURE — 99233 SBSQ HOSP IP/OBS HIGH 50: CPT | Mod: FS

## 2024-04-05 PROCEDURE — 95720 EEG PHY/QHP EA INCR W/VEEG: CPT

## 2024-04-05 RX ORDER — SODIUM CHLORIDE 9 MG/ML
1000 INJECTION INTRAMUSCULAR; INTRAVENOUS; SUBCUTANEOUS
Refills: 0 | Status: DISCONTINUED | OUTPATIENT
Start: 2024-04-05 | End: 2024-04-07

## 2024-04-05 RX ADMIN — CARVEDILOL PHOSPHATE 25 MILLIGRAM(S): 80 CAPSULE, EXTENDED RELEASE ORAL at 09:05

## 2024-04-05 RX ADMIN — Medication 5 MILLILITER(S): at 23:10

## 2024-04-05 RX ADMIN — BRIVARACETAM 100 MILLIGRAM(S): 25 TABLET, FILM COATED ORAL at 23:09

## 2024-04-05 RX ADMIN — PANTOPRAZOLE SODIUM 40 MILLIGRAM(S): 20 TABLET, DELAYED RELEASE ORAL at 09:04

## 2024-04-05 RX ADMIN — TACROLIMUS 3 MILLIGRAM(S): 5 CAPSULE ORAL at 06:08

## 2024-04-05 RX ADMIN — Medication 1 DROP(S): at 13:02

## 2024-04-05 RX ADMIN — Medication 5 MILLIGRAM(S): at 21:38

## 2024-04-05 RX ADMIN — LACOSAMIDE 200 MILLIGRAM(S): 50 TABLET ORAL at 23:08

## 2024-04-05 RX ADMIN — BRIVARACETAM 100 MILLIGRAM(S): 25 TABLET, FILM COATED ORAL at 00:29

## 2024-04-05 RX ADMIN — PANTOPRAZOLE SODIUM 40 MILLIGRAM(S): 20 TABLET, DELAYED RELEASE ORAL at 21:38

## 2024-04-05 RX ADMIN — Medication 4: at 17:11

## 2024-04-05 RX ADMIN — Medication 1 DROP(S): at 21:35

## 2024-04-05 RX ADMIN — LACOSAMIDE 200 MILLIGRAM(S): 50 TABLET ORAL at 00:29

## 2024-04-05 RX ADMIN — Medication 1 TABLET(S): at 11:22

## 2024-04-05 RX ADMIN — Medication 1 DROP(S): at 06:07

## 2024-04-05 RX ADMIN — INSULIN GLARGINE 25 UNIT(S): 100 INJECTION, SOLUTION SUBCUTANEOUS at 22:53

## 2024-04-05 RX ADMIN — LACOSAMIDE 200 MILLIGRAM(S): 50 TABLET ORAL at 11:23

## 2024-04-05 RX ADMIN — Medication 500000 UNIT(S): at 17:01

## 2024-04-05 RX ADMIN — Medication 4: at 23:09

## 2024-04-05 RX ADMIN — Medication 500000 UNIT(S): at 06:07

## 2024-04-05 RX ADMIN — CARVEDILOL PHOSPHATE 25 MILLIGRAM(S): 80 CAPSULE, EXTENDED RELEASE ORAL at 21:37

## 2024-04-05 RX ADMIN — BRIVARACETAM 100 MILLIGRAM(S): 25 TABLET, FILM COATED ORAL at 09:02

## 2024-04-05 RX ADMIN — Medication 5 MILLILITER(S): at 06:07

## 2024-04-05 RX ADMIN — SODIUM ZIRCONIUM CYCLOSILICATE 10 GRAM(S): 10 POWDER, FOR SUSPENSION ORAL at 11:22

## 2024-04-05 RX ADMIN — BRIVARACETAM 100 MILLIGRAM(S): 25 TABLET, FILM COATED ORAL at 17:01

## 2024-04-05 RX ADMIN — Medication 5 MILLILITER(S): at 11:22

## 2024-04-05 RX ADMIN — Medication 5 MILLILITER(S): at 17:01

## 2024-04-05 RX ADMIN — TACROLIMUS 3 MILLIGRAM(S): 5 CAPSULE ORAL at 17:31

## 2024-04-05 RX ADMIN — Medication 1 DROP(S): at 09:02

## 2024-04-05 RX ADMIN — SODIUM CHLORIDE 50 MILLILITER(S): 9 INJECTION INTRAMUSCULAR; INTRAVENOUS; SUBCUTANEOUS at 16:21

## 2024-04-05 RX ADMIN — Medication 1 DROP(S): at 01:04

## 2024-04-05 RX ADMIN — Medication 500000 UNIT(S): at 23:09

## 2024-04-05 RX ADMIN — Medication 4: at 11:42

## 2024-04-05 RX ADMIN — Medication 1 DROP(S): at 17:00

## 2024-04-05 RX ADMIN — Medication 500000 UNIT(S): at 11:22

## 2024-04-05 RX ADMIN — Medication 2: at 06:05

## 2024-04-05 RX ADMIN — AMLODIPINE BESYLATE 10 MILLIGRAM(S): 2.5 TABLET ORAL at 06:36

## 2024-04-05 NOTE — PROGRESS NOTE ADULT - SUBJECTIVE AND OBJECTIVE BOX
[facility] DIVISION OF KIDNEY DISEASES AND HYPERTENSION -- FOLLOW UP NOTE  --------------------------------------------------------------------------------  Chief Complaint:    24 hour events/subjective:  Pt was seen in her bed. No acute overnight events. Remained on vent support.         PAST HISTORY  --------------------------------------------------------------------------------  No significant changes to PMH, PSH, FHx, SHx, unless otherwise noted    ALLERGIES & MEDICATIONS  --------------------------------------------------------------------------------  Allergies    shellfish (Rash)  ChloraPrep One-Step (Rash)  penicillins (Rash)    Intolerances      Standing Inpatient Medications  amLODIPine   Tablet 10 milliGRAM(s) Oral daily  artificial  tears Solution 1 Drop(s) Both EYES every 4 hours  Biotene Dry Mouth Oral Rinse 5 milliLiter(s) Swish and Spit every 6 hours  brivaracetam Oral Solution 100 milliGRAM(s) Oral <User Schedule>  carvedilol 25 milliGRAM(s) Oral every 12 hours  dextrose 5%. 1000 milliLiter(s) IV Continuous <Continuous>  dextrose 5%. 1000 milliLiter(s) IV Continuous <Continuous>  dextrose 50% Injectable 25 Gram(s) IV Push once  glucagon  Injectable 1 milliGRAM(s) IntraMuscular once  insulin glargine Injectable (LANTUS) 25 Unit(s) SubCutaneous at bedtime  insulin lispro (ADMELOG) corrective regimen sliding scale   SubCutaneous every 6 hours  lacosamide Solution 200 milliGRAM(s) Oral <User Schedule>  multivitamin 1 Tablet(s) Oral daily  nystatin    Suspension 731132 Unit(s) Swish and Swallow every 6 hours  pantoprazole   Suspension 40 milliGRAM(s) Oral two times a day  predniSONE   Tablet 5 milliGRAM(s) Oral daily  sodium chloride 0.9%. 1000 milliLiter(s) IV Continuous <Continuous>  sodium zirconium cyclosilicate 10 Gram(s) Oral every other day  tacrolimus    0.5 mG/mL Suspension 3 milliGRAM(s) Oral every 12 hours  trimethoprim   80 mG/sulfamethoxazole 400 mG 1 Tablet(s) Oral daily    PRN Inpatient Medications  acetaminophen     Tablet .. 650 milliGRAM(s) Oral every 6 hours PRN  acetaminophen   IVPB .. 1000 milliGRAM(s) IV Intermittent every 6 hours PRN  albuterol/ipratropium for Nebulization 3 milliLiter(s) Nebulizer every 6 hours PRN  oxyCODONE    IR 10 milliGRAM(s) Oral every 4 hours PRN      REVIEW OF SYSTEMS  --------------------------------------------------------------------------------  Not obtainable.     VITALS/PHYSICAL EXAM  --------------------------------------------------------------------------------  T(C): 36.8 (04-05-24 @ 16:30), Max: 37.1 (04-05-24 @ 06:11)  HR: 81 (04-05-24 @ 16:30) (80 - 92)  BP: 153/77 (04-05-24 @ 16:30) (112/60 - 158/79)  RR: 16 (04-05-24 @ 16:30) (14 - 18)  SpO2: 99% (04-05-24 @ 16:30) (98% - 100%)  Wt(kg): --        04-04-24 @ 07:01  -  04-05-24 @ 07:00  --------------------------------------------------------  IN: 1120 mL / OUT: 1125 mL / NET: -5 mL    04-05-24 @ 07:01  -  04-05-24 @ 18:26  --------------------------------------------------------  IN: 700 mL / OUT: 530 mL / NET: 170 mL      Physical Exam:  Gen: ill appearing, NAD  HEENT: Lt sided craniotomy done with out bony reconstruction.  +Trach   Pulm: trach collar   CV: RRR, S1S2;  Abd: +BS, soft, nontender/nondistended  Extremities: no bilateral LE edema noted. +LLE AKA  Neuro: lethargic , non- purposeful movements   Skin: Warm    LABS/STUDIES  --------------------------------------------------------------------------------              8.6    6.48  >-----------<  101      [04-05-24 @ 06:36]              26.7     137  |  102  |  78  ----------------------------<  187      [04-05-24 @ 06:36]  4.7   |  23  |  1.64        Ca     10.2     [04-05-24 @ 06:36]      Mg     2.3     [04-05-24 @ 06:36]      Phos  2.6     [04-05-24 @ 06:36]    TPro  6.2  /  Alb  3.2  /  TBili  0.1  /  DBili  x   /  AST  14  /  ALT  <5  /  AlkPhos  79  [04-05-24 @ 06:36]          Creatinine Trend:  SCr 1.64 [04-05 @ 06:36]  SCr 1.69 [04-04 @ 06:42]  SCr 1.36 [04-03 @ 03:16]  SCr 1.34 [04-02 @ 21:33]  SCr 1.43 [04-02 @ 07:00]    Tacrolimus (), Serum: 5.4 ng/mL (04-05 @ 05:07)  Tacrolimus (), Serum: 7.1 ng/mL (04-04 @ 05:14)  Tacrolimus (), Serum: 7.2 ng/mL (04-03 @ 05:47)  Tacrolimus (), Serum: 7.7 ng/mL (04-02 @ 05:57)            Urinalysis - [04-05-24 @ 06:36]      Color  / Appearance  / SG  / pH       Gluc 187 / Ketone   / Bili  / Urobili        Blood  / Protein  / Leuk Est  / Nitrite       RBC  / WBC  / Hyaline  / Gran  / Sq Epi  / Non Sq Epi  / Bacteria     Urine Creatinine 44      [04-04-24 @ 23:30]  Urine Protein 18      [04-04-24 @ 23:30]  Urine Sodium 33      [04-04-24 @ 23:30]  Urine Urea Nitrogen 693      [04-04-24 @ 23:30]  Urine Potassium 9      [04-04-24 @ 23:30]  Urine Osmolality 351      [04-04-24 @ 23:30]    Iron 27, TIBC 201, %sat 13      [03-14-24 @ 06:49]  HbA1c 7.2      [01-11-20 @ 09:23]  TSH 1.24      [03-02-24 @ 10:51]  Lipid: chol 136, TG 95, HDL 72, LDL --      [03-02-24 @ 02:10]

## 2024-04-05 NOTE — EEG REPORT - NS EEG TEXT BOX
[facility]   COMPREHENSIVE EPILEPSY CENTER   REPORT OF CONTINUOUS VIDEO EEG     [facility]: [address], [place] [address], Ph#: [phone]  [facility]: [id number] [place] [address], Ph#: [phone]  [facility]: [address] [place] [address], Ph#: [phone]    Patient Name: ANTONELLA DOBSON  Age and : 78y ([birth year])  MRN #: [id number]  Location: [place] [address] [address]  Referring Physician: Joyce Vidales    Start Time/Date: 1700 on 2024  End Time/Date: 08:00 on 24  Duration: 15H    _____________________________________________________________  STUDY INFORMATION    EEG Recording Technique:  The patient underwent continuous Video-EEG monitoring, using Telemetry System hardware on the XLTek Digital System. EEG and video data were stored on a computer hard drive with important events saved in digital archive files. The material was reviewed by a physician (electroencephalographer / epileptologist) on a daily basis. Cosme and seizure detection algorithms were utilized and reviewed. An EEG Technician attended to the patient, and was available throughout daytime work hours.  The epilepsy center neurologist was available in person or on call 24-hours per day.    EEG Placement and Labeling of Electrodes:  The EEG was performed utilizing 20 channel referential EEG connections (coronal over temporal over parasagittal montage) using all standard 10-20 electrode placements with EKG, with additional electrodes placed in the inferior temporal region using the modified 10-10 montage electrode placements for elective admissions, or if deemed necessary. Recording was at a sampling rate of 256 samples per second per channel. Time synchronized digital video recording was done simultaneously with EEG recording. A low light infrared camera was used for low light recording.     _____________________________________________________________  HISTORY    Patient is a 78y old  Female who presents with a chief complaint of ICH (2024 07:56)      PERTINENT MEDICATION:  MEDICATIONS  (STANDING):  amLODIPine   Tablet 10 milliGRAM(s) Oral daily  artificial  tears Solution 1 Drop(s) Both EYES every 4 hours  Biotene Dry Mouth Oral Rinse 5 milliLiter(s) Swish and Spit every 6 hours  brivaracetam Oral Solution 100 milliGRAM(s) Oral <User Schedule>  carvedilol 25 milliGRAM(s) Oral every 12 hours  dextrose 5%. 1000 milliLiter(s) (50 mL/Hr) IV Continuous <Continuous>  dextrose 5%. 1000 milliLiter(s) (100 mL/Hr) IV Continuous <Continuous>  dextrose 50% Injectable 25 Gram(s) IV Push once  glucagon  Injectable 1 milliGRAM(s) IntraMuscular once  insulin glargine Injectable (LANTUS) 25 Unit(s) SubCutaneous at bedtime  insulin lispro (ADMELOG) corrective regimen sliding scale   SubCutaneous every 6 hours  lacosamide Solution 200 milliGRAM(s) Oral <User Schedule>  multivitamin 1 Tablet(s) Oral daily  nystatin    Suspension 878190 Unit(s) Swish and Swallow every 6 hours  pantoprazole   Suspension 40 milliGRAM(s) Oral two times a day  predniSONE   Tablet 5 milliGRAM(s) Oral daily  sodium zirconium cyclosilicate 10 Gram(s) Oral every other day  tacrolimus    0.5 mG/mL Suspension 3 milliGRAM(s) Oral every 12 hours  trimethoprim   80 mG/sulfamethoxazole 400 mG 1 Tablet(s) Oral daily    _____________________________________________________________  STUDY INTERPRETATION    Findings: The background was continuous, spontaneously variable and reactive.  No posterior dominant rhythm seen.  Background predominantly consisted of theta, delta and faster activities.    Focal Slowing:   Continuous theta/delta slowing over the left hemisphere.     Sleep Background:  Drowsiness and stage II sleep transients were not recorded.    Other Non-Epileptiform Findings:  Breach effect in the left hemisphere characterized by higher amplitude, sharply contoured waves and fast activities.    Interictal Epileptiform Activity:   Occasional to frequent bifrontal sharp-wave discharges, right greater than left. Right discharges occasionally with pseudo-periodic appearance (frequency 0.5-1Hz) without evolution.  GPDs with triphasic appearance.    Events:  Clinical events: None recorded.  Seizures: None recorded.    Activation Procedures:   Hyperventilation was not performed.    Photic stimulation was not performed.     Artifacts:  Intermittent myogenic and movement artifacts were noted.    _____________________________________________________________  EEG SUMMARY/CLASSIFICATION    Abnormal EEG   - Bifrontal sharp-wave discharges (right>left)  - GPDs with triphasic morphology.  - Left hemispheric slowing and breach artifact.  - Moderate generalized slowing.    _____________________________________________________________  EEG IMPRESSION/CLINICAL CORRELATE    Abnormal EEG study.  Potential epileptogenic focus in the bilateral frontal head regions.  Structural or functional abnormality in the left hemisphere with evidence for overlying skull defect.  Moderate nonspecific diffuse or multifocal cerebral dysfunction.   No seizure seen.    Paxton Calvo MD   of Neurology  Epilepsy/EEG Attending

## 2024-04-05 NOTE — PROGRESS NOTE ADULT - PROBLEM SELECTOR PLAN 11
- PCP ppx: Bactrim  - DVT ppx: heparin sub q held in setting of recent crainoplasty, s/p IVCF 3/3  - GI ppx: Protonix BID - PCP ppx: Bactrim  - DVT ppx: heparin sub q held in setting of recent cranioplasty, s/p IVCF 3/3  - GI ppx: Protonix BID

## 2024-04-05 NOTE — PROGRESS NOTE ADULT - PROBLEM SELECTOR PLAN 9
- VA Duplex 3/2: DVT RT cfv, femoral, popliteal and gastrocnemius veins; DVT LEFT cfv and femoral vein  - S/p IVCF by IR on 3/3  - Heparin Sub Q held in setting of recent cranioplasty - VA Duplex 3/2: DVT RT cfv, femoral, popliteal and gastrocnemius veins; DVT LEFT cfv and femoral vein  - S/p IVCF by IR on 3/3  - Heparin Sub Q held in setting of recent cranioplasty and increased YON drain output. - VA Duplex 3/2: DVT RT cfv, femoral, popliteal and gastrocnemius veins; DVT LEFT cfv and femoral vein  - S/p IVCF by IR on 3/3  - Lovenox held setting of increased YON drain output after cranioplasty, unable to utilized STD's due to right leg DVT, patient has ATK left amputation

## 2024-04-05 NOTE — PROGRESS NOTE ADULT - SUBJECTIVE AND OBJECTIVE BOX
Neurology        S: patient seen back in RCU.  twitching noted per primary team EEG in progress         Medications: MEDICATIONS  (STANDING):  amLODIPine   Tablet 10 milliGRAM(s) Oral daily  artificial  tears Solution 1 Drop(s) Both EYES every 4 hours  Biotene Dry Mouth Oral Rinse 5 milliLiter(s) Swish and Spit every 6 hours  brivaracetam Oral Solution 100 milliGRAM(s) Oral <User Schedule>  carvedilol 25 milliGRAM(s) Oral every 12 hours  dextrose 5%. 1000 milliLiter(s) (50 mL/Hr) IV Continuous <Continuous>  dextrose 5%. 1000 milliLiter(s) (100 mL/Hr) IV Continuous <Continuous>  dextrose 50% Injectable 25 Gram(s) IV Push once  glucagon  Injectable 1 milliGRAM(s) IntraMuscular once  insulin glargine Injectable (LANTUS) 25 Unit(s) SubCutaneous at bedtime  insulin lispro (ADMELOG) corrective regimen sliding scale   SubCutaneous every 6 hours  lacosamide Solution 200 milliGRAM(s) Oral <User Schedule>  multivitamin 1 Tablet(s) Oral daily  nystatin    Suspension 559954 Unit(s) Swish and Swallow every 6 hours  pantoprazole   Suspension 40 milliGRAM(s) Oral two times a day  predniSONE   Tablet 5 milliGRAM(s) Oral daily  sodium zirconium cyclosilicate 10 Gram(s) Oral every other day  tacrolimus    0.5 mG/mL Suspension 3 milliGRAM(s) Oral every 12 hours  trimethoprim   80 mG/sulfamethoxazole 400 mG 1 Tablet(s) Oral daily    MEDICATIONS  (PRN):  acetaminophen     Tablet .. 650 milliGRAM(s) Oral every 6 hours PRN Mild Pain (1 - 3)  acetaminophen   IVPB .. 1000 milliGRAM(s) IV Intermittent every 6 hours PRN Temp greater or equal to 38.5C (101.3F), Severe Pain (7 - 10)  albuterol/ipratropium for Nebulization 3 milliLiter(s) Nebulizer every 6 hours PRN Shortness of Breath and/or Wheezing  oxyCODONE    IR 10 milliGRAM(s) Oral every 4 hours PRN Severe Pain (7 - 10)       Vitals:  Vital Signs Last 24 Hrs  T(C): 36.7 (05 Apr 2024 09:00), Max: 37.1 (05 Apr 2024 06:11)  T(F): 98 (05 Apr 2024 09:00), Max: 98.8 (05 Apr 2024 06:11)  HR: 88 (05 Apr 2024 09:00) (80 - 92)  BP: 158/79 (05 Apr 2024 09:00) (112/60 - 158/79)  BP(mean): --  RR: 18 (05 Apr 2024 09:00) (14 - 20)  SpO2: 100% (05 Apr 2024 09:00) (98% - 100%)    Parameters below as of 05 Apr 2024 09:00  Patient On (Oxygen Delivery Method): ventilator    O2 Concentration (%): 40               General Exam:   General Appearance: Appropriately dressed    Head: Normocephalic, atraumatic and no dysmorphic features s/p trach   Ear, Nose, and Throat: Moist mucous membranes  CVS: S1S2+  Resp: No SOB, no wheeze or rhonchi on vent   GI: soft NT/ND s/p PEG   Extremities:  L AKA  Skin: No bruises or rashes     Neurological Exam:  Mental Status:  opens eyes to noxious. no verbal. not following   Cranial Nerves: PERRL, EOMI but gaze pref to L, no blink to threat on R, R facial,    Motor: minimal/no spontaneous movement on RUE.  RLE some minimal movement. LUE 2/5 at times   Sensation: grimaces to noxious at times. some minimal withdrawal LUE 2/5 and RLE.    Coordination: unable   Gait: unable     Data/Labs/Imaging which I personally reviewed.             LABS:                          8.6    6.48  )-----------( 101      ( 05 Apr 2024 06:36 )             26.7     04-05    137  |  102  |  78<H>  ----------------------------<  187<H>  4.7   |  23  |  1.64<H>    Ca    10.2      05 Apr 2024 06:36  Phos  2.6     04-05  Mg     2.3     04-05    TPro  6.2  /  Alb  3.2<L>  /  TBili  0.1<L>  /  DBili  x   /  AST  14  /  ALT  <5<L>  /  AlkPhos  79  04-05    LIVER FUNCTIONS - ( 05 Apr 2024 06:36 )  Alb: 3.2 g/dL / Pro: 6.2 g/dL / ALK PHOS: 79 U/L / ALT: <5 U/L / AST: 14 U/L / GGT: x             Urinalysis Basic - ( 05 Apr 2024 06:36 )    Color: x / Appearance: x / SG: x / pH: x  Gluc: 187 mg/dL / Ketone: x  / Bili: x / Urobili: x   Blood: x / Protein: x / Nitrite: x   Leuk Esterase: x / RBC: x / WBC x   Sq Epi: x / Non Sq Epi: x / Bacteria: x      EEG IMPRESSION/CLINICAL CORRELATE    Abnormal EEG study.  Potential epileptogenic focus in the bilateral frontal head regions.  Structural or functional abnormality in the left hemisphere with evidence for overlying skull defect.  Moderate nonspecific diffuse or multifocal cerebral dysfunction.   No seizure seen.

## 2024-04-05 NOTE — PROGRESS NOTE ADULT - SUBJECTIVE AND OBJECTIVE BOX
Chief Complaint:  Patient is a 78y old  Female who presents with a chief complaint of ICH (05 Apr 2024 07:56)      Date of service 04-05-24 @ 11:03      Interval Events:   no acute events     Hospital Medications:  acetaminophen     Tablet .. 650 milliGRAM(s) Oral every 6 hours PRN  acetaminophen   IVPB .. 1000 milliGRAM(s) IV Intermittent every 6 hours PRN  albuterol/ipratropium for Nebulization 3 milliLiter(s) Nebulizer every 6 hours PRN  amLODIPine   Tablet 10 milliGRAM(s) Oral daily  artificial  tears Solution 1 Drop(s) Both EYES every 4 hours  Biotene Dry Mouth Oral Rinse 5 milliLiter(s) Swish and Spit every 6 hours  brivaracetam Oral Solution 100 milliGRAM(s) Oral <User Schedule>  carvedilol 25 milliGRAM(s) Oral every 12 hours  dextrose 5%. 1000 milliLiter(s) IV Continuous <Continuous>  dextrose 5%. 1000 milliLiter(s) IV Continuous <Continuous>  dextrose 50% Injectable 25 Gram(s) IV Push once  glucagon  Injectable 1 milliGRAM(s) IntraMuscular once  insulin glargine Injectable (LANTUS) 25 Unit(s) SubCutaneous at bedtime  insulin lispro (ADMELOG) corrective regimen sliding scale   SubCutaneous every 6 hours  lacosamide Solution 200 milliGRAM(s) Oral <User Schedule>  multivitamin 1 Tablet(s) Oral daily  nystatin    Suspension 751576 Unit(s) Swish and Swallow every 6 hours  oxyCODONE    IR 10 milliGRAM(s) Oral every 4 hours PRN  pantoprazole   Suspension 40 milliGRAM(s) Oral two times a day  predniSONE   Tablet 5 milliGRAM(s) Oral daily  sodium zirconium cyclosilicate 10 Gram(s) Oral every other day  tacrolimus    0.5 mG/mL Suspension 3 milliGRAM(s) Oral every 12 hours  trimethoprim   80 mG/sulfamethoxazole 400 mG 1 Tablet(s) Oral daily        Review of Systems:  General:  No wt loss, fevers, chills, night sweats, fatigue,   Eyes:  Good vision, no reported pain  ENT:  No sore throat, pain, runny nose, dysphagia  CV:  No pain, palpitations, hypo/hypertension  Resp:  No dyspnea, cough, tachypnea, wheezing  GI:  See HPI  :  No pain, bleeding, incontinence, nocturia  Muscle:  No pain, weakness  Neuro:  No weakness, tingling, memory problems  Psych:  No fatigue, insomnia, mood problems, depression  Endocrine:  No polyuria, polydipsia, cold/heat intolerance  Heme:  No petechiae, ecchymosis, easy bruisability  Integumentary:  No rash, edema    PHYSICAL EXAM:   Vital Signs:  Vital Signs Last 24 Hrs  T(C): 36.7 (05 Apr 2024 09:00), Max: 37.1 (05 Apr 2024 06:11)  T(F): 98 (05 Apr 2024 09:00), Max: 98.8 (05 Apr 2024 06:11)  HR: 88 (05 Apr 2024 09:00) (80 - 92)  BP: 158/79 (05 Apr 2024 09:00) (112/60 - 158/79)  BP(mean): --  RR: 18 (05 Apr 2024 09:00) (14 - 20)  SpO2: 100% (05 Apr 2024 09:00) (98% - 100%)    Parameters below as of 05 Apr 2024 09:00  Patient On (Oxygen Delivery Method): ventilator    O2 Concentration (%): 40  Daily     Daily       PHYSICAL EXAM:     GENERAL:  Appears stated age, well-groomed, well-nourished, no distress  HEENT:  NC/AT,  conjunctivae anicteric, clear and pink,   NECK: supple, trachea midline  CHEST:  Full & symmetric excursion, no increased effort, breath sounds clear  HEART:  Regular rhythm, no JVD  ABDOMEN:  Soft, non-tender, non-distended, normoactive bowel sounds,  no masses , no hepatosplenomegaly  EXTREMITIES:  no cyanosis,clubbing or edema  SKIN:  No rash, erythema, or, ecchymoses, no jaundice  NEURO:  Alert, non-focal, no asterixis  PSYCH: Appropriate affect, oriented to place and time  RECTAL: Deferred      LABS Personally reviewed by me:                        8.6    6.48  )-----------( 101      ( 05 Apr 2024 06:36 )             26.7     Mean Cell Volume: 93.7 fl (04-05-24 @ 06:36)    04-05    137  |  102  |  78<H>  ----------------------------<  187<H>  4.7   |  23  |  1.64<H>    Ca    10.2      05 Apr 2024 06:36  Phos  2.6     04-05  Mg     2.3     04-05    TPro  6.2  /  Alb  3.2<L>  /  TBili  0.1<L>  /  DBili  x   /  AST  14  /  ALT  <5<L>  /  AlkPhos  79  04-05    LIVER FUNCTIONS - ( 05 Apr 2024 06:36 )  Alb: 3.2 g/dL / Pro: 6.2 g/dL / ALK PHOS: 79 U/L / ALT: <5 U/L / AST: 14 U/L / GGT: x             Urinalysis Basic - ( 05 Apr 2024 06:36 )    Color: x / Appearance: x / SG: x / pH: x  Gluc: 187 mg/dL / Ketone: x  / Bili: x / Urobili: x   Blood: x / Protein: x / Nitrite: x   Leuk Esterase: x / RBC: x / WBC x   Sq Epi: x / Non Sq Epi: x / Bacteria: x                              8.6    6.48  )-----------( 101      ( 05 Apr 2024 06:36 )             26.7                         9.1    8.33  )-----------( 127      ( 04 Apr 2024 06:42 )             29.3                         10.1   8.12  )-----------( 155      ( 03 Apr 2024 03:16 )             32.2                         10.4   7.01  )-----------( 158      ( 02 Apr 2024 21:33 )             33.5       Imaging personally reviewed by me:

## 2024-04-05 NOTE — PROGRESS NOTE ADULT - ASSESSMENT
79 y/o Female with polycystic kidney disease ESRD s/p renal transplant in 2019 presented with ICH - had Craniotomy and EVD    1. DDRT in 2019 - Pt. with ESRD, underwent DDRT in 2019. On review of [place], Scr was 1.86 on 10/31/23. SCr was WNL at 1.18 on admission (3/2), peaked to 1.64 (3/122). Improved 1.0. Today Scr is 1.3. Pt. with likely underlying CKD,. Continue with immunosuppression regimen, as outlined below. Monitor labs and urine output. Avoid nephrotoxins. Dose medications as per eGFR.    2. IS  -Monitor tacro trough, (goal: 4-7). Today tacro trough is 5.4. On tacro 3mg q12.   - Please recheck tacro trough - 30min before the AM dose.   -Continue prednisone 5 mg qd and tacro 3mg bid  -Leflunomide currently on hold (? BK viremia vs RA).     3. Hyperkalemia  - Resolved. Trend and monitor K.   -Monitor labs, low K/renal diet as appropriate.     4. MEHREEN :   Scr increased to 1.6. On bactrim and FeNa is  0.9. Likely pre- renal . IV fluids for now. Recheck BMP.   Plan discussed with the primary team.

## 2024-04-05 NOTE — PROGRESS NOTE ADULT - SUBJECTIVE AND OBJECTIVE BOX
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 ICH score 4.   (02 Mar 2024 00:28)    Interval Events:    REVIEW OF SYSTEMS:  [ ] Positive  [ ] All other systems negative  [ ] Unable to assess ROS because ________    Vital Signs Last 24 Hrs  T(C): 37.1 (04-05-24 @ 06:11), Max: 37.1 (04-05-24 @ 06:11)  T(F): 98.8 (04-05-24 @ 06:11), Max: 98.8 (04-05-24 @ 06:11)  HR: 92 (04-05-24 @ 06:11) (80 - 93)  BP: 146/65 (04-05-24 @ 06:11) (112/60 - 147/75)  RR: 16 (04-05-24 @ 06:11) (14 - 20)  SpO2: 100% (04-05-24 @ 06:11) (100% - 100%)    PHYSICAL EXAM:  HEENT:   [ ]Tracheostomy:  [ ]Pupils equal  [ ]No oral lesions  [ ]Abnormal    SKIN  [ ] No Rash  [ ] Abnormal  [ ] pressure    CARDIAC  [ ]Regular  [ ]Abnormal    PULMONARY  [ ]Bilateral Clear Breath Sounds  [ ]Normal Excursion  [ ]Abnormal    GI  [ ]PEG      [ ] +BS		              [ ]Soft, nondistended, nontender	  [ ]Abnormal    MUSCULOSKELETAL                                   [ ]Bedbound                 [ ]Abnormal    [ ]Ambulatory/OOB to chair                           EXTREMITIES                                         [ ]Normal  [ ]Edema                           NEUROLOGIC  [ ] Normal, non focal  [ ] Focal findings:    PSYCHIATRIC  [ ]Alert and appropriate  [ ] Sedated	 [ ]Agitated    :  Gardner: [ ] Yes, if yes: Date of Placement:                   [  ] No    LINES: Central Lines [ ] Yes, if yes: Date of Placement                                     [  ] No    HOSPITAL MEDICATIONS:  MEDICATIONS  (STANDING):  acetaminophen   IVPB .. 750 milliGRAM(s) IV Intermittent once  amLODIPine   Tablet 10 milliGRAM(s) Oral daily  artificial  tears Solution 1 Drop(s) Both EYES every 4 hours  Biotene Dry Mouth Oral Rinse 5 milliLiter(s) Swish and Spit every 6 hours  brivaracetam Oral Solution 100 milliGRAM(s) Oral <User Schedule>  carvedilol 25 milliGRAM(s) Oral every 12 hours  dextrose 5%. 1000 milliLiter(s) (50 mL/Hr) IV Continuous <Continuous>  dextrose 5%. 1000 milliLiter(s) (100 mL/Hr) IV Continuous <Continuous>  dextrose 50% Injectable 12.5 Gram(s) IV Push once  dextrose 50% Injectable 25 Gram(s) IV Push once  dextrose 50% Injectable 25 Gram(s) IV Push once  enoxaparin Injectable 30 milliGRAM(s) SubCutaneous every 24 hours  glucagon  Injectable 1 milliGRAM(s) IntraMuscular once  insulin glargine Injectable (LANTUS) 25 Unit(s) SubCutaneous at bedtime  insulin lispro (ADMELOG) corrective regimen sliding scale   SubCutaneous every 6 hours  lacosamide Solution 200 milliGRAM(s) Oral <User Schedule>  multivitamin 1 Tablet(s) Oral daily  nystatin    Suspension 890778 Unit(s) Swish and Swallow every 6 hours  pantoprazole   Suspension 40 milliGRAM(s) Oral two times a day  predniSONE   Tablet 5 milliGRAM(s) Oral daily  sodium zirconium cyclosilicate 10 Gram(s) Oral every other day  tacrolimus    0.5 mG/mL Suspension 3 milliGRAM(s) Oral every 12 hours  trimethoprim   80 mG/sulfamethoxazole 400 mG 1 Tablet(s) Oral daily    MEDICATIONS  (PRN):  acetaminophen     Tablet .. 650 milliGRAM(s) Oral every 6 hours PRN Mild Pain (1 - 3)  acetaminophen   IVPB .. 1000 milliGRAM(s) IV Intermittent every 6 hours PRN Temp greater or equal to 38.5C (101.3F), Severe Pain (7 - 10)  albuterol/ipratropium for Nebulization 3 milliLiter(s) Nebulizer every 6 hours PRN Shortness of Breath and/or Wheezing  bisacodyl 5 milliGRAM(s) Oral daily PRN Constipation  dextrose Oral Gel 15 Gram(s) Oral once PRN Blood Glucose LESS THAN 70 milliGRAM(s)/deciliter  oxyCODONE    IR 5 milliGRAM(s) Oral every 4 hours PRN Moderate Pain (4 - 6)  oxyCODONE    IR 10 milliGRAM(s) Oral every 4 hours PRN Severe Pain (7 - 10)      LABS:                        8.6    6.48  )-----------( 101      ( 05 Apr 2024 06:36 )             26.7     04-05    137  |  102  |  78<H>  ----------------------------<  187<H>  4.7   |  23  |  1.64<H>    Ca    10.2      05 Apr 2024 06:36  Phos  2.6     04-05  Mg     2.3     04-05    TPro  6.2  /  Alb  3.2<L>  /  TBili  0.1<L>  /  DBili  x   /  AST  14  /  ALT  <5<L>  /  AlkPhos  79  04-05      Mode: AC/ CMV (Assist Control/ Continuous Mandatory Ventilation)  RR (machine): 14  TV (machine): 450  FiO2: 40  PEEP: 5  PS: 5  ITime: 1  MAP: 8  PIP: 18   Patient is a 78y old  Female who presents with a chief complaint of ICH (04 Apr 2024 19:55)    HPI:  Nury Adam   78F Hx ESRD s/p renal xplant c/b DGF off HD, L AKA, 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 ICH score 4.   (02 Mar 2024 00:28)    Interval Events: No issues overnight, EEG recording    REVIEW OF SYSTEMS:  [ ] Positive  [ ] All other systems negative  [x ] Unable to assess ROS because patient is obtunded    Vital Signs Last 24 Hrs  T(C): 37.1 (04-05-24 @ 06:11), Max: 37.1 (04-05-24 @ 06:11)  T(F): 98.8 (04-05-24 @ 06:11), Max: 98.8 (04-05-24 @ 06:11)  HR: 92 (04-05-24 @ 06:11) (80 - 93)  BP: 146/65 (04-05-24 @ 06:11) (112/60 - 147/75)  RR: 16 (04-05-24 @ 06:11) (14 - 20)  SpO2: 100% (04-05-24 @ 06:11) (100% - 100%)    PHYSICAL EXAM:  HEENT:   [X]Tracheostomy: #7 Cuffed Portex  [X]Pupils equal  [ ]No oral lesions  [X]Abnormal - s/p cranioplasty, site w/ staples c/d/i. + YON drain    SKIN  [ ]No Rash  [X] Abnormal - LUE AVF, L AKA  [X] pressure - stage 3 sacral pressure injury     CARDIAC  [X]Regular  [ ]Abnormal    PULMONARY  [ ]Bilateral Clear Breath Sounds  [ ]Normal Excursion  [X]Abnormal - BL Coarse BS    GI  [X]PEG      [X] +BS		              [X]Soft, nondistended, nontender	  [ ]Abnormal    MUSCULOSKELETAL                                   [X]Bedbound                 [ ]Abnormal    [ ]Ambulatory/OOB to chair                           EXTREMITIES                                         [X]Normal  [ ]Edema                           NEUROLOGIC  [ ] Normal, non focal  [X] Focal findings: obtunded, slightly withdraws upper extremities to noxious stimuli.    PSYCHIATRIC  [X] obtunded  [ ] Sedated	 [ ]Agitated    :  Gardner: [ ] Yes, if yes: Date of Placement:                   [X] No    LINES: Central Lines [ ] Yes, if yes: Date of Placement                                     [X] No    HOSPITAL MEDICATIONS:  MEDICATIONS  (STANDING):  acetaminophen   IVPB .. 750 milliGRAM(s) IV Intermittent once  amLODIPine   Tablet 10 milliGRAM(s) Oral daily  artificial  tears Solution 1 Drop(s) Both EYES every 4 hours  Biotene Dry Mouth Oral Rinse 5 milliLiter(s) Swish and Spit every 6 hours  brivaracetam Oral Solution 100 milliGRAM(s) Oral <User Schedule>  carvedilol 25 milliGRAM(s) Oral every 12 hours  dextrose 5%. 1000 milliLiter(s) (50 mL/Hr) IV Continuous <Continuous>  dextrose 5%. 1000 milliLiter(s) (100 mL/Hr) IV Continuous <Continuous>  dextrose 50% Injectable 12.5 Gram(s) IV Push once  dextrose 50% Injectable 25 Gram(s) IV Push once  dextrose 50% Injectable 25 Gram(s) IV Push once  enoxaparin Injectable 30 milliGRAM(s) SubCutaneous every 24 hours  glucagon  Injectable 1 milliGRAM(s) IntraMuscular once  insulin glargine Injectable (LANTUS) 25 Unit(s) SubCutaneous at bedtime  insulin lispro (ADMELOG) corrective regimen sliding scale   SubCutaneous every 6 hours  lacosamide Solution 200 milliGRAM(s) Oral <User Schedule>  multivitamin 1 Tablet(s) Oral daily  nystatin    Suspension [id number] Unit(s) Swish and Swallow every 6 hours  pantoprazole   Suspension 40 milliGRAM(s) Oral two times a day  predniSONE   Tablet 5 milliGRAM(s) Oral daily  sodium zirconium cyclosilicate 10 Gram(s) Oral every other day  tacrolimus    0.5 mG/mL Suspension 3 milliGRAM(s) Oral every 12 hours  trimethoprim   80 mG/sulfamethoxazole 400 mG 1 Tablet(s) Oral daily    MEDICATIONS  (PRN):  acetaminophen     Tablet .. 650 milliGRAM(s) Oral every 6 hours PRN Mild Pain (1 - 3)  acetaminophen   IVPB .. 1000 milliGRAM(s) IV Intermittent every 6 hours PRN Temp greater or equal to 38.5C (101.3F), Severe Pain (7 - 10)  albuterol/ipratropium for Nebulization 3 milliLiter(s) Nebulizer every 6 hours PRN Shortness of Breath and/or Wheezing  bisacodyl 5 milliGRAM(s) Oral daily PRN Constipation  dextrose Oral Gel 15 Gram(s) Oral once PRN Blood Glucose LESS THAN 70 milliGRAM(s)/deciliter  oxyCODONE    IR 5 milliGRAM(s) Oral every 4 hours PRN Moderate Pain (4 - 6)  oxyCODONE    IR 10 milliGRAM(s) Oral every 4 hours PRN Severe Pain (7 - 10)      LABS:                        8.6    6.48  )-----------( 101      ( 05 Apr 2024 06:36 )             26.7     04-05    137  |  102  |  78<H>  ----------------------------<  187<H>  4.7   |  23  |  1.64<H>    Ca    10.2      05 Apr 2024 06:36  Phos  2.6     04-05  Mg     2.3     04-05    TPro  6.2  /  Alb  3.2<L>  /  TBili  0.1<L>  /  DBili  x   /  AST  14  /  ALT  <5<L>  /  AlkPhos  79  04-05      Mode: AC/ CMV (Assist Control/ Continuous Mandatory Ventilation)  RR (machine): 14  TV (machine): 450  FiO2: 40  PEEP: 5  PS: 5  ITime: 1  MAP: 8  PIP: 18

## 2024-04-05 NOTE — PROGRESS NOTE ADULT - ASSESSMENT
-RCU post op/incision checks   -Possible d/c drain in AM tomorrow. Today put out 100cc in past 24h, bloody output  -RCU post op/incision checks   -Possible d/c drain in AM tomorrow. Today put out 100cc in past 24h, bloody output   -Hold SQL

## 2024-04-05 NOTE — PROGRESS NOTE ADULT - SUBJECTIVE AND OBJECTIVE BOX
Patient seen and examined at bedside.    --Anticoagulation--  enoxaparin Injectable 30 milliGRAM(s) SubCutaneous every 24 hours    T(C): 37.1 (04-05-24 @ 06:11), Max: 37.1 (04-05-24 @ 06:11)  HR: 92 (04-05-24 @ 06:11) (80 - 93)  BP: 146/65 (04-05-24 @ 06:11) (112/60 - 147/75)  RR: 16 (04-05-24 @ 06:11) (14 - 20)  SpO2: 100% (04-05-24 @ 06:11) (100% - 100%)  Wt(kg): --    Exam: Trached, EOV, PERRL, not FC, LUE minimal spont/non purposeful in plane of bed

## 2024-04-05 NOTE — PROGRESS NOTE ADULT - ASSESSMENT
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 Hospital course c/b extensive LE DVTs, S/p IVCF on 3/3. She is s/p trach/peg 3/8/24.  Febrile 3/10 with + UA, blood/sputum culture NGTD.  Treatment for UTI initiated with ceftriaxone, eventually broadened to cefepime. Transferred to [facility] 3/11 for continued management. Urine culture resulted positive for klebsiella, treated for 7 days with Meropenem 3/12 --> 3/19. Patient weaned from MV, tolerating TC ATC since 3/23. Patient S/p Cranioplasty on 4/2.      4/4: PST as tolerated, tolerated 5/5 today.  Bump in Cr/BUN, reached out to transplant nephrology - transplant will follow up in AM, urine studies ordered.  Left shoulder twitching noted on AM rounds with team, discussed with neurology, suggested EEG, EEG ordered.  Discussed with Nsx restarting heparin subq - Dr. Brian majano with Lovenox.  BP's stable for now, clonidine patch was d/c'd 4/3 for soft BPs in NSCU.     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 Hospital course c/b extensive LE DVTs, S/p IVCF on 3/3. She is s/p trach/peg 3/8/24.  Febrile 3/10 with + UA, blood/sputum culture NGTD.  Treatment for UTI initiated with ceftriaxone, eventually broadened to cefepime. Transferred to [facility] 3/11 for continued management. Urine culture resulted positive for klebsiella, treated for 7 days with Meropenem 3/12 --> 3/19. Patient weaned from MV, tolerating TC ATC since 3/23. Patient S/p Cranioplasty on 4/2.      4/5: PST as tolerated, Left shoulder twitching noted on AM rounds with team, discussed with neurology, EEG recording. Stopped Lovenox for increase in drain output.   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 Hospital course c/b extensive LE DVTs, S/p IVCF on 3/3. She is s/p trach/peg 3/8/24.  Febrile 3/10 with + UA, blood/sputum culture NGTD.  Treatment for UTI initiated with ceftriaxone, eventually broadened to cefepime. Transferred to [facility] 3/11 for continued management. Urine culture resulted positive for klebsiella, treated for 7 days with Meropenem 3/12 --> 3/19. Patient weaned from MV, tolerating TC ATC since 3/23. Patient S/p Cranioplasty on 4/2.      4/5: PST as tolerated, Left shoulder twitching noted on AM rounds with team, discussed with neurology, EEG recording. Stopped Lovenox temporarily for increase in drain output.

## 2024-04-05 NOTE — PROGRESS NOTE ADULT - ASSESSMENT
79 yo F with ESRD s/p renal transplant (2019, now off HD), left AKA, BK viremia, HTN, breast ca s/p lumpectomy (completed Tamoxifen 03/2023), DVT (off Eliquis), HLD, gout presenting 3/1 with left ICH (ICH score 4) likely 2/2 amyloid angiopathy s/p left craniectomy  and evacuation as well as EVD placement and removal (3/7).   initial CTH3/1  with 8.9cm left frontal IPH with IVH .09cm rightward shift   3/2 CTH s/p L hmeicrani and resection of IPH. stable   3/5 CTH post op changes. some reorption of post op pneumocephalus.    s/p trach/peg 3/8/24   3/8 CTH L frontal craniectomy.  L MCA hemorrhage and infarct ; still IVH.   3/10 with + UA  A1c 5.7 LDL 46   TTE done 3/2: LA is severely dilated    Urine culture grew positive for klebsiella and enterococcus  3/9 EEG no seiuzres since 3/7;  risk of seiuzre from L parietal region. mod to severe diffuse cerebral dysfunction   Transferred to [facility] 3/11 for continued management.  3/12 CTH   sterotactic imaging of L frontal crani for hemorrhagic L MCA ifnarct. L frontal temporal pseudmeningocele  noted. no hcange since 3/8   o/e awake, no verbal, not followiing. L gaze pref  some minimal withdrawal to noxious RLE and LUE.  s/p trach /vent   s/p cranioplasty 4/3, no change in post op exam   4/3 CTH removal of drain noted.  post op changes.   4/4 c/f seizure; repeat EEG   EEG wtih no seizures but risk of si\eizures from bilateral frontal regions   Impression: L ICH possible 2/2  CAA but hemorrhagic infarct also needs to be considered.      - EEG in progress ; further recs based on EEG no change in AEDs for now   - tolerating CPAP at times   - difficult to determine IPH 2/2 CAA without MRI to look at SWI or GRE sequeneses for hemosiderin deposition  - never had vessel imaging. consider CTA H/N   - no AC/AP. dvt ppx okay  - briviact 100mg TID  and vimpat 200mg BID for seiuzre ppx   - infectious workup. was on meropenum.  this can lower seiuzre threshold ; now off   - immunosuppression on tacrolimus and prednisone.  ppx bactrim     -telemetry   - PT/OT   - check FS, glucose control <180  - GI/DVT ppx   - GOC with family.  currenlty full code; in terms of functional meaningful recovery the likelihood is low.  patient will require 24hr care and likely be bedbound at this time.    consider recalling palliative  Dieter Wilkinson MD  Vascular Neurology  Office: [phone]

## 2024-04-05 NOTE — PROGRESS NOTE ADULT - ASSESSMENT
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 S/p craniectomy and evacuation course c/b seizures, last seen on 3/7/24 EEG currently on AEDS. Now s/p trach/peg 3/9/24. During EGD/PEG found to have superficial gastric ulcers. H. Pylori Stool Ag positive, for which GI is consulted.     1. H. Pylori   Bismuth quadruple therapy x 14 days, to start post discharge     2. Resp failure s/p trach/PEG  tolerating tube feeds    3. IPH s/p craniotomy     4. PUD   daily PPI

## 2024-04-05 NOTE — PROGRESS NOTE ADULT - NS ATTEND AMEND GEN_ALL_CORE FT
78 year old female with a history of ESRD s/p renal transplant (2019) c/b BK viremia, left AKA, HTN, breast ca s/p lumpectomy (completed Tamoxifen 3/2023), DVT (off Eliquis), HLD, gout presenting with left ICH likely in the setting of amyloid angiopathy s/p left craniectomy (discarded) and evacuation (3/2/24), EVD placement and removal (3/7), c/b prolonged respiratory failure s/p trach/PEG (3/9/24) and [facility] transfer.     #ICH  #Seizure  From a neurologic perspective she has had an ICH 2/2 amyloid angiopathy s/p left craniectomy and evacuation, EVD placement and subsequent removal. She is s/p cranioplasty 4/2. YON drain is still in place.   She clinically remains obtunded, reportedly will occasionally open her eyes to sternal rub, no meaningful communication. Not opening eyes on exam, no appreciable twitching or jerking.  Her hospital course has been complicated by seizure, her last recorded seizures were seen 3/7/24. She is currently controlled on Briviact 100 TID, Vimpat 200 BID  - On vEEG without any evidence of seizure, will continue to monitor  - Appreciate neurosurgery input, YON drain with increased output. Will hold A/C  - Follow neuro exam closely  - C/W AED  - Monitor YON drain output    #HTN: Hypotension after OR. Holding Clonidine patch. Continue with other antihypertensives, currently normotensive on my exam.     #Mixed respiratory failure  She initially presented with respiratory failure, mixed hypoxic and hypercapnic respiratory failure. She was tolerating trach collar around the clock since 3/23/24. She was placed back on full support after her cranioplasty. Will rapidly wean again as able. Will wean O2 as tolerated, goal SpO2 90-95%. Continue airways clearance with duonebs and hypersal q6 for airways clearance.     #UTI  #ESBL Kleb  Her hospital course was otherwise complicated by ESBL Kleb and VRE (3/10) urinary tract infection. She is s/p Meropenem. Now clinically stable and being monitored off antibiotics.    #H.Pylori  She was found to have H.Pylori positivity: Per GI evaluation, will plan to start treatment as outlined above at the time of discharge.    #Renal Transplant  #MEHREEN  She has a history of renal transplant. Transplant is following and appreciate their recommendations. Continue with Prednisone, Tacrolimus. Will confirm Bactrim with prophylaxis with Nephrology. Her creatinine continues to trend up. Urine output has dropped off. Will d/w transplant. ? related to hypoperfusion in OR. Trend creat closely, avoid nephrotoxins, trend UOP. Tacro levels daily. Will continue to monitor for now.     #DM  She has had issues with hyperglycemia: Goal blood glucose 140-180. Adjusting insulin accordingly.      #Heme: DVT 3/2 R CFV, Fem, Pop, Gastrocnemius and L CFV, Fem s/p IVC filter (3/3/24). Tolerating SQH without issue    Rest as above

## 2024-04-06 LAB
ALBUMIN SERPL ELPH-MCNC: 3.1 G/DL — LOW (ref 3.3–5)
ALP SERPL-CCNC: 87 U/L — SIGNIFICANT CHANGE UP (ref 40–120)
ALT FLD-CCNC: 7 U/L — LOW (ref 10–45)
ANION GAP SERPL CALC-SCNC: 14 MMOL/L — SIGNIFICANT CHANGE UP (ref 5–17)
AST SERPL-CCNC: 17 U/L — SIGNIFICANT CHANGE UP (ref 10–40)
BILIRUB SERPL-MCNC: 0.2 MG/DL — SIGNIFICANT CHANGE UP (ref 0.2–1.2)
BUN SERPL-MCNC: 75 MG/DL — HIGH (ref 7–23)
CALCIUM SERPL-MCNC: 10.2 MG/DL — SIGNIFICANT CHANGE UP (ref 8.4–10.5)
CHLORIDE SERPL-SCNC: 104 MMOL/L — SIGNIFICANT CHANGE UP (ref 96–108)
CO2 SERPL-SCNC: 20 MMOL/L — LOW (ref 22–31)
CREAT SERPL-MCNC: 1.57 MG/DL — HIGH (ref 0.5–1.3)
EGFR: 33 ML/MIN/1.73M2 — LOW
GLUCOSE BLDC GLUCOMTR-MCNC: 196 MG/DL — HIGH (ref 70–99)
GLUCOSE BLDC GLUCOMTR-MCNC: 197 MG/DL — HIGH (ref 70–99)
GLUCOSE BLDC GLUCOMTR-MCNC: 199 MG/DL — HIGH (ref 70–99)
GLUCOSE BLDC GLUCOMTR-MCNC: 238 MG/DL — HIGH (ref 70–99)
GLUCOSE SERPL-MCNC: 222 MG/DL — HIGH (ref 70–99)
HCT VFR BLD CALC: 27.1 % — LOW (ref 34.5–45)
HGB BLD-MCNC: 8.4 G/DL — LOW (ref 11.5–15.5)
MAGNESIUM SERPL-MCNC: 2.3 MG/DL — SIGNIFICANT CHANGE UP (ref 1.6–2.6)
MCHC RBC-ENTMCNC: 30 PG — SIGNIFICANT CHANGE UP (ref 27–34)
MCHC RBC-ENTMCNC: 31 GM/DL — LOW (ref 32–36)
MCV RBC AUTO: 96.8 FL — SIGNIFICANT CHANGE UP (ref 80–100)
NRBC # BLD: 0 /100 WBCS — SIGNIFICANT CHANGE UP (ref 0–0)
PHOSPHATE SERPL-MCNC: 2.4 MG/DL — LOW (ref 2.5–4.5)
PLATELET # BLD AUTO: 91 K/UL — LOW (ref 150–400)
POTASSIUM SERPL-MCNC: 4.2 MMOL/L — SIGNIFICANT CHANGE UP (ref 3.5–5.3)
POTASSIUM SERPL-SCNC: 4.2 MMOL/L — SIGNIFICANT CHANGE UP (ref 3.5–5.3)
PROT SERPL-MCNC: 6.2 G/DL — SIGNIFICANT CHANGE UP (ref 6–8.3)
RBC # BLD: 2.8 M/UL — LOW (ref 3.8–5.2)
RBC # FLD: 16.6 % — HIGH (ref 10.3–14.5)
SODIUM SERPL-SCNC: 138 MMOL/L — SIGNIFICANT CHANGE UP (ref 135–145)
TACROLIMUS SERPL-MCNC: 6.7 NG/ML — SIGNIFICANT CHANGE UP
WBC # BLD: 6.95 K/UL — SIGNIFICANT CHANGE UP (ref 3.8–10.5)
WBC # FLD AUTO: 6.95 K/UL — SIGNIFICANT CHANGE UP (ref 3.8–10.5)

## 2024-04-06 PROCEDURE — 95720 EEG PHY/QHP EA INCR W/VEEG: CPT

## 2024-04-06 PROCEDURE — 99233 SBSQ HOSP IP/OBS HIGH 50: CPT | Mod: FS

## 2024-04-06 RX ORDER — HEPARIN SODIUM 5000 [USP'U]/ML
5000 INJECTION INTRAVENOUS; SUBCUTANEOUS EVERY 8 HOURS
Refills: 0 | Status: DISCONTINUED | OUTPATIENT
Start: 2024-04-06 | End: 2024-04-15

## 2024-04-06 RX ADMIN — TACROLIMUS 3 MILLIGRAM(S): 5 CAPSULE ORAL at 05:35

## 2024-04-06 RX ADMIN — Medication 1 DROP(S): at 05:34

## 2024-04-06 RX ADMIN — LACOSAMIDE 200 MILLIGRAM(S): 50 TABLET ORAL at 11:19

## 2024-04-06 RX ADMIN — Medication 4: at 05:36

## 2024-04-06 RX ADMIN — INSULIN GLARGINE 25 UNIT(S): 100 INJECTION, SOLUTION SUBCUTANEOUS at 22:46

## 2024-04-06 RX ADMIN — HEPARIN SODIUM 5000 UNIT(S): 5000 INJECTION INTRAVENOUS; SUBCUTANEOUS at 21:48

## 2024-04-06 RX ADMIN — Medication 1 TABLET(S): at 11:18

## 2024-04-06 RX ADMIN — BRIVARACETAM 100 MILLIGRAM(S): 25 TABLET, FILM COATED ORAL at 15:58

## 2024-04-06 RX ADMIN — Medication 500000 UNIT(S): at 11:19

## 2024-04-06 RX ADMIN — Medication 5 MILLILITER(S): at 05:34

## 2024-04-06 RX ADMIN — Medication 5 MILLIGRAM(S): at 21:48

## 2024-04-06 RX ADMIN — SODIUM CHLORIDE 50 MILLILITER(S): 9 INJECTION INTRAMUSCULAR; INTRAVENOUS; SUBCUTANEOUS at 07:51

## 2024-04-06 RX ADMIN — Medication 63.75 MILLIMOLE(S): at 09:57

## 2024-04-06 RX ADMIN — Medication 5 MILLILITER(S): at 10:20

## 2024-04-06 RX ADMIN — Medication 1 DROP(S): at 21:49

## 2024-04-06 RX ADMIN — Medication 2: at 17:30

## 2024-04-06 RX ADMIN — PANTOPRAZOLE SODIUM 40 MILLIGRAM(S): 20 TABLET, DELAYED RELEASE ORAL at 21:48

## 2024-04-06 RX ADMIN — PANTOPRAZOLE SODIUM 40 MILLIGRAM(S): 20 TABLET, DELAYED RELEASE ORAL at 10:18

## 2024-04-06 RX ADMIN — Medication 500000 UNIT(S): at 17:31

## 2024-04-06 RX ADMIN — Medication 2: at 23:18

## 2024-04-06 RX ADMIN — TACROLIMUS 3 MILLIGRAM(S): 5 CAPSULE ORAL at 17:31

## 2024-04-06 RX ADMIN — Medication 5 MILLILITER(S): at 23:17

## 2024-04-06 RX ADMIN — Medication 5 MILLILITER(S): at 17:31

## 2024-04-06 RX ADMIN — Medication 1 DROP(S): at 02:15

## 2024-04-06 RX ADMIN — Medication 1 DROP(S): at 10:18

## 2024-04-06 RX ADMIN — Medication 1 DROP(S): at 17:32

## 2024-04-06 RX ADMIN — BRIVARACETAM 100 MILLIGRAM(S): 25 TABLET, FILM COATED ORAL at 07:51

## 2024-04-06 RX ADMIN — Medication 500000 UNIT(S): at 23:17

## 2024-04-06 RX ADMIN — CARVEDILOL PHOSPHATE 25 MILLIGRAM(S): 80 CAPSULE, EXTENDED RELEASE ORAL at 10:18

## 2024-04-06 RX ADMIN — CARVEDILOL PHOSPHATE 25 MILLIGRAM(S): 80 CAPSULE, EXTENDED RELEASE ORAL at 21:48

## 2024-04-06 RX ADMIN — Medication 500000 UNIT(S): at 05:33

## 2024-04-06 RX ADMIN — BRIVARACETAM 100 MILLIGRAM(S): 25 TABLET, FILM COATED ORAL at 23:18

## 2024-04-06 RX ADMIN — HEPARIN SODIUM 5000 UNIT(S): 5000 INJECTION INTRAVENOUS; SUBCUTANEOUS at 13:37

## 2024-04-06 RX ADMIN — AMLODIPINE BESYLATE 10 MILLIGRAM(S): 2.5 TABLET ORAL at 05:34

## 2024-04-06 RX ADMIN — Medication 1 DROP(S): at 13:38

## 2024-04-06 RX ADMIN — LACOSAMIDE 200 MILLIGRAM(S): 50 TABLET ORAL at 23:21

## 2024-04-06 RX ADMIN — Medication 2: at 11:19

## 2024-04-06 NOTE — PROGRESS NOTE ADULT - ASSESSMENT
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   initial CTH3/1  with 8.9cm left frontal IPH with IVH .09cm rightward shift   3/2 CTH s/p L hmeicrani and resection of IPH. stable   3/5 CTH post op changes. some reorption of post op pneumocephalus.    s/p trach/peg 3/8/24   3/8 CTH L frontal craniectomy.  L MCA hemorrhage and infarct ; still IVH.   3/10 with + UA  A1c 5.7 LDL 46   TTE done 3/2: LA is severely dilated    Urine culture grew positive for klebsiella and enterococcus  3/9 EEG no seiuzres since 3/7;  risk of seiuzre from L parietal region. mod to severe diffuse cerebral dysfunction   Transferred to [facility] 3/11 for continued management.  3/12 CTH   sterotactic imaging of L frontal crani for hemorrhagic L MCA ifnarct. L frontal temporal pseudmeningocele  noted. no hcange since 3/8   o/e awake, no verbal, not followiing. L gaze pref  some minimal withdrawal to noxious RLE and LUE.  s/p trach /vent   s/p cranioplasty 4/3, no change in post op exam   4/3 CTH removal of drain noted.  post op changes.   4/4 c/f seizure; repeat EEG   EEG wtih no seizures but risk of si\eizures from bilateral frontal regions \    Impression: L ICH possible 2/2  CAA but hemorrhagic infarct also needs to be considered.      - no change in AEDS   - tolerating CPAP at times   - difficult to determine IPH 2/2 CAA without MRI to look at SWI or GRE sequeneses for hemosiderin deposition  - never had vessel imaging. consider CTA H/N   - no AC/AP. dvt ppx okay  - briviact 100mg TID  and vimpat 200mg BID for seiuzre ppx   - infectious workup. was on meropenum.  this can lower seiuzre threshold ; now off   - immunosuppression on tacrolimus and prednisone.  ppx bactrim     -telemetry   - PT/OT   - check FS, glucose control <180  - GI/DVT ppx   - GOC with family.  currenlty full code; in terms of functional meaningful recovery the likelihood is low.  patient will require 24hr care and likely be bedbound at this time.    consider recalling palliative  Dieter Wilkinson MD  Vascular Neurology  Office: [phone]

## 2024-04-06 NOTE — PROGRESS NOTE ADULT - SUBJECTIVE AND OBJECTIVE BOX
Neurology        S: patient seen back in [facility].  EEG neg for seizures         Medications: MEDICATIONS  (STANDING):  amLODIPine   Tablet 10 milliGRAM(s) Oral daily  artificial  tears Solution 1 Drop(s) Both EYES every 4 hours  Biotene Dry Mouth Oral Rinse 5 milliLiter(s) Swish and Spit every 6 hours  brivaracetam Oral Solution 100 milliGRAM(s) Oral <User Schedule>  carvedilol 25 milliGRAM(s) Oral every 12 hours  dextrose 5%. 1000 milliLiter(s) (50 mL/Hr) IV Continuous <Continuous>  dextrose 5%. 1000 milliLiter(s) (100 mL/Hr) IV Continuous <Continuous>  dextrose 50% Injectable 25 Gram(s) IV Push once  glucagon  Injectable 1 milliGRAM(s) IntraMuscular once  heparin   Injectable 5000 Unit(s) SubCutaneous every 8 hours  insulin glargine Injectable (LANTUS) 25 Unit(s) SubCutaneous at bedtime  insulin lispro (ADMELOG) corrective regimen sliding scale   SubCutaneous every 6 hours  lacosamide Solution 200 milliGRAM(s) Oral <User Schedule>  multivitamin 1 Tablet(s) Oral daily  nystatin    Suspension 911893 Unit(s) Swish and Swallow every 6 hours  pantoprazole   Suspension 40 milliGRAM(s) Oral two times a day  predniSONE   Tablet 5 milliGRAM(s) Oral daily  sodium chloride 0.9%. 1000 milliLiter(s) (50 mL/Hr) IV Continuous <Continuous>  sodium zirconium cyclosilicate 10 Gram(s) Oral every other day  tacrolimus    0.5 mG/mL Suspension 3 milliGRAM(s) Oral every 12 hours  trimethoprim   80 mG/sulfamethoxazole 400 mG 1 Tablet(s) Oral daily    MEDICATIONS  (PRN):  acetaminophen     Tablet .. 650 milliGRAM(s) Oral every 6 hours PRN Mild Pain (1 - 3)  acetaminophen   IVPB .. 1000 milliGRAM(s) IV Intermittent every 6 hours PRN Temp greater or equal to 38.5C (101.3F), Severe Pain (7 - 10)  albuterol/ipratropium for Nebulization 3 milliLiter(s) Nebulizer every 6 hours PRN Shortness of Breath and/or Wheezing  oxyCODONE    IR 10 milliGRAM(s) Oral every 4 hours PRN Severe Pain (7 - 10)       Vitals:  Vital Signs Last 24 Hrs  T(C): 36.9 (06 Apr 2024 20:30), Max: 37.1 (06 Apr 2024 07:46)  T(F): 98.4 (06 Apr 2024 20:30), Max: 98.8 (06 Apr 2024 07:46)  HR: 82 (06 Apr 2024 20:31) (75 - 87)  BP: 144/75 (06 Apr 2024 20:30) (140/72 - 157/82)  BP(mean): --  RR: 20 (06 Apr 2024 20:30) (14 - 22)  SpO2: 99% (06 Apr 2024 20:31) (99% - 100%)    Parameters below as of 06 Apr 2024 20:30  Patient On (Oxygen Delivery Method): ventilator             General Exam:   General Appearance: Appropriately dressed    Head: Normocephalic, atraumatic and no dysmorphic features s/p trach   Ear, Nose, and Throat: Moist mucous membranes  CVS: S1S2+  Resp: No SOB, no wheeze or rhonchi on vent   GI: soft NT/ND s/p PEG   Extremities:  L AKA  Skin: No bruises or rashes     Neurological Exam:  Mental Status:  opens eyes to noxious. no verbal. not following   Cranial Nerves: PERRL, EOMI but gaze pref to L, no blink to threat on R, R facial,    Motor: minimal/no spontaneous movement on RUE.  RLE some minimal movement. LUE 2/5 at times   Sensation: grimaces to noxious at times. some minimal withdrawal LUE 2/5 and RLE.    Coordination: unable   Gait: unable     Data/Labs/Imaging which I personally reviewed.        LABS:                          8.4    6.95  )-----------( 91       ( 06 Apr 2024 07:32 )             27.1     04-06    138  |  104  |  75<H>  ----------------------------<  222<H>  4.2   |  20<L>  |  1.57<H>    Ca    10.2      06 Apr 2024 07:32  Phos  2.4     04-06  Mg     2.3     04-06    TPro  6.2  /  Alb  3.1<L>  /  TBili  0.2  /  DBili  x   /  AST  17  /  ALT  7<L>  /  AlkPhos  87  04-06    LIVER FUNCTIONS - ( 06 Apr 2024 07:32 )  Alb: 3.1 g/dL / Pro: 6.2 g/dL / ALK PHOS: 87 U/L / ALT: 7 U/L / AST: 17 U/L / GGT: x             Urinalysis Basic - ( 06 Apr 2024 07:32 )    Color: x / Appearance: x / SG: x / pH: x  Gluc: 222 mg/dL / Ketone: x  / Bili: x / Urobili: x   Blood: x / Protein: x / Nitrite: x   Leuk Esterase: x / RBC: x / WBC x   Sq Epi: x / Non Sq Epi: x / Bacteria: x          EEG IMPRESSION/CLINICAL CORRELATE    Abnormal EEG study.  Potential epileptogenic focus in the bilateral frontal head regions.  Structural or functional abnormality in the left hemisphere with evidence for overlying skull defect.  Moderate nonspecific diffuse or multifocal cerebral dysfunction.   No seizure seen.

## 2024-04-06 NOTE — PROGRESS NOTE ADULT - SUBJECTIVE AND OBJECTIVE BOX
Patient seen and examined at bedside.    --Anticoagulation--    T(C): 37.1 (04-06-24 @ 07:46), Max: 37.1 (04-05-24 @ 21:42)  HR: 77 (04-06-24 @ 08:43) (75 - 95)  BP: 149/76 (04-06-24 @ 07:46) (134/72 - 158/79)  RR: 20 (04-06-24 @ 08:42) (14 - 20)  SpO2: 100% (04-06-24 @ 08:43) (98% - 100%)  Wt(kg): --    Exam: trached, EOV, PERRL, no FC, LUE minimal spon/non purposeful in plane of bed

## 2024-04-06 NOTE — PROGRESS NOTE ADULT - SUBJECTIVE AND OBJECTIVE BOX
DATE OF SERVICE: 04-06-24 @ 10:19    Patient is a 78y old  Female who presents with a chief complaint of ICH (06 Apr 2024 08:47)      INTERVAL HISTORY: no acute distress     REVIEW OF SYSTEMS:  CONSTITUTIONAL: No weakness  EYES/ENT: No visual changes;  No throat pain   NECK: No pain or stiffness  RESPIRATORY: No cough, wheezing; No shortness of breath  CARDIOVASCULAR: No chest pain or palpitations  GASTROINTESTINAL: No abdominal  pain. No nausea, vomiting, or hematemesis  GENITOURINARY: No dysuria, frequency or hematuria  NEUROLOGICAL: No stroke like symptoms  SKIN: No rashes    	  MEDICATIONS:  amLODIPine   Tablet 10 milliGRAM(s) Oral daily  carvedilol 25 milliGRAM(s) Oral every 12 hours        PHYSICAL EXAM:  T(C): 37.1 (04-06-24 @ 07:46), Max: 37.1 (04-05-24 @ 21:42)  HR: 77 (04-06-24 @ 08:43) (75 - 95)  BP: 149/76 (04-06-24 @ 07:46) (134/72 - 156/78)  RR: 20 (04-06-24 @ 08:42) (14 - 20)  SpO2: 100% (04-06-24 @ 08:43) (98% - 100%)  Wt(kg): --  I&O's Summary    05 Apr 2024 07:01  -  06 Apr 2024 07:00  --------------------------------------------------------  IN: 2010 mL / OUT: 1400 mL / NET: 610 mL    06 Apr 2024 07:01  -  06 Apr 2024 10:19  --------------------------------------------------------  IN: 150 mL / OUT: 0 mL / NET: 150 mL          Appearance: In no distress	  HEENT:    PERRL, EOMI	  Cardiovascular:  S1 S2, No JVD  Respiratory: Lungs clear to auscultation	  Gastrointestinal:  Soft, Non-tender, + BS	  Vascularature:  No edema of LE  Psychiatric: Appropriate affect   Neuro: no acute focal deficits                               8.4    6.95  )-----------( 91       ( 06 Apr 2024 07:32 )             27.1     04-06    138  |  104  |  75<H>  ----------------------------<  222<H>  4.2   |  20<L>  |  1.57<H>    Ca    10.2      06 Apr 2024 07:32  Phos  2.4     04-06  Mg     2.3     04-06    TPro  6.2  /  Alb  3.1<L>  /  TBili  0.2  /  DBili  x   /  AST  17  /  ALT  7<L>  /  AlkPhos  87  04-06        Labs personally reviewed      ASSESSMENT/PLAN: 	  78F Hx ESRD s/p renal xplant c/b DGF off HD, L AKA, 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    1. Abnormal Echo --  Septal bulge noted measures 2.2cm without obstruction.   -- Given no intracavitary obstruction no intervention at this time  - No Myxoma seen on this echo or echo in 2019, ? if hypermobile interatrial septum was confused for on prior imaging     2. HTN - Was soft now with Clonidine DC'd  Continue Coreg 25 mg BID, amlodipine 10mg    3. 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 DVT PPX - Lovenox on hold given increased output in craniotomy drain          RANDY Lopez DO [facility]  Cardiovascular Medicine  [address] [facility], [address]  Available through call or text on [employer]  Office: [phone]

## 2024-04-06 NOTE — PROGRESS NOTE ADULT - SUBJECTIVE AND OBJECTIVE BOX
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 ICH score 4.     (02 Mar 2024 00:28)    Interval Events:    REVIEW OF SYSTEMS:  [ ] Positive  [ ] All other systems negative  [ ] Unable to assess ROS because ________    Vital Signs Last 24 Hrs  T(C): 37.1 (04-06-24 @ 07:46), Max: 37.1 (04-05-24 @ 21:42)  T(F): 98.8 (04-06-24 @ 07:46), Max: 98.8 (04-06-24 @ 07:46)  HR: 75 (04-06-24 @ 07:46) (75 - 95)  BP: 149/76 (04-06-24 @ 07:46) (134/72 - 158/79)  RR: 16 (04-06-24 @ 07:46) (14 - 18)  SpO2: 100% (04-06-24 @ 07:46) (98% - 100%)    PHYSICAL EXAM:  HEENT:   [ ]Tracheostomy:  [ ]Pupils equal  [ ]No oral lesions  [ ]Abnormal    SKIN  [ ] No Rash  [ ] Abnormal  [ ] pressure    CARDIAC  [ ]Regular  [ ]Abnormal    PULMONARY  [ ]Bilateral Clear Breath Sounds  [ ]Normal Excursion  [ ]Abnormal    GI  [ ]PEG      [ ] +BS		              [ ]Soft, nondistended, nontender	  [ ]Abnormal    MUSCULOSKELETAL                                   [ ]Bedbound                 [ ]Abnormal    [ ]Ambulatory/OOB to chair                           EXTREMITIES                                         [ ]Normal  [ ]Edema                           NEUROLOGIC  [ ] Normal, non focal  [ ] Focal findings:    PSYCHIATRIC  [ ]Alert and appropriate  [ ] Sedated	 [ ]Agitated    :  Gardner: [ ] Yes, if yes: Date of Placement:                   [  ] No    LINES: Central Lines [ ] Yes, if yes: Date of Placement                                     [  ] No    HOSPITAL MEDICATIONS:  MEDICATIONS  (STANDING):  amLODIPine   Tablet 10 milliGRAM(s) Oral daily  artificial  tears Solution 1 Drop(s) Both EYES every 4 hours  Biotene Dry Mouth Oral Rinse 5 milliLiter(s) Swish and Spit every 6 hours  brivaracetam Oral Solution 100 milliGRAM(s) Oral <User Schedule>  carvedilol 25 milliGRAM(s) Oral every 12 hours  dextrose 5%. 1000 milliLiter(s) (50 mL/Hr) IV Continuous <Continuous>  dextrose 5%. 1000 milliLiter(s) (100 mL/Hr) IV Continuous <Continuous>  dextrose 50% Injectable 25 Gram(s) IV Push once  glucagon  Injectable 1 milliGRAM(s) IntraMuscular once  insulin glargine Injectable (LANTUS) 25 Unit(s) SubCutaneous at bedtime  insulin lispro (ADMELOG) corrective regimen sliding scale   SubCutaneous every 6 hours  lacosamide Solution 200 milliGRAM(s) Oral <User Schedule>  multivitamin 1 Tablet(s) Oral daily  nystatin    Suspension 169308 Unit(s) Swish and Swallow every 6 hours  pantoprazole   Suspension 40 milliGRAM(s) Oral two times a day  predniSONE   Tablet 5 milliGRAM(s) Oral daily  sodium chloride 0.9%. 1000 milliLiter(s) (50 mL/Hr) IV Continuous <Continuous>  sodium zirconium cyclosilicate 10 Gram(s) Oral every other day  tacrolimus    0.5 mG/mL Suspension 3 milliGRAM(s) Oral every 12 hours  trimethoprim   80 mG/sulfamethoxazole 400 mG 1 Tablet(s) Oral daily    MEDICATIONS  (PRN):  acetaminophen     Tablet .. 650 milliGRAM(s) Oral every 6 hours PRN Mild Pain (1 - 3)  acetaminophen   IVPB .. 1000 milliGRAM(s) IV Intermittent every 6 hours PRN Temp greater or equal to 38.5C (101.3F), Severe Pain (7 - 10)  albuterol/ipratropium for Nebulization 3 milliLiter(s) Nebulizer every 6 hours PRN Shortness of Breath and/or Wheezing  oxyCODONE    IR 10 milliGRAM(s) Oral every 4 hours PRN Severe Pain (7 - 10)      LABS:                        8.4    6.95  )-----------( 91       ( 06 Apr 2024 07:32 )             27.1     04-05    137  |  102  |  78<H>  ----------------------------<  187<H>  4.7   |  23  |  1.64<H>    Ca    10.2      05 Apr 2024 06:36  Phos  2.6     04-05  Mg     2.3     04-05    TPro  6.2  /  Alb  3.2<L>  /  TBili  0.1<L>  /  DBili  x   /  AST  14  /  ALT  <5<L>  /  AlkPhos  79  04-05      Mode: AC/ CMV (Assist Control/ Continuous Mandatory Ventilation)  RR (machine): 14  TV (machine): 450  FiO2: 40  PEEP: 5  ITime: 1  MAP: 8  PIP: 18   Patient is a 78y old  Female who presents with a chief complaint of ICH (05 Apr 2024 18:25)    HPI:  Nury Adam   78F Hx ESRD s/p renal xplant c/b DGF off HD, L AKA, 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 ICH score 4.     (02 Mar 2024 00:28)    Interval Events: No issues overnight, referred EEG results to Dr. Huggins neurologist    REVIEW OF SYSTEMS:  [ ] Positive  [ ] All other systems negative  [ x] Unable to assess ROS because NON-verbal    Vital Signs Last 24 Hrs  T(C): 37.1 (04-06-24 @ 07:46), Max: 37.1 (04-05-24 @ 21:42)  T(F): 98.8 (04-06-24 @ 07:46), Max: 98.8 (04-06-24 @ 07:46)  HR: 75 (04-06-24 @ 07:46) (75 - 95)  BP: 149/76 (04-06-24 @ 07:46) (134/72 - 158/79)  RR: 16 (04-06-24 @ 07:46) (14 - 18)  SpO2: 100% (04-06-24 @ 07:46) (98% - 100%)    PHYSICAL EXAM:  HEENT:   [X]Tracheostomy: #7 Cuffed Portex  [X]Pupils equal  [ ]No oral lesions  [X]Abnormal - s/p cranioplasty, site w/ staples c/d/i. + YON drain    SKIN  [ ]No Rash  [X] Abnormal - LUE AVF, L AKA  [X] pressure - stage 3 sacral pressure injury     CARDIAC  [X]Regular  [ ]Abnormal    PULMONARY  [ ]Bilateral Clear Breath Sounds  [ ]Normal Excursion  [X]Abnormal - BL Coarse BS    GI  [X]PEG      [X] +BS		              [X]Soft, nondistended, nontender	  [ ]Abnormal    MUSCULOSKELETAL                                   [X]Bedbound                 [ ]Abnormal    [ ]Ambulatory/OOB to chair                           EXTREMITIES                                         [X]Normal  [ ]Edema                           NEUROLOGIC  [ ] Normal, non focal  [X] Focal findings: obtunded, slightly withdraws upper extremities to noxious stimuli.    PSYCHIATRIC  [X] obtunded  [ ] Sedated	 [ ]Agitated    :  Gardner: [ ] Yes, if yes: Date of Placement:                   [X] No    LINES: Central Lines [ ] Yes, if yes: Date of Placement                                     [X] No    HOSPITAL MEDICATIONS:  MEDICATIONS  (STANDING):  amLODIPine   Tablet 10 milliGRAM(s) Oral daily  artificial  tears Solution 1 Drop(s) Both EYES every 4 hours  Biotene Dry Mouth Oral Rinse 5 milliLiter(s) Swish and Spit every 6 hours  brivaracetam Oral Solution 100 milliGRAM(s) Oral <User Schedule>  carvedilol 25 milliGRAM(s) Oral every 12 hours  dextrose 5%. 1000 milliLiter(s) (50 mL/Hr) IV Continuous <Continuous>  dextrose 5%. 1000 milliLiter(s) (100 mL/Hr) IV Continuous <Continuous>  dextrose 50% Injectable 25 Gram(s) IV Push once  glucagon  Injectable 1 milliGRAM(s) IntraMuscular once  insulin glargine Injectable (LANTUS) 25 Unit(s) SubCutaneous at bedtime  insulin lispro (ADMELOG) corrective regimen sliding scale   SubCutaneous every 6 hours  lacosamide Solution 200 milliGRAM(s) Oral <User Schedule>  multivitamin 1 Tablet(s) Oral daily  nystatin    Suspension [id number] Unit(s) Swish and Swallow every 6 hours  pantoprazole   Suspension 40 milliGRAM(s) Oral two times a day  predniSONE   Tablet 5 milliGRAM(s) Oral daily  sodium chloride 0.9%. 1000 milliLiter(s) (50 mL/Hr) IV Continuous <Continuous>  sodium zirconium cyclosilicate 10 Gram(s) Oral every other day  tacrolimus    0.5 mG/mL Suspension 3 milliGRAM(s) Oral every 12 hours  trimethoprim   80 mG/sulfamethoxazole 400 mG 1 Tablet(s) Oral daily    MEDICATIONS  (PRN):  acetaminophen     Tablet .. 650 milliGRAM(s) Oral every 6 hours PRN Mild Pain (1 - 3)  acetaminophen   IVPB .. 1000 milliGRAM(s) IV Intermittent every 6 hours PRN Temp greater or equal to 38.5C (101.3F), Severe Pain (7 - 10)  albuterol/ipratropium for Nebulization 3 milliLiter(s) Nebulizer every 6 hours PRN Shortness of Breath and/or Wheezing  oxyCODONE    IR 10 milliGRAM(s) Oral every 4 hours PRN Severe Pain (7 - 10)      LABS:                        8.4    6.95  )-----------( 91       ( 06 Apr 2024 07:32 )             27.1     04-05    137  |  102  |  78<H>  ----------------------------<  187<H>  4.7   |  23  |  1.64<H>    Ca    10.2      05 Apr 2024 06:36  Phos  2.6     04-05  Mg     2.3     04-05    TPro  6.2  /  Alb  3.2<L>  /  TBili  0.1<L>  /  DBili  x   /  AST  14  /  ALT  <5<L>  /  AlkPhos  79  04-05      Mode: AC/ CMV (Assist Control/ Continuous Mandatory Ventilation)  RR (machine): 14  TV (machine): 450  FiO2: 40  PEEP: 5  ITime: 1  MAP: 8  PIP: 18

## 2024-04-06 NOTE — PROGRESS NOTE ADULT - ASSESSMENT
- will keep drain today; possibly d/c drain tomorrow based on output - drain d/winifred today  - OK to restart DVT ppx

## 2024-04-06 NOTE — PROGRESS NOTE ADULT - THIS PATIENT HAS THE FOLLOWING CONDITION(S)/DIAGNOSES ON THIS ADMISSION:
Acute Respiratory Failure
None
Brain Compression / Herniation/Acute Respiratory Failure
Cerebral Edema
Cranial Helmet
None
Acute Respiratory Failure
Encephalopathy/Functional Quadriplegia/Cerebral Edema
None
Acute Respiratory Failure

## 2024-04-06 NOTE — EEG REPORT - NS EEG TEXT BOX
[facility]   COMPREHENSIVE EPILEPSY CENTER   REPORT OF CONTINUOUS VIDEO EEG     [facility]: [address], [place] [address], Ph#: [phone]  [facility]: [id number], [place] [address], Ph#: [phone]  [facility]: [address] [place] [address], Ph#: [phone]    Patient Name: ANTONELLA DOBSON  Age and : 78y (43)  MRN #: [id number]  Location: [place] [address] [address]  Referring Physician: Joyce Vidales    Start Time/Date: 0800 on 2024  End Time/Date: 08:00 on 24  Duration: 24h    _____________________________________________________________  STUDY INFORMATION    EEG Recording Technique:  The patient underwent continuous Video-EEG monitoring, using Telemetry System hardware on the XLTek Digital System. EEG and video data were stored on a computer hard drive with important events saved in digital archive files. The material was reviewed by a physician (electroencephalographer / epileptologist) on a daily basis. Cosme and seizure detection algorithms were utilized and reviewed. An EEG Technician attended to the patient, and was available throughout daytime work hours.  The epilepsy center neurologist was available in person or on call 24-hours per day.    EEG Placement and Labeling of Electrodes:  The EEG was performed utilizing 20 channel referential EEG connections (coronal over temporal over parasagittal montage) using all standard 10-20 electrode placements with EKG, with additional electrodes placed in the inferior temporal region using the modified 10-10 montage electrode placements for elective admissions, or if deemed necessary. Recording was at a sampling rate of 256 samples per second per channel. Time synchronized digital video recording was done simultaneously with EEG recording. A low light infrared camera was used for low light recording.     _____________________________________________________________  HISTORY    Patient is a 78y old  Female who presents with a chief complaint of ICH (2024 07:56)      PERTINENT MEDICATION:  MEDICATIONS  (STANDING):  amLODIPine   Tablet 10 milliGRAM(s) Oral daily  artificial  tears Solution 1 Drop(s) Both EYES every 4 hours  Biotene Dry Mouth Oral Rinse 5 milliLiter(s) Swish and Spit every 6 hours  brivaracetam Oral Solution 100 milliGRAM(s) Oral <User Schedule>  carvedilol 25 milliGRAM(s) Oral every 12 hours  dextrose 5%. 1000 milliLiter(s) (50 mL/Hr) IV Continuous <Continuous>  dextrose 5%. 1000 milliLiter(s) (100 mL/Hr) IV Continuous <Continuous>  dextrose 50% Injectable 25 Gram(s) IV Push once  glucagon  Injectable 1 milliGRAM(s) IntraMuscular once  insulin glargine Injectable (LANTUS) 25 Unit(s) SubCutaneous at bedtime  insulin lispro (ADMELOG) corrective regimen sliding scale   SubCutaneous every 6 hours  lacosamide Solution 200 milliGRAM(s) Oral <User Schedule>  multivitamin 1 Tablet(s) Oral daily  nystatin    Suspension 478547 Unit(s) Swish and Swallow every 6 hours  pantoprazole   Suspension 40 milliGRAM(s) Oral two times a day  predniSONE   Tablet 5 milliGRAM(s) Oral daily  sodium zirconium cyclosilicate 10 Gram(s) Oral every other day  tacrolimus    0.5 mG/mL Suspension 3 milliGRAM(s) Oral every 12 hours  trimethoprim   80 mG/sulfamethoxazole 400 mG 1 Tablet(s) Oral daily    _____________________________________________________________  STUDY INTERPRETATION    Findings: The background was continuous, spontaneously variable and reactive.  No posterior dominant rhythm seen.  Background predominantly consisted of irregular theta, delta and faster activities.    Focal Slowing:   Continuous irregular theta/delta slowing over the left hemisphere.     Sleep Background:  Drowsiness and stage II sleep transients were not recorded.    Other Non-Epileptiform Findings:  Breach effect in the left hemisphere characterized by higher amplitude, sharply contoured waves and fast activities.    Interictal Epileptiform Activity:   Bifrontal sharp-wave discharges (right>left)  Intermittent periods of ~1 Hz generalized periodic discharges (GPDs) with triphasic morphology, asymmetric with less prominent presentation over left side due to focal dysfunction in that region.    Events:  Clinical events: None recorded.  Seizures: None recorded.    Activation Procedures:   Hyperventilation was not performed.    Photic stimulation was not performed.     Artifacts:  Intermittent myogenic and movement artifacts were noted.    EKG: Regular rhythm.    _____________________________________________________________  EEG SUMMARY/CLASSIFICATION    Abnormal EEG   - Bifrontal sharp-wave discharges (right>left)  - GPDs with triphasic morphology with some asymmetry, as specified above.  - Left hemispheric slowing and breach artifact.  - Moderate generalized slowing.    _____________________________________________________________  EEG IMPRESSION/CLINICAL CORRELATE    Abnormal EEG study.  Potential epileptogenic focus in the bilateral frontal head regions.  Structural or functional abnormality in the left hemisphere with evidence for overlying skull defect.  Moderate nonspecific diffuse or multifocal cerebral dysfunction.   No seizure seen.  This is a preliminary report pending attending review and attestation.    Erick Lanza MD  Fellow, [facility] Epilepsy [place]     [facility]   COMPREHENSIVE EPILEPSY CENTER   REPORT OF CONTINUOUS VIDEO EEG     [facility]: [address], [place] [address], Ph#: [phone]  [facility]: [phone], [place] [address], Ph#: [phone]  [facility]: [address] [place] [address], Ph#: [phone]    Patient Name: ANTONELLA DOBSON  Age and : 78y (43)  MRN #: [id number]  Location: [place] [address] [address]  Referring Physician: Joyce Vidales    Start Time/Date: 0800 on 2024  End Time/Date: 08:00 on 24  Duration: 24h    _____________________________________________________________  STUDY INFORMATION    EEG Recording Technique:  The patient underwent continuous Video-EEG monitoring, using Telemetry System hardware on the XLTek Digital System. EEG and video data were stored on a computer hard drive with important events saved in digital archive files. The material was reviewed by a physician (electroencephalographer / epileptologist) on a daily basis. Cosme and seizure detection algorithms were utilized and reviewed. An EEG Technician attended to the patient, and was available throughout daytime work hours.  The epilepsy center neurologist was available in person or on call 24-hours per day.    EEG Placement and Labeling of Electrodes:  The EEG was performed utilizing 20 channel referential EEG connections (coronal over temporal over parasagittal montage) using all standard 10-20 electrode placements with EKG, with additional electrodes placed in the inferior temporal region using the modified 10-10 montage electrode placements for elective admissions, or if deemed necessary. Recording was at a sampling rate of 256 samples per second per channel. Time synchronized digital video recording was done simultaneously with EEG recording. A low light infrared camera was used for low light recording.     _____________________________________________________________  HISTORY    Patient is a 78y old  Female who presents with a chief complaint of ICH (2024 07:56)      PERTINENT MEDICATION:  MEDICATIONS  (STANDING):  amLODIPine   Tablet 10 milliGRAM(s) Oral daily  artificial  tears Solution 1 Drop(s) Both EYES every 4 hours  Biotene Dry Mouth Oral Rinse 5 milliLiter(s) Swish and Spit every 6 hours  brivaracetam Oral Solution 100 milliGRAM(s) Oral <User Schedule>  carvedilol 25 milliGRAM(s) Oral every 12 hours  dextrose 5%. 1000 milliLiter(s) (50 mL/Hr) IV Continuous <Continuous>  dextrose 5%. 1000 milliLiter(s) (100 mL/Hr) IV Continuous <Continuous>  dextrose 50% Injectable 25 Gram(s) IV Push once  glucagon  Injectable 1 milliGRAM(s) IntraMuscular once  insulin glargine Injectable (LANTUS) 25 Unit(s) SubCutaneous at bedtime  insulin lispro (ADMELOG) corrective regimen sliding scale   SubCutaneous every 6 hours  lacosamide Solution 200 milliGRAM(s) Oral <User Schedule>  multivitamin 1 Tablet(s) Oral daily  nystatin    Suspension 612292 Unit(s) Swish and Swallow every 6 hours  pantoprazole   Suspension 40 milliGRAM(s) Oral two times a day  predniSONE   Tablet 5 milliGRAM(s) Oral daily  sodium zirconium cyclosilicate 10 Gram(s) Oral every other day  tacrolimus    0.5 mG/mL Suspension 3 milliGRAM(s) Oral every 12 hours  trimethoprim   80 mG/sulfamethoxazole 400 mG 1 Tablet(s) Oral daily    _____________________________________________________________  STUDY INTERPRETATION    Findings: The background was continuous, spontaneously variable and reactive.  No posterior dominant rhythm seen.  Background predominantly consisted of irregular theta, delta and faster activities.    Focal Slowing:   Continuous irregular theta/delta slowing over the left hemisphere.     Sleep Background:  Drowsiness and stage II sleep transients were not recorded.    Other Non-Epileptiform Findings:  Breach effect in the left hemisphere characterized by higher amplitude, sharply contoured waves and fast activities.    Interictal Epileptiform Activity:   Bifrontal sharp-wave discharges (right>left)  Intermittent periods of ~1 Hz generalized periodic discharges (GPDs) with triphasic morphology, asymmetric with less prominent presentation over left side due to focal dysfunction in that region.    Events:  Clinical events: None recorded.  Seizures: None recorded.    Activation Procedures:   Hyperventilation was not performed.    Photic stimulation was not performed.     Artifacts:  Intermittent myogenic and movement artifacts were noted.    EKG: Regular rhythm.    _____________________________________________________________  EEG SUMMARY/CLASSIFICATION    Abnormal EEG   - Bifrontal sharp-wave discharges (right>left)  - GPDs with triphasic morphology with some asymmetry, as specified above.  - Left hemispheric slowing and breach artifact.  - Moderate generalized slowing.    _____________________________________________________________  EEG IMPRESSION/CLINICAL CORRELATE    Abnormal EEG study.  Potential epileptogenic focus in the bilateral frontal head regions.  Structural or functional abnormality in the left hemisphere with evidence for overlying skull defect.  Moderate nonspecific diffuse or multifocal cerebral dysfunction.   No seizure seen.    Erick Lanza MD  Fellow, [facility] Epilepsy [place]    Paxton Calvo MD  EEG/Epilepsy Attending      [facility]   COMPREHENSIVE EPILEPSY CENTER   REPORT OF CONTINUOUS VIDEO EEG     [facility]: [address], [place] [address], Ph#: [phone]  [facility]: [phone], [place] [address], Ph#: [phone]  [facility]: [address] [place] [address], Ph#: [phone]    Patient Name: ANTONELLA DOBSON  Age and : 78y (43)  MRN #: [id number]  Location: [place] [address] [address]  Referring Physician: Joyce Vidales    Start Time/Date: 0800am on 2024  End Time/Date: 11:15am on 24  Duration: 27h 15 min    _____________________________________________________________  STUDY INFORMATION    EEG Recording Technique:  The patient underwent continuous Video-EEG monitoring, using Telemetry System hardware on the XLTek Digital System. EEG and video data were stored on a computer hard drive with important events saved in digital archive files. The material was reviewed by a physician (electroencephalographer / epileptologist) on a daily basis. Cosme and seizure detection algorithms were utilized and reviewed. An EEG Technician attended to the patient, and was available throughout daytime work hours.  The epilepsy center neurologist was available in person or on call 24-hours per day.    EEG Placement and Labeling of Electrodes:  The EEG was performed utilizing 20 channel referential EEG connections (coronal over temporal over parasagittal montage) using all standard 10-20 electrode placements with EKG, with additional electrodes placed in the inferior temporal region using the modified 10-10 montage electrode placements for elective admissions, or if deemed necessary. Recording was at a sampling rate of 256 samples per second per channel. Time synchronized digital video recording was done simultaneously with EEG recording. A low light infrared camera was used for low light recording.     _____________________________________________________________  HISTORY    Patient is a 78y old  Female who presents with a chief complaint of ICH (2024 07:56)      PERTINENT MEDICATION:  MEDICATIONS  (STANDING):  amLODIPine   Tablet 10 milliGRAM(s) Oral daily  artificial  tears Solution 1 Drop(s) Both EYES every 4 hours  Biotene Dry Mouth Oral Rinse 5 milliLiter(s) Swish and Spit every 6 hours  brivaracetam Oral Solution 100 milliGRAM(s) Oral <User Schedule>  carvedilol 25 milliGRAM(s) Oral every 12 hours  dextrose 5%. 1000 milliLiter(s) (50 mL/Hr) IV Continuous <Continuous>  dextrose 5%. 1000 milliLiter(s) (100 mL/Hr) IV Continuous <Continuous>  dextrose 50% Injectable 25 Gram(s) IV Push once  glucagon  Injectable 1 milliGRAM(s) IntraMuscular once  insulin glargine Injectable (LANTUS) 25 Unit(s) SubCutaneous at bedtime  insulin lispro (ADMELOG) corrective regimen sliding scale   SubCutaneous every 6 hours  lacosamide Solution 200 milliGRAM(s) Oral <User Schedule>  multivitamin 1 Tablet(s) Oral daily  nystatin    Suspension 352259 Unit(s) Swish and Swallow every 6 hours  pantoprazole   Suspension 40 milliGRAM(s) Oral two times a day  predniSONE   Tablet 5 milliGRAM(s) Oral daily  sodium zirconium cyclosilicate 10 Gram(s) Oral every other day  tacrolimus    0.5 mG/mL Suspension 3 milliGRAM(s) Oral every 12 hours  trimethoprim   80 mG/sulfamethoxazole 400 mG 1 Tablet(s) Oral daily    _____________________________________________________________  STUDY INTERPRETATION    Findings: The background was continuous, spontaneously variable and reactive.  No posterior dominant rhythm seen.  Background predominantly consisted of irregular theta, delta and faster activities.    Focal Slowing:   Continuous irregular theta/delta slowing over the left hemisphere.     Sleep Background:  Drowsiness and stage II sleep transients were not recorded.    Other Non-Epileptiform Findings:  Breach effect in the left hemisphere characterized by higher amplitude, sharply contoured waves and fast activities.    Interictal Epileptiform Activity:   Bifrontal sharp-wave discharges (right>left)  Intermittent periods of ~1 Hz generalized periodic discharges (GPDs) with triphasic morphology, asymmetric with less prominent presentation over left side due to focal dysfunction in that region.    Events:  Clinical events: None recorded.  Seizures: None recorded.    Activation Procedures:   Hyperventilation was not performed.    Photic stimulation was not performed.     Artifacts:  Intermittent myogenic and movement artifacts were noted.    EKG: Regular rhythm.    _____________________________________________________________  EEG SUMMARY/CLASSIFICATION    Abnormal EEG   - Bifrontal sharp-wave discharges (right>left)  - GPDs with triphasic morphology with some asymmetry, as specified above.  - Left hemispheric slowing and breach artifact.  - Moderate generalized slowing.    _____________________________________________________________  EEG IMPRESSION/CLINICAL CORRELATE    Abnormal EEG study.  Potential epileptogenic focus in the bilateral frontal head regions.  Structural or functional abnormality in the left hemisphere with evidence for overlying skull defect.  Moderate nonspecific diffuse or multifocal cerebral dysfunction.   No seizure seen.    Erick Lanza MD  Fellow, [facility]    Paxton Calvo MD  EEG/Epilepsy Attending

## 2024-04-06 NOTE — PROGRESS NOTE ADULT - ASSESSMENT
77 yo F with PMHx ESRD s/p renal transplant (2019, now off HD), left AKA, BK viremia, HTN, breast ca s/p lumpectomy (completed Tamoxifen 03/2023), DVT (off Eliquis), HLD, gout presenting 3/1 with left ICH (ICH score 4) likely 2/2 amyloid angiopathy s/p left craniectomy(discarded) and evacuation as well as EVD placement and removal (3/7). Hospital course c/b extensive LE DVTs, S/p IVCF on 3/3. She is s/p trach/peg 3/8/24.  Febrile 3/10 with + UA, blood/sputum culture NGTD.  Treatment for UTI initiated with ceftriaxone, eventually broadened to cefepime. Transferred to [facility] 3/11 for continued management. Urine culture resulted positive for klebsiella, treated for 7 days with Meropenem 3/12 --> 3/19. Patient weaned from MV, tolerating TC ATC since 3/23. Patient S/p Cranioplasty on 4/2.      4/5: PST as tolerated, Left shoulder twitching noted on AM rounds with team, discussed with neurology, EEG recording. Stopped Lovenox temporarily for increase in drain output.   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 Hospital course c/b extensive LE DVTs, S/p IVCF on 3/3. She is s/p trach/peg 3/8/24.  Febrile 3/10 with + UA, blood/sputum culture NGTD.  Treatment for UTI initiated with ceftriaxone, eventually broadened to cefepime. Transferred to [facility] 3/11 for continued management. Urine culture resulted positive for klebsiella, treated for 7 days with Meropenem 3/12 --> 3/19. Patient weaned from MV, tolerating TC ATC since 3/23. Patient S/p Cranioplasty on 4/2.      4/6: PST ongoing, EEG results noted and referred results to neurologist.  No recommendations needed based on results. YON Galeal drain removed by neurosurgery and they recommended restarting prophylaxis anticoagulation.

## 2024-04-06 NOTE — PROGRESS NOTE ADULT - NS ATTEND AMEND GEN_ALL_CORE FT
78 year old female with a history of ESRD s/p renal transplant (2019) c/b BK viremia, left AKA, HTN, breast ca s/p lumpectomy (completed Tamoxifen 3/2023), DVT (off Eliquis), HLD, gout presenting with left ICH likely in the setting of amyloid angiopathy s/p left craniectomy (discarded) and evacuation (3/2/24), EVD placement and removal (3/7), c/b prolonged respiratory failure s/p trach/PEG (3/9/24) and [facility] transfer.     #ICH  #Seizure  From a neurologic perspective she has had an ICH 2/2 amyloid angiopathy s/p left craniectomy and evacuation, EVD placement and subsequent removal. She is s/p cranioplasty 4/2. YON drain is still in place.   She clinically remains obtunded, reportedly will occasionally open her eyes to sternal rub, no meaningful communication. Not opening eyes on exam, no appreciable twitching or jerking.  Her hospital course has been complicated by seizure, her last recorded seizures were seen 3/7/24. She is currently controlled on Briviact 100 TID, Vimpat 200 BID  - On vEEG without any evidence of seizure, will continue to monitor  - Appreciate neurosurgery input, YON drain with increased output. Will hold A/C  - Follow neuro exam closely  - C/W AED  - Monitor YON drain output    #HTN: Hypotension after OR. Holding Clonidine patch. Continue with other antihypertensives, currently normotensive on my exam.     #Mixed respiratory failure  She initially presented with respiratory failure, mixed hypoxic and hypercapnic respiratory failure. She was tolerating trach collar around the clock since 3/23/24. She was placed back on full support after her cranioplasty. Will rapidly wean again as able. Will wean O2 as tolerated, goal SpO2 90-95%. Continue airways clearance with duonebs and hypersal q6 for airways clearance.     #UTI  #ESBL Kleb  Her hospital course was otherwise complicated by ESBL Kleb and VRE (3/10) urinary tract infection. She is s/p Meropenem. Now clinically stable and being monitored off antibiotics.    #H.Pylori  She was found to have H.Pylori positivity: Per GI evaluation, will plan to start treatment as outlined above at the time of discharge.    #Renal Transplant  #MEHREEN  She has a history of renal transplant. Transplant is following and appreciate their recommendations. Continue with Prednisone, Tacrolimus. Will confirm Bactrim with prophylaxis with Nephrology. Her creatinine continues to trend up. Urine output has dropped off. Will d/w transplant. ? related to hypoperfusion in OR. Trend creat closely, avoid nephrotoxins, trend UOP. Tacro levels daily. Will continue to monitor for now.     #DM  She has had issues with hyperglycemia: Goal blood glucose 140-180. Adjusting insulin accordingly.      #Heme: DVT 3/2 R CFV, Fem, Pop, Gastrocnemius and L CFV, Fem s/p IVC filter (3/3/24). Tolerating SQH without issue    Rest as above.

## 2024-04-06 NOTE — PROGRESS NOTE ADULT - PROBLEM SELECTOR PLAN 11
- PCP ppx: Bactrim  - DVT ppx: heparin sub q held in setting of recent cranioplasty, s/p IVCF 3/3  - GI ppx: Protonix BID

## 2024-04-06 NOTE — PROGRESS NOTE ADULT - PROBLEM SELECTOR PLAN 9
- VA Duplex 3/2: DVT RT cfv, femoral, popliteal and gastrocnemius veins; DVT LEFT cfv and femoral vein  - S/p IVCF by IR on 3/3  - Lovenox held setting of increased YON drain output after cranioplasty, unable to utilized STD's due to right leg DVT, patient has ATK left amputation - VA Duplex 3/2: DVT RT cfv, femoral, popliteal and gastrocnemius veins; DVT LEFT cfv and femoral vein  - S/p IVCF by IR on 3/3  - Lovenox held in setting of increased YON drain output after cranioplasty, unable to utilized STD's due to right leg DVT, patient has ATK left amputation.  - YON drain removed and Heparin SQ restarted.

## 2024-04-07 LAB
ALBUMIN SERPL ELPH-MCNC: 3.4 G/DL — SIGNIFICANT CHANGE UP (ref 3.3–5)
ALP SERPL-CCNC: 86 U/L — SIGNIFICANT CHANGE UP (ref 40–120)
ALT FLD-CCNC: 10 U/L — SIGNIFICANT CHANGE UP (ref 10–45)
ANION GAP SERPL CALC-SCNC: 12 MMOL/L — SIGNIFICANT CHANGE UP (ref 5–17)
AST SERPL-CCNC: 17 U/L — SIGNIFICANT CHANGE UP (ref 10–40)
BILIRUB SERPL-MCNC: 0.2 MG/DL — SIGNIFICANT CHANGE UP (ref 0.2–1.2)
BUN SERPL-MCNC: 60 MG/DL — HIGH (ref 7–23)
CALCIUM SERPL-MCNC: 9.8 MG/DL — SIGNIFICANT CHANGE UP (ref 8.4–10.5)
CHLORIDE SERPL-SCNC: 104 MMOL/L — SIGNIFICANT CHANGE UP (ref 96–108)
CO2 SERPL-SCNC: 22 MMOL/L — SIGNIFICANT CHANGE UP (ref 22–31)
CREAT SERPL-MCNC: 1.2 MG/DL — SIGNIFICANT CHANGE UP (ref 0.5–1.3)
EGFR: 46 ML/MIN/1.73M2 — LOW
GLUCOSE BLDC GLUCOMTR-MCNC: 180 MG/DL — HIGH (ref 70–99)
GLUCOSE BLDC GLUCOMTR-MCNC: 189 MG/DL — HIGH (ref 70–99)
GLUCOSE BLDC GLUCOMTR-MCNC: 218 MG/DL — HIGH (ref 70–99)
GLUCOSE BLDC GLUCOMTR-MCNC: 233 MG/DL — HIGH (ref 70–99)
GLUCOSE BLDC GLUCOMTR-MCNC: 262 MG/DL — HIGH (ref 70–99)
GLUCOSE SERPL-MCNC: 208 MG/DL — HIGH (ref 70–99)
HCT VFR BLD CALC: 27.9 % — LOW (ref 34.5–45)
HGB BLD-MCNC: 8.4 G/DL — LOW (ref 11.5–15.5)
MAGNESIUM SERPL-MCNC: 2.1 MG/DL — SIGNIFICANT CHANGE UP (ref 1.6–2.6)
MCHC RBC-ENTMCNC: 29.1 PG — SIGNIFICANT CHANGE UP (ref 27–34)
MCHC RBC-ENTMCNC: 30.1 GM/DL — LOW (ref 32–36)
MCV RBC AUTO: 96.5 FL — SIGNIFICANT CHANGE UP (ref 80–100)
NRBC # BLD: 0 /100 WBCS — SIGNIFICANT CHANGE UP (ref 0–0)
PHOSPHATE SERPL-MCNC: 2.7 MG/DL — SIGNIFICANT CHANGE UP (ref 2.5–4.5)
PLATELET # BLD AUTO: 111 K/UL — LOW (ref 150–400)
POTASSIUM SERPL-MCNC: 4.4 MMOL/L — SIGNIFICANT CHANGE UP (ref 3.5–5.3)
POTASSIUM SERPL-SCNC: 4.4 MMOL/L — SIGNIFICANT CHANGE UP (ref 3.5–5.3)
PROT SERPL-MCNC: 6.4 G/DL — SIGNIFICANT CHANGE UP (ref 6–8.3)
RBC # BLD: 2.89 M/UL — LOW (ref 3.8–5.2)
RBC # FLD: 16.3 % — HIGH (ref 10.3–14.5)
SODIUM SERPL-SCNC: 138 MMOL/L — SIGNIFICANT CHANGE UP (ref 135–145)
TACROLIMUS SERPL-MCNC: 6.2 NG/ML — SIGNIFICANT CHANGE UP
WBC # BLD: 7.53 K/UL — SIGNIFICANT CHANGE UP (ref 3.8–10.5)
WBC # FLD AUTO: 7.53 K/UL — SIGNIFICANT CHANGE UP (ref 3.8–10.5)

## 2024-04-07 PROCEDURE — 99233 SBSQ HOSP IP/OBS HIGH 50: CPT | Mod: FS

## 2024-04-07 RX ORDER — INSULIN GLARGINE 100 [IU]/ML
30 INJECTION, SOLUTION SUBCUTANEOUS AT BEDTIME
Refills: 0 | Status: DISCONTINUED | OUTPATIENT
Start: 2024-04-07 | End: 2024-04-15

## 2024-04-07 RX ADMIN — Medication 1 DROP(S): at 17:07

## 2024-04-07 RX ADMIN — Medication 2: at 12:25

## 2024-04-07 RX ADMIN — TACROLIMUS 3 MILLIGRAM(S): 5 CAPSULE ORAL at 05:17

## 2024-04-07 RX ADMIN — Medication 1 DROP(S): at 21:58

## 2024-04-07 RX ADMIN — BRIVARACETAM 100 MILLIGRAM(S): 25 TABLET, FILM COATED ORAL at 09:11

## 2024-04-07 RX ADMIN — HEPARIN SODIUM 5000 UNIT(S): 5000 INJECTION INTRAVENOUS; SUBCUTANEOUS at 05:16

## 2024-04-07 RX ADMIN — Medication 1 DROP(S): at 09:12

## 2024-04-07 RX ADMIN — HEPARIN SODIUM 5000 UNIT(S): 5000 INJECTION INTRAVENOUS; SUBCUTANEOUS at 13:00

## 2024-04-07 RX ADMIN — Medication 500000 UNIT(S): at 17:06

## 2024-04-07 RX ADMIN — Medication 5 MILLILITER(S): at 17:07

## 2024-04-07 RX ADMIN — TACROLIMUS 3 MILLIGRAM(S): 5 CAPSULE ORAL at 17:06

## 2024-04-07 RX ADMIN — Medication 1 DROP(S): at 01:47

## 2024-04-07 RX ADMIN — Medication 1 DROP(S): at 05:17

## 2024-04-07 RX ADMIN — Medication 2: at 23:56

## 2024-04-07 RX ADMIN — Medication 5 MILLILITER(S): at 05:17

## 2024-04-07 RX ADMIN — Medication 5 MILLIGRAM(S): at 21:57

## 2024-04-07 RX ADMIN — Medication 1 TABLET(S): at 11:12

## 2024-04-07 RX ADMIN — BRIVARACETAM 100 MILLIGRAM(S): 25 TABLET, FILM COATED ORAL at 23:49

## 2024-04-07 RX ADMIN — LACOSAMIDE 200 MILLIGRAM(S): 50 TABLET ORAL at 23:55

## 2024-04-07 RX ADMIN — Medication 4: at 17:44

## 2024-04-07 RX ADMIN — Medication 5 MILLILITER(S): at 11:13

## 2024-04-07 RX ADMIN — Medication 1 DROP(S): at 13:00

## 2024-04-07 RX ADMIN — CARVEDILOL PHOSPHATE 25 MILLIGRAM(S): 80 CAPSULE, EXTENDED RELEASE ORAL at 21:57

## 2024-04-07 RX ADMIN — Medication 4: at 05:51

## 2024-04-07 RX ADMIN — PANTOPRAZOLE SODIUM 40 MILLIGRAM(S): 20 TABLET, DELAYED RELEASE ORAL at 09:12

## 2024-04-07 RX ADMIN — CARVEDILOL PHOSPHATE 25 MILLIGRAM(S): 80 CAPSULE, EXTENDED RELEASE ORAL at 10:44

## 2024-04-07 RX ADMIN — Medication 500000 UNIT(S): at 05:17

## 2024-04-07 RX ADMIN — Medication 1 TABLET(S): at 11:21

## 2024-04-07 RX ADMIN — SODIUM ZIRCONIUM CYCLOSILICATE 10 GRAM(S): 10 POWDER, FOR SUSPENSION ORAL at 11:13

## 2024-04-07 RX ADMIN — PANTOPRAZOLE SODIUM 40 MILLIGRAM(S): 20 TABLET, DELAYED RELEASE ORAL at 21:57

## 2024-04-07 RX ADMIN — BRIVARACETAM 100 MILLIGRAM(S): 25 TABLET, FILM COATED ORAL at 17:43

## 2024-04-07 RX ADMIN — AMLODIPINE BESYLATE 10 MILLIGRAM(S): 2.5 TABLET ORAL at 05:16

## 2024-04-07 RX ADMIN — INSULIN GLARGINE 30 UNIT(S): 100 INJECTION, SOLUTION SUBCUTANEOUS at 21:57

## 2024-04-07 RX ADMIN — Medication 500000 UNIT(S): at 11:12

## 2024-04-07 RX ADMIN — LACOSAMIDE 200 MILLIGRAM(S): 50 TABLET ORAL at 11:12

## 2024-04-07 RX ADMIN — Medication 5 MILLILITER(S): at 23:55

## 2024-04-07 RX ADMIN — Medication 500000 UNIT(S): at 23:49

## 2024-04-07 RX ADMIN — HEPARIN SODIUM 5000 UNIT(S): 5000 INJECTION INTRAVENOUS; SUBCUTANEOUS at 21:56

## 2024-04-07 NOTE — PROGRESS NOTE ADULT - PROBLEM SELECTOR PLAN 9
- VA Duplex 3/2: DVT RT cfv, femoral, popliteal and gastrocnemius veins; DVT LEFT cfv and femoral vein  - S/p IVCF by IR on 3/3  - Lovenox held in setting of increased YON drain output after cranioplasty, unable to utilized STD's due to right leg DVT, patient has ATK left amputation.  - YON drain removed and Heparin SQ restarted.

## 2024-04-07 NOTE — PROGRESS NOTE ADULT - ASSESSMENT
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 Hospital course c/b extensive LE DVTs, S/p IVCF on 3/3. She is s/p trach/peg 3/8/24.  Febrile 3/10 with + UA, blood/sputum culture NGTD.  Treatment for UTI initiated with ceftriaxone, eventually broadened to cefepime. Transferred to [facility] 3/11 for continued management. Urine culture resulted positive for klebsiella, treated for 7 days with Meropenem 3/12 --> 3/19. Patient weaned from MV, tolerating TC ATC since 3/23. Patient S/p Cranioplasty on 4/2.      4/6: PST ongoing, EEG results noted and referred results to neurologist.  No recommendations needed based on results. YON Galeal drain removed by neurosurgery and they recommended restarting prophylaxis anticoagulation. 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 Hospital course c/b extensive LE DVTs, S/p IVCF on 3/3. She is s/p trach/peg 3/8/24.  Febrile 3/10 with + UA, blood/sputum culture NGTD.  Treatment for UTI initiated with ceftriaxone, eventually broadened to cefepime. Transferred to [facility] 3/11 for continued management. Urine culture resulted positive for klebsiella, treated for 7 days with Meropenem 3/12 --> 3/19. Patient weaned from MV, tolerating TC ATC since 3/23. Patient S/p Cranioplasty on 4/2.      4/7: PST ongoing, EEG results noted and referred results to neurologist.  No recommendations made based on results. YON Galeal drain removed by neurosurgery and they recommended restarting prophylaxis anticoagulation.  YON drain removed 4/6, Heparin SQ started in light of elevated Cr.  Cr elevated and deemed to be pre-renal, received 2 days of IVF with noted improvement.

## 2024-04-07 NOTE — PROGRESS NOTE ADULT - SUBJECTIVE AND OBJECTIVE BOX
Patient is a 78y old  Female who presents with a chief complaint of ICH (06 Apr 2024 21:05)    HPI:  Nury Adam   78F Hx ESRD s/p renal xplant c/b DGF off HD, L AKA, 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 ICH score 4.     (02 Mar 2024 00:28)    Interval Events:    REVIEW OF SYSTEMS:  [ ] Positive  [ ] All other systems negative  [ ] Unable to assess ROS because ________    Vital Signs Last 24 Hrs  T(C): 36.6 (04-07-24 @ 04:34), Max: 37.1 (04-06-24 @ 07:46)  T(F): 97.8 (04-07-24 @ 04:34), Max: 98.8 (04-06-24 @ 07:46)  HR: 79 (04-07-24 @ 06:27) (75 - 87)  BP: 121/78 (04-07-24 @ 04:34) (121/56 - 157/82)  RR: 17 (04-07-24 @ 06:27) (14 - 22)  SpO2: 100% (04-07-24 @ 06:27) (99% - 100%)    PHYSICAL EXAM:  HEENT:   [ ]Tracheostomy:  [ ]Pupils equal  [ ]No oral lesions  [ ]Abnormal    SKIN  [ ] No Rash  [ ] Abnormal  [ ] pressure    CARDIAC  [ ]Regular  [ ]Abnormal    PULMONARY  [ ]Bilateral Clear Breath Sounds  [ ]Normal Excursion  [ ]Abnormal    GI  [ ]PEG      [ ] +BS		              [ ]Soft, nondistended, nontender	  [ ]Abnormal    MUSCULOSKELETAL                                   [ ]Bedbound                 [ ]Abnormal    [ ]Ambulatory/OOB to chair                           EXTREMITIES                                         [ ]Normal  [ ]Edema                           NEUROLOGIC  [ ] Normal, non focal  [ ] Focal findings:    PSYCHIATRIC  [ ]Alert and appropriate  [ ] Sedated	 [ ]Agitated    :  Gardner: [ ] Yes, if yes: Date of Placement:                   [  ] No    LINES: Central Lines [ ] Yes, if yes: Date of Placement                                     [  ] No    HOSPITAL MEDICATIONS:  MEDICATIONS  (STANDING):  amLODIPine   Tablet 10 milliGRAM(s) Oral daily  artificial  tears Solution 1 Drop(s) Both EYES every 4 hours  Biotene Dry Mouth Oral Rinse 5 milliLiter(s) Swish and Spit every 6 hours  brivaracetam Oral Solution 100 milliGRAM(s) Oral <User Schedule>  carvedilol 25 milliGRAM(s) Oral every 12 hours  dextrose 5%. 1000 milliLiter(s) (50 mL/Hr) IV Continuous <Continuous>  dextrose 5%. 1000 milliLiter(s) (100 mL/Hr) IV Continuous <Continuous>  dextrose 50% Injectable 25 Gram(s) IV Push once  glucagon  Injectable 1 milliGRAM(s) IntraMuscular once  heparin   Injectable 5000 Unit(s) SubCutaneous every 8 hours  insulin glargine Injectable (LANTUS) 25 Unit(s) SubCutaneous at bedtime  insulin lispro (ADMELOG) corrective regimen sliding scale   SubCutaneous every 6 hours  lacosamide Solution 200 milliGRAM(s) Oral <User Schedule>  multivitamin 1 Tablet(s) Oral daily  nystatin    Suspension 551929 Unit(s) Swish and Swallow every 6 hours  pantoprazole   Suspension 40 milliGRAM(s) Oral two times a day  predniSONE   Tablet 5 milliGRAM(s) Oral daily  sodium chloride 0.9%. 1000 milliLiter(s) (50 mL/Hr) IV Continuous <Continuous>  sodium zirconium cyclosilicate 10 Gram(s) Oral every other day  tacrolimus    0.5 mG/mL Suspension 3 milliGRAM(s) Oral every 12 hours  trimethoprim   80 mG/sulfamethoxazole 400 mG 1 Tablet(s) Oral daily    MEDICATIONS  (PRN):  acetaminophen     Tablet .. 650 milliGRAM(s) Oral every 6 hours PRN Mild Pain (1 - 3)  acetaminophen   IVPB .. 1000 milliGRAM(s) IV Intermittent every 6 hours PRN Temp greater or equal to 38.5C (101.3F), Severe Pain (7 - 10)  albuterol/ipratropium for Nebulization 3 milliLiter(s) Nebulizer every 6 hours PRN Shortness of Breath and/or Wheezing  oxyCODONE    IR 10 milliGRAM(s) Oral every 4 hours PRN Severe Pain (7 - 10)      LABS:                        8.4    7.53  )-----------( 111      ( 07 Apr 2024 06:45 )             27.9     04-07    138  |  104  |  60<H>  ----------------------------<  208<H>  4.4   |  22  |  1.20    Ca    9.8      07 Apr 2024 06:44  Phos  2.7     04-07  Mg     2.1     04-07    TPro  6.4  /  Alb  3.4  /  TBili  0.2  /  DBili  x   /  AST  17  /  ALT  10  /  AlkPhos  86  04-07          Mode: AC/ CMV (Assist Control/ Continuous Mandatory Ventilation)  RR (machine): 14  TV (machine): 450  FiO2: 40  PEEP: 5  ITime: 1  MAP: 9  PIP: 17         Patient is a 78y old  Female who presents with a chief complaint of ICH (06 Apr 2024 21:05)    HPI:  Nury Adam   78F Hx ESRD s/p renal xplant c/b DGF off HD, L AKA, 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 ICH score 4.     (02 Mar 2024 00:28)    Interval Events: EEG completed, results reviewed bu neurology with no new recommendations PST ongoing but not progressing, periods of apnea.    REVIEW OF SYSTEMS:  [ ] Positive  [ ] All other systems negative  [ ] Unable to assess ROS because ________    Vital Signs Last 24 Hrs  T(C): 36.6 (04-07-24 @ 04:34), Max: 37.1 (04-06-24 @ 07:46)  T(F): 97.8 (04-07-24 @ 04:34), Max: 98.8 (04-06-24 @ 07:46)  HR: 79 (04-07-24 @ 06:27) (75 - 87)  BP: 121/78 (04-07-24 @ 04:34) (121/56 - 157/82)  RR: 17 (04-07-24 @ 06:27) (14 - 22)  SpO2: 100% (04-07-24 @ 06:27) (99% - 100%)    PHYSICAL EXAM:  HEENT:   [X]Tracheostomy: #7 Cuffed Portex  [X]Pupils equal  [ ]No oral lesions  [X]Abnormal - s/p cranioplasty, site w/ staples c/d/i. + YON drain    SKIN  [ ]No Rash  [X] Abnormal - LUE AVF, L AKA  [X] pressure - stage 3 sacral pressure injury     CARDIAC  [X]Regular  [ ]Abnormal    PULMONARY  [ ]Bilateral Clear Breath Sounds  [ ]Normal Excursion  [X]Abnormal - BL Coarse BS    GI  [X]PEG      [X] +BS		              [X]Soft, nondistended, nontender	  [ ]Abnormal    MUSCULOSKELETAL                                   [X]Bedbound                 [ ]Abnormal    [ ]Ambulatory/OOB to chair                           EXTREMITIES                                         [X]Normal  [ ]Edema                           NEUROLOGIC  [ ] Normal, non focal  [X] Focal findings: obtunded, slightly withdraws upper extremities to noxious stimuli.    PSYCHIATRIC  [X] obtunded  [ ] Sedated	 [ ]Agitated    :  Gardner: [ ] Yes, if yes: Date of Placement:                   [X] No    LINES: Central Lines [ ] Yes, if yes: Date of Placement                                     [X] No    HOSPITAL MEDICATIONS:  MEDICATIONS  (STANDING):  amLODIPine   Tablet 10 milliGRAM(s) Oral daily  artificial  tears Solution 1 Drop(s) Both EYES every 4 hours  Biotene Dry Mouth Oral Rinse 5 milliLiter(s) Swish and Spit every 6 hours  brivaracetam Oral Solution 100 milliGRAM(s) Oral <User Schedule>  carvedilol 25 milliGRAM(s) Oral every 12 hours  dextrose 5%. 1000 milliLiter(s) (50 mL/Hr) IV Continuous <Continuous>  dextrose 5%. 1000 milliLiter(s) (100 mL/Hr) IV Continuous <Continuous>  dextrose 50% Injectable 25 Gram(s) IV Push once  glucagon  Injectable 1 milliGRAM(s) IntraMuscular once  heparin   Injectable 5000 Unit(s) SubCutaneous every 8 hours  insulin glargine Injectable (LANTUS) 25 Unit(s) SubCutaneous at bedtime  insulin lispro (ADMELOG) corrective regimen sliding scale   SubCutaneous every 6 hours  lacosamide Solution 200 milliGRAM(s) Oral <User Schedule>  multivitamin 1 Tablet(s) Oral daily  nystatin    Suspension 182356 Unit(s) Swish and Swallow every 6 hours  pantoprazole   Suspension 40 milliGRAM(s) Oral two times a day  predniSONE   Tablet 5 milliGRAM(s) Oral daily  sodium chloride 0.9%. 1000 milliLiter(s) (50 mL/Hr) IV Continuous <Continuous>  sodium zirconium cyclosilicate 10 Gram(s) Oral every other day  tacrolimus    0.5 mG/mL Suspension 3 milliGRAM(s) Oral every 12 hours  trimethoprim   80 mG/sulfamethoxazole 400 mG 1 Tablet(s) Oral daily    MEDICATIONS  (PRN):  acetaminophen     Tablet .. 650 milliGRAM(s) Oral every 6 hours PRN Mild Pain (1 - 3)  acetaminophen   IVPB .. 1000 milliGRAM(s) IV Intermittent every 6 hours PRN Temp greater or equal to 38.5C (101.3F), Severe Pain (7 - 10)  albuterol/ipratropium for Nebulization 3 milliLiter(s) Nebulizer every 6 hours PRN Shortness of Breath and/or Wheezing  oxyCODONE    IR 10 milliGRAM(s) Oral every 4 hours PRN Severe Pain (7 - 10)      LABS:                        8.4    7.53  )-----------( 111      ( 07 Apr 2024 06:45 )             27.9     04-07    138  |  104  |  60<H>  ----------------------------<  208<H>  4.4   |  22  |  1.20    Ca    9.8      07 Apr 2024 06:44  Phos  2.7     04-07  Mg     2.1     04-07    TPro  6.4  /  Alb  3.4  /  TBili  0.2  /  DBili  x   /  AST  17  /  ALT  10  /  AlkPhos  86  04-07          Mode: AC/ CMV (Assist Control/ Continuous Mandatory Ventilation)  RR (machine): 14  TV (machine): 450  FiO2: 40  PEEP: 5  ITime: 1  MAP: 9  PIP: 17

## 2024-04-07 NOTE — PROGRESS NOTE ADULT - PROBLEM SELECTOR PLAN 1
- Found to have L IPH on CTH likely 2/2 amyloid angiopathy  - S/p L hemicrani for evacuation of large ICH; bone discarded  - CT H Stereo 3/12: S/p Left Frontal Craniectomy, Remains stable from prior CT on 3/8   - CT Head 3/30: Improved lft frontal hemorrhage and extradural fluid collections   - S/p Cranioplasty on 4/2; Post op head CT Stable   - Remains with Subgaleal YON Drain   - Maintain Neuro checks - Found to have L IPH on CTH likely 2/2 amyloid angiopathy  - S/p L hemicrani for evacuation of large ICH; bone discarded  - CT H Stereo 3/12: S/p Left Frontal Craniectomy, Remains stable from prior CT on 3/8   - CT Head 3/30: Improved lft frontal hemorrhage and extradural fluid collections   - S/p Cranioplasty on 4/2; Post op head CT Stable   - Subgaleal YON Drain removed 4/6  - Maintain Neuro checks

## 2024-04-07 NOTE — PROGRESS NOTE ADULT - NS ATTEND AMEND GEN_ALL_CORE FT
------------------------ 78 year old female with a history of ESRD s/p renal transplant (2019) c/b BK viremia, left AKA, HTN, breast ca s/p lumpectomy (completed Tamoxifen 3/2023), DVT (off Eliquis), HLD, gout presenting with left ICH likely in the setting of amyloid angiopathy s/p left craniectomy (discarded) and evacuation (3/2/24), EVD placement and removal (3/7), c/b prolonged respiratory failure s/p trach/PEG (3/9/24) and [facility] transfer.     #ICH  #Seizure  From a neurologic perspective she has had an ICH 2/2 amyloid angiopathy s/p left craniectomy and evacuation, EVD placement and subsequent removal. She is s/p cranioplasty 4/2. YON drain is still in place.   She clinically remains obtunded, reportedly will occasionally open her eyes to sternal rub, no meaningful communication. Not opening eyes on exam, no appreciable twitching or jerking.  Her hospital course has been complicated by seizure, her last recorded seizures were seen 3/7/24. She is currently controlled on Briviact 100 TID, Vimpat 200 BID  - On vEEG without any evidence of seizure, will continue to monitor  - Appreciate neurosurgery input, YON drain with increased output. Will hold A/C  - Follow neuro exam closely  - C/W AED  - Monitor YON drain output    #HTN: Hypotension after OR. Holding Clonidine patch. Continue with other antihypertensives, currently normotensive on my exam.     #Mixed respiratory failure  She initially presented with respiratory failure, mixed hypoxic and hypercapnic respiratory failure. She was tolerating trach collar around the clock since 3/23/24. She was placed back on full support after her cranioplasty. Will rapidly wean again as able. Will wean O2 as tolerated, goal SpO2 90-95%. Continue airways clearance with duonebs and hypersal q6 for airways clearance.     #UTI  #ESBL Kleb  Her hospital course was otherwise complicated by ESBL Kleb and VRE (3/10) urinary tract infection. She is s/p Meropenem. Now clinically stable and being monitored off antibiotics.    #H.Pylori  She was found to have H.Pylori positivity: Per GI evaluation, will plan to start treatment as outlined above at the time of discharge.    #Renal Transplant  #MEHREEN  She has a history of renal transplant. Transplant is following and appreciate their recommendations. Continue with Prednisone, Tacrolimus. Will confirm Bactrim with prophylaxis with Nephrology. Her creatinine continues to trend up. Urine output has dropped off. Will d/w transplant. ? related to hypoperfusion in OR. Trend creat closely, avoid nephrotoxins, trend UOP. Tacro levels daily. Will continue to monitor for now.     #DM  She has had issues with hyperglycemia: Goal blood glucose 140-180. Adjusting insulin accordingly.      #Heme: DVT 3/2 R CFV, Fem, Pop, Gastrocnemius and L CFV, Fem s/p IVC filter (3/3/24). Tolerating SQH without issue

## 2024-04-07 NOTE — PROGRESS NOTE ADULT - SUBJECTIVE AND OBJECTIVE BOX
DATE OF SERVICE: 04-07-24 @ 15:47    Patient is a 78y old  Female who presents with a chief complaint of ICH (07 Apr 2024 07:43)      INTERVAL HISTORY: no acute events    REVIEW OF SYSTEMS:  CONSTITUTIONAL: No weakness  EYES/ENT: No visual changes;  No throat pain   NECK: No pain or stiffness  RESPIRATORY: No cough, wheezing; No shortness of breath  CARDIOVASCULAR: No chest pain or palpitations  GASTROINTESTINAL: No abdominal  pain. No nausea, vomiting, or hematemesis  GENITOURINARY: No dysuria, frequency or hematuria  NEUROLOGICAL: No stroke like symptoms  SKIN: No rashes      MEDICATIONS:  amLODIPine   Tablet 10 milliGRAM(s) Oral daily  carvedilol 25 milliGRAM(s) Oral every 12 hours        PHYSICAL EXAM:  T(C): 36.4 (04-07-24 @ 10:30), Max: 37 (04-06-24 @ 23:55)  HR: 89 (04-07-24 @ 15:27) (76 - 89)  BP: 162/82 (04-07-24 @ 10:30) (121/56 - 162/82)  RR: 18 (04-07-24 @ 10:30) (14 - 20)  SpO2: 100% (04-07-24 @ 15:27) (98% - 100%)  Wt(kg): --  I&O's Summary    06 Apr 2024 07:01  -  07 Apr 2024 07:00  --------------------------------------------------------  IN: 2920 mL / OUT: 810 mL / NET: 2110 mL    07 Apr 2024 07:01  -  07 Apr 2024 15:47  --------------------------------------------------------  IN: 510 mL / OUT: 475 mL / NET: 35 mL          Appearance: In no distress	  HEENT:    PERRL, EOMI	  Cardiovascular:  S1 S2, No JVD  Respiratory: Lungs clear to auscultation	  Gastrointestinal:  Soft, Non-tender, + BS	  Vascularature:  No edema of LE  Psychiatric: Appropriate affect   Neuro: no acute focal deficits                               8.4    7.53  )-----------( 111      ( 07 Apr 2024 06:45 )             27.9     04-07    138  |  104  |  60<H>  ----------------------------<  208<H>  4.4   |  22  |  1.20    Ca    9.8      07 Apr 2024 06:44  Phos  2.7     04-07  Mg     2.1     04-07    TPro  6.4  /  Alb  3.4  /  TBili  0.2  /  DBili  x   /  AST  17  /  ALT  10  /  AlkPhos  86  04-07        Labs personally reviewed      ASSESSMENT/PLAN: 	  78F Hx ESRD s/p renal xplant c/b DGF off HD, L AKA, 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    1. Abnormal Echo --  Septal bulge noted measures 2.2cm without obstruction.   -- Given no intracavitary obstruction no intervention at this time  - No Myxoma seen on this echo or echo in 2019, ? if hypermobile interatrial septum was confused for on prior imaging     2. HTN - Was soft now with Clonidine DC'd  Continue Coreg 25 mg BID, amlodipine 10mg    3. 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 DVT PPX - Lovenox on hold given increased output in craniotomy drain              Iolani Behrbom, AG-NP   Celio Mcwilliams DO [facility]  Cardiovascular Medicine  [address]  Available through call or text on [employer]  Office: [phone]

## 2024-04-08 LAB
ALBUMIN SERPL ELPH-MCNC: 3.3 G/DL — SIGNIFICANT CHANGE UP (ref 3.3–5)
ALP SERPL-CCNC: 90 U/L — SIGNIFICANT CHANGE UP (ref 40–120)
ALT FLD-CCNC: 10 U/L — SIGNIFICANT CHANGE UP (ref 10–45)
ANION GAP SERPL CALC-SCNC: 12 MMOL/L — SIGNIFICANT CHANGE UP (ref 5–17)
AST SERPL-CCNC: 20 U/L — SIGNIFICANT CHANGE UP (ref 10–40)
BILIRUB SERPL-MCNC: 0.2 MG/DL — SIGNIFICANT CHANGE UP (ref 0.2–1.2)
BUN SERPL-MCNC: 54 MG/DL — HIGH (ref 7–23)
CALCIUM SERPL-MCNC: 9.9 MG/DL — SIGNIFICANT CHANGE UP (ref 8.4–10.5)
CHLORIDE SERPL-SCNC: 104 MMOL/L — SIGNIFICANT CHANGE UP (ref 96–108)
CO2 SERPL-SCNC: 23 MMOL/L — SIGNIFICANT CHANGE UP (ref 22–31)
CREAT SERPL-MCNC: 1.18 MG/DL — SIGNIFICANT CHANGE UP (ref 0.5–1.3)
EGFR: 47 ML/MIN/1.73M2 — LOW
GLUCOSE BLDC GLUCOMTR-MCNC: 116 MG/DL — HIGH (ref 70–99)
GLUCOSE BLDC GLUCOMTR-MCNC: 136 MG/DL — HIGH (ref 70–99)
GLUCOSE BLDC GLUCOMTR-MCNC: 139 MG/DL — HIGH (ref 70–99)
GLUCOSE BLDC GLUCOMTR-MCNC: 182 MG/DL — HIGH (ref 70–99)
GLUCOSE BLDC GLUCOMTR-MCNC: 197 MG/DL — HIGH (ref 70–99)
GLUCOSE SERPL-MCNC: 109 MG/DL — HIGH (ref 70–99)
HCT VFR BLD CALC: 30.2 % — LOW (ref 34.5–45)
HGB BLD-MCNC: 9.1 G/DL — LOW (ref 11.5–15.5)
MAGNESIUM SERPL-MCNC: 2.2 MG/DL — SIGNIFICANT CHANGE UP (ref 1.6–2.6)
MCHC RBC-ENTMCNC: 29.4 PG — SIGNIFICANT CHANGE UP (ref 27–34)
MCHC RBC-ENTMCNC: 30.1 GM/DL — LOW (ref 32–36)
MCV RBC AUTO: 97.4 FL — SIGNIFICANT CHANGE UP (ref 80–100)
NRBC # BLD: 0 /100 WBCS — SIGNIFICANT CHANGE UP (ref 0–0)
PHOSPHATE SERPL-MCNC: 2.2 MG/DL — LOW (ref 2.5–4.5)
PLATELET # BLD AUTO: 93 K/UL — LOW (ref 150–400)
POTASSIUM SERPL-MCNC: 4.1 MMOL/L — SIGNIFICANT CHANGE UP (ref 3.5–5.3)
POTASSIUM SERPL-SCNC: 4.1 MMOL/L — SIGNIFICANT CHANGE UP (ref 3.5–5.3)
PROT SERPL-MCNC: 6.4 G/DL — SIGNIFICANT CHANGE UP (ref 6–8.3)
RBC # BLD: 3.1 M/UL — LOW (ref 3.8–5.2)
RBC # FLD: 16.4 % — HIGH (ref 10.3–14.5)
SODIUM SERPL-SCNC: 139 MMOL/L — SIGNIFICANT CHANGE UP (ref 135–145)
TACROLIMUS SERPL-MCNC: 5.5 NG/ML — SIGNIFICANT CHANGE UP
WBC # BLD: 6.1 K/UL — SIGNIFICANT CHANGE UP (ref 3.8–10.5)
WBC # FLD AUTO: 6.1 K/UL — SIGNIFICANT CHANGE UP (ref 3.8–10.5)

## 2024-04-08 PROCEDURE — 99233 SBSQ HOSP IP/OBS HIGH 50: CPT | Mod: FS

## 2024-04-08 RX ADMIN — TACROLIMUS 3 MILLIGRAM(S): 5 CAPSULE ORAL at 06:17

## 2024-04-08 RX ADMIN — BRIVARACETAM 100 MILLIGRAM(S): 25 TABLET, FILM COATED ORAL at 15:22

## 2024-04-08 RX ADMIN — Medication 5 MILLILITER(S): at 23:36

## 2024-04-08 RX ADMIN — Medication 5 MILLILITER(S): at 11:34

## 2024-04-08 RX ADMIN — HEPARIN SODIUM 5000 UNIT(S): 5000 INJECTION INTRAVENOUS; SUBCUTANEOUS at 13:04

## 2024-04-08 RX ADMIN — Medication 1 TABLET(S): at 11:55

## 2024-04-08 RX ADMIN — BRIVARACETAM 100 MILLIGRAM(S): 25 TABLET, FILM COATED ORAL at 23:36

## 2024-04-08 RX ADMIN — HEPARIN SODIUM 5000 UNIT(S): 5000 INJECTION INTRAVENOUS; SUBCUTANEOUS at 22:41

## 2024-04-08 RX ADMIN — Medication 1 TABLET(S): at 11:35

## 2024-04-08 RX ADMIN — TACROLIMUS 3 MILLIGRAM(S): 5 CAPSULE ORAL at 17:21

## 2024-04-08 RX ADMIN — Medication 500000 UNIT(S): at 05:07

## 2024-04-08 RX ADMIN — AMLODIPINE BESYLATE 10 MILLIGRAM(S): 2.5 TABLET ORAL at 05:05

## 2024-04-08 RX ADMIN — Medication 5 MILLILITER(S): at 05:04

## 2024-04-08 RX ADMIN — BRIVARACETAM 100 MILLIGRAM(S): 25 TABLET, FILM COATED ORAL at 08:40

## 2024-04-08 RX ADMIN — Medication 1 DROP(S): at 03:11

## 2024-04-08 RX ADMIN — Medication 1 DROP(S): at 05:07

## 2024-04-08 RX ADMIN — Medication 500000 UNIT(S): at 17:21

## 2024-04-08 RX ADMIN — PANTOPRAZOLE SODIUM 40 MILLIGRAM(S): 20 TABLET, DELAYED RELEASE ORAL at 22:40

## 2024-04-08 RX ADMIN — HEPARIN SODIUM 5000 UNIT(S): 5000 INJECTION INTRAVENOUS; SUBCUTANEOUS at 05:07

## 2024-04-08 RX ADMIN — Medication 1 DROP(S): at 22:41

## 2024-04-08 RX ADMIN — Medication 5 MILLILITER(S): at 17:21

## 2024-04-08 RX ADMIN — Medication 1 DROP(S): at 13:03

## 2024-04-08 RX ADMIN — Medication 1 DROP(S): at 09:46

## 2024-04-08 RX ADMIN — Medication 500000 UNIT(S): at 11:34

## 2024-04-08 RX ADMIN — INSULIN GLARGINE 30 UNIT(S): 100 INJECTION, SOLUTION SUBCUTANEOUS at 22:40

## 2024-04-08 RX ADMIN — Medication 5 MILLIGRAM(S): at 22:42

## 2024-04-08 RX ADMIN — CARVEDILOL PHOSPHATE 25 MILLIGRAM(S): 80 CAPSULE, EXTENDED RELEASE ORAL at 22:41

## 2024-04-08 RX ADMIN — PANTOPRAZOLE SODIUM 40 MILLIGRAM(S): 20 TABLET, DELAYED RELEASE ORAL at 09:45

## 2024-04-08 RX ADMIN — Medication 2: at 23:37

## 2024-04-08 RX ADMIN — CARVEDILOL PHOSPHATE 25 MILLIGRAM(S): 80 CAPSULE, EXTENDED RELEASE ORAL at 09:45

## 2024-04-08 RX ADMIN — LACOSAMIDE 200 MILLIGRAM(S): 50 TABLET ORAL at 11:34

## 2024-04-08 RX ADMIN — Medication 500000 UNIT(S): at 23:36

## 2024-04-08 NOTE — PROGRESS NOTE ADULT - SUBJECTIVE AND OBJECTIVE BOX
Neurology        S: patient seen back in [facility].  EEG neg for seizures           Medications: MEDICATIONS  (STANDING):  amLODIPine   Tablet 10 milliGRAM(s) Oral daily  artificial  tears Solution 1 Drop(s) Both EYES every 4 hours  Biotene Dry Mouth Oral Rinse 5 milliLiter(s) Swish and Spit every 6 hours  brivaracetam Oral Solution 100 milliGRAM(s) Oral <User Schedule>  carvedilol 25 milliGRAM(s) Oral every 12 hours  dextrose 5%. 1000 milliLiter(s) (50 mL/Hr) IV Continuous <Continuous>  dextrose 5%. 1000 milliLiter(s) (100 mL/Hr) IV Continuous <Continuous>  dextrose 50% Injectable 25 Gram(s) IV Push once  glucagon  Injectable 1 milliGRAM(s) IntraMuscular once  heparin   Injectable 5000 Unit(s) SubCutaneous every 8 hours  insulin glargine Injectable (LANTUS) 30 Unit(s) SubCutaneous at bedtime  insulin lispro (ADMELOG) corrective regimen sliding scale   SubCutaneous every 6 hours  lacosamide Solution 200 milliGRAM(s) Oral <User Schedule>  multivitamin 1 Tablet(s) Oral daily  nystatin    Suspension 086064 Unit(s) Swish and Swallow every 6 hours  pantoprazole   Suspension 40 milliGRAM(s) Oral two times a day  predniSONE   Tablet 5 milliGRAM(s) Oral daily  sodium zirconium cyclosilicate 10 Gram(s) Oral every other day  tacrolimus    0.5 mG/mL Suspension 3 milliGRAM(s) Oral every 12 hours  trimethoprim   80 mG/sulfamethoxazole 400 mG 1 Tablet(s) Oral daily    MEDICATIONS  (PRN):  acetaminophen     Tablet .. 650 milliGRAM(s) Oral every 6 hours PRN Mild Pain (1 - 3)  acetaminophen   IVPB .. 1000 milliGRAM(s) IV Intermittent every 6 hours PRN Temp greater or equal to 38.5C (101.3F), Severe Pain (7 - 10)  albuterol/ipratropium for Nebulization 3 milliLiter(s) Nebulizer every 6 hours PRN Shortness of Breath and/or Wheezing  oxyCODONE    IR 10 milliGRAM(s) Oral every 4 hours PRN Severe Pain (7 - 10)       Vitals:  Vital Signs Last 24 Hrs  T(C): 36.8 (08 Apr 2024 06:37), Max: 36.8 (08 Apr 2024 05:00)  T(F): 98.3 (08 Apr 2024 06:37), Max: 98.3 (08 Apr 2024 06:37)  HR: 79 (08 Apr 2024 15:20) (71 - 94)  BP: 165/87 (08 Apr 2024 09:45) (119/67 - 165/87)  BP(mean): --  RR: 14 (08 Apr 2024 06:37) (14 - 16)  SpO2: 100% (08 Apr 2024 15:20) (99% - 100%)    Parameters below as of 08 Apr 2024 07:59  Patient On (Oxygen Delivery Method): ventilator              General Exam:   General Appearance: Appropriately dressed    Head: Normocephalic, atraumatic and no dysmorphic features s/p trach   Ear, Nose, and Throat: Moist mucous membranes  CVS: S1S2+  Resp: No SOB, no wheeze or rhonchi on vent   GI: soft NT/ND s/p PEG   Extremities:  L AKA  Skin: No bruises or rashes     Neurological Exam:  Mental Status:  opens eyes to noxious. no verbal. not following   Cranial Nerves: PERRL, EOMI but gaze pref to L, no blink to threat on R, R facial,    Motor: minimal/no spontaneous movement on RUE.  RLE some minimal movement. LUE 2/5 at times   Sensation: grimaces to noxious at times. some minimal withdrawal LUE 2/5 and RLE.    Coordination: unable   Gait: unable     Data/Labs/Imaging which I personally reviewed.        LABS:     LABS:                          9.1    6.10  )-----------( 93       ( 08 Apr 2024 06:49 )             30.2     04-08    139  |  104  |  54<H>  ----------------------------<  109<H>  4.1   |  23  |  1.18    Ca    9.9      08 Apr 2024 06:49  Phos  2.2     04-08  Mg     2.2     04-08    TPro  6.4  /  Alb  3.3  /  TBili  0.2  /  DBili  x   /  AST  20  /  ALT  10  /  AlkPhos  90  04-08    LIVER FUNCTIONS - ( 08 Apr 2024 06:49 )  Alb: 3.3 g/dL / Pro: 6.4 g/dL / ALK PHOS: 90 U/L / ALT: 10 U/L / AST: 20 U/L / GGT: x             Urinalysis Basic - ( 08 Apr 2024 06:49 )    Color: x / Appearance: x / SG: x / pH: x  Gluc: 109 mg/dL / Ketone: x  / Bili: x / Urobili: x   Blood: x / Protein: x / Nitrite: x   Leuk Esterase: x / RBC: x / WBC x   Sq Epi: x / Non Sq Epi: x / Bacteria: x            EEG IMPRESSION/CLINICAL CORRELATE    Abnormal EEG study.  Potential epileptogenic focus in the bilateral frontal head regions.  Structural or functional abnormality in the left hemisphere with evidence for overlying skull defect.  Moderate nonspecific diffuse or multifocal cerebral dysfunction.   No seizure seen.

## 2024-04-08 NOTE — PROGRESS NOTE ADULT - ASSESSMENT
77 yo F with PMHx ESRD s/p renal transplant (2019, now off HD), left AKA, BK viremia, HTN, breast ca s/p lumpectomy (completed Tamoxifen 03/2023), DVT (off Eliquis), HLD, gout presenting 3/1 with left ICH (ICH score 4) likely 2/2 amyloid angiopathy s/p left craniectomy(discarded) and evacuation as well as EVD placement and removal (3/7). Hospital course c/b extensive LE DVTs, S/p IVCF on 3/3. She is s/p trach/peg 3/8/24.  Febrile 3/10 with + UA, blood/sputum culture NGTD.  Treatment for UTI initiated with ceftriaxone, eventually broadened to cefepime. Transferred to [facility] 3/11 for continued management. Urine culture resulted positive for klebsiella, treated for 7 days with Meropenem 3/12 --> 3/19. Patient weaned from MV, tolerating TC ATC since 3/23. Patient S/p Cranioplasty on 4/2.      4/7: PST ongoing, EEG results noted and referred results to neurologist.  No recommendations made based on results. YON Galeal drain removed by neurosurgery and they recommended restarting prophylaxis anticoagulation.  YON drain removed 4/6, Heparin SQ started in light of elevated Cr.  Cr elevated and deemed to be pre-renal, received 2 days of IVF with noted improvement. 77 yo F with PMHx ESRD s/p renal transplant (2019, now off HD), left AKA, BK viremia, HTN, breast ca s/p lumpectomy (completed Tamoxifen 03/2023), DVT (off Eliquis), HLD, gout presenting 3/1 with left ICH (ICH score 4) likely 2/2 amyloid angiopathy s/p left craniectomy(discarded) and evacuation as well as EVD placement and removal (3/7). Hospital course c/b extensive LE DVTs, S/p IVCF on 3/3. She is s/p trach/peg 3/8/24.  Febrile 3/10 with + UA, blood/sputum culture NGTD.  Treatment for UTI initiated with ceftriaxone, eventually broadened to cefepime. Transferred to [facility] 3/11 for continued management. Urine culture resulted positive for klebsiella, treated for 7 days with Meropenem 3/12 --> 3/19. Patient weaned from MV, tolerating TC ATC since 3/23. Patient S/p Cranioplasty on 4/2.      4/8: Cr stable s/p IVF since 4/7, Continue PST as tolerated. DC planning in progress.

## 2024-04-08 NOTE — PROGRESS NOTE ADULT - NS ATTEND AMEND GEN_ALL_CORE FT
8 year old female with a history of ESRD s/p renal transplant (2019) c/b BK viremia, left AKA, HTN, breast ca s/p lumpectomy (completed Tamoxifen 3/2023), DVT (off Eliquis), HLD, gout presenting with left ICH likely in the setting of amyloid angiopathy s/p left craniectomy (discarded) and evacuation (3/2/24), EVD placement and removal (3/7), c/b prolonged respiratory failure s/p trach/PEG (3/9/24) and [facility] transfer.     #ICH  #Seizure  From a neurologic perspective she has had an ICH 2/2 amyloid angiopathy s/p left craniectomy and evacuation, EVD placement and subsequent removal. She is s/p cranioplasty 4/2.  She clinically remains obtunded, reportedly will occasionally open her eyes to sternal rub, no meaningful communication. Not opening eyes on exam, no appreciable twitching or jerking.  Her hospital course has been complicated by seizure, her last recorded seizures were seen 3/7/24. She is currently controlled on Briviact 100 TID, Vimpat 200 BID  - On vEEG without any evidence of seizure, will continue to monitor  - Appreciate neurosurgery input, YON drain with increased output. Will hold A/C  - Follow neuro exam closely - eyes closed, withdraws to pain  - C/W AED    #HTN: Hypotension after OR. Holding Clonidine patch. Continue with other antihypertensives, currently normotensive on my exam.   - amlodipine and carvedilol   - TTE LVEF 70%, diastolic dysfunction     #Mixed respiratory failure  She initially presented with respiratory failure, mixed hypoxic and hypercapnic respiratory failure. She was tolerating trach collar around the clock since 3/23/24. She was placed back on full support after her cranioplasty 4/2. Will rapidly wean again as able. Will wean O2 as tolerated, goal SpO2 90-95%. Continue airways clearance with duonebs and hypersal q6 for airways clearance.   - CPAP as tolerated     #UTI  #ESBL Kleb  Her hospital course was otherwise complicated by ESBL Kleb and VRE (3/10) urinary tract infection. She is s/p Meropenem. Now clinically stable and being monitored off antibiotics.    #H.Pylori  She was found to have H.Pylori positivity: Per GI evaluation, will plan to start treatment as outlined above at the time of discharge.  - on PPI 40 IV BID     #Renal Transplant  #MEHREEN  She has a history of renal transplant. Transplant is following and appreciate their recommendations. Continue with Prednisone, Tacrolimus. Will confirm Bactrim with prophylaxis with Nephrology. Her creatinine continues to trend up. Urine output has dropped off. Will d/w transplant. ? related to hypoperfusion in OR. Trend creat closely, avoid nephrotoxins, trend UOP. Tacro levels daily. Will continue to monitor for now.   - creatinine improving today   - gets straight cath   - tacro goal 4-7  - on prednisone 5 mg and Bactrim - will clarify Bactrim with renal transplant team     #DM  She has had issues with hyperglycemia: Goal blood glucose 140-180. Adjusting insulin accordingly.    #Heme: DVT 3/2 R CFV, Fem, Pop, Gastrocnemius and L CFV, Fem s/p IVC filter (3/3/24). Tolerating SQH without issue

## 2024-04-08 NOTE — PROGRESS NOTE ADULT - PROBLEM SELECTOR PLAN 1
- Found to have L IPH on CTH likely 2/2 amyloid angiopathy  - S/p L hemicrani for evacuation of large ICH; bone discarded  - CT H Stereo 3/12: S/p Left Frontal Craniectomy, Remains stable from prior CT on 3/8   - CT Head 3/30: Improved lft frontal hemorrhage and extradural fluid collections   - S/p Cranioplasty on 4/2; Post op head CT Stable   - Subgaleal YON Drain removed 4/6  - Maintain Neuro checks

## 2024-04-08 NOTE — PROGRESS NOTE ADULT - ASSESSMENT
79 yo F with ESRD s/p renal transplant (2019, now off HD), left AKA, BK viremia, HTN, breast ca s/p lumpectomy (completed Tamoxifen 03/2023), DVT (off Eliquis), HLD, gout presenting 3/1 with left ICH (ICH score 4) likely 2/2 amyloid angiopathy s/p left craniectomy  and evacuation as well as EVD placement and removal (3/7).   initial CTH3/1  with 8.9cm left frontal IPH with IVH .09cm rightward shift   3/2 CTH s/p L hmeicrani and resection of IPH. stable   3/5 CTH post op changes. some reorption of post op pneumocephalus.    s/p trach/peg 3/8/24   3/8 CTH L frontal craniectomy.  L MCA hemorrhage and infarct ; still IVH.   3/10 with + UA  A1c 5.7 LDL 46   TTE done 3/2: LA is severely dilated    Urine culture grew positive for klebsiella and enterococcus  3/9 EEG no seiuzres since 3/7;  risk of seiuzre from L parietal region. mod to severe diffuse cerebral dysfunction   Transferred to [facility] 3/11 for continued management.  3/12 CTH   sterotactic imaging of L frontal crani for hemorrhagic L MCA ifnarct. L frontal temporal pseudmeningocele  noted. no hcange since 3/8   o/e awake, no verbal, not followiing. L gaze pref  some minimal withdrawal to noxious RLE and LUE.  s/p trach /vent   s/p cranioplasty 4/3, no change in post op exam   4/3 CTH removal of drain noted.  post op changes.   4/4 c/f seizure; repeat EEG   EEG wtih no seizures but risk of si\eizures from bilateral frontal regions \    Impression: L ICH possible 2/2  CAA but hemorrhagic infarct also needs to be considered.      - no change in AEDS   - tolerating CPAP at times   - difficult to determine IPH 2/2 CAA without MRI to look at SWI or GRE sequeneses for hemosiderin deposition  - never had vessel imaging. consider CTA H/N   - no AC/AP. dvt ppx okay  - briviact 100mg TID  and vimpat 200mg BID for seiuzre ppx   - infectious workup. was on meropenum.  this can lower seiuzre threshold ; now off   - immunosuppression on tacrolimus and prednisone.  ppx bactrim     -telemetry   - PT/OT   - check FS, glucose control <180  - GI/DVT ppx   - GOC with family.  currenlty full code; in terms of functional meaningful recovery the likelihood is low.  patient will require 24hr care and likely be bedbound at this time.    consider recalling palliative  dc planning in progress  Dieter Wilkinson MD  Vascular Neurology  Office: [phone]

## 2024-04-08 NOTE — PROGRESS NOTE ADULT - SUBJECTIVE AND OBJECTIVE BOX
Patient is a 78y old  Female who presents with a chief complaint of ICH (07 Apr 2024 15:47)      Interval Events:    REVIEW OF SYSTEMS:  [ ] Positive  [ ] All other systems negative  [ ] Unable to assess ROS because ________    Vital Signs Last 24 Hrs  T(C): 36.8 (04-08-24 @ 06:37), Max: 36.8 (04-08-24 @ 05:00)  T(F): 98.3 (04-08-24 @ 06:37), Max: 98.3 (04-08-24 @ 06:37)  HR: 71 (04-08-24 @ 06:37) (71 - 93)  BP: 119/67 (04-08-24 @ 06:37) (119/67 - 162/82)  RR: 14 (04-08-24 @ 06:37) (14 - 18)  SpO2: 99% (04-08-24 @ 06:37) (98% - 100%)PHYSICAL EXAM:  HEENT:   [ ]Tracheostomy:  [ ]Pupils equal  [ ]No oral lesions  [ ]Abnormal        SKIN  [ ]No Rash  [ ] Abnormal  [ ] pressure    CARDIAC  [ ]Regular  [ ]Abnormal    PULMONARY  [ ]Bilateral Clear Breath Sounds  [ ]Normal Excursion  [ ]Abnormal    GI  [ ]PEG      [ ] +BS		              [ ]Soft, nondistended, nontender	  [ ]Abnormal    MUSCULOSKELETAL                                   [ ]Bedbound                 [ ]Abnormal    [ ]Ambulatory/OOB to chair                           EXTREMITIES                                         [ ]Normal  [ ]Edema                           NEUROLOGIC  [ ] Normal, non focal  [ ] Focal findings:    PSYCHIATRIC  [ ]Alert and appropriate  [ ] Sedated	 [ ]Agitated    :  Gardner: [ ] Yes, if yes: Date of Placement:                   [  ] No    LINES: Central Lines [ ] Yes, if yes: Date of Placement                                     [  ] No    HOSPITAL MEDICATIONS:  MEDICATIONS  (STANDING):  amLODIPine   Tablet 10 milliGRAM(s) Oral daily  artificial  tears Solution 1 Drop(s) Both EYES every 4 hours  Biotene Dry Mouth Oral Rinse 5 milliLiter(s) Swish and Spit every 6 hours  brivaracetam Oral Solution 100 milliGRAM(s) Oral <User Schedule>  carvedilol 25 milliGRAM(s) Oral every 12 hours  dextrose 5%. 1000 milliLiter(s) (50 mL/Hr) IV Continuous <Continuous>  dextrose 5%. 1000 milliLiter(s) (100 mL/Hr) IV Continuous <Continuous>  dextrose 50% Injectable 25 Gram(s) IV Push once  glucagon  Injectable 1 milliGRAM(s) IntraMuscular once  heparin   Injectable 5000 Unit(s) SubCutaneous every 8 hours  insulin glargine Injectable (LANTUS) 30 Unit(s) SubCutaneous at bedtime  insulin lispro (ADMELOG) corrective regimen sliding scale   SubCutaneous every 6 hours  lacosamide Solution 200 milliGRAM(s) Oral <User Schedule>  multivitamin 1 Tablet(s) Oral daily  nystatin    Suspension 380203 Unit(s) Swish and Swallow every 6 hours  pantoprazole   Suspension 40 milliGRAM(s) Oral two times a day  predniSONE   Tablet 5 milliGRAM(s) Oral daily  sodium zirconium cyclosilicate 10 Gram(s) Oral every other day  tacrolimus    0.5 mG/mL Suspension 3 milliGRAM(s) Oral every 12 hours  trimethoprim   80 mG/sulfamethoxazole 400 mG 1 Tablet(s) Oral daily    MEDICATIONS  (PRN):  acetaminophen     Tablet .. 650 milliGRAM(s) Oral every 6 hours PRN Mild Pain (1 - 3)  acetaminophen   IVPB .. 1000 milliGRAM(s) IV Intermittent every 6 hours PRN Temp greater or equal to 38.5C (101.3F), Severe Pain (7 - 10)  albuterol/ipratropium for Nebulization 3 milliLiter(s) Nebulizer every 6 hours PRN Shortness of Breath and/or Wheezing  oxyCODONE    IR 10 milliGRAM(s) Oral every 4 hours PRN Severe Pain (7 - 10)      LABS:                        8.4    7.53  )-----------( 111      ( 07 Apr 2024 06:45 )             27.9     04-07    138  |  104  |  60<H>  ----------------------------<  208<H>  4.4   |  22  |  1.20    Ca    9.8      07 Apr 2024 06:44  Phos  2.7     04-07  Mg     2.1     04-07    TPro  6.4  /  Alb  3.4  /  TBili  0.2  /  DBili  x   /  AST  17  /  ALT  10  /  AlkPhos  86  04-07      Urinalysis Basic - ( 07 Apr 2024 06:44 )    Color: x / Appearance: x / SG: x / pH: x  Gluc: 208 mg/dL / Ketone: x  / Bili: x / Urobili: x   Blood: x / Protein: x / Nitrite: x   Leuk Esterase: x / RBC: x / WBC x   Sq Epi: x / Non Sq Epi: x / Bacteria: x          CAPILLARY BLOOD GLUCOSE    MICROBIOLOGY:     RADIOLOGY:  [ ] Reviewed and interpreted by me    Mode: AC/ CMV (Assist Control/ Continuous Mandatory Ventilation)  RR (machine): 14  TV (machine): 450  FiO2: 40  PEEP: 5  ITime: 1  MAP: 8  PIP: 19   Patient is a 78y old  Female who presents with a chief complaint of ICH (07 Apr 2024 15:47)      Interval Events: Stable overnight on full vent support; Fio2 40%    REVIEW OF SYSTEMS:  [ ] Positive  [ ] All other systems negative  [x ] Unable to assess ROS because __Nonverbal following IPH w/ Tracheostomy _____    Vital Signs Last 24 Hrs  T(C): 36.8 (04-08-24 @ 06:37), Max: 36.8 (04-08-24 @ 05:00)  T(F): 98.3 (04-08-24 @ 06:37), Max: 98.3 (04-08-24 @ 06:37)  HR: 71 (04-08-24 @ 06:37) (71 - 93)  BP: 119/67 (04-08-24 @ 06:37) (119/67 - 162/82)  RR: 14 (04-08-24 @ 06:37) (14 - 18)  SpO2: 99% (04-08-24 @ 06:37) (98% - 100%)    PHYSICAL EXAM:    HEENT:   [X]Tracheostomy: #7 Cuffed Portex  [X]Pupils equal  [ ]No oral lesions  [X]Abnormal - s/p cranioplasty, site w/ staples c/d/i     SKIN  [ ]No Rash  [X] Abnormal - LUE AVF  [X] pressure - stage 3 sacral pressure injury     CARDIAC  [X]Regular  [ ]Abnormal    PULMONARY  [x ]Bilateral Clear Breath Sounds  [ ]Normal Excursion  [ ]Abnormal -     GI  [X]PEG site c/d/i    [X] +BS		              [X]Soft, nondistended, nontender	  [ ]Abnormal    MUSCULOSKELETAL                                   [X]Bedbound                 [x ]Abnormal: L AKA  [ ]Ambulatory/OOB to chair                           EXTREMITIES                                         [X]Normal  [ ]Edema                           NEUROLOGIC  [ ] Normal, non focal  [X] Focal findings: obtunded, slightly withdraws upper extremities to noxious stimuli.    PSYCHIATRIC  [X] obtunded  [ ] Sedated	 [ ]Agitated    :  Gardner: [ ] Yes, if yes: Date of Placement:                   [X] No    LINES: Central Lines [ ] Yes, if yes: Date of Placement                                     [X] No    HOSPITAL MEDICATIONS:  MEDICATIONS  (STANDING):  amLODIPine   Tablet 10 milliGRAM(s) Oral daily  artificial  tears Solution 1 Drop(s) Both EYES every 4 hours  Biotene Dry Mouth Oral Rinse 5 milliLiter(s) Swish and Spit every 6 hours  brivaracetam Oral Solution 100 milliGRAM(s) Oral <User Schedule>  carvedilol 25 milliGRAM(s) Oral every 12 hours  dextrose 5%. 1000 milliLiter(s) (50 mL/Hr) IV Continuous <Continuous>  dextrose 5%. 1000 milliLiter(s) (100 mL/Hr) IV Continuous <Continuous>  dextrose 50% Injectable 25 Gram(s) IV Push once  glucagon  Injectable 1 milliGRAM(s) IntraMuscular once  heparin   Injectable 5000 Unit(s) SubCutaneous every 8 hours  insulin glargine Injectable (LANTUS) 30 Unit(s) SubCutaneous at bedtime  insulin lispro (ADMELOG) corrective regimen sliding scale   SubCutaneous every 6 hours  lacosamide Solution 200 milliGRAM(s) Oral <User Schedule>  multivitamin 1 Tablet(s) Oral daily  nystatin    Suspension 262785 Unit(s) Swish and Swallow every 6 hours  pantoprazole   Suspension 40 milliGRAM(s) Oral two times a day  predniSONE   Tablet 5 milliGRAM(s) Oral daily  sodium zirconium cyclosilicate 10 Gram(s) Oral every other day  tacrolimus    0.5 mG/mL Suspension 3 milliGRAM(s) Oral every 12 hours  trimethoprim   80 mG/sulfamethoxazole 400 mG 1 Tablet(s) Oral daily    MEDICATIONS  (PRN):  acetaminophen     Tablet .. 650 milliGRAM(s) Oral every 6 hours PRN Mild Pain (1 - 3)  acetaminophen   IVPB .. 1000 milliGRAM(s) IV Intermittent every 6 hours PRN Temp greater or equal to 38.5C (101.3F), Severe Pain (7 - 10)  albuterol/ipratropium for Nebulization 3 milliLiter(s) Nebulizer every 6 hours PRN Shortness of Breath and/or Wheezing  oxyCODONE    IR 10 milliGRAM(s) Oral every 4 hours PRN Severe Pain (7 - 10)      LABS:                        8.4    7.53  )-----------( 111      ( 07 Apr 2024 06:45 )             27.9     04-07    138  |  104  |  60<H>  ----------------------------<  208<H>  4.4   |  22  |  1.20    Ca    9.8      07 Apr 2024 06:44  Phos  2.7     04-07  Mg     2.1     04-07    TPro  6.4  /  Alb  3.4  /  TBili  0.2  /  DBili  x   /  AST  17  /  ALT  10  /  AlkPhos  86  04-07      Urinalysis Basic - ( 07 Apr 2024 06:44 )    Color: x / Appearance: x / SG: x / pH: x  Gluc: 208 mg/dL / Ketone: x  / Bili: x / Urobili: x   Blood: x / Protein: x / Nitrite: x   Leuk Esterase: x / RBC: x / WBC x   Sq Epi: x / Non Sq Epi: x / Bacteria: x          CAPILLARY BLOOD GLUCOSE    MICROBIOLOGY:     RADIOLOGY:  [ ] Reviewed and interpreted by me    Mode: AC/ CMV (Assist Control/ Continuous Mandatory Ventilation)  RR (machine): 14  TV (machine): 450  FiO2: 40  PEEP: 5  ITime: 1  MAP: 8  PIP: 19

## 2024-04-08 NOTE — PROGRESS NOTE ADULT - SUBJECTIVE AND OBJECTIVE BOX
DATE OF SERVICE: 04-08-24 @ 14:50    Patient is a 78y old  Female who presents with a chief complaint of ICH (08 Apr 2024 07:03)      INTERVAL HISTORY: In no acute distress. Responding non-verbally by opening eyes.     REVIEW OF SYSTEMS: Limited participant in ROS  CONSTITUTIONAL: No weakness  EYES/ENT: No visual changes;  No throat pain   NECK: No pain or stiffness  RESPIRATORY: No cough, wheezing; No shortness of breath  CARDIOVASCULAR: No chest pain or palpitations  GASTROINTESTINAL: No abdominal  pain. No nausea, vomiting, or hematemesis  GENITOURINARY: No dysuria, frequency or hematuria  NEUROLOGICAL: No stroke like symptoms  SKIN: No rashes      	  MEDICATIONS:  amLODIPine   Tablet 10 milliGRAM(s) Oral daily  carvedilol 25 milliGRAM(s) Oral every 12 hours        PHYSICAL EXAM:  T(C): 36.8 (04-08-24 @ 06:37), Max: 36.8 (04-08-24 @ 05:00)  HR: 94 (04-08-24 @ 09:45) (71 - 94)  BP: 165/87 (04-08-24 @ 09:45) (119/67 - 165/87)  RR: 14 (04-08-24 @ 06:37) (14 - 18)  SpO2: 100% (04-08-24 @ 09:15) (99% - 100%)  Wt(kg): --  I&O's Summary    07 Apr 2024 07:01  -  08 Apr 2024 07:00  --------------------------------------------------------  IN: 1750 mL / OUT: 1075 mL / NET: 675 mL    08 Apr 2024 07:01  -  08 Apr 2024 14:50  --------------------------------------------------------  IN: 240 mL / OUT: 0 mL / NET: 240 mL          Appearance: In no distress	  HEENT:    PERRL, EOMI	  Cardiovascular:  S1 S2, No JVD  Respiratory: Lungs clear to auscultation	  Gastrointestinal:  Soft, Non-tender, + BS	  Vascularature:  L AKA  Psychiatric: Appropriate affect   Neuro: no acute focal deficits                               9.1    6.10  )-----------( 93       ( 08 Apr 2024 06:49 )             30.2     04-08    139  |  104  |  54<H>  ----------------------------<  109<H>  4.1   |  23  |  1.18    Ca    9.9      08 Apr 2024 06:49  Phos  2.2     04-08  Mg     2.2     04-08    TPro  6.4  /  Alb  3.3  /  TBili  0.2  /  DBili  x   /  AST  20  /  ALT  10  /  AlkPhos  90  04-08        Labs personally reviewed      ASSESSMENT/PLAN: 	  78F Hx ESRD s/p renal xplant c/b DGF off HD, L AKA, 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    1. Abnormal Echo --  Septal bulge noted measures 2.2cm without obstruction.   -- Given no intracavitary obstruction no intervention at this time  - No Myxoma seen on this echo or echo in 2019, ? if hypermobile interatrial septum was confused for on prior imaging     2. HTN - Was soft now with Clonidine DC'd  Continue Coreg 25 mg BID, amlodipine 10mg    3. 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 DVT PPX - c/w SQ hep          Yodit Umaña, AG-NP   Celio Mcwilliams DO [facility]  Cardiovascular Medicine  [address], [address]  Available through call or text on [employer]  Office: [phone]

## 2024-04-09 ENCOUNTER — TRANSCRIPTION ENCOUNTER (OUTPATIENT)
Age: 79
End: 2024-04-09

## 2024-04-09 LAB
ALBUMIN SERPL ELPH-MCNC: 3.2 G/DL — LOW (ref 3.3–5)
ALP SERPL-CCNC: 86 U/L — SIGNIFICANT CHANGE UP (ref 40–120)
ALT FLD-CCNC: 11 U/L — SIGNIFICANT CHANGE UP (ref 10–45)
ANION GAP SERPL CALC-SCNC: 14 MMOL/L — SIGNIFICANT CHANGE UP (ref 5–17)
AST SERPL-CCNC: 18 U/L — SIGNIFICANT CHANGE UP (ref 10–40)
BILIRUB SERPL-MCNC: 0.2 MG/DL — SIGNIFICANT CHANGE UP (ref 0.2–1.2)
BUN SERPL-MCNC: 59 MG/DL — HIGH (ref 7–23)
CALCIUM SERPL-MCNC: 9.9 MG/DL — SIGNIFICANT CHANGE UP (ref 8.4–10.5)
CHLORIDE SERPL-SCNC: 104 MMOL/L — SIGNIFICANT CHANGE UP (ref 96–108)
CO2 SERPL-SCNC: 20 MMOL/L — LOW (ref 22–31)
CREAT SERPL-MCNC: 1.19 MG/DL — SIGNIFICANT CHANGE UP (ref 0.5–1.3)
EGFR: 47 ML/MIN/1.73M2 — LOW
GLUCOSE BLDC GLUCOMTR-MCNC: 121 MG/DL — HIGH (ref 70–99)
GLUCOSE BLDC GLUCOMTR-MCNC: 195 MG/DL — HIGH (ref 70–99)
GLUCOSE BLDC GLUCOMTR-MCNC: 205 MG/DL — HIGH (ref 70–99)
GLUCOSE BLDC GLUCOMTR-MCNC: 219 MG/DL — HIGH (ref 70–99)
GLUCOSE SERPL-MCNC: 176 MG/DL — HIGH (ref 70–99)
HCT VFR BLD CALC: 30 % — LOW (ref 34.5–45)
HGB BLD-MCNC: 8.9 G/DL — LOW (ref 11.5–15.5)
MAGNESIUM SERPL-MCNC: 2.2 MG/DL — SIGNIFICANT CHANGE UP (ref 1.6–2.6)
MCHC RBC-ENTMCNC: 29.7 GM/DL — LOW (ref 32–36)
MCHC RBC-ENTMCNC: 29.8 PG — SIGNIFICANT CHANGE UP (ref 27–34)
MCV RBC AUTO: 100.3 FL — HIGH (ref 80–100)
NRBC # BLD: 0 /100 WBCS — SIGNIFICANT CHANGE UP (ref 0–0)
PHOSPHATE SERPL-MCNC: 2.3 MG/DL — LOW (ref 2.5–4.5)
PLATELET # BLD AUTO: 122 K/UL — LOW (ref 150–400)
POTASSIUM SERPL-MCNC: 4.7 MMOL/L — SIGNIFICANT CHANGE UP (ref 3.5–5.3)
POTASSIUM SERPL-SCNC: 4.7 MMOL/L — SIGNIFICANT CHANGE UP (ref 3.5–5.3)
PROT SERPL-MCNC: 6.3 G/DL — SIGNIFICANT CHANGE UP (ref 6–8.3)
RBC # BLD: 2.99 M/UL — LOW (ref 3.8–5.2)
RBC # FLD: 16.3 % — HIGH (ref 10.3–14.5)
SODIUM SERPL-SCNC: 138 MMOL/L — SIGNIFICANT CHANGE UP (ref 135–145)
TACROLIMUS SERPL-MCNC: 5.8 NG/ML — SIGNIFICANT CHANGE UP
WBC # BLD: 7.18 K/UL — SIGNIFICANT CHANGE UP (ref 3.8–10.5)
WBC # FLD AUTO: 7.18 K/UL — SIGNIFICANT CHANGE UP (ref 3.8–10.5)

## 2024-04-09 PROCEDURE — 99233 SBSQ HOSP IP/OBS HIGH 50: CPT | Mod: FS

## 2024-04-09 RX ADMIN — CARVEDILOL PHOSPHATE 25 MILLIGRAM(S): 80 CAPSULE, EXTENDED RELEASE ORAL at 10:12

## 2024-04-09 RX ADMIN — Medication 1 DROP(S): at 22:04

## 2024-04-09 RX ADMIN — Medication 5 MILLILITER(S): at 17:26

## 2024-04-09 RX ADMIN — Medication 5 MILLILITER(S): at 05:43

## 2024-04-09 RX ADMIN — SODIUM ZIRCONIUM CYCLOSILICATE 10 GRAM(S): 10 POWDER, FOR SUSPENSION ORAL at 12:08

## 2024-04-09 RX ADMIN — BRIVARACETAM 100 MILLIGRAM(S): 25 TABLET, FILM COATED ORAL at 23:56

## 2024-04-09 RX ADMIN — LACOSAMIDE 200 MILLIGRAM(S): 50 TABLET ORAL at 00:33

## 2024-04-09 RX ADMIN — Medication 1 TABLET(S): at 12:09

## 2024-04-09 RX ADMIN — Medication 4: at 17:26

## 2024-04-09 RX ADMIN — Medication 500000 UNIT(S): at 12:10

## 2024-04-09 RX ADMIN — HEPARIN SODIUM 5000 UNIT(S): 5000 INJECTION INTRAVENOUS; SUBCUTANEOUS at 13:55

## 2024-04-09 RX ADMIN — Medication 4: at 23:40

## 2024-04-09 RX ADMIN — TACROLIMUS 3 MILLIGRAM(S): 5 CAPSULE ORAL at 07:42

## 2024-04-09 RX ADMIN — Medication 1 DROP(S): at 13:55

## 2024-04-09 RX ADMIN — Medication 5 MILLILITER(S): at 23:40

## 2024-04-09 RX ADMIN — CARVEDILOL PHOSPHATE 25 MILLIGRAM(S): 80 CAPSULE, EXTENDED RELEASE ORAL at 22:03

## 2024-04-09 RX ADMIN — Medication 1 DROP(S): at 17:27

## 2024-04-09 RX ADMIN — AMLODIPINE BESYLATE 10 MILLIGRAM(S): 2.5 TABLET ORAL at 05:41

## 2024-04-09 RX ADMIN — BRIVARACETAM 100 MILLIGRAM(S): 25 TABLET, FILM COATED ORAL at 07:43

## 2024-04-09 RX ADMIN — Medication 5 MILLIGRAM(S): at 22:03

## 2024-04-09 RX ADMIN — HEPARIN SODIUM 5000 UNIT(S): 5000 INJECTION INTRAVENOUS; SUBCUTANEOUS at 22:02

## 2024-04-09 RX ADMIN — INSULIN GLARGINE 30 UNIT(S): 100 INJECTION, SOLUTION SUBCUTANEOUS at 23:00

## 2024-04-09 RX ADMIN — Medication 500000 UNIT(S): at 05:42

## 2024-04-09 RX ADMIN — PANTOPRAZOLE SODIUM 40 MILLIGRAM(S): 20 TABLET, DELAYED RELEASE ORAL at 22:03

## 2024-04-09 RX ADMIN — LACOSAMIDE 200 MILLIGRAM(S): 50 TABLET ORAL at 23:45

## 2024-04-09 RX ADMIN — Medication 1 DROP(S): at 10:12

## 2024-04-09 RX ADMIN — Medication 500000 UNIT(S): at 17:29

## 2024-04-09 RX ADMIN — PANTOPRAZOLE SODIUM 40 MILLIGRAM(S): 20 TABLET, DELAYED RELEASE ORAL at 10:12

## 2024-04-09 RX ADMIN — Medication 2: at 05:43

## 2024-04-09 RX ADMIN — LACOSAMIDE 200 MILLIGRAM(S): 50 TABLET ORAL at 12:08

## 2024-04-09 RX ADMIN — Medication 5 MILLILITER(S): at 12:09

## 2024-04-09 RX ADMIN — Medication 1 DROP(S): at 05:39

## 2024-04-09 RX ADMIN — BRIVARACETAM 100 MILLIGRAM(S): 25 TABLET, FILM COATED ORAL at 16:10

## 2024-04-09 RX ADMIN — TACROLIMUS 3 MILLIGRAM(S): 5 CAPSULE ORAL at 17:27

## 2024-04-09 RX ADMIN — Medication 500000 UNIT(S): at 23:40

## 2024-04-09 RX ADMIN — HEPARIN SODIUM 5000 UNIT(S): 5000 INJECTION INTRAVENOUS; SUBCUTANEOUS at 05:42

## 2024-04-09 RX ADMIN — Medication 1 DROP(S): at 07:43

## 2024-04-09 NOTE — PROGRESS NOTE ADULT - SUBJECTIVE AND OBJECTIVE BOX
[facility]-- WOUND TEAM -- FOLLOW UP NOTE  --------------------------------------------------------------------------------    24 hour events/subjective:          Diet:  Diet, NPO with Tube Feed:   Tube Feeding Modality: Gastrostomy  Nepro with Carb Steady (NEPRORTH)  Total Volume for 24 Hours (mL): 960  Bolus  Total Volume of Bolus (mL):  240  Total # of Feeds: 4  Tube Feed Frequency: Every 6 hours   Tube Feed Start Time: 12:00  Bolus Feed Rate (mL per Hour): 240   Bolus Feed Duration (in Hours): 1  Supplement Feeding Modality:  Gastrostomy  Probiotic Yogurt/Smoothie Cans or Servings Per Day:  1       Frequency:  Two Times a day (04-02-24 @ 20:18)      ROS: pt unable to offer    ALLERGIES & MEDICATIONS  --------------------------------------------------------------------------------  Allergies  shellfish (Rash)  ChloraPrep One-Step (Rash)  penicillins (Rash)        STANDING INPATIENT MEDICATIONS  amLODIPine Tablet 10 milliGRAM(s) Oral daily  artificial  tears Solution 1 Drop(s) Both EYES every 4 hours  Biotene Dry Mouth Oral Rinse 5 milliLiter(s) Swish and Spit every 6 hours  brivaracetam Oral Solution 100 milliGRAM(s) Oral <User Schedule>  carvedilol 25 milliGRAM(s) Oral every 12 hours  dextrose 5%. 1000 milliLiter(s) IV Continuous <Continuous>  dextrose 5%. 1000 milliLiter(s) IV Continuous <Continuous>  dextrose 50% Injectable 25 Gram(s) IV Push once  glucagon  Injectable 1 milliGRAM(s) IntraMuscular once  heparin   Injectable 5000 Unit(s) SubCutaneous every 8 hours  insulin glargine Injectable (LANTUS) 30 Unit(s) SubCutaneous at bedtime  insulin lispro (ADMELOG) corrective regimen sliding scale   SubCutaneous every 6 hours  lacosamide Solution 200 milliGRAM(s) Oral <User Schedule>  multivitamin 1 Tablet(s) Oral daily  nystatin Suspension 916858 Unit(s) Swish and Swallow every 6 hours  pantoprazole Suspension 40 milliGRAM(s) Oral two times a day  predniSONE Tablet 5 milliGRAM(s) Oral daily  sodium zirconium cyclosilicate 10 Gram(s) Oral every other day  tacrolimus    0.5 mG/mL Suspension 3 milliGRAM(s) Oral every 12 hours  trimethoprim   80 mG/sulfamethoxazole 400 mG 1 Tablet(s) Oral daily      PRN INPATIENT MEDICATION  acetaminophen  Tablet 650 milliGRAM(s) Oral every 6 hours PRN  acetaminophen IVPB .. 1000 milliGRAM(s) IV Intermittent every 6 hours PRN  albuterol/ipratropium for Nebulization 3 milliLiter(s) Nebulizer every 6 hours PRN  oxyCODONE IR 10 milliGRAM(s) Oral every 4 hours PRN      VITALS/PHYSICAL EXAM  --------------------------------------------------------------------------------  T(C): 36.7 (04-09-24 @ 11:49), Max: 36.9 (04-09-24 @ 04:03)  HR: 80 (04-09-24 @ 15:26) (71 - 86)  BP: 119/80 (04-09-24 @ 11:49) (117/73 - 145/79)  RR: 12 (04-09-24 @ 11:49) (12 - 20)  SpO2: 100% (04-09-24 @ 15:26) (99% - 100%)  Wt(kg): --              LABS/ CULTURES/ RADIOLOGY:              8.9    7.18  >-----------<  122      [04-09-24 @ 06:50]              30.0     138  |  104  |  59  ----------------------------<  176      [04-09-24 @ 06:50]  4.7   |  20  |  1.19        Ca     9.9     [04-09-24 @ 06:50]      Mg     2.2     [04-09-24 @ 06:50]      Phos  2.3     [04-09-24 @ 06:50]    TPro  6.3  /  Alb  3.2  /  TBili  0.2  /  DBili  x   /  AST  18  /  ALT  11  /  AlkPhos  86  [04-09-24 @ 06:50]      CAPILLARY BLOOD GLUCOSE  POCT Blood Glucose.: 121 mg/dL (09 Apr 2024 11:35)  POCT Blood Glucose.: 195 mg/dL (09 Apr 2024 05:27)  POCT Blood Glucose.: 182 mg/dL (08 Apr 2024 23:32)  POCT Blood Glucose.: 197 mg/dL (08 Apr 2024 22:40)  POCT Blood Glucose.: 139 mg/dL (08 Apr 2024 17:29)    A1C with Estimated Average Glucose Result: 5.7 % (03-02-24 @ 11:57)   [facility]-- WOUND TEAM -- FOLLOW UP NOTE  --------------------------------------------------------------------------------    24 hour events/subjective:    afebrile  tolerating TF  incontinent  s/p Lt Cranioplasty      Diet:  Diet, NPO with Tube Feed:   Tube Feeding Modality: Gastrostomy  Nepro with Carb Steady (NEPRORTH)  Total Volume for 24 Hours (mL): 960  Bolus  Total Volume of Bolus (mL):  240  Total # of Feeds: 4  Tube Feed Frequency: Every 6 hours   Tube Feed Start Time: 12:00  Bolus Feed Rate (mL per Hour): 240   Bolus Feed Duration (in Hours): 1  Supplement Feeding Modality:  Gastrostomy  Probiotic Yogurt/Smoothie Cans or Servings Per Day:  1       Frequency:  Two Times a day (04-02-24 @ 20:18)      ROS: pt unable to offer    ALLERGIES & MEDICATIONS  --------------------------------------------------------------------------------  Allergies  shellfish (Rash)  ChloraPrep One-Step (Rash)  penicillins (Rash)      STANDING INPATIENT MEDICATIONS  amLODIPine Tablet 10 milliGRAM(s) Oral daily  artificial  tears Solution 1 Drop(s) Both EYES every 4 hours  Biotene Dry Mouth Oral Rinse 5 milliLiter(s) Swish and Spit every 6 hours  brivaracetam Oral Solution 100 milliGRAM(s) Oral <User Schedule>  carvedilol 25 milliGRAM(s) Oral every 12 hours  dextrose 5%. 1000 milliLiter(s) IV Continuous <Continuous>  dextrose 5%. 1000 milliLiter(s) IV Continuous <Continuous>  dextrose 50% Injectable 25 Gram(s) IV Push once  glucagon  Injectable 1 milliGRAM(s) IntraMuscular once  heparin   Injectable 5000 Unit(s) SubCutaneous every 8 hours  insulin glargine Injectable (LANTUS) 30 Unit(s) SubCutaneous at bedtime  insulin lispro (ADMELOG) corrective regimen sliding scale   SubCutaneous every 6 hours  lacosamide Solution 200 milliGRAM(s) Oral <User Schedule>  multivitamin 1 Tablet(s) Oral daily  nystatin Suspension 221971 Unit(s) Swish and Swallow every 6 hours  pantoprazole Suspension 40 milliGRAM(s) Oral two times a day  predniSONE Tablet 5 milliGRAM(s) Oral daily  sodium zirconium cyclosilicate 10 Gram(s) Oral every other day  tacrolimus    0.5 mG/mL Suspension 3 milliGRAM(s) Oral every 12 hours  trimethoprim   80 mG/sulfamethoxazole 400 mG 1 Tablet(s) Oral daily      PRN INPATIENT MEDICATION  acetaminophen  Tablet 650 milliGRAM(s) Oral every 6 hours PRN  acetaminophen IVPB 1000 milliGRAM(s) IV Intermittent every 6 hours PRN  albuterol/ipratropium for Nebulization 3 milliLiter(s) Nebulizer every 6 hours PRN  oxyCODONE IR 10 milliGRAM(s) Oral every 4 hours PRN      VITALS/PHYSICAL EXAM  --------------------------------------------------------------------------------  T(C): 36.7 (04-09-24 @ 11:49), Max: 36.9 (04-09-24 @ 04:03)  HR: 80 (04-09-24 @ 15:26) (71 - 86)  BP: 119/80 (04-09-24 @ 11:49) (117/73 - 145/79)  RR: 12 (04-09-24 @ 11:49) (12 - 20)  SpO2: 100% (04-09-24 @ 15:26) (99% - 100%)  Wt(kg): --    General: NAD, obtunded, frail, wd/wn/wg  Total Care sport bed  HEENT: NC, Rt side scalp soft- absent bone, incision c/d/i- helmet placed for exam     sclera clear/ moist, moist mucous membranes, trachea midline, trach  Respiratory: equal chest rise with respirations, vented  Gastrointestinal: soft NT/ND  Neurology: nonverbal, not following commands  Psych: not responsive to verval stimulus  Musculoskeletal: no contractures  Vascular: BLE edema equal no c/c/e  Skin:  moist w/ good turgor  Sacral/bilateral buttocks with    denuded and granular skin w/purple areas on base    6cm X 8cm x 0.3cm  No odor, erythema, increased warmth, tenderness, induration, fluctuance, nor crepitus      LABS/ CULTURES/ RADIOLOGY:              8.9    7.18  >-----------<  122      [04-09-24 @ 06:50]              30.0     138  |  104  |  59  ----------------------------<  176      [04-09-24 @ 06:50]  4.7   |  20  |  1.19        Ca     9.9     [04-09-24 @ 06:50]      Mg     2.2     [04-09-24 @ 06:50]      Phos  2.3     [04-09-24 @ 06:50]    TPro  6.3  /  Alb  3.2  /  TBili  0.2  /  DBili  x   /  AST  18  /  ALT  11  /  AlkPhos  86  [04-09-24 @ 06:50]      CAPILLARY BLOOD GLUCOSE  POCT Blood Glucose.: 121 mg/dL (09 Apr 2024 11:35)  POCT Blood Glucose.: 195 mg/dL (09 Apr 2024 05:27)  POCT Blood Glucose.: 182 mg/dL (08 Apr 2024 23:32)  POCT Blood Glucose.: 197 mg/dL (08 Apr 2024 22:40)  POCT Blood Glucose.: 139 mg/dL (08 Apr 2024 17:29)    A1C with Estimated Average Glucose Result: 5.7 % (03-02-24 @ 11:57)

## 2024-04-09 NOTE — PROGRESS NOTE ADULT - SUBJECTIVE AND OBJECTIVE BOX
INTERVAL HPI/OVERNIGHT EVENTS:    family bedside   no new gi events     MEDICATIONS  (STANDING):  amLODIPine   Tablet 10 milliGRAM(s) Oral daily  artificial  tears Solution 1 Drop(s) Both EYES every 4 hours  Biotene Dry Mouth Oral Rinse 5 milliLiter(s) Swish and Spit every 6 hours  brivaracetam Oral Solution 100 milliGRAM(s) Oral <User Schedule>  carvedilol 25 milliGRAM(s) Oral every 12 hours  dextrose 5%. 1000 milliLiter(s) (50 mL/Hr) IV Continuous <Continuous>  dextrose 5%. 1000 milliLiter(s) (100 mL/Hr) IV Continuous <Continuous>  dextrose 50% Injectable 25 Gram(s) IV Push once  glucagon  Injectable 1 milliGRAM(s) IntraMuscular once  heparin   Injectable 5000 Unit(s) SubCutaneous every 8 hours  insulin glargine Injectable (LANTUS) 30 Unit(s) SubCutaneous at bedtime  insulin lispro (ADMELOG) corrective regimen sliding scale   SubCutaneous every 6 hours  lacosamide Solution 200 milliGRAM(s) Oral <User Schedule>  multivitamin 1 Tablet(s) Oral daily  nystatin    Suspension 999759 Unit(s) Swish and Swallow every 6 hours  pantoprazole   Suspension 40 milliGRAM(s) Oral two times a day  predniSONE   Tablet 5 milliGRAM(s) Oral daily  sodium zirconium cyclosilicate 10 Gram(s) Oral every other day  tacrolimus    0.5 mG/mL Suspension 3 milliGRAM(s) Oral every 12 hours  trimethoprim   80 mG/sulfamethoxazole 400 mG 1 Tablet(s) Oral daily    MEDICATIONS  (PRN):  acetaminophen     Tablet .. 650 milliGRAM(s) Oral every 6 hours PRN Mild Pain (1 - 3)  acetaminophen   IVPB .. 1000 milliGRAM(s) IV Intermittent every 6 hours PRN Temp greater or equal to 38.5C (101.3F), Severe Pain (7 - 10)  albuterol/ipratropium for Nebulization 3 milliLiter(s) Nebulizer every 6 hours PRN Shortness of Breath and/or Wheezing  oxyCODONE    IR 10 milliGRAM(s) Oral every 4 hours PRN Severe Pain (7 - 10)      Allergies    shellfish (Rash)  ChloraPrep One-Step (Rash)  penicillins (Rash)    Intolerances        ROS: *pt minimally verbal to nonverbal, unable to obtain ROS     Vital Signs Last 24 Hrs  T(C): 36.7 (09 Apr 2024 11:49), Max: 36.9 (09 Apr 2024 04:03)  T(F): 98 (09 Apr 2024 11:49), Max: 98.4 (09 Apr 2024 04:03)  HR: 85 (09 Apr 2024 11:49) (71 - 86)  BP: 119/80 (09 Apr 2024 11:49) (117/73 - 145/79)  BP(mean): --  RR: 12 (09 Apr 2024 11:49) (12 - 20)  SpO2: 99% (09 Apr 2024 11:49) (99% - 100%)    Parameters below as of 09 Apr 2024 11:49  Patient On (Oxygen Delivery Method): ventilator    O2 Concentration (%): 40    PHYSICAL EXAM:    Constitutional: NAD  HEENT: EOMI, throat clear  Neck: No LAD, supple  Respiratory: CTA and P  Cardiovascular: S1 and S2, RRR, no M  Gastrointestinal: BS+, soft, NT/ND, neg HSM, +peg  Extremities: No peripheral edema, neg clubbing, cyanosis  Vascular: 2+ peripheral pulses  Neurological: A/O x0  Psychiatric: lethargy  Skin: No rashes      LABS:                        8.9    7.18  )-----------( 122      ( 09 Apr 2024 06:50 )             30.0     04-09    138  |  104  |  59<H>  ----------------------------<  176<H>  4.7   |  20<L>  |  1.19    Ca    9.9      09 Apr 2024 06:50  Phos  2.3     04-09  Mg     2.2     04-09    TPro  6.3  /  Alb  3.2<L>  /  TBili  0.2  /  DBili  x   /  AST  18  /  ALT  11  /  AlkPhos  86  04-09      Urinalysis Basic - ( 09 Apr 2024 06:50 )    Color: x / Appearance: x / SG: x / pH: x  Gluc: 176 mg/dL / Ketone: x  / Bili: x / Urobili: x   Blood: x / Protein: x / Nitrite: x   Leuk Esterase: x / RBC: x / WBC x   Sq Epi: x / Non Sq Epi: x / Bacteria: x        RADIOLOGY & ADDITIONAL TESTS:

## 2024-04-09 NOTE — PROGRESS NOTE ADULT - NS ATTEND AMEND GEN_ALL_CORE FT
8 year old female with a history of ESRD s/p renal transplant (2019) c/b BK viremia, left AKA, HTN, breast ca s/p lumpectomy (completed Tamoxifen 3/2023), DVT (off Eliquis), HLD, gout presenting with left ICH likely in the setting of amyloid angiopathy s/p left craniectomy (discarded) and evacuation (3/2/24), EVD placement and removal (3/7), c/b prolonged respiratory failure s/p trach/PEG (3/9/24) and RCU transfer.     #ICH  #Seizure  From a neurologic perspective she has had an ICH 2/2 amyloid angiopathy s/p left craniectomy and evacuation, EVD placement and subsequent removal. She is s/p cranioplasty 4/2.  She clinically remains obtunded, reportedly will occasionally open her eyes to sternal rub, no meaningful communication. Not opening eyes on exam, no appreciable twitching or jerking.  Her hospital course has been complicated by seizure, her last recorded seizures were seen 3/7/24. She is currently controlled on Briviact 100 TID, Vimpat 200 BID  - On vEEG without any evidence of seizure, will continue to monitor  - Appreciate neurosurgery input, YON drain with increased output. Will hold A/C  - Follow neuro exam closely - eyes closed, withdraws to pain  - C/W AED    #HTN: Hypotension after OR. Holding Clonidine patch. Continue with other antihypertensives, currently normotensive on my exam.   - amlodipine and carvedilol   - TTE LVEF 70%, diastolic dysfunction     #Mixed respiratory failure  She initially presented with respiratory failure, mixed hypoxic and hypercapnic respiratory failure. She was tolerating trach collar around the clock since 3/23/24. She was placed back on full support after her cranioplasty 4/2. Will rapidly wean again as able. Will wean O2 as tolerated, goal SpO2 90-95%. Continue airways clearance with duonebs and hypersal q6 for airways clearance.   - CPAP as tolerated     #UTI  #ESBL Kleb  Her hospital course was otherwise complicated by ESBL Kleb and VRE (3/10) urinary tract infection. She is s/p Meropenem. Now clinically stable and being monitored off antibiotics.    #H.Pylori  She was found to have H.Pylori positivity: Per GI evaluation, will plan to start treatment as outlined above at the time of discharge.  - on PPI 40 IV BID     #Renal Transplant  #MEHREEN  She has a history of renal transplant. Transplant is following and appreciate their recommendations. Continue with Prednisone, Tacrolimus. Will confirm Bactrim with prophylaxis with Nephrology. Her creatinine continues to trend up. Urine output has dropped off. Will d/w transplant. ? related to hypoperfusion in OR. Trend creat closely, avoid nephrotoxins, trend UOP. Tacro levels daily. Will continue to monitor for now.   - creatinine improving today   - gets straight cath   - tacro goal 4-7  - on prednisone 5 mg and Bactrim - will clarify Bactrim with renal transplant team     #DM  She has had issues with hyperglycemia: Goal blood glucose 140-180. Adjusting insulin accordingly.    #Heme: DVT 3/2 R CFV, Fem, Pop, Gastrocnemius and L CFV, Fem s/p IVC filter (3/3/24). Tolerating SQH without issue.

## 2024-04-09 NOTE — DISCHARGE NOTE PROVIDER - NSDCFUSCHEDAPPT_GEN_ALL_CORE_FT
[facility]  [address]   Scheduled Appointment: 05/13/2024    [facility]  MAYRAND ROBIN [address]  Scheduled Appointment: 05/13/2024    Kaitlynn Padilla  [facility]  [address]  Scheduled Appointment: 05/24/2024

## 2024-04-09 NOTE — PROGRESS NOTE ADULT - ASSESSMENT
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 Hospital course c/b extensive LE DVTs, S/p IVCF on 3/3. She is s/p trach/peg 3/8/24.  Febrile 3/10 with + UA, blood/sputum culture NGTD.  Treatment for UTI initiated with ceftriaxone, eventually broadened to cefepime. Transferred to RCU 3/11 for continued management. Urine culture resulted positive for klebsiella, treated for 7 days with Meropenem 3/12 --> 3/19. Patient weaned from MV, tolerating TC ATC since 3/23. Patient S/p Cranioplasty on 4/2.      4/8: Cr stable s/p IVF since 4/7, Continue PST as tolerated. DC planning in progress. Transplant nephrology confirmed patient will need lifelong bactrim ppx for high infection risk.        Wound Consult requested to assist w/ management of:   Sacral Stage 3 pressure imjury  Buttocks/ Sacrum change to TRIAD BID  and prn soiling        Continue w/ attends under pads and Pericare w/ purewick as per protocol  Abx per RCU/ ID  Moisturize intact skin w/ SWEEN cream BID  Nutrition optimization in pt w/  Increased nutritional needs    high quality protein TF, radha/ prosource, MVI & Vit C to promote wound healing  Continue turning and positioning w/ offloading assistive devices as per protocol  Waffle Cushion to chair when oob to chair  Continue w/ low air loss pressure redistribution bed surface   Pt will need Group 2 mattress on hospital bed and ROHO cushion for wheel chair upon discharge home  Care as per medicine, will follow w/ you  Upon discharge f/u as outpatient at [facility] [address] [facility] [phone]  Seen w/ RN and D/w team & attng  Heather Grier PA-C CWS [id number]  Nights/ Weekends/ Holidays please call:  General Surgery Consult pager ([phone]) for emergencies  Wound PT for multilayer leg wrapping or VAC issues (x [phone])   I spent 35minutes face to face w/ this pt of which more than 50% of the time was spent counseling & coordinating care of this pt.

## 2024-04-09 NOTE — PROGRESS NOTE ADULT - SUBJECTIVE AND OBJECTIVE BOX
DATE OF SERVICE: 04-09-24 @ 12:52    Patient is a 78y old  Female who presents with a chief complaint of ICH (09 Apr 2024 12:20)      INTERVAL HISTORY: No acute events, does not interact meaningfully     REVIEW OF SYSTEMS:  CONSTITUTIONAL: No weakness  EYES/ENT: No visual changes;  No throat pain   NECK: No pain or stiffness  RESPIRATORY: No cough, wheezing; No shortness of breath  CARDIOVASCULAR: No chest pain or palpitations  GASTROINTESTINAL: No abdominal  pain. No nausea, vomiting, or hematemesis  GENITOURINARY: No dysuria, frequency or hematuria  NEUROLOGICAL: No stroke like symptoms  SKIN: No rashes      	  MEDICATIONS:  amLODIPine   Tablet 10 milliGRAM(s) Oral daily  carvedilol 25 milliGRAM(s) Oral every 12 hours        PHYSICAL EXAM:  T(C): 36.7 (04-09-24 @ 11:49), Max: 36.9 (04-09-24 @ 04:03)  HR: 85 (04-09-24 @ 11:49) (71 - 86)  BP: 119/80 (04-09-24 @ 11:49) (117/73 - 145/79)  RR: 12 (04-09-24 @ 11:49) (12 - 20)  SpO2: 99% (04-09-24 @ 11:49) (99% - 100%)  Wt(kg): --  I&O's Summary    08 Apr 2024 07:01  -  09 Apr 2024 07:00  --------------------------------------------------------  IN: 870 mL / OUT: 1450 mL / NET: -580 mL          Appearance: In no distress	  HEENT:    PERRL, EOMI	  Cardiovascular:  S1 S2, No JVD  Respiratory: Lungs clear to auscultation	  Gastrointestinal:  Soft, Non-tender, + BS	  Vascularature:  No edema of LE  Psychiatric: Appropriate affect   Neuro: no acute focal deficits                               8.9    7.18  )-----------( 122      ( 09 Apr 2024 06:50 )             30.0     04-09    138  |  104  |  59<H>  ----------------------------<  176<H>  4.7   |  20<L>  |  1.19    Ca    9.9      09 Apr 2024 06:50  Phos  2.3     04-09  Mg     2.2     04-09    TPro  6.3  /  Alb  3.2<L>  /  TBili  0.2  /  DBili  x   /  AST  18  /  ALT  11  /  AlkPhos  86  04-09        Labs personally reviewed      ASSESSMENT/PLAN: 	  78F Hx ESRD s/p renal xplant c/b DGF off HD, L AKA, 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    1. Abnormal Echo --  Septal bulge noted measures 2.2cm without obstruction.   -- Given no intracavitary obstruction no intervention at this time  - No Myxoma seen on this echo or echo in 2019, ? if hypermobile interatrial septum was confused for on prior imaging     2. HTN - Was soft now with Clonidine DC'd  Continue Coreg 25 mg BID, amlodipine 10mg    3. 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 DVT PPX - c/w SQ hep            LAURA Gomez DO [facility]  Cardiovascular Medicine  [address] [place] [address], [address]  Available via call or text on [employer]  Office: [phone]

## 2024-04-09 NOTE — PROGRESS NOTE ADULT - SUBJECTIVE AND OBJECTIVE BOX
Patient is a 78y old  Female who presents with a chief complaint of ICH (08 Apr 2024 14:50)      Interval Events:    REVIEW OF SYSTEMS:  [ ] Positive  [ ] All other systems negative  [ ] Unable to assess ROS because ________    Vital Signs Last 24 Hrs  T(C): 36.9 (04-09-24 @ 04:03), Max: 36.9 (04-09-24 @ 04:03)  T(F): 98.4 (04-09-24 @ 04:03), Max: 98.4 (04-09-24 @ 04:03)  HR: 76 (04-09-24 @ 04:03) (71 - 94)  BP: 119/70 (04-09-24 @ 04:03) (117/73 - 165/87)  RR: 14 (04-09-24 @ 04:03) (14 - 20)  SpO2: 99% (04-09-24 @ 04:03) (99% - 100%)PHYSICAL EXAM:  HEENT:   [ ]Tracheostomy:  [ ]Pupils equal  [ ]No oral lesions  [ ]Abnormal        SKIN  [ ]No Rash  [ ] Abnormal  [ ] pressure    CARDIAC  [ ]Regular  [ ]Abnormal    PULMONARY  [ ]Bilateral Clear Breath Sounds  [ ]Normal Excursion  [ ]Abnormal    GI  [ ]PEG      [ ] +BS		              [ ]Soft, nondistended, nontender	  [ ]Abnormal    MUSCULOSKELETAL                                   [ ]Bedbound                 [ ]Abnormal    [ ]Ambulatory/OOB to chair                           EXTREMITIES                                         [ ]Normal  [ ]Edema                           NEUROLOGIC  [ ] Normal, non focal  [ ] Focal findings:    PSYCHIATRIC  [ ]Alert and appropriate  [ ] Sedated	 [ ]Agitated    :  Gardner: [ ] Yes, if yes: Date of Placement:                   [  ] No    LINES: Central Lines [ ] Yes, if yes: Date of Placement                                     [  ] No    HOSPITAL MEDICATIONS:  MEDICATIONS  (STANDING):  amLODIPine   Tablet 10 milliGRAM(s) Oral daily  artificial  tears Solution 1 Drop(s) Both EYES every 4 hours  Biotene Dry Mouth Oral Rinse 5 milliLiter(s) Swish and Spit every 6 hours  brivaracetam Oral Solution 100 milliGRAM(s) Oral <User Schedule>  carvedilol 25 milliGRAM(s) Oral every 12 hours  dextrose 5%. 1000 milliLiter(s) (50 mL/Hr) IV Continuous <Continuous>  dextrose 5%. 1000 milliLiter(s) (100 mL/Hr) IV Continuous <Continuous>  dextrose 50% Injectable 25 Gram(s) IV Push once  glucagon  Injectable 1 milliGRAM(s) IntraMuscular once  heparin   Injectable 5000 Unit(s) SubCutaneous every 8 hours  insulin glargine Injectable (LANTUS) 30 Unit(s) SubCutaneous at bedtime  insulin lispro (ADMELOG) corrective regimen sliding scale   SubCutaneous every 6 hours  lacosamide Solution 200 milliGRAM(s) Oral <User Schedule>  multivitamin 1 Tablet(s) Oral daily  nystatin    Suspension 606453 Unit(s) Swish and Swallow every 6 hours  pantoprazole   Suspension 40 milliGRAM(s) Oral two times a day  predniSONE   Tablet 5 milliGRAM(s) Oral daily  sodium zirconium cyclosilicate 10 Gram(s) Oral every other day  tacrolimus    0.5 mG/mL Suspension 3 milliGRAM(s) Oral every 12 hours  trimethoprim   80 mG/sulfamethoxazole 400 mG 1 Tablet(s) Oral daily    MEDICATIONS  (PRN):  acetaminophen     Tablet .. 650 milliGRAM(s) Oral every 6 hours PRN Mild Pain (1 - 3)  acetaminophen   IVPB .. 1000 milliGRAM(s) IV Intermittent every 6 hours PRN Temp greater or equal to 38.5C (101.3F), Severe Pain (7 - 10)  albuterol/ipratropium for Nebulization 3 milliLiter(s) Nebulizer every 6 hours PRN Shortness of Breath and/or Wheezing  oxyCODONE    IR 10 milliGRAM(s) Oral every 4 hours PRN Severe Pain (7 - 10)      LABS:                        9.1    6.10  )-----------( 93       ( 08 Apr 2024 06:49 )             30.2     04-08    139  |  104  |  54<H>  ----------------------------<  109<H>  4.1   |  23  |  1.18    Ca    9.9      08 Apr 2024 06:49  Phos  2.2     04-08  Mg     2.2     04-08    TPro  6.4  /  Alb  3.3  /  TBili  0.2  /  DBili  x   /  AST  20  /  ALT  10  /  AlkPhos  90  04-08      Urinalysis Basic - ( 08 Apr 2024 06:49 )    Color: x / Appearance: x / SG: x / pH: x  Gluc: 109 mg/dL / Ketone: x  / Bili: x / Urobili: x   Blood: x / Protein: x / Nitrite: x   Leuk Esterase: x / RBC: x / WBC x   Sq Epi: x / Non Sq Epi: x / Bacteria: x          CAPILLARY BLOOD GLUCOSE    MICROBIOLOGY:     RADIOLOGY:  [ ] Reviewed and interpreted by me    Mode: AC/ CMV (Assist Control/ Continuous Mandatory Ventilation)  RR (machine): 14  TV (machine): 450  FiO2: 40  PEEP: 5  ITime: 1  MAP: 9  PIP: 20   Patient is a 78y old  Female who presents with a chief complaint of ICH (08 Apr 2024 14:50)      Interval Events: Tolerating PSV x 12 hrs yesterday                         Stable overnight on Full vent support; Fio2 40%     REVIEW OF SYSTEMS:  [ ] Positive  [ ] All other systems negative  [x ] Unable to assess ROS because __Nonverbal following IPH w/ Tracheostomy ______    Vital Signs Last 24 Hrs  T(C): 36.9 (04-09-24 @ 04:03), Max: 36.9 (04-09-24 @ 04:03)  T(F): 98.4 (04-09-24 @ 04:03), Max: 98.4 (04-09-24 @ 04:03)  HR: 76 (04-09-24 @ 04:03) (71 - 94)  BP: 119/70 (04-09-24 @ 04:03) (117/73 - 165/87)  RR: 14 (04-09-24 @ 04:03) (14 - 20)  SpO2: 99% (04-09-24 @ 04:03) (99% - 100%)    PHYSICAL EXAM:    HEENT:   [X]Tracheostomy: #7 Cuffed Portex  [X]Pupils equal  [ ]No oral lesions  [X]Abnormal - s/p cranioplasty, site w/ staples c/d/i     SKIN  [ ]No Rash  [X] Abnormal - LUE AVF  [X] pressure - stage 3 sacral pressure injury     CARDIAC  [X]Regular  [ ]Abnormal    PULMONARY  [x ]Bilateral Clear Breath Sounds  [ ]Normal Excursion  [ ]Abnormal -     GI  [X]PEG site c/d/i    [X] +BS		              [X]Soft, nondistended, nontender	  [ ]Abnormal    MUSCULOSKELETAL                                   [X]Bedbound                 [x ]Abnormal: L AKA  [ ]Ambulatory/OOB to chair                           EXTREMITIES                                         [X]Normal  [ ]Edema                           NEUROLOGIC  [ ] Normal, non focal  [X] Focal findings: obtunded, Eyes Opens intermittently,  moves LUE spontaneously, and withdraws in RLE extremities to noxious stimuli.    PSYCHIATRIC  [X] obtunded  [ ] Sedated	 [ ]Agitated    :  Gardner: [ ] Yes, if yes: Date of Placement:                   [X] No    LINES: Central Lines [ ] Yes, if yes: Date of Placement                                     [X] No    HOSPITAL MEDICATIONS:  MEDICATIONS  (STANDING):  amLODIPine   Tablet 10 milliGRAM(s) Oral daily  artificial  tears Solution 1 Drop(s) Both EYES every 4 hours  Biotene Dry Mouth Oral Rinse 5 milliLiter(s) Swish and Spit every 6 hours  brivaracetam Oral Solution 100 milliGRAM(s) Oral <User Schedule>  carvedilol 25 milliGRAM(s) Oral every 12 hours  dextrose 5%. 1000 milliLiter(s) (50 mL/Hr) IV Continuous <Continuous>  dextrose 5%. 1000 milliLiter(s) (100 mL/Hr) IV Continuous <Continuous>  dextrose 50% Injectable 25 Gram(s) IV Push once  glucagon  Injectable 1 milliGRAM(s) IntraMuscular once  heparin   Injectable 5000 Unit(s) SubCutaneous every 8 hours  insulin glargine Injectable (LANTUS) 30 Unit(s) SubCutaneous at bedtime  insulin lispro (ADMELOG) corrective regimen sliding scale   SubCutaneous every 6 hours  lacosamide Solution 200 milliGRAM(s) Oral <User Schedule>  multivitamin 1 Tablet(s) Oral daily  nystatin    Suspension 431222 Unit(s) Swish and Swallow every 6 hours  pantoprazole   Suspension 40 milliGRAM(s) Oral two times a day  predniSONE   Tablet 5 milliGRAM(s) Oral daily  sodium zirconium cyclosilicate 10 Gram(s) Oral every other day  tacrolimus    0.5 mG/mL Suspension 3 milliGRAM(s) Oral every 12 hours  trimethoprim   80 mG/sulfamethoxazole 400 mG 1 Tablet(s) Oral daily    MEDICATIONS  (PRN):  acetaminophen     Tablet .. 650 milliGRAM(s) Oral every 6 hours PRN Mild Pain (1 - 3)  acetaminophen   IVPB .. 1000 milliGRAM(s) IV Intermittent every 6 hours PRN Temp greater or equal to 38.5C (101.3F), Severe Pain (7 - 10)  albuterol/ipratropium for Nebulization 3 milliLiter(s) Nebulizer every 6 hours PRN Shortness of Breath and/or Wheezing  oxyCODONE    IR 10 milliGRAM(s) Oral every 4 hours PRN Severe Pain (7 - 10)      LABS:                        9.1    6.10  )-----------( 93       ( 08 Apr 2024 06:49 )             30.2     04-08    139  |  104  |  54<H>  ----------------------------<  109<H>  4.1   |  23  |  1.18    Ca    9.9      08 Apr 2024 06:49  Phos  2.2     04-08  Mg     2.2     04-08    TPro  6.4  /  Alb  3.3  /  TBili  0.2  /  DBili  x   /  AST  20  /  ALT  10  /  AlkPhos  90  04-08      Urinalysis Basic - ( 08 Apr 2024 06:49 )    Color: x / Appearance: x / SG: x / pH: x  Gluc: 109 mg/dL / Ketone: x  / Bili: x / Urobili: x   Blood: x / Protein: x / Nitrite: x   Leuk Esterase: x / RBC: x / WBC x   Sq Epi: x / Non Sq Epi: x / Bacteria: x          CAPILLARY BLOOD GLUCOSE    MICROBIOLOGY:     RADIOLOGY:  [ ] Reviewed and interpreted by me    Mode: AC/ CMV (Assist Control/ Continuous Mandatory Ventilation)  RR (machine): 14  TV (machine): 450  FiO2: 40  PEEP: 5  ITime: 1  MAP: 9  PIP: 20

## 2024-04-09 NOTE — DISCHARGE NOTE PROVIDER - CARE PROVIDERS DIRECT ADDRESSES
[email or web address]

## 2024-04-09 NOTE — DISCHARGE NOTE PROVIDER - NSDCMRMEDTOKEN_GEN_ALL_CORE_FT
albuterol 90 mcg/inh inhalation aerosol: 2 puff(s) inhaled every 6 hours, As Needed  amLODIPine 5 mg oral tablet: 1 tab(s) orally once a day  aspirin 81 mg oral delayed release tablet: 1 tab(s) orally once a day x history of open heart surgery  Bactrim 400 mg-80 mg oral tablet: 1 tab(s) orally once a day x ppx  calcitriol 0.25 mcg oral capsule: 1 cap(s) orally once a day  cholecalciferol 25 mcg (1000 intl units) oral tablet: 1 tab(s) orally once a day  cloNIDine 0.1 mg oral tablet: 1 tab(s) orally 2 times a day  Envarsus XR 4 mg oral tablet, extended release: 10 milligram(s) orally once a day x renal transplant  famotidine 20 mg oral tablet: 1 tab(s) orally 2 times a day x reflux  gabapentin 100 mg oral tablet: 1 tab(s) orally 3 times a day x leg pain post amputation  HumaLOG KwikPen 100 units/mL injectable solution: 8 unit(s) injectable 3 times a day  Lantus Solostar Pen 100 units/mL subcutaneous solution: 15 unit(s) subcutaneous once a day (at bedtime)  leflunomide 20 mg oral tablet: 1 tab(s) orally 2 times a day x RA  Lipitor 10 mg oral tablet: 1 tab(s) orally once a day  metoprolol tartrate 100 mg oral tablet: 1 tab(s) orally 2 times a day  Multiple Vitamins oral tablet: 1 tab(s) orally once a day  Percocet 10/325 oral tablet: 1 tab(s) orally 4 times a day as needed for leg pain  predniSONE 5 mg oral tablet: 1 tab(s) orally once a day x kidney transplant  sodium bicarbonate 650 mg oral tablet: 2 tab(s) orally 2 times a day  Standard helmet: use as needed  Vascepa 1 g oral capsule: 1 cap(s) orally once a day   acetaminophen 325 mg oral tablet: 2 tab(s) orally every 6 hours As needed Mild Pain (1 - 3)  albuterol 90 mcg/inh inhalation aerosol: 2 puff(s) inhaled every 6 hours, As Needed  amLODIPine 5 mg oral tablet: 1 tab(s) orally once a day  aspirin 81 mg oral delayed release tablet: 1 tab(s) orally once a day x history of open heart surgery  Bactrim 400 mg-80 mg oral tablet: 1 tab(s) orally once a day x ppx  calcitriol 0.25 mcg oral capsule: 1 cap(s) orally once a day  cholecalciferol 25 mcg (1000 intl units) oral tablet: 1 tab(s) orally once a day  cloNIDine 0.1 mg oral tablet: 1 tab(s) orally every 8 hours  Envarsus XR 4 mg oral tablet, extended release: 10 milligram(s) orally once a day x renal transplant  famotidine 20 mg oral tablet: 1 tab(s) orally 2 times a day x reflux  gabapentin 100 mg oral tablet: 1 tab(s) orally 3 times a day x leg pain post amputation  heparin: 5,000 unit(s) every 8 hours  HumaLOG KwikPen 100 units/mL injectable solution: 8 unit(s) injectable 3 times a day  Lantus Solostar Pen 100 units/mL subcutaneous solution: 15 unit(s) subcutaneous once a day (at bedtime)  leflunomide 20 mg oral tablet: 1 tab(s) orally 2 times a day x RA  Lipitor 10 mg oral tablet: 1 tab(s) orally once a day  metoprolol tartrate 100 mg oral tablet: 1 tab(s) orally 2 times a day  Multiple Vitamins oral tablet: 1 tab(s) orally once a day  predniSONE 5 mg oral tablet: 1 tab(s) orally once a day  Standard helmet: use as needed  Vascepa 1 g oral capsule: 1 cap(s) orally once a day   acetaminophen 325 mg oral tablet: 2 tab(s) by gastrostomy tube every 6 hours as needed for Mild Pain (1 - 3)  amLODIPine 10 mg oral tablet: 1 tab(s) by gastrostomy tube once a day  aspirin 81 mg oral delayed release tablet: 1 tab(s) by gastrostomy tube once a day x history of open heart surgery  brivaracetam 10 mg/mL oral liquid: 100 milligram(s) by gastrostomy tube every 8 hours 0001, 0800, 1800  carvedilol 25 mg oral tablet: 1 tab(s) by gastrostomy tube every 12 hours  cloNIDine 0.1 mg oral tablet: 1 tab(s) by gastrostomy tube every 8 hours  heparin: 5,000 unit(s) every 8 hours  insulin glargine 100 units/mL subcutaneous solution: 30 unit(s) subcutaneous once a day (at bedtime)  insulin lispro 100 units/mL injectable solution: 1 unit(s) injectable every 6 hours 2 Unit(s) if Glucose 151 - 200  4 Unit(s) if Glucose 201 - 250  6 Unit(s) if Glucose 251 - 300  8 Unit(s) if Glucose 301 - 350  10 Unit(s) if Glucose 351 - 400  12 Unit(s) if Glucose Greater Than 400  ipratropium-albuterol 0.5 mg-2.5 mg/3 mL inhalation solution: 3 milliliter(s) inhaled every 6 hours As needed Shortness of Breath and/or Wheezing  lacosamide 10 mg/mL oral solution: 200 milligram(s) by gastrostomy tube every 12 hours 0001, 1200  Multiple Vitamins oral tablet: 1 tab(s) by gastrostomy tube once a day  nystatin 100,000 units/mL oral suspension: 5 milliliter(s) orally every 6 hours  ocular lubricant ophthalmic solution: 1 drop(s) to each affected eye every 4 hours  pantoprazole 40 mg oral granule, delayed release: 40 milligram(s) by gastrostomy tube 2 times a day  predniSONE 5 mg oral tablet: 1 tab(s) by gastrostomy tube once a day  saliva substitutes oral solution: 5 milliliter(s) orally every 6 hours  Standard helmet: use as needed  sulfamethoxazole-trimethoprim 400 mg-80 mg oral tablet: 1 tab(s) by gastrostomy tube once a day  tacrolimus: 0.5 milligram(s) by gastrostomy tube 2 times a day   acetaminophen 325 mg oral tablet: 2 tab(s) by gastrostomy tube every 6 hours as needed for Mild Pain (1 - 3)  amLODIPine 10 mg oral tablet: 1 tab(s) by gastrostomy tube once a day  brivaracetam 10 mg/mL oral liquid: 100 milligram(s) by gastrostomy tube every 8 hours 0001, 0800, 1800  carvedilol 25 mg oral tablet: 1 tab(s) by gastrostomy tube every 12 hours  cloNIDine 0.1 mg oral tablet: 1 tab(s) by gastrostomy tube every 8 hours  heparin: 5,000 unit(s) subcutaneous every 8 hours  insulin glargine 100 units/mL subcutaneous solution: 30 unit(s) subcutaneous once a day (at bedtime)  insulin lispro 100 units/mL injectable solution: 1 unit(s) injectable every 6 hours 2 Unit(s) if Glucose 151 - 200  4 Unit(s) if Glucose 201 - 250  6 Unit(s) if Glucose 251 - 300  8 Unit(s) if Glucose 301 - 350  10 Unit(s) if Glucose 351 - 400  12 Unit(s) if Glucose Greater Than 400  ipratropium-albuterol 0.5 mg-2.5 mg/3 mL inhalation solution: 3 milliliter(s) inhaled every 6 hours As needed Shortness of Breath and/or Wheezing  lacosamide 10 mg/mL oral solution: 200 milligram(s) by gastrostomy tube every 12 hours 0001, 1200  Multiple Vitamins oral tablet: 1 tab(s) by gastrostomy tube once a day  nystatin 100,000 units/mL oral suspension: 5 milliliter(s) orally every 6 hours  ocular lubricant ophthalmic solution: 1 drop(s) to each affected eye every 4 hours  pantoprazole 40 mg oral granule, delayed release: 40 milligram(s) by gastrostomy tube 2 times a day  predniSONE 5 mg oral tablet: 1 tab(s) by gastrostomy tube once a day  saliva substitutes oral solution: 5 milliliter(s) orally every 6 hours  Standard helmet: use as needed  sulfamethoxazole-trimethoprim 400 mg-80 mg oral tablet: 1 tab(s) by gastrostomy tube once a day  tacrolimus: 0.5 milligram(s) by gastrostomy tube 2 times a day

## 2024-04-09 NOTE — CHART NOTE - NSCHARTNOTEFT_GEN_A_CORE
Nutrition Follow Up Note  Patient seen for: follow up    Source: [] Patient       [x] Medical Record        [x] Nursing        [] Family at bedside       [x] Other: team during interdisciplinary rounds     -If unable to interview patient: [x] Trach/Vent/BiPAP  [] Disoriented/confused/inappropriate to interview    Diet, NPO with Tube Feed:   Tube Feeding Modality: Gastrostomy  Nepro with Carb Steady (NEPRORTH)  Total Volume for 24 Hours (mL): 960  Bolus  Total Volume of Bolus (mL):  240  Total # of Feeds: 4  Tube Feed Frequency: Every 6 hours   Tube Feed Start Time: 12:00  Bolus Feed Rate (mL per Hour): 240   Bolus Feed Duration (in Hours): 1  Supplement Feeding Modality:  Gastrostomy  Probiotic Yogurt/Smoothie Cans or Servings Per Day:  1       Frequency:  Two Times a day (24 @ 20:18) [Active]    EN order providing at 100% provision: 1699kcal (34kcal/kg) and 78g protein (1.58g/kg)     - Is current order appropriate/adequate? see below for recommendations.     Nutrition-related concerns:  -Enteral feeds changed to bolus feeds 3/22 from continuous for episodes of hypoglycemia per notes.   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   -S/P trach and PEG (3-08)  -Renal: S/p hx of renal transplant (); ordered for Tacrolimus.  -Hgb A1c 5.7%. insulin regimen as below to maintain glycemic control.   -ALLERGY: Shellfish noted and confirmed by family.   -Free water 150ml every 8 hours per orders.   -Phosphorus low    GI: rectal tube discontinued. Last BM .   Bowel Regimen? [] Yes   [x] No    Weights:   Dosing weight 50kg  Daily Weight in k.5 (), Weight in k.2 (), Weight in k.1 ()    MEDICATIONS  (STANDING):  amLODIPine   Tablet  carvedilol  dextrose 5%.  dextrose 5%.  dextrose 50% Injectable  glucagon  Injectable  insulin glargine Injectable (LANTUS)  insulin lispro (ADMELOG) corrective regimen sliding scale  multivitamin  nystatin    Suspension  pantoprazole   Suspension  predniSONE   Tablet  trimethoprim   80 mG/sulfamethoxazole 400 mG    Pertinent Labs:  @ 06:50: Na 138, BUN 59<H>, Cr 1.19, <H>, K+ 4.7, Phos 2.3<L>, Mg 2.2, Alk Phos 86, ALT/SGPT 11, AST/SGOT 18, HbA1c --    A1C with Estimated Average Glucose Result: 5.7 % (24 @ 11:57)    Finger Sticks:  POCT Blood Glucose.: 121 mg/dL ( @ 11:35)  POCT Blood Glucose.: 195 mg/dL ( @ 05:27)  POCT Blood Glucose.: 182 mg/dL ( @ 23:32)  POCT Blood Glucose.: 197 mg/dL ( @ 22:40)  POCT Blood Glucose.: 139 mg/dL ( @ 17:29)    Skin per nursing documentation: crani site, sacrum suspected deep tissue injury   Edema per nursing documentation: trace generalized per flow sheets    (Based on dosing wt 50kg):   Estimated Energy Needs: (30-35kcal/kg): 1500-1750kcal  Estimated Protein Needs: (1.4-1.8g protein/kg): 70-90g protein  Estimated Fluid Needs: defer to team    Previous Nutrition Diagnosis:  Increased Nutrient Needs  Nutrition Diagnosis is: [x] ongoing  [] resolved [] not applicable     Nutrition Care Plan:  [x] In Progress  [] Achieved  [] Not applicable    New Nutrition Diagnosis: [x] Not applicable    Nutrition Interventions:     Education Provided   [] Yes:  [x] No:     Recommendations:      -Can continue bolus per team. Current regimen meeting nutritional needs, see above for what it is providing pt with. Changed from Glucerna 1.5 to Nepro for persistently elevated K levels. History of renal transplant. Phosphorus currently low, replenish. If remains low, consider switching EN formula back to Glucerna 1.5 while monitoring other electrolytes.   -Free water flushes per team.   -Continue Probiotic Yogurt per team.   -Contnue Multivitamin to aid in wound healing if no contraindications     Monitoring and Evaluation:   Continue to monitor nutritional intake, tolerance to diet prescription, weights, labs, skin integrity    RD remains available upon request and will follow up per protocol  Shala Campbell, MS, RD, CDN / Teams Nutrition Follow Up Note  Patient seen for: follow up    Source: [] Patient       [x] Medical Record        [x] Nursing        [] Family at bedside       [] Other:     -If unable to interview patient: [x] Trach/Vent/BiPAP  [] Disoriented/confused/inappropriate to interview    Diet, NPO with Tube Feed:   Tube Feeding Modality: Gastrostomy  Nepro with Carb Steady (NEPRORTH)  Total Volume for 24 Hours (mL): 960  Bolus  Total Volume of Bolus (mL):  240  Total # of Feeds: 4  Tube Feed Frequency: Every 6 hours   Tube Feed Start Time: 12:00  Bolus Feed Rate (mL per Hour): 240   Bolus Feed Duration (in Hours): 1  Supplement Feeding Modality:  Gastrostomy  Probiotic Yogurt/Smoothie Cans or Servings Per Day:  1       Frequency:  Two Times a day (24 @ 20:18) [Active]    EN order providing at 100% provision: 1699kcal (34kcal/kg) and 78g protein (1.58g/kg)     - Is current order appropriate/adequate? see below for recommendations.     Nutrition-related concerns:  -Enteral feeds changed to bolus feeds 3/22 from continuous for episodes of hypoglycemia per notes.   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   -S/P trach and PEG (3-08)  -Renal: S/p hx of renal transplant (); ordered for Tacrolimus.  -Hgb A1c 5.7%. insulin regimen as below to maintain glycemic control.   -ALLERGY: Shellfish noted and confirmed by family.   -Free water 150ml every 8 hours per orders.   -Phosphorus low    GI: rectal tube discontinued. Last BM .   Bowel Regimen? [] Yes   [x] No    Weights:   Dosing weight 50kg  Daily Weight in k.5 (), Weight in k.2 (), Weight in k.1 ()    MEDICATIONS  (STANDING):  amLODIPine   Tablet  carvedilol  dextrose 5%.  dextrose 5%.  dextrose 50% Injectable  glucagon  Injectable  insulin glargine Injectable (LANTUS)  insulin lispro (ADMELOG) corrective regimen sliding scale  multivitamin  nystatin    Suspension  pantoprazole   Suspension  predniSONE   Tablet  trimethoprim   80 mG/sulfamethoxazole 400 mG    Pertinent Labs:  @ 06:50: Na 138, BUN 59<H>, Cr 1.19, <H>, K+ 4.7, Phos 2.3<L>, Mg 2.2, Alk Phos 86, ALT/SGPT 11, AST/SGOT 18, HbA1c --    A1C with Estimated Average Glucose Result: 5.7 % (24 @ 11:57)    Finger Sticks:  POCT Blood Glucose.: 121 mg/dL ( @ 11:35)  POCT Blood Glucose.: 195 mg/dL ( @ 05:27)  POCT Blood Glucose.: 182 mg/dL ( @ 23:32)  POCT Blood Glucose.: 197 mg/dL ( @ 22:40)  POCT Blood Glucose.: 139 mg/dL ( @ 17:29)    Skin per nursing documentation: crani site, sacrum suspected deep tissue injury   Edema per nursing documentation: trace generalized per flow sheets    (Based on dosing wt 50kg):   Estimated Energy Needs: (30-35kcal/kg): 1500-1750kcal  Estimated Protein Needs: (1.4-1.8g protein/kg): 70-90g protein  Estimated Fluid Needs: defer to team    Previous Nutrition Diagnosis:  Increased Nutrient Needs  Nutrition Diagnosis is: [x] ongoing  [] resolved [] not applicable     Nutrition Care Plan:  [x] In Progress  [] Achieved  [] Not applicable    New Nutrition Diagnosis: [x] Not applicable    Nutrition Interventions:     Education Provided   [] Yes:  [x] No:     Recommendations:      -Can continue bolus per team. Current regimen meeting nutritional needs, see above for what it is providing pt with. Changed from Glucerna 1.5 to Nepro for persistently elevated K levels. History of renal transplant. Phosphorus currently low, replenish. If remains low, consider switching EN formula back to Glucerna 1.5 while monitoring other electrolytes.   -Free water flushes per team.   -Continue Probiotic Yogurt per team.   -Contnue Multivitamin to aid in wound healing if no contraindications     Monitoring and Evaluation:   Continue to monitor nutritional intake, tolerance to diet prescription, weights, labs, skin integrity    RD remains available upon request and will follow up per protocol  Shala Campbell, MS, RD, CDN / Teams

## 2024-04-09 NOTE — DISCHARGE NOTE PROVIDER - HOSPITAL COURSE
77 yo F with PMHx ESRD s/p renal transplant (2019, now off HD), left AKA, BK viremia, HTN, breast ca s/p lumpectomy (completed Tamoxifen 03/2023), DVT (off Eliquis), HLD, gout presenting 3/1 with left ICH (ICH score 4) likely 2/2 amyloid angiopathy s/p left craniectomy(discarded) and evacuation as well as EVD placement and removal (3/7). Hospital course c/b extensive LE DVTs, S/p IVCF on 3/3. She is s/p trach/peg 3/8/24.  Febrile 3/10 with + UA, blood/sputum culture NGTD.  Treatment for UTI initiated with ceftriaxone, eventually broadened to cefepime. Transferred to [facility] 3/11 for continued management. Urine culture resulted positive for klebsiella, treated for 7 days with Meropenem 3/12 --> 3/19. Patient weaned from MV, tolerating TC ATC since 3/23. Placed back to vent post operatively S/p Cranioplasty on 4/2. Now tolerating intermittent PSV 10/5 77 yo F with PMHx ESRD s/p renal transplant (2019, now off HD), left AKA, BK viremia, HTN, breast ca s/p lumpectomy (completed Tamoxifen 03/2023), DVT (off Eliquis), HLD, gout presenting 3/1 with left ICH (ICH score 4) likely 2/2 amyloid angiopathy s/p left craniectomy(discarded) and evacuation as well as EVD placement and removal (3/7). Hospital course c/b extensive LE DVTs, S/p IVCF on 3/3. She is s/p trach/peg 3/8/24.  Febrile 3/10 with + UA, blood/sputum culture NGTD.  Treatment for UTI initiated with ceftriaxone, eventually broadened to cefepime. Transferred to [facility] 3/11 for continued management. Urine culture resulted positive for klebsiella, treated for 7 days with Meropenem 3/12 --> 3/19. Patient weaned from MV, tolerating TC ATC since 3/23. Placed back to vent post operatively S/p Cranioplasty on 4/2. Now tolerating intermittent PSV trials, continue to wean to TC as tolerated. Patient will need to follow up with Neuro Sx for cranial staple removal. 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 Hospital course c/b extensive LE DVTs, S/p IVCF on 3/3. She is s/p trach/peg 3/8/24.  Febrile 3/10 with + UA, blood/sputum culture NGTD.  Treatment for UTI initiated with ceftriaxone, eventually broadened to cefepime. Transferred to [facility] 3/11 for continued management. Urine culture resulted positive for klebsiella, treated for 7 days with Meropenem 3/12 --> 3/19. Patient weaned from MV, tolerating TC ATC since 3/23. Placed back to vent post operatively S/p Cranioplasty on 4/2. Now tolerating intermittent PSV trials, continue to wean to TC as tolerated. Patient will need to follow up with Neuro Sx for cranial staple removal in 2 weeks from discharge.

## 2024-04-09 NOTE — DISCHARGE NOTE PROVIDER - NSDCCPCAREPLAN_GEN_ALL_CORE_FT
PRINCIPAL DISCHARGE DIAGNOSIS  Diagnosis: ICH (intracerebral hemorrhage)  Assessment and Plan of Treatment: Patient found to have L IPH on CTH likely 2/2 amyloid angiopathy  - S/p L hemicrani for evacuation of large ICH; bone discarded  - CT H Stereo 3/12: S/p Left Frontal Craniectomy, Remains stable from prior CT on 3/8   - CT Head 3/30: Improved lft frontal hemorrhage and extradural fluid collections   - S/p Cranioplasty on 4/2; Post op head CT Stable   - Subgaleal YON Drain removed 4/6  - Maintain Neuro checks.      SECONDARY DISCHARGE DIAGNOSES  Diagnosis: ARF (acute respiratory failure)  Assessment and Plan of Treatment: - S/p trach 3/8 by surgery by Dr. Joselo Mayorga   - Tracheostomy Sutures removed 3/13  - Patient weaned to tc ATC while in the [facility]   - Patient returned to full vent support post op   - Tolerates PSV 10/5, however unable to wean back to TC   - Continue airway clearance; Duoneb q6h PRN.    Diagnosis: Seizures  Assessment and Plan of Treatment: - S/p EEG w/ seizures last seen 3/7  - Continue Briviact 100 mg TID and Vimpat 200 mg BID   - 3/15 EEG: Negative for seizures.    Diagnosis: Hypertension  Assessment and Plan of Treatment: SBP goal:   - Coreg 25 mg BID, Amlodipine 10mg daily  - Clonidine patch held by NSCU in setting of borderline bp prior to transfer   - Monitor BP and possible need to resume   - Echo: LVEF 70-75%, Grade II diastolic dysfunction, septal bulge without obstruction  - cardiology seen and appreciated    Diagnosis: Kidney transplant recipient  Assessment and Plan of Treatment: -AVF in the left upper extremity  - s/p renal transplant in 2019  - prednisone 5mg, bactrim ppx  - tacro 3 mg BID as per transplant nephro  - tacro goal 4-7, daily tacro levels 30 mins prior to dose administration  - Continue Free water   - Continue Lokelma EOD  - Feeds changed to Nepro w/ improved hyperkalemia.    Diagnosis: Dysphagia  Assessment and Plan of Treatment: - S/p PEG 3/8  - tolerating tube feeds  H. 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   - Repeat stool Ag in 8 weeks to confirm clearance Post treatement.    Diagnosis: DVT, lower extremity  Assessment and Plan of Treatment: VA Duplex 3/2: DVT RT cfv, femoral, popliteal and gastrocnemius veins; DVT LEFT cfv and femoral vein  - S/p IVCF by IR on 3/3  - Lovenox held in setting of increased YON drain output after cranioplasty, unable to utilized STD's due to right leg DVT, patient has ATK left amputation.  - YON drain removed and Heparin SQ restarted.      Diagnosis: Hyperglycemia  Assessment and Plan of Treatment: Goal euglycemia (-180), A1C: 5.4%  - Required ISS post op in setting of hyperglycemia   - Cont Lantus and ISS   - Monitor BGMs.    Diagnosis: Wound of skin  Assessment and Plan of Treatment: Patient with Stage 3 Sacral Pressure Injury   - Continue Local wound care   - Frequent Turning and repositioning  - Wound care team continues to follow.    Diagnosis: UTI (urinary tract infection)  Assessment and Plan of Treatment: Urine Cx 3/10: ESBL Klebsiella and VRE 10-49 K   - Treated with Meropenem 1GM Q12H 3/12 --> 3/19  - Currently remains off ABX.     PRINCIPAL DISCHARGE DIAGNOSIS  Diagnosis: ICH (intracerebral hemorrhage)  Assessment and Plan of Treatment: - Found to have L IPH on CTH likely 2/2 amyloid angiopathy  - S/p L hemicrani for evacuation of large ICH; bone discarded  - CT H Stereo 3/12: S/p Left Frontal Craniectomy  - CT Head 3/30: Improved lft frontal hemorrhage and extradural fluid collections   - S/p Cranioplasty on 4/2; Post op head CT Stable   - Subgaleal YON Drain removed 4/6  - Will need to follow up with  as outpatient for cranial staple removal   - Maintain Neuro checks.        SECONDARY DISCHARGE DIAGNOSES  Diagnosis: ARF (acute respiratory failure)  Assessment and Plan of Treatment: - S/p trach 3/8 by surgery by Dr. Joselo Mayorga   - Tracheostomy Sutures removed 3/13  - Patient previously was weaned to tc ATC while in the [facility]   - However Patient returned to full vent support post cranioplasty   - Tolerates PSV 8/5; Continue weaning attempts as tolerated  - Vent Settings: Mode PRVC TV: 450 PEEP: 5 RR: 14 Fio2: 40%  - Continue airway clearance; Duoneb q6h PRN.    Diagnosis: Seizures  Assessment and Plan of Treatment: - S/p EEG w/ seizures last seen 3/7  - Continue Briviact 100 mg TID and Vimpat 200 mg BID   - 3/15 EEG: Negative for seizures.    Diagnosis: UTI (urinary tract infection)  Assessment and Plan of Treatment: - Urine Cx 3/10: ESBL Klebsiella and VRE 10-49 K   - Treated with Meropenem 1GM Q12H 3/12 --> 3/19  - Currently remains off ABX.    Diagnosis: Hypertension  Assessment and Plan of Treatment: - Coreg 25 mg BID, Amlodipine 10mg daily  - Clonidine 0.1 mg q 8 hrs ( Hold for sbp <110 and HR < 60 )  - Monitor BP and HR  - Echo: LVEF 70-75%, Grade II diastolic dysfunction, septal bulge without obstruction  - Cardiology  followed inpatient    Diagnosis: Kidney transplant recipient  Assessment and Plan of Treatment: -AVF in the left upper extremity  - s/p renal transplant in 2019  - prednisone 5mg, bactrim ppx  - tacro 3 mg BID as per transplant nephro  - tacro goal 4-7, daily tacro levels 30 mins prior to dose administration  - Continue Free water   - Feeds changed to Nepro w/ improved hyperkalemia  - Continue to monitor potassium as outpatient    Diagnosis: Hyperglycemia  Assessment and Plan of Treatment: - Goal euglycemia (-180), A1C: 5.4%  - Cont Lantus and ISS   - Monitor BGMs    Diagnosis: Dysphagia  Assessment and Plan of Treatment: - S/p PEG 3/8  - tolerating tube feeds      Diagnosis: Gastritis, Helicobacter pylori  Assessment and Plan of Treatment: - EGD: 5cm hiatal hernia, 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   - Repeat stool Ag in 8 weeks to confirm clearance Post treatement.    Diagnosis: DVT, lower extremity  Assessment and Plan of Treatment: VA Duplex 3/2: DVT RT cfv, femoral, popliteal and gastrocnemius veins; DVT LEFT cfv and femoral vein  - S/p IVCF by IR on 3/3  - Lovenox held in setting of increased YON drain output after cranioplasty, unable to utilized STD's due to right leg DVT, patient has ATK left amputation.  - YON drain removed and Heparin SQ restarted.      Diagnosis: Wound of skin  Assessment and Plan of Treatment: -Patient with Stage 3 Sacral Pressure Injury   - Continue Local wound care   - Frequent Turning and repositioning  - Patient to f/u with wound care team as outpatient     PRINCIPAL DISCHARGE DIAGNOSIS  Diagnosis: ICH (intracerebral hemorrhage)  Assessment and Plan of Treatment: - Found to have L IPH on CTH likely 2/2 amyloid angiopathy  - S/p L hemicrani for evacuation of large ICH; bone discarded  - CT H Stereo 3/12: S/p Left Frontal Craniectomy  - CT Head 3/30: Improved lft frontal hemorrhage and extradural fluid collections   - S/p Cranioplasty on 4/2; Post op head CT Stable   - Subgaleal YON Drain removed 4/6  - Will need to follow up with  as outpatient for cranial staple removal 2 weeks after discharge      SECONDARY DISCHARGE DIAGNOSES  Diagnosis: ARF (acute respiratory failure)  Assessment and Plan of Treatment: - S/p trach 3/8 by surgery by Dr. Joselo Mayorga   - Tracheostomy Sutures removed 3/13  - Patient previously was weaned to tc ATC while in the [facility]   - However Patient returned to full vent support post cranioplasty   - Tolerates PSV 8/5; Continue weaning attempts as tolerated  - Vent Settings: Mode PRVC TV: 450 PEEP: 5 RR: 14 Fio2: 40%  - Continue airway clearance; Duoneb q6h PRN.    Diagnosis: Seizures  Assessment and Plan of Treatment: - S/p EEG w/ seizures last seen 3/7  - Continue Briviact 100 mg TID and Vimpat 200 mg BID   - 3/15 EEG: Negative for seizures.    Diagnosis: UTI (urinary tract infection)  Assessment and Plan of Treatment: - Urine Cx 3/10: ESBL Klebsiella and VRE 10-49 K   - Treated with Meropenem 1GM Q12H 3/12 --> 3/19  - Currently remains off ABX.    Diagnosis: Hypertension  Assessment and Plan of Treatment: - Coreg 25 mg BID, Amlodipine 10mg daily  - Clonidine 0.1 mg q 8 hrs ( Hold for sbp <110 and HR < 60 )  - Monitor BP and HR  - Echo: LVEF 70-75%, Grade II diastolic dysfunction, septal bulge without obstruction  - Cardiology  followed inpatient    Diagnosis: Kidney transplant recipient  Assessment and Plan of Treatment: -AVF in the left upper extremity  - s/p renal transplant in 2019  - prednisone 5mg, bactrim ppx  - tacro 3 mg BID as per transplant nephro  - tacro goal 4-7, daily tacro levels 30 mins prior to dose administration  - Continue Free water   - Feeds changed to Nepro w/ improved hyperkalemia  - Continue to monitor potassium as outpatient  - F/U with transplant nephrologist in 2 weeks    Diagnosis: Hyperglycemia  Assessment and Plan of Treatment: - Goal euglycemia (-180), A1C: 5.4%  - Cont Lantus and ISS   - Monitor BGMs    Diagnosis: Dysphagia  Assessment and Plan of Treatment: - S/p PEG 3/8  - tolerating tube feeds      Diagnosis: DVT, lower extremity  Assessment and Plan of Treatment: VA Duplex 3/2: DVT RT cfv, femoral, popliteal and gastrocnemius veins; DVT LEFT cfv and femoral vein  - S/p IVCF by IR on 3/3  - Lovenox held in setting of increased YON drain output after cranioplasty, unable to utilized STD's due to right leg DVT, patient has ATK left amputation.  - YON drain removed and Heparin SQ restarted.      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   - Repeat stool Ag in 8 weeks to confirm clearance Post treatement.

## 2024-04-09 NOTE — DISCHARGE NOTE PROVIDER - CARE PROVIDER_API CALL
Damián Perea  Nephrology  [address] [place] [address]  Phone: [phone]  Fax: [phone]  Follow Up Time:     Joselo Goncalves  Interventional Radiology and Diagnostic Radiology  [address] [facility], [place] [address]  Phone: [phone]  Fax: [phone]  Follow Up Time:     Antione Torres  Neurosurgery  [address] [facility], [address]  [place] [address]  Phone: [phone]  Fax: [phone]  Follow Up Time:

## 2024-04-09 NOTE — DISCHARGE NOTE PROVIDER - PROVIDER TOKENS
PROVIDER:[TOKEN:[[id number]]],PROVIDER:[TOKEN:[[id number]]],PROVIDER:[TOKEN:[[id number]]]

## 2024-04-09 NOTE — DISCHARGE NOTE PROVIDER - NSDCFUADDINST_GEN_ALL_CORE_FT
Sacral Stage 3 pressure injury  Buttocks/ Sacrum change to TRIAD BID  and prn soiling   Continue w/ attends under pads and Pericare w/ purewick as per protocol  Moisturize intact skin w/ SWEEN cream BID  Continue turning and positioning w/ offloading assistive devices as per protocol  Waffle Cushion to chair when oob to chair  Continue w/ low air loss pressure redistribution bed surface   Upon discharge f/u as outpatient at Wound [facility] [address] [facility] [phone]

## 2024-04-09 NOTE — PROGRESS NOTE ADULT - ASSESSMENT
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 Hospital course c/b extensive LE DVTs, S/p IVCF on 3/3. She is s/p trach/peg 3/8/24.  Febrile 3/10 with + UA, blood/sputum culture NGTD.  Treatment for UTI initiated with ceftriaxone, eventually broadened to cefepime. Transferred to [facility] 3/11 for continued management. Urine culture resulted positive for klebsiella, treated for 7 days with Meropenem 3/12 --> 3/19. Patient weaned from MV, tolerating TC ATC since 3/23. Patient S/p Cranioplasty on 4/2.      4/8: Cr stable s/p IVF since 4/7, Continue PST as tolerated. DC planning in progress. 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 Hospital course c/b extensive LE DVTs, S/p IVCF on 3/3. She is s/p trach/peg 3/8/24.  Febrile 3/10 with + UA, blood/sputum culture NGTD.  Treatment for UTI initiated with ceftriaxone, eventually broadened to cefepime. Transferred to [facility] 3/11 for continued management. Urine culture resulted positive for klebsiella, treated for 7 days with Meropenem 3/12 --> 3/19. Patient weaned from MV, tolerating TC ATC since 3/23. Patient S/p Cranioplasty on 4/2.      4/8: Cr stable s/p IVF since 4/7, Continue PST as tolerated. DC planning in progress. Transplant nephrology asked to confirm if treatment/dosing  with PCP ppx will need to continue, awaiting recc from transplant fellow.   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 Hospital course c/b extensive LE DVTs, S/p IVCF on 3/3. She is s/p trach/peg 3/8/24.  Febrile 3/10 with + UA, blood/sputum culture NGTD.  Treatment for UTI initiated with ceftriaxone, eventually broadened to cefepime. Transferred to [facility] 3/11 for continued management. Urine culture resulted positive for klebsiella, treated for 7 days with Meropenem 3/12 --> 3/19. Patient weaned from MV, tolerating TC ATC since 3/23. Patient S/p Cranioplasty on 4/2.      4/8: Cr stable s/p IVF since 4/7, Continue PST as tolerated. DC planning in progress. Transplant nephrology confirmed patient will need lifelong bactrim ppx for high infection risk.

## 2024-04-10 LAB
ALBUMIN SERPL ELPH-MCNC: 3.5 G/DL — SIGNIFICANT CHANGE UP (ref 3.3–5)
ALP SERPL-CCNC: 88 U/L — SIGNIFICANT CHANGE UP (ref 40–120)
ALT FLD-CCNC: 9 U/L — LOW (ref 10–45)
ANION GAP SERPL CALC-SCNC: 13 MMOL/L — SIGNIFICANT CHANGE UP (ref 5–17)
AST SERPL-CCNC: 16 U/L — SIGNIFICANT CHANGE UP (ref 10–40)
BILIRUB SERPL-MCNC: 0.2 MG/DL — SIGNIFICANT CHANGE UP (ref 0.2–1.2)
BUN SERPL-MCNC: 54 MG/DL — HIGH (ref 7–23)
CALCIUM SERPL-MCNC: 10.5 MG/DL — SIGNIFICANT CHANGE UP (ref 8.4–10.5)
CHLORIDE SERPL-SCNC: 101 MMOL/L — SIGNIFICANT CHANGE UP (ref 96–108)
CO2 SERPL-SCNC: 22 MMOL/L — SIGNIFICANT CHANGE UP (ref 22–31)
CREAT SERPL-MCNC: 1.19 MG/DL — SIGNIFICANT CHANGE UP (ref 0.5–1.3)
EGFR: 47 ML/MIN/1.73M2 — LOW
GLUCOSE BLDC GLUCOMTR-MCNC: 168 MG/DL — HIGH (ref 70–99)
GLUCOSE BLDC GLUCOMTR-MCNC: 171 MG/DL — HIGH (ref 70–99)
GLUCOSE BLDC GLUCOMTR-MCNC: 219 MG/DL — HIGH (ref 70–99)
GLUCOSE BLDC GLUCOMTR-MCNC: 241 MG/DL — HIGH (ref 70–99)
GLUCOSE SERPL-MCNC: 213 MG/DL — HIGH (ref 70–99)
HCT VFR BLD CALC: 28.6 % — LOW (ref 34.5–45)
HGB BLD-MCNC: 9.3 G/DL — LOW (ref 11.5–15.5)
MAGNESIUM SERPL-MCNC: 2.2 MG/DL — SIGNIFICANT CHANGE UP (ref 1.6–2.6)
MCHC RBC-ENTMCNC: 30.5 PG — SIGNIFICANT CHANGE UP (ref 27–34)
MCHC RBC-ENTMCNC: 32.5 GM/DL — SIGNIFICANT CHANGE UP (ref 32–36)
MCV RBC AUTO: 93.8 FL — SIGNIFICANT CHANGE UP (ref 80–100)
NRBC # BLD: 0 /100 WBCS — SIGNIFICANT CHANGE UP (ref 0–0)
PHOSPHATE SERPL-MCNC: 2.4 MG/DL — LOW (ref 2.5–4.5)
PLATELET # BLD AUTO: 151 K/UL — SIGNIFICANT CHANGE UP (ref 150–400)
POTASSIUM SERPL-MCNC: 4.2 MMOL/L — SIGNIFICANT CHANGE UP (ref 3.5–5.3)
POTASSIUM SERPL-SCNC: 4.2 MMOL/L — SIGNIFICANT CHANGE UP (ref 3.5–5.3)
PROT SERPL-MCNC: 6.6 G/DL — SIGNIFICANT CHANGE UP (ref 6–8.3)
RBC # BLD: 3.05 M/UL — LOW (ref 3.8–5.2)
RBC # FLD: 16 % — HIGH (ref 10.3–14.5)
SODIUM SERPL-SCNC: 136 MMOL/L — SIGNIFICANT CHANGE UP (ref 135–145)
TACROLIMUS SERPL-MCNC: 5.1 NG/ML — SIGNIFICANT CHANGE UP
WBC # BLD: 7.54 K/UL — SIGNIFICANT CHANGE UP (ref 3.8–10.5)
WBC # FLD AUTO: 7.54 K/UL — SIGNIFICANT CHANGE UP (ref 3.8–10.5)

## 2024-04-10 PROCEDURE — 99233 SBSQ HOSP IP/OBS HIGH 50: CPT | Mod: FS

## 2024-04-10 PROCEDURE — 71045 X-RAY EXAM CHEST 1 VIEW: CPT | Mod: 26

## 2024-04-10 RX ORDER — SODIUM,POTASSIUM PHOSPHATES 278-250MG
1 POWDER IN PACKET (EA) ORAL ONCE
Refills: 0 | Status: COMPLETED | OUTPATIENT
Start: 2024-04-10 | End: 2024-04-10

## 2024-04-10 RX ADMIN — Medication 1 PACKET(S): at 11:27

## 2024-04-10 RX ADMIN — Medication 1 TABLET(S): at 11:24

## 2024-04-10 RX ADMIN — CARVEDILOL PHOSPHATE 25 MILLIGRAM(S): 80 CAPSULE, EXTENDED RELEASE ORAL at 21:35

## 2024-04-10 RX ADMIN — Medication 4: at 05:33

## 2024-04-10 RX ADMIN — Medication 1 DROP(S): at 05:33

## 2024-04-10 RX ADMIN — Medication 1 TABLET(S): at 11:25

## 2024-04-10 RX ADMIN — BRIVARACETAM 100 MILLIGRAM(S): 25 TABLET, FILM COATED ORAL at 15:41

## 2024-04-10 RX ADMIN — Medication 1 DROP(S): at 14:22

## 2024-04-10 RX ADMIN — Medication 5 MILLIGRAM(S): at 21:35

## 2024-04-10 RX ADMIN — Medication 5 MILLILITER(S): at 17:52

## 2024-04-10 RX ADMIN — HEPARIN SODIUM 5000 UNIT(S): 5000 INJECTION INTRAVENOUS; SUBCUTANEOUS at 14:21

## 2024-04-10 RX ADMIN — Medication 500000 UNIT(S): at 23:22

## 2024-04-10 RX ADMIN — TACROLIMUS 3 MILLIGRAM(S): 5 CAPSULE ORAL at 05:33

## 2024-04-10 RX ADMIN — Medication 1 DROP(S): at 17:52

## 2024-04-10 RX ADMIN — Medication 2: at 12:55

## 2024-04-10 RX ADMIN — Medication 500000 UNIT(S): at 11:27

## 2024-04-10 RX ADMIN — Medication 500000 UNIT(S): at 17:51

## 2024-04-10 RX ADMIN — TACROLIMUS 3 MILLIGRAM(S): 5 CAPSULE ORAL at 17:52

## 2024-04-10 RX ADMIN — CARVEDILOL PHOSPHATE 25 MILLIGRAM(S): 80 CAPSULE, EXTENDED RELEASE ORAL at 11:22

## 2024-04-10 RX ADMIN — INSULIN GLARGINE 30 UNIT(S): 100 INJECTION, SOLUTION SUBCUTANEOUS at 22:54

## 2024-04-10 RX ADMIN — PANTOPRAZOLE SODIUM 40 MILLIGRAM(S): 20 TABLET, DELAYED RELEASE ORAL at 11:23

## 2024-04-10 RX ADMIN — BRIVARACETAM 100 MILLIGRAM(S): 25 TABLET, FILM COATED ORAL at 08:44

## 2024-04-10 RX ADMIN — Medication 0.1 MILLIGRAM(S): at 21:35

## 2024-04-10 RX ADMIN — Medication 5 MILLILITER(S): at 11:28

## 2024-04-10 RX ADMIN — BRIVARACETAM 100 MILLIGRAM(S): 25 TABLET, FILM COATED ORAL at 23:22

## 2024-04-10 RX ADMIN — PANTOPRAZOLE SODIUM 40 MILLIGRAM(S): 20 TABLET, DELAYED RELEASE ORAL at 21:35

## 2024-04-10 RX ADMIN — Medication 2: at 17:52

## 2024-04-10 RX ADMIN — AMLODIPINE BESYLATE 10 MILLIGRAM(S): 2.5 TABLET ORAL at 05:33

## 2024-04-10 RX ADMIN — Medication 1 DROP(S): at 01:58

## 2024-04-10 RX ADMIN — Medication 1 DROP(S): at 11:23

## 2024-04-10 RX ADMIN — Medication 5 MILLILITER(S): at 23:22

## 2024-04-10 RX ADMIN — HEPARIN SODIUM 5000 UNIT(S): 5000 INJECTION INTRAVENOUS; SUBCUTANEOUS at 21:35

## 2024-04-10 RX ADMIN — HEPARIN SODIUM 5000 UNIT(S): 5000 INJECTION INTRAVENOUS; SUBCUTANEOUS at 05:33

## 2024-04-10 RX ADMIN — Medication 1 DROP(S): at 21:36

## 2024-04-10 RX ADMIN — LACOSAMIDE 200 MILLIGRAM(S): 50 TABLET ORAL at 23:22

## 2024-04-10 RX ADMIN — Medication 4: at 23:23

## 2024-04-10 RX ADMIN — Medication 500000 UNIT(S): at 05:32

## 2024-04-10 RX ADMIN — Medication 5 MILLILITER(S): at 05:34

## 2024-04-10 RX ADMIN — LACOSAMIDE 200 MILLIGRAM(S): 50 TABLET ORAL at 11:29

## 2024-04-10 NOTE — PROGRESS NOTE ADULT - ASSESSMENT
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 Hospital course c/b extensive LE DVTs, S/p IVCF on 3/3. She is s/p trach/peg 3/8/24.  Febrile 3/10 with + UA, blood/sputum culture NGTD.  Treatment for UTI initiated with ceftriaxone, eventually broadened to cefepime. Transferred to [facility] 3/11 for continued management. Urine culture resulted positive for klebsiella, treated for 7 days with Meropenem 3/12-3/19. Patient S/p Cranioplasty on 4/2. Patient was initially weaned to TC ATC in the R[facility] but post cranioplasty required Mechanical Ventilation.     4/10: No events reported overnight, Patient tolerating PST yesterday will continue to progress as tolerated. D/c planning in progress. 77 yo F with PMHx ESRD s/p renal transplant (2019, now off HD), left AKA, BK viremia, HTN, breast ca s/p lumpectomy (completed Tamoxifen 03/2023), DVT (off Eliquis), HLD, gout presenting 3/1 with left ICH (ICH score 4) likely 2/2 amyloid angiopathy s/p left craniectomy(discarded) and evacuation as well as EVD placement and removal (3/7). Hospital course c/b extensive LE DVTs, S/p IVCF on 3/3. She is s/p trach/peg 3/8/24.  Febrile 3/10 with + UA, blood/sputum culture NGTD.  Treatment for UTI initiated with ceftriaxone, eventually broadened to cefepime. Transferred to [facility] 3/11 for continued management. Urine culture resulted positive for klebsiella, treated for 7 days with Meropenem 3/12-3/19. Patient S/p Cranioplasty on 4/2. Patient was initially weaned to TC ATC in the [facility] but post cranioplasty required Mechanical Ventilation.     4/10: No events reported overnight, Patient previously tolerating TC prior to cranioplasty. Will repeat CXR Today and continue Pressure Support Trials as tolerated. Patient previously on Clonidine patch was dcd while in the NSCU Postoperatively. SBP elevated will resume Clonidine patch today.  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 Hospital course c/b extensive LE DVTs, S/p IVCF on 3/3. She is s/p trach/peg 3/8/24.  Febrile 3/10 with + UA, blood/sputum culture NGTD.  Treatment for UTI initiated with ceftriaxone, eventually broadened to cefepime. Transferred to [facility] 3/11 for continued management. Urine culture resulted positive for klebsiella, treated for 7 days with Meropenem 3/12-3/19. Patient S/p Cranioplasty on 4/2. Patient was initially weaned to TC ATC in the R[facility] but post cranioplasty required Mechanical Ventilation.     4/10: No events reported overnight, Patient previously tolerating TC prior to cranioplasty. Will repeat CXR Today and continue Pressure Support Trials as tolerated. Patient previously on Clonidine patch was dcd while in the NSCU Postoperatively. SBP elevated will resume enteral Clonidine today.

## 2024-04-10 NOTE — PROGRESS NOTE ADULT - PROBLEM SELECTOR PLAN 7
- Goal euglycemia (-180), A1C: 5.4%  - Required ISS post op in setting of hyperglycemia   - Cont Lantus and ISS   - Monitor BGMs - Goal euglycemia (-180), A1C: 5.4%  - Required ISS post op in setting of hyperglycemia; Since resolved  - Cont Lantus and ISS   - Monitor BGMs

## 2024-04-10 NOTE — PROGRESS NOTE ADULT - PROBLEM SELECTOR PLAN 9
- VA Duplex 3/2: DVT RT cfv, femoral, popliteal and gastrocnemius veins; DVT LEFT cfv and femoral vein  - S/p IVCF by IR on 3/3  - Lovenox held in setting of increased YON drain output after cranioplasty, unable to utilized STD's due to right leg DVT, patient has ATK left amputation.  - YON drain removed and Heparin SQ restarted. - VA Duplex 3/2: DVT RT cfv, femoral, popliteal and gastrocnemius veins; DVT LEFT cfv and femoral vein  - S/p IVCF by IR on 3/3  - Cont Heparin Sub Q DVT PPX

## 2024-04-10 NOTE — PROGRESS NOTE ADULT - PROBLEM SELECTOR PLAN 1
- Found to have L IPH on CTH likely 2/2 amyloid angiopathy  - S/p L hemicrani for evacuation of large ICH; bone discarded  - CT H Stereo 3/12: S/p Left Frontal Craniectomy  - CT Head 3/30: Improved lft frontal hemorrhage and extradural fluid collections   - S/p Cranioplasty on 4/2; Post op head CT Stable   - Subgaleal YON Drain removed 4/6  - Maintain Neuro checks

## 2024-04-10 NOTE — PROGRESS NOTE ADULT - SUBJECTIVE AND OBJECTIVE BOX
Patient is a 78y old  Female who presents with a chief complaint of ICH (09 Apr 2024 18:26)      Interval Events: No events reported overnight     REVIEW OF SYSTEMS:  [ ] Positive  [ ] All other systems negative  [ ] Unable to assess ROS because ________    Vital Signs Last 24 Hrs  T(C): 36.8 (04-10-24 @ 04:42), Max: 36.8 (04-09-24 @ 22:12)  T(F): 98.2 (04-10-24 @ 04:42), Max: 98.2 (04-09-24 @ 22:12)  HR: 88 (04-10-24 @ 05:30) (75 - 89)  BP: 159/83 (04-10-24 @ 05:30) (119/80 - 162/80)  RR: 15 (04-10-24 @ 05:30) (12 - 15)  SpO2: 100% (04-10-24 @ 05:30) (99% - 100%)    PHYSICAL EXAM:  HEENT:   [ ]Tracheostomy:  [ ]Pupils equal  [ ]No oral lesions  [ ]Abnormal    SKIN  [ ]No Rash  [ ] Abnormal  [ ] pressure    CARDIAC  [ ]Regular  [ ]Abnormal    PULMONARY  [ ]Bilateral Clear Breath Sounds  [ ]Normal Excursion  [ ]Abnormal    GI  [ ]PEG      [ ] +BS		              [ ]Soft, nondistended, nontender	  [ ]Abnormal    MUSCULOSKELETAL                                   [ ]Bedbound                 [ ]Abnormal    [ ]Ambulatory/OOB to chair                           EXTREMITIES                                         [ ]Normal  [ ]Edema                           NEUROLOGIC  [ ] Normal, non focal  [ ] Focal findings:    PSYCHIATRIC  [ ]Alert and appropriate  [ ] Sedated	 [ ]Agitated    :  Gardner: [ ] Yes, if yes: Date of Placement:                   [  ] No    LINES: Central Lines [ ] Yes, if yes: Date of Placement                                     [  ] No    HOSPITAL MEDICATIONS:  MEDICATIONS  (STANDING):  amLODIPine   Tablet 10 milliGRAM(s) Oral daily  artificial  tears Solution 1 Drop(s) Both EYES every 4 hours  Biotene Dry Mouth Oral Rinse 5 milliLiter(s) Swish and Spit every 6 hours  brivaracetam Oral Solution 100 milliGRAM(s) Oral <User Schedule>  carvedilol 25 milliGRAM(s) Oral every 12 hours  dextrose 5%. 1000 milliLiter(s) (50 mL/Hr) IV Continuous <Continuous>  dextrose 5%. 1000 milliLiter(s) (100 mL/Hr) IV Continuous <Continuous>  dextrose 50% Injectable 25 Gram(s) IV Push once  glucagon  Injectable 1 milliGRAM(s) IntraMuscular once  heparin   Injectable 5000 Unit(s) SubCutaneous every 8 hours  insulin glargine Injectable (LANTUS) 30 Unit(s) SubCutaneous at bedtime  insulin lispro (ADMELOG) corrective regimen sliding scale   SubCutaneous every 6 hours  lacosamide Solution 200 milliGRAM(s) Oral <User Schedule>  multivitamin 1 Tablet(s) Oral daily  nystatin    Suspension 569866 Unit(s) Swish and Swallow every 6 hours  pantoprazole   Suspension 40 milliGRAM(s) Oral two times a day  predniSONE   Tablet 5 milliGRAM(s) Oral daily  sodium zirconium cyclosilicate 10 Gram(s) Oral every other day  tacrolimus    0.5 mG/mL Suspension 3 milliGRAM(s) Oral every 12 hours  trimethoprim   80 mG/sulfamethoxazole 400 mG 1 Tablet(s) Oral daily    MEDICATIONS  (PRN):  acetaminophen     Tablet .. 650 milliGRAM(s) Oral every 6 hours PRN Mild Pain (1 - 3)  acetaminophen   IVPB .. 1000 milliGRAM(s) IV Intermittent every 6 hours PRN Temp greater or equal to 38.5C (101.3F), Severe Pain (7 - 10)  albuterol/ipratropium for Nebulization 3 milliLiter(s) Nebulizer every 6 hours PRN Shortness of Breath and/or Wheezing      LABS:                        9.3    7.54  )-----------( 151      ( 10 Apr 2024 06:34 )             28.6     04-10    136  |  101  |  54<H>  ----------------------------<  213<H>  4.2   |  22  |  1.19    Ca    10.5      10 Apr 2024 06:34  Phos  2.4     04-10  Mg     2.2     04-10    TPro  6.6  /  Alb  3.5  /  TBili  0.2  /  DBili  x   /  AST  16  /  ALT  9<L>  /  AlkPhos  88  04-10      Urinalysis Basic - ( 10 Apr 2024 06:34 )    Color: x / Appearance: x / SG: x / pH: x  Gluc: 213 mg/dL / Ketone: x  / Bili: x / Urobili: x   Blood: x / Protein: x / Nitrite: x   Leuk Esterase: x / RBC: x / WBC x   Sq Epi: x / Non Sq Epi: x / Bacteria: x          CAPILLARY BLOOD GLUCOSE    MICROBIOLOGY:     RADIOLOGY:  [ ] Reviewed and interpreted by me    Mode: AC/ CMV (Assist Control/ Continuous Mandatory Ventilation)  RR (machine): 14  TV (machine): 450  FiO2: 40  PEEP: 5  ITime: 1  MAP: 10  PIP: 22   Patient is a 78y old  Female who presents with a chief complaint of ICH (09 Apr 2024 18:26)      Interval Events: No events reported overnight     REVIEW OF SYSTEMS:  [ ] Positive  [ ] All other systems negative  [x] Unable to assess ROS because patient is Not answering Verbal Questioning     Vital Signs Last 24 Hrs  T(C): 36.8 (04-10-24 @ 04:42), Max: 36.8 (04-09-24 @ 22:12)  T(F): 98.2 (04-10-24 @ 04:42), Max: 98.2 (04-09-24 @ 22:12)  HR: 88 (04-10-24 @ 05:30) (75 - 89)  BP: 159/83 (04-10-24 @ 05:30) (119/80 - 162/80)  RR: 15 (04-10-24 @ 05:30) (12 - 15)  SpO2: 100% (04-10-24 @ 05:30) (99% - 100%)    PHYSICAL EXAM:  HEENT:   [x]Tracheostomy: #7 Cuffed Portex  [x]Pupils equal  [ ]No oral lesions  [x]Abnormal: S/p Cranioplasty, Site remains w/ staples C/D/I    SKIN  [ ]No Rash  [x] Abnormal: LUE AVF  [x] pressure: Stage 3 sacral pressure injury     CARDIAC  [x]Regular  [ ]Abnormal    PULMONARY  [x]Bilateral Clear Breath Sounds  [ ]Normal Excursion  [ ]Abnormal     GI  [x]PEG site c/d/i    [x] +BS		              [x]Soft, nondistended, nontender	  [ ]Abnormal    MUSCULOSKELETAL                                   [x]Bedbound                 [x]Abnormal: L AKA  [ ]Ambulatory/OOB to chair                           EXTREMITIES                                         [x]Normal  [ ]Edema                           NEUROLOGIC  [ ] Normal, non focal  [x] Focal findings: Eyes Opens intermittently, moves LUE spontaneously, and withdraws in RLE extremities to noxious stimuli.    PSYCHIATRIC  [x]Obtunded  [ ] Sedated	 [ ]Agitated    :  Gardner: [ ] Yes, if yes: Date of Placement:                   [x] No; Straight Cath x 1    LINES: Central Lines [ ] Yes, if yes: Date of Placement                                     [x] No      HOSPITAL MEDICATIONS:  MEDICATIONS  (STANDING):  amLODIPine   Tablet 10 milliGRAM(s) Oral daily  artificial  tears Solution 1 Drop(s) Both EYES every 4 hours  Biotene Dry Mouth Oral Rinse 5 milliLiter(s) Swish and Spit every 6 hours  brivaracetam Oral Solution 100 milliGRAM(s) Oral <User Schedule>  carvedilol 25 milliGRAM(s) Oral every 12 hours  dextrose 5%. 1000 milliLiter(s) (50 mL/Hr) IV Continuous <Continuous>  dextrose 5%. 1000 milliLiter(s) (100 mL/Hr) IV Continuous <Continuous>  dextrose 50% Injectable 25 Gram(s) IV Push once  glucagon  Injectable 1 milliGRAM(s) IntraMuscular once  heparin   Injectable 5000 Unit(s) SubCutaneous every 8 hours  insulin glargine Injectable (LANTUS) 30 Unit(s) SubCutaneous at bedtime  insulin lispro (ADMELOG) corrective regimen sliding scale   SubCutaneous every 6 hours  lacosamide Solution 200 milliGRAM(s) Oral <User Schedule>  multivitamin 1 Tablet(s) Oral daily  nystatin    Suspension [id number] Unit(s) Swish and Swallow every 6 hours  pantoprazole   Suspension 40 milliGRAM(s) Oral two times a day  predniSONE   Tablet 5 milliGRAM(s) Oral daily  sodium zirconium cyclosilicate 10 Gram(s) Oral every other day  tacrolimus    0.5 mG/mL Suspension 3 milliGRAM(s) Oral every 12 hours  trimethoprim   80 mG/sulfamethoxazole 400 mG 1 Tablet(s) Oral daily    MEDICATIONS  (PRN):  acetaminophen     Tablet .. 650 milliGRAM(s) Oral every 6 hours PRN Mild Pain (1 - 3)  acetaminophen   IVPB .. 1000 milliGRAM(s) IV Intermittent every 6 hours PRN Temp greater or equal to 38.5C (101.3F), Severe Pain (7 - 10)  albuterol/ipratropium for Nebulization 3 milliLiter(s) Nebulizer every 6 hours PRN Shortness of Breath and/or Wheezing      LABS:                        9.3    7.54  )-----------( 151      ( 10 Apr 2024 06:34 )             28.6     04-10    136  |  101  |  54<H>  ----------------------------<  213<H>  4.2   |  22  |  1.19    Ca    10.5      10 Apr 2024 06:34  Phos  2.4     04-10  Mg     2.2     04-10    TPro  6.6  /  Alb  3.5  /  TBili  0.2  /  DBili  x   /  AST  16  /  ALT  9<L>  /  AlkPhos  88  04-10      Urinalysis Basic - ( 10 Apr 2024 06:34 )    Color: x / Appearance: x / SG: x / pH: x  Gluc: 213 mg/dL / Ketone: x  / Bili: x / Urobili: x   Blood: x / Protein: x / Nitrite: x   Leuk Esterase: x / RBC: x / WBC x   Sq Epi: x / Non Sq Epi: x / Bacteria: x          CAPILLARY BLOOD GLUCOSE    MICROBIOLOGY:     RADIOLOGY:  [ ] Reviewed and interpreted by me    Mode: AC/ CMV (Assist Control/ Continuous Mandatory Ventilation)  RR (machine): 14  TV (machine): 450  FiO2: 40  PEEP: 5  ITime: 1  MAP: 10  PIP: 22   Patient is a 78y old  Female who presents with a chief complaint of ICH (09 Apr 2024 18:26)      Interval Events: No events reported overnight     REVIEW OF SYSTEMS:  [ ] Positive  [ ] All other systems negative  [x] Unable to assess ROS because patient is Not answering Verbal Questioning     Vital Signs Last 24 Hrs  T(C): 36.8 (04-10-24 @ 04:42), Max: 36.8 (04-09-24 @ 22:12)  T(F): 98.2 (04-10-24 @ 04:42), Max: 98.2 (04-09-24 @ 22:12)  HR: 88 (04-10-24 @ 05:30) (75 - 89)  BP: 159/83 (04-10-24 @ 05:30) (119/80 - 162/80)  RR: 15 (04-10-24 @ 05:30) (12 - 15)  SpO2: 100% (04-10-24 @ 05:30) (99% - 100%)    PHYSICAL EXAM:  HEENT:   [x]Tracheostomy: #7 Cuffed Portex  [x]Pupils equal  [ ]No oral lesions  [x]Abnormal: S/p Cranioplasty, Site remains w/ staples C/D/I    SKIN  [ ]No Rash  [x] Abnormal: LUE AVF  [x] pressure: Stage 3 sacral pressure injury     CARDIAC  [x]Regular  [ ]Abnormal    PULMONARY  [x]Bilateral Clear Breath Sounds  [ ]Normal Excursion  [ ]Abnormal     GI  [x]PEG     [x] +BS		              [x]Soft, nondistended, nontender	  [ ]Abnormal    MUSCULOSKELETAL                                   [x]Bedbound                 [x]Abnormal: L AKA  [ ]Ambulatory/OOB to chair                           EXTREMITIES                                         [x]Normal  [ ]Edema                           NEUROLOGIC  [ ] Normal, non focal  [x] Focal findings: Eyes Opens intermittently, moves LUE spontaneously, and withdraws in RLE extremities to noxious stimuli.    PSYCHIATRIC  [x]Obtunded  [ ] Sedated	 [ ]Agitated    :  Gardner: [ ] Yes, if yes: Date of Placement:                   [x] No; Straight Cath x 1    LINES: Central Lines [ ] Yes, if yes: Date of Placement                                     [x] No      HOSPITAL MEDICATIONS:  MEDICATIONS  (STANDING):  amLODIPine   Tablet 10 milliGRAM(s) Oral daily  artificial  tears Solution 1 Drop(s) Both EYES every 4 hours  Biotene Dry Mouth Oral Rinse 5 milliLiter(s) Swish and Spit every 6 hours  brivaracetam Oral Solution 100 milliGRAM(s) Oral <User Schedule>  carvedilol 25 milliGRAM(s) Oral every 12 hours  dextrose 5%. 1000 milliLiter(s) (50 mL/Hr) IV Continuous <Continuous>  dextrose 5%. 1000 milliLiter(s) (100 mL/Hr) IV Continuous <Continuous>  dextrose 50% Injectable 25 Gram(s) IV Push once  glucagon  Injectable 1 milliGRAM(s) IntraMuscular once  heparin   Injectable 5000 Unit(s) SubCutaneous every 8 hours  insulin glargine Injectable (LANTUS) 30 Unit(s) SubCutaneous at bedtime  insulin lispro (ADMELOG) corrective regimen sliding scale   SubCutaneous every 6 hours  lacosamide Solution 200 milliGRAM(s) Oral <User Schedule>  multivitamin 1 Tablet(s) Oral daily  nystatin    Suspension 307301 Unit(s) Swish and Swallow every 6 hours  pantoprazole   Suspension 40 milliGRAM(s) Oral two times a day  predniSONE   Tablet 5 milliGRAM(s) Oral daily  sodium zirconium cyclosilicate 10 Gram(s) Oral every other day  tacrolimus    0.5 mG/mL Suspension 3 milliGRAM(s) Oral every 12 hours  trimethoprim   80 mG/sulfamethoxazole 400 mG 1 Tablet(s) Oral daily    MEDICATIONS  (PRN):  acetaminophen     Tablet .. 650 milliGRAM(s) Oral every 6 hours PRN Mild Pain (1 - 3)  acetaminophen   IVPB .. 1000 milliGRAM(s) IV Intermittent every 6 hours PRN Temp greater or equal to 38.5C (101.3F), Severe Pain (7 - 10)  albuterol/ipratropium for Nebulization 3 milliLiter(s) Nebulizer every 6 hours PRN Shortness of Breath and/or Wheezing      LABS:                        9.3    7.54  )-----------( 151      ( 10 Apr 2024 06:34 )             28.6     04-10    136  |  101  |  54<H>  ----------------------------<  213<H>  4.2   |  22  |  1.19    Ca    10.5      10 Apr 2024 06:34  Phos  2.4     04-10  Mg     2.2     04-10    TPro  6.6  /  Alb  3.5  /  TBili  0.2  /  DBili  x   /  AST  16  /  ALT  9<L>  /  AlkPhos  88  04-10      Urinalysis Basic - ( 10 Apr 2024 06:34 )    Color: x / Appearance: x / SG: x / pH: x  Gluc: 213 mg/dL / Ketone: x  / Bili: x / Urobili: x   Blood: x / Protein: x / Nitrite: x   Leuk Esterase: x / RBC: x / WBC x   Sq Epi: x / Non Sq Epi: x / Bacteria: x          CAPILLARY BLOOD GLUCOSE    MICROBIOLOGY:     RADIOLOGY:  [ ] Reviewed and interpreted by me    Mode: AC/ CMV (Assist Control/ Continuous Mandatory Ventilation)  RR (machine): 14  TV (machine): 450  FiO2: 40  PEEP: 5  ITime: 1  MAP: 10  PIP: 22

## 2024-04-10 NOTE — PROGRESS NOTE ADULT - SUBJECTIVE AND OBJECTIVE BOX
INTERVAL HPI/OVERNIGHT EVENTS:    no new gi events   tolerating feeds    MEDICATIONS  (STANDING):  amLODIPine   Tablet 10 milliGRAM(s) Oral daily  artificial  tears Solution 1 Drop(s) Both EYES every 4 hours  Biotene Dry Mouth Oral Rinse 5 milliLiter(s) Swish and Spit every 6 hours  brivaracetam Oral Solution 100 milliGRAM(s) Oral <User Schedule>  carvedilol 25 milliGRAM(s) Oral every 12 hours  dextrose 5%. 1000 milliLiter(s) (50 mL/Hr) IV Continuous <Continuous>  dextrose 5%. 1000 milliLiter(s) (100 mL/Hr) IV Continuous <Continuous>  dextrose 50% Injectable 25 Gram(s) IV Push once  glucagon  Injectable 1 milliGRAM(s) IntraMuscular once  heparin   Injectable 5000 Unit(s) SubCutaneous every 8 hours  insulin glargine Injectable (LANTUS) 30 Unit(s) SubCutaneous at bedtime  insulin lispro (ADMELOG) corrective regimen sliding scale   SubCutaneous every 6 hours  lacosamide Solution 200 milliGRAM(s) Oral <User Schedule>  multivitamin 1 Tablet(s) Oral daily  nystatin    Suspension 283328 Unit(s) Swish and Swallow every 6 hours  pantoprazole   Suspension 40 milliGRAM(s) Oral two times a day  predniSONE   Tablet 5 milliGRAM(s) Oral daily  sodium zirconium cyclosilicate 10 Gram(s) Oral every other day  tacrolimus    0.5 mG/mL Suspension 3 milliGRAM(s) Oral every 12 hours  trimethoprim   80 mG/sulfamethoxazole 400 mG 1 Tablet(s) Oral daily    MEDICATIONS  (PRN):  acetaminophen     Tablet .. 650 milliGRAM(s) Oral every 6 hours PRN Mild Pain (1 - 3)  acetaminophen   IVPB .. 1000 milliGRAM(s) IV Intermittent every 6 hours PRN Temp greater or equal to 38.5C (101.3F), Severe Pain (7 - 10)  albuterol/ipratropium for Nebulization 3 milliLiter(s) Nebulizer every 6 hours PRN Shortness of Breath and/or Wheezing      Allergies    shellfish (Rash)  ChloraPrep One-Step (Rash)  penicillins (Rash)    Intolerances        Review of Systems: *pt minimally verbal to nonverbal, unable to obtain ROS         Vital Signs Last 24 Hrs  T(C): 36.8 (10 Apr 2024 04:42), Max: 36.8 (09 Apr 2024 22:12)  T(F): 98.2 (10 Apr 2024 04:42), Max: 98.2 (09 Apr 2024 22:12)  HR: 88 (10 Apr 2024 11:00) (75 - 89)  BP: 159/83 (10 Apr 2024 05:30) (159/83 - 162/80)  BP(mean): --  RR: 15 (10 Apr 2024 05:30) (14 - 15)  SpO2: 100% (10 Apr 2024 11:00) (99% - 100%)    Parameters below as of 10 Apr 2024 08:05  Patient On (Oxygen Delivery Method): ventilator        PHYSICAL EXAM:    Constitutional: NAD  HEENT: EOMI, throat clear  Neck: No LAD, supple  Respiratory: CTA and P  Cardiovascular: S1 and S2, RRR, no M  Gastrointestinal: BS+, soft, NT/ND, neg HSM,  Extremities: No peripheral edema, neg clubbing, cyanosis  Vascular: 2+ peripheral pulses  Neurological: A/O x0  Psychiatric: Normal mood, normal affect  Skin: No rashes      LABS:                        9.3    7.54  )-----------( 151      ( 10 Apr 2024 06:34 )             28.6     04-10    136  |  101  |  54<H>  ----------------------------<  213<H>  4.2   |  22  |  1.19    Ca    10.5      10 Apr 2024 06:34  Phos  2.4     04-10  Mg     2.2     04-10    TPro  6.6  /  Alb  3.5  /  TBili  0.2  /  DBili  x   /  AST  16  /  ALT  9<L>  /  AlkPhos  88  04-10      Urinalysis Basic - ( 10 Apr 2024 06:34 )    Color: x / Appearance: x / SG: x / pH: x  Gluc: 213 mg/dL / Ketone: x  / Bili: x / Urobili: x   Blood: x / Protein: x / Nitrite: x   Leuk Esterase: x / RBC: x / WBC x   Sq Epi: x / Non Sq Epi: x / Bacteria: x        RADIOLOGY & ADDITIONAL TESTS:

## 2024-04-10 NOTE — PROGRESS NOTE ADULT - SUBJECTIVE AND OBJECTIVE BOX
DATE OF SERVICE: 04-10-24 @ 15:45    Patient is a 78y old  Female who presents with a chief complaint of ICH (10 Apr 2024 12:23)      INTERVAL HISTORY: In no acute distres. Minimally responsive today,     REVIEW OF SYSTEMS: Unable to participate in ROS  CONSTITUTIONAL: No weakness  EYES/ENT: No visual changes;  No throat pain   NECK: No pain or stiffness  RESPIRATORY: No cough, wheezing; No shortness of breath  CARDIOVASCULAR: No chest pain or palpitations  GASTROINTESTINAL: No abdominal  pain. No nausea, vomiting, or hematemesis  GENITOURINARY: No dysuria, frequency or hematuria  NEUROLOGICAL: No stroke like symptoms  SKIN: No rashes    	  MEDICATIONS:  amLODIPine   Tablet 10 milliGRAM(s) Oral daily  carvedilol 25 milliGRAM(s) Oral every 12 hours  cloNIDine 0.1 milliGRAM(s) Oral every 8 hours        PHYSICAL EXAM:  T(C): 37.1 (04-10-24 @ 14:00), Max: 37.1 (04-10-24 @ 14:00)  HR: 77 (04-10-24 @ 14:48) (75 - 89)  BP: 125/61 (04-10-24 @ 14:48) (101/67 - 162/80)  RR: 15 (04-10-24 @ 05:30) (14 - 15)  SpO2: 100% (04-10-24 @ 14:26) (99% - 100%)  Wt(kg): --  I&O's Summary    09 Apr 2024 07:01  -  10 Apr 2024 07:00  --------------------------------------------------------  IN: 1580 mL / OUT: 350 mL / NET: 1230 mL    10 Apr 2024 07:01  -  10 Apr 2024 15:45  --------------------------------------------------------  IN: 0 mL / OUT: 325 mL / NET: -325 mL          Appearance: In no distress	  HEENT:    PERRL, EOMI	  Cardiovascular:  S1 S2, No JVD  Respiratory: Lungs clear to auscultation	  Gastrointestinal:  Soft, Non-tender, + BS	  Vascularature:  L AKA  Psychiatric: Appropriate affect   Neuro: no acute focal deficits                               9.3    7.54  )-----------( 151      ( 10 Apr 2024 06:34 )             28.6     04-10    136  |  101  |  54<H>  ----------------------------<  213<H>  4.2   |  22  |  1.19    Ca    10.5      10 Apr 2024 06:34  Phos  2.4     04-10  Mg     2.2     04-10    TPro  6.6  /  Alb  3.5  /  TBili  0.2  /  DBili  x   /  AST  16  /  ALT  9<L>  /  AlkPhos  88  04-10        Labs personally reviewed      ASSESSMENT/PLAN: 	    78F Hx ESRD s/p renal xplant c/b DGF off HD, L AKA, 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    1. Abnormal Echo --  Septal bulge noted measures 2.2cm without obstruction.   -- Given no intracavitary obstruction no intervention at this time  - No Myxoma seen on this echo or echo in 2019, ? if hypermobile interatrial septum was confused for on prior imaging     2. HTN -   - Continue Coreg 25 mg BID, amlodipine 10mg  - Clonidine patch resumed but now with labile BP. Recommend switching to Clonidine 0.1mg via PEG q8hrs with hold parameters    3. 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 DVT PPX - c/w SQ hep              Yodit Umaña, AG-NP   Celio Mcwilliams DO [facility]  Cardiovascular Medicine  [address], [address]  Available through call or text on [employer]  Office: [phone]

## 2024-04-10 NOTE — PROGRESS NOTE ADULT - PROBLEM SELECTOR PLAN 8
- S/p PEG 3/8  - tolerating tube feeds    [] H. 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   - Repeat stool Ag in 8 weeks to confirm clearance Post treatement - S/p PEG 3/8  - tolerating tube feeds    [] H. 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

## 2024-04-10 NOTE — PROGRESS NOTE ADULT - PROBLEM SELECTOR PLAN 11
- PCP ppx: Bactrim  - DVT ppx: heparin sub q held in setting of recent cranioplasty, s/p IVCF 3/3  - GI ppx: Protonix BID - PCP ppx: Bactrim  - DVT ppx: Cont Heparin Sub Q   - GI ppx: Protonix BID

## 2024-04-10 NOTE — PROGRESS NOTE ADULT - PROBLEM SELECTOR PLAN 2
- S/p trach 3/8 by surgery by Dr. Joselo Mayorga   - Patient initially weaned to TC ATC while in the [facility] But has required MV Post-OP  - Continue to Progress CPAP Trials as tolerated  - Will attempt to wean back to TC   - Continue airway clearance; Duoneb q6h PRN - S/p trach 3/8 by surgery by Dr. Joselo Mayorga   - Patient initially weaned to TC ATC while in the [facility] But has required MV Post-OP  - Continue to Progress CPAP Trials as tolerated  - Will attempt to wean back to TC   - Continue airway clearance; Duoneb q6h PRN  - CXR Ordered

## 2024-04-10 NOTE — PROGRESS NOTE ADULT - NS ATTEND AMEND GEN_ALL_CORE FT
78 year old female with a history of ESRD s/p renal transplant (2019) c/b BK viremia, left AKA, HTN, breast ca s/p lumpectomy (completed Tamoxifen 3/2023), DVT (off Eliquis), HLD, gout presenting with left ICH likely in the setting of amyloid angiopathy s/p left craniectomy (discarded) and evacuation (3/2/24), EVD placement and removal (3/7), c/b prolonged respiratory failure s/p trach/PEG (3/9/24) and RCU transfer.     #ICH  #Seizure  From a neurologic perspective she has had an ICH 2/2 amyloid angiopathy s/p left craniectomy and evacuation, EVD placement and subsequent removal. She is s/p cranioplasty 4/2.  She clinically remains obtunded, reportedly will occasionally open her eyes to sternal rub, no meaningful communication. Not opening eyes on exam, no appreciable twitching or jerking.  Her hospital course has been complicated by seizure, her last recorded seizures were seen 3/7/24. She is currently controlled on Briviact 100 TID, Vimpat 200 BID  - On vEEG without any evidence of seizure, will continue to monitor  - Appreciate neurosurgery input   - Follow neuro exam closely - eyes closed, withdraws to pain  - C/W AED    #HTN: Hypotension after OR. Holding Clonidine patch. Continue with other antihypertensives, currently normotensive on my exam.   - amlodipine and carvedilol   - TTE LVEF 70%, diastolic dysfunction     #Mixed respiratory failure  She initially presented with respiratory failure, mixed hypoxic and hypercapnic respiratory failure. She was tolerating trach collar around the clock since 3/23/24. She was placed back on full support after her cranioplasty 4/2. Will rapidly wean again as able. Will wean O2 as tolerated, goal SpO2 90-95%. Continue airways clearance with duonebs and hypersal q6 for airways clearance.   - CPAP as tolerated   - CXR today     #UTI  #ESBL Kleb  Her hospital course was otherwise complicated by ESBL Kleb and VRE (3/10) urinary tract infection. She is s/p Meropenem. Now clinically stable and being monitored off antibiotics.    #H.Pylori  She was found to have H.Pylori positivity: Per GI evaluation, will plan to start treatment as outlined above at the time of discharge.  - on PPI 40 IV BID     #Renal Transplant  #MEHREEN  She has a history of renal transplant. Transplant is following and appreciate their recommendations. Continue with Prednisone, Tacrolimus. Will confirm Bactrim with prophylaxis with Nephrology. Her creatinine continues to trend up. Urine output has dropped off. Will d/w transplant. ? related to hypoperfusion in OR. Trend creat closely, avoid nephrotoxins, trend UOP. Tacro levels daily. Will continue to monitor for now.   - creatinine improving today   - gets straight cath prn   - tacro goal 4-7  - on prednisone 5 mg and Bactrim - will clarify Bactrim with renal transplant team     #DM  She has had issues with hyperglycemia: Goal blood glucose 140-180. Adjusting insulin accordingly.    #Heme: DVT 3/2 R CFV, Fem, Pop, Gastrocnemius and L CFV, Fem s/p IVC filter (3/3/24). Tolerating SQH without issue.

## 2024-04-10 NOTE — PROGRESS NOTE ADULT - PROBLEM SELECTOR PLAN 5
- SBP goal:   - Coreg 25 mg BID, Amlodipine 10mg daily  - Clonidine patch held by NSCU in setting of borderline bp prior to transfer   - Monitor BP and possible need to resume   - Echo: LVEF 70-75%, Grade II diastolic dysfunction, septal bulge without obstruction  - cardiology following - SBP goal:   - Coreg 25 mg BID, Amlodipine 10mg daily  - Clonidine patch held by NSCU in setting of borderline bp prior to transfer back to [facility]  - Will resume Clonidine Patch in setting of Elevated SBP   - Echo: LVEF 70-75%, Grade II diastolic dysfunction, septal bulge without obstruction  - Cardiology following - SBP goal:   - Coreg 25 mg BID, Amlodipine 10mg daily  - Clonidine patch held by NSCU in setting of borderline bp prior to transfer back to [facility]  - Will resume Clonidine Patch today in setting of Elevated SBP   - Echo: LVEF 70-75%, Grade II diastolic dysfunction, septal bulge without obstruction  - Cardiology following - SBP goal:   - Coreg 25 mg BID, Amlodipine 10mg daily  - Clonidine patch held by NSCU in setting of borderline bp prior to transfer back to [facility]  - Will resume enteral Clonidine today in setting of Elevated SBP   - Echo: LVEF 70-75%, Grade II diastolic dysfunction, septal bulge without obstruction  - Cardiology following

## 2024-04-10 NOTE — PROGRESS NOTE ADULT - PROBLEM SELECTOR PLAN 4
- Urine Cx 3/10: ESBL Klebsiella and VRE 10-49 K   - Treated with Meropenem 1GM Q12H 3/12- 3/19  - Currently remains off ABX

## 2024-04-10 NOTE — PROGRESS NOTE ADULT - PROBLEM SELECTOR PLAN 12
- FULL CODE  - Updated daughter at bedside 4/9 - FULL CODE  - Patients Daughter Gieslle Updated at bedside this afternoon

## 2024-04-11 LAB
ALBUMIN SERPL ELPH-MCNC: 3 G/DL — LOW (ref 3.3–5)
ALP SERPL-CCNC: 78 U/L — SIGNIFICANT CHANGE UP (ref 40–120)
ALT FLD-CCNC: 9 U/L — LOW (ref 10–45)
ANION GAP SERPL CALC-SCNC: 15 MMOL/L — SIGNIFICANT CHANGE UP (ref 5–17)
AST SERPL-CCNC: 20 U/L — SIGNIFICANT CHANGE UP (ref 10–40)
BILIRUB SERPL-MCNC: 0.2 MG/DL — SIGNIFICANT CHANGE UP (ref 0.2–1.2)
BUN SERPL-MCNC: 60 MG/DL — HIGH (ref 7–23)
CALCIUM SERPL-MCNC: 9.8 MG/DL — SIGNIFICANT CHANGE UP (ref 8.4–10.5)
CHLORIDE SERPL-SCNC: 103 MMOL/L — SIGNIFICANT CHANGE UP (ref 96–108)
CO2 SERPL-SCNC: 21 MMOL/L — LOW (ref 22–31)
CREAT SERPL-MCNC: 1.27 MG/DL — SIGNIFICANT CHANGE UP (ref 0.5–1.3)
EGFR: 43 ML/MIN/1.73M2 — LOW
GLUCOSE BLDC GLUCOMTR-MCNC: 163 MG/DL — HIGH (ref 70–99)
GLUCOSE BLDC GLUCOMTR-MCNC: 169 MG/DL — HIGH (ref 70–99)
GLUCOSE BLDC GLUCOMTR-MCNC: 174 MG/DL — HIGH (ref 70–99)
GLUCOSE SERPL-MCNC: 147 MG/DL — HIGH (ref 70–99)
HCT VFR BLD CALC: 30.5 % — LOW (ref 34.5–45)
HGB BLD-MCNC: 9.1 G/DL — LOW (ref 11.5–15.5)
MAGNESIUM SERPL-MCNC: 2.2 MG/DL — SIGNIFICANT CHANGE UP (ref 1.6–2.6)
MCHC RBC-ENTMCNC: 29.1 PG — SIGNIFICANT CHANGE UP (ref 27–34)
MCHC RBC-ENTMCNC: 29.8 GM/DL — LOW (ref 32–36)
MCV RBC AUTO: 97.4 FL — SIGNIFICANT CHANGE UP (ref 80–100)
NRBC # BLD: 0 /100 WBCS — SIGNIFICANT CHANGE UP (ref 0–0)
PHOSPHATE SERPL-MCNC: 2.8 MG/DL — SIGNIFICANT CHANGE UP (ref 2.5–4.5)
PLATELET # BLD AUTO: 160 K/UL — SIGNIFICANT CHANGE UP (ref 150–400)
POTASSIUM SERPL-MCNC: 4.2 MMOL/L — SIGNIFICANT CHANGE UP (ref 3.5–5.3)
POTASSIUM SERPL-SCNC: 4.2 MMOL/L — SIGNIFICANT CHANGE UP (ref 3.5–5.3)
PROT SERPL-MCNC: 6.1 G/DL — SIGNIFICANT CHANGE UP (ref 6–8.3)
RBC # BLD: 3.13 M/UL — LOW (ref 3.8–5.2)
RBC # FLD: 16.4 % — HIGH (ref 10.3–14.5)
SODIUM SERPL-SCNC: 139 MMOL/L — SIGNIFICANT CHANGE UP (ref 135–145)
TACROLIMUS SERPL-MCNC: 5.7 NG/ML — SIGNIFICANT CHANGE UP
WBC # BLD: 6.74 K/UL — SIGNIFICANT CHANGE UP (ref 3.8–10.5)
WBC # FLD AUTO: 6.74 K/UL — SIGNIFICANT CHANGE UP (ref 3.8–10.5)

## 2024-04-11 PROCEDURE — 99233 SBSQ HOSP IP/OBS HIGH 50: CPT | Mod: FS

## 2024-04-11 RX ORDER — FUROSEMIDE 40 MG
40 TABLET ORAL ONCE
Refills: 0 | Status: COMPLETED | OUTPATIENT
Start: 2024-04-11 | End: 2024-04-11

## 2024-04-11 RX ADMIN — Medication 1 DROP(S): at 14:39

## 2024-04-11 RX ADMIN — HEPARIN SODIUM 5000 UNIT(S): 5000 INJECTION INTRAVENOUS; SUBCUTANEOUS at 05:37

## 2024-04-11 RX ADMIN — Medication 2: at 05:38

## 2024-04-11 RX ADMIN — Medication 1 DROP(S): at 01:38

## 2024-04-11 RX ADMIN — Medication 5 MILLILITER(S): at 05:38

## 2024-04-11 RX ADMIN — Medication 1 DROP(S): at 17:32

## 2024-04-11 RX ADMIN — Medication 1 DROP(S): at 21:28

## 2024-04-11 RX ADMIN — Medication 1 DROP(S): at 10:49

## 2024-04-11 RX ADMIN — Medication 5 MILLILITER(S): at 23:24

## 2024-04-11 RX ADMIN — TACROLIMUS 3 MILLIGRAM(S): 5 CAPSULE ORAL at 05:37

## 2024-04-11 RX ADMIN — HEPARIN SODIUM 5000 UNIT(S): 5000 INJECTION INTRAVENOUS; SUBCUTANEOUS at 14:38

## 2024-04-11 RX ADMIN — Medication 2: at 17:34

## 2024-04-11 RX ADMIN — INSULIN GLARGINE 30 UNIT(S): 100 INJECTION, SOLUTION SUBCUTANEOUS at 23:00

## 2024-04-11 RX ADMIN — Medication 40 MILLIGRAM(S): at 10:48

## 2024-04-11 RX ADMIN — Medication 5 MILLILITER(S): at 17:31

## 2024-04-11 RX ADMIN — BRIVARACETAM 100 MILLIGRAM(S): 25 TABLET, FILM COATED ORAL at 16:00

## 2024-04-11 RX ADMIN — CARVEDILOL PHOSPHATE 25 MILLIGRAM(S): 80 CAPSULE, EXTENDED RELEASE ORAL at 10:48

## 2024-04-11 RX ADMIN — Medication 1 DROP(S): at 05:37

## 2024-04-11 RX ADMIN — BRIVARACETAM 100 MILLIGRAM(S): 25 TABLET, FILM COATED ORAL at 23:24

## 2024-04-11 RX ADMIN — AMLODIPINE BESYLATE 10 MILLIGRAM(S): 2.5 TABLET ORAL at 05:37

## 2024-04-11 RX ADMIN — BRIVARACETAM 100 MILLIGRAM(S): 25 TABLET, FILM COATED ORAL at 08:07

## 2024-04-11 RX ADMIN — SODIUM ZIRCONIUM CYCLOSILICATE 10 GRAM(S): 10 POWDER, FOR SUSPENSION ORAL at 11:31

## 2024-04-11 RX ADMIN — LACOSAMIDE 200 MILLIGRAM(S): 50 TABLET ORAL at 23:24

## 2024-04-11 RX ADMIN — Medication 2: at 12:21

## 2024-04-11 RX ADMIN — Medication 500000 UNIT(S): at 23:25

## 2024-04-11 RX ADMIN — TACROLIMUS 3 MILLIGRAM(S): 5 CAPSULE ORAL at 17:38

## 2024-04-11 RX ADMIN — PANTOPRAZOLE SODIUM 40 MILLIGRAM(S): 20 TABLET, DELAYED RELEASE ORAL at 10:48

## 2024-04-11 RX ADMIN — Medication 500000 UNIT(S): at 17:31

## 2024-04-11 RX ADMIN — Medication 500000 UNIT(S): at 11:31

## 2024-04-11 RX ADMIN — Medication 1 TABLET(S): at 11:32

## 2024-04-11 RX ADMIN — Medication 0.1 MILLIGRAM(S): at 05:37

## 2024-04-11 RX ADMIN — Medication 5 MILLIGRAM(S): at 21:28

## 2024-04-11 RX ADMIN — LACOSAMIDE 200 MILLIGRAM(S): 50 TABLET ORAL at 11:35

## 2024-04-11 RX ADMIN — Medication 0.1 MILLIGRAM(S): at 21:28

## 2024-04-11 RX ADMIN — Medication 500000 UNIT(S): at 05:37

## 2024-04-11 RX ADMIN — Medication 5 MILLILITER(S): at 11:31

## 2024-04-11 RX ADMIN — Medication 2: at 23:25

## 2024-04-11 RX ADMIN — PANTOPRAZOLE SODIUM 40 MILLIGRAM(S): 20 TABLET, DELAYED RELEASE ORAL at 21:28

## 2024-04-11 RX ADMIN — HEPARIN SODIUM 5000 UNIT(S): 5000 INJECTION INTRAVENOUS; SUBCUTANEOUS at 21:29

## 2024-04-11 RX ADMIN — CARVEDILOL PHOSPHATE 25 MILLIGRAM(S): 80 CAPSULE, EXTENDED RELEASE ORAL at 21:28

## 2024-04-11 NOTE — PROGRESS NOTE ADULT - NS ATTEND AMEND GEN_ALL_CORE FT
78 year old female with a history of ESRD s/p renal transplant (2019) c/b BK viremia, left AKA, HTN, breast ca s/p lumpectomy (completed Tamoxifen 3/2023), DVT (off Eliquis), HLD, gout presenting with left ICH likely in the setting of amyloid angiopathy s/p left craniectomy (discarded) and evacuation (3/2/24), EVD placement and removal (3/7), c/b prolonged respiratory failure s/p trach/PEG (3/9/24) and RCU transfer.     #ICH  #Seizure  From a neurologic perspective she has had an ICH 2/2 amyloid angiopathy s/p left craniectomy and evacuation, EVD placement and subsequent removal. She is s/p cranioplasty 4/2.  She clinically remains obtunded, reportedly will occasionally open her eyes to sternal rub, no meaningful communication. Not opening eyes on exam, no appreciable twitching or jerking.  Her hospital course has been complicated by seizure, her last recorded seizures were seen 3/7/24. She is currently controlled on Briviact 100 TID, Vimpat 200 BID  - On vEEG without any evidence of seizure, will continue to monitor  - Appreciate neurosurgery input   - Follow neuro exam closely - eyes closed, withdraws to pain  - C/W AED    #HTN: Hypotension after OR.  Continue with other antihypertensives, currently normotensive on my exam.   - amlodipine and carvedilol   - resumed clonidine patch   - TTE LVEF 70%, diastolic dysfunction     #Mixed respiratory failure  She initially presented with respiratory failure, mixed hypoxic and hypercapnic respiratory failure. She was tolerating trach collar around the clock since 3/23/24. She was placed back on full support after her cranioplasty 4/2. Will rapidly wean again as able. Will wean O2 as tolerated, goal SpO2 90-95%. Continue airways clearance with duonebs and hypersal q6 for airways clearance.   - CPAP as tolerated - only did 3 hours yesterday   - CXR/ 4/10 - no significant fluid overload or consolidation     #UTI  #ESBL Kleb  Her hospital course was otherwise complicated by ESBL Kleb and VRE (3/10) urinary tract infection. She is s/p Meropenem. Now clinically stable and being monitored off antibiotics.    #H.Pylori  She was found to have H.Pylori positivity: Per GI evaluation, will plan to start treatment as outlined above at the time of discharge.  - on PPI 40 IV BID     #Renal Transplant  #MEHREEN  She has a history of renal transplant. Transplant is following and appreciate their recommendations. Continue with Prednisone, Tacrolimus. Will confirm Bactrim with prophylaxis with Nephrology. Her creatinine continues to trend up. Urine output has dropped off. Will d/w transplant. ? related to hypoperfusion in OR. Trend creat closely, avoid nephrotoxins, trend UOP. Tacro levels daily. Will continue to monitor for now.   - creatinine improved  - gets straight cath prn   - tacro goal 4-7  - on prednisone 5 mg and Bactrim      #DM  She has had issues with hyperglycemia: Goal blood glucose 140-180. Adjusting insulin accordingly.    #Heme: DVT 3/2 R CFV, Fem, Pop, Gastrocnemius and L CFV, Fem s/p IVC filter (3/3/24). Tolerating SQH without issue.

## 2024-04-11 NOTE — PROGRESS NOTE ADULT - PROBLEM SELECTOR PLAN 7
- Goal euglycemia (-180), A1C: 5.4%  - Required ISS post op in setting of hyperglycemia; Since resolved  - Cont Lantus and ISS   - Monitor BGMs

## 2024-04-11 NOTE — PROGRESS NOTE ADULT - SUBJECTIVE AND OBJECTIVE BOX
Patient is a 78y old  Female who presents with a chief complaint of ICH (10 Apr 2024 15:45)    HPI:  Nury Adam   78F Hx ESRD s/p renal xplant c/b DGF off HD, L AKA, 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 ICH score 4.     (02 Mar 2024 00:28)    Interval Events:    REVIEW OF SYSTEMS:  [ ] Positive  [ ] All other systems negative  [ ] Unable to assess ROS because ________    Vital Signs Last 24 Hrs  T(C): 36.6 (04-11-24 @ 04:28), Max: 37.1 (04-10-24 @ 14:00)  T(F): 97.8 (04-11-24 @ 04:28), Max: 98.7 (04-10-24 @ 14:00)  HR: 88 (04-11-24 @ 05:30) (76 - 89)  BP: 126/76 (04-11-24 @ 05:30) (101/67 - 130/71)  RR: 14 (04-11-24 @ 05:30) (14 - 20)  SpO2: 100% (04-11-24 @ 05:30) (99% - 100%)    PHYSICAL EXAM:  HEENT:   [ ]Tracheostomy:  [ ]Pupils equal  [ ]No oral lesions  [ ]Abnormal    SKIN  [ ] No Rash  [ ] Abnormal  [ ] pressure    CARDIAC  [ ]Regular  [ ]Abnormal    PULMONARY  [ ]Bilateral Clear Breath Sounds  [ ]Normal Excursion  [ ]Abnormal    GI  [ ]PEG      [ ] +BS		              [ ]Soft, nondistended, nontender	  [ ]Abnormal    MUSCULOSKELETAL                                   [ ]Bedbound                 [ ]Abnormal    [ ]Ambulatory/OOB to chair                           EXTREMITIES                                         [ ]Normal  [ ]Edema                           NEUROLOGIC  [ ] Normal, non focal  [ ] Focal findings:    PSYCHIATRIC  [ ]Alert and appropriate  [ ] Sedated	 [ ]Agitated    :  Gardner: [ ] Yes, if yes: Date of Placement:                   [  ] No    LINES: Central Lines [ ] Yes, if yes: Date of Placement                                     [  ] No    HOSPITAL MEDICATIONS:  MEDICATIONS  (STANDING):  amLODIPine   Tablet 10 milliGRAM(s) Oral daily  artificial  tears Solution 1 Drop(s) Both EYES every 4 hours  Biotene Dry Mouth Oral Rinse 5 milliLiter(s) Swish and Spit every 6 hours  brivaracetam Oral Solution 100 milliGRAM(s) Oral <User Schedule>  carvedilol 25 milliGRAM(s) Oral every 12 hours  cloNIDine 0.1 milliGRAM(s) Oral every 8 hours  dextrose 5%. 1000 milliLiter(s) (50 mL/Hr) IV Continuous <Continuous>  dextrose 5%. 1000 milliLiter(s) (100 mL/Hr) IV Continuous <Continuous>  dextrose 50% Injectable 25 Gram(s) IV Push once  glucagon  Injectable 1 milliGRAM(s) IntraMuscular once  heparin   Injectable 5000 Unit(s) SubCutaneous every 8 hours  insulin glargine Injectable (LANTUS) 30 Unit(s) SubCutaneous at bedtime  insulin lispro (ADMELOG) corrective regimen sliding scale   SubCutaneous every 6 hours  lacosamide Solution 200 milliGRAM(s) Oral <User Schedule>  multivitamin 1 Tablet(s) Oral daily  nystatin    Suspension 010635 Unit(s) Swish and Swallow every 6 hours  pantoprazole   Suspension 40 milliGRAM(s) Oral two times a day  predniSONE   Tablet 5 milliGRAM(s) Oral daily  sodium zirconium cyclosilicate 10 Gram(s) Oral every other day  tacrolimus    0.5 mG/mL Suspension 3 milliGRAM(s) Oral every 12 hours  trimethoprim   80 mG/sulfamethoxazole 400 mG 1 Tablet(s) Oral daily    MEDICATIONS  (PRN):  acetaminophen     Tablet .. 650 milliGRAM(s) Oral every 6 hours PRN Mild Pain (1 - 3)  acetaminophen   IVPB .. 1000 milliGRAM(s) IV Intermittent every 6 hours PRN Temp greater or equal to 38.5C (101.3F), Severe Pain (7 - 10)  albuterol/ipratropium for Nebulization 3 milliLiter(s) Nebulizer every 6 hours PRN Shortness of Breath and/or Wheezing      LABS:                        9.3    7.54  )-----------( 151      ( 10 Apr 2024 06:34 )             28.6     04-11    139  |  103  |  60<H>  ----------------------------<  147<H>  4.2   |  21<L>  |  1.27    Ca    9.8      11 Apr 2024 07:15  Phos  2.8     04-11  Mg     2.2     04-11    TPro  6.1  /  Alb  3.0<L>  /  TBili  0.2  /  DBili  x   /  AST  20  /  ALT  9<L>  /  AlkPhos  78  04-11      Mode: AC/ CMV (Assist Control/ Continuous Mandatory Ventilation)  RR (machine): 14  TV (machine): 450  FiO2: 40  PEEP: 5  ITime: 1  MAP: 8  PIP: 18     Patient is a 78y old  Female who presents with a chief complaint of ICH (10 Apr 2024 15:45)    HPI:  Nury Adam   78F Hx ESRD s/p renal xplant c/b DGF off HD, L AKA, 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 ICH score 4.  (02 Mar 2024 00:28)    Interval Events: No issues overnight    REVIEW OF SYSTEMS:  [ ] Positive  [ ] All other systems negative  [ x] Unable to assess ROS because patient is obtunded    Vital Signs Last 24 Hrs  T(C): 36.6 (04-11-24 @ 04:28), Max: 37.1 (04-10-24 @ 14:00)  T(F): 97.8 (04-11-24 @ 04:28), Max: 98.7 (04-10-24 @ 14:00)  HR: 88 (04-11-24 @ 05:30) (76 - 89)  BP: 126/76 (04-11-24 @ 05:30) (101/67 - 130/71)  RR: 14 (04-11-24 @ 05:30) (14 - 20)  SpO2: 100% (04-11-24 @ 05:30) (99% - 100%)    PHYSICAL EXAM:  HEENT:   [x]Tracheostomy: #7 Cuffed Portex  [x]Pupils equal  [ ]No oral lesions  [x]Abnormal: S/p Cranioplasty, Site remains w/ staples C/D/I    SKIN  [ ]No Rash  [x] Abnormal: LUE AVF  [x] pressure: Stage 3 sacral pressure injury     CARDIAC  [x]Regular  [ ]Abnormal    PULMONARY  [x]Bilateral Clear Breath Sounds  [ ]Normal Excursion  [ ]Abnormal     GI  [x]PEG     [x] +BS		              [x]Soft, nondistended, nontender	  [ ]Abnormal    MUSCULOSKELETAL                                   [x]Bedbound                 [x]Abnormal: L AKA  [ ]Ambulatory/OOB to chair                           EXTREMITIES                                         [x]Normal  [ ]Edema                           NEUROLOGIC  [ ] Normal, non focal  [x] Focal findings: Eyes Opens intermittently, moves LUE spontaneously, and withdraws in RLE extremities to noxious stimuli.    PSYCHIATRIC  [x]Obtunded  [ ] Sedated	 [ ]Agitated    :  Gardner: [ ] Yes, if yes: Date of Placement:                   [x] No; Straight Cath x 1    LINES: Central Lines [ ] Yes, if yes: Date of Placement                                     [x] No    HOSPITAL MEDICATIONS:  MEDICATIONS  (STANDING):  amLODIPine   Tablet 10 milliGRAM(s) Oral daily  artificial  tears Solution 1 Drop(s) Both EYES every 4 hours  Biotene Dry Mouth Oral Rinse 5 milliLiter(s) Swish and Spit every 6 hours  brivaracetam Oral Solution 100 milliGRAM(s) Oral <User Schedule>  carvedilol 25 milliGRAM(s) Oral every 12 hours  cloNIDine 0.1 milliGRAM(s) Oral every 8 hours  dextrose 5%. 1000 milliLiter(s) (50 mL/Hr) IV Continuous <Continuous>  dextrose 5%. 1000 milliLiter(s) (100 mL/Hr) IV Continuous <Continuous>  dextrose 50% Injectable 25 Gram(s) IV Push once  glucagon  Injectable 1 milliGRAM(s) IntraMuscular once  heparin   Injectable 5000 Unit(s) SubCutaneous every 8 hours  insulin glargine Injectable (LANTUS) 30 Unit(s) SubCutaneous at bedtime  insulin lispro (ADMELOG) corrective regimen sliding scale   SubCutaneous every 6 hours  lacosamide Solution 200 milliGRAM(s) Oral <User Schedule>  multivitamin 1 Tablet(s) Oral daily  nystatin    Suspension 935697 Unit(s) Swish and Swallow every 6 hours  pantoprazole   Suspension 40 milliGRAM(s) Oral two times a day  predniSONE   Tablet 5 milliGRAM(s) Oral daily  sodium zirconium cyclosilicate 10 Gram(s) Oral every other day  tacrolimus    0.5 mG/mL Suspension 3 milliGRAM(s) Oral every 12 hours  trimethoprim   80 mG/sulfamethoxazole 400 mG 1 Tablet(s) Oral daily    MEDICATIONS  (PRN):  acetaminophen     Tablet .. 650 milliGRAM(s) Oral every 6 hours PRN Mild Pain (1 - 3)  acetaminophen   IVPB .. 1000 milliGRAM(s) IV Intermittent every 6 hours PRN Temp greater or equal to 38.5C (101.3F), Severe Pain (7 - 10)  albuterol/ipratropium for Nebulization 3 milliLiter(s) Nebulizer every 6 hours PRN Shortness of Breath and/or Wheezing      LABS:                        9.3    7.54  )-----------( 151      ( 10 Apr 2024 06:34 )             28.6     04-11    139  |  103  |  60<H>  ----------------------------<  147<H>  4.2   |  21<L>  |  1.27    Ca    9.8      11 Apr 2024 07:15  Phos  2.8     04-11  Mg     2.2     04-11    TPro  6.1  /  Alb  3.0<L>  /  TBili  0.2  /  DBili  x   /  AST  20  /  ALT  9<L>  /  AlkPhos  78  04-11      Mode: AC/ CMV (Assist Control/ Continuous Mandatory Ventilation)  RR (machine): 14  TV (machine): 450  FiO2: 40  PEEP: 5  ITime: 1  MAP: 8  PIP: 18

## 2024-04-11 NOTE — PROGRESS NOTE ADULT - PROBLEM SELECTOR PLAN 8
- S/p PEG 3/8  - tolerating tube feeds    [] H. 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 Pylori Stool 4/9: Positive   - Per GI recs patient can start treatment once discharged; Recc Bismuth quadruple therapy x 14 days   - Omeprazole 20 mg BID, Tetracycline 500 mg QID, Metronidazole 250 mg QID ,bismuth subsalicylate 2 tabs QID  - Bismuth may turn stool black  - After completing treatment would cont once daily PPI for PPX  - Repeat stool Ag in 8 weeks to confirm clearance Post treatement

## 2024-04-11 NOTE — PROGRESS NOTE ADULT - PROBLEM SELECTOR PLAN 2
- S/p trach 3/8 by surgery by Dr. Joselo Mayorga   - Patient initially weaned to TC ATC while in the RCU But has required MV Post-OP  - Continue to Progress CPAP Trials as tolerated  - Will attempt to wean back to TC   - Continue airway clearance; Duoneb q6h PRN  - CXR Ordered

## 2024-04-11 NOTE — PROGRESS NOTE ADULT - PROBLEM SELECTOR PLAN 5
- SBP goal:   - Coreg 25 mg BID, Amlodipine 10mg daily  - Clonidine patch held by NSCU in setting of borderline bp prior to transfer back to [facility]  - Will resume enteral Clonidine today in setting of Elevated SBP   - Echo: LVEF 70-75%, Grade II diastolic dysfunction, septal bulge without obstruction  - Cardiology following

## 2024-04-11 NOTE — PROGRESS NOTE ADULT - SUBJECTIVE AND OBJECTIVE BOX
DATE OF SERVICE: 04-11-24 @ 13:07    Patient is a 78y old  Female who presents with a chief complaint of ICH (11 Apr 2024 11:28)      INTERVAL HISTORY: In no acute distress. Family at bedside.    REVIEW OF SYSTEMS: Unable to fully participate in ROS  CONSTITUTIONAL: No weakness  EYES/ENT: No visual changes;  No throat pain   NECK: No pain or stiffness  RESPIRATORY: No cough, wheezing; No shortness of breath  CARDIOVASCULAR: No chest pain or palpitations  GASTROINTESTINAL: No abdominal  pain. No nausea, vomiting, or hematemesis  GENITOURINARY: No dysuria, frequency or hematuria  NEUROLOGICAL: No stroke like symptoms  SKIN: No rashes    	  MEDICATIONS:  amLODIPine   Tablet 10 milliGRAM(s) Oral daily  carvedilol 25 milliGRAM(s) Oral every 12 hours  cloNIDine 0.1 milliGRAM(s) Oral every 8 hours        PHYSICAL EXAM:  T(C): 36.6 (04-11-24 @ 04:28), Max: 37.1 (04-10-24 @ 14:00)  HR: 88 (04-11-24 @ 08:48) (76 - 89)  BP: 126/76 (04-11-24 @ 05:30) (101/67 - 130/71)  RR: 14 (04-11-24 @ 08:47) (14 - 20)  SpO2: 100% (04-11-24 @ 08:48) (99% - 100%)  Wt(kg): --  I&O's Summary    10 Apr 2024 07:01  -  11 Apr 2024 07:00  --------------------------------------------------------  IN: 2550 mL / OUT: 325 mL / NET: 2225 mL          Appearance: In no distress	  HEENT:    PERRL, EOMI	  Cardiovascular:  S1 S2, No JVD  Respiratory: Lungs clear to auscultation	  Gastrointestinal:  Soft, Non-tender, + BS	  Vascularature:  L AKA  Psychiatric: Appropriate affect   Neuro: no acute focal deficits                               9.1    6.74  )-----------( 160      ( 11 Apr 2024 07:18 )             30.5     04-11    139  |  103  |  60<H>  ----------------------------<  147<H>  4.2   |  21<L>  |  1.27    Ca    9.8      11 Apr 2024 07:15  Phos  2.8     04-11  Mg     2.2     04-11    TPro  6.1  /  Alb  3.0<L>  /  TBili  0.2  /  DBili  x   /  AST  20  /  ALT  9<L>  /  AlkPhos  78  04-11        Labs personally reviewed      ASSESSMENT/PLAN: 	      78F Hx ESRD s/p renal xplant c/b DGF off HD, L AKA, 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    1. Abnormal Echo --  Septal bulge noted measures 2.2cm without obstruction.   -- Given no intracavitary obstruction no intervention at this time  - No Myxoma seen on this echo or echo in 2019, ? if hypermobile interatrial septum was confused for on prior imaging     2. HTN -   - Continue Coreg 25 mg BID, amlodipine 10mg  - Clonidine patch resumed but now with labile BP. Cont Clonidine 0.1mg via PEG q8hrs with hold parameters    3. 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 Diastolic Dysfunction  - Moderate DD with elevated filling pressures noted on TTE from March  - s/p Lasix 40mg IVP once today     DVT PPX - c/w SQ hep            Yodit Umaña, AG-NP   Celio Mcwilliams DO [facility]  Cardiovascular Medicine  [address] [place] [address], [address]  Available through call or text on [employer]  Office: [phone]   DATE OF SERVICE: 04-11-24 @ 13:07    Patient is a 78y old  Female who presents with a chief complaint of ICH (11 Apr 2024 11:28)      INTERVAL HISTORY: In no acute distress. Family at bedside.    REVIEW OF SYSTEMS: Unable to fully participate in ROS  CONSTITUTIONAL: No weakness  EYES/ENT: No visual changes;  No throat pain   NECK: No pain or stiffness  RESPIRATORY: No cough, wheezing; No shortness of breath  CARDIOVASCULAR: No chest pain or palpitations  GASTROINTESTINAL: No abdominal  pain. No nausea, vomiting, or hematemesis  GENITOURINARY: No dysuria, frequency or hematuria  NEUROLOGICAL: No stroke like symptoms  SKIN: No rashes    	  MEDICATIONS:  amLODIPine   Tablet 10 milliGRAM(s) Oral daily  carvedilol 25 milliGRAM(s) Oral every 12 hours  cloNIDine 0.1 milliGRAM(s) Oral every 8 hours        PHYSICAL EXAM:  T(C): 36.6 (04-11-24 @ 04:28), Max: 37.1 (04-10-24 @ 14:00)  HR: 88 (04-11-24 @ 08:48) (76 - 89)  BP: 126/76 (04-11-24 @ 05:30) (101/67 - 130/71)  RR: 14 (04-11-24 @ 08:47) (14 - 20)  SpO2: 100% (04-11-24 @ 08:48) (99% - 100%)  Wt(kg): --  I&O's Summary    10 Apr 2024 07:01  -  11 Apr 2024 07:00  --------------------------------------------------------  IN: 2550 mL / OUT: 325 mL / NET: 2225 mL          Appearance: In no distress	  HEENT:    PERRL, EOMI	  Cardiovascular:  S1 S2, No JVD  Respiratory: Lungs clear to auscultation	  Gastrointestinal:  Soft, Non-tender, + BS	  Vascularature:  L AKA  Psychiatric: Appropriate affect   Neuro: no acute focal deficits                               9.1    6.74  )-----------( 160      ( 11 Apr 2024 07:18 )             30.5     04-11    139  |  103  |  60<H>  ----------------------------<  147<H>  4.2   |  21<L>  |  1.27    Ca    9.8      11 Apr 2024 07:15  Phos  2.8     04-11  Mg     2.2     04-11    TPro  6.1  /  Alb  3.0<L>  /  TBili  0.2  /  DBili  x   /  AST  20  /  ALT  9<L>  /  AlkPhos  78  04-11        Labs personally reviewed      ASSESSMENT/PLAN: 	  78F Hx ESRD s/p renal xplant c/b DGF off HD, L AKA, 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    1. Abnormal Echo --  Septal bulge noted measures 2.2cm without obstruction.   -- Given no intracavitary obstruction no intervention at this time  - No Myxoma seen on this echo or echo in 2019, ? if hypermobile interatrial septum was confused for on prior imaging     2. HTN -   - Continue Coreg 25 mg BID, amlodipine 10mg  - Clonidine patch resumed but now with labile BP. Cont Clonidine 0.1mg via PEG q8hrs with hold parameters    3. 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 Diastolic Dysfunction  - Moderate DD with elevated filling pressures noted on TTE from March  - s/p Lasix 40mg IVP once today     DVT PPX - c/w SQ hep            Yodit Umaña, AG-NP   Celio Mcwilliams DO [facility]  Cardiovascular Medicine  [address] [place] [address], [address]  Available through call or text on [employer] TEAM[employer]  Office: [phone]

## 2024-04-11 NOTE — PROGRESS NOTE ADULT - SUBJECTIVE AND OBJECTIVE BOX
INTERVAL HPI/OVERNIGHT EVENTS:    family member bedside   pt comfortable without new gi events     MEDICATIONS  (STANDING):  amLODIPine   Tablet 10 milliGRAM(s) Oral daily  artificial  tears Solution 1 Drop(s) Both EYES every 4 hours  Biotene Dry Mouth Oral Rinse 5 milliLiter(s) Swish and Spit every 6 hours  brivaracetam Oral Solution 100 milliGRAM(s) Oral <User Schedule>  carvedilol 25 milliGRAM(s) Oral every 12 hours  cloNIDine 0.1 milliGRAM(s) Oral every 8 hours  dextrose 5%. 1000 milliLiter(s) (50 mL/Hr) IV Continuous <Continuous>  dextrose 5%. 1000 milliLiter(s) (100 mL/Hr) IV Continuous <Continuous>  dextrose 50% Injectable 25 Gram(s) IV Push once  glucagon  Injectable 1 milliGRAM(s) IntraMuscular once  heparin   Injectable 5000 Unit(s) SubCutaneous every 8 hours  insulin glargine Injectable (LANTUS) 30 Unit(s) SubCutaneous at bedtime  insulin lispro (ADMELOG) corrective regimen sliding scale   SubCutaneous every 6 hours  lacosamide Solution 200 milliGRAM(s) Oral <User Schedule>  multivitamin 1 Tablet(s) Oral daily  nystatin    Suspension 050301 Unit(s) Swish and Swallow every 6 hours  pantoprazole   Suspension 40 milliGRAM(s) Oral two times a day  predniSONE   Tablet 5 milliGRAM(s) Oral daily  sodium zirconium cyclosilicate 10 Gram(s) Oral every other day  tacrolimus    0.5 mG/mL Suspension 3 milliGRAM(s) Oral every 12 hours  trimethoprim   80 mG/sulfamethoxazole 400 mG 1 Tablet(s) Oral daily    MEDICATIONS  (PRN):  acetaminophen     Tablet .. 650 milliGRAM(s) Oral every 6 hours PRN Mild Pain (1 - 3)  acetaminophen   IVPB .. 1000 milliGRAM(s) IV Intermittent every 6 hours PRN Temp greater or equal to 38.5C (101.3F), Severe Pain (7 - 10)  albuterol/ipratropium for Nebulization 3 milliLiter(s) Nebulizer every 6 hours PRN Shortness of Breath and/or Wheezing      Allergies    shellfish (Rash)  ChloraPrep One-Step (Rash)  penicillins (Rash)    Intolerances        Review of Systems: *pt minimally verbal to nonverbal, unable to obtain ROS       Vital Signs Last 24 Hrs  T(C): 36.6 (11 Apr 2024 04:28), Max: 37.1 (10 Apr 2024 14:00)  T(F): 97.8 (11 Apr 2024 04:28), Max: 98.7 (10 Apr 2024 14:00)  HR: 88 (11 Apr 2024 08:48) (76 - 89)  BP: 126/76 (11 Apr 2024 05:30) (101/67 - 130/71)  BP(mean): --  RR: 14 (11 Apr 2024 08:47) (14 - 20)  SpO2: 100% (11 Apr 2024 08:48) (99% - 100%)    Parameters below as of 11 Apr 2024 08:47  Patient On (Oxygen Delivery Method): ventilator        PHYSICAL EXAM:    Constitutional: NAD  HEENT: EOMI, throat clear  Neck: No LAD, supple  Respiratory: CTA and P  Cardiovascular: S1 and S2, RRR, no M  Gastrointestinal: BS+, soft, NT/ND, neg HSM, +peg  Extremities: No peripheral edema, neg clubbing, cyanosis  Vascular: 2+ peripheral pulses  Neurological: A/O x 0  Psychiatric: lethargic  Skin: No rashes      LABS:                        9.1    6.74  )-----------( 160      ( 11 Apr 2024 07:18 )             30.5     04-11    139  |  103  |  60<H>  ----------------------------<  147<H>  4.2   |  21<L>  |  1.27    Ca    9.8      11 Apr 2024 07:15  Phos  2.8     04-11  Mg     2.2     04-11    TPro  6.1  /  Alb  3.0<L>  /  TBili  0.2  /  DBili  x   /  AST  20  /  ALT  9<L>  /  AlkPhos  78  04-11      Urinalysis Basic - ( 11 Apr 2024 07:15 )    Color: x / Appearance: x / SG: x / pH: x  Gluc: 147 mg/dL / Ketone: x  / Bili: x / Urobili: x   Blood: x / Protein: x / Nitrite: x   Leuk Esterase: x / RBC: x / WBC x   Sq Epi: x / Non Sq Epi: x / Bacteria: x        RADIOLOGY & ADDITIONAL TESTS:

## 2024-04-11 NOTE — PROGRESS NOTE ADULT - PROBLEM SELECTOR PLAN 9
- VA Duplex 3/2: DVT RT cfv, femoral, popliteal and gastrocnemius veins; DVT LEFT cfv and femoral vein  - S/p IVCF by IR on 3/3  - Cont Heparin Sub Q DVT PPX

## 2024-04-11 NOTE — PROGRESS NOTE ADULT - ASSESSMENT
77 yo F with PMHx ESRD s/p renal transplant (2019, now off HD), left AKA, BK viremia, HTN, breast ca s/p lumpectomy (completed Tamoxifen 03/2023), DVT (off Eliquis), HLD, gout presenting 3/1 with left ICH (ICH score 4) likely 2/2 amyloid angiopathy s/p left craniectomy(discarded) and evacuation as well as EVD placement and removal (3/7). Hospital course c/b extensive LE DVTs, S/p IVCF on 3/3. She is s/p trach/peg 3/8/24.  Febrile 3/10 with + UA, blood/sputum culture NGTD.  Treatment for UTI initiated with ceftriaxone, eventually broadened to cefepime. Transferred to [facility] 3/11 for continued management. Urine culture resulted positive for klebsiella, treated for 7 days with Meropenem 3/12-3/19. Patient S/p Cranioplasty on 4/2. Patient was initially weaned to TC ATC in the R[facility] but post cranioplasty required Mechanical Ventilation.     4/11: No events reported overnight, Patient previously tolerating TC prior to cranioplasty. Will continue Pressure Support Trials as tolerated.  Tolerating Clonidine.

## 2024-04-12 LAB
ALBUMIN SERPL ELPH-MCNC: 3.3 G/DL — SIGNIFICANT CHANGE UP (ref 3.3–5)
ALP SERPL-CCNC: 77 U/L — SIGNIFICANT CHANGE UP (ref 40–120)
ALT FLD-CCNC: 10 U/L — SIGNIFICANT CHANGE UP (ref 10–45)
ANION GAP SERPL CALC-SCNC: 14 MMOL/L — SIGNIFICANT CHANGE UP (ref 5–17)
AST SERPL-CCNC: 16 U/L — SIGNIFICANT CHANGE UP (ref 10–40)
BILIRUB SERPL-MCNC: 0.2 MG/DL — SIGNIFICANT CHANGE UP (ref 0.2–1.2)
BUN SERPL-MCNC: 67 MG/DL — HIGH (ref 7–23)
CALCIUM SERPL-MCNC: 10.3 MG/DL — SIGNIFICANT CHANGE UP (ref 8.4–10.5)
CHLORIDE SERPL-SCNC: 100 MMOL/L — SIGNIFICANT CHANGE UP (ref 96–108)
CO2 SERPL-SCNC: 23 MMOL/L — SIGNIFICANT CHANGE UP (ref 22–31)
CREAT SERPL-MCNC: 1.36 MG/DL — HIGH (ref 0.5–1.3)
EGFR: 40 ML/MIN/1.73M2 — LOW
GLUCOSE BLDC GLUCOMTR-MCNC: 123 MG/DL — HIGH (ref 70–99)
GLUCOSE BLDC GLUCOMTR-MCNC: 172 MG/DL — HIGH (ref 70–99)
GLUCOSE BLDC GLUCOMTR-MCNC: 97 MG/DL — SIGNIFICANT CHANGE UP (ref 70–99)
GLUCOSE SERPL-MCNC: 162 MG/DL — HIGH (ref 70–99)
HCT VFR BLD CALC: 28.3 % — LOW (ref 34.5–45)
HGB BLD-MCNC: 8.7 G/DL — LOW (ref 11.5–15.5)
MAGNESIUM SERPL-MCNC: 2.2 MG/DL — SIGNIFICANT CHANGE UP (ref 1.6–2.6)
MCHC RBC-ENTMCNC: 29.7 PG — SIGNIFICANT CHANGE UP (ref 27–34)
MCHC RBC-ENTMCNC: 30.7 GM/DL — LOW (ref 32–36)
MCV RBC AUTO: 96.6 FL — SIGNIFICANT CHANGE UP (ref 80–100)
NRBC # BLD: 0 /100 WBCS — SIGNIFICANT CHANGE UP (ref 0–0)
PHOSPHATE SERPL-MCNC: 3 MG/DL — SIGNIFICANT CHANGE UP (ref 2.5–4.5)
PLATELET # BLD AUTO: 156 K/UL — SIGNIFICANT CHANGE UP (ref 150–400)
POTASSIUM SERPL-MCNC: 3.6 MMOL/L — SIGNIFICANT CHANGE UP (ref 3.5–5.3)
POTASSIUM SERPL-SCNC: 3.6 MMOL/L — SIGNIFICANT CHANGE UP (ref 3.5–5.3)
PROT SERPL-MCNC: 6.2 G/DL — SIGNIFICANT CHANGE UP (ref 6–8.3)
RBC # BLD: 2.93 M/UL — LOW (ref 3.8–5.2)
RBC # FLD: 16 % — HIGH (ref 10.3–14.5)
SODIUM SERPL-SCNC: 137 MMOL/L — SIGNIFICANT CHANGE UP (ref 135–145)
TACROLIMUS SERPL-MCNC: 5 NG/ML — SIGNIFICANT CHANGE UP
WBC # BLD: 6.49 K/UL — SIGNIFICANT CHANGE UP (ref 3.8–10.5)
WBC # FLD AUTO: 6.49 K/UL — SIGNIFICANT CHANGE UP (ref 3.8–10.5)

## 2024-04-12 PROCEDURE — 99233 SBSQ HOSP IP/OBS HIGH 50: CPT | Mod: FS

## 2024-04-12 RX ORDER — SODIUM CHLORIDE 9 MG/ML
500 INJECTION, SOLUTION INTRAVENOUS
Refills: 0 | Status: DISCONTINUED | OUTPATIENT
Start: 2024-04-12 | End: 2024-04-15

## 2024-04-12 RX ADMIN — Medication 1 TABLET(S): at 11:37

## 2024-04-12 RX ADMIN — Medication 500000 UNIT(S): at 23:32

## 2024-04-12 RX ADMIN — Medication 5 MILLILITER(S): at 17:29

## 2024-04-12 RX ADMIN — Medication 5 MILLILITER(S): at 11:36

## 2024-04-12 RX ADMIN — Medication 1 DROP(S): at 21:33

## 2024-04-12 RX ADMIN — Medication 500000 UNIT(S): at 05:07

## 2024-04-12 RX ADMIN — Medication 2: at 05:20

## 2024-04-12 RX ADMIN — Medication 0.1 MILLIGRAM(S): at 05:07

## 2024-04-12 RX ADMIN — Medication 0.1 MILLIGRAM(S): at 14:56

## 2024-04-12 RX ADMIN — SODIUM CHLORIDE 100 MILLILITER(S): 9 INJECTION, SOLUTION INTRAVENOUS at 11:11

## 2024-04-12 RX ADMIN — Medication 5 MILLIGRAM(S): at 21:34

## 2024-04-12 RX ADMIN — HEPARIN SODIUM 5000 UNIT(S): 5000 INJECTION INTRAVENOUS; SUBCUTANEOUS at 14:56

## 2024-04-12 RX ADMIN — BRIVARACETAM 100 MILLIGRAM(S): 25 TABLET, FILM COATED ORAL at 08:06

## 2024-04-12 RX ADMIN — TACROLIMUS 3 MILLIGRAM(S): 5 CAPSULE ORAL at 17:27

## 2024-04-12 RX ADMIN — PANTOPRAZOLE SODIUM 40 MILLIGRAM(S): 20 TABLET, DELAYED RELEASE ORAL at 10:58

## 2024-04-12 RX ADMIN — HEPARIN SODIUM 5000 UNIT(S): 5000 INJECTION INTRAVENOUS; SUBCUTANEOUS at 05:08

## 2024-04-12 RX ADMIN — Medication 0.1 MILLIGRAM(S): at 21:35

## 2024-04-12 RX ADMIN — Medication 1 TABLET(S): at 11:36

## 2024-04-12 RX ADMIN — CARVEDILOL PHOSPHATE 25 MILLIGRAM(S): 80 CAPSULE, EXTENDED RELEASE ORAL at 21:33

## 2024-04-12 RX ADMIN — Medication 500000 UNIT(S): at 11:36

## 2024-04-12 RX ADMIN — HEPARIN SODIUM 5000 UNIT(S): 5000 INJECTION INTRAVENOUS; SUBCUTANEOUS at 21:33

## 2024-04-12 RX ADMIN — Medication 1 DROP(S): at 01:33

## 2024-04-12 RX ADMIN — BRIVARACETAM 100 MILLIGRAM(S): 25 TABLET, FILM COATED ORAL at 23:33

## 2024-04-12 RX ADMIN — TACROLIMUS 3 MILLIGRAM(S): 5 CAPSULE ORAL at 05:36

## 2024-04-12 RX ADMIN — Medication 1 DROP(S): at 05:06

## 2024-04-12 RX ADMIN — Medication 1 DROP(S): at 10:59

## 2024-04-12 RX ADMIN — PANTOPRAZOLE SODIUM 40 MILLIGRAM(S): 20 TABLET, DELAYED RELEASE ORAL at 21:34

## 2024-04-12 RX ADMIN — LACOSAMIDE 200 MILLIGRAM(S): 50 TABLET ORAL at 23:33

## 2024-04-12 RX ADMIN — BRIVARACETAM 100 MILLIGRAM(S): 25 TABLET, FILM COATED ORAL at 16:00

## 2024-04-12 RX ADMIN — Medication 500000 UNIT(S): at 17:29

## 2024-04-12 RX ADMIN — Medication 1 DROP(S): at 17:30

## 2024-04-12 RX ADMIN — CARVEDILOL PHOSPHATE 25 MILLIGRAM(S): 80 CAPSULE, EXTENDED RELEASE ORAL at 10:58

## 2024-04-12 RX ADMIN — Medication 5 MILLILITER(S): at 05:06

## 2024-04-12 RX ADMIN — LACOSAMIDE 200 MILLIGRAM(S): 50 TABLET ORAL at 11:39

## 2024-04-12 RX ADMIN — AMLODIPINE BESYLATE 10 MILLIGRAM(S): 2.5 TABLET ORAL at 05:06

## 2024-04-12 RX ADMIN — Medication 1 DROP(S): at 14:56

## 2024-04-12 RX ADMIN — Medication 5 MILLILITER(S): at 23:32

## 2024-04-12 NOTE — PROGRESS NOTE ADULT - PROBLEM SELECTOR PLAN 2
- S/p trach 3/8 by surgery by Dr. Joselo Mayorga   - Patient initially weaned to TC ATC while in the R[facility] But has required MV Post-OP  - Continue to Progress CPAP Trials as tolerated  - Will attempt to wean back to TC   - Continue airway clearance; Duoneb q6h PRN  - CXR Ordered - S/p trach 3/8 by surgery by Dr. Joselo Mayorga   - Patient initially weaned to TC ATC while in the [facility] But has required MV Post-OP  - CXR 4/10: Clear Lungs   - Continue to Progress CPAP Trials as tolerated  - Will attempt to wean back to TC   - Continue airway clearance; Duoneb q6h PRN

## 2024-04-12 NOTE — PROGRESS NOTE ADULT - ASSESSMENT
77 yo F with PMHx ESRD s/p renal transplant (2019, now off HD), left AKA, BK viremia, HTN, breast ca s/p lumpectomy (completed Tamoxifen 03/2023), DVT (off Eliquis), HLD, gout presenting 3/1 with left ICH (ICH score 4) likely 2/2 amyloid angiopathy s/p left craniectomy(discarded) and evacuation as well as EVD placement and removal (3/7). Hospital course c/b extensive LE DVTs, S/p IVCF on 3/3. She is s/p trach/peg 3/8/24.  Febrile 3/10 with + UA, blood/sputum culture NGTD.  Treatment for UTI initiated with ceftriaxone, eventually broadened to cefepime. Transferred to [facility] 3/11 for continued management. Urine culture resulted positive for klebsiella, treated for 7 days with Meropenem 3/12-3/19. Patient S/p Cranioplasty on 4/2. Patient was initially weaned to TC ATC in the [facility] but post cranioplasty required Mechanical Ventilation.     4/12: No events reported overnight. Patient with Apnea during CPAP Trials yesterday. Will continue weaning attempts as tolerated. Patient with rising BUN/Creat will give  cc x 1 today. Patient on Lokelma EOD; potassium has been improved since changing TF Formula to Nepro. Will dc Lokelma today and monitor Potassium.

## 2024-04-12 NOTE — PROGRESS NOTE ADULT - NS ATTEND AMEND GEN_ALL_CORE FT
78 year old female with a history of ESRD s/p renal transplant (2019) c/b BK viremia, left AKA, HTN, breast ca s/p lumpectomy (completed Tamoxifen 3/2023), DVT (off Eliquis), HLD, gout presenting with left ICH likely in the setting of amyloid angiopathy s/p left craniectomy (discarded) and evacuation (3/2/24), EVD placement and removal (3/7), c/b prolonged respiratory failure s/p trach/PEG (3/9/24) and [facility] transfer.     #ICH  #Seizure  From a neurologic perspective she has had an ICH 2/2 amyloid angiopathy s/p left craniectomy and evacuation, EVD placement and subsequent removal. She is s/p cranioplasty 4/2.  She clinically remains obtunded, reportedly will occasionally open her eyes to sternal rub, no meaningful communication. Not opening eyes on exam, no appreciable twitching or jerking.  Her hospital course has been complicated by seizure, her last recorded seizures were seen 3/7/24. She is currently controlled on Briviact 100 TID, Vimpat 200 BID  - On vEEG without any evidence of seizure, will continue to monitor  - Appreciate neurosurgery input   - Follow neuro exam closely - eyes closed, withdraws to pain  - C/W AED    #HTN: Hypotension after OR.  Continue with other antihypertensives, currently normotensive on my exam.   - amlodipine and carvedilol   - resumed clonidine patch   - TTE LVEF 70%, diastolic dysfunction     #Mixed respiratory failure  She initially presented with respiratory failure, mixed hypoxic and hypercapnic respiratory failure. She was tolerating trach collar around the clock since 3/23/24. She was placed back on full support after her cranioplasty 4/2. Will rapidly wean again as able. Will wean O2 as tolerated, goal SpO2 90-95%. Continue airways clearance with duonebs and hypersal q6 for airways clearance.   - CPAP as tolerated -  - CXR/ 4/10 - no significant fluid overload or consolidation     #UTI  #ESBL Kleb  Her hospital course was otherwise complicated by ESBL Kleb and VRE (3/10) urinary tract infection. She is s/p Meropenem. Now clinically stable and being monitored off antibiotics.    #H.Pylori  She was found to have H.Pylori positivity: Per GI evaluation, will plan to start treatment as outlined above at the time of discharge.  - on PPI 40 IV BID     #Renal Transplant  #MEHREEN  She has a history of renal transplant. Transplant is following and appreciate their recommendations. Continue with Prednisone, Tacrolimus.  - gets straight cath prn   - tacro goal 4-7  - on prednisone 5 mg and Bactrim    - BUN Cr trending up, 500 cc LR today, monitor Cr     #DM  She has had issues with hyperglycemia: Goal blood glucose 140-180. Adjusting insulin accordingly.    #Heme: DVT 3/2 R CFV, Fem, Pop, Gastrocnemius and L CFV, Fem s/p IVC filter (3/3/24). Tolerating SQH without issue.

## 2024-04-12 NOTE — PROGRESS NOTE ADULT - PROBLEM SELECTOR PLAN 5
- SBP goal:   - Coreg 25 mg BID, Amlodipine 10mg daily  - Continue Enteral Clonidine q 8 hrs ( with hold parameters)   - Echo: LVEF 70-75%, Grade II diastolic dysfunction, septal bulge without obstruction  - Cardiology following

## 2024-04-12 NOTE — PROGRESS NOTE ADULT - SUBJECTIVE AND OBJECTIVE BOX
Patient is a 78y old  Female who presents with a chief complaint of ICH (11 Apr 2024 13:06)      Interval Events: No events reported overnight     REVIEW OF SYSTEMS:  [ ] Positive  [ ] All other systems negative  [ ] Unable to assess ROS because ________    Vital Signs Last 24 Hrs  T(C): 36.7 (04-12-24 @ 04:32), Max: 37.2 (04-11-24 @ 16:30)  T(F): 98.1 (04-12-24 @ 04:32), Max: 98.9 (04-11-24 @ 16:30)  HR: 71 (04-12-24 @ 06:06) (71 - 88)  BP: 127/64 (04-12-24 @ 04:32) (110/63 - 144/76)  RR: 14 (04-12-24 @ 06:06) (14 - 19)  SpO2: 100% (04-12-24 @ 06:06) (100% - 100%)    PHYSICAL EXAM:  HEENT:   [ ]Tracheostomy:  [ ]Pupils equal  [ ]No oral lesions  [ ]Abnormal    SKIN  [ ]No Rash  [ ] Abnormal  [ ] pressure    CARDIAC  [ ]Regular  [ ]Abnormal    PULMONARY  [ ]Bilateral Clear Breath Sounds  [ ]Normal Excursion  [ ]Abnormal    GI  [ ]PEG      [ ] +BS		              [ ]Soft, nondistended, nontender	  [ ]Abnormal    MUSCULOSKELETAL                                   [ ]Bedbound                 [ ]Abnormal    [ ]Ambulatory/OOB to chair                           EXTREMITIES                                         [ ]Normal  [ ]Edema                           NEUROLOGIC  [ ] Normal, non focal  [ ] Focal findings:    PSYCHIATRIC  [ ]Alert and appropriate  [ ] Sedated	 [ ]Agitated    :  Gardner: [ ] Yes, if yes: Date of Placement:                   [  ] No    LINES: Central Lines [ ] Yes, if yes: Date of Placement                                     [  ] No    HOSPITAL MEDICATIONS:  MEDICATIONS  (STANDING):  amLODIPine   Tablet 10 milliGRAM(s) Oral daily  artificial  tears Solution 1 Drop(s) Both EYES every 4 hours  Biotene Dry Mouth Oral Rinse 5 milliLiter(s) Swish and Spit every 6 hours  brivaracetam Oral Solution 100 milliGRAM(s) Oral <User Schedule>  carvedilol 25 milliGRAM(s) Oral every 12 hours  cloNIDine 0.1 milliGRAM(s) Oral every 8 hours  dextrose 5%. 1000 milliLiter(s) (50 mL/Hr) IV Continuous <Continuous>  dextrose 5%. 1000 milliLiter(s) (100 mL/Hr) IV Continuous <Continuous>  dextrose 50% Injectable 25 Gram(s) IV Push once  glucagon  Injectable 1 milliGRAM(s) IntraMuscular once  heparin   Injectable 5000 Unit(s) SubCutaneous every 8 hours  insulin glargine Injectable (LANTUS) 30 Unit(s) SubCutaneous at bedtime  insulin lispro (ADMELOG) corrective regimen sliding scale   SubCutaneous every 6 hours  lacosamide Solution 200 milliGRAM(s) Oral <User Schedule>  multivitamin 1 Tablet(s) Oral daily  nystatin    Suspension 011490 Unit(s) Swish and Swallow every 6 hours  pantoprazole   Suspension 40 milliGRAM(s) Oral two times a day  predniSONE   Tablet 5 milliGRAM(s) Oral daily  sodium zirconium cyclosilicate 10 Gram(s) Oral every other day  tacrolimus    0.5 mG/mL Suspension 3 milliGRAM(s) Oral every 12 hours  trimethoprim   80 mG/sulfamethoxazole 400 mG 1 Tablet(s) Oral daily    MEDICATIONS  (PRN):  acetaminophen     Tablet .. 650 milliGRAM(s) Oral every 6 hours PRN Mild Pain (1 - 3)  acetaminophen   IVPB .. 1000 milliGRAM(s) IV Intermittent every 6 hours PRN Temp greater or equal to 38.5C (101.3F), Severe Pain (7 - 10)  albuterol/ipratropium for Nebulization 3 milliLiter(s) Nebulizer every 6 hours PRN Shortness of Breath and/or Wheezing      LABS:                        8.7    6.49  )-----------( 156      ( 12 Apr 2024 06:36 )             28.3     04-12    137  |  100  |  67<H>  ----------------------------<  162<H>  3.6   |  23  |  1.36<H>    Ca    10.3      12 Apr 2024 06:36  Phos  3.0     04-12  Mg     2.2     04-12    TPro  6.2  /  Alb  3.3  /  TBili  0.2  /  DBili  x   /  AST  16  /  ALT  10  /  AlkPhos  77  04-12      Urinalysis Basic - ( 12 Apr 2024 06:36 )    Color: x / Appearance: x / SG: x / pH: x  Gluc: 162 mg/dL / Ketone: x  / Bili: x / Urobili: x   Blood: x / Protein: x / Nitrite: x   Leuk Esterase: x / RBC: x / WBC x   Sq Epi: x / Non Sq Epi: x / Bacteria: x          CAPILLARY BLOOD GLUCOSE    MICROBIOLOGY:     RADIOLOGY:  [ ] Reviewed and interpreted by me    Mode: AC/ CMV (Assist Control/ Continuous Mandatory Ventilation)  RR (machine): 14  TV (machine): 450  FiO2: 40  PEEP: 5  ITime: 1  MAP: 8  PIP: 18   Patient is a 78y old  Female who presents with a chief complaint of ICH (11 Apr 2024 13:06)      Interval Events: No events reported overnight     REVIEW OF SYSTEMS:  [ ] Positive  [ ] All other systems negative  [x] Unable to assess ROS because patient is Not responding to verbal questioning     Vital Signs Last 24 Hrs  T(C): 36.7 (04-12-24 @ 04:32), Max: 37.2 (04-11-24 @ 16:30)  T(F): 98.1 (04-12-24 @ 04:32), Max: 98.9 (04-11-24 @ 16:30)  HR: 71 (04-12-24 @ 06:06) (71 - 88)  BP: 127/64 (04-12-24 @ 04:32) (110/63 - 144/76)  RR: 14 (04-12-24 @ 06:06) (14 - 19)  SpO2: 100% (04-12-24 @ 06:06) (100% - 100%)    PHYSICAL EXAM:  HEENT:   [ ]Tracheostomy:  [ ]Pupils equal  [ ]No oral lesions  [ ]Abnormal    SKIN  [ ]No Rash  [ ] Abnormal  [ ] pressure    CARDIAC  [ ]Regular  [ ]Abnormal    PULMONARY  [ ]Bilateral Clear Breath Sounds  [ ]Normal Excursion  [ ]Abnormal    GI  [ ]PEG      [ ] +BS		              [ ]Soft, nondistended, nontender	  [ ]Abnormal    MUSCULOSKELETAL                                   [ ]Bedbound                 [ ]Abnormal    [ ]Ambulatory/OOB to chair                           EXTREMITIES                                         [ ]Normal  [ ]Edema                           NEUROLOGIC  [ ] Normal, non focal  [ ] Focal findings:    PSYCHIATRIC  [ ]Alert and appropriate  [ ] Sedated	 [ ]Agitated    :  Gardner: [ ] Yes, if yes: Date of Placement:                   [  ] No    LINES: Central Lines [ ] Yes, if yes: Date of Placement                                     [  ] No    HOSPITAL MEDICATIONS:  MEDICATIONS  (STANDING):  amLODIPine   Tablet 10 milliGRAM(s) Oral daily  artificial  tears Solution 1 Drop(s) Both EYES every 4 hours  Biotene Dry Mouth Oral Rinse 5 milliLiter(s) Swish and Spit every 6 hours  brivaracetam Oral Solution 100 milliGRAM(s) Oral <User Schedule>  carvedilol 25 milliGRAM(s) Oral every 12 hours  cloNIDine 0.1 milliGRAM(s) Oral every 8 hours  dextrose 5%. 1000 milliLiter(s) (50 mL/Hr) IV Continuous <Continuous>  dextrose 5%. 1000 milliLiter(s) (100 mL/Hr) IV Continuous <Continuous>  dextrose 50% Injectable 25 Gram(s) IV Push once  glucagon  Injectable 1 milliGRAM(s) IntraMuscular once  heparin   Injectable 5000 Unit(s) SubCutaneous every 8 hours  insulin glargine Injectable (LANTUS) 30 Unit(s) SubCutaneous at bedtime  insulin lispro (ADMELOG) corrective regimen sliding scale   SubCutaneous every 6 hours  lacosamide Solution 200 milliGRAM(s) Oral <User Schedule>  multivitamin 1 Tablet(s) Oral daily  nystatin    Suspension 952104 Unit(s) Swish and Swallow every 6 hours  pantoprazole   Suspension 40 milliGRAM(s) Oral two times a day  predniSONE   Tablet 5 milliGRAM(s) Oral daily  sodium zirconium cyclosilicate 10 Gram(s) Oral every other day  tacrolimus    0.5 mG/mL Suspension 3 milliGRAM(s) Oral every 12 hours  trimethoprim   80 mG/sulfamethoxazole 400 mG 1 Tablet(s) Oral daily    MEDICATIONS  (PRN):  acetaminophen     Tablet .. 650 milliGRAM(s) Oral every 6 hours PRN Mild Pain (1 - 3)  acetaminophen   IVPB .. 1000 milliGRAM(s) IV Intermittent every 6 hours PRN Temp greater or equal to 38.5C (101.3F), Severe Pain (7 - 10)  albuterol/ipratropium for Nebulization 3 milliLiter(s) Nebulizer every 6 hours PRN Shortness of Breath and/or Wheezing      LABS:                        8.7    6.49  )-----------( 156      ( 12 Apr 2024 06:36 )             28.3     04-12    137  |  100  |  67<H>  ----------------------------<  162<H>  3.6   |  23  |  1.36<H>    Ca    10.3      12 Apr 2024 06:36  Phos  3.0     04-12  Mg     2.2     04-12    TPro  6.2  /  Alb  3.3  /  TBili  0.2  /  DBili  x   /  AST  16  /  ALT  10  /  AlkPhos  77  04-12      Urinalysis Basic - ( 12 Apr 2024 06:36 )    Color: x / Appearance: x / SG: x / pH: x  Gluc: 162 mg/dL / Ketone: x  / Bili: x / Urobili: x   Blood: x / Protein: x / Nitrite: x   Leuk Esterase: x / RBC: x / WBC x   Sq Epi: x / Non Sq Epi: x / Bacteria: x          CAPILLARY BLOOD GLUCOSE    MICROBIOLOGY:     RADIOLOGY:  [ ] Reviewed and interpreted by me    Mode: AC/ CMV (Assist Control/ Continuous Mandatory Ventilation)  RR (machine): 14  TV (machine): 450  FiO2: 40  PEEP: 5  ITime: 1  MAP: 8  PIP: 18   Patient is a 78y old  Female who presents with a chief complaint of ICH (11 Apr 2024 13:06)      Interval Events: No events reported overnight     REVIEW OF SYSTEMS:  [ ] Positive  [ ] All other systems negative  [x] Unable to assess ROS because patient is Not responding to verbal questioning     Vital Signs Last 24 Hrs  T(C): 36.7 (04-12-24 @ 04:32), Max: 37.2 (04-11-24 @ 16:30)  T(F): 98.1 (04-12-24 @ 04:32), Max: 98.9 (04-11-24 @ 16:30)  HR: 71 (04-12-24 @ 06:06) (71 - 88)  BP: 127/64 (04-12-24 @ 04:32) (110/63 - 144/76)  RR: 14 (04-12-24 @ 06:06) (14 - 19)  SpO2: 100% (04-12-24 @ 06:06) (100% - 100%)    PHYSICAL EXAM:  HEENT:   [x]Tracheostomy: #7 Cuffed Portex  [x]Pupils equal  [ ]No oral lesions  [x]Abnormal: S/p Cranioplasty, Site remains w/ staples incision C/D/I    SKIN  [ ]No Rash  [x] Abnormal: LUE AVF  [x] pressure: Stage 3 sacral pressure injury     CARDIAC  [x]Regular  [ ]Abnormal    PULMONARY  [x]Bilateral Clear Breath Sounds  [ ]Normal Excursion  [ ]Abnormal     GI  [x]PEG     [x] +BS		              [x]Soft, nondistended, nontender	  [ ]Abnormal    MUSCULOSKELETAL                                   [x]Bedbound                 [x]Abnormal: L AKA  [ ]Ambulatory/OOB to chair                           EXTREMITIES                                         [x]Normal  [ ]Edema                           NEUROLOGIC  [ ] Normal, non focal  [x] Focal findings: Eyes Opens intermittently, moves LUE spontaneously, and withdraws in RLE extremity to noxious stimuli.    PSYCHIATRIC  [x]Obtunded  [ ] Sedated	 [ ]Agitated    :  Gardner: [ ] Yes, if yes: Date of Placement:                   [x] No    LINES: Central Lines [ ] Yes, if yes: Date of Placement                                     [x] No    HOSPITAL MEDICATIONS:  MEDICATIONS  (STANDING):  amLODIPine   Tablet 10 milliGRAM(s) Oral daily  artificial  tears Solution 1 Drop(s) Both EYES every 4 hours  Biotene Dry Mouth Oral Rinse 5 milliLiter(s) Swish and Spit every 6 hours  brivaracetam Oral Solution 100 milliGRAM(s) Oral <User Schedule>  carvedilol 25 milliGRAM(s) Oral every 12 hours  cloNIDine 0.1 milliGRAM(s) Oral every 8 hours  dextrose 5%. 1000 milliLiter(s) (50 mL/Hr) IV Continuous <Continuous>  dextrose 5%. 1000 milliLiter(s) (100 mL/Hr) IV Continuous <Continuous>  dextrose 50% Injectable 25 Gram(s) IV Push once  glucagon  Injectable 1 milliGRAM(s) IntraMuscular once  heparin   Injectable 5000 Unit(s) SubCutaneous every 8 hours  insulin glargine Injectable (LANTUS) 30 Unit(s) SubCutaneous at bedtime  insulin lispro (ADMELOG) corrective regimen sliding scale   SubCutaneous every 6 hours  lacosamide Solution 200 milliGRAM(s) Oral <User Schedule>  multivitamin 1 Tablet(s) Oral daily  nystatin    Suspension 590547 Unit(s) Swish and Swallow every 6 hours  pantoprazole   Suspension 40 milliGRAM(s) Oral two times a day  predniSONE   Tablet 5 milliGRAM(s) Oral daily  sodium zirconium cyclosilicate 10 Gram(s) Oral every other day  tacrolimus    0.5 mG/mL Suspension 3 milliGRAM(s) Oral every 12 hours  trimethoprim   80 mG/sulfamethoxazole 400 mG 1 Tablet(s) Oral daily    MEDICATIONS  (PRN):  acetaminophen     Tablet .. 650 milliGRAM(s) Oral every 6 hours PRN Mild Pain (1 - 3)  acetaminophen   IVPB .. 1000 milliGRAM(s) IV Intermittent every 6 hours PRN Temp greater or equal to 38.5C (101.3F), Severe Pain (7 - 10)  albuterol/ipratropium for Nebulization 3 milliLiter(s) Nebulizer every 6 hours PRN Shortness of Breath and/or Wheezing      LABS:                        8.7    6.49  )-----------( 156      ( 12 Apr 2024 06:36 )             28.3     04-12    137  |  100  |  67<H>  ----------------------------<  162<H>  3.6   |  23  |  1.36<H>    Ca    10.3      12 Apr 2024 06:36  Phos  3.0     04-12  Mg     2.2     04-12    TPro  6.2  /  Alb  3.3  /  TBili  0.2  /  DBili  x   /  AST  16  /  ALT  10  /  AlkPhos  77  04-12      Urinalysis Basic - ( 12 Apr 2024 06:36 )    Color: x / Appearance: x / SG: x / pH: x  Gluc: 162 mg/dL / Ketone: x  / Bili: x / Urobili: x   Blood: x / Protein: x / Nitrite: x   Leuk Esterase: x / RBC: x / WBC x   Sq Epi: x / Non Sq Epi: x / Bacteria: x          CAPILLARY BLOOD GLUCOSE    MICROBIOLOGY:     RADIOLOGY:  [ ] Reviewed and interpreted by me    Mode: AC/ CMV (Assist Control/ Continuous Mandatory Ventilation)  RR (machine): 14  TV (machine): 450  FiO2: 40  PEEP: 5  ITime: 1  MAP: 8  PIP: 18

## 2024-04-12 NOTE — PROGRESS NOTE ADULT - PROBLEM SELECTOR PLAN 6
- AVF in the left upper extremity  - S/p renal transplant in 2019  - Prednisone 5mg, Bactrim ppx  - Tacro 3 mg BID as per transplant nephro  - Tacro goal 4-7, daily tacro levels 30 mins prior to dose administration  - Continue Free water / Will give  cc x 1 for rising BUN/Creat   - Feeds changed to Nepro w/ improved hyperkalemia  - Will dc EOD Lokelma and monitor Potassium and possible need to resume if hyperkalemia reoccurs

## 2024-04-12 NOTE — PROGRESS NOTE ADULT - SUBJECTIVE AND OBJECTIVE BOX
DATE OF SERVICE: 04-12-24 @ 15:48    Patient is a 78y old  Female who presents with a chief complaint of ICH (12 Apr 2024 07:31)      INTERVAL HISTORY: Patient non-verbally following commands today.    REVIEW OF SYSTEMS: Unable to fully participate in ROS  CONSTITUTIONAL: No weakness  EYES/ENT: No visual changes;  No throat pain   NECK: No pain or stiffness  RESPIRATORY: No cough, wheezing; No shortness of breath  CARDIOVASCULAR: No chest pain or palpitations  GASTROINTESTINAL: No abdominal  pain. No nausea, vomiting, or hematemesis  GENITOURINARY: No dysuria, frequency or hematuria  NEUROLOGICAL: No stroke like symptoms  SKIN: No rashes    	  MEDICATIONS:  amLODIPine   Tablet 10 milliGRAM(s) Oral daily  carvedilol 25 milliGRAM(s) Oral every 12 hours  cloNIDine 0.1 milliGRAM(s) Oral every 8 hours        PHYSICAL EXAM:  T(C): 36.5 (04-12-24 @ 14:00), Max: 37.2 (04-11-24 @ 16:30)  HR: 74 (04-12-24 @ 14:00) (70 - 85)  BP: 128/76 (04-12-24 @ 14:00) (125/63 - 144/76)  RR: 25 (04-12-24 @ 14:00) (12 - 25)  SpO2: 100% (04-12-24 @ 14:00) (100% - 100%)  Wt(kg): --  I&O's Summary    11 Apr 2024 07:01  -  12 Apr 2024 07:00  --------------------------------------------------------  IN: 1730 mL / OUT: 200 mL / NET: 1530 mL    12 Apr 2024 07:01  -  12 Apr 2024 15:48  --------------------------------------------------------  IN: 590 mL / OUT: 0 mL / NET: 590 mL          Appearance: In no distress	  HEENT:    PERRL, EOMI	  Cardiovascular:  S1 S2, No JVD  Respiratory: Lungs clear to auscultation	  Gastrointestinal:  Soft, Non-tender, + BS	  Vascularature:  L AKA  Psychiatric: Appropriate affect   Neuro: no acute focal deficits                               8.7    6.49  )-----------( 156      ( 12 Apr 2024 06:36 )             28.3     04-12    137  |  100  |  67<H>  ----------------------------<  162<H>  3.6   |  23  |  1.36<H>    Ca    10.3      12 Apr 2024 06:36  Phos  3.0     04-12  Mg     2.2     04-12    TPro  6.2  /  Alb  3.3  /  TBili  0.2  /  DBili  x   /  AST  16  /  ALT  10  /  AlkPhos  77  04-12        Labs personally reviewed      ASSESSMENT/PLAN: 	  78F Hx ESRD s/p renal xplant c/b DGF off HD, L AKA, 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    1. Abnormal Echo --  Septal bulge noted measures 2.2cm without obstruction.   -- Given no intracavitary obstruction no intervention at this time  - No Myxoma seen on this echo or echo in 2019, ? if hypermobile interatrial septum was confused for on prior imaging     2. HTN -   - Continue Coreg 25 mg BID, amlodipine 10mg  - Clonidine patch resumed but now with labile BP. Cont Clonidine 0.1mg via PEG q8hrs with hold parameters    3. 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 Diastolic Dysfunction  - Moderate DD with elevated filling pressures noted on TTE from March  - Check BNP  - Monitor volume status closely  - Now with slight MEHREEN receiving IVF     5. DVT PPX - c/w SQ hep            Yodit Umaña, AG-NP   Celio Mcwilliams DO [facility]  Cardiovascular Medicine  [address], [address]  Available through call or text on [employer] TEAM[employer]  Office: [phone]

## 2024-04-13 LAB
ALBUMIN SERPL ELPH-MCNC: 3 G/DL — LOW (ref 3.3–5)
ALP SERPL-CCNC: 77 U/L — SIGNIFICANT CHANGE UP (ref 40–120)
ALT FLD-CCNC: 14 U/L — SIGNIFICANT CHANGE UP (ref 10–45)
ANION GAP SERPL CALC-SCNC: 14 MMOL/L — SIGNIFICANT CHANGE UP (ref 5–17)
AST SERPL-CCNC: 20 U/L — SIGNIFICANT CHANGE UP (ref 10–40)
BILIRUB SERPL-MCNC: 0.2 MG/DL — SIGNIFICANT CHANGE UP (ref 0.2–1.2)
BUN SERPL-MCNC: 64 MG/DL — HIGH (ref 7–23)
CALCIUM SERPL-MCNC: 9.8 MG/DL — SIGNIFICANT CHANGE UP (ref 8.4–10.5)
CHLORIDE SERPL-SCNC: 103 MMOL/L — SIGNIFICANT CHANGE UP (ref 96–108)
CO2 SERPL-SCNC: 21 MMOL/L — LOW (ref 22–31)
CREAT SERPL-MCNC: 1.2 MG/DL — SIGNIFICANT CHANGE UP (ref 0.5–1.3)
EGFR: 46 ML/MIN/1.73M2 — LOW
GLUCOSE BLDC GLUCOMTR-MCNC: 134 MG/DL — HIGH (ref 70–99)
GLUCOSE BLDC GLUCOMTR-MCNC: 155 MG/DL — HIGH (ref 70–99)
GLUCOSE BLDC GLUCOMTR-MCNC: 198 MG/DL — HIGH (ref 70–99)
GLUCOSE BLDC GLUCOMTR-MCNC: 200 MG/DL — HIGH (ref 70–99)
GLUCOSE SERPL-MCNC: 208 MG/DL — HIGH (ref 70–99)
HCT VFR BLD CALC: 27.1 % — LOW (ref 34.5–45)
HGB BLD-MCNC: 8.5 G/DL — LOW (ref 11.5–15.5)
MAGNESIUM SERPL-MCNC: 2.1 MG/DL — SIGNIFICANT CHANGE UP (ref 1.6–2.6)
MCHC RBC-ENTMCNC: 30 PG — SIGNIFICANT CHANGE UP (ref 27–34)
MCHC RBC-ENTMCNC: 31.4 GM/DL — LOW (ref 32–36)
MCV RBC AUTO: 95.8 FL — SIGNIFICANT CHANGE UP (ref 80–100)
NRBC # BLD: 0 /100 WBCS — SIGNIFICANT CHANGE UP (ref 0–0)
PHOSPHATE SERPL-MCNC: 2.8 MG/DL — SIGNIFICANT CHANGE UP (ref 2.5–4.5)
PLATELET # BLD AUTO: 135 K/UL — LOW (ref 150–400)
POTASSIUM SERPL-MCNC: 4 MMOL/L — SIGNIFICANT CHANGE UP (ref 3.5–5.3)
POTASSIUM SERPL-SCNC: 4 MMOL/L — SIGNIFICANT CHANGE UP (ref 3.5–5.3)
PROT SERPL-MCNC: 6.3 G/DL — SIGNIFICANT CHANGE UP (ref 6–8.3)
RBC # BLD: 2.83 M/UL — LOW (ref 3.8–5.2)
RBC # FLD: 16 % — HIGH (ref 10.3–14.5)
SODIUM SERPL-SCNC: 138 MMOL/L — SIGNIFICANT CHANGE UP (ref 135–145)
TACROLIMUS SERPL-MCNC: 5 NG/ML — SIGNIFICANT CHANGE UP
WBC # BLD: 6.36 K/UL — SIGNIFICANT CHANGE UP (ref 3.8–10.5)
WBC # FLD AUTO: 6.36 K/UL — SIGNIFICANT CHANGE UP (ref 3.8–10.5)

## 2024-04-13 PROCEDURE — 99233 SBSQ HOSP IP/OBS HIGH 50: CPT | Mod: FS

## 2024-04-13 RX ADMIN — BRIVARACETAM 100 MILLIGRAM(S): 25 TABLET, FILM COATED ORAL at 16:27

## 2024-04-13 RX ADMIN — BRIVARACETAM 100 MILLIGRAM(S): 25 TABLET, FILM COATED ORAL at 21:57

## 2024-04-13 RX ADMIN — Medication 5 MILLILITER(S): at 05:33

## 2024-04-13 RX ADMIN — Medication 2: at 05:46

## 2024-04-13 RX ADMIN — TACROLIMUS 3 MILLIGRAM(S): 5 CAPSULE ORAL at 17:26

## 2024-04-13 RX ADMIN — Medication 2: at 17:25

## 2024-04-13 RX ADMIN — LACOSAMIDE 200 MILLIGRAM(S): 50 TABLET ORAL at 11:32

## 2024-04-13 RX ADMIN — Medication 1 DROP(S): at 14:27

## 2024-04-13 RX ADMIN — LACOSAMIDE 200 MILLIGRAM(S): 50 TABLET ORAL at 22:04

## 2024-04-13 RX ADMIN — HEPARIN SODIUM 5000 UNIT(S): 5000 INJECTION INTRAVENOUS; SUBCUTANEOUS at 22:05

## 2024-04-13 RX ADMIN — Medication 1 DROP(S): at 02:32

## 2024-04-13 RX ADMIN — HEPARIN SODIUM 5000 UNIT(S): 5000 INJECTION INTRAVENOUS; SUBCUTANEOUS at 14:05

## 2024-04-13 RX ADMIN — PANTOPRAZOLE SODIUM 40 MILLIGRAM(S): 20 TABLET, DELAYED RELEASE ORAL at 11:33

## 2024-04-13 RX ADMIN — Medication 500000 UNIT(S): at 05:33

## 2024-04-13 RX ADMIN — Medication 5 MILLIGRAM(S): at 22:04

## 2024-04-13 RX ADMIN — BRIVARACETAM 100 MILLIGRAM(S): 25 TABLET, FILM COATED ORAL at 08:55

## 2024-04-13 RX ADMIN — TACROLIMUS 3 MILLIGRAM(S): 5 CAPSULE ORAL at 05:33

## 2024-04-13 RX ADMIN — Medication 1 DROP(S): at 05:33

## 2024-04-13 RX ADMIN — INSULIN GLARGINE 30 UNIT(S): 100 INJECTION, SOLUTION SUBCUTANEOUS at 00:55

## 2024-04-13 RX ADMIN — Medication 2: at 00:56

## 2024-04-13 RX ADMIN — Medication 1 DROP(S): at 21:56

## 2024-04-13 RX ADMIN — CARVEDILOL PHOSPHATE 25 MILLIGRAM(S): 80 CAPSULE, EXTENDED RELEASE ORAL at 21:56

## 2024-04-13 RX ADMIN — Medication 500000 UNIT(S): at 21:57

## 2024-04-13 RX ADMIN — Medication 500000 UNIT(S): at 11:33

## 2024-04-13 RX ADMIN — Medication 1 DROP(S): at 17:27

## 2024-04-13 RX ADMIN — CARVEDILOL PHOSPHATE 25 MILLIGRAM(S): 80 CAPSULE, EXTENDED RELEASE ORAL at 10:28

## 2024-04-13 RX ADMIN — Medication 1 DROP(S): at 10:28

## 2024-04-13 RX ADMIN — Medication 5 MILLILITER(S): at 21:57

## 2024-04-13 RX ADMIN — HEPARIN SODIUM 5000 UNIT(S): 5000 INJECTION INTRAVENOUS; SUBCUTANEOUS at 05:36

## 2024-04-13 RX ADMIN — Medication 500000 UNIT(S): at 17:26

## 2024-04-13 RX ADMIN — Medication 1 TABLET(S): at 11:32

## 2024-04-13 RX ADMIN — Medication 0.1 MILLIGRAM(S): at 22:04

## 2024-04-13 RX ADMIN — Medication 0.1 MILLIGRAM(S): at 05:36

## 2024-04-13 RX ADMIN — PANTOPRAZOLE SODIUM 40 MILLIGRAM(S): 20 TABLET, DELAYED RELEASE ORAL at 22:04

## 2024-04-13 RX ADMIN — Medication 5 MILLILITER(S): at 11:33

## 2024-04-13 RX ADMIN — Medication 5 MILLILITER(S): at 17:26

## 2024-04-13 RX ADMIN — Medication 0.1 MILLIGRAM(S): at 14:05

## 2024-04-13 NOTE — PROGRESS NOTE ADULT - ASSESSMENT
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 Hospital course c/b extensive LE DVTs, S/p IVCF on 3/3. She is s/p trach/peg 3/8/24.  Febrile 3/10 with + UA, blood/sputum culture NGTD.  Treatment for UTI initiated with ceftriaxone, eventually broadened to cefepime. Transferred to [facility]CU 3/11 for continued management. Urine culture resulted positive for klebsiella, treated for 7 days with Meropenem 3/12-3/19. Patient S/p Cranioplasty on 4/2. Patient was initially weaned to TC ATC in the RCU but post cranioplasty required Mechanical Ventilation.     4/12: No events reported overnight. Patient with Apnea during CPAP Trials yesterday. Will continue weaning attempts as tolerated. Patient with rising BUN/Creat will give  cc x 1 today. Patient on Lokelma EOD; potassium has been improved since changing TF Formula to Nepro. Will dc Lokelma today and monitor Potassium. 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 Hospital course c/b extensive LE DVTs, S/p IVCF on 3/3. She is s/p trach/peg 3/8/24.  Febrile 3/10 with + UA, blood/sputum culture NGTD.  Treatment for UTI initiated with ceftriaxone, eventually broadened to cefepime. Transferred to [facility]CU 3/11 for continued management. Urine culture resulted positive for klebsiella, treated for 7 days with Meropenem 3/12-3/19. Patient S/p Cranioplasty on 4/2. Patient was initially weaned to TC ATC in the RCU but post cranioplasty required Mechanical Ventilation.     4/12: No events reported overnight. Patient with Apnea during CPAP Trials yesterday. Will continue weaning attempts as tolerated. Patient with rising BUN/Creat will give  cc x 1 today. Patient on Lokelma EOD; potassium has been improved since changing TF Formula to Nepro. Will dc Lokelma today and monitor Potassium.  4/13 creatine  trending lower as well as  BUN.  K 4.0

## 2024-04-13 NOTE — PROGRESS NOTE ADULT - NS ATTEND AMEND GEN_ALL_CORE FT
Pt is a 78F with MHx ESRD s/p renal transplant (2019, now off HD), left AKA, BK viremia, HTN, breast ca s/p lumpectomy (completed Tamoxifen 03/2023), DVT (off Eliquis), HLD, gout presenting with left ICH (ICH score 4) likely 2/2 amyloid angiopathy s/p left craniectomy(discarded) and evacuation POD9 as well as EVD placement and removal. She is s/p trach/peg 3/9/24 and RCU transfer.      Pt initially p/w left ICH  likely 2/2 amyloid angiopathy s/p left craniectomy(discarded) and evacuation POD9 as well as EVD placement and removal. Head CT 3/12 stable. Pt remains obtunded, occasionally opens eyes to sternal rub, withdraws on LE but otherwise shows no purposeful communication or active consciousness and is functionally dependent on all ADLs. Metabolic encephalopathy possibly superimposed i/s/o active infection. As per family had been more alert earlier in admission. Remains obtunded, some eye opening with sternal rub. Will CTM closely. Pt further with seizures now controlled on Briviact 100 mg TID for seizures, Vimpat 200 mg BID. Spot EEG completed AM 3/15, no evidence of epileptiform discharges. She is s/p cranioplasty 4/2. Appreciate neurosurgery input.     Pt with acute hypercapnic and hypoxemic respiratory failure in the setting of CVA. Cheyne Wesley breathing pattern observed since admission to RCU. Was tolerating TC ATC (since 3/23) but then required full A/C since cranioplasty 4/2. Will continue SBTs as tolerated. Wean O2 supplementation for goal O2 saturation 90-95%. Trach care and suctioning as per RCU team. Oral hygiene and aspiration precautions.     Pt with HTN (SBP goal: ) now on clonidine 0.2mg BID + Coreg 25 mg BID + Norvasc 5mg. TTE LVEF 70-75%, Grade II diastolic dysfcn, septal bulge noted which is not significant as per cardiology. No current acute cardiac issues. Pt with known hx of bilateral above the knee DVT. s/p IVC filter. HSQ q12h restarted, okay from a neurosx standpoint.     Pt with hx renal transplant (2019), AVF remains in the left upper extremity. Will c/w home prednisone 5mg with bactrim ppx and tacrolimus 5mg BID per transplant nephro recs, f/u levels.     Hospital course further c/b oropharyngeal dysphagia s/p PEG feeds. Tolerating at goal rate. H. pylori infection (+). Protonix in place. H. pylori treatment can be held for 1 month until acute issues have resolved as per GI team. Pt with DMII.  Goal euglycemia (-180), A1C: 5.4%. c/w NPH with ISS/BGFS monitoring. Pt further with DVT 3/2 R CFV, Fem, Pop, Gastrocnemius and L CFV, Fem s/p IVC filter (3/3/24). Tolerating SQH without issue.    Pt with UTI with UA culture with Kleb pna >100K + Enterococcus faecium (VRE), 3/10 culture with 10-49K. She is s/p Meropenem. Now clinically stable and being monitored off antibiotics.    Dispo pending medical optimization. Pt full code. Will continue to update family throughout hospitalization.

## 2024-04-13 NOTE — PROGRESS NOTE ADULT - SUBJECTIVE AND OBJECTIVE BOX
Patient is a 78y old  Female who presents with a chief complaint of ICH (11 Apr 2024 13:06)      Interval Events: No events reported overnight     REVIEW OF SYSTEMS:  [ ] Positive  [ ] All other systems negative  [x] Unable to assess ROS because patient is Not responding to verbal questioning     Vital Signs Last 24 Hrs  T(C): 36.7 (04-12-24 @ 04:32), Max: 37.2 (04-11-24 @ 16:30)  T(F): 98.1 (04-12-24 @ 04:32), Max: 98.9 (04-11-24 @ 16:30)  HR: 71 (04-12-24 @ 06:06) (71 - 88)  BP: 127/64 (04-12-24 @ 04:32) (110/63 - 144/76)  RR: 14 (04-12-24 @ 06:06) (14 - 19)  SpO2: 100% (04-12-24 @ 06:06) (100% - 100%)    PHYSICAL EXAM:  HEENT:   [x]Tracheostomy: #7 Cuffed Portex  [x]Pupils equal  [ ]No oral lesions  [x]Abnormal: S/p Cranioplasty, Site remains w/ staples incision C/D/I    SKIN  [ ]No Rash  [x] Abnormal: LUE AVF  [x] pressure: Stage 3 sacral pressure injury     CARDIAC  [x]Regular  [ ]Abnormal    PULMONARY  [x]Bilateral Clear Breath Sounds  [ ]Normal Excursion  [ ]Abnormal     GI  [x]PEG     [x] +BS		              [x]Soft, nondistended, nontender	  [ ]Abnormal    MUSCULOSKELETAL                                   [x]Bedbound                 [x]Abnormal: L AKA  [ ]Ambulatory/OOB to chair                           EXTREMITIES                                         [x]Normal  [ ]Edema                           NEUROLOGIC  [ ] Normal, non focal  [x] Focal findings: Eyes Opens intermittently, moves LUE spontaneously, and withdraws in RLE extremity to noxious stimuli.    PSYCHIATRIC  [x]Obtunded  [ ] Sedated	 [ ]Agitated    :  Gardner: [ ] Yes, if yes: Date of Placement:                   [x] No    LINES: Central Lines [ ] Yes, if yes: Date of Placement                                     [x] No    HOSPITAL MEDICATIONS:  MEDICATIONS  (STANDING):  amLODIPine   Tablet 10 milliGRAM(s) Oral daily  artificial  tears Solution 1 Drop(s) Both EYES every 4 hours  Biotene Dry Mouth Oral Rinse 5 milliLiter(s) Swish and Spit every 6 hours  brivaracetam Oral Solution 100 milliGRAM(s) Oral <User Schedule>  carvedilol 25 milliGRAM(s) Oral every 12 hours  cloNIDine 0.1 milliGRAM(s) Oral every 8 hours  dextrose 5%. 1000 milliLiter(s) (50 mL/Hr) IV Continuous <Continuous>  dextrose 5%. 1000 milliLiter(s) (100 mL/Hr) IV Continuous <Continuous>  dextrose 50% Injectable 25 Gram(s) IV Push once  glucagon  Injectable 1 milliGRAM(s) IntraMuscular once  heparin   Injectable 5000 Unit(s) SubCutaneous every 8 hours  insulin glargine Injectable (LANTUS) 30 Unit(s) SubCutaneous at bedtime  insulin lispro (ADMELOG) corrective regimen sliding scale   SubCutaneous every 6 hours  lacosamide Solution 200 milliGRAM(s) Oral <User Schedule>  multivitamin 1 Tablet(s) Oral daily  nystatin    Suspension 313689 Unit(s) Swish and Swallow every 6 hours  pantoprazole   Suspension 40 milliGRAM(s) Oral two times a day  predniSONE   Tablet 5 milliGRAM(s) Oral daily  sodium zirconium cyclosilicate 10 Gram(s) Oral every other day  tacrolimus    0.5 mG/mL Suspension 3 milliGRAM(s) Oral every 12 hours  trimethoprim   80 mG/sulfamethoxazole 400 mG 1 Tablet(s) Oral daily    MEDICATIONS  (PRN):  acetaminophen     Tablet .. 650 milliGRAM(s) Oral every 6 hours PRN Mild Pain (1 - 3)  acetaminophen   IVPB .. 1000 milliGRAM(s) IV Intermittent every 6 hours PRN Temp greater or equal to 38.5C (101.3F), Severe Pain (7 - 10)  albuterol/ipratropium for Nebulization 3 milliLiter(s) Nebulizer every 6 hours PRN Shortness of Breath and/or Wheezing      LABS:                        8.7    6.49  )-----------( 156      ( 12 Apr 2024 06:36 )             28.3     04-12    137  |  100  |  67<H>  ----------------------------<  162<H>  3.6   |  23  |  1.36<H>    Ca    10.3      12 Apr 2024 06:36  Phos  3.0     04-12  Mg     2.2     04-12    TPro  6.2  /  Alb  3.3  /  TBili  0.2  /  DBili  x   /  AST  16  /  ALT  10  /  AlkPhos  77  04-12      Urinalysis Basic - ( 12 Apr 2024 06:36 )    Color: x / Appearance: x / SG: x / pH: x  Gluc: 162 mg/dL / Ketone: x  / Bili: x / Urobili: x   Blood: x / Protein: x / Nitrite: x   Leuk Esterase: x / RBC: x / WBC x   Sq Epi: x / Non Sq Epi: x / Bacteria: x          CAPILLARY BLOOD GLUCOSE    MICROBIOLOGY:     RADIOLOGY:  [ ] Reviewed and interpreted by me    Mode: AC/ CMV (Assist Control/ Continuous Mandatory Ventilation)  RR (machine): 14  TV (machine): 450  FiO2: 40  PEEP: 5  ITime: 1  MAP: 8  PIP: 18

## 2024-04-13 NOTE — PROGRESS NOTE ADULT - SUBJECTIVE AND OBJECTIVE BOX
DATE OF SERVICE: 04-13-24 @ 17:27    Patient is a 78y old  Female who presents with a chief complaint of ICH (13 Apr 2024 07:48)      INTERVAL HISTORY: no complaints     REVIEW OF SYSTEMS:  CONSTITUTIONAL: No weakness  EYES/ENT: No visual changes;  No throat pain   NECK: No pain or stiffness  RESPIRATORY: No cough, wheezing; No shortness of breath  CARDIOVASCULAR: No chest pain or palpitations  GASTROINTESTINAL: No abdominal  pain. No nausea, vomiting, or hematemesis  GENITOURINARY: No dysuria, frequency or hematuria  NEUROLOGICAL: No stroke like symptoms  SKIN: No rashes    	  MEDICATIONS:  amLODIPine   Tablet 10 milliGRAM(s) Oral daily  carvedilol 25 milliGRAM(s) Oral every 12 hours  cloNIDine 0.1 milliGRAM(s) Oral every 8 hours        PHYSICAL EXAM:  T(C): 36.3 (04-13-24 @ 10:30), Max: 36.6 (04-12-24 @ 23:52)  HR: 72 (04-13-24 @ 14:40) (67 - 78)  BP: 137/62 (04-13-24 @ 10:30) (116/58 - 163/93)  RR: 21 (04-13-24 @ 10:30) (14 - 21)  SpO2: 100% (04-13-24 @ 14:40) (100% - 100%)  Wt(kg): --  I&O's Summary    12 Apr 2024 07:01  -  13 Apr 2024 07:00  --------------------------------------------------------  IN: 2180 mL / OUT: 1800 mL / NET: 380 mL    13 Apr 2024 07:01  -  13 Apr 2024 17:27  --------------------------------------------------------  IN: 290 mL / OUT: 0 mL / NET: 290 mL          Appearance: In no distress	  HEENT:    PERRL, EOMI	  Cardiovascular:  S1 S2, No JVD  Respiratory: Lungs clear to auscultation	  Gastrointestinal:  Soft, Non-tender, + BS	  Vascularature:  No edema of LE  Psychiatric: Appropriate affect   Neuro: no acute focal deficits                               8.5    6.36  )-----------( 135      ( 13 Apr 2024 07:05 )             27.1     04-13    138  |  103  |  64<H>  ----------------------------<  208<H>  4.0   |  21<L>  |  1.20    Ca    9.8      13 Apr 2024 07:05  Phos  2.8     04-13  Mg     2.1     04-13    TPro  6.3  /  Alb  3.0<L>  /  TBili  0.2  /  DBili  x   /  AST  20  /  ALT  14  /  AlkPhos  77  04-13        Labs personally reviewed      ASSESSMENT/PLAN: 	      78F Hx ESRD s/p renal xplant c/b DGF off HD, L AKA, 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    1. Abnormal Echo --  Septal bulge noted measures 2.2cm without obstruction.   -- Given no intracavitary obstruction no intervention at this time  - No Myxoma seen on this echo or echo in 2019, ? if hypermobile interatrial septum was confused for on prior imaging     2. HTN -   - Continue Coreg 25 mg BID, amlodipine 10mg  - Clonidine patch resumed but now with labile BP. Cont Clonidine 0.1mg via PEG q8hrs with hold parameters    3. 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 Diastolic Dysfunction  - Moderate DD with elevated filling pressures noted on TTE from March  - Check BNP  - Monitor volume status closely  - Now with slight MEHREEN receiving IVF     5. DVT PPX - c/w SQ hep            IMAN Moser DO [facility]  Cardiovascular Medicine  [address] [place] [address], [address]  Office: [phone]  Available via call/text on [employer] Teams

## 2024-04-13 NOTE — PROGRESS NOTE ADULT - PROBLEM SELECTOR PLAN 2
- S/p trach 3/8 by surgery by Dr. Joselo Mayorga   - Patient initially weaned to TC ATC while in the RCU But has required MV Post-OP  - CXR 4/10: Clear Lungs   - Continue to Progress CPAP Trials as tolerated  - Will attempt to wean back to TC   - Continue airway clearance; Duoneb q6h PRN

## 2024-04-14 ENCOUNTER — NON-APPOINTMENT (OUTPATIENT)
Age: 79
End: 2024-04-14

## 2024-04-14 LAB
ALBUMIN SERPL ELPH-MCNC: 3.3 G/DL — SIGNIFICANT CHANGE UP (ref 3.3–5)
ALP SERPL-CCNC: 91 U/L — SIGNIFICANT CHANGE UP (ref 40–120)
ALT FLD-CCNC: 16 U/L — SIGNIFICANT CHANGE UP (ref 10–45)
ANION GAP SERPL CALC-SCNC: 16 MMOL/L — SIGNIFICANT CHANGE UP (ref 5–17)
AST SERPL-CCNC: 25 U/L — SIGNIFICANT CHANGE UP (ref 10–40)
BILIRUB SERPL-MCNC: 0.2 MG/DL — SIGNIFICANT CHANGE UP (ref 0.2–1.2)
BUN SERPL-MCNC: 56 MG/DL — HIGH (ref 7–23)
CALCIUM SERPL-MCNC: 10.1 MG/DL — SIGNIFICANT CHANGE UP (ref 8.4–10.5)
CHLORIDE SERPL-SCNC: 101 MMOL/L — SIGNIFICANT CHANGE UP (ref 96–108)
CO2 SERPL-SCNC: 19 MMOL/L — LOW (ref 22–31)
CREAT SERPL-MCNC: 1.05 MG/DL — SIGNIFICANT CHANGE UP (ref 0.5–1.3)
EGFR: 54 ML/MIN/1.73M2 — LOW
GLUCOSE BLDC GLUCOMTR-MCNC: 104 MG/DL — HIGH (ref 70–99)
GLUCOSE BLDC GLUCOMTR-MCNC: 152 MG/DL — HIGH (ref 70–99)
GLUCOSE BLDC GLUCOMTR-MCNC: 199 MG/DL — HIGH (ref 70–99)
GLUCOSE BLDC GLUCOMTR-MCNC: 95 MG/DL — SIGNIFICANT CHANGE UP (ref 70–99)
GLUCOSE BLDC GLUCOMTR-MCNC: 95 MG/DL — SIGNIFICANT CHANGE UP (ref 70–99)
GLUCOSE SERPL-MCNC: 103 MG/DL — HIGH (ref 70–99)
HCT VFR BLD CALC: 31.7 % — LOW (ref 34.5–45)
HGB BLD-MCNC: 9.9 G/DL — LOW (ref 11.5–15.5)
MAGNESIUM SERPL-MCNC: 2.1 MG/DL — SIGNIFICANT CHANGE UP (ref 1.6–2.6)
MCHC RBC-ENTMCNC: 29.8 PG — SIGNIFICANT CHANGE UP (ref 27–34)
MCHC RBC-ENTMCNC: 31.2 GM/DL — LOW (ref 32–36)
MCV RBC AUTO: 95.5 FL — SIGNIFICANT CHANGE UP (ref 80–100)
NRBC # BLD: 0 /100 WBCS — SIGNIFICANT CHANGE UP (ref 0–0)
PHOSPHATE SERPL-MCNC: 2.7 MG/DL — SIGNIFICANT CHANGE UP (ref 2.5–4.5)
PLATELET # BLD AUTO: 150 K/UL — SIGNIFICANT CHANGE UP (ref 150–400)
POTASSIUM SERPL-MCNC: 4.2 MMOL/L — SIGNIFICANT CHANGE UP (ref 3.5–5.3)
POTASSIUM SERPL-SCNC: 4.2 MMOL/L — SIGNIFICANT CHANGE UP (ref 3.5–5.3)
PROT SERPL-MCNC: 6.6 G/DL — SIGNIFICANT CHANGE UP (ref 6–8.3)
RBC # BLD: 3.32 M/UL — LOW (ref 3.8–5.2)
RBC # FLD: 15.9 % — HIGH (ref 10.3–14.5)
SODIUM SERPL-SCNC: 136 MMOL/L — SIGNIFICANT CHANGE UP (ref 135–145)
TACROLIMUS SERPL-MCNC: 4.4 NG/ML — SIGNIFICANT CHANGE UP
WBC # BLD: 6.77 K/UL — SIGNIFICANT CHANGE UP (ref 3.8–10.5)
WBC # FLD AUTO: 6.77 K/UL — SIGNIFICANT CHANGE UP (ref 3.8–10.5)

## 2024-04-14 PROCEDURE — 99233 SBSQ HOSP IP/OBS HIGH 50: CPT | Mod: FS

## 2024-04-14 RX ADMIN — Medication 500000 UNIT(S): at 21:17

## 2024-04-14 RX ADMIN — Medication 500000 UNIT(S): at 05:23

## 2024-04-14 RX ADMIN — BRIVARACETAM 100 MILLIGRAM(S): 25 TABLET, FILM COATED ORAL at 21:17

## 2024-04-14 RX ADMIN — PANTOPRAZOLE SODIUM 40 MILLIGRAM(S): 20 TABLET, DELAYED RELEASE ORAL at 21:18

## 2024-04-14 RX ADMIN — AMLODIPINE BESYLATE 10 MILLIGRAM(S): 2.5 TABLET ORAL at 05:32

## 2024-04-14 RX ADMIN — Medication 1 DROP(S): at 21:19

## 2024-04-14 RX ADMIN — PANTOPRAZOLE SODIUM 40 MILLIGRAM(S): 20 TABLET, DELAYED RELEASE ORAL at 11:53

## 2024-04-14 RX ADMIN — INSULIN GLARGINE 30 UNIT(S): 100 INJECTION, SOLUTION SUBCUTANEOUS at 00:36

## 2024-04-14 RX ADMIN — LACOSAMIDE 200 MILLIGRAM(S): 50 TABLET ORAL at 11:54

## 2024-04-14 RX ADMIN — Medication 5 MILLIGRAM(S): at 21:19

## 2024-04-14 RX ADMIN — Medication 1 TABLET(S): at 11:53

## 2024-04-14 RX ADMIN — LACOSAMIDE 200 MILLIGRAM(S): 50 TABLET ORAL at 21:18

## 2024-04-14 RX ADMIN — CARVEDILOL PHOSPHATE 25 MILLIGRAM(S): 80 CAPSULE, EXTENDED RELEASE ORAL at 11:53

## 2024-04-14 RX ADMIN — TACROLIMUS 3 MILLIGRAM(S): 5 CAPSULE ORAL at 05:23

## 2024-04-14 RX ADMIN — HEPARIN SODIUM 5000 UNIT(S): 5000 INJECTION INTRAVENOUS; SUBCUTANEOUS at 16:37

## 2024-04-14 RX ADMIN — Medication 0.1 MILLIGRAM(S): at 05:32

## 2024-04-14 RX ADMIN — Medication 5 MILLILITER(S): at 05:23

## 2024-04-14 RX ADMIN — Medication 5 MILLILITER(S): at 11:54

## 2024-04-14 RX ADMIN — Medication 5 MILLILITER(S): at 18:10

## 2024-04-14 RX ADMIN — Medication 1 DROP(S): at 11:54

## 2024-04-14 RX ADMIN — Medication 5 MILLILITER(S): at 21:17

## 2024-04-14 RX ADMIN — Medication 500000 UNIT(S): at 18:10

## 2024-04-14 RX ADMIN — Medication 1 TABLET(S): at 11:52

## 2024-04-14 RX ADMIN — HEPARIN SODIUM 5000 UNIT(S): 5000 INJECTION INTRAVENOUS; SUBCUTANEOUS at 05:32

## 2024-04-14 RX ADMIN — Medication 1 DROP(S): at 03:25

## 2024-04-14 RX ADMIN — TACROLIMUS 3 MILLIGRAM(S): 5 CAPSULE ORAL at 18:08

## 2024-04-14 RX ADMIN — HEPARIN SODIUM 5000 UNIT(S): 5000 INJECTION INTRAVENOUS; SUBCUTANEOUS at 21:18

## 2024-04-14 RX ADMIN — BRIVARACETAM 100 MILLIGRAM(S): 25 TABLET, FILM COATED ORAL at 16:55

## 2024-04-14 RX ADMIN — Medication 1 DROP(S): at 18:09

## 2024-04-14 RX ADMIN — Medication 1 DROP(S): at 05:23

## 2024-04-14 RX ADMIN — Medication 2: at 00:36

## 2024-04-14 RX ADMIN — Medication 500000 UNIT(S): at 11:54

## 2024-04-14 RX ADMIN — BRIVARACETAM 100 MILLIGRAM(S): 25 TABLET, FILM COATED ORAL at 08:33

## 2024-04-14 RX ADMIN — Medication 0.1 MILLIGRAM(S): at 16:38

## 2024-04-14 RX ADMIN — Medication 1 DROP(S): at 14:29

## 2024-04-14 RX ADMIN — Medication 0.1 MILLIGRAM(S): at 21:20

## 2024-04-14 RX ADMIN — Medication 2: at 18:09

## 2024-04-14 RX ADMIN — CARVEDILOL PHOSPHATE 25 MILLIGRAM(S): 80 CAPSULE, EXTENDED RELEASE ORAL at 21:20

## 2024-04-14 NOTE — PROGRESS NOTE ADULT - SUBJECTIVE AND OBJECTIVE BOX
DATE OF SERVICE: 04-14-24 @ 17:18    Patient is a 78y old  Female who presents with a chief complaint of ICH (14 Apr 2024 08:18)      INTERVAL HISTORY: no complaints     REVIEW OF SYSTEMS:  CONSTITUTIONAL: No weakness  EYES/ENT: No visual changes;  No throat pain   NECK: No pain or stiffness  RESPIRATORY: No cough, wheezing; No shortness of breath  CARDIOVASCULAR: No chest pain or palpitations  GASTROINTESTINAL: No abdominal  pain. No nausea, vomiting, or hematemesis  GENITOURINARY: No dysuria, frequency or hematuria  NEUROLOGICAL: No stroke like symptoms  SKIN: No rashes      	  MEDICATIONS:  amLODIPine   Tablet 10 milliGRAM(s) Oral daily  carvedilol 25 milliGRAM(s) Oral every 12 hours  cloNIDine 0.1 milliGRAM(s) Oral every 8 hours        PHYSICAL EXAM:  T(C): 36.7 (04-14-24 @ 12:23), Max: 36.7 (04-14-24 @ 06:00)  HR: 81 (04-14-24 @ 14:55) (72 - 94)  BP: 154/89 (04-14-24 @ 12:23) (124/67 - 176/87)  RR: 20 (04-14-24 @ 12:23) (20 - 35)  SpO2: 97% (04-14-24 @ 14:55) (97% - 100%)  Wt(kg): --  I&O's Summary    13 Apr 2024 07:01  -  14 Apr 2024 07:00  --------------------------------------------------------  IN: 1630 mL / OUT: 600 mL / NET: 1030 mL          Appearance: In no distress	  HEENT:    PERRL, EOMI	  Cardiovascular:  S1 S2, No JVD  Respiratory: Lungs clear to auscultation	  Gastrointestinal:  Soft, Non-tender, + BS	  Vascularature:  No edema of LE  Psychiatric: Appropriate affect   Neuro: no acute focal deficits                               9.9    6.77  )-----------( 150      ( 14 Apr 2024 07:19 )             31.7     04-14    136  |  101  |  56<H>  ----------------------------<  103<H>  4.2   |  19<L>  |  1.05    Ca    10.1      14 Apr 2024 07:19  Phos  2.7     04-14  Mg     2.1     04-14    TPro  6.6  /  Alb  3.3  /  TBili  0.2  /  DBili  x   /  AST  25  /  ALT  16  /  AlkPhos  91  04-14        Labs personally reviewed      ASSESSMENT/PLAN: 	    78F Hx ESRD s/p renal xplant c/b DGF off HD, L AKA, 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    1. Abnormal Echo --  Septal bulge noted measures 2.2cm without obstruction.   -- Given no intracavitary obstruction no intervention at this time  - No Myxoma seen on this echo or echo in 2019, ? if hypermobile interatrial septum was confused for on prior imaging     2. HTN -   - Continue Coreg 25 mg BID, amlodipine 10mg  - Clonidine patch resumed but now with labile BP. Cont Clonidine 0.1mg via PEG q8hrs with hold parameters    3. 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 Diastolic Dysfunction  - Moderate DD with elevated filling pressures noted on TTE from March  - Check BNP  - Monitor volume status closely  - Now with slight MEHREEN receiving IVF     5. DVT PPX - c/w SQ hep            IMAN Moser DO [facility]  Cardiovascular Medicine  [address] [place] [address], [address]  Office: [phone]  Available via call/text on [employer] Teams

## 2024-04-14 NOTE — PROGRESS NOTE ADULT - PROBLEM SELECTOR PLAN 1
- Found to have L IPH on CTH likely 2/2 amyloid angiopathy  - S/p L hemicrani for evacuation of large ICH; bone discarded  - CT H Stereo 3/12: S/p Left Frontal Craniectomy  - CT Head 3/30: Improved lft frontal hemorrhage and extradural fluid collections   - S/p Cranioplasty on 4/2; Post op head CT Stable   - Subgaleal YON Drain removed 4/6  - Maintain Neuro checks - Found to have L IPH on CTH likely 2/2 amyloid angiopathy  - S/p L hemicrani for evacuation of large ICH; bone discarded  - CT H Stereo 3/12: S/p Left Frontal Craniectomy  - CT Head 3/30: Improved lft frontal hemorrhage and extradural fluid collections   - S/p Cranioplasty on 4/2; Post op head CT Stable   - Subgaleal YON Drain removed 4/6  - Maintain Neuro checks  4/14 consider CTA H/N and recall pall care per Neuro

## 2024-04-14 NOTE — PROGRESS NOTE ADULT - SUBJECTIVE AND OBJECTIVE BOX
Neurology        S: patient seen  in RCU,          Medications: MEDICATIONS  (STANDING):  amLODIPine   Tablet 10 milliGRAM(s) Oral daily  artificial  tears Solution 1 Drop(s) Both EYES every 4 hours  Biotene Dry Mouth Oral Rinse 5 milliLiter(s) Swish and Spit every 6 hours  brivaracetam Oral Solution 100 milliGRAM(s) Oral <User Schedule>  carvedilol 25 milliGRAM(s) Oral every 12 hours  cloNIDine 0.1 milliGRAM(s) Oral every 8 hours  dextrose 5%. 1000 milliLiter(s) (50 mL/Hr) IV Continuous <Continuous>  dextrose 5%. 1000 milliLiter(s) (100 mL/Hr) IV Continuous <Continuous>  dextrose 50% Injectable 25 Gram(s) IV Push once  glucagon  Injectable 1 milliGRAM(s) IntraMuscular once  heparin   Injectable 5000 Unit(s) SubCutaneous every 8 hours  insulin glargine Injectable (LANTUS) 30 Unit(s) SubCutaneous at bedtime  insulin lispro (ADMELOG) corrective regimen sliding scale   SubCutaneous every 6 hours  lacosamide Solution 200 milliGRAM(s) Oral <User Schedule>  lactated ringers. 500 milliLiter(s) (100 mL/Hr) IV Continuous <Continuous>  multivitamin 1 Tablet(s) Oral daily  nystatin    Suspension 501882 Unit(s) Swish and Swallow every 6 hours  pantoprazole   Suspension 40 milliGRAM(s) Oral two times a day  predniSONE   Tablet 5 milliGRAM(s) Oral daily  tacrolimus    0.5 mG/mL Suspension 3 milliGRAM(s) Oral every 12 hours  trimethoprim   80 mG/sulfamethoxazole 400 mG 1 Tablet(s) Oral daily    MEDICATIONS  (PRN):  acetaminophen     Tablet .. 650 milliGRAM(s) Oral every 6 hours PRN Mild Pain (1 - 3)  albuterol/ipratropium for Nebulization 3 milliLiter(s) Nebulizer every 6 hours PRN Shortness of Breath and/or Wheezing       Vitals:  Vital Signs Last 24 Hrs  T(C): 36.6 (14 Apr 2024 09:00), Max: 36.7 (14 Apr 2024 06:00)  T(F): 97.8 (14 Apr 2024 09:00), Max: 98 (14 Apr 2024 06:00)  HR: 80 (14 Apr 2024 09:00) (72 - 94)  BP: 154/68 (14 Apr 2024 09:00) (124/67 - 176/87)  BP(mean): --  RR: 20 (14 Apr 2024 09:00) (20 - 35)  SpO2: 100% (14 Apr 2024 09:00) (100% - 100%)    Parameters below as of 14 Apr 2024 09:00  Patient On (Oxygen Delivery Method): ventilator            General Exam:   General Appearance: Appropriately dressed    Head: Normocephalic, atraumatic and no dysmorphic features s/p trach   Ear, Nose, and Throat: Moist mucous membranes  CVS: S1S2+  Resp: No SOB, no wheeze or rhonchi on vent   GI: soft NT/ND s/p PEG   Extremities:  L AKA  Skin: No bruises or rashes     Neurological Exam:  Mental Status:  opens eyes to noxious. no verbal. not following   Cranial Nerves: PERRL, EOMI but gaze pref to L, no blink to threat on R, R facial,    Motor: minimal/no spontaneous movement on RUE.  RLE some minimal movement. LUE 2/5 at times   Sensation: grimaces to noxious at times. some minimal withdrawal LUE 2/5 and RLE.    Coordination: unable   Gait: unable     Data/Labs/Imaging which I personally reviewed.        LABS:                          9.9    6.77  )-----------( 150      ( 14 Apr 2024 07:19 )             31.7     04-14    136  |  101  |  56<H>  ----------------------------<  103<H>  4.2   |  19<L>  |  1.05    Ca    10.1      14 Apr 2024 07:19  Phos  2.7     04-14  Mg     2.1     04-14    TPro  6.6  /  Alb  3.3  /  TBili  0.2  /  DBili  x   /  AST  25  /  ALT  16  /  AlkPhos  91  04-14    LIVER FUNCTIONS - ( 14 Apr 2024 07:19 )  Alb: 3.3 g/dL / Pro: 6.6 g/dL / ALK PHOS: 91 U/L / ALT: 16 U/L / AST: 25 U/L / GGT: x             Urinalysis Basic - ( 14 Apr 2024 07:19 )    Color: x / Appearance: x / SG: x / pH: x  Gluc: 103 mg/dL / Ketone: x  / Bili: x / Urobili: x   Blood: x / Protein: x / Nitrite: x   Leuk Esterase: x / RBC: x / WBC x   Sq Epi: x / Non Sq Epi: x / Bacteria: x            EEG IMPRESSION/CLINICAL CORRELATE    Abnormal EEG study.  Potential epileptogenic focus in the bilateral frontal head regions.  Structural or functional abnormality in the left hemisphere with evidence for overlying skull defect.  Moderate nonspecific diffuse or multifocal cerebral dysfunction.   No seizure seen.

## 2024-04-14 NOTE — PROGRESS NOTE ADULT - PROBLEM SELECTOR PLAN 2
- S/p trach 3/8 by surgery by Dr. Joselo Mayorga   - Patient initially weaned to TC ATC while in the RCU But has required MV Post-OP  - CXR 4/10: Clear Lungs   - Continue to Progress CPAP Trials as tolerated  - Will attempt to wean back to TC   - Continue airway clearance; Duoneb q6h PRN - S/p trach 3/8 by surgery by Dr. Joselo Mayorga   - Patient initially weaned to TC ATC while in the RCU But has required MV Post-OP  - CXR 4/10: Clear Lungs   - Continue to Progress CPAP Trials as tolerated  - Will attempt to wean back to TC   - Continue airway clearance; Duoneb q6h PRN  4/14 did well on CPAP 5/5 trial, TC trial today, vent at night

## 2024-04-14 NOTE — PROGRESS NOTE ADULT - SUBJECTIVE AND OBJECTIVE BOX
Patient is a 78y old  Female who presents with a chief complaint of ICH (13 Apr 2024 17:26)      Interval Events:    REVIEW OF SYSTEMS:  [ ] Positive for   [ ] All other systems negative  [ ] Unable to assess ROS because ________    Vital Signs Last 24 Hrs  T(C): 36.7 (04-14-24 @ 06:00), Max: 36.7 (04-14-24 @ 06:00)  T(F): 98 (04-14-24 @ 06:00), Max: 98 (04-14-24 @ 06:00)  HR: 77 (04-14-24 @ 06:00) (71 - 94)  BP: 149/89 (04-14-24 @ 06:00) (124/67 - 176/87)  RR: 35 (04-13-24 @ 18:50) (20 - 35)  SpO2: 100% (04-14-24 @ 06:00) (100% - 100%)      PHYSICAL EXAM:  HEENT:   [x]Tracheostomy: #7 Cuffed Portex  [x]Pupils equal  [ ]No oral lesions  [x]Abnormal: S/p Cranioplasty, Site remains w/ staples incision C/D/I    SKIN  [ ]No Rash  [x] Abnormal: LUE AVF  [x] pressure: Stage 3 sacral pressure injury     CARDIAC  [x]Regular  [ ]Abnormal    PULMONARY  [x]Bilateral Clear Breath Sounds  [ ]Normal Excursion  [ ]Abnormal     GI  [x]PEG     [x] +BS		              [x]Soft, nondistended, nontender	  [ ]Abnormal    MUSCULOSKELETAL                                   [x]Bedbound                 [x]Abnormal: L AKA  [ ]Ambulatory/OOB to chair                           EXTREMITIES                                         [x]Normal  [ ]Edema                           NEUROLOGIC  [ ] Normal, non focal  [x] Focal findings: Eyes Opens intermittently, moves LUE spontaneously, and withdraws in RLE extremity to noxious stimuli.    PSYCHIATRIC  [x]Obtunded  [ ] Sedated	 [ ]Agitated    :  Gardner: [ ] Yes, if yes: Date of Placement:                   [x] No    LINES: Central Lines [ ] Yes, if yes: Date of Placement                                     [x] No                            HOSPITAL MEDICATIONS:  MEDICATIONS  (STANDING):  amLODIPine   Tablet 10 milliGRAM(s) Oral daily  artificial  tears Solution 1 Drop(s) Both EYES every 4 hours  Biotene Dry Mouth Oral Rinse 5 milliLiter(s) Swish and Spit every 6 hours  brivaracetam Oral Solution 100 milliGRAM(s) Oral <User Schedule>  carvedilol 25 milliGRAM(s) Oral every 12 hours  cloNIDine 0.1 milliGRAM(s) Oral every 8 hours  dextrose 5%. 1000 milliLiter(s) (50 mL/Hr) IV Continuous <Continuous>  dextrose 5%. 1000 milliLiter(s) (100 mL/Hr) IV Continuous <Continuous>  dextrose 50% Injectable 25 Gram(s) IV Push once  glucagon  Injectable 1 milliGRAM(s) IntraMuscular once  heparin   Injectable 5000 Unit(s) SubCutaneous every 8 hours  insulin glargine Injectable (LANTUS) 30 Unit(s) SubCutaneous at bedtime  insulin lispro (ADMELOG) corrective regimen sliding scale   SubCutaneous every 6 hours  lacosamide Solution 200 milliGRAM(s) Oral <User Schedule>  lactated ringers. 500 milliLiter(s) (100 mL/Hr) IV Continuous <Continuous>  multivitamin 1 Tablet(s) Oral daily  nystatin    Suspension 616728 Unit(s) Swish and Swallow every 6 hours  pantoprazole   Suspension 40 milliGRAM(s) Oral two times a day  predniSONE   Tablet 5 milliGRAM(s) Oral daily  tacrolimus    0.5 mG/mL Suspension 3 milliGRAM(s) Oral every 12 hours  trimethoprim   80 mG/sulfamethoxazole 400 mG 1 Tablet(s) Oral daily    MEDICATIONS  (PRN):  acetaminophen     Tablet .. 650 milliGRAM(s) Oral every 6 hours PRN Mild Pain (1 - 3)  albuterol/ipratropium for Nebulization 3 milliLiter(s) Nebulizer every 6 hours PRN Shortness of Breath and/or Wheezing      LABS:                        8.5    6.36  )-----------( 135      ( 13 Apr 2024 07:05 )             27.1     04-13    138  |  103  |  64<H>  ----------------------------<  208<H>  4.0   |  21<L>  |  1.20    Ca    9.8      13 Apr 2024 07:05  Phos  2.8     04-13  Mg     2.1     04-13    TPro  6.3  /  Alb  3.0<L>  /  TBili  0.2  /  DBili  x   /  AST  20  /  ALT  14  /  AlkPhos  77  04-13      Urinalysis Basic - ( 13 Apr 2024 07:05 )    Color: x / Appearance: x / SG: x / pH: x  Gluc: 208 mg/dL / Ketone: x  / Bili: x / Urobili: x   Blood: x / Protein: x / Nitrite: x   Leuk Esterase: x / RBC: x / WBC x   Sq Epi: x / Non Sq Epi: x / Bacteria: x          CAPILLARY BLOOD GLUCOSE    MICROBIOLOGY:     RADIOLOGY:  [ ] Reviewed and interpreted by me    Mode: AC/ CMV (Assist Control/ Continuous Mandatory Ventilation)  RR (machine): 14  TV (machine): 450  FiO2: 40  PEEP: 5  ITime: 1  MAP: 10  PIP: 19   Patient is a 78y old  Female who presents with a chief complaint of ICH (13 Apr 2024 17:26)      Interval Events: did well on CPAP 5/5 yesterday     REVIEW OF SYSTEMS:  [ ] Positive for   [ ] All other systems negative  [ x] Unable to assess ROS because of mental status     Vital Signs Last 24 Hrs  T(C): 36.7 (04-14-24 @ 06:00), Max: 36.7 (04-14-24 @ 06:00)  T(F): 98 (04-14-24 @ 06:00), Max: 98 (04-14-24 @ 06:00)  HR: 77 (04-14-24 @ 06:00) (71 - 94)  BP: 149/89 (04-14-24 @ 06:00) (124/67 - 176/87)  RR: 35 (04-13-24 @ 18:50) (20 - 35)  SpO2: 100% (04-14-24 @ 06:00) (100% - 100%)      PHYSICAL EXAM:  HEENT:   [x]Tracheostomy: #7 Cuffed Portex  [x]Pupils equal  [ ]No oral lesions  [x]Abnormal: S/p Cranioplasty, Site remains w/ staples incision C/D/I    SKIN  [ ]No Rash  [x] Abnormal: LUE AVF  [x] pressure: Stage 3 sacral pressure injury     CARDIAC  [x]Regular  [ ]Abnormal    PULMONARY  [x]Bilateral Clear Breath Sounds  [ ]Normal Excursion  [ ]Abnormal     GI  [x]PEG     [x] +BS		              [x]Soft, nondistended, nontender	  [ ]Abnormal    MUSCULOSKELETAL                                   [x]Bedbound                 [x]Abnormal: L AKA stump CDI  [ ]Ambulatory/OOB to chair                           EXTREMITIES                                         [x]Normal  [ ]Edema                           NEUROLOGIC  [ ] Normal, non focal  [x] Focal findings: Eyes closed, no movement x 4 extremities. pt was up and alert yesterday per family at bedside     PSYCHIATRIC  [ ]Obtunded  [ ] Sedated	 [ ]Agitated    :  Gardner: [ ] Yes, if yes: Date of Placement:                   [x] No    LINES: Central Lines [ ] Yes, if yes: Date of Placement                                     [x] No                            HOSPITAL MEDICATIONS:  MEDICATIONS  (STANDING):  amLODIPine   Tablet 10 milliGRAM(s) Oral daily  artificial  tears Solution 1 Drop(s) Both EYES every 4 hours  Biotene Dry Mouth Oral Rinse 5 milliLiter(s) Swish and Spit every 6 hours  brivaracetam Oral Solution 100 milliGRAM(s) Oral <User Schedule>  carvedilol 25 milliGRAM(s) Oral every 12 hours  cloNIDine 0.1 milliGRAM(s) Oral every 8 hours  dextrose 5%. 1000 milliLiter(s) (50 mL/Hr) IV Continuous <Continuous>  dextrose 5%. 1000 milliLiter(s) (100 mL/Hr) IV Continuous <Continuous>  dextrose 50% Injectable 25 Gram(s) IV Push once  glucagon  Injectable 1 milliGRAM(s) IntraMuscular once  heparin   Injectable 5000 Unit(s) SubCutaneous every 8 hours  insulin glargine Injectable (LANTUS) 30 Unit(s) SubCutaneous at bedtime  insulin lispro (ADMELOG) corrective regimen sliding scale   SubCutaneous every 6 hours  lacosamide Solution 200 milliGRAM(s) Oral <User Schedule>  lactated ringers. 500 milliLiter(s) (100 mL/Hr) IV Continuous <Continuous>  multivitamin 1 Tablet(s) Oral daily  nystatin    Suspension [id number] Unit(s) Swish and Swallow every 6 hours  pantoprazole   Suspension 40 milliGRAM(s) Oral two times a day  predniSONE   Tablet 5 milliGRAM(s) Oral daily  tacrolimus    0.5 mG/mL Suspension 3 milliGRAM(s) Oral every 12 hours  trimethoprim   80 mG/sulfamethoxazole 400 mG 1 Tablet(s) Oral daily    MEDICATIONS  (PRN):  acetaminophen     Tablet .. 650 milliGRAM(s) Oral every 6 hours PRN Mild Pain (1 - 3)  albuterol/ipratropium for Nebulization 3 milliLiter(s) Nebulizer every 6 hours PRN Shortness of Breath and/or Wheezing      LABS:                        9.9    6.77  )-----------( 150      ( 14 Apr 2024 07:19 )             31.7         Mean Cell Volume : 95.5 fl  Mean Cell Hemoglobin : 29.8 pg  Mean Cell Hemoglobin Concentration : 31.2 gm/dL  Auto Neutrophil # : x  Auto Lymphocyte # : x  Auto Monocyte # : x  Auto Eosinophil # : x  Auto Basophil # : x  Auto Neutrophil % : x  Auto Lymphocyte % : x  Auto Monocyte % : x  Auto Eosinophil % : x  Auto Basophil % : x      04-14    136  |  101  |  56<H>  ----------------------------<  103<H>  4.2   |  19<L>  |  1.05    Ca    10.1      14 Apr 2024 07:19  Phos  2.7     04-14  Mg     2.1     04-14    TPro  6.6  /  Alb  3.3  /  TBili  0.2  /  DBili  x   /  AST  25  /  ALT  16  /  AlkPhos  91  04-14      Urinalysis Basic - ( 13 Apr 2024 07:05 )    Color: x / Appearance: x / SG: x / pH: x  Gluc: 208 mg/dL / Ketone: x  / Bili: x / Urobili: x   Blood: x / Protein: x / Nitrite: x   Leuk Esterase: x / RBC: x / WBC x   Sq Epi: x / Non Sq Epi: x / Bacteria: x      POC finger stick 95, 199, 198, 134      RADIOLOGY:  [x ] Reviewed and interpreted by me    < from: Xray Chest 1 View- PORTABLE-Routine (Xray Chest 1 View- PORTABLE-Routine .) (04.10.24 @ 12:31)  FINDINGS:  Left axillary stents.  Tracheostomy.  Sternotomy wires.  The cardiomediastinal silhouette is within normal limits.  No focal consolidation. No pleural effusion or pneumothorax.  IMPRESSION:  No focal consolidations.      < from: CT Head No Cont (04.03.24 @ 09:25) >  IMPRESSION: Interval removal/drainage of the previously seen left-sided   extradural fluid collections adjacent to the craniectomy site. New   left-sided overlying scalp drain. New fatty attenuation graft material   adjacent to the left side of the brain.  No other interval changes.        < from: [facility] Duplex Lower Ext Vein Scan, Bilat (04.01.24 @ 15:02) >  IMPRESSION:  There remains near occlusive DVT affecting the right external iliac and   common femoral veins.  There is partially occlusive DVT affecting the right popliteal veins.  The right gastrocnemius vein is thrombosed.  There is near occlusive DVT affecting the left external iliac, common   femoral and proximal left femoral veins.      Mode: AC/ CMV (Assist Control/ Continuous Mandatory Ventilation)  RR (machine): 14  TV (machine): 450  FiO2: 40  PEEP: 5  ITime: 1  MAP: 10  PIP: 19

## 2024-04-14 NOTE — PROGRESS NOTE ADULT - ASSESSMENT
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 Hospital course c/b extensive LE DVTs, S/p IVCF on 3/3. She is s/p trach/peg 3/8/24.  Febrile 3/10 with + UA, blood/sputum culture NGTD.  Treatment for UTI initiated with ceftriaxone, eventually broadened to cefepime. Transferred to RCU 3/11 for continued management. Urine culture resulted positive for klebsiella, treated for 7 days with Meropenem 3/12-3/19. Patient S/p Cranioplasty on 4/2. Patient was initially weaned to TC ATC in the RCU but post cranioplasty required Mechanical Ventilation.     4/12: No events reported overnight. Patient with Apnea during CPAP Trials yesterday. Will continue weaning attempts as tolerated. Patient with rising BUN/Creat will give  cc x 1 today. Patient on Lokelma EOD; potassium has been improved since changing TF Formula to Nepro. Will dc Lokelma today and monitor Potassium.  4/13 creatine  trending lower as well as  BUN.  K 4.0 79 yo F with PMHx ESRD s/p renal transplant (2019, now off HD), left AKA, BK viremia, HTN, breast ca s/p lumpectomy (completed Tamoxifen 03/2023), DVT (off Eliquis), HLD, gout presenting 3/1 with left ICH (ICH score 4) likely 2/2 amyloid angiopathy s/p left craniectomy(discarded) and evacuation as well as EVD placement and removal (3/7). Hospital course c/b extensive LE DVTs, S/p IVCF on 3/3. She is s/p trach/peg 3/8/24.  Febrile 3/10 with + UA, blood/sputum culture NGTD.  Treatment for UTI initiated with ceftriaxone, eventually broadened to cefepime. Transferred to RCU 3/11 for continued management. Urine culture resulted positive for klebsiella, treated for 7 days with Meropenem 3/12-3/19. Patient S/p Cranioplasty on 4/2. Patient was initially weaned to TC ATC in the RCU but post cranioplasty required Mechanical Ventilation.     4/12: No events reported overnight. Patient with Apnea during CPAP Trials yesterday. Will continue weaning attempts as tolerated. Patient with rising BUN/Creat will give  cc x 1 today. Patient on Lokelma EOD; potassium has been improved since changing TF Formula to Nepro. Will dc Lokelma today and monitor Potassium.  4/13 creatine  trending lower as well as  BUN.  K 4.0  4/14 Tacro level 4.4, goal 4-7 per renal transplant team. TC trial, vent at night

## 2024-04-14 NOTE — PROGRESS NOTE ADULT - TIME BILLING
see note
review of the medical record, interpretation of labs, imaging studies, discussion with interdisciplinary team, physical exam and medical decision making as above
review of the medical record, interpretation of labs, imaging studies, discussion with interdisciplinary team, physical exam and medical decision making as above
Data reviewed, patient seen/examined and care plan reviewed/updated with fellow.
left ICH (ICH score 4) likely 2/2 amyloid angiopathy s/p left craniectomy(discarded) and evacuation.  neuro check s q 4 hr, continue breviact and vimpat for seizures , PT/OT, d/c gabapentin , last eeg yesterday  no seizures   acute respiratory failure, trached, tolerating PS trial , has thick secretions, duonebs, add hypertonic saline, she has mucous plug overnight, chest PT   hypotensive now d/c hydralazine , if BP remain soft we will d/c clonidine MEHREEN on CKD, hyperkalemic , rpeat BMP, BUN elevated free water flushes 250 ml q 6 hr,  ml bolus , d/c salt tablets, brain swelling has improved , sodium goal 135-145 , repeat BMP, continue Tacrolimus at 3 mg BID. Monitor tacrolimus trough, goal: 4-7. tacro level 2.5 mg, will discuss with transplant increase the dose  DM on NPH 5 units q 6 hr, ISS , finger sticks q 6 hr  UA positive on Ceftriaxone x 3 days duration for UTI (3/8--) pending urine cx   heparin sc for chemorpophylaxis
Data reviewed, patient seen/examined and care plan reviewed/updated with fellow. Care plan coordinated with Dr. Torres.
Review of medical records, labs, imaging, coordination of care and did not include time spent teaching.
review of the medical record, interpretation of labs, imaging studies, discussion with interdisciplinary team, physical exam and medical decision making as above
I was present during and reviewed clinical and lab data as well as assessment and plan as documented by the house staff as noted. Please contact if any additional questions with any change in clinical condition or on availability of any additional information or reports.
I was present during and reviewed clinical and lab data as well as assessment and plan as documented by the housestaff as noted. Please contact if any additional questions with any change in clinical condition or on availability of any additional information or reports.
Review of patient records, including history, laboratory data, and imaging. Patient evaluation and assessment. Coordination of care. Time excludes bedside teaching and procedures.
Review of medical records, labs, imaging, coordination of care and did not include time spent teaching.
Reviewed images and labs. Clinical decision making, changes in medication regimen. Dispo planning
Review of medical records, labs, imaging, coordination of care and did not include time spent teaching.
Review of medical records, labs, imaging, coordination of care and did not include time spent teaching.
Review of patient records, including history, laboratory data, imaging. Patient evaluation and assessment. Coordination of care. Time excludes teaching
Review of patient records, including history, laboratory data, imaging. Patient evaluation and assessment. Coordination of care. Time excludes teaching
Reviewed images and labs. Clinical decision making, changes in medication regimen. Dispo planning.
review of the medical record, interpretation of labs, imaging studies, discussion with interdisciplinary team, physical exam and medical decision making as above
Review of patient records, including history, laboratory data, imaging. Patient evaluation and assessment. Coordination of care. Time excludes teaching
review of the medical record, interpretation of labs, imaging studies, discussion with interdisciplinary team, physical exam and medical decision making as above
review of the medical record, interpretation of labs, imaging studies, discussion with interdisciplinary team, physical exam and medical decision making as above
Review of patient records, including history, laboratory data, imaging. Patient evaluation and assessment. Coordination of care. Time excludes teaching.
review of the medical record, interpretation of labs, imaging studies, discussion with interdisciplinary team, physical exam and medical decision making as above
review of the medical record, interpretation of labs, imaging studies, discussion with interdisciplinary team, physical exam and medical decision making as above
Review of patient records, including history, laboratory data, and imaging. Patient evaluation and assessment. Coordination of care. Time excludes bedside teaching and procedures.
Reviewed images and labs. Clinical decision making, changes in medication regimen. Vent management, dispo planning.
review of the medical record, interpretation of labs, imaging studies, discussion with interdisciplinary team, physical exam and medical decision making as above
45 minutes spent on total encounter. The necessity of the time spent during the encounter on this date of service was due to:
Reviewed images and labs. Clinical decision making, changes in medication regimen. Dispo planning.
Reviewed images and labs. Clinical decision making, changes in medication regimen. Dispo planning.
Review of patient records, including history, laboratory data, imaging. Patient evaluation and assessment. Coordination of care. Time excludes teaching

## 2024-04-14 NOTE — PROGRESS NOTE ADULT - ASSESSMENT
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   initial CTH3/1  with 8.9cm left frontal IPH with IVH .09cm rightward shift   3/2 CTH s/p L hmeicrani and resection of IPH. stable   3/5 CTH post op changes. some reorption of post op pneumocephalus.    s/p trach/peg 3/8/24   3/8 CTH L frontal craniectomy.  L MCA hemorrhage and infarct ; still IVH.   3/10 with + UA  A1c 5.7 LDL 46   TTE done 3/2: LA is severely dilated    Urine culture grew positive for klebsiella and enterococcus  3/9 EEG no seiuzres since 3/7;  risk of seiuzre from L parietal region. mod to severe diffuse cerebral dysfunction   Transferred to [facility] 3/11 for continued management.  3/12 CTH   sterotactic imaging of L frontal crani for hemorrhagic L MCA ifnarct. L frontal temporal pseudmeningocele  noted. no hcange since 3/8   o/e awake, no verbal, not followiing. L gaze pref  some minimal withdrawal to noxious RLE and LUE.  s/p trach /vent   s/p cranioplasty 4/3, no change in post op exam   4/3 CTH removal of drain noted.  post op changes.   4/4 c/f seizure; repeat EEG   EEG wtih no seizures but risk of si\eizures from bilateral frontal regions \    Impression: L ICH possible 2/2  CAA but hemorrhagic infarct also needs to be considered.      - no change in AEDS   - tolerating CPAP at times ; episode of apnea.  will montior   - difficult to determine IPH 2/2 CAA without MRI to look at SWI or GRE sequeneses for hemosiderin deposition  - never had vessel imaging. consider CTA H/N   - no AC/AP. dvt ppx okay  - briviact 100mg TID  and vimpat 200mg BID for seiuzre ppx   - infectious workup. was on meropenum.  this can lower seiuzre threshold ; now off   - immunosuppression on tacrolimus and prednisone.  ppx bactrim     -telemetry   - PT/OT   - check FS, glucose control <180  - GI/DVT ppx   - GOC with family.  currenlty full code; in terms of functional meaningful recovery the likelihood is low.  patient will require 24hr care and likely be bedbound at this time.    consider recalling palliative  dc planning in progress  Dieter Wilkinson MD  Vascular Neurology  Office: [phone]

## 2024-04-14 NOTE — PROGRESS NOTE ADULT - PROBLEM SELECTOR PLAN 9
- VA Duplex 3/2: DVT RT cfv, femoral, popliteal and gastrocnemius veins; DVT LEFT cfv and femoral vein  - S/p IVCF by IR on 3/3  - Cont Heparin Sub Q DVT PPX - VA Duplex 3/2: DVT RT cfv, femoral, popliteal and gastrocnemius veins; DVT LEFT cfv and femoral vein  - S/p IVCF by IR on 3/3  -4/1 B/L LE doppler:  There remains near occlusive DVT affecting the right external iliac and common femoral veins.  There is partially occlusive DVT affecting the right popliteal veins.  The right gastrocnemius vein is thrombosed.  There is near occlusive DVT affecting the left external iliac, common   femoral and proximal left femoral veins.  - Cont Heparin Sub Q DVT PPX

## 2024-04-14 NOTE — PROGRESS NOTE ADULT - PROBLEM SELECTOR PLAN 8
- S/p PEG 3/8  - tolerating tube feeds    [] H. 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 Pylori Stool 4/9: Positive   - Per GI recs patient can start treatment once discharged; Recc Bismuth quadruple therapy x 14 days   - Omeprazole 20 mg BID, Tetracycline 500 mg QID, Metronidazole 250 mg QID ,bismuth subsalicylate 2 tabs QID  - Bismuth may turn stool black  - After completing treatment would cont once daily PPI for PPX  - Repeat stool Ag in 8 weeks to confirm clearance Post treatement - S/p PEG 3/8  - tolerating tube feeds  [] H. 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 Pylori Stool 4/9: Positive   - Per GI recs patient can start treatment once discharged; Recc Bismuth quadruple therapy x 14 days   - Omeprazole 20 mg BID, Tetracycline 500 mg QID, Metronidazole 250 mg QID ,bismuth subsalicylate 2 tabs QID  - Bismuth may turn stool black  - After completing treatment would cont once daily PPI for PPX  - Repeat stool Ag in 8 weeks to confirm clearance Post treatement

## 2024-04-14 NOTE — PROGRESS NOTE ADULT - PROBLEM SELECTOR PLAN 11
- PCP ppx: Bactrim  - DVT ppx: Cont Heparin Sub Q   - GI ppx: Protonix BID - PCP ppx: Bactrim post renal transplant on immunosuppressants   - DVT ppx: Cont Heparin Sub Q   - GI ppx: Protonix BID

## 2024-04-15 ENCOUNTER — NON-APPOINTMENT (OUTPATIENT)
Age: 79
End: 2024-04-15

## 2024-04-15 VITALS — HEART RATE: 88 BPM | OXYGEN SATURATION: 98 % | RESPIRATION RATE: 21 BRPM

## 2024-04-15 LAB
ALBUMIN SERPL ELPH-MCNC: 3.4 G/DL — SIGNIFICANT CHANGE UP (ref 3.3–5)
ALP SERPL-CCNC: 89 U/L — SIGNIFICANT CHANGE UP (ref 40–120)
ALT FLD-CCNC: 19 U/L — SIGNIFICANT CHANGE UP (ref 10–45)
ANION GAP SERPL CALC-SCNC: 16 MMOL/L — SIGNIFICANT CHANGE UP (ref 5–17)
AST SERPL-CCNC: 26 U/L — SIGNIFICANT CHANGE UP (ref 10–40)
BILIRUB SERPL-MCNC: 0.1 MG/DL — LOW (ref 0.2–1.2)
BUN SERPL-MCNC: 57 MG/DL — HIGH (ref 7–23)
CALCIUM SERPL-MCNC: 9.8 MG/DL — SIGNIFICANT CHANGE UP (ref 8.4–10.5)
CHLORIDE SERPL-SCNC: 102 MMOL/L — SIGNIFICANT CHANGE UP (ref 96–108)
CO2 SERPL-SCNC: 21 MMOL/L — LOW (ref 22–31)
CREAT SERPL-MCNC: 1.16 MG/DL — SIGNIFICANT CHANGE UP (ref 0.5–1.3)
EGFR: 48 ML/MIN/1.73M2 — LOW
GLUCOSE BLDC GLUCOMTR-MCNC: 154 MG/DL — HIGH (ref 70–99)
GLUCOSE BLDC GLUCOMTR-MCNC: 158 MG/DL — HIGH (ref 70–99)
GLUCOSE SERPL-MCNC: 193 MG/DL — HIGH (ref 70–99)
HCT VFR BLD CALC: 33.2 % — LOW (ref 34.5–45)
HGB BLD-MCNC: 10.3 G/DL — LOW (ref 11.5–15.5)
MAGNESIUM SERPL-MCNC: 2 MG/DL — SIGNIFICANT CHANGE UP (ref 1.6–2.6)
MCHC RBC-ENTMCNC: 29.9 PG — SIGNIFICANT CHANGE UP (ref 27–34)
MCHC RBC-ENTMCNC: 31 GM/DL — LOW (ref 32–36)
MCV RBC AUTO: 96.2 FL — SIGNIFICANT CHANGE UP (ref 80–100)
NRBC # BLD: 0 /100 WBCS — SIGNIFICANT CHANGE UP (ref 0–0)
PHOSPHATE SERPL-MCNC: 2.9 MG/DL — SIGNIFICANT CHANGE UP (ref 2.5–4.5)
PLATELET # BLD AUTO: 199 K/UL — SIGNIFICANT CHANGE UP (ref 150–400)
POTASSIUM SERPL-MCNC: 4.5 MMOL/L — SIGNIFICANT CHANGE UP (ref 3.5–5.3)
POTASSIUM SERPL-SCNC: 4.5 MMOL/L — SIGNIFICANT CHANGE UP (ref 3.5–5.3)
PROT SERPL-MCNC: 6.6 G/DL — SIGNIFICANT CHANGE UP (ref 6–8.3)
RBC # BLD: 3.45 M/UL — LOW (ref 3.8–5.2)
RBC # FLD: 16 % — HIGH (ref 10.3–14.5)
SODIUM SERPL-SCNC: 139 MMOL/L — SIGNIFICANT CHANGE UP (ref 135–145)
TACROLIMUS SERPL-MCNC: 4 NG/ML — SIGNIFICANT CHANGE UP
WBC # BLD: 5.92 K/UL — SIGNIFICANT CHANGE UP (ref 3.8–10.5)
WBC # FLD AUTO: 5.92 K/UL — SIGNIFICANT CHANGE UP (ref 3.8–10.5)

## 2024-04-15 PROCEDURE — C1880: CPT

## 2024-04-15 PROCEDURE — P9037: CPT

## 2024-04-15 PROCEDURE — 88304 TISSUE EXAM BY PATHOLOGIST: CPT

## 2024-04-15 PROCEDURE — 36415 COLL VENOUS BLD VENIPUNCTURE: CPT

## 2024-04-15 PROCEDURE — 88313 SPECIAL STAINS GROUP 2: CPT

## 2024-04-15 PROCEDURE — 87641 MR-STAPH DNA AMP PROBE: CPT

## 2024-04-15 PROCEDURE — 83930 ASSAY OF BLOOD OSMOLALITY: CPT

## 2024-04-15 PROCEDURE — 84145 PROCALCITONIN (PCT): CPT

## 2024-04-15 PROCEDURE — 83540 ASSAY OF IRON: CPT

## 2024-04-15 PROCEDURE — 99285 EMERGENCY DEPT VISIT HI MDM: CPT | Mod: 25

## 2024-04-15 PROCEDURE — 82803 BLOOD GASES ANY COMBINATION: CPT

## 2024-04-15 PROCEDURE — C1713: CPT

## 2024-04-15 PROCEDURE — 84540 ASSAY OF URINE/UREA-N: CPT

## 2024-04-15 PROCEDURE — 85045 AUTOMATED RETICULOCYTE COUNT: CPT

## 2024-04-15 PROCEDURE — 84100 ASSAY OF PHOSPHORUS: CPT

## 2024-04-15 PROCEDURE — 84156 ASSAY OF PROTEIN URINE: CPT

## 2024-04-15 PROCEDURE — 85730 THROMBOPLASTIN TIME PARTIAL: CPT

## 2024-04-15 PROCEDURE — C9254: CPT

## 2024-04-15 PROCEDURE — 83010 ASSAY OF HAPTOGLOBIN QUANT: CPT

## 2024-04-15 PROCEDURE — 85027 COMPLETE CBC AUTOMATED: CPT

## 2024-04-15 PROCEDURE — 84466 ASSAY OF TRANSFERRIN: CPT

## 2024-04-15 PROCEDURE — P9016: CPT

## 2024-04-15 PROCEDURE — 80053 COMPREHEN METABOLIC PANEL: CPT

## 2024-04-15 PROCEDURE — 85025 COMPLETE CBC W/AUTO DIFF WBC: CPT

## 2024-04-15 PROCEDURE — 81001 URINALYSIS AUTO W/SCOPE: CPT

## 2024-04-15 PROCEDURE — C1887: CPT

## 2024-04-15 PROCEDURE — 83735 ASSAY OF MAGNESIUM: CPT

## 2024-04-15 PROCEDURE — 84300 ASSAY OF URINE SODIUM: CPT

## 2024-04-15 PROCEDURE — 94003 VENT MGMT INPAT SUBQ DAY: CPT

## 2024-04-15 PROCEDURE — 95700 EEG CONT REC W/VID EEG TECH: CPT

## 2024-04-15 PROCEDURE — 99238 HOSP IP/OBS DSCHRG MGMT 30/<: CPT

## 2024-04-15 PROCEDURE — 95711 VEEG 2-12 HR UNMONITORED: CPT

## 2024-04-15 PROCEDURE — 70450 CT HEAD/BRAIN W/O DYE: CPT | Mod: MC

## 2024-04-15 PROCEDURE — 82962 GLUCOSE BLOOD TEST: CPT

## 2024-04-15 PROCEDURE — 93306 TTE W/DOPPLER COMPLETE: CPT

## 2024-04-15 PROCEDURE — 86901 BLOOD TYPING SEROLOGIC RH(D): CPT

## 2024-04-15 PROCEDURE — 95714 VEEG EA 12-26 HR UNMNTR: CPT

## 2024-04-15 PROCEDURE — 84439 ASSAY OF FREE THYROXINE: CPT

## 2024-04-15 PROCEDURE — 80197 ASSAY OF TACROLIMUS: CPT

## 2024-04-15 PROCEDURE — 87077 CULTURE AEROBIC IDENTIFY: CPT

## 2024-04-15 PROCEDURE — 87640 STAPH A DNA AMP PROBE: CPT

## 2024-04-15 PROCEDURE — 97112 NEUROMUSCULAR REEDUCATION: CPT

## 2024-04-15 PROCEDURE — 97110 THERAPEUTIC EXERCISES: CPT

## 2024-04-15 PROCEDURE — 87040 BLOOD CULTURE FOR BACTERIA: CPT

## 2024-04-15 PROCEDURE — 83550 IRON BINDING TEST: CPT

## 2024-04-15 PROCEDURE — 97530 THERAPEUTIC ACTIVITIES: CPT

## 2024-04-15 PROCEDURE — 87186 SC STD MICRODIL/AGAR DIL: CPT

## 2024-04-15 PROCEDURE — 85014 HEMATOCRIT: CPT

## 2024-04-15 PROCEDURE — C1769: CPT

## 2024-04-15 PROCEDURE — 87045 FECES CULTURE AEROBIC BACT: CPT

## 2024-04-15 PROCEDURE — 93970 EXTREMITY STUDY: CPT

## 2024-04-15 PROCEDURE — 95816 EEG AWAKE AND DROWSY: CPT

## 2024-04-15 PROCEDURE — 84133 ASSAY OF URINE POTASSIUM: CPT

## 2024-04-15 PROCEDURE — 87070 CULTURE OTHR SPECIMN AEROBIC: CPT

## 2024-04-15 PROCEDURE — 82947 ASSAY GLUCOSE BLOOD QUANT: CPT

## 2024-04-15 PROCEDURE — 84436 ASSAY OF TOTAL THYROXINE: CPT

## 2024-04-15 PROCEDURE — 88342 IMHCHEM/IMCYTCHM 1ST ANTB: CPT

## 2024-04-15 PROCEDURE — 87338 HPYLORI STOOL AG IA: CPT

## 2024-04-15 PROCEDURE — 83036 HEMOGLOBIN GLYCOSYLATED A1C: CPT

## 2024-04-15 PROCEDURE — 86923 COMPATIBILITY TEST ELECTRIC: CPT

## 2024-04-15 PROCEDURE — 87086 URINE CULTURE/COLONY COUNT: CPT

## 2024-04-15 PROCEDURE — 86900 BLOOD TYPING SEROLOGIC ABO: CPT

## 2024-04-15 PROCEDURE — 82435 ASSAY OF BLOOD CHLORIDE: CPT

## 2024-04-15 PROCEDURE — 36600 WITHDRAWAL OF ARTERIAL BLOOD: CPT

## 2024-04-15 PROCEDURE — 82330 ASSAY OF CALCIUM: CPT

## 2024-04-15 PROCEDURE — 80061 LIPID PANEL: CPT

## 2024-04-15 PROCEDURE — 84295 ASSAY OF SERUM SODIUM: CPT

## 2024-04-15 PROCEDURE — 93005 ELECTROCARDIOGRAM TRACING: CPT

## 2024-04-15 PROCEDURE — 85610 PROTHROMBIN TIME: CPT

## 2024-04-15 PROCEDURE — 94002 VENT MGMT INPAT INIT DAY: CPT

## 2024-04-15 PROCEDURE — 84480 ASSAY TRIIODOTHYRONINE (T3): CPT

## 2024-04-15 PROCEDURE — 84132 ASSAY OF SERUM POTASSIUM: CPT

## 2024-04-15 PROCEDURE — 37191 INS ENDOVAS VENA CAVA FILTR: CPT

## 2024-04-15 PROCEDURE — 97166 OT EVAL MOD COMPLEX 45 MIN: CPT

## 2024-04-15 PROCEDURE — C1894: CPT

## 2024-04-15 PROCEDURE — 94799 UNLISTED PULMONARY SVC/PX: CPT

## 2024-04-15 PROCEDURE — 84443 ASSAY THYROID STIM HORMONE: CPT

## 2024-04-15 PROCEDURE — 87046 STOOL CULTR AEROBIC BACT EA: CPT

## 2024-04-15 PROCEDURE — 86850 RBC ANTIBODY SCREEN: CPT

## 2024-04-15 PROCEDURE — 82570 ASSAY OF URINE CREATININE: CPT

## 2024-04-15 PROCEDURE — P9100: CPT

## 2024-04-15 PROCEDURE — 84484 ASSAY OF TROPONIN QUANT: CPT

## 2024-04-15 PROCEDURE — 80048 BASIC METABOLIC PNL TOTAL CA: CPT

## 2024-04-15 PROCEDURE — 83935 ASSAY OF URINE OSMOLALITY: CPT

## 2024-04-15 PROCEDURE — 85018 HEMOGLOBIN: CPT

## 2024-04-15 PROCEDURE — 71045 X-RAY EXAM CHEST 1 VIEW: CPT

## 2024-04-15 PROCEDURE — 97162 PT EVAL MOD COMPLEX 30 MIN: CPT

## 2024-04-15 PROCEDURE — 88307 TISSUE EXAM BY PATHOLOGIST: CPT

## 2024-04-15 PROCEDURE — C1889: CPT

## 2024-04-15 PROCEDURE — 83605 ASSAY OF LACTIC ACID: CPT

## 2024-04-15 PROCEDURE — 94640 AIRWAY INHALATION TREATMENT: CPT

## 2024-04-15 PROCEDURE — 83615 LACTATE (LD) (LDH) ENZYME: CPT

## 2024-04-15 RX ORDER — LEFLUNOMIDE 10 MG/1
1 TABLET ORAL
Refills: 0 | DISCHARGE

## 2024-04-15 RX ORDER — ATORVASTATIN CALCIUM 80 MG/1
1 TABLET, FILM COATED ORAL
Refills: 0 | DISCHARGE

## 2024-04-15 RX ORDER — ACETAMINOPHEN 500 MG
2 TABLET ORAL
Qty: 0 | Refills: 0 | DISCHARGE
Start: 2024-04-15

## 2024-04-15 RX ORDER — METOPROLOL TARTRATE 50 MG
1 TABLET ORAL
Refills: 0 | DISCHARGE

## 2024-04-15 RX ORDER — PANTOPRAZOLE SODIUM 20 MG/1
40 TABLET, DELAYED RELEASE ORAL
Qty: 0 | Refills: 0 | DISCHARGE
Start: 2024-04-15

## 2024-04-15 RX ORDER — ALBUTEROL 90 UG/1
2 AEROSOL, METERED ORAL
Qty: 0 | Refills: 0 | DISCHARGE

## 2024-04-15 RX ORDER — AMLODIPINE BESYLATE 2.5 MG/1
1 TABLET ORAL
Refills: 0 | DISCHARGE

## 2024-04-15 RX ORDER — INSULIN LISPRO 100/ML
8 VIAL (ML) SUBCUTANEOUS
Refills: 0 | DISCHARGE

## 2024-04-15 RX ORDER — SODIUM BICARBONATE 1 MEQ/ML
2 SYRINGE (ML) INTRAVENOUS
Refills: 0 | DISCHARGE

## 2024-04-15 RX ORDER — SALIVA SUBSTITUTE COMB NO.11 351 MG
5 POWDER IN PACKET (EA) MUCOUS MEMBRANE
Qty: 0 | Refills: 0 | DISCHARGE
Start: 2024-04-15

## 2024-04-15 RX ORDER — ICOSAPENT ETHYL 500 MG/1
1 CAPSULE, LIQUID FILLED ORAL
Refills: 0 | DISCHARGE

## 2024-04-15 RX ORDER — BRIVARACETAM 25 MG/1
100 TABLET, FILM COATED ORAL
Qty: 0 | Refills: 0 | DISCHARGE
Start: 2024-04-15

## 2024-04-15 RX ORDER — GABAPENTIN 400 MG/1
1 CAPSULE ORAL
Refills: 0 | DISCHARGE

## 2024-04-15 RX ORDER — CHOLECALCIFEROL (VITAMIN D3) 125 MCG
1 CAPSULE ORAL
Refills: 0 | DISCHARGE

## 2024-04-15 RX ORDER — CALCITRIOL 0.5 UG/1
1 CAPSULE ORAL
Refills: 0 | DISCHARGE

## 2024-04-15 RX ORDER — IPRATROPIUM/ALBUTEROL SULFATE 18-103MCG
3 AEROSOL WITH ADAPTER (GRAM) INHALATION
Qty: 0 | Refills: 0 | DISCHARGE
Start: 2024-04-15

## 2024-04-15 RX ORDER — TACROLIMUS 5 MG/1
0.5 CAPSULE ORAL
Qty: 0 | Refills: 0 | DISCHARGE
Start: 2024-04-15

## 2024-04-15 RX ORDER — INSULIN LISPRO 100/ML
1 VIAL (ML) SUBCUTANEOUS
Qty: 0 | Refills: 0 | DISCHARGE
Start: 2024-04-15

## 2024-04-15 RX ORDER — AMLODIPINE BESYLATE 2.5 MG/1
1 TABLET ORAL
Qty: 0 | Refills: 0 | DISCHARGE
Start: 2024-04-15

## 2024-04-15 RX ORDER — CARVEDILOL PHOSPHATE 80 MG/1
1 CAPSULE, EXTENDED RELEASE ORAL
Qty: 0 | Refills: 0 | DISCHARGE
Start: 2024-04-15

## 2024-04-15 RX ORDER — FAMOTIDINE 10 MG/ML
1 INJECTION INTRAVENOUS
Refills: 0 | DISCHARGE

## 2024-04-15 RX ORDER — INSULIN GLARGINE 100 [IU]/ML
15 INJECTION, SOLUTION SUBCUTANEOUS
Refills: 0 | DISCHARGE

## 2024-04-15 RX ORDER — LACOSAMIDE 50 MG/1
200 TABLET ORAL
Qty: 0 | Refills: 0 | DISCHARGE
Start: 2024-04-15

## 2024-04-15 RX ORDER — HEPARIN SODIUM 5000 [USP'U]/ML
5000 INJECTION INTRAVENOUS; SUBCUTANEOUS
Qty: 0 | Refills: 0 | DISCHARGE
Start: 2024-04-15

## 2024-04-15 RX ORDER — ASPIRIN/CALCIUM CARB/MAGNESIUM 324 MG
1 TABLET ORAL
Qty: 0 | Refills: 0 | DISCHARGE

## 2024-04-15 RX ORDER — OXYCODONE AND ACETAMINOPHEN 5; 325 MG/1; MG/1
1 TABLET ORAL
Refills: 0 | DISCHARGE

## 2024-04-15 RX ORDER — NYSTATIN 500MM UNIT
5 POWDER (EA) MISCELLANEOUS
Qty: 0 | Refills: 0 | DISCHARGE
Start: 2024-04-15

## 2024-04-15 RX ORDER — TACROLIMUS 5 MG/1
10 CAPSULE ORAL
Refills: 0 | DISCHARGE

## 2024-04-15 RX ORDER — INSULIN GLARGINE 100 [IU]/ML
30 INJECTION, SOLUTION SUBCUTANEOUS
Qty: 0 | Refills: 0 | DISCHARGE
Start: 2024-04-15

## 2024-04-15 RX ADMIN — CARVEDILOL PHOSPHATE 25 MILLIGRAM(S): 80 CAPSULE, EXTENDED RELEASE ORAL at 09:57

## 2024-04-15 RX ADMIN — Medication 1 DROP(S): at 14:50

## 2024-04-15 RX ADMIN — LACOSAMIDE 200 MILLIGRAM(S): 50 TABLET ORAL at 11:27

## 2024-04-15 RX ADMIN — Medication 1 TABLET(S): at 11:27

## 2024-04-15 RX ADMIN — Medication 500000 UNIT(S): at 05:22

## 2024-04-15 RX ADMIN — Medication 0.1 MILLIGRAM(S): at 14:49

## 2024-04-15 RX ADMIN — Medication 1 DROP(S): at 02:58

## 2024-04-15 RX ADMIN — Medication 5 MILLILITER(S): at 11:26

## 2024-04-15 RX ADMIN — HEPARIN SODIUM 5000 UNIT(S): 5000 INJECTION INTRAVENOUS; SUBCUTANEOUS at 14:55

## 2024-04-15 RX ADMIN — TACROLIMUS 3 MILLIGRAM(S): 5 CAPSULE ORAL at 05:23

## 2024-04-15 RX ADMIN — HEPARIN SODIUM 5000 UNIT(S): 5000 INJECTION INTRAVENOUS; SUBCUTANEOUS at 05:23

## 2024-04-15 RX ADMIN — BRIVARACETAM 100 MILLIGRAM(S): 25 TABLET, FILM COATED ORAL at 15:50

## 2024-04-15 RX ADMIN — Medication 1 DROP(S): at 09:57

## 2024-04-15 RX ADMIN — Medication 2: at 12:22

## 2024-04-15 RX ADMIN — Medication 2: at 05:45

## 2024-04-15 RX ADMIN — Medication 5 MILLILITER(S): at 05:22

## 2024-04-15 RX ADMIN — Medication 0.1 MILLIGRAM(S): at 05:23

## 2024-04-15 RX ADMIN — PANTOPRAZOLE SODIUM 40 MILLIGRAM(S): 20 TABLET, DELAYED RELEASE ORAL at 09:57

## 2024-04-15 RX ADMIN — Medication 1 DROP(S): at 05:23

## 2024-04-15 RX ADMIN — INSULIN GLARGINE 30 UNIT(S): 100 INJECTION, SOLUTION SUBCUTANEOUS at 00:26

## 2024-04-15 RX ADMIN — BRIVARACETAM 100 MILLIGRAM(S): 25 TABLET, FILM COATED ORAL at 08:56

## 2024-04-15 RX ADMIN — AMLODIPINE BESYLATE 10 MILLIGRAM(S): 2.5 TABLET ORAL at 05:23

## 2024-04-15 RX ADMIN — Medication 500000 UNIT(S): at 11:27

## 2024-04-15 NOTE — PROGRESS NOTE ADULT - PROBLEM SELECTOR PROBLEM 4
UTI (urinary tract infection)

## 2024-04-15 NOTE — PROGRESS NOTE ADULT - NS ATTEND BILL GEN_ALL_CORE
Attending to bill

## 2024-04-15 NOTE — PROGRESS NOTE ADULT - PROVIDER SPECIALTY LIST ADULT
Cardiology
Gastroenterology
NSICU
Neurology
Neurosurgery
Pulmonology
Pulmonology
Surgery
Transplant Nephrology
Transplant Nephrology
Wound Care
Wound Care
Cardiology
Gastroenterology
Intervent Radiology
NSICU
Neurology
Neurosurgery
Pulmonology
Surgery
Transplant Nephrology
Wound Care
Wound Care
Cardiology
Gastroenterology
Gastroenterology
NSICU
Neurology
Neurology
Neurosurgery
Pulmonology
Surgery
Transplant Nephrology
Transplant Nephrology
Wound Care
Gastroenterology
NSICU
Neurology
Neurology
Neurosurgery
Neurosurgery
Pulmonology
Transplant Nephrology
Transplant Nephrology
NSICU
Neurosurgery
Neurosurgery
Pulmonology
Transplant Nephrology
Transplant Nephrology
Neurosurgery
Pulmonology
Pulmonology
Transplant Nephrology
Transplant Nephrology
Pulmonology
Transplant Nephrology
Pulmonology

## 2024-04-15 NOTE — PROGRESS NOTE ADULT - SUBJECTIVE AND OBJECTIVE BOX
Patient is a 78y old  Female who presents with a chief complaint of ICH (14 Apr 2024 17:18)    HPI:  Nury Adam   78F Hx ESRD s/p renal xplant c/b DGF off HD, L AKA, 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 ICH score 4.   (02 Mar 2024 00:28)    Interval Events:    REVIEW OF SYSTEMS:  [ ] Positive  [ ] All other systems negative  [ ] Unable to assess ROS because ________    Vital Signs Last 24 Hrs  T(C): 36.8 (04-15-24 @ 05:57), Max: 36.9 (04-15-24 @ 00:20)  T(F): 98.3 (04-15-24 @ 05:57), Max: 98.4 (04-15-24 @ 00:20)  HR: 100 (04-15-24 @ 05:57) (73 - 100)  BP: 172/85 (04-15-24 @ 05:57) (130/75 - 172/85)  RR: 18 (04-15-24 @ 05:57) (18 - 20)  SpO2: 100% (04-15-24 @ 05:57) (96% - 100%)    PHYSICAL EXAM:  HEENT:   [ ]Tracheostomy:  [ ]Pupils equal  [ ]No oral lesions  [ ]Abnormal    SKIN  [ ] No Rash  [ ] Abnormal  [ ] pressure    CARDIAC  [ ]Regular  [ ]Abnormal    PULMONARY  [ ]Bilateral Clear Breath Sounds  [ ]Normal Excursion  [ ]Abnormal    GI  [ ]PEG      [ ] +BS		              [ ]Soft, nondistended, nontender	  [ ]Abnormal    MUSCULOSKELETAL                                   [ ]Bedbound                 [ ]Abnormal    [ ]Ambulatory/OOB to chair                           EXTREMITIES                                         [ ]Normal  [ ]Edema                           NEUROLOGIC  [ ] Normal, non focal  [ ] Focal findings:    PSYCHIATRIC  [ ]Alert and appropriate  [ ] Sedated	 [ ]Agitated    :  Gardner: [ ] Yes, if yes: Date of Placement:                   [  ] No    LINES: Central Lines [ ] Yes, if yes: Date of Placement                                     [  ] No    HOSPITAL MEDICATIONS:  MEDICATIONS  (STANDING):  amLODIPine   Tablet 10 milliGRAM(s) Oral daily  artificial  tears Solution 1 Drop(s) Both EYES every 4 hours  Biotene Dry Mouth Oral Rinse 5 milliLiter(s) Swish and Spit every 6 hours  brivaracetam Oral Solution 100 milliGRAM(s) Oral <User Schedule>  carvedilol 25 milliGRAM(s) Oral every 12 hours  cloNIDine 0.1 milliGRAM(s) Oral every 8 hours  dextrose 5%. 1000 milliLiter(s) (50 mL/Hr) IV Continuous <Continuous>  dextrose 5%. 1000 milliLiter(s) (100 mL/Hr) IV Continuous <Continuous>  dextrose 50% Injectable 25 Gram(s) IV Push once  glucagon  Injectable 1 milliGRAM(s) IntraMuscular once  heparin   Injectable 5000 Unit(s) SubCutaneous every 8 hours  insulin glargine Injectable (LANTUS) 30 Unit(s) SubCutaneous at bedtime  insulin lispro (ADMELOG) corrective regimen sliding scale   SubCutaneous every 6 hours  lacosamide Solution 200 milliGRAM(s) Oral <User Schedule>  lactated ringers. 500 milliLiter(s) (100 mL/Hr) IV Continuous <Continuous>  multivitamin 1 Tablet(s) Oral daily  nystatin    Suspension 986152 Unit(s) Swish and Swallow every 6 hours  pantoprazole   Suspension 40 milliGRAM(s) Oral two times a day  predniSONE   Tablet 5 milliGRAM(s) Oral daily  tacrolimus    0.5 mG/mL Suspension 3 milliGRAM(s) Oral every 12 hours  trimethoprim   80 mG/sulfamethoxazole 400 mG 1 Tablet(s) Oral daily    MEDICATIONS  (PRN):  acetaminophen     Tablet .. 650 milliGRAM(s) Oral every 6 hours PRN Mild Pain (1 - 3)  albuterol/ipratropium for Nebulization 3 milliLiter(s) Nebulizer every 6 hours PRN Shortness of Breath and/or Wheezing      LABS:                        10.3   5.92  )-----------( 199      ( 15 Apr 2024 07:08 )             33.2     04-14    136  |  101  |  56<H>  ----------------------------<  103<H>  4.2   |  19<L>  |  1.05    Ca    10.1      14 Apr 2024 07:19  Phos  2.7     04-14  Mg     2.1     04-14    TPro  6.6  /  Alb  3.3  /  TBili  0.2  /  DBili  x   /  AST  25  /  ALT  16  /  AlkPhos  91  04-14      Urinalysis Basic - ( 14 Apr 2024 07:19 )    Color: x / Appearance: x / SG: x / pH: x  Gluc: 103 mg/dL / Ketone: x  / Bili: x / Urobili: x   Blood: x / Protein: x / Nitrite: x   Leuk Esterase: x / RBC: x / WBC x   Sq Epi: x / Non Sq Epi: x / Bacteria: x      Mode: AC/ CMV (Assist Control/ Continuous Mandatory Ventilation)  RR (machine): 14  TV (machine): 450  FiO2: 40  PEEP: 5  ITime: 1  MAP: 8  PIP: 18   Patient is a 78y old  Female who presents with a chief complaint of ICH (14 Apr 2024 17:18)    HPI:  Nury Adam   78F Hx ESRD s/p renal xplant c/b DGF off HD, L AKA, 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 ICH score 4.   (02 Mar 2024 00:28)    Interval Events: No issues overnight    REVIEW OF SYSTEMS:  [ ] Positive  [ ] All other systems negative  [ x] Unable to assess ROS because patient is obtunded    Vital Signs Last 24 Hrs  T(C): 36.8 (04-15-24 @ 05:57), Max: 36.9 (04-15-24 @ 00:20)  T(F): 98.3 (04-15-24 @ 05:57), Max: 98.4 (04-15-24 @ 00:20)  HR: 100 (04-15-24 @ 05:57) (73 - 100)  BP: 172/85 (04-15-24 @ 05:57) (130/75 - 172/85)  RR: 18 (04-15-24 @ 05:57) (18 - 20)  SpO2: 100% (04-15-24 @ 05:57) (96% - 100%)    PHYSICAL EXAM:  HEENT:   [x]Tracheostomy: #7 Cuffed Portex  [x]Pupils equal  [ ]No oral lesions  [x]Abnormal: S/p Cranioplasty, Site remains w/ staples incision C/D/I    SKIN  [ ]No Rash  [x] Abnormal: LUE AVF  [x] pressure: Stage 3 sacral pressure injury     CARDIAC  [x]Regular  [ ]Abnormal    PULMONARY  [x]Bilateral Clear Breath Sounds  [ ]Normal Excursion  [ ]Abnormal     GI  [x]PEG     [x] +BS		              [x]Soft, nondistended, nontender	  [ ]Abnormal    MUSCULOSKELETAL                                   [x]Bedbound                 [x]Abnormal: L AKA  [ ]Ambulatory/OOB to chair                           EXTREMITIES                                         [x]Normal  [ ]Edema                           NEUROLOGIC  [ ] Normal, non focal  [x] Focal findings: Eyes Opens intermittently, moves LUE spontaneously, and withdraws in RLE extremity to noxious stimuli.    PSYCHIATRIC  [x]Obtunded  [ ] Sedated	 [ ]Agitated    :  Gardner: [ ] Yes, if yes: Date of Placement:                   [x] No    LINES: Central Lines [ ] Yes, if yes: Date of Placement                                     [x] No    HOSPITAL MEDICATIONS:  MEDICATIONS  (STANDING):  amLODIPine   Tablet 10 milliGRAM(s) Oral daily  artificial  tears Solution 1 Drop(s) Both EYES every 4 hours  Biotene Dry Mouth Oral Rinse 5 milliLiter(s) Swish and Spit every 6 hours  brivaracetam Oral Solution 100 milliGRAM(s) Oral <User Schedule>  carvedilol 25 milliGRAM(s) Oral every 12 hours  cloNIDine 0.1 milliGRAM(s) Oral every 8 hours  dextrose 5%. 1000 milliLiter(s) (50 mL/Hr) IV Continuous <Continuous>  dextrose 5%. 1000 milliLiter(s) (100 mL/Hr) IV Continuous <Continuous>  dextrose 50% Injectable 25 Gram(s) IV Push once  glucagon  Injectable 1 milliGRAM(s) IntraMuscular once  heparin   Injectable 5000 Unit(s) SubCutaneous every 8 hours  insulin glargine Injectable (LANTUS) 30 Unit(s) SubCutaneous at bedtime  insulin lispro (ADMELOG) corrective regimen sliding scale   SubCutaneous every 6 hours  lacosamide Solution 200 milliGRAM(s) Oral <User Schedule>  lactated ringers. 500 milliLiter(s) (100 mL/Hr) IV Continuous <Continuous>  multivitamin 1 Tablet(s) Oral daily  nystatin    Suspension [id number] Unit(s) Swish and Swallow every 6 hours  pantoprazole   Suspension 40 milliGRAM(s) Oral two times a day  predniSONE   Tablet 5 milliGRAM(s) Oral daily  tacrolimus    0.5 mG/mL Suspension 3 milliGRAM(s) Oral every 12 hours  trimethoprim   80 mG/sulfamethoxazole 400 mG 1 Tablet(s) Oral daily    MEDICATIONS  (PRN):  acetaminophen     Tablet .. 650 milliGRAM(s) Oral every 6 hours PRN Mild Pain (1 - 3)  albuterol/ipratropium for Nebulization 3 milliLiter(s) Nebulizer every 6 hours PRN Shortness of Breath and/or Wheezing      LABS:                        10.3   5.92  )-----------( 199      ( 15 Apr 2024 07:08 )             33.2     04-14    136  |  101  |  56<H>  ----------------------------<  103<H>  4.2   |  19<L>  |  1.05    Ca    10.1      14 Apr 2024 07:19  Phos  2.7     04-14  Mg     2.1     04-14    TPro  6.6  /  Alb  3.3  /  TBili  0.2  /  DBili  x   /  AST  25  /  ALT  16  /  AlkPhos  91  04-14      Urinalysis Basic - ( 14 Apr 2024 07:19 )    Color: x / Appearance: x / SG: x / pH: x  Gluc: 103 mg/dL / Ketone: x  / Bili: x / Urobili: x   Blood: x / Protein: x / Nitrite: x   Leuk Esterase: x / RBC: x / WBC x   Sq Epi: x / Non Sq Epi: x / Bacteria: x      Mode: AC/ CMV (Assist Control/ Continuous Mandatory Ventilation)  RR (machine): 14  TV (machine): 450  FiO2: 40  PEEP: 5  ITime: 1  MAP: 8  PIP: 18

## 2024-04-15 NOTE — PROGRESS NOTE ADULT - SUBJECTIVE AND OBJECTIVE BOX
DATE OF SERVICE: 04-15-24 @ 13:40    Patient is a 78y old  Female who presents with a chief complaint of ICH (15 Apr 2024 09:43)      INTERVAL HISTORY: No acute events    REVIEW OF SYSTEMS:  CONSTITUTIONAL: No weakness  EYES/ENT: No visual changes;  No throat pain   NECK: No pain or stiffness  RESPIRATORY: No cough, wheezing; No shortness of breath  CARDIOVASCULAR: No chest pain or palpitations  GASTROINTESTINAL: No abdominal  pain. No nausea, vomiting, or hematemesis  GENITOURINARY: No dysuria, frequency or hematuria  NEUROLOGICAL: No stroke like symptoms  SKIN: No rashes      	  MEDICATIONS:  amLODIPine   Tablet 10 milliGRAM(s) Oral daily  carvedilol 25 milliGRAM(s) Oral every 12 hours  cloNIDine 0.1 milliGRAM(s) Oral every 8 hours        PHYSICAL EXAM:  T(C): 37.2 (04-15-24 @ 09:47), Max: 37.2 (04-15-24 @ 09:47)  HR: 80 (04-15-24 @ 10:39) (75 - 100)  BP: 126/63 (04-15-24 @ 09:47) (126/63 - 172/85)  RR: 22 (04-15-24 @ 10:39) (18 - 24)  SpO2: 100% (04-15-24 @ 10:39) (96% - 100%)  Wt(kg): --  I&O's Summary    14 Apr 2024 07:01  -  15 Apr 2024 07:00  --------------------------------------------------------  IN: 1590 mL / OUT: 2200 mL / NET: -610 mL    15 Apr 2024 07:01  -  15 Apr 2024 13:41  --------------------------------------------------------  IN: 290 mL / OUT: 0 mL / NET: 290 mL          Appearance: In no distress	  HEENT:    PERRL, EOMI	  Cardiovascular:  S1 S2, No JVD  Respiratory: Lungs clear to auscultation	  Gastrointestinal:  Soft, Non-tender, + BS	  Vascularature:  No edema of LE  Psychiatric: Appropriate affect   Neuro: no acute focal deficits                               10.3   5.92  )-----------( 199      ( 15 Apr 2024 07:08 )             33.2     04-15    139  |  102  |  57<H>  ----------------------------<  193<H>  4.5   |  21<L>  |  1.16    Ca    9.8      15 Apr 2024 07:06  Phos  2.9     04-15  Mg     2.0     04-15    TPro  6.6  /  Alb  3.4  /  TBili  0.1<L>  /  DBili  x   /  AST  26  /  ALT  19  /  AlkPhos  89  04-15        Labs personally reviewed      ASSESSMENT/PLAN: 	  78F Hx ESRD s/p renal xplant c/b DGF off HD, L AKA, 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    1. Abnormal Echo --  Septal bulge noted measures 2.2cm without obstruction.   -- Given no intracavitary obstruction no intervention at this time  - No Myxoma seen on this echo or echo in 2019, ? if hypermobile interatrial septum was confused for on prior imaging     2. HTN -   - Continue Coreg 25 mg BID, amlodipine 10mg  - Clonidine patch resumed but now with labile BP. Cont Clonidine 0.1mg via PEG q8hrs with hold parameters    3. 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 Diastolic Dysfunction  - Moderate DD with elevated filling pressures noted on TTE from March  - Check BNP  - Monitor volume status closely  - Now with slight MEHREEN receiving IVF     5. DVT PPX - c/w SQ hep            LAURA Gomez DO [facility]  Cardiovascular Medicine  [address], [address]  Available via call or text on [employer]  Office: [phone]

## 2024-04-15 NOTE — PROGRESS NOTE ADULT - PROBLEM SELECTOR PROBLEM 6
Renal transplant recipient

## 2024-04-15 NOTE — PROGRESS NOTE ADULT - PROBLEM SELECTOR PLAN 1
- Found to have L IPH on CTH likely 2/2 amyloid angiopathy  - S/p L hemicrani for evacuation of large ICH; bone discarded  - CT H Stereo 3/12: S/p Left Frontal Craniectomy  - CT Head 3/30: Improved lft frontal hemorrhage and extradural fluid collections   - S/p Cranioplasty on 4/2; Post op head CT Stable   - Subgaleal YON Drain removed 4/6  - Maintain Neuro checks  4/14 consider CTA H/N and recall pall care per Neuro

## 2024-04-15 NOTE — PROGRESS NOTE ADULT - PROBLEM SELECTOR PROBLEM 2
ARF (acute respiratory failure)
Long term current use of immunosuppressive drug
ARF (acute respiratory failure)
Long term current use of immunosuppressive drug
Long term current use of immunosuppressive drug
ARF (acute respiratory failure)
Kidney transplant recipient
Long term current use of immunosuppressive drug
ARF (acute respiratory failure)

## 2024-04-15 NOTE — PROGRESS NOTE ADULT - ASSESSMENT
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   initial CTH3/1  with 8.9cm left frontal IPH with IVH .09cm rightward shift   3/2 CTH s/p L hmeicrani and resection of IPH. stable   3/5 CTH post op changes. some reorption of post op pneumocephalus.    s/p trach/peg 3/8/24   3/8 CTH L frontal craniectomy.  L MCA hemorrhage and infarct ; still IVH.   3/10 with + UA  A1c 5.7 LDL 46   TTE done 3/2: LA is severely dilated    Urine culture grew positive for klebsiella and enterococcus  3/9 EEG no seiuzres since 3/7;  risk of seiuzre from L parietal region. mod to severe diffuse cerebral dysfunction   Transferred to [facility] 3/11 for continued management.  3/12 CTH   sterotactic imaging of L frontal crani for hemorrhagic L MCA ifnarct. L frontal temporal pseudmeningocele  noted. no hcange since 3/8   o/e awake, no verbal, not followiing. L gaze pref  some minimal withdrawal to noxious RLE and LUE.  s/p trach /vent   s/p cranioplasty 4/3, no change in post op exam   4/3 CTH removal of drain noted.  post op changes.   4/4 c/f seizure; repeat EEG   EEG wtih no seizures but risk of si\eizures from bilateral frontal regions \    Impression: L ICH possible 2/2  CAA but hemorrhagic infarct also needs to be considered.    - never had vessel imaging. consider CTA H/N   - no change in AEDS   - tolerating CPAP at times ; episode of apnea.  will montior   - difficult to determine IPH 2/2 CAA without MRI to look at SWI or GRE sequeneses for hemosiderin deposition  - no AC/AP. dvt ppx okay  - briviact 100mg TID  and vimpat 200mg BID for seiuzre ppx   - infectious workup. was on meropenum.  this can lower seiuzre threshold ; now off   - immunosuppression on tacrolimus and prednisone.  ppx bactrim     -telemetry   - PT/OT   - check FS, glucose control <180  - GI/DVT ppx   - GOC with family.  currenlty full code; in terms of functional meaningful recovery the likelihood is low.  patient will require 24hr care and likely be bedbound at this time.    consider recalling palliative  dc planning in progress  Dieter Wilkinson MD  Vascular Neurology  Office: [phone]

## 2024-04-15 NOTE — PROGRESS NOTE ADULT - PROBLEM SELECTOR PLAN 2
- S/p trach 3/8 by surgery by Dr. Joselo Mayorga   - Patient initially weaned to TC ATC while in the RCU But has required MV Post-OP  - CXR 4/10: Clear Lungs   - Continue to Progress CPAP Trials as tolerated  - Will attempt to wean back to TC   - Continue airway clearance; Duoneb q6h PRN  4/14 did well on CPAP 5/5 trial, TC trial today, vent at night

## 2024-04-15 NOTE — PROGRESS NOTE ADULT - PROBLEM SELECTOR PROBLEM 8
Dysphagia

## 2024-04-15 NOTE — PROGRESS NOTE ADULT - PROBLEM SELECTOR PROBLEM 12
Advanced care planning/counseling discussion

## 2024-04-15 NOTE — PROGRESS NOTE ADULT - ASSESSMENT
[FreeTextEntry1] : IgA kappa MGUS\par low-int risk disease\par  79 yo F with PMHx ESRD s/p renal transplant (2019, now off HD), left AKA, BK viremia, HTN, breast ca s/p lumpectomy (completed Tamoxifen 03/2023), DVT (off Eliquis), HLD, gout presenting 3/1 with left ICH (ICH score 4) likely 2/2 amyloid angiopathy s/p left craniectomy(discarded) and evacuation as well as EVD placement and removal (3/7). Hospital course c/b extensive LE DVTs, S/p IVCF on 3/3. She is s/p trach/peg 3/8/24.  Febrile 3/10 with + UA, blood/sputum culture NGTD.  Treatment for UTI initiated with ceftriaxone, eventually broadened to cefepime. Transferred to RCU 3/11 for continued management. Urine culture resulted positive for klebsiella, treated for 7 days with Meropenem 3/12-3/19. Patient S/p Cranioplasty on 4/2. Patient was initially weaned to TC ATC in the RCU but post cranioplasty required Mechanical Ventilation.     4/12: No events reported overnight. Patient with Apnea during CPAP Trials yesterday. Will continue weaning attempts as tolerated. Patient with rising BUN/Creat will give  cc x 1 today. Patient on Lokelma EOD; potassium has been improved since changing TF Formula to Nepro. Will dc Lokelma today and monitor Potassium.  4/13 creatine  trending lower as well as  BUN.  K 4.0  4/14 [place] level 4.4, goal 4-7 per renal transplant team. TC trial, vent at night  77 yo F with PMHx ESRD s/p renal transplant (, now off HD), left AKA, BK viremia, HTN, breast ca s/p lumpectomy (completed Tamoxifen 2023), DVT (off Eliquis), HLD, gout presenting 3/1 with left ICH (ICH score 4) likely 2/2 amyloid angiopathy s/p left craniectomy(discarded) and evacuation as well as EVD placement and removal (3/7). Hospital course c/b extensive LE DVTs, S/p IVCF on 3/3. She is s/p trach/peg 3/8/24.  Febrile 3/10 with + UA, blood/sputum culture NGTD.  Treatment for UTI initiated with ceftriaxone, eventually broadened to cefepime. Transferred to RCU 3/11 for continued management. Urine culture resulted positive for klebsiella, treated for 7 days with Meropenem 3/12-3/19. Patient S/p Cranioplasty on . Patient was initially weaned to TC ATC in the RCU but post cranioplasty required Mechanical Ventilation.     4/15: discharge to [facility]  today

## 2024-04-15 NOTE — PROGRESS NOTE ADULT - NS ATTEND AMEND GEN_ALL_CORE FT
79 y/o F w/ESRD s/p transplant on immunosuppression, BK viremia, breast CA s/p lumpectomy + course of tamoxifen, DVT not on AC admitted for ICH s/p L craniectomy w/evacuation w/hospital course c/b acute respiratory failure now s/p trach/PEG and DVT s/p IVC filter. Now s/p cranioplasty. MEHREEN unclear etiology resolved    - AEDs as per neuro  - Wean from vent as tolerated  - Immunosuppresives as per renal  - H. Pylori treatment after discharge as per GI  - No therapeutic AC  - Insulin as needed  - Monitor off abx  - Dispo planning 77 y/o F w/ESRD s/p transplant on immunosuppression, BK viremia, breast CA s/p lumpectomy + course of tamoxifen, DVT not on AC admitted for ICH s/p L craniectomy w/evacuation w/hospital course c/b acute respiratory failure now s/p trach/PEG and DVT s/p IVC filter. Now s/p cranioplasty. MEHREEN unclear etiology resolved    - AEDs as per neuro  - Wean from vent as tolerated  - Immunosuppresives as per renal  - H. Pylori treatment after discharge as per GI  - No therapeutic AC  - Insulin as needed  - Monitor off abx  - Dispo planning    I spent 25 minutes discharging this patient

## 2024-04-15 NOTE — PROGRESS NOTE ADULT - PROBLEM SELECTOR PLAN 9
- VA Duplex 3/2: DVT RT cfv, femoral, popliteal and gastrocnemius veins; DVT LEFT cfv and femoral vein  - S/p IVCF by IR on 3/3  -4/1 B/L LE doppler:  There remains near occlusive DVT affecting the right external iliac and common femoral veins.  There is partially occlusive DVT affecting the right popliteal veins.  The right gastrocnemius vein is thrombosed.  There is near occlusive DVT affecting the left external iliac, common   femoral and proximal left femoral veins.  - Cont Heparin Sub Q DVT PPX

## 2024-04-15 NOTE — PROGRESS NOTE ADULT - NS ATTEND OPT1 GEN_ALL_CORE

## 2024-04-15 NOTE — PROGRESS NOTE ADULT - PROBLEM SELECTOR PROBLEM 3
Seizures
Seizures
Long term current use of immunosuppressive drug
Seizures

## 2024-04-15 NOTE — PROGRESS NOTE ADULT - SUBJECTIVE AND OBJECTIVE BOX
Neurology        S: patient seen  in RCU,          Medications: MEDICATIONS  (STANDING):  amLODIPine   Tablet 10 milliGRAM(s) Oral daily  artificial  tears Solution 1 Drop(s) Both EYES every 4 hours  Biotene Dry Mouth Oral Rinse 5 milliLiter(s) Swish and Spit every 6 hours  brivaracetam Oral Solution 100 milliGRAM(s) Oral <User Schedule>  carvedilol 25 milliGRAM(s) Oral every 12 hours  cloNIDine 0.1 milliGRAM(s) Oral every 8 hours  dextrose 5%. 1000 milliLiter(s) (50 mL/Hr) IV Continuous <Continuous>  dextrose 5%. 1000 milliLiter(s) (100 mL/Hr) IV Continuous <Continuous>  dextrose 50% Injectable 25 Gram(s) IV Push once  glucagon  Injectable 1 milliGRAM(s) IntraMuscular once  heparin   Injectable 5000 Unit(s) SubCutaneous every 8 hours  insulin glargine Injectable (LANTUS) 30 Unit(s) SubCutaneous at bedtime  insulin lispro (ADMELOG) corrective regimen sliding scale   SubCutaneous every 6 hours  lacosamide Solution 200 milliGRAM(s) Oral <User Schedule>  lactated ringers. 500 milliLiter(s) (100 mL/Hr) IV Continuous <Continuous>  multivitamin 1 Tablet(s) Oral daily  nystatin    Suspension 475603 Unit(s) Swish and Swallow every 6 hours  pantoprazole   Suspension 40 milliGRAM(s) Oral two times a day  predniSONE   Tablet 5 milliGRAM(s) Oral daily  tacrolimus    0.5 mG/mL Suspension 3 milliGRAM(s) Oral every 12 hours  trimethoprim   80 mG/sulfamethoxazole 400 mG 1 Tablet(s) Oral daily    MEDICATIONS  (PRN):  acetaminophen     Tablet .. 650 milliGRAM(s) Oral every 6 hours PRN Mild Pain (1 - 3)  albuterol/ipratropium for Nebulization 3 milliLiter(s) Nebulizer every 6 hours PRN Shortness of Breath and/or Wheezing       Vitals:  Vital Signs Last 24 Hrs  T(C): 37.2 (15 Apr 2024 09:47), Max: 37.2 (15 Apr 2024 09:47)  T(F): 99 (15 Apr 2024 09:47), Max: 99 (15 Apr 2024 09:47)  HR: 80 (15 Apr 2024 10:39) (73 - 100)  BP: 126/63 (15 Apr 2024 09:47) (126/63 - 172/85)  BP(mean): --  RR: 22 (15 Apr 2024 10:39) (18 - 24)  SpO2: 100% (15 Apr 2024 10:39) (96% - 100%)    Parameters below as of 15 Apr 2024 10:39  Patient On (Oxygen Delivery Method): tracheostomy collar    O2 Concentration (%): 28          General Exam:   General Appearance: Appropriately dressed    Head: Normocephalic, atraumatic and no dysmorphic features s/p trach   Ear, Nose, and Throat: Moist mucous membranes  CVS: S1S2+  Resp: No SOB, no wheeze or rhonchi on vent   GI: soft NT/ND s/p PEG   Extremities:  L AKA  Skin: No bruises or rashes     Neurological Exam:  Mental Status:  opens eyes to noxious. no verbal. not following   Cranial Nerves: PERRL, EOMI but gaze pref to L, no blink to threat on R, R facial,    Motor: minimal/no spontaneous movement on RUE.  RLE some minimal movement. LUE 2/5 at times   Sensation: grimaces to noxious at times. some minimal withdrawal LUE 2/5 and RLE.    Coordination: unable   Gait: unable     Data/Labs/Imaging which I personally reviewed.          LABS:                          10.3   5.92  )-----------( 199      ( 15 Apr 2024 07:08 )             33.2     04-15    139  |  102  |  57<H>  ----------------------------<  193<H>  4.5   |  21<L>  |  1.16    Ca    9.8      15 Apr 2024 07:06  Phos  2.9     04-15  Mg     2.0     04-15    TPro  6.6  /  Alb  3.4  /  TBili  0.1<L>  /  DBili  x   /  AST  26  /  ALT  19  /  AlkPhos  89  04-15    LIVER FUNCTIONS - ( 15 Apr 2024 07:06 )  Alb: 3.4 g/dL / Pro: 6.6 g/dL / ALK PHOS: 89 U/L / ALT: 19 U/L / AST: 26 U/L / GGT: x             Urinalysis Basic - ( 15 Apr 2024 07:06 )    Color: x / Appearance: x / SG: x / pH: x  Gluc: 193 mg/dL / Ketone: x  / Bili: x / Urobili: x   Blood: x / Protein: x / Nitrite: x   Leuk Esterase: x / RBC: x / WBC x   Sq Epi: x / Non Sq Epi: x / Bacteria: x                EEG IMPRESSION/CLINICAL CORRELATE    Abnormal EEG study.  Potential epileptogenic focus in the bilateral frontal head regions.  Structural or functional abnormality in the left hemisphere with evidence for overlying skull defect.  Moderate nonspecific diffuse or multifocal cerebral dysfunction.   No seizure seen.

## 2024-04-15 NOTE — PROGRESS NOTE ADULT - REASON FOR ADMISSION
ICH

## 2024-04-15 NOTE — CHART NOTE - NSCHARTNOTESELECT_GEN_ALL_CORE
EEG Prelim
EVD removal/Event Note
Nutrition Services
Postop check
Preliminary EEG report
Transplant nephrology/Off Service Note
Drain Removal/Event Note
EEG Prelim
EVD output/Event Note
Nutrition Services
Transfer level of care/Transfer Note
VTE assessment/Event Note

## 2024-04-15 NOTE — CHART NOTE - NSCHARTNOTEFT_GEN_A_CORE
Nutrition Follow Up Note  Patient seen for: follow up    Source: [] Patient       [x] Medical Record        [x] Nursing        [] Family at bedside       [] Other:     -If unable to interview patient: [x] Trach/Vent/BiPAP  [] Disoriented/confused/inappropriate to interview    Diet, NPO with Tube Feed:   Diet, NPO with Tube Feed:   Tube Feeding Modality: Gastrostomy  Nepro with Carb Steady (NEPRORTH)  Total Volume for 24 Hours (mL): 960  Bolus  Total Volume of Bolus (mL):  240  Total # of Feeds: 4  Tube Feed Frequency: Every 6 hours   Tube Feed Start Time: 12:00  Bolus Feed Rate (mL per Hour): 240   Bolus Feed Duration (in Hours): 1  Supplement Feeding Modality:  Gastrostomy  Probiotic Yogurt/Smoothie Cans or Servings Per Day:  1       Frequency:  Two Times a day (24 @ 20:18) [Active]      EN order providing at 100% provision: 1728kcal (34kcal/kg) and 78g protein (1.58g/kg) based on dosing wt 50kg.     - Is current order appropriate/adequate? see below for recommendations.     Nutrition-related concerns:  -Enteral feeds changed to bolus feeds 3/22 from continuous for episodes of hypoglycemia per notes.   -S/P L hemicrani for evacuation of large ICH, bone discarded (3-01)  -S/p Left cranioplasty ()  -S/P trach and PEG (3-08)  -Renal: S/p hx of renal transplant (); ordered for Tacrolimus.  -Hgb A1c 5.7%. insulin regimen as below to maintain glycemic control.   -ALLERGY: Shellfish noted and confirmed by family.   -Free water 150ml every 8 hours per orders.   -Phosphorus and Potassium WDL today. To note, persistently elevated K/Phos levels during LOS, now downtrending.      GI: Last BM 4/15.   Bowel Regimen? [] Yes   [x] No    Weights:   Dosing weight 50kg ()  Daily Weight in k.5 (), Weight in k.2 (), Weight in k.1 ()    MEDICATIONS  (STANDING):  amLODIPine   Tablet 10 milliGRAM(s) Oral daily  artificial  tears Solution 1 Drop(s) Both EYES every 4 hours  Biotene Dry Mouth Oral Rinse 5 milliLiter(s) Swish and Spit every 6 hours  brivaracetam Oral Solution 100 milliGRAM(s) Oral <User Schedule>  carvedilol 25 milliGRAM(s) Oral every 12 hours  cloNIDine 0.1 milliGRAM(s) Oral every 8 hours  dextrose 5%. 1000 milliLiter(s) (50 mL/Hr) IV Continuous <Continuous>  dextrose 5%. 1000 milliLiter(s) (100 mL/Hr) IV Continuous <Continuous>  dextrose 50% Injectable 25 Gram(s) IV Push once  glucagon  Injectable 1 milliGRAM(s) IntraMuscular once  heparin   Injectable 5000 Unit(s) SubCutaneous every 8 hours  insulin glargine Injectable (LANTUS) 30 Unit(s) SubCutaneous at bedtime  insulin lispro (ADMELOG) corrective regimen sliding scale   SubCutaneous every 6 hours  lacosamide Solution 200 milliGRAM(s) Oral <User Schedule>  lactated ringers. 500 milliLiter(s) (100 mL/Hr) IV Continuous <Continuous>  multivitamin 1 Tablet(s) Oral daily  nystatin    Suspension 108854 Unit(s) Swish and Swallow every 6 hours  pantoprazole   Suspension 40 milliGRAM(s) Oral two times a day  predniSONE   Tablet 5 milliGRAM(s) Oral daily  tacrolimus    0.5 mG/mL Suspension 3 milliGRAM(s) Oral every 12 hours  trimethoprim   80 mG/sulfamethoxazole 400 mG 1 Tablet(s) Oral daily      Pertinent Labs: 04-15 @ 07:06: Na 139, BUN 57<H>, Cr 1.16, <H>, K+ 4.5, Phos 2.9, Mg 2.0, Alk Phos 89, ALT/SGPT 19, AST/SGOT 26, HbA1c --    A1C with Estimated Average Glucose Result: 5.7 % (24 @ 11:57)    Finger Sticks:  POCT Blood Glucose.: 158 mg/dL (04-15-24 @ 05:44)  POCT Blood Glucose.: 95 mg/dL (24 @ 23:51)  POCT Blood Glucose.: 152 mg/dL (24 @ 17:36)  POCT Blood Glucose.: 104 mg/dL (24 @ 11:41)    Skin per nursing documentation: crani site, sacrum suspected deep tissue injury   Edema per nursing documentation: trace generalized per flow sheets    (Based on dosing wt 50kg):   Estimated Energy Needs: (30-35kcal/kg): 1500-1750kcal  Estimated Protein Needs: (1.4-1.8g protein/kg): 70-90g protein  Estimated Fluid Needs: defer to team    Previous Nutrition Diagnosis:  Increased Nutrient Needs  Nutrition Diagnosis is: [x] ongoing  [] resolved [] not applicable     Nutrition Care Plan:  [x] In Progress  [] Achieved  [] Not applicable    New Nutrition Diagnosis: [x] Not applicable    Nutrition Interventions:     Education Provided   [] Yes:  [x] No:     Recommendations:      1. Can continue bolus per team. Nepro bolus feeds of 240ml q6H. Provides (based on dosing wt 50kg): 960ml volume, 1728kcal (34kcal/kg), 78g protein(1.6g/kg protein), 698ml free water.   -->Current regimen meeting nutritional needs. Changed from Glucerna 1.5 to Nepro for persistently elevated K levels. History of renal transplant. Phosphorus and Potassium WDL, if remains low, consider switching EN formula back to Glucerna 1.5 while monitoring other electrolytes.   -Free water flushes per team.   -Continue Probiotic Yogurt per team.   -Continue Multivitamin to aid in wound healing if no contraindications   2. Monitor GI tolerance. RD to remain available to adjust EN formulary, volume/rate PRN.     Monitoring and Evaluation:   Continue to monitor nutritional intake, tolerance to diet prescription, weights, labs, skin integrity    RD remains available upon request and will follow up per protocol  Audrey Vasquez RDN CDN (TEAMS) Nutrition Follow Up Note  Patient seen for: follow up    Source: [] Patient       [x] Medical Record        [x] Nursing        [] Family at bedside       [] Other:     -If unable to interview patient: [x] Trach/Vent/BiPAP  [] Disoriented/confused/inappropriate to interview    Diet, NPO with Tube Feed:   Diet, NPO with Tube Feed:   Tube Feeding Modality: Gastrostomy  Nepro with Carb Steady (NEPRORTH)  Total Volume for 24 Hours (mL): 960  Bolus  Total Volume of Bolus (mL):  240  Total # of Feeds: 4  Tube Feed Frequency: Every 6 hours   Tube Feed Start Time: 12:00  Bolus Feed Rate (mL per Hour): 240   Bolus Feed Duration (in Hours): 1  Supplement Feeding Modality:  Gastrostomy  Probiotic Yogurt/Smoothie Cans or Servings Per Day:  1       Frequency:  Two Times a day (24 @ 20:18) [Active]      EN order providing at 100% provision: 1728kcal (34kcal/kg) and 78g protein (1.58g/kg) based on dosing wt 50kg.     - Is current order appropriate/adequate? see below for recommendations.     Nutrition-related concerns:  -Enteral feeds changed to bolus feeds 3/22 from continuous for episodes of hypoglycemia per notes. Tolerated thus far as per RN.   -S/P L hemicrani for evacuation of large ICH, bone discarded (3-01)  -S/p Left cranioplasty ()  -S/P trach and PEG (3-08)  -Renal: S/p hx of renal transplant (); ordered for Tacrolimus.  -Hgb A1c 5.7%. insulin regimen as below to maintain glycemic control.   -ALLERGY: Shellfish noted and confirmed by family.   -Free water 150ml every 8 hours per orders.   -Phosphorus and Potassium WDL today. To note, persistently elevated K/Phos levels during LOS, now downtrending.      GI: Last BM 4/15.   Bowel Regimen? [] Yes   [x] No    Weights:   Dosing weight 50kg ()  Daily Weight in k.5 (), Weight in k.2 (), Weight in k.1 ()    MEDICATIONS  (STANDING):  amLODIPine   Tablet 10 milliGRAM(s) Oral daily  artificial  tears Solution 1 Drop(s) Both EYES every 4 hours  Biotene Dry Mouth Oral Rinse 5 milliLiter(s) Swish and Spit every 6 hours  brivaracetam Oral Solution 100 milliGRAM(s) Oral <User Schedule>  carvedilol 25 milliGRAM(s) Oral every 12 hours  cloNIDine 0.1 milliGRAM(s) Oral every 8 hours  dextrose 5%. 1000 milliLiter(s) (50 mL/Hr) IV Continuous <Continuous>  dextrose 5%. 1000 milliLiter(s) (100 mL/Hr) IV Continuous <Continuous>  dextrose 50% Injectable 25 Gram(s) IV Push once  glucagon  Injectable 1 milliGRAM(s) IntraMuscular once  heparin   Injectable 5000 Unit(s) SubCutaneous every 8 hours  insulin glargine Injectable (LANTUS) 30 Unit(s) SubCutaneous at bedtime  insulin lispro (ADMELOG) corrective regimen sliding scale   SubCutaneous every 6 hours  lacosamide Solution 200 milliGRAM(s) Oral <User Schedule>  lactated ringers. 500 milliLiter(s) (100 mL/Hr) IV Continuous <Continuous>  multivitamin 1 Tablet(s) Oral daily  nystatin    Suspension 597913 Unit(s) Swish and Swallow every 6 hours  pantoprazole   Suspension 40 milliGRAM(s) Oral two times a day  predniSONE   Tablet 5 milliGRAM(s) Oral daily  tacrolimus    0.5 mG/mL Suspension 3 milliGRAM(s) Oral every 12 hours  trimethoprim   80 mG/sulfamethoxazole 400 mG 1 Tablet(s) Oral daily      Pertinent Labs: 04-15 @ 07:06: Na 139, BUN 57<H>, Cr 1.16, <H>, K+ 4.5, Phos 2.9, Mg 2.0, Alk Phos 89, ALT/SGPT 19, AST/SGOT 26, HbA1c --    A1C with Estimated Average Glucose Result: 5.7 % (24 @ 11:57)    Finger Sticks:  POCT Blood Glucose.: 158 mg/dL (04-15-24 @ 05:44)  POCT Blood Glucose.: 95 mg/dL (24 @ 23:51)  POCT Blood Glucose.: 152 mg/dL (24 @ 17:36)  POCT Blood Glucose.: 104 mg/dL (24 @ 11:41)    Skin per nursing documentation: crani site, sacrum suspected deep tissue injury   Edema per nursing documentation: trace generalized per flow sheets    (Based on dosing wt 50kg):   Estimated Energy Needs: (30-35kcal/kg): 1500-1750kcal  Estimated Protein Needs: (1.4-1.8g protein/kg): 70-90g protein  Estimated Fluid Needs: defer to team    Previous Nutrition Diagnosis:  Increased Nutrient Needs  Nutrition Diagnosis is: [x] ongoing  [] resolved [] not applicable     Nutrition Care Plan:  [x] In Progress  [] Achieved  [] Not applicable    New Nutrition Diagnosis: [x] Not applicable    Nutrition Interventions:     Education Provided   [] Yes:  [x] No:     Recommendations:      1. Can continue bolus per team. Nepro bolus feeds of 240ml q6H. Provides (based on dosing wt 50kg): 960ml volume, 1728kcal (34kcal/kg), 78g protein(1.6g/kg protein), 698ml free water.   -->Current regimen meeting nutritional needs. Changed from Glucerna 1.5 to Nepro for persistently elevated K levels. History of renal transplant. Phosphorus and Potassium WDL, if remains low, consider switching EN formula back to Glucerna 1.5 while monitoring other electrolytes.   -Free water flushes per team.   -Continue Probiotic Yogurt per team.   -Continue Multivitamin to aid in wound healing if no contraindications   2. Monitor GI tolerance. RD to remain available to adjust EN formulary, volume/rate PRN.     Monitoring and Evaluation:   Continue to monitor nutritional intake, tolerance to diet prescription, weights, labs, skin integrity    RD remains available upon request and will follow up per protocol  Audrey Vasquez RDN CDN (TEAMS)

## 2024-04-15 NOTE — PROGRESS NOTE ADULT - PROBLEM SELECTOR PROBLEM 5
Hypertension

## 2024-04-17 PROBLEM — E78.5 HYPERLIPIDEMIA: Status: ACTIVE | Noted: 2024-01-05

## 2024-04-17 PROBLEM — E04.9 EUTHYROID GOITER: Status: ACTIVE | Noted: 2021-03-18

## 2024-04-17 NOTE — ASSESSMENT
[Diabetes Foot Care] : diabetes foot care [Long Term Vascular Complications] : long term vascular complications of diabetes [Carbohydrate Consistent Diet] : carbohydrate consistent diet [Importance of Diet and Exercise] : importance of diet and exercise to improve glycemic control, achieve weight loss and improve cardiovascular health [Hypoglycemia Management] : hypoglycemia management [Action and use of Insulin] : action and use of short and long-acting insulin [Self Monitoring of Blood Glucose] : self monitoring of blood glucose [Injection Technique, Storage, Sharps Disposal] : injection technique, storage, and sharps disposal [Retinopathy Screening] : Patient was referred to ophthalmology for retinopathy screening [Diabetic Medications] : Risks and benefits of diabetic medications were discussed [FreeTextEntry1] : 1. 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 h/o DDRT in 2019, following regularly with nephrology and transplant center. Immunosuppressive therapy including prednisone 5mg daily. Recent HgbA1c is 6.5%, at goal.  Patient advised no need for tight control due to advanced age and risk of hypoglycemia.  Patient is well aware of signs and symptoms of hypoglycemia and how to manage such episodes. She receives assistance from daughters for injections.  Continue Lantus 15U qhs for now. Increase premeal 8U tid ac. Following with nephrology for CKD. Not on statin, on Vascepa for HLD. LDL is 109mgdl from 09/2023 Will meet with CDE for sensor training No foot ulcers noted today. She saw ophthalmology last month. Stable findings, no h/o retinopathy. H/o cataract surgery in 2019.  2. h/o thyroid nodule h/o FNA in 2017, patient reportedly noted benign pathology, no records to review today. Biochemically and clinically euthyroid, not on any thyroid medication. Reviewed thyroid sonogram from March 2023: noted stable 4.7cm left lower pole nodule. Reminded pt to repeat thyroid sonogram in 1 year, following w/ head and neck surgery for this as well.  Answered all questions today; patient and her daughter verbalized understanding of the above RTO in 3 months.  Refills sent to [facility]

## 2024-04-17 NOTE — PHYSICAL EXAM
[Alert] : alert [No Acute Distress] : no acute distress [Well Developed] : well developed [Normal Sclera/Conjunctiva] : normal sclera/conjunctiva [EOMI] : extra ocular movement intact [No Proptosis] : no proptosis [No Lid Lag] : no lid lag [Normal Hearing] : hearing was normal [No LAD] : no lymphadenopathy [Supple] : the neck was supple [Thyroid Not Enlarged] : the thyroid was not enlarged [No Respiratory Distress] : no respiratory distress [No Accessory Muscle Use] : no accessory muscle use [Normal Rate and Effort] : normal respiratory rate and effort [Clear to Auscultation] : lungs were clear to auscultation bilaterally [Normal S1, S2] : normal S1 and S2 [No Murmurs] : no murmurs [Normal Rate] : heart rate was normal [Regular Rhythm] : with a regular rhythm [No Edema] : no peripheral edema [Normal Bowel Sounds] : normal bowel sounds [Not Tender] : non-tender [Not Distended] : not distended [Soft] : abdomen soft [No Stigmata of Cushings Syndrome] : no stigmata of Cushings Syndrome [Normal Gait] : normal gait [No Clubbing, Cyanosis] : no clubbing  or cyanosis of the fingernails [No Rash] : no rash [No Tremors] : no tremors [Normal Sensation on Monofilament Testing] : normal sensation on monofilament testing of lower extremities [Oriented x3] : oriented to person, place, and time [Normal Insight/Judgement] : insight and judgment were intact [Abdominal Striae] : no abdominal striae [Acanthosis Nigricans] : no acanthosis nigricans [Hirsutism] : no hirsutism [de-identified] : palpable left thyroid nodule [de-identified] : left knee amputation+

## 2024-05-02 ENCOUNTER — NON-APPOINTMENT (OUTPATIENT)
Age: 79
End: 2024-05-02

## 2024-05-06 ENCOUNTER — APPOINTMENT (OUTPATIENT)
Dept: NEPHROLOGY | Facility: CLINIC | Age: 79
End: 2024-05-06

## 2024-05-06 ENCOUNTER — NON-APPOINTMENT (OUTPATIENT)
Age: 79
End: 2024-05-06

## 2024-05-08 ENCOUNTER — APPOINTMENT (OUTPATIENT)
Dept: NEUROSURGERY | Facility: CLINIC | Age: 79
End: 2024-05-08

## 2024-05-08 ENCOUNTER — NON-APPOINTMENT (OUTPATIENT)
Age: 79
End: 2024-05-08

## 2024-05-08 NOTE — HISTORY OF PRESENT ILLNESS
[FreeTextEntry1] : Hospital Course:  Discharge Date 15-Apr-2024  Admission Date 01-Mar-2024 19:59  Reason for Admission ICH  Hospital Course   77 yo F with PMHx ESRD s/p renal transplant (2019, now off HD), left AKA, BK  viremia, HTN, breast ca s/p lumpectomy (completed Tamoxifen 03/2023), DVT (off  Eliquis), HLD, gout presenting 3/1 with left ICH (ICH score 4) likely 2/2  amyloid angiopathy s/p left craniectomy(discarded) and evacuation as well as  EVD placement and removal (3/7). Hospital course c/b extensive LE DVTs, S/p  IVCF on 3/3. She is s/p trach/peg 3/8/24.  Febrile 3/10 with + UA, blood/sputum  culture NGTD.  Treatment for UTI initiated with ceftriaxone, eventually  broadened to cefepime. Transferred to [facility] 3/11 for continued management. Urine  culture resulted positive for klebsiella, treated for 7 days with Meropenem  3/12 --> 3/19. Patient weaned from MV, tolerating TC ATC since 3/23. Placed  back to vent post operatively S/p Cranioplasty on 4/2. Now tolerating  intermittent PSV trials, continue to wean to TC as tolerated. Patient will need  to follow up with Neuro Sx for cranial staple removal in 2 weeks from  discharge.    Med Reconciliation:  Override IMPROVE-DD recommendations due to: IMPROVE-DD Application Not  Available  Recommended Post-Discharge VTE Prophylaxis IMPROVE-DD Application Not Available  Medication Reconciliation Status Admission Reconciliation is Completed  Discharge Reconciliation is Completed  Discharge Medications acetaminophen 325 mg oral tablet: 2 tab(s) by gastrostomy  tube every 6 hours as needed for Mild Pain (1 - 3)  amLODIPine 10 mg oral tablet: 1 tab(s) by gastrostomy tube once a day  brivaracetam 10 mg/mL oral liquid: 100 milligram(s) by gastrostomy tube every 8  hours 0001, 0800, 1800  carvedilol 25 mg oral tablet: 1 tab(s) by gastrostomy tube every 12 hours  cloNIDine 0.1 mg oral tablet: 1 tab(s) by gastrostomy tube every 8 hours  heparin: 5,000 unit(s) subcutaneous every 8 hours  insulin glargine 100 units/mL subcutaneous solution: 30 unit(s) subcutaneous  once a day (at bedtime)  insulin lispro 100 units/mL injectable solution: 1 unit(s) injectable every 6  hours 2 Unit(s) if Glucose 151 - 200  4 Unit(s) if Glucose 201 - 250  6 Unit(s) if Glucose 251 - 300  8 Unit(s) if Glucose 301 - 350  10 Unit(s) if Glucose 351 - 400  12 Unit(s) if Glucose Greater Than 400  ipratropium-albuterol 0.5 mg-2.5 mg/3 mL inhalation solution: 3 milliliter(s)  inhaled every 6 hours As needed Shortness of Breath and/or Wheezing  lacosamide 10 mg/mL oral solution: 200 milligram(s) by gastrostomy tube every  12 hours 0001, 1200  Multiple Vitamins oral tablet: 1 tab(s) by gastrostomy tube once a day  nystatin 100,000 units/mL oral suspension: 5 milliliter(s) orally every 6 hours  ocular lubricant ophthalmic solution: 1 drop(s) to each affected eye every 4  hours  pantoprazole 40 mg oral granule, delayed release: 40 milligram(s) by  gastrostomy tube 2 times a day  predniSONE 5 mg oral tablet: 1 tab(s) by gastrostomy tube once a day  saliva substitutes oral solution: 5 milliliter(s) orally every 6 hours  Standard helmet: use as needed  sulfamethoxazole-trimethoprim 400 mg-80 mg oral tablet: 1 tab(s) by gastrostomy  tube once a day  tacrolimus: 0.5 milligram(s) by gastrostomy tube 2 times a day    ,  ,  Care Plan/Procedures:  Discharge Diagnoses, Assessment and Plan of Treatment PRINCIPAL DISCHARGE  DIAGNOSIS  Diagnosis: ICH (intracerebral hemorrhage)  Assessment and Plan of Treatment: - Found to have L IPH on CTH likely 2/2  amyloid angiopathy  - S/p L hemicrani for evacuation of large ICH; bone discarded  - CT H Stereo 3/12: S/p Left Frontal Craniectomy  - CT Head 3/30: Improved lft frontal hemorrhage and extradural fluid  collections  - S/p Cranioplasty on 4/2; Post op head CT Stable  - Subgaleal YON Drain removed 4/6  - Will need to follow up with  as outpatient for cranial staple  removal 2 weeks after discharge  Pt was brought by stretcher for the appointment , but as per the rule pt was not able to be seen on a stretcher in this facility. She is now rescheduled for 5/8/24 at [facility] for the POA. Will folow up.

## 2024-05-13 ENCOUNTER — APPOINTMENT (OUTPATIENT)
Dept: MAMMOGRAPHY | Facility: CLINIC | Age: 79
End: 2024-05-13

## 2024-05-13 ENCOUNTER — APPOINTMENT (OUTPATIENT)
Dept: ULTRASOUND IMAGING | Facility: CLINIC | Age: 79
End: 2024-05-13

## 2024-05-22 ENCOUNTER — APPOINTMENT (OUTPATIENT)
Dept: NEUROSURGERY | Facility: CLINIC | Age: 79
End: 2024-05-22

## 2024-05-22 DIAGNOSIS — Z98.890 OTHER SPECIFIED POSTPROCEDURAL STATES: ICD-10-CM

## 2024-05-23 NOTE — REASON FOR VISIT
[de-identified] : s/p cranioplasty [de-identified] : 4/2/24 [de-identified] : 36 [de-identified] : 5

## 2024-05-23 NOTE — HISTORY OF PRESENT ILLNESS
[FreeTextEntry1] : 77 yo F with PMHx ESRD s/p renal transplant (2019, now off HD), left AKA, BK viremia, HTN, breast ca s/p lumpectomy (completed Tamoxifen 03/2023), DVT (off Eliquis), HLD, gout presented on 3/1 with left ICH (ICH score 4) likely 2/2 amyloid angiopathy s/p left craniectomy(discarded) and evacuation as well as EVD placement and removal (3/7). Hospital course c/b extensive LE DVTs, S/p IVCF on 3/3. She is s/p trach/peg 3/8/24. Febrile 3/10 with + UA, blood/sputum culture NGTD. Treatment for UTI initiated with ceftriaxone, eventually broadened to cefepime. Transferred to [facility] 3/11 for continued management. Urine culture resulted positive for klebsiella, treated for 7 days with Meropenem 3/12 --> 3/19. Patient weaned from MV, tolerating TC ATC since 3/23. Placed back to vent post operatively S/p Cranioplasty on 4/2.     Med Reconciliation:  Override IMPROVE-DD recommendations due to: IMPROVE-DD Application Not  Available  Recommended Post-Discharge VTE Prophylaxis IMPROVE-DD Application Not Available  Medication Reconciliation Status Admission Reconciliation is Completed  Discharge Reconciliation is Completed  Discharge Medications acetaminophen 325 mg oral tablet: 2 tab(s) by gastrostomy  tube every 6 hours as needed for Mild Pain (1 - 3)  amLODIPine 10 mg oral tablet: 1 tab(s) by gastrostomy tube once a day  brivaracetam 10 mg/mL oral liquid: 100 milligram(s) by gastrostomy tube every 8  hours 0001, 0800, 1800  carvedilol 25 mg oral tablet: 1 tab(s) by gastrostomy tube every 12 hours  cloNIDine 0.1 mg oral tablet: 1 tab(s) by gastrostomy tube every 8 hours  heparin: 5,000 unit(s) subcutaneous every 8 hours  insulin glargine 100 units/mL subcutaneous solution: 30 unit(s) subcutaneous  once a day (at bedtime)  insulin lispro 100 units/mL injectable solution: 1 unit(s) injectable every 6  hours 2 Unit(s) if Glucose 151 - 200  4 Unit(s) if Glucose 201 - 250  6 Unit(s) if Glucose 251 - 300  8 Unit(s) if Glucose 301 - 350  10 Unit(s) if Glucose 351 - 400  12 Unit(s) if Glucose Greater Than 400  ipratropium-albuterol 0.5 mg-2.5 mg/3 mL inhalation solution: 3 milliliter(s)  inhaled every 6 hours As needed Shortness of Breath and/or Wheezing  lacosamide 10 mg/mL oral solution: 200 milligram(s) by gastrostomy tube every  12 hours 0001, 1200  Multiple Vitamins oral tablet: 1 tab(s) by gastrostomy tube once a day  nystatin 100,000 units/mL oral suspension: 5 milliliter(s) orally every 6 hours  ocular lubricant ophthalmic solution: 1 drop(s) to each affected eye every 4  hours  pantoprazole 40 mg oral granule, delayed release: 40 milligram(s) by  gastrostomy tube 2 times a day  predniSONE 5 mg oral tablet: 1 tab(s) by gastrostomy tube once a day  saliva substitutes oral solution: 5 milliliter(s) orally every 6 hours  Standard helmet: use as needed  sulfamethoxazole-trimethoprim 400 mg-80 mg oral tablet: 1 tab(s) by gastrostomy  tube once a day  tacrolimus: 0.5 milligram(s) by gastrostomy tube 2 times a day

## 2024-05-24 ENCOUNTER — APPOINTMENT (OUTPATIENT)
Dept: ENDOCRINOLOGY | Facility: CLINIC | Age: 79
End: 2024-05-24

## 2024-06-05 ENCOUNTER — NON-APPOINTMENT (OUTPATIENT)
Age: 79
End: 2024-06-05

## 2024-06-05 ENCOUNTER — APPOINTMENT (OUTPATIENT)
Dept: NEUROSURGERY | Facility: CLINIC | Age: 79
End: 2024-06-05

## 2024-06-26 ENCOUNTER — APPOINTMENT (OUTPATIENT)
Dept: CT IMAGING | Facility: IMAGING CENTER | Age: 79
End: 2024-06-26
Payer: MEDICARE

## 2024-06-26 ENCOUNTER — APPOINTMENT (OUTPATIENT)
Dept: NEUROSURGERY | Facility: CLINIC | Age: 79
End: 2024-06-26
Payer: MEDICARE

## 2024-06-26 ENCOUNTER — OUTPATIENT (OUTPATIENT)
Dept: OUTPATIENT SERVICES | Facility: HOSPITAL | Age: 79
LOS: 1 days | End: 2024-06-26
Payer: MEDICARE

## 2024-06-26 VITALS
RESPIRATION RATE: 18 BRPM | HEART RATE: 90 BPM | SYSTOLIC BLOOD PRESSURE: 129 MMHG | OXYGEN SATURATION: 99 % | DIASTOLIC BLOOD PRESSURE: 71 MMHG

## 2024-06-26 DIAGNOSIS — Z90.710 ACQUIRED ABSENCE OF BOTH CERVIX AND UTERUS: Chronic | ICD-10-CM

## 2024-06-26 DIAGNOSIS — E27.9 DISORDER OF ADRENAL GLAND, UNSPECIFIED: Chronic | ICD-10-CM

## 2024-06-26 DIAGNOSIS — Z98.890 OTHER SPECIFIED POSTPROCEDURAL STATES: ICD-10-CM

## 2024-06-26 PROCEDURE — 70450 CT HEAD/BRAIN W/O DYE: CPT | Mod: 26,MH

## 2024-06-26 PROCEDURE — 99024 POSTOP FOLLOW-UP VISIT: CPT

## 2024-06-26 PROCEDURE — 70450 CT HEAD/BRAIN W/O DYE: CPT

## 2024-07-11 NOTE — PROGRESS NOTE ADULT - PROBLEM SELECTOR PLAN 4
- + UA on 3/10  - Urine Cx 3/10: ESBL Klebsiella and VRE 10-49 K   - Treating the ESBL klebsiella with Meropenem 1GM Q12H 3/12 --> 3/19 no

## 2024-07-23 NOTE — PROGRESS NOTE ADULT - SUBJECTIVE AND OBJECTIVE BOX
"Chief Complaint   Patient presents with    Tingling     Pt states that her entire body is tingling and she has a slight headache and has little to no energy.  She said that it started today.     Headache    Blood Sugar Problem     Wants her blood sugar to be checked.     Med Refill     Needs a refill for Tizanidine.         Subjective   Della Gonzalez is a 64 y.o. female who presents today for the following: hyperglycemia and not feeling well.     HPI   Patient has not been taking steglatro because she cannot afford it. She is still taking tradjenta and actos. She is hypotensive today. She states she has lost 4 pounds and feels \"tingly all over.\" She states she just has no energy.     No Known Allergies      OBJECTIVE:  Vitals:    07/23/24 1227   BP: 92/64   Pulse: 66   Temp: 98.2 °F (36.8 °C)   TempSrc: Infrared   SpO2: 97%   Weight: 80.8 kg (178 lb 3.2 oz)   Height: 152.4 cm (60\")     Physical Exam  Vitals and nursing note reviewed.   Constitutional:       Appearance: Normal appearance. She is obese.   Cardiovascular:      Rate and Rhythm: Normal rate and regular rhythm.      Pulses: Normal pulses.      Heart sounds: Normal heart sounds.   Pulmonary:      Effort: Pulmonary effort is normal.      Breath sounds: Normal breath sounds.   Skin:     General: Skin is warm and dry.   Neurological:      General: No focal deficit present.      Mental Status: She is alert and oriented to person, place, and time.   Psychiatric:         Mood and Affect: Mood normal.         Behavior: Behavior normal.         Thought Content: Thought content normal.         Judgment: Judgment normal.                    ASSESSMENT/ PLAN:    Diagnoses and all orders for this visit:    1. Type 2 diabetes mellitus with hyperglycemia, with long-term current use of insulin (Primary)  -     empagliflozin (JARDIANCE) 25 MG tablet tablet; Take 1 tablet by mouth Daily.  Dispense: 30 tablet; Refill: 3  -     pioglitazone (Actos) 45 MG tablet; Take 1 tablet " DATE OF SERVICE: 04-05-24 @ 15:58    Patient is a 78y old  Female who presents with a chief complaint of ICH (05 Apr 2024 11:03)      INTERVAL HISTORY: In no acute distress.     REVIEW OF SYSTEMS: Unable to fully participate in ROS  CONSTITUTIONAL: No weakness  EYES/ENT: No visual changes;  No throat pain   NECK: No pain or stiffness  RESPIRATORY: No cough, wheezing; No shortness of breath  CARDIOVASCULAR: No chest pain or palpitations  GASTROINTESTINAL: No abdominal  pain. No nausea, vomiting, or hematemesis  GENITOURINARY: No dysuria, frequency or hematuria  NEUROLOGICAL: No stroke like symptoms  SKIN: No rashes    	  MEDICATIONS:  amLODIPine   Tablet 10 milliGRAM(s) Oral daily  carvedilol 25 milliGRAM(s) Oral every 12 hours        PHYSICAL EXAM:  T(C): 36.7 (04-05-24 @ 09:00), Max: 37.1 (04-05-24 @ 06:11)  HR: 80 (04-05-24 @ 15:36) (80 - 92)  BP: 158/79 (04-05-24 @ 09:00) (112/60 - 158/79)  RR: 18 (04-05-24 @ 09:00) (14 - 20)  SpO2: 98% (04-05-24 @ 15:36) (98% - 100%)  Wt(kg): --  I&O's Summary    04 Apr 2024 07:01  -  05 Apr 2024 07:00  --------------------------------------------------------  IN: 1120 mL / OUT: 1125 mL / NET: -5 mL    05 Apr 2024 07:01  -  05 Apr 2024 15:58  --------------------------------------------------------  IN: 300 mL / OUT: 500 mL / NET: -200 mL          Appearance: In no distress	  HEENT:    PERRL, EOMI	  Cardiovascular:  S1 S2, No JVD  Respiratory: Lungs clear to auscultation	  Gastrointestinal:  Soft, Non-tender, + BS	  Vascularature:  L AKA  Psychiatric: Appropriate affect   Neuro: no acute focal deficits                               8.6    6.48  )-----------( 101      ( 05 Apr 2024 06:36 )             26.7     04-05    137  |  102  |  78<H>  ----------------------------<  187<H>  4.7   |  23  |  1.64<H>    Ca    10.2      05 Apr 2024 06:36  Phos  2.6     04-05  Mg     2.3     04-05    TPro  6.2  /  Alb  3.2<L>  /  TBili  0.1<L>  /  DBili  x   /  AST  14  /  ALT  <5<L>  /  AlkPhos  79  04-05        Labs personally reviewed      ASSESSMENT/PLAN: 	      78F Hx ESRD s/p renal xplant c/b DGF off HD, L AKA, 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    1. Abnormal Echo --  Septal bulge noted measures 2.2cm without obstruction.   -- Given no intracavitary obstruction no intervention at this time  - No Myxoma seen on this echo or echo in 2019, ? if hypermobile interatrial septum was confused for on prior imaging     2. HTN - Was soft now with Clonidine DC'd  Continue Coreg 25 mg BID, amlodipine 10mg    3. 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 DVT PPX - Lovenox on hold given increased output in craniotomy drain      Yodit Umaña, MARGARITA-NP   Celio Mcwilliams DO [facility]  Cardiovascular Medicine  [address], [address]  Available through call or text on [employer]  Office: [phone]   by mouth Daily.  Dispense: 30 tablet; Refill: 3  -     linagliptin (Tradjenta) 5 MG tablet tablet; Take 1 tablet by mouth Daily.  Dispense: 90 tablet; Refill: 1    2. Muscle spasms of both lower extremities  -     tiZANidine (ZANAFLEX) 4 MG tablet; Take 1 tablet by mouth Every 8 (Eight) Hours As Needed for Muscle Spasms.  Dispense: 60 tablet; Refill: 2    3. Hypotension, unspecified hypotension type    4. Tingling of skin  -     POCT Glucose      Procedures     Management Plan:   Drink plenty of fluids today.  Check with pharmacy to see if jardiance is affordable. She is to let us know if she cannot afford it.   An After Visit Summary was printed and given to the patient at discharge.    Follow-up: Return in about 2 weeks (around 8/6/2024) for Recheck.         Zhanna Cabral, MELECIO 7/23/2024 13:53 CDT  This note was electronically signed.

## 2024-07-24 ENCOUNTER — APPOINTMENT (OUTPATIENT)
Dept: NEUROSURGERY | Facility: CLINIC | Age: 79
End: 2024-07-24
Payer: MEDICARE

## 2024-07-24 ENCOUNTER — APPOINTMENT (OUTPATIENT)
Dept: CT IMAGING | Facility: IMAGING CENTER | Age: 79
End: 2024-07-24
Payer: MEDICARE

## 2024-07-24 VITALS — SYSTOLIC BLOOD PRESSURE: 137 MMHG | DIASTOLIC BLOOD PRESSURE: 72 MMHG | HEART RATE: 81 BPM | RESPIRATION RATE: 13 BRPM

## 2024-07-24 DIAGNOSIS — G93.89 OTHER SPECIFIED DISORDERS OF BRAIN: ICD-10-CM

## 2024-07-24 PROCEDURE — 70450 CT HEAD/BRAIN W/O DYE: CPT | Mod: 26,MH

## 2024-07-24 PROCEDURE — 99213 OFFICE O/P EST LOW 20 MIN: CPT

## 2024-07-29 NOTE — ASSESSMENT
[FreeTextEntry1] : IMPRESSION:  77 yo F with PMHx ESRD s/p renal transplant (2019, now off HD), left AKA, BK viremia, HTN, breast ca s/p lumpectomy (completed Tamoxifen 03/2023), DVT (off Eliquis), HLD, gout presenting 3/1 with left ICH (ICH score 4) due to cerebral amyloid angiopathy s/p left craniectomy(discarded) and evacuation as well as EVD placement and removal (3/7). Hospital course c/b extensive LE DVTs, S/p IVCF on 3/3. She is s/p trach/peg 3/8/24, S/p Cranioplasty on 4/2/24.  Pt had a follow up 6/26/24 with stable finding. Pt on Keppra 1000 mg BID. Pt now recovering in rehab ([facility]) for vent management and PT/OT. neurologically at baseline, Pt continued to be right hemiplegic and follow some commands at times. CT head today with stable finding for mild ventriculomegaly, imaging reviewed by Dr. Torres.   PLAN: CT head no contrast in 2 months to follow up ventricular size, daughter made aware Continue PT/OT in rehab F/U on 9/25/24 at 10:30 am at [facility] as pt is on stretcher.

## 2024-07-29 NOTE — REASON FOR VISIT
[Follow-Up: _____] : a [unfilled] follow-up visit [Family Member] : family member [FreeTextEntry1] : S/P cranioplasty

## 2024-07-29 NOTE — REVIEW OF SYSTEMS
[Negative] : Cardiovascular [de-identified] : nonverbal, following some commands [FreeTextEntry3] : open on verbal [FreeTextEntry6] : on vent via trach [FreeTextEntry9] : right hemiplegia, left AKA

## 2024-07-29 NOTE — REVIEW OF SYSTEMS
[Negative] : Cardiovascular [de-identified] : nonverbal, following some commands [FreeTextEntry3] : open on verbal [FreeTextEntry6] : on vent via trach [FreeTextEntry9] : right hemiplegia, left AKA

## 2024-07-29 NOTE — REVIEW OF SYSTEMS
[Negative] : Cardiovascular [de-identified] : nonverbal, following some commands [FreeTextEntry3] : open on verbal [FreeTextEntry6] : on vent via trach [FreeTextEntry9] : right hemiplegia, left AKA

## 2024-07-29 NOTE — ASSESSMENT
[FreeTextEntry1] : IMPRESSION:  79 yo F with PMHx ESRD s/p renal transplant (2019, now off HD), left AKA, BK viremia, HTN, breast ca s/p lumpectomy (completed Tamoxifen 03/2023), DVT (off Eliquis), HLD, gout presenting 3/1 with left ICH (ICH score 4) due to cerebral amyloid angiopathy s/p left craniectomy(discarded) and evacuation as well as EVD placement and removal (3/7). Hospital course c/b extensive LE DVTs, S/p IVCF on 3/3. She is s/p trach/peg 3/8/24, S/p Cranioplasty on 4/2/24.  Pt had a follow up 6/26/24 with stable finding. Pt on Keppra 1000 mg BID. [facility] for vent management and PT/OT. neurologically at baseline, Pt continued to be right hemiplegic and follow some commands at times. CT head today with stable finding for mild ventriculomegaly, imaging reviewed by Dr. Torres.   PLAN: CT head no contrast in 2 months to follow up ventricular size, daughter made aware Continue PT/OT in rehab F/U on 9/25/24 at 10:30 am at [facility] as pt is on stretcher.

## 2024-07-29 NOTE — ASSESSMENT
[FreeTextEntry1] : IMPRESSION:  79 yo F with PMHx ESRD s/p renal transplant (2019, now off HD), left AKA, BK viremia, HTN, breast ca s/p lumpectomy (completed Tamoxifen 03/2023), DVT (off Eliquis), HLD, gout presenting 3/1 with left ICH (ICH score 4) due to cerebral amyloid angiopathy s/p left craniectomy(discarded) and evacuation as well as EVD placement and removal (3/7). Hospital course c/b extensive LE DVTs, S/p IVCF on 3/3. She is s/p trach/peg 3/8/24, S/p Cranioplasty on 4/2/24.  Pt had a follow up 6/26/24 with stable finding. Pt on Keppra 1000 mg BID. Pt now recovering in [facility] ([facility] for vent management and PT/OT. neurologically at baseline, Pt continued to be right hemiplegic and follow some commands at times. CT head today with stable finding for mild ventriculomegaly, imaging reviewed by Dr. Torres.   PLAN: CT head no contrast in 2 months to follow up ventricular size, daughter made aware Continue PT/OT in rehab F/U on 9/25/24 at 10:30 am at [facility] as pt is on stretcher.

## 2024-07-29 NOTE — PHYSICAL EXAM
[General Appearance - In No Acute Distress] : in no acute distress [Well-Healed] : well-healed [Sclera] : the sclera and conjunctiva were normal [] : no respiratory distress [Heart Rate And Rhythm] : heart rate was normal and rhythm regular [No Spinal Tenderness] : no spinal tenderness [Involuntary Movements] : no involuntary movements were seen [General Appearance - Alert] : alert [FreeTextEntry1] : Left parietal [FreeTextEntry6] : moves left arm spontaneously  [FreeTextEntry8] : in damien

## 2024-07-29 NOTE — HISTORY OF PRESENT ILLNESS
[FreeTextEntry1] : 79 yo F with PMHx ESRD s/p renal transplant (2019, now off HD), left AKA, BK viremia, HTN, breast ca s/p lumpectomy (completed Tamoxifen 03/2023), DVT (off Eliquis), HLD, gout presenting 3/1 with left ICH (ICH score 4) likely 2/2 amyloid angiopathy s/p left craniectomy(discarded) and evacuation as well as EVD placement and removal (3/7). Path had shown amyloid angiopathy.  Hospital course c/b extensive LE DVTs, S/p IVCF on 3/3. She is s/p trach/peg 3/8/24. Febrile 3/10 with + UA, blood/sputum culture NGTD. Pt had cranioplasty on 4/2/2024.  Pt had a follow up 6/26/24 with stable finding. Pt now recovering in rehab.   Pt is at [facility] on vent management and PT/OT.  She is stable on vent. As per daughter there is CPAP trials every day, but no decision has been made for trach collar yet. Pt has a rehabilitation glove which helps her doing hand exercises with squeezing a ball. Pt on Keppra 1000 mg BID. Her surgical wound healed well. Pt continued to be right hemiplegic and follows commands at time. Pt completed CT head today for the follow up.

## 2024-07-29 NOTE — HISTORY OF PRESENT ILLNESS
[FreeTextEntry1] : 79 yo F with PMHx ESRD s/p renal transplant (2019, now off HD), left AKA, BK viremia, HTN, breast ca s/p lumpectomy (completed Tamoxifen 03/2023), DVT (off Eliquis), HLD, gout presenting 3/1 with left ICH (ICH score 4) likely 2/2 amyloid angiopathy s/p left craniectomy(discarded) and evacuation as well as EVD placement and removal (3/7). Path had shown amyloid angiopathy.  Hospital course c/b extensive LE DVTs, S/p IVCF on 3/3. She is s/p trach/peg 3/8/24. Febrile 3/10 with + UA, blood/sputum culture NGTD. Pt had cranioplasty on 4/2/2024.  Pt had a follow up 6/26/24 with stable finding. Pt now recovering in rehab.   Pt is at [facility] on vent management and PT/OT.  She is stable on vent. As per daughter there is CPAP trials every day, but no decision has been made for trach collar yet. Pt has a rehabilitation glove which helps her doing hand exercises with squeezing a ball. Pt on Keppra 1000 mg BID. Her surgical wound healed well. Pt continued to be right hemiplegic and follows commands at time. Pt completed CT head today for the follow up.

## 2024-07-29 NOTE — HISTORY OF PRESENT ILLNESS
[FreeTextEntry1] : 77 yo F with PMHx ESRD s/p renal transplant (2019, now off HD), left AKA, BK viremia, HTN, breast ca s/p lumpectomy (completed Tamoxifen 03/2023), DVT (off Eliquis), HLD, gout presenting 3/1 with left ICH (ICH score 4) likely 2/2 amyloid angiopathy s/p left craniectomy(discarded) and evacuation as well as EVD placement and removal (3/7). Path had shown amyloid angiopathy.  Hospital course c/b extensive LE DVTs, S/p IVCF on 3/3. She is s/p trach/peg 3/8/24. Febrile 3/10 with + UA, blood/sputum culture NGTD. Pt had cranioplasty on 4/2/2024.  Pt had a follow up 6/26/24 with stable finding. Pt now recovering in rehab.   Pt is at [facility] on vent management and PT/OT.  She is stable on vent. As per daughter there is CPAP trials every day, but no decision has been made for trach collar yet. Pt has a rehabilitation glove which helps her doing hand exercises with squeezing a ball. Pt on Keppra 1000 mg BID. Her surgical wound healed well. Pt continued to be right hemiplegic and follows commands at time. Pt completed CT head today for the follow up.

## 2024-08-13 NOTE — REVIEW OF SYSTEMS
Problem: Prexisting or High Potential for Compromised Skin Integrity  Goal: Skin integrity is maintained or improved  Description: INTERVENTIONS:  - Identify patients at risk for skin breakdown  - Assess and monitor skin integrity  - Assess and monitor nutrition and hydration status  - Monitor labs   - Assess for incontinence   - Turn and reposition patient  - Assist with mobility/ambulation  - Relieve pressure over bony prominences  - Avoid friction and shearing  - Provide appropriate hygiene as needed including keeping skin clean and dry  - Evaluate need for skin moisturizer/barrier cream  - Collaborate with interdisciplinary team   - Patient/family teaching  - Consider wound care consult   Outcome: Progressing     Problem: PAIN - ADULT  Goal: Verbalizes/displays adequate comfort level or baseline comfort level  Description: Interventions:  - Encourage patient to monitor pain and request assistance  - Assess pain using appropriate pain scale  - Administer analgesics based on type and severity of pain and evaluate response  - Implement non-pharmacological measures as appropriate and evaluate response  - Consider cultural and social influences on pain and pain management  - Notify physician/advanced practitioner if interventions unsuccessful or patient reports new pain  Outcome: Progressing     Problem: INFECTION - ADULT  Goal: Absence or prevention of progression during hospitalization  Description: INTERVENTIONS:  - Assess and monitor for signs and symptoms of infection  - Monitor lab/diagnostic results  - Monitor all insertion sites, i.e. indwelling lines, tubes, and drains  - Monitor endotracheal if appropriate and nasal secretions for changes in amount and color  - [place] appropriate cooling/warming therapies per order  - Administer medications as ordered  - Instruct and encourage patient and family to use good hand hygiene technique  - Identify and instruct in appropriate isolation precautions for  identified infection/condition  Outcome: Progressing  Goal: Absence of fever/infection during neutropenic period  Description: INTERVENTIONS:  - Monitor WBC    Outcome: Progressing     Problem: SAFETY ADULT  Goal: Patient will remain free of falls  Description: INTERVENTIONS:  - Educate patient/family on patient safety including physical limitations  - Instruct patient to call for assistance with activity   - Consult OT/PT to assist with strengthening/mobility   - Keep Call bell within reach  - Keep bed low and locked with side rails adjusted as appropriate  - Keep care items and personal belongings within reach  - Initiate and maintain comfort rounds  - Make Fall Risk Sign visible to staff  - Offer Toileting every 2 Hours, in advance of need  - Initiate/Maintain bed/chair alarm  - Apply yellow socks and bracelet for high fall risk patients  - Consider moving patient to room near nurses station  Outcome: Progressing  Goal: Maintain or return to baseline ADL function  Description: INTERVENTIONS:  -  Assess patient's ability to carry out ADLs; assess patient's baseline for ADL function and identify physical deficits which impact ability to perform ADLs (bathing, care of mouth/teeth, toileting, grooming, dressing, etc.)  - Assess/evaluate cause of self-care deficits   - Assess range of motion  - Assess patient's mobility; develop plan if impaired  - Assess patient's need for assistive devices and provide as appropriate  - Encourage maximum independence but intervene and supervise when necessary  - Involve family in performance of ADLs  - Assess for home care needs following discharge   - Consider OT consult to assist with ADL evaluation and planning for discharge  - Provide patient education as appropriate  Outcome: Progressing  Goal: Maintains/Returns to pre admission functional level  Description: INTERVENTIONS:  - Perform AM-PAC 6 Click Basic Mobility/ Daily Activity assessment daily.  - Set and communicate daily  mobility goal to care team and patient/family/caregiver.   - Collaborate with rehabilitation services on mobility goals if consulted  - Perform Range of Motion 6 times a day.  - Reposition patient every 2 hours.  - Out of bed to chair 2 times a day   - Out of bed for toileting  - Record patient progress and toleration of activity level   Outcome: Progressing     Problem: DISCHARGE PLANNING  Goal: Discharge to home or other facility with appropriate resources  Description: INTERVENTIONS:  - Identify barriers to discharge w/patient and caregiver  - Arrange for needed discharge resources and transportation as appropriate  - Identify discharge learning needs (meds, wound care, etc.)  - Arrange for interpretive services to assist at discharge as needed  - Refer to Case Management Department for coordinating discharge planning if the patient needs post-hospital services based on physician/advanced practitioner order or complex needs related to functional status, cognitive ability, or social support system  Outcome: Progressing     Problem: GASTROINTESTINAL - ADULT  Goal: Minimal or absence of nausea and/or vomiting  Description: INTERVENTIONS:  - Administer IV fluids if ordered to ensure adequate hydration  - Maintain NPO status until nausea and vomiting are resolved  - Nasogastric tube if ordered  - Administer ordered antiemetic medications as needed  - Provide nonpharmacologic comfort measures as appropriate  - Advance diet as tolerated, if ordered  - Consider nutrition services referral to assist patient with adequate nutrition and appropriate food choices  Outcome: Progressing  Goal: Maintains or returns to baseline bowel function  Description: INTERVENTIONS:  - Assess bowel function  - Encourage oral fluids to ensure adequate hydration  - Administer IV fluids if ordered to ensure adequate hydration  - Administer ordered medications as needed  - Encourage mobilization and activity  - Consider nutritional services  referral to assist patient with adequate nutrition and appropriate food choices  Outcome: Progressing  Goal: Maintains adequate nutritional intake  Description: INTERVENTIONS:  - Monitor percentage of each meal consumed  - Identify factors contributing to decreased intake, treat as appropriate  - Assist with meals as needed  - Monitor I&O, weight, and lab values if indicated  - Obtain nutrition services referral as needed  Outcome: Progressing  Goal: Oral mucous membranes remain intact  Description: INTERVENTIONS  - Assess oral mucosa and hygiene practices  - Implement preventative oral hygiene regimen  - Implement oral medicated treatments as ordered  - Initiate Nutrition services referral as needed  Outcome: Progressing     Problem: METABOLIC, FLUID AND ELECTROLYTES - ADULT  Goal: Electrolytes maintained within normal limits  Description: INTERVENTIONS:  - Monitor labs and assess patient for signs and symptoms of electrolyte imbalances  - Administer electrolyte replacement as ordered  - Monitor response to electrolyte replacements, including repeat lab results as appropriate  - Instruct patient on fluid and nutrition as appropriate  Outcome: Progressing  Goal: Fluid balance maintained  Description: INTERVENTIONS:  - Monitor labs   - Monitor I/O and WT  - Instruct patient on fluid and nutrition as appropriate  - Assess for signs & symptoms of volume excess or deficit  Outcome: Progressing  Goal: Glucose maintained within target range  Description: INTERVENTIONS:  - Monitor Blood Glucose as ordered  - Assess for signs and symptoms of hyperglycemia and hypoglycemia  - Administer ordered medications to maintain glucose within target range  - Assess nutritional intake and initiate nutrition service referral as needed  Outcome: Progressing     Problem: MUSCULOSKELETAL - ADULT  Goal: Maintain or return mobility to safest level of function  Description: INTERVENTIONS:  - Assess patient's ability to carry out ADLs; assess  patient's baseline for ADL function and identify physical deficits which impact ability to perform ADLs (bathing, care of mouth/teeth, toileting, grooming, dressing, etc.)  - Assess/evaluate cause of self-care deficits   - Assess range of motion  - Assess patient's mobility  - Assess patient's need for assistive devices and provide as appropriate  - Encourage maximum independence but intervene and supervise when necessary  - Involve family in performance of ADLs  - Assess for home care needs following discharge   - Consider OT consult to assist with ADL evaluation and planning for discharge  - Provide patient education as appropriate  Outcome: Progressing     Problem: Nutrition/Hydration-ADULT  Goal: Nutrient/Hydration intake appropriate for improving, restoring or maintaining nutritional needs  Description: Monitor and assess patient's nutrition/hydration status for malnutrition. Collaborate with interdisciplinary team and initiate plan and interventions as ordered.  Monitor patient's weight and dietary intake as ordered or per policy. Utilize nutrition screening tool and intervene as necessary. Determine patient's food preferences and provide high-protein, high-caloric foods as appropriate.     INTERVENTIONS:  - Monitor oral intake, urinary output, labs, and treatment plans  - Assess nutrition and hydration status and recommend course of action  - Evaluate amount of meals eaten  - Assist patient with eating if necessary   - Allow adequate time for meals  - Recommend/ encourage appropriate diets, oral nutritional supplements, and vitamin/mineral supplements  - Order, calculate, and assess calorie counts as needed  - Assess need for intravenous fluids  - Provide specific nutrition/hydration education as appropriate  - Include patient/family/caregiver in decisions related to nutrition  Outcome: Progressing      [As Noted in HPI] : as noted in HPI [Negative] : Heme/Lymph

## 2024-10-02 ENCOUNTER — APPOINTMENT (OUTPATIENT)
Dept: NEUROSURGERY | Facility: CLINIC | Age: 79
End: 2024-10-02

## 2024-10-30 ENCOUNTER — APPOINTMENT (OUTPATIENT)
Dept: HEPATOLOGY | Facility: CLINIC | Age: 79
End: 2024-10-30

## 2025-03-03 ENCOUNTER — TRANSCRIPTION ENCOUNTER (OUTPATIENT)
Age: 80
End: 2025-03-03

## (undated) DEVICE — DRSG CURITY GAUZE SPONGE 4 X 4" 12-PLY

## (undated) DEVICE — DRAIN LIMITORR DRAIN SYSTEM 30ML

## (undated) DEVICE — ELCTR SUBDERMAL NDL 27G X 1/2" WITH TWISTED PAIR

## (undated) DEVICE — MIDAS REX LEGEND BALL FLUTED SM BORE 6.0MM X 10CM

## (undated) DEVICE — BIPOLAR FORCEP STRYKER STANDARD 7" X 0.5MM (YELLOW)

## (undated) DEVICE — CANISTER SUCTION 2000CC

## (undated) DEVICE — LONE STAR ELASTIC STAY HOOK 12MM BLUNT

## (undated) DEVICE — GOWN SLEEVES

## (undated) DEVICE — GLV 7.5 PROTEXIS (WHITE)

## (undated) DEVICE — ELCTR BIPOLAR PROBE

## (undated) DEVICE — ELCTR SUBDERMAL NDL CLASSIC 1.5M X 59" (6 COLOR)

## (undated) DEVICE — GLV 8 PROTEXIS (WHITE)

## (undated) DEVICE — DRAPE MAYO STAND 30"

## (undated) DEVICE — SUT VICRYL 3-0 18" X-1 (POP-OFF)

## (undated) DEVICE — MEDICATION LABELS W MARKER

## (undated) DEVICE — SOL IRR POUR H2O 250ML

## (undated) DEVICE — MIDAS REX LEGEND LUBRICANT DIFFUSER CARTRIDGE

## (undated) DEVICE — DRAIN JACKSON PRATT 7FR ROUND END NO TROCAR

## (undated) DEVICE — SOL IRR POUR NS 0.9% 500ML

## (undated) DEVICE — Device

## (undated) DEVICE — GOWN TRIMAX LG

## (undated) DEVICE — TRAP SPECIMEN SPUTUM 40CC

## (undated) DEVICE — ELCTR MONOPOLAR STIMULATOR PROBE FLUSH-TIP

## (undated) DEVICE — GLV 7 PROTEXIS (WHITE)

## (undated) DEVICE — ELCTR 4-DISC 20MM 49" (RED, BLUE, GREEN, BLACK)

## (undated) DEVICE — GLV 8.5 PROTEXIS (WHITE)

## (undated) DEVICE — VENODYNE/SCD SLEEVE CALF MEDIUM

## (undated) DEVICE — ELCTR SUBDERMAL CORKSCREW NDL 1.2MM

## (undated) DEVICE — ELCTR PEDICLE SCREW PROBE 3MM BALL 1.8MM X 100MM

## (undated) DEVICE — SYR LUER LOK 20CC

## (undated) DEVICE — WOUND IRR SURGIPHOR

## (undated) DEVICE — SYR ASEPTO

## (undated) DEVICE — MIDAS REX LEGEND TAPERED SM BORE 1.1MM X 8CM

## (undated) DEVICE — VISITEC 4X4

## (undated) DEVICE — ELCTR BIPOLAR CORD J&J 12FT DISP

## (undated) DEVICE — PREP CHLORAPREP TEAL 26ML

## (undated) DEVICE — ELCTR BOVIE TIP BLADE INSULATED 2.75" EDGE

## (undated) DEVICE — WARMING BLANKET LOWER ADULT

## (undated) DEVICE — SUCTION YANKAUER NO CONTROL VENT

## (undated) DEVICE — DRAPE TOWEL BLUE 17" X 24"

## (undated) DEVICE — DRAPE 3/4 SHEET W REINFORCEMENT 56X77"

## (undated) DEVICE — BLADE SCALPEL SAFETYLOCK #10

## (undated) DEVICE — DRSG XEROFORM 1 X 8"

## (undated) DEVICE — FOLEY TRAY 16FR LF URINE METER SURESTEP

## (undated) DEVICE — BLADE SCALPEL SAFETYLOCK #15

## (undated) DEVICE — DRAPE CAMERA VIDEO 7"X96"

## (undated) DEVICE — BATTERY PACK VARISPEED SINGLE USE

## (undated) DEVICE — MIDAS REX MR8 TAPERED SM BORE 2.3MM X 7CM FOOTED

## (undated) DEVICE — NDL COUNTER FOAM AND MAGNET 40-70

## (undated) DEVICE — BLADE SCALPEL SAFETYLOCK #11

## (undated) DEVICE — TAPE SILK 3"

## (undated) DEVICE — BIPOLAR FORCEP SYMMETRY BAYONET 7" X 1.5MM SMOOTH (SILVER)

## (undated) DEVICE — MIDAS REX MR7 LUBRICANT DIFFUSER CARTRIDGE

## (undated) DEVICE — DRAIN JACKSON PRATT 3 SPRING RESERVOIR W 10FR PVC DRAIN

## (undated) DEVICE — PREP CHLORAPREP HI-LITE ORANGE 26ML

## (undated) DEVICE — TUBING SUCTION 20FT

## (undated) DEVICE — STAPLER SKIN VISI-STAT 35 WIDE

## (undated) DEVICE — SUT NUROLON 4-0 8-18" TF (POP-OFF)

## (undated) DEVICE — SUT SOFSILK 0 30" V-20

## (undated) DEVICE — SUT ETHILON 3-0 18" FS-1

## (undated) DEVICE — TUBING CODMAN INTEGRATED BIPOLAR CORD & TUBING SET FLYING LEADS

## (undated) DEVICE — DRSG TELFA 3 X 8

## (undated) DEVICE — FOLEY TRAY 16FR 5CC LTX UMETER CLOSED

## (undated) DEVICE — VENODYNE/SCD SLEEVE CALF LARGE

## (undated) DEVICE — TUBING BIPOLAR IRRIGATOR AND CORD SET

## (undated) DEVICE — SPECIMEN CONTAINER 100ML

## (undated) DEVICE — DRAPE INSTRUMENT POUCH 6.75" X 11"

## (undated) DEVICE — DRAIN JACKSON PRATT 7MM FLAT FULL NO TROCAR

## (undated) DEVICE — PREP DURAPREP 26CC

## (undated) DEVICE — GLV 6.5 PROTEXIS (WHITE)

## (undated) DEVICE — GLV 7 DERMAPRENE ULTRA

## (undated) DEVICE — LAP PAD 18 X 18"

## (undated) DEVICE — AESCULAP SCALPFIX 10 CLIPS

## (undated) DEVICE — SET LEAD FLYING TUBE AND CORD RP

## (undated) DEVICE — GUIDE KIT TRAJECTORY BX EXTERNAL

## (undated) DEVICE — DRAPE LIGHT HANDLE COVER (BLUE)

## (undated) DEVICE — DRAPE 1/2 SHEET 40X57"

## (undated) DEVICE — CODMAN PERFORATOR 14MM (BLUE)

## (undated) DEVICE — SNAP ON SPHERZ 5 PACK

## (undated) DEVICE — MARKING PEN W RULER

## (undated) DEVICE — SUT VICRYL 2-0 18" CP-2 UNDYED (POP-OFF)

## (undated) DEVICE — DRAIN RESERVOIR FOR JACKSON PRATT 100CC CARDINAL

## (undated) DEVICE — SUT NUROLON 2-0 18" CP-2 (POP-OFF)

## (undated) DEVICE — POSITIONER FOAM EGG CRATE ULNAR 2PCS (PINK)

## (undated) DEVICE — MIDAS REX LEGEND TAPERED SM BORE 2.3MM X 8CM